# Patient Record
Sex: MALE | Race: WHITE | NOT HISPANIC OR LATINO | Employment: FULL TIME | URBAN - METROPOLITAN AREA
[De-identification: names, ages, dates, MRNs, and addresses within clinical notes are randomized per-mention and may not be internally consistent; named-entity substitution may affect disease eponyms.]

---

## 2017-10-23 ENCOUNTER — TRANSFERRED RECORDS (OUTPATIENT)
Dept: HEALTH INFORMATION MANAGEMENT | Facility: CLINIC | Age: 52
End: 2017-10-23

## 2017-10-31 ENCOUNTER — TRANSFERRED RECORDS (OUTPATIENT)
Dept: HEALTH INFORMATION MANAGEMENT | Facility: CLINIC | Age: 52
End: 2017-10-31

## 2017-12-01 DIAGNOSIS — N18.4 CKD (CHRONIC KIDNEY DISEASE) STAGE 4, GFR 15-29 ML/MIN (H): Primary | ICD-10-CM

## 2019-11-05 ENCOUNTER — APPOINTMENT (OUTPATIENT)
Age: 54
Setting detail: DERMATOLOGY
End: 2019-11-05

## 2019-11-05 DIAGNOSIS — L82.0 INFLAMED SEBORRHEIC KERATOSIS: ICD-10-CM

## 2019-11-05 DIAGNOSIS — L85.3 XEROSIS CUTIS: ICD-10-CM

## 2019-11-05 DIAGNOSIS — L82.1 OTHER SEBORRHEIC KERATOSIS: ICD-10-CM

## 2019-11-05 DIAGNOSIS — L29.8 OTHER PRURITUS: ICD-10-CM

## 2019-11-05 PROCEDURE — OTHER LIQUID NITROGEN: OTHER

## 2019-11-05 PROCEDURE — 17110 DESTRUCT B9 LESION 1-14: CPT

## 2019-11-05 PROCEDURE — 99213 OFFICE O/P EST LOW 20 MIN: CPT | Mod: 25

## 2019-11-05 PROCEDURE — OTHER COUNSELING: OTHER

## 2019-11-05 PROCEDURE — OTHER PRESCRIPTION: OTHER

## 2019-11-05 RX ORDER — FLUOCINONIDE 0.5 MG/G
CREAM TOPICAL
Qty: 1 | Refills: 0 | Status: ERX | COMMUNITY
Start: 2019-11-05

## 2019-11-05 ASSESSMENT — LOCATION SIMPLE DESCRIPTION DERM
LOCATION SIMPLE: RIGHT UPPER BACK
LOCATION SIMPLE: UPPER BACK
LOCATION SIMPLE: LEFT LOWER BACK

## 2019-11-05 ASSESSMENT — LOCATION DETAILED DESCRIPTION DERM
LOCATION DETAILED: LEFT SUPERIOR MEDIAL MIDBACK
LOCATION DETAILED: RIGHT SUPERIOR UPPER BACK
LOCATION DETAILED: INFERIOR THORACIC SPINE

## 2019-11-05 ASSESSMENT — LOCATION ZONE DERM: LOCATION ZONE: TRUNK

## 2019-11-05 NOTE — PROCEDURE: COUNSELING
Detail Level: Generalized
Detail Level: Zone
Detail Level: Detailed
Patient Specific Counseling (Will Not Stick From Patient To Patient): Samples of Eucerin and Cerave SA given to trial.

## 2019-11-05 NOTE — HPI: SKIN LESIONS
How Severe Is Your Skin Lesion?: moderate
Have Your Skin Lesions Been Treated?: not been treated
Is This A New Presentation, Or A Follow-Up?: Growths
Additional History: Itchy/ burning spot on back, no treatment. Few growths on legs, unsure what they are

## 2019-11-05 NOTE — PROCEDURE: LIQUID NITROGEN
Duration Of Freeze Thaw-Cycle (Seconds): 5-10
Render Note In Bullet Format When Appropriate: No
Post-Care Instructions: I reviewed with the patient in detail post-care instructions. Patient is to wear sunprotection, and avoid picking at any of the treated lesions. Pt may apply Vaseline to crusted or scabbing areas.
Medical Necessity Clause: This procedure was medically necessary because the lesions that were treated were:
Number Of Freeze-Thaw Cycles: 2 freeze-thaw cycles
Medical Necessity Information: It is in your best interest to select a reason for this procedure from the list below. All of these items fulfill various CMS LCD requirements except the new and changing color options.
Consent: The patient's consent was obtained including but not limited to risks of crusting, scabbing, blistering, scarring, darker or lighter pigmentary change, recurrence, incomplete removal and infection.
Detail Level: Simple

## 2020-10-21 ENCOUNTER — APPOINTMENT (OUTPATIENT)
Dept: URBAN - METROPOLITAN AREA CLINIC 257 | Age: 55
Setting detail: DERMATOLOGY
End: 2020-10-22

## 2020-10-21 DIAGNOSIS — D22 MELANOCYTIC NEVI: ICD-10-CM

## 2020-10-21 DIAGNOSIS — L57.8 OTHER SKIN CHANGES DUE TO CHRONIC EXPOSURE TO NONIONIZING RADIATION: ICD-10-CM

## 2020-10-21 DIAGNOSIS — L82.0 INFLAMED SEBORRHEIC KERATOSIS: ICD-10-CM

## 2020-10-21 DIAGNOSIS — B00.1 HERPESVIRAL VESICULAR DERMATITIS: ICD-10-CM

## 2020-10-21 PROBLEM — D22.5 MELANOCYTIC NEVI OF TRUNK: Status: ACTIVE | Noted: 2020-10-21

## 2020-10-21 PROBLEM — D48.5 NEOPLASM OF UNCERTAIN BEHAVIOR OF SKIN: Status: ACTIVE | Noted: 2020-10-21

## 2020-10-21 PROCEDURE — OTHER PRESCRIPTION: OTHER

## 2020-10-21 PROCEDURE — 11302 SHAVE SKIN LESION 1.1-2.0 CM: CPT

## 2020-10-21 PROCEDURE — OTHER COUNSELING: OTHER

## 2020-10-21 PROCEDURE — 99214 OFFICE O/P EST MOD 30 MIN: CPT | Mod: 25

## 2020-10-21 PROCEDURE — OTHER SHAVE REMOVAL: OTHER

## 2020-10-21 PROCEDURE — OTHER PATHOLOGY BILLING: OTHER

## 2020-10-21 PROCEDURE — 88305 TISSUE EXAM BY PATHOLOGIST: CPT

## 2020-10-21 RX ORDER — LIDOCAINE 4% 4 G/100G
GEL TOPICAL
Qty: 30 | Refills: 2 | Status: ERX | COMMUNITY
Start: 2020-10-21

## 2020-10-21 ASSESSMENT — LOCATION SIMPLE DESCRIPTION DERM
LOCATION SIMPLE: LEFT THIGH
LOCATION SIMPLE: RIGHT UPPER BACK
LOCATION SIMPLE: UPPER BACK

## 2020-10-21 ASSESSMENT — LOCATION DETAILED DESCRIPTION DERM
LOCATION DETAILED: LEFT ANTERIOR DISTAL THIGH
LOCATION DETAILED: RIGHT INFERIOR UPPER BACK
LOCATION DETAILED: INFERIOR THORACIC SPINE

## 2020-10-21 ASSESSMENT — LOCATION ZONE DERM
LOCATION ZONE: TRUNK
LOCATION ZONE: LEG

## 2020-10-21 NOTE — PROCEDURE: SHAVE REMOVAL
Anesthesia Volume In Cc: 0.6
Wound Care: Petrolatum
Bill 45461 For Specimen Handling/Conveyance To Laboratory?: no
Render Post-Care Instructions In Note?: yes
Biopsy Method: Dermablade
Notification Instructions: Patient will be notified of pathology results. However, patient instructed to call the office if not contacted within 2 weeks.
Detail Level: Detailed
Size Of Lesion In Cm (Required): 1.1
Consent was obtained from the patient. The risks and benefits to therapy were discussed in detail. Specifically, the risks of infection, scarring, bleeding, prolonged wound healing, incomplete removal, allergy to anesthesia, nerve injury and recurrence were addressed. Prior to the procedure, the treatment site was clearly identified and confirmed by the patient. All components of Universal Protocol/PAUSE Rule completed.
Anesthesia Type: 1% lidocaine with epinephrine
Post-Care Instructions: I reviewed with the patient in detail post-care instructions. Patient is to keep the biopsy site covered with Vaseline and bandage for 1 week
Hemostasis: Drysol
Path Notes (To The Dermatopathologist): Please check margins.
Billing Type: Third-Party Bill
X Size Of Lesion In Cm (Optional): 0
Medical Necessity Clause: This procedure was medically necessary because the lesion that was treated was:
Medical Necessity Information: It is in your best interest to select a reason for this procedure from the list below. All of these items fulfill various CMS LCD requirements except the new and changing color options.

## 2020-10-21 NOTE — PROCEDURE: PATHOLOGY BILLING
Immunohistochemistry ([id number] and [id number]) billing is not performed here. Please use the Immunohistochemistry Stain Billing plan to accomplish this. Immunohistochemistry ([id number] and [id number]) billing is not performed here. Please use the Immunohistochemistry Stain Billing plan to accomplish this.

## 2020-10-21 NOTE — HPI: FULL BODY SKIN EXAMINATION
How Severe Are Your Spot(S)?: moderate
What Type Of Note Output Would You Prefer (Optional)?: Bullet Format
What Is The Reason For Today's Visit?: Full Body Skin Examination with No Concerns
What Is The Reason For Today's Visit? (Being Monitored For X): the risk of recurrence of previously treated lesion(s)

## 2021-06-04 ENCOUNTER — APPOINTMENT (OUTPATIENT)
Dept: URBAN - METROPOLITAN AREA CLINIC 257 | Age: 56
Setting detail: DERMATOLOGY
End: 2021-06-04

## 2021-06-04 DIAGNOSIS — L57.8 OTHER SKIN CHANGES DUE TO CHRONIC EXPOSURE TO NONIONIZING RADIATION: ICD-10-CM

## 2021-06-04 DIAGNOSIS — L82.0 INFLAMED SEBORRHEIC KERATOSIS: ICD-10-CM

## 2021-06-04 DIAGNOSIS — L82.1 OTHER SEBORRHEIC KERATOSIS: ICD-10-CM

## 2021-06-04 PROBLEM — D48.5 NEOPLASM OF UNCERTAIN BEHAVIOR OF SKIN: Status: ACTIVE | Noted: 2021-06-04

## 2021-06-04 PROCEDURE — OTHER COUNSELING: OTHER

## 2021-06-04 PROCEDURE — 11306 SHAVE SKIN LESION 0.6-1.0 CM: CPT

## 2021-06-04 PROCEDURE — OTHER REASSURANCE: OTHER

## 2021-06-04 PROCEDURE — OTHER SHAVE REMOVAL: OTHER

## 2021-06-04 PROCEDURE — 99213 OFFICE O/P EST LOW 20 MIN: CPT | Mod: 25

## 2021-06-04 PROCEDURE — 17110 DESTRUCT B9 LESION 1-14: CPT | Mod: 59

## 2021-06-04 PROCEDURE — OTHER LIQUID NITROGEN: OTHER

## 2021-06-04 ASSESSMENT — LOCATION SIMPLE DESCRIPTION DERM
LOCATION SIMPLE: LEFT PRETIBIAL REGION
LOCATION SIMPLE: LEFT FOREARM
LOCATION SIMPLE: RIGHT THIGH
LOCATION SIMPLE: LEFT UPPER ARM
LOCATION SIMPLE: LEFT ANTERIOR NECK

## 2021-06-04 ASSESSMENT — LOCATION DETAILED DESCRIPTION DERM
LOCATION DETAILED: LEFT ANTERIOR DISTAL UPPER ARM
LOCATION DETAILED: LEFT PROXIMAL DORSAL FOREARM
LOCATION DETAILED: LEFT CLAVICULAR NECK
LOCATION DETAILED: RIGHT ANTERIOR DISTAL THIGH
LOCATION DETAILED: LEFT MEDIAL PROXIMAL PRETIBIAL REGION

## 2021-06-04 ASSESSMENT — LOCATION ZONE DERM
LOCATION ZONE: LEG
LOCATION ZONE: NECK
LOCATION ZONE: ARM

## 2021-06-04 NOTE — PROCEDURE: LIQUID NITROGEN
Medical Necessity Clause: This procedure was medically necessary because the lesions that were treated were:
Consent: The patient's consent was obtained including but not limited to risks of crusting, scabbing, blistering, scarring, darker or lighter pigmentary change, recurrence, incomplete removal and infection.
Post-Care Instructions: I reviewed with the patient in detail post-care instructions. Patient is to wear sunprotection, and avoid picking at any of the treated lesions. Pt may apply Vaseline to crusted or scabbing areas.
Detail Level: Generalized
Render Post-Care Instructions In Note?: no
Medical Necessity Information: It is in your best interest to select a reason for this procedure from the list below. All of these items fulfill various CMS LCD requirements except the new and changing color options.

## 2021-06-04 NOTE — PROCEDURE: SHAVE REMOVAL
Consent was obtained from the patient. The risks and benefits to therapy were discussed in detail. Specifically, the risks of infection, scarring, bleeding, prolonged wound healing, incomplete removal, allergy to anesthesia, nerve injury and recurrence were addressed. Prior to the procedure, the treatment site was clearly identified and confirmed by the patient. All components of Universal Protocol/PAUSE Rule completed.
Hemostasis: Drysol
Bill [id number] For Specimen Handling/Conveyance To Laboratory?: no
Biopsy Method: Dermablade
Path Notes (To The Dermatopathologist): Please check margins. atypical pigment network only on DermLite exam
Anesthesia Type: 1% lidocaine with epinephrine
Medical Necessity Clause: This procedure was medically necessary because the lesion that was treated was:
Medical Necessity Information: It is in your best interest to select a reason for this procedure from the list below. All of these items fulfill various CMS LCD requirements except the new and changing color options.
Notification Instructions: Patient will be notified of biopsy results. However, patient instructed to call the office if not contacted within 2 weeks.
X Size Of Lesion In Cm (Optional): 0
Wound Care: Petrolatum
Post-Care Instructions: I reviewed with the patient in detail post-care instructions. Patient is to keep the biopsy site dry overnight, and then apply bacitracin twice daily until healed. Patient may apply hydrogen peroxide soaks to remove any crusting.
Was A Bandage Applied: Yes
Detail Level: Detailed
Size Of Lesion In Cm (Required): 0.6
Billing Type: Third-Party Bill

## 2021-10-12 ENCOUNTER — LAB REQUISITION (OUTPATIENT)
Dept: LAB | Facility: CLINIC | Age: 56
End: 2021-10-12
Payer: COMMERCIAL

## 2021-10-12 PROCEDURE — 88312 SPECIAL STAINS GROUP 1: CPT | Mod: TC | Performed by: DENTIST

## 2022-06-24 ENCOUNTER — APPOINTMENT (OUTPATIENT)
Dept: MRI IMAGING | Facility: CLINIC | Age: 57
DRG: 459 | End: 2022-06-24
Attending: EMERGENCY MEDICINE
Payer: MEDICARE

## 2022-06-24 ENCOUNTER — HOSPITAL ENCOUNTER (INPATIENT)
Facility: CLINIC | Age: 57
LOS: 21 days | Discharge: NURSING, REHAB OR LONG-TERM CARE | DRG: 459 | End: 2022-07-15
Attending: EMERGENCY MEDICINE | Admitting: INTERNAL MEDICINE
Payer: MEDICARE

## 2022-06-24 ENCOUNTER — APPOINTMENT (OUTPATIENT)
Dept: MRI IMAGING | Facility: CLINIC | Age: 57
DRG: 459 | End: 2022-06-24
Attending: PHYSICIAN ASSISTANT
Payer: MEDICARE

## 2022-06-24 ENCOUNTER — APPOINTMENT (OUTPATIENT)
Dept: CT IMAGING | Facility: CLINIC | Age: 57
DRG: 459 | End: 2022-06-24
Attending: PHYSICIAN ASSISTANT
Payer: MEDICARE

## 2022-06-24 DIAGNOSIS — M46.40 DISCITIS, UNSPECIFIED SPINAL REGION: ICD-10-CM

## 2022-06-24 DIAGNOSIS — D63.1 ANEMIA OF CHRONIC RENAL FAILURE, UNSPECIFIED CKD STAGE: ICD-10-CM

## 2022-06-24 DIAGNOSIS — M47.14 DEGENERATIVE ARTHRITIS OF THORACIC SPINE WITH CORD COMPRESSION: ICD-10-CM

## 2022-06-24 DIAGNOSIS — N18.9 ANEMIA OF CHRONIC RENAL FAILURE, UNSPECIFIED CKD STAGE: ICD-10-CM

## 2022-06-24 LAB
ALBUMIN SERPL-MCNC: 3.3 G/DL (ref 3.4–5)
ALP SERPL-CCNC: 152 U/L (ref 40–150)
ALT SERPL W P-5'-P-CCNC: 55 U/L (ref 0–70)
ANION GAP SERPL CALCULATED.3IONS-SCNC: 14 MMOL/L (ref 3–14)
AST SERPL W P-5'-P-CCNC: 51 U/L (ref 0–45)
BASOPHILS # BLD AUTO: 0.1 10E3/UL (ref 0–0.2)
BASOPHILS NFR BLD AUTO: 1 %
BILIRUB SERPL-MCNC: 0.6 MG/DL (ref 0.2–1.3)
BUN SERPL-MCNC: 39 MG/DL (ref 7–30)
CALCIUM SERPL-MCNC: 9.9 MG/DL (ref 8.5–10.1)
CHLORIDE BLD-SCNC: 86 MMOL/L (ref 94–109)
CO2 SERPL-SCNC: 26 MMOL/L (ref 20–32)
CREAT SERPL-MCNC: 8.68 MG/DL (ref 0.66–1.25)
CRP SERPL-MCNC: 32.7 MG/L (ref 0–8)
EOSINOPHIL # BLD AUTO: 0.3 10E3/UL (ref 0–0.7)
EOSINOPHIL NFR BLD AUTO: 4 %
ERYTHROCYTE [DISTWIDTH] IN BLOOD BY AUTOMATED COUNT: 14.4 % (ref 10–15)
GFR SERPL CREATININE-BSD FRML MDRD: 7 ML/MIN/1.73M2
GLUCOSE BLD-MCNC: 144 MG/DL (ref 70–99)
HCT VFR BLD AUTO: 31.5 % (ref 40–53)
HGB BLD-MCNC: 10 G/DL (ref 13.3–17.7)
HOLD SPECIMEN: NORMAL
IMM GRANULOCYTES # BLD: 0 10E3/UL
IMM GRANULOCYTES NFR BLD: 1 %
LYMPHOCYTES # BLD AUTO: 1 10E3/UL (ref 0.8–5.3)
LYMPHOCYTES NFR BLD AUTO: 15 %
MCH RBC QN AUTO: 34.4 PG (ref 26.5–33)
MCHC RBC AUTO-ENTMCNC: 31.7 G/DL (ref 31.5–36.5)
MCV RBC AUTO: 108 FL (ref 78–100)
MONOCYTES # BLD AUTO: 0.5 10E3/UL (ref 0–1.3)
MONOCYTES NFR BLD AUTO: 7 %
NEUTROPHILS # BLD AUTO: 4.8 10E3/UL (ref 1.6–8.3)
NEUTROPHILS NFR BLD AUTO: 72 %
NRBC # BLD AUTO: 0 10E3/UL
NRBC BLD AUTO-RTO: 0 /100
PLATELET # BLD AUTO: 198 10E3/UL (ref 150–450)
POTASSIUM BLD-SCNC: 3.8 MMOL/L (ref 3.4–5.3)
PROT SERPL-MCNC: 7.3 G/DL (ref 6.8–8.8)
RBC # BLD AUTO: 2.91 10E6/UL (ref 4.4–5.9)
SARS-COV-2 RNA RESP QL NAA+PROBE: NEGATIVE
SODIUM SERPL-SCNC: 126 MMOL/L (ref 133–144)
WBC # BLD AUTO: 6.6 10E3/UL (ref 4–11)

## 2022-06-24 PROCEDURE — 255N000002 HC RX 255 OP 636: Performed by: EMERGENCY MEDICINE

## 2022-06-24 PROCEDURE — 96376 TX/PRO/DX INJ SAME DRUG ADON: CPT

## 2022-06-24 PROCEDURE — G1010 CDSM STANSON: HCPCS

## 2022-06-24 PROCEDURE — 250N000013 HC RX MED GY IP 250 OP 250 PS 637: Performed by: INTERNAL MEDICINE

## 2022-06-24 PROCEDURE — 250N000011 HC RX IP 250 OP 636: Performed by: EMERGENCY MEDICINE

## 2022-06-24 PROCEDURE — 36415 COLL VENOUS BLD VENIPUNCTURE: CPT | Performed by: EMERGENCY MEDICINE

## 2022-06-24 PROCEDURE — 99285 EMERGENCY DEPT VISIT HI MDM: CPT | Mod: 25

## 2022-06-24 PROCEDURE — 87040 BLOOD CULTURE FOR BACTERIA: CPT | Performed by: EMERGENCY MEDICINE

## 2022-06-24 PROCEDURE — 85025 COMPLETE CBC W/AUTO DIFF WBC: CPT | Performed by: EMERGENCY MEDICINE

## 2022-06-24 PROCEDURE — 99221 1ST HOSP IP/OBS SF/LOW 40: CPT | Performed by: PHYSICIAN ASSISTANT

## 2022-06-24 PROCEDURE — U0003 INFECTIOUS AGENT DETECTION BY NUCLEIC ACID (DNA OR RNA); SEVERE ACUTE RESPIRATORY SYNDROME CORONAVIRUS 2 (SARS-COV-2) (CORONAVIRUS DISEASE [COVID-19]), AMPLIFIED PROBE TECHNIQUE, MAKING USE OF HIGH THROUGHPUT TECHNOLOGIES AS DESCRIBED BY CMS-2020-01-R: HCPCS | Performed by: EMERGENCY MEDICINE

## 2022-06-24 PROCEDURE — A9585 GADOBUTROL INJECTION: HCPCS | Performed by: EMERGENCY MEDICINE

## 2022-06-24 PROCEDURE — 250N000011 HC RX IP 250 OP 636: Performed by: INTERNAL MEDICINE

## 2022-06-24 PROCEDURE — 72146 MRI CHEST SPINE W/O DYE: CPT | Mod: MG

## 2022-06-24 PROCEDURE — 86140 C-REACTIVE PROTEIN: CPT | Performed by: EMERGENCY MEDICINE

## 2022-06-24 PROCEDURE — 99223 1ST HOSP IP/OBS HIGH 75: CPT | Mod: AI | Performed by: INTERNAL MEDICINE

## 2022-06-24 PROCEDURE — 96375 TX/PRO/DX INJ NEW DRUG ADDON: CPT

## 2022-06-24 PROCEDURE — C9803 HOPD COVID-19 SPEC COLLECT: HCPCS

## 2022-06-24 PROCEDURE — 82040 ASSAY OF SERUM ALBUMIN: CPT | Performed by: EMERGENCY MEDICINE

## 2022-06-24 PROCEDURE — 96374 THER/PROPH/DIAG INJ IV PUSH: CPT | Mod: 59

## 2022-06-24 PROCEDURE — 80053 COMPREHEN METABOLIC PANEL: CPT | Performed by: EMERGENCY MEDICINE

## 2022-06-24 PROCEDURE — 120N000001 HC R&B MED SURG/OB

## 2022-06-24 RX ORDER — CYCLOBENZAPRINE HCL 10 MG
10 TABLET ORAL 3 TIMES DAILY PRN
Status: DISCONTINUED | OUTPATIENT
Start: 2022-06-24 | End: 2022-07-15 | Stop reason: HOSPADM

## 2022-06-24 RX ORDER — ALPRAZOLAM 0.25 MG
1 TABLET ORAL 2 TIMES DAILY PRN
Status: DISCONTINUED | OUTPATIENT
Start: 2022-06-24 | End: 2022-07-15 | Stop reason: HOSPADM

## 2022-06-24 RX ORDER — AMOXICILLIN 250 MG
2 CAPSULE ORAL 2 TIMES DAILY PRN
Status: DISCONTINUED | OUTPATIENT
Start: 2022-06-24 | End: 2022-07-01

## 2022-06-24 RX ORDER — AMMONIUM LACTATE 12 G/100G
CREAM TOPICAL 2 TIMES DAILY PRN
Status: ON HOLD | COMMUNITY
End: 2022-12-29

## 2022-06-24 RX ORDER — AMOXICILLIN 250 MG
1 CAPSULE ORAL 2 TIMES DAILY PRN
Status: DISCONTINUED | OUTPATIENT
Start: 2022-06-24 | End: 2022-07-01

## 2022-06-24 RX ORDER — NALOXONE HYDROCHLORIDE 0.4 MG/ML
0.2 INJECTION, SOLUTION INTRAMUSCULAR; INTRAVENOUS; SUBCUTANEOUS
Status: DISCONTINUED | OUTPATIENT
Start: 2022-06-24 | End: 2022-07-15 | Stop reason: HOSPADM

## 2022-06-24 RX ORDER — METHYLPREDNISOLONE SODIUM SUCCINATE 125 MG/2ML
125 INJECTION, POWDER, LYOPHILIZED, FOR SOLUTION INTRAMUSCULAR; INTRAVENOUS ONCE
Status: COMPLETED | OUTPATIENT
Start: 2022-06-24 | End: 2022-06-24

## 2022-06-24 RX ORDER — FINASTERIDE 5 MG/1
1.25 TABLET, FILM COATED ORAL DAILY
Status: DISCONTINUED | OUTPATIENT
Start: 2022-06-25 | End: 2022-07-15 | Stop reason: HOSPADM

## 2022-06-24 RX ORDER — MULTIVITAMIN WITH IRON
100 TABLET ORAL DAILY
Status: ON HOLD | COMMUNITY
End: 2022-12-29

## 2022-06-24 RX ORDER — LIDOCAINE 40 MG/G
CREAM TOPICAL
Status: DISCONTINUED | OUTPATIENT
Start: 2022-06-24 | End: 2022-07-07

## 2022-06-24 RX ORDER — OMEPRAZOLE 10 MG/1
20 CAPSULE, DELAYED RELEASE ORAL DAILY
COMMUNITY
End: 2022-06-24

## 2022-06-24 RX ORDER — GADOBUTROL 604.72 MG/ML
14 INJECTION INTRAVENOUS ONCE
Status: COMPLETED | OUTPATIENT
Start: 2022-06-24 | End: 2022-06-24

## 2022-06-24 RX ORDER — DIPHENHYDRAMINE HYDROCHLORIDE 50 MG/ML
50 INJECTION INTRAMUSCULAR; INTRAVENOUS
Status: DISCONTINUED | OUTPATIENT
Start: 2022-06-24 | End: 2022-06-24

## 2022-06-24 RX ORDER — POLYETHYLENE GLYCOL 3350 17 G/17G
17 POWDER, FOR SOLUTION ORAL DAILY PRN
Status: DISCONTINUED | OUTPATIENT
Start: 2022-06-24 | End: 2022-07-15 | Stop reason: HOSPADM

## 2022-06-24 RX ORDER — NALOXONE HYDROCHLORIDE 0.4 MG/ML
0.4 INJECTION, SOLUTION INTRAMUSCULAR; INTRAVENOUS; SUBCUTANEOUS
Status: DISCONTINUED | OUTPATIENT
Start: 2022-06-24 | End: 2022-07-15 | Stop reason: HOSPADM

## 2022-06-24 RX ORDER — PROCHLORPERAZINE 25 MG
25 SUPPOSITORY, RECTAL RECTAL EVERY 12 HOURS PRN
Status: DISCONTINUED | OUTPATIENT
Start: 2022-06-24 | End: 2022-07-15 | Stop reason: HOSPADM

## 2022-06-24 RX ORDER — FINASTERIDE 5 MG/1
1.25 TABLET, FILM COATED ORAL DAILY
COMMUNITY

## 2022-06-24 RX ORDER — DULOXETIN HYDROCHLORIDE 30 MG/1
30 CAPSULE, DELAYED RELEASE ORAL DAILY
COMMUNITY
End: 2022-08-04

## 2022-06-24 RX ORDER — VANCOMYCIN HYDROCHLORIDE 1 G/200ML
1000 INJECTION, SOLUTION INTRAVENOUS ONCE
Status: DISCONTINUED | OUTPATIENT
Start: 2022-06-24 | End: 2022-06-24

## 2022-06-24 RX ORDER — ATORVASTATIN CALCIUM 10 MG/1
10 TABLET, FILM COATED ORAL DAILY
COMMUNITY
End: 2022-06-24

## 2022-06-24 RX ORDER — UREA 10 %
500 LOTION (ML) TOPICAL DAILY
Status: ON HOLD | COMMUNITY
End: 2022-12-29

## 2022-06-24 RX ORDER — DULOXETIN HYDROCHLORIDE 30 MG/1
30 CAPSULE, DELAYED RELEASE ORAL DAILY
Status: DISCONTINUED | OUTPATIENT
Start: 2022-06-24 | End: 2022-07-15 | Stop reason: HOSPADM

## 2022-06-24 RX ORDER — MIDODRINE HYDROCHLORIDE 10 MG/1
10 TABLET ORAL DAILY PRN
Status: ON HOLD | COMMUNITY
End: 2022-07-15

## 2022-06-24 RX ORDER — HYDROMORPHONE HYDROCHLORIDE 1 MG/ML
0.5 INJECTION, SOLUTION INTRAMUSCULAR; INTRAVENOUS; SUBCUTANEOUS
Status: COMPLETED | OUTPATIENT
Start: 2022-06-24 | End: 2022-06-24

## 2022-06-24 RX ORDER — ATORVASTATIN CALCIUM 20 MG/1
20 TABLET, FILM COATED ORAL AT BEDTIME
Status: DISCONTINUED | OUTPATIENT
Start: 2022-06-24 | End: 2022-07-15 | Stop reason: HOSPADM

## 2022-06-24 RX ORDER — PIPERACILLIN SODIUM, TAZOBACTAM SODIUM 4; .5 G/20ML; G/20ML
4.5 INJECTION, POWDER, LYOPHILIZED, FOR SOLUTION INTRAVENOUS ONCE
Status: DISCONTINUED | OUTPATIENT
Start: 2022-06-24 | End: 2022-06-24

## 2022-06-24 RX ORDER — SERTRALINE HYDROCHLORIDE 100 MG/1
100 TABLET, FILM COATED ORAL DAILY
Status: ON HOLD | COMMUNITY
End: 2022-12-29

## 2022-06-24 RX ORDER — DULOXETIN HYDROCHLORIDE 20 MG/1
20 CAPSULE, DELAYED RELEASE ORAL 2 TIMES DAILY
COMMUNITY
End: 2022-06-24

## 2022-06-24 RX ORDER — METHYLPREDNISOLONE SODIUM SUCCINATE 125 MG/2ML
125 INJECTION, POWDER, LYOPHILIZED, FOR SOLUTION INTRAMUSCULAR; INTRAVENOUS
Status: DISCONTINUED | OUTPATIENT
Start: 2022-06-24 | End: 2022-06-24

## 2022-06-24 RX ORDER — SERTRALINE HYDROCHLORIDE 100 MG/1
100 TABLET, FILM COATED ORAL DAILY
Status: DISCONTINUED | OUTPATIENT
Start: 2022-06-25 | End: 2022-07-15 | Stop reason: HOSPADM

## 2022-06-24 RX ORDER — GABAPENTIN 300 MG/1
300 CAPSULE ORAL 3 TIMES DAILY
Status: DISCONTINUED | OUTPATIENT
Start: 2022-06-24 | End: 2022-07-05

## 2022-06-24 RX ORDER — BISACODYL 10 MG
10 SUPPOSITORY, RECTAL RECTAL DAILY PRN
Status: DISCONTINUED | OUTPATIENT
Start: 2022-06-24 | End: 2022-07-15 | Stop reason: HOSPADM

## 2022-06-24 RX ORDER — FLUTICASONE PROPIONATE 50 MCG
2 SPRAY, SUSPENSION (ML) NASAL DAILY PRN
Status: ON HOLD | COMMUNITY
End: 2022-12-29

## 2022-06-24 RX ORDER — PROCHLORPERAZINE MALEATE 10 MG
10 TABLET ORAL EVERY 6 HOURS PRN
Status: DISCONTINUED | OUTPATIENT
Start: 2022-06-24 | End: 2022-07-15 | Stop reason: HOSPADM

## 2022-06-24 RX ORDER — GABAPENTIN 300 MG/1
300 CAPSULE ORAL 3 TIMES DAILY
Status: ON HOLD | COMMUNITY
End: 2022-07-15

## 2022-06-24 RX ORDER — ALPRAZOLAM 1 MG
0.5 TABLET ORAL 2 TIMES DAILY PRN
Status: ON HOLD | COMMUNITY
End: 2022-12-29

## 2022-06-24 RX ORDER — HYDROMORPHONE HYDROCHLORIDE 1 MG/ML
0.5 INJECTION, SOLUTION INTRAMUSCULAR; INTRAVENOUS; SUBCUTANEOUS
Status: DISCONTINUED | OUTPATIENT
Start: 2022-06-24 | End: 2022-06-28

## 2022-06-24 RX ORDER — VIT B COMP NO.3/FOLIC/C/BIOTIN 1 MG-60 MG
1 TABLET ORAL DAILY
Status: ON HOLD | COMMUNITY
End: 2022-12-29

## 2022-06-24 RX ORDER — ATORVASTATIN CALCIUM 20 MG/1
20 TABLET, FILM COATED ORAL AT BEDTIME
Status: ON HOLD | COMMUNITY
End: 2022-12-29

## 2022-06-24 RX ORDER — CYCLOBENZAPRINE HCL 10 MG
10 TABLET ORAL 3 TIMES DAILY PRN
Status: ON HOLD | COMMUNITY
End: 2022-07-29

## 2022-06-24 RX ORDER — PIPERACILLIN SODIUM, TAZOBACTAM SODIUM 2; .25 G/10ML; G/10ML
2.25 INJECTION, POWDER, LYOPHILIZED, FOR SOLUTION INTRAVENOUS ONCE
Status: DISCONTINUED | OUTPATIENT
Start: 2022-06-24 | End: 2022-06-24

## 2022-06-24 RX ORDER — ONDANSETRON 4 MG/1
4 TABLET, ORALLY DISINTEGRATING ORAL EVERY 6 HOURS PRN
Status: DISCONTINUED | OUTPATIENT
Start: 2022-06-24 | End: 2022-07-15 | Stop reason: HOSPADM

## 2022-06-24 RX ORDER — ONDANSETRON 2 MG/ML
4 INJECTION INTRAMUSCULAR; INTRAVENOUS EVERY 6 HOURS PRN
Status: DISCONTINUED | OUTPATIENT
Start: 2022-06-24 | End: 2022-07-15 | Stop reason: HOSPADM

## 2022-06-24 RX ORDER — OXYCODONE HYDROCHLORIDE 5 MG/1
5 TABLET ORAL EVERY 4 HOURS PRN
Status: DISCONTINUED | OUTPATIENT
Start: 2022-06-24 | End: 2022-06-28

## 2022-06-24 RX ADMIN — HYDROMORPHONE HYDROCHLORIDE 0.5 MG: 1 INJECTION, SOLUTION INTRAMUSCULAR; INTRAVENOUS; SUBCUTANEOUS at 18:08

## 2022-06-24 RX ADMIN — HYDROMORPHONE HYDROCHLORIDE 0.5 MG: 1 INJECTION, SOLUTION INTRAMUSCULAR; INTRAVENOUS; SUBCUTANEOUS at 20:09

## 2022-06-24 RX ADMIN — HYDROMORPHONE HYDROCHLORIDE 0.5 MG: 1 INJECTION, SOLUTION INTRAMUSCULAR; INTRAVENOUS; SUBCUTANEOUS at 22:25

## 2022-06-24 RX ADMIN — HYDROMORPHONE HYDROCHLORIDE 0.5 MG: 1 INJECTION, SOLUTION INTRAMUSCULAR; INTRAVENOUS; SUBCUTANEOUS at 15:48

## 2022-06-24 RX ADMIN — DULOXETINE 30 MG: 30 CAPSULE, DELAYED RELEASE ORAL at 20:09

## 2022-06-24 RX ADMIN — HYDROMORPHONE HYDROCHLORIDE 0.5 MG: 1 INJECTION, SOLUTION INTRAMUSCULAR; INTRAVENOUS; SUBCUTANEOUS at 14:33

## 2022-06-24 RX ADMIN — HYDROMORPHONE HYDROCHLORIDE 0.5 MG: 1 INJECTION, SOLUTION INTRAMUSCULAR; INTRAVENOUS; SUBCUTANEOUS at 08:43

## 2022-06-24 RX ADMIN — ATORVASTATIN CALCIUM 20 MG: 20 TABLET, FILM COATED ORAL at 22:25

## 2022-06-24 RX ADMIN — CYCLOBENZAPRINE 10 MG: 10 TABLET, FILM COATED ORAL at 20:35

## 2022-06-24 RX ADMIN — GABAPENTIN 300 MG: 300 CAPSULE ORAL at 22:25

## 2022-06-24 RX ADMIN — METHYLPREDNISOLONE SODIUM SUCCINATE 125 MG: 125 INJECTION, POWDER, FOR SOLUTION INTRAMUSCULAR; INTRAVENOUS at 08:41

## 2022-06-24 RX ADMIN — GADOBUTROL 14 ML: 604.72 INJECTION INTRAVENOUS at 16:13

## 2022-06-24 ASSESSMENT — ACTIVITIES OF DAILY LIVING (ADL)
TOILETING: 1-->ASSISTANCE (EQUIPMENT/PERSON) NEEDED
DRESS: 1-->ASSISTANCE (EQUIPMENT/PERSON) NEEDED
TOILETING_ISSUES: YES
CONCENTRATING,_REMEMBERING_OR_MAKING_DECISIONS_DIFFICULTY: NO
ADLS_ACUITY_SCORE: 39
ADLS_ACUITY_SCORE: 44
DIFFICULTY_EATING/SWALLOWING: NO
TOILETING: 1-->ASSISTANCE (EQUIPMENT/PERSON) NEEDED (NOT DEVELOPMENTALLY APPROPRIATE)
ADLS_ACUITY_SCORE: 39
WALKING_OR_CLIMBING_STAIRS_DIFFICULTY: YES
WEAR_GLASSES_OR_BLIND: YES
VISION_MANAGEMENT: READING
FALL_HISTORY_WITHIN_LAST_SIX_MONTHS: YES
TRANSFERRING: 2-->COMPLETELY DEPENDENT
BATHING: 2-->COMPLETELY DEPENDENT (NOT DEVELOPMENTALLY APPROPRIATE)
TOILETING_ASSISTANCE: TOILETING DIFFICULTY, ASSISTANCE 1 PERSON
ADLS_ACUITY_SCORE: 44
ADLS_ACUITY_SCORE: 44
DOING_ERRANDS_INDEPENDENTLY_DIFFICULTY: YES
DRESS: 1-->ASSISTANCE (EQUIPMENT/PERSON) NEEDED (NOT DEVELOPMENTALLY APPROPRIATE)
NUMBER_OF_TIMES_PATIENT_HAS_FALLEN_WITHIN_LAST_SIX_MONTHS: 9
HEARING_DIFFICULTY_OR_DEAF: NO
EQUIPMENT_CURRENTLY_USED_AT_HOME: WALKER, ROLLING
DIFFICULTY_COMMUNICATING: NO
DRESSING/BATHING_DIFFICULTY: YES
DRESSING/BATHING: BATHING DIFFICULTY, ASSISTANCE 1 PERSON
WALKING_OR_CLIMBING_STAIRS: AMBULATION DIFFICULTY, REQUIRES EQUIPMENT
TRANSFERRING: 2-->COMPLETELY DEPENDENT (NOT DEVELOPMENTALLY APPROPRIATE)
CHANGE_IN_FUNCTIONAL_STATUS_SINCE_ONSET_OF_CURRENT_ILLNESS/INJURY: YES

## 2022-06-24 ASSESSMENT — ENCOUNTER SYMPTOMS
ABDOMINAL PAIN: 0
FEVER: 0
BACK PAIN: 1
CONSTIPATION: 1
NECK PAIN: 0
WEAKNESS: 1
NUMBNESS: 0
CHILLS: 0

## 2022-06-24 NOTE — ED NOTES
Bed: ED01  Expected date:   Expected time:   Means of arrival:   Comments:  723 57m back, leg pain ETA 0762

## 2022-06-24 NOTE — PROGRESS NOTES
Imaging reviewed myself as well as with ARIE and Dr. Boles   57 year old obesity and chronic renal failure with history of lower extremity weakness that has progressive x weeks. Has noted within last few days, cannot hold himself up 2/2 weakness and presented to ED.   Imaging as demonstrated below    MRI THORACIC SPINE WITHOUT CONTRAST  6/24/2022 12:44 PM      HISTORY: Severe upper back pain with leg weakness.     TECHNIQUE: Multiplanar multisequence MRI of the thoracic spine without  contrast.     COMPARISON: Thoracic spine MRI 6/6/2022, chest CT 2/18/2022     FINDINGS: Compression deformities involving T2 and T3 vertebral bodies  with severe disc height loss. Abnormal T1 hypointense T2 hyperintense  signal is present within the T2 and T3 vertebral bodies extending into  the bilateral pedicles and lamina/spinous processes. Abnormal T2  hyperintense signal within the T2-T3 disc space with erosions  involving the inferior endplate of T2 and superior endplate of T3.  Abnormal epidural fluid is present circumferentially encasing the  thoracic cord at T2-T3 extending inferiorly within the posterior  epidural space to approximately T4-T5, unchanged. Abnormal central  disc bulging and anterolisthesis of T2 on T3 contribute to severe  spinal canal stenosis with abnormal T2 hyperintense signal within the  thoracic cord at T2-T3, probably worsened compared to the prior exam.  Moderate to severe bilateral foraminal stenoses at T2-T3. Nonspecific  bilateral paraspinal, prevertebral, and posterior T2 hyperintense soft  tissue signal, similar compared to the prior exam.     Bone marrow demonstrates background of multiple presumed benign  intraosseous cavernous venous malformations. Scattered Schmorl's  nodes.      Moderate degenerative change is present with multiple small disc  protrusions contributing to mild spinal canal stenoses at multiple  levels. Multilevel facet arthropathy. Mild lower thoracic foraminal  stenoses.  Probable small bilateral pleural effusions.     Nonspecific degenerative changes and endplate irregularities are  present involving the partially visualized lower cervical spine with  multiple stenoses, incompletely characterized.                                                                      IMPRESSION:        1. Collapse of the T2 and T3 vertebral bodies with abnormal marrow  signal within the T2 and T3 vertebra and T2-T3 disc space with  epidural phlegmon/abscess and paraspinal inflammatory change,  concerning for discitis-osteomyelitis (consider contrast-enhanced  MRI). Superimposed acute on chronic fractures cannot be excluded  (consider CT correlation). Recommend spine surgery consultation.  2. Severe spinal canal stenosis at T2-T3 (probably slightly worsened)  with new abnormal cord signal suggestive of compressive myelopathy.  Infectious myelitis cannot be excluded.      Recommend:   -Flat bedrest until further imaging obtained  -Thoracic MRI w/wo contrast  -Thoracic CT w/o contrast   -Will formally consult  -Dr. Boles and ARIE in agreement     Gela NG [facility]  [address] [address]    Tel [phone]  Pager [phone]

## 2022-06-24 NOTE — PROGRESS NOTES
Patient A/Ox4, NPO, CMS : weakness in BLE, baseline Numbness/ tingling. Strict Bedrest.  Dialysis Fistula on LUE. Skin : BLE with rash/bumps, scabbed over both knees from the Falls, edema in both beet, Big callus on L Big toe, rash in the groin.

## 2022-06-24 NOTE — ED NOTES
"[facility]  ED Nurse Handoff Report    ED Chief complaint: Back Pain (Back pain \"for a long time\"  has back pain and unable to get out of chair this am and needs dialysis today.  Pt endstage renal faillure.  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 Has pain with movement in upper back. Pt has hx of renal failure on dialysis M, W, F has not had today  Focused Assessment:  ? Something on MRI in upper back area.  Pt  Hollers in pain with movement.  Given dilaudid with relief    Treatments and/or interventions provided: labs, MRI, dilaudid and solumendrol Blood cultures  Patient's response to treatments and/or interventions: pain lessened     To be done/followed up on inpatient unit:      Does this patient have any cognitive concerns?:     Activity level - Baseline/Home:  Stand with Assist  Activity Level - Current:   Total Care    Patient's Preferred language: English   Needed?: No    Isolation: None  Infection: Not Applicable  Patient tested for COVID 19 prior to admission: YES  Bariatric?: No    Vital Signs:   Vitals:    06/24/22 1000 06/24/22 1100 06/24/22 1120 06/24/22 1310   BP: 129/72 126/78 134/86 122/79   Pulse: 81 86 86 93   Resp: 16 18 18 16   Temp:       TempSrc:       SpO2: 97% 95% 93% 94%   Weight:       Height:           Cardiac Rhythm:     Was the PSS-3 completed:   Yes  What interventions are required if any?               Family Comments: none here  OBS brochure/video " discussed/provided to patient/family: N/A              Name of person given brochure if not patient:               Relationship to patient:     For the majority of the shift this patient's behavior was Green.   Behavioral interventions performed were .    ED NURSE PHONE NUMBER: [phone]

## 2022-06-24 NOTE — PROGRESS NOTES
RECEIVING UNIT ED HANDOFF REVIEW    ED Nurse Handoff Report was reviewed by: Queenie Urbano, RN on June 24, 2022 at 4:38 PM

## 2022-06-24 NOTE — H&P
Admitted: 06/24/2022    CHIEF COMPLAINT:  Upper back pain, generalized weakness and falls.    HISTORY OF PRESENT ILLNESS:  Shay Jimenez is a very pleasant 57-year-old male with a past medical history significant for hypertension, hyperlipidemia, end-stage renal disease, on hemodialysis, GERD, BPH, obesity, obstructive sleep apnea and depression, among other medical problems, who presents to the Emergency Room today for evaluation of back pain, generalized weakness and falls.  History is obtained per discussions with the patient, review of the medical record.    The patient states he first began to develop some upper back pain roughly 6 months ago, he noticed the pain seems to be exacerbated by activities such as coughing and sneezing.  He was hospitalized at [facility] in 2/2022 for shortness of breath secondary to volume overload.  A CTA of the chest was obtained during that stay and it was commented that there was abnormality in the thoracic spine suggestive of diskitis versus osteomyelitis.  The patient was recommended to follow up with the Ortho Spine team after discharge.  It sounds as though following that discharge, he may have been doing well for a period of time.  It is unclear exactly when he followed up with anyone.  Over the preceding past few weeks, he has noticed worsening of his upper back pain.  He has become increasingly weak.  He has had to use a cane to assist with ambulation.  He initially attributed this to his known chronic peripheral neuropathy that affects mostly his feet.  He had what sounds as though he ultimately saw an orthopedic provider who recommended some rest.  He then saw a different orthopedic provider who obtained an MRI and that showed findings concerning for fracture in the thoracic spine.  His symptoms have continued to worsen.  He describes severe weakness in his legs that is now precluded his ability to successfully ambulate.  He has fallen numerous times.  He says his legs  now feel like a rubber and he is no longer able to drive.  His wife is currently out of town and he has relied on his son to assist him.  He says at times his symptoms in his upper back pain is exacerbated by coughing.  He also reports occasional feelings of shakes and jolting in his legs.  He followed up with his orthopedic provider who recommended further evaluation with a CT and MRI, but given his inability to ambulate, he ultimately presented to the Emergency Room for further evaluation.  He has otherwise remained generally well with no other concurrent medical issues, he has been dialyzing per routine.  He was due to dialyze this morning and missed his session.  He has had no reports of fevers, chills, chest pain, shortness of breath or cough.  No changes in his bowel or bladder habits.  He has been taking all of his medications as prescribed.  He had just seen his neurologist last week for evaluation of his peripheral neuropathy, he has been using muscle relaxants as needed.    In the Emergency Room, he was seen and evaluated by Dr. De Oliveira.  He was afebrile and hemodynamically stable.  O2 sats were stable on room air.  He had significant weakness in his bilateral lower extremities.  Labs were notable for sodium of 126 and a creatinine of 8.6 in the context of his end-stage renal disease, on dialysis.  His white count was normal.  His CRP was elevated at 32.  His LFTs were generally stable.  His hemoglobin was low, but stable.  A blood culture was obtained.  Imaging of the spine was obtained including a thoracic and lumbar MRI spines without contrast, thoracic spine MRI without contrast showed findings of a collapse of the T2 and T3 vertebral bodies with abnormal marrow signal within the T2-T3 vertebra and T2-T3 disk space with epidural phlegmon abscess and paraspinal inflammatory changes concerning for diskitis and osteomyelitis.  There was also severe spinal canal stenosis at T2, T3, with new abnormal cord  signal suggestive of compressive myelopathy.  His lumbar imaging was generally unremarkable.  Neurosurgery was contacted and made aware of these findings.  They recommended further evaluation with a CT of the thoracic spine without contrast and MRI thoracic spine with and without contrast.  They are anticipating he may require some sort of surgical intervention this stay.  Antibiotics were deferred given potential plans for surgery and to obtain accurate intraoperative cultures.  He has been given some Dilaudid for pain.  When I saw him, he was otherwise comfortable appearing and had no other concerns at present.    PAST MEDICAL HISTORY:    1.  End-stage renal disease, on hemodialysis.  Dialyzes on a fistula in his left upper extremity.  Dialyzes Monday, Wednesday, .  2.  GERD.  3.  BPH.  4.  History of gout.  5.  History of hypertension.  He has been off any blood pressure medicines since early .  6.  Hyperlipidemia.  7.  Obesity.  BMI today is 43.  8.  Obstructive sleep apnea.  Does not utilize CPAP.  9.  Depression.  10.  History of chronic peripheral neuropathy.  Follows with [facility].     PAST SURGICAL HISTORY:  AV fistula formation, left upper extremity, ORIF of the left distal radius fracture in 2022, I and D of a strep infection after an ACL repair in the , bilateral knee replacements, hemorrhoidectomy, septoplasty, remote wisdom tooth extraction.    SOCIAL HISTORY:  The patient has a history of tobacco use. He drinks alcohol, reportedly 3-4 drinks per day.  He lives at home with his wife.  He typically does not require any assistive devices.     FAMILY HISTORY:  Mother reportedly has a history of fibromyalgia.  Father had a history of type 1 diabetes and  at the age of 35 from complications.  He has a brother with history of kidney disease.    HOME MEDICATIONS:   Prior to Admission medications    Medication Sig Last Dose Taking? Auth Provider Long Term End Date    ALPRAZolam (XANAX) 1 MG tablet Take 1 mg by mouth 2 times daily as needed for anxiety 6/23/2022 at Unknown time Yes Reported, Patient       ammonium lactate (AMLACTIN) 12 % external cream Apply topically daily Apply to bilateral lower legs 6/23/2022 at Unknown time Yes Unknown, Entered By History       atorvastatin (LIPITOR) 20 MG tablet Take 20 mg by mouth At Bedtime 6/23/2022 at Unknown time Yes Unknown, Entered By History No     cyanocobalamin (VITAMIN B-12) 500 MCG tablet Take 500 mcg by mouth daily Past Week at Unknown time Yes Unknown, Entered By History       cyclobenzaprine (FLEXERIL) 10 MG tablet Take 10 mg by mouth 3 times daily as needed for muscle spasms 6/24/2022 at Unknown time Yes Reported, Patient       DULoxetine (CYMBALTA) 30 MG capsule Take 30 mg by mouth daily 6/23/2022 at Unknown time Yes Unknown, Entered By History No     finasteride (PROSCAR) 5 MG tablet Take 1.25 mg by mouth daily Takes 1/4 of 5 mg daily 6/23/2022 at Unknown time Yes Unknown, Entered By History       fluticasone (FLONASE) 50 MCG/ACT nasal spray Spray 1 spray into both nostrils 2 times daily 6/24/2022 at am Yes Unknown, Entered By History       gabapentin (NEURONTIN) 300 MG capsule Take 300 mg by mouth 3 times daily 6/23/2022 at Unknown time Yes Reported, Patient Yes     midodrine (PROAMATINE) 10 MG tablet Take 10 mg by mouth daily as needed During dialysis if low bp Past Week at Unknown time Yes Unknown, Entered By History       multivitamin RENAL (RENAVITE RX/NEPHROVITE) 1 MG tablet Take 1 tablet by mouth daily 6/23/2022 at Unknown time Yes Unknown, Entered By History       omeprazole (PRILOSEC) 20 MG DR capsule Take 20 mg by mouth daily 6/24/2022 at Unknown time Yes Unknown, Entered By History       sertraline (ZOLOFT) 100 MG tablet Take 100 mg by mouth daily 6/24/2022 at Unknown time Yes Reported, Patient Yes     vitamin B6 (PYRIDOXINE) 100 MG tablet Take 100 mg by mouth daily 6/23/2022 at Unknown time Yes Unknown,  Entered By History             ALLERGIES:      1.  AMLODIPINE.  2.  LISINOPRIL.    REVIEW OF SYSTEMS:  Full 10-point review of systems was obtained and negative other stated per HPI.    PHYSICAL EXAMINATION:    VITAL SIGNS:  Temperature 97.1, pulse of 93, respirations 16, blood pressure 131/89, O2 sat 94% on room air.  GENERAL:  The patient is a well-nourished and well-developed male, appears stated age, alert, conversing appropriately, in no acute distress.  HEENT:  Pupils equal, round combinations were intact.  Mucous membranes are moist.  CARDIOVASCULAR:  Heart rhythm regular, no murmurs, gallops, or rubs.  Pulses are +2 and symmetric in bilateral lower extremities with significant +2 bilateral lower extremity edema.  He has an AV fistula in his left upper extremity with palpable thrill.  RESPIRATORY:  Lungs are clear to auscultation bilaterally.  No wheezes, rales or rhonchi.  No increased work of breathing assessed.  muscle use.  ABDOMEN:  Obese, soft, nontender, nondistended, positive bowel sounds throughout.  INTEGUMENTARY:   He has chronic venous stasis changes in his bilateral lower extremities otherwise warm.  No abnormal rashes, jaundice, or ecchymosis.  NEUROLOGIC:  Cranial nerves II-XII grossly he is alert and oriented x 3, conversing appropriately.  His cranial nerves II-XII grossly intact.  No focal motor or sensory deficits in bilateral upper extremities.  He has noted weakness in his bilateral lower extremities and is barely able to raise both legs off the bed.  He has decreased sensation in his feet bilaterally and findings are symmetric.  Gait was not assessed.    LABORATORY DATA AND IMAGING:  BMP shows sodium of 126, potassium of 3.8, creatinine of 8.68, a calcium of 9.9, glucose 144.  LFTs show AST of 51, ALT of 55, alkaline phosphatase 152, total bilirubin is 0.6.  CRP is 32.7.  CBC showed white count of 6.6, hemoglobin 10.0 and a platelet count of 198,000.  COVID-19 swab was negative.  Blood  cultures are pending.    MRI of the thoracic spine without contrast showed collapse of the T2 and T3 vertebral bodies with abnormal marrow signal within the T2 and T3 vertebrae in the T2-T3 disk space with epidural phlegmon abscess and paraspinal inflammatory changes concerning for diskitis and osteomyelitis as well as a severe spinal canal stenosis at T2, T3, with new abnormal cord signal suggestive of compressive myelopathy infectious myelopathy cannot be excluded.    MRI of the lumbar spine without contrast showed multilevel degenerative changes and no high-grade stenoses.    ASSESSMENT AND PLAN:  Shay Jimenez is a very pleasant 57-year-old male with past medical history significant for end-stage renal disease, on hemodialysis, chronic peripheral neuropathy affecting his feet, history of hypertension no longer on meds, hyperlipidemia, obesity, obstructive sleep apnea, GERD and depression, among other medical problems, who presents to Emergency Room today for evaluation of worsening upper back pain with associated generalized weakness and falls.  He was found to have abnormal findings on thoracic MRI concerning for diskitis with osteomyelitis with also some findings of a compressive myelopathy.  He is being admitted to the hospital today for ongoing evaluation and care.  1.  Upper back pain, generalized weakness and falls with abnormal findings of a T2 and T3 suggestive of diskitis, osteomyelitis and possible compressive myelopathy.  He initially began having upper back pain as long as 6 months ago.  On a routine CT of the chest obtained during a hospitalization at [facility] and 2/2022, he was noted to have abnormal findings on the upper thoracic spine concerning for diskitis or osteomyelitis.  In recent weeks, he developed worsening back pain and decreased mobility.  He had seen orthopedics and had an MRI that shows some concerning findings for a thoracic compression fracture.  His symptoms have continued to  progress.  He now reports his legs are weak.  He is falling frequently unable to ambulate.  He has otherwise been afebrile.  On imaging tonight, he has findings concerning for T2-T3 diskitis, osteomyelitis and compressive myelopathy.  Neurosurgery is following.  He will undergo further evaluation with a CT of the thoracic spine without contrast and MRI of the thoracic spine with and without contrast per neurosurgery recommendations.  Neurosurgery will follow up on these results to determine plan for possible surgical intervention.  He remains n.p.o. for now in the event that a more urgent surgical intervention is needed this evening.  Blood cultures were obtained.  Antibiotics were considered, but ultimately deferred per Neurosurgery recommendations so that if undergoes a surgical procedure, intraoperative cultures could be obtained.  Neurosurgery will continue to follow.  I have additionally placed a consult to Infectious Disease Service to help coordinate a plan moving forward.  He will eventually need PT and OT consults.  Tylenol, oxycodone and IV Dilaudid available as needed for pain.  2.  End-stage renal disease, on hemodialysis.  Typically dialyzes Monday, Wednesday, Friday.  Missed session this morning due to the need to present to the ED.  Nephrology consulted.  Continue management of dialysis.  3.  GERD.  Chronic and stable on PPI.  4.  History of hypertension, no longer needing blood pressure medicines.  Monitor blood pressures this stay.  5.  Hyperlipidemia.  Chronic and stable, on statin.  6.  Peripheral neuropathy of the feet.  Follows with Neurology.  Managed in part with duloxetine, which will be continued.  7.  Depression.  Chronic and stable on Zoloft.  8.  Obesity and obstructive sleep apnea.  BMI this stay is 40.  He does not use CPAP.  Encourage diet, lifestyle modifications once above issues addressed.  9.  History of BPH.    DVT prophylaxis: PCDs for now, would benefit from Lovenox but awaiting  plans per neurosurgery.    CODE STATUS:  Full code.  Discussed with him at bedside today.    Ellen Haq DO        D: 2022   T: 2022   MT: [id number]    Name:     SWAIT AGUIRRE MIKAELA  MRN:      [id number]        Account:     [id number]   :      1965           Admitted:    2022       Document: [id number]

## 2022-06-24 NOTE — PHARMACY-ADMISSION MEDICATION HISTORY
Pharmacy Medication History  Admission medication history interview status for the 6/24/2022  admission is complete. See EPIC admission navigator for prior to admission medications     Location of Interview: Patient room  Medication history sources: Patient and Surescripts    Significant changes made to the medication list:    In the past week, patient estimated taking medication this percent of the time: greater than 90%    Additional medication history information:     Medication reconciliation completed by provider prior to medication history? No    Time spent in this activity: 15 min    Prior to Admission medications    Medication Sig Last Dose Taking? Auth Provider Long Term End Date   ALPRAZolam (XANAX) 1 MG tablet Take 1 mg by mouth 2 times daily as needed for anxiety 6/23/2022 at Unknown time Yes Reported, Patient     ammonium lactate (AMLACTIN) 12 % external cream Apply topically daily Apply to bilateral lower legs 6/23/2022 at Unknown time Yes Unknown, Entered By History     atorvastatin (LIPITOR) 20 MG tablet Take 20 mg by mouth At Bedtime 6/23/2022 at Unknown time Yes Unknown, Entered By History No    cyanocobalamin (VITAMIN B-12) 500 MCG tablet Take 500 mcg by mouth daily Past Week at Unknown time Yes Unknown, Entered By History     cyclobenzaprine (FLEXERIL) 10 MG tablet Take 10 mg by mouth 3 times daily as needed for muscle spasms 6/24/2022 at Unknown time Yes Reported, Patient     DULoxetine (CYMBALTA) 30 MG capsule Take 30 mg by mouth daily 6/23/2022 at Unknown time Yes Unknown, Entered By History No    finasteride (PROSCAR) 5 MG tablet Take 1.25 mg by mouth daily Takes 1/4 of 5 mg daily 6/23/2022 at Unknown time Yes Unknown, Entered By History     fluticasone (FLONASE) 50 MCG/ACT nasal spray Spray 1 spray into both nostrils 2 times daily 6/24/2022 at am Yes Unknown, Entered By History     gabapentin (NEURONTIN) 300 MG capsule Take 300 mg by mouth 3 times daily 6/23/2022 at Unknown time Yes  Reported, Patient Yes    midodrine (PROAMATINE) 10 MG tablet Take 10 mg by mouth daily as needed During dialysis if low bp Past Week at Unknown time Yes Unknown, Entered By History     multivitamin RENAL (RENAVITE RX/NEPHROVITE) 1 MG tablet Take 1 tablet by mouth daily 6/23/2022 at Unknown time Yes Unknown, Entered By History     omeprazole (PRILOSEC) 20 MG DR capsule Take 20 mg by mouth daily 6/24/2022 at Unknown time Yes Unknown, Entered By History     sertraline (ZOLOFT) 100 MG tablet Take 100 mg by mouth daily 6/24/2022 at Unknown time Yes Reported, Patient Yes    vitamin B6 (PYRIDOXINE) 100 MG tablet Take 100 mg by mouth daily 6/23/2022 at Unknown time Yes Unknown, Entered By History         The information provided in this note is only as accurate as the sources available at the time of update(s)

## 2022-06-24 NOTE — H&P
[facility]    History and Physical - Hospitalist Service       Date of Admission:  6/24/2022  Dictation #: [id number]  Brief Summary (see dictation for more details):     56yo male presented to ED for upper back pain, generalized weakness, falls. Back pain started 6 mths ago. CTA obtained during hospitalization at [facility] in 2/2022 for volume overload showed findings of possible discitis. Was advised to follow up with ortho. Increased upper back pain, generalized bilateral LE weakness in recent weeks. Seen by ortho, had thoracic MRI that reported showed compression fracture of upper thoracic spine (don't have those reports in Epic). Bilateral LE weakness progressed, now with frequent falls and unable to ambulate.     Upper back pain, weakness, falls and abnl thoracic MRI with concern for possible discitis/osteomyelitis  * MRI thoracic spine w/o today showed  1. Collapse of the T2 and T3 vertebral bodies with abnormal marrow  signal within the T2 and T3 vertebra and T2-T3 disc space with  epidural phlegmon/abscess and paraspinal inflammatory change,  concerning for discitis-osteomyelitis   2. Severe spinal canal stenosis at T2-T3 (probably slightly worsened)   with new abnormal cord signal suggestive of compressive myelopathy.   Infectious myelitis cannot be excluded.     -- additional imaging studies ordered per neurosurgery including MRI thoracic spine w/wo contrast and CT thoracic spine w/o  -- neurosurgery consult -- will determine plan for surgical intervention, remains NPO for now  -- holding abx for now while awaiting plans for surgery/intraop culture, blood culture pending; ID consulted  -- prns for pain    ESRD on HD MWF -- nephrology consult  Peripheral neuropathy   Hx of hypertension, no longer on meds  HLP  GERD  Depression/Anxiety  GINI -- does not use CPAP  Obesity -- BMI 40 this stay    DVT ppx: PCDs  Code status: Full code    Clinically Significant Risk Factors Present on  "Admission         # Hyponatremia: Na = 126 mmol/L (Ref range: 133 - 144 mmol/L) on admission, will monitor as appropriate     # Hypoalbuminemia: Albumin = 3.3 g/dL (Ref range: 3.4 - 5.0 g/dL) on admission, will monitor as appropriate        # Severe Obesity: Estimated body mass index is 40.68 kg/m  as calculated from the following:    Height as of this encounter: 1.859 m (6' 1.2\").    Weight as of this encounter: 140.6 kg (310 lb).          Ellen Haq DO  Hospitalist Service  [facility]  Securely message with the Vocera Web Console (learn more here)  Text page via [employer] Paging/Directory       "

## 2022-06-24 NOTE — CONSULTS
[facility]    Neurosurgery Consultation     Date of Admission:  6/24/2022  Date of Consult (When I saw the patient): 06/24/22    Assessment & Plan   Shay Jimenez is a 57 year old male who was admitted on 6/24/2022. Shay Jimenez is a 57 year old male with history of chronic renal failure on dialysis, worsening upper thoracic pain since November/December, 2021 with new bilateral lower extremity weakness over the last 1 week with radiographic evidence as stated below. Patient provides history. Patient noted upper thoracic pain that started in November/December, 2021 that was aggravated with movement, coughing, sneezing and alleviated with resting. At that time, he denied associated radicular symptoms, paresthesias, new weakness. He was seen at [facility] 2-3 months ago and MRI was recommended however was denied without physical therapy. He participated in physical therapy and noted worsening of symptoms afterwards. He had MRI at [facility] 2-3 weeks ago that demonstrated fractures at T2-3. [facility] advised him to perform light duties and follow up in 2-3 months. He was recently seen at [facility] who recommended further imaging and CT scan. He was set to see them next Friday. He was able to walk with a cane or walker 1 week ago. He notes over the last 1 week he has noticed worsening lower extremity numbness and weakness. He notes he has numbness down entire distribution of bilateral lower extremities and weakness worse at bilateral hips and knees. He notes several falls over the last 1 week. He notes pain has worsened in his upper thoracic spine and is aggravated with movement, coughing or sneezing- admits to electric shocks down his back and lower extremities with coughing or sneezing. Pain is alleviated with medications, ice, biofreeze. Patient is overall frustrated with the long process it has taken him to get some answered. He is wanting surgical intervention as soon as possible for any relief  in his symptoms. He notes he takes warfarin on dialysis days Monday, Wednesday, fridays. Last dialysis and warfarin Wednesday 6/22.    Denies fever, chills, infectious s/s. Denies history of cancer.     MRI THORACIC SPINE WITHOUT CONTRAST  6/24/2022 12:44 PM   IMPRESSION:       1. Collapse of the T2 and T3 vertebral bodies with abnormal marrow  signal within the T2 and T3 vertebra and T2-T3 disc space with  epidural phlegmon/abscess and paraspinal inflammatory change,  concerning for discitis-osteomyelitis (consider contrast-enhanced  MRI). Superimposed acute on chronic fractures cannot be excluded  (consider CT correlation). Recommend spine surgery consultation.  2. Severe spinal canal stenosis at T2-T3 (probably slightly worsened)  with new abnormal cord signal suggestive of compressive myelopathy.  Infectious myelitis cannot be excluded.     Findings were discussed by phone between Dr. Dennis and Dr. De Oliveira  at 1:30 PM on 6/24/2022.    CT THORACIC SPINE WITHOUT CONTRAST   6/24/2022 3:02 PM                                        IMPRESSION:  1. Worsening collapse of the T2 and T3 vertebral bodies, worsening  facet degenerative/erosive changes, worsening endplate erosions,  worsening spondylolisthesis, and worsening stenoses compared to  2/18/2022 chest CT. Findings raise concern for discitis osteomyelitis  and septic facet arthritis.  2. No acute fracture margins are identified.     KATH DENNIS MD     Clinical history, imaging and plans reviewed myself as well as with Dr. Boles. Recommend thoracic MRI w/wo contrast, thoracic CT w/o contrast for further evaluation. Recommend AGAINST antibiotics at this time. Consult ID for further evaluation and recs. Final recommendations to come after final MRI and CT have been reviewed. In the meantime, patient should remain flat bedrest.     I have discussed the following assessment and plan with Dr. Boles who is in agreement with the initial plan and will follow up  with further consultation recommendations.    [facility]  [address]  [address]  [place] [address]    Tel [phone]  Pager [phone]    Code Status    No Order    Reason for Consult   Reason for consult: I was asked by Dr. Haq to evaluate this patient for imaging findings, BLE weakness.    Primary Care Physician   SAMANTHA GONZALEZ    Chief Complaint   Back pain, lower extremity weakness     History is obtained from the patient    History of Present Illness   Shay Jimenez is a 57 year old male with history of chronic renal failure on dialysis, worsening upper thoracic pain since November/December, 2021 with new bilateral lower extremity weakness over the last 1 week with radiographic evidence as stated below. Patient provides history. Patient noted upper thoracic pain that started in November/December, 2021 that was aggravated with movement, coughing, sneezing and alleviated with resting. At that time, he denied associated radicular symptoms, paresthesias, new weakness. He was seen at [facility] 2-3 months ago and MRI was recommended however was denied without physical therapy. He participated in physical therapy and noted worsening of symptoms afterwards. He had MRI at [facility] 2-3 weeks ago that demonstrated fractures at T2-3. [facility] advised him to perform light duties and follow up in 2-3 months. He was recently seen at [facility] who recommended further imaging and CT scan. He was set to see them next Friday. He was able to walk with a cane or walker 1 week ago. He notes over the last 1 week he has noticed worsening lower extremity numbness and weakness. He notes he has numbness down entire distribution of bilateral lower extremities and weakness worse at bilateral hips and knees. He notes several falls over the last 1 week. He notes pain has worsened in his upper thoracic spine and is aggravated with movement, coughing or sneezing-  admits to electric shocks down his back and lower extremities with coughing or sneezing. Pain is alleviated with medications, ice, biofreeze. Patient is overall frustrated with the long process it has taken him to get some answered. He is wanting surgical intervention as soon as possible for any relief in his symptoms. He notes he takes warfarin on dialysis days Monday, Wednesday, fridays. Last dialysis and warfarin Wednesday 6/22.    Denies fever, chills, infectious s/s. Denies history of cancer.     MRI THORACIC SPINE WITHOUT CONTRAST  6/24/2022 12:44 PM                                                             IMPRESSION:     1. Collapse of the T2 and T3 vertebral bodies with abnormal marrow  signal within the T2 and T3 vertebra and T2-T3 disc space with  epidural phlegmon/abscess and paraspinal inflammatory change,  concerning for discitis-osteomyelitis (consider contrast-enhanced  MRI). Superimposed acute on chronic fractures cannot be excluded  (consider CT correlation). Recommend spine surgery consultation.  2. Severe spinal canal stenosis at T2-T3 (probably slightly worsened)  with new abnormal cord signal suggestive of compressive myelopathy.  Infectious myelitis cannot be excluded.     Findings were discussed by phone between Dr. Dennis and Dr. De Oliveira  at 1:30 PM on 6/24/2022.    CT THORACIC SPINE WITHOUT CONTRAST   6/24/2022 3:02 PM                                                            IMPRESSION:  1. Worsening collapse of the T2 and T3 vertebral bodies, worsening  facet degenerative/erosive changes, worsening endplate erosions,  worsening spondylolisthesis, and worsening stenoses compared to  2/18/2022 chest CT. Findings raise concern for discitis osteomyelitis  and septic facet arthritis.  2. No acute fracture margins are identified.     KATH DENNIS MD     Past Medical History   I have reviewed this patient's medical history and updated it with pertinent information if needed.   Past  Medical History:   Diagnosis Date     Chronic kidney disease      Neuropathy        Past Surgical History   I have reviewed this patient's surgical history and updated it with pertinent information if needed.  No past surgical history on file.    Prior to Admission Medications   Prior to Admission Medications   Prescriptions Last Dose Informant Patient Reported? Taking?   ALPRAZolam (XANAX) 1 MG tablet 6/23/2022 at Unknown time  Yes Yes   Sig: Take 1 mg by mouth 2 times daily as needed for anxiety   DULoxetine (CYMBALTA) 30 MG capsule 6/23/2022 at Unknown time  Yes Yes   Sig: Take 30 mg by mouth daily   ammonium lactate (AMLACTIN) 12 % external cream 6/23/2022 at Unknown time  Yes Yes   Sig: Apply topically daily Apply to bilateral lower legs   atorvastatin (LIPITOR) 20 MG tablet 6/23/2022 at Unknown time  Yes Yes   Sig: Take 20 mg by mouth At Bedtime   cyanocobalamin (VITAMIN B-12) 500 MCG tablet Past Week at Unknown time  Yes Yes   Sig: Take 500 mcg by mouth daily   cyclobenzaprine (FLEXERIL) 10 MG tablet 6/24/2022 at Unknown time  Yes Yes   Sig: Take 10 mg by mouth 3 times daily as needed for muscle spasms   finasteride (PROSCAR) 5 MG tablet 6/23/2022 at Unknown time  Yes Yes   Sig: Take 1.25 mg by mouth daily Takes 1/4 of 5 mg daily   fluticasone (FLONASE) 50 MCG/ACT nasal spray 6/24/2022 at am  Yes Yes   Sig: Spray 1 spray into both nostrils 2 times daily   gabapentin (NEURONTIN) 300 MG capsule 6/23/2022 at Unknown time  Yes Yes   Sig: Take 300 mg by mouth 3 times daily   midodrine (PROAMATINE) 10 MG tablet Past Week at Unknown time  Yes Yes   Sig: Take 10 mg by mouth daily as needed During dialysis if low bp   multivitamin RENAL (RENAVITE RX/NEPHROVITE) 1 MG tablet 6/23/2022 at Unknown time  Yes Yes   Sig: Take 1 tablet by mouth daily   omeprazole (PRILOSEC) 20 MG DR capsule 6/24/2022 at Unknown time  Yes Yes   Sig: Take 20 mg by mouth daily   sertraline (ZOLOFT) 100 MG tablet 6/24/2022 at Unknown time  Yes  "Yes   Sig: Take 100 mg by mouth daily   vitamin B6 (PYRIDOXINE) 100 MG tablet 6/23/2022 at Unknown time  Yes Yes   Sig: Take 100 mg by mouth daily      Facility-Administered Medications: None     Allergies   Allergies   Allergen Reactions     Amlodipine      Lisinopril        Social History   I have reviewed this patient's social history and updated it with pertinent information if needed. Shay Jimenez      Family History   I have reviewed this patient's family history and updated it with pertinent information if needed.   No family history on file.    Review of Systems    ROS: 10 point ROS neg other than the symptoms noted above in the HPI.    Physical Exam   Temp: 97.1  F (36.2  C) Temp src: Temporal BP: 131/89 Pulse: 93   Resp: 16 SpO2: 94 % O2 Device: None (Room air)    Vital Signs with Ranges  Temp:  [97.1  F (36.2  C)] 97.1  F (36.2  C)  Pulse:  [81-93] 93  Resp:  [14-18] 16  BP: ()/(50-89) 131/89  SpO2:  [93 %-97 %] 94 %  310 lbs 0 oz     , Blood pressure 131/89, pulse 93, temperature 97.1  F (36.2  C), temperature source Temporal, resp. rate 16, height 6' 1.2\" (1.859 m), weight 310 lb (140.6 kg), SpO2 94 %.  310 lbs 0 oz    NEUROLOGICAL EXAMINATION:   Mental status:  Alert and Oriented x 3, speech is fluent.  Cranial nerves:  II-XII intact.   Motor:   Shoulder Abduction:  Right:  5/5   Left:  5/5  Biceps:                      Right:  5/5   Left:  5/5  Triceps:                     Right:  5/5   Left:  5/5  Wrist Extensors:       Right:  5/5   Left:  5/5  Wrist Flexors:           Right:  5/5   Left:  5/5  interosseus :            Right:  5/5   Left:  5/5    RLE: hip 3/5, knee flexion 3/5, knee extension 3/5, PF 5/5 DF 5/5   LLE: hip 2+/5, knee flexion 3/5, knee extension 3/5, PF 5/5 DF 5/5   Sensation:  Intact to light touch BUE. No sensation BL feet at baseline, decreased sensation from knee to ankle BLE to light touch.   Reflexes:     Negative Clonus.      Data   All new lab and imaging data was " personally reviewed by me.  MRI THORACIC SPINE WITHOUT CONTRAST  6/24/2022 12:44 PM      HISTORY: Severe upper back pain with leg weakness.     TECHNIQUE: Multiplanar multisequence MRI of the thoracic spine without  contrast.     COMPARISON: Thoracic spine MRI 6/6/2022, chest CT 2/18/2022     FINDINGS: Compression deformities involving T2 and T3 vertebral bodies  with severe disc height loss. Abnormal T1 hypointense T2 hyperintense  signal is present within the T2 and T3 vertebral bodies extending into  the bilateral pedicles and lamina/spinous processes. Abnormal T2  hyperintense signal within the T2-T3 disc space with erosions  involving the inferior endplate of T2 and superior endplate of T3.  Abnormal epidural fluid is present circumferentially encasing the  thoracic cord at T2-T3 extending inferiorly within the posterior  epidural space to approximately T4-T5, unchanged. Abnormal central  disc bulging and anterolisthesis of T2 on T3 contribute to severe  spinal canal stenosis with abnormal T2 hyperintense signal within the  thoracic cord at T2-T3, probably worsened compared to the prior exam.  Moderate to severe bilateral foraminal stenoses at T2-T3. Nonspecific  bilateral paraspinal, prevertebral, and posterior T2 hyperintense soft  tissue signal, similar compared to the prior exam.     Bone marrow demonstrates background of multiple presumed benign  intraosseous cavernous venous malformations. Scattered Schmorl's  nodes.      Moderate degenerative change is present with multiple small disc  protrusions contributing to mild spinal canal stenoses at multiple  levels. Multilevel facet arthropathy. Mild lower thoracic foraminal  stenoses. Probable small bilateral pleural effusions.     Nonspecific degenerative changes and endplate irregularities are  present involving the partially visualized lower cervical spine with  multiple stenoses, incompletely characterized.                                                                       IMPRESSION:       1. Collapse of the T2 and T3 vertebral bodies with abnormal marrow  signal within the T2 and T3 vertebra and T2-T3 disc space with  epidural phlegmon/abscess and paraspinal inflammatory change,  concerning for discitis-osteomyelitis (consider contrast-enhanced  MRI). Superimposed acute on chronic fractures cannot be excluded  (consider CT correlation). Recommend spine surgery consultation.  2. Severe spinal canal stenosis at T2-T3 (probably slightly worsened)  with new abnormal cord signal suggestive of compressive myelopathy.  Infectious myelitis cannot be excluded.     Findings were discussed by phone between Dr. Dennis and Dr. De Oliveira  at 1:30 PM on 6/24/2022.    CT THORACIC SPINE WITHOUT CONTRAST   6/24/2022 3:02 PM      HISTORY: Discitis verus acute on chronic fractures T2-T3.     TECHNIQUE: Axial images of the thoracic spine were obtained without  intravenous contrast. Multiplanar reformations were performed.   Radiation dose for this scan was reduced using automated exposure  control, adjustment of the mA and/or kV according to patient size, or  iterative reconstruction technique.     COMPARISON: Same day thoracic spine MRI, chest CT 2/18/2022.     FINDINGS: Worsening collapse of the T2 and T3 vertebral bodies with  endplate erosions adjacent to the irregular T2-T3 disc space, worsened  compared to the prior chest CT. There is increased grade 1 (borderline  grade 2) anterolisthesis of T2 on T3 with worsening severe bilateral  foraminal stenoses and worsening severe spinal canal stenosis. Area of  probable ankylosis across the T2-3 disc space Severe bilateral facet  arthropathy with facet joint erosions, worsened compared to the prior  exam. Known epidural process is poorly appreciated by CT.  Paraspinal/prevertebral inflammatory change/phlegmon noted.     No acute fracture margins are identified. Moderate degenerative change  is present. Multiple presumed benign  intraosseous cavernous venous  malformations. Moderate degenerative change.     Presumed scattered pulmonary atelectasis. Bilateral renal atrophy with  multiple nonspecific renal lesions.                                                                      IMPRESSION:  1. Worsening collapse of the T2 and T3 vertebral bodies, worsening  facet degenerative/erosive changes, worsening endplate erosions,  worsening spondylolisthesis, and worsening stenoses compared to  2/18/2022 chest CT. Findings raise concern for discitis osteomyelitis  and septic facet arthritis.  2. No acute fracture margins are identified.     KATH BETTENCOURT MD   CBC RESULTS:   Recent Labs   Lab Test 06/24/22 0818   WBC 6.6   RBC 2.91*   HGB 10.0*   HCT 31.5*   *   MCH 34.4*   MCHC 31.7   RDW 14.4        Basic Metabolic Panel:  Lab Results   Component Value Date     06/24/2022      Lab Results   Component Value Date    POTASSIUM 3.8 06/24/2022     Lab Results   Component Value Date    CHLORIDE 86 06/24/2022     Lab Results   Component Value Date    MAC 9.9 06/24/2022     Lab Results   Component Value Date    CO2 26 06/24/2022     Lab Results   Component Value Date    BUN 39 06/24/2022     Lab Results   Component Value Date    CR 8.68 06/24/2022     Lab Results   Component Value Date     06/24/2022     INR:  No results found for: INR

## 2022-06-24 NOTE — ED PROVIDER NOTES
"  History   Chief Complaint:  Back pain   The history is provided by the patient.      Shay Jimenez is a 57 year old male with history of CKD, reflux, hyperlipemia, and hypertension who presents with back pain. The patient reports that he has been experiencing upper back pain between his shoulder blades for a while. He notes he has T1-T2 compression vertebrae fractures and is supposed to see a provider next week. He reports coughs exacerbates the pain. He states he cannot sit up on his own due to the pain. He notes that over the past 2 days he has suffered 5 falls. He endorses leg weakness for the past week that caused him to fall yesterday. He reportedly suffered some scrapes on his knees due to the fall. His son had to help him up which he notes was difficult. The patient reports no fall today. He states he began using a walker a few weeks ago when he began having the gait issues.The patient notes that he had to use a cane when he went to his thoracic MRI appointment at [facility] in [place] two weeks ago. He reports they said he had degeneration throughout his spine. He reports the falling and leg weakness began after this MRI. He also endorses pain with movement that has worsened since the MRI. This past week he states he saw [facility] and was told by his provider that he should get a repeat MRI and CAT scan. He reports that he went to his PCP and they prescribed him Flexeril. He endorses constipation. He reports his \"blood pressure is low sometimes.\" He reports his leg swelling is normal for him.    The patient notes he was lifting weights a couple of weeks ago and felt fine. He denies use of pain medications. He endorses use of Lipitor, but no other prescribed medications. He states he is not a diabetic. He reports he was placed on dialysis due to calcium phosphate deposits. Patient reportedly gets his dialysis treatments on Monday, Wednesday, and Friday. He missed his dialysis appointment today " "that was scheduled for 0700. At this time, he reports no neck pain, perianal numbness, fever, chills, or abdominal pain.    Review of Systems   Constitutional: Negative for chills and fever.   Cardiovascular: Positive for leg swelling.   Gastrointestinal: Positive for constipation. Negative for abdominal pain.   Musculoskeletal: Positive for back pain (upper). Negative for neck pain.   Neurological: Positive for weakness (legs). Negative for numbness.   All other systems reviewed and are negative.    Allergies:  Amlodipine  Lisinopril    Medications:  Lipitor    Past Medical History:     CKD  Hypertension   Sleep apnea  Reflux  Hyperlipidemia    Past Surgical History:    Right ACL reconstruction  Colonoscopy x2    Family History:    Father: diabetes, kidney disease  Mother: type 1 diabetes     Social History:  The patient presents to the ED alone  PCP: Jonathan Travis MD    Arrives via EMS  The patient's wife lives with him, but she's leaving for [place]. He states his son is coming over to help him.     Physical Exam     Patient Vitals for the past 24 hrs:   BP Temp Temp src Pulse Resp SpO2 Height Weight   06/24/22 1430 131/89 -- -- -- 16 94 % -- --   06/24/22 1310 122/79 -- -- 93 16 94 % -- --   06/24/22 1120 134/86 -- -- 86 18 93 % -- --   06/24/22 1100 126/78 -- -- 86 18 95 % -- --   06/24/22 1000 129/72 -- -- 81 16 97 % -- --   06/24/22 0900 106/68 -- -- 83 14 95 % -- --   06/24/22 0800 (!) 82/50 -- -- 86 18 96 % -- --   06/24/22 0757 91/56 97.1  F (36.2  C) Temporal 82 16 95 % 1.859 m (6' 1.2\") 140.6 kg (310 lb)     Physical Exam  Constitutional: Heavy set, White male. Supine.    HENT: No signs of trauma.   Eyes: EOM are normal. Pupils are equal, round, and reactive to light.   Neck: Normal range of motion. No JVD present. No cervical adenopathy. No posterior midline tenderness  Cardiovascular: Regular rhythm.  Exam reveals no gallop and no friction rub.    No murmur heard.  Pulmonary/Chest: Bilateral breath " sounds normal. No wheezes, rhonchi or rales. No respiratory distress.   Abdominal: Soft. No tenderness. No rebound or guarding. 2+ femoral pulses.   Musculoskeletal: Trace edema, venous changes stasis. Tenderness on palpation of upper thoracic spine. No tenderness of lumbar spine. Negative straight leg raising. Fistula left arm with palpable pulse and thrill.   Lymphadenopathy: No lymphadenopathy.   Neurological: Alert and oriented to person, place, and time. Normal strength upper extremities. Weakness lower extremities, 3/5 strength. Sensation diminished to light touch. Trace knee jerk and ankle jerk bilaterally.   Skin: Skin is warm and dry. No rash noted. No erythema.     Emergency Department Course     Imaging:  MR Thoracic Spine w/o & w Contrast   Final Result   IMPRESSION: Postcontrast images demonstrate abnormal contrast   enhancement of the T2 and T3 vertebrae, thickened abnormal epidural   tissues from C7-T1 down to T4-T5 possibly simply representing inflamed   epidural tissues but epidural abscess cannot be excluded. Abnormal   contrast enhancement in the bilateral lateral and anterior paraspinous   soft tissues from T1 down to T4 consistent with a paraspinous   phlegmon/abscess again noted. Moderate compression of the thoracic   spinal cord at the upper T2 level again noted.       WILLIAM FREDERICK MD            SYSTEM ID:  [id number]      CT Thoracic Spine w/o Contrast   Final Result   IMPRESSION:   1. Worsening collapse of the T2 and T3 vertebral bodies, worsening   facet degenerative/erosive changes, worsening endplate erosions,   worsening spondylolisthesis, and worsening stenoses compared to   2/18/2022 chest CT. Findings raise concern for discitis osteomyelitis   and septic facet arthritis.   2. No acute fracture margins are identified.      KATH BETTENCOURT MD            SYSTEM ID:  [id number]      Lumbar spine MRI w/o contrast   Final Result   IMPRESSION:     1. Multilevel degenerative change as  detailed.   2. No high-grade stenoses.       KATH DENNIS MD            SYSTEM ID:  [id number]      Thoracic spine MRI w/o contrast   Final Result   IMPRESSION:        1. Collapse of the T2 and T3 vertebral bodies with abnormal marrow   signal within the T2 and T3 vertebra and T2-T3 disc space with   epidural phlegmon/abscess and paraspinal inflammatory change,   concerning for discitis-osteomyelitis (consider contrast-enhanced   MRI). Superimposed acute on chronic fractures cannot be excluded   (consider CT correlation). Recommend spine surgery consultation.   2. Severe spinal canal stenosis at T2-T3 (probably slightly worsened)   with new abnormal cord signal suggestive of compressive myelopathy.   Infectious myelitis cannot be excluded.      Findings were discussed by phone between Dr. Dennis and Dr. De Oliveira   at 1:30 PM on 6/24/2022.      KATH DENNIS MD            SYSTEM ID:  [id number]        Report per radiology    Laboratory:  Labs Ordered and Resulted from Time of ED Arrival to Time of ED Departure   COMPREHENSIVE METABOLIC PANEL - Abnormal       Result Value    Sodium 126 (*)     Potassium 3.8      Chloride 86 (*)     Carbon Dioxide (CO2) 26      Anion Gap 14      Urea Nitrogen 39 (*)     Creatinine 8.68 (*)     Calcium 9.9      Glucose 144 (*)     Alkaline Phosphatase 152 (*)     AST 51 (*)     ALT 55      Protein Total 7.3      Albumin 3.3 (*)     Bilirubin Total 0.6      GFR Estimate 7 (*)    CRP INFLAMMATION - Abnormal    CRP Inflammation 32.7 (*)    CBC WITH PLATELETS AND DIFFERENTIAL - Abnormal    WBC Count 6.6      RBC Count 2.91 (*)     Hemoglobin 10.0 (*)     Hematocrit 31.5 (*)      (*)     MCH 34.4 (*)     MCHC 31.7      RDW 14.4      Platelet Count 198      % Neutrophils 72      % Lymphocytes 15      % Monocytes 7      % Eosinophils 4      % Basophils 1      % Immature Granulocytes 1      NRBCs per 100 WBC 0      Absolute Neutrophils 4.8      Absolute Lymphocytes 1.0      Absolute  Monocytes 0.5      Absolute Eosinophils 0.3      Absolute Basophils 0.1      Absolute Immature Granulocytes 0.0      Absolute NRBCs 0.0     COVID-19 VIRUS (CORONAVIRUS) BY PCR - Normal    SARS CoV2 PCR Negative     BLOOD CULTURE   BLOOD CULTURE     Emergency Department Course:     Reviewed:  I reviewed nursing notes, vitals, past medical history and Care Everywhere    Assessments:  1089 I obtained history and examined the patient as noted above.   1321 I rechecked the patient and explained findings.     Consults:  8910 I spoke with Dr. Ellen Haq of the hospitalist service, who agreed to admit the patient.    Interventions:  Medications   HYDROmorphone (PF) (DILAUDID) injection 0.5 mg (0.5 mg Intravenous Given 6/24/22 1548)   methylPREDNISolone sodium succinate (solu-MEDROL) injection 125 mg (125 mg Intravenous Given 6/24/22 0841)   gadobutrol (GADAVIST) injection 14 mL (14 mLs Intravenous Given 6/24/22 1613)     Disposition:  The patient was admitted to the hospital under the care of Dr. Haq.     Impression & Plan     Medical Decision Making:  Shay Jimenez is a 57 year old male who presents to the ED with problems walking and upper spine pain. The patient has had this for several weeks. He is a dialysis patient. He denies fever or shaking chills. He denies any trauma to that area. He initially went to [facility] and saw a PA there who told him he needed to rest. He then went to [facility] where they ordered an MRI and this showed some upper thoracic compression type injuries. Patient had another follow up appointment but he has increasing weakness in his legs and difficulty walking to the point that he is falling all the time. He does not have any bowel or bladder incontinence fortunately and no perianal numbness. He was supposed to go back today to get these additional studies, but he could not make it. On exam, he has tenderness over his upper thoracic spine. He has weakness in his legs, he can lift them  off the bed but only a few inches. Reflexes are diminished and sensation is slightly decreased in feet. I did the MRI of thoracic spine and the lumbar spine. There are abnormalities in T2-3 with signs consistent for osteomyelitis, disc infection, and cord compression. Labs have been done which show an elevated CRP and also has been consistent with his chronic renal failure. I have spoken with the hospitalist as well as neurosurgery. We will obtain blood cultures. Neurosurgery requested additional CT of the thoracic spine as well as MR with contrast. Patient will be admitted for further evaluation and treatment. We have decided to hold off on antibiotics at this point so surgical specimens could be obtained.      Diagnosis:    ICD-10-CM    1. 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 Discitis, unspecified spinal region  M46.40    3. Anemia of chronic renal failure, unspecified CKD stage  N18.9     D63.1      Scribe Disclosure:  River OATES, am serving as a scribe at 7:59 AM on 6/24/2022 to document services personally performed by Robbi De Oliveira MD based on my observations and the provider's statements to me.            Robbi De Oliveira MD  06/25/22 0818

## 2022-06-25 LAB
ANION GAP SERPL CALCULATED.3IONS-SCNC: 11 MMOL/L (ref 3–14)
BUN SERPL-MCNC: 57 MG/DL (ref 7–30)
CALCIUM SERPL-MCNC: 9.3 MG/DL (ref 8.5–10.1)
CHLORIDE BLD-SCNC: 86 MMOL/L (ref 94–109)
CO2 SERPL-SCNC: 27 MMOL/L (ref 20–32)
CREAT SERPL-MCNC: 10.1 MG/DL (ref 0.66–1.25)
ERYTHROCYTE [DISTWIDTH] IN BLOOD BY AUTOMATED COUNT: 13.5 % (ref 10–15)
GFR SERPL CREATININE-BSD FRML MDRD: 5 ML/MIN/1.73M2
GLUCOSE BLD-MCNC: 153 MG/DL (ref 70–99)
HCT VFR BLD AUTO: 32.4 % (ref 40–53)
HGB BLD-MCNC: 10.8 G/DL (ref 13.3–17.7)
MCH RBC QN AUTO: 34.5 PG (ref 26.5–33)
MCHC RBC AUTO-ENTMCNC: 33.3 G/DL (ref 31.5–36.5)
MCV RBC AUTO: 104 FL (ref 78–100)
PLATELET # BLD AUTO: 214 10E3/UL (ref 150–450)
POTASSIUM BLD-SCNC: 5.1 MMOL/L (ref 3.4–5.3)
RBC # BLD AUTO: 3.13 10E6/UL (ref 4.4–5.9)
SODIUM SERPL-SCNC: 124 MMOL/L (ref 133–144)
WBC # BLD AUTO: 10.2 10E3/UL (ref 4–11)

## 2022-06-25 PROCEDURE — 5A1D70Z PERFORMANCE OF URINARY FILTRATION, INTERMITTENT, LESS THAN 6 HOURS PER DAY: ICD-10-PCS | Performed by: INTERNAL MEDICINE

## 2022-06-25 PROCEDURE — 99222 1ST HOSP IP/OBS MODERATE 55: CPT | Performed by: INTERNAL MEDICINE

## 2022-06-25 PROCEDURE — 258N000003 HC RX IP 258 OP 636: Performed by: INTERNAL MEDICINE

## 2022-06-25 PROCEDURE — 80048 BASIC METABOLIC PNL TOTAL CA: CPT | Performed by: INTERNAL MEDICINE

## 2022-06-25 PROCEDURE — 634N000001 HC RX 634: Performed by: INTERNAL MEDICINE

## 2022-06-25 PROCEDURE — 36415 COLL VENOUS BLD VENIPUNCTURE: CPT | Performed by: INTERNAL MEDICINE

## 2022-06-25 PROCEDURE — 120N000001 HC R&B MED SURG/OB

## 2022-06-25 PROCEDURE — 85027 COMPLETE CBC AUTOMATED: CPT | Performed by: INTERNAL MEDICINE

## 2022-06-25 PROCEDURE — 87340 HEPATITIS B SURFACE AG IA: CPT | Performed by: INTERNAL MEDICINE

## 2022-06-25 PROCEDURE — 250N000013 HC RX MED GY IP 250 OP 250 PS 637: Performed by: INTERNAL MEDICINE

## 2022-06-25 PROCEDURE — 250N000011 HC RX IP 250 OP 636: Performed by: INTERNAL MEDICINE

## 2022-06-25 PROCEDURE — 86704 HEP B CORE ANTIBODY TOTAL: CPT | Performed by: INTERNAL MEDICINE

## 2022-06-25 PROCEDURE — 90937 HEMODIALYSIS REPEATED EVAL: CPT

## 2022-06-25 PROCEDURE — 86706 HEP B SURFACE ANTIBODY: CPT | Performed by: INTERNAL MEDICINE

## 2022-06-25 PROCEDURE — 99233 SBSQ HOSP IP/OBS HIGH 50: CPT | Performed by: STUDENT IN AN ORGANIZED HEALTH CARE EDUCATION/TRAINING PROGRAM

## 2022-06-25 RX ORDER — MIDODRINE HYDROCHLORIDE 5 MG/1
10 TABLET ORAL ONCE
Status: COMPLETED | OUTPATIENT
Start: 2022-06-25 | End: 2022-06-25

## 2022-06-25 RX ORDER — MIDODRINE HYDROCHLORIDE 5 MG/1
10 TABLET ORAL
Status: DISCONTINUED | OUTPATIENT
Start: 2022-06-25 | End: 2022-06-26

## 2022-06-25 RX ADMIN — HYDROMORPHONE HYDROCHLORIDE 0.5 MG: 1 INJECTION, SOLUTION INTRAMUSCULAR; INTRAVENOUS; SUBCUTANEOUS at 23:34

## 2022-06-25 RX ADMIN — HYDROMORPHONE HYDROCHLORIDE 0.5 MG: 1 INJECTION, SOLUTION INTRAMUSCULAR; INTRAVENOUS; SUBCUTANEOUS at 16:30

## 2022-06-25 RX ADMIN — HYDROMORPHONE HYDROCHLORIDE 0.5 MG: 1 INJECTION, SOLUTION INTRAMUSCULAR; INTRAVENOUS; SUBCUTANEOUS at 14:32

## 2022-06-25 RX ADMIN — FINASTERIDE 1.25 MG: 5 TABLET, FILM COATED ORAL at 08:45

## 2022-06-25 RX ADMIN — GABAPENTIN 300 MG: 300 CAPSULE ORAL at 16:29

## 2022-06-25 RX ADMIN — SODIUM CHLORIDE 300 ML: 9 INJECTION, SOLUTION INTRAVENOUS at 12:31

## 2022-06-25 RX ADMIN — HYDROMORPHONE HYDROCHLORIDE 0.5 MG: 1 INJECTION, SOLUTION INTRAMUSCULAR; INTRAVENOUS; SUBCUTANEOUS at 10:49

## 2022-06-25 RX ADMIN — HYDROMORPHONE HYDROCHLORIDE 0.5 MG: 1 INJECTION, SOLUTION INTRAMUSCULAR; INTRAVENOUS; SUBCUTANEOUS at 20:43

## 2022-06-25 RX ADMIN — SERTRALINE HYDROCHLORIDE 100 MG: 100 TABLET ORAL at 08:46

## 2022-06-25 RX ADMIN — HYDROMORPHONE HYDROCHLORIDE 0.5 MG: 1 INJECTION, SOLUTION INTRAMUSCULAR; INTRAVENOUS; SUBCUTANEOUS at 08:56

## 2022-06-25 RX ADMIN — CYCLOBENZAPRINE 10 MG: 10 TABLET, FILM COATED ORAL at 22:35

## 2022-06-25 RX ADMIN — EPOETIN ALFA-EPBX 1000 UNITS: 2000 INJECTION, SOLUTION INTRAVENOUS; SUBCUTANEOUS at 12:30

## 2022-06-25 RX ADMIN — SODIUM CHLORIDE 250 ML: 9 INJECTION, SOLUTION INTRAVENOUS at 12:31

## 2022-06-25 RX ADMIN — ATORVASTATIN CALCIUM 20 MG: 20 TABLET, FILM COATED ORAL at 22:35

## 2022-06-25 RX ADMIN — MIDODRINE HYDROCHLORIDE 10 MG: 5 TABLET ORAL at 11:56

## 2022-06-25 RX ADMIN — GABAPENTIN 300 MG: 300 CAPSULE ORAL at 08:46

## 2022-06-25 RX ADMIN — DULOXETINE 30 MG: 30 CAPSULE, DELAYED RELEASE ORAL at 08:46

## 2022-06-25 RX ADMIN — HYDROMORPHONE HYDROCHLORIDE 0.5 MG: 1 INJECTION, SOLUTION INTRAMUSCULAR; INTRAVENOUS; SUBCUTANEOUS at 04:59

## 2022-06-25 RX ADMIN — HYDROMORPHONE HYDROCHLORIDE 0.5 MG: 1 INJECTION, SOLUTION INTRAMUSCULAR; INTRAVENOUS; SUBCUTANEOUS at 18:35

## 2022-06-25 RX ADMIN — GABAPENTIN 300 MG: 300 CAPSULE ORAL at 22:35

## 2022-06-25 RX ADMIN — OXYCODONE HYDROCHLORIDE 5 MG: 5 TABLET ORAL at 06:47

## 2022-06-25 RX ADMIN — OMEPRAZOLE 20 MG: 20 CAPSULE, DELAYED RELEASE ORAL at 08:46

## 2022-06-25 RX ADMIN — MIDODRINE HYDROCHLORIDE 10 MG: 5 TABLET ORAL at 13:22

## 2022-06-25 RX ADMIN — ALPRAZOLAM 1 MG: 0.25 TABLET ORAL at 08:56

## 2022-06-25 ASSESSMENT — ACTIVITIES OF DAILY LIVING (ADL)
ADLS_ACUITY_SCORE: 52
ADLS_ACUITY_SCORE: 44
ADLS_ACUITY_SCORE: 58
ADLS_ACUITY_SCORE: 44
ADLS_ACUITY_SCORE: 52
ADLS_ACUITY_SCORE: 58
ADLS_ACUITY_SCORE: 52

## 2022-06-25 NOTE — CONSULTS
Infectious Diseases Consultation  June 25, 2022  Shay Jimenez MRN# [id number]   Age: 57 year old YOB: 1965   Date of Admission: 6/24/2022     Reason for consult: I was asked to see this patient for evaluation of thoracic spine infection, back pain, vertebral collapse          Requesting physician Eun              Past Medical History:  Past Medical History:   Diagnosis Date     Chronic kidney disease      Neuropathy        Past Surgical History:  No past surgical history on file.    History of Present Illness:57-year-old male with a past medical history significant for hypertension, hyperlipidemia, end-stage renal disease, on hemodialysis, GERD, BPH, obesity, obstructive sleep apnea and depression, among other medical problems, who presents to the Emergency Room today for evaluation of back pain, generalized weakness and falls.  History is obtained per discussions with the patient, review of the medical record.     The patient states he first began to develop some upper back pain roughly 6 months ago, he noticed the pain seems to be exacerbated by activities such as coughing and sneezing.  He was hospitalized at [facility] in 2/2022 for shortness of breath secondary to volume overload.  A CTA of the chest was obtained during that stay and it was commented that there was abnormality in the thoracic spine suggestive of diskitis versus osteomyelitis.  The patient was recommended to follow up with the Ortho Spine team after discharge.  It sounds as though following that discharge, he may have been doing well for a period of time.  It is unclear exactly when he followed up with anyone.  Over the preceding past few weeks, he has noticed worsening of his upper back pain.  He has become increasingly weak.  He has had to use a cane to assist with ambulation.  He initially attributed this to his known chronic peripheral neuropathy that affects mostly his feet.  He had what sounds as though he ultimately  saw an orthopedic provider who recommended some rest.  He then saw a different orthopedic provider who obtained an MRI and that showed findings concerning for fracture in the thoracic spine.  His symptoms have continued to worsen.  He describes severe weakness in his legs that is now precluded his ability to successfully ambulate.  He has fallen numerous times.  He says his legs now feel like a rubber and he is no longer able to drive.  His wife is currently out of town and he has relied on his son to assist him.  He says at times his symptoms in his upper back pain is exacerbated by coughing.  He also reports occasional feelings of shakes and jolting in his legs.  He followed up with his orthopedic provider who recommended further evaluation with a CT and MRI, but given his inability to ambulate, he ultimately presented to the Emergency Room for further evaluation.  He has otherwise remained generally well with no other concurrent medical issues, he has been dialyzing per routine.  He was due to dialyze this morning and missed his session.  He has had no reports of fevers, chills, chest pain, shortness of breath or cough.  No changes in his bowel or bladder habits.  He has been taking all of his medications as prescribed.  He had just seen his neurologist last week for evaluation of his peripheral neuropathy, he has been using muscle relaxants as needed.     In the Emergency Room, he was seen and evaluated by Dr. De Oliveira.  He was afebrile and hemodynamically stable.  O2 sats were stable on room air.  He had significant weakness in his bilateral lower extremities.  Labs were notable for sodium of 126 and a creatinine of 8.6 in the context of his end-stage renal disease, on dialysis.  His white count was normal.  His CRP was elevated at 32.  His LFTs were generally stable.  His hemoglobin was low, but stable.  A blood culture was obtained.  Imaging of the spine was obtained including a thoracic and lumbar MRI spines  without contrast, thoracic spine MRI without contrast showed findings of a collapse of the T2 and T3 vertebral bodies with abnormal marrow signal within the T2-T3 vertebra and T2-T3 disk space with epidural phlegmon abscess and paraspinal inflammatory changes concerning for diskitis and osteomyelitis.  There was also severe spinal canal stenosis at T2, T3, with new abnormal cord signal suggestive of compressive myelopathy.  His lumbar imaging was generally unremarkable.  Neurosurgery was contacted and made aware of these findings.  They recommended further evaluation with a CT of the thoracic spine without contrast and MRI thoracic spine with and without contrast.  They are anticipating he may require some sort of surgical intervention this stay.  Antibiotics were deferred given potential plans for surgery and to obtain accurate intraoperative cultures.  He has been given some Dilaudid for pain.  When I saw him, he was otherwise comfortable appearing and had no other concerns at present.    No fever/chills  Mild intermittent pain since 11/21 in mid/upper T spine  Intermittent falls  Has had neg. PPDs in past, no TB contacts  Did travel to [facility] 20 yr ago  No other exotic travel  [place] in [place]     PAST MEDICAL HISTORY:    1.  End-stage renal disease, on hemodialysis.  Dialyzes on a fistula in his left upper extremity.  Dialyzes Monday, Wednesday, Fridays.  2.  GERD.  3.  BPH.  4.  History of gout.  5.  History of hypertension.  He has been off any blood pressure medicines since early 2022.  6.  Hyperlipidemia.  7.  Obesity.  BMI today is 43.  8.  Obstructive sleep apnea.  Does not utilize CPAP.  9.  Depression.  10.  History of chronic peripheral neuropathy.  Follows with [facility].      PAST SURGICAL HISTORY:  AV fistula formation, left upper extremity, ORIF of the left distal radius fracture in 01/2022, I and D of a strep infection after an ACL repair in the 90s, bilateral  "knee replacements, hemorrhoidectomy, septoplasty, remote wisdom tooth extraction.        FAMILY HISTORY:  Mother reportedly has a history of fibromyalgia.  Father had a history of type 1 diabetes and  at the age of 35 from complications.  He has a brother with history of kidney disease.       Antibiotics:  none  Review of Systems: as per HPI    Social History:The patient has a history of tobacco use. He drinks alcohol, reportedly 5-6 drinks per day.  He lives at home with his wife.  He typically does not require any assistive devices. Lives in [place], works as   Social History     Tobacco Use     Smoking status: Not on file     Smokeless tobacco: Not on file   Substance Use Topics     Alcohol use: Not on file        Allergies:  This patient is allergic to is allergic to amlodipine and lisinopril.     Physical Exam:  Vitals were reviewed  Blood pressure 112/71, pulse 94, temperature 97.5  F (36.4  C), temperature source Oral, resp. rate 16, height 1.859 m (6' 1.2\"), weight 149 kg (328 lb 7.8 oz), SpO2 94 %.  GEN: alert, O x 3, NAd, obese  HEENT: conj ok  No stigmata IE  LN: no neck LA  LUNG: CTA  COR: RRR  BACK mild punch/palpation  Tenderness Mid upper/mid T spine and R paraspinous area  ABDOMEN:  Soft, NT, ND, obese  MS: DF/PF 5/5, lifts leg off bed more than 30 degrees w/o pain  Neg. SLR  SKIN: no rash  L TKA incision, scab on knee from fall    MRI T spine-  IMPRESSION: Postcontrast images demonstrate abnormal contrast  enhancement of the T2 and T3 vertebrae, thickened abnormal epidural  tissues from C7-T1 down to T4-T5 possibly simply representing inflamed  epidural tissues but epidural abscess cannot be excluded. Abnormal  contrast enhancement in the bilateral lateral and anterior paraspinous  soft tissues from T1 down to T4 consistent with a paraspinous  phlegmon/abscess again noted. Moderate compression of the thoracic  spinal cord at the upper T2 level again noted.      CT T " spine-  IMPRESSION:  1. Worsening collapse of the T2 and T3 vertebral bodies, worsening  facet degenerative/erosive changes, worsening endplate erosions,  worsening spondylolisthesis, and worsening stenoses compared to  2/18/2022 chest CT. Findings raise concern for discitis osteomyelitis  and septic facet arthritis.  2. No acute fracture margins are identified.  Data:  CBC  Recent Labs   Lab 06/25/22  0728 06/24/22  0818   WBC 10.2 6.6   HGB 10.8* 10.0*    198   CRP 32    BMP  Recent Labs   Lab 06/25/22  0728 06/24/22  0818   * 126*   POTASSIUM 5.1 3.8   CHLORIDE 86* 86*   MAC 9.3 9.9   CO2 27 26   BUN 57* 39*   CR 10.10* 8.68*   * 144*   MRI L spine-  IMPRESSION:    1. Multilevel degenerative change as detailed.  2. No high-grade stenoses.     CULTURESNo results for input(s): CULT in the last 168 hours.   BC 6/24 x 2 NG  covid neg    Attestation:  I have reviewed today's vital signs, notes, medications, labs and imaging.    ASSESSMENT:  1. T2-T3 COLLAPSE, PHLEGMON, ENDPLATE EROSION, DEGENERATIVE CHANGES-possible discitis/vertebral osteomyelitis, changes could all be from DJD, CRP mildly elevated, no fever/chills, slow intermittent progression, I am not convinced this is infection but it still could be, he has had neg. PPD in past and no TB risk factors or contacts  2. DJD  3. ESRD  4. OBESITY  5. ETOH DEPENDENCE, ABUSE  6. NEUROPATHY  7. GINI  .    RECOMMENDATION  1. OR Plan on 6/27, send Cxs for routine cx/gs, fungus Cx and stain, AFB Cx and stain and pathology  2. Defer antibiotics until after surgery  3. Routine preop antibiotic prophylaxis  4. F/u pending BC    Thanks for asking me to see him!    MELECIO JAUREGUI M.D.  O:[phone]   B:[phone]

## 2022-06-25 NOTE — PROGRESS NOTES
Potassium   Date Value Ref Range Status   06/25/2022 5.1 3.4 - 5.3 mmol/L Final     Hemoglobin   Date Value Ref Range Status   06/25/2022 10.8 (L) 13.3 - 17.7 g/dL Final     Creatinine   Date Value Ref Range Status   06/25/2022 10.10 (H) 0.66 - 1.25 mg/dL Final     Urea Nitrogen   Date Value Ref Range Status   06/25/2022 57 (H) 7 - 30 mg/dL Final     Sodium   Date Value Ref Range Status   06/25/2022 124 (L) 133 - 144 mmol/L Final     No results found for: INR    DIALYSIS PROCEDURE NOTE  Hepatitis status of previous patient on machine log was checked and verified ok to use with this patients hepatitis status.  Patient dialyzed for 3 hrs. on a K2 bath with a net fluid removal of  4L.  A BFR of 400-450 ml/min was obtained via a LAVF using 15 gauge needles.      The treatment plan was discussed with Dr. Grier during the treatment.    Total heparin received during the treatment: 0 units.   Needle cannulation sites held x 10 min.       Meds  given: epogen   Complications: UF paused for 20 minutes during treatment. 250mL of NS given to treat systolic hypotension of 80s. Pt received midodrine x2 as per MD. UF resumed after effective midodrine intervention. No further complications.     Person educated: person. Knowledge base substantial. Barriers to learning: none. Educated on procedure via verbal mode. Patient verbalized understanding. Pt prefers oral education style.     ICEBOAT? Timeout performed pre-treatment  I: Patient was identified using 2 identifiers  C:  Consent Signed Yes  E: Equipment preventative maintenance is current and dialysis delivery system OK to use  B: Hepatitis B Surface Antigen: UNKNOWN; Draw Date: 6/25/2022      Hepatitis B Surface Antibody: UNKNOWN; Draw Date: 6/25/2022  O: Dialysis orders present and complete prior to treatment  A: Vascular access verified and assessed prior to treatment  T: Treatment was performed at a clinically appropriate time  ?: Patient was allowed to ask questions and  address concerns prior to treatment  See Adult Hemodialysis flowsheet in [facility] for further details and post assessment.  Machine water alarm in place and functioning. Transducer pods intact and checked every 15min.   Pt returned via bed.  Chlorine/Chloramine water system checked every 4 hours.  Outpatient Dialysis at [facility][1].       Post treatment report given to YUDI Urbano regarding 4L of fluid removed, last BP of 94/69, and patient pain rating of 7/10 (in which resource nurse administered pain meds in the last 10 minutes of treatment).     Please remove patient dressing on AVF and AVG needle sites 24 hours after dialysis. If leaking occurs please apply a Band-Aid.

## 2022-06-25 NOTE — PROGRESS NOTES
[facility]    Medicine Progress Note - Hospitalist Service    Date of Admission:  6/24/2022    Assessment & Plan          Shay Jimenez is a very pleasant 57-year-old male with past medical history significant for end-stage renal disease, on hemodialysis, chronic peripheral neuropathy affecting his feet, history of hypertension no longer on meds, hyperlipidemia, obesity, obstructive sleep apnea, GERD and depression, among other medical problems, who presented 6/24/22 for evaluation of worsening upper back pain with associated generalized weakness and falls.  He was found to have abnormal findings on thoracic MRI concerning for diskitis with osteomyelitis with also some findings of a compressive myelopathy.  He is being admitted to the hospital today for ongoing evaluation and care.    Upper back pain  Generalized weakness  Falls   Abnormal findings of a T2 and T3 suggestive of diskitis, osteomyelitis  Epidural phlegmon/abscess   Compressive myelopathy  *back pain for 6 months. CT chest 2/2022 showed concern for upper thoracic spine diskitis vs osteomyelitis. Unclear follow up from that time  *in weeks prior to admission, MRI showed possible thoracic compression fracture.   *developed progressively weak legs with frequent falls.  *on admission imaging with T2-T3 diskitis, osteomyelitis and compressive myelopathy.      - appreciate neurosurgery   - hold abx pending obtaining cultures unless develops e/o systemic infection or sepsis  - follow blood cultures  - consult to ID for further assistance  - Tylenol, oxycodone and IV Dilaudid available as needed for pain.  - will order PT/OT pending surgical plan.     Pre-op  RCRI 6.0% 30 day risk of cardiac event. He was able to ambulate at least 1 mile, exercising regularly prior to onset of weakness several weeks ago with anginal symptoms. Previously tolerated general anesthesia without issue for multiple orthopedic and AVF surgeries. Plan to  "complete pre-op dialysis 6/25  - medically optimized for surgery.     End-stage renal disease, on hemodialysis  *Typically dialyzes Monday, Wednesday, Friday. Missed 6/24 session due to being in ED    -Nephrology consult appreciated  -Dialysis per nephrology.    GERD  -Chronic and stable on PPI.    History of hypertension  *No longer needing blood pressure medicines.  Monitor blood pressures this stay.    Hyperlipidemia  -Chronic and stable, on statin.    Peripheral neuropathy of the feet  *Follows with Neurology.    -Managed in part with duloxetine, which will be continued.    Depression  -Chronic and stable on Zoloft.    Obesity, class III  Obstructive sleep apnea  *BMI this stay is 43.10.  He does not use CPAP.  Encourage diet, lifestyle modifications once above issues addressed.    History of BPH.       Diet: NPO per Anesthesia Guidelines for Procedure/Surgery Except for: Meds    DVT Prophylaxis: Pneumatic Compression Devices  Oliveira Catheter: Not present  Central Lines: None  Cardiac Monitoring: None  Code Status: Full Code      Disposition Plan      Expected Discharge Date: 06/28/2022                The patient's care was discussed with the Bedside Nurse and Patient.    Phoenix Hopkins MD  Hospitalist Service  [facility]  Securely message with the Vocera Web Console (learn more here)  Text page via [employer] Paging/Directory         Clinically Significant Risk Factors Present on Admission         # Hyponatremia: Na = 126 mmol/L (Ref range: 133 - 144 mmol/L) on admission, will monitor as appropriate     # Hypoalbuminemia: Albumin = 3.3 g/dL (Ref range: 3.4 - 5.0 g/dL) on admission, will monitor as appropriate        # Severe Obesity: Estimated body mass index is 43.1 kg/m  as calculated from the following:    Height as of this encounter: 1.859 m (6' 1.2\").    Weight as of this encounter: 149 kg (328 lb 7.8 oz).    "     ______________________________________________________________________    Interval History   Worsening weakness. Some bowel incontinence overnight. No f/c/r. No sob, chest pain. Ongoing back pain.     Data reviewed today: I reviewed all medications, new labs and imaging results over the last 24 hours. I personally reviewed no images or EKG's today.    Physical Exam   Vital Signs: Temp: 97.9  F (36.6  C) Temp src: Oral BP: (!) 136/91 Pulse: 93   Resp: 16 SpO2: 93 % O2 Device: Nasal cannula Oxygen Delivery: 3 LPM  Weight: 328 lbs 7.77 oz  Constitutional: Awake, alert, cooperative, no apparent distress  Respiratory: Clear to auscultation bilaterally, no crackles or wheezing  Cardiovascular: Regular rate and rhythm, normal S1 and S2, and no murmur noted  GI: Normal bowel sounds, soft, non-distended, non-tender  Skin/Integumen: No rashes, no cyanosis, no edema  Other: Antigravity with ble with sig effort.      Data   Recent Labs   Lab 06/25/22  0728 06/24/22  0818   WBC 10.2 6.6   HGB 10.8* 10.0*   * 108*    198   * 126*   POTASSIUM 5.1 3.8   CHLORIDE 86* 86*   CO2 27 26   BUN 57* 39*   CR 10.10* 8.68*   ANIONGAP 11 14   MAC 9.3 9.9   * 144*   ALBUMIN  --  3.3*   PROTTOTAL  --  7.3   BILITOTAL  --  0.6   ALKPHOS  --  152*   ALT  --  55   AST  --  51*     Recent Results (from the past 24 hour(s))   Thoracic spine MRI w/o contrast    Narrative    MRI THORACIC SPINE WITHOUT CONTRAST  6/24/2022 12:44 PM     HISTORY: Severe upper back pain with leg weakness.    TECHNIQUE: Multiplanar multisequence MRI of the thoracic spine without  contrast.    COMPARISON: Thoracic spine MRI 6/6/2022, chest CT 2/18/2022    FINDINGS: Compression deformities involving T2 and T3 vertebral bodies  with severe disc height loss. Abnormal T1 hypointense T2 hyperintense  signal is present within the T2 and T3 vertebral bodies extending into  the bilateral pedicles and lamina/spinous processes. Abnormal T2  hyperintense  signal within the T2-T3 disc space with erosions  involving the inferior endplate of T2 and superior endplate of T3.  Abnormal epidural fluid is present circumferentially encasing the  thoracic cord at T2-T3 extending inferiorly within the posterior  epidural space to approximately T4-T5, unchanged. Abnormal central  disc bulging and anterolisthesis of T2 on T3 contribute to severe  spinal canal stenosis with abnormal T2 hyperintense signal within the  thoracic cord at T2-T3, probably worsened compared to the prior exam.  Moderate to severe bilateral foraminal stenoses at T2-T3. Nonspecific  bilateral paraspinal, prevertebral, and posterior T2 hyperintense soft  tissue signal, similar compared to the prior exam.    Bone marrow demonstrates background of multiple presumed benign  intraosseous cavernous venous malformations. Scattered Schmorl's  nodes.     Moderate degenerative change is present with multiple small disc  protrusions contributing to mild spinal canal stenoses at multiple  levels. Multilevel facet arthropathy. Mild lower thoracic foraminal  stenoses. Probable small bilateral pleural effusions.    Nonspecific degenerative changes and endplate irregularities are  present involving the partially visualized lower cervical spine with  multiple stenoses, incompletely characterized.      Impression    IMPRESSION:      1. Collapse of the T2 and T3 vertebral bodies with abnormal marrow  signal within the T2 and T3 vertebra and T2-T3 disc space with  epidural phlegmon/abscess and paraspinal inflammatory change,  concerning for discitis-osteomyelitis (consider contrast-enhanced  MRI). Superimposed acute on chronic fractures cannot be excluded  (consider CT correlation). Recommend spine surgery consultation.  2. Severe spinal canal stenosis at T2-T3 (probably slightly worsened)  with new abnormal cord signal suggestive of compressive myelopathy.  Infectious myelitis cannot be excluded.    Findings were discussed by  phone between Dr. Dennis and Dr. De Oliveira  at 1:30 PM on 6/24/2022.    KATH DENNIS MD         SYSTEM ID:  [id number]   Lumbar spine MRI w/o contrast    Narrative    MRI LUMBAR SPINE WITHOUT CONTRAST   6/24/2022 12:46 PM     HISTORY: Severe upper back pain with leg weakness, low back pain.  Neurologic deficit, non-traumatic. Lower extremity weakness,  increasing/persistent or sudden onset.  No known/automatically  detected potential contraindications to imaging.    TECHNIQUE: Multiplanar multisequence MRI of the lumbar spine without  contrast.    COMPARISON: Lumbar spine MRI 6/6/2022.     FINDINGS: Bone marrow demonstrates scattered moderate marrow  heterogeneity with multiple presumed benign intraosseous cavernous  venous malformations. Background of mild nonspecific marrow  heterogeneity. Scattered Schmorl's nodes are present. Conus medullaris  is unremarkable terminating at the level of the mid L1 vertebral body.  Cauda equina is unremarkable. Bilateral sacroiliac joint degenerative  change. Nonspecific fluid signal within the bilateral sacroiliac  joints, left more than right. Paraspinal muscular atrophy. Bilateral  renal atrophy with multiple nonspecific renal lesions which are  incompletely characterized (renal ultrasound correlation would be  typically recommended). Dependent subcutaneous edema.    Segmental Analysis:     T12-L1:  Mild disc height loss. No herniation. Normal facet joints. No  foraminal or spinal canal stenosis.     L1-L2:  Disc height maintained. No herniation. Normal facet joints. No  foraminal or spinal canal stenosis.      L2-L3:  Disc height maintained. Subtle central annular fissure. Normal  facet joints. No foraminal or spinal canal stenosis.      L3-L4:  Disc height maintained. Subtle central annular fissure with  questionable tiny left central protrusion in close proximity to the  traversing left L4 nerve root within the lateral recess (series 5  image 34). Mild bilateral facet  arthropathy. No foraminal stenosis.  Mild spinal canal stenosis.      L4-L5:  Mild disc height loss. Disc bulge with broad central  protrusion with annular fissuring. Mild bilateral facet arthropathy.  No foraminal stenosis. Mild spinal canal stenosis with flattening of  the anterior thecal sac.      L5-S1:  Moderate to severe disc height loss. Disc bulge, asymmetric to  the left. Mild bilateral facet arthropathy. No right foraminal  stenosis. Mild left foraminal stenosis. No spinal canal stenosis.      Impression    IMPRESSION:    1. Multilevel degenerative change as detailed.  2. No high-grade stenoses.     KATH BETTENCOURT MD         SYSTEM ID:  [id number]   CT Thoracic Spine w/o Contrast    Narrative    CT THORACIC SPINE WITHOUT CONTRAST   6/24/2022 3:02 PM     HISTORY: Discitis verus acute on chronic fractures T2-T3.     TECHNIQUE: Axial images of the thoracic spine were obtained without  intravenous contrast. Multiplanar reformations were performed.   Radiation dose for this scan was reduced using automated exposure  control, adjustment of the mA and/or kV according to patient size, or  iterative reconstruction technique.    COMPARISON: Same day thoracic spine MRI, chest CT 2/18/2022.    FINDINGS: Worsening collapse of the T2 and T3 vertebral bodies with  endplate erosions adjacent to the irregular T2-T3 disc space, worsened  compared to the prior chest CT. There is increased grade 1 (borderline  grade 2) anterolisthesis of T2 on T3 with worsening severe bilateral  foraminal stenoses and worsening severe spinal canal stenosis. Area of  probable ankylosis across the T2-3 disc space Severe bilateral facet  arthropathy with facet joint erosions, worsened compared to the prior  exam. Known epidural process is poorly appreciated by CT.  Paraspinal/prevertebral inflammatory change/phlegmon noted.    No acute fracture margins are identified. Moderate degenerative change  is present. Multiple presumed benign intraosseous  cavernous venous  malformations. Moderate degenerative change.    Presumed scattered pulmonary atelectasis. Bilateral renal atrophy with  multiple nonspecific renal lesions.      Impression    IMPRESSION:  1. Worsening collapse of the T2 and T3 vertebral bodies, worsening  facet degenerative/erosive changes, worsening endplate erosions,  worsening spondylolisthesis, and worsening stenoses compared to  2/18/2022 chest CT. Findings raise concern for discitis osteomyelitis  and septic facet arthritis.  2. No acute fracture margins are identified.    KATH BETTENCOURT MD         SYSTEM ID:  [id number]   MR Thoracic Spine w/o & w Contrast    Narrative    MRI OF THE THORACIC SPINE WITHOUT AND WITH CONTRAST 6/24/2022 4:21 PM     HISTORY: Discitis verus acute on chronic fractures T2-3.    TECHNIQUE: Multiplanar, multisequence MRI images of the thoracic spine  were acquired without and with 14mL Gadavist gadolinium IV contrast.    COMPARISON: CT thoracic spine same day. Noncontrast MRI thoracic spine  same day.       Impression    IMPRESSION: Postcontrast images demonstrate abnormal contrast  enhancement of the T2 and T3 vertebrae, thickened abnormal epidural  tissues from C7-T1 down to T4-T5 possibly simply representing inflamed  epidural tissues but epidural abscess cannot be excluded. Abnormal  contrast enhancement in the bilateral lateral and anterior paraspinous  soft tissues from T1 down to T4 consistent with a paraspinous  phlegmon/abscess again noted. Moderate compression of the thoracic  spinal cord at the upper T2 level again noted.     WILLIAM FREDERICK MD         SYSTEM ID:  [id number]     Medications       - MEDICATION INSTRUCTIONS for Dialysis Patients -   Does not apply See Admin Instructions     atorvastatin  20 mg Oral At Bedtime     DULoxetine  30 mg Oral Daily     finasteride  1.25 mg Oral Daily     gabapentin  300 mg Oral TID     omeprazole  20 mg Oral Daily     sertraline  100 mg Oral Daily     sodium chloride  (PF)  3 mL Intracatheter Q8H

## 2022-06-25 NOTE — PLAN OF CARE
Goal Outcome Evaluation:    A&Ox4, forgetful at times. On 2 L O2 NC. VSS except BP. On strict bedrest. NPO for possible surgery today. IV SL. Incontinent of bowel x1. Weakness & baseline numbness/tingling to BLE. Edema present on BLE. Pain managed with PRN IV dilaudid, oxycodone, and gabapentin. Will continue to monitor.

## 2022-06-25 NOTE — PROGRESS NOTES
[facility]    Neurosurgery  Daily Note    Assessment & Plan   57M with ESRD, increased back pain and multiple recent falls. States his LE have not een supporting him for last 6-7 days. MRI T spine concerning for discitis with compression fracture at T2-3. MRI also showing possible compressive myelopathy. Currently seen while in dialysis.   Plan for OR Monday for decompression and fusion      Plan:  -Advance activity as tolerated  -Continue supportive and symptomatic treatment  -Start or continue physical therapy  -ok to increase HOB for meals    Manjit Garcia PA-C    Interval History   Stable.      Physical Exam   Temp: 97.9  F (36.6  C) Temp src: Oral BP: 96/61 Pulse: 92   Resp: 16 SpO2: 99 % O2 Device: Nasal cannula Oxygen Delivery: 3 LPM  Vitals:    06/24/22 0757 06/24/22 1716   Weight: 140.6 kg (310 lb) 149 kg (328 lb 7.8 oz)     Vital Signs with Ranges  Temp:  [97.9  F (36.6  C)-98.1  F (36.7  C)] 97.9  F (36.6  C)  Pulse:  [84-99] 92  Resp:  [16-18] 16  BP: ()/(53-91) 96/61  SpO2:  [91 %-99 %] 99 %  No intake/output data recorded.    Alert and oriented.  Weak in LE, sensation intact to light touch. Dorsi-plantar flexion 3/5. Hip flexors 4/5      Medications     - MEDICATION INSTRUCTIONS -          - MEDICATION INSTRUCTIONS for Dialysis Patients -   Does not apply See Admin Instructions     atorvastatin  20 mg Oral At Bedtime     DULoxetine  30 mg Oral Daily     finasteride  1.25 mg Oral Daily     gabapentin  300 mg Oral TID     midodrine  10 mg Oral Once per day on Tue Thu Sat     - MEDICATION INSTRUCTIONS -   Does not apply Once     omeprazole  20 mg Oral Daily     sertraline  100 mg Oral Daily     sodium chloride (PF)  3 mL Intracatheter Q8H           Manjit Garcia PA-C  [facility]  [address]  [address] [place] [address]    Tel [phone]  Pager [phone]

## 2022-06-25 NOTE — PROGRESS NOTES
A&Ox4, forgetful at times. VSS 2 L O2 NC PRN. On strict bedrest.  Regular diet. IV SL. Incontinent of bowel. Weakness/ baseline numbness/tingling to BLE. Edema present on BLE. Pain decreased with PRN IV dilaudid. Plan: surgery on Monday.

## 2022-06-26 ENCOUNTER — APPOINTMENT (OUTPATIENT)
Dept: CT IMAGING | Facility: CLINIC | Age: 57
DRG: 459 | End: 2022-06-26
Attending: STUDENT IN AN ORGANIZED HEALTH CARE EDUCATION/TRAINING PROGRAM
Payer: MEDICARE

## 2022-06-26 LAB
ANION GAP SERPL CALCULATED.3IONS-SCNC: 10 MMOL/L (ref 3–14)
BUN SERPL-MCNC: 57 MG/DL (ref 7–30)
CALCIUM SERPL-MCNC: 9.1 MG/DL (ref 8.5–10.1)
CHLORIDE BLD-SCNC: 93 MMOL/L (ref 94–109)
CO2 SERPL-SCNC: 30 MMOL/L (ref 20–32)
CREAT SERPL-MCNC: 8.46 MG/DL (ref 0.66–1.25)
ERYTHROCYTE [DISTWIDTH] IN BLOOD BY AUTOMATED COUNT: 14.2 % (ref 10–15)
GFR SERPL CREATININE-BSD FRML MDRD: 7 ML/MIN/1.73M2
GLUCOSE BLD-MCNC: 111 MG/DL (ref 70–99)
HCT VFR BLD AUTO: 33.6 % (ref 40–53)
HGB BLD-MCNC: 10.5 G/DL (ref 13.3–17.7)
MCH RBC QN AUTO: 34.5 PG (ref 26.5–33)
MCHC RBC AUTO-ENTMCNC: 31.3 G/DL (ref 31.5–36.5)
MCV RBC AUTO: 111 FL (ref 78–100)
PLATELET # BLD AUTO: 208 10E3/UL (ref 150–450)
POTASSIUM BLD-SCNC: 4.6 MMOL/L (ref 3.4–5.3)
RBC # BLD AUTO: 3.04 10E6/UL (ref 4.4–5.9)
SODIUM SERPL-SCNC: 133 MMOL/L (ref 133–144)
WBC # BLD AUTO: 7.8 10E3/UL (ref 4–11)

## 2022-06-26 PROCEDURE — 99232 SBSQ HOSP IP/OBS MODERATE 35: CPT | Performed by: STUDENT IN AN ORGANIZED HEALTH CARE EDUCATION/TRAINING PROGRAM

## 2022-06-26 PROCEDURE — 250N000011 HC RX IP 250 OP 636: Performed by: INTERNAL MEDICINE

## 2022-06-26 PROCEDURE — 250N000013 HC RX MED GY IP 250 OP 250 PS 637: Performed by: STUDENT IN AN ORGANIZED HEALTH CARE EDUCATION/TRAINING PROGRAM

## 2022-06-26 PROCEDURE — G1010 CDSM STANSON: HCPCS

## 2022-06-26 PROCEDURE — 82310 ASSAY OF CALCIUM: CPT | Performed by: STUDENT IN AN ORGANIZED HEALTH CARE EDUCATION/TRAINING PROGRAM

## 2022-06-26 PROCEDURE — 36415 COLL VENOUS BLD VENIPUNCTURE: CPT | Performed by: STUDENT IN AN ORGANIZED HEALTH CARE EDUCATION/TRAINING PROGRAM

## 2022-06-26 PROCEDURE — 87040 BLOOD CULTURE FOR BACTERIA: CPT | Performed by: STUDENT IN AN ORGANIZED HEALTH CARE EDUCATION/TRAINING PROGRAM

## 2022-06-26 PROCEDURE — 85027 COMPLETE CBC AUTOMATED: CPT | Performed by: STUDENT IN AN ORGANIZED HEALTH CARE EDUCATION/TRAINING PROGRAM

## 2022-06-26 PROCEDURE — 120N000001 HC R&B MED SURG/OB

## 2022-06-26 PROCEDURE — 250N000013 HC RX MED GY IP 250 OP 250 PS 637: Performed by: INTERNAL MEDICINE

## 2022-06-26 PROCEDURE — 99233 SBSQ HOSP IP/OBS HIGH 50: CPT | Performed by: INTERNAL MEDICINE

## 2022-06-26 RX ORDER — CETIRIZINE HYDROCHLORIDE 10 MG/1
10 TABLET ORAL DAILY
Status: DISCONTINUED | OUTPATIENT
Start: 2022-06-26 | End: 2022-07-15 | Stop reason: HOSPADM

## 2022-06-26 RX ORDER — MIDODRINE HYDROCHLORIDE 5 MG/1
10 TABLET ORAL
Status: DISCONTINUED | OUTPATIENT
Start: 2022-06-27 | End: 2022-06-28 | Stop reason: DRUGHIGH

## 2022-06-26 RX ORDER — SEVELAMER CARBONATE 800 MG/1
800 TABLET, FILM COATED ORAL
Status: DISCONTINUED | OUTPATIENT
Start: 2022-06-26 | End: 2022-07-11

## 2022-06-26 RX ADMIN — DULOXETINE 30 MG: 30 CAPSULE, DELAYED RELEASE ORAL at 08:10

## 2022-06-26 RX ADMIN — SERTRALINE HYDROCHLORIDE 100 MG: 100 TABLET ORAL at 08:10

## 2022-06-26 RX ADMIN — HYDROMORPHONE HYDROCHLORIDE 0.5 MG: 1 INJECTION, SOLUTION INTRAMUSCULAR; INTRAVENOUS; SUBCUTANEOUS at 21:05

## 2022-06-26 RX ADMIN — HYDROMORPHONE HYDROCHLORIDE 0.5 MG: 1 INJECTION, SOLUTION INTRAMUSCULAR; INTRAVENOUS; SUBCUTANEOUS at 08:10

## 2022-06-26 RX ADMIN — HYDROMORPHONE HYDROCHLORIDE 0.5 MG: 1 INJECTION, SOLUTION INTRAMUSCULAR; INTRAVENOUS; SUBCUTANEOUS at 10:01

## 2022-06-26 RX ADMIN — HYDROMORPHONE HYDROCHLORIDE 0.5 MG: 1 INJECTION, SOLUTION INTRAMUSCULAR; INTRAVENOUS; SUBCUTANEOUS at 12:35

## 2022-06-26 RX ADMIN — HYDROMORPHONE HYDROCHLORIDE 0.5 MG: 1 INJECTION, SOLUTION INTRAMUSCULAR; INTRAVENOUS; SUBCUTANEOUS at 23:12

## 2022-06-26 RX ADMIN — CETIRIZINE HYDROCHLORIDE 10 MG: 10 TABLET, FILM COATED ORAL at 10:18

## 2022-06-26 RX ADMIN — SEVELAMER CARBONATE 800 MG: 800 TABLET, FILM COATED ORAL at 18:12

## 2022-06-26 RX ADMIN — ATORVASTATIN CALCIUM 20 MG: 20 TABLET, FILM COATED ORAL at 21:05

## 2022-06-26 RX ADMIN — ALPRAZOLAM 1 MG: 0.25 TABLET ORAL at 23:11

## 2022-06-26 RX ADMIN — OMEPRAZOLE 20 MG: 20 CAPSULE, DELAYED RELEASE ORAL at 08:10

## 2022-06-26 RX ADMIN — ALPRAZOLAM 1 MG: 0.25 TABLET ORAL at 16:07

## 2022-06-26 RX ADMIN — GABAPENTIN 300 MG: 300 CAPSULE ORAL at 21:05

## 2022-06-26 RX ADMIN — HYDROMORPHONE HYDROCHLORIDE 0.5 MG: 1 INJECTION, SOLUTION INTRAMUSCULAR; INTRAVENOUS; SUBCUTANEOUS at 14:10

## 2022-06-26 RX ADMIN — GABAPENTIN 300 MG: 300 CAPSULE ORAL at 08:10

## 2022-06-26 RX ADMIN — FINASTERIDE 1.25 MG: 5 TABLET, FILM COATED ORAL at 08:10

## 2022-06-26 RX ADMIN — HYDROMORPHONE HYDROCHLORIDE 0.5 MG: 1 INJECTION, SOLUTION INTRAMUSCULAR; INTRAVENOUS; SUBCUTANEOUS at 16:09

## 2022-06-26 RX ADMIN — HYDROMORPHONE HYDROCHLORIDE 0.5 MG: 1 INJECTION, SOLUTION INTRAMUSCULAR; INTRAVENOUS; SUBCUTANEOUS at 05:54

## 2022-06-26 RX ADMIN — HYDROMORPHONE HYDROCHLORIDE 0.5 MG: 1 INJECTION, SOLUTION INTRAMUSCULAR; INTRAVENOUS; SUBCUTANEOUS at 18:12

## 2022-06-26 RX ADMIN — SEVELAMER CARBONATE 800 MG: 800 TABLET, FILM COATED ORAL at 13:15

## 2022-06-26 RX ADMIN — GABAPENTIN 300 MG: 300 CAPSULE ORAL at 16:05

## 2022-06-26 RX ADMIN — CYCLOBENZAPRINE 10 MG: 10 TABLET, FILM COATED ORAL at 23:11

## 2022-06-26 ASSESSMENT — ACTIVITIES OF DAILY LIVING (ADL)
ADLS_ACUITY_SCORE: 58
ADLS_ACUITY_SCORE: 54
ADLS_ACUITY_SCORE: 56
ADLS_ACUITY_SCORE: 58
ADLS_ACUITY_SCORE: 54
ADLS_ACUITY_SCORE: 58
ADLS_ACUITY_SCORE: 54
ADLS_ACUITY_SCORE: 56

## 2022-06-26 NOTE — PROGRESS NOTES
A&Ox4, forgetful at times. VSS,on  2 L O2 NC PRN. On strict bedrest.  Regular diet. IV SL. Incontinent of bowel. Weakness/ baseline numbness/tingling to BLE. Edema present on BLE. Pain decreased with PRN IV dilaudid Q2hrs. Plan:Dialisys and surgery on Monday. NPO at MN.

## 2022-06-26 NOTE — PROGRESS NOTES
[facility]    Nephrology Progress Note    Interval History     Shay is fairly comfortable and not too concerned about the surgery tomorrow. He was enjoying asian take out food when I went to see him. (Sodium intake has been a chronic issue)    Spoke with NSG. The plan is for them to operate in the afternoon. Will have to do dialysis in the morning.    /\/\/\/\/\/\/\/\/\/\/\/\/\/\    Assessment & Plan        <ESRD     ~Patient has excessive fluid gains chronically as well as significant IDH requiring midodrine. No evidence of volume overload today.      -Will plan on preop dialysis on Monday Morning for 4.25 hours per his usual with his midodrine prior and on dialysis.      <DISKITIS/EPIDURAL ABSCESS     ~Mgmt per neurosurgery and ID      Zaheer Grier MD  [facility], Nephrology  Cell:[phone]  Pager:[phone]    Securely message with the Voc[employer] Web Console (learn more here)  Text page via [employer] Paging/Directory     =========================    Temp: 97.6  F (36.4  C) Temp src: Oral BP: 103/73 Pulse: 73   Resp: 18 SpO2: 96 % O2 Device: Nasal cannula Oxygen Delivery: 2 LPM    Vitals:    06/24/22 0757 06/24/22 1716   Weight: 140.6 kg (310 lb) 149 kg (328 lb 7.8 oz)       [x] 10 point ROS performed and negative except as noted above.    Vital Signs with Ranges    Temp:  [97.5  F (36.4  C)-98  F (36.7  C)] 97.6  F (36.4  C)  Pulse:  [73-98] 73  Resp:  [16-18] 18  BP: ()/(51-76) 103/73  SpO2:  [94 %-96 %] 96 %    I/O last 3 completed shifts:  In: 480 [P.O.:480]  Out: 3000 [Other:3000]    Physical Exam    BP Readings from Last 10 Encounters:   06/26/22 103/73        Wt Readings from Last 20 Encounters:   06/24/22 149 kg (328 lb 7.8 oz)       GENERAL: Alert, in no apparent distress.   HEENT:  Normocephalic. No gross abnormalities.  The mouth moist.    Neck The jugular venous pressure is not visible.  CV: S1 S2 No murmur or rub detected.  RESP: Breathing  unlabored.  GI: Abdomen soft/nt/nd,   MUSCULOSKELETAL: extremities nl No change in brawny, woody venous stasis change.   SKIN: no new lesions or rashes, dry to touch.  NEURO:  No overt deficit.  PSYCH: mood good, affect appropriate    Medications       - MEDICATION INSTRUCTIONS -           - MEDICATION INSTRUCTIONS for Dialysis Patients -   Does not apply See Admin Instructions     atorvastatin  20 mg Oral At Bedtime     cetirizine  10 mg Oral Daily     DULoxetine  30 mg Oral Daily     finasteride  1.25 mg Oral Daily     gabapentin  300 mg Oral TID     [START ON 6/27/2022] midodrine  10 mg Oral Once per day on Mon Wed Fri     - MEDICATION INSTRUCTIONS -   Does not apply Once     omeprazole  20 mg Oral Daily     sertraline  100 mg Oral Daily     sevelamer carbonate  800 mg Oral TID w/meals     sodium chloride (PF)  3 mL Intracatheter Q8H       Data     UA RESULTS:  No results for input(s): COLOR, APPEARANCE, URINEGLC, URINEBILI, URINEKETONE, SG, UBLD, URINEPH, PROTEIN, UROBILINOGEN, NITRITE, LEUKEST, RBCU, WBCU in the last 02157 hours.   BMP  Recent Labs   Lab 06/26/22  0712 06/25/22  0728 06/24/22  0818    124* 126*   POTASSIUM 4.6 5.1 3.8   CHLORIDE 93* 86* 86*   MAC 9.1 9.3 9.9   CO2 30 27 26   BUN 57* 57* 39*   CR 8.46* 10.10* 8.68*   * 153* 144*     [email or web address](phos:4)  CBC)  Recent Labs   Lab 06/26/22  0712 06/25/22  0728 06/24/22  0818   WBC 7.8 10.2 6.6   HGB 10.5* 10.8* 10.0*   HCT 33.6* 32.4* 31.5*   * 104* 108*    214 198     Lab Results   Component Value Date    ALBUMIN 3.3 06/24/2022        Recent Labs   Lab 06/26/22  0712   HGB 10.5*   HCT 33.6*   *       Recent Labs   Lab 06/24/22  0818   AST 51*   ALT 55   ALKPHOS 152*   BILITOTAL 0.6     No lab results found in last 7 days.  No results found for: D2VIT, D3VIT, DTOT  No results for input(s): PTHI in the last 168 hours.    Attestation:   I have reviewed today's relevant vital signs, notes, medications, labs and  imaging.

## 2022-06-26 NOTE — PROGRESS NOTES
[facility]    Neurosurgery  Daily Note    Assessment & Plan     57M with ESRD, increased back pain and multiple recent falls. States his LE have not een supporting him for last 7-8 days. MRI T spine concerning for discitis with compression fracture at T2-3. MRI also showing possible compressive myelopathy. Currently seen while in dialysis.   Plan for OR Monday for decompression and fusion with Dr. Boles.   Discussed with Nephrology, who will plan to dialyze patient prior to surgery tomorrow.     Plan:  -continue bedrest.   -Continue supportive and symptomatic treatment  NPO after MN tonight.       Manjit Garcia PA-C    Interval History   Stable.      Physical Exam   Temp: 97.6  F (36.4  C) Temp src: Oral BP: 103/73 Pulse: 73   Resp: 18 SpO2: 96 % O2 Device: Nasal cannula Oxygen Delivery: 2 LPM  Vitals:    06/24/22 0757 06/24/22 1716   Weight: 140.6 kg (310 lb) 149 kg (328 lb 7.8 oz)     Vital Signs with Ranges  Temp:  [97.5  F (36.4  C)-98  F (36.7  C)] 97.6  F (36.4  C)  Pulse:  [73-98] 73  Resp:  [16-18] 18  BP: ()/(51-76) 103/73  SpO2:  [94 %-96 %] 96 %  I/O last 3 completed shifts:  In: 480 [P.O.:480]  Out: 3000 [Other:3000]    Alert and oriented.  Moves all extremities equally.      Medications     - MEDICATION INSTRUCTIONS -          - MEDICATION INSTRUCTIONS for Dialysis Patients -   Does not apply See Admin Instructions     atorvastatin  20 mg Oral At Bedtime     cetirizine  10 mg Oral Daily     DULoxetine  30 mg Oral Daily     finasteride  1.25 mg Oral Daily     gabapentin  300 mg Oral TID     [START ON 6/27/2022] midodrine  10 mg Oral Once per day on Mon Wed Fri     - MEDICATION INSTRUCTIONS -   Does not apply Once     omeprazole  20 mg Oral Daily     sertraline  100 mg Oral Daily     sevelamer carbonate  800 mg Oral TID w/meals     sodium chloride (PF)  3 mL Intracatheter Q8H           Manjit GARNETTC  [facility] Neurosurgery  [facility]  [address]  [address] [place] [id number]    Tel [phone]  Pager [phone]

## 2022-06-26 NOTE — PLAN OF CARE
Goal Outcome Evaluation:    A&Ox4. On 2 L O2 via NC  during NOC. VSS. On bedrest. Incontinent of bowel x1. Weakness & baseline numbness to BLE. Edema present on BLE. Pain managed with PRN IV dilaudid. IV SL. Slept comfortably during NOC. Will continue to monitor.

## 2022-06-26 NOTE — CONSULTS
[facility]    Nephrology Consultation     [facility]    Date of Admission:  6/24/2022    Assessment & Plan     <ESRD    ~Patient has excessive fluid gains chronically as well as significant IDH requiring midodrine.    -Will plan on preop dialysis on Monday for 4.25 hours per his usual with his midodrine prior and on dialysis.     <DISKITIS/EPIDURAL ABSCESS    ~Mgmt per neurosurgery and ID      Zaheer Grier MD  [facility], Nephrology  Cell:[phone]  Pager:[phone]    Securely message with the Vocera Web Console (learn more here)  Text page via [employer] Paging/Directory         /\/\/\/\/\/\/\/\/\/\/\/\/\/\/\/\/\/\/\/\/\/\/\/\/\/\    Reason for Consult     I was asked to see the patient for inpatient dialysis cares.    Primary Care Physician     SAMANTHA GONZALEZ    Chief Complaint     Back pain.    History of Present Illness     Shay Jimenez is a 57 year old male who presented with upper back pain, admitted 6/24.    Shay is a patient with CKD V on dialysis with progressive back pain and some weakness of the legs. On admission reimaging of the spine showed findings of discitis, possible abscess and compression of the spinal cord.    Surgery is planned for Monday.    Shay is typicall on dialysis for 4.25 hours and chronically has trouble with excessive fluid gains, often in excess of 4 kg. He also has significant intradialytic hypotension.     At the time I saw him he had some orthopnea but was overall comfortable.     History is obtained from the patient and chart review.      Past Medical History   I have reviewed this patient's medical history and updated it with pertinent information if needed.   Past Medical History:   Diagnosis Date     Chronic kidney disease      Neuropathy        Past Surgical History   I have reviewed this patient's surgical history and updated it with pertinent information if needed.  No past surgical history on file.    Prior to  Admission Medications   Prior to Admission Medications   Prescriptions Last Dose Informant Patient Reported? Taking?   ALPRAZolam (XANAX) 1 MG tablet 6/23/2022 at Unknown time  Yes Yes   Sig: Take 1 mg by mouth 2 times daily as needed for anxiety   DULoxetine (CYMBALTA) 30 MG capsule 6/23/2022 at Unknown time  Yes Yes   Sig: Take 30 mg by mouth daily   ammonium lactate (AMLACTIN) 12 % external cream 6/23/2022 at Unknown time  Yes Yes   Sig: Apply topically daily Apply to bilateral lower legs   atorvastatin (LIPITOR) 20 MG tablet 6/23/2022 at Unknown time  Yes Yes   Sig: Take 20 mg by mouth At Bedtime   cyanocobalamin (VITAMIN B-12) 500 MCG tablet Past Week at Unknown time  Yes Yes   Sig: Take 500 mcg by mouth daily   cyclobenzaprine (FLEXERIL) 10 MG tablet 6/24/2022 at Unknown time  Yes Yes   Sig: Take 10 mg by mouth 3 times daily as needed for muscle spasms   finasteride (PROSCAR) 5 MG tablet 6/23/2022 at Unknown time  Yes Yes   Sig: Take 1.25 mg by mouth daily Takes 1/4 of 5 mg daily   fluticasone (FLONASE) 50 MCG/ACT nasal spray 6/24/2022 at am  Yes Yes   Sig: Spray 1 spray into both nostrils 2 times daily   gabapentin (NEURONTIN) 300 MG capsule 6/23/2022 at Unknown time  Yes Yes   Sig: Take 300 mg by mouth 3 times daily   midodrine (PROAMATINE) 10 MG tablet Past Week at Unknown time  Yes Yes   Sig: Take 10 mg by mouth daily as needed During dialysis if low bp   multivitamin RENAL (RENAVITE RX/NEPHROVITE) 1 MG tablet 6/23/2022 at Unknown time  Yes Yes   Sig: Take 1 tablet by mouth daily   omeprazole (PRILOSEC) 20 MG DR capsule 6/24/2022 at Unknown time  Yes Yes   Sig: Take 20 mg by mouth daily   sertraline (ZOLOFT) 100 MG tablet 6/24/2022 at Unknown time  Yes Yes   Sig: Take 100 mg by mouth daily   vitamin B6 (PYRIDOXINE) 100 MG tablet 6/23/2022 at Unknown time  Yes Yes   Sig: Take 100 mg by mouth daily      Facility-Administered Medications: None     [unfilled]  Allergies   Allergies    Allergen Reactions     Amlodipine      Lisinopril        Social History   I have reviewed this patient's social history and updated it with pertinent information if needed. Shay Jimenez       Family History   I have reviewed this patient's family history and updated it with pertinent information if needed. No family history of kidney disease.   No family history on file.    Review of Systems   The 14 point Review of Systems is negative other than noted in the HPI.     Physical Exam   Temp: 97.5  F (36.4  C) Temp src: Oral BP: 112/71 Pulse: 94   Resp: 16 SpO2: 94 % O2 Device: Nasal cannula Oxygen Delivery: 3 LPM  Vital Signs with Ranges  Temp:  [97.5  F (36.4  C)-98.1  F (36.7  C)] 97.5  F (36.4  C)  Pulse:  [84-98] 94  Resp:  [16] 16  BP: ()/(53-91) 112/71  SpO2:  [91 %-99 %] 94 %  328 lbs 7.77 oz    GENERAL APPEARANCE:  Alert, in no distress, pleasant, cooperative, oriented x self, place and recent events.   EYES:  EOM, lids, pupils and irises normal, sclera clear and conjunctiva normal  ENT:  Mouth normal, moist mucous membranes, nose normal without drainage or crusting, external ears without lesions, hearing acuity grossly intact  NECK:  symmetrical, trachea midline, No JVD  RESP:  Respiratory effort normal,no respiratory distress, Lung sounds clear   CV:  Auscultation of heart done, rate and rhythm controlled and regular,NONA present consistent with AV fistula, no rub or gallop.   ABDOMEN:  Soft, nontender, no palpable masses or organomegaly.  EXT: Edema none bilateral lower extremities, but extensive and advanced venous stasis changes.  SKIN:  skin on extremities warm, dry and intact without rashes otherwise  NEURO: cranial nerves grossly intact, no facial asymmetry, no speech deficits and able to follow directions, moves all extremities symmetrically  PSYCH:  insight and judgement and memory appear intact, affect and mood normal    Data   BMP  Recent Labs   Lab 06/25/22  0728 06/24/22  0818   NA  124* 126*   POTASSIUM 5.1 3.8   CHLORIDE 86* 86*   MAC 9.3 9.9   CO2 27 26   BUN 57* 39*   CR 10.10* 8.68*   * 144*     [email or web address](phos:4)  CBC)  Recent Labs   Lab 06/25/22 0728 06/24/22 0818   WBC 10.2 6.6   HGB 10.8* 10.0*   HCT 32.4* 31.5*   * 108*    198     Recent Labs   Lab 06/24/22 0818   AST 51*   ALT 55   ALKPHOS 152*   BILITOTAL 0.6     No lab results found in last 7 days.  No results found for: D2VIT, D3VIT, DTOT  Recent Labs   Lab 06/25/22 0728   HGB 10.8*   HCT 32.4*   *     No results for input(s): PTHI in the last 168 hours.

## 2022-06-26 NOTE — PROGRESS NOTES
[facility]    Medicine Progress Note - Hospitalist Service    Date of Admission:  6/24/2022    Assessment & Plan          Shay Jimenez is a very pleasant 57-year-old male with past medical history significant for end-stage renal disease, on hemodialysis, chronic peripheral neuropathy affecting his feet, history of hypertension no longer on meds, hyperlipidemia, obesity, obstructive sleep apnea, GERD and depression, among other medical problems, who presented 6/24/22 for evaluation of worsening upper back pain with associated generalized weakness and falls.  He was found to have abnormal findings on thoracic MRI concerning for diskitis with osteomyelitis with also some findings of a compressive myelopathy.  He is being admitted to the hospital today for ongoing evaluation and care.    Upper back pain  Generalized weakness  Falls   Abnormal findings of a T2 and T3 suggestive of diskitis, osteomyelitis  Epidural phlegmon/abscess   Compressive myelopathy  *back pain for 6 months. CT chest 2/2022 showed concern for upper thoracic spine diskitis vs osteomyelitis. Unclear follow up from that time  *in weeks prior to admission, MRI showed possible thoracic compression fracture.   *developed progressively weak legs with frequent falls.  *on admission imaging with T2-T3 diskitis, osteomyelitis and compressive myelopathy.      - appreciate neurosurgery   - hold abx pending obtaining cultures unless develops e/o systemic infection or sepsis  - follow blood cultures  - consult to ID for further assistance  - Tylenol, oxycodone and IV Dilaudid available as needed for pain.  - will order PT/OT pending surgical plan.     Pre-op  RCRI 6.0% 30 day risk of cardiac event. He was able to ambulate at least 1 mile, exercising regularly prior to onset of weakness several weeks ago with anginal symptoms. Previously tolerated general anesthesia without issue for multiple orthopedic and AVF surgeries. Plan to  "complete pre-op dialysis 6/25  - medically optimized for surgery.   - recommend special attention to intra operative blood pressures. He is on midodrine for dialysis.     End-stage renal disease, on hemodialysis  *Typically dialyzes Monday, Wednesday, Friday. Missed 6/24 session due to being in ED    -Nephrology consult appreciated  -Dialysis per nephrology.  -PTA sevelamer     GERD  -Chronic and stable on PPI.    History of hypertension  *No longer needing blood pressure medicines.  Monitor blood pressures this stay.    Hyperlipidemia  -Chronic and stable, on statin.    Peripheral neuropathy of the feet  *Follows with Neurology.    -Managed in part with duloxetine, which will be continued.    Depression  -Chronic and stable on Zoloft.    Obesity, class III  Obstructive sleep apnea  *BMI this stay is 43.10.  He does not use CPAP.  Encourage diet, lifestyle modifications once above issues addressed.    History of BPH.       Diet: Regular Diet Adult    DVT Prophylaxis: Pneumatic Compression Devices  Oliveira Catheter: Not present  Central Lines: None  Cardiac Monitoring: None  Code Status: Full Code      Disposition Plan     Expected Discharge Date: 06/28/2022                The patient's care was discussed with the Bedside Nurse and Patient.    Phoenix Hopkins MD  Hospitalist Service  [facility]  Securely message with the [employer] Web Console (learn more here)  Text page via [employer] Paging/Directory         Clinically Significant Risk Factors Present on Admission                # Severe Obesity: Estimated body mass index is 43.1 kg/m  as calculated from the following:    Height as of this encounter: 1.859 m (6' 1.2\").    Weight as of this encounter: 149 kg (328 lb 7.8 oz).        ______________________________________________________________________    Interval History   Worsening weakness. No f/c/r. No sob, chest pain. Ongoing back pain.     Data reviewed today: I reviewed all medications, new " labs and imaging results over the last 24 hours. I personally reviewed no images or EKG's today.    Physical Exam   Vital Signs: Temp: 97.6  F (36.4  C) Temp src: Oral BP: 103/73 Pulse: 73   Resp: 18 SpO2: 96 % O2 Device: Nasal cannula Oxygen Delivery: 2 LPM  Weight: 328 lbs 7.77 oz  Constitutional: Awake, alert, cooperative, no apparent distress  Respiratory: Clear to auscultation bilaterally, no crackles or wheezing  Cardiovascular: Regular rate and rhythm, normal S1 and S2, and no murmur noted  GI: Normal bowel sounds, soft, non-distended, non-tender  Skin/Integumen: No rashes, no cyanosis, no edema  Other: Antigravity with ble with sig effort.      Data   Recent Labs   Lab 06/26/22  0712 06/25/22  0728 06/24/22  0818   WBC 7.8 10.2 6.6   HGB 10.5* 10.8* 10.0*   * 104* 108*    214 198    124* 126*   POTASSIUM 4.6 5.1 3.8   CHLORIDE 93* 86* 86*   CO2 30 27 26   BUN 57* 57* 39*   CR 8.46* 10.10* 8.68*   ANIONGAP 10 11 14   MAC 9.1 9.3 9.9   * 153* 144*   ALBUMIN  --   --  3.3*   PROTTOTAL  --   --  7.3   BILITOTAL  --   --  0.6   ALKPHOS  --   --  152*   ALT  --   --  55   AST  --   --  51*     No results found for this or any previous visit (from the past 24 hour(s)).  Medications     - MEDICATION INSTRUCTIONS -         - MEDICATION INSTRUCTIONS for Dialysis Patients -   Does not apply See Admin Instructions     atorvastatin  20 mg Oral At Bedtime     cetirizine  10 mg Oral Daily     DULoxetine  30 mg Oral Daily     finasteride  1.25 mg Oral Daily     gabapentin  300 mg Oral TID     midodrine  10 mg Oral Once per day on Tue Thu Sat     - MEDICATION INSTRUCTIONS -   Does not apply Once     omeprazole  20 mg Oral Daily     sertraline  100 mg Oral Daily     sevelamer carbonate  800 mg Oral TID w/meals     sodium chloride (PF)  3 mL Intracatheter Q8H

## 2022-06-26 NOTE — PROGRESS NOTES
I, Yelena Keller, staff  have reviewed and agree with the following  student's note on 6/29/2022 at 11:06 AM.       SPIRITUAL HEALTH SERVICES Progress Note  [facility]  Ortho Spine    Saw pt Shay Jimenez per Consultation.    Illness Narrative - Shay described his illness as some sort of back problem that also affects his legs as  sometimes [he] has trouble feeling them]. Further he talked about how he has been falling a lot this year and that he is concerned with how much he drinks.    Distress - Shay said that he is worried about a lot of things. His back and other health issues make it more difficult for him to work and do the things he enjoys. His alcohol consumption sometimes makes him worry that he puts undue burdens on others, and he is concerned of the negative health effects as well. His financial situation also adds stress where receiving proper care is in itself a burden. Overall, Shay said that he  has a lot on his plate and no healthy way of dealing with it .    Coping - Shay has stated that his primary coping method has been drinking but he would like that to change. Beyond this he relies heavily on his children for support, but he feels some guilt in this. Further Shay would like to find other ways of coping and we spoke of potentially getting involved in therapy and writing down things that bring him munir and further trying to  think of a happy place  which is something that he did in the past with some success. Shay also expressed that his brooke brings him some comfort, but he has been in between churches for some time and views this as a potential barrier for care he could receive within his brooke.    Meaning-Making - Shay is a Denominational and his wife is a Presbyterian. He shared that this difference proves somewhat challenging when it comes to finding a Congregational and practicing brooke, but Shay would like to start going to Congregational again. We then said a short prayer.    Plan -  Shay said he expected to stay at the hospital the next couple of days and would appreciate a follow up visit from .     will follow.    CELSO Faulkner  Intern    Phone: [phone]

## 2022-06-27 ENCOUNTER — ANESTHESIA EVENT (OUTPATIENT)
Dept: SURGERY | Facility: CLINIC | Age: 57
DRG: 459 | End: 2022-06-27
Payer: MEDICARE

## 2022-06-27 ENCOUNTER — APPOINTMENT (OUTPATIENT)
Dept: GENERAL RADIOLOGY | Facility: CLINIC | Age: 57
DRG: 459 | End: 2022-06-27
Attending: INTERNAL MEDICINE
Payer: MEDICARE

## 2022-06-27 ENCOUNTER — ANESTHESIA (OUTPATIENT)
Dept: SURGERY | Facility: CLINIC | Age: 57
DRG: 459 | End: 2022-06-27
Payer: MEDICARE

## 2022-06-27 LAB
ABO/RH(D): NORMAL
ANION GAP SERPL CALCULATED.3IONS-SCNC: 13 MMOL/L (ref 3–14)
ANTIBODY SCREEN: NEGATIVE
APTT PPP: 27 SECONDS (ref 22–38)
BLD PROD TYP BPU: NORMAL
BLD PROD TYP BPU: NORMAL
BLOOD COMPONENT TYPE: NORMAL
BLOOD COMPONENT TYPE: NORMAL
BUN SERPL-MCNC: 81 MG/DL (ref 7–30)
CALCIUM SERPL-MCNC: 9.4 MG/DL (ref 8.5–10.1)
CHLORIDE BLD-SCNC: 92 MMOL/L (ref 94–109)
CO2 SERPL-SCNC: 27 MMOL/L (ref 20–32)
CODING SYSTEM: NORMAL
CODING SYSTEM: NORMAL
COHGB MFR BLD: 99 % (ref 92–100)
COHGB MFR BLD: 99 % (ref 92–100)
CPB POCT: NO
CPB POCT: NO
CREAT SERPL-MCNC: 10.3 MG/DL (ref 0.66–1.25)
CROSSMATCH: NORMAL
CROSSMATCH: NORMAL
ERYTHROCYTE [DISTWIDTH] IN BLOOD BY AUTOMATED COUNT: 14.3 % (ref 10–15)
ERYTHROCYTE [DISTWIDTH] IN BLOOD BY AUTOMATED COUNT: 14.3 % (ref 10–15)
GFR SERPL CREATININE-BSD FRML MDRD: 5 ML/MIN/1.73M2
GLUCOSE BLD-MCNC: 131 MG/DL (ref 70–99)
HBV CORE AB SERPL QL IA: NEGATIVE
HBV SURFACE AB SERPL IA-ACNC: 1.29 M[IU]/ML
HBV SURFACE AB SERPLBLD QL IA.RAPID: NEGATIVE
HBV SURFACE AG SERPL QL IA: NONREACTIVE
HCO3 BLDA-SCNC: 27 MMOL/L (ref 21–28)
HCO3 BLDA-SCNC: 27 MMOL/L (ref 21–28)
HCT VFR BLD AUTO: 28.8 % (ref 40–53)
HCT VFR BLD AUTO: 33.5 % (ref 40–53)
HCT VFR BLD CALC: 31 % (ref 40–53)
HCT VFR BLD CALC: 33 % (ref 40–53)
HGB BLD-MCNC: 10.5 G/DL (ref 13.3–17.7)
HGB BLD-MCNC: 10.9 G/DL (ref 13.3–17.7)
HGB BLD-MCNC: 11.2 G/DL (ref 13.3–17.7)
HGB BLD-MCNC: 9.4 G/DL (ref 13.3–17.7)
INR PPP: 1.07 (ref 0.85–1.15)
ISSUE DATE AND TIME: NORMAL
ISSUE DATE AND TIME: NORMAL
MCH RBC QN AUTO: 34.8 PG (ref 26.5–33)
MCH RBC QN AUTO: 35.5 PG (ref 26.5–33)
MCHC RBC AUTO-ENTMCNC: 32.5 G/DL (ref 31.5–36.5)
MCHC RBC AUTO-ENTMCNC: 32.6 G/DL (ref 31.5–36.5)
MCV RBC AUTO: 107 FL (ref 78–100)
MCV RBC AUTO: 109 FL (ref 78–100)
PCO2 BLDA: 38 MM HG (ref 35–45)
PCO2 BLDA: 39 MM HG (ref 35–45)
PH BLDA: 7.45 [PH] (ref 7.35–7.45)
PH BLDA: 7.46 [PH] (ref 7.35–7.45)
PLATELET # BLD AUTO: 200 10E3/UL (ref 150–450)
PLATELET # BLD AUTO: 241 10E3/UL (ref 150–450)
PO2 BLDA: 111 MM HG (ref 80–105)
PO2 BLDA: 152 MM HG (ref 80–105)
POTASSIUM BLD-SCNC: 3.8 MMOL/L (ref 3.4–5.3)
POTASSIUM BLD-SCNC: 3.9 MMOL/L (ref 3.4–5.3)
POTASSIUM BLD-SCNC: 4.5 MMOL/L (ref 3.4–5.3)
RBC # BLD AUTO: 2.65 10E6/UL (ref 4.4–5.9)
RBC # BLD AUTO: 3.13 10E6/UL (ref 4.4–5.9)
SODIUM BLD-SCNC: 135 MMOL/L (ref 133–144)
SODIUM BLD-SCNC: 135 MMOL/L (ref 133–144)
SODIUM SERPL-SCNC: 132 MMOL/L (ref 133–144)
SPECIMEN EXPIRATION DATE: NORMAL
UNIT ABO/RH: NORMAL
UNIT ABO/RH: NORMAL
UNIT NUMBER: NORMAL
UNIT NUMBER: NORMAL
UNIT STATUS: NORMAL
UNIT STATUS: NORMAL
UNIT TYPE ISBT: 9500
UNIT TYPE ISBT: 9500
WBC # BLD AUTO: 10.9 10E3/UL (ref 4–11)
WBC # BLD AUTO: 7.1 10E3/UL (ref 4–11)

## 2022-06-27 PROCEDURE — 90937 HEMODIALYSIS REPEATED EVAL: CPT

## 2022-06-27 PROCEDURE — 85027 COMPLETE CBC AUTOMATED: CPT | Performed by: STUDENT IN AN ORGANIZED HEALTH CARE EDUCATION/TRAINING PROGRAM

## 2022-06-27 PROCEDURE — 258N000003 HC RX IP 258 OP 636: Performed by: INTERNAL MEDICINE

## 2022-06-27 PROCEDURE — 999N000141 HC STATISTIC PRE-PROCEDURE NURSING ASSESSMENT: Performed by: STUDENT IN AN ORGANIZED HEALTH CARE EDUCATION/TRAINING PROGRAM

## 2022-06-27 PROCEDURE — 87206 SMEAR FLUORESCENT/ACID STAI: CPT | Performed by: INTERNAL MEDICINE

## 2022-06-27 PROCEDURE — 0RG20K1 FUSION OF 2 OR MORE CERVICAL VERTEBRAL JOINTS WITH NONAUTOLOGOUS TISSUE SUBSTITUTE, POSTERIOR APPROACH, POSTERIOR COLUMN, OPEN APPROACH: ICD-10-PCS | Performed by: STUDENT IN AN ORGANIZED HEALTH CARE EDUCATION/TRAINING PROGRAM

## 2022-06-27 PROCEDURE — 250N000013 HC RX MED GY IP 250 OP 250 PS 637: Performed by: INTERNAL MEDICINE

## 2022-06-27 PROCEDURE — 63003 REMOVE SPINE LAMINA 1/2 THRC: CPT | Mod: 51 | Performed by: STUDENT IN AN ORGANIZED HEALTH CARE EDUCATION/TRAINING PROGRAM

## 2022-06-27 PROCEDURE — C1762 CONN TISS, HUMAN(INC FASCIA): HCPCS | Performed by: STUDENT IN AN ORGANIZED HEALTH CARE EDUCATION/TRAINING PROGRAM

## 2022-06-27 PROCEDURE — 86901 BLOOD TYPING SEROLOGIC RH(D): CPT | Performed by: INTERNAL MEDICINE

## 2022-06-27 PROCEDURE — 0PS40ZZ REPOSITION THORACIC VERTEBRA, OPEN APPROACH: ICD-10-PCS | Performed by: STUDENT IN AN ORGANIZED HEALTH CARE EDUCATION/TRAINING PROGRAM

## 2022-06-27 PROCEDURE — P9041 ALBUMIN (HUMAN),5%, 50ML: HCPCS | Performed by: ANESTHESIOLOGY

## 2022-06-27 PROCEDURE — 87075 CULTR BACTERIA EXCEPT BLOOD: CPT | Performed by: STUDENT IN AN ORGANIZED HEALTH CARE EDUCATION/TRAINING PROGRAM

## 2022-06-27 PROCEDURE — 250N000011 HC RX IP 250 OP 636: Performed by: NURSE ANESTHETIST, CERTIFIED REGISTERED

## 2022-06-27 PROCEDURE — 84295 ASSAY OF SERUM SODIUM: CPT

## 2022-06-27 PROCEDURE — 86923 COMPATIBILITY TEST ELECTRIC: CPT | Performed by: INTERNAL MEDICINE

## 2022-06-27 PROCEDURE — 370N000017 HC ANESTHESIA TECHNICAL FEE, PER MIN: Performed by: STUDENT IN AN ORGANIZED HEALTH CARE EDUCATION/TRAINING PROGRAM

## 2022-06-27 PROCEDURE — C1713 ANCHOR/SCREW BN/BN,TIS/BN: HCPCS | Performed by: STUDENT IN AN ORGANIZED HEALTH CARE EDUCATION/TRAINING PROGRAM

## 2022-06-27 PROCEDURE — 922N000001 HC NEURO MONITORING SERVICE, UP TO 7 HOURS (T1FEE): Performed by: STUDENT IN AN ORGANIZED HEALTH CARE EDUCATION/TRAINING PROGRAM

## 2022-06-27 PROCEDURE — 0RG70K1 FUSION OF 2 TO 7 THORACIC VERTEBRAL JOINTS WITH NONAUTOLOGOUS TISSUE SUBSTITUTE, POSTERIOR APPROACH, POSTERIOR COLUMN, OPEN APPROACH: ICD-10-PCS | Performed by: STUDENT IN AN ORGANIZED HEALTH CARE EDUCATION/TRAINING PROGRAM

## 2022-06-27 PROCEDURE — 250N000013 HC RX MED GY IP 250 OP 250 PS 637: Performed by: STUDENT IN AN ORGANIZED HEALTH CARE EDUCATION/TRAINING PROGRAM

## 2022-06-27 PROCEDURE — 22843 INSERT SPINE FIXATION DEVICE: CPT | Performed by: STUDENT IN AN ORGANIZED HEALTH CARE EDUCATION/TRAINING PROGRAM

## 2022-06-27 PROCEDURE — 634N000001 HC RX 634: Performed by: INTERNAL MEDICINE

## 2022-06-27 PROCEDURE — 250N000009 HC RX 250: Performed by: STUDENT IN AN ORGANIZED HEALTH CARE EDUCATION/TRAINING PROGRAM

## 2022-06-27 PROCEDURE — 22614 ARTHRD PST TQ 1NTRSPC EA ADD: CPT | Performed by: STUDENT IN AN ORGANIZED HEALTH CARE EDUCATION/TRAINING PROGRAM

## 2022-06-27 PROCEDURE — 258N000003 HC RX IP 258 OP 636: Performed by: ANESTHESIOLOGY

## 2022-06-27 PROCEDURE — 272N000001 HC OR GENERAL SUPPLY STERILE: Performed by: STUDENT IN AN ORGANIZED HEALTH CARE EDUCATION/TRAINING PROGRAM

## 2022-06-27 PROCEDURE — 8E0WXBF COMPUTER ASSISTED PROCEDURE OF TRUNK REGION, WITH FLUOROSCOPY: ICD-10-PCS | Performed by: STUDENT IN AN ORGANIZED HEALTH CARE EDUCATION/TRAINING PROGRAM

## 2022-06-27 PROCEDURE — 120N000001 HC R&B MED SURG/OB

## 2022-06-27 PROCEDURE — 999N000009 HC STATISTIC AIRWAY CARE

## 2022-06-27 PROCEDURE — 250N000011 HC RX IP 250 OP 636: Performed by: PHYSICIAN ASSISTANT

## 2022-06-27 PROCEDURE — 200N000001 HC R&B ICU

## 2022-06-27 PROCEDURE — 01N80ZZ RELEASE THORACIC NERVE, OPEN APPROACH: ICD-10-PCS | Performed by: STUDENT IN AN ORGANIZED HEALTH CARE EDUCATION/TRAINING PROGRAM

## 2022-06-27 PROCEDURE — 87102 FUNGUS ISOLATION CULTURE: CPT | Performed by: INTERNAL MEDICINE

## 2022-06-27 PROCEDURE — 99232 SBSQ HOSP IP/OBS MODERATE 35: CPT | Performed by: INTERNAL MEDICINE

## 2022-06-27 PROCEDURE — 999N000157 HC STATISTIC RCP TIME EA 10 MIN

## 2022-06-27 PROCEDURE — 86850 RBC ANTIBODY SCREEN: CPT | Performed by: INTERNAL MEDICINE

## 2022-06-27 PROCEDURE — 22600 ARTHRD PST TQ 1NTRSPC CRV: CPT | Mod: 51 | Performed by: STUDENT IN AN ORGANIZED HEALTH CARE EDUCATION/TRAINING PROGRAM

## 2022-06-27 PROCEDURE — 87040 BLOOD CULTURE FOR BACTERIA: CPT | Performed by: STUDENT IN AN ORGANIZED HEALTH CARE EDUCATION/TRAINING PROGRAM

## 2022-06-27 PROCEDURE — 87070 CULTURE OTHR SPECIMN AEROBIC: CPT | Performed by: STUDENT IN AN ORGANIZED HEALTH CARE EDUCATION/TRAINING PROGRAM

## 2022-06-27 PROCEDURE — 250N000011 HC RX IP 250 OP 636: Performed by: ANESTHESIOLOGY

## 2022-06-27 PROCEDURE — 258N000003 HC RX IP 258 OP 636: Performed by: NURSE ANESTHETIST, CERTIFIED REGISTERED

## 2022-06-27 PROCEDURE — 250N000011 HC RX IP 250 OP 636: Performed by: INTERNAL MEDICINE

## 2022-06-27 PROCEDURE — 360N000085 HC SURGERY LEVEL 5 W/ FLUORO, PER MIN: Performed by: STUDENT IN AN ORGANIZED HEALTH CARE EDUCATION/TRAINING PROGRAM

## 2022-06-27 PROCEDURE — 250N000009 HC RX 250: Performed by: ANESTHESIOLOGY

## 2022-06-27 PROCEDURE — 82803 BLOOD GASES ANY COMBINATION: CPT

## 2022-06-27 PROCEDURE — 87205 SMEAR GRAM STAIN: CPT | Performed by: STUDENT IN AN ORGANIZED HEALTH CARE EDUCATION/TRAINING PROGRAM

## 2022-06-27 PROCEDURE — 36415 COLL VENOUS BLD VENIPUNCTURE: CPT | Performed by: STUDENT IN AN ORGANIZED HEALTH CARE EDUCATION/TRAINING PROGRAM

## 2022-06-27 PROCEDURE — 85610 PROTHROMBIN TIME: CPT | Performed by: PHYSICIAN ASSISTANT

## 2022-06-27 PROCEDURE — 22327 TREAT THORAX SPINE FRACTURE: CPT | Performed by: STUDENT IN AN ORGANIZED HEALTH CARE EDUCATION/TRAINING PROGRAM

## 2022-06-27 PROCEDURE — 82310 ASSAY OF CALCIUM: CPT | Performed by: STUDENT IN AN ORGANIZED HEALTH CARE EDUCATION/TRAINING PROGRAM

## 2022-06-27 PROCEDURE — 94002 VENT MGMT INPAT INIT DAY: CPT

## 2022-06-27 PROCEDURE — 4A11X4G MONITORING OF PERIPHERAL NERVOUS ELECTRICAL ACTIVITY, INTRAOPERATIVE, EXTERNAL APPROACH: ICD-10-PCS | Performed by: STUDENT IN AN ORGANIZED HEALTH CARE EDUCATION/TRAINING PROGRAM

## 2022-06-27 PROCEDURE — 999N000180 XR SURGERY CARM FLUORO LESS THAN 5 MIN

## 2022-06-27 PROCEDURE — 61783 SCAN PROC SPINAL: CPT | Mod: 59 | Performed by: STUDENT IN AN ORGANIZED HEALTH CARE EDUCATION/TRAINING PROGRAM

## 2022-06-27 PROCEDURE — 90935 HEMODIALYSIS ONE EVALUATION: CPT | Performed by: INTERNAL MEDICINE

## 2022-06-27 PROCEDURE — P9016 RBC LEUKOCYTES REDUCED: HCPCS | Performed by: INTERNAL MEDICINE

## 2022-06-27 PROCEDURE — 85730 THROMBOPLASTIN TIME PARTIAL: CPT | Performed by: PHYSICIAN ASSISTANT

## 2022-06-27 PROCEDURE — 0RG40K1 FUSION OF CERVICOTHORACIC VERTEBRAL JOINT WITH NONAUTOLOGOUS TISSUE SUBSTITUTE, POSTERIOR APPROACH, POSTERIOR COLUMN, OPEN APPROACH: ICD-10-PCS | Performed by: STUDENT IN AN ORGANIZED HEALTH CARE EDUCATION/TRAINING PROGRAM

## 2022-06-27 PROCEDURE — 250N000009 HC RX 250: Performed by: NURSE ANESTHETIST, CERTIFIED REGISTERED

## 2022-06-27 PROCEDURE — 20930 SP BONE ALGRFT MORSEL ADD-ON: CPT | Performed by: STUDENT IN AN ORGANIZED HEALTH CARE EDUCATION/TRAINING PROGRAM

## 2022-06-27 PROCEDURE — 250N000005 HC OR RX SURGIFLO HEMOSTATIC MATRIX 10ML 199102S OPNP: Performed by: STUDENT IN AN ORGANIZED HEALTH CARE EDUCATION/TRAINING PROGRAM

## 2022-06-27 DEVICE — IMP SCR SET MEDT SOLERA BREAK OFF 5.5MM TI 5540030: Type: IMPLANTABLE DEVICE | Site: THORACIC | Status: FUNCTIONAL

## 2022-06-27 DEVICE — GRAFT BONE MAGNIFUSE 1.75CMX10CM 7509141: Type: IMPLANTABLE DEVICE | Site: THORACIC | Status: FUNCTIONAL

## 2022-06-27 DEVICE — IMP SCR MEDT 5.5/6.0MM SOLERA 4.5X35MM MA 55840004535: Type: IMPLANTABLE DEVICE | Site: THORACIC | Status: FUNCTIONAL

## 2022-06-27 DEVICE — ROD SPNL 420MM 3.5/5.5MM TPR COCRMO 3603807: Type: IMPLANTABLE DEVICE | Site: THORACIC | Status: FUNCTIONAL

## 2022-06-27 DEVICE — IMP SCR MEDT 5.5/6.0MM SOLERA 4.5X40MM MA 55840004540: Type: IMPLANTABLE DEVICE | Site: THORACIC | Status: FUNCTIONAL

## 2022-06-27 DEVICE — IMPLANTABLE DEVICE: Type: IMPLANTABLE DEVICE | Site: SPINE CERVICAL | Status: FUNCTIONAL

## 2022-06-27 RX ORDER — LIDOCAINE HYDROCHLORIDE 10 MG/ML
INJECTION, SOLUTION INFILTRATION; PERINEURAL
Status: COMPLETED | OUTPATIENT
Start: 2022-06-27 | End: 2022-06-27

## 2022-06-27 RX ORDER — CEFAZOLIN SODIUM/WATER 3 G/30 ML
3 SYRINGE (ML) INTRAVENOUS SEE ADMIN INSTRUCTIONS
Status: DISCONTINUED | OUTPATIENT
Start: 2022-06-27 | End: 2022-06-27 | Stop reason: HOSPADM

## 2022-06-27 RX ORDER — CEFAZOLIN SODIUM/WATER 3 G/30 ML
3 SYRINGE (ML) INTRAVENOUS
Status: DISCONTINUED | OUTPATIENT
Start: 2022-06-27 | End: 2022-06-27 | Stop reason: HOSPADM

## 2022-06-27 RX ORDER — HYDROMORPHONE HYDROCHLORIDE 1 MG/ML
0.5 INJECTION, SOLUTION INTRAMUSCULAR; INTRAVENOUS; SUBCUTANEOUS EVERY 5 MIN PRN
Status: DISCONTINUED | OUTPATIENT
Start: 2022-06-27 | End: 2022-06-27

## 2022-06-27 RX ORDER — ONDANSETRON 4 MG/1
4 TABLET, ORALLY DISINTEGRATING ORAL EVERY 30 MIN PRN
Status: DISCONTINUED | OUTPATIENT
Start: 2022-06-27 | End: 2022-06-27

## 2022-06-27 RX ORDER — SODIUM CHLORIDE 9 MG/ML
INJECTION, SOLUTION INTRAVENOUS CONTINUOUS PRN
Status: DISCONTINUED | OUTPATIENT
Start: 2022-06-27 | End: 2022-06-27

## 2022-06-27 RX ORDER — OXYCODONE HYDROCHLORIDE 5 MG/1
5 TABLET ORAL EVERY 4 HOURS PRN
Status: DISCONTINUED | OUTPATIENT
Start: 2022-06-27 | End: 2022-06-27

## 2022-06-27 RX ORDER — ONDANSETRON 2 MG/ML
4 INJECTION INTRAMUSCULAR; INTRAVENOUS EVERY 30 MIN PRN
Status: DISCONTINUED | OUTPATIENT
Start: 2022-06-27 | End: 2022-06-27

## 2022-06-27 RX ORDER — PROPOFOL 10 MG/ML
INJECTION, EMULSION INTRAVENOUS PRN
Status: DISCONTINUED | OUTPATIENT
Start: 2022-06-27 | End: 2022-06-27

## 2022-06-27 RX ORDER — ALBUMIN, HUMAN INJ 5% 5 %
SOLUTION INTRAVENOUS CONTINUOUS PRN
Status: DISCONTINUED | OUTPATIENT
Start: 2022-06-27 | End: 2022-06-27

## 2022-06-27 RX ORDER — MAGNESIUM HYDROXIDE 1200 MG/15ML
LIQUID ORAL PRN
Status: DISCONTINUED | OUTPATIENT
Start: 2022-06-27 | End: 2022-06-27 | Stop reason: HOSPADM

## 2022-06-27 RX ORDER — ONDANSETRON 2 MG/ML
INJECTION INTRAMUSCULAR; INTRAVENOUS PRN
Status: DISCONTINUED | OUTPATIENT
Start: 2022-06-27 | End: 2022-06-27

## 2022-06-27 RX ORDER — BUPIVACAINE HYDROCHLORIDE AND EPINEPHRINE 5; 5 MG/ML; UG/ML
INJECTION, SOLUTION EPIDURAL; INTRACAUDAL; PERINEURAL PRN
Status: DISCONTINUED | OUTPATIENT
Start: 2022-06-27 | End: 2022-06-27 | Stop reason: HOSPADM

## 2022-06-27 RX ORDER — GABAPENTIN 100 MG/1
100 CAPSULE ORAL
Status: DISCONTINUED | OUTPATIENT
Start: 2022-06-27 | End: 2022-06-27 | Stop reason: HOSPADM

## 2022-06-27 RX ORDER — FENTANYL CITRATE 50 UG/ML
25 INJECTION, SOLUTION INTRAMUSCULAR; INTRAVENOUS EVERY 5 MIN PRN
Status: DISCONTINUED | OUTPATIENT
Start: 2022-06-27 | End: 2022-06-27

## 2022-06-27 RX ORDER — FENTANYL CITRATE 50 UG/ML
INJECTION, SOLUTION INTRAMUSCULAR; INTRAVENOUS PRN
Status: DISCONTINUED | OUTPATIENT
Start: 2022-06-27 | End: 2022-06-27

## 2022-06-27 RX ORDER — HYDROMORPHONE HYDROCHLORIDE 1 MG/ML
0.5 INJECTION, SOLUTION INTRAMUSCULAR; INTRAVENOUS; SUBCUTANEOUS ONCE
Status: COMPLETED | OUTPATIENT
Start: 2022-06-27 | End: 2022-06-27

## 2022-06-27 RX ORDER — DEXMEDETOMIDINE HYDROCHLORIDE 4 UG/ML
INJECTION, SOLUTION INTRAVENOUS PRN
Status: DISCONTINUED | OUTPATIENT
Start: 2022-06-27 | End: 2022-06-27

## 2022-06-27 RX ORDER — SODIUM CHLORIDE, SODIUM LACTATE, POTASSIUM CHLORIDE, CALCIUM CHLORIDE 600; 310; 30; 20 MG/100ML; MG/100ML; MG/100ML; MG/100ML
INJECTION, SOLUTION INTRAVENOUS CONTINUOUS
Status: DISCONTINUED | OUTPATIENT
Start: 2022-06-28 | End: 2022-06-27

## 2022-06-27 RX ORDER — PROPOFOL 10 MG/ML
INJECTION, EMULSION INTRAVENOUS CONTINUOUS PRN
Status: DISCONTINUED | OUTPATIENT
Start: 2022-06-27 | End: 2022-06-27

## 2022-06-27 RX ADMIN — PHENYLEPHRINE HYDROCHLORIDE 100 MCG: 10 INJECTION INTRAVENOUS at 16:45

## 2022-06-27 RX ADMIN — PHENYLEPHRINE HYDROCHLORIDE 100 MCG: 10 INJECTION INTRAVENOUS at 16:53

## 2022-06-27 RX ADMIN — PHENYLEPHRINE HYDROCHLORIDE 100 MCG: 10 INJECTION INTRAVENOUS at 23:45

## 2022-06-27 RX ADMIN — FINASTERIDE 1.25 MG: 5 TABLET, FILM COATED ORAL at 07:56

## 2022-06-27 RX ADMIN — PROPOFOL 200 MG: 10 INJECTION, EMULSION INTRAVENOUS at 16:50

## 2022-06-27 RX ADMIN — Medication 2 G: at 21:01

## 2022-06-27 RX ADMIN — PHENYLEPHRINE HYDROCHLORIDE 100 MCG: 10 INJECTION INTRAVENOUS at 18:05

## 2022-06-27 RX ADMIN — PROPOFOL 50 MG: 10 INJECTION, EMULSION INTRAVENOUS at 18:13

## 2022-06-27 RX ADMIN — SODIUM CHLORIDE: 9 INJECTION, SOLUTION INTRAVENOUS at 19:24

## 2022-06-27 RX ADMIN — MIDODRINE HYDROCHLORIDE 10 MG: 5 TABLET ORAL at 10:57

## 2022-06-27 RX ADMIN — PHENYLEPHRINE HYDROCHLORIDE 100 MCG: 10 INJECTION INTRAVENOUS at 18:35

## 2022-06-27 RX ADMIN — HYDROMORPHONE HYDROCHLORIDE 0.5 MG: 1 INJECTION, SOLUTION INTRAMUSCULAR; INTRAVENOUS; SUBCUTANEOUS at 07:36

## 2022-06-27 RX ADMIN — Medication 3 G: at 16:59

## 2022-06-27 RX ADMIN — MIDAZOLAM 1 MG: 1 INJECTION INTRAMUSCULAR; INTRAVENOUS at 16:48

## 2022-06-27 RX ADMIN — PROPOFOL 50 MG: 10 INJECTION, EMULSION INTRAVENOUS at 23:32

## 2022-06-27 RX ADMIN — OMEPRAZOLE 20 MG: 20 CAPSULE, DELAYED RELEASE ORAL at 07:55

## 2022-06-27 RX ADMIN — GABAPENTIN 300 MG: 300 CAPSULE ORAL at 07:55

## 2022-06-27 RX ADMIN — PROPOFOL 120 MCG/KG/MIN: 10 INJECTION, EMULSION INTRAVENOUS at 16:50

## 2022-06-27 RX ADMIN — PHENYLEPHRINE HYDROCHLORIDE 100 MCG: 10 INJECTION INTRAVENOUS at 23:48

## 2022-06-27 RX ADMIN — PHENYLEPHRINE HYDROCHLORIDE 100 MCG: 10 INJECTION INTRAVENOUS at 23:38

## 2022-06-27 RX ADMIN — DEXMEDETOMIDINE HYDROCHLORIDE 20 MCG: 200 INJECTION INTRAVENOUS at 23:34

## 2022-06-27 RX ADMIN — SODIUM CHLORIDE 250 ML: 9 INJECTION, SOLUTION INTRAVENOUS at 10:02

## 2022-06-27 RX ADMIN — CYCLOBENZAPRINE 10 MG: 10 TABLET, FILM COATED ORAL at 07:56

## 2022-06-27 RX ADMIN — Medication: at 10:03

## 2022-06-27 RX ADMIN — CETIRIZINE HYDROCHLORIDE 10 MG: 10 TABLET, FILM COATED ORAL at 07:54

## 2022-06-27 RX ADMIN — HYDROMORPHONE HYDROCHLORIDE 0.5 MG: 1 INJECTION, SOLUTION INTRAMUSCULAR; INTRAVENOUS; SUBCUTANEOUS at 05:22

## 2022-06-27 RX ADMIN — HYDROMORPHONE HYDROCHLORIDE 0.5 MG: 1 INJECTION, SOLUTION INTRAMUSCULAR; INTRAVENOUS; SUBCUTANEOUS at 11:01

## 2022-06-27 RX ADMIN — ALBUMIN HUMAN: 0.05 INJECTION, SOLUTION INTRAVENOUS at 21:52

## 2022-06-27 RX ADMIN — REMIFENTANIL HYDROCHLORIDE 0.4 MCG/KG/MIN: 1 INJECTION, POWDER, LYOPHILIZED, FOR SOLUTION INTRAVENOUS at 20:49

## 2022-06-27 RX ADMIN — EPOETIN ALFA-EPBX 1000 UNITS: 2000 INJECTION, SOLUTION INTRAVENOUS; SUBCUTANEOUS at 12:03

## 2022-06-27 RX ADMIN — MIDAZOLAM 1 MG: 1 INJECTION INTRAMUSCULAR; INTRAVENOUS at 16:40

## 2022-06-27 RX ADMIN — FENTANYL CITRATE 100 MCG: 50 INJECTION, SOLUTION INTRAMUSCULAR; INTRAVENOUS at 17:05

## 2022-06-27 RX ADMIN — PHENYLEPHRINE HYDROCHLORIDE 100 MCG: 10 INJECTION INTRAVENOUS at 16:57

## 2022-06-27 RX ADMIN — HYDROMORPHONE HYDROCHLORIDE 0.5 MG: 1 INJECTION, SOLUTION INTRAMUSCULAR; INTRAVENOUS; SUBCUTANEOUS at 14:16

## 2022-06-27 RX ADMIN — SODIUM CHLORIDE: 9 INJECTION, SOLUTION INTRAVENOUS at 16:28

## 2022-06-27 RX ADMIN — SERTRALINE HYDROCHLORIDE 100 MG: 100 TABLET ORAL at 07:54

## 2022-06-27 RX ADMIN — SUCCINYLCHOLINE CHLORIDE 120 MG: 20 INJECTION, SOLUTION INTRAMUSCULAR; INTRAVENOUS; PARENTERAL at 16:50

## 2022-06-27 RX ADMIN — OXYCODONE HYDROCHLORIDE 5 MG: 5 TABLET ORAL at 07:56

## 2022-06-27 RX ADMIN — PHENYLEPHRINE HYDROCHLORIDE 100 MCG: 10 INJECTION INTRAVENOUS at 22:43

## 2022-06-27 RX ADMIN — PHENYLEPHRINE HYDROCHLORIDE 0.3 MCG/KG/MIN: 10 INJECTION INTRAVENOUS at 16:53

## 2022-06-27 RX ADMIN — Medication 1 G: at 21:04

## 2022-06-27 RX ADMIN — LIDOCAINE HYDROCHLORIDE 2 ML: 10 INJECTION, SOLUTION INFILTRATION; PERINEURAL at 16:25

## 2022-06-27 RX ADMIN — PROPOFOL 50 MG: 10 INJECTION, EMULSION INTRAVENOUS at 17:57

## 2022-06-27 RX ADMIN — SODIUM CHLORIDE 200 ML: 9 INJECTION, SOLUTION INTRAVENOUS at 10:02

## 2022-06-27 RX ADMIN — ONDANSETRON 4 MG: 2 INJECTION INTRAMUSCULAR; INTRAVENOUS at 23:38

## 2022-06-27 RX ADMIN — REMIFENTANIL HYDROCHLORIDE 0.2 MCG/KG/MIN: 1 INJECTION, POWDER, LYOPHILIZED, FOR SOLUTION INTRAVENOUS at 17:05

## 2022-06-27 RX ADMIN — DULOXETINE 30 MG: 30 CAPSULE, DELAYED RELEASE ORAL at 07:55

## 2022-06-27 RX ADMIN — ALBUMIN HUMAN: 0.05 INJECTION, SOLUTION INTRAVENOUS at 19:30

## 2022-06-27 ASSESSMENT — ACTIVITIES OF DAILY LIVING (ADL)
ADLS_ACUITY_SCORE: 56

## 2022-06-27 ASSESSMENT — COPD QUESTIONNAIRES: COPD: 0

## 2022-06-27 ASSESSMENT — LIFESTYLE VARIABLES: TOBACCO_USE: 0

## 2022-06-27 NOTE — ANESTHESIA PREPROCEDURE EVALUATION
Anesthesia Pre-Procedure Evaluation    Patient: Shay Jimenez   MRN: [id number] : 1965        Procedure : Procedure(s):  Cervical 6 to Thoracic 6 Fusion and Thoracic 2-3 Decompression, Possible additional levels.          Past Medical History:   Diagnosis Date     Chronic kidney disease      Neuropathy       History reviewed. No pertinent surgical history.   Allergies   Allergen Reactions     Amlodipine      Lisinopril       Social History     Tobacco Use     Smoking status: Not on file     Smokeless tobacco: Not on file   Substance Use Topics     Alcohol use: Not on file      Wt Readings from Last 1 Encounters:   22 149 kg (328 lb 7.8 oz)        Anesthesia Evaluation            ROS/MED HX  ENT/Pulmonary:     (+) sleep apnea, doesn't use CPAP,  (-) tobacco use, asthma and COPD   Neurologic: Comment: T2-3 epidural abcess. Cord compression.  LE weakness  Normal bowel/bladder fx    MRI:  IMPRESSION: Postcontrast images demonstrate abnormal contrast  enhancement of the T2 and T3 vertebrae, thickened abnormal epidural  tissues from C7-T1 down to T4-T5 possibly simply representing inflamed  epidural tissues but epidural abscess cannot be excluded. Abnormal  contrast enhancement in the bilateral lateral and anterior paraspinous  soft tissues from T1 down to T4 consistent with a paraspinous  phlegmon/abscess again noted. Moderate compression of the thoracic  spinal cord at the upper T2 level again noted.       Cardiovascular:     (+) Dyslipidemia hypertension-----Previous cardiac testing   Echo: Date:  Results:  CONCLUSIONS   1. Left ventricular chamber size is normal. Moderate concentric wall   thickening consistent with left ventricular hypertrophy is present.   Global and regional left ventricular function is normal. Left   ventricular ejection fraction is visually estimated at 60%. Left   ventricular Doppler filling pattern consistent with Grade II   (moderate) diastolic dysfunction.   2. Normal  right ventricle size and normal global function.   3. There is no significant valve pathology.   4. Mild dilation of the aorta is present involving the sinuses of   Valsalva (Maximal dimension 3.9 cm) and the aortic arch (Maximal   dimension 3.6 cm).   5. There is no pericardial effusion.   6. There is no prior study for direct comparison.     Stress Test: Date: Results:    ECG Reviewed: Date: Results:    Cath: Date: Results:   (-) CAD   METS/Exercise Tolerance:     Hematologic:       Musculoskeletal:       GI/Hepatic:     (+) GERD,     Renal/Genitourinary:     (+) renal disease, type: ESRD, Pt requires dialysis, type: Hemodialysis,     Endo:     (+) Obesity,  (-) Type II DM   Psychiatric/Substance Use:     (+) psychiatric history depression     Infectious Disease: Comment: Epidural Abcess      Malignancy:    (-) malignancy   Other:               OUTSIDE LABS:  CBC:   Lab Results   Component Value Date    WBC 7.1 06/27/2022    WBC 7.8 06/26/2022    HGB 10.9 (L) 06/27/2022    HGB 10.5 (L) 06/26/2022    HCT 33.5 (L) 06/27/2022    HCT 33.6 (L) 06/26/2022     06/27/2022     06/26/2022     BMP:   Lab Results   Component Value Date     (L) 06/27/2022     06/26/2022    POTASSIUM 4.5 06/27/2022    POTASSIUM 4.6 06/26/2022    CHLORIDE 92 (L) 06/27/2022    CHLORIDE 93 (L) 06/26/2022    CO2 27 06/27/2022    CO2 30 06/26/2022    BUN 81 (H) 06/27/2022    BUN 57 (H) 06/26/2022    CR 10.30 (H) 06/27/2022    CR 8.46 (H) 06/26/2022     (H) 06/27/2022     (H) 06/26/2022     COAGS: No results found for: PTT, INR, FIBR  POC: No results found for: BGM, HCG, HCGS  HEPATIC:   Lab Results   Component Value Date    ALBUMIN 3.3 (L) 06/24/2022    PROTTOTAL 7.3 06/24/2022    ALT 55 06/24/2022    AST 51 (H) 06/24/2022    ALKPHOS 152 (H) 06/24/2022    BILITOTAL 0.6 06/24/2022     OTHER:   Lab Results   Component Value Date    MAC 9.4 06/27/2022    CRP 32.7 (H) 06/24/2022       Anesthesia Plan    ASA  Status:  4   NPO Status:  NPO Appropriate    Anesthesia Type: General.     - Airway: ETT   Induction: Intravenous, Propofol.   Maintenance: Balanced.   Techniques and Equipment:     - Lines/Monitors: Arterial Line     Consents    Anesthesia Plan(s) and associated risks, benefits, and realistic alternatives discussed. Questions answered and patient/representative(s) expressed understanding.    - Discussed:     - Discussed with:  Patient      - Extended Intubation/Ventilatory Support Discussed: Yes.      - Patient is DNR/DNI Status: No    Use of blood products discussed: Yes.     - Discussed with: Patient.     - Consented: consented to blood products            Reason for refusal: other.     Postoperative Care    Pain management: Multi-modal analgesia.   PONV prophylaxis: Ondansetron (or other 5HT-3)     Comments:    Other Comments: Patient is counseled on the anesthesia plan and relevant anesthesia procedures including all risks and benefits. All patient questions were answered.   Possible post op vent but intitial plan is to extubate.             David Queen MD

## 2022-06-27 NOTE — PROGRESS NOTES
[facility]    Infectious Disease Progress Note    Date of Service (when I saw the patient): 06/27/2022     Assessment & Plan   Shay Jimenez is a 57 year old male who was admitted on 6/24/2022.     ASSESSMENT:  1. T2-T3 COLLAPSE, PHLEGMON, ENDPLATE EROSION, DEGENERATIVE CHANGES-possible discitis/vertebral osteomyelitis, changes could all be from DJD, CRP mildly elevated, no fever/chills, slow intermittent progression, I am not convinced this is infection but it still could be, he has had neg. PPD in past and no TB risk factors or contacts  2. DJD  3. ESRD  4. OBESITY  5. ETOH DEPENDENCE, ABUSE  6. NEUROPATHY  7. GINI  .     RECOMMENDATION  1. OR Plan on 6/27, send Cxs for routine cx/gs, fungus Cx and stain, AFB Cx and stain and pathology  2. Defer antibiotics until after surgery  3. Routine preop antibiotic prophylaxis  4. F/u pending BC       Family updated bedside     Ranjan Rain MD    Interval History   Afebrile   No new rashes or issues   Labs reviewed       Physical Exam   Temp: 97.4  F (36.3  C) Temp src: Oral BP: 91/58 Pulse: 90   Resp: 16 SpO2: 98 % O2 Device: Nasal cannula Oxygen Delivery: 4 LPM  Vitals:    06/24/22 0757 06/24/22 1716   Weight: 140.6 kg (310 lb) 149 kg (328 lb 7.8 oz)     Vital Signs with Ranges  Temp:  [97.4  F (36.3  C)-98.2  F (36.8  C)] 97.4  F (36.3  C)  Pulse:  [] 90  Resp:  [16-18] 16  BP: ()/(55-85) 91/58  SpO2:  [92 %-98 %] 98 %    Constitutional: Awake, alert, cooperative, no apparent distress  Lungs: Clear to auscultation bilaterally, no crackles or wheezing  Cardiovascular: Regular rate and rhythm, normal S1 and S2, and no murmur noted  Abdomen: Normal bowel sounds, soft, non-distended, non-tender  Skin: No rashes, no cyanosis, no edema  Other:    Medications     - MEDICATION INSTRUCTIONS -       - MEDICATION INSTRUCTIONS -         - MEDICATION INSTRUCTIONS for Dialysis Patients -   Does not apply See Admin Instructions     atorvastatin   20 mg Oral At Bedtime     cetirizine  10 mg Oral Daily     DULoxetine  30 mg Oral Daily     epoetin mar-epbx (RETACRIT) inj ESRD  1,000 Units Intravenous Once in dialysis/CRRT     finasteride  1.25 mg Oral Daily     gabapentin  300 mg Oral TID     HYDROmorphone  0.5 mg Intravenous Once     midodrine  10 mg Oral Once per day on Mon Wed Fri     - MEDICATION INSTRUCTIONS -   Does not apply Once     omeprazole  20 mg Oral Daily     sertraline  100 mg Oral Daily     sevelamer carbonate  800 mg Oral TID w/meals     sodium chloride (PF)  3 mL Intracatheter Q8H       Data   All microbiology laboratory data reviewed.  Recent Labs   Lab Test 06/27/22  0733 06/26/22 0712 06/25/22  0728   WBC 7.1 7.8 10.2   HGB 10.9* 10.5* 10.8*   HCT 33.5* 33.6* 32.4*   * 111* 104*    208 214     Recent Labs   Lab Test 06/27/22 0733 06/26/22 0712 06/25/22  0728   CR 10.30* 8.46* 10.10*     No lab results found.  No lab results found.    Invalid input(s): TRU

## 2022-06-27 NOTE — ANESTHESIA PROCEDURE NOTES
Arterial Line Procedure Note    Pre-Procedure   Staff -        Anesthesiologist:  David Queen MD       Performed By: Anesthesiologist       Location: pre-op       Pre-Anesthestic Checklist: patient identified, IV checked, risks and benefits discussed, informed consent, monitors and equipment checked and pre-op evaluation  Timeout:       Correct Patient: Yes        Correct Procedure: Yes        Correct Site: Yes        Correct Position: Yes   Line Placement:   This line was placed Pre Induction starting at 6/27/2022 4:25 PM and ending at 6/27/2022 4:30 PM  Procedure   Procedure: arterial line       Diagnosis: Hemodynamic instability       Laterality: right       Insertion Site: radial.  Sterile Prep        All elements of maximal sterile barrier technique followed       Patient Prep/Sterile Barriers: hand hygiene, sterile gloves, hat, mask, draped, sterile gown, draped       Skin prep: Chloraprep  Insertion/Injection        Technique: ultrasound guided        1. Ultrasound was used to evaluate the access site.       2. Artery evaluated via ultrasound for patency/adequacy.       3. Using real-time ultrasound the needle/catheter was observed entering the artery/vein.       4. Permanent image was captured and entered into the patient's record.       Catheter Type/Size: 20 gauge, 1.75 in/4.5 cm quick cath (integral wire)  Narrative         Secured by: other       Tegaderm dressing used.       Complications: None apparent,        Arterial waveform: Yes    Comments:  Ultrasound Interpretation arterial catheter    1. Ultrasound guidance was used to evaluate potential access sites.  2. Ultrasound was also used to verify the patency of the vessel specified above.   3. Ultrasound was used to visualize the needle entering the vessel.   4. The visualized structures were anatomically normal.  5. There were no apparent abnormal pathological findings.  6. A permanent ultrasound image was saved in the patient's  record.

## 2022-06-27 NOTE — PROGRESS NOTES
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 Multiple recent falls.   Precautions: fall risk  Cognitive Concerns/Orientation:  A&Ox4  Behavior and Aggression Color: green  ABNL VS/O2: VSS on 2L NC. Previously on RA but used 2L NC for sleeping and was on 4L in dialysis. Complaints of SOB bc taking deep breathes hurts. No cough. LS clear.   CMS: numbness in feet  Mobility: strict bedrest.   Pain Management: 10/10 pain managed with prn Dilaudid given x3 and prn oxy given x1.  Diet: NPO ex ice chips and water with meds.   Bowel/Bladder: incontinent of bowel. No BM this shift. On dialysis.   ABNL Labs/BG: Na: 132. Hgb: 10.9  Drain/Devices: PIV SL  Telemetry Rhythm: NA  Skin: scattered bruising. Scabs on both knees. Callous on right foot. Cracked/peeling on both feet/heels. Rash to perineum   Tests/Procedures: tentative plan for neurosurgery today around 4411-4526  Discharge Date: pending.   Other Info: ID following

## 2022-06-27 NOTE — PROGRESS NOTES
"[facility]    Medicine Progress Note - Hospitalist Service    Date of Admission:  6/24/2022    Assessment & Plan          Shay Jimenez is a very pleasant 57-year-old male with past medical history significant for end-stage renal disease, on hemodialysis, chronic peripheral neuropathy affecting his feet, history of hypertension no longer on meds, hyperlipidemia, obesity, obstructive sleep apnea, GERD and depression, among other medical problems, who presented 6/24/22 for evaluation of worsening upper back pain with associated generalized weakness and falls.  He was found to have abnormal findings on thoracic MRI concerning for diskitis with osteomyelitis with also some findings of a compressive myelopathy.  He is being admitted to the hospital today for ongoing evaluation and care.    Upper back pain  Generalized weakness  Falls   Abnormal findings of a T2 and T3 suggestive of diskitis, osteomyelitis  Epidural phlegmon/abscess   Compressive myelopathy  *back pain for 6 months. CT chest 2/2022 showed concern for upper thoracic spine diskitis vs osteomyelitis. Unclear follow up from that time  *in weeks prior to admission, MRI showed possible thoracic compression fracture.   *developed progressively weak legs with frequent falls.  *on admission imaging with T2-T3 diskitis, osteomyelitis and compressive myelopathy.        appreciate neurosurgery     hold abx pending obtaining cultures unless develops e/o systemic infection or sepsis    follow blood cultures    ID consult requested, I appreciate Dr. Yeager's evaluation recommendations    Per her, \"changes could all be from DJD, CRP mildly elevated, no fever/chills, slow intermittent progression, I am not convinced this is infection but it still could be, he has had neg. PPD in past and no TB risk factors or contacts.\"     Also per Dr. Rain, \"defer antibiotics until after surgery.\"  She also requests operative cultures to be sent for " routine gram stain culture, fungal stain and culture, AFB stain and culture and pathology    Tylenol, oxycodone and IV Dilaudid available as needed for pain.    Pre-op  RCRI 6.0% 30 day risk of cardiac event. He was able to ambulate at least 1 mile, exercising regularly prior to onset of weakness several weeks ago with anginal symptoms. Previously tolerated general anesthesia without issue for multiple orthopedic and AVF surgeries.    medically optimized for decompression/fusion    recommend special attention to intra operative blood pressures. He is on midodrine for dialysis.     End-stage renal disease, on hemodialysis  *Typically dialyzes Monday, Wednesday, Friday. Missed 6/24 session due to being in ED      Nephrology consult appreciated    Dialysis per nephrology.    PTA sevelamer     GERD    Chronic and stable on PPI.    History of hypertension  *No longer needing blood pressure medicines.  Monitor blood pressures this stay.    Hyperlipidemia    Chronic and stable, on statin.    Peripheral neuropathy of the feet  *Follows with Neurology.      Managed in part with duloxetine, which will be continued.    Depression    Chronic and stable on Zoloft.    Obesity, class III  Obstructive sleep apnea  *BMI this stay is 43.10.  He does not use CPAP.  Encourage diet, lifestyle modifications once above issues addressed.    History of BPH.       Diet: NPO per Anesthesia Guidelines for Procedure/Surgery Except for: Meds    DVT Prophylaxis: Pneumatic Compression Devices  Oliveira Catheter: Not present  Central Lines: None  Cardiac Monitoring: None  Code Status: Full Code      Disposition Plan     Expected Discharge Date: 06/28/2022                The patient's care was discussed with the Bedside Nurse and Patient.    Eunice Wilson MD  Hospitalist Service  [facility]  Securely message with the Vocera Web Console (learn more here)  Text page via [employer] Paging/Directory         Clinically Significant  "Risk Factors Present on Admission         # Acute Kidney Injury, unspecified: based on a >150% or 0.3 mg/dL increase in creatinine on admission compared to past 90 day average, will monitor renal function        # Severe Obesity: Estimated body mass index is 43.1 kg/m  as calculated from the following:    Height as of this encounter: 1.859 m (6' 1.2\").    Weight as of this encounter: 149 kg (328 lb 7.8 oz).        ______________________________________________________________________    Interval History   \"I was hurting.\"  Patient seen on hemodialysis run, he is drowsy.  Shay woke briefly but easily goes back to sleep.  Discussed with dialysis RN, patient complained of pain when he arrived for dialysis run, received Dilaudid, has been drowsy.  He had modest low blood pressure readings during dialysis run, was given midodrine.  There are no new GI issues noted.    Data reviewed today: I reviewed all medications, new labs and imaging results over the last 24 hours. I personally reviewed no images or EKG's today.    Physical Exam   Vital Signs: Temp: 97.4  F (36.3  C) Temp src: Oral BP: (!) 142/74 Pulse: 91   Resp: 16 SpO2: 98 % O2 Device: Nasal cannula Oxygen Delivery: 4 LPM  Weight: 328 lbs 7.77 oz  Constitutional: Drowsy, wakes to voice, but easily back to sleep again  Respiratory: Clear to auscultation bilaterally, no crackles or wheezing  Cardiovascular: Regular rate and rhythm, normal S1 and S2, and no murmur noted  GI: Obese, soft, bowel sounds are present  Skin/Integumen: No rash on exposed skin  Psych: Mood is pleasant      Data   Recent Labs   Lab 06/27/22  0733 06/26/22  0712 06/25/22  0728 06/24/22  0818   WBC 7.1 7.8 10.2 6.6   HGB 10.9* 10.5* 10.8* 10.0*   * 111* 104* 108*    208 214 198   * 133 124* 126*   POTASSIUM 4.5 4.6 5.1 3.8   CHLORIDE 92* 93* 86* 86*   CO2 27 30 27 26   BUN 81* 57* 57* 39*   CR 10.30* 8.46* 10.10* 8.68*   ANIONGAP 13 10 11 14   MAC 9.4 9.1 9.3 9.9   * " 111* 153* 144*   ALBUMIN  --   --   --  3.3*   PROTTOTAL  --   --   --  7.3   BILITOTAL  --   --   --  0.6   ALKPHOS  --   --   --  152*   ALT  --   --   --  55   AST  --   --   --  51*     Recent Results (from the past 24 hour(s))   CT Head w/o Contrast    Narrative    EXAM: CT HEAD W/O CONTRAST  LOCATION: [facility]  DATE/TIME: 6/26/2022 11:09 AM    INDICATION: Altered mental status. Spinal infection.  COMPARISON: None.  TECHNIQUE: Routine CT Head without IV contrast. Multiplanar reformats. Dose reduction techniques were used.    FINDINGS:  INTRACRANIAL CONTENTS: No intracranial hemorrhage, extraaxial collection, or mass effect.  No CT evidence of acute infarct. Normal parenchymal attenuation. Normal ventricles and sulci.     VISUALIZED ORBITS/SINUSES/MASTOIDS: No intraorbital abnormality. No paranasal sinus mucosal disease. No middle ear or mastoid effusion.    BONES/SOFT TISSUES: No acute abnormality.      Impression    IMPRESSION:  1.  Normal head CT.     Medications     - MEDICATION INSTRUCTIONS -       - MEDICATION INSTRUCTIONS -         - MEDICATION INSTRUCTIONS for Dialysis Patients -   Does not apply See Admin Instructions     sodium chloride 0.9%  250 mL Intravenous Once in dialysis/CRRT     sodium chloride 0.9%  300 mL Hemodialysis Machine Once     atorvastatin  20 mg Oral At Bedtime     cetirizine  10 mg Oral Daily     DULoxetine  30 mg Oral Daily     epoetin mar-epbx (RETACRIT) inj ESRD  1,000 Units Intravenous Once in dialysis/CRRT     finasteride  1.25 mg Oral Daily     gabapentin  300 mg Oral TID     midodrine  10 mg Oral Once per day on Mon Wed Fri     - MEDICATION INSTRUCTIONS -   Does not apply Once     - MEDICATION INSTRUCTIONS -   Does not apply Once     omeprazole  20 mg Oral Daily     sertraline  100 mg Oral Daily     sevelamer carbonate  800 mg Oral TID w/meals     sodium chloride (PF)  3 mL Intracatheter Q8H

## 2022-06-27 NOTE — PROVIDER NOTIFICATION
MD Notification    Notified Person: MD    Notified Person Name: Fatmata Perdomo    Notification Date/Time: 06/27/2022    Notification Interaction: web page    Purpose of Notification: Good Morning! Pt has been in constant pain and I'm wondering about increasing pain meds. Pt is in dialysis now and the plan is to get surgery later today- wondering if we have an ETA for that. Thanks!     Orders Received: page hospitalist    Comments:

## 2022-06-27 NOTE — ANESTHESIA PROCEDURE NOTES
Arterial Line Procedure Note    Pre-Procedure   Staff -        Anesthesiologist:  David Queen MD       Performed By: Anesthesiologist       Location: pre-op       Pre-Anesthestic Checklist: patient identified, IV checked, risks and benefits discussed, informed consent, monitors and equipment checked and pre-op evaluation  Timeout:       Correct Patient: Yes        Correct Procedure: Yes        Correct Site: Yes        Correct Position: Yes   Line Placement:   This line was placed Post Induction  Procedure   Procedure: arterial line       Diagnosis: Hemodynamic instability       Laterality: right       Insertion Site: brachial.  Sterile Prep        All elements of maximal sterile barrier technique followed       Patient Prep/Sterile Barriers: hand hygiene, sterile gloves, hat, mask, draped, sterile gown, draped       Skin prep: Chloraprep  Insertion/Injection        Technique: ultrasound guided        1. Ultrasound was used to evaluate the access site.       2. Artery evaluated via ultrasound for patency/adequacy.       3. Using real-time ultrasound the needle/catheter was observed entering the artery/vein.       4. Permanent image was captured and entered into the patient's record.       Catheter Type/Size: 20 G, 12 cm  Narrative         Secured by: suture and other       Tegaderm dressing used.       Complications: None apparent,        Arterial waveform: Yes    Comments:  Ultrasound Interpretation arterial catheter. Initial radial a-line got pulled out after flipping prone.     1. Ultrasound guidance was used to evaluate potential access sites.  2. Ultrasound was also used to verify the patency of the vessel specified above.   3. Ultrasound was used to visualize the needle entering the vessel.   4. The visualized structures were anatomically normal.  5. There were no apparent abnormal pathological findings.  6. A permanent ultrasound image was saved in the patient's record.

## 2022-06-27 NOTE — ANESTHESIA PROCEDURE NOTES
Airway       Patient location during procedure: OR       Procedure Start/Stop Times: 6/27/2022 4:52 PM  Staff -        CRNA: Bri Ramires APRN CRNA       Performed By: CRNA  Consent for Airway        Urgency: elective  Indications and Patient Condition       Indications for airway management: bibi-procedural       Induction type:intravenous       Mask difficulty assessment: 2 - vent by mask + OA or adjuvant +/- NMBA    Final Airway Details       Final airway type: endotracheal airway       Successful airway: ETT - single  Endotracheal Airway Details        ETT size (mm): 8.0       Successful intubation technique: video laryngoscopy       VL Blade Size: Glidescope 4       Grade View of Cords: 1       Adjucts: stylet       Position: Right       Measured from: gums/teeth       Secured at (cm): 24       Bite block used: Soft    Post intubation assessment        Placement verified by: capnometry, equal breath sounds and chest rise        Number of attempts at approach: 1       Number of other approaches attempted: 0       Secured with: plastic tape and silk tape       Ease of procedure: easy       Dentition: Unchanged    Medication(s) Administered   Medication Administration Time: 6/27/2022 4:52 PM

## 2022-06-27 NOTE — PLAN OF CARE
Goal Outcome Evaluation:    A&Ox4. On 2 L O2 via NC  during NOC. VSS. On bedrest. Incontinent of bowel. Baseline numbness & weakness to BLE. Edema present on BLE. Pain managed with PRN IV dilaudid. PIV SL. Slept comfortably during NOC. NPO for possible surgery today. Will continue to monitor.

## 2022-06-27 NOTE — PROGRESS NOTES
"[facility]     Renal Progress Note       SHORTHAND KEY FOR MY NOTES:  c = with, s = without, p = after, a = before, x = except, asx = asymptomatic, tx = transplant or treatment, sx = symptoms or symptomatic, cx = canceled or culture, rxn = reaction, yday = yesterday, nl = normal, abx = antibiotics, fxn = function, dx = diagnosis, dz = disease, m/h = melena/hematochezia, c/d/l/ha = cramping/dizziness/lightheadedness/headache, d/c = discharge or diarrhea/constipation, f/c/n/v = fevers/chills/nausea/vomiting, cp/sob = chest pain/shortness of breath, tbv = total body volume, rxn = reaction, tdc = tunneled dialysis catheter, pta = prior to admission, hd = hemodialysis, pd = peritoneal dialysis, hhd = home hemodialysis, edw = estimated dry wt         Assessment/Plan:     1.  ESKD.  Pt is on dialysis per a [facility] schedule.  No issues.  A.  Next planned HD on Weds.    2.  Discitis/epidural abscess.  Pt is going to the OR today.  A.  Per NSG.    3.  FEN.  Electrolytes are ok.  Na has improved.         Interval History:     Pt continues to have back pain and is due for surgery today.  Denies any f/c/n/v.  Tolerating HD s probs.  No c/d/l/ha.          Medications and Allergies:       - MEDICATION INSTRUCTIONS for Dialysis Patients -   Does not apply See Admin Instructions     atorvastatin  20 mg Oral At Bedtime     cetirizine  10 mg Oral Daily     DULoxetine  30 mg Oral Daily     finasteride  1.25 mg Oral Daily     gabapentin  300 mg Oral TID     midodrine  10 mg Oral Once per day on Mon Wed Fri     omeprazole  20 mg Oral Daily     sertraline  100 mg Oral Daily     sevelamer carbonate  800 mg Oral TID w/meals     sodium chloride (PF)  3 mL Intracatheter Q8H     Allergies   Allergen Reactions     Amlodipine      Lisinopril           Physical Exam:     Vitals were reviewed     , Blood pressure 120/83, pulse 78, temperature 98.7  F (37.1  C), temperature source Oral, resp. rate 18, height 1.859 m (6' 1.2\"), " weight 149 kg (328 lb 7.8 oz), SpO2 98 %.  Wt Readings from Last 3 Encounters:   06/24/22 149 kg (328 lb 7.8 oz)     Intake/Output Summary (Last 24 hours) at 6/27/2022 1444  Last data filed at 6/27/2022 1300  Gross per 24 hour   Intake 0 ml   Output 4000 ml   Net -4000 ml     GENERAL APPEARANCE: pleasant, NAD, alert  HEENT:  eyes/ears/nose/neck grossly nl  RESP: CTA B c good efforts  CV: RRR, nl S1/S2   ABDOMEN: o/s/nt/nd  EXTREMITIES/SKIN: + ble edema    Pt seen on HD.  Stable run expected.  No issues.  -4L UF.  Good BFR via LAF.         Data:     CBC RESULTS:     Recent Labs   Lab 06/27/22  0733 06/26/22  0712 06/25/22  0728 06/24/22  0818   WBC 7.1 7.8 10.2 6.6   RBC 3.13* 3.04* 3.13* 2.91*   HGB 10.9* 10.5* 10.8* 10.0*   HCT 33.5* 33.6* 32.4* 31.5*    208 214 198     Basic Metabolic Panel:  Recent Labs   Lab 06/27/22  0733 06/26/22  0712 06/25/22  0728 06/24/22  0818   * 133 124* 126*   POTASSIUM 4.5 4.6 5.1 3.8   CHLORIDE 92* 93* 86* 86*   CO2 27 30 27 26   BUN 81* 57* 57* 39*   CR 10.30* 8.46* 10.10* 8.68*   * 111* 153* 144*   MAC 9.4 9.1 9.3 9.9     INRNo lab results found in last 7 days.   Attestation:   I have reviewed today's relevant vital signs, notes, medications, labs and imaging.    Dejuan Carr MD  [facility] - Nephrology  [phone]

## 2022-06-27 NOTE — PROVIDER NOTIFICATION
MD Notification    Notified Person: MD    Notified Person Name: Eunice Wilson    Notification Date/Time: 06/27/2022 0995    Notification Interaction: vocera    Purpose of Notification: Good morning! Pt has been in constant pain and I'm wondering if we can increase the dose. Currently he is getting 0.5 dilaudid q2hrs and 5mg oxy q4hrs.    Orders Received:    Comments:

## 2022-06-27 NOTE — PROGRESS NOTES
Potassium   Date Value Ref Range Status   06/27/2022 4.5 3.4 - 5.3 mmol/L Final     Hemoglobin   Date Value Ref Range Status   06/27/2022 10.9 (L) 13.3 - 17.7 g/dL Final     Creatinine   Date Value Ref Range Status   06/27/2022 10.30 (H) 0.66 - 1.25 mg/dL Final     Urea Nitrogen   Date Value Ref Range Status   06/27/2022 81 (H) 7 - 30 mg/dL Final     Sodium   Date Value Ref Range Status   06/27/2022 132 (L) 133 - 144 mmol/L Final     No results found for: INR    DIALYSIS PROCEDURE NOTE  Hepatitis status of previous patient on machine log was checked and verified ok to use with this patients hepatitis status.  Patient dialyzed for 4.15 hrs. on a K2/3 bath with a net fluid removal of  4L.  A BFR of 400 ml/min was obtained via a LUAF using 15 gauge needles.      The treatment plan was discussed with Dr. Carr during the treatment.    Total heparin received during the treatment: 0 units.   Needle cannulation sites held x 15 min.   Meds  given: EPO 1000units IV   Complications: NONE      Person educated: pt. Knowledge base substantial. Barriers to learning: none. Educated on pain control via verbal mode. patient verbalized understanding. Pt prefers verbal education style.     ICEBOAT? Timeout performed pre-treatment  I: Patient was identified using 2 identifiers  C:  Consent Signed Yes  E: Equipment preventative maintenance is current and dialysis delivery system OK to use  B: Hepatitis B Surface Antigen: neg; Draw Date: 6/6/2022      Hepatitis B Surface Antibody: unknown; Draw Date: 6/25/2022  O: Dialysis orders present and complete prior to treatment  A: Vascular access verified and assessed prior to treatment  T: Treatment was performed at a clinically appropriate time  ?: Patient was allowed to ask questions and address concerns prior to treatment  See Adult Hemodialysis flowsheet in [employer] for further details and post assessment.  Machine water alarm in place and functioning. Transducer pods intact and checked every  15min.   Pt returned via bed.  Chlorine/Chloramine water system checked every 4 hours.  Outpatient Dialysis at [facility]      Post treatment report given to STEFANIE Avila RN regarding 4L of fluid removed, last BP of 120/83, and patient pain rating of 0/10.     **Please remove patient dressing on AVF and AVG needle sites 24 hours after dialysis. If leaking occurs please apply a Band-Aid.

## 2022-06-28 ENCOUNTER — APPOINTMENT (OUTPATIENT)
Dept: PHYSICAL THERAPY | Facility: CLINIC | Age: 57
DRG: 459 | End: 2022-06-28
Attending: PHYSICIAN ASSISTANT
Payer: MEDICARE

## 2022-06-28 LAB
ANION GAP SERPL CALCULATED.3IONS-SCNC: 11 MMOL/L (ref 3–14)
BUN SERPL-MCNC: 49 MG/DL (ref 7–30)
CALCIUM SERPL-MCNC: 8.7 MG/DL (ref 8.5–10.1)
CHLORIDE BLD-SCNC: 98 MMOL/L (ref 94–109)
CO2 SERPL-SCNC: 26 MMOL/L (ref 20–32)
COHGB MFR BLD: 100 % (ref 92–100)
CPB POCT: NO
CREAT SERPL-MCNC: 6.86 MG/DL (ref 0.66–1.25)
ERYTHROCYTE [DISTWIDTH] IN BLOOD BY AUTOMATED COUNT: 14 % (ref 10–15)
GFR SERPL CREATININE-BSD FRML MDRD: 9 ML/MIN/1.73M2
GLUCOSE BLD-MCNC: 153 MG/DL (ref 70–99)
GLUCOSE BLDC GLUCOMTR-MCNC: 139 MG/DL (ref 70–99)
GRAM STAIN RESULT: NORMAL
GRAM STAIN RESULT: NORMAL
HCO3 BLDA-SCNC: 27 MMOL/L (ref 21–28)
HCT VFR BLD AUTO: 27.6 % (ref 40–53)
HCT VFR BLD CALC: 29 % (ref 40–53)
HGB BLD-MCNC: 8.9 G/DL (ref 13.3–17.7)
HGB BLD-MCNC: 9.9 G/DL (ref 13.3–17.7)
MAGNESIUM SERPL-MCNC: 2.1 MG/DL (ref 1.6–2.3)
MCH RBC QN AUTO: 35 PG (ref 26.5–33)
MCHC RBC AUTO-ENTMCNC: 32.2 G/DL (ref 31.5–36.5)
MCV RBC AUTO: 109 FL (ref 78–100)
PCO2 BLDA: 41 MM HG (ref 35–45)
PH BLDA: 7.43 [PH] (ref 7.35–7.45)
PHOSPHATE SERPL-MCNC: 5.8 MG/DL (ref 2.5–4.5)
PLATELET # BLD AUTO: 169 10E3/UL (ref 150–450)
PO2 BLDA: 335 MM HG (ref 80–105)
POTASSIUM BLD-SCNC: 4 MMOL/L (ref 3.4–5.3)
POTASSIUM BLD-SCNC: 4.7 MMOL/L (ref 3.4–5.3)
RBC # BLD AUTO: 2.54 10E6/UL (ref 4.4–5.9)
SODIUM BLD-SCNC: 135 MMOL/L (ref 133–144)
SODIUM SERPL-SCNC: 135 MMOL/L (ref 133–144)
WBC # BLD AUTO: 7.9 10E3/UL (ref 4–11)

## 2022-06-28 PROCEDURE — 83735 ASSAY OF MAGNESIUM: CPT | Performed by: SURGERY

## 2022-06-28 PROCEDURE — 80048 BASIC METABOLIC PNL TOTAL CA: CPT | Performed by: SURGERY

## 2022-06-28 PROCEDURE — L0200 CERV COL SUPP ADJ BAR & THOR: HCPCS

## 2022-06-28 PROCEDURE — 999N000157 HC STATISTIC RCP TIME EA 10 MIN

## 2022-06-28 PROCEDURE — 250N000011 HC RX IP 250 OP 636: Performed by: PHYSICIAN ASSISTANT

## 2022-06-28 PROCEDURE — 250N000013 HC RX MED GY IP 250 OP 250 PS 637: Performed by: INTERNAL MEDICINE

## 2022-06-28 PROCEDURE — 85014 HEMATOCRIT: CPT | Performed by: PHYSICIAN ASSISTANT

## 2022-06-28 PROCEDURE — 84100 ASSAY OF PHOSPHORUS: CPT | Performed by: SURGERY

## 2022-06-28 PROCEDURE — 250N000011 HC RX IP 250 OP 636: Performed by: SURGERY

## 2022-06-28 PROCEDURE — 93005 ELECTROCARDIOGRAM TRACING: CPT

## 2022-06-28 PROCEDURE — 200N000001 HC R&B ICU

## 2022-06-28 PROCEDURE — 87040 BLOOD CULTURE FOR BACTERIA: CPT | Performed by: PHYSICIAN ASSISTANT

## 2022-06-28 PROCEDURE — 99232 SBSQ HOSP IP/OBS MODERATE 35: CPT | Performed by: INTERNAL MEDICINE

## 2022-06-28 PROCEDURE — 250N000013 HC RX MED GY IP 250 OP 250 PS 637: Performed by: STUDENT IN AN ORGANIZED HEALTH CARE EDUCATION/TRAINING PROGRAM

## 2022-06-28 PROCEDURE — 99233 SBSQ HOSP IP/OBS HIGH 50: CPT | Performed by: INTERNAL MEDICINE

## 2022-06-28 PROCEDURE — 93010 ELECTROCARDIOGRAM REPORT: CPT | Performed by: INTERNAL MEDICINE

## 2022-06-28 PROCEDURE — 258N000003 HC RX IP 258 OP 636: Performed by: PHYSICIAN ASSISTANT

## 2022-06-28 PROCEDURE — 250N000013 HC RX MED GY IP 250 OP 250 PS 637: Performed by: PHYSICIAN ASSISTANT

## 2022-06-28 PROCEDURE — 94003 VENT MGMT INPAT SUBQ DAY: CPT

## 2022-06-28 PROCEDURE — 97162 PT EVAL MOD COMPLEX 30 MIN: CPT | Mod: GP

## 2022-06-28 PROCEDURE — 36415 COLL VENOUS BLD VENIPUNCTURE: CPT | Performed by: PHYSICIAN ASSISTANT

## 2022-06-28 PROCEDURE — 97530 THERAPEUTIC ACTIVITIES: CPT | Mod: GP

## 2022-06-28 PROCEDURE — 250N000011 HC RX IP 250 OP 636: Performed by: INTERNAL MEDICINE

## 2022-06-28 RX ORDER — MIDODRINE HYDROCHLORIDE 5 MG/1
10 TABLET ORAL
Status: DISCONTINUED | OUTPATIENT
Start: 2022-06-28 | End: 2022-07-15 | Stop reason: HOSPADM

## 2022-06-28 RX ORDER — HYDROMORPHONE HYDROCHLORIDE 1 MG/ML
.3-.5 INJECTION, SOLUTION INTRAMUSCULAR; INTRAVENOUS; SUBCUTANEOUS
Status: DISCONTINUED | OUTPATIENT
Start: 2022-06-28 | End: 2022-07-15 | Stop reason: HOSPADM

## 2022-06-28 RX ORDER — CEFAZOLIN SODIUM 1 G/3ML
1 INJECTION, POWDER, FOR SOLUTION INTRAMUSCULAR; INTRAVENOUS EVERY 24 HOURS
Status: DISCONTINUED | OUTPATIENT
Start: 2022-06-28 | End: 2022-07-06

## 2022-06-28 RX ORDER — SODIUM CHLORIDE 9 MG/ML
INJECTION, SOLUTION INTRAVENOUS CONTINUOUS
Status: DISCONTINUED | OUTPATIENT
Start: 2022-06-28 | End: 2022-06-29

## 2022-06-28 RX ORDER — HYDRALAZINE HYDROCHLORIDE 20 MG/ML
10-20 INJECTION INTRAMUSCULAR; INTRAVENOUS EVERY 30 MIN PRN
Status: DISCONTINUED | OUTPATIENT
Start: 2022-06-28 | End: 2022-07-15 | Stop reason: HOSPADM

## 2022-06-28 RX ORDER — OXYCODONE HYDROCHLORIDE 5 MG/1
5-10 TABLET ORAL
Status: DISCONTINUED | OUTPATIENT
Start: 2022-06-28 | End: 2022-06-30

## 2022-06-28 RX ORDER — PHENYLEPHRINE HCL IN 0.9% NACL 50MG/250ML
.1-6 PLASTIC BAG, INJECTION (ML) INTRAVENOUS CONTINUOUS
Status: DISCONTINUED | OUTPATIENT
Start: 2022-06-28 | End: 2022-06-29

## 2022-06-28 RX ORDER — PANTOPRAZOLE SODIUM 40 MG/1
40 TABLET, DELAYED RELEASE ORAL
Status: DISCONTINUED | OUTPATIENT
Start: 2022-06-29 | End: 2022-07-15 | Stop reason: HOSPADM

## 2022-06-28 RX ORDER — LABETALOL HYDROCHLORIDE 5 MG/ML
10-40 INJECTION, SOLUTION INTRAVENOUS EVERY 10 MIN PRN
Status: DISCONTINUED | OUTPATIENT
Start: 2022-06-28 | End: 2022-07-15 | Stop reason: HOSPADM

## 2022-06-28 RX ORDER — PROPOFOL 10 MG/ML
5-75 INJECTION, EMULSION INTRAVENOUS CONTINUOUS
Status: DISCONTINUED | OUTPATIENT
Start: 2022-06-28 | End: 2022-06-29

## 2022-06-28 RX ORDER — LIDOCAINE 40 MG/G
CREAM TOPICAL
Status: DISCONTINUED | OUTPATIENT
Start: 2022-06-28 | End: 2022-07-15 | Stop reason: HOSPADM

## 2022-06-28 RX ORDER — HYDROMORPHONE HYDROCHLORIDE 1 MG/ML
0.5 INJECTION, SOLUTION INTRAMUSCULAR; INTRAVENOUS; SUBCUTANEOUS ONCE
Status: COMPLETED | OUTPATIENT
Start: 2022-06-28 | End: 2022-06-28

## 2022-06-28 RX ORDER — HYDROXYZINE HYDROCHLORIDE 25 MG/1
25 TABLET, FILM COATED ORAL 3 TIMES DAILY PRN
Status: DISCONTINUED | OUTPATIENT
Start: 2022-06-28 | End: 2022-07-15 | Stop reason: HOSPADM

## 2022-06-28 RX ADMIN — PROPOFOL 40 MCG/KG/MIN: 10 INJECTION, EMULSION INTRAVENOUS at 01:26

## 2022-06-28 RX ADMIN — HYDROMORPHONE HYDROCHLORIDE 0.5 MG: 1 INJECTION, SOLUTION INTRAMUSCULAR; INTRAVENOUS; SUBCUTANEOUS at 19:56

## 2022-06-28 RX ADMIN — FINASTERIDE 1.25 MG: 5 TABLET, FILM COATED ORAL at 08:27

## 2022-06-28 RX ADMIN — HYDROMORPHONE HYDROCHLORIDE 0.5 MG: 1 INJECTION, SOLUTION INTRAMUSCULAR; INTRAVENOUS; SUBCUTANEOUS at 10:21

## 2022-06-28 RX ADMIN — MIDODRINE HYDROCHLORIDE 10 MG: 5 TABLET ORAL at 11:58

## 2022-06-28 RX ADMIN — HYDROMORPHONE HYDROCHLORIDE 0.5 MG: 1 INJECTION, SOLUTION INTRAMUSCULAR; INTRAVENOUS; SUBCUTANEOUS at 08:55

## 2022-06-28 RX ADMIN — HYDROMORPHONE HYDROCHLORIDE 0.5 MG: 1 INJECTION, SOLUTION INTRAMUSCULAR; INTRAVENOUS; SUBCUTANEOUS at 14:00

## 2022-06-28 RX ADMIN — PROPOFOL 30 MCG/KG/MIN: 10 INJECTION, EMULSION INTRAVENOUS at 03:36

## 2022-06-28 RX ADMIN — GABAPENTIN 300 MG: 300 CAPSULE ORAL at 16:57

## 2022-06-28 RX ADMIN — SERTRALINE HYDROCHLORIDE 100 MG: 100 TABLET ORAL at 08:31

## 2022-06-28 RX ADMIN — GABAPENTIN 300 MG: 300 CAPSULE ORAL at 08:27

## 2022-06-28 RX ADMIN — ATORVASTATIN CALCIUM 20 MG: 20 TABLET, FILM COATED ORAL at 22:34

## 2022-06-28 RX ADMIN — HYDROXYZINE HYDROCHLORIDE 25 MG: 25 TABLET, FILM COATED ORAL at 10:41

## 2022-06-28 RX ADMIN — OXYCODONE HYDROCHLORIDE 10 MG: 5 TABLET ORAL at 18:20

## 2022-06-28 RX ADMIN — SODIUM CHLORIDE: 9 INJECTION, SOLUTION INTRAVENOUS at 00:22

## 2022-06-28 RX ADMIN — OMEPRAZOLE 20 MG: 20 CAPSULE, DELAYED RELEASE ORAL at 08:30

## 2022-06-28 RX ADMIN — AMIODARONE HYDROCHLORIDE 150 MG: 1.5 INJECTION, SOLUTION INTRAVENOUS at 04:45

## 2022-06-28 RX ADMIN — HYDROMORPHONE HYDROCHLORIDE 0.5 MG: 1 INJECTION, SOLUTION INTRAMUSCULAR; INTRAVENOUS; SUBCUTANEOUS at 16:57

## 2022-06-28 RX ADMIN — HYDROMORPHONE HYDROCHLORIDE 0.5 MG: 1 INJECTION, SOLUTION INTRAMUSCULAR; INTRAVENOUS; SUBCUTANEOUS at 04:44

## 2022-06-28 RX ADMIN — CEFAZOLIN 1 G: 1 INJECTION, POWDER, FOR SOLUTION INTRAMUSCULAR; INTRAVENOUS at 08:26

## 2022-06-28 RX ADMIN — HYDROMORPHONE HYDROCHLORIDE 0.5 MG: 1 INJECTION, SOLUTION INTRAMUSCULAR; INTRAVENOUS; SUBCUTANEOUS at 06:55

## 2022-06-28 RX ADMIN — HYDROMORPHONE HYDROCHLORIDE 0.5 MG: 1 INJECTION, SOLUTION INTRAMUSCULAR; INTRAVENOUS; SUBCUTANEOUS at 12:01

## 2022-06-28 RX ADMIN — CYCLOBENZAPRINE 10 MG: 10 TABLET, FILM COATED ORAL at 19:35

## 2022-06-28 RX ADMIN — GABAPENTIN 300 MG: 300 CAPSULE ORAL at 22:34

## 2022-06-28 RX ADMIN — OXYCODONE HYDROCHLORIDE 10 MG: 5 TABLET ORAL at 14:11

## 2022-06-28 RX ADMIN — HYDROMORPHONE HYDROCHLORIDE 0.5 MG: 1 INJECTION, SOLUTION INTRAMUSCULAR; INTRAVENOUS; SUBCUTANEOUS at 10:54

## 2022-06-28 RX ADMIN — OXYCODONE HYDROCHLORIDE 10 MG: 5 TABLET ORAL at 11:07

## 2022-06-28 RX ADMIN — OXYCODONE HYDROCHLORIDE 5 MG: 5 TABLET ORAL at 07:37

## 2022-06-28 RX ADMIN — OXYCODONE HYDROCHLORIDE 5 MG: 5 TABLET ORAL at 22:34

## 2022-06-28 RX ADMIN — HYDROXYZINE HYDROCHLORIDE 25 MG: 25 TABLET, FILM COATED ORAL at 17:22

## 2022-06-28 RX ADMIN — DULOXETINE 30 MG: 30 CAPSULE, DELAYED RELEASE ORAL at 08:30

## 2022-06-28 RX ADMIN — CYCLOBENZAPRINE 10 MG: 10 TABLET, FILM COATED ORAL at 08:27

## 2022-06-28 RX ADMIN — CETIRIZINE HYDROCHLORIDE 10 MG: 10 TABLET, FILM COATED ORAL at 08:30

## 2022-06-28 ASSESSMENT — ACTIVITIES OF DAILY LIVING (ADL)
ADLS_ACUITY_SCORE: 51
ADLS_ACUITY_SCORE: 51
ADLS_ACUITY_SCORE: 56
ADLS_ACUITY_SCORE: 51
ADLS_ACUITY_SCORE: 56
ADLS_ACUITY_SCORE: 56
ADLS_ACUITY_SCORE: 49
ADLS_ACUITY_SCORE: 56
ADLS_ACUITY_SCORE: 56
ADLS_ACUITY_SCORE: 51

## 2022-06-28 NOTE — PROGRESS NOTES
Pt went into sustained vtach, prior to extubation. We extubated the patient, gave a bolus of amio, then vitals stabilized. Since extubation, patient ahd been complaining of L arm pain/discomfort. Neurosurgery paged and notified.

## 2022-06-28 NOTE — PLAN OF CARE
Neuro: Pt A&Ox4. Pupils PERRLA. Speech is hoarse. Pt cooperative with cares.   Pulmonary: Lung sounds are diminished, pt has intermittent productive cough. On 5 L with oxyplus mask.   Cardiac: Tele- SR with PACs.  CMS: Able to move all extremities. Hand  is weaker in L hand. Numbness/tingling in L hand. Numbness in BLE (baseline). Pulses 2+ except for L radial, needed doppler.   : Oliveira removed at 0900. Pt has dialysis on MWF. Low urine output.   GI: Bowel sounds are hypoactive. Not passing gas yet. Tolerating water well.   Activity: Pt was able to sit at EOB with Ax3 and brace on (brace was placed while pt was supine). Tolerated fair, pt felt lightheaded and had increased pain.  Nursing staff unsure when brace is to be on, paged neurosurgery to have an order clarifying this with no response.   Pain: Pain was intolerable this morning.Pt frequently yelling out in pain, neurosurgery paged. Described it as sharp/stabbing L axillary pain, pt was often pointing to one place and would hold pressure on it himself to help with the pain. Ice, IV Dilaudid, Oxycodone, Gabapentin and Flexeril were given throughout the morning with little relief. Neurosurgery increased Oxycodone dose and frequency, Dilaudid frequency, and added hydroxyzine. Pt pain was more manageable by 1100 and pt was able to rest intermittently. Pt pain remained manageable throughout the afternoon. More lethargic in the afternoon, alert and not lethargic in the evening.       Skin: Mild generalized edema, legs appear heather. Scabs on knees, mepilex on R knee. Incision on back covered with dressing, mild amount of serosanguinous drainage on dressing near neck.  Drains: Hemovac had 170 mL of bloody drainge since 0600.  Lines: Art line dc'd at 1700, PIV x2. Fistula on L arm.   Plan: Pt will be needing upright xrays, currently pt is unable to tolerate upright xrays.

## 2022-06-28 NOTE — PROGRESS NOTES
[facility]    Infectious Disease Progress Note    Date of Service (when I saw the patient): 06/28/2022     Assessment & Plan   Shay Jimenez is a 57 year old male who was admitted on 6/24/2022.     ASSESSMENT:  1. T2-T3 COLLAPSE, PHLEGMON, ENDPLATE EROSION, DEGENERATIVE CHANGES-possible discitis/vertebral osteomyelitis, changes could all be from DJD, CRP mildly elevated, no fever/chills, slow intermittent progression, I am not convinced this is infection but it still could be, he has had neg. PPD in past and no TB risk factors or contacts  2. DJD  3. ESRD  4. OBESITY  5. ETOH DEPENDENCE, ABUSE  6. NEUROPATHY  7. GINI  .     RECOMMENDATION  1. S/p:  6/27,Cervical 6 to Thoracic 6 Fusion and Thoracic 2-3 Decompression, operative report noted for DJD changes,  Pending Cxs for routine cx/gs, fungus Cx and stain, AFB Cx and stain and pathology, discussed with lab   2. Routine preop antibiotic prophylaxis  3. F/u pending cultures.   4. Continue on Ancef for now as we wait on the cultures        Family updated bedside     Ranjan Rain MD    Interval History   Afebrile   No new rashes or issues   Labs reviewed   In ICU post surgery     Physical Exam   Temp: 98.5  F (36.9  C) Temp src: Oral BP: 112/65 Pulse: 94   Resp: 22 SpO2: 97 % O2 Device: Mechanical Ventilator Oxygen Delivery: 4 LPM  Vitals:    06/24/22 0757 06/24/22 1716 06/28/22 0400   Weight: 140.6 kg (310 lb) 149 kg (328 lb 7.8 oz) 146.6 kg (323 lb 3.1 oz)     Vital Signs with Ranges  Temp:  [98.5  F (36.9  C)-98.7  F (37.1  C)] 98.5  F (36.9  C)  Pulse:  [] 94  Resp:  [9-30] 22  BP: (110-146)/(45-83) 112/65  MAP:  [65 mmHg-97 mmHg] 83 mmHg  Arterial Line BP: ()/(49-70) 109/60  FiO2 (%):  [50 %] 50 %  SpO2:  [93 %-99 %] 97 %    Constitutional: Awake  Lungs: Clear to auscultation bilaterally, no crackles or wheezing  Cardiovascular: Regular rate and rhythm, normal S1 and S2, and no murmur noted  Abdomen: Normal bowel sounds,  soft, non-distended, non-tender  Skin: No rashes, no cyanosis, no edema  Other:    Medications    phenylephrine      propofol (DIPRIVAN) infusion Stopped (06/28/22 0405)    sodium chloride Stopped (06/28/22 1032)      - MEDICATION INSTRUCTIONS for Dialysis Patients -   Does not apply See Admin Instructions    atorvastatin  20 mg Oral At Bedtime    ceFAZolin  1 g Intravenous Q24H    cetirizine  10 mg Oral Daily    DULoxetine  30 mg Oral Daily    finasteride  1.25 mg Oral Daily    gabapentin  300 mg Oral TID    midodrine  10 mg Oral TID w/meals    [START ON 6/29/2022] pantoprazole  40 mg Oral QAM AC    sertraline  100 mg Oral Daily    sevelamer carbonate  800 mg Oral TID w/meals    sodium chloride (PF)  3 mL Intracatheter Q8H    sodium chloride (PF)  3 mL Intracatheter Q8H       Data   All microbiology laboratory data reviewed.  Recent Labs   Lab Test 06/28/22  0434 06/27/22  2229 06/27/22  2224 06/27/22  1859 06/27/22  0733   WBC 7.9 10.9  --   --  7.1   HGB 8.9* 9.4* 9.9*   < > 10.9*   HCT 27.6* 28.8* 29*   < > 33.5*   * 109*  --   --  107*    241  --   --  200    < > = values in this interval not displayed.     Recent Labs   Lab Test 06/28/22  0420 06/27/22  0733 06/26/22  0712   CR 6.86* 10.30* 8.46*     No lab results found.  No lab results found.    Invalid input(s): TRU

## 2022-06-28 NOTE — OP NOTE
Location: Main or  Attending Surgeon: Fritz Boles MD  Assistant Surgeon: CELSO Contreras  Preprocedure diagnosis: Thoracic myelopathy from T2-3 fractures due to osteomyelitis  Procedure diagnosis: Same  Procedure:  1.  Pedicle screw and lateral mass instrumentation at C5, C6, T1, T2, T4, T5, T6  2.  Posterior lateral fusion using cortical allograft from C5 through T6  3.  Open reduction of T2-3 fractures and correction of kyphosis  4.  T2 and T3 laminectomies for stenosis  5.  Use of intraoperative O-arm and C arm  6.  Use of Stealth navigation for placement of pedicle and lateral mass instrumentation  7.  Use of intraoperative spinal cord monitoring  Indications: The patient is a 57-year-old male with dialysis dependent kidney failure who is had progressive myelopathy over the last few months and over the last week has been unable to walk due to weakness and imbalance.  On imaging he has progressive kyphosis at T2-3 due to fractures likely due to osteomyelitis.  This is causing cord compression at T2-3 with cord signal change at the level.  Procedure details:  The patient was brought to the main operating, intubated and placed under general anesthesia.  Baseline SSEPs were obtained showing no significant stimulation to the lower extremities.  This time we considered adding motor evoked potentials but due to the patient's increased symptoms with Valsalva maneuvers we elected to not proceed with motor evoked potential monitoring.  He was placed in the operating table in the prone position with his head and a C-Flex and his arms tucked at his side.  He was cleaned and prepped in sterile fashion.  Local anesthetic was injected following a timeout.  Using a 10 blade incision was made to the skin.  We then dissected down in the midline with monopolar cautery to the spinous processes from C5 down through T6.  We then performed subperiosteal dissection bilaterally at these levels.  Using fluoroscopy we placed a clamp  in the mid thoracic spine for Stealth navigation purposes.  We then brought in the O-arm and performed an O-arm spin for navigation purposes.  We then identified our vertebral body levels and started placing screws in the cervical spine.  I attempted to place pedicle screws at C6 and C7 however due to the patient's body habitus I did not have an appropriate angle to be able to get the screws then.  We then moved to placing lateral mass screws at C5 and C6 using a high-speed drill to create a small cortical opening and then drilling down 12 mm in the lateral mass and then tapping the opening and placing 14 mm screws with Stealth navigation.  We then placed thoracic pedicle screws at T1 and T2 in the same fashion using a high-speed drill to create a cortical opening and then drilling down through the pedicle and then tapping and placing Infinity pedicle screws with Stealth navigation.  We then transitioned to Solera screws using the same workflow starting at T4 and working her way down through T6.  Prior to placing screws through each of the pedicle we verify there was no cortical violations using a ball-tipped probe.  We skipped T3 due to the significant erosion of the bone.  We then once again spine and O-arm to verify our screw location and no cortical violations were identified.  We then did a laminectomy at T2-3 using high-speed drill to remove the bone laterally after removing the spinous processes with a Leksell rongeur.  This was then widened using punch rongeurs superiorly and inferiorly until no further stenosis was identified.  We then brought in transition rods and cut them to length.  These were then bent in a slightly increased lordotic fashion to reduce the T2-3 kyphosis.  We started superiorly placed in the fortino in situ from C5-T2.  Setscrews were used to secure the fortino to each of the screws.  We then used reducing towers on the thoracic pedicle screws from T4-T6 and sequentially tightened these until  the fractures were reduced.  We then placed setscrews in this and final tightened all setscrews.  We were able to see fortino superiorly and inferiorly above all the screws.  We then irrigated the wound with normal saline including antibiotic.  Cortical cancellous allograft was placed from C5-T6 for posterior lateral fusion.  A Hemovac drain was placed in the wound and tunneled out inferiorly.  We then closed the muscle layers using 0 Vicryl suture followed by 0 Vicryl sutures for the fascial layer.  The subcutaneous layers were closed using 2-0 Vicryl sutures followed by 3-0 Vicryl sutures for the dermal layer and staples for the skin.  All counts were correct at the end the procedure.  The patient tolerated the procedure well.  He remained intubated and was transferred to the ICU in stable condition.  I was present for the entire procedure.    Fritz Boles MD

## 2022-06-28 NOTE — PROGRESS NOTES
[facility]    Neurosurgery  Daily Post-Op Note    Assessment & Plan   Procedure(s):  Cervical 6 to Thoracic 6 Fusion and Thoracic 2-3 Decompression   1 Day Post-Op  Pain difficult to control, pain in left a  Changes made to MAR    Plan:  -Advance activity as tolerated  -Continue supportive and symptomatic treatment  -Start or continue physical therapy  -Pain control measures    Manjit Garcia PA-C    Interval History   Stable.  Doing well.  Improving slowly.  Pain is reasonably controlled.  No fevers.     Physical Exam   Temp: 98.5  F (36.9  C) Temp src: Oral BP: 112/65 Pulse: 94   Resp: 22 SpO2: 97 % O2 Device: Mechanical Ventilator Oxygen Delivery: 4 LPM  Vitals:    06/24/22 0757 06/24/22 1716 06/28/22 0400   Weight: 140.6 kg (310 lb) 149 kg (328 lb 7.8 oz) 146.6 kg (323 lb 3.1 oz)     Vital Signs with Ranges  Temp:  [98.5  F (36.9  C)-98.7  F (37.1  C)] 98.5  F (36.9  C)  Pulse:  [] 94  Resp:  [9-30] 22  BP: (110-146)/(45-83) 112/65  MAP:  [65 mmHg-97 mmHg] 83 mmHg  Arterial Line BP: ()/(49-70) 109/60  FiO2 (%):  [50 %] 50 %  SpO2:  [93 %-99 %] 97 %  I/O last 3 completed shifts:  In: 2387.32 [I.V.:1887.32]  Out: 6735 [Urine:635; Drains:100; Other:4000; Blood:2000]    Alert and oriented.  Moves all extremities equally.  Reflexes symmetrical.     Incision: CDI      Medications     phenylephrine       propofol (DIPRIVAN) infusion Stopped (06/28/22 0405)     sodium chloride Stopped (06/28/22 1032)        - MEDICATION INSTRUCTIONS for Dialysis Patients -   Does not apply See Admin Instructions     atorvastatin  20 mg Oral At Bedtime     ceFAZolin  1 g Intravenous Q24H     cetirizine  10 mg Oral Daily     DULoxetine  30 mg Oral Daily     finasteride  1.25 mg Oral Daily     gabapentin  300 mg Oral TID     midodrine  10 mg Oral TID w/meals     [START ON 6/29/2022] pantoprazole  40 mg Oral QAM AC     sertraline  100 mg Oral Daily     sevelamer carbonate  800 mg Oral TID w/meals      sodium chloride (PF)  3 mL Intracatheter Q8H     sodium chloride (PF)  3 mL Intracatheter Q8H           Manjit Garcia PA-C  [facility] Neurosurgery  [address] [address]    Tel [phone]  Pager [phone]

## 2022-06-28 NOTE — BRIEF OP NOTE
[facility]    Brief Operative Note    Pre-operative diagnosis: Degenerative arthritis of thoracic spine with cord compression [M47.14]  Post-operative diagnosis Same as pre-operative diagnosis    Procedure: Procedure(s):  Cervical 6 to Thoracic 6 Fusion and Thoracic 2-3 Decompression  Surgeon: Surgeon(s) and Role:     * Fritz Boles MD - Primary  Anesthesia: General   Estimated Blood Loss: 2000cc    Drains: Hemovac  Specimens:   ID Type Source Tests Collected by Time Destination   A : Thoracic Epidural Tissue Tissue Spine, Thoracic ANAEROBIC BACTERIAL CULTURE ROUTINE, GRAM STAIN, AEROBIC BACTERIAL CULTURE ROUTINE Fritz Boles MD 6/27/2022  8:39 PM    B :  Blood Vein CBC WITH PLATELETS Fritz Boles MD 6/27/2022 10:29 PM      Findings:   Epidural tissue, sent for culture.  Complications: None.  Implants:   Implant Name Type Inv. Item Serial No.  Lot No. LRB No. Used Action   3.5 x 14 Infinity Screw    MEDTRONIC 42 10 [id number] N/A 4 Implanted   3.5 x 26 Infinity screw    [employer] 42 10 [id number] N/A 3 Implanted   3.5 x 28 Infinity screw    [employer] 42 10 [id number] N/A 1 Implanted   Set Screws    [employer] 42 10 [id number] N/A 8 Implanted   IMP SCR MEDT 5.5/6.0MM SOLERA 4.5X35MM MA [id number] - [id number] Metallic Hardware/[place] IMP SCR MEDT 5.5/6.0MM SOLERA 4.5X35MM MA [id number]  [employer] 42 10 [id number] N/A 2 Implanted   IMP SCR MEDT 5.5/6.0MM SOLERA 4.5X40MM MA [id number] - [id number] Metallic Hardware/[place] IMP SCR MEDT 5.5/6.0MM SOLERA 4.5X40MM MA [id number]  [employer] 42 10 [id number] N/A 4 Implanted   IMP SCR SET MEDT SOLERA BREAK OFF 5.5MM TI [id number] - [id number] Metallic Hardware/[place] IMP SCR SET MEDT SOLERA BREAK OFF 5.5MM TI [id number]  [employer] 42 10 [id number] N/A 6 Implanted   GWEN SPNL 420MM 3.5/5.5MM TPR COCO [id number] - [id number] Metallic Hardware/[place] GWEN SPNL 420[facility] 3.5/5.5MM [facility] [id number]  [employer] 42 10 [id number]  N/A 2 Implanted   GWEN SPNL 420MM 3.5/5.5MM TPR COCRMO [id number] - [id number] Metallic Hardware/[place] GWEN SPNL 420MM 3.5/5.5MM TPR COCRMO [id number]  [employer] 42 10 [id number] N/A 1 Wasted   Infinity set screw    [employer] 42 10 [id number] N/A 1 Wasted   GRAFT BONE MAGNIFUSE [id number] - [id number] Bone/Tissue/Biologic GRAFT BONE MAGNIFUSE [id number] [employer]  N/A 1 Implanted     Fritz Boles MD

## 2022-06-28 NOTE — PROGRESS NOTES
[facility]     Renal Progress Note       SHORTHAND KEY FOR MY NOTES:  c = with, s = without, p = after, a = before, x = except, asx = asymptomatic, tx = transplant or treatment, sx = symptoms or symptomatic, cx = canceled or culture, rxn = reaction, yday = yesterday, nl = normal, abx = antibiotics, fxn = function, dx = diagnosis, dz = disease, m/h = melena/hematochezia, c/d/l/ha = cramping/dizziness/lightheadedness/headache, d/c = discharge or diarrhea/constipation, f/c/n/v = fevers/chills/nausea/vomiting, cp/sob = chest pain/shortness of breath, tbv = total body volume, rxn = reaction, tdc = tunneled dialysis catheter, pta = prior to admission, hd = hemodialysis, pd = peritoneal dialysis, hhd = home hemodialysis, edw = estimated dry wt         Assessment/Plan:     1.  ESKD.  Pt is on dialysis per a [facility] schedule.  His access is in the L arm, which is the arm that hurts quite a bit.  This may impede the ability to run him safely if he keeps moving the arm since he will have needles in place.  A.  Next planned HD on Weds.  B.  We will have to see if he can keep his arm down long enough for dialysis.    2.  POD #1 s/p C6-T6 fusion / T2-3 decompression.  He is having a lot of pain in the L arm.  A.  Agree c Neuro consult for pain mgmt.  B.  Plans per NSG.    3.  Anemia.  Pt's hb is down to 8.9 and he rec'd multiple units of blood yday.  A.  Increase EPO to 10k qHD.  B.  Follow hb, clinically.  C.  Transfuse prn.    4.  NSVT.  Pt had an episode of VT earlier today that was r/w amio.  Mg is ok.  A.  Follow clinically.    5.  Hypotension.  Pt has low lower BPs at baseline and uses midodrine at dialysis.  He generally feels ok in the 70s he states.    A.  If BP remains low, then schedule midodrine 10 mg tid.    6.  FEN.  Electrolytes are ok.  He is on a clear liquid diet and is getting fluids at 50 ml/h.  K is trending higher today, but still in the nl range.  A.  Stop IVF.  B.  When eating, he  "should be on a dialysis diet.        Interval History:     Pt had surgery yday and had a lot of blood loss  (reported 2L).  Op note reviewed.    He is having a lot of pain in his L armpit that radiates to the shoulder and down the arm to the fingers.  Neuro has been consulted.  Pain meds haven't helped much so far.    No cp/sob/abd pain.  No f/c/n/v.          Medications and Allergies:       - MEDICATION INSTRUCTIONS for Dialysis Patients -   Does not apply See Admin Instructions     atorvastatin  20 mg Oral At Bedtime     ceFAZolin  1 g Intravenous Q24H     cetirizine  10 mg Oral Daily     DULoxetine  30 mg Oral Daily     finasteride  1.25 mg Oral Daily     gabapentin  300 mg Oral TID     midodrine  10 mg Oral Once per day on Mon Wed Fri     [START ON 6/29/2022] pantoprazole  40 mg Oral QAM AC     sertraline  100 mg Oral Daily     sevelamer carbonate  800 mg Oral TID w/meals     sodium chloride (PF)  3 mL Intracatheter Q8H     sodium chloride (PF)  3 mL Intracatheter Q8H     Allergies   Allergen Reactions     Amlodipine      Lisinopril           Physical Exam:     Vitals were reviewed     , Blood pressure (!) 146/68, pulse 105, temperature 98.5  F (36.9  C), temperature source Oral, resp. rate 20, height 1.859 m (6' 1.2\"), weight 146.6 kg (323 lb 3.1 oz), SpO2 94 %.  Wt Readings from Last 3 Encounters:   06/28/22 146.6 kg (323 lb 3.1 oz)     Intake/Output Summary (Last 24 hours) at 6/28/2022 1050  Last data filed at 6/28/2022 1000  Gross per 24 hour   Intake 3187.32 ml   Output 6745 ml   Net -3557.68 ml     GENERAL APPEARANCE: uncomfortable, lying in bed, alert  HEENT:  eyes/ears/nose/neck grossly nl  RESP: CTA B c good efforts  CV: reg, tachy, nl S1/S2   ABDOMEN: o/s/nt/nd  EXTREMITIES/SKIN: + ble edema; L axilla - some redness, tender to palpation, describes a shooting pain from armpit to hands/fingers  ACCESS:  LAF c good bruit         Data:     CBC RESULTS:     Recent Labs   Lab 06/28/22  0434 06/27/22  2229 " 06/27/22 2224 06/27/22 2006 06/27/22 1859 06/27/22 0733 06/26/22 0712 06/25/22 0728 06/24/22  0818   WBC 7.9 10.9  --   --   --  7.1 7.8 10.2 6.6   RBC 2.54* 2.65*  --   --   --  3.13* 3.04* 3.13* 2.91*   HGB 8.9* 9.4* 9.9* 10.5* 11.2* 10.9* 10.5* 10.8* 10.0*   HCT 27.6* 28.8* 29* 31* 33* 33.5* 33.6* 32.4* 31.5*    241  --   --   --  200 208 214 198     Basic Metabolic Panel:  Recent Labs   Lab 06/28/22  0809 06/28/22 0420 06/27/22 2224 06/27/22 2006 06/27/22 1859 06/27/22 0733 06/26/22 0712 06/25/22 0728 06/24/22  0818   NA  --  135 135 135 135 132* 133 124* 126*   POTASSIUM  --  4.7 4.0 3.8 3.9 4.5 4.6 5.1 3.8   CHLORIDE  --  98  --   --   --  92* 93* 86* 86*   CO2  --  26  --   --   --  27 30 27 26   BUN  --  49*  --   --   --  81* 57* 57* 39*   CR  --  6.86*  --   --   --  10.30* 8.46* 10.10* 8.68*   * 153*  --   --   --  131* 111* 153* 144*   MAC  --  8.7  --   --   --  9.4 9.1 9.3 9.9     INR  Recent Labs   Lab 06/27/22  1602   INR 1.07      Attestation:   I have reviewed today's relevant vital signs, notes, medications, labs and imaging.    Dejuan Carr MD  [facility] Nephrology  [phone]

## 2022-06-28 NOTE — PLAN OF CARE
Shift Summary:  Pain management. [id number], Oxy1x, Lulvcyht2t, Atarax.       Neuro: AO4. PERRLA. TANG. Follows command.   Cardiac: SR + hypotensive at times.   Pulmonary: LS dim. Productive cough. 4L Oxymask  GI: Clear liquid diet. Takes pills whole.   : No output overnight. Dialysis MWF.   Skin: Asif BLE. Scab bilateral knees. Rash in groin. Callus on lt big toe. Generalized edema trace. Cyst on left upper arm.   Lines: PIV 2x. Hemo drain to surgical site.   Activity: Bedrest  Restraints: NA

## 2022-06-28 NOTE — PROGRESS NOTES
[facility] ICU RESPIRATORY NOTE  Date of Admission: 6/27/22  Date of Intubation (most recent): 6/27/22  Reason for Mechanical Ventilation: Post op- Cervical 6 to Thoracic 6 Fusion and Thoracic 2-3 Decompression  Number of Days on Mechanical Ventilation: 2  Met Criteria for Pressure Support Trial:   Length of Pressure Support Trial:    Reason for Stopping Pressure Support Trial:  Reason for No Pressure Support Trial: Per MD     Significant Events Today: Pt arrived intubated from OR     ABG Results:   Recent Labs   Lab 06/27/22 2224 06/27/22 2006 06/27/22  1859   PH 7.43 7.46* 7.45   PCO2 41 38 39   PO2 335* 152* 111*   HCO3 27 27 27     Vent Mode: SIMV/PS  (Synchronized Intermittent Mandatory Ventilation with Pressure Support)  FiO2 (%): 50 %  Resp Rate (Set): 20 breaths/min  Tidal Volume (Set, mL): 500 mL  PEEP (cm H2O): 5 cmH2O  Pressure Support (cm H2O): 5 cmH2O  Resp: 30    LC Davis, RRT

## 2022-06-28 NOTE — PROGRESS NOTES
"[facility]    Medicine Progress Note - Hospitalist Service    Date of Admission:  6/24/2022    Assessment & Plan        Shay Jimenez is a very pleasant 57-year-old male with past medical history significant for end-stage renal disease, on hemodialysis, chronic peripheral neuropathy affecting his feet, history of hypertension no longer on meds, hyperlipidemia, obesity, obstructive sleep apnea, GERD and depression, among other medical problems, who presented 6/24/22 for evaluation of worsening upper back pain with associated generalized weakness and falls.  He was found to have abnormal findings on thoracic MRI concerning for diskitis with osteomyelitis with also some findings of a compressive myelopathy.  He is being admitted to the hospital today for ongoing evaluation and care.    Upper back pain  Generalized weakness  Falls   Abnormal findings of a T2 and T3 suggestive of diskitis, osteomyelitis  Epidural phlegmon/abscess   Compressive myelopathy  *back pain for 6 months. CT chest 2/2022 showed concern for upper thoracic spine diskitis vs osteomyelitis. Unclear follow up from that time  *in weeks prior to admission, MRI showed possible thoracic compression fracture.   *developed progressively weak legs with frequent falls.  *on admission imaging with T2-T3 diskitis, osteomyelitis and compressive myelopathy.        appreciate Neurosurgery care    follow blood cultures    ID consult requested, I appreciate Dr. Rain's evaluation recommendations    Per her, \"changes could all be from DJD, CRP mildly elevated, no fever/chills, slow intermittent progression, I am not convinced this is infection but it still could be, he has had neg. PPD in past and no TB risk factors or contacts.\"     Also per Dr. Rain, \"defer antibiotics until after surgery.\"  She also requests operative cultures to be sent for routine gram stain culture, fungal stain and culture, AFB stain and culture and " "pathology    Tylenol, oxycodone and IV Dilaudid available as needed for pain.      End-stage renal disease, on hemodialysis  *Typically dialyzes Monday, Wednesday, Friday. Missed 6/24 session due to being in ED      Nephrology consult appreciated    Dialysis per nephrology.    PTA sevelamer     GERD    Chronic and stable on PPI.    History of hypertension  *No longer needing blood pressure medicines.  Monitor blood pressures this stay.    Hyperlipidemia    Chronic and stable, on statin.    Peripheral neuropathy of the feet  *Follows with Neurology.      Managed in part with duloxetine, which will be continued.    Depression    Chronic and stable on Zoloft.    Obesity, class III  Obstructive sleep apnea  *BMI this stay is 43.10.  He does not use CPAP.  Encourage diet, lifestyle modifications once above issues addressed.    History of BPH.       Diet: Advance Diet as Tolerated: Clear Liquid Diet    DVT Prophylaxis: Pneumatic Compression Devices  Oliveira Catheter: Not present  Central Lines: None  Cardiac Monitoring: None  Code Status: Full Code      Disposition Plan      Expected Discharge Date: 07/01/2022                The patient's care was discussed with the Bedside Nurse and Patient.    Eunice Wilson MD  Hospitalist Service  [facility]  Securely message with the Vocera Web Console (learn more here)  Text page via [employer] Paging/Directory         Clinically Significant Risk Factors Present on Admission                # Severe Obesity: Estimated body mass index is 42.41 kg/m  as calculated from the following:    Height as of this encounter: 1.859 m (6' 1.2\").    Weight as of this encounter: 146.6 kg (323 lb 3.1 oz).        ______________________________________________________________________    Interval History   \"I have pain in my armpit.\"  Patient complains of pain under his left arm, says pain is intermittent, \"zaps me.\"  He has no new respiratory or GI complaints.    Data reviewed " today: I reviewed all medications, new labs and imaging results over the last 24 hours. I personally reviewed no images or EKG's today.    Physical Exam   Vital Signs: Temp: 98.5  F (36.9  C) Temp src: Oral BP: (!) 146/68 Pulse: 91   Resp: 19 SpO2: 95 % O2 Device: Mechanical Ventilator Oxygen Delivery: 5 LPM  Weight: 323 lbs 3.11 oz  Constitutional: Awake, alert.  Looks uncomfortable.  Respiratory: Clear to auscultation bilaterally, no crackles or wheezing  Chest: Has some pain to palpation left lateral chest wall.  There is no obvious ecchymosis, no deformity in this area  Cardiovascular: Regular rate and rhythm, normal S1 and S2, and no murmur noted  GI: Obese, soft, bowel sounds are present  Skin/Integumen: No rash on exposed skin  Psych: Mood is very agitated due to pain      Data   Recent Labs   Lab 06/28/22  0809 06/28/22  0434 06/28/22  0420 06/27/22  2229 06/27/22  2224 06/27/22 2006 06/27/22  1859 06/27/22  1602 06/27/22  0733 06/26/22  0712 06/25/22  0728 06/24/22  0818   WBC  --  7.9  --  10.9  --   --   --   --  7.1 7.8   < > 6.6   HGB  --  8.9*  --  9.4* 9.9* 10.5*   < >  --  10.9* 10.5*   < > 10.0*   MCV  --  109*  --  109*  --   --   --   --  107* 111*   < > 108*   PLT  --  169  --  241  --   --   --   --  200 208   < > 198   INR  --   --   --   --   --   --   --  1.07  --   --   --   --    NA  --   --  135  --  135 135   < >  --  132* 133   < > 126*   POTASSIUM  --   --  4.7  --  4.0 3.8   < >  --  4.5 4.6   < > 3.8   CHLORIDE  --   --  98  --   --   --   --   --  92* 93*   < > 86*   CO2  --   --  26  --   --   --   --   --  27 30   < > 26   BUN  --   --  49*  --   --   --   --   --  81* 57*   < > 39*   CR  --   --  6.86*  --   --   --   --   --  10.30* 8.46*   < > 8.68*   ANIONGAP  --   --  11  --   --   --   --   --  13 10   < > 14   MAC  --   --  8.7  --   --   --   --   --  9.4 9.1   < > 9.9   *  --  153*  --   --   --   --   --  131* 111*   < > 144*   ALBUMIN  --   --   --   --   --   --    --   --   --   --   --  3.3*   PROTTOTAL  --   --   --   --   --   --   --   --   --   --   --  7.3   BILITOTAL  --   --   --   --   --   --   --   --   --   --   --  0.6   ALKPHOS  --   --   --   --   --   --   --   --   --   --   --  152*   ALT  --   --   --   --   --   --   --   --   --   --   --  55   AST  --   --   --   --   --   --   --   --   --   --   --  51*    < > = values in this interval not displayed.     Recent Results (from the past 24 hour(s))   XR Surgery MOLLY L/T 5 Min Fluoro    Narrative    This exam was marked as non-reportable because it will not be read by a   radiologist or a [facility] non-radiologist provider.           Medications     phenylephrine       propofol (DIPRIVAN) infusion Stopped (06/28/22 0405)     sodium chloride 100 mL/hr at 06/28/22 0730       - MEDICATION INSTRUCTIONS for Dialysis Patients -   Does not apply See Admin Instructions     atorvastatin  20 mg Oral At Bedtime     ceFAZolin  1 g Intravenous Q24H     cetirizine  10 mg Oral Daily     DULoxetine  30 mg Oral Daily     finasteride  1.25 mg Oral Daily     gabapentin  300 mg Oral TID     midodrine  10 mg Oral Once per day on Mon Wed Fri     [START ON 6/29/2022] pantoprazole  40 mg Oral QAM AC     sertraline  100 mg Oral Daily     sevelamer carbonate  800 mg Oral TID w/meals     sodium chloride (PF)  3 mL Intracatheter Q8H     sodium chloride (PF)  3 mL Intracatheter Q8H

## 2022-06-28 NOTE — PROGRESS NOTES
06/28/22 1414   Quick Adds   Type of Visit Initial PT Evaluation   Living Environment   People in Home spouse   Current Living Arrangements apartment   Transportation Anticipated family or friend will provide   Living Environment Comments apartment, pt reports many stairs to apt, needs to navigate at least one flight of stairs at a time before rest break   Self-Care   Usual Activity Tolerance fair   Current Activity Tolerance poor   Activity/Exercise/Self-Care Comment Pt reports use of SEC recently, denies use of walker however per chart pt may have used walker previously. Will confirm with pt next session   General Information   Onset of Illness/Injury or Date of Surgery 06/27/22   Referring Physician Ivet Anne PA-C   Pertinent History of Current Problem (include personal factors and/or comorbidities that impact the POC) Cervical 6 to Thoracic 6 Fusion and Thoracic 2-3 Decompression   Existing Precautions/Restrictions fall;spinal   Cognition   Affect/Mental Status (Cognition) WNL   Pain Assessment   Patient Currently in Pain Yes, see Vital Sign flowsheet  (Pt having high pain level, L arm nerve pain)   Integumentary/Edema   Integumentary/Edema Comments scabs to BLE, frequent falls prior to admission   Posture    Posture Forward head position   Range of Motion (ROM)   ROM Comment BLE WFL   Strength (Manual Muscle Testing)   Strength Comments Pt demonstrates significant functional weakness, poor core strength sitting EOB, limited by pain this PM.   Bed Mobility   Comment, (Bed Mobility) Supine > sit max A x 3   Transfers   Comment, (Transfers) NA   Gait/Stairs (Locomotion)   Comment, (Gait/Stairs) NA   Balance   Balance Comments impaired static sitting balance EOB   Clinical Impression   Criteria for Skilled Therapeutic Intervention Yes, treatment indicated   PT Diagnosis (PT) impaired IND with mobility   Influenced by the following impairments functional weakness, impaired balance, pain   Functional  limitations due to impairments impaired IND with bed mobility, transfers, gait   Clinical Presentation (PT Evaluation Complexity) Evolving/Changing   Clinical Presentation Rationale clinical judgement, level of assist   Clinical Decision Making (Complexity) moderate complexity   Planned Therapy Interventions (PT) balance training;bed mobility training;gait training;patient/family education;strengthening;transfer training   Risk & Benefits of therapy have been explained evaluation/treatment results reviewed;care plan/treatment goals reviewed;risks/benefits reviewed;patient   PT Discharge Planning   PT Discharge Recommendation (DC Rec) [facility]ivated patient will benefit from intensive, interdisciplinary therapy.  Anticipate will be able to tolerate 3 hours of therapy per day   PT Rationale for DC Rec Pt previously mod I with use of SEC, currently pt requires A x 2-3 for mobility, most limited by pain. Recommend ARU at [facility] to improve IND with mobility as pt far below baseline at this time   Total Evaluation Time   Total Evaluation Time (Minutes) 15   Physical Therapy Goals   PT Frequency Daily   PT Predicted Duration/Target Date for Goal Attainment 07/05/22   PT Goals Bed Mobility;Transfers;Gait;Stairs   PT: Bed Mobility Supine to/from sit;Rolling;Within precautions;Minimal assist   PT: Transfers Sit to/from stand;Bed to/from chair;Assistive device;Within precautions;Minimal assist   PT: Gait Minimal assist;100 feet;Rolling walker;Within precautions   PT: Stairs Greater than 10 stairs;Minimal assist;Rail on left;Within precautions

## 2022-06-28 NOTE — PROGRESS NOTES
Pt extubated to 10 LPM oxymask at 0415 per MD. No stridor noted, SpO2 98%. Will continue to follow.     LC Davis, RRT

## 2022-06-28 NOTE — PROGRESS NOTES
S: Order received to see patient on I360-01 for fitting of a cervical thoracic orthosis () as ordered by Ivet Anne PA-C.  O/G: support, stabilize and immobilize spine  A: Patient seen for eval.  Patient was fit with an Walland Burke .  The posterior section was placed behind the patient.  Anterior section was then placed on patient and adjusted to achieve a customized/optimal fit.  The patient does not want to wear the brace right now so I removed it.  I left the instruction manual with the patient.      P: The patient will wear the brace following Physician guild lines.  The patient will contact us prn.  King DOBSON

## 2022-06-28 NOTE — ANESTHESIA POSTPROCEDURE EVALUATION
Patient: Shay Jimenez    Procedure: Procedure(s):  Cervical 6 to Thoracic 6 Fusion and Thoracic 2-3 Decompression       Anesthesia Type:  General    Note:  Disposition: Inpatient   Postop Pain Control: Uneventful            Sign Out: Well controlled pain   PONV: No   Neuro/Psych: Uneventful            Sign Out: Acceptable/Baseline neuro status   Airway/Respiratory: Uneventful            Sign Out: AIRWAY IN SITU/Resp. Support   CV/Hemodynamics: Uneventful            Sign Out: Acceptable CV status; No obvious hypovolemia; No obvious fluid overload   Other NRE: NONE   DID A NON-ROUTINE EVENT OCCUR?     Event details/Postop Comments:  To icu intubated per dr. Boles           Last vitals:  Vitals:    06/27/22 1230 06/27/22 1245 06/27/22 1300   BP: 111/45 117/74 120/83   Pulse: 74 74 78   Resp:   18   Temp:   37.1  C (98.7  F)   SpO2:   98%       Electronically Signed By: Veronique Calix  June 27, 2022  11:58 PM

## 2022-06-28 NOTE — PROGRESS NOTES
Intensivist:  From overnight signout, patient had a brief run of non-sustained v-tach during pressure support trial this morning.  Given amio bolus and then PS trial completed.  No need to continue amiodarone unless ventricular ectopy recurs.

## 2022-06-28 NOTE — ANESTHESIA CARE TRANSFER NOTE
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    Anesthesia Type:   General     Note:    Oropharynx: endotracheal tube in place  Level of Consciousness: iatrogenic sedation      Independent Airway: airway patency not satisfactory and stable  Dentition: dentition unchanged  Vital Signs Stable: post-procedure vital signs reviewed and stable  Report to RN Given: handoff report given  Patient transferred to: ICU    ICU Handoff: Call for PAUSE to initiate/utilize ICU HANDOFF, Identified Patient, Identified Responsible Provider, Reviewed the Pertinent Medical History, Discussed Surgical Course, Reviewed Intra-OP Anesthesia Management and Issues during Anesthesia, Set Expectations for Post Procedure Period and Allowed Opportunity for Questions and Acknowledgement of Understanding      Vitals:  Vitals Value Taken Time   BP     Temp     Pulse 92 06/27/22 2355   Resp 26 06/27/22 2355   SpO2 97 % 06/27/22 2355   Vitals shown include unvalidated device data.    Electronically Signed By: ENID Rene CRNA  June 27, 2022  11:56 PM

## 2022-06-29 ENCOUNTER — APPOINTMENT (OUTPATIENT)
Dept: GENERAL RADIOLOGY | Facility: CLINIC | Age: 57
DRG: 459 | End: 2022-06-29
Attending: NURSE PRACTITIONER
Payer: MEDICARE

## 2022-06-29 LAB
ALBUMIN SERPL-MCNC: 2.9 G/DL (ref 3.4–5)
ANION GAP SERPL CALCULATED.3IONS-SCNC: 11 MMOL/L (ref 3–14)
ATRIAL RATE - MUSE: 97 BPM
BACTERIA BLD CULT: NO GROWTH
BACTERIA BLD CULT: NO GROWTH
BUN SERPL-MCNC: 61 MG/DL (ref 7–30)
CALCIUM SERPL-MCNC: 8.3 MG/DL (ref 8.5–10.1)
CHLORIDE BLD-SCNC: 97 MMOL/L (ref 94–109)
CO2 SERPL-SCNC: 23 MMOL/L (ref 20–32)
CREAT SERPL-MCNC: 9.6 MG/DL (ref 0.66–1.25)
DIASTOLIC BLOOD PRESSURE - MUSE: NORMAL MMHG
ERYTHROCYTE [DISTWIDTH] IN BLOOD BY AUTOMATED COUNT: 13.9 % (ref 10–15)
GFR SERPL CREATININE-BSD FRML MDRD: 6 ML/MIN/1.73M2
GLUCOSE BLD-MCNC: 116 MG/DL (ref 70–99)
GLUCOSE BLDC GLUCOMTR-MCNC: 83 MG/DL (ref 70–99)
GLUCOSE BLDC GLUCOMTR-MCNC: 92 MG/DL (ref 70–99)
HCT VFR BLD AUTO: 25.8 % (ref 40–53)
HGB BLD-MCNC: 8 G/DL (ref 13.3–17.7)
INTERPRETATION ECG - MUSE: NORMAL
MCH RBC QN AUTO: 34.6 PG (ref 26.5–33)
MCHC RBC AUTO-ENTMCNC: 31 G/DL (ref 31.5–36.5)
MCV RBC AUTO: 112 FL (ref 78–100)
P AXIS - MUSE: 38 DEGREES
PHOSPHATE SERPL-MCNC: 8.6 MG/DL (ref 2.5–4.5)
PLATELET # BLD AUTO: 191 10E3/UL (ref 150–450)
POTASSIUM BLD-SCNC: 5.3 MMOL/L (ref 3.4–5.3)
PR INTERVAL - MUSE: 202 MS
QRS DURATION - MUSE: 76 MS
QT - MUSE: 372 MS
QTC - MUSE: 472 MS
R AXIS - MUSE: 18 DEGREES
RBC # BLD AUTO: 2.31 10E6/UL (ref 4.4–5.9)
SODIUM SERPL-SCNC: 131 MMOL/L (ref 133–144)
SYSTOLIC BLOOD PRESSURE - MUSE: NORMAL MMHG
T AXIS - MUSE: 32 DEGREES
VENTRICULAR RATE- MUSE: 97 BPM
WBC # BLD AUTO: 8.4 10E3/UL (ref 4–11)

## 2022-06-29 PROCEDURE — 250N000013 HC RX MED GY IP 250 OP 250 PS 637: Performed by: INTERNAL MEDICINE

## 2022-06-29 PROCEDURE — 250N000013 HC RX MED GY IP 250 OP 250 PS 637: Performed by: STUDENT IN AN ORGANIZED HEALTH CARE EDUCATION/TRAINING PROGRAM

## 2022-06-29 PROCEDURE — 258N000003 HC RX IP 258 OP 636: Performed by: INTERNAL MEDICINE

## 2022-06-29 PROCEDURE — 999N000065 XR THORACIC SPINE 2 VIEWS

## 2022-06-29 PROCEDURE — 200N000001 HC R&B ICU

## 2022-06-29 PROCEDURE — 250N000011 HC RX IP 250 OP 636: Performed by: PHYSICIAN ASSISTANT

## 2022-06-29 PROCEDURE — 87040 BLOOD CULTURE FOR BACTERIA: CPT | Performed by: PHYSICIAN ASSISTANT

## 2022-06-29 PROCEDURE — 36415 COLL VENOUS BLD VENIPUNCTURE: CPT | Performed by: PHYSICIAN ASSISTANT

## 2022-06-29 PROCEDURE — 72040 X-RAY EXAM NECK SPINE 2-3 VW: CPT

## 2022-06-29 PROCEDURE — 90937 HEMODIALYSIS REPEATED EVAL: CPT

## 2022-06-29 PROCEDURE — 250N000013 HC RX MED GY IP 250 OP 250 PS 637: Performed by: PHYSICIAN ASSISTANT

## 2022-06-29 PROCEDURE — 99232 SBSQ HOSP IP/OBS MODERATE 35: CPT | Performed by: INTERNAL MEDICINE

## 2022-06-29 PROCEDURE — 634N000001 HC RX 634: Performed by: INTERNAL MEDICINE

## 2022-06-29 PROCEDURE — 99233 SBSQ HOSP IP/OBS HIGH 50: CPT | Performed by: INTERNAL MEDICINE

## 2022-06-29 PROCEDURE — 250N000011 HC RX IP 250 OP 636: Performed by: INTERNAL MEDICINE

## 2022-06-29 PROCEDURE — 80069 RENAL FUNCTION PANEL: CPT | Performed by: INTERNAL MEDICINE

## 2022-06-29 PROCEDURE — 85027 COMPLETE CBC AUTOMATED: CPT | Performed by: INTERNAL MEDICINE

## 2022-06-29 RX ORDER — ALBUMIN, HUMAN INJ 5% 5 %
50-250 SOLUTION INTRAVENOUS
Status: DISCONTINUED | OUTPATIENT
Start: 2022-06-29 | End: 2022-06-29

## 2022-06-29 RX ORDER — ALBUMIN (HUMAN) 12.5 G/50ML
50 SOLUTION INTRAVENOUS
Status: DISCONTINUED | OUTPATIENT
Start: 2022-06-29 | End: 2022-06-29

## 2022-06-29 RX ORDER — MIDODRINE HYDROCHLORIDE 5 MG/1
10 TABLET ORAL
Status: COMPLETED | OUTPATIENT
Start: 2022-06-29 | End: 2022-06-29

## 2022-06-29 RX ADMIN — HYDROMORPHONE HYDROCHLORIDE 0.5 MG: 1 INJECTION, SOLUTION INTRAMUSCULAR; INTRAVENOUS; SUBCUTANEOUS at 04:50

## 2022-06-29 RX ADMIN — GABAPENTIN 300 MG: 300 CAPSULE ORAL at 13:34

## 2022-06-29 RX ADMIN — ATORVASTATIN CALCIUM 20 MG: 20 TABLET, FILM COATED ORAL at 20:55

## 2022-06-29 RX ADMIN — HYDROMORPHONE HYDROCHLORIDE 0.5 MG: 1 INJECTION, SOLUTION INTRAMUSCULAR; INTRAVENOUS; SUBCUTANEOUS at 21:56

## 2022-06-29 RX ADMIN — SEVELAMER CARBONATE 800 MG: 800 TABLET, FILM COATED ORAL at 09:09

## 2022-06-29 RX ADMIN — OXYCODONE HYDROCHLORIDE 10 MG: 5 TABLET ORAL at 18:55

## 2022-06-29 RX ADMIN — MIDODRINE HYDROCHLORIDE 10 MG: 5 TABLET ORAL at 11:45

## 2022-06-29 RX ADMIN — PANTOPRAZOLE SODIUM 40 MG: 40 TABLET, DELAYED RELEASE ORAL at 09:10

## 2022-06-29 RX ADMIN — OXYCODONE HYDROCHLORIDE 10 MG: 5 TABLET ORAL at 20:55

## 2022-06-29 RX ADMIN — SEVELAMER CARBONATE 800 MG: 800 TABLET, FILM COATED ORAL at 18:55

## 2022-06-29 RX ADMIN — OXYCODONE HYDROCHLORIDE 10 MG: 5 TABLET ORAL at 14:58

## 2022-06-29 RX ADMIN — EPOETIN ALFA-EPBX 2000 UNITS: 2000 INJECTION, SOLUTION INTRAVENOUS; SUBCUTANEOUS at 09:57

## 2022-06-29 RX ADMIN — OXYCODONE HYDROCHLORIDE 5 MG: 5 TABLET ORAL at 08:27

## 2022-06-29 RX ADMIN — CYCLOBENZAPRINE 10 MG: 10 TABLET, FILM COATED ORAL at 13:34

## 2022-06-29 RX ADMIN — SERTRALINE HYDROCHLORIDE 100 MG: 100 TABLET ORAL at 09:09

## 2022-06-29 RX ADMIN — HYDROMORPHONE HYDROCHLORIDE 0.5 MG: 1 INJECTION, SOLUTION INTRAMUSCULAR; INTRAVENOUS; SUBCUTANEOUS at 20:00

## 2022-06-29 RX ADMIN — FINASTERIDE 1.25 MG: 5 TABLET, FILM COATED ORAL at 09:09

## 2022-06-29 RX ADMIN — SODIUM CHLORIDE 250 ML: 9 INJECTION, SOLUTION INTRAVENOUS at 08:56

## 2022-06-29 RX ADMIN — HYDROXYZINE HYDROCHLORIDE 25 MG: 25 TABLET, FILM COATED ORAL at 17:03

## 2022-06-29 RX ADMIN — MIDODRINE HYDROCHLORIDE 10 MG: 5 TABLET ORAL at 18:54

## 2022-06-29 RX ADMIN — HYDROMORPHONE HYDROCHLORIDE 0.5 MG: 1 INJECTION, SOLUTION INTRAMUSCULAR; INTRAVENOUS; SUBCUTANEOUS at 17:04

## 2022-06-29 RX ADMIN — CETIRIZINE HYDROCHLORIDE 10 MG: 10 TABLET, FILM COATED ORAL at 09:09

## 2022-06-29 RX ADMIN — EPOETIN ALFA-EPBX 8000 UNITS: 4000 INJECTION, SOLUTION INTRAVENOUS; SUBCUTANEOUS at 09:56

## 2022-06-29 RX ADMIN — MIDODRINE HYDROCHLORIDE 10 MG: 5 TABLET ORAL at 10:50

## 2022-06-29 RX ADMIN — HYDROMORPHONE HYDROCHLORIDE 0.5 MG: 1 INJECTION, SOLUTION INTRAMUSCULAR; INTRAVENOUS; SUBCUTANEOUS at 15:45

## 2022-06-29 RX ADMIN — GABAPENTIN 300 MG: 300 CAPSULE ORAL at 20:55

## 2022-06-29 RX ADMIN — DULOXETINE 30 MG: 30 CAPSULE, DELAYED RELEASE ORAL at 09:09

## 2022-06-29 RX ADMIN — SODIUM CHLORIDE 300 ML: 9 INJECTION, SOLUTION INTRAVENOUS at 08:57

## 2022-06-29 RX ADMIN — HYDROXYZINE HYDROCHLORIDE 25 MG: 25 TABLET, FILM COATED ORAL at 21:56

## 2022-06-29 RX ADMIN — CEFAZOLIN 1 G: 1 INJECTION, POWDER, FOR SOLUTION INTRAMUSCULAR; INTRAVENOUS at 13:34

## 2022-06-29 RX ADMIN — MIDODRINE HYDROCHLORIDE 10 MG: 5 TABLET ORAL at 09:09

## 2022-06-29 RX ADMIN — HYDROXYZINE HYDROCHLORIDE 25 MG: 25 TABLET, FILM COATED ORAL at 11:45

## 2022-06-29 RX ADMIN — HYDROMORPHONE HYDROCHLORIDE 0.5 MG: 1 INJECTION, SOLUTION INTRAMUSCULAR; INTRAVENOUS; SUBCUTANEOUS at 09:25

## 2022-06-29 ASSESSMENT — ACTIVITIES OF DAILY LIVING (ADL)
ADLS_ACUITY_SCORE: 52
ADLS_ACUITY_SCORE: 54
ADLS_ACUITY_SCORE: 49
ADLS_ACUITY_SCORE: 52
ADLS_ACUITY_SCORE: 54
ADLS_ACUITY_SCORE: 52
ADLS_ACUITY_SCORE: 52
ADLS_ACUITY_SCORE: 49
ADLS_ACUITY_SCORE: 52
ADLS_ACUITY_SCORE: 52
ADLS_ACUITY_SCORE: 49
ADLS_ACUITY_SCORE: 52

## 2022-06-29 NOTE — PROGRESS NOTES
Potassium   Date Value Ref Range Status   06/29/2022 5.3 3.4 - 5.3 mmol/L Final     Hemoglobin   Date Value Ref Range Status   06/29/2022 8.0 (L) 13.3 - 17.7 g/dL Final     Creatinine   Date Value Ref Range Status   06/29/2022 9.60 (H) 0.66 - 1.25 mg/dL Final     Urea Nitrogen   Date Value Ref Range Status   06/29/2022 61 (H) 7 - 30 mg/dL Final     Sodium   Date Value Ref Range Status   06/29/2022 131 (L) 133 - 144 mmol/L Final     INR   Date Value Ref Range Status   06/27/2022 1.07 0.85 - 1.15 Final      Latest Reference Range & Units 06/25/22 11:53   Hepatitis B Surface Antibody Negative  Negative   Hep B Surface Agn Nonreactive  Nonreactive   Hepatitis B Core Mary[place] Negative  Negative   Hepatitis B Surface Antibody <8.00 m[IU]/mL 1.29       DIALYSIS PROCEDURE NOTE  Hepatitis status of previous patient on machine log was checked and verified ok to use with this patients hepatitis status.  Patient dialyzed for 4.25 hrs. on a K2 bath with a net fluid removal of  4L.  A BFR of 450 ml/min was obtained via a LAVF using 15 gauge needles.      The treatment plan was discussed with Dr. Carr during the treatment.    Total heparin received during the treatment: 0 units.   Needle cannulation sites held x 5 min.     Meds  given: Retacrit 10,000 units  Complications: Pt. Hypotensive, sys 70s. Pt. Asymptomatic, updated  saw pt. At bedside. Stated to continue to pull fluid with sys BP in 70s if pt. Asymptomatic. Pt. Received midodrine x 3 during run.     Person educated: Patient. Knowledge base Substantial;. Barriers to learning: None. Educated on Procedure via Verbal mode. Patient verbalized understanding. Pt prefers Oral education style.     ICEBOAT? Timeout performed pre-treatment  I: Patient was identified using 2 identifiers  C:  Consent Signed Yes  E: Equipment preventative maintenance is current and dialysis delivery system OK to use  B: Hepatitis B Status: See labs above.  O: Dialysis orders present and complete  prior to treatment  A: Vascular access verified and assessed prior to treatment  T: Treatment was performed at a clinically appropriate time  ?: Patient was allowed to ask questions and address concerns prior to treatment  See Adult Hemodialysis flowsheet in EPIC for further details and post assessment.  Machine water alarm in place and functioning. Transducer pods intact and checked every 15min.   Pt dialyzed at bedside.  Chlorine/Chloramine water system checked every 4 hours.  Outpatient Dialysis at [facility] on MWF      Post treatment report given to REGIS Bertrand RN regarding 4L of fluid removed, last BP of 89/53, and patient pain rating of 8/10.     Please remove patient dressing on AVF and AVG needle sites 24 hours after dialysis. If leaking occurs please apply a Band-Aid.

## 2022-06-29 NOTE — PROGRESS NOTES
"[facility]    Medicine Progress Note - Hospitalist Service    Date of Admission:  6/24/2022    Assessment & Plan        Shay Jimenez is a very pleasant 57-year-old male with past medical history significant for end-stage renal disease, on hemodialysis, chronic peripheral neuropathy affecting his feet, history of hypertension no longer on meds, hyperlipidemia, obesity, obstructive sleep apnea, GERD and depression, among other medical problems, who presented 6/24/22 for evaluation of worsening upper back pain with associated generalized weakness and falls.  He was found to have abnormal findings on thoracic MRI concerning for diskitis with osteomyelitis with also some findings of a compressive myelopathy.      Upper back pain  Generalized weakness  Falls   Epidural phlegmon/abscess   Compressive myelopathy  S/P C6-T6 fusion/T2-3 decompression  *back pain for 6 months. CT chest 2/2022 showed concern for upper thoracic spine diskitis vs osteomyelitis. Unclear follow up from that time  *in weeks prior to admission, MRI showed possible thoracic compression fracture.   *developed progressively weak legs with frequent falls.  *on admission imaging with T2-T3 diskitis, osteomyelitis and compressive myelopathy.        appreciate Neurosurgery care    follow blood cultures, they have no growth after 4 days    ID consult requested, I appreciate Dr. Rain's evaluation recommendations    Per her, \"changes could all be from DJD, CRP mildly elevated, no fever/chills, slow intermittent progression, I am not convinced this is infection but it still could be, he has had neg. PPD in past and no TB risk factors or contacts.\"     routine gram stain culture, fungal stain and culture, AFB stain and culture and pathology sent from OR    Also per Dr. Rain, \"continue on Ancef for now as we wait on the cultures.\"      Tylenol, oxycodone and IV Dilaudid available as needed for pain.    Okay to transfer out of ICU " "today per neurosurgery      End-stage renal disease, on hemodialysis  Hypotension  Hyponatremia  *Typically dialyzes Monday, Wednesday, Friday. Missed 6/24 session due to being in ED      Nephrology consult appreciated    Dialysis per nephrology.    Blood pressures readings are soft.  Per Dr. Carr, \"okay to run and pull fluid with BP in the 70s as long as he is asymptomatic.\"    Continue scheduled midodrine    PTA sevelamer     GERD    Chronic and stable on PPI.    Anemia of chronic kidney disease    Per Dr. Carr, \"increase erythropoietin to 10 K with dialysis\"    Follow hemoglobin    Consider transfusion for hemoglobin less than 7 g/dL and symptoms      Hyperlipidemia    Chronic and stable, on statin.    Peripheral neuropathy of the feet  *Follows with Neurology.      Managed in part with duloxetine, which will be continued.    Depression    Chronic and stable on Zoloft.    Obesity, class III  Obstructive sleep apnea  *BMI this stay is 43.10.  He does not use CPAP.  Encourage diet, lifestyle modifications once above issues addressed.    History of BPH.       Diet: Advance Diet as Tolerated: Clear Liquid Diet    DVT Prophylaxis: Pneumatic Compression Devices  Oliveira Catheter: Not present  Central Lines: None  Cardiac Monitoring: None  Code Status: Full Code      Disposition Plan     Expected Discharge Date: 07/01/2022                The patient's care was discussed with the Bedside Nurse and Patient.    Eunice Wilson MD  Hospitalist Service  [facility]  Securely message with the Vocera Web Console (learn more here)  Text page via ASSET4 Paging/Directory         Clinically Significant Risk Factors Present on Admission          # Acute Kidney Injury, unspecified: based on a >150% or 0.3 mg/dL increase in creatinine on admission compared to past 90 day average, will monitor renal function              ______________________________________________________________________    Interval History " "  \"I feel like I have to cough, but I cannot bring anything up.\"  Patient is using Coggon to suction his mouth, he has a strong, nonproductive cough.  He denies nausea, says he has had some water and is keeping it down.  He says his feet were somewhat numb prior to admission, sensation is unchanged.  He can lift each leg off the bed, strength is improved.    Data reviewed today: I reviewed all medications, new labs and imaging results over the last 24 hours. I personally reviewed no images or EKG's today.    Physical Exam   Vital Signs: Temp: 97.7  F (36.5  C) Temp src: Oral BP: (!) 73/44 Pulse: 91   Resp: 18 SpO2: 94 %   Oxygen Delivery: 4 LPM  Weight: 319 lbs 10.67 oz  Constitutional: Awake, alert.  Looks much more comfortable today  Respiratory: Clear to auscultation bilaterally, no crackles or wheezing  Cardiovascular: Regular rate and rhythm, normal S1 and S2, and no murmur noted  GI: Obese, soft, bowel sounds are present  Skin/Integumen: No rash on exposed skin  Neuro: He can feel me touching his feet.  He can lift each leg off the bed.  Psych: Mood is pleasant      Data   Recent Labs   Lab 06/29/22  0918 06/29/22  0637 06/29/22  0611 06/28/22  0809 06/28/22  0434 06/28/22  0420 06/27/22  2229 06/27/22  2224 06/27/22  1859 06/27/22  1602 06/27/22  0733 06/25/22  0728 06/24/22  0818   WBC  --   --  8.4  --  7.9  --  10.9  --   --   --  7.1   < > 6.6   HGB  --   --  8.0*  --  8.9*  --  9.4* 9.9*   < >  --  10.9*   < > 10.0*   MCV  --   --  112*  --  109*  --  109*  --   --   --  107*   < > 108*   PLT  --   --  191  --  169  --  241  --   --   --  200   < > 198   INR  --   --   --   --   --   --   --   --   --  1.07  --   --   --    NA  --   --  131*  --   --  135  --  135   < >  --  132*   < > 126*   POTASSIUM  --   --  5.3  --   --  4.7  --  4.0   < >  --  4.5   < > 3.8   CHLORIDE  --   --  97  --   --  98  --   --   --   --  92*   < > 86*   CO2  --   --  23  --   --  26  --   --   --   --  27   < > 26   BUN  " --   --  61*  --   --  49*  --   --   --   --  81*   < > 39*   CR  --   --  9.60*  --   --  6.86*  --   --   --   --  10.30*   < > 8.68*   ANIONGAP  --   --  11  --   --  11  --   --   --   --  13   < > 14   MAC  --   --  8.3*  --   --  8.7  --   --   --   --  9.4   < > 9.9   GLC 83 92 116*   < >  --  153*  --   --   --   --  131*   < > 144*   ALBUMIN  --   --  2.9*  --   --   --   --   --   --   --   --   --  3.3*   PROTTOTAL  --   --   --   --   --   --   --   --   --   --   --   --  7.3   BILITOTAL  --   --   --   --   --   --   --   --   --   --   --   --  0.6   ALKPHOS  --   --   --   --   --   --   --   --   --   --   --   --  152*   ALT  --   --   --   --   --   --   --   --   --   --   --   --  55   AST  --   --   --   --   --   --   --   --   --   --   --   --  51*    < > = values in this interval not displayed.     No results found for this or any previous visit (from the past 24 hour(s)).  Medications     phenylephrine       propofol (DIPRIVAN) infusion Stopped (06/28/22 3005)     sodium chloride Stopped (06/28/22 1032)     - MEDICATION INSTRUCTIONS -         - MEDICATION INSTRUCTIONS for Dialysis Patients -   Does not apply See Admin Instructions     atorvastatin  20 mg Oral At Bedtime     ceFAZolin  1 g Intravenous Q24H     cetirizine  10 mg Oral Daily     DULoxetine  30 mg Oral Daily     finasteride  1.25 mg Oral Daily     gabapentin  300 mg Oral TID     midodrine  10 mg Oral TID w/meals     - MEDICATION INSTRUCTIONS -   Does not apply Once     pantoprazole  40 mg Oral QAM AC     sertraline  100 mg Oral Daily     sevelamer carbonate  800 mg Oral TID w/meals     sodium chloride (PF)  3 mL Intracatheter Q8H     sodium chloride (PF)  3 mL Intracatheter Q8H

## 2022-06-29 NOTE — PROGRESS NOTES
[facility]     Renal Progress Note       SHORTHAND KEY FOR MY NOTES:  c = with, s = without, p = after, a = before, x = except, asx = asymptomatic, tx = transplant or treatment, sx = symptoms or symptomatic, cx = canceled or culture, rxn = reaction, yday = yesterday, nl = normal, abx = antibiotics, fxn = function, dx = diagnosis, dz = disease, m/h = melena/hematochezia, c/d/l/ha = cramping/dizziness/lightheadedness/headache, d/c = discharge or diarrhea/constipation, f/c/n/v = fevers/chills/nausea/vomiting, cp/sob = chest pain/shortness of breath, tbv = total body volume, rxn = reaction, tdc = tunneled dialysis catheter, pta = prior to admission, hd = hemodialysis, pd = peritoneal dialysis, hhd = home hemodialysis, edw = estimated dry wt         Assessment/Plan:     1.  ESKD.  Pt is running today per a MWF schedule.  Goal is to pull 4L.  He seems to tolerate this even c the low BPs, for which he takes midodrine.  As an outpt, he has run c low BPs s probs.  A.  Ok to run and pull fluid c BP in the 70s as long as he's asx.    B.  Next HD on Friday.    2.  POD #2 s/p C6-T6 fusion / T2-3 decompression.  His arm pain has resolved for the most part.  He is getting pain meds, which seems to be helping.  A.  Plans per NSG.    3.  Anemia.  Pt's hb continues to trend lower post-op.  Today, it is down to 8.  Part of it may be dilutional, so we will check hb in the AM.  A.  Increase EPO to 10k qHD.  B.  Follow hb, clinically.  C.  Transfuse prn.    4.  Hypotension.  Pt is now on scheduled midodrine 10 mg tid.  He is tolerating the BP s probs and is alert and interactive.  A.  Ok to give another 10 mg mid-run to facilitate fluid removal.  B.  Continue scheduled midodrine.    5.  FEN.  Electrolytes are ok.  He is on a clear liquid diet and will advance today.  A.  Ok for reg diet since that is what he eats at home.        Interval History:     Pt is having less pain in the L axilla/arm today.  No f/c/n/v.  " He wants to advance his diet.  At home, he doesn't follow a restricted renal diet \"bc my labs are always ok.\"  No c/d/l/ha right now, but he did feel a little off p receiving Dilaudid.          Medications and Allergies:       - MEDICATION INSTRUCTIONS for Dialysis Patients -   Does not apply See Admin Instructions     atorvastatin  20 mg Oral At Bedtime     ceFAZolin  1 g Intravenous Q24H     cetirizine  10 mg Oral Daily     DULoxetine  30 mg Oral Daily     finasteride  1.25 mg Oral Daily     gabapentin  300 mg Oral TID     midodrine  10 mg Oral TID w/meals     - MEDICATION INSTRUCTIONS -   Does not apply Once     pantoprazole  40 mg Oral QAM AC     sertraline  100 mg Oral Daily     sevelamer carbonate  800 mg Oral TID w/meals     sodium chloride (PF)  3 mL Intracatheter Q8H     sodium chloride (PF)  3 mL Intracatheter Q8H     Allergies   Allergen Reactions     Amlodipine      Lisinopril           Physical Exam:     Vitals were reviewed     , Blood pressure (!) 80/45, pulse 85, temperature 97.7  F (36.5  C), temperature source Oral, resp. rate 14, height 1.859 m (6' 1.2\"), weight 145 kg (319 lb 10.7 oz), SpO2 96 %.  Wt Readings from Last 3 Encounters:   06/29/22 145 kg (319 lb 10.7 oz)     Intake/Output Summary (Last 24 hours) at 6/29/2022 1012  Last data filed at 6/29/2022 0600  Gross per 24 hour   Intake 453 ml   Output 290 ml   Net 163 ml     GENERAL APPEARANCE:  lying in bed, alert, NAD  HEENT:  eyes/ears/nose/neck grossly nl  RESP: CTA B, but diminished; good efforts  CV: RRR, nl S1/S2   ABDOMEN: o/s/nt/nd  EXTREMITIES/SKIN: + ble edema - chronic-appearing skin changes  ACCESS:  LAF c good bruit    Pt seen on HD.  Goal -4L UF.  He has low BPs, but is asx         Data:     CBC RESULTS:     Recent Labs   Lab 06/29/22  0611 06/28/22  0434 06/27/22  2229 06/27/22  2224 06/27/22 2006 06/27/22  1859 06/27/22  0733 06/26/22  0712 06/25/22  0728   WBC 8.4 7.9 10.9  --   --   --  7.1 7.8 10.2   RBC 2.31* 2.54* 2.65*  " --   --   --  3.13* 3.04* 3.13*   HGB 8.0* 8.9* 9.4* 9.9* 10.5* 11.2* 10.9* 10.5* 10.8*   HCT 25.8* 27.6* 28.8* 29* 31* 33* 33.5* 33.6* 32.4*    169 241  --   --   --  200 208 214     Basic Metabolic Panel:  Recent Labs   Lab 06/29/22  0918 06/29/22  0637 06/29/22  0611 06/28/22  0809 06/28/22  0420 06/27/22  2224 06/27/22 2006 06/27/22  1859 06/27/22  0733 06/26/22  0712 06/25/22  0728 06/24/22  0818   NA  --   --  131*  --  135 135 135 135 132* 133 124* 126*   POTASSIUM  --   --  5.3  --  4.7 4.0 3.8 3.9 4.5 4.6 5.1 3.8   CHLORIDE  --   --  97  --  98  --   --   --  92* 93* 86* 86*   CO2  --   --  23  --  26  --   --   --  27 30 27 26   BUN  --   --  61*  --  49*  --   --   --  81* 57* 57* 39*   CR  --   --  9.60*  --  6.86*  --   --   --  10.30* 8.46* 10.10* 8.68*   GLC 83 92 116* 139* 153*  --   --   --  131* 111* 153* 144*   MAC  --   --  8.3*  --  8.7  --   --   --  9.4 9.1 9.3 9.9     INR  Recent Labs   Lab 06/27/22  1602   INR 1.07      Attestation:   I have reviewed today's relevant vital signs, notes, medications, labs and imaging.    Dejuan Carr MD  [facility] - Nephrology  [phone]

## 2022-06-29 NOTE — PROGRESS NOTES
Chart reviewed   No new micro   On ancef to continue   Follow up on the cultures.     Ranjan Rain MD  Infectious Disease

## 2022-06-29 NOTE — PROGRESS NOTES
[facility]  Neurosurgery Daily Progress Note    Assessment & Plan   Procedure(s):  Cervical 6 to Thoracic 6 Fusion and Thoracic 2-3 Decompression   -2 Days Post-Op  Patient seen in ICU receiving dialysis. He reports incisional back pain. Able to move extremities well in bed. He has been working with PT. Hemovac drain with 120ml output overnight.     Plan:  - Continue supportive and symptomatic treatment  - PT/OT  - Continue hemovac drain   - Routine wound care   - Brace on when out of bed   - Upright XR today (with brace on)   - Continue pain control measures as needed   - Appreciate assistance from specialties     Discussed with Dr. Elvi Yeboah, CNP  [facility] Neurosurgery  [facility]  [address]  [place] [address]  Tel [phone]  Pager [phone]    Interval History   Stable     Physical Exam   Temp: 97.7  F (36.5  C) Temp src: Oral BP: (!) 76/47 Pulse: 87   Resp: 8 SpO2: 93 %   Oxygen Delivery: 4 LPM  Vitals:    06/24/22 1716 06/28/22 0400 06/29/22 0638   Weight: 149 kg (328 lb 7.8 oz) 146.6 kg (323 lb 3.1 oz) 145 kg (319 lb 10.7 oz)     Vital Signs with Ranges  Temp:  [97.7  F (36.5  C)-98.9  F (37.2  C)] 97.7  F (36.5  C)  Pulse:  [] 87  Resp:  [8-24] 8  BP: ()/(30-96) 76/47  MAP:  [67 mmHg-91 mmHg] 87 mmHg  Arterial Line BP: ()/(51-77) 121/62  SpO2:  [90 %-100 %] 93 %  I/O last 3 completed shifts:  In: 1253 [P.O.:700; I.V.:553]  Out: 300 [Urine:10; Drains:290]    Mental status:  Alert and oriented x 3, speech is fluent.  Motor:    Moves all extremities   BUE 5/5  BLE hip flexion 4+/5, knee flex/ext 4/5, DF/PF 5/5   Sensation:  Intact to light touch     Medications     - MEDICATION INSTRUCTIONS -          - MEDICATION INSTRUCTIONS for Dialysis Patients -   Does not apply See Admin Instructions     atorvastatin  20 mg Oral At Bedtime     ceFAZolin  1 g Intravenous Q24H     cetirizine  10 mg Oral Daily      DULoxetine  30 mg Oral Daily     finasteride  1.25 mg Oral Daily     gabapentin  300 mg Oral TID     midodrine  10 mg Oral TID w/meals     - MEDICATION INSTRUCTIONS -   Does not apply Once     pantoprazole  40 mg Oral QAM AC     sertraline  100 mg Oral Daily     sevelamer carbonate  800 mg Oral TID w/meals     sodium chloride (PF)  3 mL Intracatheter Q8H     sodium chloride (PF)  3 mL Intracatheter Q8H       Venessa Yeboah [facility] Neurosurgery   [address] [address]  Tel [phone]  Pager [phone]

## 2022-06-30 ENCOUNTER — APPOINTMENT (OUTPATIENT)
Dept: PHYSICAL THERAPY | Facility: CLINIC | Age: 57
DRG: 459 | End: 2022-06-30
Payer: MEDICARE

## 2022-06-30 ENCOUNTER — APPOINTMENT (OUTPATIENT)
Dept: OCCUPATIONAL THERAPY | Facility: CLINIC | Age: 57
DRG: 459 | End: 2022-06-30
Attending: INTERNAL MEDICINE
Payer: MEDICARE

## 2022-06-30 LAB
ERYTHROCYTE [DISTWIDTH] IN BLOOD BY AUTOMATED COUNT: 14 % (ref 10–15)
HCT VFR BLD AUTO: 25.6 % (ref 40–53)
HGB BLD-MCNC: 7.9 G/DL (ref 13.3–17.7)
MCH RBC QN AUTO: 33.8 PG (ref 26.5–33)
MCHC RBC AUTO-ENTMCNC: 30.9 G/DL (ref 31.5–36.5)
MCV RBC AUTO: 109 FL (ref 78–100)
PLATELET # BLD AUTO: 229 10E3/UL (ref 150–450)
POTASSIUM BLD-SCNC: 4.1 MMOL/L (ref 3.4–5.3)
RBC # BLD AUTO: 2.34 10E6/UL (ref 4.4–5.9)
WBC # BLD AUTO: 8.3 10E3/UL (ref 4–11)

## 2022-06-30 PROCEDURE — 97110 THERAPEUTIC EXERCISES: CPT | Mod: GP | Performed by: PHYSICAL THERAPIST

## 2022-06-30 PROCEDURE — 250N000013 HC RX MED GY IP 250 OP 250 PS 637: Performed by: INTERNAL MEDICINE

## 2022-06-30 PROCEDURE — 87040 BLOOD CULTURE FOR BACTERIA: CPT | Performed by: INTERNAL MEDICINE

## 2022-06-30 PROCEDURE — 120N000001 HC R&B MED SURG/OB

## 2022-06-30 PROCEDURE — 97530 THERAPEUTIC ACTIVITIES: CPT | Mod: GO

## 2022-06-30 PROCEDURE — 99232 SBSQ HOSP IP/OBS MODERATE 35: CPT | Performed by: INTERNAL MEDICINE

## 2022-06-30 PROCEDURE — 97530 THERAPEUTIC ACTIVITIES: CPT | Mod: GP | Performed by: PHYSICAL THERAPIST

## 2022-06-30 PROCEDURE — 97165 OT EVAL LOW COMPLEX 30 MIN: CPT | Mod: GO

## 2022-06-30 PROCEDURE — 97535 SELF CARE MNGMENT TRAINING: CPT | Mod: GO

## 2022-06-30 PROCEDURE — 84132 ASSAY OF SERUM POTASSIUM: CPT | Performed by: INTERNAL MEDICINE

## 2022-06-30 PROCEDURE — 85027 COMPLETE CBC AUTOMATED: CPT | Performed by: INTERNAL MEDICINE

## 2022-06-30 PROCEDURE — 99233 SBSQ HOSP IP/OBS HIGH 50: CPT | Performed by: INTERNAL MEDICINE

## 2022-06-30 PROCEDURE — 250N000011 HC RX IP 250 OP 636: Performed by: INTERNAL MEDICINE

## 2022-06-30 PROCEDURE — 36415 COLL VENOUS BLD VENIPUNCTURE: CPT | Performed by: INTERNAL MEDICINE

## 2022-06-30 RX ORDER — HYDROMORPHONE HYDROCHLORIDE 2 MG/1
2-4 TABLET ORAL
Status: DISCONTINUED | OUTPATIENT
Start: 2022-06-30 | End: 2022-07-15 | Stop reason: HOSPADM

## 2022-06-30 RX ADMIN — POLYETHYLENE GLYCOL 3350 17 G: 17 POWDER, FOR SOLUTION ORAL at 08:07

## 2022-06-30 RX ADMIN — MIDODRINE HYDROCHLORIDE 10 MG: 5 TABLET ORAL at 11:45

## 2022-06-30 RX ADMIN — MIDODRINE HYDROCHLORIDE 10 MG: 5 TABLET ORAL at 08:07

## 2022-06-30 RX ADMIN — SERTRALINE HYDROCHLORIDE 100 MG: 100 TABLET ORAL at 08:07

## 2022-06-30 RX ADMIN — PROCHLORPERAZINE MALEATE 10 MG: 10 TABLET ORAL at 08:07

## 2022-06-30 RX ADMIN — SEVELAMER CARBONATE 800 MG: 800 TABLET, FILM COATED ORAL at 13:53

## 2022-06-30 RX ADMIN — ATORVASTATIN CALCIUM 20 MG: 20 TABLET, FILM COATED ORAL at 22:11

## 2022-06-30 RX ADMIN — HYDROXYZINE HYDROCHLORIDE 25 MG: 25 TABLET, FILM COATED ORAL at 17:11

## 2022-06-30 RX ADMIN — GABAPENTIN 300 MG: 300 CAPSULE ORAL at 05:58

## 2022-06-30 RX ADMIN — HYDROMORPHONE HYDROCHLORIDE 4 MG: 2 TABLET ORAL at 13:01

## 2022-06-30 RX ADMIN — GABAPENTIN 300 MG: 300 CAPSULE ORAL at 22:11

## 2022-06-30 RX ADMIN — PANTOPRAZOLE SODIUM 40 MG: 40 TABLET, DELAYED RELEASE ORAL at 08:07

## 2022-06-30 RX ADMIN — SENNOSIDES AND DOCUSATE SODIUM 2 TABLET: 50; 8.6 TABLET ORAL at 08:07

## 2022-06-30 RX ADMIN — GABAPENTIN 300 MG: 300 CAPSULE ORAL at 13:01

## 2022-06-30 RX ADMIN — HYDROMORPHONE HYDROCHLORIDE 0.5 MG: 1 INJECTION, SOLUTION INTRAMUSCULAR; INTRAVENOUS; SUBCUTANEOUS at 02:34

## 2022-06-30 RX ADMIN — FINASTERIDE 1.25 MG: 5 TABLET, FILM COATED ORAL at 08:07

## 2022-06-30 RX ADMIN — DULOXETINE 30 MG: 30 CAPSULE, DELAYED RELEASE ORAL at 08:07

## 2022-06-30 RX ADMIN — CYCLOBENZAPRINE 10 MG: 10 TABLET, FILM COATED ORAL at 05:58

## 2022-06-30 RX ADMIN — HYDROXYZINE HYDROCHLORIDE 25 MG: 25 TABLET, FILM COATED ORAL at 05:58

## 2022-06-30 RX ADMIN — SEVELAMER CARBONATE 800 MG: 800 TABLET, FILM COATED ORAL at 18:55

## 2022-06-30 RX ADMIN — CEFAZOLIN 1 G: 1 INJECTION, POWDER, FOR SOLUTION INTRAMUSCULAR; INTRAVENOUS at 09:58

## 2022-06-30 RX ADMIN — OXYCODONE HYDROCHLORIDE 10 MG: 5 TABLET ORAL at 10:15

## 2022-06-30 RX ADMIN — CETIRIZINE HYDROCHLORIDE 10 MG: 10 TABLET, FILM COATED ORAL at 08:07

## 2022-06-30 RX ADMIN — MIDODRINE HYDROCHLORIDE 10 MG: 5 TABLET ORAL at 18:56

## 2022-06-30 RX ADMIN — HYDROMORPHONE HYDROCHLORIDE 0.3 MG: 1 INJECTION, SOLUTION INTRAMUSCULAR; INTRAVENOUS; SUBCUTANEOUS at 13:53

## 2022-06-30 RX ADMIN — HYDROMORPHONE HYDROCHLORIDE 2 MG: 2 TABLET ORAL at 17:11

## 2022-06-30 RX ADMIN — SEVELAMER CARBONATE 800 MG: 800 TABLET, FILM COATED ORAL at 08:07

## 2022-06-30 RX ADMIN — HYDROMORPHONE HYDROCHLORIDE 4 MG: 2 TABLET ORAL at 22:11

## 2022-06-30 RX ADMIN — HYDROMORPHONE HYDROCHLORIDE 0.5 MG: 1 INJECTION, SOLUTION INTRAMUSCULAR; INTRAVENOUS; SUBCUTANEOUS at 05:37

## 2022-06-30 ASSESSMENT — ACTIVITIES OF DAILY LIVING (ADL)
ADLS_ACUITY_SCORE: 52
ADLS_ACUITY_SCORE: 52
ADLS_ACUITY_SCORE: 54
ADLS_ACUITY_SCORE: 52
ADLS_ACUITY_SCORE: 54
ADLS_ACUITY_SCORE: 52
ADLS_ACUITY_SCORE: 54
ADLS_ACUITY_SCORE: 52
ADLS_ACUITY_SCORE: 54
ADLS_ACUITY_SCORE: 52

## 2022-06-30 NOTE — PROGRESS NOTES
[facility]    Neurosurgery  Daily Note    Assessment & Plan   Procedure(s):  Cervical 6 to Thoracic 6 Fusion and Thoracic 2-3 Decompression   3 Days Post-Op  Pain well controlled. Numbness pre op has improved. Feels strength in BLE is improving as well. Drain with 50ml output overnight     EXAM: XR THORACIC SPINE 2 VIEWS, XR CERVICAL SPINE 2/3 VWS  LOCATION: [facility]  DATE/TIME: 6/29/2022 5:15 PM     INDICATION: post op fusion  COMPARISON: None.  TECHNIQUE: CR Cervical and Thoracic Spine.                                                                      IMPRESSION:   On the lateral cervical spine radiographs, the soft tissues. Evaluation of the vertebrae at C5-C7.     Postsurgical changes posterior fusion from C5-T6. No hardware fracture or hardware loosening. The vertebral bodies of the cervical spine otherwise have normal stature and alignment. Anterior marginal osteophytes C4-C7. Multilevel degenerative disc   disease of the cervical spinal with, most pronounced at C3/C4.     The vertebral bodies of the thoracic spine have normal stature and alignment without evidence of compression fracture. The partially imaged lungs are unremarkable. Soft tissues unremarkable.    Plan:  -OK to transfer to floor from NSGY perspective  -Advance activity as tolerated  -Brace on when out of bed  -Continue supportive and symptomatic treatment  -Start or continue physical therapy  -Pain control measures  -Advance diet as tolerated  -Routine wound care  -Monitor drain output   -Plans reviewed with patient and Dr. 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    Discitis, unspecified spinal region    Anemia of chronic renal failure,  unspecified CKD stage      Gela Garcia PA-C    Interval History   Stable     Physical Exam   Temp: 98.2  F (36.8  C) Temp src: Oral BP: 93/58 Pulse: 80   Resp: (!) 6 SpO2: 96 % O2 Device: Nasal cannula Oxygen Delivery: 6 LPM  Vitals:    06/24/22 1716 06/28/22 0400 06/29/22 0638   Weight: 328 lb 7.8 oz (149 kg) 323 lb 3.1 oz (146.6 kg) 319 lb 10.7 oz (145 kg)     Vital Signs with Ranges  Temp:  [98.2  F (36.8  C)-98.8  F (37.1  C)] 98.2  F (36.8  C)  Pulse:  [] 80  Resp:  [6-34] 6  BP: ()/(30-79) 93/58  SpO2:  [87 %-99 %] 96 %  I/O last 3 completed shifts:  In: 120 [P.O.:120]  Out: 4085 [Drains:85; Other:4000]    Awake, alert, intact  5/5 motor strength BUE   BL hip flexion imprved compared to pre op and now 4+/5  BL knee flexion and extension 4/5   PF and DF 5/5   sensation intact to light touch with exception of bilateral feet that are numb at baseline       Medications        - MEDICATION INSTRUCTIONS for Dialysis Patients -   Does not apply See Admin Instructions     atorvastatin  20 mg Oral At Bedtime     ceFAZolin  1 g Intravenous Q24H     cetirizine  10 mg Oral Daily     DULoxetine  30 mg Oral Daily     finasteride  1.25 mg Oral Daily     gabapentin  300 mg Oral TID     midodrine  10 mg Oral TID w/meals     pantoprazole  40 mg Oral QAM AC     sertraline  100 mg Oral Daily     sevelamer carbonate  800 mg Oral TID w/meals     sodium chloride (PF)  3 mL Intracatheter Q8H     sodium chloride (PF)  3 mL Intracatheter Q8H       Plans discussed with Dr. Boles who was in agreement with plans    Gela Garcia PA-C  [facility] Neurosurgery  [address]  [address] [place] [id number]    Tel [phone]  Pager [phone]

## 2022-06-30 NOTE — PLAN OF CARE
Neuros intact. Back brace utilized while out of bed. Equal symmetrical strength. Normothermic. SR. BP is ok per Dr. Carr noted. Pt on midodrine. Working with IS. Oral pain meds started today. Tolerating a good diet/appetite. Lift utilized with therapy and goal is to be weight bearing tomorrow. All care explained with pt. Just awaiting a ortho spine bed. Will continue to monitor and progress through plan of care.

## 2022-06-30 NOTE — PROGRESS NOTES
"[facility]    Medicine Progress Note - Hospitalist Service    Date of Admission:  6/24/2022    Assessment & Plan        Shay Jimenez is a very pleasant 57-year-old male with past medical history significant for end-stage renal disease, on hemodialysis, chronic peripheral neuropathy affecting his feet, history of hypertension no longer on meds, hyperlipidemia, obesity, obstructive sleep apnea, GERD and depression, among other medical problems, who presented 6/24/22 for evaluation of worsening upper back pain with associated generalized weakness and falls.  He was found to have abnormal findings on thoracic MRI concerning for diskitis with osteomyelitis with also some findings of a compressive myelopathy.      Upper back pain  Generalized weakness  Falls   Epidural phlegmon/abscess   Compressive myelopathy  S/P C6-T6 fusion/T2-3 decompression  *back pain for 6 months. CT chest 2/2022 showed concern for upper thoracic spine diskitis vs osteomyelitis. Unclear follow up from that time  *in weeks prior to admission, MRI showed possible thoracic compression fracture.   *developed progressively weak legs with frequent falls.  *on admission imaging with T2-T3 diskitis, osteomyelitis and compressive myelopathy.        appreciate Neurosurgery care    follow blood cultures, they have no growth after 5 days    ID consult requested, I appreciate Dr. Rain's evaluation recommendations    Per her, \"changes could all be from DJD, CRP mildly elevated, no fever/chills, slow intermittent progression, I am not convinced this is infection but it still could be, he has had neg. PPD in past and no TB risk factors or contacts.\"     routine gram stain culture, fungal stain and culture, AFB stain and culture and pathology sent from OR    Also per Dr. Rain, \"continue on Ancef for now as we wait on the cultures.\"      Tylenol, oxycodone and IV Dilaudid available as needed for pain.      End-stage renal disease, " "on hemodialysis  Hypotension  Hyponatremia  *Typically dialyzes Monday, Wednesday, Friday. Missed 6/24 session due to being in ED      Nephrology consult appreciated    Dialysis per nephrology.    Blood pressures readings are soft.  Per Dr. Carr, \"okay to run and pull fluid with BP in the 70s as long as he is asymptomatic.\"    Continue scheduled midodrine    PTA sevelamer     GERD    Chronic and stable on PPI.    Anemia of chronic kidney disease    Per Dr. Carr, \"increase erythropoietin to 10 K with dialysis\"    Follow hemoglobin    Consider transfusion for hemoglobin less than 7 g/dL and symptoms      Hyperlipidemia    Chronic and stable, on statin.    Peripheral neuropathy of the feet  *Follows with Neurology.      Managed in part with duloxetine, continue    Depression    Chronic and stable on Zoloft.    Obesity, class III  Obstructive sleep apnea  *BMI this stay is 43.10.  He does not use CPAP.  Encourage diet, lifestyle modifications once above issues addressed.    History of BPH.       Diet: Regular Diet Adult    DVT Prophylaxis: Pneumatic Compression Devices  [place] Catheter: Not present  Central Lines: None  Cardiac Monitoring: None  Code Status: Full Code      Disposition Plan     Expected Discharge Date: 07/01/2022      Destination: inpatient rehabilitation facility          The patient's care was discussed with the Bedside Nurse and Patient.    Eunice Wilson MD  Hospitalist Service  [facility]  Securely message with the Vocera Web Console (learn more here)  Text page via [email or web address] Paging/Directory         Clinically Significant Risk Factors Present on Admission          # Acute Kidney Injury, unspecified: based on a >150% or 0.3 mg/dL increase in creatinine on admission compared to past 90 day average, will monitor renal function              ______________________________________________________________________    Interval History   \"They just got me up.\"  Patient is sitting in " "the recliner, he says he was transferred using a lift.  When I ask about pain he says, \"it is there.\"  He has no new respiratory or GI complaints but cannot remember when he last had a BM, says it was prior to hospital admission.    Data reviewed today: I reviewed all medications, new labs and imaging results over the last 24 hours. I personally reviewed no images or EKG's today.    Physical Exam   Vital Signs: Temp: 98.2  F (36.8  C) Temp src: Oral BP: 93/58 Pulse: 80   Resp: (!) 6 SpO2: 96 % O2 Device: Nasal cannula Oxygen Delivery: 6 LPM  Weight: 319 lbs 10.67 oz  Constitutional: Awake, alert.  Has cervical collar/TLSO on  Respiratory: Clear to auscultation bilaterally, no crackles or wheezing  Cardiovascular: Regular rate and rhythm, normal S1 and S2, and no murmur noted  GI: Obese, soft, bowel sounds are present  Skin/Integumen: No rash on exposed skin  Psych: Mood is pleasant      Data   Recent Labs   Lab 06/30/22  0530 06/29/22  0918 06/29/22  0637 06/29/22  0611 06/28/22  0809 06/28/22  0434 06/28/22  0420 06/27/22  2229 06/27/22  2224 06/27/22  1859 06/27/22  1602 06/27/22  0733 06/25/22  0728 06/24/22  0818   WBC 8.3  --   --  8.4  --  7.9  --    < >  --   --   --  7.1   < > 6.6   HGB 7.9*  --   --  8.0*  --  8.9*  --    < > 9.9*   < >  --  10.9*   < > 10.0*   *  --   --  112*  --  109*  --    < >  --   --   --  107*   < > 108*     --   --  191  --  169  --    < >  --   --   --  200   < > 198   INR  --   --   --   --   --   --   --   --   --   --  1.07  --   --   --    NA  --   --   --  131*  --   --  135  --  135   < >  --  132*   < > 126*   POTASSIUM 4.1  --   --  5.3  --   --  4.7  --  4.0   < >  --  4.5   < > 3.8   CHLORIDE  --   --   --  97  --   --  98  --   --   --   --  92*   < > 86*   CO2  --   --   --  23  --   --  26  --   --   --   --  27   < > 26   BUN  --   --   --  61*  --   --  49*  --   --   --   --  81*   < > 39*   CR  --   --   --  9.60*  --   --  6.86*  --   --   --   --  " 10.30*   < > 8.68*   ANIONGAP  --   --   --  11  --   --  11  --   --   --   --  13   < > 14   MAC  --   --   --  8.3*  --   --  8.7  --   --   --   --  9.4   < > 9.9   GLC  --  83 92 116*   < >  --  153*  --   --   --   --  131*   < > 144*   ALBUMIN  --   --   --  2.9*  --   --   --   --   --   --   --   --   --  3.3*   PROTTOTAL  --   --   --   --   --   --   --   --   --   --   --   --   --  7.3   BILITOTAL  --   --   --   --   --   --   --   --   --   --   --   --   --  0.6   ALKPHOS  --   --   --   --   --   --   --   --   --   --   --   --   --  152*   ALT  --   --   --   --   --   --   --   --   --   --   --   --   --  55   AST  --   --   --   --   --   --   --   --   --   --   --   --   --  51*    < > = values in this interval not displayed.     Recent Results (from the past 24 hour(s))   XR Cervical Spine 2/3 Views    Narrative    EXAM: XR THORACIC SPINE 2 VIEWS, XR CERVICAL SPINE 2/3 VWS  LOCATION: [facility]  DATE/TIME: 6/29/2022 5:15 PM    INDICATION: post op fusion  COMPARISON: None.  TECHNIQUE: CR Cervical and Thoracic Spine.      Impression    IMPRESSION:   On the lateral cervical spine radiographs, the soft tissues. Evaluation of the vertebrae at C5-C7.    Postsurgical changes posterior fusion from C5-T6. No hardware fracture or hardware loosening. The vertebral bodies of the cervical spine otherwise have normal stature and alignment. Anterior marginal osteophytes C4-C7. Multilevel degenerative disc   disease of the cervical spinal with, most pronounced at C3/C4.    The vertebral bodies of the thoracic spine have normal stature and alignment without evidence of compression fracture. The partially imaged lungs are unremarkable. Soft tissues unremarkable.   XR Thoracic Spine 2 Views    Narrative    EXAM: XR THORACIC SPINE 2 VIEWS, XR CERVICAL SPINE 2/3 VWS  LOCATION: [facility]  DATE/TIME: 6/29/2022 5:15 PM    INDICATION: post op fusion  COMPARISON:  None.  TECHNIQUE: CR Cervical and Thoracic Spine.      Impression    IMPRESSION:   On the lateral cervical spine radiographs, the soft tissues. Evaluation of the vertebrae at C5-C7.    Postsurgical changes posterior fusion from C5-T6. No hardware fracture or hardware loosening. The vertebral bodies of the cervical spine otherwise have normal stature and alignment. Anterior marginal osteophytes C4-C7. Multilevel degenerative disc   disease of the cervical spinal with, most pronounced at C3/C4.    The vertebral bodies of the thoracic spine have normal stature and alignment without evidence of compression fracture. The partially imaged lungs are unremarkable. Soft tissues unremarkable.     Medications       - MEDICATION INSTRUCTIONS for Dialysis Patients -   Does not apply See Admin Instructions     atorvastatin  20 mg Oral At Bedtime     ceFAZolin  1 g Intravenous Q24H     cetirizine  10 mg Oral Daily     DULoxetine  30 mg Oral Daily     finasteride  1.25 mg Oral Daily     gabapentin  300 mg Oral TID     midodrine  10 mg Oral TID w/meals     pantoprazole  40 mg Oral QAM AC     sertraline  100 mg Oral Daily     sevelamer carbonate  800 mg Oral TID w/meals     sodium chloride (PF)  3 mL Intracatheter Q8H     sodium chloride (PF)  3 mL Intracatheter Q8H

## 2022-06-30 NOTE — PROGRESS NOTES
Patient alert and oriented, HD today, MD owen of with low Bps, see Patient care order. Went for x ray, unable to obtain standing x ray at this time. Brace to be worn when OOB. Pain managed with dilaudid, oxy, flexeril, and atarax, patient states that the oxy does not work well and that the dilaudid is better. Continuing to monitor.     Carmelita Bradford RN

## 2022-06-30 NOTE — PROGRESS NOTES
[facility]     Renal Progress Note       SHORTHAND KEY FOR MY NOTES:  c = with, s = without, p = after, a = before, x = except, asx = asymptomatic, tx = transplant or treatment, sx = symptoms or symptomatic, cx = canceled or culture, rxn = reaction, yday = yesterday, nl = normal, abx = antibiotics, fxn = function, dx = diagnosis, dz = disease, m/h = melena/hematochezia, c/d/l/ha = cramping/dizziness/lightheadedness/headache, d/c = discharge or diarrhea/constipation, f/c/n/v = fevers/chills/nausea/vomiting, cp/sob = chest pain/shortness of breath, tbv = total body volume, rxn = reaction, tdc = tunneled dialysis catheter, pta = prior to admission, hd = hemodialysis, pd = peritoneal dialysis, hhd = home hemodialysis, edw = estimated dry wt         Assessment/Plan:     1.  ESKD.  Pt is due to run tmrw per a [facility] schedule.  He has done well pulling fluid c the alb prime, so we will continue it.  He has low BPs and as long as he is asx, it's fine to run him and pull fluid.  A.  HD tmrw.  Orders placed for alb prime.  B.  Ok to run and pull fluid c BP in the 70s as long as he's asx.    C.  Continue midodrine c HD.    2.  POD #3 s/p C6-T6 fusion / T2-3 decompression.  He is doing ok, clinically.    A.  Plans reviewed in NSG note.    3.  Anemia.  Pt's hb is stable vs yday, but overall trend is lower.    A.  EPO 10k qHD.  B.  Follow hb, clinically.  C.  Transfuse prn.    4.  Hypotension.  Pt is asx c BPs as low as the high 60s.  He has been running in the 70s on dialysis and we are still able to pull 4L.  As long as he is asx, the low BP should not be treated c fluids. \  A.  Continue scheduled midodrine.  B.  Give an additional 10 mg midodrine mid-run.    5.  FEN.  Electrolytes are ok.    A.  Continue reg diet.        Interval History:     Pt is doing better today.  He is not having much pain in the arm today.  He denies any c/d/l/ha c the low BPs.  Tolerated 4L UF yday even c BP in the 70s.  No  "cp/sob.  He is eating.          Medications and Allergies:       - MEDICATION INSTRUCTIONS for Dialysis Patients -   Does not apply See Admin Instructions     atorvastatin  20 mg Oral At Bedtime     ceFAZolin  1 g Intravenous Q24H     cetirizine  10 mg Oral Daily     DULoxetine  30 mg Oral Daily     finasteride  1.25 mg Oral Daily     gabapentin  300 mg Oral TID     midodrine  10 mg Oral TID w/meals     pantoprazole  40 mg Oral QAM AC     sertraline  100 mg Oral Daily     sevelamer carbonate  800 mg Oral TID w/meals     sodium chloride (PF)  3 mL Intracatheter Q8H     sodium chloride (PF)  3 mL Intracatheter Q8H     Allergies   Allergen Reactions     Amlodipine      Lisinopril           Physical Exam:     Vitals were reviewed     , Blood pressure 93/58, pulse 80, temperature 98.2  F (36.8  C), temperature source Oral, resp. rate (!) 6, height 1.859 m (6' 1.2\"), weight 145 kg (319 lb 10.7 oz), SpO2 96 %.  Wt Readings from Last 3 Encounters:   06/29/22 145 kg (319 lb 10.7 oz)     Intake/Output Summary (Last 24 hours) at 6/30/2022 1114  Last data filed at 6/30/2022 0800  Gross per 24 hour   Intake 400 ml   Output 4085 ml   Net -3685 ml     GENERAL APPEARANCE:  lying in bed, alert, NAD  HEENT:  eyes/ears/nose/neck grossly nl  RESP: CTA B, but diminished; good efforts  CV: RRR, nl S1/S2   ABDOMEN: o/s/nt/nd  EXTREMITIES/SKIN: + ble edema - chronic-appearing skin changes  ACCESS:  LAF c good bruit         Data:     CBC RESULTS:     Recent Labs   Lab 06/30/22  0530 06/29/22  0611 06/28/22  0434 06/27/22  2229 06/27/22  2224 06/27/22 2006 06/27/22  1859 06/27/22  0733 06/26/22  0712   WBC 8.3 8.4 7.9 10.9  --   --   --  7.1 7.8   RBC 2.34* 2.31* 2.54* 2.65*  --   --   --  3.13* 3.04*   HGB 7.9* 8.0* 8.9* 9.4* 9.9* 10.5*   < > 10.9* 10.5*   HCT 25.6* 25.8* 27.6* 28.8* 29* 31*   < > 33.5* 33.6*    191 169 241  --   --   --  200 208    < > = values in this interval not displayed.     Basic Metabolic Panel:  Recent Labs "   Lab 06/30/22  0530 06/29/22  0918 06/29/22  0637 06/29/22  0611 06/28/22  0809 06/28/22  0420 06/27/22  2224 06/27/22 2006 06/27/22  1859 06/27/22  0733 06/26/22  0712 06/25/22  0728 06/24/22  0818   NA  --   --   --  131*  --  135 135 135 135 132* 133 124* 126*   POTASSIUM 4.1  --   --  5.3  --  4.7 4.0 3.8 3.9 4.5 4.6 5.1 3.8   CHLORIDE  --   --   --  97  --  98  --   --   --  92* 93* 86* 86*   CO2  --   --   --  23  --  26  --   --   --  27 30 27 26   BUN  --   --   --  61*  --  49*  --   --   --  81* 57* 57* 39*   CR  --   --   --  9.60*  --  6.86*  --   --   --  10.30* 8.46* 10.10* 8.68*   GLC  --  83 92 116* 139* 153*  --   --   --  131* 111* 153* 144*   MAC  --   --   --  8.3*  --  8.7  --   --   --  9.4 9.1 9.3 9.9     INR  Recent Labs   Lab 06/27/22  1602   INR 1.07      Attestation:   I have reviewed today's relevant vital signs, notes, medications, labs and imaging.    Dejuan Carr MD  [facility] - Nephrology  [phone]

## 2022-06-30 NOTE — PROGRESS NOTES
06/30/22 1102   Quick Adds   Type of Visit Initial Occupational Therapy Evaluation   Living Environment   People in Home spouse   Current Living Arrangements apartment   Home Accessibility stairs to enter home   Living Environment Comments Pt reports his spouse is currently  not working and can assist pt at discharge. They sold their home and are temporarily staying in apartment until home ready in September   Self-Care   Usual Activity Tolerance moderate   Current Activity Tolerance fair   Regular Exercise No   Fall history within last six months yes   Number of times patient has fallen within last six months 9   Activity/Exercise/Self-Care Comment At baseline pt is independent in self-cares without gait aid. Reports apartment has tall tub/shower and standard height toilet. Reports shortly before hospitalization he was having difficulty standing in shower   Instrumental Activities of Daily Living (IADL)   IADL Comments Reports spouse does cooking, cleaning, laundry. Works from home. Have dog and cat   General Information   Onset of Illness/Injury or Date of Surgery 06/27/22   Referring Physician Ivet Anne PA-C   Patient/Family Therapy Goal Statement (OT) Improve independence   Additional Occupational Profile Info/Pertinent History of Current Problem 57-year-old male with past medical history significant for end-stage renal disease, on hemodialysis, chronic peripheral neuropathy affecting his feet, history of hypertension no longer on meds, hyperlipidemia, obesity, obstructive sleep apnea, GERD and depression, among other medical problems, who presented 6/24/22 for evaluation of worsening upper back pain with associated generalized weakness and falls.  He was found to have abnormal findings on thoracic MRI concerning for diskitis with osteomyelitis with also some findings of a compressive myelopathy. S/P C6-T6 fusion/T2-3 decompression   Existing Precautions/Restrictions fall;brace worn when out of  bed;spinal;oxygen therapy device and L/min  (LUE fistula. )   Cognitive Status Examination   Orientation Status orientation to person, place and time   Follows Commands follows two-step commands   Visual Perception   Visual Impairment/Limitations corrective lenses for reading   Sensory   Sensory Comments Baseline bilateral neuropathy in feet. L hand tingling intermittently; reports due to wrist fx in January 2022   Pain Assessment   Patient Currently in Pain Yes, see Vital Sign flowsheet   Strength Comprehensive (MMT)   Comment, General Manual Muscle Testing (MMT) Assessment Reduced gross grasp bilaterally. 3/5 MMT in hands, wrist. 2+/5 MMT in BUE elbows due to pain. Did not assess shoulder ROM due to pain   Coordination   Upper Extremity Coordination Left UE impaired;Right UE impaired   Coordination Comments Difficult to assess due to painful IV on dorsum of R wrist.   Transfers   Transfers toilet transfer   Toilet Transfer   Type (Toilet Transfer) stand-sit;sit-stand   [place] Level (Toilet Transfer) dependent (less than 25% patient effort)   Activities of Daily Living   BADL Assessment/Intervention upper body dressing;lower body dressing;grooming;feeding;toileting   Upper Body Dressing Assessment/Training   [place] Level (Upper Body Dressing) dependent (less than 25% patient effort)   Lower Body Dressing Assessment/Training   [place] Level (Lower Body Dressing) dependent (less than 25% patient effort)   Grooming Assessment/Training   [place] Level (Grooming) moderate assist (50% patient effort)   Eating/Self Feeding   [place] Level (Feeding) supervision   Toileting   [place] Level (Toileting) dependent (less than 25% patient effort)   Clinical Impression   Criteria for Skilled Therapeutic Interventions Met (OT) Yes, treatment indicated   OT Diagnosis Decline function   OT Problem List-Impairments impacting ADL activity tolerance  impaired;balance;coordination;mobility;strength;pain;post-surgical precautions;postural control;sensory feedback   Assessment of Occupational Performance 5 or more Performance Deficits   Identified Performance Deficits grooming, toileting, bathing, functional mobility, dressing   Planned Therapy Interventions (OT) ADL retraining;IADL retraining;strengthening;transfer training;progressive activity/exercise;home program guidelines   Clinical Decision Making Complexity (OT) low complexity   Anticipated Equipment Needs Upon Discharge (OT)   (TBD)   Risk & Benefits of therapy have been explained care plan/treatment goals reviewed;evaluation/treatment results reviewed;risks/benefits reviewed;current/potential barriers reviewed;participants included;participants voiced agreement with care plan;patient   OT Discharge Planning   OT Discharge Recommendation (DC Rec) Acute [facility]hab Center-Motivated patient will benefit from intensive, interdisciplinary therapy.  Anticipate will be able to tolerate 3 hours of therapy per day   OT Rationale for DC Rec Pt functioning below baseline and will benefit from continued skilled OT to maximize safety and independence   Total Evaluation Time (Minutes)   Total Evaluation Time (Minutes) 9   OT Goals   Therapy Frequency (OT) Daily   OT Predicted Duration/Target Date for Goal Attainment 07/10/22   OT Goals Lower Body Dressing;Upper Body Dressing;Toilet Transfer/Toileting;OT Goal 1   OT: Upper Body Dressing Moderate assist;within precautions   OT: Lower Body Dressing Moderate assist;within precautions;using adaptive equipment   OT: Toilet Transfer/Toileting Moderate assist;toilet transfer;using adaptive equipment;within precautions   OT: Goal 1 Pt will be independent in at least 5 BUE exercises to enhance BUE endurance and coordination needed for self-care tasks

## 2022-06-30 NOTE — CONSULTS
Care Management Initial Consult    General Information  Assessment completed with: Patient,    Type of CM/SW Visit: Initial Assessment    Primary Care Provider verified and updated as needed:     Readmission within the last 30 days:        Reason for Consult: discharge planning  Advance Care Planning: Advance Care Planning Reviewed: no concerns identified          Communication Assessment  Patient's communication style: spoken language (English or Bilingual)    Hearing Difficulty or Deaf: no   Wear Glasses or Blind: yes    Cognitive  Cognitive/Neuro/Behavioral: WDL  Level of Consciousness: alert  Arousal Level: opens eyes spontaneously  Orientation: oriented x 4  Mood/Behavior: calm, cooperative  Best Language: 0 - No aphasia  Speech: clear, logical    Living Environment:   People in home: spouse     Current living Arrangements: house      Able to return to prior arrangements: other (see comments) (ARU v TCU)       Family/Social Support:  Care provided by: self  Provides care for: pet(s)  Marital Status:   Wife, Children  Tevin       Description of Support System: Supportive, Involved    Support Assessment: Adequate family and caregiver support, Adequate social supports    Current Resources:   Patient receiving home care services: No     Community Resources:    Equipment currently used at home: walker, rolling  Supplies currently used at home:      Employment/Financial:  Employment Status:          Financial Concerns: No concerns identified           Lifestyle & Psychosocial Needs:  Social Determinants of Health     Tobacco Use: Not on file   Alcohol Use: Not on file   Financial Resource Strain: Not on file   Food Insecurity: Not on file   Transportation Needs: Not on file   Physical Activity: Not on file   Stress: Not on file   Social Connections: Not on file   Intimate Partner Violence: Not on file   Depression: Not on file   Housing Stability: Not on file       Functional Status:  Prior to admission  patient needed assistance:              Mental Health Status:  Mental Health Status: No Current Concerns       Chemical Dependency Status:  Chemical Dependency Status: Other (see comment) (see spiritual health note)             Values/Beliefs:  Spiritual, Cultural Beliefs, Confucianism Practices, Values that affect care: no  Description of Beliefs that Will Affect Care:             Additional Information:  Shay is in the ICU for thoracic spine infection, back pain, vertebral collapse. Writer met with pt at bedside. He is A&O, though appears sleepy. Introduced self and role. Writer explained that per PT note from 6/28, ARU is being recommended at discharge. Shay expressed that he was interested in hearing more about this. Writer provided education about the differences b/w ARU and TCU. Shay indicates that his family would be supportive of either situation at discharge, and would not have any problem visiting him in the city. Gave writer permission to reach out to his wife, Tevin, to update her about possible discharge plans. Writer noted that he will have PT/OT today at 11:00 AM.   Shay lives in a house with his wife. They have a cat and a dog. He has two grown sons who have been supportive and visited him during his hospital stay. He reports he wants to rehab and get back to his baseline.   Writer spoke to [place] at [facility]. He will review chart and follow Shay.    Writer will reach out to Tevin today. She is visiting family in [place]. CC team will follow Shay for further therapy recommendations and assist with discharge PRN.    Jo-Ann Maher RN   Inpatient Care Coordinator

## 2022-06-30 NOTE — PROGRESS NOTES
[facility]    Infectious Disease Progress Note    Date of Service (when I saw the patient): 06/30/2022     Assessment & Plan   Shay Jimenez is a 57 year old male who was admitted on 6/24/2022.     ASSESSMENT:  1. T2-T3 COLLAPSE, PHLEGMON, ENDPLATE EROSION, DEGENERATIVE CHANGES-possible discitis/vertebral osteomyelitis, changes could all be from DJD, CRP mildly elevated, no fever/chills, slow intermittent progression, I am not convinced this is infection but it still could be, he has had neg. PPD in past and no TB risk factors or contacts  2. DJD  3. ESRD  4. OBESITY  5. ETOH DEPENDENCE, ABUSE  6. NEUROPATHY  7. GINI  .     RECOMMENDATION  1. S/p:  6/27,Cervical 6 to Thoracic 6 Fusion and Thoracic 2-3 Decompression, operative report noted for DJD changes,  Pending Cxs for routine cx/gs, fungus Cx and stain, AFB Cx and stain and pathology, discussed with lab   2 F/u pending cultures.   3. Continue on Ancef for now as we wait on the cultures though based on OR report low suspicion for infec         Ranjan Rain MD    Interval History   Afebrile   No new rashes or issues   Labs reviewed   In ICU post surgery     Physical Exam   Temp: 98.2  F (36.8  C) Temp src: Oral BP: 93/58 Pulse: 80   Resp: (!) 6 SpO2: 96 % O2 Device: Nasal cannula Oxygen Delivery: 6 LPM  Vitals:    06/24/22 1716 06/28/22 0400 06/29/22 0638   Weight: 149 kg (328 lb 7.8 oz) 146.6 kg (323 lb 3.1 oz) 145 kg (319 lb 10.7 oz)     Vital Signs with Ranges  Temp:  [98.2  F (36.8  C)-98.8  F (37.1  C)] 98.2  F (36.8  C)  Pulse:  [] 80  Resp:  [6-34] 6  BP: ()/(30-79) 93/58  SpO2:  [87 %-99 %] 96 %    Constitutional: Awake  Lungs: Clear to auscultation bilaterally, no crackles or wheezing  Cardiovascular: Regular rate and rhythm, normal S1 and S2, and no murmur noted  Abdomen: Normal bowel sounds, soft, non-distended, non-tender  Skin: No rashes, no cyanosis, no edema  Other:    Medications      - MEDICATION  INSTRUCTIONS for Dialysis Patients -   Does not apply See Admin Instructions    atorvastatin  20 mg Oral At Bedtime    ceFAZolin  1 g Intravenous Q24H    cetirizine  10 mg Oral Daily    DULoxetine  30 mg Oral Daily    finasteride  1.25 mg Oral Daily    gabapentin  300 mg Oral TID    midodrine  10 mg Oral TID w/meals    pantoprazole  40 mg Oral QAM AC    sertraline  100 mg Oral Daily    sevelamer carbonate  800 mg Oral TID w/meals    sodium chloride (PF)  3 mL Intracatheter Q8H    sodium chloride (PF)  3 mL Intracatheter Q8H       Data   All microbiology laboratory data reviewed.  Recent Labs   Lab Test 06/30/22  0530 06/29/22  0611 06/28/22  0434   WBC 8.3 8.4 7.9   HGB 7.9* 8.0* 8.9*   HCT 25.6* 25.8* 27.6*   * 112* 109*    191 169     Recent Labs   Lab Test 06/29/22  0611 06/28/22  0420 06/27/22  0733   CR 9.60* 6.86* 10.30*     No lab results found.  No lab results found.    Invalid input(s): TRU

## 2022-06-30 NOTE — PLAN OF CARE
ICU transfer, post-op day 1 of C6-T6 fusion and T2-T6 decompression. Alert and oriented, up with assist of 2, regular diet,Oral and IV Dlaudid for pain control, was 4L of oxygen wean down to 11/2 liters.

## 2022-07-01 ENCOUNTER — APPOINTMENT (OUTPATIENT)
Dept: PHYSICAL THERAPY | Facility: CLINIC | Age: 57
DRG: 459 | End: 2022-07-01
Payer: MEDICARE

## 2022-07-01 LAB
ANION GAP SERPL CALCULATED.3IONS-SCNC: 13 MMOL/L (ref 3–14)
BACTERIA BLD CULT: NO GROWTH
BUN SERPL-MCNC: 63 MG/DL (ref 7–30)
CALCIUM SERPL-MCNC: 9.1 MG/DL (ref 8.5–10.1)
CHLORIDE BLD-SCNC: 96 MMOL/L (ref 94–109)
CO2 SERPL-SCNC: 27 MMOL/L (ref 20–32)
CREAT SERPL-MCNC: 8.6 MG/DL (ref 0.66–1.25)
ERYTHROCYTE [DISTWIDTH] IN BLOOD BY AUTOMATED COUNT: 14.1 % (ref 10–15)
GFR SERPL CREATININE-BSD FRML MDRD: 7 ML/MIN/1.73M2
GLUCOSE BLD-MCNC: 102 MG/DL (ref 70–99)
HCT VFR BLD AUTO: 24.3 % (ref 40–53)
HGB BLD-MCNC: 7.5 G/DL (ref 13.3–17.7)
MCH RBC QN AUTO: 34.1 PG (ref 26.5–33)
MCHC RBC AUTO-ENTMCNC: 30.9 G/DL (ref 31.5–36.5)
MCV RBC AUTO: 111 FL (ref 78–100)
PLATELET # BLD AUTO: 260 10E3/UL (ref 150–450)
POTASSIUM BLD-SCNC: 4.2 MMOL/L (ref 3.4–5.3)
RBC # BLD AUTO: 2.2 10E6/UL (ref 4.4–5.9)
SODIUM SERPL-SCNC: 136 MMOL/L (ref 133–144)
WBC # BLD AUTO: 7.2 10E3/UL (ref 4–11)

## 2022-07-01 PROCEDURE — P9041 ALBUMIN (HUMAN),5%, 50ML: HCPCS | Performed by: INTERNAL MEDICINE

## 2022-07-01 PROCEDURE — 250N000013 HC RX MED GY IP 250 OP 250 PS 637: Performed by: INTERNAL MEDICINE

## 2022-07-01 PROCEDURE — 90937 HEMODIALYSIS REPEATED EVAL: CPT

## 2022-07-01 PROCEDURE — 634N000001 HC RX 634: Performed by: INTERNAL MEDICINE

## 2022-07-01 PROCEDURE — 97530 THERAPEUTIC ACTIVITIES: CPT | Mod: GP

## 2022-07-01 PROCEDURE — 258N000003 HC RX IP 258 OP 636: Performed by: INTERNAL MEDICINE

## 2022-07-01 PROCEDURE — 36415 COLL VENOUS BLD VENIPUNCTURE: CPT | Performed by: INTERNAL MEDICINE

## 2022-07-01 PROCEDURE — 250N000011 HC RX IP 250 OP 636: Performed by: INTERNAL MEDICINE

## 2022-07-01 PROCEDURE — 87040 BLOOD CULTURE FOR BACTERIA: CPT | Performed by: INTERNAL MEDICINE

## 2022-07-01 PROCEDURE — 85027 COMPLETE CBC AUTOMATED: CPT | Performed by: INTERNAL MEDICINE

## 2022-07-01 PROCEDURE — 80048 BASIC METABOLIC PNL TOTAL CA: CPT | Performed by: INTERNAL MEDICINE

## 2022-07-01 PROCEDURE — 120N000001 HC R&B MED SURG/OB

## 2022-07-01 PROCEDURE — 90935 HEMODIALYSIS ONE EVALUATION: CPT | Performed by: INTERNAL MEDICINE

## 2022-07-01 PROCEDURE — 99232 SBSQ HOSP IP/OBS MODERATE 35: CPT | Performed by: INTERNAL MEDICINE

## 2022-07-01 RX ORDER — AMOXICILLIN 250 MG
1 CAPSULE ORAL 2 TIMES DAILY
Status: DISCONTINUED | OUTPATIENT
Start: 2022-07-01 | End: 2022-07-15 | Stop reason: HOSPADM

## 2022-07-01 RX ORDER — ALBUMIN, HUMAN INJ 5% 5 %
250 SOLUTION INTRAVENOUS
Status: COMPLETED | OUTPATIENT
Start: 2022-07-01 | End: 2022-07-01

## 2022-07-01 RX ORDER — AMOXICILLIN 250 MG
2 CAPSULE ORAL 2 TIMES DAILY
Status: DISCONTINUED | OUTPATIENT
Start: 2022-07-01 | End: 2022-07-15 | Stop reason: HOSPADM

## 2022-07-01 RX ORDER — ALBUMIN, HUMAN INJ 5% 5 %
50-250 SOLUTION INTRAVENOUS
Status: DISCONTINUED | OUTPATIENT
Start: 2022-07-01 | End: 2022-07-01

## 2022-07-01 RX ORDER — ALBUMIN (HUMAN) 12.5 G/50ML
50 SOLUTION INTRAVENOUS
Status: DISCONTINUED | OUTPATIENT
Start: 2022-07-01 | End: 2022-07-01

## 2022-07-01 RX ORDER — POLYETHYLENE GLYCOL 3350 17 G/17G
17 POWDER, FOR SOLUTION ORAL DAILY
Status: DISCONTINUED | OUTPATIENT
Start: 2022-07-01 | End: 2022-07-15 | Stop reason: HOSPADM

## 2022-07-01 RX ADMIN — SODIUM CHLORIDE 300 ML: 9 INJECTION, SOLUTION INTRAVENOUS at 16:26

## 2022-07-01 RX ADMIN — ALBUMIN HUMAN 250 ML: 0.05 INJECTION, SOLUTION INTRAVENOUS at 16:26

## 2022-07-01 RX ADMIN — SEVELAMER CARBONATE 800 MG: 800 TABLET, FILM COATED ORAL at 13:31

## 2022-07-01 RX ADMIN — CETIRIZINE HYDROCHLORIDE 10 MG: 10 TABLET, FILM COATED ORAL at 09:18

## 2022-07-01 RX ADMIN — CYCLOBENZAPRINE 10 MG: 10 TABLET, FILM COATED ORAL at 00:43

## 2022-07-01 RX ADMIN — MIDODRINE HYDROCHLORIDE 10 MG: 5 TABLET ORAL at 16:44

## 2022-07-01 RX ADMIN — DULOXETINE 30 MG: 30 CAPSULE, DELAYED RELEASE ORAL at 09:18

## 2022-07-01 RX ADMIN — MIDODRINE HYDROCHLORIDE 10 MG: 5 TABLET ORAL at 09:18

## 2022-07-01 RX ADMIN — MIDODRINE HYDROCHLORIDE 10 MG: 5 TABLET ORAL at 22:20

## 2022-07-01 RX ADMIN — HYDROMORPHONE HYDROCHLORIDE 4 MG: 2 TABLET ORAL at 13:31

## 2022-07-01 RX ADMIN — CEFAZOLIN 1 G: 1 INJECTION, POWDER, FOR SOLUTION INTRAMUSCULAR; INTRAVENOUS at 18:52

## 2022-07-01 RX ADMIN — GABAPENTIN 300 MG: 300 CAPSULE ORAL at 21:35

## 2022-07-01 RX ADMIN — HYDROXYZINE HYDROCHLORIDE 25 MG: 25 TABLET, FILM COATED ORAL at 13:31

## 2022-07-01 RX ADMIN — HYDROMORPHONE HYDROCHLORIDE 4 MG: 2 TABLET ORAL at 16:45

## 2022-07-01 RX ADMIN — PANTOPRAZOLE SODIUM 40 MG: 40 TABLET, DELAYED RELEASE ORAL at 06:37

## 2022-07-01 RX ADMIN — HYDROXYZINE HYDROCHLORIDE 25 MG: 25 TABLET, FILM COATED ORAL at 00:43

## 2022-07-01 RX ADMIN — MIDODRINE HYDROCHLORIDE 10 MG: 5 TABLET ORAL at 13:31

## 2022-07-01 RX ADMIN — FINASTERIDE 1.25 MG: 5 TABLET, FILM COATED ORAL at 09:18

## 2022-07-01 RX ADMIN — EPOETIN ALFA-EPBX 10000 UNITS: 10000 INJECTION, SOLUTION INTRAVENOUS; SUBCUTANEOUS at 16:27

## 2022-07-01 RX ADMIN — SENNOSIDES AND DOCUSATE SODIUM 2 TABLET: 50; 8.6 TABLET ORAL at 21:34

## 2022-07-01 RX ADMIN — SERTRALINE HYDROCHLORIDE 100 MG: 100 TABLET ORAL at 09:18

## 2022-07-01 RX ADMIN — HYDROMORPHONE HYDROCHLORIDE 4 MG: 2 TABLET ORAL at 09:05

## 2022-07-01 RX ADMIN — Medication: at 16:26

## 2022-07-01 RX ADMIN — GABAPENTIN 300 MG: 300 CAPSULE ORAL at 06:37

## 2022-07-01 RX ADMIN — ATORVASTATIN CALCIUM 20 MG: 20 TABLET, FILM COATED ORAL at 21:34

## 2022-07-01 ASSESSMENT — ACTIVITIES OF DAILY LIVING (ADL)
ADLS_ACUITY_SCORE: 54

## 2022-07-01 NOTE — PROGRESS NOTES
[facility]    Infectious Disease Progress Note    Date of Service (when I saw the patient): 07/01/2022     Assessment & Plan   Shay Jimenez is a 57 year old male who was admitted on 6/24/2022.     ASSESSMENT:  1. T2-T3 COLLAPSE, PHLEGMON, ENDPLATE EROSION, DEGENERATIVE CHANGES-possible discitis/vertebral osteomyelitis, changes could all be from DJD, CRP mildly elevated, no fever/chills, slow intermittent progression, I am not convinced this is infection but it still could be, he has had neg. PPD in past and no TB risk factors or contacts  2. DJD  3. ESRD  4. OBESITY  5. ETOH DEPENDENCE, ABUSE  6. NEUROPATHY  7. GINI  .     RECOMMENDATION  1. S/p:  6/27,Cervical 6 to Thoracic 6 Fusion and Thoracic 2-3 Decompression, operative report noted for DJD changes,  Pending Cxs for routine cx/gs, fungus Cx and stain, AFB Cx and stain and pathology,    2 F/u pending cultures. So far No growth     If ready for discharge ok to discharge off antibiotics   I will follow up on the cultures for any late growth and start antimicrobials if and when needed.          Ranjan Rain MD    Interval History   Afebrile   No new rashes or issues   Labs reviewed   In ICU post surgery     Physical Exam   Temp: 97.6  F (36.4  C) Temp src: Oral BP: 91/62 Pulse: 86   Resp: 16 SpO2: 92 % O2 Device: None (Room air) Oxygen Delivery: 1.5 LPM  Vitals:    06/24/22 1716 06/28/22 0400 06/29/22 0638   Weight: 149 kg (328 lb 7.8 oz) 146.6 kg (323 lb 3.1 oz) 145 kg (319 lb 10.7 oz)     Vital Signs with Ranges  Temp:  [97.5  F (36.4  C)-98.6  F (37  C)] 97.6  F (36.4  C)  Pulse:  [84-90] 86  Resp:  [16-28] 16  BP: ()/(50-72) 91/62  SpO2:  [92 %-98 %] 92 %    Constitutional: Awake  Lungs: Clear to auscultation bilaterally, no crackles or wheezing  Cardiovascular: Regular rate and rhythm, normal S1 and S2, and no murmur noted  Abdomen: Normal bowel sounds, soft, non-distended, non-tender  Skin: No rashes, no cyanosis, no  edema  Other:    Medications       - MEDICATION INSTRUCTIONS for Dialysis Patients -   Does not apply See Admin Instructions     atorvastatin  20 mg Oral At Bedtime     ceFAZolin  1 g Intravenous Q24H     cetirizine  10 mg Oral Daily     DULoxetine  30 mg Oral Daily     finasteride  1.25 mg Oral Daily     gabapentin  300 mg Oral TID     midodrine  10 mg Oral TID w/meals     pantoprazole  40 mg Oral QAM AC     senna-docusate  1 tablet Oral BID    Or     senna-docusate  2 tablet Oral BID     sertraline  100 mg Oral Daily     sevelamer carbonate  800 mg Oral TID w/meals     sodium chloride (PF)  3 mL Intracatheter Q8H     sodium chloride (PF)  3 mL Intracatheter Q8H       Data   All microbiology laboratory data reviewed.  Recent Labs   Lab Test 07/01/22  0613 06/30/22  0530 06/29/22  0611   WBC 7.2 8.3 8.4   HGB 7.5* 7.9* 8.0*   HCT 24.3* 25.6* 25.8*   * 109* 112*    229 191     Recent Labs   Lab Test 07/01/22  0613 06/29/22  0611 06/28/22  0420   CR 8.60* 9.60* 6.86*     No lab results found.  No lab results found.    Invalid input(s): TRU

## 2022-07-01 NOTE — PROGRESS NOTES
Potassium   Date Value Ref Range Status   07/01/2022 4.2 3.4 - 5.3 mmol/L Final     Hemoglobin   Date Value Ref Range Status   07/01/2022 7.5 (L) 13.3 - 17.7 g/dL Final     Creatinine   Date Value Ref Range Status   07/01/2022 8.60 (H) 0.66 - 1.25 mg/dL Final     Urea Nitrogen   Date Value Ref Range Status   07/01/2022 63 (H) 7 - 30 mg/dL Final     Sodium   Date Value Ref Range Status   07/01/2022 136 133 - 144 mmol/L Final     INR   Date Value Ref Range Status   06/27/2022 1.07 0.85 - 1.15 Final      Latest Reference Range & Units 06/25/22 11:53   Hep B Surface Agn Nonreactive  Nonreactive   Hepatitis B Core Marysol Negative  Negative   Hepatitis B Surface Antibody <8.00 m[IU]/mL 1.29     DIALYSIS PROCEDURE NOTE  Hepatitis status of previous patient on machine log was checked and verified ok to use with this patients hepatitis status.  Patient dialyzed for 3 hrs 11 min. on a K3 bath with a net fluid removal of  3.6L.  A BFR of 400 ml/min was obtained via a left upper-arm AV fistula using 15 gauge needles.      The treatment plan was discussed with Dr. Carr during the treatment.    Total heparin received during the treatment: 0 units.   Needle cannulation sites held x 5 min.   Meds  given: epogen, 5% albumin prime, mid-run midodrine per resource RN     Complications: Pt stated he felt the urge to have a bowel movement ~2.5 hours into treatment- explained to patient that the only safe option to have a BM during dialysis is on a bedpan (he is A2 and has been using a lift for transfers according to notes) pt agreed to attempt using a bedpan. Pt did not have success with bedpan and requested to end treatment early d/t overall discomfort. MD notified.     Person educated: patient. Knowledge base substantial. Barriers to learning: none. Educated on procedure via verbal mode. patient verbalized understanding. Pt prefers verbal education style.     ICEBOAT? Timeout performed pre-treatment  I: Patient was identified using 2  identifiers  C:  Consent Signed Yes  E: Equipment preventative maintenance is current and dialysis delivery system OK to use  B: Hepatitis B Surface Antigen: see above  O: Dialysis orders present and complete prior to treatment  A: Vascular access verified and assessed prior to treatment  T: Treatment was performed at a clinically appropriate time  ?: Patient was allowed to ask questions and address concerns prior to treatment  See Adult Hemodialysis flowsheet in EPIC for further details and post assessment.  Machine water alarm in place and functioning. Transducer pods intact and checked every 15min.   Pt returned via bed.  Chlorine/Chloramine water system checked every 4 hours.  Outpatient Dialysis at [facility].      Post treatment report given to STEFANIE Manuel RN regarding 3.6L of fluid removed, last BP of 90/47, and patient pain rating of 4/10.     Please remove patient dressing on AVF and AVG needle sites 24 hours after dialysis. If leaking occurs please apply a Band-Aid.

## 2022-07-01 NOTE — PLAN OF CARE
Pt A&Ox3-4, forgetful at times. Hypotensive, asymptomatic; Midodrine per schedule. PO Dilaudid/ Atarax for incisional pain. N/T to BLE. Hemovac removed. Dressing replaced, surgical site wdl. Up to chair w/ lift this afternoon. Minimal appetite. Dialysis this afternoon, Rocephin/ gabapentin to be administered post dialysis. PT/OT following.

## 2022-07-01 NOTE — PROGRESS NOTES
[facility]     Neurosurgery  Daily Note        Assessment & Plan     Procedure(s):  Cervical 6 to Thoracic 6 Fusion and Thoracic 2-3 Decompression   4 Days Post-Op  Pain well controlled. Numbness pre op has improved. Feels strength in BLE is improving as well. Drain with 25ml output overnight      EXAM: XR THORACIC SPINE 2 VIEWS, XR CERVICAL SPINE 2/3 VWS  LOCATION: [facility]  DATE/TIME: 6/29/2022 5:15 PM     INDICATION: post op fusion  COMPARISON: None.  TECHNIQUE: CR Cervical and Thoracic Spine.                                                                      IMPRESSION:   On the lateral cervical spine radiographs, the soft tissues. Evaluation of the vertebrae at C5-C7.     Postsurgical changes posterior fusion from C5-T6. No hardware fracture or hardware loosening. The vertebral bodies of the cervical spine otherwise have normal stature and alignment. Anterior marginal osteophytes C4-C7. Multilevel degenerative disc   disease of the cervical spinal with, most pronounced at C3/C4.     The vertebral bodies of the thoracic spine have normal stature and alignment without evidence of compression fracture. The partially imaged lungs are unremarkable. Soft tissues unremarkable.     Plan:  -Okay to remove drain.  -Advance activity as tolerated  -Brace on when out of bed  -Continue supportive and symptomatic treatment  -Start or continue physical therapy  -Pain control measures  -Advance diet as tolerated  -Routine wound care  -Call with questions  -Plans reviewed with Dr. Boles.

## 2022-07-01 NOTE — PROGRESS NOTES
"[facility]    Medicine Progress Note - Hospitalist Service    Date of Admission:  6/24/2022    Assessment & Plan        Shay Jimenez is a very pleasant 57-year-old male with past medical history significant for end-stage renal disease, on hemodialysis, chronic peripheral neuropathy affecting his feet, history of hypertension no longer on meds, hyperlipidemia, obesity, obstructive sleep apnea, GERD and depression, among other medical problems, who presented 6/24/22 for evaluation of worsening upper back pain with associated generalized weakness and falls.  He was found to have abnormal findings on thoracic MRI concerning for diskitis with osteomyelitis with also some findings of a compressive myelopathy.      Upper back pain  Generalized weakness  Falls   Epidural phlegmon/abscess   Compressive myelopathy  S/P C6-T6 fusion/T2-3 decompression  *back pain for 6 months. CT chest 2/2022 showed concern for upper thoracic spine diskitis vs osteomyelitis. Unclear follow up from that time  *in weeks prior to admission, MRI showed possible thoracic compression fracture.   *developed progressively weak legs with frequent falls.  *on admission imaging with T2-T3 diskitis, osteomyelitis and compressive myelopathy.        appreciate Neurosurgery care    follow blood cultures, they have no growth after 5 days    ID consult requested, I appreciate Dr. Rain's evaluation recommendations    Per her, \"changes could all be from DJD, CRP mildly elevated, no fever/chills, slow intermittent progression, I am not convinced this is infection but it still could be, he has had neg. PPD in past and no TB risk factors or contacts.\"     routine gram stain culture, fungal stain and culture, AFB stain and culture and pathology sent from OR    Also per Dr. Rain, \"continue on Ancef for now as we wait on the cultures.\"      Oxycodone \"didn't work at all\" for patient, was discontinued. Tylenol and oral Dilaudid " "available as needed for pain.      End-stage renal disease, on hemodialysis  Hypotension  Hyponatremia  *Typically dialyzes Monday, Wednesday, Friday. Missed 6/24 session due to being in ED      Nephrology consult appreciated    Dialysis per nephrology.    Blood pressures readings are soft.  Per Dr. Carr, \"okay to run and pull fluid with BP in the 70s as long as he is asymptomatic.\"    Continue scheduled midodrine    PTA sevelamer     GERD    Chronic and stable on PPI.    Anemia of chronic kidney disease    Per Dr. Carr, \"increase erythropoietin to 10K with dialysis\"    Follow hemoglobin    Consider transfusion for hemoglobin less than 7 g/dL and symptoms      Hyperlipidemia    Chronic and stable, on statin.    Peripheral neuropathy of the feet  *Follows with Neurology.      Managed in part with duloxetine, continue    Depression    Chronic and stable on Zoloft.    Obesity, class III  Obstructive sleep apnea  *BMI this stay is 43.10.  He does not use CPAP.  Encourage diet, lifestyle modifications once above issues addressed.    History of BPH.       Diet: Regular Diet Adult    DVT Prophylaxis: Pneumatic Compression Devices  Oliveira Catheter: Not present  Central Lines: None  Cardiac Monitoring: None  Code Status: Full Code      Disposition Plan     Expected Discharge Date: 07/04/2022      Destination: inpatient rehabilitation facility          The patient's care was discussed with the Bedside Nurse and Patient.    Eunice Wilson MD  Hospitalist Service  [facility]  Securely message with the Vocera Web Console (learn more here)  Text page via [email or web address] Paging/Directory         Clinically Significant Risk Factors Present on Admission                     _______________________________________________________________    Interval History   \"I have kind of an ache.\"  Patient is sitting up in the chair, he says his whole back aches but pain is relatively well controlled.  He says he gets better pain " relief with Dilaudid and oxycodone.  He has not been able to stand or bear weight on his legs yet.  He has no new respiratory or GI complaints.    Data reviewed today: I reviewed all medications, new labs and imaging results over the last 24 hours. I personally reviewed no images or EKG's today.    Physical Exam   Vital Signs: Temp: 97.6  F (36.4  C) Temp src: Oral BP: 91/62 Pulse: 86   Resp: 16 SpO2: 92 % O2 Device: None (Room air) Oxygen Delivery: 1.5 LPM  Weight: 319 lbs 10.67 oz  Constitutional: Awake, alert.  Has cervical collar/TLSO on  Respiratory: Clear to auscultation bilaterally, no crackles or wheezing  Cardiovascular: Regular rate and rhythm, normal S1 and S2, and no murmur noted  GI: Obese, soft, bowel sounds are present  Skin/Integumen: No rash on exposed skin  Psych: Mood is pleasant      Data   Recent Labs   Lab 07/01/22  0613 06/30/22  0530 06/29/22  0918 06/29/22  0637 06/29/22  0611 06/28/22  0434 06/28/22  0420 06/27/22  1859 06/27/22  1602   WBC 7.2 8.3  --   --  8.4   < >  --    < >  --    HGB 7.5* 7.9*  --   --  8.0*   < >  --    < >  --    * 109*  --   --  112*   < >  --    < >  --     229  --   --  191   < >  --    < >  --    INR  --   --   --   --   --   --   --   --  1.07     --   --   --  131*  --  135   < >  --    POTASSIUM 4.2 4.1  --   --  5.3  --  4.7   < >  --    CHLORIDE 96  --   --   --  97  --  98  --   --    CO2 27  --   --   --  23  --  26  --   --    BUN 63*  --   --   --  61*  --  49*  --   --    CR 8.60*  --   --   --  9.60*  --  6.86*  --   --    ANIONGAP 13  --   --   --  11  --  11  --   --    MAC 9.1  --   --   --  8.3*  --  8.7  --   --    *  --  83 92 116*   < > 153*  --   --    ALBUMIN  --   --   --   --  2.9*  --   --   --   --     < > = values in this interval not displayed.     No results found for this or any previous visit (from the past 24 hour(s)).  Medications       - MEDICATION INSTRUCTIONS for Dialysis Patients -   Does not apply  See Admin Instructions     atorvastatin  20 mg Oral At Bedtime     ceFAZolin  1 g Intravenous Q24H     cetirizine  10 mg Oral Daily     DULoxetine  30 mg Oral Daily     finasteride  1.25 mg Oral Daily     gabapentin  300 mg Oral TID     midodrine  10 mg Oral TID w/meals     pantoprazole  40 mg Oral QAM AC     sertraline  100 mg Oral Daily     sevelamer carbonate  800 mg Oral TID w/meals     sodium chloride (PF)  3 mL Intracatheter Q8H     sodium chloride (PF)  3 mL Intracatheter Q8H

## 2022-07-01 NOTE — PROGRESS NOTES
[facility]     Renal Progress Note       SHORTHAND KEY FOR MY NOTES:  c = with, s = without, p = after, a = before, x = except, asx = asymptomatic, tx = transplant or treatment, sx = symptoms or symptomatic, cx = canceled or culture, rxn = reaction, yday = yesterday, nl = normal, abx = antibiotics, fxn = function, dx = diagnosis, dz = disease, m/h = melena/hematochezia, c/d/l/ha = cramping/dizziness/lightheadedness/headache, d/c = discharge or diarrhea/constipation, f/c/n/v = fevers/chills/nausea/vomiting, cp/sob = chest pain/shortness of breath, tbv = total body volume, rxn = reaction, tdc = tunneled dialysis catheter, pta = prior to admission, hd = hemodialysis, pd = peritoneal dialysis, hhd = home hemodialysis, edw = estimated dry wt         Assessment/Plan:     1.  ESKD.  Pt is running today and is not having any issues.  He is on a MWF schedule.  A.  Next planned run on Monday.  B.  Ok to run and pull fluid c BP in the 70s as long as he's asx.    C.  Continue midodrine c HD.    2.  POD #4 s/p C6-T6 fusion / T2-3 decompression.  He is doing ok, clinically.  Continues on abx while here, but per ID note won't need orals on discharge.  A.  Plans per NSG.    3.  Anemia.  Pt's hb continues to trend lower.  No obv bleeding, but he is post-op and lost a decent amt of blood during surgery.  A.  Continue EPO 10k qHD.  B.  Follow hb, clinically.  C.  Transfuse prn.    4.  Hypotension.  Pt is asx c BPs as low as the high 60s.  This is a chronic issue and he is doing fine c the midodrine; BP now as high as the 90s.  A.  Continue scheduled midodrine.  B.  Give an additional 10 mg midodrine mid-run on dialysis days.    5.  FEN.  Electrolytes are ok.    A.  Continue reg diet.        Interval History:     Pt is feeling ok and has no major complaints.  He is dialysing s probs.            Medications and Allergies:       - MEDICATION INSTRUCTIONS for Dialysis Patients -   Does not apply See Admin  "Instructions     sodium chloride 0.9%  300 mL Hemodialysis Machine Once     albumin human  250 mL Intravenous Once in dialysis/CRRT     atorvastatin  20 mg Oral At Bedtime     ceFAZolin  1 g Intravenous Q24H     cetirizine  10 mg Oral Daily     DULoxetine  30 mg Oral Daily     epoetin mar-epbx (RETACRIT) inj ESRD  10,000 Units Intravenous Once in dialysis/CRRT     finasteride  1.25 mg Oral Daily     gabapentin  300 mg Oral TID     midodrine  10 mg Oral TID w/meals     - MEDICATION INSTRUCTIONS -   Does not apply Once     pantoprazole  40 mg Oral QAM AC     senna-docusate  1 tablet Oral BID    Or     senna-docusate  2 tablet Oral BID     sertraline  100 mg Oral Daily     sevelamer carbonate  800 mg Oral TID w/meals     sodium chloride (PF)  3 mL Intracatheter Q8H     sodium chloride (PF)  3 mL Intracatheter Q8H     Allergies   Allergen Reactions     Amlodipine      Lisinopril           Physical Exam:     Vitals were reviewed     , Blood pressure (!) 89/59, pulse 85, temperature 98  F (36.7  C), temperature source Oral, resp. rate 16, height 1.859 m (6' 1.2\"), weight 145 kg (319 lb 10.7 oz), SpO2 90 %.  Wt Readings from Last 3 Encounters:   06/29/22 145 kg (319 lb 10.7 oz)     Intake/Output Summary (Last 24 hours) at 7/1/2022 1500  Last data filed at 7/1/2022 0649  Gross per 24 hour   Intake --   Output 100 ml   Net -100 ml     GENERAL APPEARANCE:  lying in bed, alert, NAD  HEENT:  eyes/ears/nose/neck grossly nl  RESP: CTA B c good efforts  CV: RRR, nl S1/S2   ABDOMEN: o/s/nt/nd  EXTREMITIES/SKIN: + ble edema - chronic-appearing skin changes  ACCESS:  LAF    Pt seen on HD.  Stable run expected.  Goal UF 4-5L c midodrine due to low BPs.  Good BFR via LAF.         Data:     CBC RESULTS:     Recent Labs   Lab 07/01/22  0613 06/30/22  0530 06/29/22  0611 06/28/22  0434 06/27/22  2229 06/27/22  2224 06/27/22  1859 06/27/22  0733   WBC 7.2 8.3 8.4 7.9 10.9  --   --  7.1   RBC 2.20* 2.34* 2.31* 2.54* 2.65*  --   --  3.13* "   HGB 7.5* 7.9* 8.0* 8.9* 9.4* 9.9*   < > 10.9*   HCT 24.3* 25.6* 25.8* 27.6* 28.8* 29*   < > 33.5*    229 191 169 241  --   --  200    < > = values in this interval not displayed.     Basic Metabolic Panel:  Recent Labs   Lab 07/01/22  0613 06/30/22  0530 06/29/22  0918 06/29/22  0637 06/29/22  0611 06/28/22  0809 06/28/22  0420 06/27/22  2224 06/27/22 2006 06/27/22  1859 06/27/22  1859 06/27/22  0733 06/26/22  0712 06/25/22  0728     --   --   --  131*  --  135 135 135  --  135 132* 133 124*   POTASSIUM 4.2 4.1  --   --  5.3  --  4.7 4.0 3.8   < > 3.9 4.5 4.6 5.1   CHLORIDE 96  --   --   --  97  --  98  --   --   --   --  92* 93* 86*   CO2 27  --   --   --  23  --  26  --   --   --   --  27 30 27   BUN 63*  --   --   --  61*  --  49*  --   --   --   --  81* 57* 57*   CR 8.60*  --   --   --  9.60*  --  6.86*  --   --   --   --  10.30* 8.46* 10.10*   *  --  83 92 116* 139* 153*  --   --   --   --  131* 111* 153*   MAC 9.1  --   --   --  8.3*  --  8.7  --   --   --   --  9.4 9.1 9.3    < > = values in this interval not displayed.     INR  Recent Labs   Lab 06/27/22  1602   INR 1.07      Attestation:   I have reviewed today's relevant vital signs, notes, medications, labs and imaging.    Dejuan Carr MD  [facility] - Nephrology  [phone]

## 2022-07-01 NOTE — PROGRESS NOTES
A&Ox4, not oob this shift normally Ax2, VSS, soft BP normal, reg diet, pain managed w/ oral dilaudid, VSS on 1.5L O2. Continue with care plans

## 2022-07-02 ENCOUNTER — APPOINTMENT (OUTPATIENT)
Dept: OCCUPATIONAL THERAPY | Facility: CLINIC | Age: 57
DRG: 459 | End: 2022-07-02
Payer: MEDICARE

## 2022-07-02 LAB
BACTERIA BLD CULT: NO GROWTH
ERYTHROCYTE [DISTWIDTH] IN BLOOD BY AUTOMATED COUNT: 14.1 % (ref 10–15)
HCT VFR BLD AUTO: 27.5 % (ref 40–53)
HGB BLD-MCNC: 8.4 G/DL (ref 13.3–17.7)
MCH RBC QN AUTO: 33.6 PG (ref 26.5–33)
MCHC RBC AUTO-ENTMCNC: 30.5 G/DL (ref 31.5–36.5)
MCV RBC AUTO: 110 FL (ref 78–100)
PLATELET # BLD AUTO: 311 10E3/UL (ref 150–450)
RBC # BLD AUTO: 2.5 10E6/UL (ref 4.4–5.9)
WBC # BLD AUTO: 8.4 10E3/UL (ref 4–11)

## 2022-07-02 PROCEDURE — 97535 SELF CARE MNGMENT TRAINING: CPT | Mod: GO

## 2022-07-02 PROCEDURE — 250N000013 HC RX MED GY IP 250 OP 250 PS 637: Performed by: INTERNAL MEDICINE

## 2022-07-02 PROCEDURE — 99232 SBSQ HOSP IP/OBS MODERATE 35: CPT | Performed by: INTERNAL MEDICINE

## 2022-07-02 PROCEDURE — 97530 THERAPEUTIC ACTIVITIES: CPT | Mod: GO

## 2022-07-02 PROCEDURE — 85027 COMPLETE CBC AUTOMATED: CPT | Performed by: INTERNAL MEDICINE

## 2022-07-02 PROCEDURE — 36415 COLL VENOUS BLD VENIPUNCTURE: CPT | Performed by: INTERNAL MEDICINE

## 2022-07-02 PROCEDURE — 87040 BLOOD CULTURE FOR BACTERIA: CPT | Performed by: INTERNAL MEDICINE

## 2022-07-02 PROCEDURE — 250N000011 HC RX IP 250 OP 636: Performed by: INTERNAL MEDICINE

## 2022-07-02 PROCEDURE — 120N000001 HC R&B MED SURG/OB

## 2022-07-02 RX ADMIN — MIDODRINE HYDROCHLORIDE 10 MG: 5 TABLET ORAL at 13:14

## 2022-07-02 RX ADMIN — HYDROMORPHONE HYDROCHLORIDE 4 MG: 2 TABLET ORAL at 04:14

## 2022-07-02 RX ADMIN — SERTRALINE HYDROCHLORIDE 100 MG: 100 TABLET ORAL at 09:59

## 2022-07-02 RX ADMIN — HYDROXYZINE HYDROCHLORIDE 25 MG: 25 TABLET, FILM COATED ORAL at 07:12

## 2022-07-02 RX ADMIN — CYCLOBENZAPRINE 10 MG: 10 TABLET, FILM COATED ORAL at 04:14

## 2022-07-02 RX ADMIN — SENNOSIDES AND DOCUSATE SODIUM 2 TABLET: 50; 8.6 TABLET ORAL at 09:58

## 2022-07-02 RX ADMIN — SENNOSIDES AND DOCUSATE SODIUM 2 TABLET: 50; 8.6 TABLET ORAL at 21:21

## 2022-07-02 RX ADMIN — CYCLOBENZAPRINE 10 MG: 10 TABLET, FILM COATED ORAL at 14:09

## 2022-07-02 RX ADMIN — CEFAZOLIN 1 G: 1 INJECTION, POWDER, FOR SOLUTION INTRAMUSCULAR; INTRAVENOUS at 09:58

## 2022-07-02 RX ADMIN — MIDODRINE HYDROCHLORIDE 10 MG: 5 TABLET ORAL at 17:35

## 2022-07-02 RX ADMIN — SEVELAMER CARBONATE 800 MG: 800 TABLET, FILM COATED ORAL at 13:14

## 2022-07-02 RX ADMIN — FINASTERIDE 1.25 MG: 5 TABLET, FILM COATED ORAL at 09:58

## 2022-07-02 RX ADMIN — GABAPENTIN 300 MG: 300 CAPSULE ORAL at 07:08

## 2022-07-02 RX ADMIN — HYDROXYZINE HYDROCHLORIDE 25 MG: 25 TABLET, FILM COATED ORAL at 01:10

## 2022-07-02 RX ADMIN — GABAPENTIN 300 MG: 300 CAPSULE ORAL at 13:14

## 2022-07-02 RX ADMIN — SEVELAMER CARBONATE 800 MG: 800 TABLET, FILM COATED ORAL at 09:59

## 2022-07-02 RX ADMIN — HYDROMORPHONE HYDROCHLORIDE 0.5 MG: 1 INJECTION, SOLUTION INTRAMUSCULAR; INTRAVENOUS; SUBCUTANEOUS at 01:10

## 2022-07-02 RX ADMIN — ATORVASTATIN CALCIUM 20 MG: 20 TABLET, FILM COATED ORAL at 21:21

## 2022-07-02 RX ADMIN — MIDODRINE HYDROCHLORIDE 10 MG: 5 TABLET ORAL at 09:59

## 2022-07-02 RX ADMIN — GABAPENTIN 300 MG: 300 CAPSULE ORAL at 21:21

## 2022-07-02 RX ADMIN — DULOXETINE 30 MG: 30 CAPSULE, DELAYED RELEASE ORAL at 09:58

## 2022-07-02 RX ADMIN — SEVELAMER CARBONATE 800 MG: 800 TABLET, FILM COATED ORAL at 17:36

## 2022-07-02 RX ADMIN — CETIRIZINE HYDROCHLORIDE 10 MG: 10 TABLET, FILM COATED ORAL at 09:58

## 2022-07-02 RX ADMIN — POLYETHYLENE GLYCOL 3350 17 G: 17 POWDER, FOR SOLUTION ORAL at 09:58

## 2022-07-02 RX ADMIN — HYDROMORPHONE HYDROCHLORIDE 4 MG: 2 TABLET ORAL at 14:10

## 2022-07-02 RX ADMIN — PANTOPRAZOLE SODIUM 40 MG: 40 TABLET, DELAYED RELEASE ORAL at 07:08

## 2022-07-02 RX ADMIN — HYDROMORPHONE HYDROCHLORIDE 2 MG: 2 TABLET ORAL at 07:08

## 2022-07-02 ASSESSMENT — ACTIVITIES OF DAILY LIVING (ADL)
ADLS_ACUITY_SCORE: 54
ADLS_ACUITY_SCORE: 50
ADLS_ACUITY_SCORE: 54
ADLS_ACUITY_SCORE: 54
ADLS_ACUITY_SCORE: 50
ADLS_ACUITY_SCORE: 54
ADLS_ACUITY_SCORE: 50

## 2022-07-02 NOTE — PLAN OF CARE
Goal Outcome Evaluation:    Plan of Care Reviewed With: patient        Patient vital signs are at baseline: Yes  Patient able to ambulate as they were prior to admission or with assist devices provided by therapies during their stay:  No, lift device in use for OOB activities   Patient MUST void prior to discharge:  No, on hemodialysis   Patient able to tolerate oral intake:  Yes  Pain has adequate pain control using Oral analgesics:  Yes  Does patient have an identified :  No,  Reason:  TBD  Has goal D/C date and time been discussed with patient:  No,  Reason:  , discharge pending    POD#5 c6-T6 fusion & decompression. Aox3-4. Forgetful abt place. Soft BPs. On 4 Lpm via NC while sleeping. Dilaudid and atarax for pain. Dressing CDI. Turns well in bed - not OOB this shift. Assist of 2 w/ lift for mobility. Reg diet, adequate I/Os. Continue w/ plan of care.

## 2022-07-02 NOTE — PROGRESS NOTES
[facility]     Neurosurgery  Daily Note        Assessment & Plan     Procedure(s):  Cervical 6 to Thoracic 6 Fusion and Thoracic 2-3 Decompression   5 Days Post-Op  Pain well controlled. Numbness pre op has improved. Feels strength in BLE is improving as well.      EXAM: XR THORACIC SPINE 2 VIEWS, XR CERVICAL SPINE 2/3 VWS  LOCATION: [facility]  DATE/TIME: 6/29/2022 5:15 PM     INDICATION: post op fusion  COMPARISON: None.  TECHNIQUE: CR Cervical and Thoracic Spine.                                                                      IMPRESSION:   On the lateral cervical spine radiographs, the soft tissues. Evaluation of the vertebrae at C5-C7.     Postsurgical changes posterior fusion from C5-T6. No hardware fracture or hardware loosening. The vertebral bodies of the cervical spine otherwise have normal stature and alignment. Anterior marginal osteophytes C4-C7. Multilevel degenerative disc   disease of the cervical spinal with, most pronounced at C3/C4.     The vertebral bodies of the thoracic spine have normal stature and alignment without evidence of compression fracture. The partially imaged lungs are unremarkable. Soft tissues unremarkable.     Plan:  -Brace on when out of bed  -Continue supportive and symptomatic treatment  -Start or continue physical therapy  -Pain control measures  -Advance diet as tolerated  -Routine wound care  -Call with questions    Ivet Anne MPAS  [facility] Neurosurgery  [address]  [address] [place] [id number]    Tel [phone]  Pager [phone]

## 2022-07-02 NOTE — PLAN OF CARE
Goal Outcome Evaluation:    Patient vital signs are at baseline: no hypotensive during the shift.  Patient able to ambulate as they were prior to admission or with assist devices provided by therapies during their stay:  no, refuse   Patient MUST void prior to discharge:  no he is on dialysis  Patient able to tolerate oral intake:  yes  Pain has adequate pain control using Oral analgesics:  yes  Does patient have an identified :    Has goal D/C date and time been discussed with patient:    Alert and oriented X3, hypotensive, come from dialysis at 7pm,  removed his IV and new one replace the same arm.

## 2022-07-02 NOTE — PROGRESS NOTES
Care Management Follow Up    Length of Stay (days): 8    Expected Discharge Date: 07/05/2022     Concerns to be Addressed: discharge planning     Patient plan of care discussed at interdisciplinary rounds: Yes    Anticipated Discharge Disposition: Acute Rehab, Transitional Care     Anticipated Discharge Services: Transportation Services  Anticipated Discharge DME:      Patient/family educated on Medicare website which has current facility and service quality ratings:    Education Provided on the Discharge Plan:    Patient/Family in Agreement with the Plan: yes    Referrals Placed by CM/SW: Post Acute Facilities  Private pay costs discussed: Not applicable    Additional Information:  Writer spoke to patient to confirm living situation. He reported that he is living in a quad home with his wife that is split level at entry. If going through garage he reports that there are 6 steps to enter and fewer if going through front door. September 1, 2022 he and his wife are moving into a rambler that is 2 levels but he does not need to access the 2nd floor. Patient is in favor of ARU vs TCU. He doesn't think he will need a wheelchair but also acknowledged that it it too soon to tell.     Writer called ARU to update.    SW will continue to follow.      JULISSA Tiwari

## 2022-07-02 NOTE — PROGRESS NOTES
"[facility]    Medicine Progress Note - Hospitalist Service    Date of Admission:  6/24/2022    Assessment & Plan        Shay Jimenez is a very pleasant 57-year-old male with past medical history significant for end-stage renal disease, on hemodialysis, chronic peripheral neuropathy affecting his feet, history of hypertension no longer on meds, hyperlipidemia, obesity, obstructive sleep apnea, GERD and depression, among other medical problems, who presented 6/24/22 for evaluation of worsening upper back pain with associated generalized weakness and falls.  He was found to have abnormal findings on thoracic MRI concerning for diskitis with osteomyelitis with also some findings of a compressive myelopathy.      Upper back pain  Generalized weakness  Falls   Epidural phlegmon/abscess   Compressive myelopathy  S/P C6-T6 fusion/T2-3 decompression  *back pain for 6 months. CT chest 2/2022 showed concern for upper thoracic spine diskitis vs osteomyelitis. Unclear follow up from that time  *in weeks prior to admission, MRI showed possible thoracic compression fracture.   *developed progressively weak legs with frequent falls.  *on admission imaging with T2-T3 diskitis, osteomyelitis and compressive myelopathy.        appreciate Neurosurgery care    follow blood cultures, they have no growth after 5 days    ID consult requested, I appreciate Dr. Rain's evaluation recommendations    Per her, \"changes could all be from DJD, CRP mildly elevated, no fever/chills, slow intermittent progression, I am not convinced this is infection but it still could be, he has had neg. PPD in past and no TB risk factors or contacts.\"     routine gram stain culture, fungal stain and culture, AFB stain and culture and pathology sent from OR    Also per Dr. Rain, \"continue on Ancef for now as we wait on the cultures.  If ready for discharge okay to discharge off antibiotics.\"  She plans to follow-up on the cultures " "for any late growth and start antimicrobials if/when needed.    Oxycodone \"didn't work at all\" for patient, was discontinued. Tylenol and oral Dilaudid available as needed for pain.      End-stage renal disease, on hemodialysis  Hypotension  Hyponatremia  *Typically dialyzes Monday, Wednesday, Friday. Missed 6/24 session due to being in ED      Nephrology consult appreciated    Dialysis per nephrology.    Blood pressures readings are soft.  Per Dr. Carr, \"okay to run and pull fluid with BP in the 70s as long as he is asymptomatic.\"    Continue scheduled midodrine    PTA sevelamer     GERD    Chronic and stable on PPI.    Anemia of chronic kidney disease    Per Dr. Carr, \"increase erythropoietin to 10K with dialysis\"    Follow hemoglobin    Consider transfusion for hemoglobin less than 7 g/dL and symptoms      Hyperlipidemia    Chronic and stable, on statin.    Peripheral neuropathy of the feet  *Follows with Neurology.      Managed in part with duloxetine, continue    Depression    Chronic and stable on Zoloft.    Obesity, class III  Obstructive sleep apnea  *BMI this stay is 43.10.  He does not use CPAP (patient says, \"I am one of those guys who does not tolerate CPAP.\") Encourage diet, lifestyle modifications once above issues addressed.    History of BPH.    Continue Proscar     Diet: Regular Diet Adult    DVT Prophylaxis: Pneumatic Compression Devices  Oliveira Catheter: Not present  Central Lines: None  Cardiac Monitoring: None  Code Status: Full Code      Disposition Plan      Expected Discharge Date: 07/05/2022      Destination: inpatient rehabilitation facility  Discharge Comments: Needs ARU        The patient's care was discussed with the Bedside Nurse and Patient.    Eunice Wilson MD  Hospitalist Service  [facility]  Securely message with the Vocera Web Console (learn more here)  Text page via [employer] Paging/Directory         Clinically Significant Risk Factors Present on Admission " "                    _______________________________________________________________    Interval History   \"I did not sleep very well.\"  Patient says he is tired.  He says he has not tried to bear weight on his legs yet.  RN was at the bedside, titrating supplemental oxygen.  Patient says he does not use supplemental oxygen at home, has had a couple of sleep studies showing he has obstructive sleep apnea but, \"I am one of those guys who does not tolerate CPAP.\"  He has no new respiratory or GI complaints.  He is passing gas.    Data reviewed today: I reviewed all medications, new labs and imaging results over the last 24 hours. I personally reviewed no images or EKG's today.    Physical Exam   Vital Signs: Temp: 98.1  F (36.7  C) Temp src: Oral BP: 112/74 Pulse: 95   Resp: 16 SpO2: 96 % O2 Device: Nasal cannula Oxygen Delivery: 4 LPM  Weight: 319 lbs 10.67 oz  Constitutional: Awake, looks tired  Respiratory: Clear to auscultation bilaterally, no crackles or wheezing  Cardiovascular: Regular rate and rhythm, normal S1 and S2, and no murmur noted  GI: Obese, soft, bowel sounds are present  Skin/Integumen: No rash on exposed skin  Psych: Mood seems a bit discouraged      Data   Recent Labs   Lab 07/02/22  0648 07/01/22  0613 06/30/22  0530 06/29/22  0918 06/29/22  0637 06/29/22  0611 06/28/22  0434 06/28/22  0420 06/27/22  1859 06/27/22  1602   WBC 8.4 7.2 8.3  --   --  8.4   < >  --    < >  --    HGB 8.4* 7.5* 7.9*  --   --  8.0*   < >  --    < >  --    * 111* 109*  --   --  112*   < >  --    < >  --     260 229  --   --  191   < >  --    < >  --    INR  --   --   --   --   --   --   --   --   --  1.07   NA  --  136  --   --   --  131*  --  135   < >  --    POTASSIUM  --  4.2 4.1  --   --  5.3  --  4.7   < >  --    CHLORIDE  --  96  --   --   --  97  --  98  --   --    CO2  --  27  --   --   --  23  --  26  --   --    BUN  --  63*  --   --   --  61*  --  49*  --   --    CR  --  8.60*  --   --   --  9.60*  " --  6.86*  --   --    ANIONGAP  --  13  --   --   --  11  --  11  --   --    MAC  --  9.1  --   --   --  8.3*  --  8.7  --   --    GLC  --  102*  --  83 92 116*   < > 153*  --   --    ALBUMIN  --   --   --   --   --  2.9*  --   --   --   --     < > = values in this interval not displayed.     No results found for this or any previous visit (from the past 24 hour(s)).  Medications       - MEDICATION INSTRUCTIONS for Dialysis Patients -   Does not apply See Admin Instructions     atorvastatin  20 mg Oral At Bedtime     ceFAZolin  1 g Intravenous Q24H     cetirizine  10 mg Oral Daily     DULoxetine  30 mg Oral Daily     finasteride  1.25 mg Oral Daily     gabapentin  300 mg Oral TID     midodrine  10 mg Oral TID w/meals     pantoprazole  40 mg Oral QAM AC     polyethylene glycol  17 g Oral Daily     senna-docusate  1 tablet Oral BID    Or     senna-docusate  2 tablet Oral BID     sertraline  100 mg Oral Daily     sevelamer carbonate  800 mg Oral TID w/meals     sodium chloride (PF)  3 mL Intracatheter Q8H     sodium chloride (PF)  3 mL Intracatheter Q8H

## 2022-07-02 NOTE — PROGRESS NOTES
Patient vital signs are at baseline: No,  Reason:  Chronically soft BPs  Patient able to ambulate as they were prior to admission or with assist devices provided by therapies during their stay:  No,  Reason:  Generalized weakness in BLE.   Patient MUST void prior to discharge:  Yes  Patient able to tolerate oral intake:  Yes  Pain has adequate pain control using Oral analgesics:  Yes  Does patient have an identified :  No,  Reason:  TBD.   Has goal D/C date and time been discussed with patient:  Yes,    Surgical dressing removed today.Staples intact. No drainage. Transfers with a lift due to weakness in BLE.Declined to get OOB during shift. Educated on the importance of increased activity versus being in bed. Brace on when OOB.

## 2022-07-03 ENCOUNTER — APPOINTMENT (OUTPATIENT)
Dept: PHYSICAL THERAPY | Facility: CLINIC | Age: 57
DRG: 459 | End: 2022-07-03
Payer: MEDICARE

## 2022-07-03 LAB
ANION GAP SERPL CALCULATED.3IONS-SCNC: 10 MMOL/L (ref 3–14)
BACTERIA BLD CULT: NO GROWTH
BUN SERPL-MCNC: 66 MG/DL (ref 7–30)
CALCIUM SERPL-MCNC: 9.8 MG/DL (ref 8.5–10.1)
CHLORIDE BLD-SCNC: 96 MMOL/L (ref 94–109)
CO2 SERPL-SCNC: 29 MMOL/L (ref 20–32)
CREAT SERPL-MCNC: 9.9 MG/DL (ref 0.66–1.25)
ERYTHROCYTE [DISTWIDTH] IN BLOOD BY AUTOMATED COUNT: 14.3 % (ref 10–15)
GFR SERPL CREATININE-BSD FRML MDRD: 6 ML/MIN/1.73M2
GLUCOSE BLD-MCNC: 123 MG/DL (ref 70–99)
HCT VFR BLD AUTO: 27.1 % (ref 40–53)
HGB BLD-MCNC: 8.3 G/DL (ref 13.3–17.7)
MCH RBC QN AUTO: 33.9 PG (ref 26.5–33)
MCHC RBC AUTO-ENTMCNC: 30.6 G/DL (ref 31.5–36.5)
MCV RBC AUTO: 111 FL (ref 78–100)
PLATELET # BLD AUTO: 282 10E3/UL (ref 150–450)
POTASSIUM BLD-SCNC: 4.8 MMOL/L (ref 3.4–5.3)
RBC # BLD AUTO: 2.45 10E6/UL (ref 4.4–5.9)
SODIUM SERPL-SCNC: 135 MMOL/L (ref 133–144)
WBC # BLD AUTO: 8 10E3/UL (ref 4–11)

## 2022-07-03 PROCEDURE — 36415 COLL VENOUS BLD VENIPUNCTURE: CPT | Performed by: INTERNAL MEDICINE

## 2022-07-03 PROCEDURE — 99233 SBSQ HOSP IP/OBS HIGH 50: CPT | Performed by: INTERNAL MEDICINE

## 2022-07-03 PROCEDURE — 85027 COMPLETE CBC AUTOMATED: CPT | Performed by: INTERNAL MEDICINE

## 2022-07-03 PROCEDURE — 250N000011 HC RX IP 250 OP 636: Performed by: INTERNAL MEDICINE

## 2022-07-03 PROCEDURE — 120N000001 HC R&B MED SURG/OB

## 2022-07-03 PROCEDURE — 250N000013 HC RX MED GY IP 250 OP 250 PS 637: Performed by: INTERNAL MEDICINE

## 2022-07-03 PROCEDURE — 97530 THERAPEUTIC ACTIVITIES: CPT | Mod: GP | Performed by: PHYSICAL THERAPIST

## 2022-07-03 PROCEDURE — 87040 BLOOD CULTURE FOR BACTERIA: CPT | Performed by: INTERNAL MEDICINE

## 2022-07-03 PROCEDURE — 80048 BASIC METABOLIC PNL TOTAL CA: CPT | Performed by: INTERNAL MEDICINE

## 2022-07-03 RX ORDER — MIDODRINE HYDROCHLORIDE 5 MG/1
10 TABLET ORAL
Status: DISCONTINUED | OUTPATIENT
Start: 2022-07-04 | End: 2022-07-03

## 2022-07-03 RX ORDER — MIDODRINE HYDROCHLORIDE 2.5 MG/1
10 TABLET ORAL
Status: COMPLETED | OUTPATIENT
Start: 2022-07-03 | End: 2022-07-04

## 2022-07-03 RX ADMIN — SEVELAMER CARBONATE 800 MG: 800 TABLET, FILM COATED ORAL at 12:02

## 2022-07-03 RX ADMIN — MIDODRINE HYDROCHLORIDE 10 MG: 5 TABLET ORAL at 12:02

## 2022-07-03 RX ADMIN — MIDODRINE HYDROCHLORIDE 10 MG: 5 TABLET ORAL at 08:26

## 2022-07-03 RX ADMIN — SEVELAMER CARBONATE 800 MG: 800 TABLET, FILM COATED ORAL at 08:27

## 2022-07-03 RX ADMIN — HYDROMORPHONE HYDROCHLORIDE 4 MG: 2 TABLET ORAL at 21:55

## 2022-07-03 RX ADMIN — DULOXETINE 30 MG: 30 CAPSULE, DELAYED RELEASE ORAL at 08:26

## 2022-07-03 RX ADMIN — CYCLOBENZAPRINE 10 MG: 10 TABLET, FILM COATED ORAL at 21:54

## 2022-07-03 RX ADMIN — GABAPENTIN 300 MG: 300 CAPSULE ORAL at 06:42

## 2022-07-03 RX ADMIN — ATORVASTATIN CALCIUM 20 MG: 20 TABLET, FILM COATED ORAL at 21:55

## 2022-07-03 RX ADMIN — CEFAZOLIN 1 G: 1 INJECTION, POWDER, FOR SOLUTION INTRAMUSCULAR; INTRAVENOUS at 08:31

## 2022-07-03 RX ADMIN — PANTOPRAZOLE SODIUM 40 MG: 40 TABLET, DELAYED RELEASE ORAL at 06:42

## 2022-07-03 RX ADMIN — SENNOSIDES AND DOCUSATE SODIUM 2 TABLET: 50; 8.6 TABLET ORAL at 20:20

## 2022-07-03 RX ADMIN — POLYETHYLENE GLYCOL 3350 17 G: 17 POWDER, FOR SOLUTION ORAL at 08:27

## 2022-07-03 RX ADMIN — SERTRALINE HYDROCHLORIDE 100 MG: 100 TABLET ORAL at 08:26

## 2022-07-03 RX ADMIN — FINASTERIDE 1.25 MG: 5 TABLET, FILM COATED ORAL at 08:27

## 2022-07-03 RX ADMIN — SENNOSIDES AND DOCUSATE SODIUM 2 TABLET: 50; 8.6 TABLET ORAL at 08:26

## 2022-07-03 RX ADMIN — CETIRIZINE HYDROCHLORIDE 10 MG: 10 TABLET, FILM COATED ORAL at 08:26

## 2022-07-03 ASSESSMENT — ACTIVITIES OF DAILY LIVING (ADL)
ADLS_ACUITY_SCORE: 50

## 2022-07-03 NOTE — PROGRESS NOTES
CROSS COVER:    Paged regarding BP and midodrine medication.    *Spoke with Malgorzata Manuel RN who indicated that the patient's blood pressure on his right arm was 94/61.  He checked the blood pressures of the lower extremities bilaterally (right: 137/69 and left: 141/69) and asked for direction if midodrine should be administered as parameters in place to hold for SBP > 110.    - Hold midodrine at this time.        Akira Nuno PA-C

## 2022-07-03 NOTE — PROVIDER NOTIFICATION
MD Notification    Notified Person: MD    Notified Person Name: Yaakov     Notification Date/Time:07/03/2022    Notification Interaction: amcomweb    Purpose of Notification: pt BP low on R arm and normal on both legs    Orders Received: hold midodrine for tonight.     Comments:

## 2022-07-03 NOTE — PROVIDER NOTIFICATION
MD Notification    Notified Person: MD    Notified Person Name: Yaakov  Notification Date/Time:07/02/2022 at 8:16    Notification Interaction: amcomweb    Purpose of Notification: pt low BP 81/48    Orders Received: continue to monitor and if he is symptomatic of low BP,   page again.    Comments:

## 2022-07-03 NOTE — PROGRESS NOTES
[facility]     Neurosurgery  Daily Note        Assessment & Plan     Procedure(s):  Cervical 6 to Thoracic 6 Fusion and Thoracic 2-3 Decompression   6 Days Post-Op,  Inta op cultures positive today for Gram + cocci.    PLAN:  Brace on when out of bed.  Follow up with NS for staple removal 7/13/22  Follow up in our office 8/3/22 and 9/21/22  Discharge recommendations entered.  NS will sign off.    Please feel free to contact with questions or concerns.     Ivet Anne MPAS  [facility] Neurosurgery  [address] [address]    Tel [phone]  Pager [phone]

## 2022-07-03 NOTE — PROGRESS NOTES
Patient had only 25% of breakfast this morning, became awake but didn't want to do anything. PT transferred to chair before noon, blood pressure dropped to 76/49. Dr. Carr and Dr. Wilson both came and saw patient, decided to hold gabapentin and Zoloft, other orders modified. Patient also reported occasional involuntary jerking, provider noted that problem might last until total dialysis is done tomorrow. BP is now up to 83/51, patient denies having any symptoms at this time.

## 2022-07-03 NOTE — PROGRESS NOTES
[facility]     Renal Progress Note       SHORTHAND KEY FOR MY NOTES:  c = with, s = without, p = after, a = before, x = except, asx = asymptomatic, tx = transplant or treatment, sx = symptoms or symptomatic, cx = canceled or culture, rxn = reaction, yday = yesterday, nl = normal, abx = antibiotics, fxn = function, dx = diagnosis, dz = disease, m/h = melena/hematochezia, c/d/l/ha = cramping/dizziness/lightheadedness/headache, d/c = discharge or diarrhea/constipation, f/c/n/v = fevers/chills/nausea/vomiting, cp/sob = chest pain/shortness of breath, tbv = total body volume, rxn = reaction, tdc = tunneled dialysis catheter, pta = prior to admission, hd = hemodialysis, pd = peritoneal dialysis, hhd = home hemodialysis, edw = estimated dry wt         Assessment/Plan:     1.  ESKD.  Pt is due for HD tmrw per a [facility] schedule.  A.  HD tmrw.  Orders placed.  B.  Midodrine mid-run ordered.    2.  POD #6 s/p C6-T6 fusion / T2-3 decompression.  He is doing ok, clinically, c the neck brace.  He is on cyclobenzaprine and last rec'd it yday afternoon.  Remains on cefazolin.  A.  Plans per [facility].    3.  Slight confusion / twitching.  Pt is on yossi 300 mg tid.  He's been on this dose for a while, incl pre-admission.  He didn't have a full run on Friday, so it's possible that he hasn't cleared all of the metabolites properly.    A.  Hold yossi for now.  B.  Minimize use of cyclobenzaprine +/- narcs.  C.  Follow clinically.    4.  Hypotension.  Pt remains asx c low BPs.  This is a chronic issue and he is doing fine c the midodrine.  A.  Continue scheduled midodrine.  B.  Give an additional 10 mg midodrine mid-run on dialysis days.  C.  Ok for low BP as long as he's not symptomatic.  The confusion and twitching today seem more related to yossi rather than low BP.    5.  FEN.  Electrolytes are ok.    A.  Continue reg diet.        Interval History:     Pt is a little confused at times, mentioning that he has to go  "fishing c his neighbors.  Almost immediately he recognizes that he's in the hospital.  He has been twitchy today, too.  No f/c/n/v.  No cp/sob/abd pain.  He has some neck/back discomfort.              Medications and Allergies:       - MEDICATION INSTRUCTIONS for Dialysis Patients -   Does not apply See Admin Instructions     atorvastatin  20 mg Oral At Bedtime     ceFAZolin  1 g Intravenous Q24H     cetirizine  10 mg Oral Daily     DULoxetine  30 mg Oral Daily     finasteride  1.25 mg Oral Daily     [Held by provider] gabapentin  300 mg Oral TID     midodrine  10 mg Oral TID w/meals     pantoprazole  40 mg Oral QAM AC     polyethylene glycol  17 g Oral Daily     senna-docusate  1 tablet Oral BID    Or     senna-docusate  2 tablet Oral BID     [Held by provider] sertraline  100 mg Oral Daily     sevelamer carbonate  800 mg Oral TID w/meals     sodium chloride (PF)  3 mL Intracatheter Q8H     sodium chloride (PF)  3 mL Intracatheter Q8H     Allergies   Allergen Reactions     Amlodipine      Lisinopril           Physical Exam:     Vitals were reviewed     , Blood pressure (!) 83/51, pulse 87, temperature 98  F (36.7  C), temperature source Oral, resp. rate 18, height 1.859 m (6' 1.2\"), weight 145 kg (319 lb 10.7 oz), SpO2 99 %.  Wt Readings from Last 3 Encounters:   06/29/22 145 kg (319 lb 10.7 oz)     No intake or output data in the 24 hours ending 07/03/22 1325    GENERAL APPEARANCE:  lying in bed, NAD, mostly alert but occ confusion  HEENT:  eyes/ears/nose/neck grossly nl x neck brace  RESP: CTA B c good efforts  CV: RRR c 2/6 m, nl S1/S2   ABDOMEN: o/s/nt/nd  EXTREMITIES/SKIN: + ble edema - chronic-appearing skin changes  NEURO: a little twitchy  ACCESS:  LAF c good bruit/thrill         Data:     CBC RESULTS:     Recent Labs   Lab 07/03/22  0658 07/02/22  0648 07/01/22  0613 06/30/22  0530 06/29/22  0611 06/28/22  0434   WBC 8.0 8.4 7.2 8.3 8.4 7.9   RBC 2.45* 2.50* 2.20* 2.34* 2.31* 2.54*   HGB 8.3* 8.4* 7.5* " 7.9* 8.0* 8.9*   HCT 27.1* 27.5* 24.3* 25.6* 25.8* 27.6*    311 260 229 191 169     Basic Metabolic Panel:  Recent Labs   Lab 07/03/22  0658 07/01/22  0613 06/30/22  0530 06/29/22  0918 06/29/22  0637 06/29/22  0611 06/28/22  0809 06/28/22  0420 06/27/22 2224 06/27/22 2006 06/27/22  1859 06/27/22  0733    136  --   --   --  131*  --  135 135 135   < > 132*   POTASSIUM 4.8 4.2 4.1  --   --  5.3  --  4.7 4.0 3.8   < > 4.5   CHLORIDE 96 96  --   --   --  97  --  98  --   --   --  92*   CO2 29 27  --   --   --  23  --  26  --   --   --  27   BUN 66* 63*  --   --   --  61*  --  49*  --   --   --  81*   CR 9.90* 8.60*  --   --   --  9.60*  --  6.86*  --   --   --  10.30*   * 102*  --  83 92 116* 139* 153*  --   --   --  131*   MAC 9.8 9.1  --   --   --  8.3*  --  8.7  --   --   --  9.4    < > = values in this interval not displayed.     INR  Recent Labs   Lab 06/27/22  1602   INR 1.07      Attestation:   I have reviewed today's relevant vital signs, notes, medications, labs and imaging.    Dejuan Carr MD  [facility] - Nephrology  [phone]

## 2022-07-03 NOTE — PROGRESS NOTES
Dr. Wilson called, said that blood culture from last week grew gram positive bacilli. Answering service contacted, call back expected from Dr. Shyanne Garcia.

## 2022-07-03 NOTE — PLAN OF CARE
Goal Outcome Evaluation:    Patient vital signs are at baseline: no , low BP  Patient able to ambulate as they were prior to admission or with assist devices provided by therapies during their stay:  no  Patient MUST void prior to discharge:  no, on dialysis  Patient able to tolerate oral intake:  yes  Pain has adequate pain control using Oral analgesics:  yes  Does patient have an identified :    Has goal D/C date and time been discussed with patient:    Alert and oriented X4, low BP 8:15 pm BP was 81/45 and provider notified.  Pt BP increased to 98/69 at 10pm.

## 2022-07-03 NOTE — PROGRESS NOTES
Infectious disease brief note:     Cultures noted:   06/27/2022 2039 07/03/2022 1048 Tissue Aerobic Bacterial Culture Routine [[id number]]   (Abnormal)   Tissue from Spine, Thoracic    Preliminary result Component Value   Culture Culture in progress P    Isolated in broth only Gram positive bacilli, resembling diphtheroids Abnormal  P    On day 5 of incubation          GPB resembling diphtheroid 5 days later growth in broth only is a contaminate, no changes to plan for discharge off antibiotics.     Ranjan Rain MD  Infectious Disease

## 2022-07-03 NOTE — PROGRESS NOTES
"[facility]    Medicine Progress Note - Hospitalist Service    Date of Admission:  6/24/2022    Assessment & Plan        Shay Jimenez is a very pleasant 57-year-old male with past medical history significant for end-stage renal disease, on hemodialysis, chronic peripheral neuropathy affecting his feet, history of hypertension no longer on meds, hyperlipidemia, obesity, obstructive sleep apnea, GERD and depression, among other medical problems, who presented 6/24/22 for evaluation of worsening upper back pain with associated generalized weakness and falls.  He was found to have abnormal findings on thoracic MRI concerning for diskitis with osteomyelitis with also some findings of a compressive myelopathy.      Upper back pain  Generalized weakness  Falls   Epidural phlegmon/abscess   Compressive myelopathy  S/P C6-T6 fusion/T2-3 decompression  *back pain for 6 months. CT chest 2/2022 showed concern for upper thoracic spine diskitis vs osteomyelitis. Unclear follow up from that time  *in weeks prior to admission, MRI showed possible thoracic compression fracture.   *developed progressively weak legs with frequent falls.  *on admission imaging with T2-T3 diskitis, osteomyelitis and compressive myelopathy.        appreciate Neurosurgery care    follow blood cultures, they have no growth after 5 days    ID consult requested, I appreciate Dr. Rain's evaluation recommendations    Per her, \"changes could all be from DJD, CRP mildly elevated, no fever/chills, slow intermittent progression, I am not convinced this is infection but it still could be, he has had neg. PPD in past and no TB risk factors or contacts.\"     routine gram stain culture, fungal stain and culture, AFB stain and culture and pathology sent from OR    Tissue culture has gram-positive bacilli isolated in broth only, resembling diphtheroids on day 5 of incubation.  Discussed with RN will notify ID of positive culture.    Also " "per Dr. Rain, \"continue on Ancef for now as we wait on the cultures.  If ready for discharge okay to discharge off antibiotics.\"  She plans to follow-up on the cultures for any late growth and start antimicrobials if/when needed.    Oxycodone \"didn't work at all\" for patient, was discontinued. Tylenol and oral Dilaudid available as needed for pain.      End-stage renal disease, on hemodialysis  Hypotension  Hyponatremia  *Typically dialyzes Monday, Wednesday, Friday. Missed 6/24 session due to being in ED      Nephrology consult appreciated    Dialysis per nephrology.    Blood pressures readings are soft.  Per Dr. Carr, \"okay to run and pull fluid with BP in the 70s as long as he is asymptomatic.\"    Continue scheduled midodrine    PTA sevelamer     Encephalopathy  Myclonus    Today, patient is drowsy and seems very mildly confused.  He had some myoclonic jerks of both hands.    Shay is on gabapentin 300 mg 3 times daily, this is a home medication and dose has been unchanged since he came to the hospital but is higher than the typical maximum dose recommended even for patients on CRRT    Presume encephalopathy is related to buildup of gabapentin metabolites from intradialytic..  Hold gabapentin and Zoloft    Monitor signs and symptoms    GERD    Chronic and stable on PPI.    Anemia of chronic kidney disease    Per Dr. Carr, \"increase erythropoietin to 10K with dialysis\"    Follow hemoglobin    Consider transfusion for hemoglobin less than 7 g/dL and symptoms      Hyperlipidemia    Chronic and stable, on statin.    Peripheral neuropathy of the feet  *Follows with Neurology.      Managed in part with duloxetine, continue    Depression    Chronic and stable on Zoloft.    Obesity, class III  Obstructive sleep apnea  *BMI this stay is 43.10.  He does not use CPAP (patient says, \"I am one of those guys who does not tolerate CPAP.\") Encourage diet, lifestyle modifications once above issues addressed.    History of " "BPH.    Continue Proscar     Diet: Regular Diet Adult    DVT Prophylaxis: Pneumatic Compression Devices  Oliveira Catheter: Not present  Central Lines: None  Cardiac Monitoring: None  Code Status: Full Code      Disposition Plan      Expected Discharge Date: 07/05/2022      Destination: inpatient rehabilitation facility  Discharge Comments: Needs ARU.        Total time spent:  > 35 minutes  Time spent counseling, coordination of care: 25 minutes including discussion with care team and Dr. Carr, regarding encephalopathy, hemodialysis, tissue culture results, blood pressure readings, therapy, also, personal review and interpretation of labs and studies as noted above     Eunice Wilson MD  Hospitalist Service  [facility]  Securely message with the Vocera Web Console (learn more here)  Text page via [employer] Paging/Directory         Clinically Significant Risk Factors Present on Admission          # Acute Kidney Injury, unspecified: based on a >150% or 0.3 mg/dL increase in creatinine on admission compared to past 90 day average, will monitor renal function            _______________________________________________________________    Interval History   \"I slept better last night.\"  Patient is sitting in the chair.  He offers no complaints.  He has occasional myoclonic jerks of both forearms and hands, he seems unaware of these.  He also seems mildly confused.  He has no new respiratory or GI complaints.    Data reviewed today: I reviewed all medications, new labs and imaging results over the last 24 hours. I personally reviewed no images or EKG's today.    Physical Exam   Vital Signs: Temp: 98  F (36.7  C) Temp src: Oral BP: 109/80 Pulse: 87   Resp: 18 SpO2: 99 % O2 Device: None (Room air) Oxygen Delivery: 5 LPM  Weight: 319 lbs 10.67 oz  Constitutional: Awake, looks tired, eyes are half mast  Respiratory: Clear to auscultation bilaterally, no crackles or wheezing  Cardiovascular: Regular rate and " rhythm  GI: Obese, soft, bowel sounds are present  Skin/Integumen: No rash on exposed skin  Psych: Mood is detached      Data   Recent Labs   Lab 07/03/22  0658 07/02/22  0648 07/01/22  0613 06/30/22  0530 06/29/22  0918 06/29/22  0637 06/29/22  0611 06/27/22  1859 06/27/22  1602   WBC 8.0 8.4 7.2 8.3  --   --  8.4   < >  --    HGB 8.3* 8.4* 7.5* 7.9*  --   --  8.0*   < >  --    * 110* 111* 109*  --   --  112*   < >  --     311 260 229  --   --  191   < >  --    INR  --   --   --   --   --   --   --   --  1.07     --  136  --   --   --  131*   < >  --    POTASSIUM 4.8  --  4.2 4.1  --   --  5.3   < >  --    CHLORIDE 96  --  96  --   --   --  97   < >  --    CO2 29  --  27  --   --   --  23   < >  --    BUN 66*  --  63*  --   --   --  61*   < >  --    CR 9.90*  --  8.60*  --   --   --  9.60*   < >  --    ANIONGAP 10  --  13  --   --   --  11   < >  --    MAC 9.8  --  9.1  --   --   --  8.3*   < >  --    *  --  102*  --  83   < > 116*   < >  --    ALBUMIN  --   --   --   --   --   --  2.9*  --   --     < > = values in this interval not displayed.     No results found for this or any previous visit (from the past 24 hour(s)).  Medications       - MEDICATION INSTRUCTIONS for Dialysis Patients -   Does not apply See Admin Instructions     atorvastatin  20 mg Oral At Bedtime     ceFAZolin  1 g Intravenous Q24H     cetirizine  10 mg Oral Daily     DULoxetine  30 mg Oral Daily     finasteride  1.25 mg Oral Daily     gabapentin  300 mg Oral TID     midodrine  10 mg Oral TID w/meals     pantoprazole  40 mg Oral QAM AC     polyethylene glycol  17 g Oral Daily     senna-docusate  1 tablet Oral BID    Or     senna-docusate  2 tablet Oral BID     sertraline  100 mg Oral Daily     sevelamer carbonate  800 mg Oral TID w/meals     sodium chloride (PF)  3 mL Intracatheter Q8H     sodium chloride (PF)  3 mL Intracatheter Q8H

## 2022-07-03 NOTE — PROGRESS NOTES
Patient sleeping all night, denies pain and discomfort. Upper back incision has staples on, no signs of infection noted. Oxygen saturation down to 86% during sleep and moderate use of accessory and abdominal muscle noted, breaths were deep and seemed abnormal. Writer asked patient if he was ever diagnosed of sleep apnea and patient responded yes, added that he has never used a CPAP when asked. He add that he was fine had no symptoms at all. Oxygen flow increased to 5L, saturation up to 97%. Will keep monitoring.

## 2022-07-03 NOTE — PROGRESS NOTES
CROSS COVER:    Paged regarding low BP 81/48.  Reviewed EMR and spoke with nursing staff.  Patient is asymptomatic and tends to run low.    - Continue to monitor and call on-call Hospitalist if patient becomes symptomatic.      Akira Nuno PA-C

## 2022-07-04 LAB
ANION GAP SERPL CALCULATED.3IONS-SCNC: 15 MMOL/L (ref 3–14)
BACTERIA BLD CULT: NO GROWTH
BUN SERPL-MCNC: 83 MG/DL (ref 7–30)
CALCIUM SERPL-MCNC: 9.5 MG/DL (ref 8.5–10.1)
CHLORIDE BLD-SCNC: 94 MMOL/L (ref 94–109)
CO2 SERPL-SCNC: 23 MMOL/L (ref 20–32)
CREAT SERPL-MCNC: 11.7 MG/DL (ref 0.66–1.25)
ERYTHROCYTE [DISTWIDTH] IN BLOOD BY AUTOMATED COUNT: 14.5 % (ref 10–15)
GFR SERPL CREATININE-BSD FRML MDRD: 5 ML/MIN/1.73M2
GLUCOSE BLD-MCNC: 91 MG/DL (ref 70–99)
HCT VFR BLD AUTO: 26.5 % (ref 40–53)
HGB BLD-MCNC: 8.1 G/DL (ref 13.3–17.7)
MCH RBC QN AUTO: 33.8 PG (ref 26.5–33)
MCHC RBC AUTO-ENTMCNC: 30.6 G/DL (ref 31.5–36.5)
MCV RBC AUTO: 110 FL (ref 78–100)
PLATELET # BLD AUTO: 316 10E3/UL (ref 150–450)
POTASSIUM BLD-SCNC: 4.4 MMOL/L (ref 3.4–5.3)
RBC # BLD AUTO: 2.4 10E6/UL (ref 4.4–5.9)
SODIUM SERPL-SCNC: 132 MMOL/L (ref 133–144)
WBC # BLD AUTO: 8.4 10E3/UL (ref 4–11)

## 2022-07-04 PROCEDURE — 258N000003 HC RX IP 258 OP 636: Performed by: INTERNAL MEDICINE

## 2022-07-04 PROCEDURE — 250N000013 HC RX MED GY IP 250 OP 250 PS 637: Performed by: INTERNAL MEDICINE

## 2022-07-04 PROCEDURE — 87040 BLOOD CULTURE FOR BACTERIA: CPT | Performed by: INTERNAL MEDICINE

## 2022-07-04 PROCEDURE — 250N000011 HC RX IP 250 OP 636: Performed by: INTERNAL MEDICINE

## 2022-07-04 PROCEDURE — 90935 HEMODIALYSIS ONE EVALUATION: CPT | Performed by: INTERNAL MEDICINE

## 2022-07-04 PROCEDURE — 80048 BASIC METABOLIC PNL TOTAL CA: CPT | Performed by: INTERNAL MEDICINE

## 2022-07-04 PROCEDURE — 90937 HEMODIALYSIS REPEATED EVAL: CPT

## 2022-07-04 PROCEDURE — 120N000001 HC R&B MED SURG/OB

## 2022-07-04 PROCEDURE — 99232 SBSQ HOSP IP/OBS MODERATE 35: CPT | Performed by: INTERNAL MEDICINE

## 2022-07-04 PROCEDURE — 85027 COMPLETE CBC AUTOMATED: CPT | Performed by: INTERNAL MEDICINE

## 2022-07-04 PROCEDURE — 634N000001 HC RX 634: Performed by: INTERNAL MEDICINE

## 2022-07-04 PROCEDURE — 36415 COLL VENOUS BLD VENIPUNCTURE: CPT | Performed by: INTERNAL MEDICINE

## 2022-07-04 PROCEDURE — 999N000128 HC STATISTIC PERIPHERAL IV START W/O US GUIDANCE

## 2022-07-04 RX ORDER — ALBUMIN (HUMAN) 12.5 G/50ML
50 SOLUTION INTRAVENOUS
Status: DISCONTINUED | OUTPATIENT
Start: 2022-07-04 | End: 2022-07-04

## 2022-07-04 RX ORDER — ALBUMIN, HUMAN INJ 5% 5 %
50-250 SOLUTION INTRAVENOUS
Status: DISCONTINUED | OUTPATIENT
Start: 2022-07-04 | End: 2022-07-04

## 2022-07-04 RX ADMIN — PANTOPRAZOLE SODIUM 40 MG: 40 TABLET, DELAYED RELEASE ORAL at 06:35

## 2022-07-04 RX ADMIN — HYDROMORPHONE HYDROCHLORIDE 4 MG: 2 TABLET ORAL at 22:20

## 2022-07-04 RX ADMIN — CEFAZOLIN 1 G: 1 INJECTION, POWDER, FOR SOLUTION INTRAMUSCULAR; INTRAVENOUS at 14:26

## 2022-07-04 RX ADMIN — SEVELAMER CARBONATE 800 MG: 800 TABLET, FILM COATED ORAL at 17:49

## 2022-07-04 RX ADMIN — MIDODRINE HYDROCHLORIDE 10 MG: 5 TABLET ORAL at 14:19

## 2022-07-04 RX ADMIN — SODIUM CHLORIDE 250 ML: 9 INJECTION, SOLUTION INTRAVENOUS at 09:55

## 2022-07-04 RX ADMIN — FINASTERIDE 1.25 MG: 5 TABLET, FILM COATED ORAL at 14:20

## 2022-07-04 RX ADMIN — DULOXETINE 30 MG: 30 CAPSULE, DELAYED RELEASE ORAL at 14:20

## 2022-07-04 RX ADMIN — MIDODRINE HYDROCHLORIDE 10 MG: 5 TABLET ORAL at 17:39

## 2022-07-04 RX ADMIN — CARBIDOPA AND LEVODOPA 10 MG: 50; 200 TABLET, EXTENDED RELEASE ORAL at 10:27

## 2022-07-04 RX ADMIN — HYDROXYZINE HYDROCHLORIDE 25 MG: 25 TABLET, FILM COATED ORAL at 06:35

## 2022-07-04 RX ADMIN — MIDODRINE HYDROCHLORIDE 10 MG: 5 TABLET ORAL at 08:00

## 2022-07-04 RX ADMIN — CYCLOBENZAPRINE 10 MG: 10 TABLET, FILM COATED ORAL at 17:39

## 2022-07-04 RX ADMIN — HYDROXYZINE HYDROCHLORIDE 25 MG: 25 TABLET, FILM COATED ORAL at 22:20

## 2022-07-04 RX ADMIN — ATORVASTATIN CALCIUM 20 MG: 20 TABLET, FILM COATED ORAL at 22:20

## 2022-07-04 RX ADMIN — POLYETHYLENE GLYCOL 3350 17 G: 17 POWDER, FOR SOLUTION ORAL at 14:17

## 2022-07-04 RX ADMIN — HYDROMORPHONE HYDROCHLORIDE 4 MG: 2 TABLET ORAL at 06:35

## 2022-07-04 RX ADMIN — HYDROMORPHONE HYDROCHLORIDE 4 MG: 2 TABLET ORAL at 17:39

## 2022-07-04 RX ADMIN — SODIUM CHLORIDE 300 ML: 9 INJECTION, SOLUTION INTRAVENOUS at 09:52

## 2022-07-04 RX ADMIN — SENNOSIDES AND DOCUSATE SODIUM 2 TABLET: 50; 8.6 TABLET ORAL at 22:20

## 2022-07-04 RX ADMIN — EPOETIN ALFA-EPBX 10000 UNITS: 10000 INJECTION, SOLUTION INTRAVENOUS; SUBCUTANEOUS at 09:55

## 2022-07-04 RX ADMIN — SEVELAMER CARBONATE 800 MG: 800 TABLET, FILM COATED ORAL at 14:20

## 2022-07-04 RX ADMIN — CETIRIZINE HYDROCHLORIDE 10 MG: 10 TABLET, FILM COATED ORAL at 14:19

## 2022-07-04 ASSESSMENT — ACTIVITIES OF DAILY LIVING (ADL)
ADLS_ACUITY_SCORE: 50

## 2022-07-04 NOTE — PROGRESS NOTES
"[facility]    Medicine Progress Note - Hospitalist Service    Date of Admission:  6/24/2022    Assessment & Plan        Shay Jimenez is a very pleasant 57-year-old male with past medical history significant for end-stage renal disease, on hemodialysis, chronic peripheral neuropathy affecting his feet, history of hypertension no longer on meds, hyperlipidemia, obesity, obstructive sleep apnea, GERD and depression, among other medical problems, who presented 6/24/22 for evaluation of worsening upper back pain with associated generalized weakness and falls.  He was found to have abnormal findings on thoracic MRI concerning for diskitis with osteomyelitis with also some findings of a compressive myelopathy.      Upper back pain  Generalized weakness  Falls   Epidural phlegmon/abscess   Compressive myelopathy  S/P C6-T6 fusion/T2-3 decompression  *back pain for 6 months. CT chest 2/2022 showed concern for upper thoracic spine diskitis vs osteomyelitis. Unclear follow up from that time  *in weeks prior to admission, MRI showed possible thoracic compression fracture.   *developed progressively weak legs with frequent falls.  *on admission imaging with T2-T3 diskitis, osteomyelitis and compressive myelopathy.        appreciate Neurosurgery care    follow blood cultures, they have no growth after 5 days    ID consult requested, I appreciate Dr. Rain's evaluation recommendations    Per her, \"changes could all be from DJD, CRP mildly elevated, no fever/chills, slow intermittent progression, I am not convinced this is infection but it still could be, he has had neg. PPD in past and no TB risk factors or contacts.\"     routine gram stain culture, fungal stain and culture, AFB stain and culture and pathology sent from OR    Also per Dr. Rain, \"continue on Ancef for now as we wait on the cultures.  If ready for discharge okay to discharge off antibiotics.\"  She plans to follow-up on the cultures " "for any late growth and start antimicrobials if/when needed.    Tissue culture has gram-positive bacilli isolated in broth only, resembling diphtheroids on day 5 of incubation; ID notified of this result.  Per Dr. Rain, \"GPB resembling diphtheroid 5 days later growth in broth only is a contaminate, no changes to plan for discharge off antibiotics.\"    Oxycodone \"didn't work at all\" for patient, was discontinued. Tylenol and oral Dilaudid available as needed for pain.      End-stage renal disease, on hemodialysis  Hypotension  Hyponatremia  *Typically dialyzes Monday, Wednesday, Friday. Missed 6/24 session due to being in ED      Nephrology consult appreciated    Dialysis per nephrology.    Blood pressures readings are soft.  Per Dr. Carr, \"okay to run and pull fluid with BP in the 70s as long as he is asymptomatic.\"    Continue scheduled midodrine    PTA sevelamer     Encephalopathy  Myclonus    Today, patient is drowsy and seems very mildly confused.  He had some myoclonic jerks of both hands.    Shay is on gabapentin 300 mg 3 times daily, this is a home medication and dose has been unchanged since he came to the hospital but is higher than the typical maximum dose recommended even for patients on CRRT    Presume encephalopathy is related to buildup of gabapentin metabolites from intradialytic..  Hold gabapentin and Zoloft    When encephalopathy improves, would resume gabapentin at decreased dose, 100 mg at at bedtime    Monitor signs and symptoms    GERD    Chronic and stable on PPI.    Anemia of chronic kidney disease    Per Dr. Carr, \"increase erythropoietin to 10K with dialysis\"    Follow hemoglobin    Consider transfusion for hemoglobin less than 7 g/dL and symptoms      Hyperlipidemia    Chronic and stable, on statin.    Peripheral neuropathy of the feet  *Follows with Neurology.      Managed in part with duloxetine, continue    Depression    Chronic and stable on Zoloft.    Obesity, class III  Obstructive " "sleep apnea  *BMI this stay is 43.10.  He does not use CPAP (patient says, \"I am one of those guys who does not tolerate CPAP.\") Encourage diet, lifestyle modifications once above issues addressed.    History of BPH.    Continue Proscar     Diet: Regular Diet Adult  Diet    DVT Prophylaxis: Pneumatic Compression Devices  Oliveira Catheter: Not present  Central Lines: None  Cardiac Monitoring: None  Code Status: Full Code      Disposition Plan      Expected Discharge Date: 07/07/2022    Discharge Delays: Placement - TCU  Destination: inpatient rehabilitation facility  Discharge Comments: Needs ARU.          Eunice Wilson MD  Hospitalist Service  [facility]  Securely message with the Vocera Web Console (learn more here)  Text page via [employer] Paging/Directory     Clinically Significant Risk Factors Present on Admission         # Acute Kidney Injury, unspecified: based on a >150% or 0.3 mg/dL increase in creatinine on admission compared to past 90 day average, will monitor renal function            _______________________________________________________________    Interval History   \"I feel like Frankenstein.\" Patient seen during dialysis run, he is somewhat drowsy and confused.  He says he \"feels like Frankenstein\" because he is \"poked and prodded\" all the time. He says his pain is under good control.  He has no new respiratory or GI complaints.    Data reviewed today: I reviewed all medications, new labs and imaging results over the last 24 hours. I personally reviewed no images or EKG's today.    Physical Exam   Vital Signs: Temp: 98.2  F (36.8  C) Temp src: Oral BP: 94/59 Pulse: 85   Resp: 16 SpO2: (!) 89 % O2 Device: Nasal cannula Oxygen Delivery: 3 LPM  Weight: 319 lbs 10.67 oz  Constitutional: Awake, alert, seems mildly confused  Respiratory: Clear to auscultation bilaterally, no crackles or wheezing  Cardiovascular: Regular rate and rhythm  GI: Obese, soft, bowel sounds are " present  Skin/Integumen: No rash on exposed skin  Psych: Mood is perplexed      Data   Recent Labs   Lab 07/04/22  0715 07/03/22  0658 07/02/22  0648 07/01/22  0613 06/29/22  0637 06/29/22  0611 06/27/22  1859 06/27/22  1602   WBC 8.4 8.0 8.4 7.2   < > 8.4   < >  --    HGB 8.1* 8.3* 8.4* 7.5*   < > 8.0*   < >  --    * 111* 110* 111*   < > 112*   < >  --     282 311 260   < > 191   < >  --    INR  --   --   --   --   --   --   --  1.07   * 135  --  136  --  131*   < >  --    POTASSIUM 4.4 4.8  --  4.2   < > 5.3   < >  --    CHLORIDE 94 96  --  96  --  97   < >  --    CO2 23 29  --  27  --  23   < >  --    BUN 83* 66*  --  63*  --  61*   < >  --    CR 11.70* 9.90*  --  8.60*  --  9.60*   < >  --    ANIONGAP 15* 10  --  13  --  11   < >  --    MAC 9.5 9.8  --  9.1  --  8.3*   < >  --    GLC 91 123*  --  102*   < > 116*   < >  --    ALBUMIN  --   --   --   --   --  2.9*  --   --     < > = values in this interval not displayed.     No results found for this or any previous visit (from the past 24 hour(s)).  Medications       - MEDICATION INSTRUCTIONS for Dialysis Patients -   Does not apply See Admin Instructions     atorvastatin  20 mg Oral At Bedtime     ceFAZolin  1 g Intravenous Q24H     cetirizine  10 mg Oral Daily     DULoxetine  30 mg Oral Daily     finasteride  1.25 mg Oral Daily     [Held by provider] gabapentin  300 mg Oral TID     midodrine  10 mg Oral TID w/meals     pantoprazole  40 mg Oral QAM AC     polyethylene glycol  17 g Oral Daily     senna-docusate  1 tablet Oral BID    Or     senna-docusate  2 tablet Oral BID     [Held by provider] sertraline  100 mg Oral Daily     sevelamer carbonate  800 mg Oral TID w/meals     sodium chloride (PF)  3 mL Intracatheter Q8H     sodium chloride (PF)  3 mL Intracatheter Q8H

## 2022-07-04 NOTE — PROGRESS NOTES
[facility]     Renal Progress Note       SHORTHAND KEY FOR MY NOTES:  c = with, s = without, p = after, a = before, x = except, asx = asymptomatic, tx = transplant or treatment, sx = symptoms or symptomatic, cx = canceled or culture, rxn = reaction, yday = yesterday, nl = normal, abx = antibiotics, fxn = function, dx = diagnosis, dz = disease, m/h = melena/hematochezia, c/d/l/ha = cramping/dizziness/lightheadedness/headache, d/c = discharge or diarrhea/constipation, f/c/n/v = fevers/chills/nausea/vomiting, cp/sob = chest pain/shortness of breath, tbv = total body volume, rxn = reaction, tdc = tunneled dialysis catheter, pta = prior to admission, hd = hemodialysis, pd = peritoneal dialysis, hhd = home hemodialysis, edw = estimated dry wt         Assessment/Plan:     1.  ESKD.  Pt is running today per a [facility] schedule.  A.  Continue midodrine pre and mid-run.  B.  Next planned run on Weds.    2.  POD #7 s/p C6-T6 fusion / T2-3 decompression.  He is doing ok, clinically.  He is tolerating the neck brace and is on pain meds prn.  He is still on abx and wonders when they will be stopped.  A.  Plans per NSG.  B.  Abx course?  Will defer to ID.    3.  Slight confusion / twitching.  This has improved  p stopping yossi.  A.  Resume yossi 300 mg at bedtime.  B.  Follow sx.    4.  Hypotension.  This is chronic and he is on midodrine.  He is generally asx c the low BPs, but does get sx if the BP drops too low.  A.  Continue scheduled midodrine.  B.  Give an additional 10 mg midodrine mid-run on dialysis days.  C.  Ok for low BP as long as he's not symptomatic.      5.  FEN.  Electrolytes are ok.    A.  Continue reg diet.        Interval History:     Pt is feeling fine today.  No f/c/n/v/itching/twitching/confusion.              Medications and Allergies:       - MEDICATION INSTRUCTIONS for Dialysis Patients -   Does not apply See Admin Instructions     atorvastatin  20 mg Oral At Bedtime     ceFAZolin  1 g  "Intravenous Q24H     cetirizine  10 mg Oral Daily     DULoxetine  30 mg Oral Daily     finasteride  1.25 mg Oral Daily     [Held by provider] gabapentin  300 mg Oral TID     midodrine  10 mg Oral TID w/meals     - MEDICATION INSTRUCTIONS -   Does not apply Once     pantoprazole  40 mg Oral QAM AC     polyethylene glycol  17 g Oral Daily     senna-docusate  1 tablet Oral BID    Or     senna-docusate  2 tablet Oral BID     [Held by provider] sertraline  100 mg Oral Daily     sevelamer carbonate  800 mg Oral TID w/meals     sodium chloride (PF)  3 mL Intracatheter Q8H     sodium chloride (PF)  3 mL Intracatheter Q8H     Allergies   Allergen Reactions     Amlodipine      Lisinopril           Physical Exam:     Vitals were reviewed     , Blood pressure (!) 72/40, pulse 99, temperature 98.4  F (36.9  C), temperature source Axillary, resp. rate 16, height 1.859 m (6' 1.2\"), weight 145 kg (319 lb 10.7 oz), SpO2 97 %.  Wt Readings from Last 3 Encounters:   06/29/22 145 kg (319 lb 10.7 oz)     No intake or output data in the 24 hours ending 07/04/22 1140    GENERAL APPEARANCE:  lying in bed, NAD, alert, not confused right now  HEENT:  eyes/ears/nose/neck grossly nl x neck brace  RESP: CTA B c good efforts  CV: RRR c 2/6 m, nl S1/S2   ABDOMEN: o/s/nt/nd  EXTREMITIES/SKIN: + tr ble edema - chronic-appearing skin changes  NEURO: no twitching  ACCESS:  LAF c good bruit/thrill    Pt seen on HD.  Stable run.  -4L UF goal.  Good BFR via LAF.         Data:     CBC RESULTS:     Recent Labs   Lab 07/04/22  0715 07/03/22  0658 07/02/22  0648 07/01/22  0613 06/30/22  0530 06/29/22  0611   WBC 8.4 8.0 8.4 7.2 8.3 8.4   RBC 2.40* 2.45* 2.50* 2.20* 2.34* 2.31*   HGB 8.1* 8.3* 8.4* 7.5* 7.9* 8.0*   HCT 26.5* 27.1* 27.5* 24.3* 25.6* 25.8*    282 311 260 229 191     Basic Metabolic Panel:  Recent Labs   Lab 07/04/22  0715 07/03/22  0658 07/01/22  0613 06/30/22  0530 06/29/22  0918 06/29/22  0637 06/29/22  0611 06/28/22  0809 " 06/28/22  0420 06/27/22  2224   0000   * 135 136  --   --   --  131*  --  135 135  --    POTASSIUM 4.4 4.8 4.2 4.1  --   --  5.3  --  4.7 4.0  --    CHLORIDE 94 96 96  --   --   --  97  --  98  --   --    CO2 23 29 27  --   --   --  23  --  26  --   --    BUN 83* 66* 63*  --   --   --  61*  --  49*  --   --    CR 11.70* 9.90* 8.60*  --   --   --  9.60*  --  6.86*  --   --    GLC 91 123* 102*  --  83 92 116*   < > 153*  --    < >   MAC 9.5 9.8 9.1  --   --   --  8.3*  --  8.7  --   --     < > = values in this interval not displayed.     INR  Recent Labs   Lab 06/27/22  1602   INR 1.07      Attestation:   I have reviewed today's relevant vital signs, notes, medications, labs and imaging.    Dejuan Carr MD  [facility] - Nephrology  [phone]

## 2022-07-04 NOTE — PROGRESS NOTES
Potassium   Date Value Ref Range Status   07/04/2022 4.4 3.4 - 5.3 mmol/L Final     Hemoglobin   Date Value Ref Range Status   07/04/2022 8.1 (L) 13.3 - 17.7 g/dL Final     Creatinine   Date Value Ref Range Status   07/04/2022 11.70 (H) 0.66 - 1.25 mg/dL Final     Urea Nitrogen   Date Value Ref Range Status   07/04/2022 83 (H) 7 - 30 mg/dL Final     Sodium   Date Value Ref Range Status   07/04/2022 132 (L) 133 - 144 mmol/L Final     INR   Date Value Ref Range Status   06/27/2022 1.07 0.85 - 1.15 Final      Latest Reference Range & Units 06/25/22 11:53   Hepatitis B Surface Antibody Negative  Negative   Hep B Surface Agn Nonreactive  Nonreactive   Hepatitis B Core Mary[place] Negative  Negative   Hepatitis B Surface Antibody <8.00 m[IU]/mL 1.29       DIALYSIS PROCEDURE NOTE  Hepatitis status of previous patient on machine log was checked and verified ok to use with this patients hepatitis status.  Patient dialyzed for 4.25 hrs. on a K3 bath with a net fluid removal of  3.2L.  A BFR of 400 ml/min was obtained via a LUE AVF using 15 gauge needles.      The treatment plan was discussed with Dr. Carr during the treatment.    Total heparin received during the treatment: 0 units.   Needle cannulation sites held x 5 min.     Meds  given: Epogen 10,000units and midodrine 10mg, see MAR   Complications: system began to clot near end of treatment. Able to finish and rinse blood back. Patient had intermittent confusion throughout treatment that was worse when BP was low. UF turned off. Dr. Carr notified.       Person educated: patient. Knowledge base substantial. Barriers to learning: none. Educated on provedure via verbal mode. Patient verbalized understanding. Pt prefers verbal education style.     ICEBOAT? Timeout performed pre-treatment  I: Patient was identified using 2 identifiers  C:  Consent Signed Yes  E: Equipment preventative maintenance is current and dialysis delivery system OK to use  B: Hepatitis B Surface Antigen:  NEGATIVE; Draw Date: 6/25/2022      Hepatitis B Surface Antibody: SUSCEPTIBLE; Draw Date: 6/25/2022  O: Dialysis orders present and complete prior to treatment  A: Vascular access verified and assessed prior to treatment  T: Treatment was performed at a clinically appropriate time  ?: Patient was allowed to ask questions and address concerns prior to treatment  See Adult Hemodialysis flowsheet in [facility] for further details and post assessment.  Machine water alarm in place and functioning. Transducer pods intact and checked every 15min.   Pt returned via bed.  Chlorine/Chloramine water system checked every 4 hours.  Outpatient Dialysis at [facility] MWF      Post treatment report given to PENELOPE Kline RN regarding 3.2L of fluid removed, last BP of 79/50, and patient pain rating of 5/10 in mid and lower back.     Please remove patient dressing on AVF and AVG needle sites 24 hours after dialysis. If leaking occurs please apply a Band-Aid.

## 2022-07-04 NOTE — PLAN OF CARE
Goal Outcome Evaluation:  Patient vital signs are at baseline: yes  Patient able to ambulate as they were prior to admission or with assist devices provided by therapies during their stay:  no  Patient MUST void prior to discharge:  no  Patient able to tolerate oral intake:  yes  Pain has adequate pain control using Oral analgesics:  yes  Does patient have an identified :  yes  Has goal D/C date and time been discussed with patient:  yes  Alert and oriented X3, regular diet, assist of two with lift.  Pt BP is low on arm and normal on both legs.  Pain managed with prn dilaudid and flexeril.

## 2022-07-04 NOTE — PROGRESS NOTES
Care Management Follow Up    Length of Stay (days): 10    Expected Discharge Date: 07/05/2022     Concerns to be Addressed: discharge planning     Patient plan of care discussed at interdisciplinary rounds: Yes    Anticipated Discharge Disposition: Acute Rehab, Transitional Care     Anticipated Discharge Services: Transportation Services  Anticipated Discharge DME:      Patient/family educated on Medicare website which has current facility and service quality ratings:    Education Provided on the Discharge Plan:    Patient/Family in Agreement with the Plan: yes    Referrals Placed by CM/SW: Post Acute Facilities  Private pay costs discussed: Not applicable    Additional Information:  SHAKIRA called [facility] and left a message asking for a callback to follow up on referral. SHAKIRA will continue to follow.      JULISSA Treviño    [facility]

## 2022-07-04 NOTE — PLAN OF CARE
Goal Outcome Evaluation:    Plan of Care Reviewed With: patient        Plan of Care Reviewed With: patient      Patient vital signs are at baseline: Yes  Patient able to ambulate as they were prior to admission or with assist devices provided by therapies during their stay:  No, NOT oob - per PM nurse pt transfers include lift use  Patient MUST void prior to discharge:  No, on hemodialysis  Patient able to tolerate oral intake:  Yes  Pain has adequate pain control using Oral analgesics:  Yes  Does patient have an identified :  No,  Reason:  TBD  Has goal D/C date and time been discussed with patient:  No,  Reason:  , discharge pending     Post op C6-T6 fusion & decompression. Aox2-4. Forgetful abt situation and place (refers to CABG surgery and talks about being outside hospital). Soft BPs. On 4 Lpm via NC while sleeping. Dilaudid and atarax for pain. Surgical incision open to air, ice applied. Turns well in bed - not OOB this shift. Assist of 2 w/ lift for mobility. Continue w/ plan of care.

## 2022-07-04 NOTE — PLAN OF CARE
Reason for Disposition   No answer.  First attempt to contact caller.  Follow-up call scheduled within 15 minutes.    Additional Information   Negative: Caller is angry or rude (e.g., hangs up, verbally abusive, yelling)   Negative: Caller hangs up   Negative: Caller has already spoken with the PCP and has no further questions.   Negative: Caller has already spoken with another triager and has no further questions.   Negative: Caller has already spoken with another triager or PCP AND has further questions AND triager able to answer questions.   Negative: Busy signal.  First attempt to contact caller.  Follow-up call scheduled within 15 minutes.    Protocols used: NO CONTACT OR DUPLICATE CONTACT CALL-A-     Patient dialysis today, was intermittently confused,  Pain medication was given one time, on 2 liters of oxygen, dressing open to air, was up on chair today.

## 2022-07-05 ENCOUNTER — APPOINTMENT (OUTPATIENT)
Dept: OCCUPATIONAL THERAPY | Facility: CLINIC | Age: 57
DRG: 459 | End: 2022-07-05
Payer: MEDICARE

## 2022-07-05 ENCOUNTER — APPOINTMENT (OUTPATIENT)
Dept: PHYSICAL THERAPY | Facility: CLINIC | Age: 57
DRG: 459 | End: 2022-07-05
Payer: MEDICARE

## 2022-07-05 LAB
BACTERIA BLD CULT: NO GROWTH
BACTERIA TISS BX CULT: ABNORMAL
ERYTHROCYTE [DISTWIDTH] IN BLOOD BY AUTOMATED COUNT: 14.6 % (ref 10–15)
HCT VFR BLD AUTO: 27 % (ref 40–53)
HGB BLD-MCNC: 8.4 G/DL (ref 13.3–17.7)
MCH RBC QN AUTO: 33.9 PG (ref 26.5–33)
MCHC RBC AUTO-ENTMCNC: 31.1 G/DL (ref 31.5–36.5)
MCV RBC AUTO: 109 FL (ref 78–100)
PLATELET # BLD AUTO: 296 10E3/UL (ref 150–450)
RBC # BLD AUTO: 2.48 10E6/UL (ref 4.4–5.9)
WBC # BLD AUTO: 10 10E3/UL (ref 4–11)

## 2022-07-05 PROCEDURE — 97530 THERAPEUTIC ACTIVITIES: CPT | Mod: GP

## 2022-07-05 PROCEDURE — 250N000011 HC RX IP 250 OP 636: Performed by: INTERNAL MEDICINE

## 2022-07-05 PROCEDURE — 97530 THERAPEUTIC ACTIVITIES: CPT | Mod: GO

## 2022-07-05 PROCEDURE — 250N000013 HC RX MED GY IP 250 OP 250 PS 637: Performed by: INTERNAL MEDICINE

## 2022-07-05 PROCEDURE — 85027 COMPLETE CBC AUTOMATED: CPT | Performed by: INTERNAL MEDICINE

## 2022-07-05 PROCEDURE — 87077 CULTURE AEROBIC IDENTIFY: CPT | Performed by: INTERNAL MEDICINE

## 2022-07-05 PROCEDURE — 99233 SBSQ HOSP IP/OBS HIGH 50: CPT | Performed by: INTERNAL MEDICINE

## 2022-07-05 PROCEDURE — 87149 DNA/RNA DIRECT PROBE: CPT | Performed by: INTERNAL MEDICINE

## 2022-07-05 PROCEDURE — 36415 COLL VENOUS BLD VENIPUNCTURE: CPT | Performed by: INTERNAL MEDICINE

## 2022-07-05 PROCEDURE — 120N000001 HC R&B MED SURG/OB

## 2022-07-05 RX ORDER — MIDODRINE HYDROCHLORIDE 5 MG/1
10 TABLET ORAL
Status: COMPLETED | OUTPATIENT
Start: 2022-07-06 | End: 2022-07-06

## 2022-07-05 RX ORDER — GABAPENTIN 100 MG/1
100 CAPSULE ORAL 3 TIMES DAILY
Status: DISCONTINUED | OUTPATIENT
Start: 2022-07-05 | End: 2022-07-15 | Stop reason: HOSPADM

## 2022-07-05 RX ADMIN — SEVELAMER CARBONATE 800 MG: 800 TABLET, FILM COATED ORAL at 18:39

## 2022-07-05 RX ADMIN — MIDODRINE HYDROCHLORIDE 10 MG: 5 TABLET ORAL at 12:55

## 2022-07-05 RX ADMIN — DULOXETINE 30 MG: 30 CAPSULE, DELAYED RELEASE ORAL at 10:01

## 2022-07-05 RX ADMIN — SEVELAMER CARBONATE 800 MG: 800 TABLET, FILM COATED ORAL at 12:55

## 2022-07-05 RX ADMIN — CYCLOBENZAPRINE 10 MG: 10 TABLET, FILM COATED ORAL at 18:43

## 2022-07-05 RX ADMIN — HYDROMORPHONE HYDROCHLORIDE 4 MG: 2 TABLET ORAL at 22:14

## 2022-07-05 RX ADMIN — PANTOPRAZOLE SODIUM 40 MG: 40 TABLET, DELAYED RELEASE ORAL at 07:29

## 2022-07-05 RX ADMIN — SEVELAMER CARBONATE 800 MG: 800 TABLET, FILM COATED ORAL at 10:00

## 2022-07-05 RX ADMIN — MIDODRINE HYDROCHLORIDE 10 MG: 5 TABLET ORAL at 18:39

## 2022-07-05 RX ADMIN — GABAPENTIN 100 MG: 100 CAPSULE ORAL at 16:14

## 2022-07-05 RX ADMIN — CETIRIZINE HYDROCHLORIDE 10 MG: 10 TABLET, FILM COATED ORAL at 10:00

## 2022-07-05 RX ADMIN — HYDROXYZINE HYDROCHLORIDE 25 MG: 25 TABLET, FILM COATED ORAL at 13:29

## 2022-07-05 RX ADMIN — CEFAZOLIN 1 G: 1 INJECTION, POWDER, FOR SOLUTION INTRAMUSCULAR; INTRAVENOUS at 10:00

## 2022-07-05 RX ADMIN — HYDROMORPHONE HYDROCHLORIDE 4 MG: 2 TABLET ORAL at 10:27

## 2022-07-05 RX ADMIN — HYDROMORPHONE HYDROCHLORIDE 4 MG: 2 TABLET ORAL at 03:13

## 2022-07-05 RX ADMIN — FINASTERIDE 1.25 MG: 5 TABLET, FILM COATED ORAL at 10:00

## 2022-07-05 RX ADMIN — SENNOSIDES AND DOCUSATE SODIUM 2 TABLET: 50; 8.6 TABLET ORAL at 20:40

## 2022-07-05 RX ADMIN — SENNOSIDES AND DOCUSATE SODIUM 2 TABLET: 50; 8.6 TABLET ORAL at 10:01

## 2022-07-05 RX ADMIN — GABAPENTIN 100 MG: 100 CAPSULE ORAL at 22:08

## 2022-07-05 RX ADMIN — ATORVASTATIN CALCIUM 20 MG: 20 TABLET, FILM COATED ORAL at 22:08

## 2022-07-05 RX ADMIN — ALPRAZOLAM 1 MG: 0.25 TABLET ORAL at 22:15

## 2022-07-05 RX ADMIN — CYCLOBENZAPRINE 10 MG: 10 TABLET, FILM COATED ORAL at 10:27

## 2022-07-05 RX ADMIN — POLYETHYLENE GLYCOL 3350 17 G: 17 POWDER, FOR SOLUTION ORAL at 10:19

## 2022-07-05 RX ADMIN — HYDROMORPHONE HYDROCHLORIDE 4 MG: 2 TABLET ORAL at 18:43

## 2022-07-05 RX ADMIN — MIDODRINE HYDROCHLORIDE 10 MG: 5 TABLET ORAL at 10:00

## 2022-07-05 ASSESSMENT — ACTIVITIES OF DAILY LIVING (ADL)
ADLS_ACUITY_SCORE: 50
ADLS_ACUITY_SCORE: 50
ADLS_ACUITY_SCORE: 49
ADLS_ACUITY_SCORE: 49
ADLS_ACUITY_SCORE: 50
ADLS_ACUITY_SCORE: 49
ADLS_ACUITY_SCORE: 46
ADLS_ACUITY_SCORE: 49
ADLS_ACUITY_SCORE: 46
ADLS_ACUITY_SCORE: 49

## 2022-07-05 NOTE — PROVIDER NOTIFICATION
MD Notification    Notified Person: MD    Notified Person Name: Dustin Mclean     Notification Date/Time:  7/4/22 @ 2254    Notification Interaction: page     Purpose of Notification: low BP see MAR     Orders Received:    Comments:

## 2022-07-05 NOTE — PROGRESS NOTES
"    Medicine Progress Note - Hospitalist Service    Date of Admission:  6/24/2022    Assessment & Plan        Shay Jimenez is a very pleasant 57-year-old male with past medical history significant for end-stage renal disease, on hemodialysis, chronic peripheral neuropathy affecting his feet, history of hypertension no longer on meds, hyperlipidemia, obesity, obstructive sleep apnea, GERD and depression, among other medical problems, who presented 6/24/22 for evaluation of worsening upper back pain with associated generalized weakness and falls.  He was found to have abnormal findings on thoracic MRI concerning for diskitis with osteomyelitis with also some findings of a compressive myelopathy.      Upper back pain  Generalized weakness  Falls   Epidural phlegmon/abscess   Compressive myelopathy  S/P C6-T6 fusion/T2-3 decompression  *back pain for 6 months. CT chest 2/2022 showed concern for upper thoracic spine diskitis vs osteomyelitis. Unclear follow up from that time  *in weeks prior to admission, MRI showed possible thoracic compression fracture.   *developed progressively weak legs with frequent falls.  *on admission imaging with T2-T3 diskitis, osteomyelitis and compressive myelopathy.        appreciate Neurosurgery care    follow blood cultures, they have no growth after 5 days    ID consult requested, I appreciate Dr. Rain's evaluation recommendations    Per her, \"changes could all be from DJD, CRP mildly elevated, no fever/chills, slow intermittent progression, I am not convinced this is infection but it still could be, he has had neg. PPD in past and no TB risk factors or contacts.\"     routine gram stain culture, fungal stain and culture, AFB stain and culture and pathology sent from OR    Also per Dr. Rain, \"continue on Ancef for now as we wait on the cultures.  If ready for discharge okay to discharge off antibiotics.\"  She plans to follow-up on the cultures " "for any late growth and start antimicrobials if/when needed.    Tissue culture has gram-positive bacilli isolated in broth only, resembling diphtheroids on day 5 of incubation; ID notified of this result.  Per Dr. Rain, \"GPB resembling diphtheroid 5 days later growth in broth only is a contaminate, no changes to plan for discharge off antibiotics.\"    Oxycodone \"didn't work at all\" for patient, was discontinued. Tylenol and oral Dilaudid available as needed for pain.     pain is better controlled, he is working with PT, he could  stand up today and is looking forward to go to ARU to get back his strength      End-stage renal disease, on hemodialysis  Hypotension  Hyponatremia  *Typically dialyzes Monday, Wednesday, Friday. Missed 6/24 session due to being in ED      Nephrology consult appreciated    Dialysis per nephrology.    Blood pressures readings are soft.  Per Dr. Carr, \"okay to run and pull fluid with BP in the 70s as long as he is asymptomatic.\"    Continue scheduled midodrine    PTA sevelamer     Encephalopathy  Myclonus    Today, patient is drowsy and seems very mildly confused.  He had some myoclonic jerks of both hands.    Shay is on gabapentin 300 mg 3 times daily, this is a home medication and dose has been unchanged since he came to the hospital but is higher than the typical maximum dose recommended even for patients on CRRT    Presume encephalopathy is related to buildup of gabapentin metabolites from intradialytic..  Gabapentin and Zoloft was on hold, will start with gabapentin  low-dose of 100 mg at bedtime since encephalopathy is improved.  Restarted 7/5.    Monitor signs and symptoms    GERD    Chronic and stable on PPI.    Anemia of chronic kidney disease    Per Dr. Carr, \"increase erythropoietin to 10K with dialysis\"    Follow hemoglobin    Consider transfusion for hemoglobin less than 7 g/dL and symptoms      Hyperlipidemia    Chronic and stable, on statin.    Peripheral neuropathy of the " "feet  *Follows with Neurology.      Managed in part with duloxetine, continue    Depression    Chronic and stable on Zoloft.    Obesity, class III  Obstructive sleep apnea  *BMI this stay is 43.10.  He does not use CPAP (patient says, \"I am one of those guys who does not tolerate CPAP.\") Encourage diet, lifestyle modifications once above issues addressed.    History of BPH.    Continue Proscar     Diet: Regular Diet Adult  Diet    DVT Prophylaxis: Pneumatic Compression Devices  Oliveira Catheter: Not present  Central Lines: None  Cardiac Monitoring: None  Code Status: Full Code      Disposition Plan      Expected Discharge Date: 07/07/2022    Discharge Delays: Placement - TCU  Destination: inpatient rehabilitation facility  Discharge Comments: ARU following          Oksana Spicer MD  Hospitalist Service  [facility]  Securely message with the Vocera Web Console (learn more here)  Text page via [employer] Paging/Directory     Clinically Significant Risk Factors Present on Admission         # Acute Kidney Injury, unspecified: based on a >150% or 0.3 mg/dL increase in creatinine on admission compared to past 90 day average, will monitor renal function            _______________________________________________________________    Interval History   Patient is awake and participating in communication, he worked with PT, was able to get out of bed and stand with the help of a walker, he is looking forward to being released from the hospital and working with PT.    Data reviewed today: I reviewed all medications, new labs and imaging results over the last 24 hours. I personally reviewed no images or EKG's today.    Physical Exam   Vital Signs: Temp: 98  F (36.7  C) Temp src: Oral BP: (!) 88/59 Pulse: 91   Resp: 16 SpO2: 99 % O2 Device: Nasal cannula Oxygen Delivery: 2 LPM  Weight: 319 lbs 10.67 oz  Constitutional: Awake, alert, oriented x3  Respiratory: Clear to auscultation bilaterally, no crackles or " wheezing  Cardiovascular: Regular rate and rhythm  GI: Obese, soft, bowel sounds are present  Skin/Integumen: No rash on exposed skin  Psych: Mood is appropriate      Data   Recent Labs   Lab 07/05/22  0743 07/04/22  0715 07/03/22  0658 07/02/22  0648 07/01/22  0613 06/29/22  0637 06/29/22  0611   WBC 10.0 8.4 8.0   < > 7.2   < > 8.4   HGB 8.4* 8.1* 8.3*   < > 7.5*   < > 8.0*   * 110* 111*   < > 111*   < > 112*    316 282   < > 260   < > 191   NA  --  132* 135  --  136  --  131*   POTASSIUM  --  4.4 4.8  --  4.2   < > 5.3   CHLORIDE  --  94 96  --  96  --  97   CO2  --  23 29  --  27  --  23   BUN  --  83* 66*  --  63*  --  61*   CR  --  11.70* 9.90*  --  8.60*  --  9.60*   ANIONGAP  --  15* 10  --  13  --  11   MAC  --  9.5 9.8  --  9.1  --  8.3*   GLC  --  91 123*  --  102*   < > 116*   ALBUMIN  --   --   --   --   --   --  2.9*    < > = values in this interval not displayed.     No results found for this or any previous visit (from the past 24 hour(s)).  Medications       - MEDICATION INSTRUCTIONS for Dialysis Patients -   Does not apply See Admin Instructions     atorvastatin  20 mg Oral At Bedtime     ceFAZolin  1 g Intravenous Q24H     cetirizine  10 mg Oral Daily     DULoxetine  30 mg Oral Daily     finasteride  1.25 mg Oral Daily     [Held by provider] gabapentin  300 mg Oral TID     midodrine  10 mg Oral TID w/meals     pantoprazole  40 mg Oral QAM AC     polyethylene glycol  17 g Oral Daily     senna-docusate  1 tablet Oral BID    Or     senna-docusate  2 tablet Oral BID     [Held by provider] sertraline  100 mg Oral Daily     sevelamer carbonate  800 mg Oral TID w/meals     sodium chloride (PF)  3 mL Intracatheter Q8H     sodium chloride (PF)  3 mL Intracatheter Q8H

## 2022-07-05 NOTE — INTERIM SUMMARY
[facility] Pre-Admission Screen    Referral Source:  [facility] 2405-01  Admit date to referring facility: 6/24/2022    Physical Medicine and Rehab Consult Completed: No    Rehab Diagnosis:    Spinal Cord Dysfunction Spinal Non-Traumatic 04.111 Paraplegia, Incomplete    Justification for Acute Inpatient Rehabilitation  Shay Jimenez is a 57 year old male with past medical history including ESRD on dialysis, worsening upper thoracic pain since November/December, 2021 with new bilateral lower extremity weakness over the week prior to admit who admitted on 6/24/2022. Patient noted upper thoracic pain that started in November/December, 2021 that was aggravated with movement, coughing, sneezing and alleviated with resting. He had MRI at [facility] 2-3 weeks prior to admit that demonstrated fractures at T2-3. [facility] advised him to perform light duties and follow up in 2-3 months. He was recently seen at [facility] who recommended further imaging and CT scan. He was able to walk with a cane or walker 1 week prior to admit, however he had noticed worsening lower extremity numbness and weakness. He notes he has numbness down entire distribution of bilateral lower extremities and weakness worse at bilateral hips and knees. He notes several falls over the week prior to admit. He notes he takes warfarin on dialysis days M/W/F. Upon admit he was noted to have epidural phlegmon/abscess with compressive myelopathy. He ultimately underwent a C6-T6 fusion with Thoracic 2-3 decompression on 6/27/2022. Post op course was complicated by worsened weakness, B LEs, encephalopathy, and orthostatic hypotension.    Patient requires an intensive inpatient rehab program to address the following acute impairments:impaired strength, impaired activity tolerance, impaired tone, impaired balance, impaired coordination, impaired judgement and safety awareness, impulsiveness and impaired  weight shifting    Current Active Medical Management Needs/Risks for Clinical Complications  The patient requires the high level of rehabilitation physician supervision that accompanies the provision of intensive rehabilitation therapy.  The patient needs the services of the rehabilitation physician to assess the patient medically and functionally and to modify the course of treatment as needed to maximize the patient's capacity to benefit from the rehabilitation process. The patient requires physician involvement at least 3 days per week to manage:   SCI: due to epidural phlegmon/abscess with compressive myelopathy s/p C6-T6 fusion/T2-3 decompression, assess and manage incision to promote healing and prevent infection, continue to promote spinal precautions for optimal healing and safety  Infectious Disease: in the setting of epidural abscess, at risk for continued infection, completed course of Ancef-- currently on amoxicillin 500mg daily x 4 weeks  ESRD on HD: assess and manage, will require nephrology consult upon arrival at [facility]; the patient will need to switch to T/Th/Sa runs while on [facility] due to dialysis unit capacity-- currently on Renvela  Cardiac: in the setting of hypotension, HLD; needs close monitoring and management of hypotension for optimal participation in therapies--currently on midodrine, Lipitor, fluid boluses, compression stockings  Depression/Anxiety: assess and manage in the setting of new functional loss, may benefit from health psych while on [facility]-- currently on Cymbalta, Zoloft, Xanax  Pain: acute (post surgical) on chronic back pain, peripheral neuropathy, assess and manage for optimal participation in all therapies-- currently on Neurontin, Flexeril, Dilaudid, Atarax  Incontinence of Bowel: in the setting of new incomplete SCI, with inconsistent bowel control, assess for need for bowel program.   GINI: history of GINI but does not utilize CPAP, currently requiring 2-3 L/min nocturnally for  sleep, assess and manage    The patient is at risk for falls in the setting of incomplete paraplegia; skin breakdown/pressure sores in the setting of incontinence of bowel and decreased sensation s/p incomplete SCI; medical complications due to Morbid Obesity with BMI of 41.94; depression/anxiety exacerbation in the setting of new functional loss; hypotension in patient with low BP at baseline and HD.     Past Medical/Surgical History   Surgery in the past 100 days: Yes   Additional relevant past medical history: chronic renal failure on dialysis, GERD, gout, HTN (but off BP meds since early 2022), HLD, Obesity, GINI, depression, chronic peripheral neuropathy    Level of Functioning Prior to Admission:    LIVING ENVIRONMENT   People in Home: spouse   Current Living Arrangements: house     Transportation Anticipated: agency   Living Environment Comments: Pt reports his spouse is currently not working and can assist pt at discharge. They sold their home and are temporarily staying in apartment until home ready in September    SELF-CARE   Usual Activity Tolerance: moderate   Regular Exercise: No     Equipment Currently Used at Home: walker, rolling   Activity/Exercise/Self-Care Comment: At baseline pt is independent in self-cares without gait aid. Reports apartment has tall tub/shower and standard height toilet. Reports shortly before hospitalization he was having difficulty standing in shower    Additional Comments: Pt lives with spouse who is able to assist upon return home. New home is a rambler but not available until 9/1/2022.     Level of Function: GG Scale (Section GG Functional Ability and Goals; CMS's STILL Version 3.0 Manual effective 10.1.2019):  PT Current Function Goals for Rehab   Bed Rolling 3 Partial/moderate assistance 6 Independent   Supine to Sit 3 Partial/moderate assistance 6 Independent   Sit to Stand 3 Partial/moderate assistance 6 Independent   Transfer 3 Partial/moderate assistance 6 Independent    Ambulation 1 Dependent 6 Independent   Stairs Not completed 3 Partial/moderate assistance     OT Current Function Goals for Rehab   Feeding 5 Setup or clean-up assistance 6 Independent   Grooming 3 Partial/moderate assistance 6 Independent   Bathing 3 Partial/moderate assistance 6 Independent   Upper Body Dressing 3 Partial/moderate assistance 6 Independent   Lower Body Dressing 1 Dependent 6 Independent   Toileting 1 Dependent 6 Independent   Toilet Transfer 1 Dependent 6 Independent   Tub/Shower Transfer Not completed 3 Partial/moderate assistance   Cognition Not Assessed Independent     SLP Current Function Goals for Rehab   Swallow Not Impaired Not applicable   Communication Not Impaired Not applicable       Current Diet:  Regular diet    Summary Statement:  In PT the patient performs side steps to L w/ min assist of 1 (second assist on SBA), x 5 steps w/ cuing throughout given neuropathy and unable to see feet position. Side steps to R x 8 steps with CGA to position in front of chair for chair follow. Forward ambulation x 4ft, CGA-min assist, seated rest break, then x 12ft w/ chair follow throughout. Pt demonstrates decreased L clearance compared to R, otherwise able to clear w/ CGA most of the time. For longer distances will require WC follow.   In OT patient was educated on donning  at EOB. able to demo with mod A, cue for hand placemnt and within precautions. in standing able to complete weight shifting with min Ax2, unilateral support of FWW to manage brief and bibi cares with mod A.    Expected Therapies and Services Required During Inpatient Rehab Admission  Intensity of Therapy: Patient requires intensive therapies not available in a lesser level of care. Patient is motivated, making gains, and can tolerate 3 hours of therapy a day.  Physical Therapy: 90 minutes per day, 7 days a week for 14 days  Occupational Therapy: 90 minutes per day, 7 days a week for 14 days  Speech and Language Therapy: No  SLP needs noted at this time  Rehabilitation Nursing Needs: Patient requires 24 hour Rehab Nursing to manage wound care, bowel program, vitals, medication education, tone management, positioning, carryover of new rehab techniques, care coordination, assess neurologic status, pain management and provide safe environment for patient at falls risk.    Precautions/restrictions/special needs:   Precautions: fall precautions, spinal precautions and    Restrictions: No lifting greater than 5-10 lbs, no excessive bending, lifting or twisting.  to be worn when out of bed.   Special Needs: lift and oxygen    Expected Level of Improvement: Anticipate with intensive therapies, close medical management, and rehabilitative nursing care the patient will improve strength, balance, tolerance to activity, safety to ensure Mod I with basic mobility on level and ADLs, A x 1 for stairs and tub/shower transfer to allow return home with family A and ongoing OP therapies.   Expected Length of time to achieve: 14 days    Anticipated Discharge Needs:  Anticipated Discharge Destination: Home  Anticipated Discharge Support: Family member  24/7 support available : Unknown  Identified caregiver(s):  spouse  Anticipated Discharge Needs: Home with outpatient therapy    Identified challenges/barriers:  MARCEL home    Rehab Admissions Liaison Signature/Date/Time:    Physician statement of review and agreement:  I have reviewed and am in agreement of the need for IRF stay to address above functional and medical needs. In addition to above statements address, Patient requires intensive active and ongoing therapeutic intervention and multiple therapies; Patient requires medical supervision; Expected to actively participate in the intensive rehab program; Sufficiently stable to actively participate; Expectation for measurable improvement in functional capacity or adaption to impairments.      Physician Signature/Date/Time:

## 2022-07-05 NOTE — PLAN OF CARE
Goal Outcome Evaluation:    Plan of Care Reviewed With: patient           Patient vital signs are at baseline: Yes  Patient able to ambulate as they were prior to admission or with assist devices provided by therapies during their stay:  No,  Reason:  not OOB, assist of 2 w/ bed mobility, uses lift for transfers   Patient MUST void prior to discharge:  No,  Reason:  hemodialysis   Patient able to tolerate oral intake:  Yes  Pain has adequate pain control using Oral analgesics:  Yes  Does patient have an identified :  No,  Reason:  TBD   Has goal D/C date and time been discussed with patient:  No,  Reason:  discharge pending.       Post op C6-T6 fusion & decompression. Aox3-4 (disoriented to situation on & off), forgetful. Soft BPs. On 4 Lpm via NC while sleeping. CMS intact. Dilaudid and cold therapy for upper back pain w/some relief. . Incision to back open to air . Turns well in bed - not OOB this shift. T&R. Assist of 2 w/ lift for OOB activity. Reg diet, adequate I/Os. Continue w/ plan of care

## 2022-07-05 NOTE — PROGRESS NOTES
Care Management Follow Up    Length of Stay (days): 11    Expected Discharge Date: 07/07/2022     Concerns to be Addressed: discharge planning     Patient plan of care discussed at interdisciplinary rounds: Yes    Anticipated Discharge Disposition: Acute Rehab, Transitional Care     Anticipated Discharge Services: Transportation Services  Anticipated Discharge DME:      Patient/family educated on Medicare website which has current facility and service quality ratings:    Education Provided on the Discharge Plan:    Patient/Family in Agreement with the Plan: yes    Referrals Placed by CM/SW: Post Acute Facilities  Private pay costs discussed: Not applicable    Additional Information:  SW received return call from  ARU. Patient has not been clinically accepted as of yet.  ARU is following patient today to see how well patient can tolerate therapies.     Talisha Lucas, MSW, LGSW

## 2022-07-06 LAB
ANION GAP SERPL CALCULATED.3IONS-SCNC: 8 MMOL/L (ref 3–14)
BACTERIA BLD CULT: NO GROWTH
BACTERIA TISS BX CULT: ABNORMAL
BUN SERPL-MCNC: 37 MG/DL (ref 7–30)
CALCIUM SERPL-MCNC: 9.6 MG/DL (ref 8.5–10.1)
CHLORIDE BLD-SCNC: 96 MMOL/L (ref 94–109)
CO2 SERPL-SCNC: 27 MMOL/L (ref 20–32)
CREAT SERPL-MCNC: 5.37 MG/DL (ref 0.66–1.25)
ENTEROCOCCUS FAECALIS: NOT DETECTED
ENTEROCOCCUS FAECIUM: NOT DETECTED
ERYTHROCYTE [DISTWIDTH] IN BLOOD BY AUTOMATED COUNT: 14.3 % (ref 10–15)
GFR SERPL CREATININE-BSD FRML MDRD: 12 ML/MIN/1.73M2
GLUCOSE BLD-MCNC: 119 MG/DL (ref 70–99)
HCT VFR BLD AUTO: 29.4 % (ref 40–53)
HGB BLD-MCNC: 9.3 G/DL (ref 13.3–17.7)
LISTERIA SPECIES (DETECTED/NOT DETECTED): NOT DETECTED
MCH RBC QN AUTO: 33.5 PG (ref 26.5–33)
MCHC RBC AUTO-ENTMCNC: 31.6 G/DL (ref 31.5–36.5)
MCV RBC AUTO: 106 FL (ref 78–100)
PLATELET # BLD AUTO: 225 10E3/UL (ref 150–450)
POTASSIUM BLD-SCNC: 4.5 MMOL/L (ref 3.4–5.3)
RBC # BLD AUTO: 2.78 10E6/UL (ref 4.4–5.9)
SODIUM SERPL-SCNC: 131 MMOL/L (ref 133–144)
STAPHYLOCOCCUS AUREUS: NOT DETECTED
STAPHYLOCOCCUS EPIDERMIDIS: DETECTED
STAPHYLOCOCCUS LUGDUNENSIS: NOT DETECTED
STREPTOCOCCUS AGALACTIAE: NOT DETECTED
STREPTOCOCCUS ANGINOSUS GROUP: NOT DETECTED
STREPTOCOCCUS PNEUMONIAE: NOT DETECTED
STREPTOCOCCUS PYOGENES: NOT DETECTED
STREPTOCOCCUS SPECIES: NOT DETECTED
WBC # BLD AUTO: 10.9 10E3/UL (ref 4–11)

## 2022-07-06 PROCEDURE — P9041 ALBUMIN (HUMAN),5%, 50ML: HCPCS | Performed by: INTERNAL MEDICINE

## 2022-07-06 PROCEDURE — 36415 COLL VENOUS BLD VENIPUNCTURE: CPT | Performed by: INTERNAL MEDICINE

## 2022-07-06 PROCEDURE — 250N000011 HC RX IP 250 OP 636: Performed by: INTERNAL MEDICINE

## 2022-07-06 PROCEDURE — 250N000013 HC RX MED GY IP 250 OP 250 PS 637: Performed by: INTERNAL MEDICINE

## 2022-07-06 PROCEDURE — 99233 SBSQ HOSP IP/OBS HIGH 50: CPT | Performed by: INTERNAL MEDICINE

## 2022-07-06 PROCEDURE — 99232 SBSQ HOSP IP/OBS MODERATE 35: CPT | Performed by: INTERNAL MEDICINE

## 2022-07-06 PROCEDURE — 85014 HEMATOCRIT: CPT | Performed by: INTERNAL MEDICINE

## 2022-07-06 PROCEDURE — 634N000001 HC RX 634: Performed by: INTERNAL MEDICINE

## 2022-07-06 PROCEDURE — 80048 BASIC METABOLIC PNL TOTAL CA: CPT | Performed by: INTERNAL MEDICINE

## 2022-07-06 PROCEDURE — 120N000001 HC R&B MED SURG/OB

## 2022-07-06 PROCEDURE — 87040 BLOOD CULTURE FOR BACTERIA: CPT | Performed by: INTERNAL MEDICINE

## 2022-07-06 PROCEDURE — 258N000003 HC RX IP 258 OP 636: Performed by: INTERNAL MEDICINE

## 2022-07-06 RX ORDER — ALBUMIN, HUMAN INJ 5% 5 %
250 SOLUTION INTRAVENOUS
Status: COMPLETED | OUTPATIENT
Start: 2022-07-06 | End: 2022-07-06

## 2022-07-06 RX ORDER — ALBUMIN, HUMAN INJ 5% 5 %
SOLUTION INTRAVENOUS
Status: DISPENSED
Start: 2022-07-06 | End: 2022-07-06

## 2022-07-06 RX ORDER — CEFAZOLIN SODIUM 1 G/3ML
1 INJECTION, POWDER, FOR SOLUTION INTRAMUSCULAR; INTRAVENOUS EVERY 24 HOURS
Status: DISCONTINUED | OUTPATIENT
Start: 2022-07-07 | End: 2022-07-07

## 2022-07-06 RX ADMIN — SERTRALINE HYDROCHLORIDE 100 MG: 100 TABLET ORAL at 13:52

## 2022-07-06 RX ADMIN — CYCLOBENZAPRINE 10 MG: 10 TABLET, FILM COATED ORAL at 09:23

## 2022-07-06 RX ADMIN — SEVELAMER CARBONATE 800 MG: 800 TABLET, FILM COATED ORAL at 13:52

## 2022-07-06 RX ADMIN — ATORVASTATIN CALCIUM 20 MG: 20 TABLET, FILM COATED ORAL at 21:58

## 2022-07-06 RX ADMIN — MIDODRINE HYDROCHLORIDE 10 MG: 5 TABLET ORAL at 07:43

## 2022-07-06 RX ADMIN — MIDODRINE HYDROCHLORIDE 10 MG: 5 TABLET ORAL at 17:52

## 2022-07-06 RX ADMIN — PANTOPRAZOLE SODIUM 40 MG: 40 TABLET, DELAYED RELEASE ORAL at 06:54

## 2022-07-06 RX ADMIN — ALBUMIN HUMAN 250 ML: 0.05 INJECTION, SOLUTION INTRAVENOUS at 10:02

## 2022-07-06 RX ADMIN — HYDROMORPHONE HYDROCHLORIDE 4 MG: 2 TABLET ORAL at 09:23

## 2022-07-06 RX ADMIN — HYDROXYZINE HYDROCHLORIDE 25 MG: 25 TABLET, FILM COATED ORAL at 13:51

## 2022-07-06 RX ADMIN — HYDROMORPHONE HYDROCHLORIDE 4 MG: 2 TABLET ORAL at 13:51

## 2022-07-06 RX ADMIN — CEFAZOLIN 1 G: 1 INJECTION, POWDER, FOR SOLUTION INTRAMUSCULAR; INTRAVENOUS at 13:52

## 2022-07-06 RX ADMIN — CETIRIZINE HYDROCHLORIDE 10 MG: 10 TABLET, FILM COATED ORAL at 13:52

## 2022-07-06 RX ADMIN — GABAPENTIN 100 MG: 100 CAPSULE ORAL at 17:52

## 2022-07-06 RX ADMIN — POLYETHYLENE GLYCOL 3350 17 G: 17 POWDER, FOR SOLUTION ORAL at 13:52

## 2022-07-06 RX ADMIN — SODIUM CHLORIDE 300 ML: 9 INJECTION, SOLUTION INTRAVENOUS at 10:01

## 2022-07-06 RX ADMIN — SENNOSIDES AND DOCUSATE SODIUM 2 TABLET: 50; 8.6 TABLET ORAL at 20:13

## 2022-07-06 RX ADMIN — HYDROXYZINE HYDROCHLORIDE 25 MG: 25 TABLET, FILM COATED ORAL at 20:12

## 2022-07-06 RX ADMIN — EPOETIN ALFA-EPBX 10000 UNITS: 10000 INJECTION, SOLUTION INTRAVENOUS; SUBCUTANEOUS at 10:02

## 2022-07-06 RX ADMIN — DULOXETINE 30 MG: 30 CAPSULE, DELAYED RELEASE ORAL at 13:52

## 2022-07-06 RX ADMIN — HYDROMORPHONE HYDROCHLORIDE 2 MG: 2 TABLET ORAL at 20:12

## 2022-07-06 RX ADMIN — MIDODRINE HYDROCHLORIDE 10 MG: 5 TABLET ORAL at 10:57

## 2022-07-06 RX ADMIN — FINASTERIDE 1.25 MG: 5 TABLET, FILM COATED ORAL at 13:52

## 2022-07-06 RX ADMIN — Medication: at 10:02

## 2022-07-06 RX ADMIN — SENNOSIDES AND DOCUSATE SODIUM 2 TABLET: 50; 8.6 TABLET ORAL at 14:31

## 2022-07-06 RX ADMIN — GABAPENTIN 100 MG: 100 CAPSULE ORAL at 21:58

## 2022-07-06 RX ADMIN — MIDODRINE HYDROCHLORIDE 10 MG: 5 TABLET ORAL at 13:52

## 2022-07-06 ASSESSMENT — ACTIVITIES OF DAILY LIVING (ADL)
ADLS_ACUITY_SCORE: 49

## 2022-07-06 NOTE — PROGRESS NOTES
"[facility]    Medicine Progress Note - Hospitalist Service    Date of Admission:  6/24/2022    Assessment & Plan        Shay Jimenez is a very pleasant 57-year-old male with past medical history significant for end-stage renal disease, on hemodialysis, chronic peripheral neuropathy affecting his feet, history of hypertension no longer on meds, hyperlipidemia, obesity, obstructive sleep apnea, GERD and depression, among other medical problems, who presented 6/24/22 for evaluation of worsening upper back pain with associated generalized weakness and falls.  He was found to have abnormal findings on thoracic MRI concerning for diskitis with osteomyelitis with also some findings of a compressive myelopathy.      Upper back pain  Generalized weakness  Falls   Epidural phlegmon/abscess   Compressive myelopathy  S/P C6-T6 fusion/T2-3 decompression  *back pain for 6 months. CT chest 2/2022 showed concern for upper thoracic spine diskitis vs osteomyelitis. Unclear follow up from that time  *in weeks prior to admission, MRI showed possible thoracic compression fracture.   *developed progressively weak legs with frequent falls.  *on admission imaging with T2-T3 diskitis, osteomyelitis and compressive myelopathy.        appreciate Neurosurgery care    follow blood cultures, they have no growth after 5 days    ID consult requested, I appreciate Dr. Rain's evaluation recommendations    Per her, \"changes could all be from DJD, CRP mildly elevated, no fever/chills, slow intermittent progression, I am not convinced this is infection but it still could be, he has had neg. PPD in past and no TB risk factors or contacts.\"     routine gram stain culture, fungal stain and culture, AFB stain and culture and pathology sent from OR    Also per Dr. Rain, \"continue on Ancef for now as we wait on the cultures.  If ready for discharge okay to discharge off antibiotics.\"  She plans to follow-up on the cultures " "for any late growth and start antimicrobials if/when needed.    Tissue culture has gram-positive bacilli isolated in broth only, resembling diphtheroids on day 5 of incubation; ID notified of this result.  Per Dr. Rain, \"GPB resembling diphtheroid 5 days later growth in broth only is a contaminate, no changes to plan for discharge off antibiotics.\"    Oxycodone \"didn't work at all\" for patient, was discontinued. Tylenol and oral Dilaudid available as needed for pain.     pain is better controlled, he is working with PT, he  is looking forward to go to ARU to get back his strength.    Addendum: 1 blood culture came back positive for gram-positive cocci on 7/6, he has been having regular blood cultures since 7/2, possibly this is a contaminant, 2 set of blood cultures ordered for 7/6.      End-stage renal disease, on hemodialysis  Hypotension  Hyponatremia  *Typically dialyzes Monday, Wednesday, Friday. Missed 6/24 session due to being in ED      Nephrology consult appreciated    Dialysis per nephrology.    Blood pressures readings are soft.  Per Dr. Carr, \"okay to run and pull fluid with BP in the 70s as long as he is asymptomatic.\"    Continue scheduled midodrine    PTA sevelamer     Encephalopathy  Myclonus    Today, patient is drowsy and seems very mildly confused.  He had some myoclonic jerks of both hands.    Shay is on gabapentin 300 mg 3 times daily, this is a home medication and dose has been unchanged since he came to the hospital but is higher than the typical maximum dose recommended even for patients on CRRT    Presume encephalopathy is related to buildup of gabapentin metabolites from intradialytic..  Gabapentin and Zoloft was on hold, will start with gabapentin  low-dose of 100 mg at bedtime since encephalopathy is improved.  Restarted 7/5.    Monitor signs and symptoms    GERD    Chronic and stable on PPI.    Anemia of chronic kidney disease    Per Dr. Carr, \"increase erythropoietin to 10K with " "dialysis\"    Follow hemoglobin    Consider transfusion for hemoglobin less than 7 g/dL and symptoms      Hyperlipidemia    Chronic and stable, on statin.    Peripheral neuropathy of the feet  *Follows with Neurology.      Managed in part with duloxetine, continue    Depression    Chronic and stable on Zoloft.    Obesity, class III  Obstructive sleep apnea  *BMI this stay is 43.10.  He does not use CPAP (patient says, \"I am one of those guys who does not tolerate CPAP.\") Encourage diet, lifestyle modifications once above issues addressed.    History of BPH.    Continue Proscar     Diet: Regular Diet Adult  Diet    DVT Prophylaxis: Pneumatic Compression Devices  Oliveiar Catheter: Not present  Central Lines: None  Cardiac Monitoring: None  Code Status: Full Code      Disposition Plan     Expected Discharge Date: 07/07/2022    Discharge Delays: Placement - TCU  Destination: inpatient rehabilitation facility  Discharge Comments: ARU following          Oksana Spicer MD  Hospitalist Service  [facility]  Securely message with the Vocera Web Console (learn more here)  Text page via [employer] Paging/Directory     Clinically Significant Risk Factors Present on Admission                    _______________________________________________________________    Interval History   Visited With patient in dialysis,he  is resting comfortably, no new issues noted, pending placement to ARU.    Data reviewed today: I reviewed all medications, new labs and imaging results over the last 24 hours. I personally reviewed no images or EKG's today.    Physical Exam   Vital Signs: Temp: 97.7  F (36.5  C) Temp src: Oral BP: 97/79 Pulse: 93   Resp: 16 SpO2: 94 % O2 Device: Nasal cannula Oxygen Delivery: 2 LPM  Weight: 319 lbs 10.67 oz  Constitutional: Awake, alert, oriented x3  Respiratory: Clear to auscultation bilaterally, no crackles or wheezing  Cardiovascular: Regular rate and rhythm  GI: Obese, soft, bowel sounds are " present  Skin/Integumen: No rash on exposed skin  Psych: Mood is appropriate      Data   Recent Labs   Lab 07/05/22  0743 07/04/22  0715 07/03/22  0658 07/02/22  0648 07/01/22  0613   WBC 10.0 8.4 8.0   < > 7.2   HGB 8.4* 8.1* 8.3*   < > 7.5*   * 110* 111*   < > 111*    316 282   < > 260   NA  --  132* 135  --  136   POTASSIUM  --  4.4 4.8  --  4.2   CHLORIDE  --  94 96  --  96   CO2  --  23 29  --  27   BUN  --  83* 66*  --  63*   CR  --  11.70* 9.90*  --  8.60*   ANIONGAP  --  15* 10  --  13   MAC  --  9.5 9.8  --  9.1   GLC  --  91 123*  --  102*    < > = values in this interval not displayed.     No results found for this or any previous visit (from the past 24 hour(s)).  Medications       - MEDICATION INSTRUCTIONS for Dialysis Patients -   Does not apply See Admin Instructions     albumin human         atorvastatin  20 mg Oral At Bedtime     ceFAZolin  1 g Intravenous Q24H     cetirizine  10 mg Oral Daily     DULoxetine  30 mg Oral Daily     finasteride  1.25 mg Oral Daily     gabapentin  100 mg Oral TID     midodrine  10 mg Oral TID w/meals     pantoprazole  40 mg Oral QAM AC     polyethylene glycol  17 g Oral Daily     senna-docusate  1 tablet Oral BID    Or     senna-docusate  2 tablet Oral BID     sertraline  100 mg Oral Daily     sevelamer carbonate  800 mg Oral TID w/meals     sodium chloride (PF)  3 mL Intracatheter Q8H     sodium chloride (PF)  3 mL Intracatheter Q8H

## 2022-07-06 NOTE — PROGRESS NOTES
Care Management Follow Up    Length of Stay (days): 12    Expected Discharge Date: 07/07/2022     Concerns to be Addressed: discharge planning     Patient plan of care discussed at interdisciplinary rounds: Yes    Anticipated Discharge Disposition: Acute Rehab, Transitional Care     Anticipated Discharge Services: Transportation Services  Anticipated Discharge DME:      Patient/family educated on Medicare website which has current facility and service quality ratings:    Education Provided on the Discharge Plan:    Patient/Family in Agreement with the Plan: yes    Referrals Placed by CM/SW: Post Acute Facilities  Private pay costs discussed: Not applicable    Additional Information:  SHAKIRA spoke with [facility] who indicated they can possibly accept today pending their hospitalitis review and bed availability. SHAKIRA informed dialysis dates would need to switch to Tue, Thur, Sat and requested to determine how nephrology would want to manage this switch. SHAKIRA informed by nephrology if patient accepted today they would complete a short two hour dialysis run tomorrow 07/08 and begin full length Saturday. SW informed [facility]. SHAKIRA will continue to follow.    Addendum: SHAKIRA spoke with [facility] who idicated they can accept patient tomorrow after short 2 hour dialysis and would like transport scheduled for 1400. SW will continue to follow    Addendum: SHAKIRA scheduled -[facility] wheelchair for tomorrow at 1400. SHAKIRA spoke with patient and informed of tentative transport time. SW will continue to follow.      JULISSA Treviño    [facility]

## 2022-07-06 NOTE — PROGRESS NOTES
Potassium   Date Value Ref Range Status   07/04/2022 4.4 3.4 - 5.3 mmol/L Final     Hemoglobin   Date Value Ref Range Status   07/05/2022 8.4 (L) 13.3 - 17.7 g/dL Final     Creatinine   Date Value Ref Range Status   07/04/2022 11.70 (H) 0.66 - 1.25 mg/dL Final     Urea Nitrogen   Date Value Ref Range Status   07/04/2022 83 (H) 7 - 30 mg/dL Final     Sodium   Date Value Ref Range Status   07/04/2022 132 (L) 133 - 144 mmol/L Final     INR   Date Value Ref Range Status   06/27/2022 1.07 0.85 - 1.15 Final      Latest Reference Range & Units 06/25/22 11:53   Hepatitis B Surface Antibody Negative  Negative   Hep B Surface Agn Nonreactive  Nonreactive   Hepatitis B Core Marysol Negative  Negative   Hepatitis B Surface Antibody <8.00 m[IU]/mL 1.29     DIALYSIS PROCEDURE NOTE  Hepatitis status of previous patient on machine log was checked and verified ok to use with this patients hepatitis status.  Patient dialyzed for 3 hrs + 33min. on a K3 bath with a net fluid removal of  4L.    A BFR of 350-450 ml/min was obtained via a left upper-arm AV fistula using 15 gauge needles.      The treatment plan was discussed with Dr. Carr during the treatment.    Total heparin received during the treatment: 0 units.   Needle cannulation sites held x 5 min.   Meds  given: epogen, albumin prime     Complications: MD verbal orders to increase UF goal to net 5L, this writer resumed care of patient in the last 1.5 hours of treatment- venous pressures started climbing at that time and NS flushes administered in attempt to maintain patency- venous pressures continued to increase (MD notified and verbal to continue to give flushes). In the last 30 minutes pt reported feeling lightheaded- checked BP and SBP was in the 60's- HOB flat and UF turned off. Blood rinsed back to patient and treatment discontinued, MD notified.     Person educated: patient. Knowledge base substantial. Barriers to learning: none. Educated on procedure via verbal mode. patient  verbalized understanding. Pt prefers verbal education style.     ICEBOAT? Timeout performed pre-treatment  I: Patient was identified using 2 identifiers  C:  Consent Signed Yes  E: Equipment preventative maintenance is current and dialysis delivery system OK to use  B: Hepatitis B Surface Antigen: see above  O: Dialysis orders present and complete prior to treatment  A: Vascular access verified and assessed prior to treatment  T: Treatment was performed at a clinically appropriate time  ?: Patient was allowed to ask questions and address concerns prior to treatment  See Adult Hemodialysis flowsheet in [facility] for further details and post assessment.  Machine water alarm in place and functioning. Transducer pods intact and checked every 15min.   Pt returned via bed.  Chlorine/Chloramine water system checked every 4 hours.  Outpatient Dialysis at [facility].       Post treatment report given to ADRIANNA Quiros RN regarding 4L of fluid removed, last BP of 97/79, and patient pain rating of 3/10.     Please remove patient dressing on AVF and AVG needle sites 24 hours after dialysis. If leaking occurs please apply a Band-Aid.

## 2022-07-06 NOTE — PROGRESS NOTES
Patient vital signs are at baseline: Yes HX of hypotension  Patient able to ambulate as they were prior to admission or with assist devices provided by therapies during their stay:  No,  Reason, Pt has bilateral lower leg weakness. Needs sera steady for transfers.  Patient MUST void prior to discharge:  No,  Reason:  Pt has End Stage Renal Disease. Pt receives dialysis 3 times a week  Patient able to tolerate oral intake:  Yes  Pain has adequate pain control using Oral analgesics:  Yes  Does patient have an identified :  No,  Reason:  Pending discharge to TCU  Has goal D/C date and time been discussed with patient:  Yes    Pt states he has not had a BM since after his surgery. Abdomen is soft with active bowel sounds. Pt received scheduled Senna. Writer offered pt Suppository. Pt refused  the suppository this evening. Pt stated he will take a suppository after dialysis 7/6/22. Oxygen on at 2 liters per nasal cannula. L upper arm dialysis shunt has an audible bruit and thrill palpable.

## 2022-07-06 NOTE — PROGRESS NOTES
ID Lab notified writer of a positive blood culture for Gram positive cooci in clusters for blood sample obtained on 7/5/2022. Provider notified. Patient is stable.

## 2022-07-06 NOTE — PROGRESS NOTES
Patient vital signs are at baseline: Yes History of Hypotension  Patient able to ambulate as they were prior to admission or with assist devices provided by therapies during their stay:  No,  Reason:  Pt needs sera steady for transfers.  Patient MUST void prior to discharge:  No,  Reason: Pt has End Stage Renal Disease, receives dialysis 3 times a week.  Patient able to tolerate oral intake:  Yes  Pain has adequate pain control using Oral analgesics:  Yes  Does patient have an identified :  No,  Reason: Pending discharge to TCU  Has goal D/C date and time been discussed with patient:  Yes     Oxygen on at 2 liters per nasal cannula. O2 sats 95%. L upper arm dialysis shunt patent. Pt to have dialysis 7/6/22. Pt states he is slightly constipated. Stated he will take a suppository after dialysis. Abdomen is soft with active bowel sounds. Pt states he has no had a BM since surgery. Upper back incision is CDI with staples. Incision open to air

## 2022-07-06 NOTE — PROGRESS NOTES
Care Transitions Note:    Writer paged Dr. BARKLEY, Nephrologist -- Q: Patient is likely to go to ARU upons discharge and they would like to  Dialyze T, Th, Sat when he is with them due to the current unit capacity. Is it okay for him to dialyze T, Th, Sat?    Received a call from Dr. Angulo; if patient is accepted at ARU Nephrologist would have him do a short run on Thursday 7/7.      YUDI Watson -[phone]

## 2022-07-06 NOTE — PROGRESS NOTES
[facility]    Infectious Disease Progress Note    Date of Service (when I saw the patient): 07/06/2022     Assessment & Plan   Shay Jimenez is a 57 year old male who was admitted on 6/24/2022.     ASSESSMENT:  1. T2-T3 COLLAPSE, PHLEGMON, ENDPLATE EROSION, DEGENERATIVE CHANGES-possible discitis/vertebral osteomyelitis, changes could all be from DJD, CRP mildly elevated, no fever/chills, slow intermittent progression, I am not convinced this is infection but it still could be, he has had neg. PPD in past and no TB risk factors or contacts  2. DJD  3. ESRD  4. OBESITY  5. ETOH DEPENDENCE, ABUSE  6. NEUROPATHY  7. GINI  .     RECOMMENDATION  1. S/p:  6/27,Cervical 6 to Thoracic 6 Fusion and Thoracic 2-3 Decompression, operative report noted for DJD changes,  Pending Cxs for routine cx/gs, fungus Cx and stain, AFB Cx and stain and pathology,    2. Has been on Ancef since surgery, day 10, Late growth of P acne in broth only a probable contaminate given no previous hardware, the growth is in now 2 cultures day 5/ after day7  late and in broth only. But since new hardware in place now will recommend starting prophylactic amoxi 500 mg daily once off ancef.   3. Now on 1/2 positive blood cultures from 7/ 5 verigene pending has been on ancef already received today`s dose as being dosed once a day.   4. Follow up on the verigene a probable contaminant but will follow. Only 1 cultures was done. Not clear why this was done, patient was not febrile no new infection symptoms. Get a set of repeat cultures.            Ranjan Rain MD    Interval History   Afebrile   No new rashes or issues   Labs reviewed   Dicussed with Nephrology     Physical Exam   Temp: 97.7  F (36.5  C) Temp src: Oral BP: 97/79 Pulse: 93   Resp: 16 SpO2: 94 % O2 Device: Nasal cannula Oxygen Delivery: 2 LPM  Vitals:    06/24/22 1716 06/28/22 0400 06/29/22 0638   Weight: 149 kg (328 lb 7.8 oz) 146.6 kg (323 lb 3.1 oz) 145 kg (319 lb  10.7 oz)     Vital Signs with Ranges  Temp:  [97.6  F (36.4  C)-98.1  F (36.7  C)] 97.7  F (36.5  C)  Pulse:  [62-99] 93  Resp:  [16-20] 16  BP: ()/(41-80) 97/79  SpO2:  [94 %-99 %] 94 %    Constitutional: Awake  Lungs: Clear to auscultation bilaterally, no crackles or wheezing  Cardiovascular: Regular rate and rhythm, normal S1 and S2, and no murmur noted  Abdomen: Normal bowel sounds, soft, non-distended, non-tender  Skin: No rashes, no cyanosis, no edema  Other:    Medications       - MEDICATION INSTRUCTIONS for Dialysis Patients -   Does not apply See Admin Instructions     albumin human         atorvastatin  20 mg Oral At Bedtime     cetirizine  10 mg Oral Daily     DULoxetine  30 mg Oral Daily     finasteride  1.25 mg Oral Daily     gabapentin  100 mg Oral TID     midodrine  10 mg Oral TID w/meals     pantoprazole  40 mg Oral QAM AC     polyethylene glycol  17 g Oral Daily     senna-docusate  1 tablet Oral BID    Or     senna-docusate  2 tablet Oral BID     sertraline  100 mg Oral Daily     sevelamer carbonate  800 mg Oral TID w/meals     sodium chloride (PF)  3 mL Intracatheter Q8H     sodium chloride (PF)  3 mL Intracatheter Q8H       Data   All microbiology laboratory data reviewed.  Recent Labs   Lab Test 07/05/22  0743 07/04/22  0715 07/03/22  0658   WBC 10.0 8.4 8.0   HGB 8.4* 8.1* 8.3*   HCT 27.0* 26.5* 27.1*   * 110* 111*    316 282     Recent Labs   Lab Test 07/04/22  0715 07/03/22  0658 07/01/22  0613   CR 11.70* 9.90* 8.60*     No lab results found.  No lab results found.    Invalid input(s): TRU

## 2022-07-06 NOTE — PROGRESS NOTES
[facility]     Renal Progress Note       SHORTHAND KEY FOR MY NOTES:  c = with, s = without, p = after, a = before, x = except, asx = asymptomatic, tx = transplant or treatment, sx = symptoms or symptomatic, cx = canceled or culture, rxn = reaction, yday = yesterday, nl = normal, abx = antibiotics, fxn = function, dx = diagnosis, dz = disease, m/h = melena/hematochezia, c/d/l/ha = cramping/dizziness/lightheadedness/headache, d/c = discharge or diarrhea/constipation, f/c/n/v = fevers/chills/nausea/vomiting, cp/sob = chest pain/shortness of breath, tbv = total body volume, rxn = reaction, tdc = tunneled dialysis catheter, pta = prior to admission, hd = hemodialysis, pd = peritoneal dialysis, hhd = home hemodialysis, edw = estimated dry wt         Assessment/Plan:     1.  ESKD.  Pt is running on a MWF schedule and has no issues c dialysis.  Per the notes, he may need to change to TRS to go to acute rehab.  A.  Continue midodrine pre and mid-run.  B.  Next planned run on Fri, but if he needs to change we can do a short run tmrw.    2.  POD #9 s/p C6-T6 fusion / T2-3 decompression.  Pt is feeling ok and has no major complaints of pain, but he is getting meds prn.  Currently, he is not wearing the neck brace.  Still on abx.  A.  Plans per NSG.  B.  Length of abx course?  Will defer to ID.    3.  Chronic hypotension.  Pt's BP is usually between 70-90s and he is asx most of the time.  He tends to get symptomatic in the 60s.  Pt takes midodrine 10 mg tid and an additional dose mid-run on HD days.  Even c the low BPs, he is able to pull fluid on dialysis.  A.  Ok to pull 4-5L each run even c BP in the 70s as long as he is not symptomatic.  B.  Continue scheduled midodrine 10 mg tid and mid-run 10 mg.  C.  If he's confused, first make sure it's not pain med related (yossi, cyclobenzaprine, narcs).  If it's not then, consider that it might be related to low BP and give a little albumin.    4.  FEN.   "Electrolytes are ok x Na is a little lower today.  This should improve p fluid removal c HD.  He is on a reg diet since that is what he eats at home.  A.  Continue reg diet.  B.  Monitor electrolytes.    5.  Dispo.  Pt is due to go to acute rehab soon.        Interval History:     Pt is doing well and has no issues c HD.  He denies any c/d/l/ha even c the low BPs.  Taking midodrine s probs.  No f/c/n/v/itching.  No cp/sob/abd pain.          Medications and Allergies:       - MEDICATION INSTRUCTIONS for Dialysis Patients -   Does not apply See Admin Instructions     albumin human         atorvastatin  20 mg Oral At Bedtime     ceFAZolin  1 g Intravenous Q24H     cetirizine  10 mg Oral Daily     DULoxetine  30 mg Oral Daily     finasteride  1.25 mg Oral Daily     gabapentin  100 mg Oral TID     midodrine  10 mg Oral TID w/meals     pantoprazole  40 mg Oral QAM AC     polyethylene glycol  17 g Oral Daily     senna-docusate  1 tablet Oral BID    Or     senna-docusate  2 tablet Oral BID     sertraline  100 mg Oral Daily     sevelamer carbonate  800 mg Oral TID w/meals     sodium chloride (PF)  3 mL Intracatheter Q8H     sodium chloride (PF)  3 mL Intracatheter Q8H     Allergies   Allergen Reactions     Amlodipine      Lisinopril           Physical Exam:     Vitals were reviewed     , Blood pressure (!) 88/53, pulse 88, temperature 97.7  F (36.5  C), temperature source Oral, resp. rate 16, height 1.859 m (6' 1.2\"), weight 145 kg (319 lb 10.7 oz), SpO2 99 %.  Wt Readings from Last 3 Encounters:   06/29/22 145 kg (319 lb 10.7 oz)     No intake or output data in the 24 hours ending 07/06/22 1138    GENERAL APPEARANCE:  lying in bed, NAD, alert  HEENT:  eyes/ears/nose/neck grossly nl  RESP: CTA B c good efforts  CV: RRR c 2/6 m, nl S1/S2   ABDOMEN: o/s/nt/nd  EXTREMITIES/SKIN: + tr ble edema - chronic-appearing skin changes  NEURO: no twitching  ACCESS:  LAF    Pt seen on HD.  Stable run.  -4-5L UF goal.  Good BFR via " LAF.         Data:     CBC RESULTS:     Recent Labs   Lab 07/05/22  0743 07/04/22  0715 07/03/22  0658 07/02/22  0648 07/01/22  0613 06/30/22  0530   WBC 10.0 8.4 8.0 8.4 7.2 8.3   RBC 2.48* 2.40* 2.45* 2.50* 2.20* 2.34*   HGB 8.4* 8.1* 8.3* 8.4* 7.5* 7.9*   HCT 27.0* 26.5* 27.1* 27.5* 24.3* 25.6*    316 282 311 260 229     Basic Metabolic Panel:  Recent Labs   Lab 07/04/22  0715 07/03/22  0658 07/01/22  0613 06/30/22  0530   * 135 136  --    POTASSIUM 4.4 4.8 4.2 4.1   CHLORIDE 94 96 96  --    CO2 23 29 27  --    BUN 83* 66* 63*  --    CR 11.70* 9.90* 8.60*  --    GLC 91 123* 102*  --    MAC 9.5 9.8 9.1  --      INR  No lab results found in last 7 days.   Attestation:   I have reviewed today's relevant vital signs, notes, medications, labs and imaging.    Dejuan Carr MD  [facility] - Nephrology  [phone]

## 2022-07-06 NOTE — PROGRESS NOTES
Patient vital signs are at baseline: Yes  (history of hypotension)  Patient able to ambulate as they were prior to admission or with assist devices provided by therapies during their stay:  No,  Reason:  weaknessin BLE. Transferred with suzi lafleur today from bed to recliner.   Patient MUST void prior to discharge:  No,  Reason:  End stage renal disease. TID weekly dialysis.   Patient able to tolerate oral intake:  Yes  Pain has adequate pain control using Oral analgesics:  Yes  Does patient have an identified :  No,  Reason:  Discharge to TCU for rehab.     Has goal D/C date and time been discussed with patient:  Yes

## 2022-07-07 ENCOUNTER — APPOINTMENT (OUTPATIENT)
Dept: PHYSICAL THERAPY | Facility: CLINIC | Age: 57
DRG: 459 | End: 2022-07-07
Payer: MEDICARE

## 2022-07-07 ENCOUNTER — APPOINTMENT (OUTPATIENT)
Dept: OCCUPATIONAL THERAPY | Facility: CLINIC | Age: 57
DRG: 459 | End: 2022-07-07
Payer: MEDICARE

## 2022-07-07 LAB
BACTERIA BLD CULT: NO GROWTH
ERYTHROCYTE [DISTWIDTH] IN BLOOD BY AUTOMATED COUNT: 14.6 % (ref 10–15)
HCT VFR BLD AUTO: 28.3 % (ref 40–53)
HGB BLD-MCNC: 8.7 G/DL (ref 13.3–17.7)
MCH RBC QN AUTO: 33 PG (ref 26.5–33)
MCHC RBC AUTO-ENTMCNC: 30.7 G/DL (ref 31.5–36.5)
MCV RBC AUTO: 107 FL (ref 78–100)
PLATELET # BLD AUTO: 256 10E3/UL (ref 150–450)
POTASSIUM BLD-SCNC: 5.5 MMOL/L (ref 3.4–5.3)
RBC # BLD AUTO: 2.64 10E6/UL (ref 4.4–5.9)
WBC # BLD AUTO: 10.3 10E3/UL (ref 4–11)

## 2022-07-07 PROCEDURE — 85014 HEMATOCRIT: CPT | Performed by: INTERNAL MEDICINE

## 2022-07-07 PROCEDURE — 97530 THERAPEUTIC ACTIVITIES: CPT | Mod: GO

## 2022-07-07 PROCEDURE — 250N000013 HC RX MED GY IP 250 OP 250 PS 637: Performed by: INTERNAL MEDICINE

## 2022-07-07 PROCEDURE — 250N000011 HC RX IP 250 OP 636: Performed by: INTERNAL MEDICINE

## 2022-07-07 PROCEDURE — 99232 SBSQ HOSP IP/OBS MODERATE 35: CPT | Performed by: INTERNAL MEDICINE

## 2022-07-07 PROCEDURE — 120N000001 HC R&B MED SURG/OB

## 2022-07-07 PROCEDURE — 99231 SBSQ HOSP IP/OBS SF/LOW 25: CPT | Performed by: INTERNAL MEDICINE

## 2022-07-07 PROCEDURE — 97535 SELF CARE MNGMENT TRAINING: CPT | Mod: GO

## 2022-07-07 PROCEDURE — 84132 ASSAY OF SERUM POTASSIUM: CPT | Performed by: INTERNAL MEDICINE

## 2022-07-07 PROCEDURE — 36415 COLL VENOUS BLD VENIPUNCTURE: CPT | Performed by: INTERNAL MEDICINE

## 2022-07-07 PROCEDURE — 97530 THERAPEUTIC ACTIVITIES: CPT | Mod: GP

## 2022-07-07 RX ORDER — CEFAZOLIN SODIUM 2 G/100ML
2 INJECTION, SOLUTION INTRAVENOUS EVERY 24 HOURS
Status: DISCONTINUED | OUTPATIENT
Start: 2022-07-07 | End: 2022-07-12

## 2022-07-07 RX ADMIN — HYDROXYZINE HYDROCHLORIDE 25 MG: 25 TABLET, FILM COATED ORAL at 12:02

## 2022-07-07 RX ADMIN — SERTRALINE HYDROCHLORIDE 100 MG: 100 TABLET ORAL at 08:36

## 2022-07-07 RX ADMIN — ATORVASTATIN CALCIUM 20 MG: 20 TABLET, FILM COATED ORAL at 21:38

## 2022-07-07 RX ADMIN — POLYETHYLENE GLYCOL 3350 17 G: 17 POWDER, FOR SOLUTION ORAL at 21:39

## 2022-07-07 RX ADMIN — SEVELAMER CARBONATE 800 MG: 800 TABLET, FILM COATED ORAL at 17:20

## 2022-07-07 RX ADMIN — CETIRIZINE HYDROCHLORIDE 10 MG: 10 TABLET, FILM COATED ORAL at 08:36

## 2022-07-07 RX ADMIN — MIDODRINE HYDROCHLORIDE 10 MG: 5 TABLET ORAL at 12:02

## 2022-07-07 RX ADMIN — HYDROMORPHONE HYDROCHLORIDE 0.5 MG: 1 INJECTION, SOLUTION INTRAMUSCULAR; INTRAVENOUS; SUBCUTANEOUS at 09:25

## 2022-07-07 RX ADMIN — ALPRAZOLAM 1 MG: 0.25 TABLET ORAL at 22:29

## 2022-07-07 RX ADMIN — DULOXETINE 30 MG: 30 CAPSULE, DELAYED RELEASE ORAL at 08:36

## 2022-07-07 RX ADMIN — SEVELAMER CARBONATE 800 MG: 800 TABLET, FILM COATED ORAL at 08:36

## 2022-07-07 RX ADMIN — MIDODRINE HYDROCHLORIDE 10 MG: 5 TABLET ORAL at 08:36

## 2022-07-07 RX ADMIN — GABAPENTIN 100 MG: 100 CAPSULE ORAL at 08:36

## 2022-07-07 RX ADMIN — HYDROMORPHONE HYDROCHLORIDE 4 MG: 2 TABLET ORAL at 17:20

## 2022-07-07 RX ADMIN — HYDROMORPHONE HYDROCHLORIDE 0.5 MG: 1 INJECTION, SOLUTION INTRAMUSCULAR; INTRAVENOUS; SUBCUTANEOUS at 14:31

## 2022-07-07 RX ADMIN — BISACODYL 10 MG: 10 SUPPOSITORY RECTAL at 06:20

## 2022-07-07 RX ADMIN — SENNOSIDES AND DOCUSATE SODIUM 1 TABLET: 50; 8.6 TABLET ORAL at 21:38

## 2022-07-07 RX ADMIN — GABAPENTIN 100 MG: 100 CAPSULE ORAL at 21:38

## 2022-07-07 RX ADMIN — HYDROMORPHONE HYDROCHLORIDE 4 MG: 2 TABLET ORAL at 06:18

## 2022-07-07 RX ADMIN — HYDROMORPHONE HYDROCHLORIDE 4 MG: 2 TABLET ORAL at 12:02

## 2022-07-07 RX ADMIN — PANTOPRAZOLE SODIUM 40 MG: 40 TABLET, DELAYED RELEASE ORAL at 06:31

## 2022-07-07 RX ADMIN — FINASTERIDE 1.25 MG: 5 TABLET, FILM COATED ORAL at 08:36

## 2022-07-07 RX ADMIN — SENNOSIDES AND DOCUSATE SODIUM 2 TABLET: 50; 8.6 TABLET ORAL at 08:36

## 2022-07-07 RX ADMIN — POLYETHYLENE GLYCOL 3350 17 G: 17 POWDER, FOR SOLUTION ORAL at 08:35

## 2022-07-07 RX ADMIN — HYDROXYZINE HYDROCHLORIDE 25 MG: 25 TABLET, FILM COATED ORAL at 23:01

## 2022-07-07 RX ADMIN — GABAPENTIN 100 MG: 100 CAPSULE ORAL at 17:19

## 2022-07-07 RX ADMIN — CEFAZOLIN 2 G: 10 INJECTION, POWDER, FOR SOLUTION INTRAVENOUS at 14:32

## 2022-07-07 RX ADMIN — MIDODRINE HYDROCHLORIDE 10 MG: 5 TABLET ORAL at 17:19

## 2022-07-07 ASSESSMENT — ACTIVITIES OF DAILY LIVING (ADL)
ADLS_ACUITY_SCORE: 54
ADLS_ACUITY_SCORE: 49
ADLS_ACUITY_SCORE: 54
ADLS_ACUITY_SCORE: 49
ADLS_ACUITY_SCORE: 54
ADLS_ACUITY_SCORE: 54
ADLS_ACUITY_SCORE: 49
ADLS_ACUITY_SCORE: 54
ADLS_ACUITY_SCORE: 49

## 2022-07-07 NOTE — PROGRESS NOTES
Care Transitions Note:  Writer received a phone call from Bolivar at [facility].  Discussed the note Dr. Rain just entered re a blood cx.  He will check with his admitting doctors about the patient coming to them with the cx pending, although we do not expect them to be +.  Bolivar will call back 7/7 re this.

## 2022-07-07 NOTE — PROGRESS NOTES
Pt is A&O x4, VSS, pt was anxious regarding Blood culture results. Writer listened to pt concerns and answered questions. Medicated with Hydroxyzine for itching and pain. Medicated with Dilaudid for back pain. Back incision is CDI and open to air. Pt states he is slightly constipated but refused suppository. States he will take suppository 7/7/22 in AM. Abdomen soft with BS audible. Saline Lock patent. Fistula patent.

## 2022-07-07 NOTE — PROGRESS NOTES
Patient vital signs are at baseline: Yes  Patient able to ambulate as they were prior to admission or with assist devices provided by therapies during their stay:  No  Patient MUST void prior to discharge:  Yes  Patient able to tolerate oral intake:  Yes  Pain has adequate pain control using Oral analgesics:  Yes  Does patient have an identified :  Yes  Has goal D/C date and time been discussed with patient:  Yes    Pt A&O x4, Up x2 with Irina steady. Soft BP, on O2 2L. Pain managed with Dilaudid, Atarax. Senna and Miralax given for constipation, had BM x2. Pt got agitated easily. Continues on contact isolation.

## 2022-07-07 NOTE — PROGRESS NOTES
Patient vital signs are at baseline: Yes Pt has history of Hypotension  Patient able to ambulate as they were prior to admission or with assist devices provided by therapies during their stay:  No,  Reason:  Pt needs sera steady for transfers.   Patient MUST void prior to discharge:  No,  Reason:  Pt has End Stage Renal Disease and receives Dialysis 3 times a week.  Patient able to tolerate oral intake:  Yes  Pain has adequate pain control using Oral analgesics:  Yes  Does patient have an identified :  No,  Reason:  Pending discharge to TCU  Has goal D/C date and time been discussed with patient:  Yes     Pt has oxygen on at 2 liters per nasal cannula. Oxygen sat 95%. Back incision is CDI and open to air. Pain managed with Dilaudid. Pt states he has not had a BM post operatively. No impaction noted. Soft yellow stool when checked. Suppository given 06:20. No results at this time. L upper arm Fistula is patent. Remains on contact isolation.

## 2022-07-07 NOTE — PROGRESS NOTES
[facility]    Infectious Disease Progress Note    Date of Service (when I saw the patient): 07/07/2022     Assessment & Plan   Shay Jimenez is a 57 year old male who was admitted on 6/24/2022.     ASSESSMENT:  1. T2-T3 COLLAPSE, PHLEGMON, ENDPLATE EROSION, DEGENERATIVE CHANGES-possible discitis/vertebral osteomyelitis, changes could all be from DJD, CRP mildly elevated, no fever/chills, slow intermittent progression, I am not convinced this is infection but it still could be, he has had neg. PPD in past and no TB risk factors or contacts  2. DJD  3. ESRD  4. OBESITY  5. ETOH DEPENDENCE, ABUSE  6. NEUROPATHY  7. GINI  .     RECOMMENDATION  1. S/p:  6/27,Cervical 6 to Thoracic 6 Fusion and Thoracic 2-3 Decompression, operative report noted for DJD changes,  Pending Cxs for routine cx/gs, fungus Cx and stain, AFB Cx and stain and pathology,    2. Has been on Ancef since surgery, day 10, Late growth of P acne in broth only a probable contaminate given no previous hardware, the growth is in now 2 cultures day 5/ after day7  late and in broth only. But since new hardware in place now will recommend starting prophylactic amoxi 500 mg daily once off ancef for a total of 4 weeks.   3. Now on 1/2 positive blood cultures from 7/ 5 verigene pending has been on ancef already received today`s dose as being dosed once a day.   4. Blood cultures further identified as staph epi,  probable contaminant but will follow. Only 1 cultures was done. Not clear why this was done, patient was not febrile no new infection symptoms. Repeat pending.            Ranjan Rain MD    Interval History   Afebrile   No new rashes or issues   Labs reviewed   Dicussed with IM     Physical Exam   Temp: 97.7  F (36.5  C) Temp src: Oral BP: (!) 79/50 Pulse: 85   Resp: 16 SpO2: 94 % O2 Device: Nasal cannula Oxygen Delivery: 2 LPM  Vitals:    06/24/22 1716 06/28/22 0400 06/29/22 0638   Weight: 149 kg (328 lb 7.8 oz) 146.6 kg (323  lb 3.1 oz) 145 kg (319 lb 10.7 oz)     Vital Signs with Ranges  Temp:  [97.6  F (36.4  C)-98.1  F (36.7  C)] 97.7  F (36.5  C)  Pulse:  [62-99] 85  Resp:  [16-18] 16  BP: ()/(39-80) 79/50  SpO2:  [93 %-95 %] 94 %    Constitutional: Awake  Lungs: Clear to auscultation bilaterally, no crackles or wheezing  Cardiovascular: Regular rate and rhythm, normal S1 and S2, and no murmur noted  Abdomen: Normal bowel sounds, soft, non-distended, non-tender  Skin: No rashes, no cyanosis, no edema  Other:    Medications       - MEDICATION INSTRUCTIONS for Dialysis Patients -   Does not apply See Admin Instructions     atorvastatin  20 mg Oral At Bedtime     ceFAZolin  1 g Intravenous Q24H     cetirizine  10 mg Oral Daily     DULoxetine  30 mg Oral Daily     finasteride  1.25 mg Oral Daily     gabapentin  100 mg Oral TID     midodrine  10 mg Oral TID w/meals     pantoprazole  40 mg Oral QAM AC     polyethylene glycol  17 g Oral Daily     senna-docusate  1 tablet Oral BID    Or     senna-docusate  2 tablet Oral BID     sertraline  100 mg Oral Daily     sevelamer carbonate  800 mg Oral TID w/meals     sodium chloride (PF)  3 mL Intracatheter Q8H     sodium chloride (PF)  3 mL Intracatheter Q8H       Data   All microbiology laboratory data reviewed.  Recent Labs   Lab Test 07/07/22  0552 07/06/22  1618 07/05/22  0743   WBC 10.3 10.9 10.0   HGB 8.7* 9.3* 8.4*   HCT 28.3* 29.4* 27.0*   * 106* 109*    225 296     Recent Labs   Lab Test 07/06/22  1618 07/04/22  0715 07/03/22  0658   CR 5.37* 11.70* 9.90*     No lab results found.  No lab results found.    Invalid input(s): TRU

## 2022-07-07 NOTE — PROGRESS NOTES
"[facility]    Medicine Progress Note - Hospitalist Service    Date of Admission:  6/24/2022    Assessment & Plan        Shay Jimenez is a very pleasant 57-year-old male with past medical history significant for end-stage renal disease, on hemodialysis, chronic peripheral neuropathy affecting his feet, history of hypertension no longer on meds, hyperlipidemia, obesity, obstructive sleep apnea, GERD and depression, among other medical problems, who presented 6/24/22 for evaluation of worsening upper back pain with associated generalized weakness and falls.  He was found to have abnormal findings on thoracic MRI concerning for diskitis with osteomyelitis with also some findings of a compressive myelopathy.      Upper back pain  Generalized weakness  Falls   Epidural phlegmon/abscess   Compressive myelopathy  S/P C6-T6 fusion/T2-3 decompression  *back pain for 6 months. CT chest 2/2022 showed concern for upper thoracic spine diskitis vs osteomyelitis. Unclear follow up from that time  *in weeks prior to admission, MRI showed possible thoracic compression fracture.   *developed progressively weak legs with frequent falls.  *on admission imaging with T2-T3 diskitis, osteomyelitis and compressive myelopathy.        appreciate Neurosurgery care    follow blood cultures, they have no growth after 5 days    ID consult requested, I appreciate Dr. Rain's evaluation recommendations    Per her, \"changes could all be from DJD, CRP mildly elevated, no fever/chills, slow intermittent progression, I am not convinced this is infection but it still could be, he has had neg. PPD in past and no TB risk factors or contacts.\"     routine gram stain culture, fungal stain and culture, AFB stain and culture and pathology sent from OR    Also per Dr. Rain, \"continue on Ancef for now as we wait on the cultures.  If ready for discharge okay to discharge off antibiotics.\"  She plans to follow-up on the cultures " "for any late growth and start antimicrobials if/when needed.    Tissue culture has gram-positive bacilli isolated in broth only, resembling diphtheroids on day 5 of incubation; ID notified of this result.  Per Dr. Rain, \"GPB resembling diphtheroid 5 days later growth in broth only is a contaminate, no changes to plan for discharge off antibiotics.\"    Oxycodone \"didn't work at all\" for patient, was discontinued. Tylenol and oral Dilaudid available as needed for pain.     pain is better controlled, he is working with PT, he  is looking forward to go to ARU to get back his strength.    Addendum: 1 blood culture came back positive for gram-positive cocci on 7/6, he has been having regular blood cultures since 7/2, possibly this is a contaminant, 2 set of blood cultures ordered for 7/6.  7/5,  1 set positive for staph epidermidis, possibly contaminant, repeat blood cultures from 7/6 is still pending.  Current plan is to discharge patient to ARU when bed available on amoxicillin 500 mg daily for 4 weeks.      End-stage renal disease, on hemodialysis  Hypotension  Hyponatremia  *Typically dialyzes Monday, Wednesday, Friday. Missed 6/24 session due to being in ED      Nephrology consult appreciated    Dialysis per nephrology.    Blood pressures readings are soft.  Per Dr. Carr, \"okay to run and pull fluid with BP in the 70s as long as he is asymptomatic.\"    Continue scheduled midodrine    PTA sevelamer     Encephalopathy  Gianfranco Day is on gabapentin 300 mg 3 times daily, this is a home medication and dose has been unchanged since he came to the hospital but is higher than the typical maximum dose recommended even for patients on CRRT    Presume encephalopathy is related to buildup of gabapentin metabolites from intradialytic..  Gabapentin and Zoloft was on hold, will start with gabapentin  low-dose of 100 mg at bedtime since encephalopathy is improved.  Restarted 7/5.    Monitor signs and " "symptoms    GERD    Chronic and stable on PPI.    Anemia of chronic kidney disease    Per Dr. Carr, \"increase erythropoietin to 10K with dialysis\"    Follow hemoglobin    Consider transfusion for hemoglobin less than 7 g/dL and symptoms      Hyperlipidemia    Chronic and stable, on statin.    Peripheral neuropathy of the feet  *Follows with Neurology.      Managed in part with duloxetine, continue    Depression    Chronic and stable on Zoloft.    Obesity, class III  Obstructive sleep apnea  *BMI this stay is 43.10.  He does not use CPAP (patient says, \"I am one of those guys who does not tolerate CPAP.\") Encourage diet, lifestyle modifications once above issues addressed.    History of BPH.    Continue Proscar     Diet: Regular Diet Adult  Diet    DVT Prophylaxis: Pneumatic Compression Devices  Oliveira Catheter: Not present  Central Lines: None  Cardiac Monitoring: None  Code Status: Full Code      Disposition Plan      Expected Discharge Date: 07/08/2022,  3:00 PM  Discharge Delays: Placement - TCU  Destination: inpatient rehabilitation facility  Discharge Comments: ARU following          Oksana Spicer MD  Hospitalist Service  [facility]  Securely message with the Vocera Web Console (learn more here)  Text page via studdex Paging/Directory     Clinically Significant Risk Factors Present on Admission                    _______________________________________________________________    Interval History   Patient is resting comfortably, his systolic blood pressure is 82 mmHg, he denies any symptoms, he is eager to participate in PT and to go to rehab, he also mentioned ongoing constipation, discussed about enema for that.    Data reviewed today: I reviewed all medications, new labs and imaging results over the last 24 hours. I personally reviewed no images or EKG's today.    Physical Exam   Vital Signs: Temp: 97.7  F (36.5  C) Temp src: Oral BP: (!) 82/56 Pulse: 96   Resp: 16 SpO2: 96 % O2 " Device: Nasal cannula Oxygen Delivery: 2 LPM  Weight: 319 lbs 10.67 oz  Constitutional: Awake, alert, oriented x3  Respiratory: Clear to auscultation bilaterally, no crackles or wheezing  Cardiovascular: Regular rate and rhythm  GI: Obese, soft, bowel sounds are present  Skin/Integumen: No rash on exposed skin, patient does have a brace  Psych: Mood is appropriate      Data   Recent Labs   Lab 07/07/22  0552 07/06/22  1618 07/05/22  0743 07/04/22  0715 07/03/22  0658   WBC 10.3 10.9 10.0 8.4 8.0   HGB 8.7* 9.3* 8.4* 8.1* 8.3*   * 106* 109* 110* 111*    225 296 316 282   NA  --  131*  --  132* 135   POTASSIUM 5.5* 4.5  --  4.4 4.8   CHLORIDE  --  96  --  94 96   CO2  --  27  --  23 29   BUN  --  37*  --  83* 66*   CR  --  5.37*  --  11.70* 9.90*   ANIONGAP  --  8  --  15* 10   MAC  --  9.6  --  9.5 9.8   GLC  --  119*  --  91 123*     No results found for this or any previous visit (from the past 24 hour(s)).  Medications       - MEDICATION INSTRUCTIONS for Dialysis Patients -   Does not apply See Admin Instructions     atorvastatin  20 mg Oral At Bedtime     ceFAZolin  2 g Intravenous Q24H     cetirizine  10 mg Oral Daily     DULoxetine  30 mg Oral Daily     finasteride  1.25 mg Oral Daily     gabapentin  100 mg Oral TID     midodrine  10 mg Oral TID w/meals     pantoprazole  40 mg Oral QAM AC     polyethylene glycol  17 g Oral Daily     senna-docusate  1 tablet Oral BID    Or     senna-docusate  2 tablet Oral BID     sertraline  100 mg Oral Daily     sevelamer carbonate  800 mg Oral TID w/meals     sodium chloride (PF)  3 mL Intracatheter Q8H

## 2022-07-07 NOTE — PROGRESS NOTES
Patient vital signs are at baseline: No,  Reason:  chronoic hypotension.   Patient able to ambulate as they were prior to admission or with assist devices provided by therapies during their stay:  No,  Reason:  Chronic weakness in BLE.   Patient MUST void prior to discharge:  No,  Reason:  Esrd. HEMODIALYSIS.   Patient able to tolerate oral intake:  Yes  Pain has adequate pain control using Oral analgesics:  Yes  Does patient have an identified :  No,  Reason:  Tcu Placement.   Has goal D/C date and time been discussed with patient:  Yes. AC Rehab placement.        Blood culture positive today. See lab results.

## 2022-07-07 NOTE — PROGRESS NOTES
"Care Management Follow Up    Length of Stay (days): 13    Expected Discharge Date: 07/07/2022     Concerns to be Addressed: discharge planning     Patient plan of care discussed at interdisciplinary rounds: Yes    Anticipated Discharge Disposition: Acute Rehab, Transitional Care     Anticipated Discharge Services: Transportation Services  Anticipated Discharge DME:      Patient/family educated on Medicare website which has current facility and service quality ratings:    Education Provided on the Discharge Plan:    Patient/Family in Agreement with the Plan: yes    Referrals Placed by CM/SW: Post Acute Facilities  Private pay costs discussed: Not applicable    Additional Information:  Voicemail received from Bolivar at [facility] stating that they have some concerns with patient's activity tolerance and blood pressure with therapy. Bolivar stated he is going to page therapy services to see if they can see patient this morning and as long as patient tolerates therapy and blood pressure is stable, they could accept patient today. Writer waiting for updated therapy note and to hear from Bolivar on final determination.     Addendum 1104: Updated therapy note has been entered. Writer paged Bolivar at [facility] to determine if they are able to accept patient today- ride set at 1400.    Addendum 1120: VM received from Bolivar stating that he has a call out to his MD to determine if they can accept patient due to his blood pressure when he worked with OT. Writer waiting to hear.     Writer also sent message to MD to determine if patient is medically stable for discharge if ARU will accept. Md confirmed patient is medically ready.     Spoke to Bolivar. Bolivar's concerns \"our concerns are with the hypotension with mobility and the fact he will not tolerate 3 hours of therapy per day. We are asking for you guys to place an abdominal binder and compression stockings on this darcy to try to boost fluid return to the system which may keep his BP up a " "little. That is what we usually do in rehab. Then we will want to see therapies tomorrow to make sure that he is tolerating without dizziness.\" Writer updated MD asking for these items to be ordered. Writer called [facility] transport, spoke to max and cancelled ride.     Writer updated bedside nurse that patient is not discharging to ARU due to concerns of BP, writer asked that bedside nurse update patient that he is not discharging.     Lorna Larios, TABITHA, SW   Social Work   [facility]      "

## 2022-07-08 LAB
BACTERIA BLD CULT: ABNORMAL
BACTERIA BLD CULT: ABNORMAL
BACTERIA BLD CULT: NO GROWTH
ERYTHROCYTE [DISTWIDTH] IN BLOOD BY AUTOMATED COUNT: 14.6 % (ref 10–15)
HCT VFR BLD AUTO: 24.8 % (ref 40–53)
HGB BLD-MCNC: 7.7 G/DL (ref 13.3–17.7)
MCH RBC QN AUTO: 32.6 PG (ref 26.5–33)
MCHC RBC AUTO-ENTMCNC: 31 G/DL (ref 31.5–36.5)
MCV RBC AUTO: 105 FL (ref 78–100)
PLATELET # BLD AUTO: 250 10E3/UL (ref 150–450)
RBC # BLD AUTO: 2.36 10E6/UL (ref 4.4–5.9)
WBC # BLD AUTO: 10.1 10E3/UL (ref 4–11)

## 2022-07-08 PROCEDURE — 90935 HEMODIALYSIS ONE EVALUATION: CPT | Performed by: INTERNAL MEDICINE

## 2022-07-08 PROCEDURE — 250N000013 HC RX MED GY IP 250 OP 250 PS 637: Performed by: INTERNAL MEDICINE

## 2022-07-08 PROCEDURE — 85027 COMPLETE CBC AUTOMATED: CPT | Performed by: INTERNAL MEDICINE

## 2022-07-08 PROCEDURE — 120N000001 HC R&B MED SURG/OB

## 2022-07-08 PROCEDURE — 99233 SBSQ HOSP IP/OBS HIGH 50: CPT | Performed by: INTERNAL MEDICINE

## 2022-07-08 PROCEDURE — 250N000011 HC RX IP 250 OP 636: Performed by: INTERNAL MEDICINE

## 2022-07-08 PROCEDURE — 90937 HEMODIALYSIS REPEATED EVAL: CPT

## 2022-07-08 PROCEDURE — 36415 COLL VENOUS BLD VENIPUNCTURE: CPT | Performed by: INTERNAL MEDICINE

## 2022-07-08 RX ADMIN — SEVELAMER CARBONATE 800 MG: 800 TABLET, FILM COATED ORAL at 14:15

## 2022-07-08 RX ADMIN — DULOXETINE 30 MG: 30 CAPSULE, DELAYED RELEASE ORAL at 14:14

## 2022-07-08 RX ADMIN — ATORVASTATIN CALCIUM 20 MG: 20 TABLET, FILM COATED ORAL at 22:48

## 2022-07-08 RX ADMIN — HYDROMORPHONE HYDROCHLORIDE 4 MG: 2 TABLET ORAL at 16:31

## 2022-07-08 RX ADMIN — MIDODRINE HYDROCHLORIDE 10 MG: 5 TABLET ORAL at 14:27

## 2022-07-08 RX ADMIN — ALPRAZOLAM 1 MG: 0.25 TABLET ORAL at 22:48

## 2022-07-08 RX ADMIN — MIDODRINE HYDROCHLORIDE 10 MG: 5 TABLET ORAL at 09:50

## 2022-07-08 RX ADMIN — PANTOPRAZOLE SODIUM 40 MG: 40 TABLET, DELAYED RELEASE ORAL at 08:39

## 2022-07-08 RX ADMIN — POLYETHYLENE GLYCOL 3350 17 G: 17 POWDER, FOR SOLUTION ORAL at 14:15

## 2022-07-08 RX ADMIN — GABAPENTIN 100 MG: 100 CAPSULE ORAL at 16:31

## 2022-07-08 RX ADMIN — MIDODRINE HYDROCHLORIDE 10 MG: 5 TABLET ORAL at 17:41

## 2022-07-08 RX ADMIN — MIDODRINE HYDROCHLORIDE 10 MG: 5 TABLET ORAL at 11:40

## 2022-07-08 RX ADMIN — SERTRALINE HYDROCHLORIDE 100 MG: 100 TABLET ORAL at 14:15

## 2022-07-08 RX ADMIN — HYDROMORPHONE HYDROCHLORIDE 4 MG: 2 TABLET ORAL at 08:00

## 2022-07-08 RX ADMIN — HYDROXYZINE HYDROCHLORIDE 25 MG: 25 TABLET, FILM COATED ORAL at 16:31

## 2022-07-08 RX ADMIN — SENNOSIDES AND DOCUSATE SODIUM 1 TABLET: 50; 8.6 TABLET ORAL at 22:48

## 2022-07-08 RX ADMIN — CETIRIZINE HYDROCHLORIDE 10 MG: 10 TABLET, FILM COATED ORAL at 09:47

## 2022-07-08 RX ADMIN — CYCLOBENZAPRINE 10 MG: 10 TABLET, FILM COATED ORAL at 08:00

## 2022-07-08 RX ADMIN — FINASTERIDE 1.25 MG: 5 TABLET, FILM COATED ORAL at 14:14

## 2022-07-08 RX ADMIN — SENNOSIDES AND DOCUSATE SODIUM 2 TABLET: 50; 8.6 TABLET ORAL at 14:33

## 2022-07-08 RX ADMIN — GABAPENTIN 100 MG: 100 CAPSULE ORAL at 22:48

## 2022-07-08 RX ADMIN — CEFAZOLIN 2 G: 10 INJECTION, POWDER, FOR SOLUTION INTRAVENOUS at 14:15

## 2022-07-08 RX ADMIN — Medication: at 14:27

## 2022-07-08 RX ADMIN — HYDROXYZINE HYDROCHLORIDE 25 MG: 25 TABLET, FILM COATED ORAL at 09:47

## 2022-07-08 ASSESSMENT — ACTIVITIES OF DAILY LIVING (ADL)
ADLS_ACUITY_SCORE: 54

## 2022-07-08 NOTE — PROGRESS NOTES
[facility]     Renal Progress Note       SHORTHAND KEY FOR MY NOTES:  c = with, s = without, p = after, a = before, x = except, asx = asymptomatic, tx = transplant or treatment, sx = symptoms or symptomatic, cx = canceled or culture, rxn = reaction, yday = yesterday, nl = normal, abx = antibiotics, fxn = function, dx = diagnosis, dz = disease, m/h = melena/hematochezia, c/d/l/ha = cramping/dizziness/lightheadedness/headache, d/c = discharge or diarrhea/constipation, f/c/n/v = fevers/chills/nausea/vomiting, cp/sob = chest pain/shortness of breath, tbv = total body volume, rxn = reaction, tdc = tunneled dialysis catheter, pta = prior to admission, hd = hemodialysis, pd = peritoneal dialysis, hhd = home hemodialysis, edw = estimated dry wt         Assessment/Plan:     1.  ESKD.  Pt continues to run on a MWF schedule.  He is not having any issues c volume removal of 4-5L each run.  A.  Continue midodrine pre and mid-run.  B.  Next planned run on Mon.      2.  POD #11 s/p C6-T6 fusion / T2-3 decompression.  Pt is doing ok and is awaiting rehab placement.  He is able to do rehab c the low BPs and isn't having any major complaints.  His pain is controlled c prn meds.  He remains on abx.  A.  Abx per ID at proper renal dose.  B.  Ready to go to rehab.    3.  Chronic hypotension.  Pt's BP is usually between 70-90s and he is asx most of the time.  He tends to get symptomatic in the 60s.  Pt takes midodrine 10 mg tid and an additional dose mid-run on HD days.  Even c the low BPs, he is able to pull fluid on dialysis.  This should not be a reason for him not to be accepted at rehab if he's doing it here s probs.    A.  Ok to pull 4-5L each run even c BP in the 70s as long as he is not symptomatic.  B.  Continue scheduled midodrine 10 mg tid and mid-run 10 mg.  C.  If he's confused, first make sure it's not pain med related (yossi, cyclobenzaprine, narcs).  If it's not that, then consider that it might  "be related to low BP and give a little albumin.    4.  FEN.  No labs done today.  Yday's K of 5.5 was hemolysed.  A.  Continue reg diet.  B.  Monitor electrolytes.    5.  Dispo.  Pt is due to go to acute rehab soon.  The chronic hypotension shouldn't be a disqualifying issue unless he becomes symptomatic c therapies.        Interval History:     Pt is feeling fine and has no complaints.  No cp/sob.  Denies any significant c/d/l/ha.  When getting up from lying to sitting or standing he is lightheaded for 1-2 sec, but then is fine.  Rehab is helping.            Medications and Allergies:       - MEDICATION INSTRUCTIONS for Dialysis Patients -   Does not apply See Admin Instructions     sodium chloride 0.9%  250 mL Intravenous Once in dialysis/CRRT     sodium chloride 0.9%  300 mL Hemodialysis Machine Once     atorvastatin  20 mg Oral At Bedtime     ceFAZolin  2 g Intravenous Q24H     cetirizine  10 mg Oral Daily     DULoxetine  30 mg Oral Daily     epoetin mar-epbx (RETACRIT) inj ESRD  10,000 Units Intravenous Once in dialysis/CRRT     finasteride  1.25 mg Oral Daily     gabapentin  100 mg Oral TID     midodrine  10 mg Oral TID w/meals     - MEDICATION INSTRUCTIONS -   Does not apply Once     pantoprazole  40 mg Oral QAM AC     polyethylene glycol  17 g Oral Daily     senna-docusate  1 tablet Oral BID    Or     senna-docusate  2 tablet Oral BID     sertraline  100 mg Oral Daily     sevelamer carbonate  800 mg Oral TID w/meals     sodium chloride (PF)  3 mL Intracatheter Q8H     Allergies   Allergen Reactions     Amlodipine      Lisinopril           Physical Exam:     Vitals were reviewed     , Blood pressure 100/53, pulse 83, temperature 97.2  F (36.2  C), temperature source Oral, resp. rate 18, height 1.859 m (6' 1.2\"), weight 145 kg (319 lb 10.7 oz), SpO2 98 %.  Wt Readings from Last 3 Encounters:   06/29/22 145 kg (319 lb 10.7 oz)     Intake/Output Summary (Last 24 hours) at 7/8/2022 1011  Last data filed at " 7/7/2022 1900  Gross per 24 hour   Intake 480 ml   Output --   Net 480 ml     GENERAL APPEARANCE:  lying in bed, NAD, alert  HEENT:  eyes/ears/nose/neck grossly nl  RESP: CTA B c good efforts  CV: RRR c 2/6 m, nl S1/S2   ABDOMEN: o/s/nt/nd  EXTREMITIES/SKIN: + tr ble edema - chronic-appearing skin changes  ACCESS:  LAF    Pt seen on HD.  Stable run.  -4-5L UF goal.  Good BFR via LAF.         Data:     CBC RESULTS:     Recent Labs   Lab 07/08/22  0800 07/07/22  0552 07/06/22  1618 07/05/22  0743 07/04/22  0715 07/03/22  0658   WBC 10.1 10.3 10.9 10.0 8.4 8.0   RBC 2.36* 2.64* 2.78* 2.48* 2.40* 2.45*   HGB 7.7* 8.7* 9.3* 8.4* 8.1* 8.3*   HCT 24.8* 28.3* 29.4* 27.0* 26.5* 27.1*    256 225 296 316 282     Basic Metabolic Panel:  Recent Labs   Lab 07/07/22  0552 07/06/22  1618 07/04/22  0715 07/03/22  0658   NA  --  131* 132* 135   POTASSIUM 5.5* 4.5 4.4 4.8   CHLORIDE  --  96 94 96   CO2  --  27 23 29   BUN  --  37* 83* 66*   CR  --  5.37* 11.70* 9.90*   GLC  --  119* 91 123*   MAC  --  9.6 9.5 9.8     INR  No lab results found in last 7 days.   Attestation:   I have reviewed today's relevant vital signs, notes, medications, labs and imaging.    Dejuan Carr MD  [facility] - Nephrology  [phone]

## 2022-07-08 NOTE — PROGRESS NOTES
"[facility]    Medicine Progress Note - Hospitalist Service    Date of Admission:  6/24/2022    Assessment & Plan        Shay Jimenez is a very pleasant 57-year-old male with past medical history significant for end-stage renal disease, on hemodialysis, chronic peripheral neuropathy affecting his feet, history of hypertension no longer on meds, hyperlipidemia, obesity, obstructive sleep apnea, GERD and depression, among other medical problems, who presented 6/24/22 for evaluation of worsening upper back pain with associated generalized weakness and falls.  He was found to have abnormal findings on thoracic MRI concerning for diskitis with osteomyelitis with also some findings of a compressive myelopathy.      Upper back pain  Generalized weakness  Falls   Epidural phlegmon/abscess   Compressive myelopathy  S/P C6-T6 fusion/T2-3 decompression  *back pain for 6 months. CT chest 2/2022 showed concern for upper thoracic spine diskitis vs osteomyelitis. Unclear follow up from that time  *in weeks prior to admission, MRI showed possible thoracic compression fracture.   *developed progressively weak legs with frequent falls.  *on admission imaging with T2-T3 diskitis, osteomyelitis and compressive myelopathy.        appreciate Neurosurgery care    follow blood cultures, they have no growth after 5 days    ID consult requested, I appreciate Dr. Rain's evaluation recommendations    Per her, \"changes could all be from DJD, CRP mildly elevated, no fever/chills, slow intermittent progression, I am not convinced this is infection but it still could be, he has had neg. PPD in past and no TB risk factors or contacts.\"     routine gram stain culture, fungal stain and culture, AFB stain and culture and pathology sent from OR    Also per Dr. Rain, \"continue on Ancef for now as we wait on the cultures.  If ready for discharge okay to discharge off antibiotics.\"  She plans to follow-up on the cultures " "for any late growth and start antimicrobials if/when needed.    Tissue culture has gram-positive bacilli isolated in broth only, resembling diphtheroids on day 5 of incubation; ID notified of this result.  Per Dr. Rain, \"GPB resembling diphtheroid 5 days later growth in broth only is a contaminate, no changes to plan for discharge off antibiotics.\"    Oxycodone \"didn't work at all\" for patient, was discontinued. Tylenol and oral Dilaudid available as needed for pain.     pain is better controlled, he is working with PT, he  is looking forward to go to ARU to get back his strength.    1 blood culture came back positive for gram-positive cocci on 7/6, he has been having regular blood cultures since 7/2, possibly this is a contaminant, 2 set of blood cultures ordered for 7/6.  7/5,  1 set positive for staph epidermidis, possibly contaminant, repeat blood cultures from 7/6 is still pending.  Current plan is to discharge patient to ARU when bed available on amoxicillin 500 mg daily for 4 weeks once IV antibiotics are discontinued.      End-stage renal disease, on hemodialysis  Hypotension  Hyponatremia  *Typically dialyzes Monday, Wednesday, Friday. Missed 6/24 session due to being in ED      Nephrology consult appreciated    Dialysis per nephrology.    Blood pressures readings are soft.  Per Dr. Carr, \"okay to run and pull fluid with BP in the 70s as long as he is asymptomatic.\"    Continue scheduled midodrine    PTA sevelamer    Patient is stable on low blood pressures, he is already on 10 mg midodrine 3 times a day, he does have mild dizziness with positional changes but he is tolerating PT here well.  ARU is concerned about his blood pressures but he is functioning well and this time for blood pressure and he is already on high dose of midodrine, patient is  able to tolerate PT at this blood pressures.    Encephalopathy  Gianfranco Day is on gabapentin 300 mg 3 times daily, this is a home medication and dose has " "been unchanged since he came to the hospital but is higher than the typical maximum dose recommended even for patients on CRRT    Presume encephalopathy is related to buildup of gabapentin metabolites from intradialytic..  Gabapentin and Zoloft was on hold, will start with gabapentin  low-dose of 100 mg at bedtime since encephalopathy is improved.  Restarted 7/5.    Monitor signs and symptoms    GERD    Chronic and stable on PPI.    Anemia of chronic kidney disease    Per Dr. Carr, \"increase erythropoietin to 10K with dialysis\"    Follow hemoglobin    Consider transfusion for hemoglobin less than 7 g/dL and symptoms      Hyperlipidemia    Chronic and stable, on statin.    Peripheral neuropathy of the feet  *Follows with Neurology.      Managed in part with duloxetine, continue    Depression    Chronic and stable on Zoloft.    Obesity, class III  Obstructive sleep apnea  *BMI this stay is 43.10.  He does not use CPAP (patient says, \"I am one of those guys who does not tolerate CPAP.\") Encourage diet, lifestyle modifications once above issues addressed.    History of BPH.    Continue Proscar     Diet: Regular Diet Adult  Diet    DVT Prophylaxis: Pneumatic Compression Devices  Oliveira Catheter: Not present  Central Lines: None  Cardiac Monitoring: None  Code Status: Full Code      Disposition Plan      Expected Discharge Date: 07/09/2022    Discharge Delays: Placement - TCU  Destination: inpatient rehabilitation facility  Discharge Comments: ARU following          Oksana Spicer MD  Hospitalist Service  [facility]  Securely message with the Vocera Web Console (learn more here)  Text page via [employer] Paging/Directory     Clinically Significant Risk Factors Present on Admission                    _______________________________________________________________    Interval History   Visited with patient in dialysis, denies any new concerns, we briefly discussed about his dizziness, he denies any " today.  Data reviewed today: I reviewed all medications, new labs and imaging results over the last 24 hours. I personally reviewed no images or EKG's today.    Physical Exam   Vital Signs: Temp: 97.2  F (36.2  C) Temp src: Oral BP: (!) 81/41 Pulse: 90   Resp: 18 SpO2: 98 % O2 Device: Nasal cannula Oxygen Delivery: 2 LPM  Weight: 319 lbs 10.67 oz  Constitutional: Awake, alert, oriented x3  Respiratory: Clear to auscultation bilaterally, no crackles or wheezing  Cardiovascular: Regular rate and rhythm  GI: Obese, soft, bowel sounds are present  Skin/Integumen: No rash on exposed skin, patient does have a brace  Psych: Mood is appropriate      Data   Recent Labs   Lab 07/08/22  0800 07/07/22  0552 07/06/22  1618 07/05/22  0743 07/04/22  0715 07/03/22  0658   WBC 10.1 10.3 10.9   < > 8.4 8.0   HGB 7.7* 8.7* 9.3*   < > 8.1* 8.3*   * 107* 106*   < > 110* 111*    256 225   < > 316 282   NA  --   --  131*  --  132* 135   POTASSIUM  --  5.5* 4.5  --  4.4 4.8   CHLORIDE  --   --  96  --  94 96   CO2  --   --  27  --  23 29   BUN  --   --  37*  --  83* 66*   CR  --   --  5.37*  --  11.70* 9.90*   ANIONGAP  --   --  8  --  15* 10   MAC  --   --  9.6  --  9.5 9.8   GLC  --   --  119*  --  91 123*    < > = values in this interval not displayed.     No results found for this or any previous visit (from the past 24 hour(s)).  Medications       - MEDICATION INSTRUCTIONS for Dialysis Patients -   Does not apply See Admin Instructions     sodium chloride 0.9%  250 mL Intravenous Once in dialysis/CRRT     sodium chloride 0.9%  300 mL Hemodialysis Machine Once     atorvastatin  20 mg Oral At Bedtime     ceFAZolin  2 g Intravenous Q24H     cetirizine  10 mg Oral Daily     DULoxetine  30 mg Oral Daily     epoetin mar-epbx (RETACRIT) inj ESRD  10,000 Units Intravenous Once in dialysis/CRRT     finasteride  1.25 mg Oral Daily     gabapentin  100 mg Oral TID     midodrine  10 mg Oral TID w/meals     - MEDICATION INSTRUCTIONS  -   Does not apply Once     pantoprazole  40 mg Oral QAM AC     polyethylene glycol  17 g Oral Daily     senna-docusate  1 tablet Oral BID    Or     senna-docusate  2 tablet Oral BID     sertraline  100 mg Oral Daily     sevelamer carbonate  800 mg Oral TID w/meals     sodium chloride (PF)  3 mL Intracatheter Q8H

## 2022-07-08 NOTE — PROGRESS NOTES
Chronic hypotension. No symptoms of dizziness upon return from Dialysis this afternoon. Back pain managed with oral Dilaudid. Transfer by [facility] study Ax2. Awaiting discharge to acute rehab. IV saline locked.

## 2022-07-08 NOTE — PROGRESS NOTES
Potassium   Date Value Ref Range Status   07/07/2022 5.5 (H) 3.4 - 5.3 mmol/L Final     Comment:     Specimen slightly hemolyzed, potassium may be falsely elevated.     Hemoglobin   Date Value Ref Range Status   07/08/2022 7.7 (L) 13.3 - 17.7 g/dL Final     Creatinine   Date Value Ref Range Status   07/06/2022 5.37 (H) 0.66 - 1.25 mg/dL Final     Urea Nitrogen   Date Value Ref Range Status   07/06/2022 37 (H) 7 - 30 mg/dL Final     Sodium   Date Value Ref Range Status   07/06/2022 131 (L) 133 - 144 mmol/L Final     INR   Date Value Ref Range Status   06/27/2022 1.07 0.85 - 1.15 Final      Latest Reference Range & Units 06/25/22 11:53   Hep B Surface Agn Nonreactive  Nonreactive   Hepatitis B Surface Antibody <8.00 m[IU]/mL 1.29     DIALYSIS PROCEDURE NOTE  Hepatitis status of previous patient on machine log was checked and verified ok to use with this patients hepatitis status.  Patient dialyzed for 3.5 hrs. on a K2 bath with a net fluid removal of 5L.  A BFR of 400 ml/min was obtained via a LAVF using 15 gauge needles. The treatment plan was discussed with Dr. Carr during the treatment.    Total heparin received during the treatment: 0 units.   Needle cannulation sites held x 5 min.   Meds  given: epogen   Complications: HD rinseback 30 min prior to end of treatment with 59.0 L processed and 3.8L removed due to increased venous pressures increasing with signs of clotting in chamber. Pt rinsed back with no complications. MD notified.     Person educated: person. Knowledge base substantial. Barriers to learning: none. Educated on procedure via verbal mode. Patient verbalized understanding. Pt prefers oral education style.     ICEBOAT? Timeout performed pre-treatment  I: Patient was identified using 2 identifiers  C:  Consent Signed Yes  E: Equipment preventative maintenance is current and dialysis delivery system OK to use  B: Hepatitis B Surface Antigen: NEGATIVE; Draw Date: 6/25/22      Hepatitis B Surface  Antibody: SUSCEPTIBLE; Draw Date: 6/25/2022  O: Dialysis orders present and complete prior to treatment  A: Vascular access verified and assessed prior to treatment  T: Treatment was performed at a clinically appropriate time  ?: Patient was allowed to ask questions and address concerns prior to treatment  See Adult Hemodialysis flowsheet in [facility] for further details and post assessment.  Machine water alarm in place and functioning. Transducer pods intact and checked every 15min.   Pt returned via bed.  Chlorine/Chloramine water system checked every 4 hours.  Outpatient Dialysis at [facility].       Post treatment report given to SEJAL Quiros RN regarding 3.8L of fluid removed, last BP of 91/49.    Please remove patient dressing on AVF and AVG needle sites 24 hours after dialysis. If leaking occurs please apply a Band-Aid.

## 2022-07-08 NOTE — PROGRESS NOTES
"Pt seen. Comfortable. Working with therapy  Continues to have low BP ( Chronic)   Chart reviewed. Labs and vitals reviewed.   Plan for HD tomorrow. Possible discharge to TCU afterwards    BP (!) 83/45 (BP Location: Right arm, Patient Position: Semi-Doherty's, Cuff Size: Adult Regular)   Pulse 87   Temp 98.5  F (36.9  C) (Oral)   Resp 18   Ht 1.859 m (6' 1.2\")   Wt 145 kg (319 lb 10.7 oz)   SpO2 93%   BMI 41.94 kg/m        Recent Labs   Lab Test 07/07/22  0552 07/06/22  1618 07/04/22  0715   NA  --  131* 132*   POTASSIUM 5.5* 4.5 4.4   CHLORIDE  --  96 94   CO2  --  27 23   ANIONGAP  --  8 15*   GLC  --  119* 91   BUN  --  37* 83*   CR  --  5.37* 11.70*   MAC  --  9.6 9.5       Lilly Kumari MD  [facility] - Nephrology   [phone]    "

## 2022-07-08 NOTE — PROGRESS NOTES
[facility]    Infectious Disease Progress Note    Date of Service (when I saw the patient): 07/08/2022     Assessment & Plan   Shay Jimenez is a 57 year old male who was admitted on 6/24/2022.     ASSESSMENT:  1. T2-T3 COLLAPSE, PHLEGMON, ENDPLATE EROSION, DEGENERATIVE CHANGES-possible discitis/vertebral osteomyelitis, changes could all be from DJD, CRP mildly elevated, no fever/chills, slow intermittent progression, I am not convinced this is infection but it still could be, he has had neg. PPD in past and no TB risk factors or contacts  2. DJD  3. ESRD  4. OBESITY  5. ETOH DEPENDENCE, ABUSE  6. NEUROPATHY  7. GINI  .     RECOMMENDATION  1. S/p:  6/27,Cervical 6 to Thoracic 6 Fusion and Thoracic 2-3 Decompression, operative report noted for DJD changes,   routine cx with P acne but only in broth and 5/ 7 days later .which probably means environmental contamination  fungus Cx and stain, AFB Cx and stain and pathology,    2. Has been on Ancef since surgery, day 12, Late growth of P acne in broth only a probable contaminate given no previous hardware, the growth is in now 2 cultures day 5/ after day7  late and in broth only. But since new hardware in place now will recommend starting prophylactic amoxi 500 mg daily once off ancef for a total of 4 weeks.   3. Now on 1/2 positive blood cultures from 7/ 5 verigene staph epi, has been on ancef already received today`s dose as being dosed once a day.   4. Blood cultures further identified as staph epi,  probable contaminant. Only 1 cultures was done. Not clear why this was done, patient was not febrile no new infection symptoms. Repeat so far no growth            Ranjan Rain MD    Interval History   Afebrile   No new rashes or issues   Labs reviewed   Dicussed with IM     Physical Exam   Temp: 97.2  F (36.2  C) Temp src: Oral BP: (!) 74/46 Pulse: 85   Resp: 18 SpO2: 98 % O2 Device: Nasal cannula Oxygen Delivery: 2 LPM  Vitals:    06/24/22  1716 06/28/22 0400 06/29/22 0638   Weight: 149 kg (328 lb 7.8 oz) 146.6 kg (323 lb 3.1 oz) 145 kg (319 lb 10.7 oz)     Vital Signs with Ranges  Temp:  [97.1  F (36.2  C)-98.5  F (36.9  C)] 97.2  F (36.2  C)  Pulse:  [77-96] 85  Resp:  [16-18] 18  BP: ()/(42-57) 74/46  SpO2:  [93 %-99 %] 98 %    Constitutional: Awake  Lungs: Clear to auscultation bilaterally, no crackles or wheezing  Cardiovascular: Regular rate and rhythm, normal S1 and S2, and no murmur noted  Abdomen: Normal bowel sounds, soft, non-distended, non-tender  Skin: No rashes, no cyanosis, no edema  Other:    Medications       - MEDICATION INSTRUCTIONS for Dialysis Patients -   Does not apply See Admin Instructions     sodium chloride 0.9%  250 mL Intravenous Once in dialysis/CRRT     sodium chloride 0.9%  300 mL Hemodialysis Machine Once     atorvastatin  20 mg Oral At Bedtime     ceFAZolin  2 g Intravenous Q24H     cetirizine  10 mg Oral Daily     DULoxetine  30 mg Oral Daily     epoetin mar-epbx (RETACRIT) inj ESRD  10,000 Units Intravenous Once in dialysis/CRRT     finasteride  1.25 mg Oral Daily     gabapentin  100 mg Oral TID     midodrine  10 mg Oral TID w/meals     - MEDICATION INSTRUCTIONS -   Does not apply Once     pantoprazole  40 mg Oral QAM AC     polyethylene glycol  17 g Oral Daily     senna-docusate  1 tablet Oral BID    Or     senna-docusate  2 tablet Oral BID     sertraline  100 mg Oral Daily     sevelamer carbonate  800 mg Oral TID w/meals     sodium chloride (PF)  3 mL Intracatheter Q8H       Data   All microbiology laboratory data reviewed.  Recent Labs   Lab Test 07/08/22  0800 07/07/22  0552 07/06/22  1618   WBC 10.1 10.3 10.9   HGB 7.7* 8.7* 9.3*   HCT 24.8* 28.3* 29.4*   * 107* 106*    256 225     Recent Labs   Lab Test 07/06/22  1618 07/04/22  0715 07/03/22  0658   CR 5.37* 11.70* 9.90*     No lab results found.  No lab results found.    Invalid input(s): TRU

## 2022-07-08 NOTE — PROGRESS NOTES
MD Notification    Notified Person: MD    Notified Person Name:Jennifer Aviles    Notification Date/Time: 7/7/22 @ 2139    Notification Interaction:Low BP    Purpose of Notification: Pt Is running low BP, the last couple SBP are on the 80s, do you want to order a bolus?    Orders Received: This is a chronic problem, continue to monitor    Comments:    Pt. AOx4. On dialysis MWF. Was running low BP this shift, provider notified. On 2L O2 at 94%.  not OOB this shift. Denies pain. itchiness managed by atarax. Anxiety managed by xanax. CMS intact except baseline BLE numbness. Awaiting TCU placement.

## 2022-07-08 NOTE — PLAN OF CARE
[id number]:    CMS intact ex basline n/t to BLE. Pain controlled w/ PRN Dilaudid x1, Atarax PRN for itching. Surgical incision open to air, staples intact. Not oob this shift. Discharge pending ARU acceptance.

## 2022-07-08 NOTE — PROGRESS NOTES
"I, Yelena Keller, staff  have reviewed and agree with the following  student's note on 7/8/2022 at 4:15 PM.       SPIRITUAL HEALTH SERVICES Progress Note  [facility]  Ortho Spine    Saw pt Shay Jimenez per Follow up.    Illness Narrative - Shay is frustrated with his varying physical ability and general body weakness and pain. However, Shay does believe he is getting better and further that he wants to get to work on getting better through physical therapy.    Distress - Shay is upset that his body isn't functioning at baseline and in general wishes he could get out of the hospital. Shay said that he is willing to do the work to get better, but his body does not always allow him to, which is disheartening.    Coping - Shay is grateful to have the support of his two sons that he sees almost daily. He is also confident the staff are providing him good care. Further, Shay has his brooke which is helpful to him.    Meaning-Making - While Shay is frustrated with his continued stay, he says he believes it is in \"someone's plan\" and that he prays the plan includes him getting better soon. Shay also said that he prays often.    Plan - Shay said that he might be leaving soon but if he doesn t, he would like another visit.    I offered reflective conversation, reassurance of emotions related to his situation, spoke about his support systems and his stay at the hospital. We closed in prayer.    SH remains available.    CELSO Faulkner  Intern    Phone: [phone]   "

## 2022-07-08 NOTE — PROGRESS NOTES
Care Management Follow Up    Length of Stay (days): 14    Expected Discharge Date: 07/09/2022     Concerns to be Addressed: discharge planning     Patient plan of care discussed at interdisciplinary rounds: Yes    Anticipated Discharge Disposition: Acute Rehab, Transitional Care     Anticipated Discharge Services: Transportation Services  Anticipated Discharge DME:      Patient/family educated on Medicare website which has current facility and service quality ratings:    Education Provided on the Discharge Plan:    Patient/Family in Agreement with the Plan: yes    Referrals Placed by CM/SW: Post Acute Facilities  Private pay costs discussed: Not applicable    Additional Information:  SW received a voicemail from [place] at [facility] stating they will not accept the patient at this time due to the continued low blood pressure and the lack of participation in therapy. [facility] will observe the patient over the weekend and reassess on Monday.     SW will continue to follow    Jose Luis Buenrostro, JULISSA

## 2022-07-08 NOTE — PROGRESS NOTES
Cross Cover Note    Called re low BP. Reviewed nephrology note that this is chronic.  Will hold on bolus at this time.    Jennifer Baca PA-C

## 2022-07-09 ENCOUNTER — APPOINTMENT (OUTPATIENT)
Dept: OCCUPATIONAL THERAPY | Facility: CLINIC | Age: 57
DRG: 459 | End: 2022-07-09
Payer: MEDICARE

## 2022-07-09 LAB — BACTERIA BLD CULT: NO GROWTH

## 2022-07-09 PROCEDURE — 97530 THERAPEUTIC ACTIVITIES: CPT | Mod: GO

## 2022-07-09 PROCEDURE — 250N000013 HC RX MED GY IP 250 OP 250 PS 637: Performed by: INTERNAL MEDICINE

## 2022-07-09 PROCEDURE — 250N000011 HC RX IP 250 OP 636: Performed by: INTERNAL MEDICINE

## 2022-07-09 PROCEDURE — 99232 SBSQ HOSP IP/OBS MODERATE 35: CPT | Performed by: INTERNAL MEDICINE

## 2022-07-09 PROCEDURE — 120N000001 HC R&B MED SURG/OB

## 2022-07-09 PROCEDURE — 258N000003 HC RX IP 258 OP 636: Performed by: INTERNAL MEDICINE

## 2022-07-09 RX ORDER — ACETAMINOPHEN 325 MG/1
325-650 TABLET ORAL EVERY 4 HOURS PRN
Status: DISCONTINUED | OUTPATIENT
Start: 2022-07-09 | End: 2022-07-15 | Stop reason: HOSPADM

## 2022-07-09 RX ADMIN — MIDODRINE HYDROCHLORIDE 10 MG: 5 TABLET ORAL at 12:51

## 2022-07-09 RX ADMIN — SERTRALINE HYDROCHLORIDE 100 MG: 100 TABLET ORAL at 08:08

## 2022-07-09 RX ADMIN — MIDODRINE HYDROCHLORIDE 10 MG: 5 TABLET ORAL at 17:31

## 2022-07-09 RX ADMIN — CETIRIZINE HYDROCHLORIDE 10 MG: 10 TABLET, FILM COATED ORAL at 08:08

## 2022-07-09 RX ADMIN — FINASTERIDE 1.25 MG: 5 TABLET, FILM COATED ORAL at 08:09

## 2022-07-09 RX ADMIN — CEFAZOLIN 2 G: 10 INJECTION, POWDER, FOR SOLUTION INTRAVENOUS at 15:29

## 2022-07-09 RX ADMIN — HYDROMORPHONE HYDROCHLORIDE 4 MG: 2 TABLET ORAL at 09:26

## 2022-07-09 RX ADMIN — SEVELAMER CARBONATE 800 MG: 800 TABLET, FILM COATED ORAL at 12:51

## 2022-07-09 RX ADMIN — ALPRAZOLAM 1 MG: 0.25 TABLET ORAL at 17:31

## 2022-07-09 RX ADMIN — HYDROMORPHONE HYDROCHLORIDE 4 MG: 2 TABLET ORAL at 15:29

## 2022-07-09 RX ADMIN — MIDODRINE HYDROCHLORIDE 10 MG: 5 TABLET ORAL at 08:11

## 2022-07-09 RX ADMIN — SENNOSIDES AND DOCUSATE SODIUM 2 TABLET: 50; 8.6 TABLET ORAL at 08:09

## 2022-07-09 RX ADMIN — SENNOSIDES AND DOCUSATE SODIUM 1 TABLET: 50; 8.6 TABLET ORAL at 21:06

## 2022-07-09 RX ADMIN — HYDROXYZINE HYDROCHLORIDE 25 MG: 25 TABLET, FILM COATED ORAL at 09:26

## 2022-07-09 RX ADMIN — PANTOPRAZOLE SODIUM 40 MG: 40 TABLET, DELAYED RELEASE ORAL at 08:09

## 2022-07-09 RX ADMIN — POLYETHYLENE GLYCOL 3350 17 G: 17 POWDER, FOR SOLUTION ORAL at 08:09

## 2022-07-09 RX ADMIN — HYDROMORPHONE HYDROCHLORIDE 4 MG: 2 TABLET ORAL at 21:05

## 2022-07-09 RX ADMIN — GABAPENTIN 100 MG: 100 CAPSULE ORAL at 21:05

## 2022-07-09 RX ADMIN — DULOXETINE 30 MG: 30 CAPSULE, DELAYED RELEASE ORAL at 08:09

## 2022-07-09 RX ADMIN — ATORVASTATIN CALCIUM 20 MG: 20 TABLET, FILM COATED ORAL at 21:06

## 2022-07-09 RX ADMIN — SEVELAMER CARBONATE 800 MG: 800 TABLET, FILM COATED ORAL at 08:09

## 2022-07-09 RX ADMIN — GABAPENTIN 100 MG: 100 CAPSULE ORAL at 08:09

## 2022-07-09 RX ADMIN — GABAPENTIN 100 MG: 100 CAPSULE ORAL at 15:23

## 2022-07-09 RX ADMIN — HYDROXYZINE HYDROCHLORIDE 25 MG: 25 TABLET, FILM COATED ORAL at 17:31

## 2022-07-09 ASSESSMENT — ACTIVITIES OF DAILY LIVING (ADL)
ADLS_ACUITY_SCORE: 50
ADLS_ACUITY_SCORE: 54
ADLS_ACUITY_SCORE: 50
ADLS_ACUITY_SCORE: 54
ADLS_ACUITY_SCORE: 54
ADLS_ACUITY_SCORE: 50
ADLS_ACUITY_SCORE: 50
ADLS_ACUITY_SCORE: 54
ADLS_ACUITY_SCORE: 50
ADLS_ACUITY_SCORE: 54

## 2022-07-09 NOTE — PROGRESS NOTES
Pt. A&Ox4. VSS on RA. Not oob this shift Denies pain. CMS intact except BLE numbness. Back incision site stabled, open to air, intact.

## 2022-07-09 NOTE — PROGRESS NOTES
Provider paged MARILU-   Pt had a near syncopal episode while on the rafa steady with OT. He did not lose consciousness but reported feeling very dizzy and weak. After sitting back down on the bed patient recovered. BP was low, but still near baseline. Pt stable at this time.

## 2022-07-09 NOTE — PLAN OF CARE
Pt A&Ox4. CMS/ neuro intact. Remains hypotensive. Had near syncopal event this afternoon during OT while on rafa steady, recovered once sitting back in bed. PRN Dilaudid for pain, effective. Tolerating diet. Discharge pending ARU acceptance.

## 2022-07-09 NOTE — PROGRESS NOTES
"[facility]    Medicine Progress Note - Hospitalist Service    Date of Admission:  6/24/2022    Assessment & Plan        Shay Jimenez is a very pleasant 57-year-old male with past medical history significant for end-stage renal disease, on hemodialysis, chronic peripheral neuropathy affecting his feet, history of hypertension no longer on meds, hyperlipidemia, obesity, obstructive sleep apnea, GERD and depression, among other medical problems, who presented 6/24/22 for evaluation of worsening upper back pain with associated generalized weakness and falls.  He was found to have abnormal findings on thoracic MRI concerning for diskitis with osteomyelitis with also some findings of a compressive myelopathy.      Upper back pain  Generalized weakness  Falls   Epidural phlegmon/abscess   Compressive myelopathy  S/P C6-T6 fusion/T2-3 decompression  *back pain for 6 months. CT chest 2/2022 showed concern for upper thoracic spine diskitis vs osteomyelitis. Unclear follow up from that time  *in weeks prior to admission, MRI showed possible thoracic compression fracture.   *developed progressively weak legs with frequent falls.  *on admission imaging with T2-T3 diskitis, osteomyelitis and compressive myelopathy.        appreciate Neurosurgery care    follow blood cultures, they have no growth after 5 days    ID consult requested, I appreciate Dr. Rain's evaluation recommendations    Per her, \"changes could all be from DJD, CRP mildly elevated, no fever/chills, slow intermittent progression, I am not convinced this is infection but it still could be, he has had neg. PPD in past and no TB risk factors or contacts.\"     routine gram stain culture, fungal stain and culture, AFB stain and culture and pathology sent from OR    Also per Dr. Rain, \"continue on Ancef for now as we wait on the cultures.  If ready for discharge okay to discharge off antibiotics.\"  She plans to follow-up on the cultures " "for any late growth and start antimicrobials if/when needed.    Tissue culture has gram-positive bacilli isolated in broth only, resembling diphtheroids on day 5 of incubation; ID notified of this result.  Per Dr. Rain, \"GPB resembling diphtheroid 5 days later growth in broth only is a contaminate, no changes to plan for discharge off antibiotics.\"    Oxycodone \"didn't work at all\" for patient, was discontinued. Tylenol and oral Dilaudid available as needed for pain.     pain is better controlled, he is working with PT, he  is looking forward to go to ARU to get back his strength.    1 blood culture came back positive for gram-positive cocci on 7/6, he has been having regular blood cultures since 7/2, possibly this is a contaminant, 2 set of blood cultures ordered for 7/6.  7/5,  1 set positive for staph epidermidis, possibly contaminant, repeat blood cultures from 7/6 is still pending.  Current plan is to discharge patient to ARU when bed available (they plan to reassess 7/11) on amoxicillin 500 mg daily for 4 weeks once IV antibiotics are discontinued.    End-stage renal disease, on hemodialysis  Hypotension  Hyponatremia  *Typically dialyzes Monday, Wednesday, Friday. Missed 6/24 session due to being in ED      Nephrology consult appreciated    Dialysis per nephrology.    Blood pressures readings are soft.  Per Dr. Carr, \"okay to run and pull fluid with BP in the 70s as long as he is asymptomatic.\"    Continue scheduled midodrine    PTA sevelamer    Patient is stable on low blood pressures, he is already on 10 mg midodrine 3 times a day, he does have mild dizziness with positional changes but he is tolerating PT here well.  ARU is concerned about his blood pressures but he is functioning well at this time for blood pressure and he is already on high dose of midodrine, patient is able to tolerate PT at this blood pressures.    Encephalopathy  Gianfranco Day is on gabapentin 300 mg 3 times daily, this is a " "home medication and dose has been unchanged since he came to the hospital but is higher than the typical maximum dose recommended even for patients on CRRT    Presume encephalopathy is related to buildup of gabapentin metabolites from intradialytic. Gabapentin and Zoloft was on hold, will start with gabapentin  low-dose of 100 mg at bedtime since encephalopathy is improved.  Restarted 7/5.    Monitor signs and symptoms    GERD    Chronic and stable on PPI.    Anemia of chronic kidney disease    Per Dr. Carr, \"increase erythropoietin to 10K with dialysis\"    Follow hemoglobin    Consider transfusion for hemoglobin less than 7 g/dL and symptoms      Hyperlipidemia    Chronic and stable, on statin.    Peripheral neuropathy of the feet  *Follows with Neurology.      Managed in part with duloxetine, continue    Depression    Chronic and stable on Zoloft.    Obesity, class III  Obstructive sleep apnea  *BMI this stay is 43.10.  He does not use CPAP (patient says, \"I am one of those guys who does not tolerate CPAP.\") Encourage diet, lifestyle modifications once above issues addressed.    History of BPH.    Continue Proscar     Diet: Regular Diet Adult  Diet    DVT Prophylaxis: Pneumatic Compression Devices  Oliveira Catheter: Not present  Central Lines: None  Cardiac Monitoring: None  Code Status: Full Code      Disposition Plan      Expected Discharge Date: 07/11/2022    Discharge Delays: Placement - TCU  Destination: inpatient rehabilitation facility  Discharge Comments: ARU following          Dioni Wellington MD  Hospitalist Service  [facility]  Securely message with the Vocera Web Console (learn more here)    Clinically Significant Risk Factors Present on Admission                    _______________________________________________________________    Interval History   - I assumed care this AM  - ARU still concerned about hypotension, plan to reassess on Monday  - Patient working with therapies. Some " dizziness yesterday but manageable per patient.    Data reviewed today: I reviewed all medications, new labs and imaging results over the last 24 hours. I personally reviewed no images or EKG's today.    Physical Exam   Vital Signs: Temp: 97.3  F (36.3  C) Temp src: Oral BP: 137/57 Pulse: 54   Resp: 16 SpO2: 99 % O2 Device: Nasal cannula Oxygen Delivery: 2.5 LPM  Weight: 319 lbs 10.67 oz     Constitutional: Awake, alert, oriented x3  Respiratory: Clear to auscultation bilaterally, no crackles or wheezing  Cardiovascular: Regular rate and rhythm  GI: Obese, soft, bowel sounds are present  Skin/Integumen: No rash on exposed skin, patient does have a brace  Psych: Mood is appropriate    Data   Recent Labs   Lab 07/08/22  0800 07/07/22  0552 07/06/22  1618 07/05/22  0743 07/04/22  0715 07/03/22  0658   WBC 10.1 10.3 10.9   < > 8.4 8.0   HGB 7.7* 8.7* 9.3*   < > 8.1* 8.3*   * 107* 106*   < > 110* 111*    256 225   < > 316 282   NA  --   --  131*  --  132* 135   POTASSIUM  --  5.5* 4.5  --  4.4 4.8   CHLORIDE  --   --  96  --  94 96   CO2  --   --  27  --  23 29   BUN  --   --  37*  --  83* 66*   CR  --   --  5.37*  --  11.70* 9.90*   ANIONGAP  --   --  8  --  15* 10   MAC  --   --  9.6  --  9.5 9.8   GLC  --   --  119*  --  91 123*    < > = values in this interval not displayed.     No results found for this or any previous visit (from the past 24 hour(s)).  Medications       - MEDICATION INSTRUCTIONS for Dialysis Patients -   Does not apply See Admin Instructions     atorvastatin  20 mg Oral At Bedtime     ceFAZolin  2 g Intravenous Q24H     cetirizine  10 mg Oral Daily     DULoxetine  30 mg Oral Daily     finasteride  1.25 mg Oral Daily     gabapentin  100 mg Oral TID     midodrine  10 mg Oral TID w/meals     pantoprazole  40 mg Oral QAM AC     polyethylene glycol  17 g Oral Daily     senna-docusate  1 tablet Oral BID    Or     senna-docusate  2 tablet Oral BID     sertraline  100 mg Oral Daily      sevelamer carbonate  800 mg Oral TID w/meals     sodium chloride (PF)  3 mL Intracatheter Q8H

## 2022-07-10 ENCOUNTER — APPOINTMENT (OUTPATIENT)
Dept: PHYSICAL THERAPY | Facility: CLINIC | Age: 57
DRG: 459 | End: 2022-07-10
Payer: MEDICARE

## 2022-07-10 PROCEDURE — 250N000011 HC RX IP 250 OP 636: Performed by: INTERNAL MEDICINE

## 2022-07-10 PROCEDURE — 258N000003 HC RX IP 258 OP 636: Performed by: INTERNAL MEDICINE

## 2022-07-10 PROCEDURE — 97530 THERAPEUTIC ACTIVITIES: CPT | Mod: GP

## 2022-07-10 PROCEDURE — 250N000013 HC RX MED GY IP 250 OP 250 PS 637: Performed by: INTERNAL MEDICINE

## 2022-07-10 PROCEDURE — 99233 SBSQ HOSP IP/OBS HIGH 50: CPT | Performed by: INTERNAL MEDICINE

## 2022-07-10 PROCEDURE — 120N000001 HC R&B MED SURG/OB

## 2022-07-10 RX ORDER — MIDODRINE HYDROCHLORIDE 5 MG/1
10 TABLET ORAL
Status: COMPLETED | OUTPATIENT
Start: 2022-07-11 | End: 2022-07-11

## 2022-07-10 RX ADMIN — PANTOPRAZOLE SODIUM 40 MG: 40 TABLET, DELAYED RELEASE ORAL at 06:40

## 2022-07-10 RX ADMIN — GABAPENTIN 100 MG: 100 CAPSULE ORAL at 09:30

## 2022-07-10 RX ADMIN — DULOXETINE 30 MG: 30 CAPSULE, DELAYED RELEASE ORAL at 09:30

## 2022-07-10 RX ADMIN — SEVELAMER CARBONATE 800 MG: 800 TABLET, FILM COATED ORAL at 13:29

## 2022-07-10 RX ADMIN — SERTRALINE HYDROCHLORIDE 100 MG: 100 TABLET ORAL at 09:30

## 2022-07-10 RX ADMIN — GABAPENTIN 100 MG: 100 CAPSULE ORAL at 17:44

## 2022-07-10 RX ADMIN — POLYETHYLENE GLYCOL 3350 17 G: 17 POWDER, FOR SOLUTION ORAL at 09:29

## 2022-07-10 RX ADMIN — HYDROXYZINE HYDROCHLORIDE 25 MG: 25 TABLET, FILM COATED ORAL at 22:17

## 2022-07-10 RX ADMIN — SENNOSIDES AND DOCUSATE SODIUM 2 TABLET: 50; 8.6 TABLET ORAL at 22:16

## 2022-07-10 RX ADMIN — FINASTERIDE 1.25 MG: 5 TABLET, FILM COATED ORAL at 09:30

## 2022-07-10 RX ADMIN — CETIRIZINE HYDROCHLORIDE 10 MG: 10 TABLET, FILM COATED ORAL at 09:30

## 2022-07-10 RX ADMIN — HYDROXYZINE HYDROCHLORIDE 25 MG: 25 TABLET, FILM COATED ORAL at 06:42

## 2022-07-10 RX ADMIN — MIDODRINE HYDROCHLORIDE 10 MG: 5 TABLET ORAL at 09:29

## 2022-07-10 RX ADMIN — ATORVASTATIN CALCIUM 20 MG: 20 TABLET, FILM COATED ORAL at 22:17

## 2022-07-10 RX ADMIN — SENNOSIDES AND DOCUSATE SODIUM 2 TABLET: 50; 8.6 TABLET ORAL at 09:30

## 2022-07-10 RX ADMIN — MIDODRINE HYDROCHLORIDE 10 MG: 5 TABLET ORAL at 12:18

## 2022-07-10 RX ADMIN — HYDROMORPHONE HYDROCHLORIDE 4 MG: 2 TABLET ORAL at 13:27

## 2022-07-10 RX ADMIN — GABAPENTIN 100 MG: 100 CAPSULE ORAL at 22:17

## 2022-07-10 RX ADMIN — HYDROMORPHONE HYDROCHLORIDE 4 MG: 2 TABLET ORAL at 06:42

## 2022-07-10 RX ADMIN — CEFAZOLIN 2 G: 10 INJECTION, POWDER, FOR SOLUTION INTRAVENOUS at 13:28

## 2022-07-10 RX ADMIN — MIDODRINE HYDROCHLORIDE 10 MG: 5 TABLET ORAL at 17:44

## 2022-07-10 RX ADMIN — POLYETHYLENE GLYCOL 3350 17 G: 17 POWDER, FOR SOLUTION ORAL at 18:13

## 2022-07-10 ASSESSMENT — ACTIVITIES OF DAILY LIVING (ADL)
ADLS_ACUITY_SCORE: 50

## 2022-07-10 NOTE — PLAN OF CARE
Pt A&Ox4. CMS/ neuro intact. Still hypotensive. PT/ OT this afternoon, tolerated up to chair. Therapy recommending nursing get patient up or at least sitting at edge of bed more frequently to build activity tolerance. PRN Dilaudid/ Atarax for pain, effective. Tolerating diet. Still no BM, sched bowel regimen given + PRN Miralax. Offered suppository, pt declined, stated he may accept later this evening, BS present. Discharge pending.

## 2022-07-10 NOTE — PROGRESS NOTES
Chart reviewed. Labs and vitals reviewed.   Plan for HD tomorrow.   3-4 l UF, extra midodrine mid run, EPO     Recent Labs   Lab Test 07/07/22  0552 07/06/22  1618 07/04/22  0715   NA  --  131* 132*   POTASSIUM 5.5* 4.5 4.4   CHLORIDE  --  96 94   CO2  --  27 23   ANIONGAP  --  8 15*   GLC  --  119* 91   BUN  --  37* 83*   CR  --  5.37* 11.70*   MAC  --  9.6 9.5       Lilly Kumari MD  [facility] - Nephrology   [phone]

## 2022-07-10 NOTE — PLAN OF CARE
Goal Outcome Evaluation:      A&Ox4. Pt continues to have soft Bps. Remains asymptomatic. A2 GB Irina steady. Passing flatus. CMS intact. Incision to back SOSA. PRN Dilaudid for pain.

## 2022-07-10 NOTE — PROGRESS NOTES
"[facility]    Medicine Progress Note - Hospitalist Service    Date of Admission:  6/24/2022    Assessment & Plan        hSay Jimenez is a very pleasant 57-year-old male with past medical history significant for end-stage renal disease, on hemodialysis, chronic peripheral neuropathy affecting his feet, history of hypertension no longer on meds, hyperlipidemia, obesity, obstructive sleep apnea, GERD and depression, among other medical problems, who presented 6/24/22 for evaluation of worsening upper back pain with associated generalized weakness and falls.  He was found to have abnormal findings on thoracic MRI concerning for diskitis with osteomyelitis with also some findings of a compressive myelopathy.      Upper back pain  Generalized weakness  Falls   Epidural phlegmon/abscess   Compressive myelopathy  S/P C6-T6 fusion/T2-3 decompression  *back pain for 6 months. CT chest 2/2022 showed concern for upper thoracic spine diskitis vs osteomyelitis. Unclear follow up from that time  *in weeks prior to admission, MRI showed possible thoracic compression fracture.   *developed progressively weak legs with frequent falls.  *on admission imaging with T2-T3 diskitis, osteomyelitis and compressive myelopathy.        appreciate Neurosurgery care    follow blood cultures, they have no growth after 5 days    ID consult requested, I appreciate Dr. Rain's evaluation recommendations    Per her, \"changes could all be from DJD, CRP mildly elevated, no fever/chills, slow intermittent progression, I am not convinced this is infection but it still could be, he has had neg. PPD in past and no TB risk factors or contacts.\"     routine gram stain culture, fungal stain and culture, AFB stain and culture and pathology sent from OR    Also per Dr. Rain, \"continue on Ancef for now as we wait on the cultures.  If ready for discharge okay to discharge off antibiotics.\"  She plans to follow-up on the cultures " "for any late growth and start antimicrobials if/when needed.    Tissue culture has gram-positive bacilli isolated in broth only, resembling diphtheroids on day 5 of incubation; ID notified of this result.  Per Dr. Rain, \"GPB resembling diphtheroid 5 days later growth in broth only is a contaminate, no changes to plan for discharge off antibiotics.\"    Oxycodone \"didn't work at all\" for patient, was discontinued. Tylenol and oral Dilaudid available as needed for pain.     pain is better controlled, he is working with PT, he  is looking forward to go to ARU to get back his strength.    1 blood culture came back positive for gram-positive cocci on 7/6, he has been having regular blood cultures since 7/2, possibly this is a contaminant, 2 set of blood cultures ordered for 7/6.  7/5,  1 set positive for staph epidermidis, possibly contaminant, repeat blood cultures from 7/6 is still pending.  Current plan is to discharge patient to ARU when bed available (they plan to reassess 7/11) on amoxicillin 500 mg daily for 4 weeks once IV antibiotics are discontinued.    End-stage renal disease, on hemodialysis  Hypotension  Hyponatremia  *Typically dialyzes Monday, Wednesday, Friday. Missed 6/24 session due to being in ED      Nephrology consult appreciated    Dialysis per nephrology.    Blood pressures readings are soft.  Per Dr. Carr, \"okay to run and pull fluid with BP in the 70s as long as he is asymptomatic.\"    Continue scheduled midodrine    PTA sevelamer    Patient is stable on low blood pressures, he is already on 10 mg midodrine 3 times a day, he does have mild dizziness with positional changes but he is tolerating PT here well.  ARU is concerned about his blood pressures but he is functioning well at this time for blood pressure and he is already on high dose of midodrine, patient is able to tolerate PT at this blood pressures.    Will order bilateral lower extremity Jobst stockings, nephrology did recommend that we " "could increase midodrine to 20 mg 3 times daily, considering that this not the usual dosage I would recommend nephrology to proceed with that if needed.    Encephalopathy  Gianfranco Day is on gabapentin 300 mg 3 times daily, this is a home medication and dose has been unchanged since he came to the hospital but is higher than the typical maximum dose recommended even for patients on CRRT    Presume encephalopathy is related to buildup of gabapentin metabolites from intradialytic. Gabapentin and Zoloft was on hold, will start with gabapentin  low-dose of 100 mg at bedtime since encephalopathy is improved.  Restarted 7/5.    Monitor signs and symptoms    GERD    Chronic and stable on PPI.    Anemia of chronic kidney disease    Per Dr. Carr, \"increase erythropoietin to 10K with dialysis\"    Follow hemoglobin    Consider transfusion for hemoglobin less than 7 g/dL and symptoms      Hyperlipidemia    Chronic and stable, on statin.    Peripheral neuropathy of the feet  *Follows with Neurology.      Managed in part with duloxetine, continue    Depression    Chronic and stable on Zoloft.    Obesity, class III  Obstructive sleep apnea  *BMI this stay is 43.10.  He does not use CPAP (patient says, \"I am one of those guys who does not tolerate CPAP.\") Encourage diet, lifestyle modifications once above issues addressed.    History of BPH.    Continue Proscar     Diet: Regular Diet Adult  Diet    DVT Prophylaxis: Pneumatic Compression Devices  Oliveira Catheter: Not present  Central Lines: None  Cardiac Monitoring: None  Code Status: Full Code      Disposition Plan   { TIP  Click here to update expected discharge date and refresh note (tipsheet)     :08702}  Expected Discharge Date: 07/11/2022    Discharge Delays: Placement - TCU  Destination: inpatient rehabilitation facility  Discharge Comments: ARU following          Oksana Spicer MD  Hospitalist Service  [facility]  Securely message with the " Vocera Web Console (learn more here)    Clinically Significant Risk Factors Present on Admission                    _______________________________________________________________    Interval History   Has low blood pressures during activity but he is participating well with therapies.  We discussed about will start adding in jobst to stockings for lower extremities    Data reviewed today: I reviewed all medications, new labs and imaging results over the last 24 hours. I personally reviewed no images or EKG's today.    Physical Exam   Vital Signs: Temp: 97.4  F (36.3  C) Temp src: Oral BP: (!) 86/46 Pulse: 81   Resp: 16 SpO2: 93 % O2 Device: None (Room air)    Weight: 319 lbs 10.67 oz     Constitutional: Awake, alert, oriented x3  Respiratory: Clear to auscultation bilaterally, no crackles or wheezing  Cardiovascular: Regular rate and rhythm  GI: Obese, soft, bowel sounds are present  Skin/Integumen: No rash on exposed skin, patient does have a brace  Psych: Mood is appropriate    Data   Recent Labs   Lab 07/08/22  0800 07/07/22  0552 07/06/22  1618 07/05/22  0743 07/04/22  0715   WBC 10.1 10.3 10.9   < > 8.4   HGB 7.7* 8.7* 9.3*   < > 8.1*   * 107* 106*   < > 110*    256 225   < > 316   NA  --   --  131*  --  132*   POTASSIUM  --  5.5* 4.5  --  4.4   CHLORIDE  --   --  96  --  94   CO2  --   --  27  --  23   BUN  --   --  37*  --  83*   CR  --   --  5.37*  --  11.70*   ANIONGAP  --   --  8  --  15*   MAC  --   --  9.6  --  9.5   GLC  --   --  119*  --  91    < > = values in this interval not displayed.     No results found for this or any previous visit (from the past 24 hour(s)).  Medications       - MEDICATION INSTRUCTIONS for Dialysis Patients -   Does not apply See Admin Instructions     atorvastatin  20 mg Oral At Bedtime     ceFAZolin  2 g Intravenous Q24H     cetirizine  10 mg Oral Daily     DULoxetine  30 mg Oral Daily     finasteride  1.25 mg Oral Daily     gabapentin  100 mg Oral TID      midodrine  10 mg Oral TID w/meals     pantoprazole  40 mg Oral QAM AC     polyethylene glycol  17 g Oral Daily     senna-docusate  1 tablet Oral BID    Or     senna-docusate  2 tablet Oral BID     sertraline  100 mg Oral Daily     sevelamer carbonate  800 mg Oral TID w/meals     sodium chloride (PF)  3 mL Intracatheter Q8H

## 2022-07-11 ENCOUNTER — APPOINTMENT (OUTPATIENT)
Dept: OCCUPATIONAL THERAPY | Facility: CLINIC | Age: 57
DRG: 459 | End: 2022-07-11
Payer: MEDICARE

## 2022-07-11 LAB
ALBUMIN SERPL-MCNC: 3.2 G/DL (ref 3.4–5)
ANION GAP SERPL CALCULATED.3IONS-SCNC: 18 MMOL/L (ref 3–14)
BACTERIA BLD CULT: NO GROWTH
BACTERIA BLD CULT: NO GROWTH
BUN SERPL-MCNC: 79 MG/DL (ref 7–30)
CALCIUM SERPL-MCNC: 9.4 MG/DL (ref 8.5–10.1)
CHLORIDE BLD-SCNC: 83 MMOL/L (ref 94–109)
CO2 SERPL-SCNC: 22 MMOL/L (ref 20–32)
CREAT SERPL-MCNC: 10.4 MG/DL (ref 0.66–1.25)
ERYTHROCYTE [DISTWIDTH] IN BLOOD BY AUTOMATED COUNT: 14.6 % (ref 10–15)
GFR SERPL CREATININE-BSD FRML MDRD: 5 ML/MIN/1.73M2
GLUCOSE BLD-MCNC: 89 MG/DL (ref 70–99)
HCT VFR BLD AUTO: 25.9 % (ref 40–53)
HGB BLD-MCNC: 8.2 G/DL (ref 13.3–17.7)
MCH RBC QN AUTO: 32.2 PG (ref 26.5–33)
MCHC RBC AUTO-ENTMCNC: 31.7 G/DL (ref 31.5–36.5)
MCV RBC AUTO: 102 FL (ref 78–100)
PHOSPHATE SERPL-MCNC: 8.9 MG/DL (ref 2.5–4.5)
PLATELET # BLD AUTO: 255 10E3/UL (ref 150–450)
POTASSIUM BLD-SCNC: 4.4 MMOL/L (ref 3.4–5.3)
RBC # BLD AUTO: 2.55 10E6/UL (ref 4.4–5.9)
SODIUM SERPL-SCNC: 123 MMOL/L (ref 133–144)
WBC # BLD AUTO: 9.8 10E3/UL (ref 4–11)

## 2022-07-11 PROCEDURE — 90937 HEMODIALYSIS REPEATED EVAL: CPT

## 2022-07-11 PROCEDURE — 250N000013 HC RX MED GY IP 250 OP 250 PS 637: Performed by: INTERNAL MEDICINE

## 2022-07-11 PROCEDURE — 250N000011 HC RX IP 250 OP 636: Performed by: INTERNAL MEDICINE

## 2022-07-11 PROCEDURE — 85027 COMPLETE CBC AUTOMATED: CPT | Performed by: INTERNAL MEDICINE

## 2022-07-11 PROCEDURE — 99233 SBSQ HOSP IP/OBS HIGH 50: CPT | Performed by: INTERNAL MEDICINE

## 2022-07-11 PROCEDURE — P9041 ALBUMIN (HUMAN),5%, 50ML: HCPCS | Performed by: INTERNAL MEDICINE

## 2022-07-11 PROCEDURE — 250N000013 HC RX MED GY IP 250 OP 250 PS 637: Performed by: HOSPITALIST

## 2022-07-11 PROCEDURE — 258N000003 HC RX IP 258 OP 636: Performed by: INTERNAL MEDICINE

## 2022-07-11 PROCEDURE — 97110 THERAPEUTIC EXERCISES: CPT | Mod: GO

## 2022-07-11 PROCEDURE — 120N000001 HC R&B MED SURG/OB

## 2022-07-11 PROCEDURE — 36415 COLL VENOUS BLD VENIPUNCTURE: CPT | Performed by: INTERNAL MEDICINE

## 2022-07-11 PROCEDURE — 634N000001 HC RX 634: Performed by: INTERNAL MEDICINE

## 2022-07-11 PROCEDURE — 99232 SBSQ HOSP IP/OBS MODERATE 35: CPT | Performed by: INTERNAL MEDICINE

## 2022-07-11 PROCEDURE — 99232 SBSQ HOSP IP/OBS MODERATE 35: CPT | Performed by: SPECIALIST

## 2022-07-11 PROCEDURE — 82040 ASSAY OF SERUM ALBUMIN: CPT | Performed by: INTERNAL MEDICINE

## 2022-07-11 PROCEDURE — 97530 THERAPEUTIC ACTIVITIES: CPT | Mod: GO

## 2022-07-11 RX ORDER — CALCIUM CARBONATE 500 MG/1
500 TABLET, CHEWABLE ORAL 3 TIMES DAILY PRN
Status: DISCONTINUED | OUTPATIENT
Start: 2022-07-11 | End: 2022-07-15 | Stop reason: HOSPADM

## 2022-07-11 RX ORDER — SEVELAMER CARBONATE 800 MG/1
1600 TABLET, FILM COATED ORAL
Status: DISCONTINUED | OUTPATIENT
Start: 2022-07-11 | End: 2022-07-15 | Stop reason: HOSPADM

## 2022-07-11 RX ORDER — ALBUMIN, HUMAN INJ 5% 5 %
250 SOLUTION INTRAVENOUS
Status: COMPLETED | OUTPATIENT
Start: 2022-07-11 | End: 2022-07-11

## 2022-07-11 RX ADMIN — EPOETIN ALFA-EPBX 10000 UNITS: 10000 INJECTION, SOLUTION INTRAVENOUS; SUBCUTANEOUS at 14:28

## 2022-07-11 RX ADMIN — CALCIUM CARBONATE (ANTACID) CHEW TAB 500 MG 500 MG: 500 CHEW TAB at 11:34

## 2022-07-11 RX ADMIN — ACETAMINOPHEN 650 MG: 325 TABLET ORAL at 08:15

## 2022-07-11 RX ADMIN — FINASTERIDE 1.25 MG: 5 TABLET, FILM COATED ORAL at 08:10

## 2022-07-11 RX ADMIN — HYDROXYZINE HYDROCHLORIDE 25 MG: 25 TABLET, FILM COATED ORAL at 12:01

## 2022-07-11 RX ADMIN — MIDODRINE HYDROCHLORIDE 10 MG: 5 TABLET ORAL at 13:50

## 2022-07-11 RX ADMIN — ALPRAZOLAM 1 MG: 0.25 TABLET ORAL at 22:27

## 2022-07-11 RX ADMIN — MIDODRINE HYDROCHLORIDE 10 MG: 5 TABLET ORAL at 19:17

## 2022-07-11 RX ADMIN — SODIUM CHLORIDE 300 ML: 9 INJECTION, SOLUTION INTRAVENOUS at 14:28

## 2022-07-11 RX ADMIN — CYCLOBENZAPRINE 10 MG: 10 TABLET, FILM COATED ORAL at 08:15

## 2022-07-11 RX ADMIN — ATORVASTATIN CALCIUM 20 MG: 20 TABLET, FILM COATED ORAL at 22:28

## 2022-07-11 RX ADMIN — HYDROMORPHONE HYDROCHLORIDE 4 MG: 2 TABLET ORAL at 17:02

## 2022-07-11 RX ADMIN — GABAPENTIN 100 MG: 100 CAPSULE ORAL at 08:10

## 2022-07-11 RX ADMIN — POLYETHYLENE GLYCOL 3350 17 G: 17 POWDER, FOR SOLUTION ORAL at 08:10

## 2022-07-11 RX ADMIN — SENNOSIDES AND DOCUSATE SODIUM 2 TABLET: 50; 8.6 TABLET ORAL at 20:38

## 2022-07-11 RX ADMIN — GABAPENTIN 100 MG: 100 CAPSULE ORAL at 22:28

## 2022-07-11 RX ADMIN — MIDODRINE HYDROCHLORIDE 10 MG: 5 TABLET ORAL at 08:09

## 2022-07-11 RX ADMIN — PANTOPRAZOLE SODIUM 40 MG: 40 TABLET, DELAYED RELEASE ORAL at 06:42

## 2022-07-11 RX ADMIN — SENNOSIDES AND DOCUSATE SODIUM 2 TABLET: 50; 8.6 TABLET ORAL at 08:10

## 2022-07-11 RX ADMIN — CALCIUM CARBONATE (ANTACID) CHEW TAB 500 MG 500 MG: 500 CHEW TAB at 01:51

## 2022-07-11 RX ADMIN — HYDROXYZINE HYDROCHLORIDE 25 MG: 25 TABLET, FILM COATED ORAL at 20:39

## 2022-07-11 RX ADMIN — CETIRIZINE HYDROCHLORIDE 10 MG: 10 TABLET, FILM COATED ORAL at 08:10

## 2022-07-11 RX ADMIN — ALBUMIN HUMAN 250 ML: 0.05 INJECTION, SOLUTION INTRAVENOUS at 12:28

## 2022-07-11 RX ADMIN — HYDROMORPHONE HYDROCHLORIDE 4 MG: 2 TABLET ORAL at 20:39

## 2022-07-11 RX ADMIN — SEVELAMER CARBONATE 1600 MG: 800 TABLET, FILM COATED ORAL at 19:17

## 2022-07-11 RX ADMIN — CEFAZOLIN 2 G: 10 INJECTION, POWDER, FOR SOLUTION INTRAVENOUS at 16:59

## 2022-07-11 RX ADMIN — MIDODRINE HYDROCHLORIDE 10 MG: 5 TABLET ORAL at 11:34

## 2022-07-11 RX ADMIN — ACETAMINOPHEN 650 MG: 325 TABLET ORAL at 16:58

## 2022-07-11 RX ADMIN — SODIUM CHLORIDE 250 ML: 9 INJECTION, SOLUTION INTRAVENOUS at 14:28

## 2022-07-11 RX ADMIN — DULOXETINE 30 MG: 30 CAPSULE, DELAYED RELEASE ORAL at 08:10

## 2022-07-11 RX ADMIN — GABAPENTIN 100 MG: 100 CAPSULE ORAL at 16:58

## 2022-07-11 RX ADMIN — SERTRALINE HYDROCHLORIDE 100 MG: 100 TABLET ORAL at 08:10

## 2022-07-11 ASSESSMENT — ACTIVITIES OF DAILY LIVING (ADL)
ADLS_ACUITY_SCORE: 49
ADLS_ACUITY_SCORE: 50
ADLS_ACUITY_SCORE: 49
ADLS_ACUITY_SCORE: 50

## 2022-07-11 NOTE — PROGRESS NOTES
"CLINICAL NUTRITION SERVICES  -  ASSESSMENT NOTE      Recommendations Ordered by Registered Dietitian (RD):   - ordered nepro varied flavors BID   Future/Additional Recommendations:   - PO intake and supplement tolerance  - obtain nutrition hx and NFPA, as able   Malnutrition:   % Weight Loss:  None noted  % Intake: Unable to assess- pt not available for nutrition hx, though only 3 meals received in past 7 days  Subcutaneous Fat Loss:  Unable to assess- pt not available   Muscle Loss:  Unable to assess- pt not available   Fluid Retention:  Generalized trace edema    Malnutrition Diagnosis: Unable to determine due to lack of nutrition hx, NPFA       REASON FOR ASSESSMENT  Shay Jimenez is a 57 year old male seen by Registered Dietitian for LOS      NUTRITION HISTORY  - Information obtained from chart review  - PMH of CKD, reflux, hyperlipemia, and hypertension   - per ED notes, He reports he was placed on dialysis due to calcium phosphate deposits. Patient reportedly gets his dialysis treatments on Monday, Wednesday, and Friday. He endorses constipation.   - unable to visit with pt today, he was gone from room for HD (runs [facility] afternoons)    CURRENT NUTRITION ORDERS  Diet Order:     - Dialysis diet with 1500 ml FR    - 6/28-7/11: regular  - 6/28: CLD --> ADAT  - 6/27: NPO  - 6/25-6/27: regular  - 6/24-6/25: NPO    Current Intake/Tolerance:  - unable to visit with pt today, he was gone from room for HD (runs [facility] afternoons)  - per nursing flow sheets, % intake with most recently 100%  - per health touch, pt has received 3 meals since 7/4      NUTRITION FOCUSED PHYSICAL ASSESSMENT FOR DIAGNOSING MALNUTRITION)  No:  Patient not available           ANTHROPOMETRICS  Height: 6' 1.2\"  Weight: 319 lbs 10.67 oz  Body mass index is 41.94 kg/m .  Weight Status:  Obesity Grade III BMI >40  IBW: 83.6 kg  % IBW: 168%  Weight History: weight appears to be trending down slightly since 11/21- though difficult to assess with " fluid-status, receives HD  06/29/22 : 145 kg (319 lb 10.7 oz)  06/29/22 : 145 kg (319 lb 10.7 oz)  06/28/22 : 146.6 kg (323 lb 3.1 oz)  06/24/22 : 149 kg (328 lb 7.8 oz)  06/24/22 : 140.6 kg (310 lb)  11/02/21 : 150.6 kg (332 lb 1.6 oz) - per care everywhere    LABS  Labs reviewed  - Na 123 (L), BUN 79 (H), creatinine 10.40 (H), phos 8.9 (H --> plans to increase renvela dose), BGM     MEDICATIONS  Medications reviewed  - protonix, miralax, senna-docusate, renvela      ASSESSED NUTRITION NEEDS PER APPROVED PRACTICE GUIDELINES:  Dosing Weight: 99 kg (adjusted, based on lowest wt this admit)  Estimated Energy Needs: 6652-2659 kcals (25-30 Kcal/Kg)  Justification: related to BMI and HD  Estimated Protein Needs: 119-149 grams protein (1.2-1.5 g pro/Kg)  Justification: dialysis, preservation of LBM  Estimated Fluid Needs: per provider    MALNUTRITION:  % Weight Loss:  None noted  % Intake: Unable to assess- pt not available for nutrition hx, though only 3 meals received in past 7 days  Subcutaneous Fat Loss:  Unable to assess- pt not available   Muscle Loss:  Unable to assess- pt not available   Fluid Retention:  Generalized trace edema    Malnutrition Diagnosis: Unable to determine due to lack of nutrition hx, NPFA    NUTRITION DIAGNOSIS:  Inadequate oral intake related to increased needs secondary to HD, suspected low intake as evidenced by only 3 meals ordered in past week      NUTRITION INTERVENTIONS  Recommendations / Nutrition Prescription  - ordered nepro varied flavors BID      Implementation  - Nutrition education: Not appropriate at this time due to patient condition- pt in HD this PM  - Medical Food Supplement - see above      Nutrition Goals  Patient to consume % of nutritionally adequate meal trays TID, or the equivalent with supplements/snacks.    MONITORING AND EVALUATION:  Progress towards goals will be monitored and evaluated per protocol and Practice Guidelines    Paige Mueller RDN

## 2022-07-11 NOTE — PROGRESS NOTES
"[facility]    Medicine Progress Note - Hospitalist Service    Date of Admission:  6/24/2022    Assessment & Plan        Shay Jimenez is a very pleasant 57-year-old male with past medical history significant for end-stage renal disease, on hemodialysis, chronic peripheral neuropathy affecting his feet, history of hypertension no longer on meds, hyperlipidemia, obesity, obstructive sleep apnea, GERD and depression, among other medical problems, who presented 6/24/22 for evaluation of worsening upper back pain with associated generalized weakness and falls.  He was found to have abnormal findings on thoracic MRI concerning for diskitis with osteomyelitis with also some findings of a compressive myelopathy.      Upper back pain  Generalized weakness  Falls   Epidural phlegmon/abscess   Compressive myelopathy  S/P C6-T6 fusion/T2-3 decompression  *back pain for 6 months. CT chest 2/2022 showed concern for upper thoracic spine diskitis vs osteomyelitis. Unclear follow up from that time  *in weeks prior to admission, MRI showed possible thoracic compression fracture.   *developed progressively weak legs with frequent falls.  *on admission imaging with T2-T3 diskitis, osteomyelitis and compressive myelopathy.        appreciate Neurosurgery care    follow blood cultures, they have no growth after 5 days    ID consult requested, I appreciate Dr. Rain's evaluation recommendations    Per her, \"changes could all be from DJD, CRP mildly elevated, no fever/chills, slow intermittent progression, I am not convinced this is infection but it still could be, he has had neg. PPD in past and no TB risk factors or contacts.\"     routine gram stain culture, fungal stain and culture, AFB stain and culture and pathology sent from OR    Also per Dr. Rain, \"continue on Ancef for now as we wait on the cultures.  If ready for discharge okay to discharge off antibiotics.\"  She plans to follow-up on the cultures " "for any late growth and start antimicrobials if/when needed.    Tissue culture has gram-positive bacilli isolated in broth only, resembling diphtheroids on day 5 of incubation; ID notified of this result.  Per Dr. Rain, \"GPB resembling diphtheroid 5 days later growth in broth only is a contaminate, no changes to plan for discharge off antibiotics.\"    Oxycodone \"didn't work at all\" for patient, was discontinued. Tylenol and oral Dilaudid available as needed for pain.     pain is better controlled, he is working with PT, he  is looking forward to go to ARU to get back his strength.    1 blood culture came back positive for gram-positive cocci on 7/6, he has been having regular blood cultures since 7/2, possibly this is a contaminant, 2 set of blood cultures ordered for 7/6.  7/5,  1 set positive for staph epidermidis, possibly contaminant, repeat blood cultures from 7/6 is still pending.  Current plan is to discharge patient to ARU when bed available (they plan to reassess 7/11) on amoxicillin 500 mg daily for 4 weeks once IV antibiotics are discontinued.    Currently pending placement to ARU .    End-stage renal disease, on hemodialysis  Hypotension  Hyponatremia  *Typically dialyzes Monday, Wednesday, Friday. Missed 6/24 session due to being in ED      Nephrology consult appreciated    Dialysis per nephrology.    Blood pressures readings are soft.  Per Dr. Carr, \"okay to run and pull fluid with BP in the 70s as long as he is asymptomatic.\"    Continue scheduled midodrine    PTA sevelamer    Patient is stable on low blood pressures, he is already on 10 mg midodrine 3 times a day, he does have mild dizziness with positional changes but he is tolerating PT here well.  ARU is concerned about his blood pressures but he is functioning well at this time for blood pressure and he is already on high dose of midodrine, patient is able to tolerate PT at this blood pressures.    Will order bilateral lower extremity Jobst " "stockings, nephrology did recommend that we could increase midodrine to 20 mg 3 times daily, considering that this not the usual dosage I would recommend nephrology to proceed with that if needed.    Encephalopathy  Gianfranco Day is on gabapentin 300 mg 3 times daily, this is a home medication and dose has been unchanged since he came to the hospital but is higher than the typical maximum dose recommended even for patients on CRRT    Presume encephalopathy is related to buildup of gabapentin metabolites from intradialytic. Gabapentin and Zoloft was on hold, will start with gabapentin  low-dose of 100 mg at bedtime since encephalopathy is improved.  Restarted 7/5.    Monitor signs and symptoms    GERD    Chronic and stable on PPI.    Anemia of chronic kidney disease    Per Dr. Carr, \"increase erythropoietin to 10K with dialysis\"    Follow hemoglobin    Consider transfusion for hemoglobin less than 7 g/dL and symptoms      Hyperlipidemia    Chronic and stable, on statin.    Peripheral neuropathy of the feet  *Follows with Neurology.      Managed in part with duloxetine, continue    Depression    Chronic and stable on Zoloft.    Obesity, class III  Obstructive sleep apnea  *BMI this stay is 43.10.  He does not use CPAP (patient says, \"I am one of those guys who does not tolerate CPAP.\") Encourage diet, lifestyle modifications once above issues addressed.    History of BPH.    Continue Proscar     Diet: Diet  Combination Diet Renal Diet (dialysis)  Fluid restriction 1500 ML FLUID    DVT Prophylaxis: Pneumatic Compression Devices  Oliveira Catheter: Not present  Central Lines: None  Cardiac Monitoring: None  Code Status: Full Code      Disposition Plan     Expected Discharge Date: 07/11/2022    Discharge Delays: Placement - TCU  Destination: inpatient rehabilitation facility  Discharge Comments: ARU following, symptomatic B/P's will need TCU most likely          Oksana Spicer MD  Hospitalist Service  [facility] " [facility]  Securely message with the Vocera Web Console (learn more here)    Clinically Significant Risk Factors Present on Admission         # Acute Kidney Injury, unspecified: based on a >150% or 0.3 mg/dL increase in creatinine on admission compared to past 90 day average, will monitor renal function            _______________________________________________________________    Interval History   Blood pressures are reasonably good today, he is able to do minimal activity, due for dialysis today, he mentions heartburn.    Data reviewed today: I reviewed all medications, new labs and imaging results over the last 24 hours. I personally reviewed no images or EKG's today.    Physical Exam   Vital Signs: Temp: 97.6  F (36.4  C) Temp src: Oral BP: 92/56 Pulse: 81   Resp: 18 SpO2: 96 % O2 Device: Nasal cannula Oxygen Delivery: 2 LPM  Weight: 319 lbs 10.67 oz     Constitutional: Awake, alert, oriented x3  Respiratory: Clear to auscultation bilaterally, no crackles or wheezing  Cardiovascular: Regular rate and rhythm  GI: Obese, soft, bowel sounds are present  Skin/Integumen: No rash on exposed skin, patient does have a brace  Psych: Mood is appropriate    Data   Recent Labs   Lab 07/11/22  0722 07/08/22  0800 07/07/22  0552 07/06/22  1618   WBC 9.8 10.1 10.3 10.9   HGB 8.2* 7.7* 8.7* 9.3*   * 105* 107* 106*    250 256 225   *  --   --  131*   POTASSIUM 4.4  --  5.5* 4.5   CHLORIDE 83*  --   --  96   CO2 22  --   --  27   BUN 79*  --   --  37*   CR 10.40*  --   --  5.37*   ANIONGAP 18*  --   --  8   MAC 9.4  --   --  9.6   GLC 89  --   --  119*   ALBUMIN 3.2*  --   --   --      No results found for this or any previous visit (from the past 24 hour(s)).  Medications       - MEDICATION INSTRUCTIONS for Dialysis Patients -   Does not apply See Admin Instructions     atorvastatin  20 mg Oral At Bedtime     ceFAZolin  2 g Intravenous Q24H     cetirizine  10 mg Oral Daily     DULoxetine  30 mg Oral  Daily     finasteride  1.25 mg Oral Daily     gabapentin  100 mg Oral TID     midodrine  10 mg Oral TID w/meals     pantoprazole  40 mg Oral QAM AC     polyethylene glycol  17 g Oral Daily     senna-docusate  1 tablet Oral BID    Or     senna-docusate  2 tablet Oral BID     sertraline  100 mg Oral Daily     sevelamer carbonate  1,600 mg Oral TID w/meals     sodium chloride (PF)  3 mL Intracatheter Q8H

## 2022-07-11 NOTE — PLAN OF CARE
Goal Outcome Evaluation:    Plan of Care Reviewed With: other (see comments) (pt not available today d/t HD run this PM)     Overall Patient Progress: declining    Outcome Evaluation: per health touch, only 3 meals have been ordered in past week and unable to speak with pt as he is in HD run this PM    Paige Mueller RDN

## 2022-07-11 NOTE — PROGRESS NOTES
Potassium   Date Value Ref Range Status   07/11/2022 4.4 3.4 - 5.3 mmol/L Final     Hemoglobin   Date Value Ref Range Status   07/11/2022 8.2 (L) 13.3 - 17.7 g/dL Final     Creatinine   Date Value Ref Range Status   07/11/2022 10.40 (H) 0.66 - 1.25 mg/dL Final     Urea Nitrogen   Date Value Ref Range Status   07/11/2022 79 (H) 7 - 30 mg/dL Final     Sodium   Date Value Ref Range Status   07/11/2022 123 (L) 133 - 144 mmol/L Final     INR   Date Value Ref Range Status   06/27/2022 1.07 0.85 - 1.15 Final      Latest Reference Range & Units 06/25/22 11:53   Hepatitis B Surface Antibody Negative  Negative   Hep B Surface Agn Nonreactive  Nonreactive   Hepatitis B Core Marysol Negative  Negative   Hepatitis B Surface Antibody <8.00 m[IU]/mL 1.29       DIALYSIS PROCEDURE NOTE  Hepatitis status of previous patient on machine log was checked and verified ok to use with this patients hepatitis status.  Patient dialyzed for 3.5 hrs. on a K3 bath with a net fluid removal of  3L.  A BFR of 400-450 ml/min was obtained via a left upper-arm AV fistula using 15 gauge needles.      The treatment plan was discussed with Dr. French during the treatment.    Total heparin received during the treatment: 1000 units- MD verbal order to add heparin mid-treatment d/t recent history of clotting on the run.   Needle cannulation sites held x 5 min.   Meds  given: 5% albumin prime, epogen     Complications: none      Person educated: patient. Knowledge base substantial. Barriers to learning: none. Educated on procedure via verbal mode. patient/family verbalized understanding. Pt prefers verbal education style.     ICEBOAT? Timeout performed pre-treatment  I: Patient was identified using 2 identifiers  C:  Consent Signed Yes  E: Equipment preventative maintenance is current and dialysis delivery system OK to use  B: Hepatitis B Surface Antigen: see above  O: Dialysis orders present and complete prior to treatment  A: Vascular access verified and  assessed prior to treatment  T: Treatment was performed at a clinically appropriate time  ?: Patient was allowed to ask questions and address concerns prior to treatment  See Adult Hemodialysis flowsheet in EPIC for further details and post assessment.  Machine water alarm in place and functioning. Transducer pods intact and checked every 15min.   Pt returned via bed.  Chlorine/Chloramine water system checked every 4 hours.  Outpatient Dialysis at [facility].      Post treatment report given to MARY BETH Menendez RN regarding 3L of fluid removed, last BP of 89/55, and patient pain rating of 5/10.     Please remove patient dressing on AVF and AVG needle sites 24 hours after dialysis. If leaking occurs please apply a Band-Aid.

## 2022-07-11 NOTE — PLAN OF CARE
Goal Outcome Evaluation:        A&Ox4. Pt continues to have soft Bps. Remains asymptomatic. A2 GB Irina steady. Passing flatus,continues to have constipation, refused suppository this evening. CMS intact. Incision to back SOSA. PRN dilaudid and Atarax available for pain.      Pt requests not to be bothered with vitals overnight while writer was taking his vitals this shift, will pass on to next nurse.

## 2022-07-11 NOTE — PROGRESS NOTES
[facility]    Infectious Disease Progress Note    Date of Service : 07/11/2022     Assessment:  1. Concern for T2-T3  discitis/vertebral osteomyelitis,  S/p: 6/27,Cervical 6 to Thoracic 6 Fusion and Thoracic 2-3 Decompression, operative report noted for DJD changes,   routine cx with P acne only in broth and 5/ 7 days later .which probably signifies contamination   2. DJD  3. ESRD on HD  4. Obesity  5. ETOH use disorder  6. Neuropathy  7. GINI    Recommendations:  1.  On Cefazolin  2.  Late growth of P acne in 2 cxs now. Likely contaminant given no previous hardware, but since new hardware is in place now will recommend starting prophylactic amoxi 500 mg daily once off ancef for a total of 4 weeks.   3. 1/2 positive blood cultures from 7/ 5 for staph epi signifies contamination    Shyanne Garcia MD    Interval History   Resting, was dialyzed today, tolerating antibiotics without side effects, pain stable    Physical Exam   Temp: 97.6  F (36.4  C) Temp src: Oral BP: (!) 81/47 Pulse: 90   Resp: 18 SpO2: 96 % O2 Device: Nasal cannula Oxygen Delivery: 2 LPM  Vitals:    06/24/22 1716 06/28/22 0400 06/29/22 0638   Weight: 149 kg (328 lb 7.8 oz) 146.6 kg (323 lb 3.1 oz) 145 kg (319 lb 10.7 oz)     Vital Signs with Ranges  Temp:  [97.2  F (36.2  C)-98.2  F (36.8  C)] 97.6  F (36.4  C)  Pulse:  [76-90] 90  Resp:  [16-18] 18  BP: ()/(44-65) 81/47  SpO2:  [91 %-98 %] 96 %    Constitutional: Awake, alert, cooperative, no apparent distress  Lungs: Non labored breathing  Cardiovascular: S1S2  Abdomen:soft  Skin: No rash    Other:    Medications       - MEDICATION INSTRUCTIONS for Dialysis Patients -   Does not apply See Admin Instructions     sodium chloride 0.9%  250 mL Intravenous Once in dialysis/CRRT     sodium chloride 0.9%  300 mL Hemodialysis Machine Once     atorvastatin  20 mg Oral At Bedtime     ceFAZolin  2 g Intravenous Q24H     cetirizine  10 mg Oral Daily     DULoxetine  30 mg Oral Daily      epoetin mar-epbx (RETACRIT) inj ESRD  10,000 Units Intravenous Once in dialysis/CRRT     finasteride  1.25 mg Oral Daily     gabapentin  100 mg Oral TID     midodrine  10 mg Oral Once in dialysis/CRRT     midodrine  10 mg Oral TID w/meals     - MEDICATION INSTRUCTIONS -   Does not apply Once     pantoprazole  40 mg Oral QAM AC     polyethylene glycol  17 g Oral Daily     senna-docusate  1 tablet Oral BID    Or     senna-docusate  2 tablet Oral BID     sertraline  100 mg Oral Daily     sevelamer carbonate  800 mg Oral TID w/meals     sodium chloride (PF)  3 mL Intracatheter Q8H       Data   All microbiology laboratory data reviewed.  Recent Labs   Lab Test 07/11/22  0722 07/08/22  0800 07/07/22  0552   WBC 9.8 10.1 10.3   HGB 8.2* 7.7* 8.7*   HCT 25.9* 24.8* 28.3*   * 105* 107*    250 256     Recent Labs   Lab Test 07/11/22  0722 07/06/22  1618 07/04/22  0715   CR 10.40* 5.37* 11.70*

## 2022-07-11 NOTE — PLAN OF CARE
Goal Outcome Evaluation:    A&Ox4. On 2 L O2 via NC. VSS ex soft BP. Back incision SOSA & intact. Up with Ax2 w/ rafa steady. PRN Tums given once for acide reflux. Denied pain. Refused PRN suppository for constipation. PIV SL. Fistula on arm intact. Slept comfortably at night. Pending discharge to TCU.

## 2022-07-11 NOTE — PROGRESS NOTES
Assessment and Plan:   ESRD: on dialysis MWF. Orders today: 3-4 L UF, AVF L , 400 BFR, no heparin, 3.5 h. K protocol, 35 HCO3 and 140 Na. Alb prime. EPO 10,000 U. Midodrine 10 mg tid.     Increase renvela dose. Phos 8.9.   Dialysis diet with 1500 ml Fluid restriction.                 Interval History:   Hypotension: getting midodrine on the run.      GINI:   Adm with back pain, weakness and falls. Abnl thoracic MRI with ? Discitis and osteo. On ancef. S/P spine surgery.              Review of Systems:   Some back pain. Problems with low BP on the run.           Medications:       - MEDICATION INSTRUCTIONS for Dialysis Patients -   Does not apply See Admin Instructions     sodium chloride 0.9%  250 mL Intravenous Once in dialysis/CRRT     sodium chloride 0.9%  300 mL Hemodialysis Machine Once     atorvastatin  20 mg Oral At Bedtime     ceFAZolin  2 g Intravenous Q24H     cetirizine  10 mg Oral Daily     DULoxetine  30 mg Oral Daily     epoetin mar-epbx (RETACRIT) inj ESRD  10,000 Units Intravenous Once in dialysis/CRRT     finasteride  1.25 mg Oral Daily     gabapentin  100 mg Oral TID     midodrine  10 mg Oral TID w/meals     - MEDICATION INSTRUCTIONS -   Does not apply Once     pantoprazole  40 mg Oral QAM AC     polyethylene glycol  17 g Oral Daily     senna-docusate  1 tablet Oral BID    Or     senna-docusate  2 tablet Oral BID     sertraline  100 mg Oral Daily     sevelamer carbonate  800 mg Oral TID w/meals     sodium chloride (PF)  3 mL Intracatheter Q8H         Current active medications and PTA medications reviewed, see medication list for details.            Physical Exam:   Vitals were reviewed  Patient Vitals for the past 24 hrs:   BP Temp Temp src Pulse Resp SpO2   07/11/22 1345 (!) 78/48 -- -- 82 -- --   07/11/22 1330 (!) 78/48 -- -- 86 -- --   07/11/22 1315 (!) 81/53 -- -- 85 -- --   07/11/22 1300 (!) 81/47 -- -- 90 -- --   07/11/22 1245 (!) 86/50 -- -- 86 -- --   07/11/22 1230 98/57 -- -- 86  -- --   22 1226 99/59 -- -- 84 -- --   22 1215 98/53 97.6  F (36.4  C) Oral 87 18 96 %   22 1131 93/59 97.7  F (36.5  C) Oral 90 18 98 %   22 1109 (!) 88/65 -- -- -- -- --   22 1100 135/51 -- -- -- -- --   22 0737 103/59 98.2  F (36.8  C) Oral 80 18 91 %   22 0645 101/60 -- -- 76 -- 94 %   22 0453 94/55 97.5  F (36.4  C) Oral 76 16 95 %   07/10/22 2223 103/60 97.3  F (36.3  C) Oral 77 16 96 %   07/10/22 1959 (!) 84/44 97.2  F (36.2  C) Oral 77 16 91 %   07/10/22 1700 94/44 97.3  F (36.3  C) Oral 80 16 91 %       Temp:  [97.2  F (36.2  C)-98.2  F (36.8  C)] 97.6  F (36.4  C)  Pulse:  [76-90] 82  Resp:  [16-18] 18  BP: ()/(44-65) 78/48  SpO2:  [91 %-98 %] 96 %    Temperatures:  Current - Temp: 97.6  F (36.4  C); Max - Temp  Av.5  F (36.4  C)  Min: 97.2  F (36.2  C)  Max: 98.2  F (36.8  C)  Respiration range: Resp  Av.9  Min: 16  Max: 18  Pulse range: Pulse  Av.8  Min: 76  Max: 90  Blood pressure range: Systolic (24hrs), Av , Min:78 , Max:135   ; Diastolic (24hrs), Av, Min:44, Max:65    Pulse oximetry range: SpO2  Av %  Min: 91 %  Max: 98 %    No intake/output data recorded.      Intake/Output Summary (Last 24 hours) at 2022 1355  Last data filed at 2022 0800  Gross per 24 hour   Intake 240 ml   Output --   Net 240 ml       Alert and responsive  LAF with good bruit, needles in place  Lungs with clear ant BS  Cor RRR nl S1 S2 no M  LE no edema       Wt Readings from Last 4 Encounters:   22 145 kg (319 lb 10.7 oz)          Data:          Lab Results   Component Value Date     2022     2022     2022      Lab Results   Component Value Date    CHLORIDE 83 2022    CHLORIDE 96 2022    CHLORIDE 94 2022    Lab Results   Component Value Date    BUN 79 2022    BUN 37 2022    BUN 83 2022      Lab Results   Component Value Date    POTASSIUM 4.4 2022     POTASSIUM 5.5 07/07/2022    POTASSIUM 4.5 07/06/2022    Lab Results   Component Value Date    CO2 22 07/11/2022    CO2 27 07/06/2022    CO2 23 07/04/2022    Lab Results   Component Value Date    CR 10.40 07/11/2022    CR 5.37 07/06/2022    CR 11.70 07/04/2022        Recent Labs   Lab Test 07/11/22  0722 07/08/22  0800 07/07/22  0552   WBC 9.8 10.1 10.3   HGB 8.2* 7.7* 8.7*   HCT 25.9* 24.8* 28.3*   * 105* 107*    250 256     Recent Labs   Lab Test 06/24/22  0818   AST 51*   ALT 55   ALKPHOS 152*   BILITOTAL 0.6       Recent Labs   Lab Test 06/28/22  0420   MAG 2.1     Recent Labs   Lab Test 07/11/22  0722 06/29/22  0611 06/28/22  0420   PHOS 8.9* 8.6* 5.8*     Recent Labs   Lab Test 07/11/22  0722 07/06/22  1618 07/04/22  0715   MAC 9.4 9.6 9.5     Lab Results   Component Value Date    MAC 9.4 07/11/2022     Lab Results   Component Value Date    WBC 9.8 07/11/2022    HGB 8.2 (L) 07/11/2022    HCT 25.9 (L) 07/11/2022     (H) 07/11/2022     07/11/2022     Lab Results   Component Value Date     (L) 07/11/2022    POTASSIUM 4.4 07/11/2022    CHLORIDE 83 (L) 07/11/2022    CO2 22 07/11/2022    GLC 89 07/11/2022     Lab Results   Component Value Date    BUN 79 (H) 07/11/2022    CR 10.40 (H) 07/11/2022     Lab Results   Component Value Date    MAG 2.1 06/28/2022     Lab Results   Component Value Date    PHOS 8.9 (H) 07/11/2022       Creatinine   Date Value Ref Range Status   07/11/2022 10.40 (H) 0.66 - 1.25 mg/dL Final   07/06/2022 5.37 (H) 0.66 - 1.25 mg/dL Final   07/04/2022 11.70 (H) 0.66 - 1.25 mg/dL Final   07/03/2022 9.90 (H) 0.66 - 1.25 mg/dL Final   07/01/2022 8.60 (H) 0.66 - 1.25 mg/dL Final   06/29/2022 9.60 (H) 0.66 - 1.25 mg/dL Final       Attestation:  I have reviewed today's vital signs, notes, medications, labs and imaging.  Seen on dialysis.      Aydin French MD

## 2022-07-12 ENCOUNTER — APPOINTMENT (OUTPATIENT)
Dept: PHYSICAL THERAPY | Facility: CLINIC | Age: 57
DRG: 459 | End: 2022-07-12
Payer: MEDICARE

## 2022-07-12 ENCOUNTER — APPOINTMENT (OUTPATIENT)
Dept: OCCUPATIONAL THERAPY | Facility: CLINIC | Age: 57
DRG: 459 | End: 2022-07-12
Payer: MEDICARE

## 2022-07-12 LAB
ERYTHROCYTE [DISTWIDTH] IN BLOOD BY AUTOMATED COUNT: 14.6 % (ref 10–15)
HCT VFR BLD AUTO: 24 % (ref 40–53)
HGB BLD-MCNC: 7.7 G/DL (ref 13.3–17.7)
MCH RBC QN AUTO: 32.4 PG (ref 26.5–33)
MCHC RBC AUTO-ENTMCNC: 32.1 G/DL (ref 31.5–36.5)
MCV RBC AUTO: 101 FL (ref 78–100)
PLATELET # BLD AUTO: 227 10E3/UL (ref 150–450)
RBC # BLD AUTO: 2.38 10E6/UL (ref 4.4–5.9)
WBC # BLD AUTO: 9.2 10E3/UL (ref 4–11)

## 2022-07-12 PROCEDURE — 250N000013 HC RX MED GY IP 250 OP 250 PS 637: Performed by: INTERNAL MEDICINE

## 2022-07-12 PROCEDURE — 36415 COLL VENOUS BLD VENIPUNCTURE: CPT | Performed by: INTERNAL MEDICINE

## 2022-07-12 PROCEDURE — 99232 SBSQ HOSP IP/OBS MODERATE 35: CPT | Performed by: INTERNAL MEDICINE

## 2022-07-12 PROCEDURE — 97530 THERAPEUTIC ACTIVITIES: CPT | Mod: GO

## 2022-07-12 PROCEDURE — 97530 THERAPEUTIC ACTIVITIES: CPT | Mod: GP

## 2022-07-12 PROCEDURE — 97110 THERAPEUTIC EXERCISES: CPT | Mod: GP

## 2022-07-12 PROCEDURE — 250N000013 HC RX MED GY IP 250 OP 250 PS 637: Performed by: SPECIALIST

## 2022-07-12 PROCEDURE — 85027 COMPLETE CBC AUTOMATED: CPT | Performed by: INTERNAL MEDICINE

## 2022-07-12 PROCEDURE — 120N000001 HC R&B MED SURG/OB

## 2022-07-12 PROCEDURE — 97535 SELF CARE MNGMENT TRAINING: CPT | Mod: GO

## 2022-07-12 PROCEDURE — 99232 SBSQ HOSP IP/OBS MODERATE 35: CPT | Performed by: SPECIALIST

## 2022-07-12 RX ORDER — MIDODRINE HYDROCHLORIDE 5 MG/1
10 TABLET ORAL
Status: COMPLETED | OUTPATIENT
Start: 2022-07-13 | End: 2022-07-13

## 2022-07-12 RX ORDER — AMOXICILLIN 500 MG/1
500 CAPSULE ORAL
Status: DISCONTINUED | OUTPATIENT
Start: 2022-07-12 | End: 2022-07-15 | Stop reason: HOSPADM

## 2022-07-12 RX ADMIN — ALPRAZOLAM 1 MG: 0.25 TABLET ORAL at 21:05

## 2022-07-12 RX ADMIN — GABAPENTIN 100 MG: 100 CAPSULE ORAL at 21:05

## 2022-07-12 RX ADMIN — MIDODRINE HYDROCHLORIDE 10 MG: 5 TABLET ORAL at 12:48

## 2022-07-12 RX ADMIN — AMOXICILLIN 500 MG: 500 CAPSULE ORAL at 17:44

## 2022-07-12 RX ADMIN — SENNOSIDES AND DOCUSATE SODIUM 2 TABLET: 50; 8.6 TABLET ORAL at 20:04

## 2022-07-12 RX ADMIN — SEVELAMER CARBONATE 1600 MG: 800 TABLET, FILM COATED ORAL at 09:56

## 2022-07-12 RX ADMIN — DULOXETINE 30 MG: 30 CAPSULE, DELAYED RELEASE ORAL at 09:57

## 2022-07-12 RX ADMIN — MIDODRINE HYDROCHLORIDE 10 MG: 5 TABLET ORAL at 09:56

## 2022-07-12 RX ADMIN — CETIRIZINE HYDROCHLORIDE 10 MG: 10 TABLET, FILM COATED ORAL at 09:56

## 2022-07-12 RX ADMIN — SEVELAMER CARBONATE 1600 MG: 800 TABLET, FILM COATED ORAL at 17:44

## 2022-07-12 RX ADMIN — HYDROMORPHONE HYDROCHLORIDE 4 MG: 2 TABLET ORAL at 10:13

## 2022-07-12 RX ADMIN — FINASTERIDE 1.25 MG: 5 TABLET, FILM COATED ORAL at 09:56

## 2022-07-12 RX ADMIN — MIDODRINE HYDROCHLORIDE 10 MG: 5 TABLET ORAL at 17:44

## 2022-07-12 RX ADMIN — GABAPENTIN 100 MG: 100 CAPSULE ORAL at 09:56

## 2022-07-12 RX ADMIN — GABAPENTIN 100 MG: 100 CAPSULE ORAL at 16:03

## 2022-07-12 RX ADMIN — PANTOPRAZOLE SODIUM 40 MG: 40 TABLET, DELAYED RELEASE ORAL at 09:56

## 2022-07-12 RX ADMIN — HYDROMORPHONE HYDROCHLORIDE 4 MG: 2 TABLET ORAL at 20:04

## 2022-07-12 RX ADMIN — CYCLOBENZAPRINE 10 MG: 10 TABLET, FILM COATED ORAL at 21:05

## 2022-07-12 RX ADMIN — ATORVASTATIN CALCIUM 20 MG: 20 TABLET, FILM COATED ORAL at 21:05

## 2022-07-12 RX ADMIN — SERTRALINE HYDROCHLORIDE 100 MG: 100 TABLET ORAL at 09:56

## 2022-07-12 ASSESSMENT — ACTIVITIES OF DAILY LIVING (ADL)
ADLS_ACUITY_SCORE: 51

## 2022-07-12 NOTE — PROGRESS NOTES
Pt AO x 4. Hypotensive baseline on room air, Tylenol and Dilaudid x 1 for upper back pain, Up with assist of 2, rafa ramos,1 BM this shift, renal diet. Dialysis session tomorrow.

## 2022-07-12 NOTE — PROGRESS NOTES
"[facility]    Medicine Progress Note - Hospitalist Service    Date of Admission:  6/24/2022    Assessment & Plan        Shay Jimenez is a very pleasant 57-year-old male with past medical history significant for end-stage renal disease, on hemodialysis, chronic peripheral neuropathy affecting his feet, history of hypertension no longer on meds, hyperlipidemia, obesity, obstructive sleep apnea, GERD and depression, among other medical problems, who presented 6/24/22 for evaluation of worsening upper back pain with associated generalized weakness and falls.  He was found to have abnormal findings on thoracic MRI concerning for diskitis with osteomyelitis with also some findings of a compressive myelopathy.      Upper back pain  Generalized weakness  Falls   Epidural phlegmon/abscess   Compressive myelopathy  S/P C6-T6 fusion/T2-3 decompression  *back pain for 6 months. CT chest 2/2022 showed concern for upper thoracic spine diskitis vs osteomyelitis. Unclear follow up from that time  *in weeks prior to admission, MRI showed possible thoracic compression fracture.   *developed progressively weak legs with frequent falls.  *on admission imaging with T2-T3 diskitis, osteomyelitis and compressive myelopathy.        appreciate Neurosurgery care    follow blood cultures, they have no growth after 5 days    ID consult requested, I appreciate Dr. Rain's evaluation recommendations    Per her, \"changes could all be from DJD, CRP mildly elevated, no fever/chills, slow intermittent progression, I am not convinced this is infection but it still could be, he has had neg. PPD in past and no TB risk factors or contacts.\"     routine gram stain culture, fungal stain and culture, AFB stain and culture and pathology sent from OR    Also per Dr. Rain, \"continue on Ancef for now as we wait on the cultures.  If ready for discharge okay to discharge off antibiotics.\"  She plans to follow-up on the cultures " "for any late growth and start antimicrobials if/when needed.    Tissue culture has gram-positive bacilli isolated in broth only, resembling diphtheroids on day 5 of incubation; ID notified of this result.  Per Dr. Rain, \"GPB resembling diphtheroid 5 days later growth in broth only is a contaminate, no changes to plan for discharge off antibiotics.\"    Oxycodone \"didn't work at all\" for patient, was discontinued. Tylenol and oral Dilaudid available as needed for pain.     pain is better controlled, he is working with PT, he  is looking forward to go to ARU to get back his strength.    1 blood culture came back positive for gram-positive cocci on 7/6, he has been having regular blood cultures since 7/2, possibly this is a contaminant, 2 set of blood cultures ordered for 7/6.  7/5,  1 set positive for staph epidermidis, possibly contaminant, repeat blood cultures from 7/6 is negative.   on amoxicillin 500 mg daily for 4 weeks once IV antibiotics are discontinued.  Cefazolin discontinued 7/12, complete 4 weeks of amoxicillin.    Currently pending placement to ARU .  I discussed with charge nurse, patient's blood pressure would not improve then there is, he is able to function at this blood pressure, if ARU was not able to accept the patient at this blood pressure we should look into TCU options.  Patient has been waiting for ARU placement for last 5 days.    End-stage renal disease, on hemodialysis  Hypotension  Hyponatremia  *Typically dialyzes Monday, Wednesday, Friday. Missed 6/24 session due to being in ED      Nephrology consult appreciated    Dialysis per nephrology.    Blood pressures readings are soft.  Per Dr. Carr, \"okay to run and pull fluid with BP in the 70s as long as he is asymptomatic.\"    Continue scheduled midodrine    PTA sevelamer    Patient is stable on low blood pressures, he is already on 10 mg midodrine 3 times a day, he does have mild dizziness with positional changes but he is tolerating PT " "here well.  ARU is concerned about his blood pressures but he is functioning well at this time for blood pressure and he is already on high dose of midodrine, patient is able to tolerate PT at this blood pressures.    Will order bilateral lower extremity Jobst stockings, nephrology did recommend that we could increase midodrine to 20 mg 3 times daily, considering that this not the usual dosage I would recommend nephrology to proceed with that if needed.    Encephalopathy  Gianfranco Day is on gabapentin 300 mg 3 times daily, this is a home medication and dose has been unchanged since he came to the hospital but is higher than the typical maximum dose recommended even for patients on CRRT    Presume encephalopathy is related to buildup of gabapentin metabolites from intradialytic. Gabapentin and Zoloft was on hold, will start with gabapentin  low-dose of 100 mg at bedtime since encephalopathy is improved.  Restarted 7/5.    Monitor signs and symptoms    GERD    Chronic and stable on PPI.    Anemia of chronic kidney disease    Per Dr. Carr, \"increase erythropoietin to 10K with dialysis\"    Follow hemoglobin    Consider transfusion for hemoglobin less than 7 g/dL and symptoms      Hyperlipidemia    Chronic and stable, on statin.    Peripheral neuropathy of the feet  *Follows with Neurology.      Managed in part with duloxetine, continue    Depression    Chronic and stable on Zoloft.    Obesity, class III  Obstructive sleep apnea  *BMI this stay is 43.10.  He does not use CPAP (patient says, \"I am one of those guys who does not tolerate CPAP.\") Encourage diet, lifestyle modifications once above issues addressed.    History of BPH.    Continue Proscar     Diet: Diet  Combination Diet Renal Diet (dialysis)  Fluid restriction 1500 ML FLUID  Snacks/Supplements Adult: Nepro Oral Supplement; Between Meals    DVT Prophylaxis: Pneumatic Compression Devices  Oliveira Catheter: Not present  Central Lines: None  Cardiac " Monitoring: None  Code Status: Full Code      Disposition Plan      Expected Discharge Date: 07/13/2022    Discharge Delays: Placement - TCU  Destination: inpatient rehabilitation facility  Discharge Comments: ARU following, symptomatic B/P's will need TCU most likely          Oksana Spicer MD  Hospitalist Service  [facility]  Securely message with the Vocera Web Console (learn more here)    Clinically Significant Risk Factors Present on Admission         # Acute Kidney Injury, unspecified: based on a >150% or 0.3 mg/dL increase in creatinine on admission compared to past 90 day average, will monitor renal function            _______________________________________________________________    Interval History   Patient is resting comfortably, blood pressure is 80 over 50s, he denies any dizziness, he is concerned about his discharge planning, worried while no decision is made yet regarding his disposition.  Did not have any new concerns we will    Data reviewed today: I reviewed all medications, new labs and imaging results over the last 24 hours. I personally reviewed no images or EKG's today.    Physical Exam   Vital Signs: Temp: 98.2  F (36.8  C) Temp src: Oral BP: (!) 84/55 (BP was checked 2x, notified RN) Pulse: 65   Resp: 18 SpO2: 94 % O2 Device: None (Room air)    Weight: 319 lbs 10.67 oz     Constitutional: Awake, alert, oriented x3  Respiratory: Clear to auscultation bilaterally, no crackles or wheezing  Cardiovascular: Regular rate and rhythm  GI: Obese, soft, bowel sounds are present  Skin/Integumen: No rash on exposed skin, patient does have a brace  Psych: Mood is appropriate    Data   Recent Labs   Lab 07/12/22  0800 07/11/22  0722 07/08/22  0800 07/07/22  0552 07/06/22  1618   WBC 9.2 9.8 10.1 10.3 10.9   HGB 7.7* 8.2* 7.7* 8.7* 9.3*   * 102* 105* 107* 106*    255 250 256 225   NA  --  123*  --   --  131*   POTASSIUM  --  4.4  --  5.5* 4.5   CHLORIDE  --  83*   --   --  96   CO2  --  22  --   --  27   BUN  --  79*  --   --  37*   CR  --  10.40*  --   --  5.37*   ANIONGAP  --  18*  --   --  8   MAC  --  9.4  --   --  9.6   GLC  --  89  --   --  119*   ALBUMIN  --  3.2*  --   --   --      No results found for this or any previous visit (from the past 24 hour(s)).  Medications       - MEDICATION INSTRUCTIONS for Dialysis Patients -   Does not apply See Admin Instructions     amoxicillin  500 mg Oral Daily with supper     atorvastatin  20 mg Oral At Bedtime     cetirizine  10 mg Oral Daily     DULoxetine  30 mg Oral Daily     finasteride  1.25 mg Oral Daily     gabapentin  100 mg Oral TID     midodrine  10 mg Oral TID w/meals     pantoprazole  40 mg Oral QAM AC     polyethylene glycol  17 g Oral Daily     senna-docusate  1 tablet Oral BID    Or     senna-docusate  2 tablet Oral BID     sertraline  100 mg Oral Daily     sevelamer carbonate  1,600 mg Oral TID w/meals     sodium chloride (PF)  3 mL Intracatheter Q8H

## 2022-07-12 NOTE — PROGRESS NOTES
Pt. A&OX4. VSS on RA. Not OOB this shift. Denies pain. Not OOb this shift. Denies CPAP for apnea, On 2L O2 NC. On dialysis, did not void this shift. No BM shift.

## 2022-07-12 NOTE — PROGRESS NOTES
Care Management Follow Up    Length of Stay (days): 18    Expected Discharge Date: 07/13/2022     Concerns to be Addressed: discharge planning     Patient plan of care discussed at interdisciplinary rounds: Yes    Anticipated Discharge Disposition: Acute Rehab, Transitional Care     Anticipated Discharge Services: Transportation Services  Anticipated Discharge DME:      Patient/family educated on Medicare website which has current facility and service quality ratings:    Education Provided on the Discharge Plan:    Patient/Family in Agreement with the Plan: yes    Referrals Placed by CM/SW: Post Acute Facilities  Private pay costs discussed: Not applicable    Additional Information:  SW paged [facility] lia[facility] to see if they would be able to take pt tomorrow. SW followed up with [facility] lia[facility], he reports he needs to see at least 2 days of continuous participation with therapy to monitor tolerance for  various sessions throughout the day. Provider requested SW look into TCUs as a back up in case ARU could not admit. Pt reports he would like to pursue ARU but if it does not work out he would like to be close to [place].       JULISSA Villaseñor

## 2022-07-12 NOTE — PROGRESS NOTES
Assessment and Plan:   ESRD on HD: running MWF. Orders written for tomorrow. UF limited by low BP.     We will give midodrine pre run and during run.  Pt wants to be on a regular diet > orders changed.               Interval History:   Concern for T2-T3  discitis/vertebral osteomyelitis,  S/p: 6/27,Cervical 6 to Thoracic 6 Fusion and Thoracic 2-3 Decompression  Alcoholism:   GINI             Review of Systems:   Feels well. No problems on dialysis.           Medications:       - MEDICATION INSTRUCTIONS for Dialysis Patients -   Does not apply See Admin Instructions     amoxicillin  500 mg Oral Daily with supper     atorvastatin  20 mg Oral At Bedtime     cetirizine  10 mg Oral Daily     DULoxetine  30 mg Oral Daily     finasteride  1.25 mg Oral Daily     gabapentin  100 mg Oral TID     midodrine  10 mg Oral TID w/meals     pantoprazole  40 mg Oral QAM AC     polyethylene glycol  17 g Oral Daily     senna-docusate  1 tablet Oral BID    Or     senna-docusate  2 tablet Oral BID     sertraline  100 mg Oral Daily     sevelamer carbonate  1,600 mg Oral TID w/meals     sodium chloride (PF)  3 mL Intracatheter Q8H         Current active medications and PTA medications reviewed, see medication list for details.            Physical Exam:   Vitals were reviewed  Patient Vitals for the past 24 hrs:   BP Temp Temp src Pulse Resp SpO2   07/12/22 0846 (!) 84/55 98.2  F (36.8  C) Oral 65 18 94 %   07/11/22 2300 125/44 97.4  F (36.3  C) Oral 82 18 91 %   07/11/22 1636 (!) 83/52 98.1  F (36.7  C) Oral 80 -- 92 %   07/11/22 1600 (!) 89/55 98.2  F (36.8  C) Oral 85 18 96 %   07/11/22 1557 92/57 -- -- 87 -- --   07/11/22 1545 108/57 -- -- 87 -- --   07/11/22 1530 90/50 -- -- 89 -- --   07/11/22 1515 90/53 -- -- 86 -- --   07/11/22 1500 (!) 89/62 -- -- 87 -- --   07/11/22 1445 92/56 -- -- 81 -- --   07/11/22 1430 92/56 -- -- 86 -- --   07/11/22 1415 95/55 -- -- 87 -- --   07/11/22 1400 95/81 -- -- 89 -- --   07/11/22 1345 (!)  7848 -- -- 82 -- --   22 1330 (!) 7848 -- -- 86 -- --   22 1315 (!) 81/53 -- -- 85 -- --       Temp:  [97.4  F (36.3  C)-98.2  F (36.8  C)] 98.2  F (36.8  C)  Pulse:  [65-89] 65  Resp:  [18] 18  BP: ()/(44-81) 84/55  SpO2:  [91 %-96 %] 94 %    Temperatures:  Current - Temp: 98.2  F (36.8  C); Max - Temp  Av  F (36.7  C)  Min: 97.4  F (36.3  C)  Max: 98.2  F (36.8  C)  Respiration range: Resp  Av  Min: 18  Max: 18  Pulse range: Pulse  Av  Min: 65  Max: 89  Blood pressure range: Systolic (24hrs), Av , Min:78 , Max:125   ; Diastolic (24hrs), Av, Min:44, Max:81    Pulse oximetry range: SpO2  Av.3 %  Min: 91 %  Max: 96 %    I/O last 3 completed shifts:  In: 240 [P.O.:240]  Out: 3000 [Other:3000]      Intake/Output Summary (Last 24 hours) at 2022 1311  Last data filed at 2022 1600  Gross per 24 hour   Intake 0 ml   Output 3000 ml   Net -3000 ml       Alert and responsive  Resting in bed  Lungs with clear ant BS  Cor RRR nl S1 S2 no M  LE stasis changes, no edema  LUAF with good thrill and bruit       Wt Readings from Last 4 Encounters:   22 145 kg (319 lb 10.7 oz)          Data:          Lab Results   Component Value Date     2022     2022     2022        Lab Results   Component Value Date    CHLORIDE 83 2022    CHLORIDE 96 2022    CHLORIDE 94 2022      Lab Results   Component Value Date    BUN 79 2022    BUN 37 2022    BUN 83 2022      Lab Results   Component Value Date    POTASSIUM 4.4 2022    POTASSIUM 5.5 2022    POTASSIUM 4.5 2022    Lab Results   Component Value Date    CO2 22 2022    CO2 27 2022    CO2 23 2022    Lab Results   Component Value Date    CR 10.40 2022    CR 5.37 2022    CR 11.70 2022        Recent Labs   Lab Test 22  0800 22  0722 22  0800   WBC 9.2 9.8 10.1   HGB 7.7* 8.2* 7.7*   HCT 24.0* 25.9*  24.8*   * 102* 105*    255 250     Recent Labs   Lab Test 06/24/22  0818   AST 51*   ALT 55   ALKPHOS 152*   BILITOTAL 0.6       Recent Labs   Lab Test 06/28/22  0420   MAG 2.1     Recent Labs   Lab Test 07/11/22  0722 06/29/22  0611 06/28/22  0420   PHOS 8.9* 8.6* 5.8*     Recent Labs   Lab Test 07/11/22  0722 07/06/22  1618 07/04/22  0715   MAC 9.4 9.6 9.5       Lab Results   Component Value Date    MAC 9.4 07/11/2022     Lab Results   Component Value Date    WBC 9.2 07/12/2022    HGB 7.7 (L) 07/12/2022    HCT 24.0 (L) 07/12/2022     (H) 07/12/2022     07/12/2022     Lab Results   Component Value Date     (L) 07/11/2022    POTASSIUM 4.4 07/11/2022    CHLORIDE 83 (L) 07/11/2022    CO2 22 07/11/2022    GLC 89 07/11/2022     Lab Results   Component Value Date    BUN 79 (H) 07/11/2022    CR 10.40 (H) 07/11/2022     Lab Results   Component Value Date    MAG 2.1 06/28/2022     Lab Results   Component Value Date    PHOS 8.9 (H) 07/11/2022       Creatinine   Date Value Ref Range Status   07/11/2022 10.40 (H) 0.66 - 1.25 mg/dL Final   07/06/2022 5.37 (H) 0.66 - 1.25 mg/dL Final   07/04/2022 11.70 (H) 0.66 - 1.25 mg/dL Final   07/03/2022 9.90 (H) 0.66 - 1.25 mg/dL Final   07/01/2022 8.60 (H) 0.66 - 1.25 mg/dL Final   06/29/2022 9.60 (H) 0.66 - 1.25 mg/dL Final       Attestation:  I have reviewed today's vital signs, notes, medications, labs and imaging.     Aydin French MD

## 2022-07-12 NOTE — PLAN OF CARE
Goal Outcome Evaluation:  3p to 7p : Patient is Alert and oriented x 4. Hypotensive baseline on room air, Tylenol and Dilaudid x 1 for upper back pain, Up with assist of 2, rafa ramos,1 BM this shift, renal diet. Dialysis session today,3 Ltrs removed, continuet o monitor.

## 2022-07-12 NOTE — PROGRESS NOTES
[facility]    Infectious Disease Progress Note    Date of Service : 07/12/2022        Assessment:  1. Concern for T2-T3  discitis/vertebral osteomyelitis,  S/p: 6/27,Cervical 6 to Thoracic 6 Fusion and Thoracic 2-3 Decompression, operative report noted for DJD changes,   routine cx with P acne only in broth and 5/ 7 days later which probably signifies contamination   2. DJD  3. ESRD on HD  4. Obesity  5. ETOH use disorder  6. Neuropathy  7. GINI     Recommendations:  1.  Discontinue Cefazolin  2.  Late growth of P acne in 2 cxs now. Likely contaminant given no previous hardware, but since new hardware is in place will recommend starting prophylactic amoxi 500 mg daily for a total of 4 weeks.   3. 1/2 positive blood cultures from 7/ 5 for staph epi signifies contamination and does not require treatment    Can discharge from the ID stand point  ID will sign off  Shyanne Garcia MD    Interval History   Resting, no new complaints, ongoing back pain and discomfort. tolerating antibiotics without side effects    Physical Exam   Temp: 98.2  F (36.8  C) Temp src: Oral BP: (!) 84/55 (BP was checked 2x, notified RN) Pulse: 65   Resp: 18 SpO2: 94 % O2 Device: None (Room air)    Vitals:    06/24/22 1716 06/28/22 0400 06/29/22 0638   Weight: 149 kg (328 lb 7.8 oz) 146.6 kg (323 lb 3.1 oz) 145 kg (319 lb 10.7 oz)     Vital Signs with Ranges  Temp:  [97.4  F (36.3  C)-98.2  F (36.8  C)] 98.2  F (36.8  C)  Pulse:  [65-90] 65  Resp:  [18] 18  BP: ()/(44-81) 84/55  SpO2:  [91 %-96 %] 94 %    Constitutional: Awake, alert, cooperative, no apparent distress  Lungs: Clear to auscultation bilaterally, no crackles or wheezing  Cardiovascular: Regular rate and rhythm, normal S1 and S2, and no murmur noted  Abdomen: Normal bowel sounds, soft, non-distended, non-tender  Skin: No rashes, no cyanosis, no edema  Other:    Medications       - MEDICATION INSTRUCTIONS for Dialysis Patients -   Does not apply See Admin  Instructions     atorvastatin  20 mg Oral At Bedtime     ceFAZolin  2 g Intravenous Q24H     cetirizine  10 mg Oral Daily     DULoxetine  30 mg Oral Daily     finasteride  1.25 mg Oral Daily     gabapentin  100 mg Oral TID     midodrine  10 mg Oral TID w/meals     pantoprazole  40 mg Oral QAM AC     polyethylene glycol  17 g Oral Daily     senna-docusate  1 tablet Oral BID    Or     senna-docusate  2 tablet Oral BID     sertraline  100 mg Oral Daily     sevelamer carbonate  1,600 mg Oral TID w/meals     sodium chloride (PF)  3 mL Intracatheter Q8H       Data   All microbiology laboratory data reviewed.  Recent Labs   Lab Test 07/12/22  0800 07/11/22  0722 07/08/22  0800   WBC 9.2 9.8 10.1   HGB 7.7* 8.2* 7.7*   HCT 24.0* 25.9* 24.8*   * 102* 105*    255 250     Recent Labs   Lab Test 07/11/22  0722 07/06/22  1618 07/04/22  0715   CR 10.40* 5.37* 11.70*

## 2022-07-13 ENCOUNTER — APPOINTMENT (OUTPATIENT)
Dept: PHYSICAL THERAPY | Facility: CLINIC | Age: 57
DRG: 459 | End: 2022-07-13
Payer: MEDICARE

## 2022-07-13 ENCOUNTER — TELEPHONE (OUTPATIENT)
Dept: NEUROSURGERY | Facility: CLINIC | Age: 57
End: 2022-07-13

## 2022-07-13 LAB
ERYTHROCYTE [DISTWIDTH] IN BLOOD BY AUTOMATED COUNT: 14.5 % (ref 10–15)
HCT VFR BLD AUTO: 23.6 % (ref 40–53)
HGB BLD-MCNC: 7.6 G/DL (ref 13.3–17.7)
MCH RBC QN AUTO: 31.8 PG (ref 26.5–33)
MCHC RBC AUTO-ENTMCNC: 32.2 G/DL (ref 31.5–36.5)
MCV RBC AUTO: 99 FL (ref 78–100)
PLATELET # BLD AUTO: 264 10E3/UL (ref 150–450)
RBC # BLD AUTO: 2.39 10E6/UL (ref 4.4–5.9)
WBC # BLD AUTO: 8.9 10E3/UL (ref 4–11)

## 2022-07-13 PROCEDURE — 250N000013 HC RX MED GY IP 250 OP 250 PS 637: Performed by: INTERNAL MEDICINE

## 2022-07-13 PROCEDURE — 99232 SBSQ HOSP IP/OBS MODERATE 35: CPT | Performed by: INTERNAL MEDICINE

## 2022-07-13 PROCEDURE — 120N000001 HC R&B MED SURG/OB

## 2022-07-13 PROCEDURE — 634N000001 HC RX 634: Performed by: INTERNAL MEDICINE

## 2022-07-13 PROCEDURE — 97110 THERAPEUTIC EXERCISES: CPT | Mod: GP

## 2022-07-13 PROCEDURE — 90935 HEMODIALYSIS ONE EVALUATION: CPT | Performed by: INTERNAL MEDICINE

## 2022-07-13 PROCEDURE — 250N000013 HC RX MED GY IP 250 OP 250 PS 637: Performed by: HOSPITALIST

## 2022-07-13 PROCEDURE — 250N000013 HC RX MED GY IP 250 OP 250 PS 637: Performed by: SPECIALIST

## 2022-07-13 PROCEDURE — 258N000003 HC RX IP 258 OP 636: Performed by: INTERNAL MEDICINE

## 2022-07-13 PROCEDURE — 90937 HEMODIALYSIS REPEATED EVAL: CPT

## 2022-07-13 PROCEDURE — 85027 COMPLETE CBC AUTOMATED: CPT | Performed by: INTERNAL MEDICINE

## 2022-07-13 PROCEDURE — 36415 COLL VENOUS BLD VENIPUNCTURE: CPT | Performed by: INTERNAL MEDICINE

## 2022-07-13 PROCEDURE — 97530 THERAPEUTIC ACTIVITIES: CPT | Mod: GP

## 2022-07-13 PROCEDURE — 250N000011 HC RX IP 250 OP 636: Performed by: INTERNAL MEDICINE

## 2022-07-13 RX ORDER — DOXERCALCIFEROL 4 UG/2ML
4 INJECTION INTRAVENOUS
Status: COMPLETED | OUTPATIENT
Start: 2022-07-13 | End: 2022-07-13

## 2022-07-13 RX ORDER — HEPARIN SODIUM 1000 [USP'U]/ML
500 INJECTION, SOLUTION INTRAVENOUS; SUBCUTANEOUS CONTINUOUS
Status: DISCONTINUED | OUTPATIENT
Start: 2022-07-13 | End: 2022-07-13

## 2022-07-13 RX ADMIN — GABAPENTIN 100 MG: 100 CAPSULE ORAL at 16:33

## 2022-07-13 RX ADMIN — MIDODRINE HYDROCHLORIDE 10 MG: 5 TABLET ORAL at 08:36

## 2022-07-13 RX ADMIN — ATORVASTATIN CALCIUM 20 MG: 20 TABLET, FILM COATED ORAL at 21:16

## 2022-07-13 RX ADMIN — ALPRAZOLAM 1 MG: 0.25 TABLET ORAL at 21:16

## 2022-07-13 RX ADMIN — ALPRAZOLAM 1 MG: 0.25 TABLET ORAL at 12:14

## 2022-07-13 RX ADMIN — PANTOPRAZOLE SODIUM 40 MG: 40 TABLET, DELAYED RELEASE ORAL at 06:57

## 2022-07-13 RX ADMIN — HYDROXYZINE HYDROCHLORIDE 25 MG: 25 TABLET, FILM COATED ORAL at 09:12

## 2022-07-13 RX ADMIN — HYDROMORPHONE HYDROCHLORIDE 4 MG: 2 TABLET ORAL at 09:19

## 2022-07-13 RX ADMIN — HEPARIN SODIUM 500 UNITS/HR: 1000 INJECTION INTRAVENOUS; SUBCUTANEOUS at 12:45

## 2022-07-13 RX ADMIN — DULOXETINE 30 MG: 30 CAPSULE, DELAYED RELEASE ORAL at 08:36

## 2022-07-13 RX ADMIN — SERTRALINE HYDROCHLORIDE 100 MG: 100 TABLET ORAL at 08:36

## 2022-07-13 RX ADMIN — EPOETIN ALFA-EPBX 4000 UNITS: 4000 INJECTION, SOLUTION INTRAVENOUS; SUBCUTANEOUS at 13:21

## 2022-07-13 RX ADMIN — HEPARIN SODIUM 500 UNITS: 1000 INJECTION INTRAVENOUS; SUBCUTANEOUS at 12:40

## 2022-07-13 RX ADMIN — MIDODRINE HYDROCHLORIDE 10 MG: 5 TABLET ORAL at 08:45

## 2022-07-13 RX ADMIN — GABAPENTIN 100 MG: 100 CAPSULE ORAL at 21:16

## 2022-07-13 RX ADMIN — DOXERCALCIFEROL 4 MCG: 4 INJECTION, SOLUTION INTRAVENOUS at 13:22

## 2022-07-13 RX ADMIN — SEVELAMER CARBONATE 1600 MG: 800 TABLET, FILM COATED ORAL at 18:35

## 2022-07-13 RX ADMIN — HYDROMORPHONE HYDROCHLORIDE 4 MG: 2 TABLET ORAL at 21:16

## 2022-07-13 RX ADMIN — CALCIUM CARBONATE (ANTACID) CHEW TAB 500 MG 500 MG: 500 CHEW TAB at 09:03

## 2022-07-13 RX ADMIN — HYDROMORPHONE HYDROCHLORIDE 4 MG: 2 TABLET ORAL at 16:35

## 2022-07-13 RX ADMIN — MIDODRINE HYDROCHLORIDE 10 MG: 5 TABLET ORAL at 16:33

## 2022-07-13 RX ADMIN — AMOXICILLIN 500 MG: 500 CAPSULE ORAL at 16:33

## 2022-07-13 RX ADMIN — GABAPENTIN 100 MG: 100 CAPSULE ORAL at 08:36

## 2022-07-13 RX ADMIN — FINASTERIDE 1.25 MG: 5 TABLET, FILM COATED ORAL at 08:36

## 2022-07-13 RX ADMIN — SODIUM CHLORIDE 300 ML: 9 INJECTION, SOLUTION INTRAVENOUS at 11:45

## 2022-07-13 RX ADMIN — SODIUM CHLORIDE 250 ML: 9 INJECTION, SOLUTION INTRAVENOUS at 12:45

## 2022-07-13 RX ADMIN — CETIRIZINE HYDROCHLORIDE 10 MG: 10 TABLET, FILM COATED ORAL at 08:36

## 2022-07-13 RX ADMIN — HYDROXYZINE HYDROCHLORIDE 25 MG: 25 TABLET, FILM COATED ORAL at 21:16

## 2022-07-13 RX ADMIN — MIDODRINE HYDROCHLORIDE 10 MG: 5 TABLET ORAL at 11:58

## 2022-07-13 ASSESSMENT — ACTIVITIES OF DAILY LIVING (ADL)
ADLS_ACUITY_SCORE: 49
ADLS_ACUITY_SCORE: 49
ADLS_ACUITY_SCORE: 51
ADLS_ACUITY_SCORE: 49
ADLS_ACUITY_SCORE: 51
ADLS_ACUITY_SCORE: 49
ADLS_ACUITY_SCORE: 51
ADLS_ACUITY_SCORE: 49
ADLS_ACUITY_SCORE: 51

## 2022-07-13 NOTE — PROGRESS NOTES
Pt AO x 4. Hypotensive baseline on room air, Tylenol and Dilaudid x 1 for upper back pain, Up with assist of 2, rafa ramos, Dialysis today.

## 2022-07-13 NOTE — PLAN OF CARE
Goal Outcome Evaluation:      Patient is alert and oriented X4. VSS except BP soft BP.  Assist of 2 with gait belt and rafa steady. On renal diet with fluid restriction. Patient reports pain, prn oral dilaudid and  flexeril was given. Patient request for xanax and was given. PIV saline locked. Up in chair today . Participating in PT. Dressing dry and intact, open to air. Dialysis session tomorrow. Awaiting TCU placement.

## 2022-07-13 NOTE — PROGRESS NOTES
Surgical staples removed per verbal order. Incision CDI. Scant drainage. Covered temporarily with primapore dressing.

## 2022-07-13 NOTE — PROGRESS NOTES
Potassium   Date Value Ref Range Status   07/11/2022 4.4 3.4 - 5.3 mmol/L Final     Hemoglobin   Date Value Ref Range Status   07/13/2022 7.6 (L) 13.3 - 17.7 g/dL Final     Creatinine   Date Value Ref Range Status   07/11/2022 10.40 (H) 0.66 - 1.25 mg/dL Final     Urea Nitrogen   Date Value Ref Range Status   07/11/2022 79 (H) 7 - 30 mg/dL Final     Sodium   Date Value Ref Range Status   07/11/2022 123 (L) 133 - 144 mmol/L Final     INR   Date Value Ref Range Status   06/27/2022 1.07 0.85 - 1.15 Final      Latest Reference Range & Units 06/25/22 11:53   Hepatitis B Surface Antibody Negative  Negative   Hep B Surface Agn Nonreactive  Nonreactive   Hepatitis B Core Mary[place] Negative  Negative   Hepatitis B Surface Antibody <8.00 m[IU]/mL 1.29     DIALYSIS PROCEDURE NOTE  Hepatitis status of previous patient on machine log was checked and verified ok to use with this patients hepatitis status.  Patient dialyzed for 3 hrs. on a K3 bath with a net fluid removal of  3L.  A BFR of 400 ml/min was obtained via a LAVF using 15 gauge needles.      The treatment plan was discussed with Dr. French during the treatment.    Total heparin received during the treatment: 1500 units.   Needle cannulation sites held x  min.     Meds  given: None  Complications: Pt. Hypotensive, asymptomatic denied dizziness. UF paused, saline bolus administered, UF goal decreased. Pt. Rinsed back approximately 5min. Early. Pt. States he typically has Bps in 70s-80s in dialysis. MD updated.     Person educated: Patient. Knowledge base Substantial. Barriers to learning: None. Educated on Procedure via Verbal mode. Patient verbalized understanding. Pt prefers Oral education style.     ICEBOAT? Timeout performed pre-treatment  I: Patient was identified using 2 identifiers  C:  Consent Signed Yes  E: Equipment preventative maintenance is current and dialysis delivery system OK to use  B: Hepatitis B Status: See labs above  O: Dialysis orders present and complete  prior to treatment  A: Vascular access verified and assessed prior to treatment  T: Treatment was performed at a clinically appropriate time  ?: Patient was allowed to ask questions and address concerns prior to treatment  See Adult Hemodialysis flowsheet in EPIC for further details and post assessment.  Machine water alarm in place and functioning. Transducer pods intact and checked every 15min.   Pt returned via Bed.  Chlorine/Chloramine water system checked every 4 hours.  Outpatient Dialysis at [facility]      Post treatment report given to REGIS Alfaro RN regarding 3L of fluid removed, last BP of 81/50 pt. Asymptomatic, and patient pain rating of 0/10.     Please remove patient dressing on AVF and AVG needle sites 24 hours after dialysis. If leaking occurs please apply a Band-Aid.

## 2022-07-13 NOTE — PROGRESS NOTES
Spoke with hospitalist regarding consult. Was told that it is no longer needed because staples have already been removed. Attempted to call Roxane (neuro Surg) back but was unable to get through or to leave a message.

## 2022-07-13 NOTE — PROGRESS NOTES
Assessment and Plan:   ESRD: on dialysis 3h LAF with 400 BFR. 3-4 L UF. 3K 35 HCO3 138 Na. IV hectorol and EPO on the run. Low dose heparin. On midodrine before dialysis.   Renvela 2 tid.             Interval History:   Concern for T2-T3  discitis/vertebral osteomyelitis,  S/p: 6/27,Cervical 6 to Thoracic 6 Fusion and Thoracic 2-3 Decompression  Alcoholism:   IGNI                 Review of Systems:   No problems on dialysis.           Medications:       - MEDICATION INSTRUCTIONS for Dialysis Patients -   Does not apply See Admin Instructions     amoxicillin  500 mg Oral Daily with supper     atorvastatin  20 mg Oral At Bedtime     cetirizine  10 mg Oral Daily     DULoxetine  30 mg Oral Daily     finasteride  1.25 mg Oral Daily     gabapentin  100 mg Oral TID     heparin  500 Units Hemodialysis Machine OR IV Push Once in dialysis/CRRT     midodrine  10 mg Oral TID w/meals     pantoprazole  40 mg Oral QAM AC     polyethylene glycol  17 g Oral Daily     senna-docusate  1 tablet Oral BID    Or     senna-docusate  2 tablet Oral BID     sertraline  100 mg Oral Daily     sevelamer carbonate  1,600 mg Oral TID w/meals     sodium chloride (PF)  3 mL Intracatheter Q8H       heparin (porcine)       - MEDICATION INSTRUCTIONS -       Current active medications and PTA medications reviewed, see medication list for details.            Physical Exam:   Vitals were reviewed  Patient Vitals for the past 24 hrs:   BP Temp Temp src Pulse Resp SpO2   07/13/22 1300 118/61 -- -- 77 -- --   07/13/22 1245 94/59 -- -- 77 -- --   07/13/22 1240 116/54 98  F (36.7  C) Oral 77 16 95 %   07/13/22 1135 99/54 -- -- -- -- --   07/13/22 0830 -- (P) 97.5  F (36.4  C) (P) Oral -- (P) 16 (P) 92 %   07/13/22 0100 100/65 97.8  F (36.6  C) Oral 79 14 94 %   07/12/22 2040 -- -- -- -- 17 --   07/12/22 2004 -- -- -- -- 17 --   07/12/22 1937 92/55 97.5  F (36.4  C) Oral 71 18 95 %   07/12/22 1430 (!) 89/48 -- -- 79 -- --   07/12/22 1415 (!) 89/55 -- --  76 -- --   22 1400 (!) 86/56 -- -- -- -- --       Temp:  [97.5  F (36.4  C)-98  F (36.7  C)] 98  F (36.7  C)  Pulse:  [71-79] 77  Resp:  [14-18] 16  BP: ()/(48-65) 118/61  SpO2:  [92 %-95 %] 95 %    Temperatures:  Current - Temp: 98  F (36.7  C); Max - Temp  Av.7  F (36.5  C)  Min: 97.5  F (36.4  C)  Max: 98  F (36.7  C)  Respiration range: Resp  Av.3  Min: 14  Max: 18  Pulse range: Pulse  Av.6  Min: 71  Max: 79  Blood pressure range: Systolic (24hrs), Av , Min:86 , Max:118   ; Diastolic (24hrs), Av, Min:48, Max:65    Pulse oximetry range: SpO2  Av %  Min: 92 %  Max: 95 %    I/O last 3 completed shifts:  In: 120 [P.O.:120]  Out: -       Intake/Output Summary (Last 24 hours) at 2022 1322  Last data filed at 2022 1900  Gross per 24 hour   Intake 120 ml   Output --   Net 120 ml       Resting in bed         Wt Readings from Last 4 Encounters:   22 145 kg (319 lb 10.7 oz)          Data:          Lab Results   Component Value Date     2022     2022     2022    Lab Results   Component Value Date    CHLORIDE 83 2022    CHLORIDE 96 2022    CHLORIDE 94 2022      Lab Results   Component Value Date    BUN 79 2022    BUN 37 2022    BUN 83 2022      Lab Results   Component Value Date    POTASSIUM 4.4 2022    POTASSIUM 5.5 2022    POTASSIUM 4.5 2022    Lab Results   Component Value Date    CO2 22 2022    CO2 27 2022    CO2 23 2022    Lab Results   Component Value Date    CR 10.40 2022    CR 5.37 2022    CR 11.70 2022        Recent Labs   Lab Test 22  0730 22  0800 22  0722   WBC 8.9 9.2 9.8   HGB 7.6* 7.7* 8.2*   HCT 23.6* 24.0* 25.9*   MCV 99 101* 102*    227 255     Recent Labs   Lab Test 22  0818   AST 51*   ALT 55   ALKPHOS 152*   BILITOTAL 0.6       Recent Labs   Lab Test 22  0420   MAG 2.1     Recent Labs    Lab Test 07/11/22  0722 06/29/22  0611 06/28/22  0420   PHOS 8.9* 8.6* 5.8*     Recent Labs   Lab Test 07/11/22  0722 07/06/22  1618 07/04/22  0715   MAC 9.4 9.6 9.5     Lab Results   Component Value Date    MAC 9.4 07/11/2022     Lab Results   Component Value Date    WBC 8.9 07/13/2022    HGB 7.6 (L) 07/13/2022    HCT 23.6 (L) 07/13/2022    MCV 99 07/13/2022     07/13/2022     Lab Results   Component Value Date     (L) 07/11/2022    POTASSIUM 4.4 07/11/2022    CHLORIDE 83 (L) 07/11/2022    CO2 22 07/11/2022    GLC 89 07/11/2022     Lab Results   Component Value Date    BUN 79 (H) 07/11/2022    CR 10.40 (H) 07/11/2022     Lab Results   Component Value Date    MAG 2.1 06/28/2022     Lab Results   Component Value Date    PHOS 8.9 (H) 07/11/2022       Creatinine   Date Value Ref Range Status   07/11/2022 10.40 (H) 0.66 - 1.25 mg/dL Final   07/06/2022 5.37 (H) 0.66 - 1.25 mg/dL Final   07/04/2022 11.70 (H) 0.66 - 1.25 mg/dL Final   07/03/2022 9.90 (H) 0.66 - 1.25 mg/dL Final   07/01/2022 8.60 (H) 0.66 - 1.25 mg/dL Final   06/29/2022 9.60 (H) 0.66 - 1.25 mg/dL Final       Attestation:  I have reviewed today's vital signs, notes, medications, labs and imaging.  Seen on dialysis.      Aydin French MD

## 2022-07-13 NOTE — PROGRESS NOTES
Pt is A&O x4, VSS, /65. Pt denies pain, refused pain medications.  Ice pack in place to the back incision.  Back incision is CDI with staples noted. Pt needs assistance with 2 with use of gait belt and rafa steady. L upper dialysis shunt / good bruit audible and thrill palpable. Dialysis today. TCU placement is pending. Pt did not void this shift. O2 sat 94% on room air.

## 2022-07-13 NOTE — TELEPHONE ENCOUNTER
Patient scheduled for a 2 week RN visit at  today but is currently inpatient.Called [facility] and spoke to patients current RN. Gave verbal order from Dr. Boles to remove monica.

## 2022-07-13 NOTE — PROGRESS NOTES
"[facility]    Medicine Progress Note - Hospitalist Service    Date of Admission:  6/24/2022    Assessment & Plan        Shay Jimenez is a very pleasant 57-year-old male with past medical history significant for end-stage renal disease, on hemodialysis, chronic peripheral neuropathy affecting his feet, history of hypertension no longer on meds, hyperlipidemia, obesity, obstructive sleep apnea, GERD and depression, among other medical problems, who presented 6/24/22 for evaluation of worsening upper back pain with associated generalized weakness and falls.  He was found to have abnormal findings on thoracic MRI concerning for diskitis with osteomyelitis with also some findings of a compressive myelopathy.      Upper back pain  Generalized weakness  Falls   Epidural phlegmon/abscess   Compressive myelopathy  S/P C6-T6 fusion/T2-3 decompression  *back pain for 6 months. CT chest 2/2022 showed concern for upper thoracic spine diskitis vs osteomyelitis. Unclear follow up from that time  *in weeks prior to admission, MRI showed possible thoracic compression fracture.   *developed progressively weak legs with frequent falls.  *on admission imaging with T2-T3 diskitis, osteomyelitis and compressive myelopathy.        appreciate Neurosurgery care    follow blood cultures, they have no growth after 5 days    ID consult requested, I appreciate Dr. Rain's evaluation recommendations    Per her, \"changes could all be from DJD, CRP mildly elevated, no fever/chills, slow intermittent progression, I am not convinced this is infection but it still could be, he has had neg. PPD in past and no TB risk factors or contacts.\"     routine gram stain culture, fungal stain and culture, AFB stain and culture and pathology sent from OR    Also per Dr. Rain, \"continue on Ancef for now as we wait on the cultures.  If ready for discharge okay to discharge off antibiotics.\"  She plans to follow-up on the cultures " "for any late growth and start antimicrobials if/when needed.    Tissue culture has gram-positive bacilli isolated in broth only, resembling diphtheroids on day 5 of incubation; ID notified of this result.  Per Dr. Rain, \"GPB resembling diphtheroid 5 days later growth in broth only is a contaminate, no changes to plan for discharge off antibiotics.\"    Oxycodone \"didn't work at all\" for patient, was discontinued. Tylenol and oral Dilaudid available as needed for pain.     pain is better controlled, he is working with PT, he  is looking forward to go to ARU to get back his strength.    1 blood culture came back positive for gram-positive cocci on 7/6, he has been having regular blood cultures since 7/2, possibly this is a contaminant, 2 set of blood cultures ordered for 7/6.  7/5,  1 set positive for staph epidermidis, possibly contaminant, repeat blood cultures from 7/6 is negative.   on amoxicillin 500 mg daily for 4 weeks once IV antibiotics are discontinued.  Cefazolin discontinued 7/12, complete 4 weeks of amoxicillin.    Currently pending placement to ARU .  I discussed with charge nurse, patient's blood pressure would not improve then there is, he is able to function at this blood pressure, if ARU was not able to accept the patient at this blood pressure we should look into TCU options.  Patient has been waiting for ARU placement for last 6 days.    End-stage renal disease, on hemodialysis  Hypotension  Hyponatremia  *Typically dialyzes Monday, Wednesday, Friday. Missed 6/24 session due to being in ED      Nephrology consult appreciated    Dialysis per nephrology.    Blood pressures readings are soft.  Per Dr. Carr, \"okay to run and pull fluid with BP in the 70s as long as he is asymptomatic.\"    Continue scheduled midodrine    PTA sevelamer    Patient is stable on low blood pressures, he is already on 10 mg midodrine 3 times a day, he does have mild dizziness with positional changes but he is tolerating PT " "here well.  ARU is concerned about his blood pressures but he is functioning well at this time for blood pressure and he is already on high dose of midodrine, patient is able to tolerate PT at this blood pressures.    Will order bilateral lower extremity Jobst stockings, nephrology did recommend that we could increase midodrine to 20 mg 3 times daily, considering that this not the usual dosage I would recommend nephrology to proceed with that if needed.    Encephalopathy  Gianfranco Day is on gabapentin 300 mg 3 times daily, this is a home medication and dose has been unchanged since he came to the hospital but is higher than the typical maximum dose recommended even for patients on CRRT    Presume encephalopathy is related to buildup of gabapentin metabolites from intradialytic. Gabapentin and Zoloft was on hold, will start with gabapentin  low-dose of 100 mg at bedtime since encephalopathy is improved.  Restarted 7/5.    Monitor signs and symptoms    GERD    Chronic and stable on PPI.    Anemia of chronic kidney disease    Per Dr. Carr, \"increase erythropoietin to 10K with dialysis\"    Follow hemoglobin    Consider transfusion for hemoglobin less than 7 g/dL and symptoms      Hyperlipidemia    Chronic and stable, on statin.    Peripheral neuropathy of the feet  *Follows with Neurology.      Managed in part with duloxetine, continue    Depression    Chronic and stable on Zoloft.    Obesity, class III  Obstructive sleep apnea  *BMI this stay is 43.10.  He does not use CPAP (patient says, \"I am one of those guys who does not tolerate CPAP.\") Encourage diet, lifestyle modifications once above issues addressed.    History of BPH.    Continue Proscar     Diet: Diet  Fluid restriction 1500 ML FLUID  Snacks/Supplements Adult: Nepro Oral Supplement; Between Meals  Combination Diet Regular Diet    DVT Prophylaxis: Pneumatic Compression Devices  Oliveira Catheter: Not present  Central Lines: None  Cardiac Monitoring: " None  Code Status: Full Code      Disposition Plan      Expected Discharge Date: 07/15/2022    Discharge Delays: Placement - TCU  Destination: inpatient rehabilitation facility  Discharge Comments: ARU following, symptomatic B/P's will need TCU most likely          Oksana Spicer MD  Hospitalist Service  [facility]  Securely message with the Vocera Web Console (learn more here)    Clinically Significant Risk Factors Present on Admission                    _______________________________________________________________    Interval History   Patient is resting comfortably, blood pressure is 80 over 50s, he denies any dizziness, he is concerned about his discharge planning, worried while no decision is made yet regarding his disposition.  Did not have any new concerns .    Data reviewed today: I reviewed all medications, new labs and imaging results over the last 24 hours. I personally reviewed no images or EKG's today.    Physical Exam   Vital Signs: Temp: 98  F (36.7  C) Temp src: Oral BP: 118/61 Pulse: 77   Resp: 16 SpO2: 95 % O2 Device: (P) Nasal cannula Oxygen Delivery: (P) 2 LPM  Weight: 319 lbs 10.67 oz     Constitutional: Awake, alert, oriented x3  Respiratory: Clear to auscultation bilaterally, no crackles or wheezing  Cardiovascular: Regular rate and rhythm  GI: Obese, soft, bowel sounds are present  Skin/Integumen: No rash on exposed skin, patient does have a brace  Psych: Mood is appropriate    Data   Recent Labs   Lab 07/13/22  0730 07/12/22  0800 07/11/22  0722 07/08/22  0800 07/07/22  0552 07/06/22  1618   WBC 8.9 9.2 9.8   < > 10.3 10.9   HGB 7.6* 7.7* 8.2*   < > 8.7* 9.3*   MCV 99 101* 102*   < > 107* 106*    227 255   < > 256 225   NA  --   --  123*  --   --  131*   POTASSIUM  --   --  4.4  --  5.5* 4.5   CHLORIDE  --   --  83*  --   --  96   CO2  --   --  22  --   --  27   BUN  --   --  79*  --   --  37*   CR  --   --  10.40*  --   --  5.37*   ANIONGAP  --   --  18*   --   --  8   MAC  --   --  9.4  --   --  9.6   GLC  --   --  89  --   --  119*   ALBUMIN  --   --  3.2*  --   --   --     < > = values in this interval not displayed.     No results found for this or any previous visit (from the past 24 hour(s)).  Medications     heparin (porcine)       - MEDICATION INSTRUCTIONS -         - MEDICATION INSTRUCTIONS for Dialysis Patients -   Does not apply See Admin Instructions     sodium chloride 0.9%  250 mL Intravenous Once in dialysis/CRRT     sodium chloride 0.9%  300 mL Hemodialysis Machine Once     amoxicillin  500 mg Oral Daily with supper     atorvastatin  20 mg Oral At Bedtime     cetirizine  10 mg Oral Daily     doxercalciferol  4 mcg Intravenous Once in dialysis/CRRT     DULoxetine  30 mg Oral Daily     epoetin mar-epbx (RETACRIT) inj ESRD  4,000 Units Intravenous Once in dialysis/CRRT     finasteride  1.25 mg Oral Daily     gabapentin  100 mg Oral TID     heparin  500 Units Hemodialysis Machine OR IV Push Once in dialysis/CRRT     midodrine  10 mg Oral TID w/meals     pantoprazole  40 mg Oral QAM AC     polyethylene glycol  17 g Oral Daily     senna-docusate  1 tablet Oral BID    Or     senna-docusate  2 tablet Oral BID     sertraline  100 mg Oral Daily     sevelamer carbonate  1,600 mg Oral TID w/meals     sodium chloride (PF)  3 mL Intracatheter Q8H

## 2022-07-14 ENCOUNTER — APPOINTMENT (OUTPATIENT)
Dept: PHYSICAL THERAPY | Facility: CLINIC | Age: 57
DRG: 459 | End: 2022-07-14
Payer: MEDICARE

## 2022-07-14 LAB
ERYTHROCYTE [DISTWIDTH] IN BLOOD BY AUTOMATED COUNT: 14.6 % (ref 10–15)
HCT VFR BLD AUTO: 24.1 % (ref 40–53)
HGB BLD-MCNC: 7.6 G/DL (ref 13.3–17.7)
MCH RBC QN AUTO: 31.5 PG (ref 26.5–33)
MCHC RBC AUTO-ENTMCNC: 31.5 G/DL (ref 31.5–36.5)
MCV RBC AUTO: 100 FL (ref 78–100)
PLATELET # BLD AUTO: 269 10E3/UL (ref 150–450)
RBC # BLD AUTO: 2.41 10E6/UL (ref 4.4–5.9)
WBC # BLD AUTO: 7.4 10E3/UL (ref 4–11)

## 2022-07-14 PROCEDURE — 250N000013 HC RX MED GY IP 250 OP 250 PS 637: Performed by: INTERNAL MEDICINE

## 2022-07-14 PROCEDURE — 99233 SBSQ HOSP IP/OBS HIGH 50: CPT | Performed by: INTERNAL MEDICINE

## 2022-07-14 PROCEDURE — 99232 SBSQ HOSP IP/OBS MODERATE 35: CPT | Performed by: INTERNAL MEDICINE

## 2022-07-14 PROCEDURE — 250N000013 HC RX MED GY IP 250 OP 250 PS 637: Performed by: SPECIALIST

## 2022-07-14 PROCEDURE — 85027 COMPLETE CBC AUTOMATED: CPT | Performed by: INTERNAL MEDICINE

## 2022-07-14 PROCEDURE — 97530 THERAPEUTIC ACTIVITIES: CPT | Mod: GP

## 2022-07-14 PROCEDURE — 97116 GAIT TRAINING THERAPY: CPT | Mod: GP

## 2022-07-14 PROCEDURE — 120N000001 HC R&B MED SURG/OB

## 2022-07-14 PROCEDURE — 36415 COLL VENOUS BLD VENIPUNCTURE: CPT | Performed by: INTERNAL MEDICINE

## 2022-07-14 RX ADMIN — ALPRAZOLAM 1 MG: 0.25 TABLET ORAL at 20:31

## 2022-07-14 RX ADMIN — SEVELAMER CARBONATE 1600 MG: 800 TABLET, FILM COATED ORAL at 17:03

## 2022-07-14 RX ADMIN — HYDROMORPHONE HYDROCHLORIDE 4 MG: 2 TABLET ORAL at 04:32

## 2022-07-14 RX ADMIN — CETIRIZINE HYDROCHLORIDE 10 MG: 10 TABLET, FILM COATED ORAL at 10:23

## 2022-07-14 RX ADMIN — DULOXETINE 30 MG: 30 CAPSULE, DELAYED RELEASE ORAL at 10:23

## 2022-07-14 RX ADMIN — SENNOSIDES AND DOCUSATE SODIUM 1 TABLET: 50; 8.6 TABLET ORAL at 10:23

## 2022-07-14 RX ADMIN — ATORVASTATIN CALCIUM 20 MG: 20 TABLET, FILM COATED ORAL at 21:44

## 2022-07-14 RX ADMIN — SERTRALINE HYDROCHLORIDE 100 MG: 100 TABLET ORAL at 10:23

## 2022-07-14 RX ADMIN — AMOXICILLIN 500 MG: 500 CAPSULE ORAL at 17:03

## 2022-07-14 RX ADMIN — FINASTERIDE 1.25 MG: 5 TABLET, FILM COATED ORAL at 10:23

## 2022-07-14 RX ADMIN — MIDODRINE HYDROCHLORIDE 10 MG: 5 TABLET ORAL at 17:03

## 2022-07-14 RX ADMIN — MIDODRINE HYDROCHLORIDE 10 MG: 5 TABLET ORAL at 12:05

## 2022-07-14 RX ADMIN — GABAPENTIN 100 MG: 100 CAPSULE ORAL at 09:32

## 2022-07-14 RX ADMIN — SEVELAMER CARBONATE 1600 MG: 800 TABLET, FILM COATED ORAL at 10:23

## 2022-07-14 RX ADMIN — HYDROMORPHONE HYDROCHLORIDE 4 MG: 2 TABLET ORAL at 20:31

## 2022-07-14 RX ADMIN — SENNOSIDES AND DOCUSATE SODIUM 1 TABLET: 50; 8.6 TABLET ORAL at 21:44

## 2022-07-14 RX ADMIN — MIDODRINE HYDROCHLORIDE 10 MG: 5 TABLET ORAL at 09:31

## 2022-07-14 RX ADMIN — GABAPENTIN 100 MG: 100 CAPSULE ORAL at 21:43

## 2022-07-14 RX ADMIN — GABAPENTIN 100 MG: 100 CAPSULE ORAL at 17:03

## 2022-07-14 RX ADMIN — PANTOPRAZOLE SODIUM 40 MG: 40 TABLET, DELAYED RELEASE ORAL at 06:50

## 2022-07-14 ASSESSMENT — ACTIVITIES OF DAILY LIVING (ADL)
ADLS_ACUITY_SCORE: 49

## 2022-07-14 NOTE — PROGRESS NOTES
Arrived on the floor from Dialysis @ 1600. A/O x4. BP low, schedule Midodrine given earlier. VS WNL. On RA. Urinal@ bedside. BM x1. Refused senna. Dialysis Shunt on RA. PIV SL. Staples pulled out by day shift before dialysis. Got a report from outgoing nurse to apply a sterile strips on the incision. Sterile strips applied. Dressing CDI. Pain managed with prn dilaudid, xanax and robaxin.

## 2022-07-14 NOTE — PLAN OF CARE
Goal Outcome Evaluation:    Plan of Care Reviewed With: patient          Outcome Evaluation: Pt states appetite is better. Has had his son bring in some food at times. OK wtih Balbirro, will just send 1/day instead of 2.

## 2022-07-14 NOTE — PROGRESS NOTES
Care Management Follow Up    Length of Stay (days): 20    Expected Discharge Date: 07/15/2022     Concerns to be Addressed: discharge planning     Patient plan of care discussed at interdisciplinary rounds: Yes    Anticipated Discharge Disposition: Acute Rehab, Transitional Care     Anticipated Discharge Services: Transportation Services  Anticipated Discharge DME:      Patient/family educated on Medicare website which has current facility and service quality ratings:    Education Provided on the Discharge Plan:    Patient/Family in Agreement with the Plan: yes    Referrals Placed by CM/SW: Post Acute Facilities  Private pay costs discussed: Not applicable    Additional Information:  SHAKIRA Lutz, [facility] provider regarding pt's possible placement at ARU. SHAKIRA consulted with RNCC [facility] who reports she did speak with [facility] regarding this pt. It was reported that a doctor-to-doctor consult will take place.       JULISSA Villaseñor

## 2022-07-14 NOTE — PLAN OF CARE
A&Ox4. CMS/ neuro intact. Still hypotensive, denies sx. Walked w/ PT today, tolerated well. Pain controlled; minimal.  Incision SOSA w/ steri strips. Tolerating diet. Plan for discharge to ARU tomorrow following hemodialysis tx.

## 2022-07-14 NOTE — PROGRESS NOTES
CLINICAL NUTRITION SERVICES - REASSESSMENT NOTE    Recommendations Ordered by Registered Dietitian (RD):   Send Nepro just 1x daily     MALNUTRITION  % Weight Loss:  None noted  % Intake:  <75% for > 7 days (moderate malnutrition)  Subcutaneous Fat Loss:  None observed  Muscle Loss:  None observed  Fluid Retention:  Dialysis     Malnutrition Diagnosis: Patient does not meet two of the above criteria necessary for diagnosing malnutrition     EVALUATION OF PROGRESS TOWARD GOALS   Diet: Regular diet + 1500 mL FR, Nepro BID  Intake/Tolerance:   - Pt seen in room. He states his appetite is getting better. He actually has had his son bring food in a few times, which is why he does not always order meals through room service.   - He tried the Nepro today, and actually thinks its pretty good, but does not need it 2x daily.   - tolerating 100% of the trays recorded.     - Labs reviewed.   - HD: MWF  - Stooling: BM x1 on 7/11  - Weight is 145 kg - fluid fluctuations r/t dialysis   - Meds: miralax, senokot, renvela     ASSESSED NUTRITION NEEDS:  Dosing Weight: 99 kg (adjusted, based on lowest wt this admit)  Estimated Energy Needs: 3547-2227 kcals (25-30 Kcal/Kg)  Justification: related to BMI and HD  Estimated Protein Needs: 119-149 grams protein (1.2-1.5 g pro/Kg)  Justification: dialysis, preservation of LBM  Estimated Fluid Needs: per provider    Previous Goals:   Patient to consume % of nutritionally adequate meal trays TID, or the equivalent with supplements/snacks  Evaluation: Met - though intakes are not consistently recorded     Previous Nutrition Diagnosis:   Inadequate oral intake related to increased needs secondary to HD, suspected low intake as evidenced by only 3 meals ordered in past week  Evaluation: Completed    MALNUTRITION  % Weight Loss:  None noted  % Intake:  <75% for > 7 days (moderate malnutrition)  Subcutaneous Fat Loss:  None observed  Muscle Loss:  None observed  Fluid Retention:  Dialysis      Malnutrition Diagnosis: Patient does not meet two of the above criteria necessary for diagnosing malnutrition    CURRENT NUTRITION DIAGNOSIS  No nutrition diagnosis identified at this time     INTERVENTIONS  Recommendations / Nutrition Prescription  Continue regular diet w/ FR  Send Nepro just 1x daily    Implementation  Medical food supplement - updated schedule     Goals  Intake of at least 75% for 2 meals/day + 1 supplement.     MONITORING AND EVALUATION:  Progress towards goals will be monitored and evaluated per protocol and Practice Guidelines    Inocencia Mcginnis RD, LD  [facility], [facility], [facility]  Pager: [phone]  Weekend Pager: [phone]

## 2022-07-14 NOTE — PLAN OF CARE
Goal Outcome Evaluation:      [id number]        A&Ox4. Pt continues to have soft Bps. Remains asymptomatic. A2  Irina steady. CMS intact. Incision to back has adhesive strips. PRN dilaudid and Atarax for pain.

## 2022-07-14 NOTE — PROGRESS NOTES
"[facility]    Medicine Progress Note - Hospitalist Service    Date of Admission:  6/24/2022    Assessment & Plan        Shay Jimenez is a very pleasant 57-year-old male with past medical history significant for end-stage renal disease, on hemodialysis, chronic peripheral neuropathy affecting his feet, history of hypertension no longer on meds, hyperlipidemia, obesity, obstructive sleep apnea, GERD and depression, among other medical problems, who presented 6/24/22 for evaluation of worsening upper back pain with associated generalized weakness and falls.  He was found to have abnormal findings on thoracic MRI concerning for diskitis with osteomyelitis with also some findings of a compressive myelopathy.      Upper back pain  Generalized weakness  Falls   Epidural phlegmon/abscess   Compressive myelopathy  S/P C6-T6 fusion/T2-3 decompression  *back pain for 6 months. CT chest 2/2022 showed concern for upper thoracic spine diskitis vs osteomyelitis. Unclear follow up from that time  *in weeks prior to admission, MRI showed possible thoracic compression fracture.   *developed progressively weak legs with frequent falls.  *on admission imaging with T2-T3 diskitis, osteomyelitis and compressive myelopathy.        appreciate Neurosurgery care    follow blood cultures, they have no growth after 5 days    ID consult requested, I appreciate Dr. Rain's evaluation recommendations    Per her, \"changes could all be from DJD, CRP mildly elevated, no fever/chills, slow intermittent progression, I am not convinced this is infection but it still could be, he has had neg. PPD in past and no TB risk factors or contacts.\"     routine gram stain culture, fungal stain and culture, AFB stain and culture and pathology sent from OR    Also per Dr. Rain, \"continue on Ancef for now as we wait on the cultures.  If ready for discharge okay to discharge off antibiotics.\"  She plans to follow-up on the cultures " "for any late growth and start antimicrobials if/when needed.    Tissue culture has gram-positive bacilli isolated in broth only, resembling diphtheroids on day 5 of incubation; ID notified of this result.  Per Dr. Rain, \"GPB resembling diphtheroid 5 days later growth in broth only is a contaminate, no changes to plan for discharge off antibiotics.\"    Oxycodone \"didn't work at all\" for patient, was discontinued. Tylenol and oral Dilaudid available as needed for pain.     pain is better controlled, he is working with PT, he  is looking forward to go to ARU to get back his strength.    1 blood culture came back positive for gram-positive cocci on 7/6, he has been having regular blood cultures since 7/2, possibly this is a contaminant, 2 set of blood cultures ordered for 7/6.  7/5,  1 set positive for staph epidermidis, possibly contaminant, repeat blood cultures from 7/6 is negative.   on amoxicillin 500 mg daily for 4 weeks once IV antibiotics are discontinued.  Cefazolin discontinued 7/12, complete 4 weeks of amoxicillin.    Currently pending placement to ARU .  I discussed with charge nurse, patient's blood pressure would not improve then there is, he is able to function at this blood pressure, if ARU was not able to accept the patient at this blood pressure we should look into TCU options.  Patient has been waiting for ARU placement for last 7 days.    Discussed with ARU liaison, will try to communicate with ARU physician today so that we can have a final determination whether patient could go there versus try TCU.  Addendum: Had face-to-face with the ARU physician, he is concerned about the blood pressures, I tried to convey to him that patient already was on midodrine and he had low blood pressures prior to all this, at this point patient is functioning well with this blood pressure, I did request them to give the final opinion whether they would take the patient at ARU at this blood pressure or not, this " "particular patient has been waiting for 7 days for disposition without any changes in medications to go to ARU.  If ARU is unable to or is uncomfortable taking this patient then we should look into transitional care options, patient remaining in the hospital without any active medication changes for rehab is getting deconditioned every day.  End-stage renal disease, on hemodialysis  Hypotension  Hyponatremia  *Typically dialyzes Monday, Wednesday, Friday. Missed 6/24 session due to being in ED      Nephrology consult appreciated    Dialysis per nephrology.    Blood pressures readings are soft.  Per Dr. Carr, \"okay to run and pull fluid with BP in the 70s as long as he is asymptomatic.\"    Continue scheduled midodrine    PTA sevelamer    Patient is stable on low blood pressures, he is already on 10 mg midodrine 3 times a day, he does have mild dizziness with positional changes but he is tolerating PT here well.  ARU is concerned about his blood pressures but he is functioning well at this time for blood pressure and he is already on high dose of midodrine, patient is able to tolerate PT at this blood pressures.    Added bilateral lower extremity Jobst stockings, nephrology did recommend that we could increase midodrine to 20 mg 3 times daily, considering that this not the usual dosage I would recommend nephrology to proceed with that if needed.    Encephalopathy  Gianfranco Day is on gabapentin 300 mg 3 times daily, this is a home medication and dose has been unchanged since he came to the hospital but is higher than the typical maximum dose recommended even for patients on CRRT    Presume encephalopathy is related to buildup of gabapentin metabolites from intradialytic. Gabapentin and Zoloft was on hold, started with gabapentin  low-dose of 100 mg at bedtime since encephalopathy is improved.  Restarted 7/5.    Monitor signs and symptoms    GERD    Chronic and stable on PPI.    Anemia of chronic kidney " "disease    Per Dr. Carr, \"increase erythropoietin to 10K with dialysis\"    Follow hemoglobin    Consider transfusion for hemoglobin less than 7 g/dL and symptoms      Hyperlipidemia    Chronic and stable, on statin.    Peripheral neuropathy of the feet  *Follows with Neurology.      Managed in part with duloxetine, continue    Depression    Chronic and stable on Zoloft.    Obesity, class III  Obstructive sleep apnea  *BMI this stay is 43.10.  He does not use CPAP (patient says, \"I am one of those guys who does not tolerate CPAP.\") Encourage diet, lifestyle modifications once above issues addressed.    History of BPH.    Continue Proscar     Diet: Diet  Fluid restriction 1500 ML FLUID  Snacks/Supplements Adult: Nepro Oral Supplement; Between Meals  Combination Diet Regular Diet    DVT Prophylaxis: Pneumatic Compression Devices  Oliveira Catheter: Not present  Central Lines: None  Cardiac Monitoring: None  Code Status: Full Code      Disposition Plan      Expected Discharge Date: 07/15/2022    Discharge Delays: Placement - TCU  Destination: inpatient rehabilitation facility  Discharge Comments: ARU following, symptomatic B/P's will need TCU most likely          Oksana Spicer MD  Hospitalist Service  [facility]  Securely message with the Vocera Web Console (learn more here)    Clinically Significant Risk Factors Present on Admission                    _______________________________________________________________    Interval History   Staples were removed yesterday, patient is resting comfortably, he is anxious regarding a decision to go to ARU versus TCU.  Denies any pain his blood pressures are 84/48 but he is denying any dizziness or other symptoms.    Data reviewed today: I reviewed all medications, new labs and imaging results over the last 24 hours. I personally reviewed no images or EKG's today.    Physical Exam   Vital Signs: Temp: 97.1  F (36.2  C) Temp src: Axillary BP: (!) 84/48 " Pulse: 81   Resp: 16 SpO2: 96 % O2 Device: None (Room air) Oxygen Delivery: 2 LPM  Weight: 319 lbs 10.67 oz     Constitutional: Awake, alert, oriented x3  Respiratory: Clear to auscultation bilaterally, no crackles or wheezing  Cardiovascular: Regular rate and rhythm  GI: Obese, soft, bowel sounds are present  Skin/Integumen: No rash on exposed skin, patient does have a brace  Psych: Mood is appropriate    Data   Recent Labs   Lab 07/14/22  0638 07/13/22  0730 07/12/22  0800 07/11/22  0722   WBC 7.4 8.9 9.2 9.8   HGB 7.6* 7.6* 7.7* 8.2*    99 101* 102*    264 227 255   NA  --   --   --  123*   POTASSIUM  --   --   --  4.4   CHLORIDE  --   --   --  83*   CO2  --   --   --  22   BUN  --   --   --  79*   CR  --   --   --  10.40*   ANIONGAP  --   --   --  18*   MAC  --   --   --  9.4   GLC  --   --   --  89   ALBUMIN  --   --   --  3.2*     No results found for this or any previous visit (from the past 24 hour(s)).  Medications       - MEDICATION INSTRUCTIONS for Dialysis Patients -   Does not apply See Admin Instructions     amoxicillin  500 mg Oral Daily with supper     atorvastatin  20 mg Oral At Bedtime     cetirizine  10 mg Oral Daily     DULoxetine  30 mg Oral Daily     finasteride  1.25 mg Oral Daily     gabapentin  100 mg Oral TID     midodrine  10 mg Oral TID w/meals     pantoprazole  40 mg Oral QAM AC     polyethylene glycol  17 g Oral Daily     senna-docusate  1 tablet Oral BID    Or     senna-docusate  2 tablet Oral BID     sertraline  100 mg Oral Daily     sevelamer carbonate  1,600 mg Oral TID w/meals     sodium chloride (PF)  3 mL Intracatheter Q8H

## 2022-07-14 NOTE — PROGRESS NOTES
Assessment and Plan:   ESRD: HD MWF. Orders reviewed and written for tomorrow.             Interval History:   Concern for T2-T3  discitis/vertebral osteomyelitis,  S/p: 6/27,Cervical 6 to Thoracic 6 Fusion and Thoracic 2-3 Decompression  Alcoholism:   GINI                  Review of Systems:   Ambulating with walker. Using neck and chest brace. No problems on dialysis.           Medications:       - MEDICATION INSTRUCTIONS for Dialysis Patients -   Does not apply See Admin Instructions     amoxicillin  500 mg Oral Daily with supper     atorvastatin  20 mg Oral At Bedtime     cetirizine  10 mg Oral Daily     DULoxetine  30 mg Oral Daily     finasteride  1.25 mg Oral Daily     gabapentin  100 mg Oral TID     midodrine  10 mg Oral TID w/meals     pantoprazole  40 mg Oral QAM AC     polyethylene glycol  17 g Oral Daily     senna-docusate  1 tablet Oral BID    Or     senna-docusate  2 tablet Oral BID     sertraline  100 mg Oral Daily     sevelamer carbonate  1,600 mg Oral TID w/meals     sodium chloride (PF)  3 mL Intracatheter Q8H         Current active medications and PTA medications reviewed, see medication list for details.            Physical Exam:   Vitals were reviewed  Patient Vitals for the past 24 hrs:   BP Temp Temp src Pulse Resp SpO2   07/14/22 0928 (!) 84/48 -- -- -- -- --   07/14/22 0859 (!) 77/48 97.1  F (36.2  C) Axillary 81 16 96 %   07/14/22 0423 92/59 97.5  F (36.4  C) Oral 79 16 96 %   07/14/22 0035 (!) 86/58 97.7  F (36.5  C) Oral 77 16 95 %   07/13/22 1718 94/64 -- -- 77 -- --   07/13/22 1700 (!) 85/58 -- -- 78 -- --   07/13/22 1604 (!) 74/46 97.8  F (36.6  C) Oral 96 -- 94 %   07/13/22 1540 (!) 81/50 (!) 37.4  F (3  C) Oral 93 16 96 %   07/13/22 1535 (!) 79/41 -- -- 90 -- --   07/13/22 1530 (!) 63/52 -- -- 87 -- --   07/13/22 1500 93/57 -- -- 86 -- --   07/13/22 1445 (!) 74/40 -- -- 90 -- --   07/13/22 1430 91/68 -- -- 88 -- --   07/13/22 1415 115/76 -- -- 89 -- --   07/13/22 1400 101/71  -- -- 86 -- --   22 1345 98/70 -- -- 88 -- --   22 1330 98/88 -- -- 89 -- --   22 1315 110/71 -- -- 82 -- --   22 1300 118/61 -- -- 77 -- --   22 1245 94/59 -- -- 77 -- --   22 1240 116/54 98  F (36.7  C) Oral 77 16 95 %   22 1135 99/54 -- -- -- -- --       Temp:  [37.4  F (3  C)-98  F (36.7  C)] 97.1  F (36.2  C)  Pulse:  [77-96] 81  Resp:  [16] 16  BP: ()/(40-88) 84/48  SpO2:  [94 %-96 %] 96 %    Temperatures:  Current - Temp: 97.1  F (36.2  C); Max - Temp  Av.6  F (30.9  C)  Min: 37.4  F (3  C)  Max: 98  F (36.7  C)  Respiration range: Resp  Av  Min: 16  Max: 16  Pulse range: Pulse  Av.4  Min: 77  Max: 96  Blood pressure range: Systolic (24hrs), Av , Min:63 , Max:118   ; Diastolic (24hrs), Av, Min:40, Max:88    Pulse oximetry range: SpO2  Av.3 %  Min: 94 %  Max: 96 %    I/O last 3 completed shifts:  In: 0   Out: 3000 [Other:3000]      Intake/Output Summary (Last 24 hours) at 2022 1058  Last data filed at 2022 1540  Gross per 24 hour   Intake 0 ml   Output 3000 ml   Net -3000 ml       Alert, sitting up in chair  Neck and chest brace in place  LAF with good thrill       Wt Readings from Last 4 Encounters:   22 145 kg (319 lb 10.7 oz)          Data:          Lab Results   Component Value Date     2022     2022     2022    Lab Results   Component Value Date    CHLORIDE 83 2022    CHLORIDE 96 2022    CHLORIDE 94 2022      Lab Results   Component Value Date    BUN 79 2022    BUN 37 2022    BUN 83 2022      Lab Results   Component Value Date    POTASSIUM 4.4 2022    POTASSIUM 5.5 2022    POTASSIUM 4.5 2022    Lab Results   Component Value Date    CO2 22 2022    CO2 27 2022    CO2 23 2022    Lab Results   Component Value Date    CR 10.40 2022    CR 5.37 2022    CR 11.70 2022        Recent Labs   Lab Test  07/14/22  0638 07/13/22  0730 07/12/22  0800   WBC 7.4 8.9 9.2   HGB 7.6* 7.6* 7.7*   HCT 24.1* 23.6* 24.0*    99 101*    264 227     Recent Labs   Lab Test 06/24/22  0818   AST 51*   ALT 55   ALKPHOS 152*   BILITOTAL 0.6       Recent Labs   Lab Test 06/28/22  0420   MAG 2.1     Recent Labs   Lab Test 07/11/22  0722 06/29/22  0611 06/28/22  0420   PHOS 8.9* 8.6* 5.8*     Recent Labs   Lab Test 07/11/22  0722 07/06/22  1618 07/04/22  0715   MAC 9.4 9.6 9.5       Lab Results   Component Value Date    MAC 9.4 07/11/2022     Lab Results   Component Value Date    WBC 7.4 07/14/2022    HGB 7.6 (L) 07/14/2022    HCT 24.1 (L) 07/14/2022     07/14/2022     07/14/2022     Lab Results   Component Value Date     (L) 07/11/2022    POTASSIUM 4.4 07/11/2022    CHLORIDE 83 (L) 07/11/2022    CO2 22 07/11/2022    GLC 89 07/11/2022     Lab Results   Component Value Date    BUN 79 (H) 07/11/2022    CR 10.40 (H) 07/11/2022     Lab Results   Component Value Date    MAG 2.1 06/28/2022     Lab Results   Component Value Date    PHOS 8.9 (H) 07/11/2022       Creatinine   Date Value Ref Range Status   07/11/2022 10.40 (H) 0.66 - 1.25 mg/dL Final   07/06/2022 5.37 (H) 0.66 - 1.25 mg/dL Final   07/04/2022 11.70 (H) 0.66 - 1.25 mg/dL Final   07/03/2022 9.90 (H) 0.66 - 1.25 mg/dL Final   07/01/2022 8.60 (H) 0.66 - 1.25 mg/dL Final   06/29/2022 9.60 (H) 0.66 - 1.25 mg/dL Final       Attestation:  I have reviewed today's vital signs, notes, medications, labs and imaging.     Aydin French MD

## 2022-07-14 NOTE — PROGRESS NOTES
Care Management Follow Up    Length of Stay (days): 20    Expected Discharge Date: 07/15/2022     Concerns to be Addressed: discharge planning     Patient plan of care discussed at interdisciplinary rounds: Yes    Anticipated Discharge Disposition: Acute Rehab, Transitional Care     Anticipated Discharge Services: Transportation Services  Anticipated Discharge DME:      Patient/family educated on Medicare website which has current facility and service quality ratings:    Education Provided on the Discharge Plan:    Patient/Family in Agreement with the Plan: yes    Referrals Placed by CM/SW: Post Acute Facilities  Private pay costs discussed: Not applicable    Additional Information:  Writer spoke with Sherron Patel at [facility] today regarding acceptance to ARU.  Sherron will arrange a doctor to doctor phone call in regards to the low BPs.  Will await result of this conversation before making any further plans.  Sherron agrees the patient is in need of ARU and will benefit from ARU vs TCU.  Per discussion with the ARU Dr. They are concerned patient will not tolerate 3 hours of therapy per day with the low BP's although the patient is currently able to tolerate his PT.    1541: ARU able to accept patient but will need to rearrange the HD schedule.  Patient is currently on a M, W, F schedule and will need to change to T, TH, SA.  Writer paged Nephrologist to plan the HD for discharge to [facility]..      Received call from Dr. French, nephrologist.  He wants Shay to have a full run at [facility] on Friday 7/15 and a short run at [facility] [place], then resume full runs on Tuesday, continueing on a TU, TH, Sat schedule.   Writer spoke with sherron by phone call and let him know of the nephrologists plan for HD.  Writer also let charge YUDI Childs know and she called [facility] HD center and [facility] regards to having earliest HD time tomorrow so patient can discharge by 2 PM.  Writer arranged transport via W/C for 2 PM 7/15.          Shirley Napoles RN

## 2022-07-15 ENCOUNTER — HOSPITAL ENCOUNTER (INPATIENT)
Facility: CLINIC | Age: 57
LOS: 15 days | Discharge: HOME HEALTH | DRG: 052 | End: 2022-07-30
Attending: PHYSICAL MEDICINE & REHABILITATION | Admitting: PHYSICAL MEDICINE & REHABILITATION
Payer: MEDICARE

## 2022-07-15 VITALS
RESPIRATION RATE: 20 BRPM | DIASTOLIC BLOOD PRESSURE: 47 MMHG | OXYGEN SATURATION: 96 % | TEMPERATURE: 98.3 F | SYSTOLIC BLOOD PRESSURE: 85 MMHG | WEIGHT: 315 LBS | BODY MASS INDEX: 41.75 KG/M2 | HEART RATE: 83 BPM | HEIGHT: 73 IN

## 2022-07-15 DIAGNOSIS — M46.40 DISCITIS, UNSPECIFIED SPINAL REGION: ICD-10-CM

## 2022-07-15 DIAGNOSIS — G47.33 OSA (OBSTRUCTIVE SLEEP APNEA): ICD-10-CM

## 2022-07-15 DIAGNOSIS — G62.9 PERIPHERAL POLYNEUROPATHY: Primary | ICD-10-CM

## 2022-07-15 DIAGNOSIS — K59.2 NEUROGENIC BOWEL: ICD-10-CM

## 2022-07-15 DIAGNOSIS — G06.1 ABSCESS IN EPIDURAL SPACE OF SPINE: ICD-10-CM

## 2022-07-15 DIAGNOSIS — M47.14 DEGENERATIVE ARTHRITIS OF THORACIC SPINE WITH CORD COMPRESSION: ICD-10-CM

## 2022-07-15 LAB
ANION GAP SERPL CALCULATED.3IONS-SCNC: 8 MMOL/L (ref 3–14)
BUN SERPL-MCNC: 27 MG/DL (ref 7–30)
CALCIUM SERPL-MCNC: 8.7 MG/DL (ref 8.5–10.1)
CHLORIDE BLD-SCNC: 93 MMOL/L (ref 94–109)
CO2 SERPL-SCNC: 28 MMOL/L (ref 20–32)
CREAT SERPL-MCNC: 4.85 MG/DL (ref 0.66–1.25)
ERYTHROCYTE [DISTWIDTH] IN BLOOD BY AUTOMATED COUNT: 14.2 % (ref 10–15)
GFR SERPL CREATININE-BSD FRML MDRD: 13 ML/MIN/1.73M2
GLUCOSE BLD-MCNC: 112 MG/DL (ref 70–99)
HCT VFR BLD AUTO: 24.8 % (ref 40–53)
HGB BLD-MCNC: 7.9 G/DL (ref 13.3–17.7)
MCH RBC QN AUTO: 32.1 PG (ref 26.5–33)
MCHC RBC AUTO-ENTMCNC: 31.9 G/DL (ref 31.5–36.5)
MCV RBC AUTO: 101 FL (ref 78–100)
PLATELET # BLD AUTO: 282 10E3/UL (ref 150–450)
POTASSIUM BLD-SCNC: 3.5 MMOL/L (ref 3.4–5.3)
RBC # BLD AUTO: 2.46 10E6/UL (ref 4.4–5.9)
SODIUM SERPL-SCNC: 129 MMOL/L (ref 133–144)
WBC # BLD AUTO: 7.1 10E3/UL (ref 4–11)

## 2022-07-15 PROCEDURE — 80048 BASIC METABOLIC PNL TOTAL CA: CPT

## 2022-07-15 PROCEDURE — 250N000013 HC RX MED GY IP 250 OP 250 PS 637: Performed by: INTERNAL MEDICINE

## 2022-07-15 PROCEDURE — 250N000011 HC RX IP 250 OP 636: Performed by: INTERNAL MEDICINE

## 2022-07-15 PROCEDURE — 36415 COLL VENOUS BLD VENIPUNCTURE: CPT

## 2022-07-15 PROCEDURE — 258N000003 HC RX IP 258 OP 636: Performed by: INTERNAL MEDICINE

## 2022-07-15 PROCEDURE — 99223 1ST HOSP IP/OBS HIGH 75: CPT | Mod: 24 | Performed by: PHYSICAL MEDICINE & REHABILITATION

## 2022-07-15 PROCEDURE — 36415 COLL VENOUS BLD VENIPUNCTURE: CPT | Performed by: INTERNAL MEDICINE

## 2022-07-15 PROCEDURE — 128N000003 HC R&B REHAB

## 2022-07-15 PROCEDURE — 90935 HEMODIALYSIS ONE EVALUATION: CPT | Performed by: INTERNAL MEDICINE

## 2022-07-15 PROCEDURE — 99239 HOSP IP/OBS DSCHRG MGMT >30: CPT | Performed by: INTERNAL MEDICINE

## 2022-07-15 PROCEDURE — 250N000013 HC RX MED GY IP 250 OP 250 PS 637: Performed by: PHYSICIAN ASSISTANT

## 2022-07-15 PROCEDURE — 85027 COMPLETE CBC AUTOMATED: CPT | Performed by: INTERNAL MEDICINE

## 2022-07-15 PROCEDURE — 90937 HEMODIALYSIS REPEATED EVAL: CPT

## 2022-07-15 PROCEDURE — 634N000001 HC RX 634: Performed by: INTERNAL MEDICINE

## 2022-07-15 RX ORDER — CYCLOBENZAPRINE HCL 5 MG
10 TABLET ORAL 3 TIMES DAILY PRN
Status: DISCONTINUED | OUTPATIENT
Start: 2022-07-15 | End: 2022-07-30 | Stop reason: HOSPADM

## 2022-07-15 RX ORDER — HYDROXYZINE HYDROCHLORIDE 25 MG/1
25 TABLET, FILM COATED ORAL 3 TIMES DAILY PRN
Status: DISCONTINUED | OUTPATIENT
Start: 2022-07-15 | End: 2022-07-30 | Stop reason: HOSPADM

## 2022-07-15 RX ORDER — AMOXICILLIN 500 MG/1
500 CAPSULE ORAL
Status: ON HOLD | DISCHARGE
Start: 2022-07-15 | End: 2022-07-29

## 2022-07-15 RX ORDER — AMOXICILLIN 500 MG/1
500 CAPSULE ORAL
Status: DISCONTINUED | OUTPATIENT
Start: 2022-07-15 | End: 2022-07-30 | Stop reason: HOSPADM

## 2022-07-15 RX ORDER — HYDROMORPHONE HYDROCHLORIDE 2 MG/1
2-4 TABLET ORAL
Refills: 0 | Status: ON HOLD | DISCHARGE
Start: 2022-07-15 | End: 2022-07-29

## 2022-07-15 RX ORDER — HEPARIN SODIUM 1000 [USP'U]/ML
500 INJECTION, SOLUTION INTRAVENOUS; SUBCUTANEOUS CONTINUOUS
Status: DISCONTINUED | OUTPATIENT
Start: 2022-07-15 | End: 2022-07-15 | Stop reason: HOSPADM

## 2022-07-15 RX ORDER — CETIRIZINE HYDROCHLORIDE 10 MG/1
10 TABLET ORAL DAILY
Status: ON HOLD | DISCHARGE
Start: 2022-07-15 | End: 2022-12-29

## 2022-07-15 RX ORDER — CALCIUM CARBONATE 500 MG/1
2 TABLET, CHEWABLE ORAL 3 TIMES DAILY PRN
Status: ON HOLD | DISCHARGE
Start: 2022-07-15 | End: 2022-12-29

## 2022-07-15 RX ORDER — NALOXONE HYDROCHLORIDE 0.4 MG/ML
0.4 INJECTION, SOLUTION INTRAMUSCULAR; INTRAVENOUS; SUBCUTANEOUS
Status: DISCONTINUED | OUTPATIENT
Start: 2022-07-15 | End: 2022-07-30 | Stop reason: HOSPADM

## 2022-07-15 RX ORDER — SERTRALINE HYDROCHLORIDE 100 MG/1
100 TABLET, FILM COATED ORAL DAILY
Status: DISCONTINUED | OUTPATIENT
Start: 2022-07-16 | End: 2022-07-30 | Stop reason: HOSPADM

## 2022-07-15 RX ORDER — PANTOPRAZOLE SODIUM 40 MG/1
40 TABLET, DELAYED RELEASE ORAL
Status: DISCONTINUED | OUTPATIENT
Start: 2022-07-16 | End: 2022-07-25

## 2022-07-15 RX ORDER — FINASTERIDE 5 MG/1
1.25 TABLET, FILM COATED ORAL DAILY
Status: DISCONTINUED | OUTPATIENT
Start: 2022-07-16 | End: 2022-07-30 | Stop reason: HOSPADM

## 2022-07-15 RX ORDER — ALPRAZOLAM 1 MG
1 TABLET ORAL 2 TIMES DAILY PRN
Status: DISCONTINUED | OUTPATIENT
Start: 2022-07-15 | End: 2022-07-30 | Stop reason: HOSPADM

## 2022-07-15 RX ORDER — NALOXONE HYDROCHLORIDE 0.4 MG/ML
0.2 INJECTION, SOLUTION INTRAMUSCULAR; INTRAVENOUS; SUBCUTANEOUS
Status: DISCONTINUED | OUTPATIENT
Start: 2022-07-15 | End: 2022-07-30 | Stop reason: HOSPADM

## 2022-07-15 RX ORDER — UREA 10 %
500 LOTION (ML) TOPICAL DAILY
Status: DISCONTINUED | OUTPATIENT
Start: 2022-07-16 | End: 2022-07-30 | Stop reason: HOSPADM

## 2022-07-15 RX ORDER — CETIRIZINE HYDROCHLORIDE 5 MG/1
10 TABLET ORAL DAILY
Status: DISCONTINUED | OUTPATIENT
Start: 2022-07-16 | End: 2022-07-30 | Stop reason: HOSPADM

## 2022-07-15 RX ORDER — SEVELAMER CARBONATE 800 MG/1
1600 TABLET, FILM COATED ORAL
Status: ON HOLD | DISCHARGE
Start: 2022-07-15 | End: 2022-12-29

## 2022-07-15 RX ORDER — CALCIUM CARBONATE 500 MG/1
1000 TABLET, CHEWABLE ORAL 3 TIMES DAILY PRN
Status: DISCONTINUED | OUTPATIENT
Start: 2022-07-15 | End: 2022-07-30 | Stop reason: HOSPADM

## 2022-07-15 RX ORDER — MIDODRINE HYDROCHLORIDE 10 MG/1
10 TABLET ORAL
Status: ON HOLD | DISCHARGE
Start: 2022-07-15 | End: 2022-12-29

## 2022-07-15 RX ORDER — MIDODRINE HYDROCHLORIDE 5 MG/1
10 TABLET ORAL
Status: DISCONTINUED | OUTPATIENT
Start: 2022-07-15 | End: 2022-07-30 | Stop reason: HOSPADM

## 2022-07-15 RX ORDER — ALBUMIN (HUMAN) 12.5 G/50ML
50 SOLUTION INTRAVENOUS
Status: DISCONTINUED | OUTPATIENT
Start: 2022-07-15 | End: 2022-07-15 | Stop reason: HOSPADM

## 2022-07-15 RX ORDER — POLYETHYLENE GLYCOL 3350 17 G/17G
17 POWDER, FOR SOLUTION ORAL DAILY
Qty: 510 G | Status: ON HOLD | DISCHARGE
Start: 2022-07-15 | End: 2022-07-29

## 2022-07-15 RX ORDER — ACETAMINOPHEN 325 MG/1
325-650 TABLET ORAL EVERY 4 HOURS PRN
Status: ON HOLD | DISCHARGE
Start: 2022-07-15 | End: 2022-12-29

## 2022-07-15 RX ORDER — GABAPENTIN 100 MG/1
100 CAPSULE ORAL 3 TIMES DAILY
Status: DISCONTINUED | OUTPATIENT
Start: 2022-07-15 | End: 2022-07-30 | Stop reason: HOSPADM

## 2022-07-15 RX ORDER — HYDROMORPHONE HYDROCHLORIDE 2 MG/1
2-4 TABLET ORAL EVERY 6 HOURS PRN
Status: DISCONTINUED | OUTPATIENT
Start: 2022-07-15 | End: 2022-07-25

## 2022-07-15 RX ORDER — HEPARIN SODIUM 1000 [USP'U]/ML
500 INJECTION, SOLUTION INTRAVENOUS; SUBCUTANEOUS CONTINUOUS
Status: CANCELLED | OUTPATIENT
Start: 2022-07-15

## 2022-07-15 RX ORDER — BISACODYL 10 MG
10 SUPPOSITORY, RECTAL RECTAL DAILY PRN
Status: DISCONTINUED | OUTPATIENT
Start: 2022-07-15 | End: 2022-07-30 | Stop reason: HOSPADM

## 2022-07-15 RX ORDER — GABAPENTIN 100 MG/1
100 CAPSULE ORAL 3 TIMES DAILY
Status: ON HOLD | DISCHARGE
Start: 2022-07-15 | End: 2022-07-29

## 2022-07-15 RX ORDER — DOXERCALCIFEROL 4 UG/2ML
4 INJECTION INTRAVENOUS
Status: COMPLETED | OUTPATIENT
Start: 2022-07-15 | End: 2022-07-15

## 2022-07-15 RX ORDER — ACETAMINOPHEN 325 MG/1
325-650 TABLET ORAL EVERY 4 HOURS PRN
Status: DISCONTINUED | OUTPATIENT
Start: 2022-07-15 | End: 2022-07-30 | Stop reason: HOSPADM

## 2022-07-15 RX ORDER — POLYETHYLENE GLYCOL 3350 17 G/17G
17 POWDER, FOR SOLUTION ORAL DAILY PRN
Status: DISCONTINUED | OUTPATIENT
Start: 2022-07-15 | End: 2022-07-26

## 2022-07-15 RX ORDER — PYRIDOXINE HCL (VITAMIN B6) 25 MG
100 TABLET ORAL DAILY
Status: DISCONTINUED | OUTPATIENT
Start: 2022-07-16 | End: 2022-07-30 | Stop reason: HOSPADM

## 2022-07-15 RX ORDER — ATORVASTATIN CALCIUM 20 MG/1
20 TABLET, FILM COATED ORAL AT BEDTIME
Status: DISCONTINUED | OUTPATIENT
Start: 2022-07-15 | End: 2022-07-30 | Stop reason: HOSPADM

## 2022-07-15 RX ORDER — SEVELAMER CARBONATE 800 MG/1
1600 TABLET, FILM COATED ORAL
Status: DISCONTINUED | OUTPATIENT
Start: 2022-07-15 | End: 2022-07-30 | Stop reason: HOSPADM

## 2022-07-15 RX ORDER — DULOXETIN HYDROCHLORIDE 30 MG/1
30 CAPSULE, DELAYED RELEASE ORAL DAILY
Status: DISCONTINUED | OUTPATIENT
Start: 2022-07-16 | End: 2022-07-30 | Stop reason: HOSPADM

## 2022-07-15 RX ORDER — FLUTICASONE PROPIONATE 50 MCG
1 SPRAY, SUSPENSION (ML) NASAL DAILY
Status: DISCONTINUED | OUTPATIENT
Start: 2022-07-16 | End: 2022-07-30 | Stop reason: HOSPADM

## 2022-07-15 RX ORDER — HYDROXYZINE HYDROCHLORIDE 25 MG/1
25 TABLET, FILM COATED ORAL 3 TIMES DAILY PRN
Status: ON HOLD | DISCHARGE
Start: 2022-07-15 | End: 2022-07-29

## 2022-07-15 RX ORDER — VIT B COMP NO.3/FOLIC/C/BIOTIN 1 MG-60 MG
1 TABLET ORAL DAILY
Status: DISCONTINUED | OUTPATIENT
Start: 2022-07-16 | End: 2022-07-30 | Stop reason: HOSPADM

## 2022-07-15 RX ADMIN — HYDROMORPHONE HYDROCHLORIDE 4 MG: 2 TABLET ORAL at 21:41

## 2022-07-15 RX ADMIN — ALPRAZOLAM 1 MG: 0.25 TABLET ORAL at 12:32

## 2022-07-15 RX ADMIN — DOXERCALCIFEROL 4 MCG: 4 INJECTION, SOLUTION INTRAVENOUS at 09:06

## 2022-07-15 RX ADMIN — DULOXETINE 30 MG: 30 CAPSULE, DELAYED RELEASE ORAL at 12:31

## 2022-07-15 RX ADMIN — HYDROXYZINE HYDROCHLORIDE 25 MG: 25 TABLET, FILM COATED ORAL at 12:32

## 2022-07-15 RX ADMIN — GABAPENTIN 100 MG: 100 CAPSULE ORAL at 12:31

## 2022-07-15 RX ADMIN — SEVELAMER CARBONATE 1600 MG: 800 TABLET, FILM COATED ORAL at 18:41

## 2022-07-15 RX ADMIN — GABAPENTIN 100 MG: 100 CAPSULE ORAL at 16:54

## 2022-07-15 RX ADMIN — ATORVASTATIN CALCIUM 20 MG: 20 TABLET, FILM COATED ORAL at 21:41

## 2022-07-15 RX ADMIN — EPOETIN ALFA-EPBX 4000 UNITS: 4000 INJECTION, SOLUTION INTRAVENOUS; SUBCUTANEOUS at 09:05

## 2022-07-15 RX ADMIN — SODIUM CHLORIDE 250 ML: 9 INJECTION, SOLUTION INTRAVENOUS at 09:07

## 2022-07-15 RX ADMIN — MIDODRINE HYDROCHLORIDE 10 MG: 5 TABLET ORAL at 07:34

## 2022-07-15 RX ADMIN — GABAPENTIN 100 MG: 100 CAPSULE ORAL at 21:41

## 2022-07-15 RX ADMIN — MIDODRINE HYDROCHLORIDE 10 MG: 5 TABLET ORAL at 08:58

## 2022-07-15 RX ADMIN — MIDODRINE HYDROCHLORIDE 10 MG: 5 TABLET ORAL at 12:47

## 2022-07-15 RX ADMIN — ALPRAZOLAM 1 MG: 1 TABLET ORAL at 21:41

## 2022-07-15 RX ADMIN — MIDODRINE HYDROCHLORIDE 10 MG: 5 TABLET ORAL at 18:40

## 2022-07-15 RX ADMIN — HEPARIN SODIUM 500 UNITS: 1000 INJECTION INTRAVENOUS; SUBCUTANEOUS at 09:06

## 2022-07-15 RX ADMIN — PANTOPRAZOLE SODIUM 40 MG: 40 TABLET, DELAYED RELEASE ORAL at 06:37

## 2022-07-15 RX ADMIN — HEPARIN SODIUM 500 UNITS/HR: 1000 INJECTION INTRAVENOUS; SUBCUTANEOUS at 09:06

## 2022-07-15 RX ADMIN — SERTRALINE HYDROCHLORIDE 100 MG: 100 TABLET ORAL at 12:32

## 2022-07-15 RX ADMIN — CETIRIZINE HYDROCHLORIDE 10 MG: 10 TABLET, FILM COATED ORAL at 12:33

## 2022-07-15 RX ADMIN — HYDROMORPHONE HYDROCHLORIDE 4 MG: 2 TABLET ORAL at 13:38

## 2022-07-15 RX ADMIN — FINASTERIDE 1.25 MG: 5 TABLET, FILM COATED ORAL at 12:32

## 2022-07-15 RX ADMIN — SODIUM CHLORIDE 300 ML: 9 INJECTION, SOLUTION INTRAVENOUS at 09:05

## 2022-07-15 RX ADMIN — AMOXICILLIN 500 MG: 500 CAPSULE ORAL at 16:54

## 2022-07-15 ASSESSMENT — ACTIVITIES OF DAILY LIVING (ADL)
DIFFICULTY_EATING/SWALLOWING: NO
DRESSING/BATHING: BATHING DIFFICULTY, REQUIRES EQUIPMENT
ADLS_ACUITY_SCORE: 49
ADLS_ACUITY_SCORE: 35
ADLS_ACUITY_SCORE: 38
ADLS_ACUITY_SCORE: 49
WALKING_OR_CLIMBING_STAIRS_DIFFICULTY: YES
DRESSING/BATHING_DIFFICULTY: YES
ADLS_ACUITY_SCORE: 32
CHANGE_IN_FUNCTIONAL_STATUS_SINCE_ONSET_OF_CURRENT_ILLNESS/INJURY: YES
ADLS_ACUITY_SCORE: 32
WEAR_GLASSES_OR_BLIND: YES
EQUIPMENT_CURRENTLY_USED_AT_HOME: WALKER, ROLLING
CONCENTRATING,_REMEMBERING_OR_MAKING_DECISIONS_DIFFICULTY: NO
NUMBER_OF_TIMES_PATIENT_HAS_FALLEN_WITHIN_LAST_SIX_MONTHS: 9
ADLS_ACUITY_SCORE: 49
TOILETING_ISSUES: YES
ADLS_ACUITY_SCORE: 49
WALKING_OR_CLIMBING_STAIRS: AMBULATION DIFFICULTY, REQUIRES EQUIPMENT
TOILETING_ASSISTANCE: TOILETING DIFFICULTY, ASSISTANCE 1 PERSON
ADLS_ACUITY_SCORE: 49
FALL_HISTORY_WITHIN_LAST_SIX_MONTHS: YES
ADLS_ACUITY_SCORE: 49
ADLS_ACUITY_SCORE: 49
DOING_ERRANDS_INDEPENDENTLY_DIFFICULTY: YES

## 2022-07-15 NOTE — PLAN OF CARE
Physical Therapy Discharge Summary    Reason for therapy discharge:    Discharged to acute rehabilitation facility.    Progress towards therapy goal(s). See goals on Care Plan in [facility] electronic health record for goal details.  Goals partially met.  Barriers to achieving goals:   discharge from facility.    Therapy recommendation(s):    Continued therapy is recommended.  Rationale/Recommendations:  intensive rehab recommended in order to continue to address safety and independence with functional mobility. Pt now working on transfers and ambulation w/ FWW and is making good progress to continue this at [facility] setting.       Suzi Haq, PT

## 2022-07-15 NOTE — DISCHARGE SUMMARY
[facility]    Discharge Summary  Hospitalist    Date of Admission:  6/24/2022  Date of Discharge:  7/15/2022  Discharging Provider: Oksana Spicer MD    Discharge Diagnoses   Generalized weakness  Epidural abscess  S/p C6-T6 fusion and T2-3 decompression    History of Present Illness   Please review admission history and physical.    Hospital Course   Shay Jimenez was admitted on 6/24/2022.  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  He was found to have abnormal findings on thoracic MRI concerning for diskitis with osteomyelitis with also some findings of a compressive myelopathy.       Upper back pain  Generalized weakness  Falls   Epidural phlegmon/abscess   Compressive myelopathy  S/P C6-T6 fusion/T2-3 decompression  *back pain for 6 months. CT chest 2/2022 showed concern for upper thoracic spine diskitis vs osteomyelitis. Unclear follow up from that time  *in weeks prior to admission, MRI showed possible thoracic compression fracture.   *developed progressively weak legs with frequent falls.  *on admission imaging with T2-T3 diskitis, osteomyelitis and compressive myelopathy.       ? Patient was seen by neurosurgery here, underwent procedure, blood cultures did not show any growth after 5 days.  Seen by infectious disease here  ? ID  "consult requested, I appreciate Dr. Rain's evaluation recommendations  ? Per her, \"changes could all be from DJD, CRP mildly elevated, no fever/chills, slow intermittent progression, I am not convinced this is infection but it still could be, he has had neg. PPD in past and no TB risk factors or contacts.\" routine gram stain culture, fungal stain and culture, AFB stain and culture and pathology sent from OR remains negative.  ? Tissue culture has gram-positive bacilli isolated in broth only, resembling diphtheroids on day 5 of incubation; ID notified of this result.  Per Dr. Rain, \"GPB resembling diphtheroid 5 days later growth in broth only is a contaminate, no changes to plan for discharge off antibiotics.\"pain is better controlled, he is working with PT, he  is looking forward to go to ARU to get back his strength.  ? 1 blood culture came back positive for gram-positive cocci on 7/6, he has been having regular blood cultures since 7/2, possibly this is a contaminant, 2 set of blood cultures ordered for 7/6.  7/5,  1 set positive for staph epidermidis, possibly contaminant, repeat blood cultures from 7/6 is negative.   on amoxicillin 500 mg daily for 4 weeks once IV antibiotics are discontinued.  Cefazolin discontinued 7/12, complete 4 weeks of amoxicillin.  ? Current plan is to discharge patient to ARU.    End-stage renal disease, on hemodialysis  Hypotension  Hyponatremia  *Typically dialyzes Monday, Wednesday, Friday. Missed 6/24 session due to being in ED, patient was managed by nephrology during his stay here, nephrology recommended to oral antral fluid with BP in the 70s as long as he is asymptomatic.  He is placed on scheduled midodrine here, continue PTA sevelamer.  ? Patient is stable on low blood pressures, he is already on 10 mg midodrine 3 times a day,   ? Added bilateral lower extremity Jobst stockings, nephrology did recommend that we could increase midodrine to 20 mg 3 times daily, considering " "that this not the usual dosage I would recommend nephrology to proceed with that if needed.     Encephalopathy  Myclonus  ? Shay is on gabapentin 300 mg 3 times daily, this is a home medication and dose has been unchanged since he came to the hospital but is higher than the typical maximum dose recommended even for patients on CRRT  ? Presume encephalopathy is related to buildup of gabapentin metabolites from intradialytic. Gabapentin and Zoloft was on hold, started with gabapentin  low-dose of 100 mg at bedtime since encephalopathy is improved.  Restarted 7/5.  ? Monitor signs and symptoms     GERD  ? Chronic and stable on PPI.     Anemia of chronic kidney disease  ? Per Dr. Carr, \"increase erythropoietin to 10K with dialysis\"  ? Consider transfusion for hemoglobin less than 7 g/dL and symptoms   Hyperlipidemia  Chronic and stable, on statin.     Peripheral neuropathy of the feet  *Follows with Neurology.  Managed in part with duloxetine.     Depression  ? Chronic and stable on Zoloft.     Obesity, class III  Obstructive sleep apnea  *BMI this stay is 43.10.  He does not use CPAP (patient says, \"I am one of those guys who does not tolerate CPAP.\") Encourage diet, lifestyle modifications once above issues addressed.     History of BPH.  ? Continue Proscar        Oksana Spicer MD    Significant Results and Procedures       Pending Results     Unresulted Labs Ordered in the Past 30 Days of this Admission     Date and Time Order Name Status Description    6/28/2022 11:52 AM Acid-Fast Bacilli Culture and Stain In process     6/28/2022 11:52 AM Fungal or Yeast Culture Routine Preliminary           Code Status   Full Code       Primary Care Physician   SAMANTHA GONZALEZ    Physical Exam   Temp: 97.7  F (36.5  C) Temp src: Oral BP: (!) 75/40 Pulse: 88   Resp: 18 SpO2: 96 % O2 Device: Nasal cannula Oxygen Delivery: 2 LPM  Vitals:    06/24/22 1716 06/28/22 0400 06/29/22 0638   Weight: 149 kg (328 lb 7.8 oz) 146.6 kg (323 lb " 3.1 oz) 145 kg (319 lb 10.7 oz)     Vital Signs with Ranges  Temp:  [97.4  F (36.3  C)-98.4  F (36.9  C)] 97.7  F (36.5  C)  Pulse:  [70-88] 88  Resp:  [14-18] 18  BP: ()/(40-70) 75/40  SpO2:  [94 %-98 %] 96 %  I/O last 3 completed shifts:  In: 100 [P.O.:100]  Out: -     The patient was examined on the day of discharge.    Discharge Disposition   Discharged to ARU  Condition at discharge: Stable    Consultations This Hospital Stay   NEUROSURGERY IP CONSULT  INFECTIOUS DISEASES IP CONSULT  NEPHROLOGY IP CONSULT  SPIRITUAL HEALTH SERVICES IP CONSULT  INTENSIVIST IP CONSULT  PHYSICAL THERAPY ADULT IP CONSULT  OCCUPATIONAL THERAPY ADULT IP CONSULT  ORTHOSIS BRACE IP CONSULT  CARE MANAGEMENT / SOCIAL WORK IP CONSULT  VASCULAR ACCESS ADULT IP CONSULT  NEUROSURGERY IP CONSULT  PHYSICAL THERAPY ADULT IP CONSULT  OCCUPATIONAL THERAPY ADULT IP CONSULT    Time Spent on this Encounter   I, Oksana Spicer MD, personally saw the patient today and spent greater than 30 minutes discharging this patient.    Discharge Orders      Anti-Embolism Stockings    Bilateral below knee length.On in the morning, off at night     Follow-up and recommended labs and tests     Follow up in our NS clinic   7/13/22 staple removal  8/13 and 9/21/22     Activity    No lifting greater than 5-10 lbs, no excessive bending, lifting or twisting.    Brace to be worn when out of bed.     Reason for your hospital stay    Cervical-thoracic fusion     Wound care and dressings    Okay to check incision wet, no soaking in the tub.     General info for SNF    Length of Stay Estimate: Short Term Care: Estimated # of Days <30  Condition at Discharge: Improving  Level of care:skilled   Rehabilitation Potential: Excellent  Admission H&P remains valid and up-to-date: Yes  Recent Chemotherapy: N/A  Use Nursing Home Standing Orders: Yes     Mantoux instructions    Give two-step Mantoux (PPD) Per Facility Policy Yes     Follow Up and recommended labs and tests     Follow up with Nursing home physician.  The following labs/tests are recommended: cbc in 1 week.follow up with neurosurgery as recommended..     Reason for your hospital stay    diskitis     Intake and output    Every shift     Daily weights    Call Provider for weight gain of more than 2 pounds per day or 5 pounds per week.     Activity - Up with nursing assistance     Full Code     Physical Therapy Adult Consult    Evaluate and treat as clinically indicated.    Reason:  weakness, deconditioning     Occupational Therapy Adult Consult    Evaluate and treat as clinically indicated.    Reason:  deconditioning     Fall precautions     Diet    Resume your usual diet.     Diet    Follow this diet upon discharge: Orders Placed This Encounter      Fluid restriction 1500 ML FLUID      Snacks/Supplements Adult: Nepro Oral Supplement; Between Meals      Diet      Combination Diet Regular Diet     Discharge Medications   Current Discharge Medication List      START taking these medications    Details   acetaminophen (TYLENOL) 325 MG tablet Take 1-2 tablets (325-650 mg) by mouth every 4 hours as needed for mild pain or fever    Associated Diagnoses: Discitis, unspecified spinal region      amoxicillin (AMOXIL) 500 MG capsule Take 1 capsule (500 mg) by mouth daily (with dinner) for 25 days    Associated Diagnoses: Discitis, unspecified spinal region      calcium carbonate (TUMS) 500 MG chewable tablet Take 2 tablets (1,000 mg) by mouth 3 times daily as needed for heartburn    Associated Diagnoses: Discitis, unspecified spinal region      cetirizine (ZYRTEC) 10 MG tablet Take 1 tablet (10 mg) by mouth daily    Associated Diagnoses: Discitis, unspecified spinal region      hydrOXYzine (ATARAX) 25 MG tablet Take 1 tablet (25 mg) by mouth 3 times daily as needed for other (pain)    Associated Diagnoses: Discitis, unspecified spinal region      polyethylene glycol (MIRALAX) 17 GM/Dose powder Take 17 g by mouth daily  Qty: 510 g     Associated Diagnoses: Discitis, unspecified spinal region      sevelamer carbonate (RENVELA) 800 MG tablet Take 2 tablets (1,600 mg) by mouth 3 times daily (with meals)    Associated Diagnoses: Discitis, unspecified spinal region         CONTINUE these medications which have CHANGED    Details   gabapentin (NEURONTIN) 100 MG capsule Take 1 capsule (100 mg) by mouth 3 times daily    Associated Diagnoses: Discitis, unspecified spinal region      midodrine (PROAMATINE) 10 MG tablet Take 1 tablet (10 mg) by mouth 3 times daily (with meals)    Associated Diagnoses: Discitis, unspecified spinal region         CONTINUE these medications which have NOT CHANGED    Details   ALPRAZolam (XANAX) 1 MG tablet Take 1 mg by mouth 2 times daily as needed for anxiety      ammonium lactate (AMLACTIN) 12 % external cream Apply topically daily Apply to bilateral lower legs      atorvastatin (LIPITOR) 20 MG tablet Take 20 mg by mouth At Bedtime      cyanocobalamin (VITAMIN B-12) 500 MCG tablet Take 500 mcg by mouth daily      cyclobenzaprine (FLEXERIL) 10 MG tablet Take 10 mg by mouth 3 times daily as needed for muscle spasms      DULoxetine (CYMBALTA) 30 MG capsule Take 30 mg by mouth daily      finasteride (PROSCAR) 5 MG tablet Take 1.25 mg by mouth daily Takes 1/4 of 5 mg daily      fluticasone (FLONASE) 50 MCG/ACT nasal spray Spray 1 spray into both nostrils 2 times daily      multivitamin RENAL (RENAVITE RX/NEPHROVITE) 1 MG tablet Take 1 tablet by mouth daily      omeprazole (PRILOSEC) 20 MG DR capsule Take 20 mg by mouth daily      sertraline (ZOLOFT) 100 MG tablet Take 100 mg by mouth daily      vitamin B6 (PYRIDOXINE) 100 MG tablet Take 100 mg by mouth daily           Allergies   Allergies   Allergen Reactions     Amlodipine      Lisinopril      Data   Most Recent 3 CBC's:Recent Labs   Lab Test 07/15/22  0625 07/14/22  0638 07/13/22  0730   WBC 7.1 7.4 8.9   HGB 7.9* 7.6* 7.6*   * 100 99    269 264      Most Recent  3 BMP's:  Recent Labs   Lab Test 07/15/22  0945 07/11/22  0722 07/07/22  0552 07/06/22  1618   * 123*  --  131*   POTASSIUM 3.5 4.4 5.5* 4.5   CHLORIDE 93* 83*  --  96   CO2 28 22  --  27   BUN 27 79*  --  37*   CR 4.85* 10.40*  --  5.37*   ANIONGAP 8 18*  --  8   MAC 8.7 9.4  --  9.6   * 89  --  119*     Most Recent 2 LFT's:  Recent Labs   Lab Test 06/24/22  0818   AST 51*   ALT 55   ALKPHOS 152*   BILITOTAL 0.6     Most Recent INR's and Anticoagulation Dosing History:  Anticoagulation Dose History     Recent Dosing and Labs Latest Ref Rng & Units 6/27/2022    INR 0.85 - 1.15 1.07        Most Recent 3 Troponin's:No lab results found.  Most Recent Cholesterol Panel:No lab results found.  Most Recent 6 Bacteria Isolates From Any Culture (See EPIC Reports for Culture Details):No lab results found.  Most Recent TSH, T4 and A1c Labs:No lab results found.  Results for orders placed or performed during the hospital encounter of 06/24/22   Thoracic spine MRI w/o contrast    Narrative    MRI THORACIC SPINE WITHOUT CONTRAST  6/24/2022 12:44 PM     HISTORY: Severe upper back pain with leg weakness.    TECHNIQUE: Multiplanar multisequence MRI of the thoracic spine without  contrast.    COMPARISON: Thoracic spine MRI 6/6/2022, chest CT 2/18/2022    FINDINGS: Compression deformities involving T2 and T3 vertebral bodies  with severe disc height loss. Abnormal T1 hypointense T2 hyperintense  signal is present within the T2 and T3 vertebral bodies extending into  the bilateral pedicles and lamina/spinous processes. Abnormal T2  hyperintense signal within the T2-T3 disc space with erosions  involving the inferior endplate of T2 and superior endplate of T3.  Abnormal epidural fluid is present circumferentially encasing the  thoracic cord at T2-T3 extending inferiorly within the posterior  epidural space to approximately T4-T5, unchanged. Abnormal central  disc bulging and anterolisthesis of T2 on T3 contribute to  severe  spinal canal stenosis with abnormal T2 hyperintense signal within the  thoracic cord at T2-T3, probably worsened compared to the prior exam.  Moderate to severe bilateral foraminal stenoses at T2-T3. Nonspecific  bilateral paraspinal, prevertebral, and posterior T2 hyperintense soft  tissue signal, similar compared to the prior exam.    Bone marrow demonstrates background of multiple presumed benign  intraosseous cavernous venous malformations. Scattered Schmorl's  nodes.     Moderate degenerative change is present with multiple small disc  protrusions contributing to mild spinal canal stenoses at multiple  levels. Multilevel facet arthropathy. Mild lower thoracic foraminal  stenoses. Probable small bilateral pleural effusions.    Nonspecific degenerative changes and endplate irregularities are  present involving the partially visualized lower cervical spine with  multiple stenoses, incompletely characterized.      Impression    IMPRESSION:      1. Collapse of the T2 and T3 vertebral bodies with abnormal marrow  signal within the T2 and T3 vertebra and T2-T3 disc space with  epidural phlegmon/abscess and paraspinal inflammatory change,  concerning for discitis-osteomyelitis (consider contrast-enhanced  MRI). Superimposed acute on chronic fractures cannot be excluded  (consider CT correlation). Recommend spine surgery consultation.  2. Severe spinal canal stenosis at T2-T3 (probably slightly worsened)  with new abnormal cord signal suggestive of compressive myelopathy.  Infectious myelitis cannot be excluded.    Findings were discussed by phone between Dr. Dennis and Dr. De Oliveira  at 1:30 PM on 6/24/2022.    KATH DENNIS MD         SYSTEM ID:  [id number]   Lumbar spine MRI w/o contrast    Narrative    MRI LUMBAR SPINE WITHOUT CONTRAST   6/24/2022 12:46 PM     HISTORY: Severe upper back pain with leg weakness, low back pain.  Neurologic deficit, non-traumatic. Lower extremity weakness,  increasing/persistent  or sudden onset.  No known/automatically  detected potential contraindications to imaging.    TECHNIQUE: Multiplanar multisequence MRI of the lumbar spine without  contrast.    COMPARISON: Lumbar spine MRI 6/6/2022.     FINDINGS: Bone marrow demonstrates scattered moderate marrow  heterogeneity with multiple presumed benign intraosseous cavernous  venous malformations. Background of mild nonspecific marrow  heterogeneity. Scattered Schmorl's nodes are present. Conus medullaris  is unremarkable terminating at the level of the mid L1 vertebral body.  Cauda equina is unremarkable. Bilateral sacroiliac joint degenerative  change. Nonspecific fluid signal within the bilateral sacroiliac  joints, left more than right. Paraspinal muscular atrophy. Bilateral  renal atrophy with multiple nonspecific renal lesions which are  incompletely characterized (renal ultrasound correlation would be  typically recommended). Dependent subcutaneous edema.    Segmental Analysis:     T12-L1:  Mild disc height loss. No herniation. Normal facet joints. No  foraminal or spinal canal stenosis.     L1-L2:  Disc height maintained. No herniation. Normal facet joints. No  foraminal or spinal canal stenosis.      L2-L3:  Disc height maintained. Subtle central annular fissure. Normal  facet joints. No foraminal or spinal canal stenosis.      L3-L4:  Disc height maintained. Subtle central annular fissure with  questionable tiny left central protrusion in close proximity to the  traversing left L4 nerve root within the lateral recess (series 5  image 34). Mild bilateral facet arthropathy. No foraminal stenosis.  Mild spinal canal stenosis.      L4-L5:  Mild disc height loss. Disc bulge with broad central  protrusion with annular fissuring. Mild bilateral facet arthropathy.  No foraminal stenosis. Mild spinal canal stenosis with flattening of  the anterior thecal sac.      L5-S1:  Moderate to severe disc height loss. Disc bulge, asymmetric to  the  left. Mild bilateral facet arthropathy. No right foraminal  stenosis. Mild left foraminal stenosis. No spinal canal stenosis.      Impression    IMPRESSION:    1. Multilevel degenerative change as detailed.  2. No high-grade stenoses.     KATH BETTENCOURT MD         SYSTEM ID:  [id number]   MR Thoracic Spine w/o & w Contrast    Narrative    MRI OF THE THORACIC SPINE WITHOUT AND WITH CONTRAST 6/24/2022 4:21 PM     HISTORY: Discitis verus acute on chronic fractures T2-3.    TECHNIQUE: Multiplanar, multisequence MRI images of the thoracic spine  were acquired without and with 14mL Gadavist gadolinium IV contrast.    COMPARISON: CT thoracic spine same day. Noncontrast MRI thoracic spine  same day.       Impression    IMPRESSION: Postcontrast images demonstrate abnormal contrast  enhancement of the T2 and T3 vertebrae, thickened abnormal epidural  tissues from C7-T1 down to T4-T5 possibly simply representing inflamed  epidural tissues but epidural abscess cannot be excluded. Abnormal  contrast enhancement in the bilateral lateral and anterior paraspinous  soft tissues from T1 down to T4 consistent with a paraspinous  phlegmon/abscess again noted. Moderate compression of the thoracic  spinal cord at the upper T2 level again noted.     WILLIAM FREDERICK MD         SYSTEM ID:  [id number]   CT Thoracic Spine w/o Contrast    Narrative    CT THORACIC SPINE WITHOUT CONTRAST   6/24/2022 3:02 PM     HISTORY: Discitis verus acute on chronic fractures T2-T3.     TECHNIQUE: Axial images of the thoracic spine were obtained without  intravenous contrast. Multiplanar reformations were performed.   Radiation dose for this scan was reduced using automated exposure  control, adjustment of the mA and/or kV according to patient size, or  iterative reconstruction technique.    COMPARISON: Same day thoracic spine MRI, chest CT 2/18/2022.    FINDINGS: Worsening collapse of the T2 and T3 vertebral bodies with  endplate erosions adjacent to the  irregular T2-T3 disc space, worsened  compared to the prior chest CT. There is increased grade 1 (borderline  grade 2) anterolisthesis of T2 on T3 with worsening severe bilateral  foraminal stenoses and worsening severe spinal canal stenosis. Area of  probable ankylosis across the T2-3 disc space Severe bilateral facet  arthropathy with facet joint erosions, worsened compared to the prior  exam. Known epidural process is poorly appreciated by CT.  Paraspinal/prevertebral inflammatory change/phlegmon noted.    No acute fracture margins are identified. Moderate degenerative change  is present. Multiple presumed benign intraosseous cavernous venous  malformations. Moderate degenerative change.    Presumed scattered pulmonary atelectasis. Bilateral renal atrophy with  multiple nonspecific renal lesions.      Impression    IMPRESSION:  1. Worsening collapse of the T2 and T3 vertebral bodies, worsening  facet degenerative/erosive changes, worsening endplate erosions,  worsening spondylolisthesis, and worsening stenoses compared to  2/18/2022 chest CT. Findings raise concern for discitis osteomyelitis  and septic facet arthritis.  2. No acute fracture margins are identified.    KATH BETTENCOURT MD         SYSTEM ID:  [id number]   CT Head w/o Contrast    Narrative    EXAM: CT HEAD W/O CONTRAST  LOCATION: [facility]  DATE/TIME: 6/26/2022 11:09 AM    INDICATION: Altered mental status. Spinal infection.  COMPARISON: None.  TECHNIQUE: Routine CT Head without IV contrast. Multiplanar reformats. Dose reduction techniques were used.    FINDINGS:  INTRACRANIAL CONTENTS: No intracranial hemorrhage, extraaxial collection, or mass effect.  No CT evidence of acute infarct. Normal parenchymal attenuation. Normal ventricles and sulci.     VISUALIZED ORBITS/SINUSES/MASTOIDS: No intraorbital abnormality. No paranasal sinus mucosal disease. No middle ear or mastoid effusion.    BONES/SOFT TISSUES: No acute  abnormality.      Impression    IMPRESSION:  1.  Normal head CT.   XR Surgery MOLLY L/T 5 Min Fluoro    Narrative    This exam was marked as non-reportable because it will not be read by a   radiologist or a [facility] non-radiologist provider.         XR Cervical Spine 2/3 Views    Narrative    EXAM: XR THORACIC SPINE 2 VIEWS, XR CERVICAL SPINE 2/3 VWS  LOCATION: [facility]  DATE/TIME: 6/29/2022 5:15 PM    INDICATION: post op fusion  COMPARISON: None.  TECHNIQUE: CR Cervical and Thoracic Spine.      Impression    IMPRESSION:   On the lateral cervical spine radiographs, the soft tissues. Evaluation of the vertebrae at C5-C7.    Postsurgical changes posterior fusion from C5-T6. No hardware fracture or hardware loosening. The vertebral bodies of the cervical spine otherwise have normal stature and alignment. Anterior marginal osteophytes C4-C7. Multilevel degenerative disc   disease of the cervical spinal with, most pronounced at C3/C4.    The vertebral bodies of the thoracic spine have normal stature and alignment without evidence of compression fracture. The partially imaged lungs are unremarkable. Soft tissues unremarkable.   XR Thoracic Spine 2 Views    Narrative    EXAM: XR THORACIC SPINE 2 VIEWS, XR CERVICAL SPINE 2/3 VWS  LOCATION: [facility]  DATE/TIME: 6/29/2022 5:15 PM    INDICATION: post op fusion  COMPARISON: None.  TECHNIQUE: CR Cervical and Thoracic Spine.      Impression    IMPRESSION:   On the lateral cervical spine radiographs, the soft tissues. Evaluation of the vertebrae at C5-C7.    Postsurgical changes posterior fusion from C5-T6. No hardware fracture or hardware loosening. The vertebral bodies of the cervical spine otherwise have normal stature and alignment. Anterior marginal osteophytes C4-C7. Multilevel degenerative disc   disease of the cervical spinal with, most pronounced at C3/C4.    The vertebral bodies of the thoracic spine have normal  stature and alignment without evidence of compression fracture. The partially imaged lungs are unremarkable. Soft tissues unremarkable.
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
## 2022-07-15 NOTE — PROGRESS NOTES
Assessment and Plan:   ESRD: HD today for UF and clearance. Run complicated by hypotension.   Pt treated with midodrine before and during the run. Have cut down on UF goal to 2L.   Plan 3h, LAF with 400 BFR, 2L  UF as tolerated, 3K 35 HCO3 140 Na. IV hectorol and EPO on the run. Low dose heparin.             Interval History:   S/P spine surgery  Anemia: on EPO  GINI : not on CPAP                   Review of Systems:   No complaints during the run.          Medications:       - MEDICATION INSTRUCTIONS for Dialysis Patients -   Does not apply See Admin Instructions     amoxicillin  500 mg Oral Daily with supper     atorvastatin  20 mg Oral At Bedtime     cetirizine  10 mg Oral Daily     DULoxetine  30 mg Oral Daily     finasteride  1.25 mg Oral Daily     gabapentin  100 mg Oral TID     midodrine  10 mg Oral TID w/meals     pantoprazole  40 mg Oral QAM AC     polyethylene glycol  17 g Oral Daily     senna-docusate  1 tablet Oral BID    Or     senna-docusate  2 tablet Oral BID     sertraline  100 mg Oral Daily     sevelamer carbonate  1,600 mg Oral TID w/meals     sodium chloride (PF)  3 mL Intracatheter Q8H       heparin (porcine) 500 Units/hr (07/15/22 0906)     - MEDICATION INSTRUCTIONS -       Current active medications and PTA medications reviewed, see medication list for details.            Physical Exam:   Vitals were reviewed  Patient Vitals for the past 24 hrs:   BP Temp Temp src Pulse Resp SpO2   07/15/22 0930 91/58 -- -- 80 -- --   07/15/22 0915 (!) 83/69 -- -- 82 -- --   07/15/22 0900 90/44 -- -- 85 -- --   07/15/22 0845 (!) 73/49 -- -- 87 -- --   07/15/22 0830 (!) 82/55 -- -- 83 -- --   07/15/22 0815 (!) 86/50 -- -- 85 -- --   07/15/22 0800 98/50 -- -- 84 -- --   07/15/22 0750 100/69 -- -- 80 -- --   07/15/22 0748 102/70 97.7  F (36.5  C) Oral 81 18 96 %   07/15/22 0315 -- -- -- -- 14 --   07/14/22 2315 96/51 97.9  F (36.6  C) Oral 73 14 98 %   07/14/22 1947 91/54 98.4  F (36.9  C) Oral 72 18 94 %    22 1529 97/60 97.4  F (36.3  C) Axillary 70 16 97 %       Temp:  [97.4  F (36.3  C)-98.4  F (36.9  C)] 97.7  F (36.5  C)  Pulse:  [70-87] 80  Resp:  [14-18] 18  BP: ()/(44-70) 91/58  SpO2:  [94 %-98 %] 96 %    Temperatures:  Current - Temp: 97.7  F (36.5  C); Max - Temp  Av.9  F (36.6  C)  Min: 97.4  F (36.3  C)  Max: 98.4  F (36.9  C)  Respiration range: Resp  Av  Min: 14  Max: 18  Pulse range: Pulse  Av.2  Min: 70  Max: 87  Blood pressure range: Systolic (24hrs), Av , Min:73 , Max:102   ; Diastolic (24hrs), Av, Min:44, Max:70    Pulse oximetry range: SpO2  Av.3 %  Min: 94 %  Max: 98 %    I/O last 3 completed shifts:  In: 100 [P.O.:100]  Out: -       Intake/Output Summary (Last 24 hours) at 7/15/2022 0933  Last data filed at 2022 2100  Gross per 24 hour   Intake 100 ml   Output --   Net 100 ml       Sleeping during the run  On O2 per NC  LUAF with needles in place       Wt Readings from Last 4 Encounters:   22 145 kg (319 lb 10.7 oz)          Data:          Lab Results   Component Value Date     2022     2022     2022    Lab Results   Component Value Date    CHLORIDE 83 2022    CHLORIDE 96 2022    CHLORIDE 94 2022    Lab Results   Component Value Date    BUN 79 2022    BUN 37 2022    BUN 83 2022      Lab Results   Component Value Date    POTASSIUM 4.4 2022    POTASSIUM 5.5 2022    POTASSIUM 4.5 2022    Lab Results   Component Value Date    CO2 22 2022    CO2 27 2022    CO2 23 2022    Lab Results   Component Value Date    CR 10.40 2022    CR 5.37 2022    CR 11.70 2022        Recent Labs   Lab Test 07/15/22  0625 22  0638 22  0730   WBC 7.1 7.4 8.9   HGB 7.9* 7.6* 7.6*   HCT 24.8* 24.1* 23.6*   * 100 99    269 264     Recent Labs   Lab Test 22  0818   AST 51*   ALT 55   ALKPHOS 152*   BILITOTAL 0.6        Recent Labs   Lab Test 06/28/22  0420   MAG 2.1     Recent Labs   Lab Test 07/11/22  0722 06/29/22  0611 06/28/22  0420   PHOS 8.9* 8.6* 5.8*     Recent Labs   Lab Test 07/11/22  0722 07/06/22  1618 07/04/22  0715   MAC 9.4 9.6 9.5       Lab Results   Component Value Date    MAC 9.4 07/11/2022     Lab Results   Component Value Date    WBC 7.1 07/15/2022    HGB 7.9 (L) 07/15/2022    HCT 24.8 (L) 07/15/2022     (H) 07/15/2022     07/15/2022     Lab Results   Component Value Date     (L) 07/11/2022    POTASSIUM 4.4 07/11/2022    CHLORIDE 83 (L) 07/11/2022    CO2 22 07/11/2022    GLC 89 07/11/2022     Lab Results   Component Value Date    BUN 79 (H) 07/11/2022    CR 10.40 (H) 07/11/2022     Lab Results   Component Value Date    MAG 2.1 06/28/2022     Lab Results   Component Value Date    PHOS 8.9 (H) 07/11/2022       Creatinine   Date Value Ref Range Status   07/11/2022 10.40 (H) 0.66 - 1.25 mg/dL Final   07/06/2022 5.37 (H) 0.66 - 1.25 mg/dL Final   07/04/2022 11.70 (H) 0.66 - 1.25 mg/dL Final   07/03/2022 9.90 (H) 0.66 - 1.25 mg/dL Final   07/01/2022 8.60 (H) 0.66 - 1.25 mg/dL Final   06/29/2022 9.60 (H) 0.66 - 1.25 mg/dL Final       Attestation:  I have reviewed today's vital signs, notes, medications, labs and imaging.  Seen on pharesis.      Aydin French MD

## 2022-07-15 NOTE — PROGRESS NOTES
Potassium   Date Value Ref Range Status   07/11/2022 4.4 3.4 - 5.3 mmol/L Final     Hemoglobin   Date Value Ref Range Status   07/15/2022 7.9 (L) 13.3 - 17.7 g/dL Final     Creatinine   Date Value Ref Range Status   07/11/2022 10.40 (H) 0.66 - 1.25 mg/dL Final     Urea Nitrogen   Date Value Ref Range Status   07/11/2022 79 (H) 7 - 30 mg/dL Final     Sodium   Date Value Ref Range Status   07/11/2022 123 (L) 133 - 144 mmol/L Final     INR   Date Value Ref Range Status   06/27/2022 1.07 0.85 - 1.15 Final      Latest Reference Range & Units 06/25/22 11:53   Hep B Surface Agn Nonreactive  Nonreactive   Hepatitis B Surface Antibody <8.00 m[IU]/mL 1.29     DIALYSIS PROCEDURE NOTE  Hepatitis status of previous patient on machine log was checked and verified ok to use with this patients hepatitis status.  Patient dialyzed for 3 hrs. on a K3 bath with a net fluid removal of  3L.  A BFR of 400 ml/min was obtained via a LAVF using 15 gauge needles.      The treatment plan was discussed with Dr. Santana during the treatment.    Total heparin received during the treatment: 1500 units.   Needle cannulation sites held x 10 min.     Meds  given: epogen, hectarol,    Complications: UF lowered to 3L as per ordered parameters after asymptomatic hypotension. Midodrine given mid run as per verbal order by nephrologist. Primary care nurse notified about post dialysis blood pressure with the recommendations of blood pressure check before gabapentin.  No further complications.     Person educated: person. Knowledge base substantial. Barriers to learning: none. Educated on procedure via verbal mode. Patient/ verbalized understanding. Pt prefers oral education style.     ICEBOAT? Timeout performed pre-treatment  I: Patient was identified using 2 identifiers  C:  Consent Signed Yes  E: Equipment preventative maintenance is current and dialysis delivery system OK to use  B: Hepatitis B Surface Antigen: NEGATIVE; Draw Date: 6/25/2022       Hepatitis B Surface Antibody: SUSCEPTIBLE; Draw Date: 6/25/2022  O: Dialysis orders present and complete prior to treatment  A: Vascular access verified and assessed prior to treatment  T: Treatment was performed at a clinically appropriate time  ?: Patient was allowed to ask questions and address concerns prior to treatment  See Adult Hemodialysis flowsheet in [facility] for further details and post assessment.  Machine water alarm in place and functioning. Transducer pods intact and checked every 15min.   Pt returned via bed.  Chlorine/Chloramine water system checked every 4 hours.  Outpatient Dialysis at [place] qMWF      Post treatment report given to FILIPPO Conde, RN regarding 3L of fluid removed, last BP of 80/46.    Please remove patient dressing on AVF and AVG needle sites 24 hours after dialysis. If leaking occurs please apply a Band-Aid.

## 2022-07-16 ENCOUNTER — APPOINTMENT (OUTPATIENT)
Dept: PHYSICAL THERAPY | Facility: CLINIC | Age: 57
DRG: 052 | End: 2022-07-16
Attending: PHYSICAL MEDICINE & REHABILITATION
Payer: MEDICARE

## 2022-07-16 ENCOUNTER — APPOINTMENT (OUTPATIENT)
Dept: OCCUPATIONAL THERAPY | Facility: CLINIC | Age: 57
DRG: 052 | End: 2022-07-16
Attending: PHYSICAL MEDICINE & REHABILITATION
Payer: MEDICARE

## 2022-07-16 LAB
ALBUMIN SERPL-MCNC: 2.8 G/DL (ref 3.4–5)
ALP SERPL-CCNC: 134 U/L (ref 40–150)
ALT SERPL W P-5'-P-CCNC: <6 U/L (ref 0–70)
ANION GAP SERPL CALCULATED.3IONS-SCNC: 10 MMOL/L (ref 3–14)
AST SERPL W P-5'-P-CCNC: 13 U/L (ref 0–45)
BILIRUB SERPL-MCNC: 0.5 MG/DL (ref 0.2–1.3)
BUN SERPL-MCNC: 35 MG/DL (ref 7–30)
CALCIUM SERPL-MCNC: 9.4 MG/DL (ref 8.5–10.1)
CHLORIDE BLD-SCNC: 93 MMOL/L (ref 94–109)
CO2 SERPL-SCNC: 26 MMOL/L (ref 20–32)
CREAT SERPL-MCNC: 6.52 MG/DL (ref 0.66–1.25)
ERYTHROCYTE [DISTWIDTH] IN BLOOD BY AUTOMATED COUNT: 14.6 % (ref 10–15)
GFR SERPL CREATININE-BSD FRML MDRD: 9 ML/MIN/1.73M2
GLUCOSE BLD-MCNC: 94 MG/DL (ref 70–99)
HCT VFR BLD AUTO: 25.1 % (ref 40–53)
HGB BLD-MCNC: 7.7 G/DL (ref 13.3–17.7)
MCH RBC QN AUTO: 31.2 PG (ref 26.5–33)
MCHC RBC AUTO-ENTMCNC: 30.7 G/DL (ref 31.5–36.5)
MCV RBC AUTO: 102 FL (ref 78–100)
PHOSPHATE SERPL-MCNC: 5.6 MG/DL (ref 2.5–4.5)
PLATELET # BLD AUTO: 282 10E3/UL (ref 150–450)
POTASSIUM BLD-SCNC: 4.5 MMOL/L (ref 3.4–5.3)
PROT SERPL-MCNC: 6.5 G/DL (ref 6.8–8.8)
RBC # BLD AUTO: 2.47 10E6/UL (ref 4.4–5.9)
SODIUM SERPL-SCNC: 129 MMOL/L (ref 133–144)
WBC # BLD AUTO: 7.7 10E3/UL (ref 4–11)

## 2022-07-16 PROCEDURE — 97162 PT EVAL MOD COMPLEX 30 MIN: CPT | Mod: GP | Performed by: PHYSICAL THERAPIST

## 2022-07-16 PROCEDURE — 250N000013 HC RX MED GY IP 250 OP 250 PS 637: Performed by: PHYSICIAN ASSISTANT

## 2022-07-16 PROCEDURE — 97530 THERAPEUTIC ACTIVITIES: CPT | Mod: GP | Performed by: PHYSICAL THERAPIST

## 2022-07-16 PROCEDURE — 97167 OT EVAL HIGH COMPLEX 60 MIN: CPT | Mod: GO | Performed by: OCCUPATIONAL THERAPIST

## 2022-07-16 PROCEDURE — 97535 SELF CARE MNGMENT TRAINING: CPT | Mod: GO | Performed by: OCCUPATIONAL THERAPIST

## 2022-07-16 PROCEDURE — 84100 ASSAY OF PHOSPHORUS: CPT | Performed by: PHYSICAL MEDICINE & REHABILITATION

## 2022-07-16 PROCEDURE — 85027 COMPLETE CBC AUTOMATED: CPT | Performed by: PHYSICIAN ASSISTANT

## 2022-07-16 PROCEDURE — 128N000003 HC R&B REHAB

## 2022-07-16 PROCEDURE — 97110 THERAPEUTIC EXERCISES: CPT | Mod: GP | Performed by: PHYSICAL THERAPIST

## 2022-07-16 PROCEDURE — 36415 COLL VENOUS BLD VENIPUNCTURE: CPT | Performed by: PHYSICIAN ASSISTANT

## 2022-07-16 PROCEDURE — 99233 SBSQ HOSP IP/OBS HIGH 50: CPT | Mod: 24 | Performed by: PHYSICAL MEDICINE & REHABILITATION

## 2022-07-16 PROCEDURE — 97116 GAIT TRAINING THERAPY: CPT | Mod: GP | Performed by: PHYSICAL THERAPIST

## 2022-07-16 PROCEDURE — 80053 COMPREHEN METABOLIC PANEL: CPT | Performed by: PHYSICIAN ASSISTANT

## 2022-07-16 RX ADMIN — SEVELAMER CARBONATE 1600 MG: 800 TABLET, FILM COATED ORAL at 13:01

## 2022-07-16 RX ADMIN — FLUTICASONE PROPIONATE 1 SPRAY: 50 SPRAY, METERED NASAL at 09:31

## 2022-07-16 RX ADMIN — AMOXICILLIN 500 MG: 500 CAPSULE ORAL at 17:29

## 2022-07-16 RX ADMIN — MIDODRINE HYDROCHLORIDE 10 MG: 5 TABLET ORAL at 17:29

## 2022-07-16 RX ADMIN — GABAPENTIN 100 MG: 100 CAPSULE ORAL at 13:01

## 2022-07-16 RX ADMIN — GABAPENTIN 100 MG: 100 CAPSULE ORAL at 09:30

## 2022-07-16 RX ADMIN — CYANOCOBALAMIN TAB 500 MCG 500 MCG: 500 TAB at 09:30

## 2022-07-16 RX ADMIN — Medication 100 MG: at 09:30

## 2022-07-16 RX ADMIN — DULOXETINE HYDROCHLORIDE 30 MG: 30 CAPSULE, DELAYED RELEASE ORAL at 09:30

## 2022-07-16 RX ADMIN — CETIRIZINE HYDROCHLORIDE 10 MG: 5 TABLET ORAL at 09:30

## 2022-07-16 RX ADMIN — SEVELAMER CARBONATE 1600 MG: 800 TABLET, FILM COATED ORAL at 09:30

## 2022-07-16 RX ADMIN — ALPRAZOLAM 1 MG: 1 TABLET ORAL at 19:15

## 2022-07-16 RX ADMIN — Medication 1 TABLET: at 09:30

## 2022-07-16 RX ADMIN — MIDODRINE HYDROCHLORIDE 10 MG: 5 TABLET ORAL at 13:01

## 2022-07-16 RX ADMIN — MIDODRINE HYDROCHLORIDE 10 MG: 5 TABLET ORAL at 09:30

## 2022-07-16 RX ADMIN — ATORVASTATIN CALCIUM 20 MG: 20 TABLET, FILM COATED ORAL at 21:15

## 2022-07-16 RX ADMIN — SERTRALINE HYDROCHLORIDE 100 MG: 100 TABLET ORAL at 09:30

## 2022-07-16 RX ADMIN — GABAPENTIN 100 MG: 100 CAPSULE ORAL at 19:15

## 2022-07-16 RX ADMIN — Medication 1.25 MG: at 09:31

## 2022-07-16 RX ADMIN — PANTOPRAZOLE SODIUM 40 MG: 40 TABLET, DELAYED RELEASE ORAL at 06:10

## 2022-07-16 ASSESSMENT — ACTIVITIES OF DAILY LIVING (ADL)
ADLS_ACUITY_SCORE: 38
BADLS,_PREVIOUS_FUNCTIONAL_LEVEL: INDEPENDENT
ADLS_ACUITY_SCORE: 38
PREVIOUS_RESPONSIBILITIES: MEAL PREP;HOUSEKEEPING;LAUNDRY;SHOPPING;DRIVING
ADLS_ACUITY_SCORE: 38
ADLS_ACUITY_SCORE: 38
IADLS,_PREVIOUS_FUNCTIONAL_LEVEL: INDEPENDENT
ADLS_ACUITY_SCORE: 38
ADLS_ACUITY_SCORE: 42
ADLS_ACUITY_SCORE: 38

## 2022-07-16 NOTE — PROGRESS NOTES
"CLINICAL NUTRITION SERVICES - ASSESSMENT NOTE     Nutrition Prescription    Malnutrition Status:    Patient does not meet two of the established criteria necessary for diagnosing malnutrition.    Recommendations already ordered by Registered Dietitian (RD):  None at this time.    Future/Additional Recommendations:  Monitor PO intake, wt trends, labs.     REASON FOR ASSESSMENT  Shay Jimenez is a/an 57 year old male assessed by the dietitian for Provider Order - assess/optimize nutrition    [facility]  Shay Jimenez is a 57-year-old male with a history of ESRD on HD who presented on 6/24/2022 with a 6-month history of progressive upper back pain with worsening lower extremity weakness and gait disturbances leading to falls.  He had been seen by orthopedics 2 to 3 months prior to evaluate his back pain and he was found to have T2-T3 compression fractures.     NUTRITION HISTORY  Shay reports eating well at this time. His appetite varies, but is having at least 2 meals/day. He is getting menu fatigue due to coming from [facility], but his sons are bringing him in at least 1 meal/day, which is helping his intake. No other nutrition concerns at this time.     CURRENT NUTRITION ORDERS  Diet/Nutrition Received: regular  Diet/Feeding Assistance: none  Diet/Feeding Tolerance: good  Intake (%): 75%-100% per flow sheets  Nutrition Interventions: food preferences provided     LABS  Labs reviewed 7/16  Phos 5.6H  Glucose 94WNL-112H (7/15-7/16)    MEDICATIONS  Medications reviewed 7/16  Vitamin B12 500mcg  Renavite  Protonix  Renvela     ANTHROPOMETRICS  Height: 185.4 cm (6' 1\")  Most Recent Weight: 139.8 kg (308 lb 3.3 oz)    IBW: 83.6 kg  %IBW: 167%  BMI (kg/m2): 40.96kg/m2  Weight History: Weight loss of 5.2kg (3.6%) x~2 weeks -- some likely 2/2 fluid fluctuations.   Wt Readings from Last 20 Encounters:   07/16/22 139.8 kg (308 lb 3.3 oz)   06/29/22 145 kg (319 lb 10.7 oz)     Dosing Weight:  97.7 Kg (adjusted BW)    ASSESSED " NUTRITION NEEDS  Estimated Energy Needs: 3256-9324 kcals/day (25 - 30 kcals/kg)  Justification: Obese  Estimated Protein Needs: 117-147 grams protein/day (1.2 - 1.5 grams of pro/kg)  Justification: dialysis, preservation of LBM  Estimated Fluid Needs: per provider    PHYSICAL FINDINGS  See malnutrition section below.    MALNUTRITION  % Intake: < 75% for > 7 days (moderate)  % Weight Loss: Weight loss does not meet criteria  Subcutaneous Fat Loss: None observed, visually  Muscle Loss: None observed, visually  Fluid Accumulation/Edema: None noted, visually  Malnutrition Diagnosis: Patient does not meet two of the established criteria necessary for diagnosing malnutrition.     NUTRITION DIAGNOSIS  Predicted inadequate nutrient intake (kcal, protein) related to medical course as evidenced by potential to meet <100% of estimated nutrition needs.    INTERVENTIONS  Implementation  Provided education on RDN role in care, nutrient needs.  Nutrition Interventions: food preferences provided    Goals  Patient to consume % of nutritionally adequate meal trays TID, or the equivalent with supplements/snacks.     Monitoring/Evaluation  Progress toward goals will be monitored and evaluated per protocol.  Ansley Richey, MPH, RDN, LD  On Call Pager (weekends only): [phone]

## 2022-07-16 NOTE — PROGRESS NOTES
"SPIRITUAL HEALTH SERVICES Progress Note  [facility] ([facility]) ACUTE REHAB    Met with pt per consult order while on-call.     Introduced Spiritual Health Services; Assessed spiritual needs; no immediate needs at this time, but pt remains open to spiritual health for duration of stay.     Pt reported that he would like a  visit but that today was \"not a good time\" because his son was about to visit, and that Monday would be \"better\".     PLAN: Pt will be referred to unit  for follow up visit.     Haleigh Burdick   Intern  Pager [phone]      * [facility] remains available 24/7 for emergent requests/referrals, either by having the switchboard page the on-call  or by entering an ASAP/STAT consult in Epic (this will also page the on-call ). Routine Epic consults receive an initial response within 24 hours.*    "

## 2022-07-16 NOTE — PHARMACY-ADMISSION MEDICATION HISTORY
Please see Admission Medication History completed on 6/24/22 by Akil Aliheyder, RPh under previous encounter at [facility] for information regarding prior to admission medications.

## 2022-07-16 NOTE — PHARMACY-MEDICATION REGIMEN REVIEW
Pharmacy Medication Regimen Review  Shay Jimenez is a 57 year old male who is currently in the Acute Rehab Unit.    Assessment: All medications have an appropriate indications, durations and no unnecessary use was found    Medication history completed on 6/24/22. PTA meds addressed. PTA gabapentin dose decreased as it was felt to be accumulating and contributing to encephalopathy.     Plan:   - continue current mediation regimen     Attending provider will be sent this note for review.  If there are any emergent issues noted above, pharmacist will contact provider directly by phone.      Pharmacy will periodically review the resident's medication regimen for any PRN medications not administered in > 72 hours and discontinue them. The pharmacist will discuss gradual dose reductions of psychopharmacologic medications with interdisciplinary team on a regular basis.    Please contact pharmacy if the above does not answer specific medication questions/concerns.    Background:  A pharmacist has reviewed all medications and pertinent medical history today.  Medications were reviewed for appropriate use and any irregularities found are listed with recommendations.      Current Facility-Administered Medications:      acetaminophen (TYLENOL) tablet 325-650 mg, 325-650 mg, Oral, Q4H PRN, Dona Cabezas PA-C     ALPRAZolam (XANAX) tablet 1 mg, 1 mg, Oral, BID PRN, Dona Cabezas PA-C, 1 mg at 07/15/22 2141     amoxicillin (AMOXIL) capsule 500 mg, 500 mg, Oral, Daily with supper, Dona Cabezas PA-C, 500 mg at 07/15/22 1654     atorvastatin (LIPITOR) tablet 20 mg, 20 mg, Oral, At Bedtime, Dona Cabezas PA-C, 20 mg at 07/15/22 2141     bisacodyl (DULCOLAX) suppository 10 mg, 10 mg, Rectal, Daily PRN, Dona Cabezas PA-C     calcium carbonate (TUMS) chewable tablet 1,000 mg, 1,000 mg, Oral, TID PRN, Dona Cabezas PA-C     cetirizine (zyrTEC) tablet 10 mg, 10 mg, Oral, Daily, Raulito  Dona NG PA-C, 10 mg at 07/16/22 0930     cyanocobalamin (VITAMIN B-12) tablet 500 mcg, 500 mcg, Oral, Daily, Dona Cabezas PA-C, 500 mcg at 07/16/22 0930     cyclobenzaprine (FLEXERIL) tablet 10 mg, 10 mg, Oral, TID PRN, Dona Cabezas PA-C     DULoxetine (CYMBALTA) DR capsule 30 mg, 30 mg, Oral, Daily, Dona Cabezas PA-C, 30 mg at 07/16/22 0930     finasteride (PROSCAR) quarter-tab 1.25 mg, 1.25 mg, Oral, Daily, Dona Cabezas PA-C, 1.25 mg at 07/16/22 0931     fluticasone (FLONASE) 50 MCG/ACT spray 1 spray, 1 spray, Both Nostrils, Daily, Dona Cabezas PA-C, 1 spray at 07/16/22 0931     gabapentin (NEURONTIN) capsule 100 mg, 100 mg, Oral, TID, Dona Cabezas PA-C, 100 mg at 07/16/22 0930     HYDROmorphone (DILAUDID) tablet 2-4 mg, 2-4 mg, Oral, Q6H PRN, Dona Cabezas PA-C, 4 mg at 07/15/22 2141     hydrOXYzine (ATARAX) tablet 25 mg, 25 mg, Oral, TID PRN, Dona Cabezas PA-C     melatonin tablet 1 mg, 1 mg, Oral, At Bedtime PRN, Dona Cabezas PA-C     midodrine (PROAMATINE) tablet 10 mg, 10 mg, Oral, TID w/meals, Dona Cabezas PA-C, 10 mg at 07/16/22 0930     multivitamin RENAL (RENAVITE RX/NEPHROVITE) tablet 1 tablet, 1 tablet, Oral, Daily, Dona Cabezas PA-C, 1 tablet at 07/16/22 0930     naloxone (NARCAN) injection 0.2 mg, 0.2 mg, Intravenous, Q2 Min PRN **OR** naloxone (NARCAN) injection 0.4 mg, 0.4 mg, Intravenous, Q2 Min PRN **OR** naloxone (NARCAN) injection 0.2 mg, 0.2 mg, Intramuscular, Q2 Min PRN **OR** naloxone (NARCAN) injection 0.4 mg, 0.4 mg, Intramuscular, Q2 Min PRN, Antonio Loo DO     pantoprazole (PROTONIX) EC tablet 40 mg, 40 mg, Oral, [facility] AC, Dona Cabezas PA-C, 40 mg at 07/16/22 0610     polyethylene glycol (MIRALAX) powder 17 g, 17 g, Oral, Daily PRN, Dona Cabezas PA-C     pyridOXINE (VITAMIN B6) tablet 100 mg, 100 mg, Oral, Daily, Dona Cabezas PA-C, 100 mg at 07/16/22 0930      sertraline (ZOLOFT) tablet 100 mg, 100 mg, Oral, Daily, Dona Cabezas PA-C, 100 mg at 07/16/22 0930     sevelamer carbonate (RENVELA) tablet 1,600 mg, 1,600 mg, Oral, TID w/meals, Dona Cabezas PA-C, 1,600 mg at 07/16/22 0930  No current outpatient prescriptions on file.   PMH: ESRD on HD, GERD, hyperlipidemia, peripheral neuropathy, depression/anxiety, GINI, BPH, obesity

## 2022-07-16 NOTE — PROGRESS NOTES
07/16/22 1000   Quick Adds   Type of Visit Initial Occupational Therapy Evaluation   Living Environment   People in Home alone   Current Living Arrangements apartment   Home Accessibility stairs to enter home   Number of Stairs, Main Entrance 6   Stair Railings, Main Entrance railing on right side (ascending)   Transportation Anticipated family or friend will provide   Living Environment Comments OT: Pt lives in split level home. Entry from garage, go up 6 stairs up, rail on one side on R side.  Pt's sons are 22 and 24, live nearby. Bathroom set up: high ledge tubshower and standard toilet. Bedroom set up: usually sleeps in a recliner. Plans to sleep on standard bed. Son will provide support upon discharge, works from home and will be available to assist.   Self-Care   Usual Activity Tolerance moderate   Current Activity Tolerance fair   Regular Exercise Yes   Activity/Exercise Type strength training   Exercise Amount/Frequency 3-5 times/wk   Equipment Currently Used at Home walker, rolling;cane, quad   Fall history within last six months yes   Number of times patient has fallen within last six months 9   Activity/Exercise/Self-Care Comment OT:  Pt was I , then using quad cane , then fww PTA but having more and more difficulty with balance, mobility due to weakness, sensory changes.  PT was working out at the health club. liked weights, leg press, walkng, bikes prior to having leg problems. . Had swollen feet from dialysis.  Pt likes hunting, fishing, and was working from home as an .  Has 2 level apartment.  office was downstairs and where he typically slept, but then moved recliner up to upper level, and bathroom/bed on upper level.  Plans to move 9/1/22 to a rambler nearby.   Instrumental Activities of Daily Living (IADL)   Previous Responsibilities meal prep;housekeeping;laundry;shopping;driving   Post-Acute Assessment Only   Post-Acute Functional Assessment See IP Rehab Daily Documentation  Flowsheet for Functional Mobility/ADL Assessment   Previous Level of Function/Home Environm   Bathing/Grooming, Premorbid Functional Level independent   Dressing, Premorbid Functional Level independent   Eating/Feeding, Premorbid Functional Level independent   Toileting, Premorbid Functional Level independent   BADLs, Premorbid Functional Level independent   IADLs, Premorbid Functional Level independent   Bed Mobility, Premorbid Functional Level independent   Transfers, Premorbid Functional Level independent   Household Ambulation, Premorbid Functional Level independent   Stairs, Premorbid Functional Level independent   Community Ambulation, Premorbid Functional Level uses device or equipment   Functional Cognition, Premorbid Functional Level No deficits   General Information   Onset of Illness/Injury or Date of Surgery 06/24/22   Referring Physician Antonio Loo, DO   Patient/Family Therapy Goal Statement (OT) Pt goals to return home and be more IND.   Additional Occupational Profile Info/Pertinent History of Current Problem Shay Jimenez is a 57 year old right hand dominant male with a past medical history of ESRD on HD, GERD, hyperlipidemia, peripheral neuropathy, depression/anxiety, GINI, BPH, obesity who was admitted on 6/24/22 with thoracic compressive myelopathy and concern for discitis/vertebral osteomyelitis now s/p C6-T6 fusion and T2-T3 decompression on 6/27/22 with post-op course complicated by hypotension, pain, anemia, encephalopathy, and constipation.  He is now admitted to ARU on 7/15/22 for multidisciplinary rehabilitation and ongoing medical management.   Performance Patterns (Routines, Roles, Habits) Pt working full time, IND ADL/IADL, driving. Reports recent decline in function just prior to acute hospital admission.   Existing Precautions/Restrictions fall;brace worn when out of bed;spinal;oxygen therapy device and L/min   Limitations/Impairments insensate body part;sensory   Left  "Upper Extremity (Weight-bearing Status) other (see comments)  (spinal)   Right Upper Extremity (Weight-bearing Status) other (see comments)  (spinal)   Left Lower Extremity (Weight-bearing Status) full weight-bearing (FWB)   Right Lower Extremity (Weight-bearing Status) full weight-bearing (FWB)   Cognitive Status Examination   Orientation Status orientation to person, place and time   Cognitive Status Comments Reports mild concerns from surgical anesthesia \"feeling foggy\".   Visual Perception   Visual Impairment/Limitations corrective lenses for reading   Visual Acuity Reports declining acuity with far space.   Sensory   Sensory Quick Adds Sharp dull discrimination;Light touch   Sensory Comments PT sensory testing indicates insenate BLE. Reports mild numbness in L 5th digit/forearm from previous arterial removal for LUE fistula.   Pain Assessment   Patient Currently in Pain Yes, see Vital Sign flowsheet   Integumentary/Edema   Integumentary/Edema Comments Cervical/thoracic incision, closed, steristrips.   Posture   Posture forward head position;protracted shoulders   Range of Motion Comprehensive   Comment, General Range of Motion BUE AROM WFL, limited some overhead reaching from cervical/thoracic spinal precautions. Limited L wrist AROM due to recent fx Jan 2021 after a fall.   Strength Comprehensive (MMT)   Comment, General Manual Muscle Testing (MMT) Assessment BUE strength 4/5 grossly, limited MMT due to spinal precautions.   Muscle Tone Assessment   Muscle Tone Quick Adds No deficits were identified   Coordination   Functional Limitations Fine motor ADL performance impaired;Decreased speed   Coordination Comments Noting slower coordination with ADL tasks (clipping nails, shaving, opening toothpaste).   Bed Mobility   Comment (Bed Mobility) Max A supine <> EOB   Transfers   Transfer Comments Min-Mod A SPT FWW, unsteady BLE.   Balance   Balance Assessment sitting balance: static;sitting balance: dynamic;standing " balance: static;standing balance: dynamic;system impairment contributing to balance disturbance   Balance Comments Able to sit unsupported with supervision   Clinical Impression   Criteria for Skilled Therapeutic Interventions Met (OT) Yes, treatment indicated   OT Diagnosis Decreased IND ADLs/IADLs   Influenced by the following impairments Impaired BLE sensation/coordination, impaired balance, post surgical precautions, pain, fatigue, weakness.   OT Problem List-Impairments impacting ADL activity tolerance impaired;balance;coordination;mobility;strength;pain;post-surgical precautions;postural control;sensory feedback   ADL comments/analysis Performance with ADLs/IADLs is below baseline   Assessment of Occupational Performance 5 or more Performance Deficits   Identified Performance Deficits Performance deficits include mobility, transfers, grooming, bathing, dressing, toileting, meal prep, household management, community transportation, work.   Planned Therapy Interventions (OT) ADL retraining;IADL retraining;strengthening;transfer training;progressive activity/exercise;home program guidelines   Clinical Decision Making Complexity (OT) high complexity   Anticipated Equipment Needs Upon Discharge (OT) dressing equipment;commode chair;bathing equipment;reacher;tub bench;walker, rolling;other (see comments)  (transport w/c)   Risk & Benefits of therapy have been explained risks/benefits reviewed;current/potential barriers reviewed;participants voiced agreement with care plan;participants included;patient   Clinical Impression Comments The pt is a 57 yr old male admitted to IP ARU following hospital stay for spinal fusion due to arthritis of thoracic spine with cord compression. Pt performance with ADLs/IADLs is below baseline impacted by performance deficits listed above. Goals to advance to Mod IND simple IADLs, anticipate pt will require increased assistance IADLs. The pt will benefit from skilled OT intervention to  address goals in POC and maximize functional IND and safety in discharge environment. ELOS 2-3 weeks with goals to return home with HC OT.   Total Evaluation Time (Minutes)   Total Evaluation Time (Minutes) 30   OT Goals   Therapy Frequency (OT) Daily   OT Predicted Duration/Target Date for Goal Attainment 08/02/22   OT Goals Hygiene/Grooming;Upper Body Dressing;Lower Body Dressing;Upper Body Bathing;Lower Body Bathing;Bed Mobility;Transfers;Toilet Transfer/Toileting;Meal Preparation;Home Management;Cognition;Aerobic Activity;OT Goal 1   OT: Hygiene/Grooming independent   OT: Upper Body Dressing Modified independent   OT: Lower Body Dressing Modified independent   OT: Upper Body Bathing Supervision/stand-by assist   OT: Lower Body Bathing Supervision/stand-by assist   OT: Bed Mobility Modified independent   OT: Transfer Modified independent   OT: Toilet Transfer/Toileting Modified independent   OT: Meal Preparation Supervision/stand-by assist;ambulatory level;with simple meal preparation   OT: Home Management Supervision/stand-by assist;ambulatory level;with light demand household tasks   OT: Cognitive Patient/caregiver will verbalize understanding of cognitive assessment results/recommendations as needed for safe discharge planning   OT: Goal 1 The pt will demo SBA shower transfer using DME/AE prn

## 2022-07-16 NOTE — PLAN OF CARE
Goal Outcome Evaluation:    Plan of Care Reviewed With: patient     Please see full RDN progress note for interventions and goals.    Ansley Richey, MPH, RDN, LD  On Call Pager (weekends only): [phone]
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
## 2022-07-16 NOTE — PLAN OF CARE
Discharge Planner Post-Acute Rehab PT:     Discharge Plan: Apartment, stairs to upper level or lower level, 6, rail on one side. Home care PT,  2 sons to assist.     Precautions: falls, spinal, brace when out of bed (CTLO) , poor sensation B feet.     Current Status:  Bed Mobility: max A of 1 , logroll  Transfer: stand pivot with fww A of 1, modA .   Gait: Amb with fww 15 ft, Pinky and another helper for wc follow.   Stairs: NT   Balance: sitting EOB sba, standing with fww and cga to Pinky.     Assessment:  PT: Pt with complex medical history with h/o HD, neuropathy, and with 6 month recent h/o cervical myelopathy, s/p C6-T6 fusion, T2-T3 decompression, with LE weakness, significant impaiment in proprioception and sensation in B feet, decreased activity tolerance with low BPs, pain, and decreased activity tolerance.  Pt states his 2  sons can help him at home.  Pt has stairs into home, and his wife is not going to be living with him or taking care of him at discharge per chart.  Pt will likely need home care PT at discharge.  Pt may need a transport w/c for longer distance mobility at discharge.    Other Barriers to Discharge (DME, Family Training, etc):   - stairs  - history of recurrent falls  - may need sons for family training.

## 2022-07-16 NOTE — PROGRESS NOTES
07/16/22 0807   Quick Adds   Type of Visit Initial PT Evaluation   Living Environment   People in Home alone   Current Living Arrangements apartment   Home Accessibility stairs to enter home   Number of Stairs, Main Entrance 6   Stair Railings, Main Entrance railing on right side (ascending)   Transportation Anticipated agency   Living Environment Comments PT: from garage, go up 6 stairs up, rail on one side on R side.  Pt's sons are 22 and 24, live nearby.   Self-Care   Usual Activity Tolerance moderate   Current Activity Tolerance fair   Regular Exercise Yes   Activity/Exercise Type strength training   Exercise Amount/Frequency 3-5 times/wk   Equipment Currently Used at Home walker, rolling;cane, quad   Fall history within last six months yes   Number of times patient has fallen within last six months 9   Activity/Exercise/Self-Care Comment PT; Pt was I , then using quad cane , then fww PTA but having more and more difficulty with balance, mobility due to weakness, sensory changes.  PT was working out at the health club. liked weights, leg press, walkng, bikes prior to having leg problems. . Had swollen feet from dialysis.  Pt likes hunting, fishing, and was working from home as an .  Has 2 level apartment.  office was downstairs and where he typically slept, but then moved recliner up to upper level, and bathroom/bed on upper level.  Plans to move 9/1/22 to a rambler nearby.   Post-Acute Assessment Only   Post-Acute Functional Assessment See IP Rehab Daily Documentation Flowsheet for Functional Mobility/ADL Assessment   General Information   Onset of Illness/Injury or Date of Surgery 06/27/22   Referring Physician CELSO Cabezas and Cinda DO   Patient/Family Therapy Goals Statement (PT) PT: Pt states he wants to get to bathroom, sit on toilet on his own.Pt wants to walk eventually without a walker.   Pertinent History of Current Problem (include personal factors and/or comorbidities that  impact the POC) Shay Jimenez is a 57-year-old male with a history of ESRD on HD who presented on 6/24/2022 with a 6-month history of progressive upper back pain with worsening lower extremity weakness and gait disturbances leading to falls.  He had been seen by orthopedics 2 to 3 months prior to evaluate his back pain and he was found to have T2-T3 compression fractures.  On admission to the hospital, a CT of the thoracic spine revealed a worsening collapse of the T2 and T3 vertebral bodies, facet degeneration, endplate erosion, spondylolisthesis and stenosis as compared to prior imaging with concern for discitis/osteomyelitis and possible epidural abscess.  He was also found to have abnormal cord signal suggestive of compressive myelopathy.  The patient ultimately underwent C6-T6 fusion and T2-T3 decompression, and he was started on perioperative Ancef, with cultures consistent with P acne which was thought to possibly be a contaminant.   Existing Precautions/Restrictions fall;oxygen therapy device and L/min   Cognition   Affect/Mental Status (Cognition) WNL   Cognitive Status Comments alert, O x 4   Pain Assessment   Patient Currently in Pain Yes, see Vital Sign flowsheet  (intermittent neck pain)   Integumentary/Edema   Integumentary/Edema Comments has fistula for HD L UE , B knee scabs  , R metatarsal callous R foot.  discoloration/flaky skin B LEs.   Range of Motion (ROM)   ROM Comment B LEs WFL   Left Hip (Manual Muscle Testing)   Hip Flexion - Left Side (2/5) poor, left   Hip ABduction - Left Side (2/5) poor, left   Hip ADduction - Left Side (2/5) poor, left   Right Hip (Manual Muscle Testing)   Hip Flexion - Right Side (2/5) poor, right   Hip ABduction - Right Side (2/5) poor, right   Hip ADduction - Right Side (2/5) poor, right   Left Knee (Manual Muscle Testing)   Knee Flexion - Left Side (4/5) good, left   Knee Extension - Left Side (3+/5) fair plus, left   Right Knee (Manual Muscle Testing)   Knee  Flexion - Right Side (4/5) good, right   Knee Extension - Right Side (4/5) good, right   Left Ankle/Foot (Manual Muscle Testing)   Ankle Dorsiflexion - Left Side (3+/5) fair plus, left   Ankle Plantarflexion - Left Side (2+/5) poor plus, left   Right Ankle/Foot (Manual Muscle Testing)   Ankle Dorsiflexion - Right Side (3+/5) fair plus, right   Ankle Plantarflexion - Right Side (2+/5) poor plus, right   Balance   Balance other (describe)   Sit-to-Stand Balance fair balance   Standing Balance: Static poor balance   Standing Balance: Dynamic poor balance   Systems Impairment Contributing to Balance Disturbance somatosensory;neuromuscular   Identified Impairments Contributing to Balance Disturbance decreased strength;impaired sensory feedback;decreased sensation   Balance Comments PT sitting EOB sba, no lob, with some UE movements to help don brace.   Standing impaired, needs B UEs support on fww, Pinky at times iwth static standing, slight lean forward at times. Amb with Pinky, antoher helper for wc follow   Sensory Examination   Sensory Perception other (describe)   Sensory Perception Comments PT: pt with severely impaired light touch, lacks protective sensation B feet.  Pt can feel some pressure B feet, but not light touch. Pt with sever proprioceptive loss B great toes.   Coordination   Coordination other (see comments)   Muscle Tone   Muscle Tone other (see comments)   Muscle Tone Comments pt reports occasional spasms at the hospital.   Clinical Impression   Criteria for Skilled Therapeutic Intervention Yes, treatment indicated   PT Diagnosis (PT) PT: Pt with impaired mobility, ADLs due to debility, incomplete SCI   Influenced by the following impairments PT; Pt with longstanding impaired sensation B LEs, lacking protective sensation in B feet, decreased strength B LEs especially proximally, decreased standing balance and decreased activity tolerance.  Pt also with pain in spine.   Functional limitations due to  impairments PT: pt with difficulty with bed mobility, transfers, and gait.  Stairs NT  today , and pt unable to participate in work duties or community mobility at this time.   Clinical Presentation (PT Evaluation Complexity) Evolving/Changing   Clinical Presentation Rationale Pt with multiple medical complexities, stairs. history of falls.   Clinical Decision Making (Complexity) moderate complexity   Planned Therapy Interventions (PT) balance training;bed mobility training;gait training;home exercise program;lumbar stabilization;neuromuscular re-education;patient/family education;postural re-education;orthotic fitting/training;stair training;strengthening;stretching;transfer training;progressive activity/exercise;risk factor education;home program guidelines   Anticipated Equipment Needs at Discharge (PT) wheelchair   Risk & Benefits of therapy have been explained evaluation/treatment results reviewed;care plan/treatment goals reviewed;risks/benefits reviewed;patient   Clinical Impression Comments PT: Pt with complex medical history with h/o HD, neuropathy, and with 6 month recent h/o cervical myelopathy, s/p C6-T6 fusion, T2-T3 decompression, with LE weakness, significant impaiment in proprioception and sensation in B feet, decreased activity tolerance with low BPs, pain, and decreased activity tolerance.  Pt states his son can help him at home.  Pt has stairs into home, and his wife is not going to be living with him or taking care of him at discharge per chart.  Pt will likely need home care PT at discharge.  Pt may need a transport w/c for longer distance mobility at discharge.   Plan of Care Review   Plan of Care Reviewed With patient   Total Evaluation Time   Total Evaluation Time (Minutes) 25   Physical Therapy Goals   PT Frequency Daily   PT Predicted Duration/Target Date for Goal Attainment 08/02/22   PT: Bed Mobility Modified independent;Supine to/from sit;Within precautions;Rolling;Bridging   PT:  Transfers Modified independent;Sit to/from stand;Bed to/from chair;Assistive device;Within precautions  (fww)   PT: Gait Modified independent;Rolling walker;100 feet   PT: Stairs 8 stairs;Supervision/stand-by assist;Rail on right;Assistive device  (quad cane)   PT: Perform aerobic activity with stable cardiovascular response intermittent activity;5 minutes;ambulation   PT: Goal 1 Pt able to perform a car transfer with fww and sba   PT: Goal 2 Pt able to perform a HEP Natalia for LE strengthening for improved function at home.   PT: Goal 3 Pt able to state 3 fall prevention strategies after participating in fall prevention training to reduce fall risk at home.   Psychosocial Support   Family/Support System Care involvement promoted

## 2022-07-16 NOTE — PLAN OF CARE
FOCUS/GOAL  Bowel management, Bladder management, Skin integrity and Cognition/Memory/Judgment/Problem solving    ASSESSMENT, INTERVENTIONS AND CONTINUING PLAN FOR GOAL:  Pt is alert and oriented, denied pain, sob, fever, chills, n/v, or new numbness and tingling. Fluids promoted. BP is low but baseline since admission for pt. No urine output overnight due to HD, fistula intact with thrill and bruit. Pt was observed having apneic breathing SPO2 was monitored during sleeping and was found to need O2 at 2 LPM to maintain O2 sat above 90%. Numbness present in BLE. Spinal incision is SOSA, no further care concerns at this time.        Goal Outcome Evaluation: Ongoing
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
## 2022-07-16 NOTE — PLAN OF CARE
Goal Outcome Evaluation:    Plan of Care Reviewed With: patient     Overall Patient Progress: no change    Outcome Evaluation: Pt admitted this afternoon, nursing goals are just generated.    Oriented to unit and routines. VSS.

## 2022-07-16 NOTE — PLAN OF CARE
Goal Outcome Evaluation:        Patient alert and oriented x4. Regular diet, thins, pills whole. A1 pivot. LUE fistula, bruit and thrill present. Continent of bowel, LBM: 7/13. Pt on HD on MWF, not producing urine. Pt up in chair most of day, brace on at this time. UTV surgical incisional sites. Pt BP stable throughout day, given scheduled midodrine. Pt denies pain during shift. Will continue with POC.

## 2022-07-16 NOTE — PLAN OF CARE
Goal Outcome Evaluation:    Plan of Care Reviewed With: patient     Pt at dialysis most of day shift. Upon return, pt A&Ox4; continues to be hypotensive but remains asymptomatic. Able to finish dialysis run. Received midodrine as ordered. CMS intact; denies any numbness or tingling this shift. Incision on back CDI/steri-strips intact/ SOSA. Pain managed with PO analgesics. Tolerating PO; up with assist of 2/Irina-Steady with brace on. Discharge orders received/ pt updated. IV removed; all belongings returned to pt; pt discharged from floor via w/c accompanied by [facility] transpot; Destination [facility].

## 2022-07-16 NOTE — PROGRESS NOTES
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      Interim history   last hemodialysis done yesterday complicated by hypotension and he was treated with midodrine before during the run.  Currently he is not on fluid restriction.  At this point he denies any lightheadedness while sitting, asked the patient to closely monitor his symptoms when working with therapies.  Recommend therapies check his blood pressures.    From the functional perspective, he is participating in therapies, he appears to be motivated.  Full formal evaluations by PT and OT today.    Medically,   T2-T3 compression fractures, facet degeneration, endplate erosion, spondylolisthesis and stenosis status post T2-T3 decompression and C6 to T6 fusion.    Continue to 4-week prophylactic dose of amoxicillin.    Pain: on gabapentin, Cymbalta, hydroxyzine, Dilaudid, for pain control.  Patient reports that he does not have any pain at the time of the encounter was in a seated position.    Monitor closely for the orthostatics, hemodialysis 3 times a week.  Sodium levels remain low at 129.  We will continue to monitor    Orders Placed This Encounter      Combination Diet Regular Diet; Thin Liquids (level 0)        Review Of Systems  Total of ten systems reviewed, pertinent positives and negatives as follows  Denies chest pain fever chills rigors  Denies any shortness of breath   Denies any nausea or vomiting   Appetite good  Denies any lightheadedness or dizziness   Orders Placed This Encounter      Combination Diet Regular Diet; Thin Liquids (level  "0)  Denies any pain    Denies any sob or cough   Denies any new rashes   Mood euphoric   Slept well last night   Remainder of the review of the systems was negative        /50 (BP Location: Right arm)   Pulse 74   Temp (!) 96.4  F (35.8  C) (Oral)   Resp 20   Ht 1.854 m (6' 1\")   Wt 139.8 kg (308 lb 3.3 oz)   SpO2 96%   BMI 40.66 kg/m    Physical exam    Patient is sitting in chair comfortable in no acute distress   HEENT NC AT PRTL EOM good   Neck supple  Heart S1S2  Lungs CTA  Abdomen  benign BS positive NT NR   LE mild edema         Current Facility-Administered Medications   Medication     acetaminophen (TYLENOL) tablet 325-650 mg     ALPRAZolam (XANAX) tablet 1 mg     amoxicillin (AMOXIL) capsule 500 mg     atorvastatin (LIPITOR) tablet 20 mg     bisacodyl (DULCOLAX) suppository 10 mg     calcium carbonate (TUMS) chewable tablet 1,000 mg     cetirizine (zyrTEC) tablet 10 mg     cyanocobalamin (VITAMIN B-12) tablet 500 mcg     cyclobenzaprine (FLEXERIL) tablet 10 mg     DULoxetine (CYMBALTA) DR capsule 30 mg     finasteride (PROSCAR) quarter-tab 1.25 mg     fluticasone (FLONASE) 50 MCG/ACT spray 1 spray     gabapentin (NEURONTIN) capsule 100 mg     HYDROmorphone (DILAUDID) tablet 2-4 mg     hydrOXYzine (ATARAX) tablet 25 mg     melatonin tablet 1 mg     midodrine (PROAMATINE) tablet 10 mg     multivitamin RENAL (RENAVITE RX/NEPHROVITE) tablet 1 tablet     naloxone (NARCAN) injection 0.2 mg    Or     naloxone (NARCAN) injection 0.4 mg    Or     naloxone (NARCAN) injection 0.2 mg    Or     naloxone (NARCAN) injection 0.4 mg     pantoprazole (PROTONIX) EC tablet 40 mg     polyethylene glycol (MIRALAX) powder 17 g     pyridOXINE (VITAMIN B6) tablet 100 mg     sertraline (ZOLOFT) tablet 100 mg     sevelamer carbonate (RENVELA) tablet 1,600 mg        Labs:  Lab Results   Component Value Date    WBC 7.7 07/16/2022    HGB 7.7 (L) 07/16/2022    HCT 25.1 (L) 07/16/2022     07/16/2022     (L) " 07/16/2022    POTASSIUM 4.5 07/16/2022    CHLORIDE 93 (L) 07/16/2022    CO2 26 07/16/2022    BUN 35 (H) 07/16/2022    CR 6.52 (H) 07/16/2022    GLC 94 07/16/2022    AST 13 07/16/2022    ALT <6 07/16/2022    ALKPHOS 134 07/16/2022    BILITOTAL 0.5 07/16/2022    INR 1.07 06/27/2022       Attestation:  This patient has been seen and evaluated by me, Shannon Cabrera MD.    Total time: 35 minutes more than 50% in Care coordination on the floor/ counseling the patient face to face   Shannon Cabrera MD, [facility]   Department of Rehabilitation

## 2022-07-17 ENCOUNTER — APPOINTMENT (OUTPATIENT)
Dept: OCCUPATIONAL THERAPY | Facility: CLINIC | Age: 57
DRG: 052 | End: 2022-07-17
Attending: PHYSICAL MEDICINE & REHABILITATION
Payer: MEDICARE

## 2022-07-17 ENCOUNTER — APPOINTMENT (OUTPATIENT)
Dept: PHYSICAL THERAPY | Facility: CLINIC | Age: 57
DRG: 052 | End: 2022-07-17
Attending: PHYSICAL MEDICINE & REHABILITATION
Payer: MEDICARE

## 2022-07-17 PROCEDURE — 97110 THERAPEUTIC EXERCISES: CPT | Mod: GP | Performed by: PHYSICAL THERAPIST

## 2022-07-17 PROCEDURE — 97110 THERAPEUTIC EXERCISES: CPT | Mod: GO

## 2022-07-17 PROCEDURE — 250N000013 HC RX MED GY IP 250 OP 250 PS 637: Performed by: PHYSICIAN ASSISTANT

## 2022-07-17 PROCEDURE — 97530 THERAPEUTIC ACTIVITIES: CPT | Mod: GP | Performed by: PHYSICAL THERAPIST

## 2022-07-17 PROCEDURE — 128N000003 HC R&B REHAB

## 2022-07-17 PROCEDURE — 97116 GAIT TRAINING THERAPY: CPT | Mod: GP | Performed by: PHYSICAL THERAPIST

## 2022-07-17 PROCEDURE — 97535 SELF CARE MNGMENT TRAINING: CPT | Mod: GO | Performed by: OCCUPATIONAL THERAPIST

## 2022-07-17 RX ADMIN — PANTOPRAZOLE SODIUM 40 MG: 40 TABLET, DELAYED RELEASE ORAL at 06:24

## 2022-07-17 RX ADMIN — GABAPENTIN 100 MG: 100 CAPSULE ORAL at 21:52

## 2022-07-17 RX ADMIN — MIDODRINE HYDROCHLORIDE 10 MG: 5 TABLET ORAL at 18:44

## 2022-07-17 RX ADMIN — CETIRIZINE HYDROCHLORIDE 10 MG: 5 TABLET ORAL at 07:48

## 2022-07-17 RX ADMIN — DULOXETINE HYDROCHLORIDE 30 MG: 30 CAPSULE, DELAYED RELEASE ORAL at 07:48

## 2022-07-17 RX ADMIN — GABAPENTIN 100 MG: 100 CAPSULE ORAL at 13:03

## 2022-07-17 RX ADMIN — ATORVASTATIN CALCIUM 20 MG: 20 TABLET, FILM COATED ORAL at 21:52

## 2022-07-17 RX ADMIN — Medication 1 TABLET: at 07:48

## 2022-07-17 RX ADMIN — MIDODRINE HYDROCHLORIDE 10 MG: 5 TABLET ORAL at 13:03

## 2022-07-17 RX ADMIN — HYDROMORPHONE HYDROCHLORIDE 4 MG: 2 TABLET ORAL at 21:51

## 2022-07-17 RX ADMIN — SEVELAMER CARBONATE 1600 MG: 800 TABLET, FILM COATED ORAL at 13:03

## 2022-07-17 RX ADMIN — Medication 1.25 MG: at 07:49

## 2022-07-17 RX ADMIN — AMOXICILLIN 500 MG: 500 CAPSULE ORAL at 18:44

## 2022-07-17 RX ADMIN — ACETAMINOPHEN 650 MG: 325 TABLET, FILM COATED ORAL at 13:03

## 2022-07-17 RX ADMIN — SERTRALINE HYDROCHLORIDE 100 MG: 100 TABLET ORAL at 07:48

## 2022-07-17 RX ADMIN — Medication 100 MG: at 07:48

## 2022-07-17 RX ADMIN — FLUTICASONE PROPIONATE 1 SPRAY: 50 SPRAY, METERED NASAL at 07:49

## 2022-07-17 RX ADMIN — SEVELAMER CARBONATE 1600 MG: 800 TABLET, FILM COATED ORAL at 07:48

## 2022-07-17 RX ADMIN — CYANOCOBALAMIN TAB 500 MCG 500 MCG: 500 TAB at 07:48

## 2022-07-17 RX ADMIN — GABAPENTIN 100 MG: 100 CAPSULE ORAL at 07:49

## 2022-07-17 ASSESSMENT — ACTIVITIES OF DAILY LIVING (ADL)
ADLS_ACUITY_SCORE: 42

## 2022-07-17 NOTE — PLAN OF CARE
Goal Outcome Evaluation:        Patient alert and oriented x4. Regular diet, thins, pills whole. A1 pivot. LUE fistula, bruit and thrill present. Continent of bowel, LBM: 7/16. Pt on HD on MWF, not producing urine. Pt up in chair most of day, brace on at this time. Surgical incisional sites approximated with steri strips.. Pt BP stable throughout day, held morning midodrine but given in the afternoon as scheduled. Pt had shower with OT in the morning. Pt c/o pain in head this afternoon, given prn tylenol. Will continue with POC.

## 2022-07-17 NOTE — PLAN OF CARE
Discharge Planner Post-Acute Rehab PT:     Discharge Plan: Apartment, stairs to upper level or lower level, 6, rail on one side. Home care PT,  2 sons to assist (wife will not be there or be available to help per chart). .     Precautions: falls, spinal, brace when out of bed (CTLO) , poor sensation B feet.     Current Status:  Bed Mobility: max A of 1 , logroll  Transfer: stand pivot with fww A of 1, modA .   Gait: Amb with fww  15 to 40  ft, Pinky and another helper for wc follow.   Stairs: NT   Balance: sitting EOB sba, standing with fww and cga to Pinky.     Assessment: Pt is making progress with increased gait distance today and increased tolerance for sitting in w/c , and OOB.  Pt needing 2 L O 2 in am, but tolerated PT session with transfers on RA. PT will continue to assess pt's O2 needs with activity.  Cont toward goals.               Other Barriers to Discharge (DME, Family Training, etc):   - stairs  - history of recurrent falls  - may need sons for family training.

## 2022-07-17 NOTE — PLAN OF CARE
FOCUS/GOAL  Bowel management, Bladder management, Pain management and Cognition/Memory/Judgment/Problem solving    ASSESSMENT, INTERVENTIONS AND CONTINUING PLAN FOR GOAL:  Pt is alert and oriented, denied pain, sob, fever, chills, n/v, or new numbness and tingling. Sleeping throughout the night, frequently removing O2 during sleep. Fistula with thrill and bruit. Self weight shifting in bed. No further care concerns at this time continue with POC.       Goal Outcome Evaluation: No change
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
## 2022-07-17 NOTE — PROGRESS NOTES
ORIENTATION: A/O x4  TRANSFERS/AMBULATION: A1-2 SPT walker, GB  DIET: reg/thin/whole. Nausea in evening. Declined intervention and dinner; renvela held. Declined snack with abx. Nausea relieved. Pt states that he wants to lose weight at ARU. Education provided.  BOWEL/BLADDER: cont B/B; 7/16. Pt does not produce urine, hemodialysis  PAIN: SOB upon exertion. Pain to neck and upper back. Prn dilaudid admin x1; effective  LDA: none  SKIN: scabs, incision, bilat knee scab. Fistula to LUE. Declined micatin powder to groin.  Other: patient requested 02 at [facility]. Does not have at home. sats at HS 88% RA. Up to 92% 2L via NC. Sticky left to request overnight oximeter prior to discharge

## 2022-07-17 NOTE — PLAN OF CARE
Goal Outcome Evaluation:    Plan of Care Reviewed With: patient     Overall Patient Progress: improving    Outcome Evaluation: Pt denies pain this shift. He uses the call light appropriately for assistance and continues to reposition himself independently, no new skin concerns noted.

## 2022-07-18 ENCOUNTER — APPOINTMENT (OUTPATIENT)
Dept: OCCUPATIONAL THERAPY | Facility: CLINIC | Age: 57
DRG: 052 | End: 2022-07-18
Attending: PHYSICAL MEDICINE & REHABILITATION
Payer: MEDICARE

## 2022-07-18 ENCOUNTER — APPOINTMENT (OUTPATIENT)
Dept: PHYSICAL THERAPY | Facility: CLINIC | Age: 57
DRG: 052 | End: 2022-07-18
Attending: PHYSICAL MEDICINE & REHABILITATION
Payer: MEDICARE

## 2022-07-18 LAB
ANION GAP SERPL CALCULATED.3IONS-SCNC: 13 MMOL/L (ref 3–14)
BUN SERPL-MCNC: 53 MG/DL (ref 7–30)
CALCIUM SERPL-MCNC: 9.3 MG/DL (ref 8.5–10.1)
CHLORIDE BLD-SCNC: 88 MMOL/L (ref 94–109)
CO2 SERPL-SCNC: 21 MMOL/L (ref 20–32)
CREAT SERPL-MCNC: 9.55 MG/DL (ref 0.66–1.25)
ERYTHROCYTE [DISTWIDTH] IN BLOOD BY AUTOMATED COUNT: 14.6 % (ref 10–15)
GFR SERPL CREATININE-BSD FRML MDRD: 6 ML/MIN/1.73M2
GLUCOSE BLD-MCNC: 80 MG/DL (ref 70–99)
HCT VFR BLD AUTO: 24.2 % (ref 40–53)
HGB BLD-MCNC: 7.4 G/DL (ref 13.3–17.7)
MCH RBC QN AUTO: 30.6 PG (ref 26.5–33)
MCHC RBC AUTO-ENTMCNC: 30.6 G/DL (ref 31.5–36.5)
MCV RBC AUTO: 100 FL (ref 78–100)
PHOSPHATE SERPL-MCNC: 6.9 MG/DL (ref 2.5–4.5)
PLATELET # BLD AUTO: 266 10E3/UL (ref 150–450)
POTASSIUM BLD-SCNC: 5.2 MMOL/L (ref 3.4–5.3)
RBC # BLD AUTO: 2.42 10E6/UL (ref 4.4–5.9)
SODIUM SERPL-SCNC: 122 MMOL/L (ref 133–144)
WBC # BLD AUTO: 7.8 10E3/UL (ref 4–11)

## 2022-07-18 PROCEDURE — 82310 ASSAY OF CALCIUM: CPT | Performed by: PHYSICIAN ASSISTANT

## 2022-07-18 PROCEDURE — 97116 GAIT TRAINING THERAPY: CPT | Mod: GP

## 2022-07-18 PROCEDURE — 36415 COLL VENOUS BLD VENIPUNCTURE: CPT | Performed by: PHYSICIAN ASSISTANT

## 2022-07-18 PROCEDURE — 258N000003 HC RX IP 258 OP 636

## 2022-07-18 PROCEDURE — 83970 ASSAY OF PARATHORMONE: CPT | Performed by: PHYSICIAN ASSISTANT

## 2022-07-18 PROCEDURE — 250N000013 HC RX MED GY IP 250 OP 250 PS 637: Performed by: PHYSICIAN ASSISTANT

## 2022-07-18 PROCEDURE — 97110 THERAPEUTIC EXERCISES: CPT | Mod: GP

## 2022-07-18 PROCEDURE — 250N000011 HC RX IP 250 OP 636

## 2022-07-18 PROCEDURE — 90937 HEMODIALYSIS REPEATED EVAL: CPT

## 2022-07-18 PROCEDURE — 85027 COMPLETE CBC AUTOMATED: CPT | Performed by: PHYSICIAN ASSISTANT

## 2022-07-18 PROCEDURE — 5A1D70Z PERFORMANCE OF URINARY FILTRATION, INTERMITTENT, LESS THAN 6 HOURS PER DAY: ICD-10-PCS | Performed by: PHYSICAL MEDICINE & REHABILITATION

## 2022-07-18 PROCEDURE — 97535 SELF CARE MNGMENT TRAINING: CPT | Mod: GO

## 2022-07-18 PROCEDURE — 84100 ASSAY OF PHOSPHORUS: CPT | Performed by: PHYSICIAN ASSISTANT

## 2022-07-18 PROCEDURE — 99232 SBSQ HOSP IP/OBS MODERATE 35: CPT | Mod: 24 | Performed by: PHYSICIAN ASSISTANT

## 2022-07-18 PROCEDURE — 128N000003 HC R&B REHAB

## 2022-07-18 PROCEDURE — 97530 THERAPEUTIC ACTIVITIES: CPT | Mod: GP

## 2022-07-18 RX ORDER — HEPARIN SODIUM 1000 [USP'U]/ML
500 INJECTION, SOLUTION INTRAVENOUS; SUBCUTANEOUS CONTINUOUS
Status: DISCONTINUED | OUTPATIENT
Start: 2022-07-18 | End: 2022-07-18

## 2022-07-18 RX ADMIN — ACETAMINOPHEN 650 MG: 325 TABLET, FILM COATED ORAL at 09:41

## 2022-07-18 RX ADMIN — HYDROXYZINE HYDROCHLORIDE 25 MG: 25 TABLET ORAL at 08:07

## 2022-07-18 RX ADMIN — Medication 1 TABLET: at 08:03

## 2022-07-18 RX ADMIN — HYDROMORPHONE HYDROCHLORIDE 4 MG: 2 TABLET ORAL at 18:28

## 2022-07-18 RX ADMIN — ATORVASTATIN CALCIUM 20 MG: 20 TABLET, FILM COATED ORAL at 21:10

## 2022-07-18 RX ADMIN — CETIRIZINE HYDROCHLORIDE 10 MG: 5 TABLET ORAL at 08:02

## 2022-07-18 RX ADMIN — SODIUM CHLORIDE 300 ML: 9 INJECTION, SOLUTION INTRAVENOUS at 12:58

## 2022-07-18 RX ADMIN — GABAPENTIN 100 MG: 100 CAPSULE ORAL at 08:02

## 2022-07-18 RX ADMIN — ALPRAZOLAM 1 MG: 1 TABLET ORAL at 21:10

## 2022-07-18 RX ADMIN — Medication 1.25 MG: at 08:10

## 2022-07-18 RX ADMIN — MIDODRINE HYDROCHLORIDE 10 MG: 5 TABLET ORAL at 08:02

## 2022-07-18 RX ADMIN — PANTOPRAZOLE SODIUM 40 MG: 40 TABLET, DELAYED RELEASE ORAL at 06:04

## 2022-07-18 RX ADMIN — MIDODRINE HYDROCHLORIDE 10 MG: 5 TABLET ORAL at 11:41

## 2022-07-18 RX ADMIN — GABAPENTIN 100 MG: 100 CAPSULE ORAL at 21:10

## 2022-07-18 RX ADMIN — HEPARIN SODIUM 500 UNITS: 1000 INJECTION INTRAVENOUS; SUBCUTANEOUS at 13:00

## 2022-07-18 RX ADMIN — CYANOCOBALAMIN TAB 500 MCG 500 MCG: 500 TAB at 08:02

## 2022-07-18 RX ADMIN — AMOXICILLIN 500 MG: 500 CAPSULE ORAL at 18:27

## 2022-07-18 RX ADMIN — HYDROXYZINE HYDROCHLORIDE 25 MG: 25 TABLET ORAL at 18:28

## 2022-07-18 RX ADMIN — MIDODRINE HYDROCHLORIDE 10 MG: 5 TABLET ORAL at 18:28

## 2022-07-18 RX ADMIN — HEPARIN SODIUM 500 UNITS/HR: 1000 INJECTION INTRAVENOUS; SUBCUTANEOUS at 13:00

## 2022-07-18 RX ADMIN — SEVELAMER CARBONATE 1600 MG: 800 TABLET, FILM COATED ORAL at 18:27

## 2022-07-18 RX ADMIN — SERTRALINE HYDROCHLORIDE 100 MG: 100 TABLET ORAL at 08:02

## 2022-07-18 RX ADMIN — CYCLOBENZAPRINE HYDROCHLORIDE 10 MG: 5 TABLET, FILM COATED ORAL at 11:41

## 2022-07-18 RX ADMIN — SODIUM CHLORIDE 250 ML: 9 INJECTION, SOLUTION INTRAVENOUS at 13:00

## 2022-07-18 RX ADMIN — Medication 100 MG: at 08:02

## 2022-07-18 RX ADMIN — FLUTICASONE PROPIONATE 1 SPRAY: 50 SPRAY, METERED NASAL at 08:11

## 2022-07-18 RX ADMIN — DULOXETINE HYDROCHLORIDE 30 MG: 30 CAPSULE, DELAYED RELEASE ORAL at 08:02

## 2022-07-18 RX ADMIN — HYDROMORPHONE HYDROCHLORIDE 4 MG: 2 TABLET ORAL at 06:04

## 2022-07-18 ASSESSMENT — ACTIVITIES OF DAILY LIVING (ADL)
ADLS_ACUITY_SCORE: 44
ADLS_ACUITY_SCORE: 44
ADLS_ACUITY_SCORE: 42
ADLS_ACUITY_SCORE: 44
ADLS_ACUITY_SCORE: 44
ADLS_ACUITY_SCORE: 42
ADLS_ACUITY_SCORE: 44
ADLS_ACUITY_SCORE: 42
ADLS_ACUITY_SCORE: 44

## 2022-07-18 NOTE — PLAN OF CARE
Discharge Planner Post-Acute Rehab PT:      Discharge Plan: Apartment, stairs to upper level or lower level, 6, rail on one side. Home care PT,  2 sons to assist (wife will not be there or be available to help per chart). .      Precautions: falls, spinal, brace when out of bed (CTLO) , poor sensation B feet.      Current Status:  Bed Mobility: max A of 1 , logroll  Transfer: stand pivot with fww A of 1, modA .   Gait: Amb with fww  15 to 40  ft, Pinky and another helper for wc follow.   Stairs: NT   Balance: sitting EOB sba, standing with fww and cga to Pinky.      Assessment: Pt responding well w/ STS cueing for efficiency, progress from consistent modA to Pinky w/ practice. 40' gait bout today w/ good tolerance.
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
## 2022-07-18 NOTE — PROGRESS NOTES
HEMODIALYSIS TREATMENT NOTE    Date: 7/18/2022  Time: 6:07 PM    Data:  Pre Wt:  141.3   Desired Wt:136.7   kg   Post Wt:  136.7  Weight change:4.5   kg  Ultrafiltration - Post Run Net Total Removed (mL): 4500 mL  Vascular Access Status: patent  Dialyzer Rinse: Clear  Total Blood Volume Processed: 0 L Liters  Total Dialysis (Treatment) Time: 3.5 Hours    Lab:   No    Interventions:  Heparin bolus and infusion admin as Rx. Total Uf removal: 4.5L. pt tolerated 3.5Hr of HD w/o complications     Assessment:  A&Ox4. HR-RRR. 20 rpm. Lung sounds diminished ant & post BUL/BLL. Edema present in ble. Pt denies complaints since last tx. B/t+. hemostasis obtained within 10 minutes post tx.      Plan:    Follow-up w renal team and PCN

## 2022-07-18 NOTE — PLAN OF CARE
FOCUS/GOAL  Medical management    ASSESSMENT, INTERVENTIONS AND CONTINUING PLAN FOR GOAL:    Patient slept good this shift. Had a large loose incontinent of bm this morning. Denied abdominal pain or nausea. Mod A2 in bed mobility. Complained of neck and back pain, requested for prn dilaudid which was given with relief. Vitals stable this morning. For dialysis today p/u at 12 noon.     Goal Outcome Evaluation: In progress

## 2022-07-18 NOTE — PROGRESS NOTES
Nephrology Progress Note  07/18/2022         Assessment & Recommendations:   Shay Jimenez is a 57 year old right hand dominant male with a past medical history of ESRD on HD, GERD, hyperlipidemia, peripheral neuropathy, depression/anxiety, GINI, BPH, obesity who was admitted on 6/24/22 with thoracic compressive myelopathy and concern for discitis/vertebral osteomyelitis now s/p C6-T6 fusion and T2-T3 decompression on 6/27/22 with post-op course complicated by hypotension, pain, anemia, encephalopathy, and constipation.  He is now admitted to ARU on 7/15/22 for multidisciplinary rehabilitation and ongoing medical management.     #ESRD  - dialyzes MWF at [facility], primary nephrologist Dr. Cline  - access: left AVF, run time 4 hours and 15 minutes  - .6 kg    #BP/Volume  - BP historically low, getting midodrine 10 mg TID, hold with systolic pressure > 110 except just before dialysis  - spoke with his unit, has history of large 4-6 kg interdialytic wt gains.    #Electrolytes:  - K 5.2, sodium 122  - hyponatremia  - encourage fluid restriction of 1.2 liters, correct with HD     #Anemia  - Hgb 7.4  - getting epo 4000 units with HD while inpatient      #Bone/Mineral  - Hectorol 4 mcg with HD  - sevelamer 1600 mg TID with meals, recently increased from 800 mg  - phos 6.9, check PTH      Recommendations were communicated to primary team via this note    JUNAID ELLIOTT, PA-C     Interval History :   Reviewed provider and nursing notes for past 24 hours. Pt seen on HD, stable run. Denies fevers/chills, chest pain, dyspnea. He does have some LE edema. Admits to drinking a large amount of soda yesterday after PT.       Review of Systems:   A 4 point review of systems was negative except as noted above.      Physical Exam:   Vitals: /49  Pulse 77  Resp 16  SpO2 97%  Wt 141.3 kg  GENERAL APPEARANCE: alert and no distress  EYES:  No scleral icterus, pupils equal  HENT: mouth without ulcers or  lesions  PULM: lungs clear to auscultation, equal air movement, no cyanosis or clubbing  CV: regular rhythm, normal rate, no rub     -edema 2+ LE   GI: soft, nontender  MS: no evidence of inflammation in joints, no muscle tenderness  NEURO: mentation intact and speech normal, no asterixis   Access LUE AVF     Labs:   All labs reviewed by me  Electrolytes/Renal - Recent Labs   Lab Test 07/18/22  0700 07/16/22  0557 07/15/22  0945 07/11/22  0722 06/28/22  0809 06/28/22  0420   * 129* 129* 123*   < > 135   POTASSIUM 5.2 4.5 3.5 4.4   < > 4.7   CHLORIDE 88* 93* 93* 83*   < > 98   CO2 21 26 28 22   < > 26   BUN 53* 35* 27 79*   < > 49*   CR 9.55* 6.52* 4.85* 10.40*   < > 6.86*   GLC 80 94 112* 89   < > 153*   MAC 9.3 9.4 8.7 9.4   < > 8.7   MAG  --   --   --   --   --  2.1   PHOS 6.9* 5.6*  --  8.9*   < > 5.8*    < > = values in this interval not displayed.       CBC -   Recent Labs   Lab Test 07/18/22  0700 07/16/22  0557 07/15/22  0625   WBC 7.8 7.7 7.1   HGB 7.4* 7.7* 7.9*    282 282       LFTs -   Recent Labs   Lab Test 07/16/22  0557 07/11/22  0722 06/29/22  0611 06/24/22  0818   ALKPHOS 134  --   --  152*   BILITOTAL 0.5  --   --  0.6   ALT <6  --   --  55   AST 13  --   --  51*   PROTTOTAL 6.5*  --   --  7.3   ALBUMIN 2.8* 3.2* 2.9* 3.3*       Iron Panel - No lab results found.      Imaging: Reviewed     Current Medications:    sodium chloride 0.9%  250 mL Intravenous Once in dialysis/CRRT     sodium chloride 0.9%  300 mL Hemodialysis Machine Once     amoxicillin  500 mg Oral Daily with supper     atorvastatin  20 mg Oral At Bedtime     cetirizine  10 mg Oral Daily     cyanocobalamin  500 mcg Oral Daily     DULoxetine  30 mg Oral Daily     finasteride  1.25 mg Oral Daily     fluticasone  1 spray Both Nostrils Daily     gabapentin  100 mg Oral TID     heparin  500 Units Hemodialysis Machine OR IV Push Once in dialysis/CRRT     midodrine  10 mg Oral TID w/meals     multivitamin RENAL  1 tablet Oral  Daily     pantoprazole  40 mg Oral QAM AC     vitamin B6  100 mg Oral Daily     sertraline  100 mg Oral Daily     sevelamer carbonate  1,600 mg Oral TID w/meals       heparin (porcine)       - MEDICATION INSTRUCTIONS -       JUNAID ELLIOTT PA-C

## 2022-07-18 NOTE — PLAN OF CARE
Goal Outcome Evaluation:    Plan of Care Reviewed With: patient      Pt complained of headache that was alleviated with tylenol. Up with assist of 1, stand pivot. Appetite fair, 2 episodes of continent diarrhea this shift. Pt went to dialysis @ 12 noon, midodrine given prior to . Continue with POC.

## 2022-07-18 NOTE — PROGRESS NOTES
[facility]   Acute Rehabilitation Unit  Daily progress note    INTERVAL HISTORY  Weekend therapy and progress notes reviewed, no acute events reported.    Shay Jimenez was seen and examined at bedside this morning working with therapist.  He reports onset of stomach upset and diarrhea this morning, which did result in an incontinent bowel episode.  Patient denies any nausea or vomiting.  States that he has not had any abdominal pain or diarrhea prior to today.  Feels he is eating and drinking well.  He also notes low energy, fatigue, headache, which he states are typical for him when he is due for dialysis.    Functionally, EOB LB dressing tasks and short ambulation to and from bathroom w/ CGA-min AFWW A. Pt had incontinent episode of loose bowels contained in brief and then in toilet requiring additional time for total dep A for pericares/doffing LB dressing.  Yesterday was able to progress with increased gait distance up to 40' with min A, FWW, and wheelchair follow.    MEDICATIONS    sodium chloride 0.9%  250 mL Intravenous Once in dialysis/CRRT     sodium chloride 0.9%  300 mL Hemodialysis Machine Once     amoxicillin  500 mg Oral Daily with supper     atorvastatin  20 mg Oral At Bedtime     cetirizine  10 mg Oral Daily     cyanocobalamin  500 mcg Oral Daily     DULoxetine  30 mg Oral Daily     finasteride  1.25 mg Oral Daily     fluticasone  1 spray Both Nostrils Daily     gabapentin  100 mg Oral TID     heparin  500 Units Hemodialysis Machine OR IV Push Once in dialysis/CRRT     midodrine  10 mg Oral TID w/meals     multivitamin RENAL  1 tablet Oral Daily     pantoprazole  40 mg Oral QAM AC     vitamin B6  100 mg Oral Daily     sertraline  100 mg Oral Daily     sevelamer carbonate  1,600 mg Oral TID w/meals        sodium chloride 0.9%, acetaminophen, ALPRAZolam, bisacodyl, calcium carbonate, cyclobenzaprine, HYDROmorphone, hydrOXYzine, lidocaine (buffered or not  "buffered), lidocaine (buffered or not buffered), melatonin, miconazole, naloxone **OR** naloxone **OR** naloxone **OR** naloxone, polyethylene glycol, - MEDICATION INSTRUCTIONS -     PHYSICAL EXAM  /49 (BP Location: Right arm)   Pulse 77   Temp (!) 96.1  F (35.6  C) (Oral)   Resp 16   Ht 1.854 m (6' 1\")   Wt 141.3 kg (311 lb 8.2 oz)   SpO2 97%   BMI 41.10 kg/m    Gen: NAD, sitting upright in chair  HEENT: NC/AT,   Cardio: RRR, no murmurs  Pulm: non-labored on room air, lungs CTA bilaterally  Abd: soft, non-tender, protuberant, hyperactive bowel sounds  Ext: chronic venous stasis changes, bilateral pedal edema  Neuro/MSK: awake, alert, follows commands, decreased sensation to light touch in bilateral lower extremities to ~knee in stocking distribution.  Generalized weakness, worst in bilateral HF, bilateral DF/PF, L hand.    LABS  CBC RESULTS: Recent Labs   Lab Test 07/18/22  0700 07/16/22  0557 07/15/22  0625   WBC 7.8 7.7 7.1   RBC 2.42* 2.47* 2.46*   HGB 7.4* 7.7* 7.9*   HCT 24.2* 25.1* 24.8*    102* 101*   MCH 30.6 31.2 32.1   MCHC 30.6* 30.7* 31.9   RDW 14.6 14.6 14.2    282 282     Last Basic Metabolic Panel:  Recent Labs   Lab Test 07/18/22  0700 07/16/22  0557 07/15/22  0945   * 129* 129*   POTASSIUM 5.2 4.5 3.5   CHLORIDE 88* 93* 93*   CO2 21 26 28   ANIONGAP 13 10 8   GLC 80 94 112*   BUN 53* 35* 27   CR 9.55* 6.52* 4.85*   GFRESTIMATED 6* 9* 13*   MAC 9.3 9.4 8.7     Liver Function Studies - Recent Labs   Lab Test 07/16/22  0557   PROTTOTAL 6.5*   ALBUMIN 2.8*   BILITOTAL 0.5   ALKPHOS 134   AST 13   ALT <6       Rehabilitation - continue comprehensive acute inpatient rehabilitation program with multidisciplinary approach including therapies, rehab nursing, and physiatry following. See interval history for updates.      ASSESSMENT AND PLAN  Shay EARL Jimenez is a 57 year old right hand dominant male with a past medical history of ESRD on HD, GERD, hyperlipidemia, " peripheral neuropathy, depression/anxiety, GINI, BPH, obesity who was admitted on 6/24/22 with thoracic compressive myelopathy and concern for discitis/vertebral osteomyelitis now s/p C6-T6 fusion and T2-T3 decompression on 6/27/22 with post-op course complicated by hypotension, pain, anemia, encephalopathy, and constipation.  He is now admitted to ARU on 7/15/22 for multidisciplinary rehabilitation and ongoing medical management.       Admission to acute inpatient rehab 07/15/22.    Impairment group code: Spinal Cord Dysfunction Spinal Non-Traumatic 04.111 Paraplegia, Incomplete        1. PT and OT 90 minutes of each on a daily basis, in addition to rehab nursing and close management of physiatrist.       2. Impairment of ADL's: Noted to have impaired strength, impaired activity tolerance, impaired tone, impaired balance, impaired coordination, impaired judgement and safety awareness, impulsiveness and impaired weight shifting, all affecting his ability to safely and independently perform basic ADLs.  Goal for mod I with basic ADLs with assist of 1 for tub/shower transfers.     3. Impairment of mobility:  Noted to have impaired strength, impaired activity tolerance, impaired tone, impaired balance, impaired coordination, impaired judgement and safety awareness, impulsiveness and impaired weight shifting, all affecting his ability to safely and independently perform basic mobility.  Goal for mod I with basic mobility with assist of 1 for stairs.     4. Medical Conditions    Thoracic compressive myelopathy  Concern for T2-T3 discitis/vertebral osteomyelitis  S/p C6-T6 fusion/T2-3 decompression on 6/27/22 with Dr. Elvi Dela Cruz on chronic upper back pain  Lower extremity weakness  Recurrent falls  Hx back pain for 6 months. CT chest 2/2022 showed concern for upper thoracic spine diskitis vs osteomyelitis. Unclear follow up from that time.  In weeks prior to admission, MRI showed possible thoracic compression fracture.  "Developed progressively weak legs with frequent falls.  On admission imaging with T2-T3 diskitis, osteomyelitis and compressive myelopathy.  Neurosurgery and ID consulted while inpatient.  Late growth of P acne in 2 intra-op cultures.  Per ID, \"likely contaminant given no previous hardware, but since new hardware is in place will recommend starting prophylactic amoxicillin.\"  Started on 7/12.  1/2 positive blood cultures from 7/ 5 for staph epi, per ID \"signifies contamination and does not require treatment\".  Repeat cultures negative.  - Continue amoxicillin 500 mg daily for total of 4 weeks  - Pain management: Tylenol PRN, flexeril PRN, Dilaudid 2-4 mg q6h PRN.  Wean opioids as able.  - Wound care: keep clean and dry, ok to shower no soaking  - Activity: no lifting greater than 5-10 pounds, no excessive bending, lifting, or twisting  - Brace when out of bed  - Continue PT/OT  - Follow up with neurosurgery, currently scheduled on 8/3 and 9/21     End-stage renal disease, on hemodialysis  Hypotension  Hyponatremia  Hyperphosphatemia  PTA HD M/W/F.  LUE fistula.  Nephrology following this admission for ongoing dialysis.  Reportedly with chronic hypotension, typically SBP in 70-90s and asymptomatic most of the time.  While IP, mild dizziness with positional changes but tolerating PT.   Patient reports PTA using midodrine 1 hour prior to HD and repeated 1 hour into HD run.  Started on TID dosing this admission.  - Nephrology consulted, appreciate ongoing assistance.  - Continue HD per nephrology, M/W/F schedule while at ARU  - Monitor BP.  Continue midodrine 10 mg TID.  Per [facility] team, nephrology recommended to consider 20 mg TID if symptomatic hypotension limiting therapies.  This was deferred by inpatient team.  Could discuss with [facility] nephrology to consider at ARU if indicated.  - Continue bilateral lower extremity Jobst stockings  - Continue PTA sevelamer  - Trend labs.  Cr/BUN up as expected, Na 122, phos 6.9. "  Dialysis today, management per nephrology.     Acute blood loss on anemia of chronic kidney disease  Pre-op Hgb stable in 10s.  Hgb currently stable in 7s-8s.  - Epo with HD per nephrology  - Trend CBC.  Hgb stable at 7.4 today (7/18)  - Consider transfusion for hemoglobin less than 7 g/dL and symptoms     Encephalopathy  Felt to be due to buildup of gabapentin metabolites due to PTA dose (300 mg TID) higher than typical maximum dose recommended even for patients on CRRT.  Gabapentin and Zoloft held.  With improved mental status, resumed, gabapentin at lower dose.  - Monitor mental status, consider further adjustments in medications if recurrent.     GERD  - Continue PPI (autosub protonix for omeprazole per formulary while at ARU)  - Patient reports that protonix has not been as helpful for symptoms as PTA prilosec.  He notes increased reflux symptoms this admission.  Will try to obtain home supply for use at ARU.  - Tums PRN     Hyperlipidemia  - Chronic and stable, on statin.     Peripheral neuropathy  Follows with [facility].    - Continue PT duloxetine 30 mg daily  - Continue gabapentin 100 mg TID, reduced from PTA dose as above due to concern for contributing to confusion in setting of renal impairment     Depression/anxiety  At ARU admission, reporting increased stressors at home, marital issues, financial.  - Continue PTA Zoloft 100 mg daily, Xanax 1 mg BID PRN  - Continue atarax 25 mg PRN for anxiety and pain  - Monitor mood, consult health psychology if indicated     Obesity, class III  Obstructive sleep apnea  BMI this stay is ~41.  No PTA CPAP use, reportedly does not tolerate.  - Needs oxygen by NC with sleep due to ongoing desats at night.  Overnight oximetry closer to discharge.  - Recommend follow-up with sleep medicine after discharge  - Encourage diet, lifestyle modifications once above issues addressed     History of BPH  - Continue PTA finasteride.  Given anuric and hypotension,  "could consider discontinuing?     Alcohol use disorder  Reports drinking \"too much\" lately, 6-8 cocktails per day PTA.  - Encourage cessation, offer CD resources if interested     Allergic rhinitis  - Continue PTA Flonase and Zyrtec    Loose stool, abdominal pain, bowel incontinence   Onset this morning per patient report.  Patient reports that he is having regular BM prior to this, but chart with last BM on 7/15 noted to be loose as well.  On sertraline, recently resumed after brief hold of PTA med.  No senna for ~4 days.  LFTs on 7/17 WNL.  Afebrile, WBC WNL.  Abdomen soft, non-tender.  Could be component of neurogenic bowel, possible bypass given unclear whether having regular bowel movements recently.    - Encourage fluid  - Monitor.  If ongoing, will pursue additional work-up, could trial loperamide if non-infectious.      1. Adjustment to disability:  Clinical psychology to eval and treat if indicated  2. FEN: regular diet, thin liquids.  During inpatient stay, was on 1.5L fluid restriction, though not documented in nephrology or IM note.  Will defer to [place] nephrology if feel he should be on ongoing restriction.  3. Bowel: continent, monitor for neurogenic bowel, on scheduled senokot, PRN Miralax, suppository  4. Bladder: anuric  5. DVT Prophylaxis: mechanical  6. GI Prophylaxis: PPI  7. Code: full  8. Disposition: goal for home  9. ELOS:  14 days  10. Follow up Appointments on Discharge: PCP, neurosurgery follow-up 8/3 and 9/21, nephrology for ongoing dialysis        Patient discussed with Dr. Antonio Loo, PM&R Staff Physician    Dona Cabezas PA-C  Physical Medicine & Rehabilitation     "

## 2022-07-18 NOTE — PROGRESS NOTES
SPIRITUAL HEALTH SERVICES Progress Note  [facility] ([facility]) ARU    Met w/ pt Shay following on-call visit. Pt reported they were getting ready for dialysis and requested writer come back tomorrow.    Manjit Tong   Intern  Pager [phone]      * [facility] remains available 24/7 for emergent requests/referrals, either by having the switchboard page the on-call  or by entering an ASAP/STAT consult in Epic (this will also page the on-call ). Routine Epic consults receive an initial response within 24 hours.*

## 2022-07-19 ENCOUNTER — APPOINTMENT (OUTPATIENT)
Dept: OCCUPATIONAL THERAPY | Facility: CLINIC | Age: 57
DRG: 052 | End: 2022-07-19
Attending: PHYSICAL MEDICINE & REHABILITATION
Payer: MEDICARE

## 2022-07-19 ENCOUNTER — APPOINTMENT (OUTPATIENT)
Dept: PHYSICAL THERAPY | Facility: CLINIC | Age: 57
DRG: 052 | End: 2022-07-19
Attending: PHYSICAL MEDICINE & REHABILITATION
Payer: MEDICARE

## 2022-07-19 ENCOUNTER — TELEPHONE (OUTPATIENT)
Dept: NEUROSURGERY | Facility: CLINIC | Age: 57
End: 2022-07-19

## 2022-07-19 DIAGNOSIS — Z98.1 S/P SPINAL FUSION: Primary | ICD-10-CM

## 2022-07-19 LAB — PTH-INTACT SERPL-MCNC: 75 PG/ML (ref 15–65)

## 2022-07-19 PROCEDURE — 97110 THERAPEUTIC EXERCISES: CPT | Mod: GO

## 2022-07-19 PROCEDURE — 97535 SELF CARE MNGMENT TRAINING: CPT | Mod: GO

## 2022-07-19 PROCEDURE — 99232 SBSQ HOSP IP/OBS MODERATE 35: CPT | Mod: 24 | Performed by: PHYSICAL MEDICINE & REHABILITATION

## 2022-07-19 PROCEDURE — 250N000013 HC RX MED GY IP 250 OP 250 PS 637: Performed by: PHYSICIAN ASSISTANT

## 2022-07-19 PROCEDURE — 97530 THERAPEUTIC ACTIVITIES: CPT | Mod: GP

## 2022-07-19 PROCEDURE — 97110 THERAPEUTIC EXERCISES: CPT | Mod: GP

## 2022-07-19 PROCEDURE — 128N000003 HC R&B REHAB

## 2022-07-19 RX ADMIN — HYDROXYZINE HYDROCHLORIDE 25 MG: 25 TABLET ORAL at 12:34

## 2022-07-19 RX ADMIN — PANTOPRAZOLE SODIUM 40 MG: 40 TABLET, DELAYED RELEASE ORAL at 05:37

## 2022-07-19 RX ADMIN — AMOXICILLIN 500 MG: 500 CAPSULE ORAL at 17:53

## 2022-07-19 RX ADMIN — SERTRALINE HYDROCHLORIDE 100 MG: 100 TABLET ORAL at 08:15

## 2022-07-19 RX ADMIN — HYDROMORPHONE HYDROCHLORIDE 4 MG: 2 TABLET ORAL at 20:37

## 2022-07-19 RX ADMIN — MIDODRINE HYDROCHLORIDE 10 MG: 5 TABLET ORAL at 08:15

## 2022-07-19 RX ADMIN — CETIRIZINE HYDROCHLORIDE 10 MG: 5 TABLET ORAL at 08:15

## 2022-07-19 RX ADMIN — Medication 100 MG: at 08:15

## 2022-07-19 RX ADMIN — MIDODRINE HYDROCHLORIDE 10 MG: 5 TABLET ORAL at 17:54

## 2022-07-19 RX ADMIN — DULOXETINE HYDROCHLORIDE 30 MG: 30 CAPSULE, DELAYED RELEASE ORAL at 08:15

## 2022-07-19 RX ADMIN — Medication 1.25 MG: at 08:17

## 2022-07-19 RX ADMIN — GABAPENTIN 100 MG: 100 CAPSULE ORAL at 20:38

## 2022-07-19 RX ADMIN — CYANOCOBALAMIN TAB 500 MCG 500 MCG: 500 TAB at 08:15

## 2022-07-19 RX ADMIN — ATORVASTATIN CALCIUM 20 MG: 20 TABLET, FILM COATED ORAL at 20:38

## 2022-07-19 RX ADMIN — Medication 1 TABLET: at 08:15

## 2022-07-19 RX ADMIN — SEVELAMER CARBONATE 1600 MG: 800 TABLET, FILM COATED ORAL at 17:54

## 2022-07-19 RX ADMIN — HYDROMORPHONE HYDROCHLORIDE 4 MG: 2 TABLET ORAL at 12:39

## 2022-07-19 RX ADMIN — ALPRAZOLAM 1 MG: 1 TABLET ORAL at 20:37

## 2022-07-19 RX ADMIN — GABAPENTIN 100 MG: 100 CAPSULE ORAL at 14:22

## 2022-07-19 RX ADMIN — MIDODRINE HYDROCHLORIDE 10 MG: 5 TABLET ORAL at 12:34

## 2022-07-19 RX ADMIN — GABAPENTIN 100 MG: 100 CAPSULE ORAL at 08:15

## 2022-07-19 RX ADMIN — FLUTICASONE PROPIONATE 1 SPRAY: 50 SPRAY, METERED NASAL at 08:17

## 2022-07-19 ASSESSMENT — ACTIVITIES OF DAILY LIVING (ADL)
ADLS_ACUITY_SCORE: 44

## 2022-07-19 NOTE — PROGRESS NOTES
"SPIRITUAL HEALTH SERVICES Progress Note  [facility] ([facility]) ARU    Saw pt Shay Jimenez per follow-up from on-call.    Illness Narrative - Shay openly shared about his health problems leading up to his stay in the ARU. He desires to start prioritizing his physical health, and is especially interested in regaining mobility and better leg function.    Distress - In addition to his physical health reasons for being in the ARU, Shay has financial, relational, and social stressors that he shared about.   His wife recently  him, which he did not blame her for expressing that she was doing what's best for her. But he did blame himself by saying he \"was drinking too much and getting angry too easily.\"   There also are financial stressors as Shay mentioned bills piling up, short term disability running out Friday, and a desire to do some work while he's staying in the hospital.  Lastly Shay mentioned a sense of having too much time in the day, feeling socially alone a lot of the time, which leads to unhelpful dwelling on his other problems.    Coping - Shay's two sons provide most of his support currently. His eldest is able to visit more frequently, but his youngest can be here occasionally. Shay said that he \"would not be able to make it\" without them and their support. Shay also asked for prayer, he communicated that he does not have a strong tie to a community of [place] but considers himself [employer]ism. Shay plans to start doing some financial advising work on his laptop next week once disability benefits run out, both to try and relieve some financial stress and help keep himself busy and not dwelling on his other problems too much.    Meaning-Making - Shay and I talked about the multitude of things he has to deal with at the moment and agreed that his physical health is up there, if not on the top of the list. He currently desires to stay in the hospital until he gets more mobility.    Plan - Per pt " request will follow-up next week.    Manjit Gill Intern  Pager [phone]      * [facility] remains available 24/7 for emergent requests/referrals, either by having the switchboard page the on-call  or by entering an ASAP/STAT consult in Epic (this will also page the on-call ). Routine Epic consults receive an initial response within 24 hours.*

## 2022-07-19 NOTE — PLAN OF CARE
"/57 (BP Location: Right arm, Patient Position: Semi-Doherty's, Cuff Size: Adult Regular)   Pulse 81   Temp 96.8  F (36  C) (Oral)   Resp 16   Ht 1.854 m (6' 1\")   Wt 141.3 kg (311 lb 8.2 oz)   SpO2 97%   BMI 41.10 kg/m    Pt arrived back to the unit around 1745 from dialysis. Alert and oriented x4. Vitals stable. Denies SOB, chest pain, dizziness or headache. C/o upper back and neck pain, pain managed effectively with tylenol and dilaudid PRN. Pt also requested for atarax and xanax PRN at bedtime. Tolerating regular diet well, thin liquids. Takes pills whole. Incontinent of small loose stool x1. Denies abdominal discomfort, nausea/vomiting. Transfers with assist of 1, stand pivot. Continue to monitor per POC.  "

## 2022-07-19 NOTE — PROGRESS NOTES
[facility]   Acute Rehabilitation Unit  Daily progress note    INTERVAL HISTORY  Shay Jimenez was seen and examined at bedside this afternoon.  No acute events reported overnight.  Today, patient reports that he is feeling ok.  He feels progress/recovery is going slowing, wishing his strength and mobility would improve more quickly.  States that pain has overall been well-controlled, though increased with therapies, so using PRN pain meds after sessions for relief.  He does note some new right leg muscle spasms starting today, he suspects related to increased activity.  Does have PRN flexeril available, has not yet tried.  He notes intermittent ongoing loose stools, though improved from yesterday.  Denies any current abdominal pain or nausea.  Feels appetite has been poor throughout this admission.    Functionally, he is currently needing assist of 1 for pivot transfers, CGA to min A for short distance ambulation with FWW and skilled therapy, remains wheelchair based with nursing.  He needs set-up assist for seated grooming, mod A for upper body dressing, min A for loer body, mod A for sponge bathing.    MEDICATIONS    amoxicillin  500 mg Oral Daily with supper     atorvastatin  20 mg Oral At Bedtime     cetirizine  10 mg Oral Daily     cyanocobalamin  500 mcg Oral Daily     DULoxetine  30 mg Oral Daily     finasteride  1.25 mg Oral Daily     fluticasone  1 spray Both Nostrils Daily     gabapentin  100 mg Oral TID     midodrine  10 mg Oral TID w/meals     multivitamin RENAL  1 tablet Oral Daily     pantoprazole  40 mg Oral QAM AC     vitamin B6  100 mg Oral Daily     sertraline  100 mg Oral Daily     sevelamer carbonate  1,600 mg Oral TID w/meals        acetaminophen, ALPRAZolam, bisacodyl, calcium carbonate, cyclobenzaprine, HYDROmorphone, hydrOXYzine, melatonin, miconazole, naloxone **OR** naloxone **OR** naloxone **OR** naloxone, polyethylene glycol     PHYSICAL  "EXAM  /57 (BP Location: Right arm, Patient Position: Semi-Doherty's, Cuff Size: Adult Regular)   Pulse 81   Temp 96.8  F (36  C) (Oral)   Resp 16   Ht 1.854 m (6' 1\")   Wt 142.7 kg (314 lb 9.5 oz)   SpO2 97%   BMI 41.51 kg/m     Gen: NAD, sitting upright in chair  HEENT: NC/AT,   Cardio: RRR, no murmurs  Pulm: non-labored on room air, lungs CTA bilaterally  Abd: soft, non-tender, protuberant, bowel sounds present  Ext: chronic venous stasis changes, bilateral pedal edema  Neuro/MSK: awake, alert, follows commands, decreased sensation to light touch in bilateral lower extremities to ~knee in stocking distribution.  Generalized weakness, worst in bilateral HF, bilateral DF/PF, L hand.    LABS  CBC RESULTS:   Recent Labs   Lab Test 07/18/22  0700 07/16/22  0557 07/15/22  0625   WBC 7.8 7.7 7.1   RBC 2.42* 2.47* 2.46*   HGB 7.4* 7.7* 7.9*   HCT 24.2* 25.1* 24.8*    102* 101*   MCH 30.6 31.2 32.1   MCHC 30.6* 30.7* 31.9   RDW 14.6 14.6 14.2    282 282     Last Basic Metabolic Panel:  Recent Labs   Lab Test 07/18/22  0700 07/16/22  0557 07/15/22  0945   * 129* 129*   POTASSIUM 5.2 4.5 3.5   CHLORIDE 88* 93* 93*   CO2 21 26 28   ANIONGAP 13 10 8   GLC 80 94 112*   BUN 53* 35* 27   CR 9.55* 6.52* 4.85*   GFRESTIMATED 6* 9* 13*   MAC 9.3 9.4 8.7     Liver Function Studies - Recent Labs   Lab Test 07/16/22  0557   PROTTOTAL 6.5*   ALBUMIN 2.8*   BILITOTAL 0.5   ALKPHOS 134   AST 13   ALT <6       Rehabilitation - continue comprehensive acute inpatient rehabilitation program with multidisciplinary approach including therapies, rehab nursing, and physiatry following. See interval history for updates.      ASSESSMENT AND PLAN  Shay Jimenez is a 57 year old right hand dominant male with a past medical history of ESRD on HD, GERD, hyperlipidemia, peripheral neuropathy, depression/anxiety, GINI, BPH, obesity who was admitted on 6/24/22 with thoracic compressive myelopathy and concern for " discitis/vertebral osteomyelitis now s/p C6-T6 fusion and T2-T3 decompression on 6/27/22 with post-op course complicated by hypotension, pain, anemia, encephalopathy, and constipation.  He is now admitted to ARU on 7/15/22 for multidisciplinary rehabilitation and ongoing medical management.       Admission to acute inpatient rehab 07/15/22.    Impairment group code: Spinal Cord Dysfunction Spinal Non-Traumatic 04.111 Paraplegia, Incomplete        1. PT and OT 90 minutes of each on a daily basis, in addition to rehab nursing and close management of physiatrist.       2. Impairment of ADL's: Noted to have impaired strength, impaired activity tolerance, impaired tone, impaired balance, impaired coordination, impaired judgement and safety awareness, impulsiveness and impaired weight shifting, all affecting his ability to safely and independently perform basic ADLs.  Goal for mod I with basic ADLs with assist of 1 for tub/shower transfers.     3. Impairment of mobility:  Noted to have impaired strength, impaired activity tolerance, impaired tone, impaired balance, impaired coordination, impaired judgement and safety awareness, impulsiveness and impaired weight shifting, all affecting his ability to safely and independently perform basic mobility.  Goal for mod I with basic mobility with assist of 1 for stairs.     4. Medical Conditions    Thoracic compressive myelopathy  Concern for T2-T3 discitis/vertebral osteomyelitis  S/p C6-T6 fusion/T2-3 decompression on 6/27/22 with Dr. Elvi Dela Cruz on chronic upper back pain  Lower extremity weakness  Recurrent falls  Hx back pain for 6 months. CT chest 2/2022 showed concern for upper thoracic spine diskitis vs osteomyelitis. Unclear follow up from that time.  In weeks prior to admission, MRI showed possible thoracic compression fracture. Developed progressively weak legs with frequent falls.  On admission imaging with T2-T3 diskitis, osteomyelitis and compressive myelopathy.  " Neurosurgery and ID consulted while inpatient.  Late growth of P acne in 2 intra-op cultures.  Per ID, \"likely contaminant given no previous hardware, but since new hardware is in place will recommend starting prophylactic amoxicillin.\"  Started on 7/12.  1/2 positive blood cultures from 7/5 for staph epi, per ID \"signifies contamination and does not require treatment\".  Repeat cultures negative.  - Continue amoxicillin 500 mg daily for total of 4 weeks  - Pain management: Tylenol PRN, flexeril PRN, Dilaudid 2-4 mg q6h PRN.  Wean opioids as able.  - Wound care: keep clean and dry, ok to shower no soaking  - Activity: no lifting greater than 5-10 pounds, no excessive bending, lifting, or twisting  - Brace when out of bed  - Continue PT/OT  - Follow up with neurosurgery, currently scheduled on 8/3 and 9/21     End-stage renal disease, on hemodialysis  Hypotension  Hyponatremia  Hyperphosphatemia  Secondary hyperparathyroidism  PTA HD M/W/F.  LUE fistula.  Nephrology following this admission for ongoing dialysis.  Reportedly with chronic hypotension, typically SBP in 70-90s and asymptomatic most of the time.  While IP, mild dizziness with positional changes but tolerating PT.   Patient reports PTA using midodrine 1 hour prior to HD and repeated 1 hour into HD run.  Started on TID dosing this admission.  - Nephrology consulted, appreciate ongoing assistance.  - Continue HD per nephrology, M/W/F schedule while at ARU  - Monitor BP.  Continue midodrine 10 mg TID.  Per [place] team, nephrology recommended to consider 20 mg TID if symptomatic hypotension limiting therapies. This was deferred by inpatient team.  Could discuss with [facility] nephrology to consider at ARU if indicated.  - Continue bilateral lower extremity Jobst stockings  - Continue PTA sevelamer (recently increased 800 mg to 1600 mg).  Per nephrology, check PTH, which is elevated.  Defer any changes in management to nephrology.  - Resumed fluid restriction, " 1.2 L daily, on 7/19 per nephrology recs given ongoing hyponatremia.  - Trend labs.       Acute blood loss on anemia of chronic kidney disease  Pre-op Hgb stable in 10s.  Hgb currently stable in 7s-8s.  - Epo with HD per nephrology  - Trend CBC.  Hgb stable at 7.4 on 7/18  - Consider transfusion for hemoglobin less than 7 g/dL and symptoms     Encephalopathy  Felt to be due to buildup of gabapentin metabolites due to PTA dose (300 mg TID) higher than typical maximum dose recommended even for patients on CRRT.  Gabapentin and Zoloft held.  With improved mental status, resumed, gabapentin at lower dose.  - Monitor mental status, consider further adjustments in medications if recurrent.     GERD  - Continue PPI (autosub protonix for omeprazole per formulary while at ARU)  - Patient reports that protonix has not been as helpful for symptoms as PTA prilosec.  He notes increased reflux symptoms this admission.  Will try to obtain home supply for use at ARU.  - Tums PRN     Hyperlipidemia  - Chronic and stable, on statin.     Peripheral neuropathy  Follows with [facility].    - Continue PT duloxetine 30 mg daily  - Continue gabapentin 100 mg TID, reduced from PTA dose as above due to concern for contributing to confusion in setting of renal impairment     Depression/anxiety  At ARU admission, reporting increased stressors at home, marital issues, financial.  - Continue PTA Zoloft 100 mg daily, Xanax 1 mg BID PRN  - Continue atarax 25 mg PRN for anxiety and pain  - Monitor mood, consult health psychology if indicated     Obesity, class III  Obstructive sleep apnea  BMI this stay is ~41.  No PTA CPAP use, reportedly does not tolerate.  - Needs oxygen by NC with sleep due to ongoing desats at night.  Overnight oximetry closer to discharge.  - Recommend follow-up with sleep medicine after discharge  - Encourage diet, lifestyle modifications once above issues addressed     History of BPH  - Continue PTA  "finasteride.  Given anuric and hypotension, could consider discontinuing?     Alcohol use disorder  Reports drinking \"too much\" lately, 6-8 cocktails per day PTA.  - Most recent LFTs on 7/16 WNL  - Encourage cessation, offer CD resources if interested     Allergic rhinitis  - Continue PTA Flonase and Zyrtec    Loose stool, bowel incontinence   Onset 7/18 morning per patient report.  Patient reports that he is having regular BM prior to this, but chart with last BM on 7/15 noted to be loose as well.  On sertraline, recently resumed after brief hold of PTA med.  No senna for ~4 days.  LFTs on 7/16 WNL.  Afebrile, WBC WNL.  Abdomen soft, non-tender.  Could be component of neurogenic bowel, possible bypass given unclear whether having regular bowel movements recently.    - Ongoing but improving without intervention per patient report  - Encourage fluid  - Monitor.  If ongoing, will pursue additional work-up, could trial loperamide if non-infectious.      1. Adjustment to disability:  Clinical psychology to eval and treat if indicated  2. FEN: regular diet, thin liquids, 1.2L fluid restriction per nephrology  3. Bowel: continent, monitor for neurogenic bowel, on scheduled senokot, PRN Miralax, suppository  4. Bladder: anuric  5. DVT Prophylaxis: mechanical  6. GI Prophylaxis: PPI  7. Code: full  8. Disposition: goal for home  9. ELOS:  14 days  10. Follow up Appointments on Discharge: PCP, neurosurgery follow-up 8/3 and 9/21, nephrology for ongoing dialysis        Patient discussed with Dr. Antonio Loo, PM&R Staff Physician    Dona Cabezas PA-C  Physical Medicine & Rehabilitation     "

## 2022-07-19 NOTE — PLAN OF CARE
Discharge Planner Post-Acute Rehab PT:      Discharge Plan: Apartment, stairs to upper level or lower level, 6, rail on one side. Home care PT,  2 sons to assist (wife will not be there or be available to help per chart).     Precautions: falls, spinal, brace when out of bed (CTLO), poor sensation B feet.   *Turn power chair off before transferring to avoid chair moving*     Current Status:  Bed Mobility: max A of 1 , logroll  Transfer: stand pivot with fww A of 1, modA .   Gait: 30 ft with WW, CGA  Stairs: NT   Balance: sitting EOB sba, standing with fww and cga to Pinky.      Assessment: Pt is OK to amb with nursing staff in room with WW. Progressing well with STS and standing tolerance.     Issued power chair for improved independence. Pt needs reminders to turn chair power off before transferring.     Other Barriers to Discharge (DME, Family Training, etc):   - stairs  - history of recurrent falls  - may need sons for family training

## 2022-07-19 NOTE — TELEPHONE ENCOUNTER
[facility]- Dr Boles has requested images prior to the appointments on August 3rd, and Sept 21st. Patient was asked to call scheduling to confirm the imaging times- due to not having My chart access established.

## 2022-07-19 NOTE — PLAN OF CARE
"  VS: BP 96/57 (BP Location: Right arm)   Pulse 85   Temp (!) 95.8  F (35.4  C) (Oral)   Resp 16   Ht 1.854 m (6' 1\")   Wt 142.7 kg (314 lb 9.5 oz)   SpO2 91%   BMI 41.51 kg/m       O2: 91% on RA, uses 2L O2 overnight    Output: Incontinent   Last BM: Incontinent 7/19/2022, loose.    Activity: Assist of 1 with walker to pivot.    Skin: Scab on L knee. Surgical incision on spine, covered with steri-strips    Pain: Pain rated 7/10 in upper back and neck. PRN Dilaudid 4 mg given.    Neuro: A&Ox4, denies NV, numbness present in BUE     Dressing: None    Diet: 1200 mL fluid restriction. Regular diet, whole pills, thin liquids    LDA: None    Equipment: Walker and gait belt at bedside    Plan: Continue plan of care    Additional Info:       Talisha Urbano RN   "
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
## 2022-07-19 NOTE — PLAN OF CARE
FOCUS/GOAL  Bladder management, Pain management, Cognition/Memory/Judgment/Problem solving, and Safety management    ASSESSMENT, INTERVENTIONS AND CONTINUING PLAN FOR GOAL:  Pt is alert and oriented. No void this shift, pt on hemodialysis. No report of pain or discomfort this shift. Appeared to be sleeping well during rounds. Pt using O2 at 2 liters overnight. Uses call light appropriately, able to make needs known. Bed alarm on for safety.

## 2022-07-19 NOTE — PROGRESS NOTES
Individualized Overall Plan Of Care (IOPOC)      Rehab diagnosis/Impairment Group Code: Spinal cord dysfunction spinal non-traumatic 04.111 paraplegia, incomplete  Abscess in epidural space of spine       Expected functional outcome: reach a level of mod I     Clinical Impression Comments: Spinal Cord Dysfunction Spinal Non-Traumatic Paraplegia, Incomplete    Mobility: PT: Pt with complex medical history with h/o HD, neuropathy, and with 6 month recent h/o cervical myelopathy, s/p C6-T6 fusion, T2-T3 decompression, with LE weakness, significant impaiment in proprioception and sensation in B feet, decreased activity tolerance with low BPs, pain, and decreased activity tolerance.  Pt states his son can help him at home.  Pt has stairs into home, and his wife is not going to be living with him or taking care of him at discharge per chart.  Pt will likely need home care PT at discharge.  Pt may need a transport w/c for longer distance mobility at discharge.    ADL: The pt is a 57 yr old male admitted to IP ARU following hospital stay for spinal fusion due to arthritis of thoracic spine with cord compression. Pt performance with ADLs/IADLs is below baseline impacted by performance deficits listed above. Goals to advance to Mod IND simple IADLs, anticipate pt will require increased assistance IADLs. The pt will benefit from skilled OT intervention to address goals in POC and maximize functional IND and safety in discharge environment. ELOS 2-3 weeks with goals to return home with HC OT.    Communication/Cognition/Swallow:   Intact     Intensity of therapy:   PT 90 minutes, Daily, for 16 days   OT 90 minutes, Daily, for 16 days         Education orthostatic BP monitoring   Neuropsychology Testing: No        Medical Prognosis: good      Physician summary statement: Spinal Cord Dysfunction Spinal Non-Traumatic Paraplegia, Incomplete goal is to reach a level of mod I       Discharge destination: prior home  Discharge  rehabilitation needs: home care, RN, PT and OT      Estimated length of stay: 16 days       Rehabilitation Physician Antonio Loo DO

## 2022-07-20 ENCOUNTER — APPOINTMENT (OUTPATIENT)
Dept: PHYSICAL THERAPY | Facility: CLINIC | Age: 57
DRG: 052 | End: 2022-07-20
Attending: PHYSICAL MEDICINE & REHABILITATION
Payer: MEDICARE

## 2022-07-20 ENCOUNTER — APPOINTMENT (OUTPATIENT)
Dept: OCCUPATIONAL THERAPY | Facility: CLINIC | Age: 57
DRG: 052 | End: 2022-07-20
Attending: PHYSICAL MEDICINE & REHABILITATION
Payer: MEDICARE

## 2022-07-20 PROCEDURE — 97110 THERAPEUTIC EXERCISES: CPT | Mod: GO

## 2022-07-20 PROCEDURE — 250N000013 HC RX MED GY IP 250 OP 250 PS 637: Performed by: PHYSICIAN ASSISTANT

## 2022-07-20 PROCEDURE — 128N000003 HC R&B REHAB

## 2022-07-20 PROCEDURE — 258N000003 HC RX IP 258 OP 636: Performed by: PHYSICIAN ASSISTANT

## 2022-07-20 PROCEDURE — 634N000001 HC RX 634: Performed by: PHYSICIAN ASSISTANT

## 2022-07-20 PROCEDURE — 97535 SELF CARE MNGMENT TRAINING: CPT | Mod: GO

## 2022-07-20 PROCEDURE — 99232 SBSQ HOSP IP/OBS MODERATE 35: CPT | Mod: 24 | Performed by: PHYSICAL MEDICINE & REHABILITATION

## 2022-07-20 PROCEDURE — 90937 HEMODIALYSIS REPEATED EVAL: CPT

## 2022-07-20 PROCEDURE — 97110 THERAPEUTIC EXERCISES: CPT | Mod: GP

## 2022-07-20 PROCEDURE — 99232 SBSQ HOSP IP/OBS MODERATE 35: CPT | Mod: 24 | Performed by: PHYSICIAN ASSISTANT

## 2022-07-20 RX ADMIN — HYDROMORPHONE HYDROCHLORIDE 4 MG: 2 TABLET ORAL at 21:41

## 2022-07-20 RX ADMIN — AMOXICILLIN 500 MG: 500 CAPSULE ORAL at 17:49

## 2022-07-20 RX ADMIN — DULOXETINE HYDROCHLORIDE 30 MG: 30 CAPSULE, DELAYED RELEASE ORAL at 08:24

## 2022-07-20 RX ADMIN — EPOETIN ALFA-EPBX 4000 UNITS: 10000 INJECTION, SOLUTION INTRAVENOUS; SUBCUTANEOUS at 13:43

## 2022-07-20 RX ADMIN — GABAPENTIN 100 MG: 100 CAPSULE ORAL at 20:04

## 2022-07-20 RX ADMIN — CYCLOBENZAPRINE HYDROCHLORIDE 10 MG: 5 TABLET, FILM COATED ORAL at 21:41

## 2022-07-20 RX ADMIN — Medication 1 TABLET: at 08:24

## 2022-07-20 RX ADMIN — HYDROMORPHONE HYDROCHLORIDE 4 MG: 2 TABLET ORAL at 11:31

## 2022-07-20 RX ADMIN — MIDODRINE HYDROCHLORIDE 10 MG: 5 TABLET ORAL at 08:24

## 2022-07-20 RX ADMIN — HYDROXYZINE HYDROCHLORIDE 25 MG: 25 TABLET ORAL at 20:06

## 2022-07-20 RX ADMIN — PANTOPRAZOLE SODIUM 40 MG: 40 TABLET, DELAYED RELEASE ORAL at 06:35

## 2022-07-20 RX ADMIN — ATORVASTATIN CALCIUM 20 MG: 20 TABLET, FILM COATED ORAL at 20:04

## 2022-07-20 RX ADMIN — Medication 100 MG: at 08:24

## 2022-07-20 RX ADMIN — SODIUM CHLORIDE 250 ML: 9 INJECTION, SOLUTION INTRAVENOUS at 13:44

## 2022-07-20 RX ADMIN — SERTRALINE HYDROCHLORIDE 100 MG: 100 TABLET ORAL at 08:24

## 2022-07-20 RX ADMIN — CYANOCOBALAMIN TAB 500 MCG 500 MCG: 500 TAB at 08:25

## 2022-07-20 RX ADMIN — SEVELAMER CARBONATE 1600 MG: 800 TABLET, FILM COATED ORAL at 17:49

## 2022-07-20 RX ADMIN — ACETAMINOPHEN 650 MG: 325 TABLET, FILM COATED ORAL at 06:35

## 2022-07-20 RX ADMIN — ALPRAZOLAM 1 MG: 1 TABLET ORAL at 17:51

## 2022-07-20 RX ADMIN — GABAPENTIN 100 MG: 100 CAPSULE ORAL at 08:25

## 2022-07-20 RX ADMIN — SODIUM CHLORIDE 300 ML: 9 INJECTION, SOLUTION INTRAVENOUS at 13:44

## 2022-07-20 RX ADMIN — MIDODRINE HYDROCHLORIDE 10 MG: 5 TABLET ORAL at 11:33

## 2022-07-20 RX ADMIN — MIDODRINE HYDROCHLORIDE 10 MG: 5 TABLET ORAL at 17:49

## 2022-07-20 RX ADMIN — CETIRIZINE HYDROCHLORIDE 10 MG: 5 TABLET ORAL at 08:24

## 2022-07-20 RX ADMIN — HYDROXYZINE HYDROCHLORIDE 25 MG: 25 TABLET ORAL at 08:35

## 2022-07-20 RX ADMIN — FLUTICASONE PROPIONATE 1 SPRAY: 50 SPRAY, METERED NASAL at 08:36

## 2022-07-20 RX ADMIN — Medication 1.25 MG: at 08:35

## 2022-07-20 RX ADMIN — Medication: at 13:44

## 2022-07-20 RX ADMIN — SEVELAMER CARBONATE 1600 MG: 800 TABLET, FILM COATED ORAL at 08:25

## 2022-07-20 ASSESSMENT — ACTIVITIES OF DAILY LIVING (ADL)
ADLS_ACUITY_SCORE: 44
ADLS_ACUITY_SCORE: 44
ADLS_ACUITY_SCORE: 42
ADLS_ACUITY_SCORE: 44
ADLS_ACUITY_SCORE: 44
ADLS_ACUITY_SCORE: 42
ADLS_ACUITY_SCORE: 44
ADLS_ACUITY_SCORE: 44
ADLS_ACUITY_SCORE: 42
ADLS_ACUITY_SCORE: 44

## 2022-07-20 NOTE — PROGRESS NOTES
HEMODIALYSIS TREATMENT NOTE    Date: 7/20/2022  Time: 4:17 PM    Data:  Pre Wt:     Desired Wt:   kg   Post Wt:    Weight change:   kg  Ultrafiltration - Post Run Net Total Removed (mL): 4500 mL  Vascular Access Status: patent  Dialyzer Rinse: Light, Streaked  Total Blood Volume Processed: 81.71 L Liters  Total Dialysis (Treatment) Time: 3.5 Hours    Lab:        Interventions:  3.5 hours of HD with 4500 mls pulled with no complications.    Assessment:  ESRD patient from [facility] in for his regular HD run VSS A+O     Plan:    Per renal

## 2022-07-20 NOTE — PLAN OF CARE
"Goal Outcome Evaluation:  FOCUS/GOAL  Bowel management, Nutrition/Feeding/Swallowing precautions, Pain management, and Medical management    ASSESSMENT, INTERVENTIONS AND CONTINUING PLAN FOR GOAL:  Patient is alert/oriented x4, able to call appropriately for care needs.  Patient denied any pain this am, had taken PRN tylenol for HA prior to this shift and was effective.  Patient's BP was 108/64 this am and Midodrine given as scheduled.  Patient did not want to eat breakfast, stated \"I'll eat after my 9 O'clock therapy,\" but patient was offered some corn flakes and was willing to eat that, but then did not order any more food to eat prior to dialysis and is on a fluid restriction.  Patient had a bowel movement x1 today, was very loose per report but continent.  Patient was assisted to bed after- this was around 11:20am and requested for PRN Dilaudid and Xanax, went to give and checked BP prior as patient also had Midodrine due. BP was 94/44 at this time so Midodrine was given and the Xanax held to prevent further drop of BP.  [facility] ride here to take patient at 12:10pm and BP rechecked prior and was only 88/43, patient denied symptoms and still sent to dialysis, report on this left for dialysis nurse, continue to monitor closely.    ADDENDUM: page sent to PA just to MARILU regarding patient's BP prior to dialysis.                      "
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
## 2022-07-20 NOTE — PLAN OF CARE
Goal Outcome Evaluation:        Patient alert and oriented x4. A1 pivot transfer. Mixed continence, LBM: 7/19. Pt c/o having multiple loose stools during shift. Regular diet, thins, pills whole. Fistula in LUE for HD, scheduled on MWF. Steri strips on posterior back incision, approximated. Scabs on both knees, SOSA. Edema in BLE. Prn dilaudid and xanax given at bedtime after c/o of back pain. Will continue with POC.

## 2022-07-20 NOTE — PROGRESS NOTES
Nephrology Progress Note  07/20/2022         Assessment & Recommendations:   Shay Jimenez is a 57 year old right hand dominant male with a past medical history of ESRD on HD, GERD, hyperlipidemia, peripheral neuropathy, depression/anxiety, GINI, BPH, obesity who was admitted on 6/24/22 with thoracic compressive myelopathy and concern for discitis/vertebral osteomyelitis now s/p C6-T6 fusion and T2-T3 decompression on 6/27/22 with post-op course complicated by hypotension, pain, anemia, encephalopathy, and constipation.  He is now admitted to ARU on 7/15/22 for multidisciplinary rehabilitation and ongoing medical management.     #ESRD  - dialyzes MWF at [facility], primary nephrologist Dr. Cline  - access: left AVF, run time 4 hours and 15 minutes  - .6 kg    #BP/Volume  - BP historically low, getting midodrine 10 mg TID, hold with systolic pressure > 110 except just before dialysis  - spoke with his unit, has history of large 4-6 kg interdialytic wt gains.  - fluid restriction should help with this as well  - will try for UF 4.5 kg today    #Electrolytes:  - labs pending, should be obtained morning of dialysis days to assess potassium level  - history of hyperkalemia and hyponatremia  - encourage fluid restriction of 1.2 liters    #Anemia  - recent Hgb 7.4  - getting epo 4000 units with HD while inpatient      #Bone/Mineral  - Hectorol 4 mcg with HD  - sevelamer 1600 mg TID with meals, recently increased from 800 mg  - phos 6.9, PTH 75, can reduce hectorol to 2 mcg with HD  - recheck phos on Monday      Recommendations were communicated to primary team via this note    JUNAID ELLIOTT, PAJaeC     Interval History :   Reviewed provider and nursing notes for past 24 hours. Pt seen on HD, stable run. Denies fevers/chills, chest pain, dyspnea. He does have LE edema.     Review of Systems:   A 4 point review of systems was negative except as noted above.      Physical Exam:   Vitals: /64  Pulse 72   Resp 18  SpO2 95%  Wt 142.2 kg  GENERAL APPEARANCE: Resting in no distress  HENT: mouth without ulcers or lesions  PULM: lungs clear to auscultation, equal air movement, no cyanosis or clubbing  CV: regular rhythm, normal rate, no rub     -edema 2+ LE   GI: soft, nontender  NEURO: mentation intact and speech normal, no asterixis   Access LUE AVF     Labs:   All labs reviewed by me  Electrolytes/Renal -   Recent Labs   Lab Test 07/18/22  0700 07/16/22  0557 07/15/22  0945 07/11/22  0722 06/28/22  0809 06/28/22  0420   * 129* 129* 123*   < > 135   POTASSIUM 5.2 4.5 3.5 4.4   < > 4.7   CHLORIDE 88* 93* 93* 83*   < > 98   CO2 21 26 28 22   < > 26   BUN 53* 35* 27 79*   < > 49*   CR 9.55* 6.52* 4.85* 10.40*   < > 6.86*   GLC 80 94 112* 89   < > 153*   MAC 9.3 9.4 8.7 9.4   < > 8.7   MAG  --   --   --   --   --  2.1   PHOS 6.9* 5.6*  --  8.9*   < > 5.8*    < > = values in this interval not displayed.       CBC -   Recent Labs   Lab Test 07/18/22  0700 07/16/22  0557 07/15/22  0625   WBC 7.8 7.7 7.1   HGB 7.4* 7.7* 7.9*    282 282       LFTs -   Recent Labs   Lab Test 07/16/22  0557 07/11/22  0722 06/29/22  0611 06/24/22  0818   ALKPHOS 134  --   --  152*   BILITOTAL 0.5  --   --  0.6   ALT <6  --   --  55   AST 13  --   --  51*   PROTTOTAL 6.5*  --   --  7.3   ALBUMIN 2.8* 3.2* 2.9* 3.3*       Iron Panel - No lab results found.      Imaging: Reviewed     Current Medications:    sodium chloride 0.9%  250 mL Intravenous Once in dialysis/CRRT     sodium chloride 0.9%  300 mL Hemodialysis Machine Once     amoxicillin  500 mg Oral Daily with supper     atorvastatin  20 mg Oral At Bedtime     cetirizine  10 mg Oral Daily     cyanocobalamin  500 mcg Oral Daily     DULoxetine  30 mg Oral Daily     epoetin mar-epbx (RETACRIT) inj ESRD  4,000 Units Intravenous Once in dialysis/CRRT     finasteride  1.25 mg Oral Daily     fluticasone  1 spray Both Nostrils Daily     gabapentin  100 mg Oral TID     midodrine  10 mg Oral  TID w/meals     multivitamin RENAL  1 tablet Oral Daily     - MEDICATION INSTRUCTIONS -   Does not apply Once     pantoprazole  40 mg Oral QAM AC     vitamin B6  100 mg Oral Daily     sertraline  100 mg Oral Daily     sevelamer carbonate  1,600 mg Oral TID w/meals       - MEDICATION INSTRUCTIONS -       JUNAID ELLIOTT, PA-C

## 2022-07-20 NOTE — PLAN OF CARE
FOCUS/GOAL  Bladder management, Pain management, Cognition/Memory/Judgment/Problem solving, and Safety management    ASSESSMENT, INTERVENTIONS AND CONTINUING PLAN FOR GOAL:  Pt is alert and oriented. No void this shift, pt on hemodialysis. Incontinent of large amount of loose/watery stool x 1 this shift. Pt denied pain or discomfort overnight. Appeared to be sleeping well during rounds. Pt using O2 at 2 liters at night. Uses call light appropriately, able to make needs known. Bed alarm on for safety.

## 2022-07-20 NOTE — PROGRESS NOTES
[facility]   Acute Rehabilitation Unit  Daily progress note    INTERVAL HISTORY  Shay Jimenez was seen and examined at bedside this morning.  No acute events reported overnight.  Today, patient reports that he is feeling fatigued with intensity of therapies at ARU, though overall ok.  He has some ongoing neck/surgical site pain, which he rates on average ~5-6/10.  Has not had a bowel movement yet today, did have recurrent loose incontinent stool last evening.  Denies any abdominal pain, nausea or vomiting today.  He also denies chest pain, palpitations, shortness of breath, dizziness or lightheadedness.    Functionally, he is currently needing max A for bed mobility, mod A for stand pivot transfers with FWW, CGA for 30' ambulation with walker.  Progressing well with sit to stand and standing tolerance.  Issued power chair for improved independence.    MEDICATIONS    sodium chloride 0.9%  250 mL Intravenous Once in dialysis/CRRT     sodium chloride 0.9%  300 mL Hemodialysis Machine Once     amoxicillin  500 mg Oral Daily with supper     atorvastatin  20 mg Oral At Bedtime     cetirizine  10 mg Oral Daily     cyanocobalamin  500 mcg Oral Daily     DULoxetine  30 mg Oral Daily     epoetin mar-epbx (RETACRIT) inj ESRD  4,000 Units Intravenous Once in dialysis/CRRT     finasteride  1.25 mg Oral Daily     fluticasone  1 spray Both Nostrils Daily     gabapentin  100 mg Oral TID     midodrine  10 mg Oral TID w/meals     multivitamin RENAL  1 tablet Oral Daily     - MEDICATION INSTRUCTIONS -   Does not apply Once     pantoprazole  40 mg Oral QAM AC     vitamin B6  100 mg Oral Daily     sertraline  100 mg Oral Daily     sevelamer carbonate  1,600 mg Oral TID w/meals        sodium chloride 0.9%, acetaminophen, ALPRAZolam, bisacodyl, calcium carbonate, cyclobenzaprine, HYDROmorphone, hydrOXYzine, lidocaine (buffered or not buffered), lidocaine (buffered or not buffered), melatonin,  "miconazole, naloxone **OR** naloxone **OR** naloxone **OR** naloxone, polyethylene glycol, - MEDICATION INSTRUCTIONS -     PHYSICAL EXAM  /64 (BP Location: Right arm, Patient Position: Semi-Doherty's)   Pulse 72   Temp (!) 96.7  F (35.9  C) (Oral)   Resp 18   Ht 1.854 m (6' 1\")   Wt 142.2 kg (313 lb 7.9 oz)   SpO2 95%   BMI 41.36 kg/m     Gen: NAD, sitting upright in chair  HEENT: NC/AT,   Cardio: RRR, no murmurs  Pulm: non-labored on room air, lungs CTA bilaterally  Abd: soft, non-tender, protuberant, bowel sounds present  Ext: chronic venous stasis changes, bilateral pedal edema  Neuro/MSK: awake, alert, follows commands, decreased sensation to light touch in bilateral lower extremities to ~knee in stocking distribution.  Generalized weakness, worst in bilateral HF, bilateral DF/PF, L hand.    LABS  CBC RESULTS:   Recent Labs   Lab Test 07/18/22  0700 07/16/22  0557 07/15/22  0625   WBC 7.8 7.7 7.1   RBC 2.42* 2.47* 2.46*   HGB 7.4* 7.7* 7.9*   HCT 24.2* 25.1* 24.8*    102* 101*   MCH 30.6 31.2 32.1   MCHC 30.6* 30.7* 31.9   RDW 14.6 14.6 14.2    282 282     Last Basic Metabolic Panel:  Recent Labs   Lab Test 07/18/22  0700 07/16/22  0557 07/15/22  0945   * 129* 129*   POTASSIUM 5.2 4.5 3.5   CHLORIDE 88* 93* 93*   CO2 21 26 28   ANIONGAP 13 10 8   GLC 80 94 112*   BUN 53* 35* 27   CR 9.55* 6.52* 4.85*   GFRESTIMATED 6* 9* 13*   MAC 9.3 9.4 8.7     Liver Function Studies - Recent Labs   Lab Test 07/16/22  0557   PROTTOTAL 6.5*   ALBUMIN 2.8*   BILITOTAL 0.5   ALKPHOS 134   AST 13   ALT <6       Rehabilitation - continue comprehensive acute inpatient rehabilitation program with multidisciplinary approach including therapies, rehab nursing, and physiatry following. See interval history for updates.      ASSESSMENT AND PLAN  Shay EARL Jimenez is a 57 year old right hand dominant male with a past medical history of ESRD on HD, GERD, hyperlipidemia, peripheral neuropathy, " depression/anxiety, GINI, BPH, obesity who was admitted on 6/24/22 with thoracic compressive myelopathy and concern for discitis/vertebral osteomyelitis now s/p C6-T6 fusion and T2-T3 decompression on 6/27/22 with post-op course complicated by hypotension, pain, anemia, encephalopathy, and constipation.  He is now admitted to ARU on 7/15/22 for multidisciplinary rehabilitation and ongoing medical management.       Admission to acute inpatient rehab 07/15/22.    Impairment group code: Spinal Cord Dysfunction Spinal Non-Traumatic 04.111 Paraplegia, Incomplete        1. PT and OT 90 minutes of each on a daily basis, in addition to rehab nursing and close management of physiatrist.       2. Impairment of ADL's: Noted to have impaired strength, impaired activity tolerance, impaired tone, impaired balance, impaired coordination, impaired judgement and safety awareness, impulsiveness and impaired weight shifting, all affecting his ability to safely and independently perform basic ADLs.  Goal for mod I with basic ADLs with assist of 1 for tub/shower transfers.     3. Impairment of mobility:  Noted to have impaired strength, impaired activity tolerance, impaired tone, impaired balance, impaired coordination, impaired judgement and safety awareness, impulsiveness and impaired weight shifting, all affecting his ability to safely and independently perform basic mobility.  Goal for mod I with basic mobility with assist of 1 for stairs.     4. Medical Conditions    Thoracic compressive myelopathy  Concern for T2-T3 discitis/vertebral osteomyelitis  S/p C6-T6 fusion/T2-3 decompression on 6/27/22 with Dr. Elvi Dela Cruz on chronic upper back pain  Lower extremity weakness  Recurrent falls  Hx back pain for 6 months. CT chest 2/2022 showed concern for upper thoracic spine diskitis vs osteomyelitis. Unclear follow up from that time.  In weeks prior to admission, MRI showed possible thoracic compression fracture. Developed  "progressively weak legs with frequent falls.  On admission imaging with T2-T3 diskitis, osteomyelitis and compressive myelopathy.  Neurosurgery and ID consulted while inpatient.  Late growth of P acne in 2 intra-op cultures.  Per ID, \"likely contaminant given no previous hardware, but since new hardware is in place will recommend starting prophylactic amoxicillin.\"  Started on 7/12.  1/2 positive blood cultures from 7/5 for staph epi, per ID \"signifies contamination and does not require treatment\".  Repeat cultures negative.  - Continue amoxicillin 500 mg daily for total of 4 weeks  - Pain management: Tylenol PRN, flexeril PRN, Dilaudid 2-4 mg q6h PRN.  Wean opioids as able.  - Wound care: keep clean and dry, ok to shower no soaking  - Activity: no lifting greater than 5-10 pounds, no excessive bending, lifting, or twisting  - Brace when out of bed  - Continue PT/OT  - Follow up with neurosurgery, currently scheduled on 8/3 and 9/21     End-stage renal disease, on hemodialysis  Hypotension  Hyponatremia  Hyperphosphatemia  Secondary hyperparathyroidism  PTA HD M/W/F.  LUE fistula.  Nephrology following this admission for ongoing dialysis.  Reportedly with chronic hypotension, typically SBP in 70-90s and asymptomatic most of the time.  While IP, mild dizziness with positional changes but tolerating PT.   Patient reports PTA using midodrine 1 hour prior to HD and repeated 1 hour into HD run.  Started on TID dosing this admission.  - Nephrology consulted, appreciate ongoing assistance.  - Continue HD per nephrology, M/W/F schedule while at ARU  - Monitor BP.  Continue midodrine 10 mg TID.  Per [facility] team, nephrology recommended to consider 20 mg TID if symptomatic hypotension limiting therapies. This was deferred by inpatient team.  Could discuss with [facility] nephrology to consider at ARU if indicated.  - Continue bilateral lower extremity Jobst stockings  - Continue PTA sevelamer (recently increased 800 mg to 1600 " mg).  Per nephrology, checked PTH, which is elevated.  Defer any changes in management to nephrology.  - Resumed fluid restriction, 1.2 L daily, on 7/19 per nephrology recs given ongoing hyponatremia.  - Trend labs MWF, pending this morning     Acute blood loss on anemia of chronic kidney disease  Pre-op Hgb stable in 10s.  Hgb currently stable in 7s-8s.  - Epo with HD per nephrology  - Trend CBC.  Hgb stable at 7.4 on 7/18  - Consider transfusion for hemoglobin less than 7 g/dL and symptoms     Encephalopathy  Felt to be due to buildup of gabapentin metabolites due to PTA dose (300 mg TID) higher than typical maximum dose recommended even for patients on CRRT.  Gabapentin and Zoloft held.  With improved mental status, resumed, gabapentin at lower dose.  - Monitor mental status, consider further adjustments in medications if recurrent.     GERD  - Continue PPI (autosub protonix for omeprazole per formulary while at ARU)  - Patient reports that protonix has not been as helpful for symptoms as PTA prilosec.  He notes increased reflux symptoms this admission.  Will try to obtain home supply for use at ARU.  - Tums PRN     Hyperlipidemia  - Chronic and stable, on statin.     Peripheral neuropathy  Follows with [facility].    - Continue PT duloxetine 30 mg daily  - Continue gabapentin 100 mg TID, reduced from PTA dose as above due to concern for contributing to confusion in setting of renal impairment     Depression/anxiety  At ARU admission, reporting increased stressors at home, marital issues, financial.  - Continue PTA Zoloft 100 mg daily, Xanax 1 mg BID PRN  - Continue atarax 25 mg PRN for anxiety and pain  - Monitor mood, consult health psychology if indicated     Obesity, class III  Obstructive sleep apnea  BMI this stay is ~41.  No PTA CPAP use, reportedly does not tolerate.  - Needs oxygen by NC with sleep due to ongoing desats at night.  Overnight oximetry closer to discharge.  - Recommend  "follow-up with sleep medicine after discharge  - Encourage diet, lifestyle modifications once above issues addressed     History of BPH  - Continue PTA finasteride.  Given anuric and hypotension, could consider discontinuing?     Alcohol use disorder  Reports drinking \"too much\" lately, 6-8 cocktails per day PTA.  - Most recent LFTs on 7/16 WNL  - Encourage cessation, offer CD resources if interested     Allergic rhinitis  - Continue PTA Flonase and Zyrtec    Loose stool, bowel incontinence   Onset 7/18 morning per patient report.  Patient reports that he is having regular BM prior to this, but chart with last BM on 7/15 noted to be loose as well.  On sertraline, recently resumed after brief hold of PTA med.  No senna for ~4 days.  LFTs on 7/16 WNL.  Afebrile, WBC WNL.  Abdomen soft, non-tender.  Could be component of neurogenic bowel, possible bypass given unclear whether having regular bowel movements recently.    - Improving without intervention per patient report  - Encourage fluid  - Monitor.  If ongoing, will pursue additional work-up, could trial loperamide if non-infectious.      1. Adjustment to disability:  Clinical psychology to eval and treat if indicated  2. FEN: regular diet, thin liquids, 1.2L fluid restriction per nephrology  3. Bowel: continent, monitor for neurogenic bowel, on scheduled senokot, PRN Miralax, suppository  4. Bladder: anuric  5. DVT Prophylaxis: mechanical  6. GI Prophylaxis: PPI  7. Code: full  8. Disposition: goal for home  9. ELOS:  14 days  10. Follow up Appointments on Discharge: PCP, neurosurgery follow-up 8/3 and 9/21, nephrology for ongoing dialysis        Patient discussed with Dr. Antonio Loo, PM&R Staff Physician    Dona Cabezas PA-C  Physical Medicine & Rehabilitation     "

## 2022-07-21 ENCOUNTER — APPOINTMENT (OUTPATIENT)
Dept: PHYSICAL THERAPY | Facility: CLINIC | Age: 57
DRG: 052 | End: 2022-07-21
Attending: PHYSICAL MEDICINE & REHABILITATION
Payer: MEDICARE

## 2022-07-21 ENCOUNTER — APPOINTMENT (OUTPATIENT)
Dept: OCCUPATIONAL THERAPY | Facility: CLINIC | Age: 57
DRG: 052 | End: 2022-07-21
Attending: PHYSICAL MEDICINE & REHABILITATION
Payer: MEDICARE

## 2022-07-21 LAB
ALBUMIN SERPL-MCNC: 3 G/DL (ref 3.4–5)
ALP SERPL-CCNC: 131 U/L (ref 40–150)
ALT SERPL W P-5'-P-CCNC: 9 U/L (ref 0–70)
ANION GAP SERPL CALCULATED.3IONS-SCNC: 10 MMOL/L (ref 3–14)
AST SERPL W P-5'-P-CCNC: 17 U/L (ref 0–45)
BILIRUB SERPL-MCNC: 0.6 MG/DL (ref 0.2–1.3)
BUN SERPL-MCNC: 25 MG/DL (ref 7–30)
CALCIUM SERPL-MCNC: 9.4 MG/DL (ref 8.5–10.1)
CHLORIDE BLD-SCNC: 98 MMOL/L (ref 94–109)
CO2 SERPL-SCNC: 25 MMOL/L (ref 20–32)
CREAT SERPL-MCNC: 6.75 MG/DL (ref 0.66–1.25)
ERYTHROCYTE [DISTWIDTH] IN BLOOD BY AUTOMATED COUNT: 14.7 % (ref 10–15)
GFR SERPL CREATININE-BSD FRML MDRD: 9 ML/MIN/1.73M2
GLUCOSE BLD-MCNC: 101 MG/DL (ref 70–99)
HCT VFR BLD AUTO: 25.1 % (ref 40–53)
HGB BLD-MCNC: 8 G/DL (ref 13.3–17.7)
MCH RBC QN AUTO: 31.1 PG (ref 26.5–33)
MCHC RBC AUTO-ENTMCNC: 31.9 G/DL (ref 31.5–36.5)
MCV RBC AUTO: 98 FL (ref 78–100)
PLATELET # BLD AUTO: 279 10E3/UL (ref 150–450)
POTASSIUM BLD-SCNC: 3.7 MMOL/L (ref 3.4–5.3)
PROT SERPL-MCNC: 6.7 G/DL (ref 6.8–8.8)
RBC # BLD AUTO: 2.57 10E6/UL (ref 4.4–5.9)
SODIUM SERPL-SCNC: 133 MMOL/L (ref 133–144)
WBC # BLD AUTO: 8.9 10E3/UL (ref 4–11)

## 2022-07-21 PROCEDURE — 97112 NEUROMUSCULAR REEDUCATION: CPT | Mod: GP | Performed by: PHYSICAL THERAPIST

## 2022-07-21 PROCEDURE — 97110 THERAPEUTIC EXERCISES: CPT | Mod: GP | Performed by: PHYSICAL THERAPIST

## 2022-07-21 PROCEDURE — 250N000013 HC RX MED GY IP 250 OP 250 PS 637: Performed by: PHYSICIAN ASSISTANT

## 2022-07-21 PROCEDURE — 80053 COMPREHEN METABOLIC PANEL: CPT | Performed by: PHYSICIAN ASSISTANT

## 2022-07-21 PROCEDURE — 99233 SBSQ HOSP IP/OBS HIGH 50: CPT | Mod: 24 | Performed by: PHYSICAL MEDICINE & REHABILITATION

## 2022-07-21 PROCEDURE — 128N000003 HC R&B REHAB

## 2022-07-21 PROCEDURE — 97116 GAIT TRAINING THERAPY: CPT | Mod: GP | Performed by: PHYSICAL THERAPIST

## 2022-07-21 PROCEDURE — 999N000150 HC STATISTIC PT MED CONFERENCE < 30 MIN

## 2022-07-21 PROCEDURE — 999N000125 HC STATISTIC PATIENT MED CONFERENCE < 30 MIN

## 2022-07-21 PROCEDURE — 97760 ORTHOTIC MGMT&TRAING 1ST ENC: CPT | Mod: GO

## 2022-07-21 PROCEDURE — 97530 THERAPEUTIC ACTIVITIES: CPT | Mod: GP | Performed by: PHYSICAL THERAPIST

## 2022-07-21 PROCEDURE — 97535 SELF CARE MNGMENT TRAINING: CPT | Mod: GO

## 2022-07-21 PROCEDURE — 36415 COLL VENOUS BLD VENIPUNCTURE: CPT | Performed by: PHYSICIAN ASSISTANT

## 2022-07-21 PROCEDURE — 85014 HEMATOCRIT: CPT | Performed by: PHYSICIAN ASSISTANT

## 2022-07-21 RX ORDER — FAMOTIDINE 10 MG
10 TABLET ORAL DAILY
Status: DISCONTINUED | OUTPATIENT
Start: 2022-07-21 | End: 2022-07-30 | Stop reason: HOSPADM

## 2022-07-21 RX ORDER — DOXERCALCIFEROL 4 UG/2ML
4 INJECTION INTRAVENOUS
Status: DISCONTINUED | OUTPATIENT
Start: 2022-07-22 | End: 2022-07-27

## 2022-07-21 RX ORDER — LACTOBACILLUS RHAMNOSUS GG 10B CELL
1 CAPSULE ORAL 2 TIMES DAILY
Status: DISCONTINUED | OUTPATIENT
Start: 2022-07-21 | End: 2022-07-30 | Stop reason: HOSPADM

## 2022-07-21 RX ADMIN — CALCIUM CARBONATE (ANTACID) CHEW TAB 500 MG 1000 MG: 500 CHEW TAB at 09:53

## 2022-07-21 RX ADMIN — AMOXICILLIN 500 MG: 500 CAPSULE ORAL at 17:09

## 2022-07-21 RX ADMIN — CETIRIZINE HYDROCHLORIDE 10 MG: 5 TABLET ORAL at 08:27

## 2022-07-21 RX ADMIN — Medication 1 CAPSULE: at 21:07

## 2022-07-21 RX ADMIN — Medication 1.25 MG: at 08:33

## 2022-07-21 RX ADMIN — SEVELAMER CARBONATE 1600 MG: 800 TABLET, FILM COATED ORAL at 08:27

## 2022-07-21 RX ADMIN — MIDODRINE HYDROCHLORIDE 10 MG: 5 TABLET ORAL at 08:27

## 2022-07-21 RX ADMIN — ATORVASTATIN CALCIUM 20 MG: 20 TABLET, FILM COATED ORAL at 21:07

## 2022-07-21 RX ADMIN — SERTRALINE HYDROCHLORIDE 100 MG: 100 TABLET ORAL at 08:27

## 2022-07-21 RX ADMIN — Medication 1 CAPSULE: at 11:33

## 2022-07-21 RX ADMIN — CYANOCOBALAMIN TAB 500 MCG 500 MCG: 500 TAB at 08:27

## 2022-07-21 RX ADMIN — ALPRAZOLAM 1 MG: 1 TABLET ORAL at 21:07

## 2022-07-21 RX ADMIN — GABAPENTIN 100 MG: 100 CAPSULE ORAL at 08:27

## 2022-07-21 RX ADMIN — FLUTICASONE PROPIONATE 1 SPRAY: 50 SPRAY, METERED NASAL at 08:33

## 2022-07-21 RX ADMIN — SEVELAMER CARBONATE 1600 MG: 800 TABLET, FILM COATED ORAL at 17:09

## 2022-07-21 RX ADMIN — ALPRAZOLAM 1 MG: 1 TABLET ORAL at 11:59

## 2022-07-21 RX ADMIN — FAMOTIDINE 10 MG: 10 TABLET ORAL at 11:57

## 2022-07-21 RX ADMIN — HYDROXYZINE HYDROCHLORIDE 25 MG: 25 TABLET ORAL at 09:50

## 2022-07-21 RX ADMIN — HYDROMORPHONE HYDROCHLORIDE 4 MG: 2 TABLET ORAL at 17:09

## 2022-07-21 RX ADMIN — Medication 100 MG: at 08:27

## 2022-07-21 RX ADMIN — GABAPENTIN 100 MG: 100 CAPSULE ORAL at 21:06

## 2022-07-21 RX ADMIN — HYDROXYZINE HYDROCHLORIDE 25 MG: 25 TABLET ORAL at 21:06

## 2022-07-21 RX ADMIN — DULOXETINE HYDROCHLORIDE 30 MG: 30 CAPSULE, DELAYED RELEASE ORAL at 08:28

## 2022-07-21 RX ADMIN — GABAPENTIN 100 MG: 100 CAPSULE ORAL at 14:42

## 2022-07-21 RX ADMIN — PANTOPRAZOLE SODIUM 40 MG: 40 TABLET, DELAYED RELEASE ORAL at 06:20

## 2022-07-21 RX ADMIN — Medication 1 TABLET: at 08:28

## 2022-07-21 RX ADMIN — MIDODRINE HYDROCHLORIDE 10 MG: 5 TABLET ORAL at 17:09

## 2022-07-21 RX ADMIN — MIDODRINE HYDROCHLORIDE 10 MG: 5 TABLET ORAL at 11:33

## 2022-07-21 ASSESSMENT — ACTIVITIES OF DAILY LIVING (ADL)
ADLS_ACUITY_SCORE: 44
ADLS_ACUITY_SCORE: 42
ADLS_ACUITY_SCORE: 42
ADLS_ACUITY_SCORE: 44
ADLS_ACUITY_SCORE: 42
ADLS_ACUITY_SCORE: 44
ADLS_ACUITY_SCORE: 44

## 2022-07-21 NOTE — PLAN OF CARE
"Goal Outcome Evaluation:  FOCUS/GOAL  Bowel management, Medication management, Medical management, Cognition/Memory/Judgment/Problem solving, Psychosocial needs, and Prevention of secondary complications    ASSESSMENT, INTERVENTIONS AND CONTINUING PLAN FOR GOAL:  Patient is alert/oriented x4, is able to use call light and direct cares appropriately on this shift.  Patient continues to have soft BP, gave Midodrine as scheduled this am prior to therapies and patient is asymptomatic.  With OT this am patient c/o itchiness with the brace and requested Atarax PRN x1 with relief.  Patient also was upset because had another loose stool that cut into the therapy time, afterwards was c/o indegestion and heartburn and was given Tums PRN x1 but patient did not feel relief from this- team rounds was happening at this time (11am) so team was discussing adding some probiotic as well as some fiber while patient is continuing on Amoxicillin.  Patient was also requesting to get Pepcid as Protonix does not seem to be helping for his symptoms.  These meds were given as soon as available, patient still having anxiety though, BP was low again at 11:30am and midodrine given, patient still wanted the xanax though stating \"my anxiety just gets really bad.\" MD aware of recent BP, will continue to monitor and offer support.  Team did also offer psychology support to assist in management of rehab needs along with interpersonal, patient is taking into consideration.  No additional care concerns at this time, continue with POC.                        "

## 2022-07-21 NOTE — PROGRESS NOTES
Team rounds this morning. Discussed discharge and ongoing therapy. Asked pt to confirm discharge location, house set-up, and support at discharge early next week. Will talk again in round next week. Therapy suggesting pt doesn't drive at discharge. Marci offered a metro mobility application. Pt declined at this time. Encouraged to consider if pt thinks he will need assistance with transport, especially to HD, at discharge. Health Psychology offered. Pt stated that he spoke with the [facility] yesterday and found it helpful. Will consider Health Psychology support as well. Need to f/u and place consult if agreeable. SW will follow, address other concerns, and offer additional resources throughout ARU stay.     MOUNA Rosario   [facility]   Direct Phone: [phone]  I   Pager: [phone]  I  Fax: [phone]

## 2022-07-21 NOTE — PROGRESS NOTES
"  [facility]   Acute Rehabilitation Unit  Daily progress note    INTERVAL HISTORY  Shay Jimenez was seen and examined at bedside this morning during team rounds.  No acute events reported overnight.  Today, patient reports that he is feeling \"frustrated\".  He notes ongoing reflux which has been increased in setting of being off his home omeprazole on pantoprazole as substitute.  He also notes ongoing loose stools, with some incontinence, which has been disruptive for therapy.  Denies abdominal pain or nausea.  Also with some ongoing social stressors - marital, financial.  He will \"think about\" health psychology supports, did find  visit yesterday to be helpful.    Functionally, he has significant sensory impairments in bilateral feet, unclear if this is different than PTA baseline, unsteady, did have episode of R knee buckling with OT this morning.  He is making slow but steady progress.  Anticipates discharge to [facility] apartment with help from sons with heavier IADLs, stairs, donning/doffing brace, transportation.  For full functional updates, see team rounds note from today.    MEDICATIONS    amoxicillin  500 mg Oral Daily with supper     atorvastatin  20 mg Oral At Bedtime     cetirizine  10 mg Oral Daily     cyanocobalamin  500 mcg Oral Daily     DULoxetine  30 mg Oral Daily     finasteride  1.25 mg Oral Daily     fluticasone  1 spray Both Nostrils Daily     gabapentin  100 mg Oral TID     midodrine  10 mg Oral TID w/meals     multivitamin RENAL  1 tablet Oral Daily     pantoprazole  40 mg Oral QAM AC     vitamin B6  100 mg Oral Daily     sertraline  100 mg Oral Daily     sevelamer carbonate  1,600 mg Oral TID w/meals        acetaminophen, ALPRAZolam, bisacodyl, calcium carbonate, cyclobenzaprine, HYDROmorphone, hydrOXYzine, melatonin, miconazole, naloxone **OR** naloxone **OR** naloxone **OR** naloxone, polyethylene glycol     PHYSICAL EXAM  BP 90/80 (BP Location: " "Right arm)   Pulse 77   Temp 97  F (36.1  C) (Oral)   Resp 18   Ht 1.854 m (6' 1\")   Wt 135.3 kg (298 lb 3.2 oz)   SpO2 92%   BMI 39.34 kg/m     Gen: NAD, sitting upright in chair  HEENT: NC/AT,   Cardio: appears well-perfused  Pulm: non-labored on room air  Abd: soft, non-tender, protuberant, bowel sounds present  Ext: chronic venous stasis changes, bilateral pedal edema  Neuro/MSK: awake, alert, follows commands, decreased sensation to light touch in bilateral lower extremities to ~knee in stocking distribution.  Lower extremity weakness.    LABS  CBC RESULTS:   Recent Labs   Lab Test 07/18/22  0700 07/16/22  0557 07/15/22  0625   WBC 7.8 7.7 7.1   RBC 2.42* 2.47* 2.46*   HGB 7.4* 7.7* 7.9*   HCT 24.2* 25.1* 24.8*    102* 101*   MCH 30.6 31.2 32.1   MCHC 30.6* 30.7* 31.9   RDW 14.6 14.6 14.2    282 282     Last Basic Metabolic Panel:  Recent Labs   Lab Test 07/18/22  0700 07/16/22  0557 07/15/22  0945   * 129* 129*   POTASSIUM 5.2 4.5 3.5   CHLORIDE 88* 93* 93*   CO2 21 26 28   ANIONGAP 13 10 8   GLC 80 94 112*   BUN 53* 35* 27   CR 9.55* 6.52* 4.85*   GFRESTIMATED 6* 9* 13*   MAC 9.3 9.4 8.7     Liver Function Studies - Recent Labs   Lab Test 07/16/22  0557   PROTTOTAL 6.5*   ALBUMIN 2.8*   BILITOTAL 0.5   ALKPHOS 134   AST 13   ALT <6       Rehabilitation - continue comprehensive acute inpatient rehabilitation program with multidisciplinary approach including therapies, rehab nursing, and physiatry following. See interval history for updates.      ASSESSMENT AND PLAN  Shay Jimenez is a 57 year old right hand dominant male with a past medical history of ESRD on HD, GERD, hyperlipidemia, peripheral neuropathy, depression/anxiety, GINI, BPH, obesity who was admitted on 6/24/22 with thoracic compressive myelopathy and concern for discitis/vertebral osteomyelitis now s/p C6-T6 fusion and T2-T3 decompression on 6/27/22 with post-op course complicated by hypotension, pain, anemia, " "encephalopathy, and constipation.  He is now admitted to ARU on 7/15/22 for multidisciplinary rehabilitation and ongoing medical management.       Admission to acute inpatient rehab 07/15/22.    Impairment group code: Spinal Cord Dysfunction Spinal Non-Traumatic 04.111 Paraplegia, Incomplete        1. PT and OT 90 minutes of each on a daily basis, in addition to rehab nursing and close management of physiatrist.       2. Impairment of ADL's: Noted to have impaired strength, impaired activity tolerance, impaired tone, impaired balance, impaired coordination, impaired judgement and safety awareness, impulsiveness and impaired weight shifting, all affecting his ability to safely and independently perform basic ADLs.  Goal for mod I with basic ADLs with assist of 1 for tub/shower transfers.     3. Impairment of mobility:  Noted to have impaired strength, impaired activity tolerance, impaired tone, impaired balance, impaired coordination, impaired judgement and safety awareness, impulsiveness and impaired weight shifting, all affecting his ability to safely and independently perform basic mobility.  Goal for mod I with basic mobility with assist of 1 for stairs.     4. Medical Conditions    Thoracic compressive myelopathy  Concern for T2-T3 discitis/vertebral osteomyelitis  S/p C6-T6 fusion/T2-3 decompression on 6/27/22 with Dr. Elvi Dela Cruz on chronic upper back pain  Lower extremity weakness  Recurrent falls  Hx back pain for 6 months. CT chest 2/2022 showed concern for upper thoracic spine diskitis vs osteomyelitis. Unclear follow up from that time.  In weeks prior to admission, MRI showed possible thoracic compression fracture. Developed progressively weak legs with frequent falls.  On admission imaging with T2-T3 diskitis, osteomyelitis and compressive myelopathy.  Neurosurgery and ID consulted while inpatient.  Late growth of P acne in 2 intra-op cultures.  Per ID, \"likely contaminant given no previous hardware, " "but since new hardware is in place will recommend starting prophylactic amoxicillin.\"  Started on 7/12.  1/2 positive blood cultures from 7/5 for staph epi, per ID \"signifies contamination and does not require treatment\".  Repeat cultures negative.  - Continue amoxicillin 500 mg daily for total of 4 weeks  - Pain management: Tylenol PRN, flexeril PRN, Dilaudid 2-4 mg q6h PRN.  Wean opioids as able.  - Wound care: keep clean and dry, ok to shower no soaking  - Activity: no lifting greater than 5-10 pounds, no excessive bending, lifting, or twisting  - Brace when out of bed  - Continue PT/OT  - Follow up with neurosurgery, currently scheduled on 8/3 and 9/21     End-stage renal disease, on hemodialysis  Hypotension  Hyponatremia  Hyperphosphatemia  Secondary hyperparathyroidism  PTA HD M/W/F.  LUE fistula.  Nephrology following this admission for ongoing dialysis.  Reportedly with chronic hypotension, typically SBP in 70-90s and asymptomatic most of the time.  While IP, mild dizziness with positional changes but tolerating PT.   Patient reports PTA using midodrine 1 hour prior to HD and repeated 1 hour into HD run.  Started on TID dosing this admission.  - Nephrology consulted, appreciate ongoing assistance.  - Continue HD per nephrology, M/W/F schedule while at ARU  - Monitor BP.  Continue midodrine 10 mg TID.  Per Pinky team, nephrology recommended to consider 20 mg TID if symptomatic hypotension limiting therapies. This was deferred by inpatient team.  Could discuss with [facility] nephrology to consider at ARU if indicated.  - Continue bilateral lower extremity Jobst stockings  - Continue PTA sevelamer (recently increased 800 mg to 1600 mg).  Per nephrology, checked PTH 7/19, which is elevated 75.  Management per nephrology.  - Resumed fluid restriction, 1.2 L daily, on 7/19 per nephrology recs given ongoing hyponatremia.  - Trend labs MWF     Acute blood loss on anemia of chronic kidney disease  Pre-op Hgb stable " "in 10s.  Hgb currently stable in 7s-8s.  - Epo with HD per nephrology  - Trend CBC.  Hgb stable at 7.4 on 7/18  - Consider transfusion for hemoglobin less than 7 g/dL and symptoms     Encephalopathy  Felt to be due to buildup of gabapentin metabolites due to PTA dose (300 mg TID) higher than typical maximum dose recommended even for patients on CRRT.  Gabapentin and Zoloft held.  With improved mental status, resumed, gabapentin at lower dose.  - Monitor mental status, consider further adjustments in medications if recurrent.     GERD  - Continue PPI (autosub protonix for omeprazole per formulary while at ARU)  - Patient reports that protonix has not been as helpful for symptoms as PTA prilosec.  He notes increased reflux symptoms this admission.  Will try to obtain home supply for use at ARU.  - Has not yet been able to get home omeprazole.  Will add Pepcid due to ongoing reflux symptoms.  - Tums PRN     Hyperlipidemia  - Chronic and stable, on statin.     Peripheral neuropathy  Follows with [facility].    - Continue PT duloxetine 30 mg daily  - Continue gabapentin 100 mg TID, reduced from PTA dose as above due to concern for contributing to confusion in setting of renal impairment     Depression/anxiety  At ARU admission, reporting increased stressors at home, marital issues, financial.  - Continue PTA Zoloft 100 mg daily, Xanax 1 mg BID PRN  - Continue atarax 25 mg PRN for anxiety and pain  - Offered health psychology, patient wants to \"think about it\".  Will consult if open.  - Monitor mood     Obesity, class III  Obstructive sleep apnea  BMI this stay is ~41.  No PTA CPAP use, reportedly does not tolerate.  - Needs oxygen by NC with sleep due to ongoing desats at night.  Overnight oximetry closer to discharge.  - Recommend follow-up with sleep medicine after discharge  - Encourage diet, lifestyle modifications once above issues addressed     History of BPH  - Continue PTA finasteride.  Given anuric " "and hypotension, could consider discontinuing?     Alcohol use disorder  Reports drinking \"too much\" lately, 6-8 cocktails per day PTA.  - Most recent LFTs on 7/16 WNL  - Encourage cessation, offer CD resources if interested     Allergic rhinitis  - Continue PTA Flonase and Zyrtec    Loose stool, bowel incontinence   Onset 7/18 morning per patient report.  On sertraline, recently resumed after brief hold of PTA med.  No recent stool softener or laxative use. senna for ~4 days.  LFTs on 7/16 WNL.  Afebrile, WBC WNL.  Abdomen soft, non-tender.    - Reportedly improving without intervention on 7/20, but 7/21 complains of ongoing loose, incontinent stools.  No abdominal pain or nausea, though is noting reflux as above.  Add metamucil, probiotic.  - Obtain repeat BMP, CBC  - Encourage fluid  - Monitor.  If ongoing, will pursue additional work-up, could trial loperamide if non-infectious.      1. Adjustment to disability:  Clinical psychology to eval and treat if indicated  2. FEN: regular diet, thin liquids, 1.2L fluid restriction per nephrology  3. Bowel: continent/incontinent, frequent loose stools recently as above   4. Bladder: anuric  5. DVT Prophylaxis: mechanical  6. GI Prophylaxis: PPI  7. Code: full  8. Disposition: goal for home  9. ELOS:  14 days  10. Follow up Appointments on Discharge: PCP, neurosurgery follow-up 8/3 and 9/21, nephrology for ongoing dialysis        Patient seen and discussed with Dr. Antonio Loo, PM&R Staff Physician    Dona Cabezas PA-C  Physical Medicine & Rehabilitation     "

## 2022-07-21 NOTE — PLAN OF CARE
Acute Rehab Care Conference/Team Rounds      Type: Team Rounds    Present: Dr Antonio Loo, Dona HOUSTON, Elvia Sky OT, Claudia Covarrubias PT, Tomasa KENNYSW, Suyapa Hawkins RD, Jo-Ann Man RN, and Patient Shay Jimenez.     Discharge Barriers/Treatment/Education    Rehab Diagnosis: paraparesis 2/2 thoracic myelopathy s/p spinal surgery     Active Medical Co-morbidities/Prognosis:     Patient Active Problem List   Diagnosis Code     Degenerative arthritis of thoracic spine with cord compression M47.14     Discitis, unspecified spinal region M46.40     Anemia of chronic renal failure, unspecified CKD stage N18.9, D63.1     Abscess in epidural space of spine G06.1   gait instability     Safety: Pt is alert and oriented. Use call light appropriately. A1-2 SPT transfers. On hemodialysis. 1200 Fluid restriction. Use oxygen at 2L/nc prn while sleeping.    Pain: No c/o pain.    Medications, Skin, Tubes/Lines: Can take pills whole. Post s/p C6/T6 fusion and T2-T3 decompression site is w/ steri strips,SOSA.  Fistula to left forearm for hemodialysis on MWF.    Swallowing/Nutrition: reg with tihns    Bowel/Bladder: Anuric. Can be cont/incont of bowel. LBM 7/20/22.    Psychosocial: Live with wife and teenage sons. Indep PTA but decline recently PTA. Depression noted. ETOH use reported, pt denied concerns to SWer. Questionable support from pt wife. No financial concerns. HD PTA.     ADLs/IADLs:    Mobility: A x 1 SPT FWW, CGA-Min A short distance amb FWW(LE sensory deficits/R knee instability), w/c based nursing.              Needs supervision w/ power w/c mobility for safety.    Grooming: Set up seated.    Dressing: SBA don shirt upright, dep A to don brace    . LB sba w/ AE for threading over feet. Mod a standing to adjust over hips unilateral support of walker for modified tech.     Bathing: SBA UB wash sponge bath, LB wash mod A and max A for posterior cares.     Toileting: A x 1 SPT toilet w/o commode overlay but  pt would benefit from commode overlay, needs to reassess. Mod-max A clothing management, variable CGA seated/max A w/ toilet hygiene standing w/ FWW.  IADLs: Previously IND ADL/IADL, working, and driving. Sons can assist at discharge.  Vision/Cognition: ACE score 91/100 w/ below 82 indicating impairment. WFL cognition.     Pt is making good progress with ADLs, tsfers and room mobility to bathroom but variable performance w/ pt occasionally demonstrating R knee buckling w/ short ambulation, activity tolerance and transfers. Pt can continue to progress functional standing tolerance to progress LB ADLs/dressing w/ AE/cares standing, UE strengthening with precautions and transfers. Continue to assess safety and performance w/ ADLs/tsfers and mobility w/ consistency. Recommending Home OT.     Mobility: Pt is making progress with his mobility.  Pt with B LE, core, UE weakness, and decreased balance.  Pt needing modA to stand from w/c height (about 20 inches) , amb with Pinky, 30-40 ft, fww,  davis by TONEY, LE weakness. Pt is using a power wheelchair on the unit for more independence, but needs cues to turn the wc off before transferring on/off it.   Stairs NT yet due to LE weakness. Pt has stairs to get into his apartment. Home PT vs OP PT depending on progress.     Cognition/Language: intact     Community Re-Entry: Pt will need to continue to work on this goal as an outpatient.     Transportation: A car transfer will likely not be a barrier to discharge.     Decision maker: self    Plan of Care and goals reviewed and updated.    Discharge Plan/Recommendations    Fall Precautions: continue    Patient/Family input to goals: yes     Estimated length of stay: 20 days     Overall plan for the patient: reach a level of mod I       Utilization Review and Continued Stay Justification    Medical Necessity Criteria:    For any criteria that is not met, please document reason and plan for discharge, transfer, or modification of plan of  care to address.    Requires intensive rehabilitation program to treat functional deficits?: Yes    Requires 3x per week or greater involvement of rehabilitation physician to oversee rehabilitation program?: Yes    Requires rehabilitation nursing interventions?: Yes    Patient is making functional progress?: Yes    There is a potential for additional functional progress? Yes    Patient is participating in therapy 3 hours per day a minimum of 5 days per week or 15 hours per week in 7 day period?:Yes    Has discharge needs that require coordinated discharge planning approach?:Yes      Barriers/Concerns related to meeting medical necessity criteria:  None     Team Plan to Address Concern:  As needed       Final Physician Sign off    Statement of Approval:  Antonio Loo, DO      Patient Goals  Social Work Goals: Confirm discharge recommendations with therapy, coordinate safe discharge plan and remain available to support and assist as needed.    OT Predicted Duration/Target Date for Goal Attainment: 08/02/22  Therapy Frequency (OT): Daily  OT: Hygiene/Grooming: independent  OT: Upper Body Dressing: Modified independent  OT: Lower Body Dressing: Modified independent  OT: Upper Body Bathing: Supervision/stand-by assist  OT: Lower Body Bathing: Supervision/stand-by assist  OT: Bed Mobility: Modified independent  OT: Transfer: Modified independent  OT: Toilet Transfer/Toileting: Modified independent  OT: Meal Preparation: Supervision/stand-by assist, ambulatory level, with simple meal preparation  OT: Home Management: Supervision/stand-by assist, ambulatory level, with light demand household tasks  OT: Cognitive: Patient/caregiver will verbalize understanding of cognitive assessment results/recommendations as needed for safe discharge planning     OT: Goal 1: The pt will demo SBA shower transfer using DME/AE prn       PT Predicted Duration/Target Date for Goal Attainment: 08/02/22  PT Frequency: Daily  PT: Bed  Mobility: Modified independent, Supine to/from sit, Within precautions, Rolling, Bridging  PT: Transfers: Modified independent, Sit to/from stand, Bed to/from chair, Assistive device, Within precautions (fww)  PT: Gait: Modified independent, Rolling walker, 100 feet  PT: Stairs: 8 stairs, Supervision/stand-by assist, Rail on right, Assistive device (quad cane)     PT: Perform aerobic activity with stable cardiovascular response: intermittent activity, 5 minutes, ambulation  PT: Goal 1: Pt able to perform a car transfer with fww and sba  PT: Goal 2: Pt able to perform a HEP Natalia for LE strengthening for improved function at home.  PT: Goal 3: Pt able to state 3 fall prevention strategies after participating in fall prevention training to reduce fall risk at home.           Nursing Goals:    Patient Goal:  Pain Management: Pt will verbalize adequate pain control by participating in daily therapies prior to discharge.     Goal: Skin Integrity: Pt will be free from skin breakdown/pressure injury by independently shifting positions or asking staff to assist with repositioning prior to discharge.       Goal: Safety Management: Pt will demonstarte safety by using call light appropriately and waiting for staff assistance to mobilize prior to discharge.

## 2022-07-21 NOTE — PLAN OF CARE
Goal Outcome Evaluation:        Patient alert and oriented x4. A1-2 pivot transfer. Mixed continence, LBM: 7/20. Regular diet, thins, pills whole. 1200 fluid restriction. Fistula in [facility] for HD, came back at 1800 today. Steri strips on posterior back incision, approximated. Scabs on both knees, tegaderm on. Edema in BLE. Prn xanax given when returning from dialysis and prn dilaudid and flexeril given at bedtime after c/o of back pain. Will continue with POC.
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
## 2022-07-21 NOTE — PLAN OF CARE
Discharge Planner Post-Acute Rehab PT:      Discharge Plan: Apartment, stairs to upper level or lower level, 6, rail on one side. Home care PT,  2 sons to assist (wife will not be there or be available to help per chart).     Precautions: falls, spinal, brace when out of bed (CTLO), poor sensation B feet.     Current Status:  Bed Mobility: sit EOB cga, rail. Needs A for BLes into bed.   Transfer: stand pivot with fww A of 1, modA .   Gait: 30 ft with WW, CGA  Stairs: NT - 4 inch step up in bars.   Balance: sitting EOB sba, standing with fww and cga to Pinky.      Assessment: Pt is back to using the manual wc as the tyree rest on power chair was seemed hazardous due to the position of it and pt's decreased sensation in B LES. Pt frustrated about loose stools in morning, needing A, cues to slow and for safety when ambulating to BR when frustrated.  Pt participating well in PM for strengthening , gait drills, needs cues at times for foot positioning due to poor sensation in B LEs.        Other Barriers to Discharge (DME, Family Training, etc):   - stairs  - history of recurrent falls  - may need sons for family training

## 2022-07-22 ENCOUNTER — APPOINTMENT (OUTPATIENT)
Dept: PHYSICAL THERAPY | Facility: CLINIC | Age: 57
DRG: 052 | End: 2022-07-22
Attending: PHYSICAL MEDICINE & REHABILITATION
Payer: MEDICARE

## 2022-07-22 ENCOUNTER — APPOINTMENT (OUTPATIENT)
Dept: OCCUPATIONAL THERAPY | Facility: CLINIC | Age: 57
DRG: 052 | End: 2022-07-22
Attending: PHYSICAL MEDICINE & REHABILITATION
Payer: MEDICARE

## 2022-07-22 LAB
ANION GAP SERPL CALCULATED.3IONS-SCNC: 12 MMOL/L (ref 3–14)
BUN SERPL-MCNC: 33 MG/DL (ref 7–30)
CALCIUM SERPL-MCNC: 9.5 MG/DL (ref 8.5–10.1)
CHLORIDE BLD-SCNC: 97 MMOL/L (ref 94–109)
CO2 SERPL-SCNC: 24 MMOL/L (ref 20–32)
CREAT SERPL-MCNC: 7.86 MG/DL (ref 0.66–1.25)
ERYTHROCYTE [DISTWIDTH] IN BLOOD BY AUTOMATED COUNT: 14.6 % (ref 10–15)
GFR SERPL CREATININE-BSD FRML MDRD: 7 ML/MIN/1.73M2
GLUCOSE BLD-MCNC: 92 MG/DL (ref 70–99)
HCT VFR BLD AUTO: 25.8 % (ref 40–53)
HGB BLD-MCNC: 7.8 G/DL (ref 13.3–17.7)
MCH RBC QN AUTO: 30.7 PG (ref 26.5–33)
MCHC RBC AUTO-ENTMCNC: 30.2 G/DL (ref 31.5–36.5)
MCV RBC AUTO: 102 FL (ref 78–100)
PHOSPHATE SERPL-MCNC: 5.8 MG/DL (ref 2.5–4.5)
PLATELET # BLD AUTO: 256 10E3/UL (ref 150–450)
POTASSIUM BLD-SCNC: 3.6 MMOL/L (ref 3.4–5.3)
RBC # BLD AUTO: 2.54 10E6/UL (ref 4.4–5.9)
SODIUM SERPL-SCNC: 133 MMOL/L (ref 133–144)
WBC # BLD AUTO: 6.4 10E3/UL (ref 4–11)

## 2022-07-22 PROCEDURE — 634N000001 HC RX 634: Performed by: PHYSICIAN ASSISTANT

## 2022-07-22 PROCEDURE — 99232 SBSQ HOSP IP/OBS MODERATE 35: CPT | Performed by: PHYSICIAN ASSISTANT

## 2022-07-22 PROCEDURE — 258N000003 HC RX IP 258 OP 636: Performed by: PHYSICIAN ASSISTANT

## 2022-07-22 PROCEDURE — 82310 ASSAY OF CALCIUM: CPT | Performed by: PHYSICIAN ASSISTANT

## 2022-07-22 PROCEDURE — 128N000003 HC R&B REHAB

## 2022-07-22 PROCEDURE — 97110 THERAPEUTIC EXERCISES: CPT | Mod: GP

## 2022-07-22 PROCEDURE — 250N000013 HC RX MED GY IP 250 OP 250 PS 637: Performed by: PHYSICIAN ASSISTANT

## 2022-07-22 PROCEDURE — 90937 HEMODIALYSIS REPEATED EVAL: CPT

## 2022-07-22 PROCEDURE — 85027 COMPLETE CBC AUTOMATED: CPT | Performed by: PHYSICIAN ASSISTANT

## 2022-07-22 PROCEDURE — 97530 THERAPEUTIC ACTIVITIES: CPT | Mod: GO

## 2022-07-22 PROCEDURE — 99232 SBSQ HOSP IP/OBS MODERATE 35: CPT | Mod: 24 | Performed by: PHYSICAL MEDICINE & REHABILITATION

## 2022-07-22 PROCEDURE — 36415 COLL VENOUS BLD VENIPUNCTURE: CPT | Performed by: PHYSICIAN ASSISTANT

## 2022-07-22 PROCEDURE — 250N000013 HC RX MED GY IP 250 OP 250 PS 637: Performed by: PHYSICAL MEDICINE & REHABILITATION

## 2022-07-22 PROCEDURE — 84100 ASSAY OF PHOSPHORUS: CPT | Performed by: PHYSICIAN ASSISTANT

## 2022-07-22 PROCEDURE — 97535 SELF CARE MNGMENT TRAINING: CPT | Mod: GO

## 2022-07-22 RX ORDER — HEPARIN SODIUM 1000 [USP'U]/ML
500 INJECTION, SOLUTION INTRAVENOUS; SUBCUTANEOUS CONTINUOUS
Status: DISCONTINUED | OUTPATIENT
Start: 2022-07-22 | End: 2022-07-22

## 2022-07-22 RX ADMIN — HYDROXYZINE HYDROCHLORIDE 25 MG: 25 TABLET ORAL at 08:29

## 2022-07-22 RX ADMIN — MIDODRINE HYDROCHLORIDE 10 MG: 5 TABLET ORAL at 08:18

## 2022-07-22 RX ADMIN — AMOXICILLIN 500 MG: 500 CAPSULE ORAL at 19:03

## 2022-07-22 RX ADMIN — EPOETIN ALFA-EPBX 4000 UNITS: 10000 INJECTION, SOLUTION INTRAVENOUS; SUBCUTANEOUS at 13:43

## 2022-07-22 RX ADMIN — Medication 1.25 MG: at 08:29

## 2022-07-22 RX ADMIN — ALPRAZOLAM 1 MG: 1 TABLET ORAL at 20:58

## 2022-07-22 RX ADMIN — Medication 1 CAPSULE: at 08:17

## 2022-07-22 RX ADMIN — SEVELAMER CARBONATE 1600 MG: 800 TABLET, FILM COATED ORAL at 08:19

## 2022-07-22 RX ADMIN — GABAPENTIN 100 MG: 100 CAPSULE ORAL at 08:19

## 2022-07-22 RX ADMIN — SODIUM CHLORIDE 250 ML: 9 INJECTION, SOLUTION INTRAVENOUS at 13:42

## 2022-07-22 RX ADMIN — SODIUM CHLORIDE 300 ML: 9 INJECTION, SOLUTION INTRAVENOUS at 13:42

## 2022-07-22 RX ADMIN — Medication 1 CAPSULE: at 08:20

## 2022-07-22 RX ADMIN — HYDROMORPHONE HYDROCHLORIDE 4 MG: 2 TABLET ORAL at 19:12

## 2022-07-22 RX ADMIN — FLUTICASONE PROPIONATE 1 SPRAY: 50 SPRAY, METERED NASAL at 08:17

## 2022-07-22 RX ADMIN — MIDODRINE HYDROCHLORIDE 10 MG: 5 TABLET ORAL at 11:48

## 2022-07-22 RX ADMIN — SEVELAMER CARBONATE 1600 MG: 800 TABLET, FILM COATED ORAL at 19:03

## 2022-07-22 RX ADMIN — ACETAMINOPHEN 650 MG: 325 TABLET, FILM COATED ORAL at 08:17

## 2022-07-22 RX ADMIN — Medication 1 TABLET: at 08:18

## 2022-07-22 RX ADMIN — GABAPENTIN 100 MG: 100 CAPSULE ORAL at 20:49

## 2022-07-22 RX ADMIN — CYANOCOBALAMIN TAB 500 MCG 500 MCG: 500 TAB at 08:19

## 2022-07-22 RX ADMIN — SERTRALINE HYDROCHLORIDE 100 MG: 100 TABLET ORAL at 08:19

## 2022-07-22 RX ADMIN — Medication 100 MG: at 08:17

## 2022-07-22 RX ADMIN — MIDODRINE HYDROCHLORIDE 10 MG: 5 TABLET ORAL at 19:12

## 2022-07-22 RX ADMIN — CETIRIZINE HYDROCHLORIDE 10 MG: 5 TABLET ORAL at 08:29

## 2022-07-22 RX ADMIN — GABAPENTIN 100 MG: 100 CAPSULE ORAL at 13:42

## 2022-07-22 RX ADMIN — DULOXETINE HYDROCHLORIDE 30 MG: 30 CAPSULE, DELAYED RELEASE ORAL at 08:17

## 2022-07-22 RX ADMIN — CYCLOBENZAPRINE HYDROCHLORIDE 10 MG: 5 TABLET, FILM COATED ORAL at 11:48

## 2022-07-22 RX ADMIN — Medication 1 CAPSULE: at 20:49

## 2022-07-22 RX ADMIN — PANTOPRAZOLE SODIUM 40 MG: 40 TABLET, DELAYED RELEASE ORAL at 06:28

## 2022-07-22 RX ADMIN — SEVELAMER CARBONATE 1600 MG: 800 TABLET, FILM COATED ORAL at 11:48

## 2022-07-22 RX ADMIN — HYDROXYZINE HYDROCHLORIDE 25 MG: 25 TABLET ORAL at 19:19

## 2022-07-22 RX ADMIN — ATORVASTATIN CALCIUM 20 MG: 20 TABLET, FILM COATED ORAL at 21:00

## 2022-07-22 ASSESSMENT — ACTIVITIES OF DAILY LIVING (ADL)
ADLS_ACUITY_SCORE: 46
ADLS_ACUITY_SCORE: 44
ADLS_ACUITY_SCORE: 46

## 2022-07-22 NOTE — PLAN OF CARE
Goal Outcome Evaluation:        Patient alert and oriented x4. A1 pivot transfer. Continent of bowel and bladder, LBM: 7/21. Pt still having loose stools. Regular diet, thins, pills whole. 1200 fluid restriction. Fistula in LUE for HD on MWF. Steri strips on posterior back incision, approximated. Scabs on both knees, tegaderm on. Edema in BLE. Prn dilaudid given at 1700, xanax and atarax given at bedtime after c/o of back pain. Pt denied shower this evening with NA. Will continue with POC.

## 2022-07-22 NOTE — PLAN OF CARE
Goal Outcome Evaluation:    Plan of Care Reviewed With: patient     Overall Patient Progress: no change    Outcome Evaluation: no change in pt progress on shift, participating in therapies.    Pt A&Ox4. A1 stand pivot. Incont of bowel on shift, cares done. Pt c/o back spasms, PRN flexeril given with relief. C/o mild headache, PRN tylenol given with relief. Pt denies SOB, nausea, or new numbness/tingling. Back incision, SOSA. L fistula. Tegaderm on bilateral knees for scabbing. Brace on OOB. 1200 FR in place. Call light in reach, alarms on. Picked up for dialysis at 12:30.

## 2022-07-22 NOTE — PROGRESS NOTES
HEMODIALYSIS TREATMENT NOTE    Date: 7/22/2022  Time: 5:33 PM    Data:  Pre Wt:     Desired Wt:   kg   Post Wt:    Weight change:   kg  Ultrafiltration - Post Run Net Total Removed (mL): 3500 mL  Vascular Access Status: patent  Dialyzer Rinse: Streaked, Moderate  Total Blood Volume Processed: 91.24 L Liters  Total Dialysis (Treatment) Time: 3.5 Hours    Lab:        Interventions:  3.5 hours of a 4 hour run. Cut short 2/2 large BM and low SBPS. 3500 mls pulled. 450 BFR     Assessment:  ESRD patient fro ARU in for his regular HD run VSS A+O     Plan:    Per renal team

## 2022-07-22 NOTE — PLAN OF CARE
"Discharge Planner Post-Acute Rehab PT:      Discharge Plan: Apartment, stairs to upper level or lower level, 6, rail on one side. Home care PT,  2 sons to assist.     Precautions: falls, spinal, brace when out of bed (CTLO), poor sensation B feet.     Current Status:  Bed Mobility: sit EOB cga, rail. Needs A for BLes into bed.   Transfer: stand pivot with WW, CGA   Gait: 75 ft with WW, CGA  Stairs: MIN A on 6\" stairs  Balance: Poor standing balance d/t absent sensation in B feet.      Assessment: Able to progress to stair climbing MIN A today. Need to work on endurance and strengthening for LEs in order to be durable for home. Sons will assist with stair climbing safety at home     Other Barriers to Discharge (DME, Family Training, etc):   - stairs  - history of recurrent falls  - may need sons for family training: for stairs, car transfer, and brace use        "

## 2022-07-22 NOTE — PLAN OF CARE
Goal Outcome Evaluation:    Overall Patient Progress: no change    Outcome Evaluation: No change in patient progress this shift.    Pt is alert and oriented. Can take pills whole. Continent of B&B. [facility] 7/21. Ax1 SPT. Denied pain. Call light within reach and bed alarms on. Will continue with POC.

## 2022-07-22 NOTE — PROGRESS NOTES
Nephrology Progress Note  07/22/2022         Assessment & Recommendations:   Shay Jimenez is a 57 year old right hand dominant male with a past medical history of ESRD on HD, GERD, hyperlipidemia, peripheral neuropathy, depression/anxiety, GINI, BPH, obesity who was admitted on 6/24/22 with thoracic compressive myelopathy and concern for discitis/vertebral osteomyelitis now s/p C6-T6 fusion and T2-T3 decompression on 6/27/22 with post-op course complicated by hypotension, pain, anemia, encephalopathy, and constipation.  He is now admitted to ARU on 7/15/22 for multidisciplinary rehabilitation and ongoing medical management.     #ESRD  - dialyzes MWF at [facility], primary nephrologist Dr. Cline  - access: left AVF, run time 4 hours and 15 minutes  - .6 kg  - verbal consent obtained to continue HD while in ARU.     #BP/Volume  - BP historically low, getting midodrine 10 mg TID, hold with systolic pressure > 110 except just before dialysis  - spoke with his unit, has history of large 4-6 kg interdialytic wt gains.  - fluid restriction should help with this as well  - wt today 137 kg  - Planned for UF 3 kg today, pt wants to challenge to pull 5 kg, will UF as able depending on blood pressure.    #Electrolytes:  - sodium 133, K 3.6, bicarb 24  - history of hyperkalemia and hyponatremia  - encourage fluid restriction of 1.2 liters    #Anemia  - recent Hgb 7.8  - getting epo 4000 units with HD while inpatient      #Bone/Mineral  - Hectorol 4 mcg with HD  - sevelamer 1600 mg TID with meals, recently increased from 800 mg  - phos 6.9, PTH 75, can reduce hectorol to 2 mcg with HD  - recheck phos on Monday      Recommendations were communicated to primary team via this note    JUNAID ELLIOTT, PA-C     Interval History :   Reviewed provider and nursing notes for past 24 hours. Pt seen on HD, stable run. Denies fevers/chills, chest pain, dyspnea. He does have LE edema. He is hoping to UF ~ 5 kg  today.    Review of Systems:   A 4 point review of systems was negative except as noted above.      Physical Exam:   Vitals: /64  Pulse 72  Resp 18  SpO2 95%  Wt 142.2 kg  GENERAL APPEARANCE: Resting in no distress  HENT: mouth without ulcers or lesions  PULM: lungs clear to auscultation, equal air movement, no cyanosis or clubbing  CV: regular rhythm, normal rate, no rub     -edema 2+ LE   GI: soft, nontender  NEURO: mentation intact and speech normal, no asterixis   Access LUE AVF     Labs:   All labs reviewed by me  Electrolytes/Renal -   Recent Labs   Lab Test 07/22/22  0517 07/21/22  1252 07/18/22  0700 07/16/22  0557 06/28/22  0809 06/28/22  0420    133 122* 129*   < > 135   POTASSIUM 3.6 3.7 5.2 4.5   < > 4.7   CHLORIDE 97 98 88* 93*   < > 98   CO2 24 25 21 26   < > 26   BUN 33* 25 53* 35*   < > 49*   CR 7.86* 6.75* 9.55* 6.52*   < > 6.86*   GLC 92 101* 80 94   < > 153*   MAC 9.5 9.4 9.3 9.4   < > 8.7   MAG  --   --   --   --   --  2.1   PHOS 5.8*  --  6.9* 5.6*   < > 5.8*    < > = values in this interval not displayed.       CBC -   Recent Labs   Lab Test 07/22/22  0517 07/21/22  1252 07/18/22  0700   WBC 6.4 8.9 7.8   HGB 7.8* 8.0* 7.4*    279 266       LFTs -   Recent Labs   Lab Test 07/21/22  1252 07/16/22  0557 07/11/22  0722 06/29/22  0611 06/24/22  0818   ALKPHOS 131 134  --   --  152*   BILITOTAL 0.6 0.5  --   --  0.6   ALT 9 <6  --   --  55   AST 17 13  --   --  51*   PROTTOTAL 6.7* 6.5*  --   --  7.3   ALBUMIN 3.0* 2.8* 3.2*   < > 3.3*    < > = values in this interval not displayed.       Iron Panel - No lab results found.      Imaging: Reviewed     Current Medications:    sodium chloride 0.9%  250 mL Intravenous Once in dialysis/CRRT     sodium chloride 0.9%  300 mL Hemodialysis Machine Once     amoxicillin  500 mg Oral Daily with supper     atorvastatin  20 mg Oral At Bedtime     cetirizine  10 mg Oral Daily     cyanocobalamin  500 mcg Oral Daily     doxercalciferol  4 mcg  Intravenous Once in dialysis/CRRT     DULoxetine  30 mg Oral Daily     epoetin mar-epbx (RETACRIT) inj ESRD  4,000 Units Intravenous Once in dialysis/CRRT     famotidine  10 mg Oral Daily     finasteride  1.25 mg Oral Daily     fluticasone  1 spray Both Nostrils Daily     gabapentin  100 mg Oral TID     heparin  500 Units Hemodialysis Machine OR IV Push Once in dialysis/CRRT     lactobacillus rhamnosus (GG)  1 capsule Oral BID     midodrine  10 mg Oral TID w/meals     multivitamin RENAL  1 tablet Oral Daily     pantoprazole  40 mg Oral QAM AC     psyllium  1 packet Oral BID     vitamin B6  100 mg Oral Daily     sertraline  100 mg Oral Daily     sevelamer carbonate  1,600 mg Oral TID w/meals       heparin (porcine)       JUNAID ELLIOTT, PA-C

## 2022-07-22 NOTE — PROGRESS NOTES
"  [facility]   Acute Rehabilitation Unit  Daily progress note    INTERVAL HISTORY  Shay Jimenez was seen and examined at bedside this morning.  No acute events overnight.  Denies chest pain, shortness of breath, no fever or chills.  States stomach feels much better.  Hopes to be able to discharge next week.  Going for HD today.      Functional  PT:  Bed Mobility: sit EOB cga, rail. Needs A for BLes into bed.   Transfer: stand pivot with WW, CGA   Gait: 75 ft with WW, CGA  Stairs: MIN A on 6\" stairs  Balance: Poor standing balance d/t absent sensation in B feet.        ROS: 10 point ROS neg other than the symptoms noted above in the HPI.    MEDICATIONS    amoxicillin  500 mg Oral Daily with supper     atorvastatin  20 mg Oral At Bedtime     cetirizine  10 mg Oral Daily     cyanocobalamin  500 mcg Oral Daily     doxercalciferol  4 mcg Intravenous Once in dialysis/CRRT     DULoxetine  30 mg Oral Daily     famotidine  10 mg Oral Daily     finasteride  1.25 mg Oral Daily     fluticasone  1 spray Both Nostrils Daily     gabapentin  100 mg Oral TID     lactobacillus rhamnosus (GG)  1 capsule Oral BID     midodrine  10 mg Oral TID w/meals     multivitamin RENAL  1 tablet Oral Daily     pantoprazole  40 mg Oral QAM AC     psyllium  1 packet Oral BID     vitamin B6  100 mg Oral Daily     sertraline  100 mg Oral Daily     sevelamer carbonate  1,600 mg Oral TID w/meals        acetaminophen, ALPRAZolam, bisacodyl, calcium carbonate, cyclobenzaprine, HYDROmorphone, hydrOXYzine, melatonin, miconazole, naloxone **OR** naloxone **OR** naloxone **OR** naloxone, polyethylene glycol     PHYSICAL EXAM  BP 95/57   Pulse 82   Temp 97.5  F (36.4  C) (Oral)   Resp 9   Ht 1.854 m (6' 1\")   Wt 137 kg (302 lb)   SpO2 99%   BMI 39.84 kg/m     Gen: NAD, sitting upright in chair  HEENT: NC/AT,   Cardio: appears well-perfused  Pulm: non-labored on room air  Abd: soft, non-tender, protuberant, " bowel sounds present  Ext: chronic venous stasis changes, bilateral pedal edema  Neuro/MSK: awake, alert, follows commands, decreased sensation to light touch in bilateral lower extremities to ~knee in stocking distribution.  Lower extremity weakness.    LABS  CBC RESULTS:   Recent Labs   Lab Test 07/22/22  0517 07/21/22  1252 07/18/22  0700   WBC 6.4 8.9 7.8   RBC 2.54* 2.57* 2.42*   HGB 7.8* 8.0* 7.4*   HCT 25.8* 25.1* 24.2*   * 98 100   MCH 30.7 31.1 30.6   MCHC 30.2* 31.9 30.6*   RDW 14.6 14.7 14.6    279 266     Last Basic Metabolic Panel:  Recent Labs   Lab Test 07/22/22  0517 07/21/22  1252 07/18/22  0700    133 122*   POTASSIUM 3.6 3.7 5.2   CHLORIDE 97 98 88*   CO2 24 25 21   ANIONGAP 12 10 13   GLC 92 101* 80   BUN 33* 25 53*   CR 7.86* 6.75* 9.55*   GFRESTIMATED 7* 9* 6*   MAC 9.5 9.4 9.3     Liver Function Studies - Recent Labs   Lab Test 07/16/22  0557   PROTTOTAL 6.5*   ALBUMIN 2.8*   BILITOTAL 0.5   ALKPHOS 134   AST 13   ALT <6       Rehabilitation - continue comprehensive acute inpatient rehabilitation program with multidisciplinary approach including therapies, rehab nursing, and physiatry following. See interval history for updates.      ASSESSMENT AND PLAN  Shay Jimenez is a 57 year old right hand dominant male with a past medical history of ESRD on HD, GERD, hyperlipidemia, peripheral neuropathy, depression/anxiety, GINI, BPH, obesity who was admitted on 6/24/22 with thoracic compressive myelopathy and concern for discitis/vertebral osteomyelitis now s/p C6-T6 fusion and T2-T3 decompression on 6/27/22 with post-op course complicated by hypotension, pain, anemia, encephalopathy, and constipation.  He is now admitted to ARU on 7/15/22 for multidisciplinary rehabilitation and ongoing medical management.       Admission to acute inpatient rehab 07/15/22.    Impairment group code: Spinal Cord Dysfunction Spinal Non-Traumatic 04.111 Paraplegia, Incomplete        1. PT and OT 90  "minutes of each on a daily basis, in addition to rehab nursing and close management of physiatrist.       2. Impairment of ADL's: Noted to have impaired strength, impaired activity tolerance, impaired tone, impaired balance, impaired coordination, impaired judgement and safety awareness, impulsiveness and impaired weight shifting, all affecting his ability to safely and independently perform basic ADLs.  Goal for mod I with basic ADLs with assist of 1 for tub/shower transfers.     3. Impairment of mobility:  Noted to have impaired strength, impaired activity tolerance, impaired tone, impaired balance, impaired coordination, impaired judgement and safety awareness, impulsiveness and impaired weight shifting, all affecting his ability to safely and independently perform basic mobility.  Goal for mod I with basic mobility with assist of 1 for stairs.     4. Medical Conditions    Thoracic compressive myelopathy  Concern for T2-T3 discitis/vertebral osteomyelitis  S/p C6-T6 fusion/T2-3 decompression on 6/27/22 with Dr. Boles  Acute on chronic upper back pain  Lower extremity weakness  Recurrent falls  Hx back pain for 6 months. CT chest 2/2022 showed concern for upper thoracic spine diskitis vs osteomyelitis. Unclear follow up from that time.  In weeks prior to admission, MRI showed possible thoracic compression fracture. Developed progressively weak legs with frequent falls.  On admission imaging with T2-T3 diskitis, osteomyelitis and compressive myelopathy.  Neurosurgery and ID consulted while inpatient.  Late growth of P acne in 2 intra-op cultures.  Per ID, \"likely contaminant given no previous hardware, but since new hardware is in place will recommend starting prophylactic amoxicillin.\"  Started on 7/12.  1/2 positive blood cultures from 7/5 for staph epi, per ID \"signifies contamination and does not require treatment\".  Repeat cultures negative.  - Continue amoxicillin 500 mg daily for total of 4 weeks  - Pain " management: Tylenol PRN, flexeril PRN, Dilaudid 2-4 mg q6h PRN.  Wean opioids as able.  - Wound care: keep clean and dry, ok to shower no soaking  - Activity: no lifting greater than 5-10 pounds, no excessive bending, lifting, or twisting  - Brace when out of bed  - Continue PT/OT  - Follow up with neurosurgery, currently scheduled on 8/3 and 9/21     End-stage renal disease, on hemodialysis  Hypotension  Hyponatremia  Hyperphosphatemia  Secondary hyperparathyroidism  PTA HD M/W/F.  LUE fistula.  Nephrology following this admission for ongoing dialysis.  Reportedly with chronic hypotension, typically SBP in 70-90s and asymptomatic most of the time.  While IP, mild dizziness with positional changes but tolerating PT.   Patient reports PTA using midodrine 1 hour prior to HD and repeated 1 hour into HD run.  Started on TID dosing this admission.  - Nephrology consulted, appreciate ongoing assistance.  - Continue HD per nephrology, M/W/F schedule while at ARU  - Monitor BP.  Continue midodrine 10 mg TID.  Per [facility] team, nephrology recommended to consider 20 mg TID if symptomatic hypotension limiting therapies. This was deferred by inpatient team.  Could discuss with [facility] nephrology to consider at ARU if indicated.  - Continue bilateral lower extremity Jobst stockings  - Continue PTA sevelamer (recently increased 800 mg to 1600 mg).  Per nephrology, checked PTH 7/19, which is elevated 75.  Management per nephrology.  - Resumed fluid restriction, 1.2 L daily, on 7/19 per nephrology recs given ongoing hyponatremia.  - Trend labs MWF     Acute blood loss on anemia of chronic kidney disease  Pre-op Hgb stable in 10s.  Hgb currently stable in 7s-8s.  - Epo with HD per nephrology  - Trend CBC.  Hgb stable at 7.4 on 7/18  - Consider transfusion for hemoglobin less than 7 g/dL and symptoms     Encephalopathy  Felt to be due to buildup of gabapentin metabolites due to PTA dose (300 mg TID) higher than typical maximum dose  "recommended even for patients on CRRT.  Gabapentin and Zoloft held.  With improved mental status, resumed, gabapentin at lower dose.  - Monitor mental status, consider further adjustments in medications if recurrent.     GERD  - Continue PPI (autosub protonix for omeprazole per formulary while at ARU)  - Patient reports that protonix has not been as helpful for symptoms as PTA prilosec.  He notes increased reflux symptoms this admission.  Will try to obtain home supply for use at ARU.  - Has not yet been able to get home omeprazole.  Will add Pepcid due to ongoing reflux symptoms.  - Tums PRN     Hyperlipidemia  - Chronic and stable, on statin.     Peripheral neuropathy  Follows with \[facility].    - Continue PT duloxetine 30 mg daily  - Continue gabapentin 100 mg TID, reduced from PTA dose as above due to concern for contributing to confusion in setting of renal impairment     Depression/anxiety  At ARU admission, reporting increased stressors at home, marital issues, financial.  - Continue PTA Zoloft 100 mg daily, Xanax 1 mg BID PRN  - Continue atarax 25 mg PRN for anxiety and pain  - Offered health psychology, patient wants to \"think about it\".  Will consult if open.  - Monitor mood     Obesity, class III  Obstructive sleep apnea  BMI this stay is ~41.  No PTA CPAP use, reportedly does not tolerate.  - Needs oxygen by NC with sleep due to ongoing desats at night.  Overnight oximetry closer to discharge.  - Recommend follow-up with sleep medicine after discharge  - Encourage diet, lifestyle modifications once above issues addressed     History of BPH  - Continue PTA finasteride.  Given anuric and hypotension, could consider discontinuing?     Alcohol use disorder  Reports drinking \"too much\" lately, 6-8 cocktails per day PTA.  - Most recent LFTs on 7/16 WNL  - Encourage cessation, offer CD resources if interested     Allergic rhinitis  - Continue PTA Flonase and Zyrtec    Loose stool, bowel " incontinence - imporved  Onset 7/18 morning per patient report.  On sertraline, recently resumed after brief hold of PTA med.  No recent stool softener or laxative use. senna for ~4 days.  LFTs on 7/16 WNL.  Afebrile, WBC WNL.  Abdomen soft, non-tender.    - Reportedly improving without intervention on 7/20, but 7/21 complains of ongoing loose, incontinent stools.  No abdominal pain or nausea, though is noting reflux as above.  Added metamucil, probiotic.  - Obtained repeat BMP, CBC - stable  - Encourage fluid  - Monitor.  If ongoing, will pursue additional work-up, could trial loperamide if non-infectious.      1. Adjustment to disability:  Clinical psychology to eval and treat if indicated  2. FEN: regular diet, thin liquids, 1.2L fluid restriction per nephrology  3. Bowel: continent/incontinent, frequent loose stools recently as above   4. Bladder: anuric  5. DVT Prophylaxis: mechanical  6. GI Prophylaxis: PPI  7. Code: full  8. Disposition: goal for home  9. ELOS:  14 days  10. Follow up Appointments on Discharge: PCP, neurosurgery follow-up 8/3 and 9/21, nephrology for ongoing dialysis        Antonio Loo, DO  Physical Medicine & Rehabilitation       I spent a total of 25 minutes face to face and coordinating care of Shay Jimenez. Over 50% of my time on the unit was spent counseling the patient and /or coordinating care regarding paraparesis post thoracic myelopathy.
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
## 2022-07-23 ENCOUNTER — APPOINTMENT (OUTPATIENT)
Dept: PHYSICAL THERAPY | Facility: CLINIC | Age: 57
DRG: 052 | End: 2022-07-23
Attending: PHYSICAL MEDICINE & REHABILITATION
Payer: MEDICARE

## 2022-07-23 ENCOUNTER — APPOINTMENT (OUTPATIENT)
Dept: OCCUPATIONAL THERAPY | Facility: CLINIC | Age: 57
DRG: 052 | End: 2022-07-23
Attending: PHYSICAL MEDICINE & REHABILITATION
Payer: MEDICARE

## 2022-07-23 ENCOUNTER — APPOINTMENT (OUTPATIENT)
Dept: GENERAL RADIOLOGY | Facility: CLINIC | Age: 57
DRG: 052 | End: 2022-07-23
Attending: PHYSICAL MEDICINE & REHABILITATION
Payer: MEDICARE

## 2022-07-23 PROCEDURE — 99233 SBSQ HOSP IP/OBS HIGH 50: CPT | Mod: 24 | Performed by: PHYSICAL MEDICINE & REHABILITATION

## 2022-07-23 PROCEDURE — 74018 RADEX ABDOMEN 1 VIEW: CPT

## 2022-07-23 PROCEDURE — 74018 RADEX ABDOMEN 1 VIEW: CPT | Mod: 26 | Performed by: STUDENT IN AN ORGANIZED HEALTH CARE EDUCATION/TRAINING PROGRAM

## 2022-07-23 PROCEDURE — 250N000013 HC RX MED GY IP 250 OP 250 PS 637: Performed by: PHYSICAL MEDICINE & REHABILITATION

## 2022-07-23 PROCEDURE — 128N000003 HC R&B REHAB

## 2022-07-23 PROCEDURE — 250N000013 HC RX MED GY IP 250 OP 250 PS 637: Performed by: PHYSICIAN ASSISTANT

## 2022-07-23 PROCEDURE — 97535 SELF CARE MNGMENT TRAINING: CPT | Mod: GO

## 2022-07-23 PROCEDURE — 97110 THERAPEUTIC EXERCISES: CPT | Mod: GP | Performed by: PHYSICAL THERAPIST

## 2022-07-23 RX ORDER — BISACODYL 10 MG
10 SUPPOSITORY, RECTAL RECTAL EVERY EVENING
Status: DISCONTINUED | OUTPATIENT
Start: 2022-07-23 | End: 2022-07-27

## 2022-07-23 RX ADMIN — MIDODRINE HYDROCHLORIDE 10 MG: 5 TABLET ORAL at 08:34

## 2022-07-23 RX ADMIN — Medication 1 CAPSULE: at 20:18

## 2022-07-23 RX ADMIN — DULOXETINE HYDROCHLORIDE 30 MG: 30 CAPSULE, DELAYED RELEASE ORAL at 08:34

## 2022-07-23 RX ADMIN — HYDROMORPHONE HYDROCHLORIDE 4 MG: 2 TABLET ORAL at 20:18

## 2022-07-23 RX ADMIN — SERTRALINE HYDROCHLORIDE 100 MG: 100 TABLET ORAL at 08:35

## 2022-07-23 RX ADMIN — Medication 1.25 MG: at 08:42

## 2022-07-23 RX ADMIN — Medication 100 MG: at 08:35

## 2022-07-23 RX ADMIN — GABAPENTIN 100 MG: 100 CAPSULE ORAL at 14:53

## 2022-07-23 RX ADMIN — HYDROXYZINE HYDROCHLORIDE 25 MG: 25 TABLET ORAL at 08:42

## 2022-07-23 RX ADMIN — Medication 1 CAPSULE: at 08:34

## 2022-07-23 RX ADMIN — AMOXICILLIN 500 MG: 500 CAPSULE ORAL at 18:04

## 2022-07-23 RX ADMIN — HYDROXYZINE HYDROCHLORIDE 25 MG: 25 TABLET ORAL at 16:03

## 2022-07-23 RX ADMIN — ALPRAZOLAM 1 MG: 1 TABLET ORAL at 20:56

## 2022-07-23 RX ADMIN — FAMOTIDINE 10 MG: 10 TABLET ORAL at 08:35

## 2022-07-23 RX ADMIN — MIDODRINE HYDROCHLORIDE 10 MG: 5 TABLET ORAL at 18:04

## 2022-07-23 RX ADMIN — SEVELAMER CARBONATE 1600 MG: 800 TABLET, FILM COATED ORAL at 08:35

## 2022-07-23 RX ADMIN — SEVELAMER CARBONATE 1600 MG: 800 TABLET, FILM COATED ORAL at 18:05

## 2022-07-23 RX ADMIN — CETIRIZINE HYDROCHLORIDE 10 MG: 5 TABLET ORAL at 08:34

## 2022-07-23 RX ADMIN — GABAPENTIN 100 MG: 100 CAPSULE ORAL at 20:18

## 2022-07-23 RX ADMIN — ATORVASTATIN CALCIUM 20 MG: 20 TABLET, FILM COATED ORAL at 20:55

## 2022-07-23 RX ADMIN — GABAPENTIN 100 MG: 100 CAPSULE ORAL at 08:35

## 2022-07-23 RX ADMIN — Medication 1 TABLET: at 08:36

## 2022-07-23 RX ADMIN — FLUTICASONE PROPIONATE 1 SPRAY: 50 SPRAY, METERED NASAL at 08:41

## 2022-07-23 RX ADMIN — MIDODRINE HYDROCHLORIDE 10 MG: 5 TABLET ORAL at 14:52

## 2022-07-23 RX ADMIN — PSYLLIUM HUSK 1 PACKET: 3.4 POWDER ORAL at 08:34

## 2022-07-23 RX ADMIN — CYANOCOBALAMIN TAB 500 MCG 500 MCG: 500 TAB at 08:35

## 2022-07-23 RX ADMIN — HYDROMORPHONE HYDROCHLORIDE 4 MG: 2 TABLET ORAL at 14:53

## 2022-07-23 ASSESSMENT — ACTIVITIES OF DAILY LIVING (ADL)
ADLS_ACUITY_SCORE: 46

## 2022-07-23 NOTE — PLAN OF CARE
Goal Outcome Evaluation:      FOCUS/GOAL  Pain management, Medical management, Mobility, and Skin integrity    ASSESSMENT, INTERVENTIONS AND CONTINUING PLAN FOR GOAL:         Aox4. Pleasant and cooperative during shift. Provider called this evening regarding X-ray reports and ordered suppository. Pt declined suppository as it was late and stated he would try to get it in the morning after his first therapy session.   Denies concerns for nurse at this time. Needs in reach and safety measures in place. Cont POC.

## 2022-07-23 NOTE — PLAN OF CARE
Goal Outcome Evaluation:    Plan of Care Reviewed With: patient     Overall Patient Progress: improving    Outcome Evaluation: Pt participating in therapies. Pt c/o pain, PRN atarax and Dilaudid given and provided relief. Cont of B&B on shift.    Pt A&Ox4. A1 with walker. Cont of B&B, continues to have loose stools, orders for abdominal xray, pt brought down at change of shift. Pt c/o back pain, PRN oxy and atarax given and provided relief. Incision SOSA. Collar on OOB. Call light in reach, alarms on, continue POC.

## 2022-07-23 NOTE — PROGRESS NOTES
"  [facility]   Acute Rehabilitation Unit  Daily progress note    INTERVAL HISTORY  No acute events overnight, patient reports sleeping well.  Therapies are going well, no issues.  Patient was reporting ongoing loose stools in the context of his antibiotic regimen.  Reporting frequent watery stools 4-5 times a day.  Additionally has not had significant bowel movements for almost a week discussed obtaining an abdominal XR the patient was agreeable       ROS: 10 point ROS neg other than the symptoms noted above in the HPI.    MEDICATIONS    amoxicillin  500 mg Oral Daily with supper     atorvastatin  20 mg Oral At Bedtime     cetirizine  10 mg Oral Daily     cyanocobalamin  500 mcg Oral Daily     doxercalciferol  4 mcg Intravenous Once in dialysis/CRRT     DULoxetine  30 mg Oral Daily     famotidine  10 mg Oral Daily     finasteride  1.25 mg Oral Daily     fluticasone  1 spray Both Nostrils Daily     gabapentin  100 mg Oral TID     lactobacillus rhamnosus (GG)  1 capsule Oral BID     midodrine  10 mg Oral TID w/meals     multivitamin RENAL  1 tablet Oral Daily     pantoprazole  40 mg Oral QAM AC     psyllium  1 packet Oral BID     vitamin B6  100 mg Oral Daily     sertraline  100 mg Oral Daily     sevelamer carbonate  1,600 mg Oral TID w/meals        acetaminophen, ALPRAZolam, bisacodyl, calcium carbonate, cyclobenzaprine, HYDROmorphone, hydrOXYzine, melatonin, miconazole, naloxone **OR** naloxone **OR** naloxone **OR** naloxone, polyethylene glycol     PHYSICAL EXAM  /60 (BP Location: Right arm)   Pulse 83   Temp 98.6  F (37  C) (Oral)   Resp 20   Ht 1.854 m (6' 1\")   Wt 133.1 kg (293 lb 8 oz)   SpO2 94%   BMI 38.72 kg/m     Gen: Sitting up in chair, no acute distress  HEENT: NC/AT,   Cardio: Regular rate and rhythm  Pulm: Nonlabored, lungs clear to auscultation bilateral  Abd: Soft, moderately distended, normal bowel sounds, nontender  Ext: chronic venous stasis " Per Dr. Obregon:  Use ice, not heat on the hip as needed for pain.    Use pain medications as needed for pain and swelling.    Continue toe touch weightbearing.    We will see her back in 2 weeks with more Xrays.    Please make sure patient arrives early for appointment.    changes, bilateral pedal edema, no tenderness  Neuro/MSK: awake, alert, follows commands, decreased sensation to light touch in bilateral lower extremities to ~knee in stocking distribution.  Lower extremity weakness.  No changes in deficits    LABS  CBC RESULTS:   Recent Labs   Lab Test 07/22/22  0517 07/21/22  1252 07/18/22  0700   WBC 6.4 8.9 7.8   RBC 2.54* 2.57* 2.42*   HGB 7.8* 8.0* 7.4*   HCT 25.8* 25.1* 24.2*   * 98 100   MCH 30.7 31.1 30.6   MCHC 30.2* 31.9 30.6*   RDW 14.6 14.7 14.6    279 266     Last Basic Metabolic Panel:  Recent Labs   Lab Test 07/22/22  0517 07/21/22  1252 07/18/22  0700    133 122*   POTASSIUM 3.6 3.7 5.2   CHLORIDE 97 98 88*   CO2 24 25 21   ANIONGAP 12 10 13   GLC 92 101* 80   BUN 33* 25 53*   CR 7.86* 6.75* 9.55*   GFRESTIMATED 7* 9* 6*   MAC 9.5 9.4 9.3     Liver Function Studies - Recent Labs   Lab Test 07/16/22  0557   PROTTOTAL 6.5*   ALBUMIN 2.8*   BILITOTAL 0.5   ALKPHOS 134   AST 13   ALT <6       Rehabilitation - continue comprehensive acute inpatient rehabilitation program with multidisciplinary approach including therapies, rehab nursing, and physiatry following. See interval history for updates.      ASSESSMENT AND PLAN  Shay Jimenez is a 57 year old right hand dominant male with a past medical history of ESRD on HD, GERD, hyperlipidemia, peripheral neuropathy, depression/anxiety, GINI, BPH, obesity who was admitted on 6/24/22 with thoracic compressive myelopathy and concern for discitis/vertebral osteomyelitis now s/p C6-T6 fusion and T2-T3 decompression on 6/27/22 with post-op course complicated by hypotension, pain, anemia, encephalopathy, and constipation.  He is now admitted to ARU on 7/15/22 for multidisciplinary rehabilitation and ongoing medical management.     Weekend updates/changes:  -07/23: We will obtain abdominal XR and assess current stool burden    Admission to acute inpatient rehab 07/15/22.    Impairment group code: Spinal Cord  "Dysfunction Spinal Non-Traumatic 04.111 Paraplegia, Incomplete        1. PT and OT 90 minutes of each on a daily basis, in addition to rehab nursing and close management of physiatrist.       2. Impairment of ADL's: Noted to have impaired strength, impaired activity tolerance, impaired tone, impaired balance, impaired coordination, impaired judgement and safety awareness, impulsiveness and impaired weight shifting, all affecting his ability to safely and independently perform basic ADLs.  Goal for mod I with basic ADLs with assist of 1 for tub/shower transfers.     3. Impairment of mobility:  Noted to have impaired strength, impaired activity tolerance, impaired tone, impaired balance, impaired coordination, impaired judgement and safety awareness, impulsiveness and impaired weight shifting, all affecting his ability to safely and independently perform basic mobility.  Goal for mod I with basic mobility with assist of 1 for stairs.     4. Medical Conditions    Thoracic compressive myelopathy  Concern for T2-T3 discitis/vertebral osteomyelitis  S/p C6-T6 fusion/T2-3 decompression on 6/27/22 with Dr. Boles  Acute on chronic upper back pain  Lower extremity weakness  Recurrent falls  Hx back pain for 6 months. CT chest 2/2022 showed concern for upper thoracic spine diskitis vs osteomyelitis. Unclear follow up from that time.  In weeks prior to admission, MRI showed possible thoracic compression fracture. Developed progressively weak legs with frequent falls.  On admission imaging with T2-T3 diskitis, osteomyelitis and compressive myelopathy.  Neurosurgery and ID consulted while inpatient.  Late growth of P acne in 2 intra-op cultures.  Per ID, \"likely contaminant given no previous hardware, but since new hardware is in place will recommend starting prophylactic amoxicillin.\"  Started on 7/12.  1/2 positive blood cultures from 7/5 for staph epi, per ID \"signifies contamination and does not require treatment\".  Repeat " cultures negative.  - Continue amoxicillin 500 mg daily for total of 4 weeks  - Pain management: Tylenol PRN, flexeril PRN, Dilaudid 2-4 mg q6h PRN.  Wean opioids as able.  - Wound care: keep clean and dry, ok to shower no soaking  - Activity: no lifting greater than 5-10 pounds, no excessive bending, lifting, or twisting  - Brace when out of bed  - Continue PT/OT  - Follow up with neurosurgery, currently scheduled on 8/3 and 9/21     End-stage renal disease, on hemodialysis  Hypotension  Hyponatremia  Hyperphosphatemia  Secondary hyperparathyroidism  PTA HD M/W/F.  LUE fistula.  Nephrology following this admission for ongoing dialysis.  Reportedly with chronic hypotension, typically SBP in 70-90s and asymptomatic most of the time.  While IP, mild dizziness with positional changes but tolerating PT.   Patient reports PTA using midodrine 1 hour prior to HD and repeated 1 hour into HD run.  Started on TID dosing this admission.  - Nephrology consulted, appreciate ongoing assistance.  - Continue HD per nephrology, M/W/F schedule while at ARU  - Monitor BP.  Continue midodrine 10 mg TID.  Per [facility] team, nephrology recommended to consider 20 mg TID if symptomatic hypotension limiting therapies. This was deferred by inpatient team.  Could discuss with [facility] nephrology to consider at ARU if indicated.  - Continue bilateral lower extremity Jobst stockings  - Continue PTA sevelamer (recently increased 800 mg to 1600 mg).  Per nephrology, checked PTH 7/19, which is elevated 75.  Management per nephrology.  - Resumed fluid restriction, 1.2 L daily, on 7/19 per nephrology recs given ongoing hyponatremia.  - Trend labs MWF     Acute blood loss on anemia of chronic kidney disease  Pre-op Hgb stable in 10s.  Hgb currently stable in 7s-8s.  - Epo with HD per nephrology  - Trend CBC.  Hgb stable at 7.4 on 7/18  - Consider transfusion for hemoglobin less than 7 g/dL and symptoms     Encephalopathy  Felt to be due to buildup of  "gabapentin metabolites due to PTA dose (300 mg TID) higher than typical maximum dose recommended even for patients on CRRT.  Gabapentin and Zoloft held.  With improved mental status, resumed, gabapentin at lower dose.  - Monitor mental status, consider further adjustments in medications if recurrent.     GERD  - Continue PPI (autosub protonix for omeprazole per formulary while at ARU)  - Patient reports that protonix has not been as helpful for symptoms as PTA prilosec.  He notes increased reflux symptoms this admission.  Will try to obtain home supply for use at ARU.  - Has not yet been able to get home omeprazole.  Will add Pepcid due to ongoing reflux symptoms.  - Tums PRN     Hyperlipidemia  - Chronic and stable, on statin.     Peripheral neuropathy  Follows with [facility].    - Continue PT duloxetine 30 mg daily  - Continue gabapentin 100 mg TID, reduced from PTA dose as above due to concern for contributing to confusion in setting of renal impairment     Depression/anxiety  At ARU admission, reporting increased stressors at home, marital issues, financial.  - Continue PTA Zoloft 100 mg daily, Xanax 1 mg BID PRN  - Continue atarax 25 mg PRN for anxiety and pain  - Offered health psychology, patient wants to \"think about it\".  Will consult if open.  - Monitor mood     Obesity, class III  Obstructive sleep apnea  BMI this stay is ~41.  No PTA CPAP use, reportedly does not tolerate.  - Needs oxygen by NC with sleep due to ongoing desats at night.  Overnight oximetry closer to discharge.  - Recommend follow-up with sleep medicine after discharge  - Encourage diet, lifestyle modifications once above issues addressed     History of BPH  - Continue PTA finasteride.  Given anuric and hypotension, could consider discontinuing?     Alcohol use disorder  Reports drinking \"too much\" lately, 6-8 cocktails per day PTA.  - Most recent LFTs on 7/16 WNL  - Encourage cessation, offer CD resources if " interested     Allergic rhinitis  - Continue PTA Flonase and Zyrtec    Loose stool, bowel incontinence - imporved  Onset 7/18 morning per patient report.  On sertraline, recently resumed after brief hold of PTA med.  No recent stool softener or laxative use. senna for ~4 days.  LFTs on 7/16 WNL.  Afebrile, WBC WNL.  Abdomen soft, non-tender.    - Reportedly improving without intervention on 7/20, but 7/21 complains of ongoing loose, incontinent stools.  No abdominal pain or nausea, though is noting reflux as above.  Added metamucil, probiotic.  - Obtained repeat BMP, CBC - stable  - Encourage fluid  - Monitor.  If ongoing, will pursue additional work-up, could trial loperamide if non-infectious.    1. Adjustment to disability:  Clinical psychology to eval and treat if indicated  2. FEN: regular diet, thin liquids, 1.2L fluid restriction per nephrology  3. Bowel: continent/incontinent, frequent loose stools recently as above   4. Bladder: anuric  5. DVT Prophylaxis: mechanical  6. GI Prophylaxis: PPI  7. Code: full  8. Disposition: goal for home  9. ELOS:  14 days  10. Follow up Appointments on Discharge: PCP, neurosurgery follow-up 8/3 and 9/21, nephrology for ongoing dialysis        Antonio Loo, DO  Physical Medicine & Rehabilitation       I spent a total of 25 minutes face to face and coordinating care of Shay Jimenez. Over 50% of my time on the unit was spent counseling the patient and /or coordinating care regarding paraparesis post thoracic myelopathy.

## 2022-07-23 NOTE — PLAN OF CARE
FOCUS/GOAL  Pain management, Medical management, Mobility, and Skin integrity    ASSESSMENT, INTERVENTIONS AND CONTINUING PLAN FOR GOAL:    Goal Outcome Evaluation:      Pt Aox4. Pt had dialysis today.  Requested PRN Xanax, Dilaudid and Atarax during shift. Pt denies concerns for nurse at this time. Calls appropriately.  Pt reported he had a bout of incont diarrhea while at dialysis. Needs in reach and safety measures in place. Cont POC.

## 2022-07-23 NOTE — PLAN OF CARE
"Discharge Planner Post-Acute Rehab PT:      Discharge Plan: Apartment, stairs to upper level or lower level, 6, rail on one side. Home care PT,  2 sons to assist.     Precautions: falls, spinal, brace when out of bed (CTLO), poor sensation B feet.     Current Status:  Bed Mobility: sit EOB cga, rail. Needs A for BLes into bed.   Transfer: stand pivot with WW, SBA/CGA   Gait: 95 ft with FWW, SBA/CGA  Stairs: MIN A on 6\" stairs  Balance: Poor standing balance d/t absent sensation in B feet.      Assessment: Able to progress to stair climbing daily; using  core-engaged LE strengthening with green band resistance for bridging or slow marching within spinal precautions.     Other Barriers to Discharge (DME, Family Training, etc):   - stairs  - history of recurrent falls  - may need sons for family training: for stairs, car transfer, and brace use        "

## 2022-07-23 NOTE — PLAN OF CARE
Goal Outcome Evaluation:    Overall Patient Progress: no change    Outcome Evaluation: No change in patient progress this shift.    Pt is alert and oriented. Can take pills whole. Mixed continence of B&B. LB 7/23. Denied pain, SOB, CP, and n/t. Call light within reach and bed alarms on. Will continue with POC.
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
## 2022-07-24 ENCOUNTER — APPOINTMENT (OUTPATIENT)
Dept: PHYSICAL THERAPY | Facility: CLINIC | Age: 57
DRG: 052 | End: 2022-07-24
Attending: PHYSICAL MEDICINE & REHABILITATION
Payer: MEDICARE

## 2022-07-24 ENCOUNTER — APPOINTMENT (OUTPATIENT)
Dept: OCCUPATIONAL THERAPY | Facility: CLINIC | Age: 57
DRG: 052 | End: 2022-07-24
Attending: PHYSICAL MEDICINE & REHABILITATION
Payer: MEDICARE

## 2022-07-24 PROCEDURE — 250N000013 HC RX MED GY IP 250 OP 250 PS 637: Performed by: PHYSICAL MEDICINE & REHABILITATION

## 2022-07-24 PROCEDURE — 128N000003 HC R&B REHAB

## 2022-07-24 PROCEDURE — 250N000013 HC RX MED GY IP 250 OP 250 PS 637: Performed by: PHYSICIAN ASSISTANT

## 2022-07-24 PROCEDURE — 97535 SELF CARE MNGMENT TRAINING: CPT | Mod: GO

## 2022-07-24 PROCEDURE — 99233 SBSQ HOSP IP/OBS HIGH 50: CPT | Mod: 24 | Performed by: PHYSICAL MEDICINE & REHABILITATION

## 2022-07-24 PROCEDURE — 97110 THERAPEUTIC EXERCISES: CPT | Mod: GO

## 2022-07-24 PROCEDURE — 97110 THERAPEUTIC EXERCISES: CPT | Mod: GP | Performed by: PHYSICAL THERAPIST

## 2022-07-24 RX ADMIN — GABAPENTIN 100 MG: 100 CAPSULE ORAL at 21:22

## 2022-07-24 RX ADMIN — CETIRIZINE HYDROCHLORIDE 10 MG: 5 TABLET ORAL at 08:24

## 2022-07-24 RX ADMIN — FAMOTIDINE 10 MG: 10 TABLET ORAL at 08:24

## 2022-07-24 RX ADMIN — GABAPENTIN 100 MG: 100 CAPSULE ORAL at 08:24

## 2022-07-24 RX ADMIN — SEVELAMER CARBONATE 1600 MG: 800 TABLET, FILM COATED ORAL at 18:47

## 2022-07-24 RX ADMIN — MIDODRINE HYDROCHLORIDE 10 MG: 5 TABLET ORAL at 12:54

## 2022-07-24 RX ADMIN — SEVELAMER CARBONATE 1600 MG: 800 TABLET, FILM COATED ORAL at 08:24

## 2022-07-24 RX ADMIN — PSYLLIUM HUSK 1 PACKET: 3.4 POWDER ORAL at 08:24

## 2022-07-24 RX ADMIN — FLUTICASONE PROPIONATE 1 SPRAY: 50 SPRAY, METERED NASAL at 08:29

## 2022-07-24 RX ADMIN — DULOXETINE HYDROCHLORIDE 30 MG: 30 CAPSULE, DELAYED RELEASE ORAL at 08:24

## 2022-07-24 RX ADMIN — MIDODRINE HYDROCHLORIDE 10 MG: 5 TABLET ORAL at 08:25

## 2022-07-24 RX ADMIN — ALPRAZOLAM 1 MG: 1 TABLET ORAL at 16:29

## 2022-07-24 RX ADMIN — Medication 1 CAPSULE: at 08:25

## 2022-07-24 RX ADMIN — GABAPENTIN 100 MG: 100 CAPSULE ORAL at 12:54

## 2022-07-24 RX ADMIN — HYDROXYZINE HYDROCHLORIDE 25 MG: 25 TABLET ORAL at 08:25

## 2022-07-24 RX ADMIN — MIDODRINE HYDROCHLORIDE 10 MG: 5 TABLET ORAL at 17:53

## 2022-07-24 RX ADMIN — AMOXICILLIN 500 MG: 500 CAPSULE ORAL at 16:19

## 2022-07-24 RX ADMIN — Medication 1 CAPSULE: at 21:22

## 2022-07-24 RX ADMIN — Medication 1 TABLET: at 08:25

## 2022-07-24 RX ADMIN — SERTRALINE HYDROCHLORIDE 100 MG: 100 TABLET ORAL at 08:24

## 2022-07-24 RX ADMIN — Medication 1.25 MG: at 08:24

## 2022-07-24 RX ADMIN — MICONAZOLE NITRATE: 2 POWDER TOPICAL at 17:48

## 2022-07-24 RX ADMIN — Medication 100 MG: at 08:25

## 2022-07-24 RX ADMIN — BISACODYL 10 MG: 10 SUPPOSITORY RECTAL at 16:19

## 2022-07-24 RX ADMIN — CYANOCOBALAMIN TAB 500 MCG 500 MCG: 500 TAB at 08:24

## 2022-07-24 RX ADMIN — ATORVASTATIN CALCIUM 20 MG: 20 TABLET, FILM COATED ORAL at 21:22

## 2022-07-24 ASSESSMENT — ACTIVITIES OF DAILY LIVING (ADL)
ADLS_ACUITY_SCORE: 46
ADLS_ACUITY_SCORE: 44
ADLS_ACUITY_SCORE: 46
ADLS_ACUITY_SCORE: 44
ADLS_ACUITY_SCORE: 46

## 2022-07-24 NOTE — PLAN OF CARE
"Discharge Planner Post-Acute Rehab PT:      Discharge Plan: Apartment, stairs to upper level or lower level, 6, rail on one side. Home care PT,  2 sons to assist.     Precautions: falls, spinal, brace when out of bed (CTLO), poor sensation B feet.     Current Status:  Bed Mobility: sit EOB cga, rail. Needs A for BLes into bed.   Transfer: stand pivot with WW, SBA/CGA   Gait: 95 ft with FWW, SBA/CGA  Stairs: MIN A on 6\" stairs  Balance: Poor standing balance d/t absent sensation in B feet.      Assessment: Able to progress to stair climbing and gait with reciprocal pattern in parallel bars; using core-engaged LE strengthening with green band resistance for bridging or slow marching within spinal precautions to increase posterior muscle chain strength for upright posture..     Other Barriers to Discharge (DME, Family Training, etc):   - stairs  - history of recurrent falls  - may need sons for family training: for stairs, car transfer, and brace use        "

## 2022-07-24 NOTE — PROGRESS NOTES
Final results of abdominal XR reviewed. Results competed ~9PM showing moderate stool burden. Concern multiple liquid stools reported by patient is leakage around impacted stool. Will schedule suppository and recommend taking tonight if patient wishes.

## 2022-07-24 NOTE — PLAN OF CARE
Goal Outcome Evaluation:    Overall Patient Progress: no change    Outcome Evaluation: No change in patient progress this shift.    Pt is alert and oriented. Can take pills whole. Mixed continence of B&B. LBM 7/23. Ax1-2 SPT. Denied pain. Call light within reach and bed alarms on. Pt slept throughout the shift. Will continue with POC.

## 2022-07-24 NOTE — PLAN OF CARE
"FOCUS/GOAL  Medication management, Pain management, Medical management, Mobility, and Skin integrity    ASSESSMENT, INTERVENTIONS AND CONTINUING PLAN FOR GOAL:    Goal Outcome Evaluation:           AOX4. Pleasant and cooperative during shift. Pt calls appropriately.   Cleansed bilateral knee abrasions and replaced Tegaderm with mepilex dressings.   Pt received suppository at approx 1620 and sat on commode with minimal results; loose stool. Provider notified and, held 2100 scheduled suppository.  Groin/inguinal folds red and peeling, cleansed area and applied PRN micatin powder and educ pt on keeping area clean and dry.   Pt denies pain and no concerns for nurse at this time.  Pt fatigued today and sleeping off and on, states, \"they really worked me hard in therapy.\"   Needs in reach and safety measures in place. Cont POC.                "
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
## 2022-07-24 NOTE — PLAN OF CARE
Goal Outcome Evaluation:    Plan of Care Reviewed With: patient     Overall Patient Progress: improving    Outcome Evaluation: Pt participating in therapies. uses call light appropriately. pt denies pain.    Pt A&Ox4. A1 with walker. Cont of B&B on shift. No new skin concerns. Brace on OOB. PRN atarax given for itchiness, provided relief. Pt denies pain, SOB, headache, nausea, or new numbness/tingling. Call light in reach, alarms on, continue POC.

## 2022-07-24 NOTE — PROGRESS NOTES
"  [facility]   Acute Rehabilitation Unit  Daily progress note    INTERVAL HISTORY  No acute events overnight, patient reports sleeping well.  We discussed his abdominal x-ray results today, patient did not wish to try suppository yesterday.  We discussed trying suppository today after therapies to get ahead of bowel movements.  However he does report improvement of his BM frequency.  Therapies are otherwise going well, no issues.     ROS: 10 point ROS neg other than the symptoms noted above in the HPI.    MEDICATIONS    amoxicillin  500 mg Oral Daily with supper     atorvastatin  20 mg Oral At Bedtime     bisacodyl  10 mg Rectal QPM     cetirizine  10 mg Oral Daily     cyanocobalamin  500 mcg Oral Daily     doxercalciferol  4 mcg Intravenous Once in dialysis/CRRT     DULoxetine  30 mg Oral Daily     famotidine  10 mg Oral Daily     finasteride  1.25 mg Oral Daily     fluticasone  1 spray Both Nostrils Daily     gabapentin  100 mg Oral TID     lactobacillus rhamnosus (GG)  1 capsule Oral BID     midodrine  10 mg Oral TID w/meals     multivitamin RENAL  1 tablet Oral Daily     pantoprazole  40 mg Oral QAM AC     psyllium  1 packet Oral BID     vitamin B6  100 mg Oral Daily     sertraline  100 mg Oral Daily     sevelamer carbonate  1,600 mg Oral TID w/meals        acetaminophen, ALPRAZolam, bisacodyl, calcium carbonate, cyclobenzaprine, HYDROmorphone, hydrOXYzine, melatonin, miconazole, naloxone **OR** naloxone **OR** naloxone **OR** naloxone, polyethylene glycol     PHYSICAL EXAM  /54 (BP Location: Right arm, Patient Position: Sitting, Cuff Size: Adult Large)   Pulse 79   Temp 96.9  F (36.1  C) (Oral)   Resp 16   Ht 1.854 m (6' 1\")   Wt 134.5 kg (296 lb 9.6 oz)   SpO2 99%   BMI 39.13 kg/m     Gen: Sitting up in chair, pleasant and cooperative  HEENT: NC/AT,   Cardio: Regular rate and rhythm  Pulm: Nonlabored, lungs clear to auscultation bilateral  Abd: Soft, " moderately distended, normal active bowel sounds, nontender  Ext: chronic venous stasis changes, bilateral pedal edema with no tenderness-no change  Neuro/MSK: awake, alert, follows commands, decreased sensation to light touch in bilateral lower extremities to ~knee in stocking distribution.  Lower extremity weakness.  No changes in deficits    LABS  CBC RESULTS:   Recent Labs   Lab Test 07/22/22  0517 07/21/22  1252 07/18/22  0700   WBC 6.4 8.9 7.8   RBC 2.54* 2.57* 2.42*   HGB 7.8* 8.0* 7.4*   HCT 25.8* 25.1* 24.2*   * 98 100   MCH 30.7 31.1 30.6   MCHC 30.2* 31.9 30.6*   RDW 14.6 14.7 14.6    279 266     Last Basic Metabolic Panel:  Recent Labs   Lab Test 07/22/22  0517 07/21/22  1252 07/18/22  0700    133 122*   POTASSIUM 3.6 3.7 5.2   CHLORIDE 97 98 88*   CO2 24 25 21   ANIONGAP 12 10 13   GLC 92 101* 80   BUN 33* 25 53*   CR 7.86* 6.75* 9.55*   GFRESTIMATED 7* 9* 6*   MAC 9.5 9.4 9.3     Liver Function Studies - Recent Labs   Lab Test 07/16/22  0557   PROTTOTAL 6.5*   ALBUMIN 2.8*   BILITOTAL 0.5   ALKPHOS 134   AST 13   ALT <6       Rehabilitation - continue comprehensive acute inpatient rehabilitation program with multidisciplinary approach including therapies, rehab nursing, and physiatry following. See interval history for updates.      ASSESSMENT AND PLAN  Shay Jimenez is a 57 year old right hand dominant male with a past medical history of ESRD on HD, GERD, hyperlipidemia, peripheral neuropathy, depression/anxiety, GINI, BPH, obesity who was admitted on 6/24/22 with thoracic compressive myelopathy and concern for discitis/vertebral osteomyelitis now s/p C6-T6 fusion and T2-T3 decompression on 6/27/22 with post-op course complicated by hypotension, pain, anemia, encephalopathy, and constipation.  He is now admitted to ARU on 7/15/22 for multidisciplinary rehabilitation and ongoing medical management.     Weekend updates/changes:  -07/23: Obtain abdominal XR, scheduled  "suppository  -07/24: Encourage patient to take suppository.  Patient refusing Protonix, consider discontinuing if indicated    Admission to acute inpatient rehab 07/15/22.    Impairment group code: Spinal Cord Dysfunction Spinal Non-Traumatic 04.111 Paraplegia, Incomplete        1. PT and OT 90 minutes of each on a daily basis, in addition to rehab nursing and close management of physiatrist.       2. Impairment of ADL's: Noted to have impaired strength, impaired activity tolerance, impaired tone, impaired balance, impaired coordination, impaired judgement and safety awareness, impulsiveness and impaired weight shifting, all affecting his ability to safely and independently perform basic ADLs.  Goal for mod I with basic ADLs with assist of 1 for tub/shower transfers.     3. Impairment of mobility:  Noted to have impaired strength, impaired activity tolerance, impaired tone, impaired balance, impaired coordination, impaired judgement and safety awareness, impulsiveness and impaired weight shifting, all affecting his ability to safely and independently perform basic mobility.  Goal for mod I with basic mobility with assist of 1 for stairs.     4. Medical Conditions    Thoracic compressive myelopathy  Concern for T2-T3 discitis/vertebral osteomyelitis  S/p C6-T6 fusion/T2-3 decompression on 6/27/22 with Dr. Elvi Dela Cruz on chronic upper back pain  Lower extremity weakness  Recurrent falls  Hx back pain for 6 months. CT chest 2/2022 showed concern for upper thoracic spine diskitis vs osteomyelitis. Unclear follow up from that time.  In weeks prior to admission, MRI showed possible thoracic compression fracture. Developed progressively weak legs with frequent falls.  On admission imaging with T2-T3 diskitis, osteomyelitis and compressive myelopathy.  Neurosurgery and ID consulted while inpatient.  Late growth of P acne in 2 intra-op cultures.  Per ID, \"likely contaminant given no previous hardware, but since new " "hardware is in place will recommend starting prophylactic amoxicillin.\"  Started on 7/12.  1/2 positive blood cultures from 7/5 for staph epi, per ID \"signifies contamination and does not require treatment\".  Repeat cultures negative.  - Continue amoxicillin 500 mg daily for total of 4 weeks  - Pain management: Tylenol PRN, flexeril PRN, Dilaudid 2-4 mg q6h PRN.  Wean opioids as able.  - Wound care: keep clean and dry, ok to shower no soaking  - Activity: no lifting greater than 5-10 pounds, no excessive bending, lifting, or twisting  - Brace when out of bed  - Continue PT/OT  - Follow up with neurosurgery, currently scheduled on 8/3 and 9/21     End-stage renal disease, on hemodialysis  Hypotension  Hyponatremia  Hyperphosphatemia  Secondary hyperparathyroidism  PTA HD M/W/F.  LUE fistula.  Nephrology following this admission for ongoing dialysis.  Reportedly with chronic hypotension, typically SBP in 70-90s and asymptomatic most of the time.  While IP, mild dizziness with positional changes but tolerating PT.   Patient reports PTA using midodrine 1 hour prior to HD and repeated 1 hour into HD run.  Started on TID dosing this admission.  - Nephrology consulted, appreciate ongoing assistance.  - Continue HD per nephrology, M/W/F schedule while at ARU  - Monitor BP.  Continue midodrine 10 mg TID.  Per [facility] team, nephrology recommended to consider 20 mg TID if symptomatic hypotension limiting therapies. This was deferred by inpatient team.  Could discuss with [facility] nephrology to consider at ARU if indicated.  - Continue bilateral lower extremity Jobst stockings  - Continue PTA sevelamer (recently increased 800 mg to 1600 mg).  Per nephrology, checked PTH 7/19, which is elevated 75.  Management per nephrology.  - Resumed fluid restriction, 1.2 L daily, on 7/19 per nephrology recs given ongoing hyponatremia.  - Trend labs MWF     Acute blood loss on anemia of chronic kidney disease  Pre-op Hgb stable in 10s.  Hgb " "currently stable in 7s-8s.  - Epo with HD per nephrology  - Trend CBC.  Hgb stable at 7.4 on 7/18  - Consider transfusion for hemoglobin less than 7 g/dL and symptoms     Encephalopathy  Felt to be due to buildup of gabapentin metabolites due to PTA dose (300 mg TID) higher than typical maximum dose recommended even for patients on CRRT.  Gabapentin and Zoloft held.  With improved mental status, resumed, gabapentin at lower dose.  - Monitor mental status, consider further adjustments in medications if recurrent.  -No issues over the weekend 07/24     GERD  - Continue PPI (autosub protonix for omeprazole per formulary while at ARU)  - Patient reports that protonix has not been as helpful for symptoms as PTA prilosec.  He notes increased reflux symptoms this admission.  Will try to obtain home supply for use at ARU.  - Has not yet been able to get home omeprazole.  Will add Pepcid due to ongoing reflux symptoms.  - Tums PRN     Hyperlipidemia  - Chronic and stable, on statin.     Peripheral neuropathy  Follows with [facility].    - Continue PT duloxetine 30 mg daily  - Continue gabapentin 100 mg TID, reduced from PTA dose as above due to concern for contributing to confusion in setting of renal impairment     Depression/anxiety  At ARU admission, reporting increased stressors at home, marital issues, financial.  - Continue PTA Zoloft 100 mg daily, Xanax 1 mg BID PRN  - Continue atarax 25 mg PRN for anxiety and pain  - Offered health psychology, patient wants to \"think about it\".  Will consult if open.  - Monitor mood     Obesity, class III  Obstructive sleep apnea  BMI this stay is ~41.  No PTA CPAP use, reportedly does not tolerate.  - Needs oxygen by NC with sleep due to ongoing desats at night.  Overnight oximetry closer to discharge.  - Recommend follow-up with sleep medicine after discharge  - Encourage diet, lifestyle modifications once above issues addressed     History of BPH  - Continue PTA " "finasteride.  Given anuric and hypotension, could consider discontinuing?     Alcohol use disorder  Reports drinking \"too much\" lately, 6-8 cocktails per day PTA.  - Most recent LFTs on 7/16 WNL  - Encourage cessation, offer CD resources if interested     Allergic rhinitis  - Continue PTA Flonase and Zyrtec    Loose stool, bowel incontinence - imporved  Onset 7/18 morning per patient report.  On sertraline, recently resumed after brief hold of PTA med.  No recent stool softener or laxative use. senna for ~4 days.  LFTs on 7/16 WNL.  Afebrile, WBC WNL.  Abdomen soft, non-tender.    - Reportedly improving without intervention on 7/20, but 7/21 complains of ongoing loose, incontinent stools.  No abdominal pain or nausea, though is noting reflux as above.  Added metamucil, probiotic.  - Obtained repeat BMP, CBC - stable  - Encourage fluid  - Monitor.  If ongoing, will pursue additional work-up, could trial loperamide if non-infectious.    1. Adjustment to disability:  Clinical psychology to eval and treat if indicated  2. FEN: regular diet, thin liquids, 1.2L fluid restriction per nephrology  3. Bowel: continent/incontinent, frequent loose stools recently as above   4. Bladder: anuric  5. DVT Prophylaxis: mechanical  6. GI Prophylaxis: PPI  7. Code: full  8. Disposition: goal for home  9. ELOS:  14 days  10. Follow up Appointments on Discharge: PCP, neurosurgery follow-up 8/3 and 9/21, nephrology for ongoing dialysis    Patient was seen and discussed with the attending Dr. He who agrees with the above assessment and plan    Delio Man M.D.  Physical Medicine & Rehabilitation PGY-4  Pager: [phone]      "

## 2022-07-25 ENCOUNTER — APPOINTMENT (OUTPATIENT)
Dept: OCCUPATIONAL THERAPY | Facility: CLINIC | Age: 57
DRG: 052 | End: 2022-07-25
Attending: PHYSICAL MEDICINE & REHABILITATION
Payer: MEDICARE

## 2022-07-25 ENCOUNTER — APPOINTMENT (OUTPATIENT)
Dept: PHYSICAL THERAPY | Facility: CLINIC | Age: 57
DRG: 052 | End: 2022-07-25
Attending: PHYSICAL MEDICINE & REHABILITATION
Payer: MEDICARE

## 2022-07-25 LAB
ANION GAP SERPL CALCULATED.3IONS-SCNC: 13 MMOL/L (ref 3–14)
BUN SERPL-MCNC: 39 MG/DL (ref 7–30)
CALCIUM SERPL-MCNC: 9.2 MG/DL (ref 8.5–10.1)
CHLORIDE BLD-SCNC: 94 MMOL/L (ref 94–109)
CO2 SERPL-SCNC: 24 MMOL/L (ref 20–32)
CREAT SERPL-MCNC: 9.64 MG/DL (ref 0.66–1.25)
ERYTHROCYTE [DISTWIDTH] IN BLOOD BY AUTOMATED COUNT: 15.2 % (ref 10–15)
GFR SERPL CREATININE-BSD FRML MDRD: 6 ML/MIN/1.73M2
GLUCOSE BLD-MCNC: 95 MG/DL (ref 70–99)
HBV SURFACE AB SERPL IA-ACNC: 0 M[IU]/ML
HBV SURFACE AG SERPL QL IA: NONREACTIVE
HCT VFR BLD AUTO: 25.9 % (ref 40–53)
HGB BLD-MCNC: 8.2 G/DL (ref 13.3–17.7)
MCH RBC QN AUTO: 30.7 PG (ref 26.5–33)
MCHC RBC AUTO-ENTMCNC: 31.7 G/DL (ref 31.5–36.5)
MCV RBC AUTO: 97 FL (ref 78–100)
PHOSPHATE SERPL-MCNC: 5.5 MG/DL (ref 2.5–4.5)
PLATELET # BLD AUTO: 226 10E3/UL (ref 150–450)
POTASSIUM BLD-SCNC: 3.5 MMOL/L (ref 3.4–5.3)
RBC # BLD AUTO: 2.67 10E6/UL (ref 4.4–5.9)
SODIUM SERPL-SCNC: 131 MMOL/L (ref 133–144)
WBC # BLD AUTO: 7.3 10E3/UL (ref 4–11)

## 2022-07-25 PROCEDURE — 85027 COMPLETE CBC AUTOMATED: CPT | Performed by: PHYSICIAN ASSISTANT

## 2022-07-25 PROCEDURE — 97535 SELF CARE MNGMENT TRAINING: CPT | Mod: GO

## 2022-07-25 PROCEDURE — 634N000001 HC RX 634: Performed by: PHYSICIAN ASSISTANT

## 2022-07-25 PROCEDURE — 250N000013 HC RX MED GY IP 250 OP 250 PS 637: Performed by: PHYSICIAN ASSISTANT

## 2022-07-25 PROCEDURE — 97110 THERAPEUTIC EXERCISES: CPT | Mod: GP | Performed by: PHYSICAL THERAPIST

## 2022-07-25 PROCEDURE — 250N000011 HC RX IP 250 OP 636: Performed by: PHYSICAL MEDICINE & REHABILITATION

## 2022-07-25 PROCEDURE — 99232 SBSQ HOSP IP/OBS MODERATE 35: CPT | Mod: 24 | Performed by: PHYSICAL MEDICINE & REHABILITATION

## 2022-07-25 PROCEDURE — 84100 ASSAY OF PHOSPHORUS: CPT | Performed by: PHYSICIAN ASSISTANT

## 2022-07-25 PROCEDURE — 36415 COLL VENOUS BLD VENIPUNCTURE: CPT | Performed by: PHYSICIAN ASSISTANT

## 2022-07-25 PROCEDURE — 86706 HEP B SURFACE ANTIBODY: CPT | Performed by: INTERNAL MEDICINE

## 2022-07-25 PROCEDURE — 97530 THERAPEUTIC ACTIVITIES: CPT | Mod: GP | Performed by: PHYSICAL THERAPIST

## 2022-07-25 PROCEDURE — 87340 HEPATITIS B SURFACE AG IA: CPT | Performed by: INTERNAL MEDICINE

## 2022-07-25 PROCEDURE — 250N000011 HC RX IP 250 OP 636: Performed by: PHYSICIAN ASSISTANT

## 2022-07-25 PROCEDURE — 80048 BASIC METABOLIC PNL TOTAL CA: CPT | Performed by: PHYSICIAN ASSISTANT

## 2022-07-25 PROCEDURE — 90937 HEMODIALYSIS REPEATED EVAL: CPT

## 2022-07-25 PROCEDURE — 258N000003 HC RX IP 258 OP 636: Performed by: PHYSICIAN ASSISTANT

## 2022-07-25 PROCEDURE — 99233 SBSQ HOSP IP/OBS HIGH 50: CPT | Mod: 24 | Performed by: PHYSICIAN ASSISTANT

## 2022-07-25 PROCEDURE — 250N000013 HC RX MED GY IP 250 OP 250 PS 637: Performed by: PHYSICAL MEDICINE & REHABILITATION

## 2022-07-25 PROCEDURE — 36415 COLL VENOUS BLD VENIPUNCTURE: CPT | Performed by: INTERNAL MEDICINE

## 2022-07-25 PROCEDURE — 128N000003 HC R&B REHAB

## 2022-07-25 RX ORDER — MIDODRINE HYDROCHLORIDE 5 MG/1
10 TABLET ORAL DAILY PRN
Status: DISCONTINUED | OUTPATIENT
Start: 2022-07-25 | End: 2022-07-30 | Stop reason: HOSPADM

## 2022-07-25 RX ORDER — HYDROMORPHONE HYDROCHLORIDE 2 MG/1
2 TABLET ORAL EVERY 6 HOURS PRN
Status: DISCONTINUED | OUTPATIENT
Start: 2022-07-25 | End: 2022-07-27

## 2022-07-25 RX ORDER — DOXERCALCIFEROL 4 UG/2ML
2 INJECTION INTRAVENOUS
Status: COMPLETED | OUTPATIENT
Start: 2022-07-25 | End: 2022-07-25

## 2022-07-25 RX ORDER — HEPARIN SODIUM 1000 [USP'U]/ML
500 INJECTION, SOLUTION INTRAVENOUS; SUBCUTANEOUS CONTINUOUS
Status: DISCONTINUED | OUTPATIENT
Start: 2022-07-25 | End: 2022-07-30 | Stop reason: HOSPADM

## 2022-07-25 RX ADMIN — HEPARIN SODIUM 500 UNITS/HR: 1000 INJECTION INTRAVENOUS; SUBCUTANEOUS at 13:43

## 2022-07-25 RX ADMIN — Medication 1 TABLET: at 08:02

## 2022-07-25 RX ADMIN — MIDODRINE HYDROCHLORIDE 10 MG: 5 TABLET ORAL at 08:02

## 2022-07-25 RX ADMIN — ACETAMINOPHEN 650 MG: 325 TABLET, FILM COATED ORAL at 03:58

## 2022-07-25 RX ADMIN — MIDODRINE HYDROCHLORIDE 10 MG: 5 TABLET ORAL at 14:21

## 2022-07-25 RX ADMIN — Medication 100 MG: at 08:02

## 2022-07-25 RX ADMIN — HYDROXYZINE HYDROCHLORIDE 25 MG: 25 TABLET ORAL at 18:36

## 2022-07-25 RX ADMIN — AMOXICILLIN 500 MG: 500 CAPSULE ORAL at 18:36

## 2022-07-25 RX ADMIN — DULOXETINE HYDROCHLORIDE 30 MG: 30 CAPSULE, DELAYED RELEASE ORAL at 08:03

## 2022-07-25 RX ADMIN — ALPRAZOLAM 1 MG: 1 TABLET ORAL at 23:57

## 2022-07-25 RX ADMIN — CETIRIZINE HYDROCHLORIDE 10 MG: 5 TABLET ORAL at 08:02

## 2022-07-25 RX ADMIN — GABAPENTIN 100 MG: 100 CAPSULE ORAL at 17:28

## 2022-07-25 RX ADMIN — HYDROXYZINE HYDROCHLORIDE 25 MG: 25 TABLET ORAL at 08:02

## 2022-07-25 RX ADMIN — Medication 1.25 MG: at 08:07

## 2022-07-25 RX ADMIN — MIDODRINE HYDROCHLORIDE 10 MG: 5 TABLET ORAL at 11:23

## 2022-07-25 RX ADMIN — DOXERCALCIFEROL 2 MCG: 2 INJECTION, SOLUTION INTRAVENOUS at 13:55

## 2022-07-25 RX ADMIN — ATORVASTATIN CALCIUM 20 MG: 20 TABLET, FILM COATED ORAL at 20:23

## 2022-07-25 RX ADMIN — EPOETIN ALFA-EPBX 4000 UNITS: 10000 INJECTION, SOLUTION INTRAVENOUS; SUBCUTANEOUS at 13:55

## 2022-07-25 RX ADMIN — ALPRAZOLAM 1 MG: 1 TABLET ORAL at 11:27

## 2022-07-25 RX ADMIN — PSYLLIUM HUSK 1 PACKET: 3.4 POWDER ORAL at 08:08

## 2022-07-25 RX ADMIN — ACETAMINOPHEN 650 MG: 325 TABLET, FILM COATED ORAL at 18:36

## 2022-07-25 RX ADMIN — FLUTICASONE PROPIONATE 1 SPRAY: 50 SPRAY, METERED NASAL at 08:03

## 2022-07-25 RX ADMIN — GABAPENTIN 100 MG: 100 CAPSULE ORAL at 20:23

## 2022-07-25 RX ADMIN — Medication 1 CAPSULE: at 08:02

## 2022-07-25 RX ADMIN — BISACODYL 10 MG: 10 SUPPOSITORY RECTAL at 10:03

## 2022-07-25 RX ADMIN — SERTRALINE HYDROCHLORIDE 100 MG: 100 TABLET ORAL at 08:02

## 2022-07-25 RX ADMIN — Medication 1 CAPSULE: at 20:23

## 2022-07-25 RX ADMIN — CYCLOBENZAPRINE HYDROCHLORIDE 10 MG: 5 TABLET, FILM COATED ORAL at 20:23

## 2022-07-25 RX ADMIN — SODIUM CHLORIDE 250 ML: 9 INJECTION, SOLUTION INTRAVENOUS at 13:27

## 2022-07-25 RX ADMIN — CYANOCOBALAMIN TAB 500 MCG 500 MCG: 500 TAB at 08:02

## 2022-07-25 RX ADMIN — GABAPENTIN 100 MG: 100 CAPSULE ORAL at 08:02

## 2022-07-25 RX ADMIN — SEVELAMER CARBONATE 1600 MG: 800 TABLET, FILM COATED ORAL at 08:03

## 2022-07-25 RX ADMIN — FAMOTIDINE 10 MG: 10 TABLET ORAL at 08:02

## 2022-07-25 RX ADMIN — SODIUM CHLORIDE 300 ML: 9 INJECTION, SOLUTION INTRAVENOUS at 13:27

## 2022-07-25 ASSESSMENT — ACTIVITIES OF DAILY LIVING (ADL)
ADLS_ACUITY_SCORE: 44
ADLS_ACUITY_SCORE: 46
ADLS_ACUITY_SCORE: 44
ADLS_ACUITY_SCORE: 44

## 2022-07-25 NOTE — PROGRESS NOTES
Nephrology Progress Note  07/25/2022         Assessment & Recommendations:   Shay Jimenez is a 57 year old right hand dominant male with a past medical history of ESRD on HD, GERD, hyperlipidemia, peripheral neuropathy, depression/anxiety, GINI, BPH, obesity who was admitted on 6/24/22 with thoracic compressive myelopathy and concern for discitis/vertebral osteomyelitis now s/p C6-T6 fusion and T2-T3 decompression on 6/27/22 with post-op course complicated by hypotension, pain, anemia, encephalopathy, and constipation.  He is now admitted to ARU on 7/15/22 for multidisciplinary rehabilitation and ongoing medical management.     #ESRD  - dialyzes MWF at [facility], primary nephrologist Dr. Cline  - access: left AVF, run time 4 hours and 15 minutes  - .6 kg  - verbal consent obtained to continue HD while in ARU.     #BP/Volume hypervolemia is much improved, though still significant LE edema  - BP historically low, getting midodrine 10 mg TID, ordered another dose 1 hr into HD per pt's usual routine  - per OP HD unit, has history of large 4-6 kg interdialytic wt gains.  - fluid restriction should help with this as well (1200 ml per day, started 7/19)  - UF 3-4 kg generally, will UF as able depending on blood pressure.  - Will need to lower EDW       #Anemia hgb 8's  - getting epo 4000 units with HD while inpatient  - Avoid transfusions if possible given pt is listed for transplant (at [place]).  - If transfused, would start low dose immunosuppression (MMF generally)      #Bone/[place] Ca 9's, phos 5.5, PTH 75  - Hectorol 2 mcg with HD (reduced dose)  - sevelamer 1600 mg TID with meals       Recommendations were communicated to primary team via this note    Jayde Pete PA-C  [phone]     Interval History :   Reviewed provider and nursing notes for past 24 hours. Seen on dialysis, stable run so far for 4 kg. Gets midodrine 1 hr into the run. LE edema is much improved though still significant. Denies  n/v, CP, SOB, chills    Review of Systems:   A 4 point review of systems was negative except as noted above.      Physical Exam:   Vitals: /64  Pulse 72  Resp 18  SpO2 95%  Wt 142.2 kg  GENERAL APPEARANCE: Resting in no distress, neck brace on  HENT: mouth without ulcers or lesions  PULM: lungs clear to auscultation, equal air movement, no cyanosis or clubbing  CV: regular rhythm, normal rate, no rub     -edema 2+ LE   GI: soft, nontender  NEURO: mentation intact and speech normal, no asterixis   Access LUE AVF     Labs:   All labs reviewed by me  Electrolytes/Renal -   Recent Labs   Lab Test 07/25/22  0732 07/22/22  0517 07/21/22  1252 07/18/22  0700 06/28/22  0809 06/28/22  0420   * 133 133 122*   < > 135   POTASSIUM 3.5 3.6 3.7 5.2   < > 4.7   CHLORIDE 94 97 98 88*   < > 98   CO2 24 24 25 21   < > 26   BUN 39* 33* 25 53*   < > 49*   CR 9.64* 7.86* 6.75* 9.55*   < > 6.86*   GLC 95 92 101* 80   < > 153*   MAC 9.2 9.5 9.4 9.3   < > 8.7   MAG  --   --   --   --   --  2.1   PHOS 5.5* 5.8*  --  6.9*   < > 5.8*    < > = values in this interval not displayed.       CBC -   Recent Labs   Lab Test 07/25/22  0732 07/22/22  0517 07/21/22  1252   WBC 7.3 6.4 8.9   HGB 8.2* 7.8* 8.0*    256 279       LFTs -   Recent Labs   Lab Test 07/21/22  1252 07/16/22  0557 07/11/22  0722 06/29/22  0611 06/24/22  0818   ALKPHOS 131 134  --   --  152*   BILITOTAL 0.6 0.5  --   --  0.6   ALT 9 <6  --   --  55   AST 17 13  --   --  51*   PROTTOTAL 6.7* 6.5*  --   --  7.3   ALBUMIN 3.0* 2.8* 3.2*   < > 3.3*    < > = values in this interval not displayed.       Iron Panel - No lab results found.      Imaging: Reviewed     Current Medications:    amoxicillin  500 mg Oral Daily with supper     atorvastatin  20 mg Oral At Bedtime     bisacodyl  10 mg Rectal QPM     cetirizine  10 mg Oral Daily     cyanocobalamin  500 mcg Oral Daily     doxercalciferol  2 mcg Intravenous Once in dialysis/CRRT     doxercalciferol  4 mcg  Intravenous Once in dialysis/CRRT     DULoxetine  30 mg Oral Daily     epoetin mar-epbx (RETACRIT) inj ESRD  4,000 Units Intravenous Once in dialysis/CRRT     famotidine  10 mg Oral Daily     finasteride  1.25 mg Oral Daily     fluticasone  1 spray Both Nostrils Daily     gabapentin  100 mg Oral TID     lactobacillus rhamnosus (GG)  1 capsule Oral BID     midodrine  10 mg Oral TID w/meals     multivitamin RENAL  1 tablet Oral Daily     - MEDICATION INSTRUCTIONS -   Does not apply Once     psyllium  1 packet Oral BID     vitamin B6  100 mg Oral Daily     sertraline  100 mg Oral Daily     sevelamer carbonate  1,600 mg Oral TID w/meals       heparin (porcine)       CELSO Haynes
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
## 2022-07-25 NOTE — PLAN OF CARE
"Discharge Planner Post-Acute Rehab PT:      Discharge Plan: Apartment, stairs to upper level or lower level, 6, rail on one side. Home care PT,  2 sons to assist.     Precautions: falls, spinal, brace when out of bed (CTLO), poor sensation B feet.     Current Status:  Bed Mobility: sit EOB cga, rail. Needs A for BLes into bed.   Transfer: stand pivot with WW, SBA/CGA   Gait: 95 ft with FWW, SBA/CGA  Stairs: MIN A on 6\" stairs  Balance: Poor standing balance d/t absent sensation in B feet.      Assessment:  Fatigued from previous day, but able to participate in therapy.  Continues to progress with LE and core strength for improve stability during gait and functional endurance.     Other Barriers to Discharge (DME, Family Training, etc):   - stairs  - history of recurrent falls  - may need sons for family training: for stairs, car transfer, and brace use        "

## 2022-07-25 NOTE — PROGRESS NOTES
"  [facility]   Acute Rehabilitation Unit  Daily progress note    INTERVAL HISTORY  Weekend therapy and progress notes reviewed.  No acute events reported.  However noted to have ongoing loose stools; AXR revealed moderate colonic stool burden, started on nightly suppository, took for first time yesterday with minimal results.    Patient was seen and examined at bedside this morning.  Today, he reports that he is somewhat frustrated about ongoing loose stools.  He tried suppository yesterday with just minimal results, just had administered a few minutes ago and awaiting results.  He denies any abdominal pain or nausea.  He reports that he is eating well.  He denies any ongoing reflux symptoms, feels this has been improved since adding Pepcid and he would like to make Protonix PRN.  He was discouraged by several incontinent bowel episodes over the past week or so.  He would like to discuss with ID to consider alternative antibiotic, though he is willing to try bowel clean-out first to see if resolves.  He denies any significant back pain.  Feels he is drinking up to his fluid limit, denies any dizziness or lightheadedness.  Outside of bowel concerns, he feels he is progressing well with therapy and close to ready for discharge.  Progressing with ambulating and stairs.  Reports his son will be staying with him at discharge and is able to work from home, so he will be available to assist as needed.    Functionally, he is currently needing CGA to sit edge of bed, needs assist to get lower extremities into bed.  He needs SBA/CGA for stand pivot transfers, ambulating 95' with FWW and SBA/CGA.  He needs min A for 6\" stairs.    MEDICATIONS    sodium chloride 0.9%  250 mL Intravenous Once in dialysis/CRRT     sodium chloride 0.9%  300 mL Hemodialysis Machine Once     amoxicillin  500 mg Oral Daily with supper     atorvastatin  20 mg Oral At Bedtime     bisacodyl  10 mg Rectal QPM     " "cetirizine  10 mg Oral Daily     cyanocobalamin  500 mcg Oral Daily     doxercalciferol  2 mcg Intravenous Once in dialysis/CRRT     doxercalciferol  4 mcg Intravenous Once in dialysis/CRRT     DULoxetine  30 mg Oral Daily     epoetin mar-epbx (RETACRIT) inj ESRD  4,000 Units Intravenous Once in dialysis/CRRT     famotidine  10 mg Oral Daily     finasteride  1.25 mg Oral Daily     fluticasone  1 spray Both Nostrils Daily     gabapentin  100 mg Oral TID     lactobacillus rhamnosus (GG)  1 capsule Oral BID     midodrine  10 mg Oral TID w/meals     multivitamin RENAL  1 tablet Oral Daily     - MEDICATION INSTRUCTIONS -   Does not apply Once     pantoprazole  40 mg Oral QAM AC     psyllium  1 packet Oral BID     vitamin B6  100 mg Oral Daily     sertraline  100 mg Oral Daily     sevelamer carbonate  1,600 mg Oral TID w/meals        sodium chloride 0.9%, acetaminophen, ALPRAZolam, bisacodyl, calcium carbonate, cyclobenzaprine, HYDROmorphone, hydrOXYzine, melatonin, miconazole, naloxone **OR** naloxone **OR** naloxone **OR** naloxone, polyethylene glycol     PHYSICAL EXAM  BP (!) 89/54 (BP Location: Right arm, Patient Position: Semi-Doherty's, Cuff Size: Adult Large)   Pulse 88   Temp 97.6  F (36.4  C) (Oral)   Resp 16   Ht 1.854 m (6' 1\")   Wt 134.5 kg (296 lb 9.6 oz)   SpO2 95%   BMI 39.13 kg/m     Gen: NAD, lying in bed  HEENT: NC/AT,   Cardio: RRR  Pulm: non-labored on room air, lungs CTA bilaterally  Abd: soft, non-tender, mildly distended, bowel sounds present  Ext: chronic venous stasis changes, bilateral pedal edema  Neuro/MSK: awake, alert, follows commands, decreased sensation to light touch in bilateral lower extremities to ~knee in stocking distribution.  Lower extremity weakness.    LABS  CBC RESULTS:   Recent Labs   Lab Test 07/25/22  0732 07/22/22  0517 07/21/22  1252   WBC 7.3 6.4 8.9   RBC 2.67* 2.54* 2.57*   HGB 8.2* 7.8* 8.0*   HCT 25.9* 25.8* 25.1*   MCV 97 102* 98   MCH 30.7 30.7 31.1   MCHC " 31.7 30.2* 31.9   RDW 15.2* 14.6 14.7    256 279     Last Basic Metabolic Panel:  Recent Labs   Lab Test 07/25/22  0732 07/22/22  0517 07/21/22  1252   * 133 133   POTASSIUM 3.5 3.6 3.7   CHLORIDE 94 97 98   CO2 24 24 25   ANIONGAP 13 12 10   GLC 95 92 101*   BUN 39* 33* 25   CR 9.64* 7.86* 6.75*   GFRESTIMATED 6* 7* 9*   MAC 9.2 9.5 9.4     Lab Results   Component Value Date    AST 17 07/21/2022     Lab Results   Component Value Date    ALT 9 07/21/2022     No results found for: BILICONJ   Lab Results   Component Value Date    BILITOTAL 0.6 07/21/2022     Lab Results   Component Value Date    ALBUMIN 3.0 07/21/2022     Lab Results   Component Value Date    PROTTOTAL 6.7 07/21/2022      Lab Results   Component Value Date    ALKPHOS 131 07/21/2022         Rehabilitation - continue comprehensive acute inpatient rehabilitation program with multidisciplinary approach including therapies, rehab nursing, and physiatry following. See interval history for updates.      ASSESSMENT AND PLAN  Shay Jimenez is a 57 year old right hand dominant male with a past medical history of ESRD on HD, GERD, hyperlipidemia, peripheral neuropathy, depression/anxiety, GINI, BPH, obesity who was admitted on 6/24/22 with thoracic compressive myelopathy and concern for discitis/vertebral osteomyelitis now s/p C6-T6 fusion and T2-T3 decompression on 6/27/22 with post-op course complicated by hypotension, pain, anemia, encephalopathy, and constipation.  He is now admitted to ARU on 7/15/22 for multidisciplinary rehabilitation and ongoing medical management.     Admission to acute inpatient rehab 07/15/22.    Impairment group code: Spinal Cord Dysfunction Spinal Non-Traumatic 04.111 Paraplegia, Incomplete        1. PT and OT 90 minutes of each on a daily basis, in addition to rehab nursing and close management of physiatrist.       2. Impairment of ADL's: Noted to have impaired strength, impaired activity tolerance, impaired tone,  "impaired balance, impaired coordination, impaired judgement and safety awareness, impulsiveness and impaired weight shifting, all affecting his ability to safely and independently perform basic ADLs.  Goal for mod I with basic ADLs with assist of 1 for tub/shower transfers.     3. Impairment of mobility:  Noted to have impaired strength, impaired activity tolerance, impaired tone, impaired balance, impaired coordination, impaired judgement and safety awareness, impulsiveness and impaired weight shifting, all affecting his ability to safely and independently perform basic mobility.  Goal for mod I with basic mobility with assist of 1 for stairs.     4. Medical Conditions    Thoracic compressive myelopathy  Concern for T2-T3 discitis/vertebral osteomyelitis  S/p C6-T6 fusion/T2-3 decompression on 6/27/22 with Dr. Elvi Dela Cruz on chronic upper back pain  Lower extremity weakness  Recurrent falls  Hx back pain for 6 months. CT chest 2/2022 showed concern for upper thoracic spine diskitis vs osteomyelitis. Unclear follow up from that time.  In weeks prior to admission, MRI showed possible thoracic compression fracture. Developed progressively weak legs with frequent falls.  On admission imaging with T2-T3 diskitis, osteomyelitis and compressive myelopathy.  Neurosurgery and ID consulted while inpatient.  Late growth of P acne in 2 intra-op cultures.  Per ID, \"likely contaminant given no previous hardware, but since new hardware is in place will recommend starting prophylactic amoxicillin.\"  Started on 7/12.  1/2 positive blood cultures from 7/5 for staph epi, per ID \"signifies contamination and does not require treatment\".  Repeat cultures negative.  - Continue amoxicillin 500 mg daily for total of 4 weeks  - Pain management: Tylenol PRN, flexeril PRN, Decrease Dilaudid to 2 mg q6h PRN.  Wean opioids as able.  - Wound care: keep clean and dry, ok to shower no soaking  - Activity: no lifting greater than 5-10 pounds, no " excessive bending, lifting, or twisting  - Brace when out of bed  - Continue PT/OT  - Follow up with neurosurgery, currently scheduled on 8/3 and 9/21     End-stage renal disease, on hemodialysis  Hypotension  Hyponatremia  Hyperphosphatemia  Secondary hyperparathyroidism  PTA HD M/W/F.  LUE fistula.  Nephrology following this admission for ongoing dialysis.  Reportedly with chronic hypotension, typically SBP in 70-90s and asymptomatic most of the time.  While IP, mild dizziness with positional changes but tolerating PT.   Patient reports PTA using midodrine 1 hour prior to HD and repeated 1 hour into HD run.  Started on TID dosing this admission.  - Nephrology consulted, appreciate ongoing assistance.  - Continue HD per nephrology, M/W/F schedule while at ARU  - Monitor BP.  Continue midodrine 10 mg TID.  Per [facility] team, nephrology recommended to consider 20 mg TID if symptomatic hypotension limiting therapies. This was deferred by inpatient team.  Could discuss with [facility] nephrology to consider at ARU if indicated.  - Continue bilateral lower extremity Jobst stockings  - Continue PTA sevelamer (recently increased 800 mg to 1600 mg).  Per nephrology, checked PTH 7/19, which is elevated 75.  Management per nephrology.  - Resumed fluid restriction, 1.2 L daily, on 7/19 per nephrology recs given ongoing hyponatremia.  - Trend labs MWF.  7/25: Cr/BUN consistent with CKD.  Na mildly low at 131.  K WNL.  Phos elevated 5.5 but improved.     Acute blood loss on anemia of chronic kidney disease  Pre-op Hgb stable in 10s.  Hgb currently stable in 7s-8s.  - Epo with HD per nephrology  - Trend CBC.  Hgb stable at 8.2 on 7/25  - Consider transfusion for hemoglobin less than 7 g/dL and symptoms     Encephalopathy  Felt to be due to buildup of gabapentin metabolites due to PTA dose (300 mg TID) higher than typical maximum dose recommended even for patients on CRRT. Gabapentin and Zoloft held.  With improved mental status,  "resumed, gabapentin at lower dose.  No ongoing concerns at ARU.     GERD  PTA on omeprazole.  On protonix as formulary autosub while inpatient.  Patient reports this has not been as helpful, unable to obtain home supply for use here.  On 7/21, complaining of increased reflux symptoms, added Pepcid.  - Continue Pepcid BID  - Continue Tums PRN  - Resume PTA omeprazole at discharge to home     Hyperlipidemia  - Chronic and stable, on statin.     Peripheral neuropathy  Follows with [facility].    - Continue PT duloxetine 30 mg daily  - Continue gabapentin 100 mg TID, reduced from PTA dose as above due to concern for contributing to confusion in setting of renal impairment     Depression/anxiety  At ARU admission, reporting increased stressors at home, marital issues, financial.  - Continue PTA Zoloft 100 mg daily, Xanax 1 mg BID PRN  - Continue atarax 25 mg PRN for anxiety and pain  - Offered health psychology, patient wants to \"think about it\".  Will consult if open.   - Monitor mood     Obesity, class III  Obstructive sleep apnea  BMI this stay is ~41.  No PTA CPAP use, reportedly does not tolerate.  - Needs oxygen by NC with sleep due to ongoing desats at night.  Overnight oximetry closer to discharge.  - Recommend follow-up with sleep medicine after discharge  - Encourage diet, lifestyle modifications once above issues addressed     History of BPH  - Continue PTA finasteride.  Given anuric and hypotension, could consider discontinuing?     Alcohol use disorder  Reports drinking \"too much\" lately, 6-8 cocktails per day PTA.  - Most recent LFTs on 7/16 WNL  - Encourage cessation, offer CD resources if interested     Allergic rhinitis  - Continue PTA Flonase and Zyrtec    Loose stool, bowel incontinence  Onset 7/18 morning per patient report.  On sertraline, recently resumed after brief hold of PTA med.  No recent stool softener or laxative use.  LFTs on 7/16 WNL.  Afebrile, WBC WNL.  Abdomen soft, " non-tender.  7/21 added metamucil and probiotic.  AXR on 7/23 with moderate colonic stool burden.  Started on daily suppository, given 7/24 with minimal results.  - Continue probiotics, metamucil  - Patient complaining of ongoing loose stools, x2 yesterday.  Intermittent incontinence.  No abdominal pain, nausea, abdominal exam benign.  Remains afebrile, WBC WNL. Repeat suppository this morning, if unsuccessful, administer enema.  - Encourage fluid up to limit  - If ongoing, trial loperamide.  Consider reaching out to ID for alternative to amoxicillin due to patient request if other measures ineffective.    1. Adjustment to disability:  Clinical psychology to eval and treat if indicated  2. FEN: regular diet, thin liquids, 1.2L fluid restriction per nephrology  3. Bowel: continent/incontinent, frequent loose stools recently as above   4. Bladder: anuric  5. DVT Prophylaxis: mechanical  6. GI Prophylaxis: PPI  7. Code: full  8. Disposition: goal for home  9. ELOS:  14 days  10. Follow up Appointments on Discharge: PCP, neurosurgery follow-up 8/3 and 9/21, nephrology for ongoing dialysis        Dona Cabezas PA-C  Physical Medicine & Rehabilitation
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
## 2022-07-25 NOTE — PROGRESS NOTES
HEMODIALYSIS TREATMENT NOTE    Date: 7/25/2022  Time: 4:58 PM    Data:  Pre Wt:134.5    Desired Wt: 130.5  kg   Post Wt: Unable to obtain post weight   Weight change:   4kg  Ultrafiltration - Post Run Net Total Removed (mL): 3500 mL  Vascular Access Status: patent  Dialyzer Rinse: Streaked, Moderate  Total Blood Volume Processed: 92 L  Total Dialysis (Treatment) Time: 4 Hours  Dialysate:  3K,Ca2.25  Heparin: 1300 Units    Lab:   Yes    Interventions:  Hecterol  Midodrine  Heparin  Epogen  Gabapentin  BFR: 400  DFR: 600  UF:4kg      Assessment:  Patient slept through the run, alert and oriented x 4. Completed 4 hours of HD via left upper arm AVF. 10 mg Midodrine given to optimize BP, pulled 4 kg without issues, 100 mg gabapentin  Given post run. Hemostasis achieved in 10 minutes . Post report given to ARU charge RN.     Plan:    Per Renal team

## 2022-07-25 NOTE — PLAN OF CARE
Goal Outcome Evaluation:    Plan of Care Reviewed With: patient     Overall Patient Progress: improving    Outcome Evaluation: Pt participating in therapies. PRN xanax given with  relief. Cont of B&B on shift using BSC.    Pt A&Ox4. A1 with walker. Pt c/o loose stools, PRN suppository given with no results. PRN enema given per orders, pt had medium formed BM. Pt denies pain, SOB, headache, nausea, or new numbness/tingling. Pt c/o anxiousness, PRN xanax given with relief. No new skin issues. Back incision with steri strips WDL, bilateral knee abrasions with mepilex present. Pt picked up for dialysis around 12:30pm. Call light in reach, alarms on, continue POC.

## 2022-07-25 NOTE — PLAN OF CARE
Goal Outcome Evaluation:    Overall Patient Progress: no change    Outcome Evaluation: No change in patient progress this shift.    Pt is alert and oriented. Mixed continence of B&B. LBM 7/24. Ax1-2 SPT with walker. C/o pain; given PRN pain medication with some relief. Denied SOB, CP, and n/t. Call light within reach and bed alarms on. Will continue with POC.

## 2022-07-26 ENCOUNTER — APPOINTMENT (OUTPATIENT)
Dept: PHYSICAL THERAPY | Facility: CLINIC | Age: 57
DRG: 052 | End: 2022-07-26
Attending: PHYSICAL MEDICINE & REHABILITATION
Payer: MEDICARE

## 2022-07-26 ENCOUNTER — APPOINTMENT (OUTPATIENT)
Dept: OCCUPATIONAL THERAPY | Facility: CLINIC | Age: 57
DRG: 052 | End: 2022-07-26
Attending: PHYSICAL MEDICINE & REHABILITATION
Payer: MEDICARE

## 2022-07-26 LAB — BACTERIA TISS BX CULT: NO GROWTH

## 2022-07-26 PROCEDURE — 128N000003 HC R&B REHAB

## 2022-07-26 PROCEDURE — 99232 SBSQ HOSP IP/OBS MODERATE 35: CPT | Mod: 24 | Performed by: PHYSICAL MEDICINE & REHABILITATION

## 2022-07-26 PROCEDURE — 97110 THERAPEUTIC EXERCISES: CPT | Mod: GP | Performed by: REHABILITATION PRACTITIONER

## 2022-07-26 PROCEDURE — 97535 SELF CARE MNGMENT TRAINING: CPT | Mod: GO

## 2022-07-26 PROCEDURE — 97530 THERAPEUTIC ACTIVITIES: CPT | Mod: GP | Performed by: REHABILITATION PRACTITIONER

## 2022-07-26 PROCEDURE — 250N000013 HC RX MED GY IP 250 OP 250 PS 637: Performed by: PHYSICIAN ASSISTANT

## 2022-07-26 PROCEDURE — 250N000013 HC RX MED GY IP 250 OP 250 PS 637: Performed by: PHYSICAL MEDICINE & REHABILITATION

## 2022-07-26 RX ORDER — POLYETHYLENE GLYCOL 3350 17 G/17G
17 POWDER, FOR SOLUTION ORAL 2 TIMES DAILY
Status: DISCONTINUED | OUTPATIENT
Start: 2022-07-26 | End: 2022-07-30 | Stop reason: HOSPADM

## 2022-07-26 RX ADMIN — Medication 100 MG: at 09:34

## 2022-07-26 RX ADMIN — HYDROMORPHONE HYDROCHLORIDE 2 MG: 2 TABLET ORAL at 00:32

## 2022-07-26 RX ADMIN — FAMOTIDINE 10 MG: 10 TABLET ORAL at 09:34

## 2022-07-26 RX ADMIN — DOCUSATE SODIUM 1 ENEMA: 283 LIQUID RECTAL at 12:10

## 2022-07-26 RX ADMIN — GABAPENTIN 100 MG: 100 CAPSULE ORAL at 20:24

## 2022-07-26 RX ADMIN — HYDROXYZINE HYDROCHLORIDE 25 MG: 25 TABLET ORAL at 20:24

## 2022-07-26 RX ADMIN — ACETAMINOPHEN 650 MG: 325 TABLET, FILM COATED ORAL at 00:32

## 2022-07-26 RX ADMIN — Medication 1 TABLET: at 09:35

## 2022-07-26 RX ADMIN — Medication 1 CAPSULE: at 20:24

## 2022-07-26 RX ADMIN — CETIRIZINE HYDROCHLORIDE 10 MG: 5 TABLET ORAL at 09:34

## 2022-07-26 RX ADMIN — ALPRAZOLAM 1 MG: 1 TABLET ORAL at 20:24

## 2022-07-26 RX ADMIN — GABAPENTIN 100 MG: 100 CAPSULE ORAL at 09:34

## 2022-07-26 RX ADMIN — AMOXICILLIN 500 MG: 500 CAPSULE ORAL at 17:03

## 2022-07-26 RX ADMIN — SEVELAMER CARBONATE 1600 MG: 800 TABLET, FILM COATED ORAL at 17:03

## 2022-07-26 RX ADMIN — Medication 1.25 MG: at 09:33

## 2022-07-26 RX ADMIN — CYANOCOBALAMIN TAB 500 MCG 500 MCG: 500 TAB at 09:34

## 2022-07-26 RX ADMIN — SERTRALINE HYDROCHLORIDE 100 MG: 100 TABLET ORAL at 09:35

## 2022-07-26 RX ADMIN — MIDODRINE HYDROCHLORIDE 10 MG: 5 TABLET ORAL at 14:22

## 2022-07-26 RX ADMIN — FLUTICASONE PROPIONATE 1 SPRAY: 50 SPRAY, METERED NASAL at 09:36

## 2022-07-26 RX ADMIN — Medication 1 CAPSULE: at 09:34

## 2022-07-26 RX ADMIN — DULOXETINE HYDROCHLORIDE 30 MG: 30 CAPSULE, DELAYED RELEASE ORAL at 09:34

## 2022-07-26 RX ADMIN — MIDODRINE HYDROCHLORIDE 10 MG: 5 TABLET ORAL at 17:02

## 2022-07-26 RX ADMIN — MIDODRINE HYDROCHLORIDE 10 MG: 5 TABLET ORAL at 09:34

## 2022-07-26 RX ADMIN — ACETAMINOPHEN 650 MG: 325 TABLET, FILM COATED ORAL at 17:03

## 2022-07-26 RX ADMIN — HYDROMORPHONE HYDROCHLORIDE 2 MG: 2 TABLET ORAL at 20:23

## 2022-07-26 RX ADMIN — GABAPENTIN 100 MG: 100 CAPSULE ORAL at 14:22

## 2022-07-26 RX ADMIN — ATORVASTATIN CALCIUM 20 MG: 20 TABLET, FILM COATED ORAL at 20:23

## 2022-07-26 RX ADMIN — POLYETHYLENE GLYCOL 3350 17 G: 17 POWDER, FOR SOLUTION ORAL at 20:23

## 2022-07-26 RX ADMIN — ACETAMINOPHEN 650 MG: 325 TABLET, FILM COATED ORAL at 22:35

## 2022-07-26 ASSESSMENT — ACTIVITIES OF DAILY LIVING (ADL)
ADLS_ACUITY_SCORE: 46

## 2022-07-26 NOTE — PLAN OF CARE
Post Rounds Family Phone Call  Staff involved: Elvia Sky OT  Length of call: 15 mins  Family involved: Cory MANTILLA  Projected discharge date: 7/31/22  Projected discharge location: Pt's home w/ son.   Equipment needs: See OT POC/PT POC  Other questions and misc: Pt gave permission to reach out to son Cory. Discussed progress thus far for pt and pt desire to leave earlier than recommended date. Pt having increased anxiety about return to home and needing to arrange for moving in August. Cory had no questions and agreeable to assist pt with ADLs/mobility. Will send out equipment list to son as son reports patient has not followed up on sending him any OT/ADL equipment that was recommended for home.    Family training scheduled for Saturday 7/31/22 for 8:15 a.m.-10:00 p.m. w/ OT/PT with sons Cory (who will stay with pt upon discharge) and son Coleman. Cory expressed no concerns or questions. Family training time to be provided to pt.

## 2022-07-26 NOTE — PROGRESS NOTES
"  [facility]   Acute Rehabilitation Unit  Daily progress note    INTERVAL HISTORY  Patient was seen and examined at bedside this morning.  No acute events reported overnight.  Today, he reports that overall he is feeling better.  He would like to try to facilitate sooner discharge than originally anticipated and aim for Sunday.  He anticipates his son will be able to provide needed level of assist and would plan for family training with therapies on Saturday prior.  He reports a good formed BM after enema yesterday, which has provided some relief of loose/incontinent stools, though he did have looser BM this morning and would like to repeat enema.  He continues to deny any abdominal pain or nausea.  He reports back pain continues to decrease and he is agreeable to ongoing wean of opioids, expects he will be able to manage on Tylenol alone soon, certainly by discharge to home.  Reports that he has intermittent been using oxygen with sleep at ARU, though he often \"forgets\".  Has a history of GINI, though did not tolerate CPAP.  He typically slept in recliner due to back pain, which he states helped with his apnea as well.  He would like to see if he would qualify for oxygen for home with sleep, will need overnight oximetry, and then would plan to follow-up with sleep medicine as an outpatient.    Functionally, he is currently needing SBA/CGA with stand pivot transfers, ambulating up to 95' with SBA/CGA with walker.  He needs min A on 6\" stairs.  He needs set-up for seated grooming, SBA for upper body dressing but dependent to don brace.  He needs CGA to mod A for lower body dressing standing.    MEDICATIONS    amoxicillin  500 mg Oral Daily with supper     atorvastatin  20 mg Oral At Bedtime     bisacodyl  10 mg Rectal QPM     cetirizine  10 mg Oral Daily     cyanocobalamin  500 mcg Oral Daily     doxercalciferol  4 mcg Intravenous Once in dialysis/CRRT     DULoxetine  30 mg Oral " "Daily     famotidine  10 mg Oral Daily     finasteride  1.25 mg Oral Daily     fluticasone  1 spray Both Nostrils Daily     gabapentin  100 mg Oral TID     lactobacillus rhamnosus (GG)  1 capsule Oral BID     midodrine  10 mg Oral TID w/meals     multivitamin RENAL  1 tablet Oral Daily     psyllium  1 packet Oral BID     vitamin B6  100 mg Oral Daily     sertraline  100 mg Oral Daily     sevelamer carbonate  1,600 mg Oral TID w/meals        acetaminophen, ALPRAZolam, bisacodyl, calcium carbonate, cyclobenzaprine, HYDROmorphone, hydrOXYzine, melatonin, miconazole, midodrine, naloxone **OR** naloxone **OR** naloxone **OR** naloxone, polyethylene glycol     PHYSICAL EXAM  BP 98/60 (BP Location: Right arm)   Pulse 77   Temp 96.8  F (36  C) (Oral)   Resp 16   Ht 1.854 m (6' 1\")   Wt 134.5 kg (296 lb 9.6 oz)   SpO2 91%   BMI 39.13 kg/m     Gen: NAD, sitting up in chair  HEENT: NC/AT,   Cardio: RRR, no murmurs  Pulm: non-labored on room air, lungs CTA bilaterally  Abd: soft, non-tender, mildly distended, bowel sounds present  Ext: chronic venous stasis changes, stable bilateral pedal edema  Neuro/MSK: awake, alert, follows commands, decreased sensation to light touch in bilateral lower extremities to ~knee in stocking distribution.  Lower extremity weakness.    LABS  CBC RESULTS:   Recent Labs   Lab Test 07/25/22  0732 07/22/22  0517 07/21/22  1252   WBC 7.3 6.4 8.9   RBC 2.67* 2.54* 2.57*   HGB 8.2* 7.8* 8.0*   HCT 25.9* 25.8* 25.1*   MCV 97 102* 98   MCH 30.7 30.7 31.1   MCHC 31.7 30.2* 31.9   RDW 15.2* 14.6 14.7    256 279     Last Basic Metabolic Panel:  Recent Labs   Lab Test 07/25/22  0732 07/22/22  0517 07/21/22  1252   * 133 133   POTASSIUM 3.5 3.6 3.7   CHLORIDE 94 97 98   CO2 24 24 25   ANIONGAP 13 12 10   GLC 95 92 101*   BUN 39* 33* 25   CR 9.64* 7.86* 6.75*   GFRESTIMATED 6* 7* 9*   MAC 9.2 9.5 9.4     Lab Results   Component Value Date    AST 17 07/21/2022     Lab Results   Component " Value Date    ALT 9 07/21/2022     No results found for: BILICONJ   Lab Results   Component Value Date    BILITOTAL 0.6 07/21/2022     Lab Results   Component Value Date    ALBUMIN 3.0 07/21/2022     Lab Results   Component Value Date    PROTTOTAL 6.7 07/21/2022      Lab Results   Component Value Date    ALKPHOS 131 07/21/2022         Rehabilitation - continue comprehensive acute inpatient rehabilitation program with multidisciplinary approach including therapies, rehab nursing, and physiatry following. See interval history for updates.      ASSESSMENT AND PLAN  Shay Jimenez is a 57 year old right hand dominant male with a past medical history of ESRD on HD, GERD, hyperlipidemia, peripheral neuropathy, depression/anxiety, GINI, BPH, obesity who was admitted on 6/24/22 with thoracic compressive myelopathy and concern for discitis/vertebral osteomyelitis now s/p C6-T6 fusion and T2-T3 decompression on 6/27/22 with post-op course complicated by hypotension, pain, anemia, encephalopathy, and constipation.  He is now admitted to ARU on 7/15/22 for multidisciplinary rehabilitation and ongoing medical management.     Admission to acute inpatient rehab 07/15/22.    Impairment group code: Spinal Cord Dysfunction Spinal Non-Traumatic 04.111 Paraplegia, Incomplete        1. PT and OT 90 minutes of each on a daily basis, in addition to rehab nursing and close management of physiatrist.       2. Impairment of ADL's: Noted to have impaired strength, impaired activity tolerance, impaired tone, impaired balance, impaired coordination, impaired judgement and safety awareness, impulsiveness and impaired weight shifting, all affecting his ability to safely and independently perform basic ADLs.  Goal for mod I with basic ADLs with assist of 1 for tub/shower transfers.     3. Impairment of mobility:  Noted to have impaired strength, impaired activity tolerance, impaired tone, impaired balance, impaired coordination, impaired  "judgement and safety awareness, impulsiveness and impaired weight shifting, all affecting his ability to safely and independently perform basic mobility.  Goal for mod I with basic mobility with assist of 1 for stairs.     4. Medical Conditions    Thoracic compressive myelopathy  Concern for T2-T3 discitis/vertebral osteomyelitis  S/p C6-T6 fusion/T2-3 decompression on 6/27/22 with Dr. Boles  Acute on chronic upper back pain  Lower extremity weakness  Recurrent falls  Hx back pain for 6 months. CT chest 2/2022 showed concern for upper thoracic spine diskitis vs osteomyelitis. Unclear follow up from that time.  In weeks prior to admission, MRI showed possible thoracic compression fracture. Developed progressively weak legs with frequent falls.  On admission imaging with T2-T3 diskitis, osteomyelitis and compressive myelopathy.  Neurosurgery and ID consulted while inpatient.  Late growth of P acne in 2 intra-op cultures.  Per ID, \"likely contaminant given no previous hardware, but since new hardware is in place will recommend starting prophylactic amoxicillin.\"  Started on 7/12.  1/2 positive blood cultures from 7/5 for staph epi, per ID \"signifies contamination and does not require treatment\".  Repeat cultures negative.  - Continue amoxicillin 500 mg daily for total of 4 weeks  - Pain management: Tylenol PRN, flexeril PRN, Dilaudid 2 mg q6h PRN.  Continue to wean opioids as able, with plans to discontinue prior to discharge.  - Wound care: keep clean and dry, ok to shower no soaking  - Activity: no lifting greater than 5-10 pounds, no excessive bending, lifting, or twisting  - Brace when out of bed  - Continue PT/OT  - Follow up with neurosurgery, currently scheduled on 8/3 and 9/21     End-stage renal disease, on hemodialysis  Hypotension  Hyponatremia  Hyperphosphatemia  Secondary hyperparathyroidism  PTA HD M/W/F.  LUE fistula.  Nephrology following this admission for ongoing dialysis.  Reportedly with chronic " hypotension, typically SBP in 70-90s and asymptomatic most of the time.  While IP, mild dizziness with positional changes but tolerating PT.   Patient reports PTA using midodrine 1 hour prior to HD and repeated 1 hour into HD run.  Started on TID dosing this admission.  - Nephrology consulted, appreciate ongoing assistance.  - Continue HD per nephrology, M/W/F schedule while at ARU  - Monitor BP.  Continue midodrine 10 mg TID.  Per [facility] team, nephrology recommended to consider 20 mg TID if symptomatic hypotension limiting therapies. This was deferred by inpatient team.  Could discuss with [facility] nephrology to consider at ARU if indicated.  - Continue bilateral lower extremity Jobst stockings  - Continue PTA sevelamer (recently increased 800 mg to 1600 mg).  Per nephrology, checked PTH 7/19, which is elevated 75.  Management per nephrology - hectorol with HD, dose reduced.  - Resumed fluid restriction, 1.2 L daily, on 7/19 per nephrology recs given ongoing hyponatremia.  - Trend labs MWF.  7/25: Cr/BUN consistent with CKD.  Na mildly low at 131.  K WNL.  Phos elevated 5.5 but improved.     Acute blood loss on anemia of chronic kidney disease  Pre-op Hgb stable in 10s.  Hgb currently stable in 7s-8s.  - Epo with HD per nephrology  - Trend CBC.  Hgb stable at 8.2 on 7/25  - Avoid transfusion if possible given listed for transplant ([place]).  Per nephrology, if transfused, would start low dose immunosuppression (MMF generally).     Encephalopathy  Felt to be due to buildup of gabapentin metabolites due to PTA dose (300 mg TID) higher than typical maximum dose recommended even for patients on CRRT. Gabapentin and Zoloft held.  With improved mental status, resumed, gabapentin at lower dose.  No ongoing concerns at ARU.     GERD  PTA on omeprazole.  On protonix as formulary autosub while inpatient.  Patient reports this has not been as helpful, unable to obtain home supply for use here.  On 7/21, complaining of increased  "reflux symptoms, added Pepcid.  - Continue Pepcid BID  - Continue Tums PRN  - Resume PTA omeprazole at discharge to home     Hyperlipidemia  - Chronic and stable, on statin.     Peripheral neuropathy  Follows with [facility].    - Continue PT duloxetine 30 mg daily  - Continue gabapentin 100 mg TID, reduced from PTA dose as above due to concern for contributing to confusion in setting of renal impairment     Depression/anxiety  At ARU admission, reporting increased stressors at home, marital issues, financial.  - Continue PTA Zoloft 100 mg daily, Xanax 1 mg BID PRN  - Continue atarax 25 mg PRN for anxiety and pain  - [facility] psychology consulted, appreciate assistance  - Monitor mood     Obesity, class III  Obstructive sleep apnea  BMI this stay is ~41.  No PTA CPAP use, reportedly does not tolerate.  - Intermittently using oxygen by NC with sleep.  Overnight oximetry closer to discharge to qualify for home oxygen.  - Recommend follow-up with sleep medicine after discharge  - Encourage diet, lifestyle modifications once above issues addressed     History of BPH  - Continue PTA finasteride.  Given anuric and hypotension, could consider discontinuing?     Alcohol use disorder  Reports drinking \"too much\" lately, 6-8 cocktails per day PTA.  - Most recent LFTs on 7/16 WNL  - Encourage cessation, offer CD resources if interested     Allergic rhinitis  - Continue PTA Flonase and Zyrtec    Loose stool, bowel incontinence  Onset 7/18 morning per patient report.  On sertraline, recently resumed after brief hold of PTA med.  No recent stool softener or laxative use.  LFTs on 7/16 WNL.  Afebrile, WBC WNL.  Abdomen soft, non-tender.  7/21 added metamucil and probiotic.  AXR on 7/23 with moderate colonic stool burden.  Started on daily suppository, given 7/24 with minimal results.    - Continue probiotics, metamucil  - Formed BM on 7/25 following enema.  Did have 1 recurrent loose stool this morning, though patient " states improving.  Repeat enema today.  Continue to monitor.  - Encourage fluid up to limit  - If ongoing, trial loperamide    1. Adjustment to disability:  Clinical psychology to eval and treat if indicated  2. FEN: regular diet, thin liquids, 1.2L fluid restriction per nephrology  3. Bowel: continent/incontinent, frequent loose stools recently as above   4. Bladder: anuric  5. DVT Prophylaxis: mechanical  6. GI Prophylaxis: PPI  7. Code: full  8. Disposition: goal for home  9. ELOS:  14 days  10. Follow up Appointments on Discharge: PCP, neurosurgery follow-up 8/3 and 9/21, nephrology for ongoing dialysis      Patient discussed with Dr. Antonio Loo, PM&R staff physician     CELSO Gibbs-C  Physical Medicine & Rehabilitation

## 2022-07-26 NOTE — PLAN OF CARE
FOCUS/GOAL  Pain management, Medical management, Mobility, and Skin integrity    ASSESSMENT, INTERVENTIONS AND CONTINUING PLAN FOR GOAL:    Goal Outcome Evaluation:       Pt Aox4 this shift. Denies pain. Pleasant and cooperative. No concerns for nurse at this time. Calls appropriately. Needs in reach and safety measures in place. Cont POC  Mobility: Ax1 with FWW  Bowel/Bladder: Received enema, with minimal results  Skin: Asif, flakey  O2: RA  Diet: R/T/W  Psychosocial: appropriate to situation  Misc:       B/P: 98/60, T: 96.8, P: 77, R: 16

## 2022-07-26 NOTE — PLAN OF CARE
Goal Outcome Evaluation:        Patient a/o x4. Returned from HD at 1800, 4L taken off. VSS. Held scheduled midodrine, see flowsheets for BP. 1200 fluid restriction, had 400mL during shift. Fistula in LUE. Surgical incision approximated. Mepilex on bilat knee for opened scabs. Prn tylenol and atarax given at 1845 for headache, reported relief. Prn flexeril given at 2030 for spasms. Continent of bowel and bladder, LBM: 7/25. Pt refused bowel meds this evening. Will continue with POC.

## 2022-07-26 NOTE — PROGRESS NOTES
SPIRITUAL HEALTH SERVICES Progress Note  [facility] MAISHA Day is pleased with his therapy progress, and is looking forward to discharge on Sunday, he's hopeful he will meet that date. Discussed his divorce and the grief dynamics that play into it. Also discussed his move at the end of August and what he's looking forward to/fearing with moving in with his two sons. Shay is thinking positively about moving in with his boys, while acknowledging there may be some tension - he believes they will be kind and adequate caretakers. Provided prayer for pt.    Manjit Tong   Intern  Pager [phone]      * [facility] remains available 24/7 for emergent requests/referrals, either by having the switchboard page the on-call  or by entering an ASAP/STAT consult in Epic (this will also page the on-call ). Routine Epic consults receive an initial response within 24 hours.*

## 2022-07-26 NOTE — PLAN OF CARE
FOCUS/GOAL  Bowel management, Bladder management and Pain management    ASSESSMENT, INTERVENTIONS AND CONTINUING PLAN FOR GOAL:  Pt is alert and oriented, reported anxiety and back pain at start of shift and received, xanax, dilaudid and tylenol. Pain was relieved and pt slept throughout the remainder of the night without voiding due to HD. Fistula in LUE with thrill and bruit. Mepilex on B/L knees.   No further care concerns at this time continue with POC. Continue with 1200 ml FR, 200 ml given overnight.      Goal Outcome Evaluation: NO change.
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
## 2022-07-26 NOTE — PROGRESS NOTES
CLINICAL NUTRITION SERVICES - REASSESSMENT NOTE     Nutrition Prescription    RECOMMENDATIONS FOR MDs/PROVIDERS TO ORDER:  None today     Malnutrition Status:    Patient does not meet two of the established criteria necessary for diagnosing malnutrition    Recommendations already ordered by Registered Dietitian (RD):  RD will place patient care order for nursing reminder to give phos binder with meals     Future/Additional Recommendations:  Continue to monitor po meal intakes, weight and lab trends      EVALUATION OF THE PROGRESS TOWARD GOALS   Diet: Regular, 1200 ml FR  Intake: %, pt consuming outside foods as well as  meals        NEW FINDINGS   MAR reviewed. Labs reviewed, phos and albumin trends noted. RD visited pt at bedside, noted fluid status improving, reviewed dialysis labs and noted phos trends, pt reports doesn't usually run high, reminded pt of importance of taking phos binder right with meals and will put in nursing FYI as additional reminder. Reviewed importance of protein intakes, re-offered supplements, pt politely declined at this time and will attempt to increase protein intakes at meals.    07/24/22 0631 134.5 kg (296 lb 9.6 oz) Standing scale   07/17/22 0623 139.7 kg (307 lb 15.7 oz) Standing scale   07/16/22 0700 139.8 kg (308 lb 3.3 oz) Bed scale   07/15/22 1518 140.8 kg (310 lb 6.5 oz) --     06/29/22 145 kg (319 lb 10.7 oz)     142.9 kg (315 lb) 01/04/2022 - per CE     Weight assessment: 3.5% weight loss in 1 week, 7.2% weight loss in 1 month, 6% weight loss in 6 months.     MALNUTRITION  % Intake: Decreased intake does not meet criteria  % Weight Loss: Difficult to assess due to fluid status   Subcutaneous Fat Loss: None observed  Muscle Loss: None observed  Fluid Accumulation/Edema: Moderate to trace  Malnutrition Diagnosis: Patient does not meet two of the established criteria necessary for diagnosing malnutrition    Previous Goals   Patient to consume % of  nutritionally adequate meal trays TID, or the equivalent with supplements/snacks  Evaluation: Likely met     Previous Nutrition Diagnosis  Predicted inadequate nutrient intake (kcal, protein) related to medical course as evidenced by potential to meet <100% of estimated nutrition needs  Evaluation: Improving    CURRENT NUTRITION DIAGNOSIS  Predicted inadequate nutrient intake vs predicted increased nutrient needs related to appetite, dialysis as evidenced by therapeutic recommendations       INTERVENTIONS  Implementation  Collaboration with other staff - ordered as above     Goals  Patient to consume % of nutritionally adequate meal trays TID, or the equivalent with supplements/snacks.    Monitoring/Evaluation  Progress toward goals will be monitored and evaluated per protocol.    Suyapa Hawkins RD, CNSC, LD  [facility] RD pager: [phone]

## 2022-07-26 NOTE — DISCHARGE INSTRUCTIONS
PCP  You are scheduled to see Dr. Smith on August 3 2022 at 10:20 am.    Address  [address]  [address]  [place] [address]  Phone   [phone]        Neurosurgery  You are scheduled to see Dr. Boles on August 4 2022 and September 21 2022 at 11:20 am.    Address  [address]  [address] [facility] [address]  Phone   [phone]          Podiatry  You are scheduled to see Dr. Romano on August 5 2022 at 11:00 am.    Address  [address]  [address]   [facility] [place] [address]  Phone   [phone]        Sleep Medicine        Call the number below to schedule an apppointment.   You are scheduled to see *** on *** at ***.    Address  ***  Phone   [phone]      Home Health Care:   [facility] PH: [phone]   Nurse, physical therapy, occupational therapy, and home health aide   -You will get a call after you have discharged from [facility] to schedule the first home care visit.     Overnight Oxygen:   Call today to set up home oxygen delivery.  [facility] (D[facility])  PH: [phone] x [phone] x [phone]  Fax: [phone]    Outpatient Dialysis:   [facility]   [address]  [place], [place] [address]  PH: [phone]  F: [phone]  [facility] chair     [employer]:   Call [phone] to check on the status of your application   Mailed 07/28/2022

## 2022-07-26 NOTE — PROGRESS NOTES
Targeting discharge home Sun 07/31. Therapy coordinating family training. HC recommended. Referral for RN, PT, and OT sent to [facility] and pending. Pt on OP HD MWF at [facility]. SWer called [facility] and provided update via VM and provided SWer direct number to f/u if additional needs arise. Will f/u and confirm update was received.     Overnight O2 test needed Fri PM vs Sat PM. Will send communication to weekend Charge RN RE: setting up overnight O2 if pt qualifies. Discussed this with PA and Charge RN this afternoon after huddle.     Will continue to follow.     Outpatient Dialysis:   [facility]   [address]  [place], [place] [address]  PH: [phone]  F: [phone]  [facility] chair     ---------------------------------------------------------------------------------------    ADDENDUM: [facility] accepted. Info added to pt AVS. Will update/confirm all discharge plans with pt prior to discharge home and continue to follow if needs arise.     Home Health Care:   [facility] PH: [phone]  RN, PT, OT     MOUNA Rosario   [facility]   Direct Phone: [phone]  I   Pager: [phone]  I  Fax: [phone]

## 2022-07-26 NOTE — PLAN OF CARE
"Discharge Planner Post-Acute Rehab PT:      Discharge Plan: Apartment, stairs to upper level or lower level, 6, rail on one side. Home care PT,  2 sons to assist.     Precautions: falls, spinal, brace when out of bed (CTLO), poor sensation B feet.     Current Status:  Bed Mobility: sit EOB cga, rail. Needs A for BLes into bed.   Transfer: stand pivot with WW, SBA/CGA   Gait: 95 ft with FWW, SBA/CGA  Stairs: MIN A on 6\" stairs  Balance: Poor standing balance d/t absent sensation in B feet.      Assessment:  Fatigued from previous day, but able to participate in therapy.  Continues to progress with LE and core strength for improve stability during gait and functional endurance.  7/26 pt cont to work hard with pt but limited by weakness, pain and fatigue. Due to absent of Chava foot sensation pt foot placement is inconsistent causing instability at times. Pt also demo Chava knee buckling at time needing up to min A for stability.    Other Barriers to Discharge (DME, Family Training, etc):   - stairs  - history of recurrent falls  - may need sons for family training: for stairs, car transfer, and brace use - son will be coming Saturday for family training.         "

## 2022-07-27 ENCOUNTER — APPOINTMENT (OUTPATIENT)
Dept: OCCUPATIONAL THERAPY | Facility: CLINIC | Age: 57
DRG: 052 | End: 2022-07-27
Attending: PHYSICAL MEDICINE & REHABILITATION
Payer: MEDICARE

## 2022-07-27 ENCOUNTER — APPOINTMENT (OUTPATIENT)
Dept: PHYSICAL THERAPY | Facility: CLINIC | Age: 57
DRG: 052 | End: 2022-07-27
Attending: PHYSICAL MEDICINE & REHABILITATION
Payer: MEDICARE

## 2022-07-27 LAB
ANION GAP SERPL CALCULATED.3IONS-SCNC: 10 MMOL/L (ref 3–14)
BUN SERPL-MCNC: 29 MG/DL (ref 7–30)
CALCIUM SERPL-MCNC: 9.4 MG/DL (ref 8.5–10.1)
CHLORIDE BLD-SCNC: 95 MMOL/L (ref 94–109)
CO2 SERPL-SCNC: 26 MMOL/L (ref 20–32)
CREAT SERPL-MCNC: 8.18 MG/DL (ref 0.66–1.25)
ERYTHROCYTE [DISTWIDTH] IN BLOOD BY AUTOMATED COUNT: 15.1 % (ref 10–15)
GFR SERPL CREATININE-BSD FRML MDRD: 7 ML/MIN/1.73M2
GLUCOSE BLD-MCNC: 107 MG/DL (ref 70–99)
HCT VFR BLD AUTO: 26.9 % (ref 40–53)
HGB BLD-MCNC: 8.5 G/DL (ref 13.3–17.7)
MCH RBC QN AUTO: 30.5 PG (ref 26.5–33)
MCHC RBC AUTO-ENTMCNC: 31.6 G/DL (ref 31.5–36.5)
MCV RBC AUTO: 96 FL (ref 78–100)
PHOSPHATE SERPL-MCNC: 4 MG/DL (ref 2.5–4.5)
PLATELET # BLD AUTO: 287 10E3/UL (ref 150–450)
POTASSIUM BLD-SCNC: 3.4 MMOL/L (ref 3.4–5.3)
RBC # BLD AUTO: 2.79 10E6/UL (ref 4.4–5.9)
SODIUM SERPL-SCNC: 131 MMOL/L (ref 133–144)
WBC # BLD AUTO: 9 10E3/UL (ref 4–11)

## 2022-07-27 PROCEDURE — 90937 HEMODIALYSIS REPEATED EVAL: CPT

## 2022-07-27 PROCEDURE — 250N000013 HC RX MED GY IP 250 OP 250 PS 637: Performed by: PHYSICAL MEDICINE & REHABILITATION

## 2022-07-27 PROCEDURE — 85027 COMPLETE CBC AUTOMATED: CPT | Performed by: PHYSICIAN ASSISTANT

## 2022-07-27 PROCEDURE — 634N000001 HC RX 634: Performed by: PHYSICAL MEDICINE & REHABILITATION

## 2022-07-27 PROCEDURE — 97535 SELF CARE MNGMENT TRAINING: CPT | Mod: GO

## 2022-07-27 PROCEDURE — 80048 BASIC METABOLIC PNL TOTAL CA: CPT | Performed by: PHYSICIAN ASSISTANT

## 2022-07-27 PROCEDURE — 128N000003 HC R&B REHAB

## 2022-07-27 PROCEDURE — 99232 SBSQ HOSP IP/OBS MODERATE 35: CPT | Mod: 24 | Performed by: PHYSICIAN ASSISTANT

## 2022-07-27 PROCEDURE — 250N000013 HC RX MED GY IP 250 OP 250 PS 637: Performed by: PHYSICIAN ASSISTANT

## 2022-07-27 PROCEDURE — 99233 SBSQ HOSP IP/OBS HIGH 50: CPT | Mod: 24 | Performed by: PHYSICIAN ASSISTANT

## 2022-07-27 PROCEDURE — 36415 COLL VENOUS BLD VENIPUNCTURE: CPT | Performed by: PHYSICIAN ASSISTANT

## 2022-07-27 PROCEDURE — 84100 ASSAY OF PHOSPHORUS: CPT | Performed by: PHYSICIAN ASSISTANT

## 2022-07-27 PROCEDURE — 258N000003 HC RX IP 258 OP 636: Performed by: PHYSICAL MEDICINE & REHABILITATION

## 2022-07-27 PROCEDURE — 97530 THERAPEUTIC ACTIVITIES: CPT | Mod: GP

## 2022-07-27 PROCEDURE — 250N000011 HC RX IP 250 OP 636: Performed by: PHYSICAL MEDICINE & REHABILITATION

## 2022-07-27 RX ORDER — HYDROMORPHONE HYDROCHLORIDE 2 MG/1
2 TABLET ORAL 2 TIMES DAILY PRN
Status: DISCONTINUED | OUTPATIENT
Start: 2022-07-27 | End: 2022-07-30 | Stop reason: HOSPADM

## 2022-07-27 RX ORDER — DOXERCALCIFEROL 4 UG/2ML
2 INJECTION INTRAVENOUS
Status: COMPLETED | OUTPATIENT
Start: 2022-07-27 | End: 2022-07-27

## 2022-07-27 RX ORDER — ALBUMIN (HUMAN) 12.5 G/50ML
50 SOLUTION INTRAVENOUS
Status: DISCONTINUED | OUTPATIENT
Start: 2022-07-27 | End: 2022-07-27

## 2022-07-27 RX ADMIN — GABAPENTIN 100 MG: 100 CAPSULE ORAL at 20:59

## 2022-07-27 RX ADMIN — Medication 1.25 MG: at 08:28

## 2022-07-27 RX ADMIN — POLYETHYLENE GLYCOL 3350 17 G: 17 POWDER, FOR SOLUTION ORAL at 08:28

## 2022-07-27 RX ADMIN — FAMOTIDINE 10 MG: 10 TABLET ORAL at 08:28

## 2022-07-27 RX ADMIN — HYDROMORPHONE HYDROCHLORIDE 2 MG: 2 TABLET ORAL at 18:55

## 2022-07-27 RX ADMIN — ALPRAZOLAM 1 MG: 1 TABLET ORAL at 12:00

## 2022-07-27 RX ADMIN — HYDROMORPHONE HYDROCHLORIDE 2 MG: 2 TABLET ORAL at 12:08

## 2022-07-27 RX ADMIN — MIDODRINE HYDROCHLORIDE 10 MG: 5 TABLET ORAL at 08:25

## 2022-07-27 RX ADMIN — MIDODRINE HYDROCHLORIDE 10 MG: 5 TABLET ORAL at 12:00

## 2022-07-27 RX ADMIN — SODIUM CHLORIDE 300 ML: 9 INJECTION, SOLUTION INTRAVENOUS at 13:07

## 2022-07-27 RX ADMIN — DOXERCALCIFEROL 2 MCG: 2 INJECTION, SOLUTION INTRAVENOUS at 14:57

## 2022-07-27 RX ADMIN — Medication 1 CAPSULE: at 21:04

## 2022-07-27 RX ADMIN — CYCLOBENZAPRINE HYDROCHLORIDE 10 MG: 5 TABLET, FILM COATED ORAL at 22:24

## 2022-07-27 RX ADMIN — Medication 1 MG: at 22:24

## 2022-07-27 RX ADMIN — CETIRIZINE HYDROCHLORIDE 10 MG: 5 TABLET ORAL at 08:24

## 2022-07-27 RX ADMIN — ACETAMINOPHEN 650 MG: 325 TABLET, FILM COATED ORAL at 10:16

## 2022-07-27 RX ADMIN — SEVELAMER CARBONATE 1600 MG: 800 TABLET, FILM COATED ORAL at 12:01

## 2022-07-27 RX ADMIN — ATORVASTATIN CALCIUM 20 MG: 20 TABLET, FILM COATED ORAL at 21:04

## 2022-07-27 RX ADMIN — EPOETIN ALFA-EPBX 4000 UNITS: 10000 INJECTION, SOLUTION INTRAVENOUS; SUBCUTANEOUS at 13:07

## 2022-07-27 RX ADMIN — HYDROXYZINE HYDROCHLORIDE 25 MG: 25 TABLET ORAL at 08:27

## 2022-07-27 RX ADMIN — Medication 1 CAPSULE: at 08:25

## 2022-07-27 RX ADMIN — Medication 1 TABLET: at 10:16

## 2022-07-27 RX ADMIN — Medication: at 13:08

## 2022-07-27 RX ADMIN — HYDROXYZINE HYDROCHLORIDE 25 MG: 25 TABLET ORAL at 18:55

## 2022-07-27 RX ADMIN — POLYETHYLENE GLYCOL 3350 17 G: 17 POWDER, FOR SOLUTION ORAL at 21:03

## 2022-07-27 RX ADMIN — GABAPENTIN 100 MG: 100 CAPSULE ORAL at 08:25

## 2022-07-27 RX ADMIN — SEVELAMER CARBONATE 1600 MG: 800 TABLET, FILM COATED ORAL at 08:26

## 2022-07-27 RX ADMIN — FLUTICASONE PROPIONATE 1 SPRAY: 50 SPRAY, METERED NASAL at 08:28

## 2022-07-27 RX ADMIN — SODIUM CHLORIDE 250 ML: 9 INJECTION, SOLUTION INTRAVENOUS at 13:07

## 2022-07-27 RX ADMIN — SERTRALINE HYDROCHLORIDE 100 MG: 100 TABLET ORAL at 08:25

## 2022-07-27 RX ADMIN — CYANOCOBALAMIN TAB 500 MCG 500 MCG: 500 TAB at 08:26

## 2022-07-27 RX ADMIN — ALPRAZOLAM 1 MG: 1 TABLET ORAL at 21:03

## 2022-07-27 RX ADMIN — MIDODRINE HYDROCHLORIDE 10 MG: 5 TABLET ORAL at 18:48

## 2022-07-27 RX ADMIN — ACETAMINOPHEN 650 MG: 325 TABLET, FILM COATED ORAL at 21:00

## 2022-07-27 RX ADMIN — SEVELAMER CARBONATE 1600 MG: 800 TABLET, FILM COATED ORAL at 18:48

## 2022-07-27 RX ADMIN — Medication 100 MG: at 08:26

## 2022-07-27 RX ADMIN — DULOXETINE HYDROCHLORIDE 30 MG: 30 CAPSULE, DELAYED RELEASE ORAL at 08:26

## 2022-07-27 RX ADMIN — AMOXICILLIN 500 MG: 500 CAPSULE ORAL at 18:51

## 2022-07-27 ASSESSMENT — ACTIVITIES OF DAILY LIVING (ADL)
ADLS_ACUITY_SCORE: 46

## 2022-07-27 NOTE — PROGRESS NOTES
Called pt HD center and notified them of pt return. Nothing needed from this writer. HD center given this writer direct number if needs arise. Met with pt and provided update. Also notified pt of [facility] acceptance and provided pt with [facility] flyer. Information also in AVS. Pt expressed appreciation and denied additional needs.     Will need IMM prior to discharge. Will ask covering SWer to provide before discharge home. Health Psych apt scheduled for tomorrow at 3pm, therapy schedulers notified and iPad username and pin set up by this writer.     Outpatient Dialysis:   [facility]   [address]  [place], [place] [address]  PH: [phone]  F: [phone]  MWF chair     Home Health Care:   [facility] PH: [phone]  RN, PT, OT     MOUNA Rosario   [facility]   Direct Phone: [phone]  I   Pager: [phone]  I  Fax: [phone]

## 2022-07-27 NOTE — PROGRESS NOTES
"  [facility]   Acute Rehabilitation Unit  Daily progress note    INTERVAL HISTORY  Patient was seen and examined at bedside this morning.  No acute events reported overnight.  Today, he reports that he is feeling well overall.  States back pain is \"medium\", typically alleviated with Tylenol.  He reports diarrhea is improved.  Had partially loose stool this morning; still have 2-3 BMs per day.  Continues to deny abdominal pain or nausea.  He would like to continue with daily Miralax and PRN suppository/enema while at ARU.  He feels he will be ready for discharge this weekend with his son's assistance.  Family is working on obtaining needed equipment, will have caregiver training prior to discharge.    Functionally, patient is demonstrating improvement with ADLs, more agreeable to use of adaptive equipment.  He needs set-up for seated grooming, SBA for standing grooming, set-up for upper body dressing, CGA to mod A for standing dressing.    MEDICATIONS    sodium chloride 0.9%  250 mL Intravenous Once in dialysis/CRRT     sodium chloride 0.9%  300 mL Hemodialysis Machine Once     amoxicillin  500 mg Oral Daily with supper     atorvastatin  20 mg Oral At Bedtime     bisacodyl  10 mg Rectal QPM     cetirizine  10 mg Oral Daily     cyanocobalamin  500 mcg Oral Daily     doxercalciferol  2 mcg Intravenous Once in dialysis/CRRT     doxercalciferol  4 mcg Intravenous Once in dialysis/CRRT     DULoxetine  30 mg Oral Daily     epoetin mar-epbx (RETACRIT) inj ESRD  4,000 Units Intravenous Once in dialysis/CRRT     famotidine  10 mg Oral Daily     finasteride  1.25 mg Oral Daily     fluticasone  1 spray Both Nostrils Daily     gabapentin  100 mg Oral TID     lactobacillus rhamnosus (GG)  1 capsule Oral BID     midodrine  10 mg Oral TID w/meals     multivitamin RENAL  1 tablet Oral Daily     - MEDICATION INSTRUCTIONS -   Does not apply Once     polyethylene glycol  17 g Oral BID     vitamin " "B6  100 mg Oral Daily     sertraline  100 mg Oral Daily     sevelamer carbonate  1,600 mg Oral TID w/meals        sodium chloride 0.9%, acetaminophen, albumin human, ALPRAZolam, bisacodyl, calcium carbonate, cyclobenzaprine, HYDROmorphone, hydrOXYzine, lidocaine (buffered or not buffered), lidocaine (buffered or not buffered), melatonin, miconazole, midodrine, naloxone **OR** naloxone **OR** naloxone **OR** naloxone, - MEDICATION INSTRUCTIONS -     PHYSICAL EXAM  /68 (BP Location: Right arm, Cuff Size: Adult Regular)   Pulse 77   Temp (!) 96.3  F (35.7  C)   Resp 18   Ht 1.854 m (6' 1\")   Wt 134.2 kg (295 lb 14.4 oz)   SpO2 99%   BMI 39.04 kg/m     Gen: NAD, sitting up in chair  HEENT: NC/AT,   Cardio: appears well-perfused  Pulm: non-labored on room air  Abd: soft, non-tender, mildly distended, bowel sounds present  Ext: chronic venous stasis changes, stable bilateral pedal edema  Neuro/MSK: awake, alert, follows commands, decreased sensation to light touch in bilateral lower extremities to ~knee in stocking distribution.  Lower extremity weakness.    LABS  CBC RESULTS:   Recent Labs   Lab Test 07/25/22  0732 07/22/22  0517 07/21/22  1252   WBC 7.3 6.4 8.9   RBC 2.67* 2.54* 2.57*   HGB 8.2* 7.8* 8.0*   HCT 25.9* 25.8* 25.1*   MCV 97 102* 98   MCH 30.7 30.7 31.1   MCHC 31.7 30.2* 31.9   RDW 15.2* 14.6 14.7    256 279     Last Basic Metabolic Panel:  Recent Labs   Lab Test 07/25/22  0732 07/22/22  0517 07/21/22  1252   * 133 133   POTASSIUM 3.5 3.6 3.7   CHLORIDE 94 97 98   CO2 24 24 25   ANIONGAP 13 12 10   GLC 95 92 101*   BUN 39* 33* 25   CR 9.64* 7.86* 6.75*   GFRESTIMATED 6* 7* 9*   MAC 9.2 9.5 9.4     Lab Results   Component Value Date    AST 17 07/21/2022     Lab Results   Component Value Date    ALT 9 07/21/2022     No results found for: BILICONJ   Lab Results   Component Value Date    BILITOTAL 0.6 07/21/2022     Lab Results   Component Value Date    ALBUMIN 3.0 07/21/2022 "     Lab Results   Component Value Date    PROTTOTAL 6.7 07/21/2022      Lab Results   Component Value Date    ALKPHOS 131 07/21/2022         Rehabilitation - continue comprehensive acute inpatient rehabilitation program with multidisciplinary approach including therapies, rehab nursing, and physiatry following. See interval history for updates.      ASSESSMENT AND PLAN  Shay Jimenez is a 57 year old right hand dominant male with a past medical history of ESRD on HD, GERD, hyperlipidemia, peripheral neuropathy, depression/anxiety, GINI, BPH, obesity who was admitted on 6/24/22 with thoracic compressive myelopathy and concern for discitis/vertebral osteomyelitis now s/p C6-T6 fusion and T2-T3 decompression on 6/27/22 with post-op course complicated by hypotension, pain, anemia, encephalopathy, and constipation.  He is now admitted to ARU on 7/15/22 for multidisciplinary rehabilitation and ongoing medical management.     Admission to acute inpatient rehab 07/15/22.    Impairment group code: Spinal Cord Dysfunction Spinal Non-Traumatic 04.111 Paraplegia, Incomplete        1. PT and OT 90 minutes of each on a daily basis, in addition to rehab nursing and close management of physiatrist.       2. Impairment of ADL's: Noted to have impaired strength, impaired activity tolerance, impaired tone, impaired balance, impaired coordination, impaired judgement and safety awareness, impulsiveness and impaired weight shifting, all affecting his ability to safely and independently perform basic ADLs.  Goal for mod I with basic ADLs with assist of 1 for tub/shower transfers.     3. Impairment of mobility:  Noted to have impaired strength, impaired activity tolerance, impaired tone, impaired balance, impaired coordination, impaired judgement and safety awareness, impulsiveness and impaired weight shifting, all affecting his ability to safely and independently perform basic mobility.  Goal for mod I with basic mobility with assist  "of 1 for stairs.     4. Medical Conditions    Thoracic compressive myelopathy  Concern for T2-T3 discitis/vertebral osteomyelitis  S/p C6-T6 fusion/T2-3 decompression on 6/27/22 with Dr. Boles  Acute on chronic upper back pain  Lower extremity weakness  Recurrent falls  Hx back pain for 6 months. CT chest 2/2022 showed concern for upper thoracic spine diskitis vs osteomyelitis. Unclear follow up from that time.  In weeks prior to admission, MRI showed possible thoracic compression fracture. Developed progressively weak legs with frequent falls.  On admission imaging with T2-T3 diskitis, osteomyelitis and compressive myelopathy.  Neurosurgery and ID consulted while inpatient.  Late growth of P acne in 2 intra-op cultures.  Per ID, \"likely contaminant given no previous hardware, but since new hardware is in place will recommend starting prophylactic amoxicillin.\"  Started on 7/12.  1/2 positive blood cultures from 7/5 for staph epi, per ID \"signifies contamination and does not require treatment\".  Repeat cultures negative.  - Continue amoxicillin 500 mg daily for total of 4 weeks  - Pain management: Tylenol PRN, flexeril PRN, Dilaudid 2 mg q6h PRN.  Continue to wean opioids as able, with plans to discontinue prior to discharge.  - Wound care: keep clean and dry, ok to shower no soaking  - Activity: no lifting greater than 5-10 pounds, no excessive bending, lifting, or twisting  - Brace when out of bed  - Continue PT/OT  - Follow up with neurosurgery, currently scheduled on 8/3 and 9/21     End-stage renal disease, on hemodialysis  Hypotension  Hyponatremia  Hyperphosphatemia  Secondary hyperparathyroidism  PTA HD M/W/F.  LUE fistula.  Nephrology following this admission for ongoing dialysis.  Reportedly with chronic hypotension, typically SBP in 70-90s and asymptomatic most of the time.  While IP, mild dizziness with positional changes but tolerating PT.   Patient reports PTA using midodrine 1 hour prior to HD and " repeated 1 hour into HD run.  Started on TID dosing this admission.  - Nephrology consulted, appreciate ongoing assistance.  - Continue HD per nephrology, M/W/F schedule while at ARU  - Monitor BP.  Continue midodrine 10 mg TID.  Per [facility] team, nephrology recommended to consider 20 mg TID if symptomatic hypotension limiting therapies. This was deferred by inpatient team.  Could discuss with [facility] nephrology to consider at ARU if indicated.  - Continue bilateral lower extremity Jobst stockings  - Continue PTA sevelamer (recently increased 800 mg to 1600 mg).  Per nephrology, checked PTH 7/19, which is elevated 75.  Management per nephrology - hectorol with HD, dose reduced.  - Resumed fluid restriction, 1.2 L daily, on 7/19 per nephrology recs given ongoing hyponatremia.  - Trend labs MWF.  7/27: Cr/BUN consistent with CKD.  Na mildly low at 131.  K WNL.  Phos WNL 4.0.     Acute blood loss on anemia of chronic kidney disease  Pre-op Hgb stable in 10s.  Hgb currently stable in 7s-8s.  - Epo with HD per nephrology  - Trend CBC.  Hgb stable at 8.5 today (7/27)  - Avoid transfusion if possible given listed for transplant ([place]).  Per nephrology, if transfused, would start low dose immunosuppression (MMF generally).     Encephalopathy  Felt to be due to buildup of gabapentin metabolites due to PTA dose (300 mg TID) higher than typical maximum dose recommended even for patients on CRRT. Gabapentin and Zoloft held.  With improved mental status, resumed, gabapentin at lower dose.  No ongoing concerns at ARU.     GERD  PTA on omeprazole.  On protonix as formulary autosub while inpatient.  Patient reports this has not been as helpful, unable to obtain home supply for use here.  On 7/21, complaining of increased reflux symptoms, added Pepcid.  - Continue Pepcid BID  - Continue Tums PRN  - Resume PTA omeprazole at discharge to home     Hyperlipidemia  - Chronic and stable, on statin.     Peripheral neuropathy  Follows with  [facility].    - Continue PT duloxetine 30 mg daily  - Continue gabapentin 100 mg TID, reduced from PTA dose as above due to concern for contributing to confusion in setting of renal impairment     Depression/anxiety  At ARU admission, reporting increased stressors at home, marital issues, financial.  - Continue PTA Zoloft 100 mg daily, Xanax 1 mg BID PRN  - Continue atarax 25 mg PRN for anxiety and pain  - [facility] psychology consulted, appreciate assistance  - Monitor mood     Obesity, class III  Obstructive sleep apnea  BMI this stay is ~41.  No PTA CPAP use, reportedly does not tolerate.  - Intermittently using oxygen by NC with sleep.  Plan for overnight oximetry Friday (needs to be within 48 hours discharge to qualify for home oxygen).  - Recommend follow-up with sleep medicine after discharge  - Encourage diet, lifestyle modifications once above issues addressed     History of BPH  - Continue PTA finasteride.  Given anuric and hypotension, could consider discontinuing?     Alcohol use disorder  Reports drinking \"too much\" lately, 6-8 cocktails per day PTA.  - Most recent LFTs on 7/16 WNL  - Encourage cessation, offer CD resources if interested     Allergic rhinitis  - Continue PTA Flonase and Zyrtec    Loose stool, bowel incontinence - improving  Onset 7/18 morning per patient report.  On sertraline, recently resumed after brief hold of PTA med.  No recent stool softener or laxative use.  LFTs on 7/16 WNL.  Afebrile, WBC WNL.  Abdomen soft, non-tender.  7/21 added metamucil and probiotic.  AXR on 7/23 with moderate colonic stool burden.  Started on daily suppository, given 7/24 with minimal results.  Did have good results with enema on 7/25.  Repeated enema 7/26 per patient request with small results.  - Continue probiotics, metamucil  - Improving per patient report. Still having 2-3 BM per day, more formed.  Patient would like to continue Miralax daily, suppository/enema PRN.  Continue to " monitor.  - Encourage fluid up to limit    1. Adjustment to disability:  Clinical psychology to eval and treat if indicated  2. FEN: regular diet, thin liquids, 1.2L fluid restriction per nephrology  3. Bowel: continent/incontinent, frequent loose stools recently as above   4. Bladder: anuric  5. DVT Prophylaxis: mechanical  6. GI Prophylaxis: PPI  7. Code: full  8. Disposition: goal for home  9. ELOS:  14 days  10. Follow up Appointments on Discharge: PCP, neurosurgery follow-up 8/3 and 9/21, nephrology for ongoing dialysis      Patient discussed with Dr. Antonio Loo, PM&R staff physician     CELSO Gibbs-C  Physical Medicine & Rehabilitation

## 2022-07-27 NOTE — PLAN OF CARE
Goal Outcome Evaluation:        Patient a/o x4. VSS. 1200 fluid restriction, had 480mL during shift. Fistula in LUE. HD on MWF. Surgical incision approximated. Mepilex on bilat knee for opened scabs. Prn dilaudid, atarax, and xanax given at bedtime for back pain, reported relief. Continent of bowel and bladder, LBM: 7/26. Pt refused suppository this evening but took the scheduled miralax. Prn tylenol given at bedtime for headache. Will continue with POC.

## 2022-07-27 NOTE — PROGRESS NOTES
HEMODIALYSIS TREATMENT NOTE    Date: 7/27/2022  Time: 3:42 PM    Data:  Pre Wt: 134.2kg    Desired Wt130.2kg:   kg   Post Wt: 130.2kg   Weight change:  4 kg  Ultrafiltration - Post Run Net Total Removed (mL): 4000 mL  Vascular Access Status: patent  Dialyzer Rinse: Clear  Total Blood Volume Processed: 89.97 L Liters  Total Dialysis (Treatment) Time: 4 Hours    Lab:   no    Interventions:Assessments  Pt dialyzed today for 4hrs on a K3 Ca2.25 bath via LAVF. 450Qb throughout. 4kgs of fluid removed per pt request. VSS throughout. See MAR for meds given on run. Hemostasis achieved in 5 minutes. Report to ARU RN post run.      Plan:    Per renal

## 2022-07-27 NOTE — PLAN OF CARE
FOCUS/GOAL  Bowel management, Bladder management, Pain management and Cognition/Memory/Judgment/Problem solving    ASSESSMENT, INTERVENTIONS AND CONTINUING PLAN FOR GOAL:  Pt is alert and oriented x4, denies fever, chills, CP, SOB, N/V, abdominal pain, or new weakness/numbness/tingling, not voiding due to HD, fistula in LUE, Mepilex on B/L knees. Transferring with assist of 1 and ww, sleeping throughout the night, on 2L O2 which is frequently being removed during sleep and replaced. Apneic breathing is persistent.vs stable, no further care concerns at this time continue with POC.         Goal Outcome Evaluation: No change

## 2022-07-27 NOTE — PLAN OF CARE
FOCUS/GOAL  Bowel management, Bladder management, Medication management, Pain management, Medical management, Mobility, Skin integrity, and Safety management    ASSESSMENT, INTERVENTIONS AND CONTINUING PLAN FOR GOAL:    Pt is alert and oriented x 4. Vital signs stable. Generalized pain managed well with prn pain medications. Denied nausea and vomiting, numbness or tingling, shortness of breath. Alarms on, side rails up x 3 for safety. Call light within reach, able to make needs known. Pt is continent of bowel, last bm today, on hemodialysis. Spinal incision site WDL. Mepilex on bilateral knee. Brace on when out of bed,  Reg diet, thin liquids. Pt left for dialysis this afternoon.
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
## 2022-07-27 NOTE — PLAN OF CARE
"Discharge Planner Post-Acute Rehab PT:      Discharge Plan: Apartment, stairs to upper level or lower level, 6, rail on one side. Home care PT,  2 sons to assist. New discharge of 7/31/22.     Precautions: falls, spinal, brace when out of bed (CTLO), poor sensation B feet.     Current Status:  Bed Mobility: sit EOB cga, rail. Needs A for BLes into bed.   Transfer: stand pivot with WW, SBA/CGA   Gait: 186ft with FWW, SBA/CGA with w/c follow d/t LE buckling w/fatigue  Stairs: 4 - 6\" stairs, B HR, CGA  Balance: Poor standing balance d/t absent sensation in B feet.      Assessment: focused on progression of gait distance, functional transfers and stairs. Pt displayed good progress with gait distance, stairs today with no knee buckling. Some BLE unsteadiness w/fatigue. Continue to progress BLE strengthening within precautions for increased ease with functional activities.      Other Barriers to Discharge (DME, Family Training, etc):   - stairs  - history of recurrent falls  - may need sons for family training: for stairs, car transfer, and brace use - son will be coming Saturday, 7/30/33 for family training with discharge on Sunday, 7/31/22.        "

## 2022-07-27 NOTE — PROGRESS NOTES
Nephrology Progress Note  07/27/2022         Assessment & Recommendations:   Shay Jimenez is a 57 year old right hand dominant male with a past medical history of ESRD on HD, GERD, hyperlipidemia, peripheral neuropathy, depression/anxiety, GINI, BPH, obesity who was admitted on 6/24/22 with thoracic compressive myelopathy and concern for discitis/vertebral osteomyelitis now s/p C6-T6 fusion and T2-T3 decompression on 6/27/22 with post-op course complicated by hypotension, pain, anemia, encephalopathy, and constipation.  He is now admitted to ARU on 7/15/22 for multidisciplinary rehabilitation and ongoing medical management.     #ESRD  - dialyzes MWF at [facility], primary nephrologist Dr. Cline  - access: left AVF, run time 4 hours and 15 minutes  - .6 kg  - verbal consent obtained to continue HD while in ARU.     #BP/Volume hypervolemia is much improved, though still significant LE edema  - BP historically low, getting midodrine 10 mg TID, ordered another dose 1 hr into HD per pt's usual routine  - per OP HD unit, has history of large 4-6 kg interdialytic wt gains   - fluid restriction 1200 ml per day started 7/19  - Will need to lower EDW       #Anemia hgb 8's  - getting epo 4000 units with HD while inpatient  - Avoid transfusions if possible given pt is listed for transplant (at [facility]).  - If transfused, would start low dose immunosuppression (MMF generally)      #Bone/[place] Ca 9.4, alb 3.0, phos 4.0, PTH 75  - stop hectorol for now  - sevelamer 1600 mg TID with meals       Recommendations were communicated to primary team via this note    Jayde Pete PA-C  P [phone]     Interval History :   Seen on dialysis, stable run so far with 3-4 kg UF goal. Gets midodrine 1 hr into the run. LE edema is much improved though still significant. Denies n/v, CP, SOB, chills    Review of Systems:   A 4 point review of systems was negative except as noted above.      Physical Exam:   Vitals: /64   Pulse 72  Resp 18  SpO2 95%  Wt 142.2 kg  GENERAL APPEARANCE: NAD  HENT: mouth without ulcers or lesions  PULM: lungs clear to auscultation, equal air movement   CV: regular rhythm, normal rate      -edema 2+ LE   GI: soft, nontender  NEURO: mentation intact and speech normal, no asterixis   Access LUE AVF     Labs:   All labs reviewed by me  Electrolytes/Renal -   Recent Labs   Lab Test 07/27/22  1057 07/25/22  0732 07/22/22  0517 06/28/22  0809 06/28/22  0420   * 131* 133   < > 135   POTASSIUM 3.4 3.5 3.6   < > 4.7   CHLORIDE 95 94 97   < > 98   CO2 26 24 24   < > 26   BUN 29 39* 33*   < > 49*   CR 8.18* 9.64* 7.86*   < > 6.86*   * 95 92   < > 153*   MAC 9.4 9.2 9.5   < > 8.7   MAG  --   --   --   --  2.1   PHOS 4.0 5.5* 5.8*   < > 5.8*    < > = values in this interval not displayed.       CBC -   Recent Labs   Lab Test 07/27/22  1057 07/25/22  0732 07/22/22  0517   WBC 9.0 7.3 6.4   HGB 8.5* 8.2* 7.8*    226 256       LFTs -   Recent Labs   Lab Test 07/21/22  1252 07/16/22  0557 07/11/22  0722 06/29/22  0611 06/24/22  0818   ALKPHOS 131 134  --   --  152*   BILITOTAL 0.6 0.5  --   --  0.6   ALT 9 <6  --   --  55   AST 17 13  --   --  51*   PROTTOTAL 6.7* 6.5*  --   --  7.3   ALBUMIN 3.0* 2.8* 3.2*   < > 3.3*    < > = values in this interval not displayed.       Iron Panel - No lab results found.      Imaging: Reviewed     Current Medications:    sodium chloride 0.9%  250 mL Intravenous Once in dialysis/CRRT     sodium chloride 0.9%  300 mL Hemodialysis Machine Once     amoxicillin  500 mg Oral Daily with supper     atorvastatin  20 mg Oral At Bedtime     cetirizine  10 mg Oral Daily     cyanocobalamin  500 mcg Oral Daily     doxercalciferol  2 mcg Intravenous Once in dialysis/CRRT     DULoxetine  30 mg Oral Daily     epoetin mar-epbx (RETACRIT) inj ESRD  4,000 Units Intravenous Once in dialysis/CRRT     famotidine  10 mg Oral Daily     finasteride  1.25 mg Oral Daily     fluticasone  1 spray  Both Nostrils Daily     gabapentin  100 mg Oral TID     lactobacillus rhamnosus (GG)  1 capsule Oral BID     midodrine  10 mg Oral TID w/meals     multivitamin RENAL  1 tablet Oral Daily     - MEDICATION INSTRUCTIONS -   Does not apply Once     polyethylene glycol  17 g Oral BID     vitamin B6  100 mg Oral Daily     sertraline  100 mg Oral Daily     sevelamer carbonate  1,600 mg Oral TID w/meals       heparin (porcine) 500 Units/hr (07/25/22 1343)     - MEDICATION INSTRUCTIONS -       Jayde Pete PA

## 2022-07-28 ENCOUNTER — APPOINTMENT (OUTPATIENT)
Dept: PHYSICAL THERAPY | Facility: CLINIC | Age: 57
DRG: 052 | End: 2022-07-28
Attending: PHYSICAL MEDICINE & REHABILITATION
Payer: MEDICARE

## 2022-07-28 ENCOUNTER — APPOINTMENT (OUTPATIENT)
Dept: OCCUPATIONAL THERAPY | Facility: CLINIC | Age: 57
DRG: 052 | End: 2022-07-28
Attending: PHYSICAL MEDICINE & REHABILITATION
Payer: MEDICARE

## 2022-07-28 PROCEDURE — 99233 SBSQ HOSP IP/OBS HIGH 50: CPT | Mod: 24 | Performed by: PHYSICAL MEDICINE & REHABILITATION

## 2022-07-28 PROCEDURE — 97110 THERAPEUTIC EXERCISES: CPT | Mod: GP

## 2022-07-28 PROCEDURE — 97530 THERAPEUTIC ACTIVITIES: CPT | Mod: GP

## 2022-07-28 PROCEDURE — 250N000013 HC RX MED GY IP 250 OP 250 PS 637: Performed by: PHYSICIAN ASSISTANT

## 2022-07-28 PROCEDURE — 97530 THERAPEUTIC ACTIVITIES: CPT | Mod: GO

## 2022-07-28 PROCEDURE — 999N000125 HC STATISTIC PATIENT MED CONFERENCE < 30 MIN

## 2022-07-28 PROCEDURE — 999N000150 HC STATISTIC PT MED CONFERENCE < 30 MIN: Performed by: PHYSICAL THERAPIST

## 2022-07-28 PROCEDURE — 97116 GAIT TRAINING THERAPY: CPT | Mod: GP

## 2022-07-28 PROCEDURE — 128N000003 HC R&B REHAB

## 2022-07-28 PROCEDURE — 97535 SELF CARE MNGMENT TRAINING: CPT | Mod: GO

## 2022-07-28 RX ADMIN — MIDODRINE HYDROCHLORIDE 10 MG: 5 TABLET ORAL at 09:03

## 2022-07-28 RX ADMIN — ALPRAZOLAM 1 MG: 1 TABLET ORAL at 16:47

## 2022-07-28 RX ADMIN — SEVELAMER CARBONATE 1600 MG: 800 TABLET, FILM COATED ORAL at 12:04

## 2022-07-28 RX ADMIN — SEVELAMER CARBONATE 1600 MG: 800 TABLET, FILM COATED ORAL at 09:03

## 2022-07-28 RX ADMIN — Medication 1 TABLET: at 09:04

## 2022-07-28 RX ADMIN — GABAPENTIN 100 MG: 100 CAPSULE ORAL at 09:04

## 2022-07-28 RX ADMIN — AMOXICILLIN 500 MG: 500 CAPSULE ORAL at 18:40

## 2022-07-28 RX ADMIN — ATORVASTATIN CALCIUM 20 MG: 20 TABLET, FILM COATED ORAL at 21:50

## 2022-07-28 RX ADMIN — CYCLOBENZAPRINE HYDROCHLORIDE 10 MG: 5 TABLET, FILM COATED ORAL at 20:10

## 2022-07-28 RX ADMIN — DULOXETINE HYDROCHLORIDE 30 MG: 30 CAPSULE, DELAYED RELEASE ORAL at 09:04

## 2022-07-28 RX ADMIN — MICONAZOLE NITRATE: 2 POWDER TOPICAL at 21:50

## 2022-07-28 RX ADMIN — FLUTICASONE PROPIONATE 1 SPRAY: 50 SPRAY, METERED NASAL at 09:15

## 2022-07-28 RX ADMIN — HYDROXYZINE HYDROCHLORIDE 25 MG: 25 TABLET ORAL at 20:11

## 2022-07-28 RX ADMIN — HYDROMORPHONE HYDROCHLORIDE 2 MG: 2 TABLET ORAL at 22:47

## 2022-07-28 RX ADMIN — Medication 1.25 MG: at 09:16

## 2022-07-28 RX ADMIN — FAMOTIDINE 10 MG: 10 TABLET ORAL at 09:03

## 2022-07-28 RX ADMIN — GABAPENTIN 100 MG: 100 CAPSULE ORAL at 13:53

## 2022-07-28 RX ADMIN — ACETAMINOPHEN 650 MG: 325 TABLET, FILM COATED ORAL at 09:02

## 2022-07-28 RX ADMIN — Medication 1 CAPSULE: at 09:03

## 2022-07-28 RX ADMIN — ALPRAZOLAM 1 MG: 1 TABLET ORAL at 22:47

## 2022-07-28 RX ADMIN — MIDODRINE HYDROCHLORIDE 10 MG: 5 TABLET ORAL at 12:04

## 2022-07-28 RX ADMIN — CYANOCOBALAMIN TAB 500 MCG 500 MCG: 500 TAB at 09:04

## 2022-07-28 RX ADMIN — HYDROXYZINE HYDROCHLORIDE 25 MG: 25 TABLET ORAL at 09:04

## 2022-07-28 RX ADMIN — SERTRALINE HYDROCHLORIDE 100 MG: 100 TABLET ORAL at 09:04

## 2022-07-28 RX ADMIN — Medication 100 MG: at 09:03

## 2022-07-28 RX ADMIN — Medication 1 CAPSULE: at 21:50

## 2022-07-28 RX ADMIN — CETIRIZINE HYDROCHLORIDE 10 MG: 5 TABLET ORAL at 09:03

## 2022-07-28 RX ADMIN — GABAPENTIN 100 MG: 100 CAPSULE ORAL at 21:49

## 2022-07-28 ASSESSMENT — ACTIVITIES OF DAILY LIVING (ADL)
ADLS_ACUITY_SCORE: 46

## 2022-07-28 NOTE — PLAN OF CARE
Post Rounds Family Phone Call  Staff involved: Elvia Her   Length of call: 5 mins  Family involved: Cory, son  Projected discharge date: 7/31/22  Projected discharge location: Home w/ home OT/PT and possibly home health aide/bath aide  Equipment needs: FWW to be issued by PT, gait belt, extended tub bench, commode, total hip kit  Other questions and misc:   Confirmation from son that he received equipment list and reports that equipment has been ordered. Pt declined total hip kit for LB ADLs and son reports he can assist w/ ADLs prn. Training will be provided by Saida BRUSH/Sue for Saturday. Discharge on track for leave on Sunday.

## 2022-07-28 NOTE — PROGRESS NOTES
"  [facility]   Acute Rehabilitation Unit  Daily progress note    INTERVAL HISTORY  Patient was seen and examined at bedside this morning during team rounds.  No acute events reported overnight.  Today, he reports ***    Functionally, ***.  For full functional updates, see team rounds note from today.    MEDICATIONS    amoxicillin  500 mg Oral Daily with supper     atorvastatin  20 mg Oral At Bedtime     cetirizine  10 mg Oral Daily     cyanocobalamin  500 mcg Oral Daily     DULoxetine  30 mg Oral Daily     famotidine  10 mg Oral Daily     finasteride  1.25 mg Oral Daily     fluticasone  1 spray Both Nostrils Daily     gabapentin  100 mg Oral TID     lactobacillus rhamnosus (GG)  1 capsule Oral BID     midodrine  10 mg Oral TID w/meals     multivitamin RENAL  1 tablet Oral Daily     polyethylene glycol  17 g Oral BID     vitamin B6  100 mg Oral Daily     sertraline  100 mg Oral Daily     sevelamer carbonate  1,600 mg Oral TID w/meals        acetaminophen, ALPRAZolam, bisacodyl, calcium carbonate, cyclobenzaprine, docusate sodium, HYDROmorphone, hydrOXYzine, melatonin, miconazole, midodrine, naloxone **OR** naloxone **OR** naloxone **OR** naloxone     PHYSICAL EXAM  /77   Pulse 76   Temp 98  F (36.7  C) (Axillary)   Resp 12   Ht 1.854 m (6' 1\")   Wt 130.5 kg (287 lb 9.6 oz)   SpO2 99%   BMI 37.94 kg/m     ***  Gen: NAD, sitting up in chair  HEENT: NC/AT,   Cardio: appears well-perfused  Pulm: non-labored on room air  Abd: soft, non-tender, mildly distended, bowel sounds present  Ext: chronic venous stasis changes, stable bilateral pedal edema  Neuro/MSK: awake, alert, follows commands, decreased sensation to light touch in bilateral lower extremities to ~knee in stocking distribution.  Lower extremity weakness.    LABS  CBC RESULTS:   Recent Labs   Lab Test 07/27/22  1057 07/25/22  0732 07/22/22  0517   WBC 9.0 7.3 6.4   RBC 2.79* 2.67* 2.54*   HGB 8.5* 8.2* 7.8* "   HCT 26.9* 25.9* 25.8*   MCV 96 97 102*   MCH 30.5 30.7 30.7   MCHC 31.6 31.7 30.2*   RDW 15.1* 15.2* 14.6    226 256     Last Basic Metabolic Panel:  Recent Labs   Lab Test 07/27/22  1057 07/25/22  0732 07/22/22  0517   * 131* 133   POTASSIUM 3.4 3.5 3.6   CHLORIDE 95 94 97   CO2 26 24 24   ANIONGAP 10 13 12   * 95 92   BUN 29 39* 33*   CR 8.18* 9.64* 7.86*   GFRESTIMATED 7* 6* 7*   MAC 9.4 9.2 9.5     Lab Results   Component Value Date    AST 17 07/21/2022     Lab Results   Component Value Date    ALT 9 07/21/2022     No results found for: BILICONJ   Lab Results   Component Value Date    BILITOTAL 0.6 07/21/2022     Lab Results   Component Value Date    ALBUMIN 3.0 07/21/2022     Lab Results   Component Value Date    PROTTOTAL 6.7 07/21/2022      Lab Results   Component Value Date    ALKPHOS 131 07/21/2022         Rehabilitation - continue comprehensive acute inpatient rehabilitation program with multidisciplinary approach including therapies, rehab nursing, and physiatry following. See interval history for updates.      ASSESSMENT AND PLAN  Shay Jimenez is a 57 year old right hand dominant male with a past medical history of ESRD on HD, GERD, hyperlipidemia, peripheral neuropathy, depression/anxiety, GINI, BPH, obesity who was admitted on 6/24/22 with thoracic compressive myelopathy and concern for discitis/vertebral osteomyelitis now s/p C6-T6 fusion and T2-T3 decompression on 6/27/22 with post-op course complicated by hypotension, pain, anemia, encephalopathy, and constipation.  He is now admitted to ARU on 7/15/22 for multidisciplinary rehabilitation and ongoing medical management.     Admission to acute inpatient rehab 07/15/22.    Impairment group code: Spinal Cord Dysfunction Spinal Non-Traumatic 04.111 Paraplegia, Incomplete        1. PT and OT 90 minutes of each on a daily basis, in addition to rehab nursing and close management of physiatrist.       2. Impairment of ADL's: Noted  "to have impaired strength, impaired activity tolerance, impaired tone, impaired balance, impaired coordination, impaired judgement and safety awareness, impulsiveness and impaired weight shifting, all affecting his ability to safely and independently perform basic ADLs.  Goal for mod I with basic ADLs with assist of 1 for tub/shower transfers.     3. Impairment of mobility:  Noted to have impaired strength, impaired activity tolerance, impaired tone, impaired balance, impaired coordination, impaired judgement and safety awareness, impulsiveness and impaired weight shifting, all affecting his ability to safely and independently perform basic mobility.  Goal for mod I with basic mobility with assist of 1 for stairs.     4. Medical Conditions    Thoracic compressive myelopathy  Concern for T2-T3 discitis/vertebral osteomyelitis  S/p C6-T6 fusion/T2-3 decompression on 6/27/22 with Dr. Elvi Dela Cruz on chronic upper back pain  Lower extremity weakness  Recurrent falls  Hx back pain for 6 months. CT chest 2/2022 showed concern for upper thoracic spine diskitis vs osteomyelitis. Unclear follow up from that time.  In weeks prior to admission, MRI showed possible thoracic compression fracture. Developed progressively weak legs with frequent falls.  On admission imaging with T2-T3 diskitis, osteomyelitis and compressive myelopathy.  Neurosurgery and ID consulted while inpatient.  Late growth of P acne in 2 intra-op cultures.  Per ID, \"likely contaminant given no previous hardware, but since new hardware is in place will recommend starting prophylactic amoxicillin.\"  Started on 7/12.  1/2 positive blood cultures from 7/5 for staph epi, per ID \"signifies contamination and does not require treatment\".  Repeat cultures negative.  - Continue amoxicillin 500 mg daily for total of 4 weeks  - Pain management: Tylenol PRN, flexeril PRN, Dilaudid decreased to 2 mg BID PRN on 7/27.  Continue to wean opioids as able, with plans to " discontinue prior to discharge.  - Wound care: keep clean and dry, ok to shower no soaking  - Activity: no lifting greater than 5-10 pounds, no excessive bending, lifting, or twisting  - Brace when out of bed  - Continue PT/OT  - Follow up with neurosurgery, currently scheduled on 8/3 and 9/21     End-stage renal disease, on hemodialysis  Hypotension  Hyponatremia  Hyperphosphatemia  Secondary hyperparathyroidism  PTA HD M/W/F.  LUE fistula.  Nephrology following this admission for ongoing dialysis.  Reportedly with chronic hypotension, typically SBP in 70-90s and asymptomatic most of the time.  While IP, mild dizziness with positional changes but tolerating PT.   Patient reports PTA using midodrine 1 hour prior to HD and repeated 1 hour into HD run.  Started on TID dosing this admission.  - Nephrology consulted, appreciate ongoing assistance.  - Continue HD per nephrology, M/W/F schedule while at ARU  - BP labile but orthostasis has generally been asymptomatic and not limiting therapies.  Continue midodrine 10 mg TID.   - Continue bilateral lower extremity Jobst stockings  - Continue PTA sevelamer (recently increased 800 mg to 1600 mg).  Per nephrology, checked PTH 7/19, which is elevated 75.  Phos 4.0 on 7/27.  Management per nephrology - hectorol stopped 7/27.  - Resumed fluid restriction, 1.2 L daily, on 7/19 per nephrology recs given ongoing hyponatremia.  - Trend labs MWF.  7/27: Cr/BUN consistent with CKD.  Na mildly low at 131.  K WNL.  Phos WNL 4.0.     Acute blood loss on anemia of chronic kidney disease  Pre-op Hgb stable in 10s.  Hgb currently stable in 7s-8s.  - Epo with HD per nephrology  - Trend CBC.  Hgb stable at 8.5 on 7/27  - Avoid transfusion if possible given listed for transplant ([place]).  Per nephrology, if transfused, would start low dose immunosuppression (MMF generally).     Encephalopathy  Felt to be due to buildup of gabapentin metabolites due to PTA dose (300 mg TID) higher than typical  "maximum dose recommended even for patients on CRRT. Gabapentin and Zoloft held.  With improved mental status, resumed, gabapentin at lower dose.  No ongoing concerns at ARU.     GERD  PTA on omeprazole.  On protonix as formulary autosub while inpatient.  Patient reports this has not been as helpful, unable to obtain home supply for use here.  On 7/21, complaining of increased reflux symptoms, added Pepcid.  - Continue Pepcid BID  - Continue Tums PRN  - Resume PTA omeprazole at discharge to home     Hyperlipidemia  - Chronic and stable, on statin.     Peripheral neuropathy  Follows with [facility].    - Continue PT duloxetine 30 mg daily  - Continue gabapentin 100 mg TID, reduced from PTA dose as above due to concern for contributing to confusion in setting of renal impairment     Depression/anxiety  At ARU admission, reporting increased stressors at home, marital issues, financial.  - Continue PTA Zoloft 100 mg daily, Xanax 1 mg BID PRN  - Continue atarax 25 mg PRN for anxiety and pain  - [facility] appreciate assistance  - Monitor mood     Obesity, class III  Obstructive sleep apnea  BMI this stay is ~41.  No PTA CPAP use, reportedly does not tolerate.  - Intermittently using oxygen by NC with sleep.  Plan for overnight oximetry Friday (needs to be within 48 hours discharge to qualify for home oxygen).  - Recommend follow-up with sleep medicine after discharge  - Encourage diet, lifestyle modifications once above issues addressed     History of BPH  - Continue PTA finasteride.  Given anuric and hypotension, could consider discontinuing?     Alcohol use disorder  Reports drinking \"too much\" lately, 6-8 cocktails per day PTA.  - Most recent LFTs on 7/16 WNL  - Encourage cessation, offer CD resources if interested     Allergic rhinitis  - Continue PTA Flonase and Zyrtec    Loose stool, bowel incontinence - improving  Onset 7/18 morning per patient report.  On sertraline, recently resumed " after brief hold of PTA med.  No recent stool softener or laxative use.  LFTs on 7/16 WNL.  Afebrile, WBC WNL.  Abdomen soft, non-tender.  7/21 added metamucil and probiotic.  AXR on 7/23 with moderate colonic stool burden.  Started on daily suppository, given 7/24 with minimal results.  Did have good results with enema on 7/25.  Repeated enema 7/26 per patient request with small results.  - Continue probiotics, metamucil  - ***Improving per patient report. Still having 2-3 BM per day, more formed.  Patient would like to continue Miralax daily, suppository/enema PRN.  Continue to monitor.  - Encourage fluid up to limit    1. Adjustment to disability:  Clinical psychology to eval and treat if indicated  2. FEN: regular diet, thin liquids, 1.2L fluid restriction per nephrology  3. Bowel: continent/incontinent, frequent loose stools recently as above   4. Bladder: anuric  5. DVT Prophylaxis: mechanical  6. GI Prophylaxis: PPI  7. Code: full  8. Disposition: goal for home  9. ELOS:  14 days  10. Follow up Appointments on Discharge: PCP, neurosurgery follow-up 8/3 and 9/21, nephrology for ongoing dialysis      Patient seen and discussed with Dr. Antonio Loo, PM&R staff physician     Dona Cabezas PA-C  Physical Medicine & Rehabilitation

## 2022-07-28 NOTE — PLAN OF CARE
"Goal Outcome Evaluation:    Plan of Care Reviewed With: patient     Overall Patient Progress: improving     Pt A/O X 4. Afebrile. IS encouraged. Denies nausea, shortness of breath, or chest pain.  Vital signs:Temp: 97  F (36.1  C) Temp src: Oral BP: 107/45 Pulse: 87   Resp: 20 SpO2: 94 % , None (Room air) .  Pt refused the writer to assess his skin and said \" I am fine, they check my skin every day\"Pt is able to make needs known and the call light is within the pt's reach. Continue to monitor.     "

## 2022-07-28 NOTE — CARE CONFERENCE
Acute Rehab Care Conference/Team Rounds      Type: Team Rounds    Present: Dr. Antonio Loo, Dona Cabezas PA, Flori Covarrubias PT, Elvia Sky OT, Tomasa KENNYSW, Suyapa Hawkins RD, Samy Moore RN, and patient Shay Jimenez.     Discharge Barriers/Treatment/Education    Rehab Diagnosis: gait instability 2/2 thoracic myelopathy 2/2 spinal surgery     Active Medical Co-morbidities/Prognosis:   Patient Active Problem List   Diagnosis Code     Degenerative arthritis of thoracic spine with cord compression M47.14     Discitis, unspecified spinal region M46.40     Anemia of chronic renal failure, unspecified CKD stage N18.9, D63.1     Abscess in epidural space of spine G06.1   ESRD  Peripheral neuropathy      Safety: Pt is alert and oriented, calling for needs, assist of 1 ww to transfer. Bed alarms on for safety.     Pain: No reports of pain overnight.    Medications, Skin, Tubes/Lines: Pt is taking medications whole with water. Pt would benefit from modified MAP prior to discharge so will start it today. Mepilex on b/l knees, spinal incision SOSA with steri strips in place. Fistula in LUE with thrill in bruit.     Swallowing/Nutrition: reg with thins     Bowel/Bladder: Pt is not producing urine due to HD, LBM 7/27.     Psychosocial: Live with wife and teenage sons (although wife left for [place] and ). Indep PTA but decline recently PTA. Depression noted. ETOH use reported, pt denied concerns to SWer. Support from sons. No financial concerns. HD MWF PTA.     ADLs:    Mobility: (Pt benefit from LE AROM prior to morning mobility for increasing L knee stability). FWW SBA room mobility w/ FWW(LE sensory deficits/ knee instability). Ambulates CGAx1 w/ nsg.    Grooming: Set up seated, SBA w/c behind standing.    Dressing: Set up UBD, A to don brace    . LB sba w/ AE for threading over feet. Variable w/ assistance CGA-mod A standing to adjust over hips unilateral support of walker for modified tech.     Bathing: set  up UB wash sponge bath, LB wash SBA-supervisoin standing at sink for UE support     Toileting: SBA on handicapped toilet/ SBA for clothing management/sitting for cares.Commode overlay but would benefit from higher toilet for home w/ toilet frame for home    IADLs: Previously IND ADL/IADL, working, and driving. Sons can assist at discharge w/ transportation and IADLs.  Vision/Cognition: ACE score 91/100 w/ below 82 indicating impairment. WFL cognition. Pt needs assist for complex problem solving and minimizes impairments.    Pt making progress for home w/ son assist and home OT/bath aide recommended. Spoke with son who has confirmed order of equipment needs and will be in for training on Saturday. Pt can continue to benefit from Home OT to progress endurance, safety w/ tsfers/ADLS and functional mobility.    Mobility: Pt is making progress with his mobility. Pt with poor sensation in B lower legs, weakness and decreased activity tolerance and balance.  Pt needs cga to stand from wc level height, amb with cga for balance, unsteady at times , fww 186 ft, and cga for balance for stairs x 4 (6 inch ) with 2 HR.   Likely home PT initially. Pt will need a wide walker at discharge.  Pt will have his son at home with him at discharge, family training on Saturday 7/30.     Cognition/Language: intact     Community Re-Entry: Pt willl need to continue to work on this goal with therapy.     Transportation: A car transfer will not be a barrier to discharge.     Decision maker: self    Plan of Care and goals reviewed and updated.    Discharge Plan/Recommendations    Fall Precautions: continue    Patient/Family input to goals: yes     Estimated length of stay: 14 days     Overall plan for the patient: reach a level of mod I       Utilization Review and Continued Stay Justification    Medical Necessity Criteria:    For any criteria that is not met, please document reason and plan for discharge, transfer, or modification of plan of  care to address.    Requires intensive rehabilitation program to treat functional deficits?: Yes    Requires 3x per week or greater involvement of rehabilitation physician to oversee rehabilitation program?: Yes    Requires rehabilitation nursing interventions?: Yes    Patient is making functional progress?: Yes    There is a potential for additional functional progress? Yes    Patient is participating in therapy 3 hours per day a minimum of 5 days per week or 15 hours per week in 7 day period?:Yes    Has discharge needs that require coordinated discharge planning approach?:Yes      Barriers/Concerns related to meeting medical necessity criteria:  none    Team Plan to Address Concern:  As needed       Final Physician Sign off    Statement of Approval:  Antonio Loo, DO      Patient Goals  Social Work Goals: Good family support. Home Sun 07/31/2022 with [facility] and resumed HD. Overnight O2 test pending.     OT Predicted Duration/Target Date for Goal Attainment: 08/02/22  Therapy Frequency (OT): Daily  OT: Hygiene/Grooming: independent  OT: Upper Body Dressing: Modified independent  OT: Lower Body Dressing: Modified independent  OT: Upper Body Bathing: Supervision/stand-by assist  OT: Lower Body Bathing: Supervision/stand-by assist  OT: Bed Mobility: Modified independent  OT: Transfer: Modified independent  OT: Toilet Transfer/Toileting: Modified independent  OT: Meal Preparation: Supervision/stand-by assist, ambulatory level, with simple meal preparation  OT: Home Management: Supervision/stand-by assist, ambulatory level, with light demand household tasks  OT: Cognitive: Patient/caregiver will verbalize understanding of cognitive assessment results/recommendations as needed for safe discharge planning   OT: Goal 1: The pt will demo SBA shower transfer using DME/AE prn      PT Predicted Duration/Target Date for Goal Attainment: 08/02/22  PT Frequency: Daily  PT: Bed Mobility: Modified independent, Supine  to/from sit, Within precautions, Rolling, Bridging  PT: Transfers: Modified independent, Sit to/from stand, Bed to/from chair, Assistive device, Within precautions (fww)  PT: Gait: Modified independent, Rolling walker, 100 feet  PT: Stairs: 8 stairs, Supervision/stand-by assist, Rail on right, Assistive device (quad cane)     PT: Perform aerobic activity with stable cardiovascular response: intermittent activity, 5 minutes, ambulation  PT: Goal 1: Pt able to perform a car transfer with fww and sba  PT: Goal 2: Pt able to perform a HEP Natalia for LE strengthening for improved function at home.  PT: Goal 3: Pt able to state 3 fall prevention strategies after participating in fall prevention training to reduce fall risk at home.    Nursing goals:     Patient Goal:  Pain Management: Pt will verbalize adequate pain control by participating in daily therapies prior to discharge.  Goal: Skin Integrity: Pt will be free from skin breakdown/pressure injury by independently shifting positions or asking staff to assist with repositioning prior to discharge.  Goal: Safety Management: Pt will demonstarte safety by using call light appropriately and waiting for staff assistance to mobilize prior to discharge.
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
## 2022-07-28 NOTE — PROGRESS NOTES
OT discussed [employer] mobility again with pt. Pt agreeable. SWer and pt completed application and application placed in outgoing mailbox. Information for [employer] mobility and for pt to f/u added to his AVS and pt aware. See earlier note for more information.    MOUNA Rosario   [facility]   Direct Phone: [phone]  I   Pager: [phone]  I  Fax: [phone]

## 2022-07-28 NOTE — PROGRESS NOTES
[facility]   Acute Rehabilitation Unit  Daily progress note    INTERVAL HISTORY  Patient was seen and examined at bedside this morning during team rounds.  No acute events reported overnight.  Today, he reports that he is looking forward to discharge home this weekend, happy with the progress he has made but recognizes he is still recovering.  Bowels have improved, loose/incontinent stools slowed and no longer interfering with therapy.  Has not had any recent symptomatic orthostatic hypotension.  Pain continues to improve and feels he will be able to manage with Tylenol alone at home so encouraged to continue wean opioids at ARU.    Functionally, he is on track for discharge home on Sunday.  Will plan on family training with his sons on Saturday.  Will work on obtaining appropriate equipment for discharge; will need a walker as the one he has at home does not fit him properly.  Discussed no driving at discharge.  Remains fall risk, so provided fall reduction strategies, and recommend assist with transfers, stairs.  Will continue with home therapies.  For full functional updates, see team rounds note from today.    MEDICATIONS    amoxicillin  500 mg Oral Daily with supper     atorvastatin  20 mg Oral At Bedtime     cetirizine  10 mg Oral Daily     cyanocobalamin  500 mcg Oral Daily     DULoxetine  30 mg Oral Daily     famotidine  10 mg Oral Daily     finasteride  1.25 mg Oral Daily     fluticasone  1 spray Both Nostrils Daily     gabapentin  100 mg Oral TID     lactobacillus rhamnosus (GG)  1 capsule Oral BID     midodrine  10 mg Oral TID w/meals     multivitamin RENAL  1 tablet Oral Daily     polyethylene glycol  17 g Oral BID     vitamin B6  100 mg Oral Daily     sertraline  100 mg Oral Daily     sevelamer carbonate  1,600 mg Oral TID w/meals        acetaminophen, ALPRAZolam, bisacodyl, calcium carbonate, cyclobenzaprine, docusate sodium, HYDROmorphone, hydrOXYzine, melatonin,  "miconazole, midodrine, naloxone **OR** naloxone **OR** naloxone **OR** naloxone     PHYSICAL EXAM  /57 (BP Location: Right arm, Patient Position: Supine)   Pulse 80   Temp 97  F (36.1  C) (Oral)   Resp 16   Ht 1.854 m (6' 1\")   Wt 130.5 kg (287 lb 9.6 oz)   SpO2 99%   BMI 37.94 kg/m     Gen: NAD, lying in bed  HEENT: NC/AT  Cardio: appears well-perfused  Pulm: non-labored on room air  Abd: soft, non-distended  Ext: chronic venous stasis changes, stable bilateral pedal edema  Neuro/MSK: awake, alert, follows commands, decreased sensation to light touch in bilateral lower extremities to ~knee in stocking distribution.  Lower extremity weakness.    LABS  CBC RESULTS:   Recent Labs   Lab Test 07/27/22  1057 07/25/22  0732 07/22/22  0517   WBC 9.0 7.3 6.4   RBC 2.79* 2.67* 2.54*   HGB 8.5* 8.2* 7.8*   HCT 26.9* 25.9* 25.8*   MCV 96 97 102*   MCH 30.5 30.7 30.7   MCHC 31.6 31.7 30.2*   RDW 15.1* 15.2* 14.6    226 256     Last Basic Metabolic Panel:  Recent Labs   Lab Test 07/27/22  1057 07/25/22  0732 07/22/22  0517   * 131* 133   POTASSIUM 3.4 3.5 3.6   CHLORIDE 95 94 97   CO2 26 24 24   ANIONGAP 10 13 12   * 95 92   BUN 29 39* 33*   CR 8.18* 9.64* 7.86*   GFRESTIMATED 7* 6* 7*   MAC 9.4 9.2 9.5     Lab Results   Component Value Date    AST 17 07/21/2022     Lab Results   Component Value Date    ALT 9 07/21/2022     No results found for: BILICONJ   Lab Results   Component Value Date    BILITOTAL 0.6 07/21/2022     Lab Results   Component Value Date    ALBUMIN 3.0 07/21/2022     Lab Results   Component Value Date    PROTTOTAL 6.7 07/21/2022      Lab Results   Component Value Date    ALKPHOS 131 07/21/2022         Rehabilitation - continue comprehensive acute inpatient rehabilitation program with multidisciplinary approach including therapies, rehab nursing, and physiatry following. See interval history for updates.      ASSESSMENT AND PLAN  Shay Jimenez is a 57 year old right hand " dominant male with a past medical history of ESRD on HD, GERD, hyperlipidemia, peripheral neuropathy, depression/anxiety, GINI, BPH, obesity who was admitted on 6/24/22 with thoracic compressive myelopathy and concern for discitis/vertebral osteomyelitis now s/p C6-T6 fusion and T2-T3 decompression on 6/27/22 with post-op course complicated by hypotension, pain, anemia, encephalopathy, and constipation.  He is now admitted to ARU on 7/15/22 for multidisciplinary rehabilitation and ongoing medical management.     Admission to acute inpatient rehab 07/15/22.    Impairment group code: Spinal Cord Dysfunction Spinal Non-Traumatic 04.111 Paraplegia, Incomplete        1. PT and OT 90 minutes of each on a daily basis, in addition to rehab nursing and close management of physiatrist.       2. Impairment of ADL's: Noted to have impaired strength, impaired activity tolerance, impaired tone, impaired balance, impaired coordination, impaired judgement and safety awareness, impulsiveness and impaired weight shifting, all affecting his ability to safely and independently perform basic ADLs.  Goal for mod I with basic ADLs with assist of 1 for tub/shower transfers.     3. Impairment of mobility:  Noted to have impaired strength, impaired activity tolerance, impaired tone, impaired balance, impaired coordination, impaired judgement and safety awareness, impulsiveness and impaired weight shifting, all affecting his ability to safely and independently perform basic mobility.  Goal for mod I with basic mobility with assist of 1 for stairs.     4. Medical Conditions    Thoracic compressive myelopathy  Concern for T2-T3 discitis/vertebral osteomyelitis  S/p C6-T6 fusion/T2-3 decompression on 6/27/22 with Dr. Boles  Acute on chronic upper back pain  Lower extremity weakness  Recurrent falls  Hx back pain for 6 months. CT chest 2/2022 showed concern for upper thoracic spine diskitis vs osteomyelitis. Unclear follow up from that time.  In  "weeks prior to admission, MRI showed possible thoracic compression fracture. Developed progressively weak legs with frequent falls.  On admission imaging with T2-T3 diskitis, osteomyelitis and compressive myelopathy.  Neurosurgery and ID consulted while inpatient.  Late growth of P acne in 2 intra-op cultures.  Per ID, \"likely contaminant given no previous hardware, but since new hardware is in place will recommend starting prophylactic amoxicillin.\"  Started on 7/12.  1/2 positive blood cultures from 7/5 for staph epi, per ID \"signifies contamination and does not require treatment\".  Repeat cultures negative.  - Continue amoxicillin 500 mg daily for total of 4 weeks  - Pain management: Tylenol PRN, flexeril PRN, Dilaudid 2 mg q6h PRN.  Continue to wean opioids as able, with plans to discontinue prior to discharge.  - Wound care: keep clean and dry, ok to shower no soaking  - Activity: no lifting greater than 5-10 pounds, no excessive bending, lifting, or twisting  - Brace when out of bed  - Continue PT/OT  - Follow up with neurosurgery, currently scheduled on 8/3 and 9/21     End-stage renal disease, on hemodialysis  Hypotension  Hyponatremia  Hyperphosphatemia  Secondary hyperparathyroidism  PTA HD M/W/F.  LUE fistula.  Nephrology following this admission for ongoing dialysis.  Reportedly with chronic hypotension, typically SBP in 70-90s and asymptomatic most of the time.  While IP, mild dizziness with positional changes but tolerating PT.   Patient reports PTA using midodrine 1 hour prior to HD and repeated 1 hour into HD run.  Started on TID dosing this admission.  - Nephrology consulted, appreciate ongoing assistance.  - Continue HD per nephrology, M/W/F schedule while at ARU  - Monitor BP.  Continue midodrine 10 mg TID.  Per [facility] team, nephrology recommended to consider 20 mg TID if symptomatic hypotension limiting therapies. This was deferred by inpatient team.  Could discuss with [facility] nephrology to " consider at ARU if indicated.  - Continue bilateral lower extremity Jobst stockings  - Continue PTA sevelamer (recently increased 800 mg to 1600 mg).  Per nephrology, checked PTH 7/19, which is elevated 75.  Management per nephrology - hectorol with HD, dose reduced.  - Resumed fluid restriction, 1.2 L daily, on 7/19 per nephrology recs given ongoing hyponatremia.  Will discuss with nephrology to determine whether need to continue 1.2L restriction at home or ok for 1.5L?  - Trend labs MWF.  7/27: Cr/BUN consistent with CKD.  Na mildly low at 131.  K WNL.  Phos WNL 4.0.     Acute blood loss on anemia of chronic kidney disease  Pre-op Hgb stable in 10s.  Hgb currently stable in 7s-8s.  - Epo with HD per nephrology  - Trend CBC.  Hgb stable at 8.5 on 7/27  - Avoid transfusion if possible given listed for transplant ([place]).  Per nephrology, if transfused, would start low dose immunosuppression ([facility]F generally).     Encephalopathy  Felt to be due to buildup of gabapentin metabolites due to PTA dose (300 mg TID) higher than typical maximum dose recommended even for patients on CRRT. Gabapentin and Zoloft held.  With improved mental status, resumed, gabapentin at lower dose.  No ongoing concerns at ARU.     GERD  PTA on omeprazole.  On protonix as formulary autosub while inpatient.  Patient reports this has not been as helpful, unable to obtain home supply for use here.  On 7/21, complaining of increased reflux symptoms, added Pepcid.  - Continue Pepcid BID  - Continue Tums PRN  - Resume PTA omeprazole at discharge to home     Hyperlipidemia  - Chronic and stable, on statin.     Peripheral neuropathy  Follows with [facility].    - Continue PT duloxetine 30 mg daily  - Continue gabapentin 100 mg TID, reduced from PTA dose as above due to concern for contributing to confusion in setting of renal impairment     Depression/anxiety  At ARU admission, reporting increased stressors at home, marital issues,  "financial.  - Continue PTA Zoloft 100 mg daily, Xanax 1 mg BID PRN  - Continue atarax 25 mg PRN for anxiety and pain  - [facility] psychology consulted, appreciate assistance  - Monitor mood     Obesity, class III  Obstructive sleep apnea  BMI this stay is ~41.  No PTA CPAP use, reportedly does not tolerate.  - Intermittently using oxygen by NC with sleep.  Plan for overnight oximetry Friday (needs to be within 48 hours discharge to qualify for home oxygen).  - Recommend follow-up with sleep medicine after discharge  - Encourage diet, lifestyle modifications once above issues addressed     History of BPH  - Continue PTA finasteride.  Given anuric and hypotension, could consider discontinuing?     Alcohol use disorder  Reports drinking \"too much\" lately, 6-8 cocktails per day PTA.  - Most recent LFTs on 7/16 WNL  - Encourage cessation, offer CD resources if interested     Allergic rhinitis  - Continue PTA Flonase and Zyrtec    Loose stool, bowel incontinence - improving  Onset 7/18 morning per patient report.  On sertraline, recently resumed after brief hold of PTA med.  No recent stool softener or laxative use.  LFTs on 7/16 WNL.  Afebrile, WBC WNL.  Abdomen soft, non-tender.  7/21 added metamucil and probiotic.  AXR on 7/23 with moderate colonic stool burden.  Started on daily suppository, given 7/24 with minimal results.  Did have good results with enema on 7/25.  Repeated enema 7/26 per patient request with small results.  - Continue probiotics, metamucil  - Improving per patient report. Continue Miralax daily, suppository/enema PRN.  Continue to monitor.  - Encourage fluid up to limit    1. Adjustment to disability:  Clinical psychology to eval and treat if indicated  2. FEN: regular diet, thin liquids, 1.2L fluid restriction per nephrology  3. Bowel: continent/incontinent, frequent loose stools recently as above   4. Bladder: anuric  5. DVT Prophylaxis: mechanical  6. GI Prophylaxis: PPI  7. Code: " full  8. Disposition: goal for home  9. ELOS:  14 days  10. Follow up Appointments on Discharge: PCP, neurosurgery follow-up 8/3 and 9/21, nephrology for ongoing dialysis      Patient seen and discussed with Dr. Antonio Loo, PM&R staff physician     Dona Cabezas PA-C  Physical Medicine & Rehabilitation

## 2022-07-28 NOTE — PROGRESS NOTES
Team rounds this morning. On track to discharge home Sunday 07/31/2022. [facility] HC set up for RN, PT, and OT. ARU OT asking for HA to be added as well. Email sent with request and [facility] HC able to accommodate. Pt's sons are coming in Sat 07/30/2022 for caregiver training. HD resumed and pt aware. Providers discussing recommendation for no driving. [employer] nivia recommended/offered last week but pt politely declined.     Overnight O2 test needed prior to discharge. Order placed by the ARU provider. Communication sent to Charge RN over the weekend to follow-up and set up. Once overnight O2 test completed, MD order and F2F needed. Charge RN to call [facility] and initiate order/request. Due to only needing overnight O2, getting O2 delivered won't be a barrier to discharge as [facility] (if accepting) would connect directly with the pt and deliver to the pt home.     Home Health Care:   [facility] PH: [phone]   Nurse, physical therapy, occupational therapy, and home health aide      Overnight Oxygen:   [facility] ([facility]  PH: [phone]  Fax: [phone]     Outpatient Dialysis:   [facility]   [address]  [place], [place] [address]  PH: [phone]  F: [phone]  MWF chair     MOUNA Rosario   [facility]   Direct Phone: [phone]  I   Pager: [phone]  I  Fax: [phone]

## 2022-07-28 NOTE — PLAN OF CARE
"Discharge Planner Post-Acute Rehab PT:      Discharge Plan: Apartment, stairs to upper level or lower level, 6, rail on one side. Home care PT,  2 sons to assist. New discharge of 7/31/22.     Precautions: falls, spinal, brace when out of bed (CTLO), poor sensation B feet.      Current Status:  Bed Mobility: sit EOB cga, rail. Needs A for BLes into bed.   Transfer: stand pivot with WW, SBA/CGA   Gait: 186ft with FWW, SBA/CGA with w/c follow d/t LE buckling w/fatigue  Stairs: 4 - 6\" stairs, B HR, CGA  Balance: Poor standing balance d/t absent sensation in B feet.      Assessment: Pt reporting more fatigue today.  Focused on IND with transfers/standing, stairs and short bouts of walking.  Use of nustep for strength.       Other Barriers to Discharge (DME, Family Training, etc):   - stairs  - history of recurrent falls  - may need sons for family training: for stairs, car transfer, and brace use - son will be coming Saturday, 7/30/33 for family training with discharge on Sunday, 7/31/22.       "
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
## 2022-07-28 NOTE — PLAN OF CARE
Goal Outcome Evaluation:    Plan of Care Reviewed With: patient     Overall Patient Progress: improving    Outcome Evaluation: Pt given tylenol and dilaudid x1 for pain with relief. Up in chair, with assist of one. Using call light to make needs known.

## 2022-07-29 ENCOUNTER — APPOINTMENT (OUTPATIENT)
Dept: OCCUPATIONAL THERAPY | Facility: CLINIC | Age: 57
DRG: 052 | End: 2022-07-29
Attending: PHYSICAL MEDICINE & REHABILITATION
Payer: MEDICARE

## 2022-07-29 ENCOUNTER — APPOINTMENT (OUTPATIENT)
Dept: PHYSICAL THERAPY | Facility: CLINIC | Age: 57
DRG: 052 | End: 2022-07-29
Attending: PHYSICAL MEDICINE & REHABILITATION
Payer: MEDICARE

## 2022-07-29 LAB
ANION GAP SERPL CALCULATED.3IONS-SCNC: 10 MMOL/L (ref 3–14)
BUN SERPL-MCNC: 28 MG/DL (ref 7–30)
CALCIUM SERPL-MCNC: 9.2 MG/DL (ref 8.5–10.1)
CHLORIDE BLD-SCNC: 93 MMOL/L (ref 94–109)
CO2 SERPL-SCNC: 26 MMOL/L (ref 20–32)
CREAT SERPL-MCNC: 7.54 MG/DL (ref 0.66–1.25)
ERYTHROCYTE [DISTWIDTH] IN BLOOD BY AUTOMATED COUNT: 15.3 % (ref 10–15)
FERRITIN SERPL-MCNC: 626 NG/ML (ref 26–388)
GFR SERPL CREATININE-BSD FRML MDRD: 8 ML/MIN/1.73M2
GLUCOSE BLD-MCNC: 89 MG/DL (ref 70–99)
HCT VFR BLD AUTO: 27 % (ref 40–53)
HGB BLD-MCNC: 8.4 G/DL (ref 13.3–17.7)
IRON SATN MFR SERPL: 20 % (ref 15–46)
IRON SERPL-MCNC: 44 UG/DL (ref 35–180)
MCH RBC QN AUTO: 30.2 PG (ref 26.5–33)
MCHC RBC AUTO-ENTMCNC: 31.1 G/DL (ref 31.5–36.5)
MCV RBC AUTO: 97 FL (ref 78–100)
PHOSPHATE SERPL-MCNC: 4.4 MG/DL (ref 2.5–4.5)
PLATELET # BLD AUTO: 244 10E3/UL (ref 150–450)
POTASSIUM BLD-SCNC: 3.6 MMOL/L (ref 3.4–5.3)
RBC # BLD AUTO: 2.78 10E6/UL (ref 4.4–5.9)
SODIUM SERPL-SCNC: 129 MMOL/L (ref 133–144)
TIBC SERPL-MCNC: 217 UG/DL (ref 240–430)
WBC # BLD AUTO: 6.2 10E3/UL (ref 4–11)

## 2022-07-29 PROCEDURE — 250N000013 HC RX MED GY IP 250 OP 250 PS 637: Performed by: PHYSICIAN ASSISTANT

## 2022-07-29 PROCEDURE — 85027 COMPLETE CBC AUTOMATED: CPT | Performed by: PHYSICIAN ASSISTANT

## 2022-07-29 PROCEDURE — G0463 HOSPITAL OUTPT CLINIC VISIT: HCPCS

## 2022-07-29 PROCEDURE — 99233 SBSQ HOSP IP/OBS HIGH 50: CPT | Mod: 24 | Performed by: PHYSICIAN ASSISTANT

## 2022-07-29 PROCEDURE — 99232 SBSQ HOSP IP/OBS MODERATE 35: CPT | Mod: 24 | Performed by: PHYSICAL MEDICINE & REHABILITATION

## 2022-07-29 PROCEDURE — 97116 GAIT TRAINING THERAPY: CPT | Mod: GP | Performed by: PHYSICAL THERAPIST

## 2022-07-29 PROCEDURE — 258N000003 HC RX IP 258 OP 636: Performed by: PHYSICAL MEDICINE & REHABILITATION

## 2022-07-29 PROCEDURE — 97535 SELF CARE MNGMENT TRAINING: CPT | Mod: GO

## 2022-07-29 PROCEDURE — 82728 ASSAY OF FERRITIN: CPT | Performed by: PHYSICAL MEDICINE & REHABILITATION

## 2022-07-29 PROCEDURE — 128N000003 HC R&B REHAB

## 2022-07-29 PROCEDURE — 97530 THERAPEUTIC ACTIVITIES: CPT | Mod: GP | Performed by: PHYSICAL THERAPIST

## 2022-07-29 PROCEDURE — 90937 HEMODIALYSIS REPEATED EVAL: CPT

## 2022-07-29 PROCEDURE — 80048 BASIC METABOLIC PNL TOTAL CA: CPT | Performed by: PHYSICIAN ASSISTANT

## 2022-07-29 PROCEDURE — 36415 COLL VENOUS BLD VENIPUNCTURE: CPT | Performed by: PHYSICIAN ASSISTANT

## 2022-07-29 PROCEDURE — 84100 ASSAY OF PHOSPHORUS: CPT | Performed by: PHYSICIAN ASSISTANT

## 2022-07-29 PROCEDURE — 83550 IRON BINDING TEST: CPT | Performed by: PHYSICAL MEDICINE & REHABILITATION

## 2022-07-29 PROCEDURE — 250N000011 HC RX IP 250 OP 636: Performed by: PHYSICAL MEDICINE & REHABILITATION

## 2022-07-29 PROCEDURE — 634N000001 HC RX 634: Performed by: PHYSICAL MEDICINE & REHABILITATION

## 2022-07-29 RX ORDER — GABAPENTIN 100 MG/1
100 CAPSULE ORAL 3 TIMES DAILY
Qty: 90 CAPSULE | Refills: 0 | Status: ON HOLD | OUTPATIENT
Start: 2022-07-29 | End: 2022-12-12

## 2022-07-29 RX ORDER — AMOXICILLIN 500 MG/1
500 CAPSULE ORAL
Qty: 10 CAPSULE | Refills: 0 | Status: SHIPPED | OUTPATIENT
Start: 2022-07-29 | End: 2022-08-08

## 2022-07-29 RX ORDER — HEPARIN SODIUM 1000 [USP'U]/ML
500 INJECTION, SOLUTION INTRAVENOUS; SUBCUTANEOUS CONTINUOUS
Status: DISCONTINUED | OUTPATIENT
Start: 2022-07-29 | End: 2022-07-29

## 2022-07-29 RX ORDER — LACTOBACILLUS RHAMNOSUS GG 10B CELL
1 CAPSULE ORAL 2 TIMES DAILY
Qty: 20 CAPSULE | Refills: 0 | COMMUNITY
Start: 2022-07-29 | End: 2022-08-08

## 2022-07-29 RX ORDER — POLYETHYLENE GLYCOL 3350 17 G/17G
17 POWDER, FOR SOLUTION ORAL DAILY
Qty: 510 G | Refills: 0 | Status: ON HOLD | OUTPATIENT
Start: 2022-07-29 | End: 2022-12-29

## 2022-07-29 RX ADMIN — GABAPENTIN 100 MG: 100 CAPSULE ORAL at 08:49

## 2022-07-29 RX ADMIN — HYDROXYZINE HYDROCHLORIDE 25 MG: 25 TABLET ORAL at 21:27

## 2022-07-29 RX ADMIN — GABAPENTIN 100 MG: 100 CAPSULE ORAL at 19:34

## 2022-07-29 RX ADMIN — SERTRALINE HYDROCHLORIDE 100 MG: 100 TABLET ORAL at 08:48

## 2022-07-29 RX ADMIN — ACETAMINOPHEN 650 MG: 325 TABLET, FILM COATED ORAL at 06:47

## 2022-07-29 RX ADMIN — HYDROXYZINE HYDROCHLORIDE 25 MG: 25 TABLET ORAL at 19:34

## 2022-07-29 RX ADMIN — Medication 1 TABLET: at 08:48

## 2022-07-29 RX ADMIN — SEVELAMER CARBONATE 1600 MG: 800 TABLET, FILM COATED ORAL at 19:35

## 2022-07-29 RX ADMIN — ACETAMINOPHEN 650 MG: 325 TABLET, FILM COATED ORAL at 16:39

## 2022-07-29 RX ADMIN — FLUTICASONE PROPIONATE 1 SPRAY: 50 SPRAY, METERED NASAL at 08:49

## 2022-07-29 RX ADMIN — ATORVASTATIN CALCIUM 20 MG: 20 TABLET, FILM COATED ORAL at 21:24

## 2022-07-29 RX ADMIN — Medication 1 CAPSULE: at 08:43

## 2022-07-29 RX ADMIN — ALPRAZOLAM 1 MG: 1 TABLET ORAL at 21:29

## 2022-07-29 RX ADMIN — HEPARIN SODIUM 500 UNITS/HR: 1000 INJECTION INTRAVENOUS; SUBCUTANEOUS at 12:58

## 2022-07-29 RX ADMIN — HYDROMORPHONE HYDROCHLORIDE 2 MG: 2 TABLET ORAL at 22:56

## 2022-07-29 RX ADMIN — EPOETIN ALFA-EPBX 4000 UNITS: 10000 INJECTION, SOLUTION INTRAVENOUS; SUBCUTANEOUS at 17:08

## 2022-07-29 RX ADMIN — CYANOCOBALAMIN TAB 500 MCG 500 MCG: 500 TAB at 08:49

## 2022-07-29 RX ADMIN — AMOXICILLIN 500 MG: 500 CAPSULE ORAL at 19:35

## 2022-07-29 RX ADMIN — CYCLOBENZAPRINE HYDROCHLORIDE 10 MG: 5 TABLET, FILM COATED ORAL at 21:29

## 2022-07-29 RX ADMIN — SODIUM CHLORIDE 300 ML: 9 INJECTION, SOLUTION INTRAVENOUS at 12:58

## 2022-07-29 RX ADMIN — HYDROXYZINE HYDROCHLORIDE 25 MG: 25 TABLET ORAL at 08:47

## 2022-07-29 RX ADMIN — Medication 1.25 MG: at 08:47

## 2022-07-29 RX ADMIN — SEVELAMER CARBONATE 1600 MG: 800 TABLET, FILM COATED ORAL at 08:48

## 2022-07-29 RX ADMIN — Medication 100 MG: at 08:48

## 2022-07-29 RX ADMIN — FAMOTIDINE 10 MG: 10 TABLET ORAL at 08:46

## 2022-07-29 RX ADMIN — ALPRAZOLAM 1 MG: 1 TABLET ORAL at 12:01

## 2022-07-29 RX ADMIN — Medication 1 CAPSULE: at 21:25

## 2022-07-29 RX ADMIN — MIDODRINE HYDROCHLORIDE 10 MG: 5 TABLET ORAL at 19:34

## 2022-07-29 RX ADMIN — MIDODRINE HYDROCHLORIDE 10 MG: 5 TABLET ORAL at 12:18

## 2022-07-29 RX ADMIN — Medication: at 12:59

## 2022-07-29 RX ADMIN — SODIUM CHLORIDE 250 ML: 9 INJECTION, SOLUTION INTRAVENOUS at 12:58

## 2022-07-29 RX ADMIN — DULOXETINE HYDROCHLORIDE 30 MG: 30 CAPSULE, DELAYED RELEASE ORAL at 08:49

## 2022-07-29 RX ADMIN — CETIRIZINE HYDROCHLORIDE 10 MG: 5 TABLET ORAL at 08:49

## 2022-07-29 RX ADMIN — HEPARIN SODIUM 500 UNITS: 1000 INJECTION INTRAVENOUS; SUBCUTANEOUS at 12:57

## 2022-07-29 ASSESSMENT — ACTIVITIES OF DAILY LIVING (ADL)
ADLS_ACUITY_SCORE: 46
ADLS_ACUITY_SCORE: 42
ADLS_ACUITY_SCORE: 46
ADLS_ACUITY_SCORE: 40
ADLS_ACUITY_SCORE: 46
ADLS_ACUITY_SCORE: 40
ADLS_ACUITY_SCORE: 40
ADLS_ACUITY_SCORE: 42
ADLS_ACUITY_SCORE: 40
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
## 2022-07-29 NOTE — PLAN OF CARE
Goal Outcome Evaluation:    Plan of Care Reviewed With: patient     Overall Patient Progress: no change    Outcome Evaluation: No change in patient progress this shift. Pt is able to make needs known.    Pt is alert and oriented. On 2L of oxygen at HOS. Regular textured diet and thin liquids; can take pills whole. Continent of B&B. LBM 7/27. C/o pain; given PRN pain medication with some relief. Denied SOB, CP, and n/t. Given PRN Xanax x2, Atarax and Flexeril per Pt request with some relief. PRN Micatin powder applied to affected areas. Pt refused shower but wanted a bed bath instead - later refused a bed bath stating that he could wash himself in the morning. Call light within reach. Pt did not call for scheduled medications. Pt is able to make needs known. Will continue with POC.

## 2022-07-29 NOTE — PROGRESS NOTES
HEMODIALYSIS TREATMENT NOTE    Date: 7/29/2022  Time: 6:03 PM    Data:  Pre Wt: 132.5    Desired Wt:  127.5kg   Post Wt:  127.5  Weight change: 5.0  kg  Ultrafiltration - Post Run Net Total Removed (mL): 5000 mL  Vascular Access Status: patent  Dialyzer Rinse: Clear  Total Blood Volume Processed: 100.2 L Liters  Total Dialysis (Treatment) Time: 4.0 Hours    Lab:   No    Interventions:  Epo, heparin, and Acetaminophen admin as Rx- see MAR. Pt tolerated tx of 4.0 hr and UF removal of 5.0L. b/t+. Hemostasis obtained within 10 minutes post tx     Assessment:  A&Ox4. HR-RRR. 20rpm. Lung sounds diminished ant & post BUL/BLL trace edema present In ble. Pt denies complaints since last tx r/t HD, pt c.o pain- see MAR per intervention. B/t+     Plan:    Follow- up w Renal Team and Charge RN at ARU

## 2022-07-29 NOTE — PLAN OF CARE
Goal Outcome Evaluation:    Plan of Care Reviewed With: patient          Outcome Evaluation: Pt is alert and oriented x 4 but can be forgetful. On Modified MAP. He didn't call for meds this morning. Reviewed his meds  using medlist at bedside and pt knows his meds including the dosages and indications. Vitals stable. BP low at noon but he is on scheduled midodrine for that. Pt able to use this call light and direct his cares. TLSO on before getting out of bed. Assist of 2 walking to the bathroom using a walker. Pt almost lost balance on the way back to room. He thinks that it was because of his  socks and he does better with his shoes on so assisted to put on his shoes and no loss of balance noted later when he walked to bathroom with staff assist and walker to use the toilet again. Also notified PT and she said to make sure pt wears his shoes as he has been able to walk to/from the gym with walker. Noted thick callous and dark area of ulceration on plantar area of foot under right great toe. Notified PM&R CELSO Cabezas and she put in a WOCN consult. Seen by WOCN and foot cares done per WOCN recommendations. Pt declined sevelamer at lunch as he will not be eating lunch. prn xanax given per request. Picked up by HE transport for dialysis at around 1215.

## 2022-07-29 NOTE — PROGRESS NOTES
SW met with Pt.  Pt was on the phone and didn't want to get off phone.  Pt signed IMM. SW returned with signed copy.       JULISSA Forrest   [facility], Transitional Care Unit   Social Work   [address] [place] [address], [address] [place] [address]  (PH) [phone]

## 2022-07-29 NOTE — PLAN OF CARE
"Discharge Planner Post-Acute Rehab PT:     Discharge Plan: Apartment with assist from sons; 6 steps with single rail to upper level or lower level. Home care PT. Anticipate discharge to home 7/30/22, following completion of family training.     Precautions: falls, spinal, brace when out of bed (CTLO), poor sensation B feet.     Current Status:  Bed Mobility: SBA for supine to sidelying to sitting with HOB elevated & use of rails; needs assist for sitting to sidelying to supine to bring L/Es into bed  Transfer: SBA for sit to stand from W/C, & toilet with use of rails, & WW  Gait: SBA/CGA to amb with WW on level 150 ft with W/C follow as L/Es may buckle with fatigue  Stairs: CGA to ascend/descend 4 - 6\" steps with bilateral rails  Balance: standing - fair/poor due to absent sensation bilateral feet & need for U/E support    Assessment: highly motivated to progress gait stability & L/E strength - working in // bars with SBA; absent sensation bilateral feet complicates standing balance without U/E support; continue to work on progressing to modified household mobility with use of WW    Other Barriers to Discharge (DME, Family Training, etc):   Stairs within home; history of recurrent falls;   son will be coming Saturday, 7/30/33 for family training with plan for discharge following therapy sessions                   "

## 2022-07-29 NOTE — PROGRESS NOTES
"  [facility]   Acute Rehabilitation Unit  Daily progress note    INTERVAL HISTORY  Patient was seen and examined at bedside this morning.  No acute events reported overnight.  Today, he reports that he is feeling well and would like to move discharge up to tomorrow after therapies rather than Sunday morning.  He continues to feel pain well-controlled and will not prescribe opioids at discharge.  Nursing noted callus with concern for wound on plantar surface of right foot, base of great toe.  Evaluated by WOC (see their note) and recommended referral to podiatry as outpatient.  Patient reports that he has been seen by podiatry for callus in this area in past, but no history of wound.  Reviewed importance of good foot care and follow-up given significant sensory deficits.  States bowels have improved.    Functionally, he is needing SBA for sit to stand from wheelchair and toilet with walker, SBA to CGA to ambulate with walker on level 150' with wheelchair follow, CGA to ascend/descend 4-6\" steps.  Plan for family training tomorrow and discharge following full day of therapy.    MEDICATIONS    sodium chloride 0.9%  250 mL Intravenous Once in dialysis/CRRT     sodium chloride 0.9%  300 mL Hemodialysis Machine Once     amoxicillin  500 mg Oral Daily with supper     atorvastatin  20 mg Oral At Bedtime     cetirizine  10 mg Oral Daily     cyanocobalamin  500 mcg Oral Daily     DULoxetine  30 mg Oral Daily     epoetin mar-epbx (RETACRIT) inj ESRD  4,000 Units Intravenous Once in dialysis/CRRT     famotidine  10 mg Oral Daily     finasteride  1.25 mg Oral Daily     fluticasone  1 spray Both Nostrils Daily     gabapentin  100 mg Oral TID     heparin  500 Units Hemodialysis Machine OR IV Push Once in dialysis/CRRT     lactobacillus rhamnosus (GG)  1 capsule Oral BID     midodrine  10 mg Oral TID w/meals     multivitamin RENAL  1 tablet Oral Daily     polyethylene glycol  17 g Oral BID     " "vitamin B6  100 mg Oral Daily     sertraline  100 mg Oral Daily     sevelamer carbonate  1,600 mg Oral TID w/meals        sodium chloride 0.9%, acetaminophen, ALPRAZolam, bisacodyl, calcium carbonate, cyclobenzaprine, docusate sodium, HYDROmorphone, hydrOXYzine, lidocaine (buffered or not buffered), lidocaine (buffered or not buffered), melatonin, miconazole, midodrine, naloxone **OR** naloxone **OR** naloxone **OR** naloxone, - MEDICATION INSTRUCTIONS -     PHYSICAL EXAM  /66 (BP Location: Right arm)   Pulse 83   Temp (!) 96.2  F (35.7  C) (Oral)   Resp 20   Ht 1.854 m (6' 1\")   Wt 130.5 kg (287 lb 9.6 oz)   SpO2 92%   BMI 37.94 kg/m     Gen: NAD, lying in bed  HEENT: NC/AT  Cardio: appears well-perfused  Pulm: non-labored on room air  Abd: soft, non-distended  Ext: chronic venous stasis changes, stable bilateral pedal edema.  Callus/suspected wound on plantar surface of R foot at base of great toe not visualized by this writer but reviewed WOC photos from today.  Neuro/MSK: awake, alert, follows commands, decreased sensation to light touch in bilateral lower extremities to ~knee in stocking distribution.  Lower extremity weakness.    LABS  CBC RESULTS:   Recent Labs   Lab Test 07/29/22  0600 07/27/22  1057 07/25/22  0732   WBC 6.2 9.0 7.3   RBC 2.78* 2.79* 2.67*   HGB 8.4* 8.5* 8.2*   HCT 27.0* 26.9* 25.9*   MCV 97 96 97   MCH 30.2 30.5 30.7   MCHC 31.1* 31.6 31.7   RDW 15.3* 15.1* 15.2*    287 226     Last Basic Metabolic Panel:  Recent Labs   Lab Test 07/29/22  0600 07/27/22  1057 07/25/22  0732   * 131* 131*   POTASSIUM 3.6 3.4 3.5   CHLORIDE 93* 95 94   CO2 26 26 24   ANIONGAP 10 10 13   GLC 89 107* 95   BUN 28 29 39*   CR 7.54* 8.18* 9.64*   GFRESTIMATED 8* 7* 6*   MAC 9.2 9.4 9.2     Lab Results   Component Value Date    AST 17 07/21/2022     Lab Results   Component Value Date    ALT 9 07/21/2022     No results found for: BILICONJ   Lab Results   Component Value Date    BILITOTAL " 0.6 07/21/2022     Lab Results   Component Value Date    ALBUMIN 3.0 07/21/2022     Lab Results   Component Value Date    PROTTOTAL 6.7 07/21/2022      Lab Results   Component Value Date    ALKPHOS 131 07/21/2022         Rehabilitation - continue comprehensive acute inpatient rehabilitation program with multidisciplinary approach including therapies, rehab nursing, and physiatry following. See interval history for updates.      ASSESSMENT AND PLAN  Shay Jimenez is a 57 year old right hand dominant male with a past medical history of ESRD on HD, GERD, hyperlipidemia, peripheral neuropathy, depression/anxiety, GINI, BPH, obesity who was admitted on 6/24/22 with thoracic compressive myelopathy and concern for discitis/vertebral osteomyelitis now s/p C6-T6 fusion and T2-T3 decompression on 6/27/22 with post-op course complicated by hypotension, pain, anemia, encephalopathy, and constipation.  He is now admitted to ARU on 7/15/22 for multidisciplinary rehabilitation and ongoing medical management.     Admission to acute inpatient rehab 07/15/22.    Impairment group code: Spinal Cord Dysfunction Spinal Non-Traumatic 04.111 Paraplegia, Incomplete        1. PT and OT 90 minutes of each on a daily basis, in addition to rehab nursing and close management of physiatrist.       2. Impairment of ADL's: Noted to have impaired strength, impaired activity tolerance, impaired tone, impaired balance, impaired coordination, impaired judgement and safety awareness, impulsiveness and impaired weight shifting, all affecting his ability to safely and independently perform basic ADLs.  Goal for mod I with basic ADLs with assist of 1 for tub/shower transfers.     3. Impairment of mobility:  Noted to have impaired strength, impaired activity tolerance, impaired tone, impaired balance, impaired coordination, impaired judgement and safety awareness, impulsiveness and impaired weight shifting, all affecting his ability to safely and  "independently perform basic mobility.  Goal for mod I with basic mobility with assist of 1 for stairs.     4. Medical Conditions    Thoracic compressive myelopathy  Concern for T2-T3 discitis/vertebral osteomyelitis  S/p C6-T6 fusion/T2-3 decompression on 6/27/22 with Dr. Boles  Acute on chronic upper back pain  Lower extremity weakness  Recurrent falls  Hx back pain for 6 months. CT chest 2/2022 showed concern for upper thoracic spine diskitis vs osteomyelitis. Unclear follow up from that time.  In weeks prior to admission, MRI showed possible thoracic compression fracture. Developed progressively weak legs with frequent falls.  On admission imaging with T2-T3 diskitis, osteomyelitis and compressive myelopathy.  Neurosurgery and ID consulted while inpatient.  Late growth of P acne in 2 intra-op cultures.  Per ID, \"likely contaminant given no previous hardware, but since new hardware is in place will recommend starting prophylactic amoxicillin.\"  Started on 7/12.  1/2 positive blood cultures from 7/5 for staph epi, per ID \"signifies contamination and does not require treatment\".  Repeat cultures negative.  - Continue amoxicillin 500 mg daily for total of 4 weeks  - Pain management: Tylenol PRN, flexeril PRN, Dilaudid 2 mg BID PRN.  Continue to wean opioids as able, with plans to discontinue prior to discharge.  - Wound care: keep clean and dry, ok to shower no soaking  - Activity: no lifting greater than 5-10 pounds, no excessive bending, lifting, or twisting  - Brace when out of bed  - Continue PT/OT  - Follow up with neurosurgery, currently scheduled on 8/3 and 9/21     End-stage renal disease, on hemodialysis  Hypotension  Hyponatremia  Hyperphosphatemia  Secondary hyperparathyroidism  PTA HD M/W/F.  LUE fistula.  Nephrology following this admission for ongoing dialysis.  Reportedly with chronic hypotension, typically SBP in 70-90s and asymptomatic most of the time.  While IP, mild dizziness with positional " changes but tolerating PT.   Patient reports PTA using midodrine 1 hour prior to HD and repeated 1 hour into HD run.  Started on TID dosing this admission.  - Nephrology consulted, appreciate ongoing assistance.  - Continue HD per nephrology, M/W/F schedule while at ARU  - BP labile but orthostasis has generally been asymptomatic and not limiting therapies.  Continue midodrine 10 mg TID.   - Continue bilateral lower extremity Jobst stockings  - Continue PTA sevelamer (recently increased 800 mg to 1600 mg).  Per nephrology, checked PTH 7/19, which is elevated 75.  Management per nephrology - hectorol with HD, dose reduced.  - Resumed fluid restriction, 1.2 L daily, on 7/19 per nephrology recs given ongoing hyponatremia.    - Trend labs MW.  7/29: Cr/BUN consistent with CKD.  Na mildly low at 129.  K WNL.  Phos WNL 4.4.     Acute blood loss on anemia of chronic kidney disease  Pre-op Hgb stable in 10s.  Hgb currently stable in 7s-8s.  - Epo with HD per nephrology  - Trend CBC.  Hgb stable at 8.4 on 7/29.  Iron labs per nephrology with iron WNL, iron binding cap low 217, ferritin elevated 626.  - Avoid transfusion if possible given listed for transplant ([place]).  Per nephrology, if transfused, would start low dose immunosuppression (MMF generally).     Encephalopathy  Felt to be due to buildup of gabapentin metabolites due to PTA dose (300 mg TID) higher than typical maximum dose recommended even for patients on CRRT. Gabapentin and Zoloft held.  With improved mental status, resumed, gabapentin at lower dose.  No ongoing concerns at ARU.     GERD  PTA on omeprazole.  On protonix as formulary autosub while inpatient.  Patient reports this has not been as helpful, unable to obtain home supply for use here.  On 7/21, complaining of increased reflux symptoms, added Pepcid.  - Continue Pepcid BID  - Continue Tums PRN  - Resume PTA omeprazole at discharge to home     Hyperlipidemia  - Chronic and stable, on  "statin.     Peripheral neuropathy  Follows with [facility].    - Continue PT duloxetine 30 mg daily  - Continue gabapentin 100 mg TID, reduced from PTA dose as above due to concern for contributing to confusion in setting of renal impairment     Depression/anxiety  At ARU admission, reporting increased stressors at home, marital issues, financial.  - Continue PTA Zoloft 100 mg daily, Xanax 1 mg BID PRN  - Continue atarax 25 mg PRN for anxiety and pain  - [facility] psychology consulted, appreciate assistance  - Monitor mood     Obesity, class III  Obstructive sleep apnea  BMI this stay is ~41.  No PTA CPAP use, reportedly does not tolerate.  - Intermittently using oxygen by NC with sleep.  Plan for overnight oximetry tonight to determine whether qualifies for home oxygen with sleep.  - Recommend follow-up with sleep medicine after discharge  - Encourage diet, lifestyle modifications once above issues addressed     History of BPH  - Continue PTA finasteride.  Given anuric and hypotension, could consider discontinuing?     Alcohol use disorder  Reports drinking \"too much\" lately, 6-8 cocktails per day PTA.  - Most recent LFTs on 7/16 WNL  - Encourage cessation, offer CD resources if interested     Allergic rhinitis  - Continue PTA Flonase and Zyrtec    Loose stool, bowel incontinence - improving  Onset 7/18 morning per patient report.  On sertraline, recently resumed after brief hold of PTA med.  No recent stool softener or laxative use.  LFTs on 7/16 WNL.  Afebrile, WBC WNL.  Abdomen soft, non-tender.  7/21 added metamucil and probiotic.  AXR on 7/23 with moderate colonic stool burden.  Started on daily suppository, given 7/24 with minimal results.  Did have good results with enema on 7/25.  Repeated enema 7/26 per patient request with small results.  - Continue probiotics, metamucil  - Improving per patient report. Continue Miralax daily, suppository/enema PRN.  Continue to monitor.  - Encourage fluid up to " limit    R foot callus, suspected diabetic ulcer at base of great toe  - Evaluated by WOC on 7/29, see their note for additional details  - Diabetic foot care  - Follow up with outpatient podiatry, referral placed    1. Adjustment to disability:  Clinical psychology to eval and treat if indicated  2. FEN: regular diet, thin liquids, 1.2L fluid restriction per nephrology  3. Bowel: continent/incontinent, frequent loose stools recently as above   4. Bladder: anuric  5. DVT Prophylaxis: mechanical  6. GI Prophylaxis: PPI  7. Code: full  8. Disposition: goal for home  9. ELOS:  14 days  10. Follow up Appointments on Discharge: PCP, neurosurgery follow-up 8/3 and 9/21, nephrology for ongoing dialysis, sleep medicine, podiatry      Patient discussed with Dr. Antonio Loo, PM&R staff physician     CELSO Gibbs-C  Physical Medicine & Rehabilitation

## 2022-07-29 NOTE — CONSULTS
[facility] Nurse Inpatient Assessment     Consulted for: Right foot wound    Patient History (according to provider note(s):      Per Dona Cabezas PA-C on 7/28/2022: Shay Jimenez is a 57 year old right hand dominant male with a past medical history of ESRD on HD, GERD, hyperlipidemia, peripheral neuropathy, depression/anxiety, GINI, BPH, obesity who was admitted on 6/24/22 with thoracic compressive myelopathy and concern for discitis/vertebral osteomyelitis now s/p C6-T6 fusion and T2-T3 decompression on 6/27/22 with post-op course complicated by hypotension, pain, anemia, encephalopathy, and constipation.  He is now admitted to ARU on 7/15/22 for multidisciplinary rehabilitation and ongoing medical management.     Areas Assessed:      Areas visualized during today's visit: Right foot    Wound location: Right 1st Met Head    7/29    Last photo: 7/292/2022  Wound due to: Diabetic Ulcer  Wound history/plan of care: Patient with thick layer of hyperkeratotic tissue over the right 1st met head. Dark area in center, right over the joint, most likely indicates a wound under the callous. Patient needs to be seen by Podiatry for removal of callous to investigate possible wound.   Wound base: 100 % thick hyperkeratotic tissue     Palpation of the wound bed: firm      Drainage: none     Description of drainage: none     Measurements (length x width x depth, in cm): see photo   Periwound skin: Intact      Color: heather      Temperature: normal   Odor: none  Pain: insensate  Pain interventions prior to dressing change: N/A  Treatment goal: Infection control/prevention  STATUS: initial assessment  Supplies ordered: supplies stored on unit       Treatment Plan:     Bilateral foot diabetic foot care: Daily :   1. Wash feet daily with warm water and soap. Dry thoroughly, taking care to dry well between the toes, with a clean towel.   2. Moisturize feet with lubricating oil  or lotion (Sween 24)  3. Soften calluses with Vaseline or Aquaphor.   4. Wear socks and shoes at all times when out of bed.     Orders: Written    RECOMMEND PRIMARY TEAM ORDER: Podiatry consult  Education provided: plan of care and wound progress  Discussed plan of care with: Patient and Physicians Assistant  WO nurse follow-up plan: signing off  Notify WOC if wound(s) deteriorate.  Nursing to notify the Provider(s) and re-consult the WOC Nurse if new skin concern.    DATA:     Current support surface: Standard  Atmos Air mattress  Containment of urine/stool: Continent of bladder and Continent of bowel  BMI: Body mass index is 38.52 kg/m .   Active diet order: Orders Placed This Encounter      Combination Diet Regular Diet; Thin Liquids (level 0)     Output: I/O last 3 completed shifts:  In: 1048 [P.O.:1048]  Out: -      Labs: Recent Labs   Lab 07/29/22  0600   HGB 8.4*   WBC 6.2     Pressure injury risk assessment:   Sensory Perception: 3-->slightly limited  Moisture: 3-->occasionally moist  Activity: 2-->chairfast  Mobility: 3-->slightly limited  Nutrition: 3-->adequate  Friction and Shear: 2-->potential problem  Konstantin Score: 16    Kanwal Farrell RN   Dept. Pager: [phone]  Dept. Office Number: [phone]

## 2022-07-29 NOTE — PROGRESS NOTES
Nephrology Progress Note  07/29/2022         Assessment & Recommendations:   Shay Jimenez is a 57 year old right hand dominant male with a past medical history of ESRD on HD, GERD, hyperlipidemia, peripheral neuropathy, depression/anxiety, GINI, BPH, obesity who was admitted on 6/24/22 with thoracic compressive myelopathy and concern for discitis/vertebral osteomyelitis now s/p C6-T6 fusion and T2-T3 decompression on 6/27/22 with post-op course complicated by hypotension, pain, anemia, encephalopathy, and constipation.  He is now admitted to ARU on 7/15/22 for multidisciplinary rehabilitation and ongoing medical management.     #ESRD  - dialyzes MWF at [facility], primary nephrologist Dr. Cline  - access: left AVF, run time 4 hours and 15 minutes  - .6 kg (will need to be lowered)  - verbal consent obtained to continue HD while in ARU.     #BP/Volume hypervolemia is much improved, though still significant LE edema  - BP historically low, getting midodrine 10 mg TID, ordered another dose 1 hr into HD per pt's usual routine  - per OP HD unit, has history of large 4-6 kg interdialytic wt gains   - fluid restriction 1200 ml per day started 7/19  - Will need to lower EDW       #Anemia hgb 8's  - getting epo 4000 units with HD while inpatient  - ordered iron labs 7/29  - Avoid transfusions if possible given pt is listed for transplant (at [place]).  - If transfused, would start MMF      #Bone/Mineral Ca 9.2, alb 3.0, phos 4.4, PTH 75  - stop hectorol for now  - sevelamer 1600 mg TID with meals       Recommendations were communicated to primary team via this note    Jayde Pete PA-C  [phone]     Interval History :   Seen on dialysis, stable run so far with 3-4 kg UF goal. LE edema continues to improve. Denies n/v, CP, SOB, chills    Review of Systems:   A 4 point review of systems was negative except as noted above.      Physical Exam:   Vitals: /64  Pulse 72  Resp 18  SpO2 95%  Wt 142.2  kg  GENERAL APPEARANCE: NAD  HENT: mouth without ulcers or lesions  PULM: lungs clear to auscultation, equal air movement   CV: regular rhythm, normal rate      -edema 1-2+ LE   GI: soft, nontender  NEURO: mentation intact and speech normal, no asterixis   Access LUE AVF     Labs:   All labs reviewed by me  Electrolytes/Renal -   Recent Labs   Lab Test 07/29/22  0600 07/27/22  1057 07/25/22  0732 06/28/22  0809 06/28/22  0420   * 131* 131*   < > 135   POTASSIUM 3.6 3.4 3.5   < > 4.7   CHLORIDE 93* 95 94   < > 98   CO2 26 26 24   < > 26   BUN 28 29 39*   < > 49*   CR 7.54* 8.18* 9.64*   < > 6.86*   GLC 89 107* 95   < > 153*   MAC 9.2 9.4 9.2   < > 8.7   MAG  --   --   --   --  2.1   PHOS 4.4 4.0 5.5*   < > 5.8*    < > = values in this interval not displayed.       CBC -   Recent Labs   Lab Test 07/29/22  0600 07/27/22  1057 07/25/22  0732   WBC 6.2 9.0 7.3   HGB 8.4* 8.5* 8.2*    287 226       LFTs -   Recent Labs   Lab Test 07/21/22  1252 07/16/22  0557 07/11/22  0722 06/29/22  0611 06/24/22  0818   ALKPHOS 131 134  --   --  152*   BILITOTAL 0.6 0.5  --   --  0.6   ALT 9 <6  --   --  55   AST 17 13  --   --  51*   PROTTOTAL 6.7* 6.5*  --   --  7.3   ALBUMIN 3.0* 2.8* 3.2*   < > 3.3*    < > = values in this interval not displayed.       Iron Panel - No lab results found.      Imaging: Reviewed     Current Medications:    amoxicillin  500 mg Oral Daily with supper     atorvastatin  20 mg Oral At Bedtime     cetirizine  10 mg Oral Daily     cyanocobalamin  500 mcg Oral Daily     DULoxetine  30 mg Oral Daily     epoetin mar-epbx (RETACRIT) inj ESRD  4,000 Units Intravenous Once in dialysis/CRRT     famotidine  10 mg Oral Daily     finasteride  1.25 mg Oral Daily     fluticasone  1 spray Both Nostrils Daily     gabapentin  100 mg Oral TID     lactobacillus rhamnosus (GG)  1 capsule Oral BID     midodrine  10 mg Oral TID w/meals     multivitamin RENAL  1 tablet Oral Daily     polyethylene glycol  17 g Oral  BID     vitamin B6  100 mg Oral Daily     sertraline  100 mg Oral Daily     sevelamer carbonate  1,600 mg Oral TID w/meals       heparin (porcine) 500 Units/hr (07/29/22 1258)     heparin (porcine) 500 Units/hr (07/25/22 1343)     - MEDICATION INSTRUCTIONS -       CELSO Haynes

## 2022-07-29 NOTE — PLAN OF CARE
FOCUS/GOAL  Medical management    ASSESSMENT, INTERVENTIONS AND CONTINUING PLAN FOR GOAL:  Had a good night of sleep. ON 2l o2 during  The night with good Spo2. Was asleep at all writer's rounds. No concerns  Or complaints to report. 50 ml  fluid intake this shift. 200 ml water in his room at this moment.No spontaneous voiding. Tylenol given at the end of shift for headache. Will continue with POC.  Goal Outcome Evaluation:    Plan of Care Reviewed With: other (see comments)          Outcome Evaluation: NO change. Noc SHIFT.

## 2022-07-30 ENCOUNTER — APPOINTMENT (OUTPATIENT)
Dept: OCCUPATIONAL THERAPY | Facility: CLINIC | Age: 57
DRG: 052 | End: 2022-07-30
Attending: PHYSICAL MEDICINE & REHABILITATION
Payer: MEDICARE

## 2022-07-30 ENCOUNTER — APPOINTMENT (OUTPATIENT)
Dept: PHYSICAL THERAPY | Facility: CLINIC | Age: 57
DRG: 052 | End: 2022-07-30
Attending: PHYSICAL MEDICINE & REHABILITATION
Payer: MEDICARE

## 2022-07-30 VITALS
SYSTOLIC BLOOD PRESSURE: 83 MMHG | RESPIRATION RATE: 20 BRPM | WEIGHT: 292 LBS | HEIGHT: 73 IN | OXYGEN SATURATION: 98 % | DIASTOLIC BLOOD PRESSURE: 51 MMHG | BODY MASS INDEX: 38.7 KG/M2 | HEART RATE: 91 BPM | TEMPERATURE: 96.3 F

## 2022-07-30 LAB — LACTATE SERPL-SCNC: 1.6 MMOL/L (ref 0.7–2)

## 2022-07-30 PROCEDURE — 97530 THERAPEUTIC ACTIVITIES: CPT | Mod: GP

## 2022-07-30 PROCEDURE — 83605 ASSAY OF LACTIC ACID: CPT | Performed by: PHYSICAL MEDICINE & REHABILITATION

## 2022-07-30 PROCEDURE — 250N000013 HC RX MED GY IP 250 OP 250 PS 637: Performed by: PHYSICIAN ASSISTANT

## 2022-07-30 PROCEDURE — 99239 HOSP IP/OBS DSCHRG MGMT >30: CPT | Mod: 24 | Performed by: PHYSICAL MEDICINE & REHABILITATION

## 2022-07-30 PROCEDURE — 97535 SELF CARE MNGMENT TRAINING: CPT | Mod: GO

## 2022-07-30 PROCEDURE — 36415 COLL VENOUS BLD VENIPUNCTURE: CPT | Performed by: PHYSICAL MEDICINE & REHABILITATION

## 2022-07-30 PROCEDURE — 94762 N-INVAS EAR/PLS OXIMTRY CONT: CPT

## 2022-07-30 PROCEDURE — 999N000157 HC STATISTIC RCP TIME EA 10 MIN

## 2022-07-30 RX ORDER — HYDROXYZINE HYDROCHLORIDE 25 MG/1
25 TABLET, FILM COATED ORAL 2 TIMES DAILY PRN
Qty: 60 TABLET | Refills: 0 | Status: ON HOLD | OUTPATIENT
Start: 2022-07-30 | End: 2022-12-29

## 2022-07-30 RX ORDER — HYDROXYZINE HYDROCHLORIDE 25 MG/1
25 TABLET, FILM COATED ORAL 2 TIMES DAILY PRN
Qty: 60 TABLET | Refills: 0 | Status: SHIPPED | OUTPATIENT
Start: 2022-07-30 | End: 2022-07-30

## 2022-07-30 RX ADMIN — Medication 1.25 MG: at 08:56

## 2022-07-30 RX ADMIN — Medication 100 MG: at 08:55

## 2022-07-30 RX ADMIN — MIDODRINE HYDROCHLORIDE 10 MG: 5 TABLET ORAL at 11:41

## 2022-07-30 RX ADMIN — CYANOCOBALAMIN TAB 500 MCG 500 MCG: 500 TAB at 08:56

## 2022-07-30 RX ADMIN — Medication 1 CAPSULE: at 08:53

## 2022-07-30 RX ADMIN — SEVELAMER CARBONATE 1600 MG: 800 TABLET, FILM COATED ORAL at 08:54

## 2022-07-30 RX ADMIN — GABAPENTIN 100 MG: 100 CAPSULE ORAL at 08:56

## 2022-07-30 RX ADMIN — CETIRIZINE HYDROCHLORIDE 10 MG: 5 TABLET ORAL at 08:54

## 2022-07-30 RX ADMIN — HYDROXYZINE HYDROCHLORIDE 25 MG: 25 TABLET ORAL at 09:08

## 2022-07-30 RX ADMIN — MIDODRINE HYDROCHLORIDE 10 MG: 5 TABLET ORAL at 08:53

## 2022-07-30 RX ADMIN — FLUTICASONE PROPIONATE 1 SPRAY: 50 SPRAY, METERED NASAL at 08:56

## 2022-07-30 RX ADMIN — Medication 1 TABLET: at 08:53

## 2022-07-30 RX ADMIN — DULOXETINE HYDROCHLORIDE 30 MG: 30 CAPSULE, DELAYED RELEASE ORAL at 08:53

## 2022-07-30 RX ADMIN — FAMOTIDINE 10 MG: 10 TABLET ORAL at 08:53

## 2022-07-30 RX ADMIN — ACETAMINOPHEN 650 MG: 325 TABLET, FILM COATED ORAL at 09:08

## 2022-07-30 RX ADMIN — SERTRALINE HYDROCHLORIDE 100 MG: 100 TABLET ORAL at 08:53

## 2022-07-30 ASSESSMENT — ACTIVITIES OF DAILY LIVING (ADL)
ADLS_ACUITY_SCORE: 42
ADLS_ACUITY_SCORE: 41
ADLS_ACUITY_SCORE: 42
ADLS_ACUITY_SCORE: 42

## 2022-07-30 NOTE — PLAN OF CARE
Occupational Therapy Discharge Summary    Reason for therapy discharge:    Discharged to home with home therapy.    Progress towards therapy goal(s). See goals on Care Plan in [facility] electronic health record for goal details.  Goals partially met.  Barriers to achieving goals:   discharge from facility.    Therapy recommendation(s):    Continued therapy is recommended.  Rationale/Recommendations:  24 hr sba from [facility]  pt will have home ot and HHA for showers, continue ot to increase I with adls.

## 2022-07-30 NOTE — PROGRESS NOTES
Physical Therapy Discharge Summary    Reason for therapy discharge:    Discharged to home with home therapy.    Progress towards therapy goal(s). See goals on Care Plan in [facility] electronic health record for goal details.  Goals met    Therapy recommendation(s):    Continued therapy is recommended.  Rationale/Recommendations:  Pt is now safe to d/c to home with assist from sons. He currently lives in split level home, and requires min A on stairs with single rail. Sons present for training today and able to assist prn. Reviewed spinal precautions, lifting restrictions, driving restrictions, and safety strategies for fall prevention. Family was advised to seek medical help immediately if pt falls at home d/t recent spine surgery. .     Recommending ongoing HCPT to address safety at home and progress strength and IND.

## 2022-07-30 NOTE — PLAN OF CARE
"Goal Outcome Evaluation:    Plan of Care Reviewed With: patient     Overall Patient Progress: improving    Outcome Evaluation: Pt discharged this shift after family training. BP continuing to be low, around baseline, midodrine given. Pt and family presnt for discussion of medications and AVS. Discharged home with family transport and support.    BP (!) 83/51 (BP Location: Right arm)   Pulse 91   Temp (!) 96.3  F (35.7  C) (Oral)   Resp 20   Ht 1.854 m (6' 1\")   Wt 132.5 kg (292 lb)   SpO2 98%   BMI 38.52 kg/m      Last BP 75/50. MD aware and okay with discharge. Midodrine given. Pt educated on low BP and told to recheck BP when home.  "

## 2022-07-30 NOTE — PLAN OF CARE
FOCUS/GOAL  Medical management    ASSESSMENT, INTERVENTIONS AND CONTINUING PLAN FOR GOAL:  On oxymeter study, patient's failed. His Spo2 was fluactuacting between 94 and 71 at shift change. He was put back on  2L O2 via NC. On call and RT department updated. Patient removed nasal cannula stating that he was not supposed to have it. Reeducated. But was still experiencing low Spo2 and snoring.  Denied pain. Voided. No BM to report this shift.   Goal Outcome Evaluation:    Plan of Care Reviewed With: other (see comments)     Overall Patient Progress: no change    Outcome Evaluation: No change. need o2 overnight.

## 2022-08-01 ENCOUNTER — TELEPHONE (OUTPATIENT)
Dept: PODIATRY | Facility: CLINIC | Age: 57
End: 2022-08-01

## 2022-08-01 ENCOUNTER — PATIENT OUTREACH (OUTPATIENT)
Dept: CARE COORDINATION | Facility: CLINIC | Age: 57
End: 2022-08-01

## 2022-08-01 DIAGNOSIS — Z71.89 OTHER SPECIFIED COUNSELING: ICD-10-CM

## 2022-08-01 NOTE — PROGRESS NOTES
Clinic Care Coordination Contact  [facility]: Post-Discharge Note  SITUATION                                                      Admission:    Admission Date: 07/15/22   Reason for Admission: Thoracic compressive myelopathy s/p C6-T6 fusion, T2-3 decompression on 6/27/22  Discharge:   Discharge Date: 07/30/22  Discharge Diagnosis: Thoracic compressive myelopathy s/p C6-T6 fusion, T2-3 decompression on 6/27/22    BACKGROUND                                                      Per hospital discharge summary and inpatient provider notes:    Shay Jimenez is a 57 year old right hand dominant male with a past medical history of ESRD on HD, GERD, hyperlipidemia, peripheral neuropathy, depression/anxiety, GINI, BPH, obesity who was admitted on 6/24/22 with thoracic compressive myelopathy and concern for discitis/vertebral osteomyelitis now s/p C6-T6 fusion and T2-T3 decompression on 6/27/22 with post-op course complicated by hypotension, pain, anemia, encephalopathy, and constipation.  He is now admitted to ARU on 7/15/22 for multidisciplinary rehabilitation and ongoing medical management.        ASSESSMENT      Enrollment  Primary Care Care Coordination Status: Not a Candidate    Discharge Assessment  How are you doing now that you are home?: Patient reports he is doing well, no questions  How are your symptoms? (Red Flag symptoms escalate to triage hotline per guidelines): Improved  Do you feel your condition is stable enough to be safe at home until your provider visit?: Yes  Does the patient have their discharge instructions? : Yes  Does the patient have questions regarding their discharge instructions? : No  Were you started on any new medications or were there changes to any of your previous medications? : Yes  Does the patient have all of their medications?: Yes  Do you have questions regarding any of your medications? : No  Discharge follow-up appointment scheduled within 14 calendar days? :  Yes  Discharge Follow Up Appointment Date: 08/04/22  Discharge Follow Up Appointment Scheduled with?: Specialty Care Provider                  PLAN                                                      Outpatient Plan:     Follow up with primary care provider, SAMANTHA GONZALEZ, within 7 days for hospital follow- up.  The following labs/tests are recommended: CBC, BMP.     Follow-up with neurosurgery as scheduled on 8/3 and 9/21.     Follow up with nephrology for ongoing dialysis.     Follow up with sleep medicine.     Follow up with podiatry for R foot callus/wound.       Future Appointments   Date Time Provider Department Center   8/4/2022 10:10 AM MGXR2 MGXRAY [place]   8/4/2022 10:30 AM MGXR2 MGXRAY [place]   8/4/2022 11:20 AM Fritz Boles MD MGNRSG [facility]   8/5/2022 11:15 AM Krystle Romano DPM [facility]   9/21/2022 10:50 AM BUFSOCXR2 [facility]FSXR [facility]   9/21/2022 11:20 AM [facility]         For any urgent concerns, please contact our 24 hour nurse triage line: [phone] ([phone])         Shanna Perdomo  [facility] Care [facility]

## 2022-08-01 NOTE — TELEPHONE ENCOUNTER
RIGHT foot, great toe, wound, see referral (detailed notes) / Dona Cabezas PA-C / [facility] Call Center    Phone Message:  Pt has some questions in regards to his appt, prior to attending his appt.  Please contact pt to discuss.  Thank you.    May a detailed message be left on voicemail: Yes     Reason for Call: Other: Pre Appointment Questions     Action Taken: Message routed to:  Other: [facility]    Travel Screening: Not Applicable

## 2022-08-02 NOTE — TELEPHONE ENCOUNTER
There is a referral order dated 7/15/22 from Dona Cabezas PA-C with the following:    callus with apparent underlying wound at base of R great toe in patient with hx ESRD on HD, peripheral neuropathy

## 2022-08-02 NOTE — TELEPHONE ENCOUNTER
Phone call to patient. He forgot he had called with all of the doctors appointments he has had recently.   He asked if we take his insurance. Recommended he contact our billing office to confirm, and their number was provided.     He also states he wants to make sure the doctor is able to manage his callus and can trim his nails. Discussed that our podiatrists do handle calluses but do not do routine nail trimming. We can provide him with information of other providers that do this when he comes for the appointment. He verbalized understanding.     LIDIA Roblero RN

## 2022-08-04 ENCOUNTER — ANCILLARY PROCEDURE (OUTPATIENT)
Dept: GENERAL RADIOLOGY | Facility: CLINIC | Age: 57
End: 2022-08-04
Attending: STUDENT IN AN ORGANIZED HEALTH CARE EDUCATION/TRAINING PROGRAM
Payer: MEDICARE

## 2022-08-04 ENCOUNTER — OFFICE VISIT (OUTPATIENT)
Dept: NEUROSURGERY | Facility: CLINIC | Age: 57
End: 2022-08-04
Payer: MEDICARE

## 2022-08-04 VITALS
BODY MASS INDEX: 38.7 KG/M2 | HEART RATE: 86 BPM | HEIGHT: 73 IN | WEIGHT: 292 LBS | SYSTOLIC BLOOD PRESSURE: 97 MMHG | DIASTOLIC BLOOD PRESSURE: 63 MMHG

## 2022-08-04 DIAGNOSIS — Z98.1 S/P SPINAL FUSION: Primary | ICD-10-CM

## 2022-08-04 DIAGNOSIS — Z98.1 S/P SPINAL FUSION: ICD-10-CM

## 2022-08-04 PROCEDURE — 72040 X-RAY EXAM NECK SPINE 2-3 VW: CPT | Mod: GC | Performed by: RADIOLOGY

## 2022-08-04 PROCEDURE — 72070 X-RAY EXAM THORAC SPINE 2VWS: CPT | Performed by: STUDENT IN AN ORGANIZED HEALTH CARE EDUCATION/TRAINING PROGRAM

## 2022-08-04 PROCEDURE — 99024 POSTOP FOLLOW-UP VISIT: CPT | Performed by: STUDENT IN AN ORGANIZED HEALTH CARE EDUCATION/TRAINING PROGRAM

## 2022-08-04 RX ORDER — TRAMADOL HYDROCHLORIDE 50 MG/1
50 TABLET ORAL EVERY 6 HOURS PRN
Qty: 10 TABLET | Refills: 0 | Status: SHIPPED | OUTPATIENT
Start: 2022-08-04 | End: 2022-08-07

## 2022-08-04 RX ORDER — BACLOFEN 10 MG/1
10 TABLET ORAL 2 TIMES DAILY
Qty: 60 TABLET | Refills: 1 | Status: ON HOLD | OUTPATIENT
Start: 2022-08-04 | End: 2022-12-29

## 2022-08-04 NOTE — PROGRESS NOTES
"HPI:  57 year old male s/p C5-T6 Fusion and decompression for T2-3 osteomyelitis.  Now able to walk with a walker.  Living at home with assistance.  No significant pain and feeling better.  Sensation still not quite normal.  No bladder control issues.    Current Outpatient Medications   Medication     acetaminophen (TYLENOL) 325 MG tablet     ALPRAZolam (XANAX) 1 MG tablet     ammonium lactate (AMLACTIN) 12 % external cream     amoxicillin (AMOXIL) 500 MG capsule     atorvastatin (LIPITOR) 20 MG tablet     calcium carbonate (TUMS) 500 MG chewable tablet     cetirizine (ZYRTEC) 10 MG tablet     cyanocobalamin (VITAMIN B-12) 500 MCG tablet     DULoxetine (CYMBALTA) 30 MG capsule     finasteride (PROSCAR) 5 MG tablet     fluticasone (FLONASE) 50 MCG/ACT nasal spray     gabapentin (NEURONTIN) 100 MG capsule     hydrOXYzine (ATARAX) 25 MG tablet     lactobacillus rhamnosus, GG, (CULTURELL) capsule     midodrine (PROAMATINE) 10 MG tablet     multivitamin RENAL (RENAVITE RX/NEPHROVITE) 1 MG tablet     omeprazole (PRILOSEC) 20 MG DR capsule     polyethylene glycol (MIRALAX) 17 GM/Dose powder     sertraline (ZOLOFT) 100 MG tablet     sevelamer carbonate (RENVELA) 800 MG tablet     vitamin B6 (PYRIDOXINE) 100 MG tablet     No current facility-administered medications for this visit.      Physical Exam:  Vital signs:      BP: 97/63 Pulse: 86           Height: 185.4 cm (6' 1\") Weight: 132.5 kg (292 lb)  Estimated body mass index is 38.52 kg/m  as calculated from the following:    Height as of this encounter: 1.854 m (6' 1\").    Weight as of this encounter: 132.5 kg (292 lb).   Full strength in bilateral lower extremities.  Sensation diminished to normal in BLE.  Incision well healed.    Results Reviewed:  I personally reviewed x rays today showing stable hardware without fracture and no increased kyphosis.  Assessment:  57 year old male s/p C5-T6 Fusion and decompression T2-3 for spinal cord comrepession from " osteomyelitis.  Plan:  Ok to lift up to 25 pounds.  Continue with therapy for myelopathy.  Follow up 6 more weeks with me.      Fritz Boles MD

## 2022-08-16 ENCOUNTER — HOME INFUSION (PRE-WILLOW HOME INFUSION) (OUTPATIENT)
Dept: PHARMACY | Facility: CLINIC | Age: 57
End: 2022-08-16

## 2022-08-16 ENCOUNTER — TELEPHONE (OUTPATIENT)
Dept: NEUROSURGERY | Facility: CLINIC | Age: 57
End: 2022-08-16

## 2022-08-16 NOTE — TELEPHONE ENCOUNTER
Pt reports that he was seen at [facility] lat week for dx of sepsis and  osteomyelitis in his spine. Placed on abx for 6 weeks. During his hospital admission, thoracic CT and MRI was completed which showed 2 loose screws. Pt scheduled appt with Dr. Boles to further discuss. Report in care everywhere but will retrieve images and have this sent to Dr. Boles prior to appt on 8/18.         Celina Mireles, RNCC  Neurology/Neurosurgery/PM&R           Friend

## 2022-08-18 ENCOUNTER — LAB REQUISITION (OUTPATIENT)
Dept: LAB | Facility: CLINIC | Age: 57
End: 2022-08-18
Payer: MEDICARE

## 2022-08-18 ENCOUNTER — OFFICE VISIT (OUTPATIENT)
Dept: NEUROSURGERY | Facility: CLINIC | Age: 57
End: 2022-08-18
Payer: MEDICARE

## 2022-08-18 VITALS
WEIGHT: 292 LBS | BODY MASS INDEX: 38.7 KG/M2 | HEART RATE: 91 BPM | DIASTOLIC BLOOD PRESSURE: 70 MMHG | SYSTOLIC BLOOD PRESSURE: 112 MMHG | HEIGHT: 73 IN

## 2022-08-18 DIAGNOSIS — R65.21 SEVERE SEPSIS WITH SEPTIC SHOCK (CODE) (H): ICD-10-CM

## 2022-08-18 DIAGNOSIS — Z98.1 S/P SPINAL FUSION: Primary | ICD-10-CM

## 2022-08-18 LAB
ANION GAP SERPL CALCULATED.3IONS-SCNC: 10 MMOL/L (ref 3–14)
AST SERPL W P-5'-P-CCNC: 17 U/L (ref 0–45)
BASOPHILS # BLD AUTO: 0.1 10E3/UL (ref 0–0.2)
BASOPHILS NFR BLD AUTO: 1 %
BUN SERPL-MCNC: 20 MG/DL (ref 7–30)
CALCIUM SERPL-MCNC: 8.7 MG/DL (ref 8.5–10.1)
CHLORIDE BLD-SCNC: 94 MMOL/L (ref 94–109)
CO2 SERPL-SCNC: 28 MMOL/L (ref 20–32)
CREAT SERPL-MCNC: 5.53 MG/DL (ref 0.66–1.25)
CRP SERPL-MCNC: 14.6 MG/L (ref 0–8)
EOSINOPHIL # BLD AUTO: 0.1 10E3/UL (ref 0–0.7)
EOSINOPHIL NFR BLD AUTO: 2 %
ERYTHROCYTE [DISTWIDTH] IN BLOOD BY AUTOMATED COUNT: 15.4 % (ref 10–15)
ERYTHROCYTE [SEDIMENTATION RATE] IN BLOOD BY WESTERGREN METHOD: 41 MM/HR (ref 0–20)
GFR SERPL CREATININE-BSD FRML MDRD: 11 ML/MIN/1.73M2
GLUCOSE BLD-MCNC: 85 MG/DL (ref 70–99)
HCT VFR BLD AUTO: 28.6 % (ref 40–53)
HGB BLD-MCNC: 9 G/DL (ref 13.3–17.7)
IMM GRANULOCYTES # BLD: 0 10E3/UL
IMM GRANULOCYTES NFR BLD: 0 %
LYMPHOCYTES # BLD AUTO: 1.3 10E3/UL (ref 0.8–5.3)
LYMPHOCYTES NFR BLD AUTO: 16 %
MCH RBC QN AUTO: 29.8 PG (ref 26.5–33)
MCHC RBC AUTO-ENTMCNC: 31.5 G/DL (ref 31.5–36.5)
MCV RBC AUTO: 95 FL (ref 78–100)
MONOCYTES # BLD AUTO: 0.5 10E3/UL (ref 0–1.3)
MONOCYTES NFR BLD AUTO: 7 %
NEUTROPHILS # BLD AUTO: 6.1 10E3/UL (ref 1.6–8.3)
NEUTROPHILS NFR BLD AUTO: 74 %
NRBC # BLD AUTO: 0 10E3/UL
NRBC BLD AUTO-RTO: 0 /100
PLATELET # BLD AUTO: 259 10E3/UL (ref 150–450)
POTASSIUM BLD-SCNC: 3 MMOL/L (ref 3.4–5.3)
RBC # BLD AUTO: 3.02 10E6/UL (ref 4.4–5.9)
SODIUM SERPL-SCNC: 132 MMOL/L (ref 133–144)
WBC # BLD AUTO: 8.1 10E3/UL (ref 4–11)

## 2022-08-18 PROCEDURE — 85025 COMPLETE CBC W/AUTO DIFF WBC: CPT | Performed by: INTERNAL MEDICINE

## 2022-08-18 PROCEDURE — 80048 BASIC METABOLIC PNL TOTAL CA: CPT | Performed by: INTERNAL MEDICINE

## 2022-08-18 PROCEDURE — 99024 POSTOP FOLLOW-UP VISIT: CPT | Performed by: STUDENT IN AN ORGANIZED HEALTH CARE EDUCATION/TRAINING PROGRAM

## 2022-08-18 PROCEDURE — 85652 RBC SED RATE AUTOMATED: CPT | Performed by: INTERNAL MEDICINE

## 2022-08-18 PROCEDURE — 86140 C-REACTIVE PROTEIN: CPT | Performed by: INTERNAL MEDICINE

## 2022-08-18 PROCEDURE — 84450 TRANSFERASE (AST) (SGOT): CPT | Performed by: INTERNAL MEDICINE

## 2022-08-18 RX ORDER — CEFUROXIME AXETIL 250 MG/1
500 TABLET ORAL DAILY
Status: ON HOLD | COMMUNITY
Start: 2021-11-03 | End: 2022-12-29

## 2022-08-18 ASSESSMENT — PAIN SCALES - GENERAL: PAINLEVEL: MODERATE PAIN (5)

## 2022-08-18 NOTE — LETTER
"    8/18/2022         RE: Shay Jimenez  [address] [address]        Dear Colleague,    Thank you for referring your patient, Shay Jimenez, to the [facility] [place]. Please see a copy of my visit note below.    HPI:  57-year-old male status post C5-T6 posterior fusion with T2-3 decompression for thoracic myelopathy presents after a recent admission to a local hospital for bacteremia and possible sepsis.  He is now on antibiotics.  He denies any changes in his neurologic status since we last saw him.  A CT scan was done showing possible displacement of the upper lateral mass screws in the cervical spine.  He denies any significant pain and no wound healing issues.  He is continuing to improve with his thoracic myelopathy symptoms as he was last clinic visit.  Current Outpatient Medications   Medication     acetaminophen (TYLENOL) 325 MG tablet     ALPRAZolam (XANAX) 1 MG tablet     ammonium lactate (AMLACTIN) 12 % external cream     atorvastatin (LIPITOR) 20 MG tablet     baclofen (LIORESAL) 10 MG tablet     calcium carbonate (TUMS) 500 MG chewable tablet     cefuroxime (CEFTIN) 250 MG tablet     cetirizine (ZYRTEC) 10 MG tablet     cyanocobalamin (VITAMIN B-12) 500 MCG tablet     finasteride (PROSCAR) 5 MG tablet     fluticasone (FLONASE) 50 MCG/ACT nasal spray     gabapentin (NEURONTIN) 100 MG capsule     hydrOXYzine (ATARAX) 25 MG tablet     midodrine (PROAMATINE) 10 MG tablet     multivitamin RENAL (RENAVITE RX/NEPHROVITE) 1 MG tablet     naloxone (NARCAN) 4 MG/0.1ML nasal spray     omeprazole (PRILOSEC) 20 MG DR capsule     polyethylene glycol (MIRALAX) 17 GM/Dose powder     sertraline (ZOLOFT) 100 MG tablet     sevelamer carbonate (RENVELA) 800 MG tablet     vitamin B6 (PYRIDOXINE) 100 MG tablet     No current facility-administered medications for this visit.      Physical Exam:  Vital signs:                    Height: 185.4 cm (6' 1\") Weight: 132.5 kg " "(292 lb)  Estimated body mass index is 38.52 kg/m  as calculated from the following:    Height as of this encounter: 1.854 m (6' 1\").    Weight as of this encounter: 132.5 kg (292 lb).   Is full-strength in his bilateral lower extremities.  Sensation is intact to light touch.  His incision is well-healed without any erythema swelling or drainage.  Results Reviewed:  I personally viewed the images of the CT scan of the cervical and thoracic spine showing lateral mass screws at C5 and C6 which have been displaced posteriorly and no longer significantly in the bone.  The thoracic screws at T1 and T2 are firmly in place and there is significant improvement in the kyphosis as compared to preop.  I also reviewed an MRI of the thoracic spine which shows significant decompression of the thoracic spinal cord without any further compression.  No evidence of any abscess or fluid collection.  Assessment:  57-year-old male with displaced lateral mass screws at C5 and 6.  No change in symptoms.  Plan:  We discussed possible revision in the future.  Given he has an infection being treated right now I would recommend continuing to treat that as his T1 and T2 screws are in a good spot and he is continuing to improve with his symptoms.  We will discuss further revision once he has completed treatment for his infection.  I will see him back as previously scheduled in about 4 weeks.  We discussed warning symptoms including increased pain, numbness and weakness.    Fritz Boles MD      Again, thank you for allowing me to participate in the care of your patient.        Sincerely,        Fritz Boles MD    "

## 2022-08-19 ENCOUNTER — HOME INFUSION (PRE-WILLOW HOME INFUSION) (OUTPATIENT)
Dept: PHARMACY | Facility: CLINIC | Age: 57
End: 2022-08-19

## 2022-08-22 NOTE — PROGRESS NOTES
This is a recent snapshot of the patient's [facility] medical record.  For current drug dose and complete information and questions, call [phone] or In Basket pool, fv home infusion ([id number])  CSN Number:  [id number]

## 2022-08-23 LAB
ACID FAST STAIN (ARUP): NORMAL

## 2022-08-25 ENCOUNTER — LAB REQUISITION (OUTPATIENT)
Dept: LAB | Facility: CLINIC | Age: 57
End: 2022-08-25
Payer: MEDICARE

## 2022-08-25 ENCOUNTER — HOME INFUSION (PRE-WILLOW HOME INFUSION) (OUTPATIENT)
Dept: PHARMACY | Facility: CLINIC | Age: 57
End: 2022-08-25

## 2022-08-25 DIAGNOSIS — M46.24 OSTEOMYELITIS OF VERTEBRA, THORACIC REGION (H): ICD-10-CM

## 2022-08-25 DIAGNOSIS — R65.21 SEVERE SEPSIS WITH SEPTIC SHOCK (CODE) (H): ICD-10-CM

## 2022-08-25 LAB
AST SERPL W P-5'-P-CCNC: 22 U/L (ref 0–45)
BASOPHILS # BLD AUTO: 0.1 10E3/UL (ref 0–0.2)
BASOPHILS NFR BLD AUTO: 1 %
BUN SERPL-MCNC: 11 MG/DL (ref 7–30)
CREAT SERPL-MCNC: 5.22 MG/DL (ref 0.66–1.25)
CRP SERPL-MCNC: 39.2 MG/L (ref 0–8)
EOSINOPHIL # BLD AUTO: 0.2 10E3/UL (ref 0–0.7)
EOSINOPHIL NFR BLD AUTO: 3 %
ERYTHROCYTE [DISTWIDTH] IN BLOOD BY AUTOMATED COUNT: 15.9 % (ref 10–15)
ERYTHROCYTE [SEDIMENTATION RATE] IN BLOOD BY WESTERGREN METHOD: 34 MM/HR (ref 0–20)
GFR SERPL CREATININE-BSD FRML MDRD: 12 ML/MIN/1.73M2
HCT VFR BLD AUTO: 27.8 % (ref 40–53)
HGB BLD-MCNC: 8.8 G/DL (ref 13.3–17.7)
IMM GRANULOCYTES # BLD: 0 10E3/UL
IMM GRANULOCYTES NFR BLD: 0 %
LYMPHOCYTES # BLD AUTO: 1.2 10E3/UL (ref 0.8–5.3)
LYMPHOCYTES NFR BLD AUTO: 19 %
MCH RBC QN AUTO: 29.6 PG (ref 26.5–33)
MCHC RBC AUTO-ENTMCNC: 31.7 G/DL (ref 31.5–36.5)
MCV RBC AUTO: 94 FL (ref 78–100)
MONOCYTES # BLD AUTO: 0.4 10E3/UL (ref 0–1.3)
MONOCYTES NFR BLD AUTO: 6 %
NEUTROPHILS # BLD AUTO: 4.5 10E3/UL (ref 1.6–8.3)
NEUTROPHILS NFR BLD AUTO: 71 %
NRBC # BLD AUTO: 0 10E3/UL
NRBC BLD AUTO-RTO: 0 /100
PLATELET # BLD AUTO: 231 10E3/UL (ref 150–450)
RBC # BLD AUTO: 2.97 10E6/UL (ref 4.4–5.9)
WBC # BLD AUTO: 6.3 10E3/UL (ref 4–11)

## 2022-08-25 PROCEDURE — 84450 TRANSFERASE (AST) (SGOT): CPT | Performed by: INTERNAL MEDICINE

## 2022-08-25 PROCEDURE — 84520 ASSAY OF UREA NITROGEN: CPT | Performed by: INTERNAL MEDICINE

## 2022-08-25 PROCEDURE — 85025 COMPLETE CBC W/AUTO DIFF WBC: CPT | Performed by: INTERNAL MEDICINE

## 2022-08-25 PROCEDURE — 82565 ASSAY OF CREATININE: CPT | Performed by: INTERNAL MEDICINE

## 2022-08-25 PROCEDURE — 86140 C-REACTIVE PROTEIN: CPT | Performed by: INTERNAL MEDICINE

## 2022-08-25 PROCEDURE — 85652 RBC SED RATE AUTOMATED: CPT | Performed by: INTERNAL MEDICINE

## 2022-08-29 ENCOUNTER — HOME INFUSION (PRE-WILLOW HOME INFUSION) (OUTPATIENT)
Dept: PHARMACY | Facility: CLINIC | Age: 57
End: 2022-08-29

## 2022-08-29 ENCOUNTER — LAB REQUISITION (OUTPATIENT)
Dept: LAB | Facility: CLINIC | Age: 57
End: 2022-08-29
Payer: MEDICARE

## 2022-08-29 DIAGNOSIS — M46.24 OSTEOMYELITIS OF VERTEBRA, THORACIC REGION (H): ICD-10-CM

## 2022-08-29 DIAGNOSIS — R65.21 SEVERE SEPSIS WITH SEPTIC SHOCK (CODE) (H): ICD-10-CM

## 2022-08-29 LAB
BASOPHILS # BLD AUTO: 0.1 10E3/UL (ref 0–0.2)
BASOPHILS NFR BLD AUTO: 2 %
EOSINOPHIL # BLD AUTO: 0.2 10E3/UL (ref 0–0.7)
EOSINOPHIL NFR BLD AUTO: 4 %
ERYTHROCYTE [DISTWIDTH] IN BLOOD BY AUTOMATED COUNT: 15.9 % (ref 10–15)
HCT VFR BLD AUTO: 30.3 % (ref 40–53)
HGB BLD-MCNC: 9.6 G/DL (ref 13.3–17.7)
IMM GRANULOCYTES # BLD: 0 10E3/UL
IMM GRANULOCYTES NFR BLD: 0 %
LYMPHOCYTES # BLD AUTO: 0.9 10E3/UL (ref 0.8–5.3)
LYMPHOCYTES NFR BLD AUTO: 18 %
MCH RBC QN AUTO: 29.4 PG (ref 26.5–33)
MCHC RBC AUTO-ENTMCNC: 31.7 G/DL (ref 31.5–36.5)
MCV RBC AUTO: 93 FL (ref 78–100)
MONOCYTES # BLD AUTO: 0.4 10E3/UL (ref 0–1.3)
MONOCYTES NFR BLD AUTO: 9 %
NEUTROPHILS # BLD AUTO: 3.5 10E3/UL (ref 1.6–8.3)
NEUTROPHILS NFR BLD AUTO: 67 %
NRBC # BLD AUTO: 0 10E3/UL
NRBC BLD AUTO-RTO: 0 /100
PLATELET # BLD AUTO: 261 10E3/UL (ref 150–450)
RBC # BLD AUTO: 3.27 10E6/UL (ref 4.4–5.9)
WBC # BLD AUTO: 5.2 10E3/UL (ref 4–11)

## 2022-08-29 PROCEDURE — 85025 COMPLETE CBC W/AUTO DIFF WBC: CPT | Performed by: INTERNAL MEDICINE

## 2022-08-29 NOTE — PROGRESS NOTES
This is a recent snapshot of the patient's [facility] medical record.  For current drug dose and complete information and questions, call [phone] or In Basket pool, fv home infusion ([id number])  CSN Number:  [id number]

## 2022-08-30 ENCOUNTER — HOME INFUSION (PRE-WILLOW HOME INFUSION) (OUTPATIENT)
Dept: PHARMACY | Facility: CLINIC | Age: 57
End: 2022-08-30

## 2022-08-31 NOTE — PROGRESS NOTES
This is a recent snapshot of the patient's [facility] medical record.  For current drug dose and complete information and questions, call [phone] or In Basket pool, fv home infusion ([id number])  CSN Number:  [id number]

## 2022-09-01 ENCOUNTER — LAB REQUISITION (OUTPATIENT)
Dept: LAB | Facility: CLINIC | Age: 57
End: 2022-09-01
Payer: MEDICARE

## 2022-09-01 DIAGNOSIS — R65.21 SEVERE SEPSIS WITH SEPTIC SHOCK (CODE) (H): ICD-10-CM

## 2022-09-01 DIAGNOSIS — M46.24 OSTEOMYELITIS OF VERTEBRA, THORACIC REGION (H): ICD-10-CM

## 2022-09-01 LAB
AST SERPL W P-5'-P-CCNC: 22 U/L (ref 0–45)
BASOPHILS # BLD AUTO: 0.1 10E3/UL (ref 0–0.2)
BASOPHILS NFR BLD AUTO: 1 %
CRP SERPL-MCNC: 26.8 MG/L (ref 0–8)
EOSINOPHIL # BLD AUTO: 0.2 10E3/UL (ref 0–0.7)
EOSINOPHIL NFR BLD AUTO: 4 %
ERYTHROCYTE [DISTWIDTH] IN BLOOD BY AUTOMATED COUNT: 17 % (ref 10–15)
HCT VFR BLD AUTO: 28.4 % (ref 40–53)
HGB BLD-MCNC: 8.9 G/DL (ref 13.3–17.7)
IMM GRANULOCYTES # BLD: 0 10E3/UL
IMM GRANULOCYTES NFR BLD: 1 %
LYMPHOCYTES # BLD AUTO: 1.1 10E3/UL (ref 0.8–5.3)
LYMPHOCYTES NFR BLD AUTO: 23 %
MCH RBC QN AUTO: 30 PG (ref 26.5–33)
MCHC RBC AUTO-ENTMCNC: 31.3 G/DL (ref 31.5–36.5)
MCV RBC AUTO: 96 FL (ref 78–100)
MONOCYTES # BLD AUTO: 0.7 10E3/UL (ref 0–1.3)
MONOCYTES NFR BLD AUTO: 15 %
NEUTROPHILS # BLD AUTO: 2.6 10E3/UL (ref 1.6–8.3)
NEUTROPHILS NFR BLD AUTO: 56 %
NRBC # BLD AUTO: 0 10E3/UL
NRBC BLD AUTO-RTO: 0 /100
PLATELET # BLD AUTO: 164 10E3/UL (ref 150–450)
RBC # BLD AUTO: 2.97 10E6/UL (ref 4.4–5.9)
WBC # BLD AUTO: 4.6 10E3/UL (ref 4–11)

## 2022-09-01 PROCEDURE — 84450 TRANSFERASE (AST) (SGOT): CPT | Performed by: INTERNAL MEDICINE

## 2022-09-01 PROCEDURE — 86140 C-REACTIVE PROTEIN: CPT | Performed by: INTERNAL MEDICINE

## 2022-09-01 PROCEDURE — 85025 COMPLETE CBC W/AUTO DIFF WBC: CPT | Performed by: INTERNAL MEDICINE

## 2022-09-02 ENCOUNTER — HOME INFUSION (PRE-WILLOW HOME INFUSION) (OUTPATIENT)
Dept: PHARMACY | Facility: CLINIC | Age: 57
End: 2022-09-02

## 2022-09-06 NOTE — PROGRESS NOTES
This is a recent snapshot of the patient's [facility] medical record.  For current drug dose and complete information and questions, call [phone] or In Basket pool, fv home infusion ([id number])  CSN Number:  [id number]

## 2022-09-07 ENCOUNTER — DOCUMENTATION ONLY (OUTPATIENT)
Dept: NEUROSURGERY | Facility: CLINIC | Age: 57
End: 2022-09-07

## 2022-09-08 ENCOUNTER — LAB REQUISITION (OUTPATIENT)
Dept: LAB | Facility: CLINIC | Age: 57
End: 2022-09-08
Payer: MEDICARE

## 2022-09-08 DIAGNOSIS — R65.21 SEVERE SEPSIS WITH SEPTIC SHOCK (CODE) (H): ICD-10-CM

## 2022-09-08 DIAGNOSIS — M46.24 OSTEOMYELITIS OF VERTEBRA, THORACIC REGION (H): ICD-10-CM

## 2022-09-08 LAB
AST SERPL W P-5'-P-CCNC: 33 U/L (ref 0–45)
BASOPHILS # BLD AUTO: 0.1 10E3/UL (ref 0–0.2)
BASOPHILS NFR BLD AUTO: 1 %
BUN SERPL-MCNC: 8 MG/DL (ref 7–30)
CREAT SERPL-MCNC: 5.07 MG/DL (ref 0.66–1.25)
CRP SERPL-MCNC: 30.3 MG/L (ref 0–8)
EOSINOPHIL # BLD AUTO: 0.3 10E3/UL (ref 0–0.7)
EOSINOPHIL NFR BLD AUTO: 5 %
ERYTHROCYTE [DISTWIDTH] IN BLOOD BY AUTOMATED COUNT: 19.1 % (ref 10–15)
ERYTHROCYTE [SEDIMENTATION RATE] IN BLOOD BY WESTERGREN METHOD: 27 MM/HR (ref 0–20)
GFR SERPL CREATININE-BSD FRML MDRD: 12 ML/MIN/1.73M2
HCT VFR BLD AUTO: 26.1 % (ref 40–53)
HGB BLD-MCNC: 8.2 G/DL (ref 13.3–17.7)
IMM GRANULOCYTES # BLD: 0 10E3/UL
IMM GRANULOCYTES NFR BLD: 0 %
LYMPHOCYTES # BLD AUTO: 1.3 10E3/UL (ref 0.8–5.3)
LYMPHOCYTES NFR BLD AUTO: 25 %
MCH RBC QN AUTO: 30 PG (ref 26.5–33)
MCHC RBC AUTO-ENTMCNC: 31.4 G/DL (ref 31.5–36.5)
MCV RBC AUTO: 96 FL (ref 78–100)
MONOCYTES # BLD AUTO: 0.6 10E3/UL (ref 0–1.3)
MONOCYTES NFR BLD AUTO: 11 %
NEUTROPHILS # BLD AUTO: 3.1 10E3/UL (ref 1.6–8.3)
NEUTROPHILS NFR BLD AUTO: 58 %
NRBC # BLD AUTO: 0 10E3/UL
NRBC BLD AUTO-RTO: 0 /100
PLATELET # BLD AUTO: 128 10E3/UL (ref 150–450)
RBC # BLD AUTO: 2.73 10E6/UL (ref 4.4–5.9)
WBC # BLD AUTO: 5.4 10E3/UL (ref 4–11)

## 2022-09-08 PROCEDURE — 85652 RBC SED RATE AUTOMATED: CPT | Performed by: INTERNAL MEDICINE

## 2022-09-08 PROCEDURE — 84520 ASSAY OF UREA NITROGEN: CPT | Performed by: INTERNAL MEDICINE

## 2022-09-08 PROCEDURE — 86140 C-REACTIVE PROTEIN: CPT | Performed by: INTERNAL MEDICINE

## 2022-09-08 PROCEDURE — 84450 TRANSFERASE (AST) (SGOT): CPT | Performed by: INTERNAL MEDICINE

## 2022-09-08 PROCEDURE — 82565 ASSAY OF CREATININE: CPT | Performed by: INTERNAL MEDICINE

## 2022-09-08 PROCEDURE — 85025 COMPLETE CBC W/AUTO DIFF WBC: CPT | Mod: AY | Performed by: INTERNAL MEDICINE

## 2022-09-09 ENCOUNTER — HOME INFUSION (PRE-WILLOW HOME INFUSION) (OUTPATIENT)
Dept: PHARMACY | Facility: CLINIC | Age: 57
End: 2022-09-09

## 2022-09-12 ENCOUNTER — TELEPHONE (OUTPATIENT)
Dept: NEUROSURGERY | Facility: CLINIC | Age: 57
End: 2022-09-12

## 2022-09-12 ENCOUNTER — LAB REQUISITION (OUTPATIENT)
Dept: LAB | Facility: CLINIC | Age: 57
End: 2022-09-12
Payer: MEDICARE

## 2022-09-12 DIAGNOSIS — R65.21 SEVERE SEPSIS WITH SEPTIC SHOCK (CODE) (H): ICD-10-CM

## 2022-09-12 LAB
BASOPHILS # BLD AUTO: 0.1 10E3/UL (ref 0–0.2)
BASOPHILS NFR BLD AUTO: 1 %
CRP SERPL-MCNC: 9.4 MG/L (ref 0–8)
EOSINOPHIL # BLD AUTO: 0.2 10E3/UL (ref 0–0.7)
EOSINOPHIL NFR BLD AUTO: 5 %
ERYTHROCYTE [DISTWIDTH] IN BLOOD BY AUTOMATED COUNT: 19.3 % (ref 10–15)
HCT VFR BLD AUTO: 27 % (ref 40–53)
HGB BLD-MCNC: 8.6 G/DL (ref 13.3–17.7)
IMM GRANULOCYTES # BLD: 0 10E3/UL
IMM GRANULOCYTES NFR BLD: 1 %
LYMPHOCYTES # BLD AUTO: 1.2 10E3/UL (ref 0.8–5.3)
LYMPHOCYTES NFR BLD AUTO: 28 %
MCH RBC QN AUTO: 31.3 PG (ref 26.5–33)
MCHC RBC AUTO-ENTMCNC: 31.9 G/DL (ref 31.5–36.5)
MCV RBC AUTO: 98 FL (ref 78–100)
MONOCYTES # BLD AUTO: 0.3 10E3/UL (ref 0–1.3)
MONOCYTES NFR BLD AUTO: 6 %
NEUTROPHILS # BLD AUTO: 2.6 10E3/UL (ref 1.6–8.3)
NEUTROPHILS NFR BLD AUTO: 59 %
NRBC # BLD AUTO: 0 10E3/UL
NRBC BLD AUTO-RTO: 0 /100
PLATELET # BLD AUTO: 137 10E3/UL (ref 150–450)
RBC # BLD AUTO: 2.75 10E6/UL (ref 4.4–5.9)
WBC # BLD AUTO: 4.3 10E3/UL (ref 4–11)

## 2022-09-12 PROCEDURE — 85025 COMPLETE CBC W/AUTO DIFF WBC: CPT | Mod: AY | Performed by: INTERNAL MEDICINE

## 2022-09-12 PROCEDURE — 86140 C-REACTIVE PROTEIN: CPT | Performed by: INTERNAL MEDICINE

## 2022-09-12 NOTE — TELEPHONE ENCOUNTER
"See TE note below which was attached to pt's son's chart rather than the patient himself. Pt's son, Cory, called to have [employer] form completed so he can continue to remain home and help care for pt who is recovering from spinal surgery. Form were received at [facility] but unclear if these have been signed and sent back to the [employer]. Will send to [facility] neurosurgery staff to inquire if these have been completed. Pt states they will need to be sent back today.       \"Pt called to follow up on a form (Certification for  healthcare and family member with serious health condition . ) [employer] insurance company faxed in last Tuesday for the clinic to review and fill out. Pt is requesting the clinic fill out the form and fax back to the insurance company.\"     Pt is also requesting a call [phone].     [phone]- [employer] Fax     Action Taken: Message routed to:  Adult Clinics: Surgery, General p [id number]     Travel Screening: Not Applicable                                                                                                Celina Mireles RNCC  Neurology/Neurosurgery/PM&R              "

## 2022-09-13 ENCOUNTER — TELEPHONE (OUTPATIENT)
Dept: NEUROSURGERY | Facility: CLINIC | Age: 57
End: 2022-09-13

## 2022-09-13 ENCOUNTER — HOME INFUSION (PRE-WILLOW HOME INFUSION) (OUTPATIENT)
Dept: PHARMACY | Facility: CLINIC | Age: 57
End: 2022-09-13

## 2022-09-13 DIAGNOSIS — Z98.1 S/P SPINAL FUSION: Primary | ICD-10-CM

## 2022-09-13 NOTE — TELEPHONE ENCOUNTER
Cory called in regards to his paperwork for FMLA for his dad who is a pt of Dr Boles. Cory is going to take care of his dad (Shay Baum : 1965) while his dad is recovering. Cory's work is asking for the completed paperwork to be sent ASAP. Paperwork was faxed over on Tuesday last week 9/6 & the deadline is 2022. Please call Cory when it is completed.  Thank you.

## 2022-09-13 NOTE — PROGRESS NOTES
This is a recent snapshot of the patient's [facility] medical record.  For current drug dose and complete information and questions, call [phone] or In Basket pool, fv home infusion ([id number])  CSN Number:  [id number]

## 2022-09-14 ENCOUNTER — TELEPHONE (OUTPATIENT)
Dept: NEUROSURGERY | Facility: CLINIC | Age: 57
End: 2022-09-14

## 2022-09-14 NOTE — PROGRESS NOTES
Faxed signed forms September 14, 2022 to fax number [phone]    Right Fax confirmed at 11:10 AM    Will bring to  location to be scanned into chart.

## 2022-09-14 NOTE — TELEPHONE ENCOUNTER
Patient's son is requesting a return call, he is wanting a status on the patient's [place] paperwork, he can be reached back at [phone].

## 2022-09-14 NOTE — PROGRESS NOTES
This is a recent snapshot of the patient's [facility] medical record.  For current drug dose and complete information and questions, call [phone] or In Basket pool, fv home infusion ([id number])  CSN Number:  [id number]

## 2022-09-15 ENCOUNTER — HOME INFUSION (PRE-WILLOW HOME INFUSION) (OUTPATIENT)
Dept: PHARMACY | Facility: CLINIC | Age: 57
End: 2022-09-15

## 2022-09-15 ENCOUNTER — LAB REQUISITION (OUTPATIENT)
Dept: LAB | Facility: CLINIC | Age: 57
End: 2022-09-15
Payer: MEDICARE

## 2022-09-15 DIAGNOSIS — R65.21 SEVERE SEPSIS WITH SEPTIC SHOCK (CODE) (H): ICD-10-CM

## 2022-09-15 DIAGNOSIS — M46.24 OSTEOMYELITIS OF VERTEBRA, THORACIC REGION (H): ICD-10-CM

## 2022-09-15 LAB
AST SERPL W P-5'-P-CCNC: 38 U/L (ref 0–45)
BASOPHILS # BLD AUTO: 0 10E3/UL (ref 0–0.2)
BASOPHILS NFR BLD AUTO: 1 %
BUN SERPL-MCNC: 18 MG/DL (ref 7–30)
CREAT SERPL-MCNC: 6.45 MG/DL (ref 0.66–1.25)
CRP SERPL-MCNC: 29.7 MG/L (ref 0–8)
EOSINOPHIL # BLD AUTO: 0.1 10E3/UL (ref 0–0.7)
EOSINOPHIL NFR BLD AUTO: 3 %
ERYTHROCYTE [DISTWIDTH] IN BLOOD BY AUTOMATED COUNT: 19.6 % (ref 10–15)
ERYTHROCYTE [SEDIMENTATION RATE] IN BLOOD BY WESTERGREN METHOD: 25 MM/HR (ref 0–20)
GFR SERPL CREATININE-BSD FRML MDRD: 9 ML/MIN/1.73M2
HCT VFR BLD AUTO: 27.5 % (ref 40–53)
HGB BLD-MCNC: 8.8 G/DL (ref 13.3–17.7)
IMM GRANULOCYTES # BLD: 0 10E3/UL
IMM GRANULOCYTES NFR BLD: 0 %
LYMPHOCYTES # BLD AUTO: 0.7 10E3/UL (ref 0.8–5.3)
LYMPHOCYTES NFR BLD AUTO: 22 %
MCH RBC QN AUTO: 30.8 PG (ref 26.5–33)
MCHC RBC AUTO-ENTMCNC: 32 G/DL (ref 31.5–36.5)
MCV RBC AUTO: 96 FL (ref 78–100)
MONOCYTES # BLD AUTO: 0.3 10E3/UL (ref 0–1.3)
MONOCYTES NFR BLD AUTO: 10 %
NEUTROPHILS # BLD AUTO: 2.1 10E3/UL (ref 1.6–8.3)
NEUTROPHILS NFR BLD AUTO: 64 %
NRBC # BLD AUTO: 0 10E3/UL
NRBC BLD AUTO-RTO: 0 /100
PLATELET # BLD AUTO: 121 10E3/UL (ref 150–450)
RBC # BLD AUTO: 2.86 10E6/UL (ref 4.4–5.9)
WBC # BLD AUTO: 3.3 10E3/UL (ref 4–11)

## 2022-09-15 PROCEDURE — 84520 ASSAY OF UREA NITROGEN: CPT | Performed by: INTERNAL MEDICINE

## 2022-09-15 PROCEDURE — 85025 COMPLETE CBC W/AUTO DIFF WBC: CPT | Mod: AY | Performed by: INTERNAL MEDICINE

## 2022-09-15 PROCEDURE — 84450 TRANSFERASE (AST) (SGOT): CPT | Performed by: INTERNAL MEDICINE

## 2022-09-15 PROCEDURE — 86140 C-REACTIVE PROTEIN: CPT | Performed by: INTERNAL MEDICINE

## 2022-09-15 PROCEDURE — 85652 RBC SED RATE AUTOMATED: CPT | Performed by: INTERNAL MEDICINE

## 2022-09-15 PROCEDURE — 82565 ASSAY OF CREATININE: CPT | Mod: AY | Performed by: INTERNAL MEDICINE

## 2022-09-16 ENCOUNTER — HOME INFUSION (PRE-WILLOW HOME INFUSION) (OUTPATIENT)
Dept: PHARMACY | Facility: CLINIC | Age: 57
End: 2022-09-16

## 2022-09-16 NOTE — PROGRESS NOTES
This is a recent snapshot of the patient's [facility] medical record.  For current drug dose and complete information and questions, call [phone] or In Basket pool, fv home infusion ([id number])  CSN Number:  [id number]

## 2022-09-20 ENCOUNTER — HOME INFUSION (PRE-WILLOW HOME INFUSION) (OUTPATIENT)
Dept: PHARMACY | Facility: CLINIC | Age: 57
End: 2022-09-20

## 2022-09-21 ENCOUNTER — TELEPHONE (OUTPATIENT)
Dept: NEUROSURGERY | Facility: CLINIC | Age: 57
End: 2022-09-21

## 2022-09-21 NOTE — TELEPHONE ENCOUNTER
I called pt and let him know that we are keeping his appt for tomorrow at 340pm with xrays before. Pt was very appreciative    Cyndy Garcia LPN

## 2022-09-21 NOTE — TELEPHONE ENCOUNTER
"I called patient to explain that we needed to reschedule his appt that was just scheduled with Dr. Boles for tomorrow. Unfortunately he was being scheduled at the same time as another patient. Dr. Boles has agreed to add this patient on next week 11/29/2022 at 11:20. He will need xrays before appt.   When I explained the above information to patient patient stated he wanted to know why his original appt  For tomorrow at 9:20 was cancelled, I explained that it stated in notes it was cancelled because he had appt at [facility].  Pt stated he doesn't know why they keep trying to get him to go to [facility] and he is not going to wait, He wants to speak to Dr. Boles now.  I let him know that I will let Dr. Boles know and someone will call him back.  Pt Stated \" I do not want a nurse to call me I want Dr. Boles to call me back.\"    Cyndy Garcia LPN  "

## 2022-09-22 ENCOUNTER — LAB REQUISITION (OUTPATIENT)
Dept: LAB | Facility: CLINIC | Age: 57
End: 2022-09-22
Payer: MEDICARE

## 2022-09-22 ENCOUNTER — OFFICE VISIT (OUTPATIENT)
Dept: NEUROSURGERY | Facility: CLINIC | Age: 57
End: 2022-09-22
Payer: MEDICARE

## 2022-09-22 ENCOUNTER — HOME INFUSION (PRE-WILLOW HOME INFUSION) (OUTPATIENT)
Dept: PHARMACY | Facility: CLINIC | Age: 57
End: 2022-09-22

## 2022-09-22 VITALS
SYSTOLIC BLOOD PRESSURE: 87 MMHG | HEIGHT: 73 IN | DIASTOLIC BLOOD PRESSURE: 54 MMHG | WEIGHT: 292 LBS | BODY MASS INDEX: 38.7 KG/M2 | HEART RATE: 97 BPM

## 2022-09-22 DIAGNOSIS — M46.24 OSTEOMYELITIS OF VERTEBRA, THORACIC REGION (H): ICD-10-CM

## 2022-09-22 DIAGNOSIS — Z98.1 S/P SPINAL FUSION: Primary | ICD-10-CM

## 2022-09-22 LAB
AST SERPL W P-5'-P-CCNC: 41 U/L (ref 10–50)
BASOPHILS # BLD AUTO: 0 10E3/UL (ref 0–0.2)
BASOPHILS NFR BLD AUTO: 1 %
BUN SERPL-MCNC: 11.2 MG/DL (ref 6–20)
CREAT SERPL-MCNC: 5.03 MG/DL (ref 0.67–1.17)
CRP SERPL-MCNC: 32.9 MG/L
EOSINOPHIL # BLD AUTO: 0.1 10E3/UL (ref 0–0.7)
EOSINOPHIL NFR BLD AUTO: 3 %
ERYTHROCYTE [DISTWIDTH] IN BLOOD BY AUTOMATED COUNT: 20.9 % (ref 10–15)
GFR SERPL CREATININE-BSD FRML MDRD: 13 ML/MIN/1.73M2
HCT VFR BLD AUTO: 28.2 % (ref 40–53)
HGB BLD-MCNC: 8.9 G/DL (ref 13.3–17.7)
HOLD SPECIMEN: NORMAL
HOLD SPECIMEN: NORMAL
IMM GRANULOCYTES # BLD: 0 10E3/UL
IMM GRANULOCYTES NFR BLD: 0 %
LYMPHOCYTES # BLD AUTO: 0.9 10E3/UL (ref 0.8–5.3)
LYMPHOCYTES NFR BLD AUTO: 19 %
MCH RBC QN AUTO: 30.9 PG (ref 26.5–33)
MCHC RBC AUTO-ENTMCNC: 31.6 G/DL (ref 31.5–36.5)
MCV RBC AUTO: 98 FL (ref 78–100)
MONOCYTES # BLD AUTO: 0.6 10E3/UL (ref 0–1.3)
MONOCYTES NFR BLD AUTO: 12 %
NEUTROPHILS # BLD AUTO: 2.9 10E3/UL (ref 1.6–8.3)
NEUTROPHILS NFR BLD AUTO: 65 %
NRBC # BLD AUTO: 0 10E3/UL
NRBC BLD AUTO-RTO: 0 /100
PLATELET # BLD AUTO: 150 10E3/UL (ref 150–450)
RBC # BLD AUTO: 2.88 10E6/UL (ref 4.4–5.9)
WBC # BLD AUTO: 4.5 10E3/UL (ref 4–11)

## 2022-09-22 PROCEDURE — 86140 C-REACTIVE PROTEIN: CPT | Performed by: INTERNAL MEDICINE

## 2022-09-22 PROCEDURE — 84450 TRANSFERASE (AST) (SGOT): CPT | Performed by: INTERNAL MEDICINE

## 2022-09-22 PROCEDURE — 82565 ASSAY OF CREATININE: CPT | Performed by: INTERNAL MEDICINE

## 2022-09-22 PROCEDURE — 85025 COMPLETE CBC W/AUTO DIFF WBC: CPT | Performed by: INTERNAL MEDICINE

## 2022-09-22 PROCEDURE — 84520 ASSAY OF UREA NITROGEN: CPT | Mod: AY | Performed by: INTERNAL MEDICINE

## 2022-09-22 PROCEDURE — 99024 POSTOP FOLLOW-UP VISIT: CPT | Performed by: STUDENT IN AN ORGANIZED HEALTH CARE EDUCATION/TRAINING PROGRAM

## 2022-09-22 RX ORDER — APIXABAN 2.5 MG/1
TABLET, FILM COATED ORAL
Status: ON HOLD | COMMUNITY
Start: 2022-08-15 | End: 2022-12-29

## 2022-09-22 ASSESSMENT — PAIN SCALES - GENERAL: PAINLEVEL: MODERATE PAIN (4)

## 2022-09-22 NOTE — PROGRESS NOTES
"Shay Jimenez's goals for this visit include:   Chief Complaint   Patient presents with     RECHECK     Upper back ( Throasic ) neck        He requests these members of his care team be copied on today's visit information: yes    PCP: Jonathan Travis    Referring Provider:  Referred Self, MD  No address on file    BP (!) 87/54 (BP Location: Right arm, Patient Position: Sitting, Cuff Size: Adult Small)   Pulse 97   Ht 1.854 m (6' 1\")   Wt 132.5 kg (292 lb)   BMI 38.52 kg/m      Do you need any medication refills at today's visit? Yes  WILIAM Butcher., LESLI ([facility])      "

## 2022-09-22 NOTE — PROGRESS NOTES
This is a recent snapshot of the patient's [facility] medical record.  For current drug dose and complete information and questions, call [phone] or In Basket pool, fv home infusion ([id number])  CSN Number:  [id number]

## 2022-09-22 NOTE — PROGRESS NOTES
This is a recent snapshot of the patient's [facility] medical record.  For current drug dose and complete information and questions, call [phone] or In Basket pool, fv home infusion ([id number])  CSN Number:  [id number]

## 2022-09-22 NOTE — LETTER
"    9/22/2022         RE: Shay Jimenez  [address] [address]        Dear Colleague,    Thank you for referring your patient, Shay Jimenez, to the [facility] [place]. Please see a copy of my visit note below.    Shay Jimenez's goals for this visit include:   Chief Complaint   Patient presents with     RECHECK     Upper back ( Throasic ) neck        He requests these members of his care team be copied on today's visit information: yes    PCP: Jonathan Travis    Referring Provider:  Referred Self, MD  No address on file    BP (!) 87/54 (BP Location: Right arm, Patient Position: Sitting, Cuff Size: Adult Small)   Pulse 97   Ht 1.854 m (6' 1\")   Wt 132.5 kg (292 lb)   BMI 38.52 kg/m      Do you need any medication refills at today's visit? Yes  MIESHA Butcher, LESLI ([facility])        HPI:  57 year old male s/p C5 to T6 fusuion and decompression at T2-3 for chronic osteomyelitis with cord compression.  States no changes since last visit.  Is walking at home gradually better than before.  Continuing to work with PT.  Strength improving.  Last visit he was noted to have losening of the lateral mass screws at C5 and C6 but intact T1-2 screws with no new kyphosis or stenosis.  No new symptoms.  Continued ABX for infection.  Current Outpatient Medications   Medication     acetaminophen (TYLENOL) 325 MG tablet     ALPRAZolam (XANAX) 1 MG tablet     ammonium lactate (AMLACTIN) 12 % external cream     atorvastatin (LIPITOR) 20 MG tablet     baclofen (LIORESAL) 10 MG tablet     calcium carbonate (TUMS) 500 MG chewable tablet     cefuroxime (CEFTIN) 250 MG tablet     cetirizine (ZYRTEC) 10 MG tablet     cyanocobalamin (VITAMIN B-12) 500 MCG tablet     ELIQUIS ANTICOAGULANT 2.5 MG tablet     finasteride (PROSCAR) 5 MG tablet     fluticasone (FLONASE) 50 MCG/ACT nasal spray     gabapentin (NEURONTIN) 100 MG capsule     hydrOXYzine (ATARAX) 25 MG tablet     midodrine " "(PROAMATINE) 10 MG tablet     multivitamin RENAL (RENAVITE RX/NEPHROVITE) 1 MG tablet     naloxone (NARCAN) 4 MG/0.1ML nasal spray     omeprazole (PRILOSEC) 20 MG DR capsule     polyethylene glycol (MIRALAX) 17 GM/Dose powder     sertraline (ZOLOFT) 100 MG tablet     sevelamer carbonate (RENVELA) 800 MG tablet     vitamin B6 (PYRIDOXINE) 100 MG tablet     No current facility-administered medications for this visit.      Physical Exam:  Vital signs:                   Height: 185.4 cm (6' 1\") Weight: 132.5 kg (292 lb)  Estimated body mass index is 38.52 kg/m  as calculated from the following:    Height as of this encounter: 1.854 m (6' 1\").    Weight as of this encounter: 132.5 kg (292 lb).   He has full strength in his bilateral upper and lower extremities.  Normal sensation in upper and lower extremities.  Incision healing well.  No palpable hardware.  Results Reviewed:  I have personally reviewed x rays today showing no increased kyphosis or hardware complications.  Based on his prior MRI and CT, I suspect the C5 screws and unilateral c6 screw still displaced.    Assessment:  57 year old male s/p c5-T6 fusion and T2-3 decompression, screw back out at c5-6  Plan:  Discussed revision surgery.  This will likely be necessary unless there are clear signs of posterior fusion.  Given he is doing well, we will keep in a collar at all times and get a Ct scan in the next couple of weeks.  Should this not show any signs of fusion, I will recommend a revision surgery.  Discussed red flag symptoms to call with.  I would like to wait to complete the antibiotics before revision surgery if possible as well.  I instructed to call should there be any changes or concerns as well.    Fritz Boles MD        Again, thank you for allowing me to participate in the care of your patient.        Sincerely,        Fritz Boles MD    "

## 2022-09-26 ENCOUNTER — TELEPHONE (OUTPATIENT)
Dept: NEUROSURGERY | Facility: CLINIC | Age: 57
End: 2022-09-26

## 2022-09-26 NOTE — TELEPHONE ENCOUNTER
Cory called in regards to the paperwork for hisself.  Please call him ASA. [phone]  Thank you

## 2022-09-26 NOTE — PROGRESS NOTES
This is a recent snapshot of the patient's [facility] medical record.  For current drug dose and complete information and questions, call [phone] or In Basket pool, fv home infusion ([id number])  CSN Number:  [id number]

## 2022-09-26 NOTE — TELEPHONE ENCOUNTER
Called son to discuss. He has asked for some additional time off from work to include the dates on 8/26-9/11.  Cory reports the [employer] faxed some more paperwork on 9/22.  We have not received any more paperwork  Dr Boles will need to review when he is in office on 8/28.   Per cory it is due on 9/29

## 2022-09-27 ENCOUNTER — MYC MEDICAL ADVICE (OUTPATIENT)
Dept: NEUROSURGERY | Facility: CLINIC | Age: 57
End: 2022-09-27

## 2022-09-27 NOTE — TELEPHONE ENCOUNTER
"Fill out a \"yes\" to question requiring clarification that Dr Boles approves to extend the leave for patients son.   Dr Boles to sign  Please fax back to [place] by 9/28  "

## 2022-09-27 NOTE — TELEPHONE ENCOUNTER
Called son Cory  We do not have any new paperwork     Per patient, verbally gave the ok for son to help set up mychart. He will send additional paperwork through [employer]

## 2022-09-28 NOTE — TELEPHONE ENCOUNTER
"Dr. Boles authorized. Completed form by replying \"yes\" and signed by Dr Boles. Form sent to be scanned into chart. Updated patient via [employer].     Faxed completed and signed form to [employer]  September 28, 2022 to fax number [phone].     Right Fax confirmed at 1500 PM          "

## 2022-09-29 ENCOUNTER — LAB REQUISITION (OUTPATIENT)
Dept: LAB | Facility: CLINIC | Age: 57
End: 2022-09-29
Payer: MEDICARE

## 2022-09-29 DIAGNOSIS — M46.24 OSTEOMYELITIS OF VERTEBRA, THORACIC REGION (H): ICD-10-CM

## 2022-09-29 DIAGNOSIS — R65.21 SEVERE SEPSIS WITH SEPTIC SHOCK (CODE) (H): ICD-10-CM

## 2022-09-29 LAB
AST SERPL W P-5'-P-CCNC: 35 U/L (ref 0–45)
BASOPHILS # BLD AUTO: 0 10E3/UL (ref 0–0.2)
BASOPHILS NFR BLD AUTO: 1 %
BUN SERPL-MCNC: 15 MG/DL (ref 7–30)
CREAT SERPL-MCNC: 5.99 MG/DL (ref 0.66–1.25)
CRP SERPL-MCNC: 53.5 MG/L (ref 0–8)
EOSINOPHIL # BLD AUTO: 0.1 10E3/UL (ref 0–0.7)
EOSINOPHIL NFR BLD AUTO: 2 %
ERYTHROCYTE [DISTWIDTH] IN BLOOD BY AUTOMATED COUNT: 20.3 % (ref 10–15)
ERYTHROCYTE [SEDIMENTATION RATE] IN BLOOD BY WESTERGREN METHOD: 31 MM/HR (ref 0–20)
GFR SERPL CREATININE-BSD FRML MDRD: 10 ML/MIN/1.73M2
HCT VFR BLD AUTO: 25 % (ref 40–53)
HGB BLD-MCNC: 8.3 G/DL (ref 13.3–17.7)
IMM GRANULOCYTES # BLD: 0 10E3/UL
IMM GRANULOCYTES NFR BLD: 1 %
LYMPHOCYTES # BLD AUTO: 0.7 10E3/UL (ref 0.8–5.3)
LYMPHOCYTES NFR BLD AUTO: 17 %
MCH RBC QN AUTO: 31.4 PG (ref 26.5–33)
MCHC RBC AUTO-ENTMCNC: 33.2 G/DL (ref 31.5–36.5)
MCV RBC AUTO: 95 FL (ref 78–100)
MONOCYTES # BLD AUTO: 0.4 10E3/UL (ref 0–1.3)
MONOCYTES NFR BLD AUTO: 10 %
NEUTROPHILS # BLD AUTO: 2.9 10E3/UL (ref 1.6–8.3)
NEUTROPHILS NFR BLD AUTO: 69 %
NRBC # BLD AUTO: 0 10E3/UL
NRBC BLD AUTO-RTO: 0 /100
PLATELET # BLD AUTO: 144 10E3/UL (ref 150–450)
RBC # BLD AUTO: 2.64 10E6/UL (ref 4.4–5.9)
WBC # BLD AUTO: 4.1 10E3/UL (ref 4–11)

## 2022-09-29 PROCEDURE — 85025 COMPLETE CBC W/AUTO DIFF WBC: CPT | Performed by: INTERNAL MEDICINE

## 2022-09-29 PROCEDURE — 86140 C-REACTIVE PROTEIN: CPT | Performed by: INTERNAL MEDICINE

## 2022-09-29 PROCEDURE — 84520 ASSAY OF UREA NITROGEN: CPT | Performed by: INTERNAL MEDICINE

## 2022-09-29 PROCEDURE — 85652 RBC SED RATE AUTOMATED: CPT | Performed by: INTERNAL MEDICINE

## 2022-09-29 PROCEDURE — 84450 TRANSFERASE (AST) (SGOT): CPT | Performed by: INTERNAL MEDICINE

## 2022-09-29 PROCEDURE — 82565 ASSAY OF CREATININE: CPT | Performed by: INTERNAL MEDICINE

## 2022-09-30 ENCOUNTER — HOME INFUSION (PRE-WILLOW HOME INFUSION) (OUTPATIENT)
Dept: PHARMACY | Facility: CLINIC | Age: 57
End: 2022-09-30

## 2022-09-30 ENCOUNTER — TELEPHONE (OUTPATIENT)
Dept: NEUROSURGERY | Facility: CLINIC | Age: 57
End: 2022-09-30

## 2022-09-30 NOTE — TELEPHONE ENCOUNTER
Last Visit: 9/22/22 with Dr Ceja    Next Visit: 10/20/22 followup with Dr ceja with CT Cervical and Thoracic prior    Name of Provider:  Dr Ceja    Assessment: Patient s/p Cervical 6 to Thoracic 6 Fusion and Thoracic 2-3 Decompression on 6/27/22. As per patient, when last seen in clinic with Dr Ceja, there was discussion of possibly needing to do another surgery.  Calls clinic reporting that he feels his  hardware is broken/loose. Pain ok. Some mild intermittent n/t in fingers. Patient had a difficult time describing symptoms exactly but just feels something is wrong and wants to be seen by Provider earlier.   Reviewed Red Flags and denies at this time.    Recommendation given: CTM symptoms. Reviewed Red Flags and to proceed to ED if any occur.     Action needed from provider: Routed to Provider for rec's

## 2022-10-03 ENCOUNTER — LAB REQUISITION (OUTPATIENT)
Dept: LAB | Facility: CLINIC | Age: 57
End: 2022-10-03
Payer: MEDICARE

## 2022-10-03 DIAGNOSIS — M46.24 OSTEOMYELITIS OF VERTEBRA, THORACIC REGION (H): ICD-10-CM

## 2022-10-03 DIAGNOSIS — R65.21 SEVERE SEPSIS WITH SEPTIC SHOCK (CODE) (H): ICD-10-CM

## 2022-10-03 LAB
BASOPHILS # BLD AUTO: 0 10E3/UL (ref 0–0.2)
BASOPHILS NFR BLD AUTO: 1 %
CRP SERPL-MCNC: 42.6 MG/L (ref 0–8)
EOSINOPHIL # BLD AUTO: 0.1 10E3/UL (ref 0–0.7)
EOSINOPHIL NFR BLD AUTO: 2 %
ERYTHROCYTE [DISTWIDTH] IN BLOOD BY AUTOMATED COUNT: 19.8 % (ref 10–15)
HCT VFR BLD AUTO: 27.5 % (ref 40–53)
HGB BLD-MCNC: 9.2 G/DL (ref 13.3–17.7)
IMM GRANULOCYTES # BLD: 0 10E3/UL
IMM GRANULOCYTES NFR BLD: 0 %
LYMPHOCYTES # BLD AUTO: 0.5 10E3/UL (ref 0.8–5.3)
LYMPHOCYTES NFR BLD AUTO: 9 %
MCH RBC QN AUTO: 32.4 PG (ref 26.5–33)
MCHC RBC AUTO-ENTMCNC: 33.5 G/DL (ref 31.5–36.5)
MCV RBC AUTO: 97 FL (ref 78–100)
MONOCYTES # BLD AUTO: 0.5 10E3/UL (ref 0–1.3)
MONOCYTES NFR BLD AUTO: 9 %
NEUTROPHILS # BLD AUTO: 4.8 10E3/UL (ref 1.6–8.3)
NEUTROPHILS NFR BLD AUTO: 79 %
NRBC # BLD AUTO: 0 10E3/UL
NRBC BLD AUTO-RTO: 0 /100
PLATELET # BLD AUTO: 149 10E3/UL (ref 150–450)
RBC # BLD AUTO: 2.84 10E6/UL (ref 4.4–5.9)
WBC # BLD AUTO: 6 10E3/UL (ref 4–11)

## 2022-10-03 PROCEDURE — 86140 C-REACTIVE PROTEIN: CPT | Performed by: INTERNAL MEDICINE

## 2022-10-03 PROCEDURE — 85025 COMPLETE CBC W/AUTO DIFF WBC: CPT | Performed by: INTERNAL MEDICINE

## 2022-10-03 NOTE — TELEPHONE ENCOUNTER
As per Provider, ok to move up CTs and schedule with any of Dr Boles's APPs.     Called patient to update him and he states that he is ok, that nothing really has changed since surgery. He prefers to keep the imaging and appts as scheduled. He will call clinic if any changes/concerns.

## 2022-10-03 NOTE — PROGRESS NOTES
This is a recent snapshot of the patient's [facility] medical record.  For current drug dose and complete information and questions, call [phone] or In Basket pool, [facility] home infusion ([id number])  CSN Number:  [id number]

## 2022-10-05 ENCOUNTER — HOME INFUSION (PRE-WILLOW HOME INFUSION) (OUTPATIENT)
Dept: PHARMACY | Facility: CLINIC | Age: 57
End: 2022-10-05

## 2022-10-06 ENCOUNTER — LAB REQUISITION (OUTPATIENT)
Dept: LAB | Facility: CLINIC | Age: 57
End: 2022-10-06
Payer: MEDICARE

## 2022-10-06 LAB
AST SERPL W P-5'-P-CCNC: 31 U/L (ref 10–50)
BASOPHILS # BLD AUTO: 0.1 10E3/UL (ref 0–0.2)
BASOPHILS NFR BLD AUTO: 1 %
BUN SERPL-MCNC: 11 MG/DL (ref 6–20)
CREAT SERPL-MCNC: 4.94 MG/DL (ref 0.67–1.17)
CRP SERPL-MCNC: 43.7 MG/L
EOSINOPHIL # BLD AUTO: 0.1 10E3/UL (ref 0–0.7)
EOSINOPHIL NFR BLD AUTO: 2 %
ERYTHROCYTE [DISTWIDTH] IN BLOOD BY AUTOMATED COUNT: 20.2 % (ref 10–15)
ERYTHROCYTE [SEDIMENTATION RATE] IN BLOOD BY WESTERGREN METHOD: 31 MM/HR (ref 0–20)
GFR SERPL CREATININE-BSD FRML MDRD: 13 ML/MIN/1.73M2
HCT VFR BLD AUTO: 25.9 % (ref 40–53)
HGB BLD-MCNC: 8.4 G/DL (ref 13.3–17.7)
HOLD SPECIMEN: NORMAL
IMM GRANULOCYTES # BLD: 0 10E3/UL
IMM GRANULOCYTES NFR BLD: 0 %
LYMPHOCYTES # BLD AUTO: 0.7 10E3/UL (ref 0.8–5.3)
LYMPHOCYTES NFR BLD AUTO: 16 %
MCH RBC QN AUTO: 32.1 PG (ref 26.5–33)
MCHC RBC AUTO-ENTMCNC: 32.4 G/DL (ref 31.5–36.5)
MCV RBC AUTO: 99 FL (ref 78–100)
MONOCYTES # BLD AUTO: 0.7 10E3/UL (ref 0–1.3)
MONOCYTES NFR BLD AUTO: 17 %
NEUTROPHILS # BLD AUTO: 2.8 10E3/UL (ref 1.6–8.3)
NEUTROPHILS NFR BLD AUTO: 64 %
NRBC # BLD AUTO: 0 10E3/UL
NRBC BLD AUTO-RTO: 0 /100
PLATELET # BLD AUTO: 167 10E3/UL (ref 150–450)
RBC # BLD AUTO: 2.62 10E6/UL (ref 4.4–5.9)
WBC # BLD AUTO: 4.4 10E3/UL (ref 4–11)

## 2022-10-06 PROCEDURE — 85025 COMPLETE CBC W/AUTO DIFF WBC: CPT | Mod: ORL | Performed by: INTERNAL MEDICINE

## 2022-10-06 PROCEDURE — 84450 TRANSFERASE (AST) (SGOT): CPT | Mod: ORL

## 2022-10-06 PROCEDURE — 82565 ASSAY OF CREATININE: CPT | Mod: ORL

## 2022-10-06 PROCEDURE — 84520 ASSAY OF UREA NITROGEN: CPT | Mod: ORL | Performed by: INTERNAL MEDICINE

## 2022-10-06 PROCEDURE — 85652 RBC SED RATE AUTOMATED: CPT | Mod: ORL

## 2022-10-06 PROCEDURE — 85652 RBC SED RATE AUTOMATED: CPT | Mod: ORL | Performed by: INTERNAL MEDICINE

## 2022-10-06 PROCEDURE — 86140 C-REACTIVE PROTEIN: CPT | Mod: ORL | Performed by: INTERNAL MEDICINE

## 2022-10-06 PROCEDURE — 85025 COMPLETE CBC W/AUTO DIFF WBC: CPT | Mod: ORL

## 2022-10-06 PROCEDURE — 84520 ASSAY OF UREA NITROGEN: CPT | Mod: ORL

## 2022-10-06 PROCEDURE — 84450 TRANSFERASE (AST) (SGOT): CPT | Mod: ORL | Performed by: INTERNAL MEDICINE

## 2022-10-06 PROCEDURE — 86140 C-REACTIVE PROTEIN: CPT | Mod: ORL

## 2022-10-06 PROCEDURE — 82565 ASSAY OF CREATININE: CPT | Mod: ORL | Performed by: INTERNAL MEDICINE

## 2022-10-07 ENCOUNTER — HOME INFUSION (PRE-WILLOW HOME INFUSION) (OUTPATIENT)
Dept: PHARMACY | Facility: CLINIC | Age: 57
End: 2022-10-07

## 2022-10-10 ENCOUNTER — APPOINTMENT (OUTPATIENT)
Dept: CT IMAGING | Facility: CLINIC | Age: 57
End: 2022-10-10
Attending: EMERGENCY MEDICINE
Payer: MEDICARE

## 2022-10-10 ENCOUNTER — HOSPITAL ENCOUNTER (EMERGENCY)
Facility: CLINIC | Age: 57
Discharge: HOME | End: 2022-10-11
Attending: EMERGENCY MEDICINE | Admitting: EMERGENCY MEDICINE
Payer: MEDICARE

## 2022-10-10 ENCOUNTER — APPOINTMENT (OUTPATIENT)
Dept: MRI IMAGING | Facility: CLINIC | Age: 57
End: 2022-10-10
Attending: EMERGENCY MEDICINE
Payer: MEDICARE

## 2022-10-10 DIAGNOSIS — Z99.2 END-STAGE RENAL DISEASE NEEDING DIALYSIS (H): ICD-10-CM

## 2022-10-10 DIAGNOSIS — E16.2 HYPOGLYCEMIA: ICD-10-CM

## 2022-10-10 DIAGNOSIS — R20.2 BILATERAL LEG PARESTHESIA: ICD-10-CM

## 2022-10-10 DIAGNOSIS — N18.6 END-STAGE RENAL DISEASE NEEDING DIALYSIS (H): ICD-10-CM

## 2022-10-10 DIAGNOSIS — M54.16 LUMBAR RADICULOPATHY: ICD-10-CM

## 2022-10-10 LAB
ANION GAP SERPL CALCULATED.3IONS-SCNC: 24 MMOL/L (ref 7–15)
BASOPHILS # BLD AUTO: 0 10E3/UL (ref 0–0.2)
BASOPHILS NFR BLD AUTO: 1 %
BUN SERPL-MCNC: 19.3 MG/DL (ref 6–20)
CALCIUM SERPL-MCNC: 8.4 MG/DL (ref 8.6–10)
CHLORIDE SERPL-SCNC: 79 MMOL/L (ref 98–107)
CREAT SERPL-MCNC: 7.66 MG/DL (ref 0.67–1.17)
DEPRECATED HCO3 PLAS-SCNC: 18 MMOL/L (ref 22–29)
EOSINOPHIL # BLD AUTO: 0.1 10E3/UL (ref 0–0.7)
EOSINOPHIL NFR BLD AUTO: 1 %
ERYTHROCYTE [DISTWIDTH] IN BLOOD BY AUTOMATED COUNT: 18.7 % (ref 10–15)
GFR SERPL CREATININE-BSD FRML MDRD: 8 ML/MIN/1.73M2
GLUCOSE SERPL-MCNC: 87 MG/DL (ref 70–99)
HCT VFR BLD AUTO: 24.6 % (ref 40–53)
HGB BLD-MCNC: 8.2 G/DL (ref 13.3–17.7)
IMM GRANULOCYTES # BLD: 0 10E3/UL
IMM GRANULOCYTES NFR BLD: 0 %
LYMPHOCYTES # BLD AUTO: 0.4 10E3/UL (ref 0.8–5.3)
LYMPHOCYTES NFR BLD AUTO: 8 %
MCH RBC QN AUTO: 31.7 PG (ref 26.5–33)
MCHC RBC AUTO-ENTMCNC: 33.3 G/DL (ref 31.5–36.5)
MCV RBC AUTO: 95 FL (ref 78–100)
MONOCYTES # BLD AUTO: 0.5 10E3/UL (ref 0–1.3)
MONOCYTES NFR BLD AUTO: 8 %
NEUTROPHILS # BLD AUTO: 4.5 10E3/UL (ref 1.6–8.3)
NEUTROPHILS NFR BLD AUTO: 82 %
NRBC # BLD AUTO: 0 10E3/UL
NRBC BLD AUTO-RTO: 0 /100
PLATELET # BLD AUTO: 137 10E3/UL (ref 150–450)
POTASSIUM SERPL-SCNC: 3.3 MMOL/L (ref 3.4–5.3)
RBC # BLD AUTO: 2.59 10E6/UL (ref 4.4–5.9)
SODIUM SERPL-SCNC: 121 MMOL/L (ref 136–145)
WBC # BLD AUTO: 5.5 10E3/UL (ref 4–11)

## 2022-10-10 PROCEDURE — 250N000013 HC RX MED GY IP 250 OP 250 PS 637: Performed by: EMERGENCY MEDICINE

## 2022-10-10 PROCEDURE — 36415 COLL VENOUS BLD VENIPUNCTURE: CPT | Performed by: EMERGENCY MEDICINE

## 2022-10-10 PROCEDURE — G1010 CDSM STANSON: HCPCS | Mod: GC | Performed by: STUDENT IN AN ORGANIZED HEALTH CARE EDUCATION/TRAINING PROGRAM

## 2022-10-10 PROCEDURE — 85025 COMPLETE CBC W/AUTO DIFF WBC: CPT | Performed by: EMERGENCY MEDICINE

## 2022-10-10 PROCEDURE — G1010 CDSM STANSON: HCPCS

## 2022-10-10 PROCEDURE — 96376 TX/PRO/DX INJ SAME DRUG ADON: CPT | Performed by: EMERGENCY MEDICINE

## 2022-10-10 PROCEDURE — 90937 HEMODIALYSIS REPEATED EVAL: CPT

## 2022-10-10 PROCEDURE — 72148 MRI LUMBAR SPINE W/O DYE: CPT | Mod: 26 | Performed by: RADIOLOGY

## 2022-10-10 PROCEDURE — 72125 CT NECK SPINE W/O DYE: CPT | Mod: 26 | Performed by: STUDENT IN AN ORGANIZED HEALTH CARE EDUCATION/TRAINING PROGRAM

## 2022-10-10 PROCEDURE — 96374 THER/PROPH/DIAG INJ IV PUSH: CPT | Performed by: EMERGENCY MEDICINE

## 2022-10-10 PROCEDURE — 258N000003 HC RX IP 258 OP 636: Performed by: EMERGENCY MEDICINE

## 2022-10-10 PROCEDURE — G0257 UNSCHED DIALYSIS ESRD PT HOS: HCPCS

## 2022-10-10 PROCEDURE — 250N000011 HC RX IP 250 OP 636: Performed by: STUDENT IN AN ORGANIZED HEALTH CARE EDUCATION/TRAINING PROGRAM

## 2022-10-10 PROCEDURE — 250N000011 HC RX IP 250 OP 636: Performed by: EMERGENCY MEDICINE

## 2022-10-10 PROCEDURE — 96361 HYDRATE IV INFUSION ADD-ON: CPT | Performed by: EMERGENCY MEDICINE

## 2022-10-10 PROCEDURE — 99285 EMERGENCY DEPT VISIT HI MDM: CPT | Mod: 25 | Performed by: EMERGENCY MEDICINE

## 2022-10-10 PROCEDURE — 99285 EMERGENCY DEPT VISIT HI MDM: CPT | Performed by: EMERGENCY MEDICINE

## 2022-10-10 PROCEDURE — 80048 BASIC METABOLIC PNL TOTAL CA: CPT | Performed by: EMERGENCY MEDICINE

## 2022-10-10 PROCEDURE — 99214 OFFICE O/P EST MOD 30 MIN: CPT | Performed by: PHYSICIAN ASSISTANT

## 2022-10-10 PROCEDURE — G1010 CDSM STANSON: HCPCS | Performed by: RADIOLOGY

## 2022-10-10 PROCEDURE — 72128 CT CHEST SPINE W/O DYE: CPT | Mod: 26 | Performed by: STUDENT IN AN ORGANIZED HEALTH CARE EDUCATION/TRAINING PROGRAM

## 2022-10-10 PROCEDURE — 96375 TX/PRO/DX INJ NEW DRUG ADDON: CPT | Performed by: EMERGENCY MEDICINE

## 2022-10-10 PROCEDURE — 72148 MRI LUMBAR SPINE W/O DYE: CPT | Mod: MF

## 2022-10-10 RX ORDER — HYDROMORPHONE HYDROCHLORIDE 1 MG/ML
0.5 INJECTION, SOLUTION INTRAMUSCULAR; INTRAVENOUS; SUBCUTANEOUS
Status: COMPLETED | OUTPATIENT
Start: 2022-10-10 | End: 2022-10-10

## 2022-10-10 RX ORDER — HEPARIN SODIUM 1000 [USP'U]/ML
1000 INJECTION, SOLUTION INTRAVENOUS; SUBCUTANEOUS CONTINUOUS
Status: DISCONTINUED | OUTPATIENT
Start: 2022-10-10 | End: 2022-10-10

## 2022-10-10 RX ORDER — FLUTICASONE PROPIONATE 50 MCG
2 SPRAY, SUSPENSION (ML) NASAL DAILY
Status: DISCONTINUED | OUTPATIENT
Start: 2022-10-10 | End: 2022-10-11 | Stop reason: HOSPADM

## 2022-10-10 RX ORDER — HYDROXYZINE HYDROCHLORIDE 25 MG/1
25 TABLET, FILM COATED ORAL ONCE
Status: COMPLETED | OUTPATIENT
Start: 2022-10-10 | End: 2022-10-10

## 2022-10-10 RX ORDER — ONDANSETRON 2 MG/ML
4 INJECTION INTRAMUSCULAR; INTRAVENOUS EVERY 6 HOURS PRN
Status: DISCONTINUED | OUTPATIENT
Start: 2022-10-10 | End: 2022-10-11 | Stop reason: HOSPADM

## 2022-10-10 RX ORDER — SODIUM CHLORIDE, SODIUM LACTATE, POTASSIUM CHLORIDE, CALCIUM CHLORIDE 600; 310; 30; 20 MG/100ML; MG/100ML; MG/100ML; MG/100ML
INJECTION, SOLUTION INTRAVENOUS CONTINUOUS
Status: DISCONTINUED | OUTPATIENT
Start: 2022-10-10 | End: 2022-10-10

## 2022-10-10 RX ORDER — GABAPENTIN 100 MG/1
100 CAPSULE ORAL 3 TIMES DAILY
Status: COMPLETED | OUTPATIENT
Start: 2022-10-10 | End: 2022-10-10

## 2022-10-10 RX ORDER — MIDODRINE HYDROCHLORIDE 5 MG/1
10 TABLET ORAL DAILY PRN
Status: DISCONTINUED | OUTPATIENT
Start: 2022-10-10 | End: 2022-10-11 | Stop reason: HOSPADM

## 2022-10-10 RX ORDER — LORAZEPAM 2 MG/ML
1 INJECTION INTRAMUSCULAR ONCE
Status: COMPLETED | OUTPATIENT
Start: 2022-10-10 | End: 2022-10-10

## 2022-10-10 RX ADMIN — LORAZEPAM 1 MG: 2 INJECTION INTRAMUSCULAR; INTRAVENOUS at 21:25

## 2022-10-10 RX ADMIN — SODIUM CHLORIDE 250 ML: 9 INJECTION, SOLUTION INTRAVENOUS at 15:53

## 2022-10-10 RX ADMIN — MIDODRINE HYDROCHLORIDE 10 MG: 5 TABLET ORAL at 15:33

## 2022-10-10 RX ADMIN — HEPARIN SODIUM 1000 UNITS/HR: 1000 INJECTION INTRAVENOUS; SUBCUTANEOUS at 19:15

## 2022-10-10 RX ADMIN — HEPARIN SODIUM 1000 UNITS: 1000 INJECTION INTRAVENOUS; SUBCUTANEOUS at 17:13

## 2022-10-10 RX ADMIN — Medication: at 17:25

## 2022-10-10 RX ADMIN — HYDROXYZINE HYDROCHLORIDE 25 MG: 25 TABLET, FILM COATED ORAL at 21:26

## 2022-10-10 RX ADMIN — SODIUM CHLORIDE 300 ML: 9 INJECTION, SOLUTION INTRAVENOUS at 15:53

## 2022-10-10 RX ADMIN — HYDROMORPHONE HYDROCHLORIDE 0.5 MG: 1 INJECTION, SOLUTION INTRAMUSCULAR; INTRAVENOUS; SUBCUTANEOUS at 21:26

## 2022-10-10 RX ADMIN — ONDANSETRON 4 MG: 2 INJECTION INTRAMUSCULAR; INTRAVENOUS at 21:26

## 2022-10-10 RX ADMIN — HEPARIN SODIUM 1000 UNITS/HR: 1000 INJECTION INTRAVENOUS; SUBCUTANEOUS at 17:13

## 2022-10-10 RX ADMIN — SODIUM CHLORIDE, POTASSIUM CHLORIDE, SODIUM LACTATE AND CALCIUM CHLORIDE: 600; 310; 30; 20 INJECTION, SOLUTION INTRAVENOUS at 10:55

## 2022-10-10 RX ADMIN — ONDANSETRON 4 MG: 2 INJECTION INTRAMUSCULAR; INTRAVENOUS at 17:23

## 2022-10-10 RX ADMIN — HYDROMORPHONE HYDROCHLORIDE 0.5 MG: 1 INJECTION, SOLUTION INTRAMUSCULAR; INTRAVENOUS; SUBCUTANEOUS at 13:18

## 2022-10-10 RX ADMIN — SODIUM CHLORIDE, POTASSIUM CHLORIDE, SODIUM LACTATE AND CALCIUM CHLORIDE: 600; 310; 30; 20 INJECTION, SOLUTION INTRAVENOUS at 14:00

## 2022-10-10 RX ADMIN — GABAPENTIN 100 MG: 100 CAPSULE ORAL at 21:30

## 2022-10-10 ASSESSMENT — ACTIVITIES OF DAILY LIVING (ADL)
ADLS_ACUITY_SCORE: 39
ADLS_ACUITY_SCORE: 37
ADLS_ACUITY_SCORE: 39

## 2022-10-10 ASSESSMENT — ENCOUNTER SYMPTOMS: NUMBNESS: 1

## 2022-10-10 NOTE — CONSULTS
Nephrology Initial Consult  October 10, 2022      Shay Jimenez MRN:[id number] YOB: 1965  Date of Admission:10/10/2022  Primary care provider: Jonathan Travis  Requesting physician: Bartolo Lomeli*    ASSESSMENT AND RECOMMENDATIONS:   Shay Jimenez is a 57 year old male with PMH of ESRD on HD, GERD, hyperlipidemia, peripheral neuropathy, depression/anxiety, GINI, BPH, obesity, hx of thoracic compressive myelopathy and concern for discitis/vertebral osteomyelitis s/p C6-T6 fusion and T2-T3 decompression on 6/27/22, in ED now with c/f lower extremity weakness.    ESKD: Dialyzes MWF at [facility] with Dr. Cline. Run time 4 hrs 15 min. Access: LUE AVF. .6 kg. Uses heparin  - Dialysis today per usual schedule, possibly a run tomorrow as well for volume and electrolyte management  - often has shortened runs as has difficulty coming on time in the morning    Hypervolemic hyponatremia: Na runs in mid 120's at baseline, 121 today  Hypervolemia/BP: PTA 10 mg midodrine pre HD, ok to run SBP 70's if asymptomatic  - per OP HD unit, usual UF ~ 6.5 kg per run, often pt is 10 kg up over the weekend  - pt has difficulty with limiting fluids, HD units has had multiple conversations  - 1800 ml fluid restriction recommended  - Will dialyze on low sodium bath to avoid rapid correction  - ordered pre HD midodrine  - usual outpatient BP parameters > 70 if asymptomatic    ETOH overuse disorder: positive for asterixis  - pt reports drinking at least 5 cocktails per day  - He has not reported this to anyone before and he would like help with this  - recommend withdrawal precautions and checking thiamine, etc  - he reports minimal appetite and is concerned about his liver  - hx of anxiety/depression and suicide in his family, would benefit from psych consult as well    Hypokalemia:  - Will dialyze on K4  - poor PO intake, per above    BMD: recent , PTA PO calcitriol 0.25 mcg MWF, renvela 1  tab tid WM      Anemia: hgb 8-9's. recent ferritin 870, iron 184, hgb 9's, on mircera protocol (30 mcg s9kmbnt)      Recommendations were communicated to primary team via this note and in person      CELSO Haynes   Division of Renal Disease and Hypertension  [phone]      REASON FOR CONSULT: ESKD/dialysis    HISTORY OF PRESENT ILLNESS:  Shay Jimenez is a 57 year old male with PMH of ESRD on HD, GERD, hyperlipidemia, peripheral neuropathy, depression/anxiety, GINI, BPH, obesity, hx of thoracic compressive myelopathy and concern for discitis/vertebral osteomyelitis s/p C6-T6 fusion and T2-T3 decompression on 6/27/22, in ED now with c/f lower extremity weakness. CT scans unremarkable, pt going for MRI shortly. Obtained dialysis records and talked with unit. Pt usually pulls 6.5 kg per run and has great difficulty with fluid restriction. He has difficulty coming on time as well so runs are often shortened. His Na runs mid 120's at baseline, today 121 so will dialyze on low sodium bath to avoid rapid correction. Pt was seen in ED, feeling quite down and worried as he reports drinking at least 5 cocktails per day and reports family hx of ETOH abuse and mental health issues including several suicides. He says he has not told anyone this before. He would like help with his drinking and does not feel suicidal currently. Discussed with ED physician as he'll need withdrawal protocols and hopefully a mental health consult. He also reports a very poor appetite. His K is 3.3 so will dialyze on high K bath. He denies current n/v, CP, chills. He has somewhat increased WOB, will pull 4-5 kg today and likely run again tomorrow.     PAST MEDICAL HISTORY:  Reviewed with patient on 10/10/2022     Past Medical History:   Diagnosis Date     Chronic kidney disease      Neuropathy        Past Surgical History:   Procedure Laterality Date     OPTICAL TRACKING SYSTEM FUSION POSTERIOR SPINE THORACIC THREE+ LEVELS N/A 6/27/2022     Procedure: Cervical 6 to Thoracic 6 Fusion and Thoracic 2-3 Decompression;  Surgeon: Fritz Boles MD;  Location:  OR        MEDICATIONS:  PTA Meds  Prior to Admission medications    Medication Sig Last Dose Taking? Auth Provider Long Term End Date   acetaminophen (TYLENOL) 325 MG tablet Take 1-2 tablets (325-650 mg) by mouth every 4 hours as needed for mild pain or fever   Oksana Spicer MD     ALPRAZolam (XANAX) 1 MG tablet Take 1 mg by mouth 2 times daily as needed for anxiety   Reported, Patient     ammonium lactate (AMLACTIN) 12 % external cream Apply topically daily Apply to bilateral lower legs   Unknown, Entered By History     atorvastatin (LIPITOR) 20 MG tablet Take 20 mg by mouth At Bedtime   Unknown, Entered By History No    baclofen (LIORESAL) 10 MG tablet Take 1 tablet (10 mg) by mouth 2 times daily   Fritz Boles MD     calcium carbonate (TUMS) 500 MG chewable tablet Take 2 tablets (1,000 mg) by mouth 3 times daily as needed for heartburn   Oksana Spicer MD     cefuroxime (CEFTIN) 250 MG tablet 500 mg daily   Reported, Patient     cetirizine (ZYRTEC) 10 MG tablet Take 1 tablet (10 mg) by mouth daily   Oksana Spicer MD     cyanocobalamin (VITAMIN B-12) 500 MCG tablet Take 500 mcg by mouth daily   Unknown, Entered By History     ELIQUIS ANTICOAGULANT 2.5 MG tablet TAKE 1 TABLET (2.5 MG) BY MOUTH TWO TIMES A DAY. INDICATIONS: PREVENT STROKE IN AFIB   Reported, Patient     finasteride (PROSCAR) 5 MG tablet Take 1.25 mg by mouth daily Takes 1/4 of 5 mg daily   Unknown, Entered By History     fluticasone (FLONASE) 50 MCG/ACT nasal spray Spray 1 spray into both nostrils 2 times daily   Unknown, Entered By History     gabapentin (NEURONTIN) 100 MG capsule Take 1 capsule (100 mg) by mouth 3 times daily   Dona Cabezas PA-C Yes    hydrOXYzine (ATARAX) 25 MG tablet Take 1 tablet (25 mg) by mouth 2 times daily as needed for other or itching (pain as adjuant therapy)   Talia Johnson MD      midodrine (PROAMATINE) 10 MG tablet Take 1 tablet (10 mg) by mouth 3 times daily (with meals)   Oksana Spicer MD     multivitamin RENAL (RENAVITE RX/NEPHROVITE) 1 MG tablet Take 1 tablet by mouth daily   Unknown, Entered By History     naloxone (NARCAN) 4 MG/0.1ML nasal spray Spray 1 spray (4 mg) into one nostril alternating nostrils once as needed for opioid reversal every 2-3 minutes until assistance arrives   Fritz Boles MD Yes    omeprazole (PRILOSEC) 20 MG DR capsule Take 20 mg by mouth daily   Unknown, Entered By History     polyethylene glycol (MIRALAX) 17 GM/Dose powder Take 17 g by mouth daily   Dona Cabezas PA-C     sertraline (ZOLOFT) 100 MG tablet Take 100 mg by mouth daily   Reported, Patient Yes    sevelamer carbonate (RENVELA) 800 MG tablet Take 2 tablets (1,600 mg) by mouth 3 times daily (with meals)   Oksana Spicer MD     vitamin B6 (PYRIDOXINE) 100 MG tablet Take 100 mg by mouth daily   Unknown, Entered By History        Current Meds    Infusion Meds      ALLERGIES:    Allergies   Allergen Reactions     Amlodipine      Lisinopril        REVIEW OF SYSTEMS:  A comprehensive of systems was negative except as noted above.    SOCIAL HISTORY:   Social History     Socioeconomic History     Marital status: Single     Spouse name: Not on file     Number of children: Not on file     Years of education: Not on file     Highest education level: Not on file   Occupational History     Not on file   Tobacco Use     Smoking status: Never     Smokeless tobacco: Never   Substance and Sexual Activity     Alcohol use: Not Currently     Drug use: Never     Sexual activity: Not on file   Other Topics Concern     Not on file   Social History Narrative     Not on file     Social Determinants of Health     Financial Resource Strain: Not on file   Food Insecurity: Not on file   Transportation Needs: Not on file   Physical Activity: Not on file   Stress: Not on file   Social Connections: Not on file  "  Intimate Partner Violence: Not on file   Housing Stability: Not on file     Reviewed with patient     FAMILY MEDICAL HISTORY:   History reviewed. No pertinent family history.  No known family history of kidney disease  Reviewed with patient    PHYSICAL EXAM:   Temp  Av.6  F (36.4  C)  Min: 97.6  F (36.4  C)  Max: 97.6  F (36.4  C)      Pulse  Av  Min: 97  Max: 103 Resp  Av  Min: 18  Max: 20  SpO2  Av.8 %  Min: 92 %  Max: 98 %       BP (!) 151/68   Pulse 102   Temp 97.6  F (36.4  C) (Oral)   Resp 20   Ht 1.854 m (6' 1\")   Wt 132.5 kg (292 lb)   SpO2 98%   BMI 38.52 kg/m        Admit Weight: 132.5 kg (292 lb)     GENERAL APPEARANCE: NAD  EYES: no scleral icterus, pupils equal  Pulmonary: diminished  CV: RRR   - Edema 2+  GI: soft   MS: no evidence of inflammation in joints, no muscle tenderness  : no de los santos  SKIN: no rash, warm, dry, no cyanosis  NEURO: face symmetric, + asterixis     LABS:   I have reviewed the following labs:  CMP  Recent Labs   Lab 10/10/22  1053 10/06/22  1215   *  --    POTASSIUM 3.3*  --    CHLORIDE 79*  --    CO2 18*  --    ANIONGAP 24*  --    GLC 87  --    BUN 19.3 11.0   CR 7.66* 4.94*   GFRESTIMATED 8* 13*   MAC 8.4*  --    AST  --  31     CBC  Recent Labs   Lab 10/10/22  1053 10/06/22  1215 10/03/22  1500   HGB 8.2* 8.4* 9.2*   WBC 5.5 4.4 6.0   RBC 2.59* 2.62* 2.84*   HCT 24.6* 25.9* 27.5*   MCV 95 99 97   MCH 31.7 32.1 32.4   MCHC 33.3 32.4 33.5   RDW 18.7* 20.2* 19.8*   * 167 149*     INRNo lab results found in last 7 days.  ABGNo lab results found in last 7 days.   URINE STUDIES  No lab results found.  No lab results found.  PTH  Recent Labs   Lab Test 22  0700   PTHI 75*     IRON STUDIES  Recent Labs   Lab Test 22  0600   IRON 44   *   IRONSAT 20   JACKELINE 626*       IMAGING:  Reviewed    CELSO Haynes    "

## 2022-10-10 NOTE — ED PROVIDER NOTES
[employer] EMERGENCY DEPARTMENT ([facility])  10/10/22    History     Chief Complaint   Patient presents with     Generalized Weakness     The history is provided by the patient and medical records.     Shay Jimenez is a 57 year old male with history of chronic osteomyelitis with cord compression s/p C5-T6 fusion and T2-T3 decompression (6/27/2022) who presents to the Emergency Department with lower extremity numbness. Patient reports he had spinal cord surgery done by Dr. Boles at the end of June, has been told he has 2 loose screws in his neck seen on previous CT. Patient reports over the past couple of days he developed bilateral lower leg numbness. He states prior to the surgery he had neuropathy in his feet and this does not feel similar. He states yesterday and today he has been hardly able to lift his legs. Patient states prior to the past couple of days he had been able to exercise.    XR SPINE COMPLETE SCOLIOSIS 2 VIEWS 9/22/2022  IMPRESSION:  Similar appearance of posterior cervicothoracic fusion hardware without hardware fracture or loosening. Alignment is unchanged with mild S-shaped curvature of the thoracolumbar spine and kyphosis in the proximal thoracic spine. Vertebral   body heights maintained. No spondylolisthesis. Mild spondylitic changes elsewhere, not significantly changed. Similar appearance of coarse calcifications overlying the right lower abdomen since 07/23/2022.    Past Medical History  Past Medical History:   Diagnosis Date     Chronic kidney disease      Neuropathy      Past Surgical History:   Procedure Laterality Date     OPTICAL TRACKING SYSTEM FUSION POSTERIOR SPINE THORACIC THREE+ LEVELS N/A 6/27/2022    Procedure: Cervical 6 to Thoracic 6 Fusion and Thoracic 2-3 Decompression;  Surgeon: Fritz Boles MD;  Location:  OR     acetaminophen (TYLENOL) 325 MG tablet  ALPRAZolam (XANAX) 1 MG tablet  ammonium lactate (AMLACTIN) 12 % external cream  atorvastatin  "(LIPITOR) 20 MG tablet  baclofen (LIORESAL) 10 MG tablet  calcium carbonate (TUMS) 500 MG chewable tablet  cefuroxime (CEFTIN) 250 MG tablet  cetirizine (ZYRTEC) 10 MG tablet  cyanocobalamin (VITAMIN B-12) 500 MCG tablet  ELIQUIS ANTICOAGULANT 2.5 MG tablet  finasteride (PROSCAR) 5 MG tablet  fluticasone (FLONASE) 50 MCG/ACT nasal spray  gabapentin (NEURONTIN) 100 MG capsule  hydrOXYzine (ATARAX) 25 MG tablet  midodrine (PROAMATINE) 10 MG tablet  multivitamin RENAL (RENAVITE RX/NEPHROVITE) 1 MG tablet  naloxone (NARCAN) 4 MG/0.1ML nasal spray  omeprazole (PRILOSEC) 20 MG DR capsule  polyethylene glycol (MIRALAX) 17 GM/Dose powder  sertraline (ZOLOFT) 100 MG tablet  sevelamer carbonate (RENVELA) 800 MG tablet  vitamin B6 (PYRIDOXINE) 100 MG tablet      Allergies   Allergen Reactions     Amlodipine      Lisinopril      Family History  History reviewed. No pertinent family history.  Social History   Social History     Tobacco Use     Smoking status: Never     Smokeless tobacco: Never   Substance Use Topics     Alcohol use: Not Currently     Drug use: Never      Past medical history, past surgical history, medications, allergies, family history, and social history were reviewed with the patient. No additional pertinent items.       Review of Systems   Constitutional: Negative.  Negative for fever.   HENT: Negative.    Respiratory: Negative.    Cardiovascular: Negative.    Gastrointestinal: Negative.    Genitourinary: Negative.    Musculoskeletal: Negative for back pain.   Neurological: Positive for numbness (BLE). Negative for weakness.   All other systems reviewed and are negative.    A complete review of systems was performed with pertinent positives and negatives noted in the HPI, and all other systems negative.    Physical Exam   BP: 103/70  Pulse: 97  Temp: 97.6  F (36.4  C)  Resp: 18  Height: 185.4 cm (6' 1\")  Weight: 132.5 kg (292 lb)  SpO2: 97 %  Physical Exam  Vitals and nursing note reviewed.   Constitutional: "       General: He is not in acute distress.     Appearance: He is well-developed.   HENT:      Head: Normocephalic and atraumatic.      Mouth/Throat:      Mouth: Mucous membranes are moist.   Eyes:      General: No scleral icterus.     Conjunctiva/sclera: Conjunctivae normal.      Pupils: Pupils are equal, round, and reactive to light.   Cardiovascular:      Rate and Rhythm: Normal rate and regular rhythm.      Heart sounds: Normal heart sounds.   Pulmonary:      Effort: Pulmonary effort is normal. No respiratory distress.      Breath sounds: Normal breath sounds. No wheezing.   Abdominal:      General: Abdomen is flat.      Palpations: Abdomen is soft.   Musculoskeletal:      Cervical back: Neck supple.   Skin:     General: Skin is warm and dry.   Neurological:      General: No focal deficit present.      Mental Status: He is alert and oriented to person, place, and time.      Cranial Nerves: No cranial nerve deficit.   Psychiatric:         Mood and Affect: Mood normal.         Behavior: Behavior normal.         ED Course   9:00 AM  The patient was seen and examined by Bartolo Lomeli MD in [facility].      Procedures              Results for orders placed or performed during the hospital encounter of 10/10/22   CT Cervical Spine w/o Contrast     Status: None    Narrative    CT CERVICAL SPINE W/O CONTRAST, CT THORACIC SPINE W/O CONTRAST  10/10/2022 9:47 AM    Provided History: Recent cervical fusion now with lower extremity  numbness    Comparison: Outside CT 8/11/2022    Technique: Using multidetector thin collimation helical acquisition  technique, axial, coronal and sagittal CT images through the cervical  spine and thoracic were obtained without intravenous contrast.     Findings:  Surgical changes of C5-T6 posterior instrumented fusion with bilateral  transpedicular screws at C5 and C6, T1-T2, and T4-T6. Hardware is  intact. Unchanged loosening and retraction of bilateral C5 and C6  screws. No acute fracture or  subluxation.     No acute fracture or subluxation. No prevertebral edema. The findings  on a level by level basis are as follows:    C2-3:  No spinal canal or neural foraminal stenosis.    C3-4:  Disc osteophyte complex. Bilateral uncinate spurring. Severe  left neuroforaminal stenosis. Moderate right neuroforaminal stenosis.  No spinal canal stenosis.    C4-5:  Bilateral uncinate spurring and facet arthropathy. Moderate  right and mild left neuroforaminal stenosis. No spinal canal stenosis.    C5-6:  Bilateral uncinate spurring and facet arthropathy. Mild  bilateral neural foraminal stenosis. No spinal canal stenosis.    C6-7:  Bilateral uncinate spurring. Mild bilateral neural foraminal  stenosis, left greater than right. No canal stenosis.    C7-T1:  No spinal canal or neural foraminal stenosis.     Thoracic spine:  Unchanged height loss and sclerosis of T2 and T3 vertebral bodies with  anterior wedging and erosive changes centered in the joint space.  Unchanged multilevel degenerative opposing endplate changes. Unchanged  hemangioma in posterior T11 vertebral body. No significant neural  foraminal or spinal canal stenosis at any level.    Atelectasis in the posterior lungs bilaterally. Partially visualized  central venous catheter with tip in the superior vena cava. Atrophic  kidneys.      Impression    Impression:   1. No acute finding or significant change since 8/11/2022.  2. Unchanged loosening and retropulsion of the transpedicle screws  bilaterally at C5 and C6.   3. Unchanged T2 and T3 vertebral body sclerosis and height loss.  4. Stable degenerative changes of the cervical spine, most  significantly resulting in severe neural foraminal stenosis on the  left C3-4 and moderate neural foraminal stenosis on the right at C3-4  and C4-5.    I have personally reviewed the examination and initial interpretation  and I agree with the findings.    MIGDALIA DEE MD         SYSTEM ID:  [id number]   CT Thoracic Spine  w/o Contrast     Status: None    Narrative    CT CERVICAL SPINE W/O CONTRAST, CT THORACIC SPINE W/O CONTRAST  10/10/2022 9:47 AM    Provided History: Recent cervical fusion now with lower extremity  numbness    Comparison: Outside CT 8/11/2022    Technique: Using multidetector thin collimation helical acquisition  technique, axial, coronal and sagittal CT images through the cervical  spine and thoracic were obtained without intravenous contrast.     Findings:  Surgical changes of C5-T6 posterior instrumented fusion with bilateral  transpedicular screws at C5 and C6, T1-T2, and T4-T6. Hardware is  intact. Unchanged loosening and retraction of bilateral C5 and C6  screws. No acute fracture or subluxation.     No acute fracture or subluxation. No prevertebral edema. The findings  on a level by level basis are as follows:    C2-3:  No spinal canal or neural foraminal stenosis.    C3-4:  Disc osteophyte complex. Bilateral uncinate spurring. Severe  left neuroforaminal stenosis. Moderate right neuroforaminal stenosis.  No spinal canal stenosis.    C4-5:  Bilateral uncinate spurring and facet arthropathy. Moderate  right and mild left neuroforaminal stenosis. No spinal canal stenosis.    C5-6:  Bilateral uncinate spurring and facet arthropathy. Mild  bilateral neural foraminal stenosis. No spinal canal stenosis.    C6-7:  Bilateral uncinate spurring. Mild bilateral neural foraminal  stenosis, left greater than right. No canal stenosis.    C7-T1:  No spinal canal or neural foraminal stenosis.     Thoracic spine:  Unchanged height loss and sclerosis of T2 and T3 vertebral bodies with  anterior wedging and erosive changes centered in the joint space.  Unchanged multilevel degenerative opposing endplate changes. Unchanged  hemangioma in posterior T11 vertebral body. No significant neural  foraminal or spinal canal stenosis at any level.    Atelectasis in the posterior lungs bilaterally. Partially visualized  central venous  catheter with tip in the superior vena cava. Atrophic  kidneys.      Impression    Impression:   1. No acute finding or significant change since 8/11/2022.  2. Unchanged loosening and retropulsion of the transpedicle screws  bilaterally at C5 and C6.   3. Unchanged T2 and T3 vertebral body sclerosis and height loss.  4. Stable degenerative changes of the cervical spine, most  significantly resulting in severe neural foraminal stenosis on the  left C3-4 and moderate neural foraminal stenosis on the right at C3-4  and C4-5.    I have personally reviewed the examination and initial interpretation  and I agree with the findings.    MIGDALIA DEE MD         SYSTEM ID:  [id number]   Basic metabolic panel     Status: Abnormal   Result Value Ref Range    Sodium 121 (L) 136 - 145 mmol/L    Potassium 3.3 (L) 3.4 - 5.3 mmol/L    Chloride 79 (L) 98 - 107 mmol/L    Carbon Dioxide (CO2) 18 (L) 22 - 29 mmol/L    Anion Gap 24 (H) 7 - 15 mmol/L    Urea Nitrogen 19.3 6.0 - 20.0 mg/dL    Creatinine 7.66 (H) 0.67 - 1.17 mg/dL    Calcium 8.4 (L) 8.6 - 10.0 mg/dL    Glucose 87 70 - 99 mg/dL    GFR Estimate 8 (L) >60 mL/min/1.73m2   CBC with platelets and differential     Status: Abnormal   Result Value Ref Range    WBC Count 5.5 4.0 - 11.0 10e3/uL    RBC Count 2.59 (L) 4.40 - 5.90 10e6/uL    Hemoglobin 8.2 (L) 13.3 - 17.7 g/dL    Hematocrit 24.6 (L) 40.0 - 53.0 %    MCV 95 78 - 100 fL    MCH 31.7 26.5 - 33.0 pg    MCHC 33.3 31.5 - 36.5 g/dL    RDW 18.7 (H) 10.0 - 15.0 %    Platelet Count 137 (L) 150 - 450 10e3/uL    % Neutrophils 82 %    % Lymphocytes 8 %    % Monocytes 8 %    % Eosinophils 1 %    % Basophils 1 %    % Immature Granulocytes 0 %    NRBCs per 100 WBC 0 <1 /100    Absolute Neutrophils 4.5 1.6 - 8.3 10e3/uL    Absolute Lymphocytes 0.4 (L) 0.8 - 5.3 10e3/uL    Absolute Monocytes 0.5 0.0 - 1.3 10e3/uL    Absolute Eosinophils 0.1 0.0 - 0.7 10e3/uL    Absolute Basophils 0.0 0.0 - 0.2 10e3/uL    Absolute Immature Granulocytes 0.0  <=0.4 10e3/uL    Absolute NRBCs 0.0 10e3/uL   CBC with platelets differential     Status: Abnormal    Narrative    The following orders were created for panel order CBC with platelets differential.  Procedure                               Abnormality         Status                     ---------                               -----------         ------                     CBC with platelets and d...[[id number]]  Abnormal            Final result                 Please view results for these tests on the individual orders.     Medications   0.9% sodium chloride BOLUS (has no administration in time range)   heparin 10,000 units/10 mL infusion (DIALYSIS USE) (1,000 Units/hr Hemodialysis Machine New Bag 10/10/22 1713)   lidocaine 1 % 0.5 mL (has no administration in time range)   lidocaine 1 % 0.5 mL (has no administration in time range)   Stop Heparin 60 minutes before end of treatment ( Does not apply Given 10/10/22 1725)   midodrine (PROAMATINE) tablet 10 mg (10 mg Oral Given 10/10/22 1533)   ondansetron (ZOFRAN) injection 4 mg (4 mg Intravenous Given 10/10/22 1723)   gabapentin (NEURONTIN) capsule 100 mg (has no administration in time range)   hydrOXYzine (ATARAX) tablet 25 mg (has no administration in time range)   fluticasone (FLONASE) 50 MCG/ACT spray 2 spray (has no administration in time range)   HYDROmorphone (PF) (DILAUDID) injection 0.5 mg (0.5 mg Intravenous Given 10/10/22 1318)   0.9% sodium chloride BOLUS (250 mLs Intravenous New Bag 10/10/22 1553)   0.9% sodium chloride BOLUS (300 mLs Hemodialysis Machine New Bag 10/10/22 1553)   heparin (porcine) injection (1,000 Units Hemodialysis Machine OR IV Push Given 10/10/22 1713)        Assessments & Plan (with Medical Decision Making)   Patient presented complaining of numbness in both legs of 2 days duration.  He has an extensive history of cervical and upper thoracic abnormalities including effusion and a decompression and osteomyelitis.  For this reason I  started with a CT of the cervical and thoracic spine there is no compromised canal.  I then and moving towards MRI of the lumbar spine.  He missed his regular dialysis so they contacted me and are willing to run him now.  His MRI has not been done but he will get it after dialysis patient will be signed out to my partner pending the results of the MRI.  His symptoms may be simply his chronic neuropathy but we must rule out lumbar pathology.    I have reviewed the nursing notes. I have reviewed the findings, diagnosis, plan and need for follow up with the patient.    New Prescriptions    No medications on file       Final diagnoses:   Bilateral leg paresthesia   End-stage renal disease needing dialysis (H)     I, Jo-Ann Hamlin, am serving as a trained medical scribe to document services personally performed by Bartolo Lomeli MD, based on the provider's statements to me.      I, Bartolo Lomeli MD, was physically present and have reviewed and verified the accuracy of this note documented by Jo-Ann Hamlin.     --  Bartolo Lomeli MD  [facility] EMERGENCY DEPARTMENT  10/10/2022     Bartolo Lomeli MD  10/23/22 [id number]

## 2022-10-10 NOTE — PROGRESS NOTES
"HPI:  57 year old male s/p C5 to T6 fusuion and decompression at T2-3 for chronic osteomyelitis with cord compression.  States no changes since last visit.  Is walking at home gradually better than before.  Continuing to work with PT.  Strength improving.  Last visit he was noted to have losening of the lateral mass screws at C5 and C6 but intact T1-2 screws with no new kyphosis or stenosis.  No new symptoms.  Continued ABX for infection.  Current Outpatient Medications   Medication     acetaminophen (TYLENOL) 325 MG tablet     ALPRAZolam (XANAX) 1 MG tablet     ammonium lactate (AMLACTIN) 12 % external cream     atorvastatin (LIPITOR) 20 MG tablet     baclofen (LIORESAL) 10 MG tablet     calcium carbonate (TUMS) 500 MG chewable tablet     cefuroxime (CEFTIN) 250 MG tablet     cetirizine (ZYRTEC) 10 MG tablet     cyanocobalamin (VITAMIN B-12) 500 MCG tablet     ELIQUIS ANTICOAGULANT 2.5 MG tablet     finasteride (PROSCAR) 5 MG tablet     fluticasone (FLONASE) 50 MCG/ACT nasal spray     gabapentin (NEURONTIN) 100 MG capsule     hydrOXYzine (ATARAX) 25 MG tablet     midodrine (PROAMATINE) 10 MG tablet     multivitamin RENAL (RENAVITE RX/NEPHROVITE) 1 MG tablet     naloxone (NARCAN) 4 MG/0.1ML nasal spray     omeprazole (PRILOSEC) 20 MG DR capsule     polyethylene glycol (MIRALAX) 17 GM/Dose powder     sertraline (ZOLOFT) 100 MG tablet     sevelamer carbonate (RENVELA) 800 MG tablet     vitamin B6 (PYRIDOXINE) 100 MG tablet     No current facility-administered medications for this visit.      Physical Exam:  Vital signs:                   Height: 185.4 cm (6' 1\") Weight: 132.5 kg (292 lb)  Estimated body mass index is 38.52 kg/m  as calculated from the following:    Height as of this encounter: 1.854 m (6' 1\").    Weight as of this encounter: 132.5 kg (292 lb).   He has full strength in his bilateral upper and lower extremities.  Normal sensation in upper and lower extremities.  Incision healing well.  No palpable " hardware.  Results Reviewed:  I have personally reviewed x rays today showing no increased kyphosis or hardware complications.  Based on his prior MRI and CT, I suspect the C5 screws and unilateral c6 screw still displaced.    Assessment:  57 year old male s/p c5-T6 fusion and T2-3 decompression, screw back out at c5-6  Plan:  Discussed revision surgery.  This will likely be necessary unless there are clear signs of posterior fusion.  Given he is doing well, we will keep in a collar at all times and get a Ct scan in the next couple of weeks.  Should this not show any signs of fusion, I will recommend a revision surgery.  Discussed red flag symptoms to call with.  I would like to wait to complete the antibiotics before revision surgery if possible as well.  I instructed to call should there be any changes or concerns as well.    Fritz Boles MD

## 2022-10-10 NOTE — ED NOTES
Bed: [facility]  Expected date: 10/10/22  Expected time: 8:25 AM  Means of arrival: Ambulance  Comments:  [place] [id number]    56 y/o Male    Weak    Triaged:  YELLOW

## 2022-10-10 NOTE — ED TRIAGE NOTES
"Shay was BIBA from home for evaluation of not being able to feel his lower legs.  Has neuropathy.  Legs are \"giving out on him\".      "

## 2022-10-11 VITALS
TEMPERATURE: 98.2 F | OXYGEN SATURATION: 94 % | BODY MASS INDEX: 38.42 KG/M2 | RESPIRATION RATE: 16 BRPM | DIASTOLIC BLOOD PRESSURE: 67 MMHG | WEIGHT: 289.9 LBS | SYSTOLIC BLOOD PRESSURE: 100 MMHG | HEART RATE: 101 BPM | HEIGHT: 73 IN

## 2022-10-11 LAB
GLUCOSE BLDC GLUCOMTR-MCNC: 148 MG/DL (ref 70–99)
GLUCOSE BLDC GLUCOMTR-MCNC: 33 MG/DL (ref 70–99)

## 2022-10-11 PROCEDURE — 258N000003 HC RX IP 258 OP 636: Performed by: EMERGENCY MEDICINE

## 2022-10-11 PROCEDURE — 258N000001 HC RX 258

## 2022-10-11 RX ORDER — DEXTROSE MONOHYDRATE 25 G/50ML
INJECTION, SOLUTION INTRAVENOUS
Status: COMPLETED
Start: 2022-10-11 | End: 2022-10-11

## 2022-10-11 RX ADMIN — DEXTROSE MONOHYDRATE 50 ML: 500 INJECTION PARENTERAL at 00:39

## 2022-10-11 RX ADMIN — SODIUM CHLORIDE 1000 ML: 9 INJECTION, SOLUTION INTRAVENOUS at 00:39

## 2022-10-11 ASSESSMENT — ACTIVITIES OF DAILY LIVING (ADL)
ADLS_ACUITY_SCORE: 39
ADLS_ACUITY_SCORE: 39

## 2022-10-11 NOTE — DISCHARGE INSTRUCTIONS
TODAY'S VISIT:  You were seen today for leg numbness  -   - If you had any labs or imaging/radiology tests performed today, you should also discuss these tests with your usual provider.     FOLLOW-UP:  Please make an appointment to follow up with:  - Your Primary Care Provider. If you do not have a PCP, please call the Primary Care Center (phone: [phone] for an appointment    - Have your provider review the results from today's visit with you again to make sure no further follow-up or additional testing is needed based on those results.     RETURN TO THE EMERGENCY DEPARTMENT  Return to the Emergency Department at any time for any new or worsening symptoms or any concerns.

## 2022-10-11 NOTE — DISCHARGE SUMMARY
Dialysis Discharge Summary Brief    [facility]  Division of Nephrology  Nephrology Discharge Dialysis Orders  Ph: [phone]  Fax: [phone]    Shay Jimenez  MRN: [id number]  YOB: 1965    [facility]  Primary Nephrologist: Dr. Cline    Date of Admission: 10/10/2022  Date of Discharge: 10/11/2022    Please page me at  with any questions. Thanks much. Jayde Pete PA-C    Shay Jimenez is a 57 year old male with PMH of ESRD on HD, GERD, hyperlipidemia, peripheral neuropathy, depression/anxiety, GINI, BPH, obesity, hx of thoracic compressive myelopathy and concern for discitis/vertebral osteomyelitis s/p C6-T6 fusion and T2-T3 decompression on 6/27/22, in ED now with c/f lower extremity weakness; CT and MRI without acute changes. New dx of alcohol abuse disorder.     ESKD: Dialyzes MWF at [facility] with Dr. Cline. Run time 4 hrs 15 min. Access: LUE AVF. .6 kg. Uses heparin  - Dialyzed 10/10     ETOH overuse disorder: positive for asterixis  - pt reports drinking at least 5 cocktails per day  - He has not reported this to anyone before and he would like help with this  - he reports minimal appetite and is concerned about his liver  - hx of anxiety/depression and suicide in his family, would benefit from psych consult as well  - I spoke with primary care physician office today (Dr. Jonathan Travis) and they will reach out to the patient and hopefully get him connected to addiction medicine.             Name of physician completing this form: CELSO Haynes

## 2022-10-11 NOTE — ED NOTES
Emergency Department Patient Sign-out       Brief HPI:  This is a 57 year old male signed out to me by Dr. Lomeli .  See initial ED Provider note for details of the presentation.            Significant Events prior to my assuming care: The patient is a 57-year-old male who presented to the emergency department with numbness in bilateral legs.  CTs of the thoracic and lumbar spines showed no acute process.  MRI of the lumbar spine is pending at this time.  Patient also missed his regular dialysis run, so he is to be dialyzed today.  If MRI is negative, he will likely be discharged home.      Exam:   Patient Vitals for the past 24 hrs:   BP Temp Temp src Pulse Resp SpO2 Height Weight   10/10/22 1905 -- -- -- -- -- -- -- 131.5 kg (289 lb 14.5 oz)   10/10/22 1853 130/84 98.3  F (36.8  C) Oral 100 18 95 % -- --   10/10/22 1851 116/83 -- -- 99 -- 99 % -- --   10/10/22 1845 125/82 -- -- 101 -- 98 % -- --   10/10/22 1830 117/83 -- -- 100 -- 98 % -- --   10/10/22 1815 118/74 -- -- 96 -- 100 % -- --   10/10/22 1800 111/75 -- -- 97 -- 98 % -- --   10/10/22 1745 98/64 -- -- 91 -- 98 % -- --   10/10/22 1730 (!) 118/104 -- -- 89 -- 95 % -- --   10/10/22 1715 91/78 -- -- 102 -- 96 % -- --   10/10/22 1700 95/65 -- -- 104 -- 97 % -- --   10/10/22 1645 (!) 89/60 -- -- 103 -- 95 % -- --   10/10/22 1630 (!) 80/54 -- -- 102 -- 98 % -- --   10/10/22 1615 (!) 89/59 -- -- 102 -- 96 % -- --   10/10/22 1600 (!) 85/52 -- -- 100 -- 98 % -- --   10/10/22 1551 90/57 -- -- 98 18 96 % -- --   10/10/22 1547 109/65 98.7  F (37.1  C) Oral 99 16 97 % -- --   10/10/22 1500 136/67 -- -- 99 -- 98 % -- --   10/10/22 1330 -- -- -- -- -- 98 % -- --   10/10/22 1315 (!) 151/68 -- -- 102 -- -- -- --   10/10/22 1300 (!) 143/70 -- -- 103 -- -- -- --   10/10/22 1245 (!) 147/71 -- -- 101 -- -- -- --   10/10/22 1230 (!) 143/59 -- -- 99 -- -- -- --   10/10/22 1200 (!) 145/63 -- -- 99 -- 92 % -- --   10/10/22 1145 (!) 143/63 -- -- 100 -- 95 % -- --  "  10/10/22 1130 (!) 145/59 -- -- 100 -- 98 % -- --   10/10/22 1102 100/63 -- -- 99 20 95 % -- --   10/10/22 0858 103/70 97.6  F (36.4  C) Oral 97 18 97 % 1.854 m (6' 1\") 132.5 kg (292 lb)           ED RESULTS:   Results for orders placed or performed during the hospital encounter of 10/10/22 (from the past 24 hour(s))   CT Cervical Spine w/o Contrast     Status: None    Collection Time: 10/10/22  9:47 AM    Narrative    CT CERVICAL SPINE W/O CONTRAST, CT THORACIC SPINE W/O CONTRAST  10/10/2022 9:47 AM    Provided History: Recent cervical fusion now with lower extremity  numbness    Comparison: Outside CT 8/11/2022    Technique: Using multidetector thin collimation helical acquisition  technique, axial, coronal and sagittal CT images through the cervical  spine and thoracic were obtained without intravenous contrast.     Findings:  Surgical changes of C5-T6 posterior instrumented fusion with bilateral  transpedicular screws at C5 and C6, T1-T2, and T4-T6. Hardware is  intact. Unchanged loosening and retraction of bilateral C5 and C6  screws. No acute fracture or subluxation.     No acute fracture or subluxation. No prevertebral edema. The findings  on a level by level basis are as follows:    C2-3:  No spinal canal or neural foraminal stenosis.    C3-4:  Disc osteophyte complex. Bilateral uncinate spurring. Severe  left neuroforaminal stenosis. Moderate right neuroforaminal stenosis.  No spinal canal stenosis.    C4-5:  Bilateral uncinate spurring and facet arthropathy. Moderate  right and mild left neuroforaminal stenosis. No spinal canal stenosis.    C5-6:  Bilateral uncinate spurring and facet arthropathy. Mild  bilateral neural foraminal stenosis. No spinal canal stenosis.    C6-7:  Bilateral uncinate spurring. Mild bilateral neural foraminal  stenosis, left greater than right. No canal stenosis.    C7-T1:  No spinal canal or neural foraminal stenosis.     Thoracic spine:  Unchanged height loss and sclerosis of " T2 and T3 vertebral bodies with  anterior wedging and erosive changes centered in the joint space.  Unchanged multilevel degenerative opposing endplate changes. Unchanged  hemangioma in posterior T11 vertebral body. No significant neural  foraminal or spinal canal stenosis at any level.    Atelectasis in the posterior lungs bilaterally. Partially visualized  central venous catheter with tip in the superior vena cava. Atrophic  kidneys.      Impression    Impression:   1. No acute finding or significant change since 8/11/2022.  2. Unchanged loosening and retropulsion of the transpedicle screws  bilaterally at C5 and C6.   3. Unchanged T2 and T3 vertebral body sclerosis and height loss.  4. Stable degenerative changes of the cervical spine, most  significantly resulting in severe neural foraminal stenosis on the  left C3-4 and moderate neural foraminal stenosis on the right at C3-4  and C4-5.    I have personally reviewed the examination and initial interpretation  and I agree with the findings.    MIGDALIA DEE MD         SYSTEM ID:  [id number]   CT Thoracic Spine w/o Contrast     Status: None    Collection Time: 10/10/22  9:47 AM    Narrative    CT CERVICAL SPINE W/O CONTRAST, CT THORACIC SPINE W/O CONTRAST  10/10/2022 9:47 AM    Provided History: Recent cervical fusion now with lower extremity  numbness    Comparison: Outside CT 8/11/2022    Technique: Using multidetector thin collimation helical acquisition  technique, axial, coronal and sagittal CT images through the cervical  spine and thoracic were obtained without intravenous contrast.     Findings:  Surgical changes of C5-T6 posterior instrumented fusion with bilateral  transpedicular screws at C5 and C6, T1-T2, and T4-T6. Hardware is  intact. Unchanged loosening and retraction of bilateral C5 and C6  screws. No acute fracture or subluxation.     No acute fracture or subluxation. No prevertebral edema. The findings  on a level by level basis are as  follows:    C2-3:  No spinal canal or neural foraminal stenosis.    C3-4:  Disc osteophyte complex. Bilateral uncinate spurring. Severe  left neuroforaminal stenosis. Moderate right neuroforaminal stenosis.  No spinal canal stenosis.    C4-5:  Bilateral uncinate spurring and facet arthropathy. Moderate  right and mild left neuroforaminal stenosis. No spinal canal stenosis.    C5-6:  Bilateral uncinate spurring and facet arthropathy. Mild  bilateral neural foraminal stenosis. No spinal canal stenosis.    C6-7:  Bilateral uncinate spurring. Mild bilateral neural foraminal  stenosis, left greater than right. No canal stenosis.    C7-T1:  No spinal canal or neural foraminal stenosis.     Thoracic spine:  Unchanged height loss and sclerosis of T2 and T3 vertebral bodies with  anterior wedging and erosive changes centered in the joint space.  Unchanged multilevel degenerative opposing endplate changes. Unchanged  hemangioma in posterior T11 vertebral body. No significant neural  foraminal or spinal canal stenosis at any level.    Atelectasis in the posterior lungs bilaterally. Partially visualized  central venous catheter with tip in the superior vena cava. Atrophic  kidneys.      Impression    Impression:   1. No acute finding or significant change since 8/11/2022.  2. Unchanged loosening and retropulsion of the transpedicle screws  bilaterally at C5 and C6.   3. Unchanged T2 and T3 vertebral body sclerosis and height loss.  4. Stable degenerative changes of the cervical spine, most  significantly resulting in severe neural foraminal stenosis on the  left C3-4 and moderate neural foraminal stenosis on the right at C3-4  and C4-5.    I have personally reviewed the examination and initial interpretation  and I agree with the findings.    MIGDALIA DEE MD         SYSTEM ID:  [id number]   CBC with platelets differential     Status: Abnormal    Collection Time: 10/10/22 10:53 AM    Narrative    The following orders were created  for panel order CBC with platelets differential.  Procedure                               Abnormality         Status                     ---------                               -----------         ------                     CBC with platelets and d...[[id number]]  Abnormal            Final result                 Please view results for these tests on the individual orders.   Basic metabolic panel     Status: Abnormal    Collection Time: 10/10/22 10:53 AM   Result Value Ref Range    Sodium 121 (L) 136 - 145 mmol/L    Potassium 3.3 (L) 3.4 - 5.3 mmol/L    Chloride 79 (L) 98 - 107 mmol/L    Carbon Dioxide (CO2) 18 (L) 22 - 29 mmol/L    Anion Gap 24 (H) 7 - 15 mmol/L    Urea Nitrogen 19.3 6.0 - 20.0 mg/dL    Creatinine 7.66 (H) 0.67 - 1.17 mg/dL    Calcium 8.4 (L) 8.6 - 10.0 mg/dL    Glucose 87 70 - 99 mg/dL    GFR Estimate 8 (L) >60 mL/min/1.73m2   CBC with platelets and differential     Status: Abnormal    Collection Time: 10/10/22 10:53 AM   Result Value Ref Range    WBC Count 5.5 4.0 - 11.0 10e3/uL    RBC Count 2.59 (L) 4.40 - 5.90 10e6/uL    Hemoglobin 8.2 (L) 13.3 - 17.7 g/dL    Hematocrit 24.6 (L) 40.0 - 53.0 %    MCV 95 78 - 100 fL    MCH 31.7 26.5 - 33.0 pg    MCHC 33.3 31.5 - 36.5 g/dL    RDW 18.7 (H) 10.0 - 15.0 %    Platelet Count 137 (L) 150 - 450 10e3/uL    % Neutrophils 82 %    % Lymphocytes 8 %    % Monocytes 8 %    % Eosinophils 1 %    % Basophils 1 %    % Immature Granulocytes 0 %    NRBCs per 100 WBC 0 <1 /100    Absolute Neutrophils 4.5 1.6 - 8.3 10e3/uL    Absolute Lymphocytes 0.4 (L) 0.8 - 5.3 10e3/uL    Absolute Monocytes 0.5 0.0 - 1.3 10e3/uL    Absolute Eosinophils 0.1 0.0 - 0.7 10e3/uL    Absolute Basophils 0.0 0.0 - 0.2 10e3/uL    Absolute Immature Granulocytes 0.0 <=0.4 10e3/uL    Absolute NRBCs 0.0 10e3/uL       ED MEDICATIONS:   Medications   midodrine (PROAMATINE) tablet 10 mg (10 mg Oral Given 10/10/22 1533)   ondansetron (ZOFRAN) injection 4 mg (4 mg Intravenous Given 10/10/22 1723)    gabapentin (NEURONTIN) capsule 100 mg (has no administration in time range)   hydrOXYzine (ATARAX) tablet 25 mg (has no administration in time range)   fluticasone (FLONASE) 50 MCG/ACT spray 2 spray (has no administration in time range)   LORazepam (ATIVAN) injection 1 mg (has no administration in time range)   HYDROmorphone (PF) (DILAUDID) injection 0.5 mg (has no administration in time range)   HYDROmorphone (PF) (DILAUDID) injection 0.5 mg (0.5 mg Intravenous Given 10/10/22 1318)   0.9% sodium chloride BOLUS (250 mLs Intravenous New Bag 10/10/22 1553)   0.9% sodium chloride BOLUS (300 mLs Hemodialysis Machine New Bag 10/10/22 1553)   heparin (porcine) injection (1,000 Units Hemodialysis Machine OR IV Push Given 10/10/22 1713)         Impression:    ICD-10-CM    1. Bilateral leg paresthesia  R20.2       2. End-stage renal disease needing dialysis (H)  N18.6     Z99.2           Plan:    Pending studies include MRI of the lumbar spine.    Patient has returned from hemodialysis.  MRI was delayed due to hemodialysis.  We will check with MRI staff to see if this can be performed this evening, otherwise, we will plan to admit to ED observation unit.    MRI shows stable degenerative changes, no high-grade canal stenosis.  There is bilateral neural foraminal narrowing greatest on the left at L5-S1    Patient to be discharged home. Advised to follow up with PCP within 1 week. To return to ER immediately with any new/worsening symptoms.     Of note, after transport arrived to pick patient up in the emergency department, he was noted to be somewhat drowsy.  He had attributed this to having received Ativan and Dilaudid for anxiety and pain prior to his MRI.  Blood pressures noted to be somewhat low.  About 500 cc of IV fluids were given.  Blood glucose 35.  Patient notes he has had problems with hypoglycemia in the past.  Patient was given d50 and food/juice.  Blood sugar 145 on recheck.  Blood pressures improved to 100s  systolic.  Patient states his normal blood pressures range between 80 and 120 systolic.  He reports they pulled off about 1 L at dialysis today.    MD Luis Antonio Lou Michelle C, MD  10/10/22 9005       Yasmin Salmon MD  10/11/22 [id number]

## 2022-10-11 NOTE — PROGRESS NOTES
HEMODIALYSIS TREATMENT NOTE    Date: 10/10/2022  Time: 7:06 PM    Data:  Pre Wt:   132.5kg  Desired Wt:   kg    Post Wt:  131.5kg  Weight change:  -1 kg  Ultrafiltration - Post Run Net Total Removed (mL): 1000 mL  Vascular Access Status: patent  Dialyzer Rinse: Clear  Total Blood Volume Processed: 70.56 L Liters  Total Dialysis (Treatment) Time: 3.5 Hours      Interventions:  Heparin bolus and maintenance infusion   Ondansetron for nausea       Assessment:  Pt completed 3.5hrs of tx removing 1L of fluid. SBPs  varied from low 80s to last reading of 130/84. Pt had complaints of nausea and back pain. Zofran given at 1723 by previous dialysis RN. Report given to PCN. Sites held for 10 minutes without issue.    Plan:    Per renal.

## 2022-10-11 NOTE — PROGRESS NOTES
This is a recent snapshot of the patient's [facility] medical record.  For current drug dose and complete information and questions, call [phone] or In Basket pool, fv home infusion ([id number])  CSN Number:  [id number]

## 2022-10-12 ENCOUNTER — TELEPHONE (OUTPATIENT)
Dept: NEUROSURGERY | Facility: CLINIC | Age: 57
End: 2022-10-12

## 2022-10-12 NOTE — TELEPHONE ENCOUNTER
As per Dr Boles:  Can we have this patient actually moved up for CT and visit with CHITO this week.     Patient in ED 10/10 for increased numbness/tingling. Imaging done and negative. Patient DC'ed.     Attempted to reach out to patient to followup/assess, no answer. Left voice message for patient to call clinic back to further discuss.

## 2022-10-17 ENCOUNTER — TELEPHONE (OUTPATIENT)
Dept: NEUROSURGERY | Facility: CLINIC | Age: 57
End: 2022-10-17

## 2022-10-19 NOTE — PROGRESS NOTES
This is a recent snapshot of the patient's [facility] medical record.  For current drug dose and complete information and questions, call [phone] or In Basket pool, fv home infusion ([id number])  CSN Number:  [id number]

## 2022-10-22 ENCOUNTER — HEALTH MAINTENANCE LETTER (OUTPATIENT)
Age: 57
End: 2022-10-22

## 2022-10-23 ASSESSMENT — ENCOUNTER SYMPTOMS
WEAKNESS: 0
GASTROINTESTINAL NEGATIVE: 1
BACK PAIN: 0
FEVER: 0
RESPIRATORY NEGATIVE: 1
CARDIOVASCULAR NEGATIVE: 1
CONSTITUTIONAL NEGATIVE: 1

## 2022-10-24 NOTE — PROGRESS NOTES
This is a recent snapshot of the patient's [facility] medical record.  For current drug dose and complete information and questions, call [phone] or In Basket pool, fv home infusion ([id number])  CSN Number:  [id number]

## 2022-10-28 ENCOUNTER — TELEPHONE (OUTPATIENT)
Dept: NEUROSURGERY | Facility: CLINIC | Age: 57
End: 2022-10-28

## 2022-10-28 ENCOUNTER — DOCUMENTATION ONLY (OUTPATIENT)
Dept: NEUROSURGERY | Facility: CLINIC | Age: 57
End: 2022-10-28

## 2022-10-28 NOTE — PROGRESS NOTES
This is a recent snapshot of the patient's [facility] medical record.  For current drug dose and complete information and questions, call [phone] or In Basket pool, fv home infusion ([id number])  CSN Number:  [id number]

## 2022-10-28 NOTE — TELEPHONE ENCOUNTER
Received Jeffrey STD form from RN at [facility]. Scanned blank form into media.     Will pass form back to RN

## 2022-10-31 NOTE — TELEPHONE ENCOUNTER
Lilly Melchor, Celina Denny RN; Casandra Lyon RN; [facility]  Replies will be sent to  [facility]  Valdo Carroll,     Patient is scheduled to see Dr. Boles 11/3 in  with some imaging prior. We received paperwork from [facility] to complete for this patient and its due by 11/3 (blank form scanned into media). Difficult to complete this paperwork right now since he hasn't had his follow-up with imaging yet or POC determined.     Paperwork will need to be completed and signed by Dr Boles after visit on 11/3. Would you be able to fill out PW and have Dr Boles sign it Thursday?     It would need to be faxed back to vita once done and scanned into patient's chart.     Sorry to put this on you ladies. LMK if any questions. Thank you so much!     YUDI Carr

## 2022-10-31 NOTE — TELEPHONE ENCOUNTER
Forms placed in Dr Boles's folder to complete on Thursday when he is in clinic.    Casandra Lyon, RN Care Coordinator   Neurology/Neurosurgery/PM&R/ Pain Management

## 2022-11-03 ENCOUNTER — OFFICE VISIT (OUTPATIENT)
Dept: NEUROSURGERY | Facility: CLINIC | Age: 57
End: 2022-11-03
Payer: COMMERCIAL

## 2022-11-03 ENCOUNTER — ANCILLARY PROCEDURE (OUTPATIENT)
Dept: CT IMAGING | Facility: CLINIC | Age: 57
End: 2022-11-03
Attending: STUDENT IN AN ORGANIZED HEALTH CARE EDUCATION/TRAINING PROGRAM
Payer: COMMERCIAL

## 2022-11-03 VITALS
HEIGHT: 73 IN | SYSTOLIC BLOOD PRESSURE: 86 MMHG | WEIGHT: 289 LBS | HEART RATE: 82 BPM | BODY MASS INDEX: 38.3 KG/M2 | OXYGEN SATURATION: 99 % | DIASTOLIC BLOOD PRESSURE: 61 MMHG

## 2022-11-03 DIAGNOSIS — Z98.1 S/P SPINAL FUSION: ICD-10-CM

## 2022-11-03 DIAGNOSIS — N18.5 CKD (CHRONIC KIDNEY DISEASE) STAGE 5, GFR LESS THAN 15 ML/MIN (H): ICD-10-CM

## 2022-11-03 DIAGNOSIS — Z01.818 PRE-OP TESTING: Primary | ICD-10-CM

## 2022-11-03 PROCEDURE — G1010 CDSM STANSON: HCPCS | Mod: GC | Performed by: STUDENT IN AN ORGANIZED HEALTH CARE EDUCATION/TRAINING PROGRAM

## 2022-11-03 PROCEDURE — 99214 OFFICE O/P EST MOD 30 MIN: CPT | Performed by: STUDENT IN AN ORGANIZED HEALTH CARE EDUCATION/TRAINING PROGRAM

## 2022-11-03 PROCEDURE — 72128 CT CHEST SPINE W/O DYE: CPT | Mod: MG | Performed by: STUDENT IN AN ORGANIZED HEALTH CARE EDUCATION/TRAINING PROGRAM

## 2022-11-03 PROCEDURE — 72125 CT NECK SPINE W/O DYE: CPT | Mod: MF | Performed by: STUDENT IN AN ORGANIZED HEALTH CARE EDUCATION/TRAINING PROGRAM

## 2022-11-03 ASSESSMENT — PAIN SCALES - GENERAL: PAINLEVEL: MODERATE PAIN (4)

## 2022-11-03 NOTE — NURSING NOTE
"Shay Jimenez's goals for this visit include:   Chief Complaint   Patient presents with     RECHECK     return:4 wk follow up:  MRI done 10/10  CT done 10/10  No xrays needed md       He requests these members of his care team be copied on today's visit information: YES    PCP: Jonathan Travis    Referring Provider:  No referring provider defined for this encounter.    BP (!) 86/61 (BP Location: Right arm, Patient Position: Sitting, Cuff Size: Adult Regular)   Pulse 82   Ht 1.854 m (6' 1\")   Wt 131.1 kg (289 lb)   SpO2 99%   BMI 38.13 kg/m      Do you need any medication refills at today's visit? NO  WILIAM Butcher., LESLI ([facility])      "

## 2022-11-03 NOTE — TELEPHONE ENCOUNTER
Forms completed and signed. RN tried to reach the pt to discuss some further questions but there was no answer. Was unable to leave a message so we will try again in a little bit.    Casandra Lyon RN Care Coordinator   Neurology/Neurosurgery/PM&R/ Pain Management

## 2022-11-03 NOTE — LETTER
11/3/2022         RE: Shay Jmienez  [address] [address]        Dear Colleague,    Thank you for referring your patient, Shay Jimenez, to the [facility] [place]. Please see a copy of my visit note below.    HPI:  57-year-old male status post C5-T6 posterior lateral fusion and decompression for cervical myelopathy from chronic osteomyelitis.  Postoperatively he is improved significantly with his strength and myelopathy symptoms however recently he was admitted for hyponatremia and global weakness.  He does feel he is still slightly weaker after his hospital stay however this is starting to improve once again.  He is still able to ambulate at home with a walker.  Denies any new myelopathy.  He states he can feel the hardware in the back of his neck sometimes.  Current Outpatient Medications   Medication     acetaminophen (TYLENOL) 325 MG tablet     ALPRAZolam (XANAX) 1 MG tablet     ammonium lactate (AMLACTIN) 12 % external cream     atorvastatin (LIPITOR) 20 MG tablet     baclofen (LIORESAL) 10 MG tablet     calcium carbonate (TUMS) 500 MG chewable tablet     cefuroxime (CEFTIN) 250 MG tablet     cetirizine (ZYRTEC) 10 MG tablet     cyanocobalamin (VITAMIN B-12) 500 MCG tablet     ELIQUIS ANTICOAGULANT 2.5 MG tablet     finasteride (PROSCAR) 5 MG tablet     fluticasone (FLONASE) 50 MCG/ACT nasal spray     gabapentin (NEURONTIN) 100 MG capsule     hydrOXYzine (ATARAX) 25 MG tablet     midodrine (PROAMATINE) 10 MG tablet     multivitamin RENAL (RENAVITE RX/NEPHROVITE) 1 MG tablet     naloxone (NARCAN) 4 MG/0.1ML nasal spray     omeprazole (PRILOSEC) 20 MG DR capsule     polyethylene glycol (MIRALAX) 17 GM/Dose powder     sertraline (ZOLOFT) 100 MG tablet     sevelamer carbonate (RENVELA) 800 MG tablet     vitamin B6 (PYRIDOXINE) 100 MG tablet     No current facility-administered medications for this visit.      Physical Exam:  Vital signs:                   " Height: 185.4 cm (6' 1\") Weight: 131.1 kg (289 lb)  Estimated body mass index is 38.13 kg/m  as calculated from the following:    Height as of this encounter: 1.854 m (6' 1\").    Weight as of this encounter: 131.1 kg (289 lb).   He has full strength in his bilateral upper and lower extremities.  Sensation is intact to light touch throughout.  He is in his wheelchair today due to the long walk from the parking lot to the clinic.  No Daroí sign noted.  No clonus noted.  Incision is well-healed.  Results Reviewed:  I personally viewed the images of the CT scan of the cervical and thoracic spine.  There does appear to be continued displacement of the C5 and C6 lateral mass screws bilaterally.  Fortunately the T1-T2 and T4-6 screws that appear to be stable without any evidence of haloing or displacement.  There is no significant increase in cervical-thoracic kyphosis.  There is no significant posterior lateral fusion above or below the area of the T2 and T3 degeneration however the lateral masses bilaterally do appear to be fusing at these levels.  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  Now with hardware displacement and not fusion.  Plan:  We discussed continuing with current treatment plan versus revision surgery.  Given the screws at C5 and C6 of backed out and there is no obvious fusion above or below the areas of the fracture I believe he is at risk for fortino fracture and restenosis of the thoracic spinal cord.  Therefore I recommend revision surgery specifically in the cervical region to place more robust hardware and add rods for further stability.  Would also recommend redoing posterior lateral fusion as he has not had any significant fusion on the imaging.  We discussed the risks and the benefits of this " procedure overall.  Would like to do this in the near future and would recommend keeping him in a cervical collar at all times until the procedure is done.    Fritz Boles MD      Again, thank you for allowing me to participate in the care of your patient.        Sincerely,        Fritz Boles MD

## 2022-11-03 NOTE — PATIENT INSTRUCTIONS
Patient Instructions    Surgery scheduled at [facility] cervical and thoracic revision of previous fusion at [facility] with Dr. Boles    Pre-Operative  Surgical risks: blood clots in the leg or lung, problems urinating, nerve damage, drainage from the incision, infection,stiffness  Pre-operative physical with primary care physician within 30 days of surgical date.   2-4 night hospitalization stay   Shower procedure  Please shower with antimicrobial soap the night before surgery and morning of surgery. Please refer to showering instruction sheet in folder.  Eating/Drinking  Stop solid foods 8 hours prior to arrival time  May have clear liquids up to 1 hour prior to arrival time   Clear liquids include water, clear juice, black coffee, or clear tea without milk, Gatorade, clear soda.   Medications  Hold Aspirin, NSAIDs (Advil/Ibuprofen, Indocin, Naproxen,Nuprin,Relafen/Nabumetone, Diclofenac,Meloxicam, Aleve, Celebrex) x 7 days prior to surgical date  Hold methotrexate, Xeljanz for 1-2 weeks prior to surgery and continue to hold 2 weeks post surgery.   You can take Tylenol (Acetaminophen) for pain, 1000 mg  Do not exceed 3,000 mg per day   Any other medications prescribed, please discuss with your primary care provider at your pre-operative physical   As part of preparation for your upcoming procedure you are required to have a test for the novel Coronavirus, COVID-19.  Please call the drive-up testing number to schedule your appointment. The test needs to be completed within 4 days (96) hours of surgery.   Scheduling Number: [phone]  You may NOT receive the COVID-19 vaccine 72 hours before or after surgery.    Pain Management  Dealing with pain  As your body heals, you might feel a stabbing, burning, or aching pain. You may also have some numbness.  Everyone feels pain differently, we may ask you to rate your pain using a pain scale. This will let us know how much pain you feel.   Keep in mind  that medicine won't take away all of your pain. It helps to try other ways to relax and ease pain.   Things to help with pain  After surgery, we will give you medicine for your pain. These medications work well, but they can make you drowsy, itchy, or sick to your stomach. If we give you narcotics for pain, try to take the pills with food.   For mild to moderate pain, you can take medication such as Tylenol. These can be used with narcotics, but make sure that your narcotic does not contain Tylenol.   Do NOT drive while taking narcotic pain medication  Do NOT drink alcohol while using any pain medication  You can utilize ice as needed (no longer than 20 minutes at one time)  You may resume taking NSAIDs (ex. Ibuprofen, aleve, naproxen) 2 weeks after surgery.    Incision Care  No submerging incision in water such as pools, hot tubs, baths for at least 8 weeks or until incision is healed  You may get your incision wet in the shower. Allow water to run over incision, and gently pat dry.   Remove dressing as instructed upon discharge  Watch for signs of infection  Redness, swelling, warmth, drainage, and fever of 101 degrees or higher  Notify clinic [phone].    Activity Restrictions  For the first 6-8 weeks, no lifting > 10 pounds, no bending, twisting, or overhead reaching.  Take stairs in moderation   Ok to walk as tolerated, take short frequent walks. You may gradually increase the distance as tolerated.   Avoid bed rest and prolonged sitting for longer than 30 minutes (change positions frequently while awake)  No contact sports until after follow up visit  No high impact activities such as; running/jogging, snowmobile or 4 apiz riding or any other recreational vehicles  Please call the clinic if you develop any of the following symptoms:  Swelling and/or warmth in one or both legs  Pain or tenderness in your leg, ankle, foot, or arm   Red or discolored skin     Post-Op Follow Up Appointments  2 week  staple/suture removal with CHITO/RN  6 week post op with xray prior with Dr. Boles  3 months post op with xray prior with Dr. Boles  6 months with xray prior with CHITO  1 year post op with xray prior with CHITO  2 year post op with xray prior with CHITO  Please call to schedule follow up and xray appointments at [phone]    Resources  If you are currently employed, you will need to be off work for 4-6 weeks for post op recovery and healing.  Please fax any FMLA/short term disability paperwork to [phone]  You may call our clinic when you'd like to return to work and we can provide a work letter  To allow staff adequate time to complete paperwork, please send as soon as possible     [facility]  Phone: [phone]  Fax: [phone]

## 2022-11-03 NOTE — TELEPHONE ENCOUNTER
Form completed and signed while pt was in-clinic on 11/3/22. Faxed to Jeffrey Bennett at [phone]. Confirmed fax was sent successfully.      Celina Mireles, RNCC  Neurology/Neurosurgery/PM&R

## 2022-11-03 NOTE — TELEPHONE ENCOUNTER
Tried that pt again with no success. [facility] message sent to the pt.     Casandra Lyon, RN Care Coordinator   Neurology/Neurosurgery/PM&R/ Pain Management

## 2022-11-03 NOTE — NURSING NOTE
Reviewed pre- and post-operative instructions as outlined in the After Visit Summary/Patient Instructions with patient.   Surgery folder was given to patient    Patient Education Topic: pre-op education     Learner(s): Patient and Family     Knowledge Level: Advanced     Readiness to Learn: Ready    Method:  Verbal explanation and Written material     Outcome: Able to verbalize instructions    Barriers to Learning: No barrier    Covid Testing: patient educated they will need to have a test for the novel Coronavirus, COVID-19.The PCR test needs to be completed within 4 days (96) hours of surgery. . Order Placed.     Scheduling Number: [phone]    NDI/ERNAE: Confirmation of completion within last 6 months    Patient had the opportunity for questions to be answered. Advised Patient and Family  to call clinic with any questions/concerns. Gave patient antibacterial soap for pre-surgery skin preparation.       Celina Mireles, RNCC  Neurology/Neurosurgery/PM&R

## 2022-11-06 PROBLEM — N18.5 CKD (CHRONIC KIDNEY DISEASE) STAGE 5, GFR LESS THAN 15 ML/MIN (H): Status: ACTIVE | Noted: 2022-11-06

## 2022-11-09 ENCOUNTER — TELEPHONE (OUTPATIENT)
Dept: NEUROSURGERY | Facility: CLINIC | Age: 57
End: 2022-11-09

## 2022-11-09 NOTE — TELEPHONE ENCOUNTER
Spoke to patient and offered January surgery date. Patient would like to have surgery in December. Writer sent staff message to .

## 2022-11-10 ENCOUNTER — MEDICAL CORRESPONDENCE (OUTPATIENT)
Dept: HEALTH INFORMATION MANAGEMENT | Facility: CLINIC | Age: 57
End: 2022-11-10

## 2022-11-10 NOTE — PROGRESS NOTES
"HPI:  57-year-old male status post C5-T6 posterior lateral fusion and decompression for cervical myelopathy from chronic osteomyelitis.  Postoperatively he is improved significantly with his strength and myelopathy symptoms however recently he was admitted for hyponatremia and global weakness.  He does feel he is still slightly weaker after his hospital stay however this is starting to improve once again.  He is still able to ambulate at home with a walker.  Denies any new myelopathy.  He states he can feel the hardware in the back of his neck sometimes.  Current Outpatient Medications   Medication     acetaminophen (TYLENOL) 325 MG tablet     ALPRAZolam (XANAX) 1 MG tablet     ammonium lactate (AMLACTIN) 12 % external cream     atorvastatin (LIPITOR) 20 MG tablet     baclofen (LIORESAL) 10 MG tablet     calcium carbonate (TUMS) 500 MG chewable tablet     cefuroxime (CEFTIN) 250 MG tablet     cetirizine (ZYRTEC) 10 MG tablet     cyanocobalamin (VITAMIN B-12) 500 MCG tablet     ELIQUIS ANTICOAGULANT 2.5 MG tablet     finasteride (PROSCAR) 5 MG tablet     fluticasone (FLONASE) 50 MCG/ACT nasal spray     gabapentin (NEURONTIN) 100 MG capsule     hydrOXYzine (ATARAX) 25 MG tablet     midodrine (PROAMATINE) 10 MG tablet     multivitamin RENAL (RENAVITE RX/NEPHROVITE) 1 MG tablet     naloxone (NARCAN) 4 MG/0.1ML nasal spray     omeprazole (PRILOSEC) 20 MG DR capsule     polyethylene glycol (MIRALAX) 17 GM/Dose powder     sertraline (ZOLOFT) 100 MG tablet     sevelamer carbonate (RENVELA) 800 MG tablet     vitamin B6 (PYRIDOXINE) 100 MG tablet     No current facility-administered medications for this visit.      Physical Exam:  Vital signs:                   Height: 185.4 cm (6' 1\") Weight: 131.1 kg (289 lb)  Estimated body mass index is 38.13 kg/m  as calculated from the following:    Height as of this encounter: 1.854 m (6' 1\").    Weight as of this encounter: 131.1 kg (289 lb).   He has full strength in his bilateral " upper and lower extremities.  Sensation is intact to light touch throughout.  He is in his wheelchair today due to the long walk from the parking lot to the clinic.  No Darío sign noted.  No clonus noted.  Incision is well-healed.  Results Reviewed:  I personally viewed the images of the CT scan of the cervical and thoracic spine.  There does appear to be continued displacement of the C5 and C6 lateral mass screws bilaterally.  Fortunately the T1-T2 and T4-6 screws that appear to be stable without any evidence of haloing or displacement.  There is no significant increase in cervical-thoracic kyphosis.  There is no significant posterior lateral fusion above or below the area of the T2 and T3 degeneration however the lateral masses bilaterally do appear to be fusing at these levels.  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  Now with hardware displacement and not fusion.  Plan:  We discussed continuing with current treatment plan versus revision surgery.  Given the screws at C5 and C6 of backed out and there is no obvious fusion above or below the areas of the fracture I believe he is at risk for fortino fracture and restenosis of the thoracic spinal cord.  Therefore I recommend revision surgery specifically in the cervical region to place more robust hardware and add rods for further stability.  Would also recommend redoing posterior lateral fusion as he has not had any significant fusion on the imaging.  We discussed the risks and the benefits of this procedure overall.  Would like to do this in the near future and would recommend keeping him in a cervical collar at all times until the procedure is done.    Fritz Boles MD

## 2022-11-14 ENCOUNTER — TELEPHONE (OUTPATIENT)
Dept: NEUROSURGERY | Facility: CLINIC | Age: 57
End: 2022-11-14

## 2022-12-01 ENCOUNTER — TELEPHONE (OUTPATIENT)
Dept: NEUROSURGERY | Facility: CLINIC | Age: 57
End: 2022-12-01

## 2022-12-01 ENCOUNTER — TRANSFERRED RECORDS (OUTPATIENT)
Dept: MULTI SPECIALTY CLINIC | Facility: CLINIC | Age: 57
End: 2022-12-01

## 2022-12-01 LAB
CREATININE (EXTERNAL): 4.8 MG/DL (ref 0.5–1.2)
GFR ESTIMATED (EXTERNAL): 12 ML/MIN/1.73M2
GFR ESTIMATED (IF AFRICAN AMERICAN) (EXTERNAL): 14 ML/MIN/1.73M2
GLUCOSE (EXTERNAL): 77 MG/DL (ref 65–99)
POTASSIUM (EXTERNAL): 4.2 MMOL/L (ref 3.5–5)

## 2022-12-01 NOTE — TELEPHONE ENCOUNTER
PAN office notified clinic that pt still needs H&P completed prior to surgery on 12/6. Pt was contacted and informed writer that he is scheudled for his H&P at 2:20pm today with his PCP. He is also getting his COVID booster today which will be outside the 72hr window. He will pan to have his COVID test done tomorrow. Pt given both fax numbers to [facility] and  locations to fax his H&P notes and COVID test results. He will make sure that PCP office has this faxed accordingly. PAN office updated with this information.         Celina Mireles, RNCC  Neurology/Neurosurgery/PM&R

## 2022-12-05 ENCOUNTER — ANESTHESIA EVENT (OUTPATIENT)
Dept: SURGERY | Facility: CLINIC | Age: 57
DRG: 459 | End: 2022-12-05
Payer: MEDICARE

## 2022-12-05 ASSESSMENT — ENCOUNTER SYMPTOMS: DYSRHYTHMIAS: 1

## 2022-12-06 ENCOUNTER — ANESTHESIA (OUTPATIENT)
Dept: SURGERY | Facility: CLINIC | Age: 57
DRG: 459 | End: 2022-12-06
Payer: MEDICARE

## 2022-12-06 ENCOUNTER — HOSPITAL ENCOUNTER (INPATIENT)
Facility: CLINIC | Age: 57
LOS: 32 days | Discharge: NURSING, REHAB OR LONG-TERM CARE | DRG: 459 | End: 2023-01-07
Attending: STUDENT IN AN ORGANIZED HEALTH CARE EDUCATION/TRAINING PROGRAM | Admitting: STUDENT IN AN ORGANIZED HEALTH CARE EDUCATION/TRAINING PROGRAM
Payer: MEDICARE

## 2022-12-06 ENCOUNTER — APPOINTMENT (OUTPATIENT)
Dept: GENERAL RADIOLOGY | Facility: CLINIC | Age: 57
DRG: 459 | End: 2022-12-06
Attending: STUDENT IN AN ORGANIZED HEALTH CARE EDUCATION/TRAINING PROGRAM
Payer: MEDICARE

## 2022-12-06 ENCOUNTER — APPOINTMENT (OUTPATIENT)
Dept: INTERVENTIONAL RADIOLOGY/VASCULAR | Facility: CLINIC | Age: 57
DRG: 459 | End: 2022-12-06
Attending: STUDENT IN AN ORGANIZED HEALTH CARE EDUCATION/TRAINING PROGRAM
Payer: MEDICARE

## 2022-12-06 DIAGNOSIS — M47.14 DEGENERATIVE ARTHRITIS OF THORACIC SPINE WITH CORD COMPRESSION: ICD-10-CM

## 2022-12-06 DIAGNOSIS — G62.9 PERIPHERAL POLYNEUROPATHY: ICD-10-CM

## 2022-12-06 DIAGNOSIS — Z98.1 S/P SPINAL FUSION: ICD-10-CM

## 2022-12-06 DIAGNOSIS — M46.40 DISCITIS, UNSPECIFIED SPINAL REGION: ICD-10-CM

## 2022-12-06 DIAGNOSIS — K59.2 NEUROGENIC BOWEL: ICD-10-CM

## 2022-12-06 DIAGNOSIS — G06.1 ABSCESS IN EPIDURAL SPACE OF SPINE: ICD-10-CM

## 2022-12-06 DIAGNOSIS — Z98.1 S/P CERVICAL SPINAL FUSION: Primary | ICD-10-CM

## 2022-12-06 LAB
ABO/RH(D): NORMAL
ANION GAP SERPL CALCULATED.3IONS-SCNC: 20 MMOL/L (ref 7–15)
ANTIBODY SCREEN: NEGATIVE
APTT PPP: 26 SECONDS (ref 22–38)
BASE EXCESS BLDA CALC-SCNC: 2 MMOL/L (ref -9.6–2)
BASE EXCESS BLDA CALC-SCNC: 6.6 MMOL/L (ref -9.6–2)
BASE EXCESS BLDA CALC-SCNC: 6.8 MMOL/L (ref -9.6–2)
BASOPHILS # BLD AUTO: 0.1 10E3/UL (ref 0–0.2)
BASOPHILS NFR BLD AUTO: 1 %
BLD PROD TYP BPU: NORMAL
BLOOD COMPONENT TYPE: NORMAL
BUN SERPL-MCNC: 17.4 MG/DL (ref 6–20)
CA-I BLD-MCNC: 4 MG/DL (ref 4.4–5.2)
CA-I BLD-MCNC: 4.1 MG/DL (ref 4.4–5.2)
CA-I BLD-MCNC: 4.2 MG/DL (ref 4.4–5.2)
CALCIUM SERPL-MCNC: 9.7 MG/DL (ref 8.6–10)
CHLORIDE SERPL-SCNC: 87 MMOL/L (ref 98–107)
CODING SYSTEM: NORMAL
CREAT SERPL-MCNC: 4.66 MG/DL (ref 0.67–1.17)
CROSSMATCH: NORMAL
DEPRECATED HCO3 PLAS-SCNC: 25 MMOL/L (ref 22–29)
EOSINOPHIL # BLD AUTO: 0.2 10E3/UL (ref 0–0.7)
EOSINOPHIL NFR BLD AUTO: 5 %
ERYTHROCYTE [DISTWIDTH] IN BLOOD BY AUTOMATED COUNT: 14.2 % (ref 10–15)
GFR SERPL CREATININE-BSD FRML MDRD: 14 ML/MIN/1.73M2
GLUCOSE BLD-MCNC: 129 MG/DL (ref 70–99)
GLUCOSE BLD-MCNC: 137 MG/DL (ref 70–99)
GLUCOSE BLD-MCNC: 161 MG/DL (ref 70–99)
GLUCOSE BLDC GLUCOMTR-MCNC: 127 MG/DL (ref 70–99)
GLUCOSE BLDC GLUCOMTR-MCNC: 92 MG/DL (ref 70–99)
GLUCOSE SERPL-MCNC: 87 MG/DL (ref 70–99)
HCO3 BLDA-SCNC: 27 MMOL/L (ref 21–28)
HCO3 BLDA-SCNC: 31 MMOL/L (ref 21–28)
HCO3 BLDA-SCNC: 31 MMOL/L (ref 21–28)
HCT VFR BLD AUTO: 30.3 % (ref 40–53)
HGB BLD-MCNC: 10.2 G/DL (ref 13.3–17.7)
HGB BLD-MCNC: 8.5 G/DL (ref 13.3–17.7)
HGB BLD-MCNC: 8.6 G/DL (ref 13.3–17.7)
HGB BLD-MCNC: 9.1 G/DL (ref 13.3–17.7)
IMM GRANULOCYTES # BLD: 0 10E3/UL
IMM GRANULOCYTES NFR BLD: 0 %
INR PPP: 0.92 (ref 0.85–1.15)
ISSUE DATE AND TIME: NORMAL
LACTATE BLD-SCNC: 1.9 MMOL/L
LACTATE BLD-SCNC: 2 MMOL/L
LACTATE BLD-SCNC: 3.4 MMOL/L
LYMPHOCYTES # BLD AUTO: 1.1 10E3/UL (ref 0.8–5.3)
LYMPHOCYTES NFR BLD AUTO: 26 %
MCH RBC QN AUTO: 33 PG (ref 26.5–33)
MCHC RBC AUTO-ENTMCNC: 33.7 G/DL (ref 31.5–36.5)
MCV RBC AUTO: 98 FL (ref 78–100)
MONOCYTES # BLD AUTO: 0.5 10E3/UL (ref 0–1.3)
MONOCYTES NFR BLD AUTO: 12 %
NEUTROPHILS # BLD AUTO: 2.3 10E3/UL (ref 1.6–8.3)
NEUTROPHILS NFR BLD AUTO: 56 %
NRBC # BLD AUTO: 0 10E3/UL
NRBC BLD AUTO-RTO: 0 /100
O2/TOTAL GAS SETTING VFR VENT: 49 %
O2/TOTAL GAS SETTING VFR VENT: 90 %
O2/TOTAL GAS SETTING VFR VENT: 93 %
PCO2 BLDA: 40 MM HG (ref 35–45)
PCO2 BLDA: 43 MM HG (ref 35–45)
PCO2 BLDA: 44 MM HG (ref 35–45)
PH BLDA: 7.43 [PH] (ref 7.35–7.45)
PH BLDA: 7.46 [PH] (ref 7.35–7.45)
PH BLDA: 7.46 [PH] (ref 7.35–7.45)
PLATELET # BLD AUTO: 204 10E3/UL (ref 150–450)
PO2 BLDA: 166 MM HG (ref 80–105)
PO2 BLDA: 296 MM HG (ref 80–105)
PO2 BLDA: 403 MM HG (ref 80–105)
POTASSIUM BLD-SCNC: 4.7 MMOL/L (ref 3.5–5)
POTASSIUM BLD-SCNC: 4.9 MMOL/L (ref 3.5–5)
POTASSIUM BLD-SCNC: 5.9 MMOL/L (ref 3.5–5)
POTASSIUM SERPL-SCNC: 4 MMOL/L (ref 3.4–5.3)
RBC # BLD AUTO: 3.09 10E6/UL (ref 4.4–5.9)
SODIUM BLD-SCNC: 129 MMOL/L (ref 133–144)
SODIUM BLD-SCNC: 130 MMOL/L (ref 133–144)
SODIUM BLD-SCNC: 130 MMOL/L (ref 133–144)
SODIUM SERPL-SCNC: 132 MMOL/L (ref 136–145)
SPECIMEN EXPIRATION DATE: NORMAL
UNIT ABO/RH: NORMAL
UNIT NUMBER: NORMAL
UNIT STATUS: NORMAL
UNIT TYPE ISBT: 9500
WBC # BLD AUTO: 4.1 10E3/UL (ref 4–11)

## 2022-12-06 PROCEDURE — C1887 CATHETER, GUIDING: HCPCS

## 2022-12-06 PROCEDURE — 250N000011 HC RX IP 250 OP 636

## 2022-12-06 PROCEDURE — 999N000179 XR SURGERY CARM FLUORO LESS THAN 5 MIN W STILLS: Mod: TC

## 2022-12-06 PROCEDURE — 85730 THROMBOPLASTIN TIME PARTIAL: CPT | Performed by: PHYSICIAN ASSISTANT

## 2022-12-06 PROCEDURE — 36224 PLACE CATH CAROTD ART: CPT

## 2022-12-06 PROCEDURE — 250N000011 HC RX IP 250 OP 636: Performed by: NURSE PRACTITIONER

## 2022-12-06 PROCEDURE — 250N000009 HC RX 250

## 2022-12-06 PROCEDURE — 20930 SP BONE ALGRFT MORSEL ADD-ON: CPT | Performed by: STUDENT IN AN ORGANIZED HEALTH CARE EDUCATION/TRAINING PROGRAM

## 2022-12-06 PROCEDURE — 86923 COMPATIBILITY TEST ELECTRIC: CPT | Performed by: STUDENT IN AN ORGANIZED HEALTH CARE EDUCATION/TRAINING PROGRAM

## 2022-12-06 PROCEDURE — 84100 ASSAY OF PHOSPHORUS: CPT | Performed by: STUDENT IN AN ORGANIZED HEALTH CARE EDUCATION/TRAINING PROGRAM

## 2022-12-06 PROCEDURE — 999N000141 HC STATISTIC PRE-PROCEDURE NURSING ASSESSMENT: Performed by: STUDENT IN AN ORGANIZED HEALTH CARE EDUCATION/TRAINING PROGRAM

## 2022-12-06 PROCEDURE — 22614 ARTHRD PST TQ 1NTRSPC EA ADD: CPT | Mod: 22 | Performed by: STUDENT IN AN ORGANIZED HEALTH CARE EDUCATION/TRAINING PROGRAM

## 2022-12-06 PROCEDURE — 258N000003 HC RX IP 258 OP 636: Performed by: ANESTHESIOLOGY

## 2022-12-06 PROCEDURE — 36226 PLACE CATH VERTEBRAL ART: CPT | Mod: 50

## 2022-12-06 PROCEDURE — 86901 BLOOD TYPING SEROLOGIC RH(D): CPT | Performed by: PHYSICIAN ASSISTANT

## 2022-12-06 PROCEDURE — 22849 REINSERT SPINAL FIXATION: CPT | Mod: 51 | Performed by: STUDENT IN AN ORGANIZED HEALTH CARE EDUCATION/TRAINING PROGRAM

## 2022-12-06 PROCEDURE — 86923 COMPATIBILITY TEST ELECTRIC: CPT | Performed by: NURSE PRACTITIONER

## 2022-12-06 PROCEDURE — 82803 BLOOD GASES ANY COMBINATION: CPT

## 2022-12-06 PROCEDURE — 370N000017 HC ANESTHESIA TECHNICAL FEE, PER MIN: Performed by: STUDENT IN AN ORGANIZED HEALTH CARE EDUCATION/TRAINING PROGRAM

## 2022-12-06 PROCEDURE — 250N000011 HC RX IP 250 OP 636: Performed by: PHYSICIAN ASSISTANT

## 2022-12-06 PROCEDURE — 36226 PLACE CATH VERTEBRAL ART: CPT | Mod: 50 | Performed by: NEUROLOGICAL SURGERY

## 2022-12-06 PROCEDURE — 86140 C-REACTIVE PROTEIN: CPT | Performed by: STUDENT IN AN ORGANIZED HEALTH CARE EDUCATION/TRAINING PROGRAM

## 2022-12-06 PROCEDURE — 36224 PLACE CATH CAROTD ART: CPT | Mod: RT | Performed by: NEUROLOGICAL SURGERY

## 2022-12-06 PROCEDURE — 61783 SCAN PROC SPINAL: CPT | Performed by: STUDENT IN AN ORGANIZED HEALTH CARE EDUCATION/TRAINING PROGRAM

## 2022-12-06 PROCEDURE — 710N000011 HC RECOVERY PHASE 1, LEVEL 3, PER MIN: Performed by: STUDENT IN AN ORGANIZED HEALTH CARE EDUCATION/TRAINING PROGRAM

## 2022-12-06 PROCEDURE — 85610 PROTHROMBIN TIME: CPT | Performed by: PHYSICIAN ASSISTANT

## 2022-12-06 PROCEDURE — 250N000011 HC RX IP 250 OP 636: Performed by: NURSE ANESTHETIST, CERTIFIED REGISTERED

## 2022-12-06 PROCEDURE — P9016 RBC LEUKOCYTES REDUCED: HCPCS

## 2022-12-06 PROCEDURE — 999N000285 HC STATISTIC VASC ACCESS LAB DRAW WITH PIV START

## 2022-12-06 PROCEDURE — 36415 COLL VENOUS BLD VENIPUNCTURE: CPT

## 2022-12-06 PROCEDURE — 85014 HEMATOCRIT: CPT | Performed by: PHYSICIAN ASSISTANT

## 2022-12-06 PROCEDURE — 250N000009 HC RX 250: Performed by: STUDENT IN AN ORGANIZED HEALTH CARE EDUCATION/TRAINING PROGRAM

## 2022-12-06 PROCEDURE — 250N000009 HC RX 250: Performed by: NURSE ANESTHETIST, CERTIFIED REGISTERED

## 2022-12-06 PROCEDURE — 22600 ARTHRD PST TQ 1NTRSPC CRV: CPT | Mod: 22 | Performed by: STUDENT IN AN ORGANIZED HEALTH CARE EDUCATION/TRAINING PROGRAM

## 2022-12-06 PROCEDURE — 86923 COMPATIBILITY TEST ELECTRIC: CPT

## 2022-12-06 PROCEDURE — 255N000002 HC RX 255 OP 636: Performed by: NEUROLOGICAL SURGERY

## 2022-12-06 PROCEDURE — 85027 COMPLETE CBC AUTOMATED: CPT | Performed by: STUDENT IN AN ORGANIZED HEALTH CARE EDUCATION/TRAINING PROGRAM

## 2022-12-06 PROCEDURE — 20936 SP BONE AGRFT LOCAL ADD-ON: CPT | Performed by: STUDENT IN AN ORGANIZED HEALTH CARE EDUCATION/TRAINING PROGRAM

## 2022-12-06 PROCEDURE — 84132 ASSAY OF SERUM POTASSIUM: CPT | Performed by: STUDENT IN AN ORGANIZED HEALTH CARE EDUCATION/TRAINING PROGRAM

## 2022-12-06 PROCEDURE — 86850 RBC ANTIBODY SCREEN: CPT | Performed by: PHYSICIAN ASSISTANT

## 2022-12-06 PROCEDURE — 22327 TREAT THORAX SPINE FRACTURE: CPT | Mod: GC | Performed by: STUDENT IN AN ORGANIZED HEALTH CARE EDUCATION/TRAINING PROGRAM

## 2022-12-06 PROCEDURE — 258N000003 HC RX IP 258 OP 636

## 2022-12-06 PROCEDURE — 272N000566 HC SHEATH CR3

## 2022-12-06 PROCEDURE — C1769 GUIDE WIRE: HCPCS

## 2022-12-06 PROCEDURE — 82330 ASSAY OF CALCIUM: CPT

## 2022-12-06 PROCEDURE — 82310 ASSAY OF CALCIUM: CPT

## 2022-12-06 PROCEDURE — P9047 ALBUMIN (HUMAN), 25%, 50ML: HCPCS

## 2022-12-06 PROCEDURE — 272N000001 HC OR GENERAL SUPPLY STERILE: Performed by: STUDENT IN AN ORGANIZED HEALTH CARE EDUCATION/TRAINING PROGRAM

## 2022-12-06 PROCEDURE — L8699 PROSTHETIC IMPLANT NOS: HCPCS | Performed by: STUDENT IN AN ORGANIZED HEALTH CARE EDUCATION/TRAINING PROGRAM

## 2022-12-06 PROCEDURE — C1762 CONN TISS, HUMAN(INC FASCIA): HCPCS | Performed by: STUDENT IN AN ORGANIZED HEALTH CARE EDUCATION/TRAINING PROGRAM

## 2022-12-06 PROCEDURE — 87070 CULTURE OTHR SPECIMN AEROBIC: CPT | Performed by: STUDENT IN AN ORGANIZED HEALTH CARE EDUCATION/TRAINING PROGRAM

## 2022-12-06 PROCEDURE — 87075 CULTR BACTERIA EXCEPT BLOOD: CPT | Performed by: STUDENT IN AN ORGANIZED HEALTH CARE EDUCATION/TRAINING PROGRAM

## 2022-12-06 PROCEDURE — 200N000002 HC R&B ICU UMMC

## 2022-12-06 PROCEDURE — 999N000248 HC STATISTIC IV INSERT WITH US BY RN

## 2022-12-06 PROCEDURE — B31Q1ZZ FLUOROSCOPY OF CERVICO-CEREBRAL ARCH USING LOW OSMOLAR CONTRAST: ICD-10-PCS | Performed by: NEUROLOGICAL SURGERY

## 2022-12-06 PROCEDURE — 258N000003 HC RX IP 258 OP 636: Performed by: NURSE PRACTITIONER

## 2022-12-06 PROCEDURE — 271N000001 HC OR GENERAL SUPPLY NON-STERILE: Performed by: STUDENT IN AN ORGANIZED HEALTH CARE EDUCATION/TRAINING PROGRAM

## 2022-12-06 PROCEDURE — 83735 ASSAY OF MAGNESIUM: CPT | Performed by: STUDENT IN AN ORGANIZED HEALTH CARE EDUCATION/TRAINING PROGRAM

## 2022-12-06 PROCEDURE — 360N000085 HC SURGERY LEVEL 5 W/ FLUORO, PER MIN: Performed by: STUDENT IN AN ORGANIZED HEALTH CARE EDUCATION/TRAINING PROGRAM

## 2022-12-06 PROCEDURE — C1713 ANCHOR/SCREW BN/BN,TIS/BN: HCPCS | Performed by: STUDENT IN AN ORGANIZED HEALTH CARE EDUCATION/TRAINING PROGRAM

## 2022-12-06 PROCEDURE — 250N000025 HC SEVOFLURANE, PER MIN: Performed by: STUDENT IN AN ORGANIZED HEALTH CARE EDUCATION/TRAINING PROGRAM

## 2022-12-06 PROCEDURE — C1760 CLOSURE DEV, VASC: HCPCS

## 2022-12-06 DEVICE — GRAFT BONE MAGNIFUSE 1CMX10CM 7509211: Type: IMPLANTABLE DEVICE | Site: BACK | Status: FUNCTIONAL

## 2022-12-06 DEVICE — IMP SCREW INFINITY SPINE MULTIAXIAL 18MMX3.5MM 3603518: Type: IMPLANTABLE DEVICE | Site: BACK | Status: FUNCTIONAL

## 2022-12-06 DEVICE — GRAFT BONE INFUSE BMP SM 7510200: Type: IMPLANTABLE DEVICE | Site: SPINE CERVICAL | Status: FUNCTIONAL

## 2022-12-06 DEVICE — IMP SCREW INFINITY SPINE MULTIAXIAL14MMX3.5MM 3603514: Type: IMPLANTABLE DEVICE | Site: BACK | Status: FUNCTIONAL

## 2022-12-06 DEVICE — GRAFT BONE MAGNIFUSE 1CMX5CM 7509115: Type: IMPLANTABLE DEVICE | Site: BACK | Status: FUNCTIONAL

## 2022-12-06 DEVICE — ROD SPNL 420MM 3.5/5.5MM TPR COCRMO 3603807: Type: IMPLANTABLE DEVICE | Site: BACK | Status: FUNCTIONAL

## 2022-12-06 DEVICE — IMP SCR SET MEDT SOLERA BREAK OFF 5.5MM TI 5540030: Type: IMPLANTABLE DEVICE | Site: BACK | Status: FUNCTIONAL

## 2022-12-06 DEVICE — IMP SCREW INFINITY SPINE MULTIAXIAL 16MMX3.5MM 3603516: Type: IMPLANTABLE DEVICE | Site: BACK | Status: FUNCTIONAL

## 2022-12-06 DEVICE — IMP SET SCREW MEDTRONIC INFINITY 3600215: Type: IMPLANTABLE DEVICE | Site: BACK | Status: FUNCTIONAL

## 2022-12-06 RX ORDER — HYDROMORPHONE HCL IN WATER/PF 6 MG/30 ML
0.2 PATIENT CONTROLLED ANALGESIA SYRINGE INTRAVENOUS
Status: DISCONTINUED | OUTPATIENT
Start: 2022-12-06 | End: 2022-12-10

## 2022-12-06 RX ORDER — SODIUM CHLORIDE 9 MG/ML
INJECTION, SOLUTION INTRAVENOUS CONTINUOUS
Status: DISCONTINUED | OUTPATIENT
Start: 2022-12-06 | End: 2022-12-06

## 2022-12-06 RX ORDER — OXYCODONE HYDROCHLORIDE 5 MG/1
5 TABLET ORAL
Status: DISCONTINUED | OUTPATIENT
Start: 2022-12-06 | End: 2022-12-10

## 2022-12-06 RX ORDER — IODIXANOL 320 MG/ML
150 INJECTION, SOLUTION INTRAVASCULAR ONCE
Status: COMPLETED | OUTPATIENT
Start: 2022-12-06 | End: 2022-12-06

## 2022-12-06 RX ORDER — FAMOTIDINE 20 MG/1
20 TABLET, FILM COATED ORAL 2 TIMES DAILY
Status: DISCONTINUED | OUTPATIENT
Start: 2022-12-06 | End: 2022-12-06 | Stop reason: CLARIF

## 2022-12-06 RX ORDER — PROCHLORPERAZINE MALEATE 5 MG
10 TABLET ORAL EVERY 6 HOURS PRN
Status: DISCONTINUED | OUTPATIENT
Start: 2022-12-06 | End: 2023-01-07 | Stop reason: HOSPADM

## 2022-12-06 RX ORDER — HYDROMORPHONE HCL IN WATER/PF 6 MG/30 ML
0.4 PATIENT CONTROLLED ANALGESIA SYRINGE INTRAVENOUS EVERY 5 MIN PRN
Status: DISCONTINUED | OUTPATIENT
Start: 2022-12-06 | End: 2022-12-06 | Stop reason: HOSPADM

## 2022-12-06 RX ORDER — ONDANSETRON 4 MG/1
4 TABLET, ORALLY DISINTEGRATING ORAL EVERY 30 MIN PRN
Status: DISCONTINUED | OUTPATIENT
Start: 2022-12-06 | End: 2022-12-06 | Stop reason: HOSPADM

## 2022-12-06 RX ORDER — PROPOFOL 10 MG/ML
INJECTION, EMULSION INTRAVENOUS PRN
Status: DISCONTINUED | OUTPATIENT
Start: 2022-12-06 | End: 2022-12-06

## 2022-12-06 RX ORDER — POTASSIUM CHLORIDE 1500 MG/1
20 TABLET, EXTENDED RELEASE ORAL DAILY
Status: ON HOLD | COMMUNITY
Start: 2022-10-27 | End: 2022-12-29

## 2022-12-06 RX ORDER — LIDOCAINE 40 MG/G
CREAM TOPICAL
Status: CANCELLED | OUTPATIENT
Start: 2022-12-06

## 2022-12-06 RX ORDER — SODIUM CHLORIDE 9 MG/ML
INJECTION, SOLUTION INTRAVENOUS CONTINUOUS
Status: DISCONTINUED | OUTPATIENT
Start: 2022-12-06 | End: 2022-12-07

## 2022-12-06 RX ORDER — SODIUM CHLORIDE 9 MG/ML
INJECTION, SOLUTION INTRAVENOUS CONTINUOUS
Status: CANCELLED | OUTPATIENT
Start: 2022-12-06

## 2022-12-06 RX ORDER — UREA 10 %
500 LOTION (ML) TOPICAL DAILY
Status: DISCONTINUED | OUTPATIENT
Start: 2022-12-07 | End: 2023-01-07 | Stop reason: HOSPADM

## 2022-12-06 RX ORDER — CEFAZOLIN SODIUM 2 G/100ML
2 INJECTION, SOLUTION INTRAVENOUS EVERY 12 HOURS
Status: COMPLETED | OUTPATIENT
Start: 2022-12-07 | End: 2022-12-07

## 2022-12-06 RX ORDER — NALOXONE HYDROCHLORIDE 0.4 MG/ML
0.2 INJECTION, SOLUTION INTRAMUSCULAR; INTRAVENOUS; SUBCUTANEOUS
Status: DISCONTINUED | OUTPATIENT
Start: 2022-12-06 | End: 2023-01-07 | Stop reason: HOSPADM

## 2022-12-06 RX ORDER — ACETAMINOPHEN 325 MG/1
975 TABLET ORAL EVERY 8 HOURS SCHEDULED
Status: COMPLETED | OUTPATIENT
Start: 2022-12-07 | End: 2022-12-09

## 2022-12-06 RX ORDER — PROCHLORPERAZINE MALEATE 10 MG
10 TABLET ORAL EVERY 6 HOURS PRN
Status: DISCONTINUED | OUTPATIENT
Start: 2022-12-06 | End: 2022-12-06

## 2022-12-06 RX ORDER — CEFAZOLIN SODIUM/WATER 3 G/30 ML
3 SYRINGE (ML) INTRAVENOUS
Status: COMPLETED | OUTPATIENT
Start: 2022-12-06 | End: 2022-12-06

## 2022-12-06 RX ORDER — POLYETHYLENE GLYCOL 3350 17 G/17G
17 POWDER, FOR SOLUTION ORAL DAILY
Status: DISCONTINUED | OUTPATIENT
Start: 2022-12-07 | End: 2022-12-13

## 2022-12-06 RX ORDER — CEFAZOLIN SODIUM/WATER 3 G/30 ML
3 SYRINGE (ML) INTRAVENOUS SEE ADMIN INSTRUCTIONS
Status: DISCONTINUED | OUTPATIENT
Start: 2022-12-06 | End: 2022-12-06 | Stop reason: HOSPADM

## 2022-12-06 RX ORDER — PROPOFOL 10 MG/ML
INJECTION, EMULSION INTRAVENOUS CONTINUOUS PRN
Status: DISCONTINUED | OUTPATIENT
Start: 2022-12-06 | End: 2022-12-06

## 2022-12-06 RX ORDER — PHENYLEPHRINE HCL IN 0.9% NACL 50MG/250ML
.5-1.25 PLASTIC BAG, INJECTION (ML) INTRAVENOUS CONTINUOUS
Status: DISCONTINUED | OUTPATIENT
Start: 2022-12-06 | End: 2022-12-07

## 2022-12-06 RX ORDER — GABAPENTIN 300 MG/1
300 CAPSULE ORAL 3 TIMES DAILY
Status: DISCONTINUED | OUTPATIENT
Start: 2022-12-07 | End: 2022-12-11

## 2022-12-06 RX ORDER — HYDROMORPHONE HCL IN WATER/PF 6 MG/30 ML
0.2 PATIENT CONTROLLED ANALGESIA SYRINGE INTRAVENOUS EVERY 5 MIN PRN
Status: DISCONTINUED | OUTPATIENT
Start: 2022-12-06 | End: 2022-12-06 | Stop reason: HOSPADM

## 2022-12-06 RX ORDER — FLUTICASONE PROPIONATE 50 MCG
1 SPRAY, SUSPENSION (ML) NASAL 2 TIMES DAILY
Status: DISCONTINUED | OUTPATIENT
Start: 2022-12-06 | End: 2023-01-07 | Stop reason: HOSPADM

## 2022-12-06 RX ORDER — FINASTERIDE 5 MG/1
1.25 TABLET, FILM COATED ORAL DAILY
Status: DISCONTINUED | OUTPATIENT
Start: 2022-12-07 | End: 2022-12-07

## 2022-12-06 RX ORDER — ONDANSETRON 2 MG/ML
4 INJECTION INTRAMUSCULAR; INTRAVENOUS EVERY 6 HOURS PRN
Status: DISCONTINUED | OUTPATIENT
Start: 2022-12-06 | End: 2023-01-07 | Stop reason: HOSPADM

## 2022-12-06 RX ORDER — HYDROMORPHONE HCL IN WATER/PF 6 MG/30 ML
0.4 PATIENT CONTROLLED ANALGESIA SYRINGE INTRAVENOUS
Status: DISCONTINUED | OUTPATIENT
Start: 2022-12-06 | End: 2022-12-10

## 2022-12-06 RX ORDER — BISACODYL 10 MG
10 SUPPOSITORY, RECTAL RECTAL DAILY PRN
Status: DISCONTINUED | OUTPATIENT
Start: 2022-12-06 | End: 2023-01-07 | Stop reason: HOSPADM

## 2022-12-06 RX ORDER — ONDANSETRON 4 MG/1
4 TABLET, ORALLY DISINTEGRATING ORAL EVERY 6 HOURS PRN
Status: DISCONTINUED | OUTPATIENT
Start: 2022-12-06 | End: 2022-12-06

## 2022-12-06 RX ORDER — SEVELAMER CARBONATE 800 MG/1
1600 TABLET, FILM COATED ORAL
Status: DISCONTINUED | OUTPATIENT
Start: 2022-12-07 | End: 2023-01-07 | Stop reason: HOSPADM

## 2022-12-06 RX ORDER — OXYCODONE HYDROCHLORIDE 10 MG/1
10 TABLET ORAL
Status: DISCONTINUED | OUTPATIENT
Start: 2022-12-06 | End: 2022-12-10

## 2022-12-06 RX ORDER — BACLOFEN 10 MG/1
10 TABLET ORAL 2 TIMES DAILY
Status: DISCONTINUED | OUTPATIENT
Start: 2022-12-06 | End: 2022-12-11

## 2022-12-06 RX ORDER — GABAPENTIN 300 MG/1
300 CAPSULE ORAL 3 TIMES DAILY
Status: ON HOLD | COMMUNITY
Start: 2022-12-02 | End: 2022-12-29

## 2022-12-06 RX ORDER — ACETAMINOPHEN 325 MG/1
650 TABLET ORAL EVERY 4 HOURS PRN
Status: DISCONTINUED | OUTPATIENT
Start: 2022-12-09 | End: 2022-12-17

## 2022-12-06 RX ORDER — AMOXICILLIN 250 MG
1 CAPSULE ORAL 2 TIMES DAILY
Status: DISCONTINUED | OUTPATIENT
Start: 2022-12-06 | End: 2023-01-07 | Stop reason: HOSPADM

## 2022-12-06 RX ORDER — SODIUM CHLORIDE, SODIUM GLUCONATE, SODIUM ACETATE, POTASSIUM CHLORIDE AND MAGNESIUM CHLORIDE 526; 502; 368; 37; 30 MG/100ML; MG/100ML; MG/100ML; MG/100ML; MG/100ML
INJECTION, SOLUTION INTRAVENOUS CONTINUOUS PRN
Status: DISCONTINUED | OUTPATIENT
Start: 2022-12-06 | End: 2022-12-06

## 2022-12-06 RX ORDER — LIDOCAINE 40 MG/G
CREAM TOPICAL
Status: DISCONTINUED | OUTPATIENT
Start: 2022-12-06 | End: 2022-12-10

## 2022-12-06 RX ORDER — ONDANSETRON 2 MG/ML
4 INJECTION INTRAMUSCULAR; INTRAVENOUS EVERY 6 HOURS PRN
Status: DISCONTINUED | OUTPATIENT
Start: 2022-12-06 | End: 2022-12-06

## 2022-12-06 RX ORDER — HEPARIN SODIUM 200 [USP'U]/100ML
1 INJECTION, SOLUTION INTRAVENOUS CONTINUOUS PRN
Status: DISCONTINUED | OUTPATIENT
Start: 2022-12-06 | End: 2022-12-06 | Stop reason: HOSPADM

## 2022-12-06 RX ORDER — NALOXONE HYDROCHLORIDE 0.4 MG/ML
0.4 INJECTION, SOLUTION INTRAMUSCULAR; INTRAVENOUS; SUBCUTANEOUS
Status: DISCONTINUED | OUTPATIENT
Start: 2022-12-06 | End: 2023-01-07 | Stop reason: HOSPADM

## 2022-12-06 RX ORDER — LIDOCAINE HYDROCHLORIDE 20 MG/ML
INJECTION, SOLUTION INFILTRATION; PERINEURAL PRN
Status: DISCONTINUED | OUTPATIENT
Start: 2022-12-06 | End: 2022-12-06

## 2022-12-06 RX ORDER — CETIRIZINE HYDROCHLORIDE 10 MG/1
10 TABLET ORAL DAILY
Status: DISCONTINUED | OUTPATIENT
Start: 2022-12-07 | End: 2022-12-11

## 2022-12-06 RX ORDER — POTASSIUM CHLORIDE 750 MG/1
20 TABLET, EXTENDED RELEASE ORAL DAILY
Status: DISCONTINUED | OUTPATIENT
Start: 2022-12-07 | End: 2022-12-06

## 2022-12-06 RX ORDER — ALBUMIN (HUMAN) 12.5 G/50ML
SOLUTION INTRAVENOUS CONTINUOUS PRN
Status: DISCONTINUED | OUTPATIENT
Start: 2022-12-06 | End: 2022-12-06

## 2022-12-06 RX ORDER — FENTANYL CITRATE 50 UG/ML
INJECTION, SOLUTION INTRAMUSCULAR; INTRAVENOUS PRN
Status: DISCONTINUED | OUTPATIENT
Start: 2022-12-06 | End: 2022-12-06

## 2022-12-06 RX ORDER — ONDANSETRON 2 MG/ML
INJECTION INTRAMUSCULAR; INTRAVENOUS PRN
Status: DISCONTINUED | OUTPATIENT
Start: 2022-12-06 | End: 2022-12-06

## 2022-12-06 RX ORDER — FENTANYL CITRATE 50 UG/ML
25 INJECTION, SOLUTION INTRAMUSCULAR; INTRAVENOUS EVERY 5 MIN PRN
Status: DISCONTINUED | OUTPATIENT
Start: 2022-12-06 | End: 2022-12-06 | Stop reason: HOSPADM

## 2022-12-06 RX ORDER — DIAZEPAM 5 MG
5 TABLET ORAL EVERY 6 HOURS PRN
Status: DISCONTINUED | OUTPATIENT
Start: 2022-12-06 | End: 2022-12-12

## 2022-12-06 RX ORDER — VIT B COMP NO.3/FOLIC/C/BIOTIN 1 MG-60 MG
1 TABLET ORAL DAILY
Status: DISCONTINUED | OUTPATIENT
Start: 2022-12-07 | End: 2023-01-07 | Stop reason: HOSPADM

## 2022-12-06 RX ORDER — ONDANSETRON 4 MG/1
4 TABLET, ORALLY DISINTEGRATING ORAL EVERY 6 HOURS PRN
Status: DISCONTINUED | OUTPATIENT
Start: 2022-12-06 | End: 2023-01-07 | Stop reason: HOSPADM

## 2022-12-06 RX ORDER — CALCIUM CARBONATE 500 MG/1
1000 TABLET, CHEWABLE ORAL 3 TIMES DAILY PRN
Status: DISCONTINUED | OUTPATIENT
Start: 2022-12-06 | End: 2023-01-07 | Stop reason: HOSPADM

## 2022-12-06 RX ORDER — FENTANYL CITRATE 50 UG/ML
50 INJECTION, SOLUTION INTRAMUSCULAR; INTRAVENOUS EVERY 5 MIN PRN
Status: DISCONTINUED | OUTPATIENT
Start: 2022-12-06 | End: 2022-12-06 | Stop reason: HOSPADM

## 2022-12-06 RX ORDER — CALCIUM CHLORIDE 100 MG/ML
INJECTION INTRAVENOUS; INTRAVENTRICULAR PRN
Status: DISCONTINUED | OUTPATIENT
Start: 2022-12-06 | End: 2022-12-06

## 2022-12-06 RX ORDER — ATORVASTATIN CALCIUM 20 MG/1
20 TABLET, FILM COATED ORAL AT BEDTIME
Status: DISCONTINUED | OUTPATIENT
Start: 2022-12-06 | End: 2023-01-07 | Stop reason: HOSPADM

## 2022-12-06 RX ORDER — MIDODRINE HYDROCHLORIDE 5 MG/1
10 TABLET ORAL EVERY 8 HOURS SCHEDULED
Status: DISCONTINUED | OUTPATIENT
Start: 2022-12-07 | End: 2022-12-14

## 2022-12-06 RX ORDER — LIDOCAINE 40 MG/G
CREAM TOPICAL
Status: DISCONTINUED | OUTPATIENT
Start: 2022-12-06 | End: 2022-12-06 | Stop reason: HOSPADM

## 2022-12-06 RX ORDER — SODIUM CHLORIDE, SODIUM LACTATE, POTASSIUM CHLORIDE, CALCIUM CHLORIDE 600; 310; 30; 20 MG/100ML; MG/100ML; MG/100ML; MG/100ML
INJECTION, SOLUTION INTRAVENOUS CONTINUOUS
Status: DISCONTINUED | OUTPATIENT
Start: 2022-12-06 | End: 2022-12-06 | Stop reason: HOSPADM

## 2022-12-06 RX ORDER — ONDANSETRON 2 MG/ML
4 INJECTION INTRAMUSCULAR; INTRAVENOUS EVERY 30 MIN PRN
Status: DISCONTINUED | OUTPATIENT
Start: 2022-12-06 | End: 2022-12-06 | Stop reason: HOSPADM

## 2022-12-06 RX ORDER — CYCLOBENZAPRINE HCL 10 MG
10 TABLET ORAL
Status: ON HOLD | COMMUNITY
Start: 2022-12-04 | End: 2022-12-29

## 2022-12-06 RX ORDER — PANTOPRAZOLE SODIUM 40 MG/1
40 TABLET, DELAYED RELEASE ORAL DAILY
Status: DISCONTINUED | OUTPATIENT
Start: 2022-12-07 | End: 2022-12-11

## 2022-12-06 RX ORDER — ACETAMINOPHEN 325 MG/1
325-650 TABLET ORAL EVERY 4 HOURS PRN
Status: DISCONTINUED | OUTPATIENT
Start: 2022-12-06 | End: 2022-12-06

## 2022-12-06 RX ORDER — LIDOCAINE HYDROCHLORIDE AND EPINEPHRINE 10; 10 MG/ML; UG/ML
INJECTION, SOLUTION INFILTRATION; PERINEURAL PRN
Status: DISCONTINUED | OUTPATIENT
Start: 2022-12-06 | End: 2022-12-06 | Stop reason: HOSPADM

## 2022-12-06 RX ORDER — PROCHLORPERAZINE 25 MG
25 SUPPOSITORY, RECTAL RECTAL EVERY 12 HOURS PRN
Status: DISCONTINUED | OUTPATIENT
Start: 2022-12-06 | End: 2022-12-06

## 2022-12-06 RX ADMIN — NOREPINEPHRINE BITARTRATE 6.4 MCG: 1 INJECTION, SOLUTION, CONCENTRATE INTRAVENOUS at 12:28

## 2022-12-06 RX ADMIN — Medication 20 MG: at 15:19

## 2022-12-06 RX ADMIN — PROPOFOL 85 MCG/KG/MIN: 10 INJECTION, EMULSION INTRAVENOUS at 09:45

## 2022-12-06 RX ADMIN — NOREPINEPHRINE BITARTRATE 6.4 MCG: 1 INJECTION, SOLUTION, CONCENTRATE INTRAVENOUS at 12:39

## 2022-12-06 RX ADMIN — Medication 10 MG: at 18:06

## 2022-12-06 RX ADMIN — Medication 50 MG: at 09:18

## 2022-12-06 RX ADMIN — NOREPINEPHRINE BITARTRATE 6.4 MCG: 1 INJECTION, SOLUTION, CONCENTRATE INTRAVENOUS at 12:25

## 2022-12-06 RX ADMIN — Medication 10 MG: at 10:42

## 2022-12-06 RX ADMIN — SUGAMMADEX 200 MG: 100 INJECTION, SOLUTION INTRAVENOUS at 19:24

## 2022-12-06 RX ADMIN — PHENYLEPHRINE HYDROCHLORIDE 100 MCG: 10 INJECTION INTRAVENOUS at 09:21

## 2022-12-06 RX ADMIN — MIDAZOLAM 1 MG: 1 INJECTION INTRAMUSCULAR; INTRAVENOUS at 09:03

## 2022-12-06 RX ADMIN — PHENYLEPHRINE HYDROCHLORIDE 100 MCG: 10 INJECTION INTRAVENOUS at 09:59

## 2022-12-06 RX ADMIN — SODIUM CHLORIDE, SODIUM GLUCONATE, SODIUM ACETATE, POTASSIUM CHLORIDE AND MAGNESIUM CHLORIDE: 526; 502; 368; 37; 30 INJECTION, SOLUTION INTRAVENOUS at 18:34

## 2022-12-06 RX ADMIN — CALCIUM CHLORIDE 1 G: 100 INJECTION INTRAVENOUS; INTRAVENTRICULAR at 18:30

## 2022-12-06 RX ADMIN — HYDROMORPHONE HYDROCHLORIDE 0.5 MG: 1 INJECTION, SOLUTION INTRAMUSCULAR; INTRAVENOUS; SUBCUTANEOUS at 17:29

## 2022-12-06 RX ADMIN — HYDROMORPHONE HYDROCHLORIDE 0.5 MG: 1 INJECTION, SOLUTION INTRAMUSCULAR; INTRAVENOUS; SUBCUTANEOUS at 11:50

## 2022-12-06 RX ADMIN — Medication 20 MG: at 12:23

## 2022-12-06 RX ADMIN — FENTANYL CITRATE 50 MCG: 50 INJECTION, SOLUTION INTRAMUSCULAR; INTRAVENOUS at 14:42

## 2022-12-06 RX ADMIN — Medication 20 MG: at 11:32

## 2022-12-06 RX ADMIN — PHENYLEPHRINE HYDROCHLORIDE 100 MCG: 10 INJECTION INTRAVENOUS at 09:25

## 2022-12-06 RX ADMIN — Medication 3 G: at 10:05

## 2022-12-06 RX ADMIN — ALBUMIN HUMAN: 0.25 SOLUTION INTRAVENOUS at 12:44

## 2022-12-06 RX ADMIN — IODIXANOL 100 ML: 320 INJECTION, SOLUTION INTRAVASCULAR at 14:16

## 2022-12-06 RX ADMIN — PHENYLEPHRINE HYDROCHLORIDE 100 MCG: 10 INJECTION INTRAVENOUS at 10:22

## 2022-12-06 RX ADMIN — Medication 50 MG: at 09:08

## 2022-12-06 RX ADMIN — PHENYLEPHRINE HYDROCHLORIDE 100 MCG: 10 INJECTION INTRAVENOUS at 09:42

## 2022-12-06 RX ADMIN — FENTANYL CITRATE 50 MCG: 50 INJECTION, SOLUTION INTRAMUSCULAR; INTRAVENOUS at 09:35

## 2022-12-06 RX ADMIN — PHENYLEPHRINE HYDROCHLORIDE 100 MCG: 10 INJECTION INTRAVENOUS at 09:23

## 2022-12-06 RX ADMIN — NOREPINEPHRINE BITARTRATE 6.4 MCG: 1 INJECTION, SOLUTION, CONCENTRATE INTRAVENOUS at 12:32

## 2022-12-06 RX ADMIN — PHENYLEPHRINE HYDROCHLORIDE 0.3 MCG/KG/MIN: 10 INJECTION INTRAVENOUS at 09:45

## 2022-12-06 RX ADMIN — Medication 20 MG: at 16:56

## 2022-12-06 RX ADMIN — PHENYLEPHRINE HYDROCHLORIDE 100 MCG: 10 INJECTION INTRAVENOUS at 16:02

## 2022-12-06 RX ADMIN — FENTANYL CITRATE 25 MCG: 50 INJECTION, SOLUTION INTRAMUSCULAR; INTRAVENOUS at 19:12

## 2022-12-06 RX ADMIN — PROPOFOL 50 MG: 10 INJECTION, EMULSION INTRAVENOUS at 09:36

## 2022-12-06 RX ADMIN — ONDANSETRON 4 MG: 2 INJECTION INTRAMUSCULAR; INTRAVENOUS at 19:05

## 2022-12-06 RX ADMIN — PHENYLEPHRINE HYDROCHLORIDE 100 MCG: 10 INJECTION INTRAVENOUS at 09:41

## 2022-12-06 RX ADMIN — SUGAMMADEX 200 MG: 100 INJECTION, SOLUTION INTRAVENOUS at 19:45

## 2022-12-06 RX ADMIN — HYDROMORPHONE HYDROCHLORIDE 0.4 MG: 0.2 INJECTION, SOLUTION INTRAMUSCULAR; INTRAVENOUS; SUBCUTANEOUS at 22:10

## 2022-12-06 RX ADMIN — REMIFENTANIL HYDROCHLORIDE 0.05 MCG/KG/MIN: 1 INJECTION, POWDER, LYOPHILIZED, FOR SOLUTION INTRAVENOUS at 10:24

## 2022-12-06 RX ADMIN — SODIUM CHLORIDE: 9 INJECTION, SOLUTION INTRAVENOUS at 21:30

## 2022-12-06 RX ADMIN — SODIUM CHLORIDE, POTASSIUM CHLORIDE, SODIUM LACTATE AND CALCIUM CHLORIDE: 600; 310; 30; 20 INJECTION, SOLUTION INTRAVENOUS at 09:45

## 2022-12-06 RX ADMIN — HYDROMORPHONE HYDROCHLORIDE 0.5 MG: 1 INJECTION, SOLUTION INTRAMUSCULAR; INTRAVENOUS; SUBCUTANEOUS at 17:01

## 2022-12-06 RX ADMIN — LIDOCAINE HYDROCHLORIDE 100 MG: 20 INJECTION, SOLUTION INFILTRATION; PERINEURAL at 09:06

## 2022-12-06 RX ADMIN — Medication 3 G: at 18:05

## 2022-12-06 RX ADMIN — Medication 50 MG: at 12:57

## 2022-12-06 RX ADMIN — HYDROMORPHONE HYDROCHLORIDE 0.5 MG: 1 INJECTION, SOLUTION INTRAMUSCULAR; INTRAVENOUS; SUBCUTANEOUS at 10:50

## 2022-12-06 RX ADMIN — FENTANYL CITRATE 50 MCG: 50 INJECTION, SOLUTION INTRAMUSCULAR; INTRAVENOUS at 19:02

## 2022-12-06 RX ADMIN — MIDAZOLAM 1 MG: 1 INJECTION INTRAMUSCULAR; INTRAVENOUS at 08:56

## 2022-12-06 RX ADMIN — Medication 3 G: at 14:05

## 2022-12-06 RX ADMIN — PROPOFOL 200 MG: 10 INJECTION, EMULSION INTRAVENOUS at 09:06

## 2022-12-06 RX ADMIN — SODIUM CHLORIDE, SODIUM GLUCONATE, SODIUM ACETATE, POTASSIUM CHLORIDE AND MAGNESIUM CHLORIDE: 526; 502; 368; 37; 30 INJECTION, SOLUTION INTRAVENOUS at 09:01

## 2022-12-06 ASSESSMENT — VISUAL ACUITY
OU: BASELINE
OU: NORMAL ACUITY

## 2022-12-06 ASSESSMENT — ACTIVITIES OF DAILY LIVING (ADL)
ADLS_ACUITY_SCORE: 40
ADLS_ACUITY_SCORE: 42
ADLS_ACUITY_SCORE: 37

## 2022-12-06 NOTE — LETTER
[facility] [address] [address]  [phone]    FACSIMILE TRANSMITTAL SHEET    TO: Richelle  COMPANY: [employer]  FAX NUMBER: [phone]  PHONE NUMBER:     FROM: Suraj  PHONE: [phone]  DATE: 12/26/22  NUMBER OF PAGES:     _____URGENT _____REVIEW ONLY _____PLEASE COMMENT____PLEASE REPLY    NOTES/COMMENTS: Please assess for TCU stay. Thank you!                                      IF YOU DID NOT RECEIVE THE CORRECT NUMBER OF PAGES OR THE FAX DID NOT COME THROUGH CLEARLY, PLEASE CALL THE SENDER     CONFIDENTIALITY STATEMENT: Confidential information that may accompany this transmission contains protected health information under state and federal law and is legally privileged. This information is intended only for the use of the individual or entity named above and may be used only for carrying out treatment, payment or other healthcare operations. The recipient or person responsible for delivering this information is prohibited by law from disclosing this information without proper authorization to any other party, unless required to do so by law or regulation. If you are not the intended recipient, you are hereby notified that any review, dissemination, distribution, or copying of this message is strictly prohibited. If you have received this communication in error, please destroy the materials and contact us immediately by calling the number listed above. No response indicates that the information was received by the appropriate authorized party

## 2022-12-06 NOTE — PROGRESS NOTES
Patient Name: Shay ELLIOTT [facility]  Medical Record Number: [id number]  Today's Date: 12/6/2022    Procedure: Diagnostic cerebral angiogram, possible treatment.  Proceduralist: MD Davion., MD Esequiel  Pathology present: n/a    Procedure Start: 1333  Procedure end: 1400  Sedation medications administered: Per anesthesia.     : n/a    Other Notes: Pt arrived to IR room 3 from OR. Consent reviewed. Pt denies any questions or concerns regarding procedure. Pt positioned supine and monitored per protocol. Angioseal closure device deployed @ 1400. 3 hour flat bedrest.  Pt tolerated procedure without any noted complications. Pt transferred back to OR.    Roxane Arthur RN

## 2022-12-06 NOTE — ANESTHESIA PREPROCEDURE EVALUATION
Anesthesia Pre-Procedure Evaluation    Patient: Shay Jimenez   MRN: [id number] : 1965        Procedure : Procedure(s):  O-Arm/Stealth Assisted Revision of Cervical 5 to Thoracic 6 Fusion, Possible Additional Levels          Past Medical History:   Diagnosis Date     Chronic kidney disease      Neuropathy       Past Surgical History:   Procedure Laterality Date     OPTICAL TRACKING SYSTEM FUSION POSTERIOR SPINE THORACIC THREE+ LEVELS N/A 2022    Procedure: Cervical 6 to Thoracic 6 Fusion and Thoracic 2-3 Decompression;  Surgeon: Fritz Boles MD;  Location:  OR      Allergies   Allergen Reactions     Amlodipine      Lisinopril       Social History     Tobacco Use     Smoking status: Never     Smokeless tobacco: Never   Substance Use Topics     Alcohol use: Not Currently      Wt Readings from Last 1 Encounters:   22 131.1 kg (289 lb)        Anesthesia Evaluation   Pt has had prior anesthetic. Type: General.        ROS/MED HX  ENT/Pulmonary:     (+) sleep apnea, doesn't use CPAP,     Neurologic: Comment: Hx C5-T6 fusion  Hx cervical myelopathy    (+) peripheral neuropathy,     Cardiovascular:     (+) Dyslipidemia hypertension-----dysrhythmias, a-fib, pulmonary hypertension, Previous cardiac testing   Echo: Date: 8/15/22 Results:  CONCLUSIONS   Left ventricular ejection fraction is visually estimated at 60%.   Mild right ventricular dilation is present.   Global right ventricular systolic function is normal.   Mild-moderate mitral regurgitation.   Mild pulmonary hypertension   Estimated right atrial pressure is > 15 mmHg .   Compared with the previous study dated 22, mild right ventricular   dilation is better demonstrated (prior imaging was off-axis). Right atrial pressure is elevated.     Stress Test: Date: Results:    ECG Reviewed: Date: Results:    Cath: Date: Results:   (-) taking anticoagulants/antiplatelets   METS/Exercise Tolerance:     Hematologic:     (+) anemia,      Musculoskeletal:   (+) cervical spine instability.     GI/Hepatic:     (+) GERD, Asymptomatic on medication, liver disease,     Renal/Genitourinary:     (+) renal disease, type: ESRD, Pt requires dialysis, type: Hemodialysis,     Endo:     (+) Obesity,     Psychiatric/Substance Use:     (+) psychiatric history anxiety and depression alcohol abuse  (-) chronic opioid use history   Infectious Disease:       Malignancy:  - neg malignancy ROS     Other:      (+) , H/O Chronic Pain,           OUTSIDE LABS:  CBC:   Lab Results   Component Value Date    WBC 5.5 10/10/2022    WBC 4.4 10/06/2022    HGB 8.2 (L) 10/10/2022    HGB 8.4 (L) 10/06/2022    HCT 24.6 (L) 10/10/2022    HCT 25.9 (L) 10/06/2022     (L) 10/10/2022     10/06/2022     BMP:   Lab Results   Component Value Date     (L) 10/10/2022     (L) 08/18/2022    POTASSIUM 3.3 (L) 10/10/2022    POTASSIUM 3.0 (L) 08/18/2022    CHLORIDE 79 (L) 10/10/2022    CHLORIDE 94 08/18/2022    CO2 18 (L) 10/10/2022    CO2 28 08/18/2022    BUN 19.3 10/10/2022    BUN 11.0 10/06/2022    CR 7.66 (H) 10/10/2022    CR 4.94 (H) 10/06/2022     (H) 10/11/2022    GLC 33 (LL) 10/11/2022     COAGS:   Lab Results   Component Value Date    PTT 27 06/27/2022    INR 1.07 06/27/2022     POC: No results found for: BGM, HCG, HCGS  HEPATIC:   Lab Results   Component Value Date    ALBUMIN 3.0 (L) 07/21/2022    PROTTOTAL 6.7 (L) 07/21/2022    ALT 9 07/21/2022    AST 31 10/06/2022    ALKPHOS 131 07/21/2022    BILITOTAL 0.6 07/21/2022     OTHER:   Lab Results   Component Value Date    PH 7.43 06/27/2022    LACT 1.6 07/30/2022    MAC 8.4 (L) 10/10/2022    PHOS 4.4 07/29/2022    MAG 2.1 06/28/2022    CRP 43.70 (H) 10/06/2022    SED 31 (H) 10/06/2022       Anesthesia Plan    ASA Status:  4   NPO Status:  NPO Appropriate    Anesthesia Type: General.     - Airway: ETT   Induction: Intravenous, Propofol.   Maintenance: Balanced.   Techniques and Equipment:     - AVOID: No BP/IV  in LUE     - Airway: Video-Laryngoscope     - Lines/Monitors: 2nd IV, Arterial Line, BIS     - Blood: T&S     - Drips/Meds: Remifentanil, Phenylephrine     Consents            Postoperative Care    Pain management: IV analgesics, Oral pain medications, Multi-modal analgesia.   PONV prophylaxis: Ondansetron (or other 5HT-3), Dexamethasone or Solumedrol, Background Propofol Infusion     Comments:                Bolivar Vences MD

## 2022-12-06 NOTE — LETTER
[facility] [address] [address]  [phone]    FACSIMILE TRANSMITTAL SHEET    TO: Zahida  COMPANY: [employer]  FAX NUMBER: [phone]  PHONE NUMBER:     FROM: Suraj  PHONE: [phone]  DATE: 12/26/22  NUMBER OF PAGES:     _____URGENT _____REVIEW ONLY _____PLEASE COMMENT____PLEASE REPLY    NOTES/COMMENTS: Please assess for TCU. He is medically stable for discharge. Thank you!                                    IF YOU DID NOT RECEIVE THE CORRECT NUMBER OF PAGES OR THE FAX DID NOT COME THROUGH CLEARLY, PLEASE CALL THE SENDER     CONFIDENTIALITY STATEMENT: Confidential information that may accompany this transmission contains protected health information under state and federal law and is legally privileged. This information is intended only for the use of the individual or entity named above and may be used only for carrying out treatment, payment or other healthcare operations. The recipient or person responsible for delivering this information is prohibited by law from disclosing this information without proper authorization to any other party, unless required to do so by law or regulation. If you are not the intended recipient, you are hereby notified that any review, dissemination, distribution, or copying of this message is strictly prohibited. If you have received this communication in error, please destroy the materials and contact us immediately by calling the number listed above. No response indicates that the information was received by the appropriate authorized party

## 2022-12-06 NOTE — ANESTHESIA PROCEDURE NOTES
Arterial Line Procedure Note    Pre-Procedure   Staff -        Anesthesiologist:  Cory Jeter MD       Resident/Fellow: Bolivar Vences MD       Performed By: resident       Location: OR       Pre-Anesthestic Checklist: patient identified, IV checked, risks and benefits discussed, informed consent, monitors and equipment checked, pre-op evaluation and at physician/surgeon's request  Timeout:       Correct Patient: Yes        Correct Procedure: Yes        Correct Site: Yes        Correct Position: Yes   Line Placement:   This line was placed Post Induction starting at 12/6/2022 9:00 AM and ending at 12/6/2022 9:04 AM  Procedure   Procedure: arterial line       Laterality: right       Insertion Site: radial.  Sterile Prep        Standard elements of sterile barrier followed       Skin prep: Chloraprep  Insertion/Injection        Technique: ultrasound guided and Seldinger Technique        1. Ultrasound was used to evaluate the access site.       2. Artery evaluated via ultrasound for patency/adequacy.       3. Using real-time ultrasound the needle/catheter was observed entering the artery/vein.       Catheter Type/Size: 20 G, 12 cm  Narrative         Secured by: suture       Tegaderm dressing used.       Complications: None apparent,        Arterial waveform: Yes        IBP within 10% of NIBP: Yes

## 2022-12-06 NOTE — ANESTHESIA PROCEDURE NOTES
Airway       Patient location during procedure: OR       Procedure Start/Stop Times: 12/6/2022 10:02 AM  Staff -        Anesthesiologist:  Cory Jeter MD       Resident/Fellow: Bolivar Vences MD       Performed By: resident  Consent for Airway        Urgency: elective  Indications and Patient Condition       Indications for airway management: bibi-procedural       Induction type:intravenous       Mask difficulty assessment: 1 - vent by mask    Final Airway Details       Final airway type: endotracheal airway       Successful airway: ETT - single  Endotracheal Airway Details        ETT size (mm): 7.5       Cuffed: yes       Successful intubation technique: flexible bronchoscopy       Grade View of Cords: 1       Position: Right       Measured from: gums/teeth       Secured at (cm): 22       Bite block used: Soft    Post intubation assessment        Placement verified by: capnometry, equal breath sounds and chest rise        Number of attempts at approach: 1       Number of other approaches attempted: 0       Secured with: silk tape       Ease of procedure: easy       Dentition: Intact and Unchanged    Medication(s) Administered   Medication Administration Time: 12/6/2022 10:02 AM    Additional Comments       Fiberoptic used given c-spine pathology and concern for potential cervical instability.

## 2022-12-06 NOTE — PROCEDURES
[facility]     Endovascular Surgical Neuroradiology Post-Procedure Note    Pre-Procedure Diagnosis:  Suspected right vertebral artery injury   Post-Procedure Diagnosis:  No evidence of right v2,3 or v4 segment vertebral artery injury with normal opacification of vertebrobasilar circulation     Procedure(s):   Diagnostic cervicocerebral angiography    Findings:    [] as above     Plan:    [] Keep right leg flat x 2 hours     Primary Surgeon:  Dr. Piter Ricardo  Secondary Surgeon:  Dr. Piter Ricardo  Secondary Surgeon Review:  None  Fellow:  Gm Iraheta   Additional Assistants:  As above     Prior to the start of the procedure and with procedural staff participation, I verbally confirmed: the patient s identity using two indicators, relevant allergies, that the procedure was appropriate and matched the consent or emergent situation, and that the correct equipment/implants were available. Immediately prior to starting the procedure I conducted the Time Out with the procedural staff and re-confirmed the patient s name, procedure, and site/side. (The Joint Commission universal protocol was followed.)  Yes    PRU value: Not applicable    Anesthesia:  Performed by Anesthesia  Medications:  per anesthesia   Puncture site:  Right Femoral Artery    Fluoroscopy time (minutes):    Radiation dose (mGy):   Contrast amount (mL): 20    Estimated blood loss (mL):  05    Closure:  Device    Disposition:  Will be followed in hospital by the Neurosurgery team.        Sedation Post-Procedure Summary    Sedatives: per anesthesiology     Vital signs and pulse oximetry were monitored and remained stable throughout the procedure, and sedation was maintained until the procedure was complete.  The patient was monitored by staff until sedation discharge criteria were met.    Patient tolerance:  Patient tolerated the procedure well with no immediate complications.    Time of sedation in minutes:  per anesthesiology     UDAY BUCIO MD  Pager:  [phone]

## 2022-12-07 ENCOUNTER — APPOINTMENT (OUTPATIENT)
Dept: GENERAL RADIOLOGY | Facility: CLINIC | Age: 57
DRG: 459 | End: 2022-12-07
Attending: STUDENT IN AN ORGANIZED HEALTH CARE EDUCATION/TRAINING PROGRAM
Payer: MEDICARE

## 2022-12-07 ENCOUNTER — APPOINTMENT (OUTPATIENT)
Dept: OCCUPATIONAL THERAPY | Facility: CLINIC | Age: 57
DRG: 459 | End: 2022-12-07
Attending: NURSE PRACTITIONER
Payer: MEDICARE

## 2022-12-07 LAB
ANION GAP SERPL CALCULATED.3IONS-SCNC: 16 MMOL/L (ref 7–15)
ANION GAP SERPL CALCULATED.3IONS-SCNC: 17 MMOL/L (ref 7–15)
BLD PROD TYP BPU: NORMAL
BLOOD COMPONENT TYPE: NORMAL
BUN SERPL-MCNC: 22 MG/DL (ref 6–20)
BUN SERPL-MCNC: 26.4 MG/DL (ref 6–20)
CALCIUM SERPL-MCNC: 7.3 MG/DL (ref 8.6–10)
CALCIUM SERPL-MCNC: 7.9 MG/DL (ref 8.6–10)
CHLORIDE SERPL-SCNC: 86 MMOL/L (ref 98–107)
CHLORIDE SERPL-SCNC: 93 MMOL/L (ref 98–107)
CODING SYSTEM: NORMAL
CREAT SERPL-MCNC: 4.7 MG/DL (ref 0.67–1.17)
CREAT SERPL-MCNC: 5.54 MG/DL (ref 0.67–1.17)
CROSSMATCH: NORMAL
CRP SERPL-MCNC: 37.6 MG/L
DEPRECATED HCO3 PLAS-SCNC: 22 MMOL/L (ref 22–29)
DEPRECATED HCO3 PLAS-SCNC: 25 MMOL/L (ref 22–29)
ERYTHROCYTE [DISTWIDTH] IN BLOOD BY AUTOMATED COUNT: 15 % (ref 10–15)
ERYTHROCYTE [DISTWIDTH] IN BLOOD BY AUTOMATED COUNT: 16.8 % (ref 10–15)
ERYTHROCYTE [DISTWIDTH] IN BLOOD BY AUTOMATED COUNT: 17.2 % (ref 10–15)
ERYTHROCYTE [DISTWIDTH] IN BLOOD BY AUTOMATED COUNT: 17.7 % (ref 10–15)
GFR SERPL CREATININE-BSD FRML MDRD: 11 ML/MIN/1.73M2
GFR SERPL CREATININE-BSD FRML MDRD: 14 ML/MIN/1.73M2
GLUCOSE SERPL-MCNC: 103 MG/DL (ref 70–99)
GLUCOSE SERPL-MCNC: 91 MG/DL (ref 70–99)
HBA1C MFR BLD: 5.6 %
HBV SURFACE AB SERPL IA-ACNC: 4.67 M[IU]/ML
HBV SURFACE AB SERPL IA-ACNC: NONREACTIVE M[IU]/ML
HBV SURFACE AG SERPL QL IA: NONREACTIVE
HCT VFR BLD AUTO: 20.8 % (ref 40–53)
HCT VFR BLD AUTO: 23.4 % (ref 40–53)
HCT VFR BLD AUTO: 23.5 % (ref 40–53)
HCT VFR BLD AUTO: 23.6 % (ref 40–53)
HGB BLD-MCNC: 6.6 G/DL (ref 13.3–17.7)
HGB BLD-MCNC: 7.5 G/DL (ref 13.3–17.7)
HGB BLD-MCNC: 7.6 G/DL (ref 13.3–17.7)
HGB BLD-MCNC: 7.7 G/DL (ref 13.3–17.7)
ISSUE DATE AND TIME: NORMAL
MAGNESIUM SERPL-MCNC: 1.8 MG/DL (ref 1.7–2.3)
MCH RBC QN AUTO: 31.9 PG (ref 26.5–33)
MCH RBC QN AUTO: 32.1 PG (ref 26.5–33)
MCH RBC QN AUTO: 32.2 PG (ref 26.5–33)
MCH RBC QN AUTO: 32.4 PG (ref 26.5–33)
MCHC RBC AUTO-ENTMCNC: 31.7 G/DL (ref 31.5–36.5)
MCHC RBC AUTO-ENTMCNC: 32.1 G/DL (ref 31.5–36.5)
MCHC RBC AUTO-ENTMCNC: 32.2 G/DL (ref 31.5–36.5)
MCHC RBC AUTO-ENTMCNC: 32.8 G/DL (ref 31.5–36.5)
MCV RBC AUTO: 100 FL (ref 78–100)
MCV RBC AUTO: 100 FL (ref 78–100)
MCV RBC AUTO: 102 FL (ref 78–100)
MCV RBC AUTO: 98 FL (ref 78–100)
PHOSPHATE SERPL-MCNC: 4.3 MG/DL (ref 2.5–4.5)
PLATELET # BLD AUTO: 109 10E3/UL (ref 150–450)
PLATELET # BLD AUTO: 114 10E3/UL (ref 150–450)
PLATELET # BLD AUTO: 130 10E3/UL (ref 150–450)
PLATELET # BLD AUTO: 175 10E3/UL (ref 150–450)
POTASSIUM SERPL-SCNC: 5 MMOL/L (ref 3.4–5.3)
POTASSIUM SERPL-SCNC: 5.2 MMOL/L (ref 3.4–5.3)
RBC # BLD AUTO: 2.04 10E6/UL (ref 4.4–5.9)
RBC # BLD AUTO: 2.35 10E6/UL (ref 4.4–5.9)
RBC # BLD AUTO: 2.36 10E6/UL (ref 4.4–5.9)
RBC # BLD AUTO: 2.4 10E6/UL (ref 4.4–5.9)
SODIUM SERPL-SCNC: 128 MMOL/L (ref 136–145)
SODIUM SERPL-SCNC: 131 MMOL/L (ref 136–145)
UNIT ABO/RH: NORMAL
UNIT NUMBER: NORMAL
UNIT STATUS: NORMAL
UNIT TYPE ISBT: 5100
UNIT TYPE ISBT: 9500
WBC # BLD AUTO: 5 10E3/UL (ref 4–11)
WBC # BLD AUTO: 5.3 10E3/UL (ref 4–11)
WBC # BLD AUTO: 6.6 10E3/UL (ref 4–11)
WBC # BLD AUTO: 8 10E3/UL (ref 4–11)

## 2022-12-07 PROCEDURE — 250N000011 HC RX IP 250 OP 636: Performed by: NURSE PRACTITIONER

## 2022-12-07 PROCEDURE — 3E0U0GB INTRODUCTION OF RECOMBINANT BONE MORPHOGENETIC PROTEIN INTO JOINTS, OPEN APPROACH: ICD-10-PCS | Performed by: STUDENT IN AN ORGANIZED HEALTH CARE EDUCATION/TRAINING PROGRAM

## 2022-12-07 PROCEDURE — 8E09XBZ COMPUTER ASSISTED PROCEDURE OF HEAD AND NECK REGION: ICD-10-PCS | Performed by: STUDENT IN AN ORGANIZED HEALTH CARE EDUCATION/TRAINING PROGRAM

## 2022-12-07 PROCEDURE — 83036 HEMOGLOBIN GLYCOSYLATED A1C: CPT | Performed by: NURSE PRACTITIONER

## 2022-12-07 PROCEDURE — L0190 CERV COLLAR SUPP ADJ CERV BA: HCPCS

## 2022-12-07 PROCEDURE — 86706 HEP B SURFACE ANTIBODY: CPT | Performed by: CLINICAL NURSE SPECIALIST

## 2022-12-07 PROCEDURE — 80048 BASIC METABOLIC PNL TOTAL CA: CPT | Performed by: NURSE PRACTITIONER

## 2022-12-07 PROCEDURE — 999N000065 XR ABDOMEN PORT 1 VIEW

## 2022-12-07 PROCEDURE — 97535 SELF CARE MNGMENT TRAINING: CPT | Mod: GO

## 2022-12-07 PROCEDURE — 99222 1ST HOSP IP/OBS MODERATE 55: CPT | Performed by: CLINICAL NURSE SPECIALIST

## 2022-12-07 PROCEDURE — 97166 OT EVAL MOD COMPLEX 45 MIN: CPT | Mod: GO

## 2022-12-07 PROCEDURE — 0RG20K1 FUSION OF 2 OR MORE CERVICAL VERTEBRAL JOINTS WITH NONAUTOLOGOUS TISSUE SUBSTITUTE, POSTERIOR APPROACH, POSTERIOR COLUMN, OPEN APPROACH: ICD-10-PCS | Performed by: STUDENT IN AN ORGANIZED HEALTH CARE EDUCATION/TRAINING PROGRAM

## 2022-12-07 PROCEDURE — 200N000002 HC R&B ICU UMMC

## 2022-12-07 PROCEDURE — 36415 COLL VENOUS BLD VENIPUNCTURE: CPT | Performed by: NURSE PRACTITIONER

## 2022-12-07 PROCEDURE — 5A1D70Z PERFORMANCE OF URINARY FILTRATION, INTERMITTENT, LESS THAN 6 HOURS PER DAY: ICD-10-PCS | Performed by: CLINICAL NURSE SPECIALIST

## 2022-12-07 PROCEDURE — 99292 CRITICAL CARE ADDL 30 MIN: CPT | Mod: FS | Performed by: PSYCHIATRY & NEUROLOGY

## 2022-12-07 PROCEDURE — 85027 COMPLETE CBC AUTOMATED: CPT | Performed by: NURSE PRACTITIONER

## 2022-12-07 PROCEDURE — 0RG70K1 FUSION OF 2 TO 7 THORACIC VERTEBRAL JOINTS WITH NONAUTOLOGOUS TISSUE SUBSTITUTE, POSTERIOR APPROACH, POSTERIOR COLUMN, OPEN APPROACH: ICD-10-PCS | Performed by: STUDENT IN AN ORGANIZED HEALTH CARE EDUCATION/TRAINING PROGRAM

## 2022-12-07 PROCEDURE — P9016 RBC LEUKOCYTES REDUCED: HCPCS | Performed by: STUDENT IN AN ORGANIZED HEALTH CARE EDUCATION/TRAINING PROGRAM

## 2022-12-07 PROCEDURE — 250N000013 HC RX MED GY IP 250 OP 250 PS 637: Performed by: NURSE PRACTITIONER

## 2022-12-07 PROCEDURE — 90937 HEMODIALYSIS REPEATED EVAL: CPT

## 2022-12-07 PROCEDURE — 99222 1ST HOSP IP/OBS MODERATE 55: CPT | Mod: FS | Performed by: STUDENT IN AN ORGANIZED HEALTH CARE EDUCATION/TRAINING PROGRAM

## 2022-12-07 PROCEDURE — 87340 HEPATITIS B SURFACE AG IA: CPT | Performed by: CLINICAL NURSE SPECIALIST

## 2022-12-07 PROCEDURE — 0PS404Z REPOSITION THORACIC VERTEBRA WITH INTERNAL FIXATION DEVICE, OPEN APPROACH: ICD-10-PCS | Performed by: STUDENT IN AN ORGANIZED HEALTH CARE EDUCATION/TRAINING PROGRAM

## 2022-12-07 PROCEDURE — 0RP104Z REMOVAL OF INTERNAL FIXATION DEVICE FROM CERVICAL VERTEBRAL JOINT, OPEN APPROACH: ICD-10-PCS | Performed by: STUDENT IN AN ORGANIZED HEALTH CARE EDUCATION/TRAINING PROGRAM

## 2022-12-07 PROCEDURE — P9059 PLASMA, FRZ BETWEEN 8-24HOUR: HCPCS | Performed by: NURSE PRACTITIONER

## 2022-12-07 PROCEDURE — 250N000013 HC RX MED GY IP 250 OP 250 PS 637: Performed by: STUDENT IN AN ORGANIZED HEALTH CARE EDUCATION/TRAINING PROGRAM

## 2022-12-07 PROCEDURE — 74018 RADEX ABDOMEN 1 VIEW: CPT | Mod: 26 | Performed by: STUDENT IN AN ORGANIZED HEALTH CARE EDUCATION/TRAINING PROGRAM

## 2022-12-07 PROCEDURE — P9016 RBC LEUKOCYTES REDUCED: HCPCS | Performed by: NURSE PRACTITIONER

## 2022-12-07 PROCEDURE — 272N000010 HC KIT CATH ARTERIAL EXT SUPPLY

## 2022-12-07 PROCEDURE — 0RG40K1 FUSION OF CERVICOTHORACIC VERTEBRAL JOINT WITH NONAUTOLOGOUS TISSUE SUBSTITUTE, POSTERIOR APPROACH, POSTERIOR COLUMN, OPEN APPROACH: ICD-10-PCS | Performed by: STUDENT IN AN ORGANIZED HEALTH CARE EDUCATION/TRAINING PROGRAM

## 2022-12-07 PROCEDURE — 250N000013 HC RX MED GY IP 250 OP 250 PS 637: Performed by: CLINICAL NURSE SPECIALIST

## 2022-12-07 PROCEDURE — 258N000003 HC RX IP 258 OP 636: Performed by: CLINICAL NURSE SPECIALIST

## 2022-12-07 PROCEDURE — 99291 CRITICAL CARE FIRST HOUR: CPT | Mod: FS | Performed by: PSYCHIATRY & NEUROLOGY

## 2022-12-07 PROCEDURE — 250N000009 HC RX 250: Performed by: STUDENT IN AN ORGANIZED HEALTH CARE EDUCATION/TRAINING PROGRAM

## 2022-12-07 PROCEDURE — 74018 RADEX ABDOMEN 1 VIEW: CPT

## 2022-12-07 PROCEDURE — 999N000015 HC STATISTIC ARTERIAL MONITORING DAILY

## 2022-12-07 PROCEDURE — 634N000001 HC RX 634: Performed by: CLINICAL NURSE SPECIALIST

## 2022-12-07 PROCEDURE — 4A11X4G MONITORING OF PERIPHERAL NERVOUS ELECTRICAL ACTIVITY, INTRAOPERATIVE, EXTERNAL APPROACH: ICD-10-PCS | Performed by: STUDENT IN AN ORGANIZED HEALTH CARE EDUCATION/TRAINING PROGRAM

## 2022-12-07 RX ORDER — CALCIUM GLUCONATE 20 MG/ML
2 INJECTION, SOLUTION INTRAVENOUS EVERY 4 HOURS
Status: COMPLETED | OUTPATIENT
Start: 2022-12-07 | End: 2022-12-07

## 2022-12-07 RX ORDER — FINASTERIDE 5 MG/1
1.25 TABLET, FILM COATED ORAL DAILY
Status: DISCONTINUED | OUTPATIENT
Start: 2022-12-07 | End: 2022-12-12

## 2022-12-07 RX ORDER — METHOCARBAMOL 500 MG/1
500 TABLET, FILM COATED ORAL 4 TIMES DAILY
Status: DISCONTINUED | OUTPATIENT
Start: 2022-12-07 | End: 2023-01-07 | Stop reason: HOSPADM

## 2022-12-07 RX ORDER — HYDROXYZINE HYDROCHLORIDE 25 MG/1
25 TABLET, FILM COATED ORAL 2 TIMES DAILY PRN
Status: DISCONTINUED | OUTPATIENT
Start: 2022-12-07 | End: 2022-12-07

## 2022-12-07 RX ORDER — HYDROXYZINE HYDROCHLORIDE 25 MG/1
25 TABLET, FILM COATED ORAL EVERY 6 HOURS PRN
Status: DISCONTINUED | OUTPATIENT
Start: 2022-12-07 | End: 2022-12-09

## 2022-12-07 RX ORDER — MIDODRINE HYDROCHLORIDE 5 MG/1
10 TABLET ORAL ONCE
Status: COMPLETED | OUTPATIENT
Start: 2022-12-07 | End: 2022-12-07

## 2022-12-07 RX ADMIN — FLUTICASONE PROPIONATE 1 SPRAY: 50 SPRAY, METERED NASAL at 20:26

## 2022-12-07 RX ADMIN — HYDROXYZINE HYDROCHLORIDE 25 MG: 25 TABLET, FILM COATED ORAL at 17:34

## 2022-12-07 RX ADMIN — Medication 133 MG: at 08:05

## 2022-12-07 RX ADMIN — CEFAZOLIN SODIUM 2 G: 2 INJECTION, SOLUTION INTRAVENOUS at 18:04

## 2022-12-07 RX ADMIN — MIDODRINE HYDROCHLORIDE 10 MG: 5 TABLET ORAL at 05:46

## 2022-12-07 RX ADMIN — B-COMPLEX W/ C & FOLIC ACID TAB 1 MG 1 TABLET: 1 TAB at 07:59

## 2022-12-07 RX ADMIN — SERTRALINE HYDROCHLORIDE 100 MG: 50 TABLET ORAL at 07:59

## 2022-12-07 RX ADMIN — OXYCODONE HYDROCHLORIDE 10 MG: 10 TABLET ORAL at 18:55

## 2022-12-07 RX ADMIN — OXYCODONE HYDROCHLORIDE 10 MG: 10 TABLET ORAL at 08:01

## 2022-12-07 RX ADMIN — CALCIUM GLUCONATE 2 G: 20 INJECTION, SOLUTION INTRAVENOUS at 10:50

## 2022-12-07 RX ADMIN — SEVELAMER CARBONATE 1600 MG: 800 TABLET, FILM COATED ORAL at 18:32

## 2022-12-07 RX ADMIN — OXYCODONE HYDROCHLORIDE 10 MG: 10 TABLET ORAL at 11:25

## 2022-12-07 RX ADMIN — ACETAMINOPHEN 975 MG: 325 TABLET, FILM COATED ORAL at 05:46

## 2022-12-07 RX ADMIN — HYDROMORPHONE HYDROCHLORIDE 0.4 MG: 0.2 INJECTION, SOLUTION INTRAMUSCULAR; INTRAVENOUS; SUBCUTANEOUS at 18:37

## 2022-12-07 RX ADMIN — ACETAMINOPHEN 975 MG: 325 TABLET, FILM COATED ORAL at 13:53

## 2022-12-07 RX ADMIN — DIAZEPAM 5 MG: 5 TABLET ORAL at 20:25

## 2022-12-07 RX ADMIN — SEVELAMER CARBONATE 1600 MG: 800 TABLET, FILM COATED ORAL at 07:59

## 2022-12-07 RX ADMIN — HYDROMORPHONE HYDROCHLORIDE 0.4 MG: 0.2 INJECTION, SOLUTION INTRAMUSCULAR; INTRAVENOUS; SUBCUTANEOUS at 11:25

## 2022-12-07 RX ADMIN — CETIRIZINE HYDROCHLORIDE 10 MG: 10 TABLET, FILM COATED ORAL at 08:00

## 2022-12-07 RX ADMIN — METHOCARBAMOL 500 MG: 500 TABLET ORAL at 15:13

## 2022-12-07 RX ADMIN — ATORVASTATIN CALCIUM 20 MG: 20 TABLET, FILM COATED ORAL at 00:58

## 2022-12-07 RX ADMIN — METHOCARBAMOL 500 MG: 500 TABLET ORAL at 09:23

## 2022-12-07 RX ADMIN — FINASTERIDE 1.3 MG: 5 TABLET, FILM COATED ORAL at 12:23

## 2022-12-07 RX ADMIN — Medication 0.5 MCG/KG/MIN: at 00:43

## 2022-12-07 RX ADMIN — BACLOFEN 10 MG: 10 TABLET ORAL at 08:00

## 2022-12-07 RX ADMIN — OXYCODONE HYDROCHLORIDE 10 MG: 10 TABLET ORAL at 15:13

## 2022-12-07 RX ADMIN — GABAPENTIN 300 MG: 300 CAPSULE ORAL at 07:59

## 2022-12-07 RX ADMIN — METHOCARBAMOL 500 MG: 500 TABLET ORAL at 12:23

## 2022-12-07 RX ADMIN — BACLOFEN 10 MG: 10 TABLET ORAL at 00:58

## 2022-12-07 RX ADMIN — CYANOCOBALAMIN TAB 500 MCG 500 MCG: 500 TAB at 08:05

## 2022-12-07 RX ADMIN — BACLOFEN 10 MG: 10 TABLET ORAL at 20:25

## 2022-12-07 RX ADMIN — ACETAMINOPHEN 975 MG: 325 TABLET, FILM COATED ORAL at 00:57

## 2022-12-07 RX ADMIN — METHOCARBAMOL 500 MG: 500 TABLET ORAL at 20:26

## 2022-12-07 RX ADMIN — HYDROMORPHONE HYDROCHLORIDE 0.4 MG: 0.2 INJECTION, SOLUTION INTRAMUSCULAR; INTRAVENOUS; SUBCUTANEOUS at 03:43

## 2022-12-07 RX ADMIN — GABAPENTIN 300 MG: 300 CAPSULE ORAL at 14:40

## 2022-12-07 RX ADMIN — MIDODRINE HYDROCHLORIDE 10 MG: 5 TABLET ORAL at 10:59

## 2022-12-07 RX ADMIN — Medication 100 MG: at 10:50

## 2022-12-07 RX ADMIN — MIDODRINE HYDROCHLORIDE 10 MG: 5 TABLET ORAL at 14:40

## 2022-12-07 RX ADMIN — GABAPENTIN 300 MG: 300 CAPSULE ORAL at 20:25

## 2022-12-07 RX ADMIN — CALCIUM GLUCONATE 2 G: 20 INJECTION, SOLUTION INTRAVENOUS at 13:55

## 2022-12-07 RX ADMIN — HYDROMORPHONE HYDROCHLORIDE 0.4 MG: 0.2 INJECTION, SOLUTION INTRAMUSCULAR; INTRAVENOUS; SUBCUTANEOUS at 05:54

## 2022-12-07 RX ADMIN — HYDROXYZINE HYDROCHLORIDE 25 MG: 25 TABLET, FILM COATED ORAL at 10:50

## 2022-12-07 RX ADMIN — ATORVASTATIN CALCIUM 20 MG: 20 TABLET, FILM COATED ORAL at 22:11

## 2022-12-07 RX ADMIN — EPOETIN ALFA-EPBX 8000 UNITS: 10000 INJECTION, SOLUTION INTRAVENOUS; SUBCUTANEOUS at 11:39

## 2022-12-07 RX ADMIN — FLUTICASONE PROPIONATE 1 SPRAY: 50 SPRAY, METERED NASAL at 08:18

## 2022-12-07 RX ADMIN — SODIUM CHLORIDE 300 ML: 9 INJECTION, SOLUTION INTRAVENOUS at 11:00

## 2022-12-07 RX ADMIN — MIDODRINE HYDROCHLORIDE 10 MG: 5 TABLET ORAL at 22:11

## 2022-12-07 RX ADMIN — ACETAMINOPHEN 975 MG: 325 TABLET, FILM COATED ORAL at 22:11

## 2022-12-07 RX ADMIN — MIDODRINE HYDROCHLORIDE 10 MG: 5 TABLET ORAL at 00:58

## 2022-12-07 RX ADMIN — HYDROMORPHONE HYDROCHLORIDE 0.4 MG: 0.2 INJECTION, SOLUTION INTRAMUSCULAR; INTRAVENOUS; SUBCUTANEOUS at 13:53

## 2022-12-07 RX ADMIN — SODIUM CHLORIDE 250 ML: 9 INJECTION, SOLUTION INTRAVENOUS at 11:00

## 2022-12-07 RX ADMIN — CEFAZOLIN SODIUM 2 G: 2 INJECTION, SOLUTION INTRAVENOUS at 05:47

## 2022-12-07 RX ADMIN — HYDROMORPHONE HYDROCHLORIDE 0.2 MG: 0.2 INJECTION, SOLUTION INTRAMUSCULAR; INTRAVENOUS; SUBCUTANEOUS at 01:09

## 2022-12-07 ASSESSMENT — ACTIVITIES OF DAILY LIVING (ADL)
ADLS_ACUITY_SCORE: 48
ADLS_ACUITY_SCORE: 46
ADLS_ACUITY_SCORE: 42
ADLS_ACUITY_SCORE: 46
ADLS_ACUITY_SCORE: 48
ADLS_ACUITY_SCORE: 46
IADL_COMMENTS: SON ASSISTS

## 2022-12-07 ASSESSMENT — VISUAL ACUITY
OU: NORMAL ACUITY

## 2022-12-07 NOTE — PROGRESS NOTES
SPIRITUAL HEALTH SERVICES Progress Note  [facility] ([place]) 4A    I met briefly with Shay as he was being cared for by medical team per admission request to introduce SHS. Shay expressed desire for a follow up visit tomorrow which I have notified the on-call  of. If Shay is still on 4A Friday, I will attempt to follow up. Shay and I prayed together per his request.    Landon Esquivel  Chaplain Resident  Pager [phone]    * [facility] remains available 24/7 for emergent requests/referrals, either by having the switchboard page the on-call  or by entering an ASAP/STAT consult in Epic (this will also page the on-call ). Routine Epic consults receive an initial response within 24 hours.*

## 2022-12-07 NOTE — PROGRESS NOTES
[facility], [place]   Neurosurgery Progress Note:    Assessment: Shay Jimenez is a 57 year old male with with PMH ESRD on dialysis and osteomyelitis s/p C5-T6 fusion who is now POD#1 s/p redo C5-T6 fusion with concern for hemorrhage; no vertebral artery dissection or extravasation was noted on intraoperative angiogram.  He was admitted to the Neuro ICU postoperatively for MAP goals and frequent neuro checks, remaining stable on pressors.  Neuro exam is unchanged from patient's baseline.    Plan:  - Serial neuro exams  - Pain control  - Monitor HV output  - Goal normotension  - Appreciate Nephrology recommendations re: dialysis (baseline M/W/F)  - Cervical collar at all times  -   - Advance diet as tolerated  - Bowel regimen  - PRN antiemetics  - IVF until taking adequate PO  - PT/OT  - SCDs for DVT proph    Greatly appreciate the help from PT/OT in caring for Shay Jimenez    -----------------------------------  Lior Cornejo MD  Neurosurgery PGY-4    Interval History/Subjective: Maintained MAP >65 on maintenance fluids and pressors overnight.  Transfused 2u pRBCs for postoperative anemia.  Pending dialysis today.    Objective:   Temp:  [97.5  F (36.4  C)-99.8  F (37.7  C)] 98.2  F (36.8  C)  Pulse:  [] 84  Resp:  [0-52] 12  BP: ()/(44-93) 126/57  MAP:  [0 mmHg-246 mmHg] 57 mmHg  Arterial Line BP: (0-259)/(0-244) 68/47  SpO2:  [83 %-100 %] 83 %  I/O last 3 completed shifts:  In: 4890.42 [I.V.:4090.42]  Out: 3561 [Urine:918; Drains:143; Blood:2500]    Gen: Appears comfortable, NAD  Wound: Incision, clean, dry, intact without strikethrough  Neurologic:  - Alert & Oriented to person, place, time, and situation  - Follows commands briskly  - Speech fluent, spontaneous. No aphasia or dysarthria.  - No gaze preference. No apparent hemineglect.  - PERRL, EOMI  - Strong brow raise, eye closure, and smile  - Face symmetric with sensation intact to light touch  - Trapezii and  sternocleidomastoid muscles 5/5 bilaterally  - No pronator drift     Del Tr Bi WE WF Gr   R 4* 5 5 5 5 5   L 4* 5 5 5 5 5    HF KE KF DF PF EHL   R 4+ 5 5 5 5 5   L 4+ 5 5 5 5 5   *pain-limited    Norman's and clonus negative.    Sensation intact and symmetric to light touch throughout    LABS  Recent Labs   Lab Test 12/07/22 0538 12/06/22  2335 12/06/22 1817 12/06/22  1305 12/06/22  0756   WBC 8.0 6.6  --   --  4.1   HGB 7.6* 6.6* 9.1*   < > 10.2*    102*  --   --  98    130*  --   --  204    < > = values in this interval not displayed.       Recent Labs   Lab Test 12/07/22 0538 12/06/22 2335 12/06/22 2246 12/06/22 1817 12/06/22  1305 12/06/22  0756   * 131*  --  129*   < > 132*   POTASSIUM 5.2 5.0  --  5.9*   < > 4.0   CHLORIDE 86* 93*  --   --   --  87*   CO2 25 22  --   --   --  25   BUN 26.4* 22.0*  --   --   --  17.4   CR 5.54* 4.70*  --   --   --  4.66*   ANIONGAP 17* 16*  --   --   --  20*   MAC 7.9* 7.3*  --   --   --  9.7   GLC 91 103* 127* 161*   < > 87    < > = values in this interval not displayed.       IMAGING  Recent Results (from the past 24 hour(s))   XR Surgery MOLLY Fluoro Less Than 5 Min w Stills    Narrative    This exam was marked as non-reportable because it will not be read by a   radiologist or a [place] non-radiologist provider.

## 2022-12-07 NOTE — CONSULTS
[facility]OUS [facility]EAS[facility] CONSULT NOTE     Patient:  Shay Jimenez   Date of birth 1965, Medical record number [id number]  Date of Visit:  12/07/2022  Date of Admission: 12/6/2022  Consult Requester:Fritz Boles MD          Assessment and Recommendations:   ID Problem List  1. S/p redo C5-T6 fusion 12/6/22  2. History of possible T2-T3 discitis/vertebral osteomyelitis vs DJD s/p cervical to thoracic fusion and T2/3 decompression 6/27/22, intraoperative culture x2 with Cutibacterium acnes in broth only, thought likely contamination but completed 2 weeks Ancef and 4 weeks amoxicillin  3. Recent Morganella morganii bacteremia of unclear etiology s/p 8.5 weeks cefepime (8/14/22 - 10/13/22)    RECOMMENDATION:  1. Follow up intraoperative cultures  2. No role for empiric antibiotics at this time  3. Requested to add CRP to patient's admission labs prior to OR    ASSESSMENT:  Shay Jimenez is a 57 year old male with past medical history significant for ESRD on HD, alcohol use disorder, previous c/f thoracic spine osteomyelitis vs DJD s/p cervical spinal fusion in June 2022, recent Morganella bacteremia (08/2022, s/p 8.5 wks cefepime) who is admitted since 12/6/22 for redo cervical spinal fusion which was completed 12/6 with cefazolin perioperatively. ID consulted regarding history of osteomyelitis. He has recent admission in June 2022 for upper back pain, weakness, falls, inability to ambulate with radiographic findings concerning for DJD vs inflammatory changes possibly related to discitis/osteomyelitis at T2-T3. He had cervical spine fusion on 6/27/22, and since clinically stable was done off antibiotics and intraoperative cultures collected. These were positive on day 5 and day 7 in 2 sets for Cutibacterium acnes that was felt likely contaminant due by infectious disease at the time. However, due to hardware placement did receive 2 weeks IV Ancef followed by 4 weeks amoxicillin. One blood  culture during that time with Staph epi attributed to contamination. He was also admitted in August 2022 septic shock requiring pressor support and found to have Morganella morganii bacteremia (blood cultures +2 days) of unclear etiology, no evidence clinically or radiographically for other source of infection, was thought to be possibly related to osteomyelitis and he completed 8.5 weeks of IV cefepime in mid-October. Given report of 4 months of diarrhea 2/2 constipation (likely overflow) per patient, consider GI etiology for previous Morganella bacteremia though CT at that time unremarkable. He continued to have neck pain and imaging findings with no acute change from August 2022, though loosened screws at C5/C6 for which he was admitted for re-do fusion. He is afebrile, no leukocytosis, no report of intraoperative concern for osteomyelitis, and stable findings on CT spinal imaging. No evidence of active infection and would not treat with antibiotics at this time. Will follow up pending cultures.     Thank you for this consult. ID will continue to follow.     Patient was discussed with Dr. Pandya. Recommendations discussed with primary team.    Phylicia Roper PA-C  Infectious Diseases  Pager # [phone]    I spent a total of 60 minutes face-to-face and/or coordinating care of Shay Jimenez. Over 50% of my time on the unit was spent counseling the patient and/or coordinating care.         History of Present Illness:     Shay Jimenez is a 57 year old male with past medical history significant for HTN, HLD, ESRD on HD, GERD, GINI, obesity, alcohol use disorder, previous c/f thoracic spine osteomyelitis s/p spinal fusion in June 2022, recent Morganella bacteremia (08/2022) who is admitted since 12/6/22 for redo cervical spinal fusion.     He had CTA chest in 02/2022 for dyspnea due to volume overload that noted discitis vs osteomyelitis of thoracic spine. Follow up after this is reported by ID note 6/25/22 -  ultimately pt had done ok with rest, later had follow up with orthopedic provider and had MRI notable for fracture of thoracic spine. He was admitted in June 2022 for 6 months of upper back pain, with weakness, falls, and inability to ambulate. He denied fever, chills. CT and thoracic MRI 6/24/22 notable for collapse of T2-T3 vertebral bodies with abnormal enhancement, and thickened abnormal epidural tissues c/f inflammed epidural tissues and paraspinal inflammatory changes c/f DJD but unable to rule out epidural phlegmon abscess c/f discitis and osteomyelitis, and with severe spinal canal stenosis at T2,T3 c/f compressive myelopathy. Antibiotics were deferred since clinically stable and planned for OR with cultures to be collected. On 6/27/22 he had cervical 6 to thoracic 6 fusion and decompression of thoracic 2-3, operative note with degenerative joint disease changes and intraop cultures sent. He was treated with Ancef postoperatively for 14 days. Spine tissue culture was positive in two cultures on day 5 and day 7 in broth only for C. acnes that was thought likely to be contaminant. However, he had new hardware placed during surgery and was found in two cultures, he was started on amoxicillin 500 mg daily (dose adjusted for HD) for 4 weeks after being on Ancef for 2 weeks. Blood culture on 7/5 (collected when afebrile, on Ancef) positive in 1/2 bottle from only 1 set for Staph epi was considered due to contamination.    He was previously admitted 8/10/22 for septic shock requiring pressors, found to have Morganella morganii of unclear etiology , thought related to progressive T2-3 discitis/osteomyelitis. No intraabdominal source on imaging, TTE negative for endocarditis, no other reported clinical or radiographic signs of infection. He had positive blood culture on 8/10 x2 (reports rigors then, no fever) and again in 1/2 sets on 8/11, negative since 8/13. Resistant to ampicillin, cefazolin, intermediate  ceftriaxone, cefepime/Zosyn/carbapenem/FQ sensitive. He was treated with IV cefepime from 8/14/22 - 10/13/22 for almost 9 weeks of IV antibiotics.    CTA chest angio 8/10/22 with progressive bony destruction and c/f discitis/osteomyelitis at T2/3. CT Cervical spine 10/10/22 with no acute finding or change since 8/11/22 - with stable degenerative changes, loosening and retropulsion of screws at C5/C6. MRI lumbar spine same day with stable degenerative changes, no report of discitis/osteomyelitis on imaging at that time. Repeat imaging on 11/3 with stable screw loosening, stable erosive changes at T2-T3. He was found to have hardware failure with screw pullout at C5/C6 and pseudoarthrosis, admitted 12/6/22 for re-do cervical spinal fusion C5-T6 with fortino placement. Had hemorrhage intraoperatively, s/p 2 units RBC, cerebral angiogram negative for vertebral artery injury. Intraoperative cultures were taken and remain pending at this time. Per neurosurgery, no evidence of osteomyelitis reported intraoperatively. Does have significant amount of original hardware retained. He received cefazolin for perioperative antibiotic. He remains in ICU on pressor x1. He remains afebrile, without leukocytosis.     He denies IV drug use or injection medications. He has no open sores, cuts. He reports continued neck pain prior to admission. Denies fever, chills, rigors, unintentional weight loss, nausea, vomiting, abdominal pain, diarrhea, rash, or urinary symptoms. He denies any previous episodes of bacteremia prior to this year. He has LUE fistula and dialysis in place.          Review of Systems:   CONSTITUTIONAL:  No fevers, chills or night sweats.   EYES: negative for icterus, redness, or purulent drainage.   ENT:  negative for nasal congestion, rhinorrhea, or sore throat.  RESPIRATORY:  negative for cough with sputum and dyspnea.  CARDIOVASCULAR:  negative for chest pain or palpitations.  GASTROINTESTINAL:  negative for nausea,  vomiting, diarrhea and constipation.  GENITOURINARY:  negative for dysuria, frequency, or urgency.   HEME:  No easy bruising or bleeding.  INTEGUMENT:  negative for rash and pruritus.  NEURO:  Negative for headache, vision changes, +numbness/tingling in extremities.         Past Medical History:     Past Medical History:   Diagnosis Date     Chronic kidney disease      Neuropathy             Past Surgical History:     Past Surgical History:   Procedure Laterality Date     OPTICAL TRACKING SYSTEM FUSION POSTERIOR SPINE THORACIC THREE+ LEVELS N/A 6/27/2022    Procedure: Cervical 6 to Thoracic 6 Fusion and Thoracic 2-3 Decompression;  Surgeon: Fritz Boles MD;  Location:  OR            Family History:     History reviewed. No pertinent family history.         Social History:     Social History     Tobacco Use     Smoking status: Never     Smokeless tobacco: Never   Substance Use Topics     Alcohol use: Not Currently     History   Sexual Activity     Sexual activity: Not on file            Current Medications:       sodium chloride 0.9%  250 mL Intravenous Once in dialysis/CRRT     sodium chloride 0.9%  300 mL Hemodialysis Machine Once     acetaminophen  975 mg Oral Q8H CUONG     atorvastatin  20 mg Oral At Bedtime     baclofen  10 mg Oral BID     ceFAZolin  2 g Intravenous Q12H     cetirizine  10 mg Oral Daily     cyanocobalamin  500 mcg Oral Daily     epoetin mar-epbx  8,000 Units Intravenous Once in dialysis/CRRT     finasteride  1.3 mg Oral Daily     fluticasone  1 spray Both Nostrils BID     gabapentin  300 mg Oral TID     magnesium plus protein  133 mg Oral Daily     methocarbamol  500 mg Oral 4x Daily     midodrine  10 mg Oral Q8H [facility]     multivitamin RENAL  1 tablet Oral Daily     - MEDICATION INSTRUCTIONS -   Does not apply Once     pantoprazole  40 mg Oral Daily     polyethylene glycol  17 g Oral Daily     vitamin B6  100 mg Oral Daily     senna-docusate  1 tablet Oral BID     sertraline  100 mg Oral  Daily     sevelamer carbonate  1,600 mg Oral TID w/meals     sodium chloride (PF)  3 mL Intracatheter Q8H            Allergies:     Allergies   Allergen Reactions     Amlodipine      Lisinopril             Physical Exam:   Vitals were reviewed  Patient Vitals for the past 24 hrs:   BP Temp Temp src Pulse Resp SpO2 Weight   12/07/22 0900 (!) 85/38 97.6  F (36.4  C) Oral 92 15 97 % --   12/07/22 0845 (!) 88/41 -- -- 93 21 91 % --   12/07/22 0844 92/46 97.7  F (36.5  C) Oral 91 17 94 % --   12/07/22 0800 104/49 98.3  F (36.8  C) Oral 86 10 94 % --   12/07/22 0700 126/57 -- -- 84 -- (!) 83 % --   12/07/22 0645 127/60 -- -- 84 -- 95 % --   12/07/22 0630 131/57 -- -- 86 -- 99 % --   12/07/22 0615 123/54 -- -- 85 -- 100 % --   12/07/22 0600 119/64 -- -- 86 -- 91 % 136.1 kg (300 lb 0.7 oz)   12/07/22 0400 115/44 98.2  F (36.8  C) Axillary 88 12 (!) 88 % --   12/07/22 0345 115/48 -- -- 85 (!) 8 97 % --   12/07/22 0330 123/56 -- -- 85 13 93 % --   12/07/22 0315 124/56 -- -- 87 (!) 7 (!) 87 % --   12/07/22 0300 119/48 -- -- 87 10 97 % --   12/07/22 0245 (!) 85/54 -- -- 89 (!) 6 99 % --   12/07/22 0230 -- 97.9  F (36.6  C) Axillary 94 10 96 % --   12/07/22 0215 -- 97.8  F (36.6  C) Axillary 90 (!) 7 90 % --   12/07/22 0200 -- -- -- 85 12 (!) 83 % --   12/07/22 0145 -- -- -- 84 (!) 52 98 % --   12/07/22 0130 -- -- -- 85 (!) 0 98 % --   12/07/22 0115 -- -- -- 86 10 100 % --   12/07/22 0100 -- -- -- 86 10 96 % --   12/07/22 0000 (!) 82/50 97.7  F (36.5  C) Oral 90 12 100 % --   12/06/22 2300 (!) 83/49 99.8  F (37.7  C) Oral 93 (!) 5 100 % --   12/06/22 2231 -- -- -- -- -- 99 % --   12/06/22 2200 (!) 125/93 -- -- 92 18 98 % --   12/06/22 2130 112/68 97.5  F (36.4  C) Oral 93 11 98 % --   12/06/22 2115 135/69 -- -- 98 10 97 % --   12/06/22 2100 91/60 -- -- 96 16 100 % --   12/06/22 2045 92/66 -- -- 94 14 100 % --   12/06/22 2030 99/62 -- -- 97 13 100 % --   12/06/22 2015 (!) 81/62 -- -- 94 14 100 % --   12/06/22 2000 100/64 -- -- 96  10 98 % --   12/06/22 1945 90/60 -- -- 92 13 98 % --   12/06/22 1941 91/63 98.5  F (36.9  C) Axillary 103 24 93 % --       Physical Examination:  Constitutional: Pleasant adult male seen sitting up in bed, in NAD. Alert and interactive. Spine C-collar in place.  HEENT: NCAT, anicteric sclerae, conjunctiva clear. Moist mucous membranes without lesions or thrush.   Respiratory: Non-labored breathing, good air exchange. Lungs are clear to auscultation bilaterally, without wheezing, crackles or rhonchi. No cough noted.   Cardiovascular: Regular rate and rhythm with no murmur, rub or gallop.  GI: Normoactive BS. Abdomen is soft, obese, and non-tender to palpation. No rigidity or guarding.  Skin: Warm and dry. No lesions on exposed surfaces.+peripheral vascular changes  Musculoskeletal: 2+ edema present.   Neurologic: A &O x3, speech normal, answering questions appropriately. Moves all extremities spontaneously. Grossly non-focal.  Neuropsychiatric: Mentation and affect normal/appropriate.  VAD: Lines c/d/i with no erythema, drainage, or tenderness.LUE fistula.         Laboratory Data:     Inflammatory Markers    Recent Labs   Lab Test 10/06/22  1215 10/03/22  1500 09/29/22  1345 09/22/22  1205 09/15/22  1425 09/12/22  1320 09/08/22  1330 09/01/22  1300 08/25/22  1300 08/18/22  1435   SED 31*  --  31*  --  25*  --  27*  --  34* 41*   CRP 43.70* 42.6* 53.5* 32.90* 29.7* 9.4* 30.3* 26.8* 39.2* 14.6*       Hematology Studies    Recent Labs   Lab Test 12/07/22  0538 12/06/22  2335 12/06/22  1817 12/06/22  1409 12/06/22  1305 12/06/22  0756 10/10/22  1053 10/06/22  1215 10/03/22  1500   WBC 8.0 6.6  --   --   --  4.1 5.5 4.4 6.0   HGB 7.6* 6.6* 9.1* 8.6* 8.5* 10.2* 8.2* 8.4* 9.2*    102*  --   --   --  98 95 99 97    130*  --   --   --  204 137* 167 149*       Metabolic Studies     Recent Labs   Lab Test 12/07/22  0538 12/06/22  2335 12/06/22  1817 12/06/22  1409 12/06/22  1305 12/06/22  0756 10/10/22  1053  10/06/22  1215 08/25/22  1300 08/18/22  1435 08/18/22  1435   * 131* 129* 130* 130* 132* 121*  --   --   --  132*   POTASSIUM 5.2 5.0 5.9* 4.9 4.7 4.0 3.3*  --   --    < > 3.0*   CHLORIDE 86* 93*  --   --   --  87* 79*  --   --   --  94   CO2 25 22  --   --   --  25 18*  --   --   --  28   BUN 26.4* 22.0*  --   --   --  17.4 19.3 11.0   < >  --  20   CR 5.54* 4.70*  --   --   --  4.66* 7.66* 4.94*   < >  --  5.53*   GFRESTIMATED 11* 14*  --   --   --  14* 8* 13*   < >  --  11*    < > = values in this interval not displayed.       Hepatic Studies    Recent Labs   Lab Test 10/06/22  1215 09/29/22  1345 09/22/22  1205 09/15/22  1425 09/08/22  1330 09/01/22  1300 08/18/22  1435 07/21/22  1252 07/16/22  0557 07/11/22  0722 06/29/22  0611 06/24/22  0818   BILITOTAL  --   --   --   --   --   --   --  0.6 0.5  --   --  0.6   ALKPHOS  --   --   --   --   --   --   --  131 134  --   --  152*   ALBUMIN  --   --   --   --   --   --   --  3.0* 2.8* 3.2* 2.9* 3.3*   AST 31 35 41 38 33 22   < > 17 13  --   --  51*   ALT  --   --   --   --   --   --   --  9 <6  --   --  55    < > = values in this interval not displayed.       Microbiology:  Culture   Date Value Ref Range Status   07/06/2022 No Growth  Final   07/06/2022 No Growth  Final   07/05/2022 Positive on the 1st day of incubation (A)  Final   07/05/2022 Staphylococcus epidermidis (AA)  Final     Comment:     1 of 1 bottles   07/04/2022 No Growth  Final   07/03/2022 No Growth  Final   07/02/2022 No Growth  Final   07/01/2022 No Growth  Final   06/30/2022 No Growth  Final   06/29/2022 No Growth  Final   06/28/2022 No Growth  Final   06/27/2022 (A)  Final    Isolated in broth only Cutibacterium (Propionibacterium) acnes     Comment:     On day 7 of incubation    Susceptibility testing of Cutibacterium (Propionibacterium) species is not done from this source. This organism is susceptible to penicillin and cefotaxime and most are susceptible to clindamycin.   06/27/2022 (A)   Final    Isolated in broth only Cutibacterium (Propionibacterium) acnes     Comment:     On day 5 of incubation  Susceptibility testing of Cutibacterium (Propionibacterium) species is not done from this source. This organism is susceptible to penicillin and cefotaxime and most are susceptible to clindamycin.   06/27/2022 No Growth  Final   06/27/2022 No Growth  Final   06/26/2022 No Growth  Final   06/24/2022 No Growth  Final   06/24/2022 No Growth  Final       IMAGING  CT Thoracic and Cervical Spine 11/3/22  Impression:      1. Postsurgical changes of C5-T6 fortino/screw fixation. When compared to  the 10/10/2012 exams, there is stable transpedicular screw loosening  and retraction at C5 and C6.   2. Stable erosive changes in the T2, T3 sequela of patient's known  osteomyelitis. Unchanged T11 hemangioma.    MRI Lumbar Spine 10/10/22  IMPRESSION:  1.  Stable degenerative changes of the lumbar spine without a significant interval change.  2.  No high-grade canal stenosis.  3.  Bilateral neural foraminal narrowing is greatest on the left at L5-S1, where there is moderate stenosis.    CT Thoracic and Cervical Spine 10/10/22                                                                 Impression:   1. No acute finding or significant change since 8/11/2022.  2. Unchanged loosening and retropulsion of the transpedicle screws  bilaterally at C5 and C6.   3. Unchanged T2 and T3 vertebral body sclerosis and height loss.  4. Stable degenerative changes of the cervical spine, most  significantly resulting in severe neural foraminal stenosis on the  left C3-4 and moderate neural foraminal stenosis on the right at C3-4  and C4-5.    MRI Thoracic Spine 6/24/22  IMPRESSION: Postcontrast images demonstrate abnormal contrast  enhancement of the T2 and T3 vertebrae, thickened abnormal epidural  tissues from C7-T1 down to T4-T5 possibly simply representing inflamed  epidural tissues but epidural abscess cannot be excluded.  Abnormal  contrast enhancement in the bilateral lateral and anterior paraspinous  soft tissues from T1 down to T4 consistent with a paraspinous  phlegmon/abscess again noted. Moderate compression of the thoracic  spinal cord at the upper T2 level again noted.

## 2022-12-07 NOTE — OP NOTE
PATIENT NAME: Shay Jimenez  PATIENT MRN: [id number]  PATIENT ACCOUNT: [id number]  PATIENT YOB: 1965    NEUROSURGERY OPERATIVE REPORT     DATE OF SURGERY: 12/07/22     PREOPERATIVE DIAGNOSIS:  1. 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 Hardware failure of C5-T6 posterior fusion (June 27, 2022)  3. Thoracic myelopathy  4. History of T2-3 fractures  5. Osteomyelitis    POSTOPERATIVE DIAGNOSIS:  1. 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 Hardware failure of C5-T6 posterior fusion (June 27, 2022)  3. Thoracic myelopathy  4. History of T2-3 fractures  5. Osteomyelitis     PROCEDURES PERFORMED:  1. Exploration of prior spinal fusion from Cervical 5 to Thoracic 6(CPT 80376)  2. Removal of spine instrumentation at right cervical 6  3. Re-insertion of instrumentation at same cervical levels at cervical 5 and 6 (CPT 70173)  4. Insertion of new hardware at cervical 7   5. Revision arthrodesis from cervical 5-thoracic 6 (modifier 22 for increased complexity due to patient's body habitus)  6. Open reduction and internal fixation of thoracic 2-3 fracture with correction of kyphotic deformity  7. Posterior lateral fusion using bone morphogenic protein  8. Posterior lateral fusion using allograft   9. Use of intra-operative O-arm and C-arm  10. Temporary midline fascia and skin closure   11. Cerebral angiography   12. Use of Stealth navigation  13. Use of neuromonitoring (SSEPs)    STAFF SURGEON: Fritz Boles MD     RESIDENT SURGEONS: Romeo Chang MD     ANESTHESIA: General endotracheal anesthesia     ESTIMATED BLOOD LOSS: 2.5 liters     IMPLANTS:   Medtronic Vertex screws:  C5: 3.5 x 16 mm screw (L) and 3.5 x 14 mm screw (R)  C6: 3.5 x 18 mm screw (L) and no screw placed on right   C7: 3.5 x 18 mm screw (L) and 3.5 x 18 mm screw (R)    New set screws placed throughout construct     Rods:   3x custom cut and custom bent cobalt chrome Medtronic spinal rods     Graft:    Medtronic Magnifuse     Biologics:   Medtronic infuse bone morphogenic protein    Adjuncts:   2x Medtronic variable angle dominos     EXPLANTS:   Removed 4 previously placed lateral mass screws   Removed 2 previously placed Medtronic rods   Removed old set screws      DRAINS:   Bilateral medium hemovac drains in the subfascial space     FINDINGS:  Prior C5-T6 fusion was explored and pseudoarthrosis noted with screw pull-out at C5 and C6. At time of soft tissue dissection, brisk bleeding was identified and tamponade was achieved with ray-janusz sponge that was later removed after his diagnostic angiogram. This prompted a formal diagnostic cerebral angiogram to evaluate the patient for vertebral artery injury. No vertebral artery injury was identified on the right side. Patient returned to operating room for remainder of operation with pedicle screws placed from C5-C7. No mal-placed screw identified on O-arm check-spin. Revision arthrodesis performed from C5-T6 without complication thereafter.     COMPLICATIONS: none immediate     INDICATIONS:  Shay Jimenez is a 58 yo male who underwent a prior cervical 5 - thoracic 6 fusion for the indication of thoracic pathologic fracture secondary to osteomyelitis. Unfortunately, in follow-up, the patient was found to have hardware failure with screw pullout at C5 and C6 as well as pseudoarthrosis. For this reason, the patient underwent informed consent and provided written and verbal consent with the above stated operation after a discussion of risks, alternatives, and benefits.      DESCRIPTION OF PROCEDURE:  Shay was brought back to the operating room.  General endotracheal anesthesia was obtained.  The patient underwent an arterial line and Oliveira placement.  All intravenous lines were obtained on the contralateral side of his fistula.  Patient underwent neuro monitoring with somatosensory evoked potentials.  We proceeded to clamp his skull using a head clamp.  Baseline  signals were obtained prior to turning the patient prone.  He was then positioned in the prone position on a Amos table with the appropriate attachments with his arms at his side and his head secured using a Mizuho Levo.  His skin was cleaned, prepared, and draped in sterile fashion.  We performed a timeout, and proceeded to open his prior incision using a #10 blade after injecting the skin with local anesthetic.     Thereafter, we performed a dissection down to the patient's spinous processes.  We encountered the patient's hardware and worked towards the thoracic 2 and 3 levels and left the scar tissue overlying the thecal sac intact.  Next, we dissected laterally towards the previously placed screws.  Pseudoarthrosis was noted with no bone fusion mass identified at the cervical levels.  We proceeded to remove the set screws and fortino on the patient's left side which allowed us to remove the C5 and C6 lateral mass screw on the left side.  These screws had pulled out and were not within bone.  Monopolar cautery was used to dissect the scar tissue off the bone at C5, C6, and C7.  During this process, we also decorticated visible bone with the Leksell rongeur.  To facilitate this dissection, his incision was extended in a cephalad fashion and additional soft tissue dissection was required.  It was difficult to mobilize the patient's soft tissues laterally.  Due to the patient's body habitus and degree of decreased venous return, this added greater than 200% extra time.  We replaced the old fortino on the left side which had been cut so that it only articulated with the T1-T6 screws.      Next, we proceeded to remove the set screws on the right side from C5-T6 and removed the fortino.  We also decorticated visible bone with the Leksell rongeur during this process.  We thereafter began dissection of the bone at C5, C6, and C7 using the monopolar cautery.  Brisk bleeding began to occur at the junction of C5 and C6.  We sized  our suction tips up and applied manual pressure.  Bleeding continued and appeared extensive.  Thus, we proceeded to insert gel foam to achieve hemostasis.  Further tamponade was achieved using a Ray-janusz sponge.      We obtained an intraoperative consultation from Dr. Ricardo from the neuro-interventional team.  Given the difficulty in achieving hemostasis and difficulty in visualizing the source of the bleeding, we could not rule out vertebral artery injury on the right side.  Thus, the maneuvers of Surgi-Kristofer injection or bipolar cauterization were not deemed to be safe.  We discussed with Dr. Ricardo that performing a diagnostic cerebral angiogram would be the best to evaluate the current situation and to assess for either vertebral artery puncture and/or dissection of the vasculature.  To achieve some degree of spinal stability, we replaced his rods at T1-T6 and placed his old set screws in.  They were hand-tightened.  Thereater, the patient's surgical site was closed in temporary fashion with an 0-0 vicryl runner and skin was closed with 2-0 prolene.  Throughout this time, the neuro-monitoring team informed us of no changes from the patient's pre-flip baseline.  We dressed the incision in sterile fashion.  We assisted anesthesia with turning the patient over into the supine position and removed his skull clamp.  A cervical orthosis was applied.  We escorted the patient down to the interventional radiology suite.  A diagnostic angiography did not reveal any injury of the right vertebral artery.  Thus, he was brought back to the operating room where all the instrumentation was kept sterile or was replaced as deemed appropriate by the circulating nurse and scrub technician.      He was again positioned in the same manner after ensuring adequate intravenous access, initiation of neuro-monitoring, and application of a skull clamp.  Neuro-monitoring was performed prior and after positioning the patient in the prone  position, and again no significant changes were identified.  We cleaned, prepared, and draped his skin in sterile fashion again.  Due to the emergency nature of the previous closure, needle counts were identified to be incorrect.  Thus, using the X-ray imaging from the patient's angiogram, we had independent verification from the radiologist that there were no retained needles.  Prior to our second time out, a new needle count was initiated. The prolene and vicryl sutures were cut and removed after performing a timeout.  We removed the temporary Ray-Jr sponge.  Upon inspection of the soft tissues adjacent to the right C5 and C6 lateral mass, we identified all of our old gel foam, which was left in place.  No further maneuvers were required to achieve hemostasis and there was no active bleeding at this site visualized.     At this time, we proceeded to remove all of the set screws on the patient's right side.  A screw based Medtronic stealth frame was placed and secured with temporary screws.  An O-arm image was obtained in sterile fashion.  We then used Stealth neuronavigation to place screws in  the following fashion.  A 1.1 mm drill bit was used to create a starter hole which was identified using KS12.  Next, we used a navigated drill guide to drill a  hole to an appropriate depth.  We then tapped the trajectory of the drill guide to expand the hole and depth to 3.0 mm diameter.  Next, the screws were placed in the same trajectory as the tap.  Due to pedicle size on the right C6 vertebral body, we deemed risk outweighed benefit for placement of a screw at this level.   Another O-arm check spin was obtained that verified appropriate placement of the 5 new screws.  No screw revisions were performed thereafter.      Next, we utilized the Levo head damon to increase the patient's lordosis at the cervico-thoracic junction and to reduce the patient's fracture at T2-3 as able.  We then turned our  "attention to contouring of the Cobalt Chrome rods which were custom sized and custom contoured to achieve correction of the patient's kyphosis.  We secured the rods using set screws.  On the left side, we placed a medial and a lateral fortino.  The medial fortino articulated from T1-T6.  The medial fortino was secured to the lateral fortino using two dominos.  The lateral fortino articulated with the pedicle screw tulips at C5, C6, and C7 on the left side.  This was deemed necessary to increase the strength of the overall construct.      At the completion of these maneuvers, an X-ray was obtained which verified appropriate placement of the hardware and overall alignment.  We then final tightened all the set screws and dominos per the Medtronic representative's recommendations.  Next, the surgical site was extensively irrigated using a Pulsavac. We then placed the autograft and bone morphogenic protein laterally from the screws which was deemed necessary due to the patient's pseudoarthrosis.      Two medium hemovac drains were placed in the sub-fascial and sub-muscular layer.  We then closed the skin in layers using Vicryl for the deep and layers and a combination of staples and nylon for the skin.  The drains were secured using 3-0 nylon.  He underwent dressing in sterile fashion.  We then assisted anesthesia with turning the patient into the supine position onto his gurney, where he underwent uncomplicated extubation in the OR.  He returned to the post-operative anesthesia care unit in stable condition.      At the end of the procedure, all the counts were correct x 2.  Dr. Boles was present and scrubbed for the entirety of the operation.  No changes were noted with somatosensory evoked potentials throughout the entirety of the case.      Operative note prepared by:   Romeo \"Sha\" MD Charlene, Neurosurgery, PGY-6  "

## 2022-12-07 NOTE — PLAN OF CARE
Major Shift Events: pt admitted from PACU post op spinal fusion. 9/10 back/neck pain, PRN dilaudid x3. Baseline BLE neuropathy, all other neuros intact. Soft blood pressures upon transfer, phenyl started for MAP >65. HgB 6.6, 1 unit RBCs overnight. SR in 80s. 2L NC, lung sounds clear/diminished. 2 loose BMs, de los santos in place. Oliguric d/t dialysis.     Plan: dialysis today, monitor BP and Hgb, and manage pain

## 2022-12-07 NOTE — PROGRESS NOTES
Neurocritical Care Progress Note    Reason for critical care admission: Post op-C5-T6 fusion  Admitting Team: GABINO  Date of Service:  12/07/2022  Date of Admission:  12/6/2022  Hospital Day: 2    Assessment/Plan  Shay Jimenez is a 57 year old male with a past medical history of ESRD on dialysis and osteomyelitis s/p C5-T6 fusion admitted on 12/6/2022 for redo C5-T6 fusion    24 hour events: POD1 doing well. Low BP's giving two units PRBC's. Plan to wean pressors.     Neuro  #Redo C5-T6 Fusion POD1  #Concern for Hemorrhage  -No extravasation on angiogram  -Neurochecks every 2 hrs  -MAP goal >65 mmHg  -HOB > 30   -PT/OT/SLP    #Analgesics & sedation  RASS goal:0  -dilaudid 0.2-0.4mg q2  -oxy 5-10mg PRN q3    CV  #Hypotension  -Cardiac monitoring  -MAP goal >65 mmHg  -Phenylephrine gtt-weaning  -Midodrine 10mg q8 for Dialysis    Resp  #POLO  Oxygen/vent:[place] air  -Continuous pulse ox  -Maintain O2 saturations greater than 92%    GI  #POLO  Diet:Regular Diet  Last BM: 12/7  GI prophylaxis:  -Bowel regimen: scheduled senna-docusate and Miralax  -ABD xray-PT is concerned he has bowel obstruction and large stool burden seen on previous imaging.    Renal/  #ESRD  #Chronic Hyponatremia  #Hypochloremia  -Nephrology consulted  -Scheduled dialysis today  -Daily BMP  -IV fluids: none  -Electrolyte replacement protocol  -discontinue de los santos    Endo  #POLO  -Hgb A1c pending  -Monitor glucose levels    Heme  #Acute blood loss anemia  -EBL in OR 2.5 liters  -2units PRBC's today  -No dissection or extravasation on angiogram.   -Daily CBC  -Repeat CBC @1400 post transfusions  -Hgb goal >7, plt goal >50k  -Transfuse to meet Hgb and plt goals    ID  #Osteomylytis Hx  -ID consulted per NSGY  -Daily CBC  -Follow temperature curve  -Afebrile  -No leukocytosis  -No ABX at this time.     ICU Checklist  Lines/tubes/drains: Fistula, PIV, discontinue de los santos  FEN: none, prn replacement, regular diet  PPX: DVT - SCDs; GI -  protonix.  Code:Full  Dispo: ICU - [facility]      TIME SPENT ON THIS ENCOUNTER   I spent 40 minutes of critical care time on the unit/floor managing the care of Shay Jimenez. Upon evaluation, this patient had a high probability of imminent or life-threatening deterioration due to acute blood loss anemia requiring close hemodynamic monitoring, which required my direct attention, intervention, and personal management. Greater than 50% of my time was spent at the bedside counseling the patient and/or coordinating care regarding services listed in this note.    The patient was seen and discussed with the [facility] attending, Dr. Arrieta.    Phill Downs, CNP  NCC *[id number]    24 Hour Vital Signs Summary:  Temp: 97.6  F (36.4  C) Temp  Min: 97.5  F (36.4  C)  Max: 99.8  F (37.7  C)  Resp: (!) 9 Resp  Min: 0  Max: 52  SpO2: 97 % SpO2  Min: 83 %  Max: 100 %  Pulse: 91 Pulse  Min: 84  Max: 103    No data recorded  BP: (!) 148/64 Systolic (24hrs), Av , Min:81 , Max:148   Diastolic (24hrs), Av, Min:38, Max:93        Respiratory monitoring:   Resp: (!) 9      I/O last 3 completed shifts:  In: 4890.42 [I.V.:4090.42]  Out: 3561 [Urine:918; Drains:143; Blood:2500]    Current Medications:    sodium chloride 0.9%  250 mL Intravenous Once in dialysis/CRRT     sodium chloride 0.9%  300 mL Hemodialysis Machine Once     acetaminophen  975 mg Oral Q8H CUONG     atorvastatin  20 mg Oral At Bedtime     baclofen  10 mg Oral BID     ceFAZolin  2 g Intravenous Q12H     cetirizine  10 mg Oral Daily     cyanocobalamin  500 mcg Oral Daily     epoetin mar-epbx  8,000 Units Intravenous Once in dialysis/CRRT     finasteride  1.3 mg Oral Daily     fluticasone  1 spray Both Nostrils BID     gabapentin  300 mg Oral TID     magnesium plus protein  133 mg Oral Daily     methocarbamol  500 mg Oral 4x Daily     midodrine  10 mg Oral Q8H CUONG     multivitamin RENAL  1 tablet Oral Daily     - MEDICATION INSTRUCTIONS -   Does not apply Once     pantoprazole  40  "mg Oral Daily     polyethylene glycol  17 g Oral Daily     vitamin B6  100 mg Oral Daily     senna-docusate  1 tablet Oral BID     sertraline  100 mg Oral Daily     sevelamer carbonate  1,600 mg Oral TID w/meals     sodium chloride (PF)  3 mL Intracatheter Q8H       PRN Medications:  sodium chloride 0.9%, [START ON 12/9/2022] acetaminophen, sore throat, bisacodyl, calcium carbonate, diazepam, HYDROmorphone **OR** HYDROmorphone, hydrOXYzine, lidocaine 4%, lidocaine (buffered or not buffered), lidocaine (buffered or not buffered), lidocaine (buffered or not buffered), magnesium hydroxide, naloxone **OR** naloxone **OR** naloxone **OR** naloxone, ondansetron **OR** ondansetron, oxyCODONE **OR** oxyCODONE, prochlorperazine **OR** prochlorperazine, sodium chloride (PF), - MEDICATION INSTRUCTIONS -    Infusions:    phenylephrine 0.5 mcg/kg/min (12/07/22 0900)     sodium chloride 75 mL/hr at 12/07/22 1000     - MEDICATION INSTRUCTIONS -         Allergies   Allergen Reactions     Amlodipine      Lisinopril        Physical Examination:  Vitals: BP (!) 148/64   Pulse 91   Temp 97.6  F (36.4  C) (Oral)   Resp (!) 9   Ht 1.854 m (6' 1\")   Wt 136.1 kg (300 lb 0.7 oz)   SpO2 97%   BMI 39.59 kg/m    General: Adult male patient, lying in bed, critically-ill  HEENT: Normocephalic, c-collar, no icterus, oral cavity/oropharynx pink and moist  Cardiac: RRR, s1/s2 auscultated without murmur  Pulm: CTAB, unlabored, expansion symmetric, no retractions or use of accessory muscles  Abdomen: Soft, non-distended, bowel sounds present  Extremities: Warm, generalized edema, distal pulses +2, well perfused  Skin: No rash or lesion  Psych: Calm and cooperative  Neuro:  Mental status: Awake, alert, attentive, oriented to self, time, place, and circumstance. Language is fluent and coherent with intact comprehension of complex commands, naming and repetition.  Cranial nerves: VFF, PERRL, conjugate gaze, EOMI, facial sensation intact, face " symmetric, shoulder shrug strong, tongue midline, no dysarthria.   Motor: Normal bulk and tone. No abnormal movements. 5/5 strength in 4/4 extremities.   Sensory: Intact to light touch x 4 extremities  Coordination: FNF and HS without ataxia or dysmetria. Rapid alternating movements intact.   Gait: MARIA ESTHER, deferred.      Labs:  Recent Labs   Lab 12/07/22  0538 12/06/22  2335 12/06/22  1817 12/06/22  1409 12/06/22  1305 12/06/22  0756   * 131* 129* 130*   < > 132*   POTASSIUM 5.2 5.0 5.9* 4.9   < > 4.0   CHLORIDE 86* 93*  --   --   --  87*   CO2 25 22  --   --   --  25   BUN 26.4* 22.0*  --   --   --  17.4   CR 5.54* 4.70*  --   --   --  4.66*   MAC 7.9* 7.3*  --   --   --  9.7    < > = values in this interval not displayed.       Recent Labs   Lab 12/07/22  0538 12/06/22  2335 12/06/22 1817 12/06/22  1409 12/06/22  1305 12/06/22  0756   WBC 8.0 6.6  --   --   --  4.1   HGB 7.6* 6.6* 9.1* 8.6*   < > 10.2*    130*  --   --   --  204    < > = values in this interval not displayed.       Recent Labs   Lab 12/06/22 1817 12/06/22  1409 12/06/22  1305   PH 7.43 7.46* 7.46*   PCO2 40 44 43   PO2 166* 296* 403*   HCO3 27 31* 31*       All cultures:  No results for input(s): CULTURE in the last 168 hours.    Imaging:    Results for orders placed or performed during the hospital encounter of 12/06/22 (from the past 24 hour(s))   XR Surgery MOLLY Fluoro Less Than 5 Min w Stills    Narrative    This exam was marked as non-reportable because it will not be read by a   radiologist or a [place] non-radiologist provider.         Tissue Aerobic Bacterial Culture Routine    Specimen: Spine; Tissue   Result Value Ref Range    Culture No growth, less than 1 day     Narrative    This specimen was received on a swab. Results may not be optimal. For maximum sensitivity of detection submit tissue, fluid or fine needle aspirate.         Arterial Panel POCT   Result Value Ref Range    pH Arterial POCT 7.43 7.35 - 7.45    pCO2  Arterial POCT 40 35 - 45 mm Hg    pO2 Arterial POCT 166 (H) 80 - 105 mm Hg    Bicarbonate Arterial POCT 27 21 - 28 mmol/L    Sodium POCT 129 (L) 133 - 144 mmol/L    Potassium POCT 5.9 (H) 3.5 - 5.0 mmol/L    Hemoglobin POCT 9.1 (L) 13.3 - 17.7 g/dL    Glucose Whole Blood POCT 161 (H) 70 - 99 mg/dL    Calcium, Ionized Whole Blood POCT 4.0 (L) 4.4 - 5.2 mg/dL    Base Excess/Deficit (+/-) POCT 2.0 -9.6 - 2.0 mmol/L    FIO2 POCT 49.0 %    Lactic Acid POCT 3.4 (H) <=2.0 mmol/L   Glucose by meter   Result Value Ref Range    GLUCOSE BY METER POCT 127 (H) 70 - 99 mg/dL   CBC with platelets   Result Value Ref Range    WBC Count 6.6 4.0 - 11.0 10e3/uL    RBC Count 2.04 (L) 4.40 - 5.90 10e6/uL    Hemoglobin 6.6 (LL) 13.3 - 17.7 g/dL    Hematocrit 20.8 (L) 40.0 - 53.0 %     (H) 78 - 100 fL    MCH 32.4 26.5 - 33.0 pg    MCHC 31.7 31.5 - 36.5 g/dL    RDW 15.0 10.0 - 15.0 %    Platelet Count 130 (L) 150 - 450 10e3/uL   Basic metabolic panel   Result Value Ref Range    Sodium 131 (L) 136 - 145 mmol/L    Potassium 5.0 3.4 - 5.3 mmol/L    Chloride 93 (L) 98 - 107 mmol/L    Carbon Dioxide (CO2) 22 22 - 29 mmol/L    Anion Gap 16 (H) 7 - 15 mmol/L    Urea Nitrogen 22.0 (H) 6.0 - 20.0 mg/dL    Creatinine 4.70 (H) 0.67 - 1.17 mg/dL    Calcium 7.3 (L) 8.6 - 10.0 mg/dL    Glucose 103 (H) 70 - 99 mg/dL    GFR Estimate 14 (L) >60 mL/min/1.73m2   Magnesium   Result Value Ref Range    Magnesium 1.8 1.7 - 2.3 mg/dL   Phosphorus   Result Value Ref Range    Phosphorus 4.3 2.5 - 4.5 mg/dL   Prepare red blood cells (unit)   Result Value Ref Range    Blood Component Type Red Blood Cells     Product Code F8149R82     Unit Status Transfused     Unit Number [id number]     CROSSMATCH Compatible     CODING SYSTEM [id number]     ISSUE DATE AND TIME [id number]     UNIT ABO/RH O-     UNIT TYPE ISBT 9500    Basic metabolic panel   Result Value Ref Range    Sodium 128 (L) 136 - 145 mmol/L    Potassium 5.2 3.4 - 5.3 mmol/L    Chloride 86 (L) 98 - 107  mmol/L    Carbon Dioxide (CO2) 25 22 - 29 mmol/L    Anion Gap 17 (H) 7 - 15 mmol/L    Urea Nitrogen 26.4 (H) 6.0 - 20.0 mg/dL    Creatinine 5.54 (H) 0.67 - 1.17 mg/dL    Calcium 7.9 (L) 8.6 - 10.0 mg/dL    Glucose 91 70 - 99 mg/dL    GFR Estimate 11 (L) >60 mL/min/1.73m2   CBC with platelets   Result Value Ref Range    WBC Count 8.0 4.0 - 11.0 10e3/uL    RBC Count 2.36 (L) 4.40 - 5.90 10e6/uL    Hemoglobin 7.6 (L) 13.3 - 17.7 g/dL    Hematocrit 23.6 (L) 40.0 - 53.0 %     78 - 100 fL    MCH 32.2 26.5 - 33.0 pg    MCHC 32.2 31.5 - 36.5 g/dL    RDW 16.8 (H) 10.0 - 15.0 %    Platelet Count 175 150 - 450 10e3/uL   Prepare red blood cells (unit)   Result Value Ref Range    Blood Component Type Red Blood Cells     Product Code [id number]     Unit Status Transfused     Unit Number [id number]     CROSSMATCH Compatible     CODING SYSTEM [id number]     ISSUE DATE AND TIME [id number]     UNIT ABO/RH O-     UNIT TYPE ISBT 9500    CBC with platelets   Result Value Ref Range    WBC Count 5.3 4.0 - 11.0 10e3/uL    RBC Count 2.35 (L) 4.40 - 5.90 10e6/uL    Hemoglobin 7.5 (L) 13.3 - 17.7 g/dL    Hematocrit 23.4 (L) 40.0 - 53.0 %     78 - 100 fL    MCH 31.9 26.5 - 33.0 pg    MCHC 32.1 31.5 - 36.5 g/dL    RDW 17.2 (H) 10.0 - 15.0 %    Platelet Count 114 (L) 150 - 450 10e3/uL       All relevant imaging and laboratory values personally reviewed.

## 2022-12-07 NOTE — PROGRESS NOTES
"   12/07/22 1614   Appointment Info   Signing Clinician's Name / Credentials (OT) Yelena Christie MS, OTR/L, CLT   Rehab Comments (OT) C-spine, C-collar at all times   Living Environment   People in Home child(lynette), adult   Current Living Arrangements house   Transportation Anticipated family or friend will provide   Living Environment Comments Pt lives with two adult sons; one sone works from home and assists pt with IADL/ADL as needed   Self-Care   Usual Activity Tolerance moderate   Current Activity Tolerance fair   Regular Exercise Yes   Equipment Currently Used at Home walker, rolling;wheelchair, manual   Fall history within last six months yes   Number of times patient has fallen within last six months 5   Activity/Exercise/Self-Care Comment Pt reports son would assist with ADL prn but pt was mod I for transfers and short distance mobility with FWW. W/c for long distances   Instrumental Activities of Daily Living (IADL)   IADL Comments Son assists   General Information   Onset of Illness/Injury or Date of Surgery 12/06/22   Referring Physician Amber Pascual APRN CNP   Patient/Family Therapy Goal Statement (OT) to go home, reduce pain   Additional Occupational Profile Info/Pertinent History of Current Problem Per chart: \"Shay Jimenez is a 57 year old male with a past medical history of ESRD on dialysis and osteomyelitis s/p C5-T6 fusion admitted on 12/6/2022 for redo C5-T6 fusion.\"   Existing Precautions/Restrictions spinal;other (see comments)  (c-collar at all times)   Left Upper Extremity (Weight-bearing Status) partial weight-bearing (PWB)  (10# lifting restriction)   Right Upper Extremity (Weight-bearing Status) partial weight-bearing (PWB)  (10# lifting restriction)   Left Lower Extremity (Weight-bearing Status) weight-bearing as tolerated (WBAT)   Right Lower Extremity (Weight-bearing Status) weight-bearing as tolerated (WBAT)   General Observations and Info Activity: up with A "   Cognitive Status Examination   Orientation Status orientation to person, place and time   Cognitive Status Comments pt impulsive at times, needs frequent reminders for precautions   Pain Assessment   Patient Currently in Pain Yes, see Vital Sign flowsheet   Range of Motion Comprehensive   Comment, General Range of Motion BUE WFL though limited by pain   Strength Comprehensive (MMT)   Comment, General Manual Muscle Testing (MMT) Assessment generalized deconditioning noted   Bed Mobility   Comment (Bed Mobility) min A with cues   Transfers   Transfer Comments unable to achieve full standing with max Ax1   Activities of Daily Living   BADL Assessment/Intervention upper body dressing;lower body dressing;toileting   Upper Body Dressing Assessment/Training   [place] Level (Upper Body Dressing) maximum assist (25% patient effort)   Lower Body Dressing Assessment/Training   [place] Level (Lower Body Dressing) maximum assist (25% patient effort)   Toileting   [place] Level (Toileting) dependent (less than 25% patient effort)   Clinical Impression   Criteria for Skilled Therapeutic Interventions Met (OT) Yes, treatment indicated   OT Diagnosis decreased I with ADL   OT Problem List-Impairments impacting ADL activity tolerance impaired;mobility;pain;post-surgical precautions;strength   Assessment of Occupational Performance 3-5 Performance Deficits   Identified Performance Deficits dressing, bathing, toileting, transfers   Planned Therapy Interventions (OT) ADL retraining;ROM;strengthening;transfer training   Clinical Decision Making Complexity (OT) moderate complexity   Risk & Benefits of therapy have been explained evaluation/treatment results reviewed;care plan/treatment goals reviewed;risks/benefits reviewed;current/potential barriers reviewed;participants voiced agreement with care plan;participants included;patient   Clinical Impression Comments Pt presents with impaired ADL 2/2 the above deficits. Pt to  benefit from skilled OT to address and maximize safety and I with ADL   OT Total Evaluation Time   OT Eval, Moderate Complexity Minutes (95965) 9   OT Goals   Therapy Frequency (OT) Daily   OT Predicted Duration/Target Date for Goal Attainment 12/14/22   OT Goals Upper Body Dressing;Lower Body Dressing;Toilet Transfer/Toileting   OT: Upper Body Dressing Supervision/stand-by assist;within precautions;including orthotic   OT: Lower Body Dressing Supervision/stand-by assist;within precautions   OT: Toilet Transfer/Toileting Modified independent   OT Discharge Planning   OT Plan review C spine precautions, STS with Ax2 and FWW   OT Discharge Recommendation (DC Rec) Transitional Care Facility   OT Rationale for DC Rec Pt currently well below baseline and requiring significant assist for mobility and ADL. At present, recommend TCU for ongoing therapies to achieve ind and safety with ADL and transfers. Pending LOS and progress in therapy, pt may be able to d/c home with assist. Son provides 24/7 assist at baseline   OT Brief overview of current status min A to EOB, unable to stand with Ax2 this date   Total Session Time   Timed Code Treatment Minutes 27   Total Session Time (sum of timed and untimed services) 36

## 2022-12-07 NOTE — BRIEF OP NOTE
[facility]    Brief Operative Note    Pre-operative diagnosis: S/P spinal fusion [Z98.1]  Post-operative diagnosis Same as pre-operative diagnosis    Procedure: Procedure(s):  O-Arm/Stealth Assisted Revision of Cervical 5 to Thoracic 6 Fusion  Surgeon: Surgeon(s) and Role:     * Fritz Boles MD - Primary     * Romeo Chang MD - Resident - Assisting  Anesthesia: General   Estimated Blood Loss: 2.5 liters    Drains: Hemovac  Specimens:   ID Type Source Tests Collected by Time Destination   A : perispinal muscle Tissue Other ANAEROBIC BACTERIAL CULTURE ROUTINE, AEROBIC BACTERIAL CULTURE ROUTINE Fritz Boles MD 12/6/2022  6:15 PM      Findings:   Prior C5-T6 fusion was explored and pseudoarthrosis noted with screw pull-out at C5 and C6. At time of soft tissue dissection, brisk bleeding was identified and tamponade was achieved with ray-janusz sponge. This prompted a formal diagnostic cerebral angiogram to evaluate the patient for vertebral artery injury. No vertebral artery injury was identified on the right side. Patient returned to operating room for remainder of operation with pedicle screws placed from C5-C7. No mal-placed screw identified on O-arm check-spin. Revision arthrodesis performed from C5-T6 without complication thereafter. .  Complications: None.  Implants:   Implant Name Type Inv. Item Serial No.  Lot No. LRB No. Used Action   GRAFT BONE INFUSE BMP  [id number] - [id number]  GRAFT BONE INFUSE BMP  [id number]  [employer] [id number] N/A 1 Implanted   Variable angle aristeo   [id number]  [id number] N/A 1 Implanted   Variable angle aristeo     [id number] N/A 1 Implanted   GRAFT BONE MAGNIFUSE [id number] - [id number] Bone/Tissue/Biologic GRAFT BONE MAGNIFUSE [id number] [employer]  N/A 1 Implanted   GRAFT BONE MAGNIFUSE [id number] - [id number] Bone/Tissue/Biologic GRAFT BONE MAGNIFUSE [id number] [employer]  INC  N/A 1 Implanted   IMP SCREW INFINITY SPINE [id number] - [id number] Metallic Hardware/[place] IMP SCREW INFINITY SPINE [id number]  [employer]  N/A 1 Implanted   IMP SCREW INFINITY SPINE MULTIAXIAL 16MMX3.5MM [id number] - [id number] Metallic Hardware/[place] IMP SCREW INFINITY SPINE MULTIAXIAL 16MMX3.5MM [id number]  [employer] Taecanet  N/A 1 Implanted   IMP SCREW INFINITY SPINE MULTIAXIAL 18MMX3.5MM [id number] - [id number] Metallic Hardware/[place] IMP SCREW INFINITY SPINE MULTIAXIAL 18MMX3.5MM [id number]  [employer] Taecanet  N/A 3 Implanted   GWEN SPNL 420MM 3.5/5.5MM TPR COCRMO [id number] - [id number] Metallic Hardware/[place] GWEN SPNL 420MM 3.5/5.5MM TPR COCRMO [id number]  [employer] Taecanet  N/A 3 Implanted   IMP SET SCREW MEDTRONIC INFINITY [id number] - [id number] Metallic Hardware/[place] IMP SET SCREW MEDTRONIC INFINITY [id number]  [employer] [facility]  N/A 10 Implanted   IMP SCR SET MEDT SOLERA BREAK OFF 5.5MM TI [id number] - [id number] Metallic Hardware/[place] IMP SCR SET MEDT SOLERA BREAK OFF 5.5MM TI [id number]  [employer]  N/A 6 Implanted

## 2022-12-07 NOTE — ANESTHESIA POSTPROCEDURE EVALUATION
Patient: Shay Jimenez    Procedure: Procedure(s):  O-Arm/Stealth Assisted Revision of Cervical 5 to Thoracic 6 Fusion       Anesthesia Type:  General    Note:  Disposition: Inpatient   Postop Pain Control: Uneventful            Sign Out: Well controlled pain   PONV: No   Neuro/Psych: Uneventful            Sign Out: Acceptable/Baseline neuro status   Airway/Respiratory: Uneventful            Sign Out: Acceptable/Baseline resp. status   CV/Hemodynamics: Uneventful            Sign Out: Acceptable CV status; No obvious hypovolemia; No obvious fluid overload   Other NRE: NONE   DID A NON-ROUTINE EVENT OCCUR? No           Last vitals:  Vitals Value Taken Time   BP 90/60 12/06/22 1945   Temp     Pulse 95 12/06/22 1949   Resp 15 12/06/22 1949   SpO2 98 % 12/06/22 1949   Vitals shown include unvalidated device data.    Electronically Signed By: Britton Jarquin MD  December 6, 2022  7:51 PM

## 2022-12-07 NOTE — ANESTHESIA POSTPROCEDURE EVALUATION
Patient: Shay Jimenez    Procedure: Procedure(s):  O-Arm/Stealth Assisted Revision of Cervical 5 to Thoracic 6 Fusion       Anesthesia Type:  General    Note:  Disposition: Inpatient   Postop Pain Control: Uneventful            Sign Out: Well controlled pain   PONV: No   Neuro/Psych: Uneventful            Sign Out: Acceptable/Baseline neuro status   Airway/Respiratory: Uneventful            Sign Out: Acceptable/Baseline resp. status   CV/Hemodynamics: Uneventful            Sign Out: Acceptable CV status; No obvious hypovolemia; No obvious fluid overload   Other NRE: NONE   DID A NON-ROUTINE EVENT OCCUR? No           Last vitals:  Vitals Value Taken Time   /61 12/06/22 2010   Temp 36.9  C (98.5  F) 12/06/22 1941   Pulse 96 12/06/22 2011   Resp 15 12/06/22 2011   SpO2 100 % 12/06/22 2011   Vitals shown include unvalidated device data.    Electronically Signed By: Britton Jarquin MD  December 6, 2022  8:12 PM

## 2022-12-07 NOTE — PROGRESS NOTES
Admitted/transferred from: PACU  Reason for admission/transfer: C5-T6 spinal fusion  2 RN skin assessment: completed by Dimple ELLIOTT and Haleigh DAVIS  Result of skin assessment and interventions/actions: L knee abrasion, R heel eschar wound, R buttock pressure injury, upper midline back pressure injury, hemovac drain, spine incision  Height, weight, drug calc weight: DONE  Patient belongings (see Flowsheet)  MDRO education added to care plan YES  ?

## 2022-12-07 NOTE — ANESTHESIA CARE TRANSFER NOTE
Patient: Shay Jimenez    Procedure: Procedure(s):  O-Arm/Stealth Assisted Revision of Cervical 5 to Thoracic 6 Fusion       Diagnosis: S/P spinal fusion [Z98.1]  Diagnosis Additional Information: No value filed.    Anesthesia Type:   General     Note:    Oropharynx: oropharynx clear of all foreign objects, nasal airway in place and spontaneously breathing  Level of Consciousness: drowsy  Oxygen Supplementation: face mask  Level of Supplemental Oxygen (L/min / FiO2): 6  Independent Airway: airway patency satisfactory and stable  Dentition: dentition unchanged  Vital Signs Stable: post-procedure vital signs reviewed and stable  Report to RN Given: handoff report given  Patient transferred to: PACU  Comments: Patient following commands but needs a lot of stimulation  Handoff Report: Identifed the Patient, Identified the Reponsible Provider, Reviewed the pertinent medical history, Discussed the surgical course, Reviewed Intra-OP anesthesia mangement and issues during anesthesia, Set expectations for post-procedure period and Allowed opportunity for questions and acknowledgement of understanding      Vitals:  Vitals Value Taken Time   BP 99/46 12/06/22 1950   Temp     Pulse 93 12/06/22 1951   Resp 16 12/06/22 1951   SpO2 97 % 12/06/22 1951   Vitals shown include unvalidated device data.    Electronically Signed By: ENID Stephens CRNA  December 6, 2022  7:52 PM

## 2022-12-07 NOTE — PROGRESS NOTES
HEMODIALYSIS TREATMENT NOTE    Date: 12/7/2022  Time: 1:39 PM    Data:  Pre Wt:  136.1 kg (Estimated)   Desired Wt:  136.1 kg   Post Wt:  136.1 kg (Estimated)  Weight change:  0 kg  Ultrafiltration - Post Run Net Total Removed (mL): 0 mL  Vascular Access Status: Fistula  patent  Dialyzer Rinse: Clear  Total Blood Volume Processed: 77.1 L     Total Dialysis (Treatment) Time: 3   Dialysate Bath: K 2, Ca 3  Heparin: None    Lab:   Yes; Hep B series    Interventions:  ESRD pt. Cannulated with 15 g needle X 2 @  without lido inj. PCN administered midodrine as ordered to help keep BP up. No fluid was pulled per order. Pt ordered and ate lunch. Ending BP was 99/68. Pt rinsed back post tx, needles were pulled, new dressings applied, and sites were held for about 10 mins to help achieve hemostasis. Hand off report was given to YUDI Vásquez.    Assessment:  -Calm & Cooperative  -VSS  -A & O X 4     Plan:    Per renal

## 2022-12-07 NOTE — PROGRESS NOTES
Nephrology Initial Consult  December 7, 2022      Shay Jimenez MRN:[id number] YOB: 1965  Date of Admission:12/6/2022  Primary care provider: Jonathan Travis  Requesting physician: Fritz Boles MD    ASSESSMENT AND RECOMMENDATIONS:   ESRD-Since 4/2019 due to Ca Phos deposition and HTN, runs at [facility] ([phone], fax [phone]) under care of Dr Cline.  Runs MWF via [facility], 4.25h runs.     -Running 3h today for clearance with K of 5.2 but not pulling any UF as he is still on pressor post-op.      -Will decide on runs daily depending on chemistries and hemodynamics.     -Continuing long term HD, no need for new consent.     -Is eventually pursuing renal transplant through [facility] once acute issues requiring neurosurgery are done.      Volume-EDW 130kg, at 136kg today (although this is bed wt and pt was 128kg on standing scale prior to surgery) but still on pressor post surgery and resp status is stable so will not pull UF with run today, intake is currently minimal.     Electrolytes-K 5.2, bicarb 25, Na 128, running HD today for clearance.      BMD-Ca 7.9, Mg and Phos WNL.      Neurosurgery-Had planned revision of previous screws at C5-C6 on 12/6.      Anemia-Hgb 7.6 after 1u PRBC's this am, giving 8k epo with run today.      Nutrition-Deferred.      Time spent: 30 minutes on this date of encounter for chart review, physical exam, medical decision making and co-ordination of care.     Discussed with Dr Marinelli    Recommendations were communicated to primary team via verbal communication.       ENID Shirley CNS  Clinical Nurse Specialist  [phone]    REASON FOR CONSULT: Requested to evaluate 57 yom for management of dialysis post op neurosurgery.      HISTORY OF PRESENT ILLNESS:  Shay Jimenez is a 57 yom with ESRD since 4/2019 due to Ca Phos deposition and HTN, runs at [facility] ([phone], fax [phone]) under care of Dr Cline.  Had planned  neurosurgery revision for C5-C6 on 12/6 as screw had dislodged.  Nephrology consulted for management of dialysis post op.      PAST MEDICAL HISTORY:  ESRD   Past Medical History:   Diagnosis Date     Chronic kidney disease      Neuropathy        Past Surgical History:   Procedure Laterality Date     OPTICAL TRACKING SYSTEM FUSION POSTERIOR SPINE THORACIC THREE+ LEVELS N/A 6/27/2022    Procedure: Cervical 6 to Thoracic 6 Fusion and Thoracic 2-3 Decompression;  Surgeon: Fritz Boles MD;  Location:  OR        MEDICATIONS:  PTA Meds  Prior to Admission medications    Medication Sig Last Dose Taking? Auth Provider Long Term End Date   acetaminophen (TYLENOL) 325 MG tablet Take 1-2 tablets (325-650 mg) by mouth every 4 hours as needed for mild pain or fever 12/6/2022 at 0500 Yes Oksana Spicer MD     ALPRAZolam (XANAX) 1 MG tablet Take 1 mg by mouth 2 times daily as needed for anxiety 12/5/2022 at 2000 Yes Reported, Patient     ammonium lactate (AMLACTIN) 12 % external cream Apply topically daily Apply to bilateral lower legs Past Month Yes Unknown, Entered By History     atorvastatin (LIPITOR) 20 MG tablet Take 20 mg by mouth At Bedtime 12/5/2022 at 2000 Yes Unknown, Entered By History No    calcium carbonate (TUMS) 500 MG chewable tablet Take 2 tablets (1,000 mg) by mouth 3 times daily as needed for heartburn Past Month Yes Oksana Spicer MD     cetirizine (ZYRTEC) 10 MG tablet Take 1 tablet (10 mg) by mouth daily 12/5/2022 at 0800 Yes Oksana Spicer MD     cyanocobalamin (VITAMIN B-12) 500 MCG tablet Take 500 mcg by mouth daily 12/5/2022 at 0800 Yes Unknown, Entered By History     cyclobenzaprine (FLEXERIL) 10 MG tablet  Past Week Yes Reported, Patient     diclofenac (VOLTAREN) 1 % topical gel Apply topically 4 times daily 12/5/2022 at 2000 Yes Reported, Patient     finasteride (PROSCAR) 5 MG tablet Take 1.25 mg by mouth daily Takes 1/4 of 5 mg daily 12/5/2022 at 0800 Yes Unknown, Entered By History      fluticasone (FLONASE) 50 MCG/ACT nasal spray Spray 1 spray into both nostrils 2 times daily 12/5/2022 at 2000 Yes Unknown, Entered By History     gabapentin (NEURONTIN) 300 MG capsule  12/6/2022 at 0500 Yes Reported, Patient Yes    hydrOXYzine (ATARAX) 25 MG tablet Take 1 tablet (25 mg) by mouth 2 times daily as needed for other or itching (pain as adjuant therapy) 12/6/2022 at 0500 Yes Talia Johnson MD     midodrine (PROAMATINE) 10 MG tablet Take 1 tablet (10 mg) by mouth 3 times daily (with meals) 12/5/2022 at 0800 Yes Oksana Spicer MD     multivitamin RENAL (RENAVITE RX/NEPHROVITE) 1 MG tablet Take 1 tablet by mouth daily 12/5/2022 Yes Unknown, Entered By History     omeprazole (PRILOSEC) 20 MG DR capsule Take 20 mg by mouth daily 12/5/2022 at 0800 Yes Unknown, Entered By History     polyethylene glycol (MIRALAX) 17 GM/Dose powder Take 17 g by mouth daily Past Month Yes Dona Cabezas PA-C     potassium chloride ER (KLOR-CON M) 20 MEQ CR tablet Take 20 mEq by mouth 12/5/2022 at 0800 Yes Reported, Patient     sertraline (ZOLOFT) 100 MG tablet Take 100 mg by mouth daily 12/5/2022 at 0800 Yes Reported, Patient Yes    sevelamer carbonate (RENVELA) 800 MG tablet Take 2 tablets (1,600 mg) by mouth 3 times daily (with meals) 12/5/2022 at 1700 Yes Oksana Spicer MD     Specialty Vitamins Products (MAGNESIUM PLUS PROTEIN) 133 MG tablet Take 1 tablet by mouth 12/5/2022 Yes Reported, Patient     vitamin B6 (PYRIDOXINE) 100 MG tablet Take 100 mg by mouth daily 12/5/2022 at 0800 Yes Unknown, Entered By History     baclofen (LIORESAL) 10 MG tablet Take 1 tablet (10 mg) by mouth 2 times daily   Fritz Boles MD     cefuroxime (CEFTIN) 250 MG tablet 500 mg daily   Reported, Patient     ELIQUIS ANTICOAGULANT 2.5 MG tablet TAKE 1 TABLET (2.5 MG) BY MOUTH TWO TIMES A DAY. INDICATIONS: PREVENT STROKE IN AFIB   Reported, Patient     gabapentin (NEURONTIN) 100 MG capsule Take 1 capsule (100 mg) by mouth 3 times  daily   Dona Cabezas PA-C Yes    naloxone (NARCAN) 4 MG/0.1ML nasal spray Spray 1 spray (4 mg) into one nostril alternating nostrils once as needed for opioid reversal every 2-3 minutes until assistance arrives   Fritz Boles MD Yes       Current Meds    sodium chloride 0.9%  250 mL Intravenous Once in dialysis/CRRT     sodium chloride 0.9%  300 mL Hemodialysis Machine Once     acetaminophen  975 mg Oral Q8H CUONG     atorvastatin  20 mg Oral At Bedtime     baclofen  10 mg Oral BID     ceFAZolin  2 g Intravenous Q12H     cetirizine  10 mg Oral Daily     cyanocobalamin  500 mcg Oral Daily     epoetin mar-epbx  8,000 Units Intravenous Once in dialysis/CRRT     finasteride  1.25 mg Oral Daily     fluticasone  1 spray Both Nostrils BID     gabapentin  300 mg Oral TID     magnesium plus protein  133 mg Oral Daily     midodrine  10 mg Oral Q8H CUONG     multivitamin RENAL  1 tablet Oral Daily     - MEDICATION INSTRUCTIONS -   Does not apply Once     pantoprazole  40 mg Oral Daily     polyethylene glycol  17 g Oral Daily     vitamin B6  100 mg Oral Daily     senna-docusate  1 tablet Oral BID     sertraline  100 mg Oral Daily     sevelamer carbonate  1,600 mg Oral TID w/meals     sodium chloride (PF)  3 mL Intracatheter Q8H     Infusion Meds    phenylephrine 0.5 mcg/kg/min (12/07/22 0700)     sodium chloride 75 mL/hr at 12/07/22 0500     - MEDICATION INSTRUCTIONS -         ALLERGIES:    Allergies   Allergen Reactions     Amlodipine      Lisinopril        REVIEW OF SYSTEMS:  A 10 point review of systems was negative except as noted above.    SOCIAL HISTORY:   Social History     Socioeconomic History     Marital status: Single     Spouse name: Not on file     Number of children: Not on file     Years of education: Not on file     Highest education level: Not on file   Occupational History     Not on file   Tobacco Use     Smoking status: Never     Smokeless tobacco: Never   Substance and Sexual Activity     Alcohol  "use: Not Currently     Drug use: Never     Sexual activity: Not on file   Other Topics Concern     Not on file   Social History Narrative     Not on file     Social Determinants of Health     Financial Resource Strain: Not on file   Food Insecurity: Not on file   Transportation Needs: Not on file   Physical Activity: Not on file   Stress: Not on file   Social Connections: Not on file   Intimate Partner Violence: Not on file   Housing Stability: Not on file     Reviewed with patient, noncontributory social hx.     FAMILY MEDICAL HISTORY:   Reviewed with pt, noncontributory.     PHYSICAL EXAM:   Temp  Av.2  F (36.8  C)  Min: 97.5  F (36.4  C)  Max: 99.8  F (37.7  C)  Arterial Line BP  Min: 0/0  Max: 259/244  Arterial Line MAP (mmHg)  Av.4 mmHg  Min: 0 mmHg  Max: 246 mmHg      Pulse  Av.9  Min: 84  Max: 103 Resp  Av  Min: 0  Max: 52  SpO2  Av.8 %  Min: 83 %  Max: 100 %       /57   Pulse 84   Temp 98.2  F (36.8  C) (Axillary)   Resp (!) 6   Ht 1.854 m (6' 1\")   Wt 136.1 kg (300 lb 0.7 oz)   SpO2 (!) 83%   BMI 39.59 kg/m        Admit Weight: 128.4 kg (283 lb 1.1 oz)     GENERAL APPEARANCE: alert and no distress, C-collar on.    EYES: No scleral icterus  NECK:  No JVD  Pulmonary: lungs clear to auscultation with equal breath sounds bilaterally, no clubbing or cyanosis  CV: Regular rhythm, normal rate, no rub   - Edema none  GI: soft, nontender, normal bowel sounds  MS: no evidence of inflammation in joints, no muscle tenderness  : No Oliveira  SKIN: no rash, warm, dry  NEURO: mentation intact and speech normal    LABS:   CMP  Recent Labs   Lab 22  0538 22  2335 22  2246 22  1817 22  1409 22  1305 22  0756   * 131*  --  129* 130*   < > 132*   POTASSIUM 5.2 5.0  --  5.9* 4.9   < > 4.0   CHLORIDE 86* 93*  --   --   --   --  87*   CO2 25 22  --   --   --   --  25   ANIONGAP 17* 16*  --   --   --   --  20*   GLC 91 103* 127* 161* 129*   < > 87 "   BUN 26.4* 22.0*  --   --   --   --  17.4   CR 5.54* 4.70*  --   --   --   --  4.66*   GFRESTIMATED 11* 14*  --   --   --   --  14*   MAC 7.9* 7.3*  --   --   --   --  9.7   MAG  --  1.8  --   --   --   --   --    PHOS  --  4.3  --   --   --   --   --     < > = values in this interval not displayed.     CBC  Recent Labs   Lab 12/07/22  0538 12/06/22  2335 12/06/22  1817 12/06/22  1409 12/06/22  1305 12/06/22  0756   HGB 7.6* 6.6* 9.1* 8.6*   < > 10.2*   WBC 8.0 6.6  --   --   --  4.1   RBC 2.36* 2.04*  --   --   --  3.09*   HCT 23.6* 20.8*  --   --   --  30.3*    102*  --   --   --  98   MCH 32.2 32.4  --   --   --  33.0   MCHC 32.2 31.7  --   --   --  33.7   RDW 16.8* 15.0  --   --   --  14.2    130*  --   --   --  204    < > = values in this interval not displayed.     INR  Recent Labs   Lab 12/06/22  0756   INR 0.92   PTT 26     ABG  Recent Labs   Lab 12/06/22 1817 12/06/22  1409 12/06/22  1305   PH 7.43 7.46* 7.46*   PCO2 40 44 43   PO2 166* 296* 403*   HCO3 27 31* 31*   O2PER 49.0 90.0 93.0      URINE STUDIES  No lab results found.  No lab results found.  PTH  Recent Labs   Lab Test 07/18/22  0700   PTHI 75*     IRON STUDIES  Recent Labs   Lab Test 07/29/22  0600   IRON 44   *   IRONSAT 20   JACKELINE 626*

## 2022-12-07 NOTE — PLAN OF CARE
Major Shift Events:  No neuro deficits noted besides baseline neuropathy in bilateral feet. Pain managed with scheduled tylenol/gabapentin/robaxin and prn oxy x3, and dilaudid x2. Atarax also given x2 for itching. Stood at edge of bed with PT. Normotensive after midodrine, 2 units RBCs, and 1 unit plasma. Jerry shut off at 1100. Sats adequately on RA. Tolerating regular diet. Small watery stool x2. Oliveira removed and is oliguric at baseline. Had HD run today. Incision has dried drainage w/o changes. HV output ~180ml. Skin WNL under C collar. Art line removed.     Plan: Pain management. Increase activity as able. Neuros Q2. MAP > 65. Continue plan of care.     For vital signs and complete assessments, please see documentation flowsheets.

## 2022-12-08 ENCOUNTER — APPOINTMENT (OUTPATIENT)
Dept: CT IMAGING | Facility: CLINIC | Age: 57
DRG: 459 | End: 2022-12-08
Attending: NURSE PRACTITIONER
Payer: MEDICARE

## 2022-12-08 ENCOUNTER — APPOINTMENT (OUTPATIENT)
Dept: GENERAL RADIOLOGY | Facility: CLINIC | Age: 57
DRG: 459 | End: 2022-12-08
Attending: STUDENT IN AN ORGANIZED HEALTH CARE EDUCATION/TRAINING PROGRAM
Payer: MEDICARE

## 2022-12-08 ENCOUNTER — APPOINTMENT (OUTPATIENT)
Dept: PHYSICAL THERAPY | Facility: CLINIC | Age: 57
DRG: 459 | End: 2022-12-08
Attending: NURSE PRACTITIONER
Payer: MEDICARE

## 2022-12-08 ENCOUNTER — APPOINTMENT (OUTPATIENT)
Dept: CT IMAGING | Facility: CLINIC | Age: 57
DRG: 459 | End: 2022-12-08
Payer: MEDICARE

## 2022-12-08 ENCOUNTER — APPOINTMENT (OUTPATIENT)
Dept: OCCUPATIONAL THERAPY | Facility: CLINIC | Age: 57
DRG: 459 | End: 2022-12-08
Attending: STUDENT IN AN ORGANIZED HEALTH CARE EDUCATION/TRAINING PROGRAM
Payer: MEDICARE

## 2022-12-08 LAB
ANION GAP SERPL CALCULATED.3IONS-SCNC: 10 MMOL/L (ref 7–15)
BASOPHILS # BLD AUTO: 0.1 10E3/UL (ref 0–0.2)
BASOPHILS NFR BLD AUTO: 1 %
BLD PROD TYP BPU: NORMAL
BLD PROD TYP BPU: NORMAL
BLOOD COMPONENT TYPE: NORMAL
BLOOD COMPONENT TYPE: NORMAL
BUN SERPL-MCNC: 18.8 MG/DL (ref 6–20)
CALCIUM SERPL-MCNC: 8.3 MG/DL (ref 8.6–10)
CHLORIDE SERPL-SCNC: 96 MMOL/L (ref 98–107)
CODING SYSTEM: NORMAL
CODING SYSTEM: NORMAL
CREAT SERPL-MCNC: 4.29 MG/DL (ref 0.67–1.17)
CROSSMATCH: NORMAL
DEPRECATED HCO3 PLAS-SCNC: 27 MMOL/L (ref 22–29)
EOSINOPHIL # BLD AUTO: 0.3 10E3/UL (ref 0–0.7)
EOSINOPHIL NFR BLD AUTO: 4 %
ERYTHROCYTE [DISTWIDTH] IN BLOOD BY AUTOMATED COUNT: 18.9 % (ref 10–15)
ERYTHROCYTE [DISTWIDTH] IN BLOOD BY AUTOMATED COUNT: 19.1 % (ref 10–15)
GFR SERPL CREATININE-BSD FRML MDRD: 15 ML/MIN/1.73M2
GLUCOSE BLDC GLUCOMTR-MCNC: 108 MG/DL (ref 70–99)
GLUCOSE SERPL-MCNC: 99 MG/DL (ref 70–99)
HCT VFR BLD AUTO: 24.2 % (ref 40–53)
HCT VFR BLD AUTO: 28.3 % (ref 40–53)
HGB BLD-MCNC: 7.9 G/DL (ref 13.3–17.7)
HGB BLD-MCNC: 9 G/DL (ref 13.3–17.7)
IMM GRANULOCYTES # BLD: 0 10E3/UL
IMM GRANULOCYTES NFR BLD: 1 %
ISSUE DATE AND TIME: NORMAL
ISSUE DATE AND TIME: NORMAL
LYMPHOCYTES # BLD AUTO: 0.6 10E3/UL (ref 0.8–5.3)
LYMPHOCYTES NFR BLD AUTO: 8 %
MAGNESIUM SERPL-MCNC: 1.5 MG/DL (ref 1.7–2.3)
MCH RBC QN AUTO: 30.8 PG (ref 26.5–33)
MCH RBC QN AUTO: 31.6 PG (ref 26.5–33)
MCHC RBC AUTO-ENTMCNC: 31.8 G/DL (ref 31.5–36.5)
MCHC RBC AUTO-ENTMCNC: 32.6 G/DL (ref 31.5–36.5)
MCV RBC AUTO: 97 FL (ref 78–100)
MCV RBC AUTO: 97 FL (ref 78–100)
MONOCYTES # BLD AUTO: 0.5 10E3/UL (ref 0–1.3)
MONOCYTES NFR BLD AUTO: 6 %
NEUTROPHILS # BLD AUTO: 6.1 10E3/UL (ref 1.6–8.3)
NEUTROPHILS NFR BLD AUTO: 80 %
NRBC # BLD AUTO: 0 10E3/UL
NRBC BLD AUTO-RTO: 0 /100
PHOSPHATE SERPL-MCNC: 3.5 MG/DL (ref 2.5–4.5)
PLATELET # BLD AUTO: 103 10E3/UL (ref 150–450)
PLATELET # BLD AUTO: 128 10E3/UL (ref 150–450)
POTASSIUM SERPL-SCNC: 4.6 MMOL/L (ref 3.4–5.3)
RBC # BLD AUTO: 2.5 10E6/UL (ref 4.4–5.9)
RBC # BLD AUTO: 2.92 10E6/UL (ref 4.4–5.9)
SODIUM SERPL-SCNC: 133 MMOL/L (ref 136–145)
UNIT ABO/RH: NORMAL
UNIT ABO/RH: NORMAL
UNIT NUMBER: NORMAL
UNIT NUMBER: NORMAL
UNIT STATUS: NORMAL
UNIT STATUS: NORMAL
UNIT TYPE ISBT: 5100
UNIT TYPE ISBT: 9500
WBC # BLD AUTO: 5.7 10E3/UL (ref 4–11)
WBC # BLD AUTO: 7.5 10E3/UL (ref 4–11)

## 2022-12-08 PROCEDURE — 83735 ASSAY OF MAGNESIUM: CPT | Performed by: NURSE PRACTITIONER

## 2022-12-08 PROCEDURE — 97162 PT EVAL MOD COMPLEX 30 MIN: CPT | Mod: GP

## 2022-12-08 PROCEDURE — G1010 CDSM STANSON: HCPCS | Mod: GC | Performed by: RADIOLOGY

## 2022-12-08 PROCEDURE — 999N000128 HC STATISTIC PERIPHERAL IV START W/O US GUIDANCE

## 2022-12-08 PROCEDURE — P9037 PLATE PHERES LEUKOREDU IRRAD: HCPCS | Performed by: STUDENT IN AN ORGANIZED HEALTH CARE EDUCATION/TRAINING PROGRAM

## 2022-12-08 PROCEDURE — 71045 X-RAY EXAM CHEST 1 VIEW: CPT | Mod: 26 | Performed by: RADIOLOGY

## 2022-12-08 PROCEDURE — 200N000002 HC R&B ICU UMMC

## 2022-12-08 PROCEDURE — 99232 SBSQ HOSP IP/OBS MODERATE 35: CPT | Performed by: CLINICAL NURSE SPECIALIST

## 2022-12-08 PROCEDURE — 74177 CT ABD & PELVIS W/CONTRAST: CPT | Mod: MG

## 2022-12-08 PROCEDURE — 250N000013 HC RX MED GY IP 250 OP 250 PS 637: Performed by: NURSE PRACTITIONER

## 2022-12-08 PROCEDURE — 36415 COLL VENOUS BLD VENIPUNCTURE: CPT | Performed by: STUDENT IN AN ORGANIZED HEALTH CARE EDUCATION/TRAINING PROGRAM

## 2022-12-08 PROCEDURE — 250N000011 HC RX IP 250 OP 636: Performed by: STUDENT IN AN ORGANIZED HEALTH CARE EDUCATION/TRAINING PROGRAM

## 2022-12-08 PROCEDURE — G1010 CDSM STANSON: HCPCS

## 2022-12-08 PROCEDURE — 71260 CT THORAX DX C+: CPT | Mod: 26 | Performed by: RADIOLOGY

## 2022-12-08 PROCEDURE — 85027 COMPLETE CBC AUTOMATED: CPT | Performed by: STUDENT IN AN ORGANIZED HEALTH CARE EDUCATION/TRAINING PROGRAM

## 2022-12-08 PROCEDURE — 250N000009 HC RX 250: Performed by: STUDENT IN AN ORGANIZED HEALTH CARE EDUCATION/TRAINING PROGRAM

## 2022-12-08 PROCEDURE — 85027 COMPLETE CBC AUTOMATED: CPT | Performed by: NURSE PRACTITIONER

## 2022-12-08 PROCEDURE — 74177 CT ABD & PELVIS W/CONTRAST: CPT | Mod: 26 | Performed by: RADIOLOGY

## 2022-12-08 PROCEDURE — 80048 BASIC METABOLIC PNL TOTAL CA: CPT | Performed by: NURSE PRACTITIONER

## 2022-12-08 PROCEDURE — 84100 ASSAY OF PHOSPHORUS: CPT | Performed by: NURSE PRACTITIONER

## 2022-12-08 PROCEDURE — 999N000065 XR CHEST PORT 1 VIEW

## 2022-12-08 PROCEDURE — 70450 CT HEAD/BRAIN W/O DYE: CPT | Mod: 26 | Performed by: RADIOLOGY

## 2022-12-08 PROCEDURE — 99233 SBSQ HOSP IP/OBS HIGH 50: CPT | Performed by: STUDENT IN AN ORGANIZED HEALTH CARE EDUCATION/TRAINING PROGRAM

## 2022-12-08 PROCEDURE — 97530 THERAPEUTIC ACTIVITIES: CPT | Mod: GP

## 2022-12-08 PROCEDURE — P9016 RBC LEUKOCYTES REDUCED: HCPCS | Performed by: STUDENT IN AN ORGANIZED HEALTH CARE EDUCATION/TRAINING PROGRAM

## 2022-12-08 PROCEDURE — 36415 COLL VENOUS BLD VENIPUNCTURE: CPT | Performed by: NURSE PRACTITIONER

## 2022-12-08 PROCEDURE — 97530 THERAPEUTIC ACTIVITIES: CPT | Mod: GO

## 2022-12-08 PROCEDURE — 250N000013 HC RX MED GY IP 250 OP 250 PS 637: Performed by: STUDENT IN AN ORGANIZED HEALTH CARE EDUCATION/TRAINING PROGRAM

## 2022-12-08 RX ORDER — IOPAMIDOL 755 MG/ML
135 INJECTION, SOLUTION INTRAVASCULAR ONCE
Status: COMPLETED | OUTPATIENT
Start: 2022-12-08 | End: 2022-12-08

## 2022-12-08 RX ORDER — CEFAZOLIN SODIUM 2 G/100ML
2 INJECTION, SOLUTION INTRAVENOUS EVERY 24 HOURS
Status: DISCONTINUED | OUTPATIENT
Start: 2022-12-08 | End: 2022-12-09

## 2022-12-08 RX ADMIN — SEVELAMER CARBONATE 1600 MG: 800 TABLET, FILM COATED ORAL at 07:36

## 2022-12-08 RX ADMIN — METHOCARBAMOL 500 MG: 500 TABLET ORAL at 17:09

## 2022-12-08 RX ADMIN — CETIRIZINE HYDROCHLORIDE 10 MG: 10 TABLET, FILM COATED ORAL at 07:35

## 2022-12-08 RX ADMIN — FLUTICASONE PROPIONATE 1 SPRAY: 50 SPRAY, METERED NASAL at 07:41

## 2022-12-08 RX ADMIN — SEVELAMER CARBONATE 1600 MG: 800 TABLET, FILM COATED ORAL at 17:09

## 2022-12-08 RX ADMIN — METHOCARBAMOL 500 MG: 500 TABLET ORAL at 12:15

## 2022-12-08 RX ADMIN — ACETAMINOPHEN 975 MG: 325 TABLET, FILM COATED ORAL at 13:44

## 2022-12-08 RX ADMIN — OXYCODONE HYDROCHLORIDE 10 MG: 10 TABLET ORAL at 04:18

## 2022-12-08 RX ADMIN — SODIUM CHLORIDE, PRESERVATIVE FREE 74 ML: 5 INJECTION INTRAVENOUS at 16:47

## 2022-12-08 RX ADMIN — SENNOSIDES AND DOCUSATE SODIUM 1 TABLET: 8.6; 5 TABLET ORAL at 07:36

## 2022-12-08 RX ADMIN — BACLOFEN 10 MG: 10 TABLET ORAL at 21:22

## 2022-12-08 RX ADMIN — FINASTERIDE 1.3 MG: 5 TABLET, FILM COATED ORAL at 07:41

## 2022-12-08 RX ADMIN — MIDODRINE HYDROCHLORIDE 10 MG: 5 TABLET ORAL at 13:44

## 2022-12-08 RX ADMIN — CEFAZOLIN SODIUM 2 G: 2 INJECTION, SOLUTION INTRAVENOUS at 17:17

## 2022-12-08 RX ADMIN — BACLOFEN 10 MG: 10 TABLET ORAL at 07:36

## 2022-12-08 RX ADMIN — CYANOCOBALAMIN TAB 500 MCG 500 MCG: 500 TAB at 07:35

## 2022-12-08 RX ADMIN — SEVELAMER CARBONATE 1600 MG: 800 TABLET, FILM COATED ORAL at 12:14

## 2022-12-08 RX ADMIN — SERTRALINE HYDROCHLORIDE 100 MG: 50 TABLET ORAL at 07:36

## 2022-12-08 RX ADMIN — Medication 100 MG: at 07:35

## 2022-12-08 RX ADMIN — PANTOPRAZOLE SODIUM 40 MG: 40 TABLET, DELAYED RELEASE ORAL at 07:36

## 2022-12-08 RX ADMIN — MIDODRINE HYDROCHLORIDE 10 MG: 5 TABLET ORAL at 06:04

## 2022-12-08 RX ADMIN — FLUTICASONE PROPIONATE 1 SPRAY: 50 SPRAY, METERED NASAL at 21:35

## 2022-12-08 RX ADMIN — Medication 133 MG: at 07:35

## 2022-12-08 RX ADMIN — GABAPENTIN 300 MG: 300 CAPSULE ORAL at 07:35

## 2022-12-08 RX ADMIN — METHOCARBAMOL 500 MG: 500 TABLET ORAL at 21:23

## 2022-12-08 RX ADMIN — ACETAMINOPHEN 975 MG: 325 TABLET, FILM COATED ORAL at 06:04

## 2022-12-08 RX ADMIN — IOPAMIDOL 135 ML: 755 INJECTION, SOLUTION INTRAVENOUS at 16:46

## 2022-12-08 RX ADMIN — GABAPENTIN 300 MG: 300 CAPSULE ORAL at 13:45

## 2022-12-08 RX ADMIN — METHOCARBAMOL 500 MG: 500 TABLET ORAL at 07:36

## 2022-12-08 RX ADMIN — SENNOSIDES AND DOCUSATE SODIUM 1 TABLET: 8.6; 5 TABLET ORAL at 21:23

## 2022-12-08 RX ADMIN — B-COMPLEX W/ C & FOLIC ACID TAB 1 MG 1 TABLET: 1 TAB at 07:35

## 2022-12-08 ASSESSMENT — ACTIVITIES OF DAILY LIVING (ADL)
ADLS_ACUITY_SCORE: 46

## 2022-12-08 NOTE — PROGRESS NOTES
12/08/22 1500   Appointment Info   Signing Clinician's Name / Credentials (PT) Jo-Ann Hernandez DPT   Rehab Comments (PT) per neurosurg  when OOB, Chen dong collar when in bed.       Present no   Living Environment   People in Home child(lynette), adult   Current Living Arrangements house   Transportation Anticipated family or friend will provide   Living Environment Comments Pt lives with two adult sons; one sone works from home and assists pt with IADL/ADL as needed   Self-Care   Usual Activity Tolerance moderate   Current Activity Tolerance fair   Regular Exercise Yes   Equipment Currently Used at Home wheelchair, manual;walker, rolling   Fall history within last six months yes   Number of times patient has fallen within last six months 5   Activity/Exercise/Self-Care Comment Information received from chart as pt uncooperative with eval questioning. Pt reports son would assist with ADL prn but pt was mod I for transfers and short distance mobility with FWW. W/c for long distances   General Information   Onset of Illness/Injury or Date of Surgery 12/06/22   Referring Physician Amber Pascual APRN CNP   Patient/Family Therapy Goals Statement (PT) did not state   Pertinent History of Current Problem (include personal factors and/or comorbidities that impact the POC) Shay Jimenez is a 57 year old male with with PMH ESRD on dialysis and osteomyelitis s/p C5-T6 fusion who is now POD#1 s/p redo C5-T6 fusion   Existing Precautions/Restrictions fall;spinal;brace worn when out of bed   Weight-Bearing Status - LUE partial weight-bearing (% in comments)  (<10#)   Weight-Bearing Status - RUE partial weight-bearing (% in comments)  (<10#)   Heart Disease Risk Factors Age;Gender;Medical history;Lack of physical activity   General Observations  when OOB. RN present throughout eval; paged team regarding mental status changes   Cognition   Affect/Mental Status (Cognition)  agitated;sad/depressed affect;emotionally labile;low arousal/lethargic   Orientation Status (Cognition) oriented to;place;person   Follows Commands (Cognition) follows one-step commands;0-24% accuracy;physical/tactile prompts required;verbal cues/prompting required;repetition of directions required   Behavioral Issues inappropriate language;overwhelmed easily;uncooperative;withdrawn;verbal outbursts   Safety Deficit (Cognition) ability to follow commands;judgment;problem-solving;severe deficit;safety precautions follow-through/compliance   Cognitive Status Comments pt lethargic at start of evela, becoming more unrepsonsive and uncooperative by end of eval. Will stare off into space. Verbal outbursts and emotionally labile at times, tearful.   Pain Assessment   Patient Currently in Pain   (did not state)   Integumentary/Edema   Integumentary/Edema no deficits were identifed   Posture    Posture Forward head position;Protracted shoulders;Kyphosis   Range of Motion (ROM)   ROM Comment appears WFL BUE/LE   Strength (Manual Muscle Testing)   Strength Comments deconditioned, unable to fully assess given lack of participation   Bed Mobility   Comment, (Bed Mobility) maxA of 2 rolling side<>side in bed   Transfers   Comment, (Transfers) NT, unsafe given cognition/mental status. OH lift   Gait/Stairs (Locomotion)   Comment, (Gait/Stairs) NT, unsafe given cognition/mental status. OH lift   Balance   Balance Comments maxA unsupported sitting to don lift sling   Sensory Examination   Sensory Perception Comments unable to fully assess given mentation   Coordination   Coordination no deficits were identified   Muscle Tone   Muscle Tone no deficits were identified   Clinical Impression   Criteria for Skilled Therapeutic Intervention Yes, treatment indicated   PT Diagnosis (PT) impaired functional mobility   Influenced by the following impairments precautions, mental status, deconditioning, decreased activity tolerance, functional  balance   Functional limitations due to impairments decreased (I) w/ bed mobility, transfers, gait   Clinical Presentation (PT Evaluation Complexity) Evolving/Changing   Clinical Presentation Rationale current status, clinical judgement, PMH, home set up   Clinical Decision Making (Complexity) moderate complexity   Planned Therapy Interventions (PT) balance training;bed mobility training;gait training;home exercise program;patient/family education;stair training;strengthening;transfer training;progressive activity/exercise;risk factor education;home program guidelines;orthotic fitting/training   Anticipated Equipment Needs at Discharge (PT)   (TBD)   Risk & Benefits of therapy have been explained evaluation/treatment results reviewed;care plan/treatment goals reviewed;risks/benefits reviewed;current/potential barriers reviewed;participants voiced agreement with care plan;participants included;patient   Clinical Impression Comments Pt presents below functional baseline w/ post-op precautions, decreased activity tolerance, functional strength and balance. Mental status and participation limiting eval today. Will benefit from skilled PT to progress functional IND.   PT Total Evaluation Time   PT Eval, Moderate Complexity Minutes (44597) 10   Plan of Care Review   Plan of Care Reviewed With patient   Physical Therapy Goals   PT Frequency Daily   PT Predicted Duration/Target Date for Goal Attainment 12/15/22   PT Goals Bed Mobility;Transfers;Gait;Wheelchair Mobility   PT: Bed Mobility Supine to/from sit;Rolling;Bridging;Supervision/stand-by assist   PT: Transfers Supervision/stand-by assist;Sit to/from stand;Bed to/from chair;Assistive device;Within precautions   PT: Gait Supervision/stand-by assist;50 feet;Assistive device   PT: Wheelchair Mobility Caregiver SBA;150 feet   PT Discharge Planning   PT Discharge Recommendation (DC Rec) Transitional Care Facility   PT Rationale for DC Rec Pt well below functional baseline,  requiring OH lift today. Will require rehab to progress functional IND.   PT Brief overview of current status OH lift today, decreased participation/ lethargy and mental status changes   Total Session Time   Total Session Time (sum of timed and untimed services) 10

## 2022-12-08 NOTE — PROGRESS NOTES
[facility], [place]   Neurosurgery Progress Note:    Assessment: Shay Jimenez is a 57 year old male with with PMH ESRD on dialysis and osteomyelitis s/p C5-T6 fusion who is now POD#2 s/p redo C5-T6 fusion with concern for hemorrhage; no vertebral artery dissection or extravasation was noted on intraoperative angiogram.  He was admitted to the Neuro ICU postoperatively for MAP goals and frequent neuro checks, remaining stable on pressors.  Neuro exam is unchanged from patient's baseline.    Plan:  - Serial neuro exams  - Pain control  - Hb goal > 7  - Normonatremia  - Monitor HV output  - Goal normotension  - Appreciate Nephrology recommendations re: dialysis (baseline M/W/F)  - Cervical collar at all times  - Advance diet as tolerated  - Bowel regimen  - PRN antiemetics  - IVF until taking adequate PO  - PT/OT  - SCDs for DVT proph  - Okay to transfer to the floor    -----------------------------------  Jonny Stephenson  Neurosurgery Resident PGY2    Interval History/Subjective: Received 1 unit PRBC overnight. Feels better than yesterday in terms of pain and strength.     Objective:   Temp:  [97.4  F (36.3  C)-97.8  F (36.6  C)] 97.4  F (36.3  C)  Pulse:  [] 85  Resp:  [10-21] 17  BP: ()/(29-78) 114/70  SpO2:  [80 %-99 %] 97 %  I/O last 3 completed shifts:  In: 3018.41 [P.O.:840; I.V.:949.41]  Out: 286 [Urine:8; Drains:278]    Gen: Appears comfortable, NAD  Wound: Incision, clean, dry, intact without strikethrough  Neurologic:  - Alert & Oriented to person, place, time, and situation  - Follows commands briskly  - Speech fluent, spontaneous. No aphasia or dysarthria.  - No gaze preference. No apparent hemineglect.  - PERRL, EOMI  - Strong brow raise, eye closure, and smile  - Face symmetric with sensation intact to light touch  - Trapezii and sternocleidomastoid muscles 5/5 bilaterally  - No pronator drift     Del Tr Bi WE WF Gr   R 4* 5 5 5 5 5   L 4* 5 5 5 5 5    HF KE KF  DF PF EHL   R 4+ 5 5 5 5 5   L 4+ 5 5 5 5 5   *pain-limited    Norman's and clonus negative.    Sensation intact and symmetric to light touch throughout    LABS  Recent Labs   Lab Test 12/08/22 0458 12/07/22 2013 12/07/22  1400   WBC 5.7 5.0 5.3   HGB 7.9* 7.7* 7.5*   MCV 97 98 100   * 109* 114*       Recent Labs   Lab Test 12/08/22 0458 12/08/22  0406 12/07/22  0538 12/06/22  2335   *  --  128* 131*   POTASSIUM 4.6  --  5.2 5.0   CHLORIDE 96*  --  86* 93*   CO2 27  --  25 22   BUN 18.8  --  26.4* 22.0*   CR 4.29*  --  5.54* 4.70*   ANIONGAP 10  --  17* 16*   MAC 8.3*  --  7.9* 7.3*   GLC 99 108* 91 103*       IMAGING  Recent Results (from the past 24 hour(s))   XR Abdomen Port 1 View    Narrative    Exam: XR ABDOMEN PORT 1 VIEW, 12/7/2022 3:43 PM    Indication: abdominal distension    Comparison: X-ray abdomen 7/3/2022     Findings: Supine x-ray of the abdomen. Air-filled distended small or  large bowel loops in the central and left abdomen. Opacity in the  right lower quadrant. Radiopacity in the right mid abdomen may  represent enterolith or calcified gallstone.  Lung bases are clear. No definite pneumatosis.      Impression    Impression: Air-filled distended small and large bowel loops in the  central and left abdomen, which may represent early or developing  adynamic ileus.    I have personally reviewed the examination and initial interpretation  and I agree with the findings.    MICHAEL ARREDONDO MD         SYSTEM ID:  [id number]

## 2022-12-08 NOTE — PROGRESS NOTES
S: Pt seen at [facility] hosp room 4210 with nurse for fitting/delivery of ordered . O: I see he has on a Aspen collar on currently and see the EPIC order for the  when OOB. I see his previous  and I matched up the settings with the new one. A: Since he was in bed we did not put on the  since it is for OOB times. The  should fit as good at the one he had previously since the settings were matched. The instruction sheet was left in his personal bag with the OLD , and the NEW  was put aside the bag. P: FU PRN. G: The goal is to restrict motion of head/neck during healing when OOB.  Electronically signed Laureano Gill CPO, LPO.

## 2022-12-08 NOTE — PROGRESS NOTES
SPIRITUAL HEALTH SERVICES  SPIRITUAL ASSESSMENT Progress Note  [facility] ([place] 4A     REFERRAL SOURCE: Admission Request     Pt was up in the chair, but very sleepy and had trouble staying awake during visit.  He welcomed  presence and asked for prayer, which was provided.   let pt know that [facility] would continue to be available to him as needed/requested.  RN also notified.     PLAN: Unit  to follow     Verona Solis    Pager: [phone]

## 2022-12-08 NOTE — PLAN OF CARE
Major Shift Events:  Patient AO x4, difficult to arose overnight. Baseline neuropathy present in BLE. Patient SR-ST, normotensive (on scheduled midodrine), afebrile. 1 unit of RBC given for low Hgb. Patient on 2L NC while sleeping. Patient on a regular diet. Oliguric r/t dialysis MWF.     Plan: Continue Q2 hr neuro checks, increase activity as tolerated. Manage pain. Continue with HD. Monitor Hgb.    For vital signs and complete assessments, please see documentation flowsheets.            Goal Outcome Evaluation:      Plan of Care Reviewed With: patient    Overall Patient Progress: improving

## 2022-12-08 NOTE — PROGRESS NOTES
Neurocritical Care Progress Note    Reason for critical care admission: Post op-C5-T6 fusion  Admitting Team: GABINO  Date of Service:  12/08/2022  Date of Admission:  12/6/2022  Hospital Day: 3    Assessment/Plan  Shay Jimenez is a 57 year old male with a past medical history of ESRD on dialysis and osteomyelitis s/p C5-T6 fusion admitted on 12/6/2022 for redo C5-T6 fusion.    24 hour events: POD#2 doing well. Hgb stale overnight. No overt signs of bleeding. Will obtain CT CAP.     Neuro  #Redo C5-T6 Fusion POD#2  #Concern for Hemorrhage  -No extravasation on angiogram  -Neurochecks every 2 hrs  -MAP goal >65 mmHg  -HOB > 30   -PT/OT/SLP    #Analgesics & sedation  RASS goal:0  -dilaudid 0.2-0.4mg q2  -oxy 5-10mg PRN q3    CV  #Hypotension-resolved  -Cardiac monitoring  -MAP goal >65 mmHg  -Phenylephrine gtt-weaning  -Midodrine 10mg q8 for Dialysis    Resp  #POLO  Oxygen/vent:[place] air  -Continuous pulse ox  -Maintain O2 saturations greater than 92%    GI  #diarrhea   Diet:Regular Diet  Last BM: 12/7  GI prophylaxis:  -Bowel regimen: scheduled senna-docusate and Miralax  -ABD xray-possible developing ileus. Will continue bowel meds despite loose stools.     Renal/  #ESRD  #Chronic Hyponatremia  #Hypochloremia  -Nephrology consulted  -dialysis PRN  -Daily BMP  -IV fluids: none  -Electrolyte replacement protocol    Endo  #POLO  -Hgb A1c 5.6  -Monitor glucose levels    Heme  #Acute blood loss anemia  -EBL in OR 2.5 liters  -3 units PRBC's total  -1 unit FFP  -No dissection or extravasation on angiogram.   -Daily CBC  -Hgb goal >7, plt goal >50k  -Transfuse to meet Hgb and plt goals  -CT CAP to eval for bleeding    ID  #Osteomylytis Hx  -ID consulted per NSGY  -Daily CBC  -Follow temperature curve  -Afebrile  -No leukocytosis  -No ABX at this time.     ICU Checklist  Lines/tubes/drains: Fistula, PIV  FEN: none, prn replacement, regular diet  PPX: DVT - SCDs; GI - protonix.  Code:Full  Dispo: ICU - NCC      TIME SPENT  ON THIS ENCOUNTER   I spent 40 minutes of critical care time on the unit/floor managing the care of Shay Jiemnez. Upon evaluation, this patient had a high probability of imminent or life-threatening deterioration due to acute blood loss anemia requiring close hemodynamic monitoring, which required my direct attention, intervention, and personal management. Greater than 50% of my time was spent at the bedside counseling the patient and/or coordinating care regarding services listed in this note.    The patient was seen and discussed with the [facility] attending, Dr. Arrieta.    Phill Downs, CNP  NCC *[id number]    24 Hour Vital Signs Summary:  Temp: 97.4  F (36.3  C) Temp  Min: 97.4  F (36.3  C)  Max: 98.3  F (36.8  C)  Resp: 17 Resp  Min: 9  Max: 21  SpO2: 95 % SpO2  Min: 80 %  Max: 100 %  Pulse: 90 Pulse  Min: 84  Max: 100    No data recorded  BP: 107/62 Systolic (24hrs), Av , Min:78 , Max:151   Diastolic (24hrs), Av, Min:29, Max:78        Respiratory monitoring:   Resp: 17      I/O last 3 completed shifts:  In: 3018.41 [P.O.:840; I.V.:949.41]  Out: 286 [Urine:8; Drains:278]    Current Medications:    acetaminophen  975 mg Oral Q8H CUONG     atorvastatin  20 mg Oral At Bedtime     baclofen  10 mg Oral BID     cetirizine  10 mg Oral Daily     cyanocobalamin  500 mcg Oral Daily     finasteride  1.3 mg Oral Daily     fluticasone  1 spray Both Nostrils BID     gabapentin  300 mg Oral TID     magnesium plus protein  133 mg Oral Daily     methocarbamol  500 mg Oral 4x Daily     midodrine  10 mg Oral Q8H CUONG     multivitamin RENAL  1 tablet Oral Daily     pantoprazole  40 mg Oral Daily     polyethylene glycol  17 g Oral Daily     vitamin B6  100 mg Oral Daily     senna-docusate  1 tablet Oral BID     sertraline  100 mg Oral Daily     sevelamer carbonate  1,600 mg Oral TID w/meals     sodium chloride (PF)  3 mL Intracatheter Q8H       PRN Medications:  [START ON 2022] acetaminophen, sore throat, bisacodyl, calcium  "carbonate, diazepam, HYDROmorphone **OR** HYDROmorphone, hydrOXYzine, lidocaine 4%, lidocaine (buffered or not buffered), magnesium hydroxide, naloxone **OR** naloxone **OR** naloxone **OR** naloxone, ondansetron **OR** ondansetron, oxyCODONE **OR** oxyCODONE, prochlorperazine **OR** prochlorperazine, sodium chloride (PF)    Infusions:      Allergies   Allergen Reactions     Amlodipine      Lisinopril        Physical Examination:  Vitals: /62   Pulse 90   Temp 97.4  F (36.3  C) (Oral)   Resp 17   Ht 1.854 m (6' 1\")   Wt 136.1 kg (300 lb 0.7 oz)   SpO2 95%   BMI 39.59 kg/m    General: Adult male patient, lying in bed, critically-ill  HEENT: Normocephalic, c-collar, no icterus, oral cavity/oropharynx pink and moist  Cardiac: RRR, s1/s2 auscultated without murmur  Pulm: CTAB, unlabored, expansion symmetric, no retractions or use of accessory muscles  Abdomen: Soft, non-distended, bowel sounds present  Extremities: Warm, generalized edema, distal pulses +2, well perfused  Skin: No rash or lesion  Psych: Calm and cooperative  Neuro:  Mental status: Awake, alert, attentive, oriented to self, time, place, and circumstance. Language is fluent and coherent with intact comprehension of complex commands, naming and repetition.  Cranial nerves: VFF, PERRL, conjugate gaze, EOMI, facial sensation intact, face symmetric, shoulder shrug strong, tongue midline, no dysarthria.   Motor: Normal bulk and tone. No abnormal movements. 5/5 strength in 4/4 extremities.   Sensory: Intact to light touch x 4 extremities  Coordination: FNF and HS without ataxia or dysmetria. Rapid alternating movements intact.   Gait: MARIA ESTHER, deferred.      Labs:  Recent Labs   Lab 12/08/22  0458 12/07/22  0538 12/06/22  2335 12/06/22  1817 12/06/22  1305 12/06/22  0756   * 128* 131* 129*   < > 132*   POTASSIUM 4.6 5.2 5.0 5.9*   < > 4.0   CHLORIDE 96* 86* 93*  --   --  87*   CO2 27 25 22  --   --  25   BUN 18.8 26.4* 22.0*  --   --  17.4   CR " 4.29* 5.54* 4.70*  --   --  4.66*   MAC 8.3* 7.9* 7.3*  --   --  9.7    < > = values in this interval not displayed.       Recent Labs   Lab 12/08/22  0458 12/07/22 2013 12/07/22  1400 12/07/22  0538   WBC 5.7 5.0 5.3 8.0   HGB 7.9* 7.7* 7.5* 7.6*   * 109* 114* 175       Recent Labs   Lab 12/06/22  1817 12/06/22  1409 12/06/22  1305   PH 7.43 7.46* 7.46*   PCO2 40 44 43   PO2 166* 296* 403*   HCO3 27 31* 31*       All cultures:  Recent Labs   Lab 12/06/22 1815   CULTURE No anaerobic organisms isolated after 1 day  No growth, less than 1 day       Imaging:    Results for orders placed or performed during the hospital encounter of 12/06/22 (from the past 24 hour(s))   Hepatitis B Surface Antibody   Result Value Ref Range    Hepatitis B Surface Antibody Instrument Value 4.67 <8.00 m[IU]/mL    Hepatitis B Surface Antibody Nonreactive    Hepatitis B surface antigen   Result Value Ref Range    Hepatitis B Surface Antigen Nonreactive Nonreactive   CBC with platelets   Result Value Ref Range    WBC Count 5.3 4.0 - 11.0 10e3/uL    RBC Count 2.35 (L) 4.40 - 5.90 10e6/uL    Hemoglobin 7.5 (L) 13.3 - 17.7 g/dL    Hematocrit 23.4 (L) 40.0 - 53.0 %     78 - 100 fL    MCH 31.9 26.5 - 33.0 pg    MCHC 32.1 31.5 - 36.5 g/dL    RDW 17.2 (H) 10.0 - 15.0 %    Platelet Count 114 (L) 150 - 450 10e3/uL   Hemoglobin A1c   Result Value Ref Range    Hemoglobin A1C 5.6 <5.7 %   Prepare plasma (unit)   Result Value Ref Range    Blood Component Type Plasma     Product Code V5898B53     Unit Status Transfused     Unit Number [id number]     CODING SYSTEM [id number]     ISSUE DATE AND TIME [id number]     UNIT ABO/RH O+     UNIT TYPE ISBT 5100    Prepare red blood cells (unit)   Result Value Ref Range    Blood Component Type Red Blood Cells     Product Code V8557Z91     Unit Status Transfused     Unit Number [id number]     CROSSMATCH Compatible     CODING SYSTEM [id number]     ISSUE DATE AND TIME 64341342604530     UNIT ABO/RH  O-     UNIT TYPE ISBT 9500    XR Abdomen Port 1 View    Narrative    Exam: XR ABDOMEN PORT 1 VIEW, 12/7/2022 3:43 PM    Indication: abdominal distension    Comparison: X-ray abdomen 7/3/2022     Findings: Supine x-ray of the abdomen. Air-filled distended small or  large bowel loops in the central and left abdomen. Opacity in the  right lower quadrant. Radiopacity in the right mid abdomen may  represent enterolith or calcified gallstone.  Lung bases are clear. No definite pneumatosis.      Impression    Impression: Air-filled distended small and large bowel loops in the  central and left abdomen, which may represent early or developing  adynamic ileus.    I have personally reviewed the examination and initial interpretation  and I agree with the findings.    MICHAEL ARREDONDO MD         SYSTEM ID:  [id number]   CBC with platelets   Result Value Ref Range    WBC Count 5.0 4.0 - 11.0 10e3/uL    RBC Count 2.40 (L) 4.40 - 5.90 10e6/uL    Hemoglobin 7.7 (L) 13.3 - 17.7 g/dL    Hematocrit 23.5 (L) 40.0 - 53.0 %    MCV 98 78 - 100 fL    MCH 32.1 26.5 - 33.0 pg    MCHC 32.8 31.5 - 36.5 g/dL    RDW 17.7 (H) 10.0 - 15.0 %    Platelet Count 109 (L) 150 - 450 10e3/uL   Prepare red blood cells (unit)   Result Value Ref Range    Blood Component Type Red Blood Cells     Product Code O4978O17     Unit Status Transfused     Unit Number [id number]     CROSSMATCH Compatible     CODING SYSTEM [id number]     ISSUE DATE AND TIME [id number]     UNIT ABO/RH O-     UNIT TYPE ISBT 9500    Glucose by meter   Result Value Ref Range    GLUCOSE BY METER POCT 108 (H) 70 - 99 mg/dL   Basic metabolic panel   Result Value Ref Range    Sodium 133 (L) 136 - 145 mmol/L    Potassium 4.6 3.4 - 5.3 mmol/L    Chloride 96 (L) 98 - 107 mmol/L    Carbon Dioxide (CO2) 27 22 - 29 mmol/L    Anion Gap 10 7 - 15 mmol/L    Urea Nitrogen 18.8 6.0 - 20.0 mg/dL    Creatinine 4.29 (H) 0.67 - 1.17 mg/dL    Calcium 8.3 (L) 8.6 - 10.0 mg/dL    Glucose 99 70 - 99 mg/dL     GFR Estimate 15 (L) >60 mL/min/1.73m2   CBC with platelets   Result Value Ref Range    WBC Count 5.7 4.0 - 11.0 10e3/uL    RBC Count 2.50 (L) 4.40 - 5.90 10e6/uL    Hemoglobin 7.9 (L) 13.3 - 17.7 g/dL    Hematocrit 24.2 (L) 40.0 - 53.0 %    MCV 97 78 - 100 fL    MCH 31.6 26.5 - 33.0 pg    MCHC 32.6 31.5 - 36.5 g/dL    RDW 18.9 (H) 10.0 - 15.0 %    Platelet Count 103 (L) 150 - 450 10e3/uL   Magnesium   Result Value Ref Range    Magnesium 1.5 (L) 1.7 - 2.3 mg/dL   Phosphorus   Result Value Ref Range    Phosphorus 3.5 2.5 - 4.5 mg/dL       All relevant imaging and laboratory values personally reviewed.

## 2022-12-08 NOTE — PROGRESS NOTES
[facility] PROGRESS NOTE     Patient:  Shay Jimenez   Date of birth 1965, Medical record number [id number]  Date of Visit:  12/08/2022  Date of Admission: 12/6/2022  Consult Requester:Fritz Boles MD          Assessment and Plan:   ID Problem List  1. S/p redo C5-T6 fusion 12/6/22  2. History of T2-T3 discitis/vertebral osteomyelitis vs DJD s/p cervical to thoracic fusion and T2/3 decompression 6/27/22, intraoperative culture x2 with Cutibacterium acnes in broth only, thought likely contamination but completed 2 weeks Ancef and 4 weeks amoxicillin  3. Recent Morganella morganii bacteremia of unclear etiology s/p 8.5 weeks cefepime (8/14/22 - 10/13/22)     RECOMMENDATION:  1. Follow up intraoperative cultures - NGTD  2. No role for antibiotics at this time     ASSESSMENT:  Shay Jimenez is a 57 year old male with past medical history significant for ESRD on HD, alcohol use disorder, previous c/f thoracic spine osteomyelitis vs DJD s/p cervical spinal fusion in June 2022, recent Morganella bacteremia (08/2022, s/p 8.5 wks cefepime) who is admitted since 12/6/22 for redo cervical spinal fusion which was completed 12/6 with cefazolin perioperatively. ID consulted regarding history of osteomyelitis. He has recent admission in June 2022 for upper back pain, weakness, falls, inability to ambulate with radiographic findings concerning for DJD vs inflammatory changes possibly related to discitis/osteomyelitis at T2-T3. He had cervical spine fusion on 6/27/22, and since clinically stable was done off antibiotics and intraoperative cultures collected. These were positive on day 5 and day 7 in 2 sets for Cutibacterium acnes that was felt likely contaminant due by infectious disease at the time. However, due to hardware placement did receive 2 weeks IV Ancef followed by 4 weeks amoxicillin. One blood culture during that time with Staph epi attributed to contamination. He was also  admitted in August 2022 septic shock requiring pressor support and found to have Morganella morganii bacteremia (blood cultures +2 days) of unclear etiology, no evidence clinically or radiographically for other source of infection, was thought to be possibly related to osteomyelitis and he completed 8.5 weeks of IV cefepime in mid-October. Given report of 4 months of diarrhea 2/2 constipation (likely overflow) per patient, consider GI etiology for previous Morganella bacteremia though CT at that time unremarkable. He continued to have neck pain and imaging findings with no acute change from August 2022, though loosened screws at C5/C6 for which he was admitted for re-do fusion on 12/6. He remains afebrile, no leukocytosis, no report of intraoperative concern for osteomyelitis, and stable findings on CT spinal imaging. Will follow up pending cultures. No evidence of active infection and would not treat with antibiotics at this time.       ID will continue to follow.     Patient was discussed with Dr. Pandya. Recommendations discussed with primary team.    Phylicia Roper PA-C  Infectious Diseases  Pager # [phone]    I spent a total of 35 minutes face-to-face and/or coordinating care of Shay Jimenez. Over 50% of my time on the unit was spent counseling the patient and/or coordinating care.           Interim History and Events:     Shay remains afebrile, vitals stable, off pressors since yesterday afternoon. No leukocytosis. He is tired today, falling asleep on exam. Denies fever, chills, pain, dyspnea. He is on 2L oxygen overnight and this morning. Received 2 units RBC and 1 FFP yesterday. 1 RBC this morning. Hemoglobin 7.9. HD yesterday with UF only due to pressors.     CRP on admission 37.60, improved from previous. Intraoperative cultures pending, no growth to date. Abd Xray with air-filled small and large bowel, c/f adynamic ileus. Had BM smear this morning. Net +4L since admission         HPI: from ID consult  note dated 12/7   Shay Jimenez is a 57 year old male with past medical history significant for HTN, HLD, ESRD on HD, GERD, GINI, obesity, alcohol use disorder, previous c/f thoracic spine osteomyelitis s/p spinal fusion in June 2022, recent Morganella bacteremia (08/2022) who is admitted since 12/6/22 for redo cervical spinal fusion.      He had CTA chest in 02/2022 for dyspnea due to volume overload that noted discitis vs osteomyelitis of thoracic spine. Follow up after this is reported by ID note 6/25/22 - ultimately pt had done ok with rest, later had follow up with orthopedic provider and had MRI notable for fracture of thoracic spine. He was admitted in June 2022 for 6 months of upper back pain, with weakness, falls, and inability to ambulate. He denied fever, chills. CT and thoracic MRI 6/24/22 notable for collapse of T2-T3 vertebral bodies with abnormal enhancement, and thickened abnormal epidural tissues c/f inflammed epidural tissues and paraspinal inflammatory changes c/f DJD but unable to rule out epidural phlegmon abscess c/f discitis and osteomyelitis, and with severe spinal canal stenosis at T2,T3 c/f compressive myelopathy. Antibiotics were deferred since clinically stable and planned for OR with cultures to be collected. On 6/27/22 he had cervical 6 to thoracic 6 fusion and decompression of thoracic 2-3, operative note with degenerative joint disease changes and intraop cultures sent. He was treated with Ancef postoperatively for 14 days. Spine tissue culture was positive in two cultures on day 5 and day 7 in broth only for C. acnes that was thought likely to be contaminant. However, he had new hardware placed during surgery and was found in two cultures, he was started on amoxicillin 500 mg daily (dose adjusted for HD) for 4 weeks after being on Ancef for 2 weeks. Blood culture on 7/5 (collected when afebrile, on Ancef) positive in 1/2 bottle from only 1 set for Staph epi was considered due to  contamination.     He was previously admitted 8/10/22 for septic shock requiring pressors, found to have Morganella morganii of unclear etiology , thought related to progressive T2-3 discitis/osteomyelitis. No intraabdominal source on imaging, TTE negative for endocarditis, no other reported clinical or radiographic signs of infection. He had positive blood culture on 8/10 x2 (reports rigors then, no fever) and again in 1/2 sets on 8/11, negative since 8/13. Resistant to ampicillin, cefazolin, intermediate ceftriaxone, cefepime/Zosyn/carbapenem/FQ sensitive. He was treated with IV cefepime from 8/14/22 - 10/13/22 for almost 9 weeks of IV antibiotics.     CTA chest angio 8/10/22 with progressive bony destruction and c/f discitis/osteomyelitis at T2/3. CT Cervical spine 10/10/22 with no acute finding or change since 8/11/22 - with stable degenerative changes, loosening and retropulsion of screws at C5/C6. MRI lumbar spine same day with stable degenerative changes, no report of discitis/osteomyelitis on imaging at that time. Repeat imaging on 11/3 with stable screw loosening, stable erosive changes at T2-T3. He was found to have hardware failure with screw pullout at C5/C6 and pseudoarthrosis, admitted 12/6/22 for re-do cervical spinal fusion C5-T6 with fortino placement. Had hemorrhage intraoperatively, s/p 2 units RBC, cerebral angiogram negative for vertebral artery injury. Intraoperative cultures were taken and remain pending at this time. Per neurosurgery, no evidence of osteomyelitis reported intraoperatively. Does have significant amount of original hardware retained. He received cefazolin for perioperative antibiotic. He remains in ICU on pressor x1. He remains afebrile, without leukocytosis.      He denies IV drug use or injection medications. He has no open sores, cuts. He reports continued neck pain prior to admission. Denies fever, chills, rigors, unintentional weight loss, nausea, vomiting, abdominal pain,  diarrhea, rash, or urinary symptoms. He denies any previous episodes of bacteremia prior to this year. He has LUE fistula and dialysis in place.            ROS:   -Focused 5 point ROS completed, pertinent positives and negatives listed above.      Physical Examination:  Temp: 97.4  F (36.3  C) Temp src: Oral BP: 114/70 Pulse: 85   Resp: 17 SpO2: 97 % O2 Device: Nasal cannula Oxygen Delivery: 2 LPM    Vitals:    12/06/22 0729 12/07/22 0600   Weight: 128.4 kg (283 lb 1.1 oz) 136.1 kg (300 lb 0.7 oz)       Constitutional: Pleasant adult male seen sitting up in bed, in NAD. Alert and interactive, falls asleep at times. Spine C-collar in place.  HEENT: NCAT, anicteric sclerae, conjunctiva clear. Moist mucous membranes without lesions or thrush.   Respiratory: Non-labored breathing, good air exchange. Lungs are clear to auscultation bilaterally, without wheezing, crackles or rhonchi. No cough noted.   Cardiovascular: Regular rate and rhythm with no murmur, rub or gallop.  GI: Normoactive BS. Abdomen is soft, obese, and non-tender to palpation. No rigidity or guarding.  Skin: Warm and dry. No lesions on exposed surfaces.+peripheral vascular changes  Musculoskeletal: 2+ edema present.   Neurologic: A &O x3, speech normal, answering questions appropriately. Moves all extremities spontaneously. Grossly non-focal.  Neuropsychiatric: Mentation and affect normal/appropriate.  VAD: PIV is c/d/i with no erythema, drainage, or tenderness. LUE fistula.       Medications:    acetaminophen  975 mg Oral Q8H CUONG     atorvastatin  20 mg Oral At Bedtime     baclofen  10 mg Oral BID     cetirizine  10 mg Oral Daily     cyanocobalamin  500 mcg Oral Daily     finasteride  1.3 mg Oral Daily     fluticasone  1 spray Both Nostrils BID     gabapentin  300 mg Oral TID     magnesium plus protein  133 mg Oral Daily     methocarbamol  500 mg Oral 4x Daily     midodrine  10 mg Oral Q8H CUONG     multivitamin RENAL  1 tablet Oral Daily     pantoprazole   40 mg Oral Daily     polyethylene glycol  17 g Oral Daily     vitamin B6  100 mg Oral Daily     senna-docusate  1 tablet Oral BID     sertraline  100 mg Oral Daily     sevelamer carbonate  1,600 mg Oral TID w/meals     sodium chloride (PF)  3 mL Intracatheter Q8H       Infusions/Drips:      Laboratory Data:   No results found for: ACD4    Inflammatory Markers    Recent Labs   Lab Test 12/06/22  0756 10/06/22  1215 10/03/22  1500 09/29/22  1345 09/22/22  1205 09/15/22  1425 09/12/22  1320 09/08/22  1330 09/01/22  1300 08/25/22  1300 08/18/22  1435   SED  --  31*  --  31*  --  25*  --  27*  --  34* 41*   CRP 37.60* 43.70* 42.6* 53.5* 32.90* 29.7* 9.4* 30.3*   < > 39.2* 14.6*    < > = values in this interval not displayed.       Metabolic Studies       Recent Labs   Lab Test 12/08/22  0458 12/08/22  0406 12/07/22  1400 12/07/22  0538 12/06/22  2335 12/06/22  2246 12/06/22  1817 12/06/22  1409 12/06/22  1305 12/06/22  0756 10/11/22  0017 10/10/22  1053 10/06/22  1215 08/25/22  1300 08/18/22  1435 08/18/22  1435 06/27/22  2224   *  --   --  128* 131*  --  129* 130* 130* 132*  --  121*  --   --   --  132*  --    POTASSIUM 4.6  --   --  5.2 5.0  --  5.9* 4.9 4.7 4.0  --  3.3*  --   --    < > 3.0*  --    CHLORIDE 96*  --   --  86* 93*  --   --   --   --  87*  --  79*  --   --   --  94  --    CO2 27  --   --  25 22  --   --   --   --  25  --  18*  --   --   --  28  --    ANIONGAP 10  --   --  17* 16*  --   --   --   --  20*  --  24*  --   --   --  10  --    BUN 18.8  --   --  26.4* 22.0*  --   --   --   --  17.4  --  19.3 11.0   < >  --  20  --    CR 4.29*  --   --  5.54* 4.70*  --   --   --   --  4.66*  --  7.66* 4.94*   < >  --  5.53*  --    GFRESTIMATED 15*  --   --  11* 14*  --   --   --   --  14*  --  8* 13*   < >  --  11*  --    GLC 99 108*  --  91 103* 127* 161* 129* 137* 87   < > 87  --   --    < > 85   < >   A1C  --   --  5.6  --   --   --   --   --   --   --   --   --   --   --   --   --   --    MAC 8.3*  --    --  7.9* 7.3*  --   --   --   --  9.7  --  8.4*  --   --   --  8.7  --    PHOS 3.5  --   --   --  4.3  --   --   --   --   --   --   --   --   --   --   --   --    MAG 1.5*  --   --   --  1.8  --   --   --   --   --   --   --   --   --   --   --   --    LACT  --   --   --   --   --   --  3.4* 2.0 1.9  --   --   --   --   --   --   --    < >    < > = values in this interval not displayed.       Hepatic Studies    Recent Labs   Lab Test 10/06/22  1215 09/29/22  1345 09/22/22  1205 09/15/22  1425 09/08/22  1330 09/01/22  1300 08/18/22  1435 07/21/22  1252 07/16/22  0557 07/11/22  0722 06/29/22  0611 06/24/22  0818   BILITOTAL  --   --   --   --   --   --   --  0.6 0.5  --   --  0.6   ALKPHOS  --   --   --   --   --   --   --  131 134  --   --  152*   ALBUMIN  --   --   --   --   --   --   --  3.0* 2.8* 3.2* 2.9* 3.3*   AST 31 35 41 38 33 22   < > 17 13  --   --  51*   ALT  --   --   --   --   --   --   --  9 <6  --   --  55    < > = values in this interval not displayed.       Pancreatitis testing    No lab results found.    Hematology Studies      Recent Labs   Lab Test 12/08/22  0458 12/07/22  2013 12/07/22  1400 12/07/22  0538 12/06/22  2335 12/06/22  1817 12/06/22  1305 12/06/22  0756   WBC 5.7 5.0 5.3 8.0 6.6  --   --  4.1   HGB 7.9* 7.7* 7.5* 7.6* 6.6* 9.1*   < > 10.2*   HCT 24.2* 23.5* 23.4* 23.6* 20.8*  --   --  30.3*   * 109* 114* 175 130*  --   --  204    < > = values in this interval not displayed.       Arterial Blood Gas Testing    Recent Labs   Lab Test 12/06/22  1817 12/06/22  1409 12/06/22  1305 06/27/22  2224 06/27/22 2006   PH 7.43 7.46* 7.46* 7.43 7.46*   PCO2 40 44 43 41 38   PO2 166* 296* 403* 335* 152*   HCO3 27 31* 31* 27 27   O2PER 49.0 90.0 93.0  --   --         Urine Studies     No lab results found.    Vancomycin Levels     No lab results found.    Invalid input(s): VANCO    Tobramycin levels     No lab results found.    Gentamicin levels    No lab results found.    CSF testing   No  lab results found.      Microbiology:  Culture   Date Value Ref Range Status   12/06/2022 No anaerobic organisms isolated after 1 day  Preliminary   12/06/2022 No growth, less than 1 day  Preliminary   07/06/2022 No Growth  Final   07/06/2022 No Growth  Final   07/05/2022 Positive on the 1st day of incubation (A)  Final   07/05/2022 Staphylococcus epidermidis (AA)  Final     Comment:     1 of 1 bottles   07/04/2022 No Growth  Final   07/03/2022 No Growth  Final   07/02/2022 No Growth  Final   07/01/2022 No Growth  Final   06/30/2022 No Growth  Final   06/29/2022 No Growth  Final   06/28/2022 No Growth  Final   06/27/2022 (A)  Final    Isolated in broth only Cutibacterium (Propionibacterium) acnes     Comment:     On day 7 of incubation    Susceptibility testing of Cutibacterium (Propionibacterium) species is not done from this source. This organism is susceptible to penicillin and cefotaxime and most are susceptible to clindamycin.   06/27/2022 (A)  Final    Isolated in broth only Cutibacterium (Propionibacterium) acnes     Comment:     On day 5 of incubation  Susceptibility testing of Cutibacterium (Propionibacterium) species is not done from this source. This organism is susceptible to penicillin and cefotaxime and most are susceptible to clindamycin.   06/27/2022 No Growth  Final   06/27/2022 No Growth  Final   06/26/2022 No Growth  Final   06/24/2022 No Growth  Final   06/24/2022 No Growth  Final       Last check of C difficile  No results found for: CDBPCT    Imaging:  Abd Xray -  Impression: Air-filled distended small and large bowel loops in the central and left abdomen, which may represent early or developing  adynamic ileus.    CT Thoracic and Cervical Spine 11/3/22  Impression:      1. Postsurgical changes of C5-T6 fortino/screw fixation. When compared to  the 10/10/2012 exams, there is stable transpedicular screw loosening  and retraction at C5 and C6.   2. Stable erosive changes in the T2, T3 sequela of  patient's known  osteomyelitis. Unchanged T11 hemangioma.     MRI Lumbar Spine 10/10/22  IMPRESSION:  1.  Stable degenerative changes of the lumbar spine without a significant interval change.  2.  No high-grade canal stenosis.  3.  Bilateral neural foraminal narrowing is greatest on the left at L5-S1, where there is moderate stenosis.     CT Thoracic and Cervical Spine 10/10/22                                                                 Impression:   1. No acute finding or significant change since 8/11/2022.  2. Unchanged loosening and retropulsion of the transpedicle screws  bilaterally at C5 and C6.   3. Unchanged T2 and T3 vertebral body sclerosis and height loss.  4. Stable degenerative changes of the cervical spine, most  significantly resulting in severe neural foraminal stenosis on the  left C3-4 and moderate neural foraminal stenosis on the right at C3-4  and C4-5.     MRI Thoracic Spine 6/24/22  IMPRESSION: Postcontrast images demonstrate abnormal contrast  enhancement of the T2 and T3 vertebrae, thickened abnormal epidural  tissues from C7-T1 down to T4-T5 possibly simply representing inflamed  epidural tissues but epidural abscess cannot be excluded. Abnormal  contrast enhancement in the bilateral lateral and anterior paraspinous  soft tissues from T1 down to T4 consistent with a paraspinous  phlegmon/abscess again noted. Moderate compression of the thoracic  spinal cord at the upper T2 level again noted.

## 2022-12-08 NOTE — CONSULTS
"Care Management Initial Consult    General Information  Assessment completed with: Shay Barroso  Type of CM/SW Visit: Initial Assessment  Primary Care Provider verified and updated as needed: Yes - Dr. Jonathan Travis   Readmission within the last 30 days: no previous admission in last 30 days    Advance Care Planning: Advance Care Planning Reviewed: explained purpose of health care directive, pt declined to complete a HCD at present time     Communication Assessment  Patient's communication style: spoken language (English)    Hearing Difficulty: no   Wears Glasses: yes    Cognitive  Cognitive/Neuro/Behavioral: WDL except  Level of Consciousness: lethargic   Arousal Level: opens eyes spontaneously, arouses to voice  Orientation: oriented x 4  Mood/Behavior: calm, cooperative  Best Language: 0 - No aphasia  Speech: spontaneous, hoarse, slow    Living Environment:   People in home: Pt and his sons (Chinedu Ray)  Current living Arrangements: house      Able to return to prior arrangements: yes     Family/Social Support:  Care provided by: children  Provides care for: no one - his mom lives in [place].  Marital Status:  - \"My wife left me in June.\"   Description of Support System: Supportive, Involved    Support Assessment: Adequate family and caregiver support    Current Resources:   Patient receiving home care services: No  Community Resources: Dialysis Services -  M/W/F goes to [facility] (P: [phone], F: [phone]).   Equipment currently used at home: wheelchair, manual; walker, rolling  Supplies currently used at home: None    Employment/Financial:  Employment Status: employed full-time - this is what pt reported, not sure if that is accurate given that he is on dialysis 3 days per week for 4.25 hours each session  Employment/ Comments: Investments for [employer]  Financial Concerns: No    Referral to Financial Worker: No     Lifestyle & Psychosocial Needs:  Social Determinants " of Health     Tobacco Use: Low Risk      Smoking Tobacco Use: Never     Smokeless Tobacco Use: Never     Passive Exposure: Not on file   Alcohol Use: Not on file   Financial Resource Strain: Not on file   Food Insecurity: Not on file   Transportation Needs: Not on file   Physical Activity: Not on file   Stress: Not on file   Social Connections: Not on file   Intimate Partner Violence: Not on file   Depression: Not at risk     PHQ-2 Score: 0   Housing Stability: Not on file     Functional Status:  Prior to admission patient needed assistance: Was getting help from his adult children  Dependent ADLs: Bathing, Dressing, Toileting  Dependent IADLs: Transportation, Cooking, Cleaning, Laundry, Shopping, Meal Preparation    Mental Health Status:  Mental Health Status: Current Concern - cried during the interview, reported he missed his wife Stephane who moved to [place] in the summer. They are  but not .      Chemical Dependency Status: n/a        Values/Beliefs:  Spiritual, Cultural Beliefs, Methodist Practices, Values that affect care: no             Additional Information: SHAKIRA met with pt at bedside to complete initial assessment. Pt reported feeling down related to current health conditions and grieving loss of wife who wants a divorce. Pt initially seemed open to talking to SW, but as the interview progressed he started to stare off into space and not respond to questions. SW discontinued the interview and will continue to follow for discharge planning needs.    Patricia Alfaro [facility]   Unit SH[facility]A Phone: [phone]

## 2022-12-08 NOTE — PROVIDER NOTIFICATION
NSG pg'd at approximately 1500 d/t pt becoming increasingly agitated and uncooperative. NSG and NCC came to bedside to assess pt. Orders to get CT once pt is more appropriate.

## 2022-12-08 NOTE — PLAN OF CARE
Major Shift Events: Pt lethargic all shift. Arouses to repeated voice and vigorous stimulation. Pupils equal and reactive but sluggish. Moving all extremities. Neuropathy in BLE at baseline. C/o at surgical incision site. Accordion drain in place with serosanguineous output. CT completed at 1630 d/t increased agitation and to assess for retroperitoneal bleed. Sinus rhythm with soft BPs. NSG aware. Platelets x1, PRBC x1 transfused today with desirable results. On 2L NC. Reg diet with poor appetite- pt didn't eat anything today. No BM. Small UOP today-- pt on HD and doesn't produce much.  to be on when OOB. C-collar while in bed; ok to take off if HOB is at 45. Repositioned q2h.     Plan: Continue q2h neuro checks. HD run tomorrow.     For vital signs and complete assessments, please see documentation flowsheets.

## 2022-12-09 LAB
ANION GAP SERPL CALCULATED.3IONS-SCNC: 15 MMOL/L (ref 7–15)
BUN SERPL-MCNC: 27.4 MG/DL (ref 6–20)
CALCIUM SERPL-MCNC: 8.9 MG/DL (ref 8.6–10)
CHLORIDE SERPL-SCNC: 95 MMOL/L (ref 98–107)
CREAT SERPL-MCNC: 5.68 MG/DL (ref 0.67–1.17)
DEPRECATED HCO3 PLAS-SCNC: 24 MMOL/L (ref 22–29)
ERYTHROCYTE [DISTWIDTH] IN BLOOD BY AUTOMATED COUNT: 19 % (ref 10–15)
GFR SERPL CREATININE-BSD FRML MDRD: 11 ML/MIN/1.73M2
GLUCOSE SERPL-MCNC: 84 MG/DL (ref 70–99)
HCT VFR BLD AUTO: 27.2 % (ref 40–53)
HGB BLD-MCNC: 8.7 G/DL (ref 13.3–17.7)
MAGNESIUM SERPL-MCNC: 1.8 MG/DL (ref 1.7–2.3)
MCH RBC QN AUTO: 31 PG (ref 26.5–33)
MCHC RBC AUTO-ENTMCNC: 32 G/DL (ref 31.5–36.5)
MCV RBC AUTO: 97 FL (ref 78–100)
PHOSPHATE SERPL-MCNC: 3.8 MG/DL (ref 2.5–4.5)
PLATELET # BLD AUTO: 152 10E3/UL (ref 150–450)
POTASSIUM SERPL-SCNC: 4.4 MMOL/L (ref 3.4–5.3)
RBC # BLD AUTO: 2.81 10E6/UL (ref 4.4–5.9)
SODIUM SERPL-SCNC: 134 MMOL/L (ref 136–145)
WBC # BLD AUTO: 6.7 10E3/UL (ref 4–11)

## 2022-12-09 PROCEDURE — 250N000013 HC RX MED GY IP 250 OP 250 PS 637: Performed by: NURSE PRACTITIONER

## 2022-12-09 PROCEDURE — 36415 COLL VENOUS BLD VENIPUNCTURE: CPT | Performed by: NURSE PRACTITIONER

## 2022-12-09 PROCEDURE — 250N000013 HC RX MED GY IP 250 OP 250 PS 637: Performed by: STUDENT IN AN ORGANIZED HEALTH CARE EDUCATION/TRAINING PROGRAM

## 2022-12-09 PROCEDURE — 250N000011 HC RX IP 250 OP 636

## 2022-12-09 PROCEDURE — 85027 COMPLETE CBC AUTOMATED: CPT | Performed by: NURSE PRACTITIONER

## 2022-12-09 PROCEDURE — 250N000011 HC RX IP 250 OP 636: Performed by: STUDENT IN AN ORGANIZED HEALTH CARE EDUCATION/TRAINING PROGRAM

## 2022-12-09 PROCEDURE — 200N000002 HC R&B ICU UMMC

## 2022-12-09 PROCEDURE — 99291 CRITICAL CARE FIRST HOUR: CPT | Performed by: NURSE PRACTITIONER

## 2022-12-09 PROCEDURE — 80048 BASIC METABOLIC PNL TOTAL CA: CPT | Performed by: NURSE PRACTITIONER

## 2022-12-09 PROCEDURE — 84100 ASSAY OF PHOSPHORUS: CPT | Performed by: NURSE PRACTITIONER

## 2022-12-09 PROCEDURE — 99233 SBSQ HOSP IP/OBS HIGH 50: CPT | Performed by: CLINICAL NURSE SPECIALIST

## 2022-12-09 PROCEDURE — 83735 ASSAY OF MAGNESIUM: CPT | Performed by: NURSE PRACTITIONER

## 2022-12-09 RX ORDER — HYDROXYZINE HYDROCHLORIDE 25 MG/1
25 TABLET, FILM COATED ORAL EVERY 6 HOURS PRN
Status: DISCONTINUED | OUTPATIENT
Start: 2022-12-09 | End: 2023-01-07 | Stop reason: HOSPADM

## 2022-12-09 RX ORDER — MAGNESIUM SULFATE HEPTAHYDRATE 40 MG/ML
2 INJECTION, SOLUTION INTRAVENOUS ONCE
Status: COMPLETED | OUTPATIENT
Start: 2022-12-09 | End: 2022-12-09

## 2022-12-09 RX ORDER — CEFAZOLIN SODIUM 2 G/100ML
2 INJECTION, SOLUTION INTRAVENOUS EVERY 24 HOURS
Status: COMPLETED | OUTPATIENT
Start: 2022-12-09 | End: 2022-12-09

## 2022-12-09 RX ADMIN — ACETAMINOPHEN 975 MG: 325 TABLET, FILM COATED ORAL at 06:05

## 2022-12-09 RX ADMIN — Medication 133 MG: at 09:22

## 2022-12-09 RX ADMIN — Medication 2 G: at 19:37

## 2022-12-09 RX ADMIN — POLYETHYLENE GLYCOL 3350 17 G: 17 POWDER, FOR SOLUTION ORAL at 08:56

## 2022-12-09 RX ADMIN — FLUTICASONE PROPIONATE 1 SPRAY: 50 SPRAY, METERED NASAL at 08:57

## 2022-12-09 RX ADMIN — ATORVASTATIN CALCIUM 20 MG: 20 TABLET, FILM COATED ORAL at 21:34

## 2022-12-09 RX ADMIN — MAGNESIUM SULFATE IN WATER 2 G: 40 INJECTION, SOLUTION INTRAVENOUS at 20:34

## 2022-12-09 RX ADMIN — BACLOFEN 10 MG: 10 TABLET ORAL at 19:38

## 2022-12-09 RX ADMIN — SERTRALINE HYDROCHLORIDE 100 MG: 50 TABLET ORAL at 09:23

## 2022-12-09 RX ADMIN — GABAPENTIN 300 MG: 300 CAPSULE ORAL at 19:38

## 2022-12-09 RX ADMIN — CETIRIZINE HYDROCHLORIDE 10 MG: 10 TABLET, FILM COATED ORAL at 09:26

## 2022-12-09 RX ADMIN — FLUTICASONE PROPIONATE 1 SPRAY: 50 SPRAY, METERED NASAL at 19:41

## 2022-12-09 RX ADMIN — FINASTERIDE 1.3 MG: 5 TABLET, FILM COATED ORAL at 09:21

## 2022-12-09 RX ADMIN — MIDODRINE HYDROCHLORIDE 10 MG: 5 TABLET ORAL at 02:03

## 2022-12-09 RX ADMIN — CYANOCOBALAMIN TAB 500 MCG 500 MCG: 500 TAB at 09:24

## 2022-12-09 RX ADMIN — ACETAMINOPHEN 975 MG: 325 TABLET, FILM COATED ORAL at 14:02

## 2022-12-09 RX ADMIN — DIAZEPAM 5 MG: 5 TABLET ORAL at 20:58

## 2022-12-09 RX ADMIN — SENNOSIDES AND DOCUSATE SODIUM 1 TABLET: 8.6; 5 TABLET ORAL at 19:38

## 2022-12-09 RX ADMIN — B-COMPLEX W/ C & FOLIC ACID TAB 1 MG 1 TABLET: 1 TAB at 09:25

## 2022-12-09 RX ADMIN — METHOCARBAMOL 500 MG: 500 TABLET ORAL at 19:38

## 2022-12-09 ASSESSMENT — ACTIVITIES OF DAILY LIVING (ADL)
ADLS_ACUITY_SCORE: 46

## 2022-12-09 NOTE — PROGRESS NOTES
Neuro: patient's behavior extremely lablie this shift,  pleasantly confused to time and cooperative alternating with paranoid, angry and uncooperative. TANG, PERRL. Refuses all meds except vitamins and tylenol. Refuses PT/OT. Refuses turns to assess incision or to prevent pressure injury. Neurosurgery notified.  CV: Refused midodrine, but SBP adequate. Pulses weak but palpable.   Pulm: Sats 93% and better on 2 liters nasal cannula.   GI: Pt refuses to eat, refuses laxatives.No BM this shift.   : No void this shift. Refused scheduled dialysis. Plan for HD run tomorrow.   Skin: Drain removed by neurosurgery. Refuses skin assessment.   Continue plan of care.

## 2022-12-09 NOTE — PROGRESS NOTES
Nephrology Progress Note  12/09/2022       Shay Jimenez is a 57 yom with ESRD since 4/2019 due to Ca Phos deposition and HTN, runs at [facility] ([phone], fax [phone]) under care of Dr Cline.  Had planned neurosurgery revision for C5-C6 on 12/6 as screw had dislodged.  Nephrology consulted for management of dialysis post op.      Interval History :   Mr Jimenez had day off HD yesterday, ordered run for today but pt is delirious and refusing most cares including dialysis and there are no urgent issues in his chemistries today.  Will order HD for tomorrow.      Assessment & Recommendations:   ESRD-Since 4/2019 due to Ca Phos deposition and HTN, runs at [facility] ([phone], fax [phone]) under care of Dr Cline.  Runs MWF via [facility], 4.25h runs.                  -Holding on run today as pt is delirious and refusing.                   -Will decide on runs daily depending on chemistries and hemodynamics.                  -Continuing long term HD, no need for new consent.                  -Is eventually pursuing renal transplant through [facility] once acute issues requiring neurosurgery are done.       Volume-EDW 130kg, bed wt 136kg today, pulm status stable despite being a bit up in wt.  Refusing to run today.       Electrolytes-K 4.4 bicarb 24, Na 134, holding on run today.        BMD-Ca 8.9, Mg and Phos WNL.       Neurosurgery-Had planned revision of previous screws at C5-C6 on 12/6.       Anemia-Hgb 8.6 after 2u PRBC's yesterday, giving 8k epo with runs       Nutrition-Deferred.       Time spent: 30 minutes on this date of encounter for chart review, physical exam, medical decision making and co-ordination of care.      Discussed with Dr Marinelli     Recommendations were communicated to primary team via verbal communication.        ENID Shirley CNS  Clinical Nurse Specialist  [phone]    Review of Systems:   I reviewed the following systems:  Gen: No fevers or chills  CV:  "No CP at rest  Resp: No SOB at rest  GI: No N/V    Physical Exam:   I/O last 3 completed shifts:  In: 774 [P.O.:120; I.V.:135]  Out: 107 [Drains:107]   /87   Pulse 88   Temp 97.5  F (36.4  C) (Oral)   Resp 17   Ht 1.854 m (6' 1\")   Wt 136.4 kg (300 lb 11.3 oz)   SpO2 97%   BMI 39.67 kg/m       GENERAL APPEARANCE: alert and no distress, C-collar on.    EYES: No scleral icterus  NECK:  No JVD  Pulmonary: lungs clear to auscultation with equal breath sounds bilaterally, no clubbing or cyanosis  CV: Regular rhythm, normal rate, no rub   - Edema none  GI: soft, nontender, normal bowel sounds  MS: no evidence of inflammation in joints, no muscle tenderness  : No Oliveira  SKIN: no rash, warm, dry  NEURO: mentation intact and speech normal    Labs:   All labs reviewed by me  Electrolytes/Renal -   Recent Labs   Lab Test 12/09/22  0429 12/08/22 0458 12/08/22  0406 12/07/22  0538 12/06/22  2335   * 133*  --  128* 131*   POTASSIUM 4.4 4.6  --  5.2 5.0   CHLORIDE 95* 96*  --  86* 93*   CO2 24 27  --  25 22   BUN 27.4* 18.8  --  26.4* 22.0*   CR 5.68* 4.29*  --  5.54* 4.70*   GLC 84 99 108* 91 103*   MAC 8.9 8.3*  --  7.9* 7.3*   MAG 1.8 1.5*  --   --  1.8   PHOS 3.8 3.5  --   --  4.3       CBC -   Recent Labs   Lab Test 12/09/22  0429 12/08/22  1401 12/08/22  0458   WBC 6.7 7.5 5.7   HGB 8.7* 9.0* 7.9*    128* 103*       LFTs -   Recent Labs   Lab Test 10/06/22  1215 09/29/22  1345 09/22/22  1205 08/18/22  1435 07/21/22  1252 07/16/22  0557 07/11/22  0722 06/29/22  0611 06/24/22  0818   ALKPHOS  --   --   --   --  131 134  --   --  152*   BILITOTAL  --   --   --   --  0.6 0.5  --   --  0.6   ALT  --   --   --   --  9 <6  --   --  55   AST 31 35 41   < > 17 13  --   --  51*   PROTTOTAL  --   --   --   --  6.7* 6.5*  --   --  7.3   ALBUMIN  --   --   --   --  3.0* 2.8* 3.2*   < > 3.3*    < > = values in this interval not displayed.       Iron Panel -   Recent Labs   Lab Test 07/29/22  0600   IRON 44 "   [facility]   JACKELINE 626*           Current Medications:    acetaminophen  975 mg Oral Q8H CUONG     atorvastatin  20 mg Oral At Bedtime     baclofen  10 mg Oral BID     ceFAZolin  2 g Intravenous Q24H     cetirizine  10 mg Oral Daily     cyanocobalamin  500 mcg Oral Daily     finasteride  1.3 mg Oral Daily     fluticasone  1 spray Both Nostrils BID     gabapentin  300 mg Oral TID     magnesium plus protein  133 mg Oral Daily     methocarbamol  500 mg Oral 4x Daily     midodrine  10 mg Oral Q8H CUONG     multivitamin RENAL  1 tablet Oral Daily     pantoprazole  40 mg Oral Daily     polyethylene glycol  17 g Oral Daily     vitamin B6  100 mg Oral Daily     senna-docusate  1 tablet Oral BID     sertraline  100 mg Oral Daily     sevelamer carbonate  1,600 mg Oral TID w/meals     sodium chloride (PF)  3 mL Intracatheter Q8H

## 2022-12-09 NOTE — PROGRESS NOTES
SPIRITUAL HEALTH SERVICES Progress Note  [facility] 4A    I offered follow up visit with Shay per our visit Wednesday. He did not identify any [facility] needs at this time.    Landon Esquivel  Chaplain Resident  Pager [phone]    * [facility] remains available 24/7 for emergent requests/referrals, either by having the switchboard page the on-call  or by entering an ASAP/STAT consult in Epic (this will also page the on-call ). Routine Epic consults receive an initial response within 24 hours.*

## 2022-12-09 NOTE — PROVIDER NOTIFICATION
Patient very lethargic and intermittently following commands at 2000. Concern for patient's ability to take oral medications. NSG notified. NSG came to bedside and patient responded to resident. Oral medications given, gabapentin held per MD.

## 2022-12-09 NOTE — PROGRESS NOTES
[facility] PROGRESS NOTE     Patient:  Shay Jimenez   Date of birth 1965, Medical record number [id number]  Date of Visit:  12/09/2022  Date of Admission: 12/6/2022  Consult Requester:Fritz Boles MD          Assessment and Plan:   ID Problem List  1. S/p redo C5-T6 fusion 12/6/22 c/b hematoma  2. History of T2-T3 discitis/vertebral osteomyelitis vs DJD s/p cervical to thoracic fusion and T2/3 decompression 6/27/22, intraoperative culture x2 with Cutibacterium acnes in broth only, thought likely contamination but completed 2 weeks Ancef and 4 weeks amoxicillin  3. Recent Morganella morganii bacteremia of unclear etiology s/p 8.5 weeks cefepime (8/14/22 - 10/13/22)     RECOMMENDATION:  1. Follow up intraoperative cultures - NGTD  2. No role for antibiotics at this time from ID standpoint.   1. Per neurosurgery team, has continued on cefazolin d/t drain in place. Will defer this to primary team.     ASSESSMENT:  Shay Jimenez is a 57 year old male with past medical history significant for ESRD on HD, alcohol use disorder, previous c/f thoracic spine osteomyelitis vs DJD s/p cervical spinal fusion in June 2022, recent Morganella bacteremia (08/2022, s/p 8.5 wks cefepime) who is admitted since 12/6/22 for redo cervical spinal fusion which was completed 12/6 with cefazolin perioperatively. ID consulted regarding history of osteomyelitis. He has recent admission in June 2022 for upper back pain, weakness, falls, inability to ambulate with radiographic findings concerning for DJD vs inflammatory changes possibly related to discitis/osteomyelitis at T2-T3. He had cervical spine fusion on 6/27/22, and since clinically stable, this was done off antibiotics and intraoperative cultures collected. These were positive on day 5 and day 7 in 2 sets for Cutibacterium acnes that was felt likely contaminant due by infectious disease at the time. However, due to hardware placement did receive  2 weeks IV Ancef followed by 4 weeks amoxicillin. One blood culture during that time with Staph epi attributed to contamination. He was also admitted in 2022 septic shock requiring pressor support and found to have Morganella morganii bacteremia (blood cultures +2 days) of unclear etiology, no evidence clinically or radiographically for other source of infection, was thought to be possibly related to osteomyelitis and he completed 8.5 weeks of IV cefepime in mid-October. Given report of 4 months of diarrhea 2/2 constipation (likely overflow) per patient, consider GI etiology for previous Morganella bacteremia though CT at that time unremarkable. He continued to have neck pain and imaging findings with no acute change from 2022, though loosened screws at C5/C6 for which he was admitted for re-do fusion on . He remains afebrile, no leukocytosis, no report of intraoperative concern for osteomyelitis, cultures negative to date from . Has continued on cefazolin since surgery per neurosurgery team due to drain in place. Will follow up pending cultures. New agitation and delirium on -, CT with 9.5 x 5 cm hematoma noted at surgical site and lung findings c/f aspiration, edema/mucous plugging. He denies any respiratory concerns, is on 2L NC. No evidence of active infection of spine or lungs. No role for antibiotics at this time from ID standpoint. Will defer cefazolin continuing to neurosurgery team.    Infectious diseases will follow peripherally over the weekend. Please page if positive cultures. Dr. Camarillo (pg [phone] ) will be covering the General ID services over the weekend. Dr. Denney (p) will take over the Yellow ID service on Monday.     Patient was discussed with Dr. Pandya. Recommendations discussed with primary team.    Phylicia Roper PA-C  Infectious Diseases  Pager # [phone]    I spent a total of 35 minutes face-to-face and/or coordinating care of Shay Jimenez. Over 50% of  "my time on the unit was spent counseling the patient and/or coordinating care.           Interim History and Events:     Shay remains afebrile, vitals stable, off pressors. No leukocytosis. He is delirious and agitated today. Screaming for \"doctor, doctor only\" and \"drug-drug interactions\". He is worried about gabapentin but cannot clarify anything further. He was able to answer a few ROS questions - denies fever, chills, pain, dyspnea, or cough. He was started on capnography overnight to monitor due to lethargy. Refused dialysis today. CT head unremarkable. CT C/A/P with 9.5 x 5 cm hematoma at cervical surgical site, multiple GGO in lungs c/f mucous plugging, diffuse mild pulmonary edema. Has not had dialysis since Wednesday and no fluid removed at that time.  He is net +4.8L since admission. Has continued on cefazolin since surgery per neurosurgery team due to drain in place.          HPI: from ID consult note dated 12/7   Shay Jimenez is a 57 year old male with past medical history significant for HTN, HLD, ESRD on HD, GERD, GINI, obesity, alcohol use disorder, previous c/f thoracic spine osteomyelitis s/p spinal fusion in June 2022, recent Morganella bacteremia (08/2022) who is admitted since 12/6/22 for redo cervical spinal fusion.      He had CTA chest in 02/2022 for dyspnea due to volume overload that noted discitis vs osteomyelitis of thoracic spine. Follow up after this is reported by ID note 6/25/22 - ultimately pt had done ok with rest, later had follow up with orthopedic provider and had MRI notable for fracture of thoracic spine. He was admitted in June 2022 for 6 months of upper back pain, with weakness, falls, and inability to ambulate. He denied fever, chills. CT and thoracic MRI 6/24/22 notable for collapse of T2-T3 vertebral bodies with abnormal enhancement, and thickened abnormal epidural tissues c/f inflammed epidural tissues and paraspinal inflammatory changes c/f DJD but unable to rule out " epidural phlegmon abscess c/f discitis and osteomyelitis, and with severe spinal canal stenosis at T2,T3 c/f compressive myelopathy. Antibiotics were deferred since clinically stable and planned for OR with cultures to be collected. On 6/27/22 he had cervical 6 to thoracic 6 fusion and decompression of thoracic 2-3, operative note with degenerative joint disease changes and intraop cultures sent. He was treated with Ancef postoperatively for 14 days. Spine tissue culture was positive in two cultures on day 5 and day 7 in broth only for C. acnes that was thought likely to be contaminant. However, he had new hardware placed during surgery and was found in two cultures, he was started on amoxicillin 500 mg daily (dose adjusted for HD) for 4 weeks after being on Ancef for 2 weeks. Blood culture on 7/5 (collected when afebrile, on Ancef) positive in 1/2 bottle from only 1 set for Staph epi was considered due to contamination.     He was previously admitted 8/10/22 for septic shock requiring pressors, found to have Morganella morganii of unclear etiology , thought related to progressive T2-3 discitis/osteomyelitis. No intraabdominal source on imaging, TTE negative for endocarditis, no other reported clinical or radiographic signs of infection. He had positive blood culture on 8/10 x2 (reports rigors then, no fever) and again in 1/2 sets on 8/11, negative since 8/13. Resistant to ampicillin, cefazolin, intermediate ceftriaxone, cefepime/Zosyn/carbapenem/FQ sensitive. He was treated with IV cefepime from 8/14/22 - 10/13/22 for almost 9 weeks of IV antibiotics.     CTA chest angio 8/10/22 with progressive bony destruction and c/f discitis/osteomyelitis at T2/3. CT Cervical spine 10/10/22 with no acute finding or change since 8/11/22 - with stable degenerative changes, loosening and retropulsion of screws at C5/C6. MRI lumbar spine same day with stable degenerative changes, no report of discitis/osteomyelitis on imaging at  that time. Repeat imaging on 11/3 with stable screw loosening, stable erosive changes at T2-T3. He was found to have hardware failure with screw pullout at C5/C6 and pseudoarthrosis, admitted 12/6/22 for re-do cervical spinal fusion C5-T6 with fortino placement. Had hemorrhage intraoperatively, s/p 2 units RBC, cerebral angiogram negative for vertebral artery injury. Intraoperative cultures were taken and remain pending at this time. Per neurosurgery, no evidence of osteomyelitis reported intraoperatively. Does have significant amount of original hardware retained. He received cefazolin for perioperative antibiotic. He remains in ICU on pressor x1. He remains afebrile, without leukocytosis.      He denies IV drug use or injection medications. He has no open sores, cuts. He reports continued neck pain prior to admission. Denies fever, chills, rigors, unintentional weight loss, nausea, vomiting, abdominal pain, diarrhea, rash, or urinary symptoms. He denies any previous episodes of bacteremia prior to this year. He has LUE fistula and dialysis in place.            ROS:   -Focused 5 point ROS completed, pertinent positives and negatives listed above.      Physical Examination:  Temp: 97.5  F (36.4  C) Temp src: Oral BP: 121/84 Pulse: 96   Resp: 23 SpO2: 95 % O2 Device: Nasal cannula Oxygen Delivery: 2 LPM    Vitals:    12/06/22 0729 12/07/22 0600 12/09/22 0000   Weight: 128.4 kg (283 lb 1.1 oz) 136.1 kg (300 lb 0.7 oz) 136.4 kg (300 lb 11.3 oz)       Constitutional: Agitated adult male seen sitting up in bed, in NAD. Alert and interactive. No C-collar in place.  HEENT: NCAT, anicteric sclerae, conjunctiva clear.   Respiratory: Non-labored breathing, good air exchange. 2L NC. No cough noted.   Cardiovascular: Regular rate and rhythm  GI:  Abdomen is obese.   Skin: No lesions on exposed surfaces.+peripheral vascular changes  Musculoskeletal: 2+ edema present.   Neurologic: Unable to assess orientation today due to agitation.  Moves all extremities spontaneously.   VAD: PIV is c/d/i with no erythema, drainage, or tenderness. LUE fistula.       Medications:    acetaminophen  975 mg Oral Q8H CUONG     atorvastatin  20 mg Oral At Bedtime     baclofen  10 mg Oral BID     ceFAZolin  2 g Intravenous Q24H     cetirizine  10 mg Oral Daily     cyanocobalamin  500 mcg Oral Daily     finasteride  1.3 mg Oral Daily     fluticasone  1 spray Both Nostrils BID     gabapentin  300 mg Oral TID     magnesium plus protein  133 mg Oral Daily     methocarbamol  500 mg Oral 4x Daily     midodrine  10 mg Oral Q8H CUONG     multivitamin RENAL  1 tablet Oral Daily     pantoprazole  40 mg Oral Daily     polyethylene glycol  17 g Oral Daily     vitamin B6  100 mg Oral Daily     senna-docusate  1 tablet Oral BID     sertraline  100 mg Oral Daily     sevelamer carbonate  1,600 mg Oral TID w/meals     sodium chloride (PF)  3 mL Intracatheter Q8H       Infusions/Drips:      Laboratory Data:   No results found for: ACD4    Inflammatory Markers    Recent Labs   Lab Test 12/06/22  0756 10/06/22  1215 10/03/22  1500 09/29/22  1345 09/22/22  1205 09/15/22  1425 09/12/22  1320 09/08/22  1330 09/01/22  1300 08/25/22  1300 08/18/22  1435   SED  --  31*  --  31*  --  25*  --  27*  --  34* 41*   CRP 37.60* 43.70* 42.6* 53.5* 32.90* 29.7* 9.4* 30.3*   < > 39.2* 14.6*    < > = values in this interval not displayed.       Metabolic Studies       Recent Labs   Lab Test 12/09/22  0429 12/08/22  0458 12/08/22  0406 12/07/22  1400 12/07/22  0538 12/06/22  2335 12/06/22  2246 12/06/22  1817 12/06/22  1409 12/06/22  1305 12/06/22  0756 10/11/22  0017 10/10/22  1053   * 133*  --   --  128* 131*  --  129* 130*   < > 132*  --  121*   POTASSIUM 4.4 4.6  --   --  5.2 5.0  --  5.9* 4.9   < > 4.0  --  3.3*   CHLORIDE 95* 96*  --   --  86* 93*  --   --   --   --  87*  --  79*   CO2 24 27  --   --  25 22  --   --   --   --  25  --  18*   ANIONGAP 15 10  --   --  17* 16*  --   --   --   --  20*   --  24*   BUN 27.4* 18.8  --   --  26.4* 22.0*  --   --   --   --  17.4  --  19.3   CR 5.68* 4.29*  --   --  5.54* 4.70*  --   --   --   --  4.66*  --  7.66*   GFRESTIMATED 11* 15*  --   --  11* 14*  --   --   --   --  14*  --  8*   GLC 84 99 108*  --  91 103* 127* 161* 129*   < > 87   < > 87   A1C  --   --   --  5.6  --   --   --   --   --   --   --   --   --    MAC 8.9 8.3*  --   --  7.9* 7.3*  --   --   --   --  9.7  --  8.4*   PHOS 3.8 3.5  --   --   --  4.3  --   --   --   --   --   --   --    MAG 1.8 1.5*  --   --   --  1.8  --   --   --   --   --   --   --    LACT  --   --   --   --   --   --   --  3.4* 2.0   < >  --   --   --     < > = values in this interval not displayed.       Hepatic Studies    Recent Labs   Lab Test 10/06/22  1215 09/29/22  1345 09/22/22  1205 09/15/22  1425 09/08/22  1330 09/01/22  1300 08/18/22  1435 07/21/22  1252 07/16/22  0557 07/11/22  0722 06/29/22  0611 06/24/22  0818   BILITOTAL  --   --   --   --   --   --   --  0.6 0.5  --   --  0.6   ALKPHOS  --   --   --   --   --   --   --  131 134  --   --  152*   ALBUMIN  --   --   --   --   --   --   --  3.0* 2.8* 3.2* 2.9* 3.3*   AST 31 35 41 38 33 22   < > 17 13  --   --  51*   ALT  --   --   --   --   --   --   --  9 <6  --   --  55    < > = values in this interval not displayed.       Pancreatitis testing    No lab results found.    Hematology Studies      Recent Labs   Lab Test 12/09/22  0429 12/08/22  1401 12/08/22  0458 12/07/22  2013 12/07/22  1400 12/07/22  0538   WBC 6.7 7.5 5.7 5.0 5.3 8.0   HGB 8.7* 9.0* 7.9* 7.7* 7.5* 7.6*   HCT 27.2* 28.3* 24.2* 23.5* 23.4* 23.6*    128* 103* 109* 114* 175       Arterial Blood Gas Testing    Recent Labs   Lab Test 12/06/22  1817 12/06/22  1409 12/06/22  1305 06/27/22  2224 06/27/22 2006   PH 7.43 7.46* 7.46* 7.43 7.46*   PCO2 40 44 43 41 38   PO2 166* 296* 403* 335* 152*   HCO3 27 31* 31* 27 27   O2PER 49.0 90.0 93.0  --   --        Microbiology:  Culture   Date Value Ref Range  Status   12/06/2022 No anaerobic organisms isolated after 2 days  Preliminary   12/06/2022 No growth after 2 days  Preliminary   07/06/2022 No Growth  Final   07/06/2022 No Growth  Final   07/05/2022 Positive on the 1st day of incubation (A)  Final   07/05/2022 Staphylococcus epidermidis (AA)  Final     Comment:     1 of 1 bottles   07/04/2022 No Growth  Final   07/03/2022 No Growth  Final   07/02/2022 No Growth  Final   07/01/2022 No Growth  Final   06/30/2022 No Growth  Final   06/29/2022 No Growth  Final   06/28/2022 No Growth  Final   06/27/2022 (A)  Final    Isolated in broth only Cutibacterium (Propionibacterium) acnes     Comment:     On day 7 of incubation    Susceptibility testing of Cutibacterium (Propionibacterium) species is not done from this source. This organism is susceptible to penicillin and cefotaxime and most are susceptible to clindamycin.   06/27/2022 (A)  Final    Isolated in broth only Cutibacterium (Propionibacterium) acnes     Comment:     On day 5 of incubation  Susceptibility testing of Cutibacterium (Propionibacterium) species is not done from this source. This organism is susceptible to penicillin and cefotaxime and most are susceptible to clindamycin.   06/27/2022 No Growth  Final   06/27/2022 No Growth  Final   06/26/2022 No Growth  Final   06/24/2022 No Growth  Final   06/24/2022 No Growth  Final       Imaging:  CT Head 12/8/22                                                                 Impression:  No acute intracranial pathology.     CT C/A/P 12/8/22    IMPRESSION:  1.  There is a partially visualized hematoma in the subcutaneous soft  tissues overlying the posterior cervicothoracic junction subjacent to  the staple line measuring 9.5 x 5 cm in axial dimension. Post surgical  changes at C5-T6 posterior fortino instrumented fusion with multilevel  bilateral pedicle screws. No evidence of hardware failure.  2.  Multifocal groundglass and tree in bud nodular densities in all 5  lobes  as well as peripheral mucous plugging may suggest aspiration or  atypical infection. There is diffuse mild pulmonary edema, small  pleural effusions and compressive atelectasis.  3.  Atrophic native kidneys with acquired cystic disease,  nonobstructing punctate calculus in the inferior pole region left  kidney. Indeterminate exophytic cystic mass lesion of the inferior  pole region right kidney measuring up to 1.8 cm. Recommend nonemergent  follow-up dedicated renal mass protocol MR or CT.    Abd Xray 12/7/22 -  Impression: Air-filled distended small and large bowel loops in the central and left abdomen, which may represent early or developing  adynamic ileus.    CT Thoracic and Cervical Spine 11/3/22  Impression:      1. Postsurgical changes of C5-T6 fortino/screw fixation. When compared to  the 10/10/2012 exams, there is stable transpedicular screw loosening  and retraction at C5 and C6.   2. Stable erosive changes in the T2, T3 sequela of patient's known  osteomyelitis. Unchanged T11 hemangioma.     MRI Lumbar Spine 10/10/22  IMPRESSION:  1.  Stable degenerative changes of the lumbar spine without a significant interval change.  2.  No high-grade canal stenosis.  3.  Bilateral neural foraminal narrowing is greatest on the left at L5-S1, where there is moderate stenosis.     CT Thoracic and Cervical Spine 10/10/22                                                                 Impression:   1. No acute finding or significant change since 8/11/2022.  2. Unchanged loosening and retropulsion of the transpedicle screws  bilaterally at C5 and C6.   3. Unchanged T2 and T3 vertebral body sclerosis and height loss.  4. Stable degenerative changes of the cervical spine, most  significantly resulting in severe neural foraminal stenosis on the  left C3-4 and moderate neural foraminal stenosis on the right at C3-4  and C4-5.     MRI Thoracic Spine 6/24/22  IMPRESSION: Postcontrast images demonstrate abnormal contrast  enhancement  of the T2 and T3 vertebrae, thickened abnormal epidural  tissues from C7-T1 down to T4-T5 possibly simply representing inflamed  epidural tissues but epidural abscess cannot be excluded. Abnormal  contrast enhancement in the bilateral lateral and anterior paraspinous  soft tissues from T1 down to T4 consistent with a paraspinous  phlegmon/abscess again noted. Moderate compression of the thoracic  spinal cord at the upper T2 level again noted.

## 2022-12-09 NOTE — PLAN OF CARE
Major Shift Events: Decreasing alertness overnight, responding to only noxious stimuli at 0000, see provider notification notes for more details. Patient after 0100: AO x4, no neuro deficits apart from baseline neuropathy in bilateral feet. Patient in SR, with softer pressures, NSG aware. Patient on 2L NC overnight, capnography started based off of recommendation from NSG. Regular diet, oliguric d/t dialysis MWF.    Plan: Plan for HD today. Continue to work with PT/OT to increase mobility. Pt appropriate to transfer out of ICU today. Manage pain.    For vital signs and complete assessments, please see documentation flowsheets.          Goal Outcome Evaluation:      Plan of Care Reviewed With: patient    Overall Patient Progress: improving

## 2022-12-09 NOTE — PROGRESS NOTES
[facility], [place]   Neurosurgery Progress Note:    Assessment: Shay Jimenez is a 57 year old male with with PMH ESRD on dialysis and osteomyelitis s/p C5-T6 fusion who is now POD#3 s/p redo C5-T6 fusion with concern for hemorrhage; no vertebral artery dissection or extravasation was noted on intraoperative angiogram.  He was admitted to the Neuro ICU postoperatively for MAP goals and frequent neuro checks, remaining stable on pressors.  Neuro exam is unchanged from patient's baseline.    Plan:  - Serial neuro exams  - Pain control  - Hb goal > 7  - Normonatremia  - Monitor HV output-potential removal of Hemovac drain today  - Goal normotension  - Appreciate Nephrology recommendations re: dialysis (baseline M/W/F)  - Will give 2 g of magnesium IV   - Cervical collar at all times  - Advance diet as tolerated  - Bowel regimen  - PRN antiemetics  - IVF until taking adequate PO  - PT/OT  - SCDs for DVT proph  - Okay to transfer to the floor  -----------------------------------  Jonny Stephenson  Neurosurgery Resident PGY2    Interval History/Subjective: Fluctuating exams overnight with significant improvement in the morning when he was more alert and awake.  Reports improvement in the neck and back pain.    Objective:   Temp:  [97.5  F (36.4  C)-98.7  F (37.1  C)] 97.5  F (36.4  C)  Pulse:  [73-96] 96  Resp:  [11-23] 23  BP: ()/(51-87) 121/84  SpO2:  [88 %-100 %] 95 %  I/O last 3 completed shifts:  In: 774 [P.O.:120; I.V.:135]  Out: 107 [Drains:107]    Gen: Appears comfortable, NAD  Wound: Incision, clean, dry, intact without strikethrough  Neurologic:  - Alert & Oriented to person, place, time, and situation  - Follows commands briskly  - Speech fluent, spontaneous. No aphasia or dysarthria.  - No gaze preference. No apparent hemineglect.  - PERRL, EOMI  - Strong brow raise, eye closure, and smile  - Face symmetric with sensation intact to light touch  - Trapezii and  sternocleidomastoid muscles 5/5 bilaterally  - No pronator drift     Del Tr Bi WE WF Gr   R 4* 5 5 5 5 5   L 4* 5 5 5 5 5    HF KE KF DF PF EHL   R 4+ 5 5 5 5 5   L 4+ 5 5 5 5 5   *pain-limited    Norman's and clonus negative.    Sensation intact and symmetric to light touch throughout    LABS  Recent Labs   Lab Test 12/09/22  0429 12/08/22  1401 12/08/22  0458   WBC 6.7 7.5 5.7   HGB 8.7* 9.0* 7.9*   MCV 97 97 97    128* 103*       Recent Labs   Lab Test 12/09/22  0429 12/08/22  0458 12/08/22  0406 12/07/22  0538   * 133*  --  128*   POTASSIUM 4.4 4.6  --  5.2   CHLORIDE 95* 96*  --  86*   CO2 24 27  --  25   BUN 27.4* 18.8  --  26.4*   CR 5.68* 4.29*  --  5.54*   ANIONGAP 15 10  --  17*   MAC 8.9 8.3*  --  7.9*   GLC 84 99 108* 91       IMAGING  Recent Results (from the past 24 hour(s))   CT Chest/Abdomen/Pelvis w Contrast    Narrative    EXAMINATION: CT CHEST/ABDOMEN/PELVIS W CONTRAST, 12/8/2022 5:20 PM    TECHNIQUE: Helical CT images from the thoracic inlet through the  symphysis pubis were obtained with intravenous contrast. Contrast  dose: iopamidol (ISOVUE-370) solution 135 mL    COMPARISON: 11/3/2022    HISTORY: persistently low hemoglobin despite blood resuscitation, s/p  C5-T6 fusion with c/f hemorrhage    FINDINGS:    Lower neck: Visualized portions of the lower neck and thyroid gland  are unremarkable.    Chest:   Heart/ Mediastinum: Heart is borderline enlarged. No evidence of  central pulmonary embolism. No bulky lymphadenopathy.  Esophagus  appears normal.   Lungs/pleura: Mild tracheomalacia. Scattered peripheral mucous  plugging. Multifocal groundglass and tree in bud nodular densities in  all 5 lobes. Pleural effusions and compressive atelectasis. No  suspicious solid pulmonary nodules.. No pneumothorax.   Chest wall/axilla: No bulky lymphadenopathy..    Abdomen and pelvis:  Hepatobiliary: Normal size. No focal lesions.  No intra or  extrahepatic biliary dilatation.. The gall bladder  is without wall  thickening or pericholecystic fluid. A few small calcified layering  gallstones. Extrahepatic biliary system within normal limits..    Pancreas: Mildly atrophic without suspicious focal lesion or ductal  dilatation.    Spleen: Mild splenomegaly. No focal lesions.    Adrenals: Normal.    Kidneys: Markedly atrophic bilateral native kidneys with acquired  cystic disease. No hydronephrosis, obstructing calculi. Upper  extremity related streak artifact limits evaluation of multiple  exophytic cystic lesions. Nonobstructing punctate inferior pole left  renal calculus. Indeterminant exophytic cystic mass lesion measuring  up to 1.8 cm of the inferior right kidney (series 1 image 7).  Hemorrhagic superior pole left kidney cyst.  Urinary bladder: Decompressed with symmetrical thickening, small  amount of perivesicular inflammatory fat stranding  Reproductive organs: Prostate and seminal vesicles are unremarkable.    Gastrointestinal: The stomach and duodenum are unremarkable. Small and  large bowel are normal in caliber and without abnormal wall  thickening. Colonic diverticulosis without acute diverticulitis.  Mesentery/Peritoneum: No ascites or pneumoperitoneum.    Lymph nodes: No lymphadenopathy.  Vasculature: No aortic aneurysm.     Bones and soft tissues: Postsurgical changes of C5-T6 posterior fortino  instrumented fusion with multilevel bilateral pedicle screws. There is  a soft tissue hematoma subjacent to the staple line which is partially  visualized overlying the cervicothoracic junction measuring up to  approximately 9.5 x 5 cm in axial dimension. Soft tissue anasarca.      Impression    IMPRESSION:  1.  There is a partially visualized hematoma in the subcutaneous soft  tissues overlying the posterior cervicothoracic junction subjacent to  the staple line measuring 9.5 x 5 cm in axial dimension. Post surgical  changes at C5-T6 posterior fortino instrumented fusion with multilevel  bilateral pedicle  screws. No evidence of hardware failure.  2.  Multifocal groundglass and tree in bud nodular densities in all 5  lobes as well as peripheral mucous plugging may suggest aspiration or  atypical infection. There is diffuse mild pulmonary edema, small  pleural effusions and compressive atelectasis.  3.  Atrophic native kidneys with acquired cystic disease,  nonobstructing punctate calculus in the inferior pole region left  kidney. Indeterminate exophytic cystic mass lesion of the inferior  pole region right kidney measuring up to 1.8 cm. Recommend nonemergent  follow-up dedicated renal mass protocol MR or CT.    I have personally reviewed the examination and initial interpretation  and I agree with the findings.    DELIA MENDOZA MD         SYSTEM ID:  [id number]   CT Head w/o Contrast    Narrative    CT HEAD W/O CONTRAST 12/8/2022 5:21 PM    History: AMS post-op     Comparison: CT head 6/26/2022    Technique: Using multidetector thin collimation helical acquisition  technique, axial, coronal and sagittal CT images from the skull base  to the vertex were obtained without intravenous contrast.   (topogram) image(s) also obtained and reviewed.    Findings: There is no intracranial hemorrhage, mass effect, or midline  shift. Gray/white matter differentiation in both cerebral hemispheres  is preserved. Ventricles are proportionate to the cerebral sulci. The  basal cisterns are clear.    The bony calvaria and the bones of the skull base are normal. The  visualized portions of the paranasal sinuses and mastoid air cells are  clear.      Impression    Impression:  No acute intracranial pathology.     I have personally reviewed the examination and initial interpretation  and I agree with the findings.    NADJA CHOWDHURY MD         SYSTEM ID:  [id number]

## 2022-12-09 NOTE — PROGRESS NOTES
Brief Neurosurgery Update    The cervical spinal Hemovac was removed.    First the bandage which was covering the incision and drain were removed. Next the suture securing the drain was cut at the level of the skin. The suture was then completely removed from the skin and disposed of. Next the drain was relieved of it suction by releasing the bulb's valve. Then the drain was removed from the wound with slow steady pressure. There was no resistance removing the drain. The entirety of the drain was removed from the wound. There was scant serosanguinous drainage from the drain site which was controlled with light direct pressure. Finally a bandage was applied over the incision and drain site.     There were no complications.    Herberth Alicia MD PGY1  Plastic and Reconstructive Surgery  Neurosurgery

## 2022-12-09 NOTE — PROGRESS NOTES
Neurocritical Care Progress Note    Reason for critical care admission: Post op-C5-T6 fusion  Admitting Team: [facility]INO  Date of Service:  12/09/2022  Date of Admission:  12/6/2022  Hospital Day: 4    Assessment/Plan  Shay Jimenez is a 57 year old male with a past medical history of ESRD on dialysis and osteomyelitis s/p C5-T6 fusion admitted on 12/6/2022 for redo C5-T6 fusion.    24 hour events: POD#3. Tearful refusing cares 12/8. Was upset about about recent end of marriage. Doing better today, and participating with cares. Plan for tx to floor.      Neuro  #Redo C5-T6 Fusion POD#3  -No extravasation on angiogram  -Neurochecks every 4 hrs  -MAP goal >65 mmHg  -HOB > 30   -PT/OT/SLP  -C-collar while in bed; ok to take off if HOB is at 45    #Analgesics & sedation  RASS goal:0  -dilaudid 0.2-0.4mg q2  -oxy 5-10mg PRN q3    -Refused Psych consult  - to see pt.     CV  #Hypotension-resolved  -Cardiac monitoring  -MAP goal >65 mmHg  -Phenylephrine gtt-weaning  -Midodrine 10mg q8 for Dialysis    Resp  #POLO  Oxygen/vent:[place] air  -Continuous pulse ox  -Maintain O2 saturations greater than 92%    GI  #diarrhea   Diet:Regular Diet  Last BM: 12/9  GI prophylaxis:none  -Bowel regimen: scheduled senna-docusate and Miralax  -ABD xray-possible developing ileus. Will continue bowel meds despite loose stools.     Renal/  #ESRD  #Chronic Hyponatremia  #Hypochloremia  -Nephrology consulted  -dialysis PRN  -Daily BMP  -IV fluids: none  -Electrolyte replacement protocol    Endo  #POLO  -Hgb A1c 5.6  -Monitor glucose levels    Heme  #Acute blood loss anemia  -EBL in OR 2.5 liters  -4 units PRBC's total  -1 unit FFP  -1 unit of PLT's  -No dissection or extravasation on angiogram.   -Daily CBC  -Hgb goal >7, plt goal >50k  -Transfuse to meet Hgb and plt goals  -CT CAP showing subcutaneous hematoma at surgical site.     ID  #Osteomylytis Hx  -ID consulted per NSGY  -Daily CBC  -Follow temperature curve  -Afebrile  -No  leukocytosis  -No ABX at this time per ID  -Ancef per NSGY team for drain ppx    ICU Checklist  Lines/tubes/drains: Fistula, PIV  FEN: none, prn replacement, regular diet  PPX: DVT - SCDs; GI - protonix.  Code:Full  Dispo: ICU - NCC      TIME SPENT ON THIS ENCOUNTER   I spent 38 minutes of critical care time on the unit/floor managing the care of Shay Jimenez. Upon evaluation, this patient had a high probability of imminent or life-threatening deterioration due to acute blood loss anemia requiring close hemodynamic monitoring, which required my direct attention, intervention, and personal management. Greater than 50% of my time was spent at the bedside counseling the patient and/or coordinating care regarding services listed in this note.    The patient was discussed with the [facility] attending, Dr. Arrieta.    Phill Downs, CNP  NCC *[id number]    24 Hour Vital Signs Summary:  Temp: 97.5  F (36.4  C) Temp  Min: 97.5  F (36.4  C)  Max: 98.5  F (36.9  C)  Resp: 15 Resp  Min: 10  Max: 23  SpO2: 92 % SpO2  Min: 88 %  Max: 99 %  Pulse: 89 Pulse  Min: 74  Max: 96    No data recorded  BP: 121/84 Systolic (24hrs), Av , Min:86 , Max:122   Diastolic (24hrs), Av, Min:51, Max:87        Respiratory monitoring:   Resp: 15      I/O last 3 completed shifts:  In: 774 [P.O.:120; I.V.:135]  Out: 107 [Drains:107]    Current Medications:    acetaminophen  975 mg Oral Q8H CUONG     atorvastatin  20 mg Oral At Bedtime     baclofen  10 mg Oral BID     ceFAZolin  2 g Intravenous Q24H     cetirizine  10 mg Oral Daily     cyanocobalamin  500 mcg Oral Daily     finasteride  1.3 mg Oral Daily     fluticasone  1 spray Both Nostrils BID     gabapentin  300 mg Oral TID     magnesium plus protein  133 mg Oral Daily     methocarbamol  500 mg Oral 4x Daily     midodrine  10 mg Oral Q8H CUONG     multivitamin RENAL  1 tablet Oral Daily     pantoprazole  40 mg Oral Daily     polyethylene glycol  17 g Oral Daily     vitamin B6  100 mg Oral Daily      "senna-docusate  1 tablet Oral BID     sertraline  100 mg Oral Daily     sevelamer carbonate  1,600 mg Oral TID w/meals     sodium chloride (PF)  3 mL Intracatheter Q8H       PRN Medications:  sodium chloride 0.9%, acetaminophen, sore throat, bisacodyl, calcium carbonate, diazepam, HYDROmorphone **OR** HYDROmorphone, hydrOXYzine, lidocaine 4%, lidocaine (buffered or not buffered), magnesium hydroxide, naloxone **OR** naloxone **OR** naloxone **OR** naloxone, ondansetron **OR** ondansetron, oxyCODONE **OR** oxyCODONE, prochlorperazine **OR** prochlorperazine, sodium chloride (PF)    Infusions:      Allergies   Allergen Reactions     Amlodipine      Lisinopril        Physical Examination:  Vitals: /84   Pulse 89   Temp 97.5  F (36.4  C) (Oral)   Resp 15   Ht 1.854 m (6' 1\")   Wt 136.4 kg (300 lb 11.3 oz)   SpO2 92%   BMI 39.67 kg/m    General: Adult male patient, lying in bed, critically-ill  HEENT: Normocephalic, c-collar, no icterus, oral cavity/oropharynx pink and moist  Cardiac: RRR, s1/s2 auscultated without murmur  Pulm: CTAB, unlabored, expansion symmetric, no retractions or use of accessory muscles  Abdomen: Soft, non-distended, bowel sounds present  Extremities: Warm, generalized edema, distal pulses +2, well perfused  Skin: No rash or lesion  Psych: Calm and cooperative now, previously tearful.  Neuro:  Mental status: Awake, alert, attentive, oriented to self, time, place, and circumstance. Language is fluent and coherent with intact comprehension of complex commands, naming and repetition.  Cranial nerves: VFF, PERRL, conjugate gaze, EOMI, facial sensation intact, face symmetric, shoulder shrug strong, tongue midline, no dysarthria.   Motor: Normal bulk and tone. No abnormal movements. 5/5 strength in 4/4 extremities.   Sensory: Intact to light touch x 4 extremities  Coordination: FNF and HS without ataxia or dysmetria. Rapid alternating movements intact.   Gait: MARIA ESTHER, " deferred.      Labs:  Recent Labs   Lab 12/09/22  0429 12/08/22  0458 12/07/22  0538 12/06/22  2335   * 133* 128* 131*   POTASSIUM 4.4 4.6 5.2 5.0   CHLORIDE 95* 96* 86* 93*   CO2 24 27 25 22   BUN 27.4* 18.8 26.4* 22.0*   CR 5.68* 4.29* 5.54* 4.70*   MAC 8.9 8.3* 7.9* 7.3*       Recent Labs   Lab 12/09/22  0429 12/08/22  1401 12/08/22  0458 12/07/22 2013   WBC 6.7 7.5 5.7 5.0   HGB 8.7* 9.0* 7.9* 7.7*    128* 103* 109*       Recent Labs   Lab 12/06/22  1817 12/06/22  1409 12/06/22  1305   PH 7.43 7.46* 7.46*   PCO2 40 44 43   PO2 166* 296* 403*   HCO3 27 31* 31*       All cultures:  Recent Labs   Lab 12/06/22  1815   CULTURE No growth after 2 days  No anaerobic organisms isolated after 2 days       Imaging:    Results for orders placed or performed during the hospital encounter of 12/06/22 (from the past 24 hour(s))   CBC with platelets differential    Narrative    The following orders were created for panel order CBC with platelets differential.  Procedure                               Abnormality         Status                     ---------                               -----------         ------                     CBC with platelets and d...[[id number]]  Abnormal            Final result                 Please view results for these tests on the individual orders.   CBC with platelets and differential   Result Value Ref Range    WBC Count 7.5 4.0 - 11.0 10e3/uL    RBC Count 2.92 (L) 4.40 - 5.90 10e6/uL    Hemoglobin 9.0 (L) 13.3 - 17.7 g/dL    Hematocrit 28.3 (L) 40.0 - 53.0 %    MCV 97 78 - 100 fL    MCH 30.8 26.5 - 33.0 pg    MCHC 31.8 31.5 - 36.5 g/dL    RDW 19.1 (H) 10.0 - 15.0 %    Platelet Count 128 (L) 150 - 450 10e3/uL    % Neutrophils 80 %    % Lymphocytes 8 %    % Monocytes 6 %    % Eosinophils 4 %    % Basophils 1 %    % Immature Granulocytes 1 %    NRBCs per 100 WBC 0 <1 /100    Absolute Neutrophils 6.1 1.6 - 8.3 10e3/uL    Absolute Lymphocytes 0.6 (L) 0.8 - 5.3 10e3/uL    Absolute  Monocytes 0.5 0.0 - 1.3 10e3/uL    Absolute Eosinophils 0.3 0.0 - 0.7 10e3/uL    Absolute Basophils 0.1 0.0 - 0.2 10e3/uL    Absolute Immature Granulocytes 0.0 <=0.4 10e3/uL    Absolute NRBCs 0.0 10e3/uL   CT Chest/Abdomen/Pelvis w Contrast    Narrative    EXAMINATION: CT CHEST/ABDOMEN/PELVIS W CONTRAST, 12/8/2022 5:20 PM    TECHNIQUE: Helical CT images from the thoracic inlet through the  symphysis pubis were obtained with intravenous contrast. Contrast  dose: iopamidol (ISOVUE-370) solution 135 mL    COMPARISON: 11/3/2022    HISTORY: persistently low hemoglobin despite blood resuscitation, s/p  C5-T6 fusion with c/f hemorrhage    FINDINGS:    Lower neck: Visualized portions of the lower neck and thyroid gland  are unremarkable.    Chest:   Heart/ Mediastinum: Heart is borderline enlarged. No evidence of  central pulmonary embolism. No bulky lymphadenopathy.  Esophagus  appears normal.   Lungs/pleura: Mild tracheomalacia. Scattered peripheral mucous  plugging. Multifocal groundglass and tree in bud nodular densities in  all 5 lobes. Pleural effusions and compressive atelectasis. No  suspicious solid pulmonary nodules.. No pneumothorax.   Chest wall/axilla: No bulky lymphadenopathy..    Abdomen and pelvis:  Hepatobiliary: Normal size. No focal lesions.  No intra or  extrahepatic biliary dilatation.. The gall bladder is without wall  thickening or pericholecystic fluid. A few small calcified layering  gallstones. Extrahepatic biliary system within normal limits..    Pancreas: Mildly atrophic without suspicious focal lesion or ductal  dilatation.    Spleen: Mild splenomegaly. No focal lesions.    Adrenals: Normal.    Kidneys: Markedly atrophic bilateral native kidneys with acquired  cystic disease. No hydronephrosis, obstructing calculi. Upper  extremity related streak artifact limits evaluation of multiple  exophytic cystic lesions. Nonobstructing punctate inferior pole left  renal calculus. Indeterminant exophytic  cystic mass lesion measuring  up to 1.8 cm of the inferior right kidney (series 1 image 7).  Hemorrhagic superior pole left kidney cyst.  Urinary bladder: Decompressed with symmetrical thickening, small  amount of perivesicular inflammatory fat stranding  Reproductive organs: Prostate and seminal vesicles are unremarkable.    Gastrointestinal: The stomach and duodenum are unremarkable. Small and  large bowel are normal in caliber and without abnormal wall  thickening. Colonic diverticulosis without acute diverticulitis.  Mesentery/Peritoneum: No ascites or pneumoperitoneum.    Lymph nodes: No lymphadenopathy.  Vasculature: No aortic aneurysm.     Bones and soft tissues: Postsurgical changes of C5-T6 posterior fortino  instrumented fusion with multilevel bilateral pedicle screws. There is  a soft tissue hematoma subjacent to the staple line which is partially  visualized overlying the cervicothoracic junction measuring up to  approximately 9.5 x 5 cm in axial dimension. Soft tissue anasarca.      Impression    IMPRESSION:  1.  There is a partially visualized hematoma in the subcutaneous soft  tissues overlying the posterior cervicothoracic junction subjacent to  the staple line measuring 9.5 x 5 cm in axial dimension. Post surgical  changes at C5-T6 posterior fortino instrumented fusion with multilevel  bilateral pedicle screws. No evidence of hardware failure.  2.  Multifocal groundglass and tree in bud nodular densities in all 5  lobes as well as peripheral mucous plugging may suggest aspiration or  atypical infection. There is diffuse mild pulmonary edema, small  pleural effusions and compressive atelectasis.  3.  Atrophic native kidneys with acquired cystic disease,  nonobstructing punctate calculus in the inferior pole region left  kidney. Indeterminate exophytic cystic mass lesion of the inferior  pole region right kidney measuring up to 1.8 cm. Recommend nonemergent  follow-up dedicated renal mass protocol MR or  CT.    I have personally reviewed the examination and initial interpretation  and I agree with the findings.    DELIA MENDOZA MD         SYSTEM ID:  [id number]   CT Head w/o Contrast    Narrative    CT HEAD W/O CONTRAST 12/8/2022 5:21 PM    History: AMS post-op     Comparison: CT head 6/26/2022    Technique: Using multidetector thin collimation helical acquisition  technique, axial, coronal and sagittal CT images from the skull base  to the vertex were obtained without intravenous contrast.   (topogram) image(s) also obtained and reviewed.    Findings: There is no intracranial hemorrhage, mass effect, or midline  shift. Gray/white matter differentiation in both cerebral hemispheres  is preserved. Ventricles are proportionate to the cerebral sulci. The  basal cisterns are clear.    The bony calvaria and the bones of the skull base are normal. The  visualized portions of the paranasal sinuses and mastoid air cells are  clear.      Impression    Impression:  No acute intracranial pathology.     I have personally reviewed the examination and initial interpretation  and I agree with the findings.    NADJA CHOWDHURY MD         SYSTEM ID:  [id number]   Basic metabolic panel   Result Value Ref Range    Sodium 134 (L) 136 - 145 mmol/L    Potassium 4.4 3.4 - 5.3 mmol/L    Chloride 95 (L) 98 - 107 mmol/L    Carbon Dioxide (CO2) 24 22 - 29 mmol/L    Anion Gap 15 7 - 15 mmol/L    Urea Nitrogen 27.4 (H) 6.0 - 20.0 mg/dL    Creatinine 5.68 (H) 0.67 - 1.17 mg/dL    Calcium 8.9 8.6 - 10.0 mg/dL    Glucose 84 70 - 99 mg/dL    GFR Estimate 11 (L) >60 mL/min/1.73m2   CBC with platelets   Result Value Ref Range    WBC Count 6.7 4.0 - 11.0 10e3/uL    RBC Count 2.81 (L) 4.40 - 5.90 10e6/uL    Hemoglobin 8.7 (L) 13.3 - 17.7 g/dL    Hematocrit 27.2 (L) 40.0 - 53.0 %    MCV 97 78 - 100 fL    MCH 31.0 26.5 - 33.0 pg    MCHC 32.0 31.5 - 36.5 g/dL    RDW 19.0 (H) 10.0 - 15.0 %    Platelet Count 152 150 - 450 10e3/uL   Magnesium   Result  Value Ref Range    Magnesium 1.8 1.7 - 2.3 mg/dL   Phosphorus   Result Value Ref Range    Phosphorus 3.8 2.5 - 4.5 mg/dL       All relevant imaging and laboratory values personally reviewed.

## 2022-12-09 NOTE — PROVIDER NOTIFICATION
Patient not following any commands or opening eyes. Only arousing to sternal rub/pain. NSG notified. NSG came to bedside to assess, recommended continuing assessments and starting capnography.

## 2022-12-10 LAB
ERYTHROCYTE [DISTWIDTH] IN BLOOD BY AUTOMATED COUNT: 18.4 % (ref 10–15)
HCT VFR BLD AUTO: 28.5 % (ref 40–53)
HGB BLD-MCNC: 8.9 G/DL (ref 13.3–17.7)
MAGNESIUM SERPL-MCNC: 1.7 MG/DL (ref 1.7–2.3)
MCH RBC QN AUTO: 30.7 PG (ref 26.5–33)
MCHC RBC AUTO-ENTMCNC: 31.2 G/DL (ref 31.5–36.5)
MCV RBC AUTO: 98 FL (ref 78–100)
PHOSPHATE SERPL-MCNC: 2.2 MG/DL (ref 2.5–4.5)
PLATELET # BLD AUTO: 191 10E3/UL (ref 150–450)
RBC # BLD AUTO: 2.9 10E6/UL (ref 4.4–5.9)
WBC # BLD AUTO: 6.3 10E3/UL (ref 4–11)

## 2022-12-10 PROCEDURE — 86140 C-REACTIVE PROTEIN: CPT | Performed by: INTERNAL MEDICINE

## 2022-12-10 PROCEDURE — 36415 COLL VENOUS BLD VENIPUNCTURE: CPT | Performed by: NURSE PRACTITIONER

## 2022-12-10 PROCEDURE — 36415 COLL VENOUS BLD VENIPUNCTURE: CPT | Performed by: STUDENT IN AN ORGANIZED HEALTH CARE EDUCATION/TRAINING PROGRAM

## 2022-12-10 PROCEDURE — 90935 HEMODIALYSIS ONE EVALUATION: CPT | Performed by: INTERNAL MEDICINE

## 2022-12-10 PROCEDURE — 250N000013 HC RX MED GY IP 250 OP 250 PS 637: Performed by: NURSE PRACTITIONER

## 2022-12-10 PROCEDURE — 634N000001 HC RX 634: Performed by: CLINICAL NURSE SPECIALIST

## 2022-12-10 PROCEDURE — 90937 HEMODIALYSIS REPEATED EVAL: CPT

## 2022-12-10 PROCEDURE — 84100 ASSAY OF PHOSPHORUS: CPT | Performed by: STUDENT IN AN ORGANIZED HEALTH CARE EDUCATION/TRAINING PROGRAM

## 2022-12-10 PROCEDURE — 250N000011 HC RX IP 250 OP 636: Performed by: STUDENT IN AN ORGANIZED HEALTH CARE EDUCATION/TRAINING PROGRAM

## 2022-12-10 PROCEDURE — 85027 COMPLETE CBC AUTOMATED: CPT | Performed by: NURSE PRACTITIONER

## 2022-12-10 PROCEDURE — 200N000002 HC R&B ICU UMMC

## 2022-12-10 PROCEDURE — 83735 ASSAY OF MAGNESIUM: CPT | Performed by: STUDENT IN AN ORGANIZED HEALTH CARE EDUCATION/TRAINING PROGRAM

## 2022-12-10 PROCEDURE — 84100 ASSAY OF PHOSPHORUS: CPT | Performed by: NURSE PRACTITIONER

## 2022-12-10 PROCEDURE — 258N000003 HC RX IP 258 OP 636: Performed by: STUDENT IN AN ORGANIZED HEALTH CARE EDUCATION/TRAINING PROGRAM

## 2022-12-10 PROCEDURE — 80048 BASIC METABOLIC PNL TOTAL CA: CPT | Performed by: NURSE PRACTITIONER

## 2022-12-10 PROCEDURE — 83735 ASSAY OF MAGNESIUM: CPT | Performed by: NURSE PRACTITIONER

## 2022-12-10 PROCEDURE — 250N000013 HC RX MED GY IP 250 OP 250 PS 637: Performed by: STUDENT IN AN ORGANIZED HEALTH CARE EDUCATION/TRAINING PROGRAM

## 2022-12-10 PROCEDURE — 258N000003 HC RX IP 258 OP 636: Performed by: CLINICAL NURSE SPECIALIST

## 2022-12-10 PROCEDURE — 99291 CRITICAL CARE FIRST HOUR: CPT | Performed by: NURSE PRACTITIONER

## 2022-12-10 RX ORDER — GABAPENTIN 300 MG/1
300 CAPSULE ORAL EVERY 8 HOURS
Status: DISCONTINUED | OUTPATIENT
Start: 2022-12-16 | End: 2022-12-10

## 2022-12-10 RX ORDER — MULTIPLE VITAMINS W/ MINERALS TAB 9MG-400MCG
1 TAB ORAL DAILY
Status: DISCONTINUED | OUTPATIENT
Start: 2022-12-10 | End: 2022-12-10

## 2022-12-10 RX ORDER — FOLIC ACID 1 MG/1
1 TABLET ORAL DAILY
Status: DISCONTINUED | OUTPATIENT
Start: 2022-12-13 | End: 2023-01-07 | Stop reason: HOSPADM

## 2022-12-10 RX ORDER — FOLIC ACID 5 MG/ML
1 INJECTION, SOLUTION INTRAMUSCULAR; INTRAVENOUS; SUBCUTANEOUS ONCE
Status: COMPLETED | OUTPATIENT
Start: 2022-12-10 | End: 2022-12-10

## 2022-12-10 RX ORDER — CLONIDINE HYDROCHLORIDE 0.1 MG/1
0.1 TABLET ORAL EVERY 8 HOURS
Status: DISCONTINUED | OUTPATIENT
Start: 2022-12-10 | End: 2022-12-12

## 2022-12-10 RX ORDER — GABAPENTIN 300 MG/1
900 CAPSULE ORAL EVERY 8 HOURS
Status: DISCONTINUED | OUTPATIENT
Start: 2022-12-11 | End: 2022-12-10

## 2022-12-10 RX ORDER — GABAPENTIN 100 MG/1
100 CAPSULE ORAL EVERY 8 HOURS
Status: DISCONTINUED | OUTPATIENT
Start: 2022-12-18 | End: 2022-12-10

## 2022-12-10 RX ORDER — OLANZAPINE 10 MG/1
5 INJECTION, POWDER, LYOPHILIZED, FOR SOLUTION INTRAMUSCULAR ONCE
Status: COMPLETED | OUTPATIENT
Start: 2022-12-10 | End: 2022-12-10

## 2022-12-10 RX ORDER — FLUMAZENIL 0.1 MG/ML
0.2 INJECTION, SOLUTION INTRAVENOUS
Status: DISCONTINUED | OUTPATIENT
Start: 2022-12-10 | End: 2022-12-18

## 2022-12-10 RX ORDER — LORAZEPAM 2 MG/ML
1-2 INJECTION INTRAMUSCULAR EVERY 30 MIN PRN
Status: DISCONTINUED | OUTPATIENT
Start: 2022-12-10 | End: 2022-12-13

## 2022-12-10 RX ORDER — HALOPERIDOL 5 MG/ML
2.5-5 INJECTION INTRAMUSCULAR EVERY 4 HOURS PRN
Status: DISCONTINUED | OUTPATIENT
Start: 2022-12-10 | End: 2022-12-13

## 2022-12-10 RX ORDER — GABAPENTIN 600 MG/1
1200 TABLET ORAL ONCE
Status: DISCONTINUED | OUTPATIENT
Start: 2022-12-10 | End: 2022-12-10

## 2022-12-10 RX ORDER — GABAPENTIN 300 MG/1
600 CAPSULE ORAL EVERY 8 HOURS
Status: DISCONTINUED | OUTPATIENT
Start: 2022-12-14 | End: 2022-12-10

## 2022-12-10 RX ORDER — QUETIAPINE FUMARATE 25 MG/1
25 TABLET, FILM COATED ORAL
Status: DISCONTINUED | OUTPATIENT
Start: 2022-12-10 | End: 2022-12-17

## 2022-12-10 RX ORDER — HEPARIN SODIUM 5000 [USP'U]/.5ML
5000 INJECTION, SOLUTION INTRAVENOUS; SUBCUTANEOUS EVERY 8 HOURS
Status: DISCONTINUED | OUTPATIENT
Start: 2022-12-10 | End: 2022-12-20

## 2022-12-10 RX ORDER — FOLIC ACID 5 MG/ML
1 INJECTION, SOLUTION INTRAMUSCULAR; INTRAVENOUS; SUBCUTANEOUS DAILY
Status: COMPLETED | OUTPATIENT
Start: 2022-12-11 | End: 2022-12-12

## 2022-12-10 RX ORDER — LORAZEPAM 1 MG/1
1-2 TABLET ORAL EVERY 30 MIN PRN
Status: DISCONTINUED | OUTPATIENT
Start: 2022-12-10 | End: 2022-12-17

## 2022-12-10 RX ADMIN — LORAZEPAM 2 MG: 2 INJECTION INTRAMUSCULAR at 21:08

## 2022-12-10 RX ADMIN — FOLIC ACID 1 MG: 5 INJECTION, SOLUTION INTRAMUSCULAR; INTRAVENOUS; SUBCUTANEOUS at 21:00

## 2022-12-10 RX ADMIN — SENNOSIDES AND DOCUSATE SODIUM 1 TABLET: 8.6; 5 TABLET ORAL at 08:51

## 2022-12-10 RX ADMIN — CYANOCOBALAMIN TAB 500 MCG 500 MCG: 500 TAB at 08:51

## 2022-12-10 RX ADMIN — SENNOSIDES AND DOCUSATE SODIUM 1 TABLET: 8.6; 5 TABLET ORAL at 21:01

## 2022-12-10 RX ADMIN — CLONIDINE HYDROCHLORIDE 0.1 MG: 0.1 TABLET ORAL at 21:01

## 2022-12-10 RX ADMIN — SEVELAMER CARBONATE 1600 MG: 800 TABLET, FILM COATED ORAL at 08:50

## 2022-12-10 RX ADMIN — EPOETIN ALFA-EPBX 8000 UNITS: 10000 INJECTION, SOLUTION INTRAVENOUS; SUBCUTANEOUS at 15:56

## 2022-12-10 RX ADMIN — FLUTICASONE PROPIONATE 1 SPRAY: 50 SPRAY, METERED NASAL at 21:00

## 2022-12-10 RX ADMIN — BACLOFEN 10 MG: 10 TABLET ORAL at 08:52

## 2022-12-10 RX ADMIN — FINASTERIDE 1.3 MG: 5 TABLET, FILM COATED ORAL at 08:59

## 2022-12-10 RX ADMIN — MIDODRINE HYDROCHLORIDE 10 MG: 5 TABLET ORAL at 09:30

## 2022-12-10 RX ADMIN — GABAPENTIN 300 MG: 300 CAPSULE ORAL at 21:01

## 2022-12-10 RX ADMIN — METHOCARBAMOL 500 MG: 500 TABLET ORAL at 08:51

## 2022-12-10 RX ADMIN — Medication 133 MG: at 08:51

## 2022-12-10 RX ADMIN — HEPARIN SODIUM 5000 UNITS: 5000 INJECTION, SOLUTION INTRAVENOUS; SUBCUTANEOUS at 21:01

## 2022-12-10 RX ADMIN — BACLOFEN 10 MG: 10 TABLET ORAL at 21:01

## 2022-12-10 RX ADMIN — SERTRALINE HYDROCHLORIDE 100 MG: 50 TABLET ORAL at 08:50

## 2022-12-10 RX ADMIN — OLANZAPINE 5 MG: 10 INJECTION, POWDER, LYOPHILIZED, FOR SOLUTION INTRAMUSCULAR at 15:26

## 2022-12-10 RX ADMIN — SODIUM CHLORIDE 300 ML: 9 INJECTION, SOLUTION INTRAVENOUS at 15:56

## 2022-12-10 RX ADMIN — FLUTICASONE PROPIONATE 1 SPRAY: 50 SPRAY, METERED NASAL at 08:59

## 2022-12-10 RX ADMIN — B-COMPLEX W/ C & FOLIC ACID TAB 1 MG 1 TABLET: 1 TAB at 08:50

## 2022-12-10 RX ADMIN — METHOCARBAMOL 500 MG: 500 TABLET ORAL at 21:09

## 2022-12-10 RX ADMIN — Medication 100 MG: at 08:50

## 2022-12-10 RX ADMIN — THIAMINE HYDROCHLORIDE 200 MG: 100 INJECTION, SOLUTION INTRAMUSCULAR; INTRAVENOUS at 21:09

## 2022-12-10 RX ADMIN — CETIRIZINE HYDROCHLORIDE 10 MG: 10 TABLET, FILM COATED ORAL at 08:52

## 2022-12-10 ASSESSMENT — ACTIVITIES OF DAILY LIVING (ADL)
ADLS_ACUITY_SCORE: 46
ADLS_ACUITY_SCORE: 47
ADLS_ACUITY_SCORE: 46
ADLS_ACUITY_SCORE: 47
ADLS_ACUITY_SCORE: 46
ADLS_ACUITY_SCORE: 47
ADLS_ACUITY_SCORE: 47
ADLS_ACUITY_SCORE: 46
ADLS_ACUITY_SCORE: 47
ADLS_ACUITY_SCORE: 46
ADLS_ACUITY_SCORE: 46
ADLS_ACUITY_SCORE: 47

## 2022-12-10 NOTE — PROGRESS NOTES
[facility], [place]   Neurosurgery Progress Note:    Assessment: Shay Jimenez is a 57 year old male with with PMH ESRD on dialysis and osteomyelitis s/p C5-T6 fusion who is now POD#5 s/p redo C5-T6 fusion with concern for hemorrhage; no vertebral artery dissection or extravasation was noted on intraoperative angiogram.  He was admitted to the Neuro ICU postoperatively for MAP goals and frequent neuro checks, remaining stable on pressors.  Neuro exam is unchanged from patient's baseline.    Plan:  - Serial neuro exams  - Pain control  - Hb goal > 7  - Normonatremia  - Goal normotension  - Appreciate Nephrology recommendations re: dialysis (baseline M/W/F)  - Cervical collar at all times  - Advance diet as tolerated  - Bowel regimen  - PRN antiemetics  - IVF until taking adequate PO  - PT/OT  - SCDs for DVT proph  - Okay to transfer to the floor  -----------------------------------  Lior Cornejo M.D.  Neurosurgery Resident, PGY-4    Please contact neurosurgery resident on call with questions.    Dial * * [phone], enter [phone] when prompted.      Interval History/Subjective: Refused medications, cares, and dialysis yesterday. Returned to room and dialysis performed in ICU. Doing fine in evening after dialysis, with somewhat improved confusion.    Objective:   Temp:  [97.6  F (36.4  C)-98.8  F (37.1  C)] 97.9  F (36.6  C)  Pulse:  [80-98] 82  Resp:  [9-25] 20  BP: ()/(53-96) 99/61  SpO2:  [90 %-99 %] 92 %  I/O last 3 completed shifts:  In: 180 [P.O.:120; I.V.:60]  Out: 2300 [Other:2300]    Gen: Appears comfortable, NAD  Wound: Incision, clean, dry, intact without strikethrough  Neurologic:  - Alert & Oriented to person, place, time, and situation  - Follows commands briskly  - Speech fluent, spontaneous. No aphasia or dysarthria.  - No gaze preference. No apparent hemineglect.  - PERRL, EOMI  - Strong brow raise, eye closure, and smile  - Face symmetric with sensation intact to  light touch  - Trapezii and sternocleidomastoid muscles 5/5 bilaterally  - No pronator drift     Del Tr Bi WE WF Gr   R 4* 5 5 5 5 5   L 4* 5 5 5 5 5    HF KE KF DF PF EHL   R 4+ 5 5 5 5 5   L 4+ 5 5 5 5 5   *pain-limited    Norman's and clonus negative.    Sensation intact and symmetric to light touch throughout    LABS  Recent Labs   Lab Test 12/10/22  0559 12/09/22  0429 12/08/22  1401   WBC 6.3 6.7 7.5   HGB 8.9* 8.7* 9.0*   MCV 98 97 97    152 128*       Recent Labs   Lab Test 12/11/22  0029 12/09/22  0429 12/08/22  0458 12/08/22  0406 12/07/22  0538   NA  --  134* 133*  --  128*   POTASSIUM  --  4.4 4.6  --  5.2   CHLORIDE  --  95* 96*  --  86*   CO2  --  24 27  --  25   BUN  --  27.4* 18.8  --  26.4*   CR  --  5.68* 4.29*  --  5.54*   ANIONGAP  --  15 10  --  17*   MAC  --  8.9 8.3*  --  7.9*   GLC 88 84 99   < > 91    < > = values in this interval not displayed.       IMAGING  No results found for this or any previous visit (from the past 24 hour(s)).

## 2022-12-10 NOTE — PLAN OF CARE
Goal Outcome Evaluation:  Major Shift Events:  AOX4, one time episode of incontinence, able to sleep in between Q4 neuro assessments.   Plan: Transfer to  when bed available  For vital signs and complete assessments, please see documentation flowsheets.

## 2022-12-10 NOTE — PROGRESS NOTES
Neurocritical Care Progress Note    Reason for critical care admission: Post op-C5-T6 fusion  Admitting Team: GABINO  Date of Service:  12/10/2022  Date of Admission:  12/6/2022  Hospital Day: 5    Assessment/Plan  Shay Jimenez is a 57 year old male with a past medical history of ESRD on dialysis and osteomyelitis s/p C5-T6 fusion admitted on 12/6/2022 for redo C5-T6 fusion.    24 hour events: POD#4. Delirious/agitated with staff. Initiate delirium precautions. Plan for tx to floor. Refusing Dialysis, not decisional. Likely encephalopathic from refusing dialysis yesterday and being dialyzed since surgery.     Neuro  #Redo C5-T6 Fusion POD#4  -No extravasation on angiogram  -Neurochecks every 4 hrs  -MAP goal >65 mmHg  -HOB > 30   -PT/OT/SLP  -C-collar while in bed; ok to take off if HOB is at 45    #Analgesics & sedation  RASS goal:0  -discontinue dilaudid 0.2-0.4mg q2  -discontinue oxy 5-10mg PRN q3  -Tylenol PRN    -Refused Psych consult  - to see pt.     #Delirium  #Encephalopathy  -delirium precautions  -Seroquel HS PRN  -Zyprexa x1    CV  #Hypotension-resolved  -Cardiac monitoring  -MAP goal >65 mmHg  -Midodrine 10mg q8 for Dialysis    Resp  #POLO  Oxygen/vent:[place] air  -Continuous pulse ox  -Maintain O2 saturations greater than 92%    GI  #diarrhea   Diet:Regular Diet  Last BM: 12/10  GI prophylaxis:none  -Bowel regimen: scheduled senna-docusate and Miralax  -ABD xray-possible developing ileus. Will continue bowel meds despite loose stools.     Renal/  #ESRD  #Chronic Hyponatremia  #Hypochloremia  -Nephrology consulted  -dialysis PRN  -Daily BMP  -IV fluids: none  -Electrolyte replacement protocol    Endo  #POLO  -Hgb A1c 5.6  -Monitor glucose levels    Heme  #Acute blood loss anemia-stable  -EBL in OR 2.5 liters  -4 units PRBC's total  -1 unit FFP  -1 unit of PLT's  -No dissection or extravasation on angiogram.   -Daily CBC  -Hgb goal >7, plt goal >50k  -Transfuse to meet Hgb and plt goals  -CT  CAP showing subcutaneous hematoma at surgical site.     ID  #Maxine Hx  -ID consulted per NSGY  -Daily CBC  -Follow temperature curve  -Afebrile  -No leukocytosis  -No ABX at this time per ID    ICU Checklist  Lines/tubes/drains: Fistula, PIV  FEN: none, prn replacement, regular diet  PPX: DVT - SCDs; GI - protonix.  Code:Full  Dispo: ICU - NCC      TIME SPENT ON THIS ENCOUNTER   I spent 38 minutes of critical care time on the unit/floor managing the care of Shay Jimenez. Upon evaluation, this patient had a high probability of imminent or life-threatening deterioration due to acute blood loss anemia requiring close hemodynamic monitoring, which required my direct attention, intervention, and personal management. Greater than 50% of my time was spent at the bedside counseling the patient and/or coordinating care regarding services listed in this note.    The patient was discussed with the [facility] attending, Dr. Arrieta.    Phill Downs, CNP  NCC *[id number]    24 Hour Vital Signs Summary:  Temp: 97.9  F (36.6  C) Temp  Min: 97.1  F (36.2  C)  Max: 98.8  F (37.1  C)  Resp: 19 Resp  Min: 9  Max: 25  SpO2: 96 % SpO2  Min: 90 %  Max: 98 %  Pulse: 95 Pulse  Min: 80  Max: 96    No data recorded  BP: 131/75 Systolic (24hrs), Av , Min:100 , Max:131   Diastolic (24hrs), Av, Min:57, Max:85      Respiratory monitoring:   Resp: 19      I/O last 3 completed shifts:  In: 260 [P.O.:100; I.V.:160]  Out: -     Current Medications:    sodium chloride 0.9%  250 mL Intravenous Once in dialysis/CRRT     sodium chloride 0.9%  300 mL Hemodialysis Machine Once     atorvastatin  20 mg Oral At Bedtime     baclofen  10 mg Oral BID     cetirizine  10 mg Oral Daily     cyanocobalamin  500 mcg Oral Daily     epoetin mar-epbx  8,000 Units Intravenous Once in dialysis/CRRT     finasteride  1.3 mg Oral Daily     fluticasone  1 spray Both Nostrils BID     gabapentin  300 mg Oral TID     heparin ANTICOAGULANT  5,000 Units Subcutaneous Q8H  "    magnesium plus protein  133 mg Oral Daily     methocarbamol  500 mg Oral 4x Daily     midodrine  10 mg Oral Q8H CUONG     multivitamin RENAL  1 tablet Oral Daily     - MEDICATION INSTRUCTIONS -   Does not apply Once     pantoprazole  40 mg Oral Daily     polyethylene glycol  17 g Oral Daily     vitamin B6  100 mg Oral Daily     senna-docusate  1 tablet Oral BID     sertraline  100 mg Oral Daily     sevelamer carbonate  1,600 mg Oral TID w/meals     sodium chloride (PF)  3 mL Intracatheter Q8H       PRN Medications:  sodium chloride 0.9%, sodium chloride 0.9%, acetaminophen, sore throat, bisacodyl, calcium carbonate, diazepam, HYDROmorphone **OR** HYDROmorphone, hydrOXYzine, lidocaine 4%, lidocaine (buffered or not buffered), lidocaine (buffered or not buffered), lidocaine (buffered or not buffered), magnesium hydroxide, naloxone **OR** naloxone **OR** naloxone **OR** naloxone, ondansetron **OR** ondansetron, oxyCODONE **OR** oxyCODONE, prochlorperazine **OR** prochlorperazine, sodium chloride (PF), - MEDICATION INSTRUCTIONS -    Infusions:    - MEDICATION INSTRUCTIONS -         Allergies   Allergen Reactions     Amlodipine      Lisinopril        Physical Examination:  Vitals: /75 (BP Location: Right arm, Cuff Size: Adult Large)   Pulse 95   Temp 97.9  F (36.6  C) (Axillary)   Resp 19   Ht 1.854 m (6' 1\")   Wt 136.4 kg (300 lb 11.3 oz)   SpO2 96%   BMI 39.67 kg/m    General: Adult male patient, lying in bed, critically-ill  HEENT: Normocephalic, c-collar, no icterus, oral cavity/oropharynx pink and moist  Cardiac: RRR, s1/s2 auscultated without murmur  Pulm: CTAB, unlabored, expansion symmetric, no retractions or use of accessory muscles  Abdomen: Soft, non-distended, bowel sounds present  Extremities: Warm, generalized edema, distal pulses +2, well perfused  Skin: No rash or lesion  Psych: Agitated.  Neuro:  Mental status: Awake, alert, attentive, oriented to self, time, place, and circumstance. " Language is fluent and coherent with intact comprehension of complex commands, naming and repetition.  Cranial nerves: VFF, PERRL, conjugate gaze, EOMI, facial sensation intact, face symmetric, shoulder shrug strong, tongue midline, no dysarthria.   Motor: Normal bulk and tone. No abnormal movements. 5/5 strength in 4/4 extremities.   Sensory: Intact to light touch x 4 extremities  Coordination: FNF and HS without ataxia or dysmetria. Rapid alternating movements intact.   Gait: MARIA ESTHER, deferred.      Labs:  Recent Labs   Lab 12/09/22  0429 12/08/22  0458 12/07/22  0538 12/06/22  2335   * 133* 128* 131*   POTASSIUM 4.4 4.6 5.2 5.0   CHLORIDE 95* 96* 86* 93*   CO2 24 27 25 22   BUN 27.4* 18.8 26.4* 22.0*   CR 5.68* 4.29* 5.54* 4.70*   MAC 8.9 8.3* 7.9* 7.3*       Recent Labs   Lab 12/10/22  0559 12/09/22  0429 12/08/22  1401 12/08/22  0458   WBC 6.3 6.7 7.5 5.7   HGB 8.9* 8.7* 9.0* 7.9*    152 128* 103*       Recent Labs   Lab 12/06/22  1817 12/06/22  1409 12/06/22  1305   PH 7.43 7.46* 7.46*   PCO2 40 44 43   PO2 166* 296* 403*   HCO3 27 31* 31*       All cultures:  Recent Labs   Lab 12/06/22  1815   CULTURE No growth after 3 days  No anaerobic organisms isolated after 3 days       Imaging:    Results for orders placed or performed during the hospital encounter of 12/06/22 (from the past 24 hour(s))   CBC with platelets   Result Value Ref Range    WBC Count 6.3 4.0 - 11.0 10e3/uL    RBC Count 2.90 (L) 4.40 - 5.90 10e6/uL    Hemoglobin 8.9 (L) 13.3 - 17.7 g/dL    Hematocrit 28.5 (L) 40.0 - 53.0 %    MCV 98 78 - 100 fL    MCH 30.7 26.5 - 33.0 pg    MCHC 31.2 (L) 31.5 - 36.5 g/dL    RDW 18.4 (H) 10.0 - 15.0 %    Platelet Count 191 150 - 450 10e3/uL       All relevant imaging and laboratory values personally reviewed.

## 2022-12-10 NOTE — PLAN OF CARE
Major Shift Events: Pt refusing cares & refuses to take certain medications, NSG aware. Verbally abusive to staff. Transferred to dialysis this afternoon, refused dialysis upon arrival. Pt given zyprexa & HD completed in pt room. Pt delirious. Refuses to wear brace & get out of bed. HR 80-90s, BP stable. Pulses weak. Continuous O2 monitoring. 2L NC, desats while asleep. Regular diet, refused meals all day. No BM. Oliguric. Incision under dressing, UTV.    Plan: Transfer to  when bed is available. Continue current plan of care.    For vital signs and complete assessments, please see documentation flowsheets.

## 2022-12-11 ENCOUNTER — APPOINTMENT (OUTPATIENT)
Dept: OCCUPATIONAL THERAPY | Facility: CLINIC | Age: 57
DRG: 459 | End: 2022-12-11
Attending: STUDENT IN AN ORGANIZED HEALTH CARE EDUCATION/TRAINING PROGRAM
Payer: MEDICARE

## 2022-12-11 LAB
ALBUMIN SERPL BCG-MCNC: 3.1 G/DL (ref 3.5–5.2)
ALP SERPL-CCNC: 146 U/L (ref 40–129)
ALT SERPL W P-5'-P-CCNC: <5 U/L (ref 10–50)
AMMONIA PLAS-SCNC: 22 UMOL/L (ref 16–60)
ANION GAP SERPL CALCULATED.3IONS-SCNC: 12 MMOL/L (ref 7–15)
ANION GAP SERPL CALCULATED.3IONS-SCNC: 18 MMOL/L (ref 7–15)
AST SERPL W P-5'-P-CCNC: 18 U/L (ref 10–50)
BACTERIA TISS BX CULT: NO GROWTH
BILIRUB DIRECT SERPL-MCNC: <0.2 MG/DL (ref 0–0.3)
BILIRUB SERPL-MCNC: 0.4 MG/DL
BUN SERPL-MCNC: 20.2 MG/DL (ref 6–20)
BUN SERPL-MCNC: 35.3 MG/DL (ref 6–20)
CALCIUM SERPL-MCNC: 8.7 MG/DL (ref 8.6–10)
CALCIUM SERPL-MCNC: 9 MG/DL (ref 8.6–10)
CHLORIDE SERPL-SCNC: 95 MMOL/L (ref 98–107)
CHLORIDE SERPL-SCNC: 97 MMOL/L (ref 98–107)
CREAT SERPL-MCNC: 4.78 MG/DL (ref 0.67–1.17)
CREAT SERPL-MCNC: 7.39 MG/DL (ref 0.67–1.17)
CRP SERPL-MCNC: 158 MG/L
DEPRECATED HCO3 PLAS-SCNC: 22 MMOL/L (ref 22–29)
DEPRECATED HCO3 PLAS-SCNC: 27 MMOL/L (ref 22–29)
ERYTHROCYTE [DISTWIDTH] IN BLOOD BY AUTOMATED COUNT: 17.6 % (ref 10–15)
GFR SERPL CREATININE-BSD FRML MDRD: 13 ML/MIN/1.73M2
GFR SERPL CREATININE-BSD FRML MDRD: 8 ML/MIN/1.73M2
GLUCOSE BLDC GLUCOMTR-MCNC: 105 MG/DL (ref 70–99)
GLUCOSE BLDC GLUCOMTR-MCNC: 88 MG/DL (ref 70–99)
GLUCOSE BLDC GLUCOMTR-MCNC: 94 MG/DL (ref 70–99)
GLUCOSE SERPL-MCNC: 84 MG/DL (ref 70–99)
GLUCOSE SERPL-MCNC: 89 MG/DL (ref 70–99)
HCT VFR BLD AUTO: 29.7 % (ref 40–53)
HGB BLD-MCNC: 9.3 G/DL (ref 13.3–17.7)
MAGNESIUM SERPL-MCNC: 1.8 MG/DL (ref 1.7–2.3)
MAGNESIUM SERPL-MCNC: 2.3 MG/DL (ref 1.7–2.3)
MCH RBC QN AUTO: 31 PG (ref 26.5–33)
MCHC RBC AUTO-ENTMCNC: 31.3 G/DL (ref 31.5–36.5)
MCV RBC AUTO: 99 FL (ref 78–100)
PHOSPHATE SERPL-MCNC: 2.9 MG/DL (ref 2.5–4.5)
PHOSPHATE SERPL-MCNC: 4.4 MG/DL (ref 2.5–4.5)
PLATELET # BLD AUTO: 210 10E3/UL (ref 150–450)
POTASSIUM SERPL-SCNC: 4.3 MMOL/L (ref 3.4–5.3)
POTASSIUM SERPL-SCNC: 4.6 MMOL/L (ref 3.4–5.3)
PROT SERPL-MCNC: 5.9 G/DL (ref 6.4–8.3)
RBC # BLD AUTO: 3 10E6/UL (ref 4.4–5.9)
SODIUM SERPL-SCNC: 134 MMOL/L (ref 136–145)
SODIUM SERPL-SCNC: 137 MMOL/L (ref 136–145)
T4 FREE SERPL-MCNC: 0.47 NG/DL (ref 0.9–1.7)
TSH SERPL DL<=0.005 MIU/L-ACNC: 7.54 UIU/ML (ref 0.3–4.2)
WBC # BLD AUTO: 6 10E3/UL (ref 4–11)

## 2022-12-11 PROCEDURE — 250N000011 HC RX IP 250 OP 636: Performed by: STUDENT IN AN ORGANIZED HEALTH CARE EDUCATION/TRAINING PROGRAM

## 2022-12-11 PROCEDURE — 84100 ASSAY OF PHOSPHORUS: CPT | Performed by: NURSE PRACTITIONER

## 2022-12-11 PROCEDURE — 84443 ASSAY THYROID STIM HORMONE: CPT | Performed by: PSYCHIATRY & NEUROLOGY

## 2022-12-11 PROCEDURE — 82140 ASSAY OF AMMONIA: CPT | Performed by: PSYCHIATRY & NEUROLOGY

## 2022-12-11 PROCEDURE — 36415 COLL VENOUS BLD VENIPUNCTURE: CPT | Performed by: NURSE PRACTITIONER

## 2022-12-11 PROCEDURE — 36415 COLL VENOUS BLD VENIPUNCTURE: CPT | Performed by: PSYCHIATRY & NEUROLOGY

## 2022-12-11 PROCEDURE — 84439 ASSAY OF FREE THYROXINE: CPT | Performed by: PSYCHIATRY & NEUROLOGY

## 2022-12-11 PROCEDURE — 82310 ASSAY OF CALCIUM: CPT | Performed by: NURSE PRACTITIONER

## 2022-12-11 PROCEDURE — 250N000013 HC RX MED GY IP 250 OP 250 PS 637: Performed by: STUDENT IN AN ORGANIZED HEALTH CARE EDUCATION/TRAINING PROGRAM

## 2022-12-11 PROCEDURE — 250N000013 HC RX MED GY IP 250 OP 250 PS 637: Performed by: NURSE PRACTITIONER

## 2022-12-11 PROCEDURE — 200N000002 HC R&B ICU UMMC

## 2022-12-11 PROCEDURE — 85027 COMPLETE CBC AUTOMATED: CPT | Performed by: NURSE PRACTITIONER

## 2022-12-11 PROCEDURE — 258N000003 HC RX IP 258 OP 636: Performed by: STUDENT IN AN ORGANIZED HEALTH CARE EDUCATION/TRAINING PROGRAM

## 2022-12-11 PROCEDURE — 97530 THERAPEUTIC ACTIVITIES: CPT | Mod: GO | Performed by: OCCUPATIONAL THERAPIST

## 2022-12-11 PROCEDURE — 83735 ASSAY OF MAGNESIUM: CPT | Performed by: NURSE PRACTITIONER

## 2022-12-11 PROCEDURE — 82248 BILIRUBIN DIRECT: CPT | Performed by: PSYCHIATRY & NEUROLOGY

## 2022-12-11 RX ORDER — SERTRALINE HYDROCHLORIDE 20 MG/ML
100 SOLUTION ORAL DAILY
Status: DISCONTINUED | OUTPATIENT
Start: 2022-12-11 | End: 2022-12-12

## 2022-12-11 RX ORDER — CETIRIZINE HYDROCHLORIDE 5 MG/1
10 TABLET ORAL DAILY
Status: DISCONTINUED | OUTPATIENT
Start: 2022-12-11 | End: 2022-12-12

## 2022-12-11 RX ORDER — GABAPENTIN 250 MG/5ML
300 SOLUTION ORAL EVERY 8 HOURS SCHEDULED
Status: DISCONTINUED | OUTPATIENT
Start: 2022-12-11 | End: 2022-12-12

## 2022-12-11 RX ADMIN — MIDODRINE HYDROCHLORIDE 10 MG: 5 TABLET ORAL at 18:01

## 2022-12-11 RX ADMIN — Medication 10 MG: at 15:44

## 2022-12-11 RX ADMIN — LORAZEPAM 2 MG: 2 INJECTION INTRAMUSCULAR at 11:30

## 2022-12-11 RX ADMIN — THIAMINE HYDROCHLORIDE 200 MG: 100 INJECTION, SOLUTION INTRAMUSCULAR; INTRAVENOUS at 20:56

## 2022-12-11 RX ADMIN — LORAZEPAM 2 MG: 2 INJECTION INTRAMUSCULAR at 06:35

## 2022-12-11 RX ADMIN — HEPARIN SODIUM 5000 UNITS: 5000 INJECTION, SOLUTION INTRAVENOUS; SUBCUTANEOUS at 03:02

## 2022-12-11 RX ADMIN — THIAMINE HYDROCHLORIDE 200 MG: 100 INJECTION, SOLUTION INTRAMUSCULAR; INTRAVENOUS at 13:14

## 2022-12-11 RX ADMIN — HEPARIN SODIUM 5000 UNITS: 5000 INJECTION, SOLUTION INTRAVENOUS; SUBCUTANEOUS at 20:42

## 2022-12-11 RX ADMIN — LORAZEPAM 2 MG: 2 INJECTION INTRAMUSCULAR at 18:37

## 2022-12-11 RX ADMIN — FLUTICASONE PROPIONATE 1 SPRAY: 50 SPRAY, METERED NASAL at 08:56

## 2022-12-11 RX ADMIN — GABAPENTIN 300 MG: 250 SOLUTION ORAL at 15:44

## 2022-12-11 RX ADMIN — Medication 40 MG: at 15:44

## 2022-12-11 RX ADMIN — MIDODRINE HYDROCHLORIDE 10 MG: 5 TABLET ORAL at 03:01

## 2022-12-11 RX ADMIN — HEPARIN SODIUM 5000 UNITS: 5000 INJECTION, SOLUTION INTRAVENOUS; SUBCUTANEOUS at 11:29

## 2022-12-11 RX ADMIN — CLONIDINE HYDROCHLORIDE 0.1 MG: 0.1 TABLET ORAL at 11:33

## 2022-12-11 RX ADMIN — ATORVASTATIN CALCIUM 20 MG: 20 TABLET, FILM COATED ORAL at 00:38

## 2022-12-11 RX ADMIN — CLONIDINE HYDROCHLORIDE 0.1 MG: 0.1 TABLET ORAL at 03:02

## 2022-12-11 RX ADMIN — BACLOFEN 10 MG: 20 TABLET ORAL at 18:02

## 2022-12-11 RX ADMIN — FOLIC ACID 1 MG: 5 INJECTION, SOLUTION INTRAMUSCULAR; INTRAVENOUS; SUBCUTANEOUS at 08:52

## 2022-12-11 RX ADMIN — METHOCARBAMOL 500 MG: 500 TABLET ORAL at 11:33

## 2022-12-11 RX ADMIN — SERTRALINE HYDROCHLORIDE 100 MG: 20 SOLUTION ORAL at 15:44

## 2022-12-11 RX ADMIN — SEVELAMER CARBONATE 1600 MG: 800 TABLET, FILM COATED ORAL at 18:01

## 2022-12-11 RX ADMIN — THIAMINE HYDROCHLORIDE 200 MG: 100 INJECTION, SOLUTION INTRAMUSCULAR; INTRAVENOUS at 08:52

## 2022-12-11 RX ADMIN — METHOCARBAMOL 500 MG: 500 TABLET ORAL at 15:44

## 2022-12-11 ASSESSMENT — ACTIVITIES OF DAILY LIVING (ADL)
ADLS_ACUITY_SCORE: 47
ADLS_ACUITY_SCORE: 51
ADLS_ACUITY_SCORE: 47
ADLS_ACUITY_SCORE: 47
ADLS_ACUITY_SCORE: 51
ADLS_ACUITY_SCORE: 47
ADLS_ACUITY_SCORE: 51

## 2022-12-11 NOTE — PROGRESS NOTES
Nephrology Dialysis visit:   This patient was seen and examined while on dialysis. He was given zyprexa after which he has been somnolent. Laboratory results and nurses' notes were reviewed.  No changes to management of volume, anemia, BMD, acidosis, or electrolytes.  Diagnosis - EDOUARD on CKD stage 4 requiring RRT  Mora Marinelli MD          Recorded Pressure: LV, HR=61, Condition=1 (Left Ventricle) LV 87/-5/-1

## 2022-12-11 NOTE — PROGRESS NOTES
Neurocritical Care Progress Note    Reason for critical care admission: Post op-C5-T6 fusion  Admitting Team: GABINO  Date of Service:  12/11/2022  Date of Admission:  12/6/2022  Hospital Day: 6    Assessment/Plan  Shay Jimenez is a 57 year old male with a past medical history of ESRD on dialysis and osteomyelitis s/p C5-T6 fusion admitted on 12/6/2022 for redo C5-T6 fusion.    24 hour events: POD#5. Delirious/agitated with staff. Refused dialysis in the evening. Received Zyprexa and was able to complete dialysis. Mental status did not improve after. Per son pt has been consuming alcohol frequently in recent months.  CIWA with benzos started.     Neuro  #Redo C5-T6 Fusion POD#5  -No extravasation on angiogram  -Neurochecks every 4 hrs  -MAP goal >65 mmHg  -HOB > 30   -PT/OT/SLP  -C-collar while in bed; ok to take off if HOB is at 45    #Analgesics & sedation  RASS goal:0  -Tylenol PRN    #Alcohol Withdrawal   -CIWA  -haldol  -Ativan  -Folic acid  -clonidine  -b-12       #Delirium  #Encephalopathy  -delirium precautions  -Seroquel HS PRN      CV  #Hypotension-resolved  -Cardiac monitoring  -MAP goal >65 mmHg  -Midodrine 10mg q8 for Dialysis    Resp  #POLO  Oxygen/vent:[place] air  -Continuous pulse ox  -Maintain O2 saturations greater than 92%    GI  #diarrhea   Diet:Regular Diet  Last BM: 12/10  GI prophylaxis:none  -Bowel regimen: scheduled senna-docusate and Miralax  -ABD xray-possible developing ileus. Will continue bowel meds despite loose stools.   -Renvela TID with meals    Renal/  #ESRD  #Chronic Hyponatremia  #Hypochloremia  -Nephrology consulted  -dialysis MWF  -Daily BMP  -IV fluids: none  -Electrolyte replacement protocol  -Mag supplementation daily    Endo  #POLO  -Hgb A1c 5.6  -Monitor glucose levels    Heme  #Acute blood loss anemia-stable  -EBL in OR 2.5 liters  -4 units PRBC's total  -1 unit FFP  -1 unit of PLT's  -No dissection or extravasation on angiogram.   -Daily CBC  -Hgb goal >7, plt goal  >50k  -Transfuse to meet Hgb and plt goals  -CT CAP showing subcutaneous hematoma at surgical site.     ID  #Ostealex Hx  -ID consulted per NSGY  -Daily CBC  -Follow temperature curve  -Afebrile  -No leukocytosis  -No ABX at this time per ID    ICU Checklist  Lines/tubes/drains: Fistula, PIV  FEN: none, prn replacement, regular diet  PPX: DVT - SCDs, heparin sq; GI - protonix.  Code:Full  Dispo: ICU - NCC      TIME SPENT ON THIS ENCOUNTER   I spent 40 minutes of critical care time on the unit/floor managing the care of Shay Jimenez. Upon evaluation, this patient had a high probability of imminent or life-threatening deterioration due to acute blood loss anemia requiring close hemodynamic monitoring, which required my direct attention, intervention, and personal management. Greater than 50% of my time was spent at the bedside counseling the patient and/or coordinating care regarding services listed in this note.    The patient was seen and discussed with the [facility] attending, Dr. Wright.    Phill Downs, CNP  NCC *[id number]    24 Hour Vital Signs Summary:  Temp: 99.9  F (37.7  C) Temp  Min: 97.6  F (36.4  C)  Max: 99.9  F (37.7  C)  Resp: 16 Resp  Min: 16  Max: 20  SpO2: 97 % SpO2  Min: 91 %  Max: 100 %  Pulse: 81 Pulse  Min: 81  Max: 101    No data recorded  BP: 120/77 Systolic (24hrs), Av , Min:99 , Max:147   Diastolic (24hrs), Av, Min:53, Max:106      Respiratory monitoring:   Resp: 16      I/O last 3 completed shifts:  In: 370 [P.O.:220; I.V.:150]  Out: 2300 [Other:2300]    Current Medications:    atorvastatin  20 mg Oral At Bedtime     baclofen  10 mg Oral BID     cetirizine  10 mg Oral Daily     cloNIDine  0.1 mg Oral Q8H     cyanocobalamin  500 mcg Oral Daily     finasteride  1.3 mg Oral Daily     fluticasone  1 spray Both Nostrils BID     [START ON 2022] folic acid  1 mg Oral Daily     folic acid  1 mg Intravenous Daily     gabapentin  300 mg Oral Q8H CUONG     heparin ANTICOAGULANT  5,000  "Units Subcutaneous Q8H     magnesium plus protein  133 mg Oral Daily     methocarbamol  500 mg Oral 4x Daily     midodrine  10 mg Oral Q8H CUONG     multivitamin RENAL  1 tablet Oral Daily     pantoprazole  40 mg Oral QAM AC     polyethylene glycol  17 g Oral Daily     vitamin B6  100 mg Oral Daily     senna-docusate  1 tablet Oral BID     sertraline  100 mg Oral Daily     sevelamer carbonate  1,600 mg Oral TID w/meals     sodium chloride (PF)  3 mL Intracatheter Q8H     thiamine  200 mg Intravenous TID     [START ON 12/12/2022] thiamine  100 mg Oral TID     [START ON 12/17/2022] thiamine  100 mg Oral Daily       PRN Medications:  sodium chloride 0.9%, acetaminophen, sore throat, bisacodyl, calcium carbonate, diazepam, flumazenil, haloperidol lactate, hydrOXYzine, LORazepam **OR** LORazepam, magnesium hydroxide, naloxone **OR** naloxone **OR** naloxone **OR** naloxone, ondansetron **OR** ondansetron, prochlorperazine **OR** prochlorperazine, QUEtiapine, sodium chloride (PF)    Infusions:      Allergies   Allergen Reactions     Amlodipine      Lisinopril        Physical Examination:  Vitals: /77   Pulse 81   Temp 99.9  F (37.7  C) (Axillary)   Resp 16   Ht 1.854 m (6' 1\")   Wt 136.4 kg (300 lb 11.3 oz)   SpO2 97%   BMI 39.67 kg/m    General: Adult male patient, lying in bed, critically-ill  HEENT: Normocephalic, c-collar, no icterus, oral cavity/oropharynx pink and moist  Cardiac: RRR, s1/s2 auscultated without murmur  Pulm: CTAB, unlabored, expansion symmetric, no retractions or use of accessory muscles  Abdomen: Soft, non-distended, bowel sounds present  Extremities: Warm, generalized edema, distal pulses +2, well perfused  Skin: No rash or lesion  Psych: drowsy.  Neuro:  Mental status: Awake, alert, attentive, oriented to self, time, place, and circumstance. Language is fluent and coherent with intact comprehension of complex commands, naming and repetition.  Cranial nerves: VFF, PERRL, conjugate gaze, " EOMI, facial sensation intact, face symmetric, shoulder shrug strong, tongue midline, no dysarthria.   Motor: Normal bulk and tone. No abnormal movements. 5/5 strength in 4/4 extremities.   Sensory: Intact to light touch x 4 extremities  Coordination: FNF and HS without ataxia or dysmetria. Rapid alternating movements intact.   Gait: MARIA ESTHER, deferred.      Labs:  Recent Labs   Lab 12/11/22  0528 12/10/22  0559 12/09/22 0429 12/08/22  0458   * 137 134* 133*   POTASSIUM 4.3 4.6 4.4 4.6   CHLORIDE 95* 97* 95* 96*   CO2 27 22 24 27   BUN 20.2* 35.3* 27.4* 18.8   CR 4.78* 7.39* 5.68* 4.29*   MAC 8.7 9.0 8.9 8.3*   BILITOTAL 0.4  --   --   --    ALKPHOS 146*  --   --   --    ALT <5*  --   --   --    AST 18  --   --   --        Recent Labs   Lab 12/11/22  0528 12/10/22  0559 12/09/22 0429 12/08/22  1401   WBC 6.0 6.3 6.7 7.5   HGB 9.3* 8.9* 8.7* 9.0*    191 152 128*       Recent Labs   Lab 12/06/22  1817 12/06/22  1409 12/06/22  1305   PH 7.43 7.46* 7.46*   PCO2 40 44 43   PO2 166* 296* 403*   HCO3 27 31* 31*       All cultures:  Recent Labs   Lab 12/06/22  1815   CULTURE No Growth  No anaerobic organisms isolated after 4 days       Imaging:    Results for orders placed or performed during the hospital encounter of 12/06/22 (from the past 24 hour(s))   Magnesium   Result Value Ref Range    Magnesium 1.7 1.7 - 2.3 mg/dL   Phosphorus   Result Value Ref Range    Phosphorus 2.2 (L) 2.5 - 4.5 mg/dL   Glucose by meter   Result Value Ref Range    GLUCOSE BY METER POCT 88 70 - 99 mg/dL   Basic metabolic panel   Result Value Ref Range    Sodium 134 (L) 136 - 145 mmol/L    Potassium 4.3 3.4 - 5.3 mmol/L    Chloride 95 (L) 98 - 107 mmol/L    Carbon Dioxide (CO2) 27 22 - 29 mmol/L    Anion Gap 12 7 - 15 mmol/L    Urea Nitrogen 20.2 (H) 6.0 - 20.0 mg/dL    Creatinine 4.78 (H) 0.67 - 1.17 mg/dL    Calcium 8.7 8.6 - 10.0 mg/dL    Glucose 89 70 - 99 mg/dL    GFR Estimate 13 (L) >60 mL/min/1.73m2   CBC with platelets   Result  Value Ref Range    WBC Count 6.0 4.0 - 11.0 10e3/uL    RBC Count 3.00 (L) 4.40 - 5.90 10e6/uL    Hemoglobin 9.3 (L) 13.3 - 17.7 g/dL    Hematocrit 29.7 (L) 40.0 - 53.0 %    MCV 99 78 - 100 fL    MCH 31.0 26.5 - 33.0 pg    MCHC 31.3 (L) 31.5 - 36.5 g/dL    RDW 17.6 (H) 10.0 - 15.0 %    Platelet Count 210 150 - 450 10e3/uL   Magnesium   Result Value Ref Range    Magnesium 1.8 1.7 - 2.3 mg/dL   Phosphorus   Result Value Ref Range    Phosphorus 2.9 2.5 - 4.5 mg/dL   Hepatic panel   Result Value Ref Range    Protein Total 5.9 (L) 6.4 - 8.3 g/dL    Albumin 3.1 (L) 3.5 - 5.2 g/dL    Bilirubin Total 0.4 <=1.2 mg/dL    Alkaline Phosphatase 146 (H) 40 - 129 U/L    AST 18 10 - 50 U/L    ALT <5 (L) 10 - 50 U/L    Bilirubin Direct <0.20 0.00 - 0.30 mg/dL   TSH with free T4 reflex   Result Value Ref Range    TSH 7.54 (H) 0.30 - 4.20 uIU/mL   T4 free   Result Value Ref Range    Free T4 0.47 (L) 0.90 - 1.70 ng/dL   Glucose by meter   Result Value Ref Range    GLUCOSE BY METER POCT 105 (H) 70 - 99 mg/dL   Ammonia   Result Value Ref Range    Ammonia 22 16 - 60 umol/L   Glucose by meter   Result Value Ref Range    GLUCOSE BY METER POCT 94 70 - 99 mg/dL       All relevant imaging and laboratory values personally reviewed.

## 2022-12-11 NOTE — PROGRESS NOTES
HEMODIALYSIS TREATMENT NOTE    Date: 12/10/2022  Time: 7:58 PM    Data:  Pre Wt:     Desired Wt:   kg   Post Wt:    Weight change:   kg  Ultrafiltration - Post Run Net Total Removed (mL): 2300 mL  Vascular Access Status: patent  Dialyzer Rinse: Light, Streaked  Total Blood Volume Processed: 50.88 L Liters  Total Dialysis (Treatment) Time: 4HRS Hours    Lab:   No    Interventions:  PT tolerated TX well. 2K with 2.25Ca used PT was aggitateded beginning of TX, JOSE EE ras Continued to move and  bend LUE, unable  To follow commands. PT alert to self only.  LUE restraint applied by primary nurse and assessed q1hr by this nurse    Assessment:  See MAR , Flowsheet and MD orders for further details     Plan:    Per renal

## 2022-12-11 NOTE — PROGRESS NOTES
ICU End of Shift Summary. See flowsheets for vital signs and detailed assessment.    Changes this shift: Patient delirious. Following commands and moving all extremities. Agitated, angry.  Hallucinating. CIWA 25.  Given Ativan according to protocol x1 with good effect. PERRLA.  Denies pain. 3L NC while sleeping. Lungs clear bilaterally. Good sats. No tele as patient has 6A orders. 99.9 tmax. Trace edema. No BM, positive bowel sounds but not eating much. Encouraging PO intake this morning, however was unable to swallow pills appropriately- team aware. May need to readress medication routes if continues. Last glucose 91. Will continue to frequently check. No urine output. Patient oliguric at baseline per report. Scheduled for iHD tomorrow.     Plan: CIWA scores and PRN meds. Go to 6A when bed becomes available.

## 2022-12-11 NOTE — PLAN OF CARE
ICU End of Shift Summary. See flowsheets for vital signs and detailed assessment.    Changes this shift: No acute overnight changes, Dialysis run with 2L's pulled, vitals stable. Patients verbally abusive to staff, refusing cares with episodes of hallucinations CIWA scores followed, Ativan given x2.     Plan: Continue with POC, CIWA assessments, transfer orders in place for 6A,     Goal Outcome Evaluation:      Plan of Care Reviewed With: patient    Overall Patient Progress: no changeOverall Patient Progress: no change

## 2022-12-12 ENCOUNTER — APPOINTMENT (OUTPATIENT)
Dept: GENERAL RADIOLOGY | Facility: CLINIC | Age: 57
DRG: 459 | End: 2022-12-12
Attending: STUDENT IN AN ORGANIZED HEALTH CARE EDUCATION/TRAINING PROGRAM
Payer: MEDICARE

## 2022-12-12 LAB
ANION GAP SERPL CALCULATED.3IONS-SCNC: 16 MMOL/L (ref 7–15)
BUN SERPL-MCNC: 32.8 MG/DL (ref 6–20)
CALCIUM SERPL-MCNC: 9.1 MG/DL (ref 8.6–10)
CHLORIDE SERPL-SCNC: 95 MMOL/L (ref 98–107)
CREAT SERPL-MCNC: 6.29 MG/DL (ref 0.67–1.17)
CRP SERPL-MCNC: 72.4 MG/L
DEPRECATED HCO3 PLAS-SCNC: 24 MMOL/L (ref 22–29)
ERYTHROCYTE [DISTWIDTH] IN BLOOD BY AUTOMATED COUNT: 17.1 % (ref 10–15)
GFR SERPL CREATININE-BSD FRML MDRD: 10 ML/MIN/1.73M2
GLUCOSE SERPL-MCNC: 87 MG/DL (ref 70–99)
HCT VFR BLD AUTO: 29.8 % (ref 40–53)
HGB BLD-MCNC: 9.3 G/DL (ref 13.3–17.7)
MAGNESIUM SERPL-MCNC: 1.9 MG/DL (ref 1.7–2.3)
MCH RBC QN AUTO: 31 PG (ref 26.5–33)
MCHC RBC AUTO-ENTMCNC: 31.2 G/DL (ref 31.5–36.5)
MCV RBC AUTO: 99 FL (ref 78–100)
PHOSPHATE SERPL-MCNC: 3.7 MG/DL (ref 2.5–4.5)
PLATELET # BLD AUTO: 256 10E3/UL (ref 150–450)
POTASSIUM SERPL-SCNC: 4.1 MMOL/L (ref 3.4–5.3)
RBC # BLD AUTO: 3 10E6/UL (ref 4.4–5.9)
SODIUM SERPL-SCNC: 135 MMOL/L (ref 136–145)
WBC # BLD AUTO: 6.6 10E3/UL (ref 4–11)

## 2022-12-12 PROCEDURE — 999N000065 XR CERVICAL SPINE 2/3 VIEWS

## 2022-12-12 PROCEDURE — 99233 SBSQ HOSP IP/OBS HIGH 50: CPT | Performed by: STUDENT IN AN ORGANIZED HEALTH CARE EDUCATION/TRAINING PROGRAM

## 2022-12-12 PROCEDURE — 85014 HEMATOCRIT: CPT | Performed by: NURSE PRACTITIONER

## 2022-12-12 PROCEDURE — 250N000013 HC RX MED GY IP 250 OP 250 PS 637: Performed by: NURSE PRACTITIONER

## 2022-12-12 PROCEDURE — 250N000011 HC RX IP 250 OP 636: Performed by: STUDENT IN AN ORGANIZED HEALTH CARE EDUCATION/TRAINING PROGRAM

## 2022-12-12 PROCEDURE — 999N000128 HC STATISTIC PERIPHERAL IV START W/O US GUIDANCE

## 2022-12-12 PROCEDURE — 200N000002 HC R&B ICU UMMC

## 2022-12-12 PROCEDURE — 250N000013 HC RX MED GY IP 250 OP 250 PS 637: Performed by: STUDENT IN AN ORGANIZED HEALTH CARE EDUCATION/TRAINING PROGRAM

## 2022-12-12 PROCEDURE — 83735 ASSAY OF MAGNESIUM: CPT | Performed by: NURSE PRACTITIONER

## 2022-12-12 PROCEDURE — 72040 X-RAY EXAM NECK SPINE 2-3 VW: CPT | Mod: 26 | Performed by: RADIOLOGY

## 2022-12-12 PROCEDURE — 634N000001 HC RX 634: Performed by: INTERNAL MEDICINE

## 2022-12-12 PROCEDURE — 86140 C-REACTIVE PROTEIN: CPT | Performed by: INTERNAL MEDICINE

## 2022-12-12 PROCEDURE — 258N000003 HC RX IP 258 OP 636: Performed by: STUDENT IN AN ORGANIZED HEALTH CARE EDUCATION/TRAINING PROGRAM

## 2022-12-12 PROCEDURE — 258N000003 HC RX IP 258 OP 636: Performed by: INTERNAL MEDICINE

## 2022-12-12 PROCEDURE — 36415 COLL VENOUS BLD VENIPUNCTURE: CPT | Performed by: NURSE PRACTITIONER

## 2022-12-12 PROCEDURE — 80048 BASIC METABOLIC PNL TOTAL CA: CPT | Performed by: NURSE PRACTITIONER

## 2022-12-12 PROCEDURE — 99233 SBSQ HOSP IP/OBS HIGH 50: CPT | Mod: FS | Performed by: CLINICAL NURSE SPECIALIST

## 2022-12-12 PROCEDURE — 84100 ASSAY OF PHOSPHORUS: CPT | Performed by: NURSE PRACTITIONER

## 2022-12-12 PROCEDURE — 90937 HEMODIALYSIS REPEATED EVAL: CPT

## 2022-12-12 RX ORDER — FINASTERIDE 5 MG/1
1.25 TABLET, FILM COATED ORAL DAILY
Status: DISCONTINUED | OUTPATIENT
Start: 2022-12-13 | End: 2022-12-12

## 2022-12-12 RX ORDER — BACLOFEN 10 MG/1
10 TABLET ORAL 2 TIMES DAILY
Status: DISCONTINUED | OUTPATIENT
Start: 2022-12-12 | End: 2023-01-07 | Stop reason: HOSPADM

## 2022-12-12 RX ORDER — FINASTERIDE 5 MG/1
1.25 TABLET, FILM COATED ORAL DAILY
Status: DISCONTINUED | OUTPATIENT
Start: 2022-12-13 | End: 2023-01-07 | Stop reason: HOSPADM

## 2022-12-12 RX ORDER — SERTRALINE HYDROCHLORIDE 100 MG/1
100 TABLET, FILM COATED ORAL DAILY
Status: DISCONTINUED | OUTPATIENT
Start: 2022-12-13 | End: 2023-01-07 | Stop reason: HOSPADM

## 2022-12-12 RX ORDER — GABAPENTIN 300 MG/1
300 CAPSULE ORAL EVERY 8 HOURS
Status: DISCONTINUED | OUTPATIENT
Start: 2022-12-12 | End: 2023-01-07 | Stop reason: HOSPADM

## 2022-12-12 RX ORDER — OXYCODONE HYDROCHLORIDE 5 MG/1
5 TABLET ORAL EVERY 4 HOURS PRN
Status: DISCONTINUED | OUTPATIENT
Start: 2022-12-12 | End: 2023-01-04

## 2022-12-12 RX ORDER — PANTOPRAZOLE SODIUM 40 MG/1
40 TABLET, DELAYED RELEASE ORAL
Status: DISCONTINUED | OUTPATIENT
Start: 2022-12-13 | End: 2023-01-07 | Stop reason: HOSPADM

## 2022-12-12 RX ORDER — CETIRIZINE HYDROCHLORIDE 10 MG/1
10 TABLET ORAL DAILY
Status: DISCONTINUED | OUTPATIENT
Start: 2022-12-13 | End: 2023-01-07 | Stop reason: HOSPADM

## 2022-12-12 RX ADMIN — MIDODRINE HYDROCHLORIDE 10 MG: 5 TABLET ORAL at 22:07

## 2022-12-12 RX ADMIN — OXYCODONE HYDROCHLORIDE 5 MG: 5 TABLET ORAL at 22:43

## 2022-12-12 RX ADMIN — EPOETIN ALFA-EPBX 8000 UNITS: 10000 INJECTION, SOLUTION INTRAVENOUS; SUBCUTANEOUS at 10:39

## 2022-12-12 RX ADMIN — HEPARIN SODIUM 5000 UNITS: 5000 INJECTION, SOLUTION INTRAVENOUS; SUBCUTANEOUS at 04:21

## 2022-12-12 RX ADMIN — ACETAMINOPHEN 650 MG: 325 TABLET, FILM COATED ORAL at 12:13

## 2022-12-12 RX ADMIN — HYDROXYZINE HYDROCHLORIDE 25 MG: 25 TABLET, FILM COATED ORAL at 22:07

## 2022-12-12 RX ADMIN — Medication 40 MG: at 09:16

## 2022-12-12 RX ADMIN — HEPARIN SODIUM 5000 UNITS: 5000 INJECTION, SOLUTION INTRAVENOUS; SUBCUTANEOUS at 19:41

## 2022-12-12 RX ADMIN — OXYCODONE HYDROCHLORIDE 5 MG: 5 TABLET ORAL at 18:24

## 2022-12-12 RX ADMIN — SODIUM CHLORIDE 250 ML: 9 INJECTION, SOLUTION INTRAVENOUS at 10:41

## 2022-12-12 RX ADMIN — SEVELAMER CARBONATE 1600 MG: 800 TABLET, FILM COATED ORAL at 12:13

## 2022-12-12 RX ADMIN — SERTRALINE HYDROCHLORIDE 100 MG: 20 SOLUTION ORAL at 09:11

## 2022-12-12 RX ADMIN — THIAMINE HYDROCHLORIDE 200 MG: 100 INJECTION, SOLUTION INTRAMUSCULAR; INTRAVENOUS at 13:59

## 2022-12-12 RX ADMIN — ATORVASTATIN CALCIUM 20 MG: 20 TABLET, FILM COATED ORAL at 22:07

## 2022-12-12 RX ADMIN — CYANOCOBALAMIN TAB 500 MCG 500 MCG: 500 TAB at 09:14

## 2022-12-12 RX ADMIN — ACETAMINOPHEN 650 MG: 325 TABLET, FILM COATED ORAL at 16:35

## 2022-12-12 RX ADMIN — GABAPENTIN 300 MG: 250 SOLUTION ORAL at 13:59

## 2022-12-12 RX ADMIN — HYDROXYZINE HYDROCHLORIDE 25 MG: 25 TABLET, FILM COATED ORAL at 16:36

## 2022-12-12 RX ADMIN — METHOCARBAMOL 500 MG: 500 TABLET ORAL at 12:13

## 2022-12-12 RX ADMIN — Medication 100 MG: at 19:34

## 2022-12-12 RX ADMIN — MIDODRINE HYDROCHLORIDE 10 MG: 5 TABLET ORAL at 07:44

## 2022-12-12 RX ADMIN — SEVELAMER CARBONATE 1600 MG: 800 TABLET, FILM COATED ORAL at 07:44

## 2022-12-12 RX ADMIN — GABAPENTIN 300 MG: 300 CAPSULE ORAL at 22:07

## 2022-12-12 RX ADMIN — FOLIC ACID 1 MG: 5 INJECTION, SOLUTION INTRAMUSCULAR; INTRAVENOUS; SUBCUTANEOUS at 09:18

## 2022-12-12 RX ADMIN — THIAMINE HYDROCHLORIDE 200 MG: 100 INJECTION, SOLUTION INTRAMUSCULAR; INTRAVENOUS at 09:18

## 2022-12-12 RX ADMIN — FLUTICASONE PROPIONATE 1 SPRAY: 50 SPRAY, METERED NASAL at 09:19

## 2022-12-12 RX ADMIN — HEPARIN SODIUM 5000 UNITS: 5000 INJECTION, SOLUTION INTRAVENOUS; SUBCUTANEOUS at 12:13

## 2022-12-12 RX ADMIN — MIDODRINE HYDROCHLORIDE 10 MG: 5 TABLET ORAL at 15:41

## 2022-12-12 RX ADMIN — Medication 10 MG: at 09:12

## 2022-12-12 RX ADMIN — FINASTERIDE 1.3 MG: 5 TABLET, FILM COATED ORAL at 09:12

## 2022-12-12 RX ADMIN — LORAZEPAM 1 MG: 2 INJECTION INTRAMUSCULAR at 08:10

## 2022-12-12 RX ADMIN — SENNOSIDES AND DOCUSATE SODIUM 1 TABLET: 8.6; 5 TABLET ORAL at 19:34

## 2022-12-12 RX ADMIN — Medication: at 10:41

## 2022-12-12 RX ADMIN — SODIUM CHLORIDE 300 ML: 9 INJECTION, SOLUTION INTRAVENOUS at 10:40

## 2022-12-12 RX ADMIN — BACLOFEN 10 MG: 10 TABLET ORAL at 19:34

## 2022-12-12 RX ADMIN — B-COMPLEX W/ C & FOLIC ACID TAB 1 MG 1 TABLET: 1 TAB at 09:14

## 2022-12-12 RX ADMIN — FLUTICASONE PROPIONATE 1 SPRAY: 50 SPRAY, METERED NASAL at 19:40

## 2022-12-12 RX ADMIN — Medication 100 MG: at 09:16

## 2022-12-12 RX ADMIN — METHOCARBAMOL 500 MG: 500 TABLET ORAL at 15:41

## 2022-12-12 RX ADMIN — BACLOFEN 10 MG: 20 TABLET ORAL at 09:11

## 2022-12-12 RX ADMIN — LORAZEPAM 2 MG: 1 TABLET ORAL at 11:20

## 2022-12-12 RX ADMIN — SEVELAMER CARBONATE 1600 MG: 800 TABLET, FILM COATED ORAL at 18:24

## 2022-12-12 RX ADMIN — CLONIDINE HYDROCHLORIDE 0.1 MG: 0.1 TABLET ORAL at 09:15

## 2022-12-12 RX ADMIN — Medication 133 MG: at 09:14

## 2022-12-12 RX ADMIN — METHOCARBAMOL 500 MG: 500 TABLET ORAL at 09:15

## 2022-12-12 RX ADMIN — LORAZEPAM 2 MG: 1 TABLET ORAL at 09:09

## 2022-12-12 RX ADMIN — METHOCARBAMOL 500 MG: 500 TABLET ORAL at 19:34

## 2022-12-12 RX ADMIN — LORAZEPAM 2 MG: 1 TABLET ORAL at 13:58

## 2022-12-12 RX ADMIN — GABAPENTIN 300 MG: 250 SOLUTION ORAL at 09:15

## 2022-12-12 ASSESSMENT — ACTIVITIES OF DAILY LIVING (ADL)
ADLS_ACUITY_SCORE: 51
ADLS_ACUITY_SCORE: 47
ADLS_ACUITY_SCORE: 51
ADLS_ACUITY_SCORE: 47
ADLS_ACUITY_SCORE: 47
ADLS_ACUITY_SCORE: 51
ADLS_ACUITY_SCORE: 47
ADLS_ACUITY_SCORE: 51
ADLS_ACUITY_SCORE: 47
ADLS_ACUITY_SCORE: 51

## 2022-12-12 NOTE — PROGRESS NOTES
Nephrology Progress Note  12/12/2022         Shay Jimenez is a 57 yom with ESRD since 4/2019 due to Ca Phos deposition and HTN, runs at [facility] ([phone], fax [phone]) under care of Dr Cline.  Had planned neurosurgery revision for C5-C6 on 12/6 as screw had dislodged.  Nephrology consulted for management of dialysis post op.       Interval History :   Mr Jimenez had day off HD yesterday, seen on run this am.  Delirious and somewhat paranoid on my visit but is cooperative enough to run.  Next run planned for 12/14.       Assessment & Recommendations:   ESRD-Since 4/2019 due to Ca Phos deposition and HTN, runs at [facility] ([phone], fax [phone]) under care of Dr Cline.  Runs MWF via [facility], 4.25h runs.                  -HD today, 2L of UF.                  -Will decide on runs daily depending on chemistries and hemodynamics.                  -Continuing long term HD, no need for new consent.                  -Is eventually pursuing renal transplant through [facility] once acute issues requiring neurosurgery are done.       Volume-EDW 130kg, bed wt ~133kg today, pulm status stable despite being a bit up in wt.  Refusing to run today.       Electrolytes-K 4.1 bicarb 24, Na 135, HD today.        BMD-Ca 9.1, Mg and Phos WNL.       Neurosurgery-Had planned revision of previous screws at C5-C6 on 12/6.       Anemia-Hgb 9.3, giving 8k epo with runs.       Nutrition-Deferred.       Time spent: 30 minutes on this date of encounter for chart review, physical exam, medical decision making and co-ordination of care.      Discussed with Dr Reinoso     Recommendations were communicated to primary team via verbal communication.        ENID Shirley CNS  Clinical Nurse Specialist  [phone]    Review of Systems:   I reviewed the following systems:  Gen: No fevers or chills  CV: No CP at rest  Resp: No SOB at rest  GI: No N/V    Physical Exam:   I/O last 3 completed shifts:  In: 660  "[P.O.:510; I.V.:150]  Out: 0    /79   Pulse 84   Temp 98.8  F (37.1  C) (Axillary)   Resp 18   Ht 1.854 m (6' 1\")   Wt 132.8 kg (292 lb 12.3 oz)   SpO2 98%   BMI 38.63 kg/m       GENERAL APPEARANCE: alert and no distress, moderate confusion  EYES: No scleral icterus  NECK:  No JVD  Pulmonary: lungs clear to auscultation with equal breath sounds bilaterally, no clubbing or cyanosis  CV: Regular rhythm, normal rate, no rub   - Edema none  GI: soft, nontender, normal bowel sounds  MS: no evidence of inflammation in joints, no muscle tenderness  : No Oliveira  SKIN: no rash, warm, dry  NEURO: Delirious but somewhat redirectable.      Labs:   All labs reviewed by me  Electrolytes/Renal - Recent Labs   Lab Test 12/12/22 0955 12/11/22  1143 12/11/22  0920 12/11/22 0528 12/11/22  0029 12/10/22  2152 12/10/22  0559   *  --   --  134*  --   --  137   POTASSIUM 4.1  --   --  4.3  --   --  4.6   CHLORIDE 95*  --   --  95*  --   --  97*   CO2 24  --   --  27  --   --  22   BUN 32.8*  --   --  20.2*  --   --  35.3*   CR 6.29*  --   --  4.78*  --   --  7.39*   GLC 87 94 105* 89   < >  --  84   MAC 9.1  --   --  8.7  --   --  9.0   MAG 1.9  --   --  1.8  --  1.7 2.3   PHOS 3.7  --   --  2.9  --  2.2* 4.4    < > = values in this interval not displayed.       CBC -   Recent Labs   Lab Test 12/12/22  0955 12/11/22  0528 12/10/22  0559   WBC 6.6 6.0 6.3   HGB 9.3* 9.3* 8.9*    210 191       LFTs -   Recent Labs   Lab Test 12/11/22 0528 10/06/22  1215 09/29/22  1345 08/18/22  1435 07/21/22  1252 07/16/22  0557   ALKPHOS 146*  --   --   --  131 134   BILITOTAL 0.4  --   --   --  0.6 0.5   ALT <5*  --   --   --  9 <6   AST 18 31 35   < > 17 13   PROTTOTAL 5.9*  --   --   --  6.7* 6.5*   ALBUMIN 3.1*  --   --   --  3.0* 2.8*    < > = values in this interval not displayed.       Iron Panel -   Recent Labs   Lab Test 07/29/22  0600   IRON 44   IRONSAT 20   JACKELINE 626*           Current Medications:    atorvastatin  " 20 mg Oral At Bedtime     baclofen  10 mg Oral BID     cetirizine  10 mg Oral Daily     cloNIDine  0.1 mg Oral Q8H     cyanocobalamin  500 mcg Oral Daily     finasteride  1.3 mg Oral Daily     fluticasone  1 spray Both Nostrils BID     [START ON 12/13/2022] folic acid  1 mg Oral Daily     gabapentin  300 mg Oral Q8H CUONG     heparin ANTICOAGULANT  5,000 Units Subcutaneous Q8H     magnesium plus protein  133 mg Oral Daily     methocarbamol  500 mg Oral 4x Daily     midodrine  10 mg Oral Q8H CUONG     multivitamin RENAL  1 tablet Oral Daily     pantoprazole  40 mg Oral QAM AC     polyethylene glycol  17 g Oral Daily     vitamin B6  100 mg Oral Daily     senna-docusate  1 tablet Oral BID     sertraline  100 mg Oral Daily     sevelamer carbonate  1,600 mg Oral TID w/meals     sodium chloride (PF)  3 mL Intracatheter Q8H     thiamine  200 mg Intravenous TID     thiamine  100 mg Oral TID     [START ON 12/17/2022] thiamine  100 mg Oral Daily       - MEDICATION INSTRUCTIONS -

## 2022-12-12 NOTE — PROGRESS NOTES
Neurocritical Care Progress Note    Reason for critical care admission: Post op-C5-T6 fusion  Admitting Team: GABINO  Date of Service:  12/12/2022  Date of Admission:  12/6/2022  Hospital Day: 7    Assessment/Plan  Shay Jimenez is a 57 year old male with a past medical history of ESRD on dialysis and osteomyelitis s/p C5-T6 fusion admitted on 12/6/2022 for redo C5-T6 fusion.    24 hour events: Intermittently delirious and refusing care, oriented on my exam.     Neuro  #Redo C5-T6 Fusion POD#6  -No extravasation on angiogram  -Neurochecks every 4 hrs  -MAP goal >65 mmHg  -HOB > 30   -PT/OT/SLP  -C-collar while in bed; ok to take off if HOB is at 45  -Continue Baclofen     #Analgesics & sedation  RASS goal:0  -Tylenol PRN    #Alcohol Withdrawal   -CIWA  -haldol  -Ativan  -Folic acid  -clonidine  -B-12     #Delirium  #Encephalopathy  -delirium precautions  -Seroquel HS PRN    CV  #Hypotension-resolved  -Cardiac monitoring  -MAP goal >65 mmHg  -Midodrine 10mg q8 for Dialysis    Resp  #POLO  Oxygen/vent:Room air  -Continuous pulse ox  -Maintain O2 saturations greater than 92%    GI  #diarrhea   Diet:Regular Diet  Last BM: 12/10  GI prophylaxis:none  -Bowel regimen: scheduled senna-docusate and Miralax though patient refusing   -ABD xray 12/7-possible developing ileus, continue bowel meds despite loose stools.   -Renvela TID with meals    Renal/  #ESRD  #Chronic Hyponatremia  #Hypochloremia  -Nephrology consulted  -dialysis MWF  -Daily BMP  -IV fluids: none  -Electrolyte replacement protocol  -Mag supplementation daily    Endo  #POLO  -Hgb A1c 5.6  -Monitor glucose levels    Heme  #Acute blood loss anemia-stable  -EBL in OR 2.5 liters  -4 units PRBC's total  -1 unit FFP  -1 unit of PLT's  -Daily CBC  -Hgb goal >7, plt goal >50k  -Transfuse to meet Hgb and plt goals    ID  #Osteomylytis Hx  -ID consulted per NSGY  -Daily CBC  -Follow temperature curve  -Afebrile  -No leukocytosis  -No ABX at this time per ID    ICU  Checklist  Lines/tubes/drains: Fistula, PIV  FEN: none, prn replacement, regular diet  PPX: DVT - SCDs, heparin sq; GI - protonix (home PPI)  Code:Full  Dispo: Transfer order per NSGY    TIME SPENT ON THIS ENCOUNTER   I spent 40 minutes of care time on the unit/floor managing the care of Shay Jimenez. Greater than 50% of my time was spent at the bedside counseling the patient and/or coordinating care regarding services listed in this note.    The patient was discussed with the NCC attending, Dr. Karri Wright.    Amber Osborn, APRN, CNP  Neurocritical Care *[id number]    24 Hour Vital Signs Summary:  Temp: 98.7  F (37.1  C) Temp  Min: 98.5  F (36.9  C)  Max: 99.9  F (37.7  C)  Resp: 16 Resp  Min: 14  Max: 16  SpO2: 97 % SpO2  Min: 91 %  Max: 99 %  Pulse: 68 Pulse  Min: 68  Max: 89    No data recorded  BP: 101/63 Systolic (24hrs), Av , Min:101 , Max:137   Diastolic (24hrs), Av, Min:63, Max:92      Respiratory monitoring:   Resp: 16      I/O last 3 completed shifts:  In: 660 [P.O.:510; I.V.:150]  Out: 0     Current Medications:    sodium chloride 0.9%  250 mL Intravenous Once in dialysis/CRRT     sodium chloride 0.9%  300 mL Hemodialysis Machine Once     atorvastatin  20 mg Oral At Bedtime     baclofen  10 mg Oral BID     cetirizine  10 mg Oral Daily     cloNIDine  0.1 mg Oral Q8H     cyanocobalamin  500 mcg Oral Daily     epoetin mar-epbx  8,000 Units Intravenous Once in dialysis/CRRT     finasteride  1.3 mg Oral Daily     fluticasone  1 spray Both Nostrils BID     [START ON 2022] folic acid  1 mg Oral Daily     folic acid  1 mg Intravenous Daily     gabapentin  300 mg Oral Q8H CUONG     heparin ANTICOAGULANT  5,000 Units Subcutaneous Q8H     magnesium plus protein  133 mg Oral Daily     methocarbamol  500 mg Oral 4x Daily     midodrine  10 mg Oral Q8H CUONG     multivitamin RENAL  1 tablet Oral Daily     - MEDICATION INSTRUCTIONS -   Does not apply Once     pantoprazole  40 mg Oral QAM AC      "polyethylene glycol  17 g Oral Daily     vitamin B6  100 mg Oral Daily     senna-docusate  1 tablet Oral BID     sertraline  100 mg Oral Daily     sevelamer carbonate  1,600 mg Oral TID w/meals     sodium chloride (PF)  3 mL Intracatheter Q8H     thiamine  200 mg Intravenous TID     thiamine  100 mg Oral TID     [START ON 12/17/2022] thiamine  100 mg Oral Daily       PRN Medications:  sodium chloride 0.9%, sodium chloride 0.9%, acetaminophen, sore throat, bisacodyl, calcium carbonate, diazepam, flumazenil, haloperidol lactate, hydrOXYzine, lidocaine (buffered or not buffered), lidocaine (buffered or not buffered), LORazepam **OR** LORazepam, magnesium hydroxide, naloxone **OR** naloxone **OR** naloxone **OR** naloxone, ondansetron **OR** ondansetron, prochlorperazine **OR** prochlorperazine, QUEtiapine, sodium chloride (PF), - MEDICATION INSTRUCTIONS -    Infusions:    - MEDICATION INSTRUCTIONS -         Allergies   Allergen Reactions     Amlodipine      Lisinopril        Physical Examination:  Vitals: /63 (BP Location: Right arm)   Pulse 68   Temp 98.7  F (37.1  C) (Axillary)   Resp 16   Ht 1.854 m (6' 1\")   Wt 132.8 kg (292 lb 12.3 oz)   SpO2 97%   BMI 38.63 kg/m    General: Adult male patient, lying in bed.  HEENT: Normocephalic, no icterus, oral cavity/oropharynx pink and moist.  Cardiac: RRR on bedside monitor. He appears adequately perfused.   Pulm: No tachypnea, no increased work of breathing.   Abdomen: Non-distended.  Extremities: Warm, generalized edema, extremities appear adequately perfused.   Skin: No obvious rashes or lesions on exposed skin.   Psych: Calm, cooperative.   Neuro:  Mental status: Awake, alert, attentive, oriented to self, time, place, and circumstance. Language is fluent and coherent with intact comprehension of complex commands.  Cranial nerves: Conjugate gaze, EOMI, face symmetric, tongue midline, no dysarthria.   Motor: Normal bulk and tone. No abnormal movements. 5/5 " strength in 4/4 extremities.   Sensory: Intact to light touch x 4 extremities.  Coordination: Deferred.   Gait: Deferred.      Labs:  Recent Labs   Lab 12/11/22  0528 12/10/22  0559 12/09/22  0429 12/08/22  0458   * 137 134* 133*   POTASSIUM 4.3 4.6 4.4 4.6   CHLORIDE 95* 97* 95* 96*   CO2 27 22 24 27   BUN 20.2* 35.3* 27.4* 18.8   CR 4.78* 7.39* 5.68* 4.29*   MAC 8.7 9.0 8.9 8.3*   BILITOTAL 0.4  --   --   --    ALKPHOS 146*  --   --   --    ALT <5*  --   --   --    AST 18  --   --   --        Recent Labs   Lab 12/11/22  0528 12/10/22  0559 12/09/22  0429 12/08/22  1401   WBC 6.0 6.3 6.7 7.5   HGB 9.3* 8.9* 8.7* 9.0*    191 152 128*       Recent Labs   Lab 12/06/22  1817 12/06/22  1409 12/06/22  1305   PH 7.43 7.46* 7.46*   PCO2 40 44 43   PO2 166* 296* 403*   HCO3 27 31* 31*       All cultures:  Recent Labs   Lab 12/06/22 1815   CULTURE No anaerobic organisms isolated after 5 days  No Growth       Imaging:    Results for orders placed or performed during the hospital encounter of 12/06/22 (from the past 24 hour(s))   Glucose by meter   Result Value Ref Range    GLUCOSE BY METER POCT 105 (H) 70 - 99 mg/dL   Ammonia   Result Value Ref Range    Ammonia 22 16 - 60 umol/L   Glucose by meter   Result Value Ref Range    GLUCOSE BY METER POCT 94 70 - 99 mg/dL       All relevant imaging and laboratory values personally reviewed.

## 2022-12-12 NOTE — PLAN OF CARE
Major Shift Events:  No major shift events to report, intermittently disoriented and uncooperative. Calm majority of the night but became frustrated in the morning.   Plan: Transfer to  when bed available   For vital signs and complete assessments, please see documentation flowsheets.

## 2022-12-12 NOTE — PROGRESS NOTES
[facility] PROGRESS NOTE     Patient:  Shay Jimenez   Date of birth 1965, Medical record number [id number]  Date of Visit:  12/12/2022  Date of Admission: 12/6/2022  Consult Requester:Fritz Boles MD          Assessment and Plan:   ID Problem List  1. S/p redo C5-T6 fusion 12/6/22 c/b hematoma, intraoperative cultures negative  2. History of T2-T3 discitis/vertebral osteomyelitis vs DJD s/p cervical to thoracic fusion and T2/3 decompression 6/27/22, intraoperative culture x2 with Cutibacterium acnes in broth only, thought likely contamination but completed 2 weeks Ancef and 4 weeks amoxicillin  3. Recent Morganella morganii bacteremia of unclear etiology s/p 8.5 weeks cefepime (8/14/22 - 10/13/22)     RECOMMENDATION:  1. Intraoperative cultures with no growth at 5 days  2. No role for antibiotics at this time from ID standpoint.      ASSESSMENT:  Shay Jimenez is a 57 year old male with past medical history significant for ESRD on HD, alcohol use disorder, previous c/f thoracic spine osteomyelitis vs DJD s/p cervical spinal fusion in June 2022, recent Morganella bacteremia (08/2022, s/p 8.5 wks cefepime) who is admitted since 12/6/22 for redo cervical spinal fusion which was completed 12/6 with cefazolin perioperatively. ID consulted regarding history of osteomyelitis. He has recent admission in June 2022 for upper back pain, weakness, falls, inability to ambulate with radiographic findings concerning for DJD vs inflammatory changes possibly related to discitis/osteomyelitis at T2-T3. He had cervical spine fusion on 6/27/22, and since clinically stable, this was done off antibiotics and intraoperative cultures collected. These were positive on day 5 and day 7 in 2 sets for Cutibacterium acnes that was felt likely contaminant due by infectious disease at the time. However, due to hardware placement did receive 2 weeks IV Ancef followed by 4 weeks amoxicillin. One blood  culture during that time with Staph epi attributed to contamination. He was also admitted in August 2022 septic shock requiring pressor support and found to have Morganella morganii bacteremia (blood cultures +2 days) of unclear etiology, no evidence clinically or radiographically for other source of infection, was thought to be possibly related to osteomyelitis and he completed 8.5 weeks of IV cefepime in mid-October. Given report of 4 months of diarrhea 2/2 constipation (likely overflow) and alcohol use diosrder, consider GI etiology for previous Morganella bacteremia though CT at that time unremarkable. He continued to have neck pain and imaging findings with no acute change from August 2022, though loosened screws at C5/C6 for which he was admitted for re-do fusion on 12/6. He remains afebrile, no leukocytosis, no report of intraoperative concern for osteomyelitis, cultures remain negative to date from 12/6. He continued on cefazolin perioperative per neurosurgery team due to drain in place, then discontinued 12/9. New agitation and delirium 12/8 started on CIWA protocol, CT with 9.5 x 5 cm hematoma noted at surgical site and lung findings c/f aspiration, edema/mucous plugging. He denies any respiratory concerns, is stable on 2L NC. No evidence of active infection of spine or lungs. No indication for antibiotics at this time from ID standpoint.      Infectious diseases will sign off at this time.    Patient was discussed with Dr. Denney. Recommendations discussed with primary team.    Phylicia Roper PA-C  Infectious Diseases  Pager # [phone]    I spent a total of 40 minutes face-to-face and/or coordinating care of Shay Jimenez. Over 50% of my time on the unit was spent counseling the patient and/or coordinating care.           Interim History and Events:     Shay remains afebrile, vitals stable, no leukocytosis, and CRP improved. He remained delirious and agitated through the weekend, started on CIWA  protocol with high scores. Intraoperative cultures remain negative to date. Had dialysis on 12/10 with 2.3L removed, dialysis again today. He is net +3.5 L since admission.          HPI: from ID consult note dated 12/7     Shay Jimenez is a 57 year old male with past medical history significant for HTN, HLD, ESRD on HD, GERD, GINI, obesity, alcohol use disorder, previous c/f thoracic spine osteomyelitis s/p spinal fusion in June 2022, recent Morganella bacteremia (08/2022) who is admitted since 12/6/22 for redo cervical spinal fusion.      He had CTA chest in 02/2022 for dyspnea due to volume overload that noted discitis vs osteomyelitis of thoracic spine. Follow up after this is reported by ID note 6/25/22 - ultimately pt had done ok with rest, later had follow up with orthopedic provider and had MRI notable for fracture of thoracic spine. He was admitted in June 2022 for 6 months of upper back pain, with weakness, falls, and inability to ambulate. He denied fever, chills. CT and thoracic MRI 6/24/22 notable for collapse of T2-T3 vertebral bodies with abnormal enhancement, and thickened abnormal epidural tissues c/f inflammed epidural tissues and paraspinal inflammatory changes c/f DJD but unable to rule out epidural phlegmon abscess c/f discitis and osteomyelitis, and with severe spinal canal stenosis at T2,T3 c/f compressive myelopathy. Antibiotics were deferred since clinically stable and planned for OR with cultures to be collected. On 6/27/22 he had cervical 6 to thoracic 6 fusion and decompression of thoracic 2-3, operative note with degenerative joint disease changes and intraop cultures sent. He was treated with Ancef postoperatively for 14 days. Spine tissue culture was positive in two cultures on day 5 and day 7 in broth only for C. acnes that was thought likely to be contaminant. However, he had new hardware placed during surgery and was found in two cultures, he was started on amoxicillin 500 mg  daily (dose adjusted for HD) for 4 weeks after being on Ancef for 2 weeks. Blood culture on 7/5 (collected when afebrile, on Ancef) positive in 1/2 bottle from only 1 set for Staph epi was considered due to contamination.     He was previously admitted 8/10/22 for septic shock requiring pressors, found to have Morganella morganii of unclear etiology , thought related to progressive T2-3 discitis/osteomyelitis. No intraabdominal source on imaging, TTE negative for endocarditis, no other reported clinical or radiographic signs of infection. He had positive blood culture on 8/10 x2 (reports rigors then, no fever) and again in 1/2 sets on 8/11, negative since 8/13. Resistant to ampicillin, cefazolin, intermediate ceftriaxone, cefepime/Zosyn/carbapenem/FQ sensitive. He was treated with IV cefepime from 8/14/22 - 10/13/22 for almost 9 weeks of IV antibiotics.     CTA chest angio 8/10/22 with progressive bony destruction and c/f discitis/osteomyelitis at T2/3. CT Cervical spine 10/10/22 with no acute finding or change since 8/11/22 - with stable degenerative changes, loosening and retropulsion of screws at C5/C6. MRI lumbar spine same day with stable degenerative changes, no report of discitis/osteomyelitis on imaging at that time. Repeat imaging on 11/3 with stable screw loosening, stable erosive changes at T2-T3. He was found to have hardware failure with screw pullout at C5/C6 and pseudoarthrosis, admitted 12/6/22 for re-do cervical spinal fusion C5-T6 with fortino placement. Had hemorrhage intraoperatively, s/p 2 units RBC, cerebral angiogram negative for vertebral artery injury. Intraoperative cultures were taken and remain pending at this time. Per neurosurgery, no evidence of osteomyelitis reported intraoperatively. Does have significant amount of original hardware retained. He received cefazolin for perioperative antibiotic. He remains in ICU on pressor x1. He remains afebrile, without leukocytosis.      He denies IV  drug use or injection medications. He has no open sores, cuts. He reports continued neck pain prior to admission. Denies fever, chills, rigors, unintentional weight loss, nausea, vomiting, abdominal pain, diarrhea, rash, or urinary symptoms. He denies any previous episodes of bacteremia prior to this year. He has LUE fistula and dialysis in place.          ROS:   -Focused 5 point ROS completed, pertinent positives and negatives listed above.      Physical Examination:  Temp: 98.7  F (37.1  C) Temp src: Axillary BP: 101/63 Pulse: 68   Resp: 16 SpO2: 97 % O2 Device: Nasal cannula Oxygen Delivery: 3 LPM    Vitals:    12/06/22 0729 12/07/22 0600 12/09/22 0000 12/12/22 0000   Weight: 128.4 kg (283 lb 1.1 oz) 136.1 kg (300 lb 0.7 oz) 136.4 kg (300 lb 11.3 oz) 132.8 kg (292 lb 12.3 oz)       Constitutional: Adult male seen sitting up in bed, in NAD. Alert and interactive.   HEENT: NCAT, anicteric sclerae, conjunctiva clear.   Respiratory: Non-labored breathing, good air exchange. 2L NC. No cough noted.   Cardiovascular: Regular rate and rhythm  GI:  Abdomen is obese.   Skin: No lesions on exposed surfaces.+peripheral vascular changes  Musculoskeletal: 2+ edema present.   Neurologic: No focal deficit. Moves all extremities spontaneously.   VAD: PIV is c/d/i with no erythema, drainage, or tenderness. LUE fistula with dialysis in progress     Medications:    sodium chloride 0.9%  250 mL Intravenous Once in dialysis/CRRT     sodium chloride 0.9%  300 mL Hemodialysis Machine Once     atorvastatin  20 mg Oral At Bedtime     baclofen  10 mg Oral BID     cetirizine  10 mg Oral Daily     cloNIDine  0.1 mg Oral Q8H     cyanocobalamin  500 mcg Oral Daily     epoetin mar-epbx  8,000 Units Intravenous Once in dialysis/CRRT     finasteride  1.3 mg Oral Daily     fluticasone  1 spray Both Nostrils BID     [START ON 12/13/2022] folic acid  1 mg Oral Daily     folic acid  1 mg Intravenous Daily     gabapentin  300 mg Oral Q8H CUONG      heparin ANTICOAGULANT  5,000 Units Subcutaneous Q8H     magnesium plus protein  133 mg Oral Daily     methocarbamol  500 mg Oral 4x Daily     midodrine  10 mg Oral Q8H CUONG     multivitamin RENAL  1 tablet Oral Daily     - MEDICATION INSTRUCTIONS -   Does not apply Once     pantoprazole  40 mg Oral QAM AC     polyethylene glycol  17 g Oral Daily     vitamin B6  100 mg Oral Daily     senna-docusate  1 tablet Oral BID     sertraline  100 mg Oral Daily     sevelamer carbonate  1,600 mg Oral TID w/meals     sodium chloride (PF)  3 mL Intracatheter Q8H     thiamine  200 mg Intravenous TID     thiamine  100 mg Oral TID     [START ON 12/17/2022] thiamine  100 mg Oral Daily       Infusions/Drips:    - MEDICATION INSTRUCTIONS -         Laboratory Data:   No results found for: ACD4    Inflammatory Markers    Recent Labs   Lab Test 12/10/22  0559 12/06/22  0756 10/06/22  1215 10/03/22  1500 09/29/22  1345 09/22/22  1205 09/15/22  1425 09/12/22  1320 09/08/22  1330 09/01/22  1300 08/25/22  1300 08/18/22  1435   SED  --   --  31*  --  31*  --  25*  --  27*  --  34* 41*   .00* 37.60* 43.70* 42.6* 53.5* 32.90* 29.7* 9.4* 30.3*   < > 39.2* 14.6*    < > = values in this interval not displayed.       Metabolic Studies       Recent Labs   Lab Test 12/11/22  1143 12/11/22  0920 12/11/22  0528 12/11/22  0029 12/10/22  2152 12/10/22  0559 12/09/22  0429 12/08/22  0458 12/08/22  0406 12/07/22  1400 12/07/22  0538 12/06/22  2335 12/06/22  2246 12/06/22  1817 12/06/22  1409   NA  --   --  134*  --   --  137 134* 133*  --   --  128* 131*  --  129* 130*   POTASSIUM  --   --  4.3  --   --  4.6 4.4 4.6  --   --  5.2 5.0  --  5.9* 4.9   CHLORIDE  --   --  95*  --   --  97* 95* 96*  --   --  86* 93*  --   --   --    CO2  --   --  27  --   --  22 24 27  --   --  25 22  --   --   --    ANIONGAP  --   --  12  --   --  18* 15 10  --   --  17* 16*  --   --   --    BUN  --   --  20.2*  --   --  35.3* 27.4* 18.8  --   --  26.4* 22.0*  --   --   --     CR  --   --  4.78*  --   --  7.39* 5.68* 4.29*  --   --  5.54* 4.70*  --   --   --    GFRESTIMATED  --   --  13*  --   --  8* 11* 15*  --   --  11* 14*  --   --   --    GLC 94 105* 89 88  --  84 84 99   < >  --  91 103*   < > 161* 129*   A1C  --   --   --   --   --   --   --   --   --  5.6  --   --   --   --   --    MAC  --   --  8.7  --   --  9.0 8.9 8.3*  --   --  7.9* 7.3*  --   --   --    PHOS  --   --  2.9  --  2.2* 4.4 3.8 3.5  --   --   --  4.3  --   --   --    MAG  --   --  1.8  --  1.7 2.3 1.8 1.5*  --   --   --  1.8  --   --   --    LACT  --   --   --   --   --   --   --   --   --   --   --   --   --  3.4* 2.0    < > = values in this interval not displayed.       Hepatic Studies    Recent Labs   Lab Test 12/11/22  0528 10/06/22  1215 09/29/22  1345 09/22/22  1205 09/15/22  1425 09/08/22  1330 08/18/22  1435 07/21/22  1252 07/16/22  0557 07/11/22  0722 06/29/22  0611 06/24/22  0818   BILITOTAL 0.4  --   --   --   --   --   --  0.6 0.5  --   --  0.6   ALKPHOS 146*  --   --   --   --   --   --  131 134  --   --  152*   ALBUMIN 3.1*  --   --   --   --   --   --  3.0* 2.8* 3.2* 2.9* 3.3*   AST 18 31 35 41 38 33   < > 17 13  --   --  51*   ALT <5*  --   --   --   --   --   --  9 <6  --   --  55    < > = values in this interval not displayed.       Pancreatitis testing    No lab results found.    Hematology Studies      Recent Labs   Lab Test 12/11/22  0528 12/10/22  0559 12/09/22  0429 12/08/22  1401 12/08/22  0458 12/07/22 2013   WBC 6.0 6.3 6.7 7.5 5.7 5.0   HGB 9.3* 8.9* 8.7* 9.0* 7.9* 7.7*   HCT 29.7* 28.5* 27.2* 28.3* 24.2* 23.5*    191 152 128* 103* 109*       Arterial Blood Gas Testing    Recent Labs   Lab Test 12/06/22  1817 12/06/22  1409 12/06/22  1305 06/27/22  2224 06/27/22 2006   PH 7.43 7.46* 7.46* 7.43 7.46*   PCO2 40 44 43 41 38   PO2 166* 296* 403* 335* 152*   HCO3 27 31* 31* 27 27   O2PER 49.0 90.0 93.0  --   --        Microbiology:  Culture   Date Value Ref Range Status    12/06/2022 No anaerobic organisms isolated after 5 days  Preliminary   12/06/2022 No Growth  Final   07/06/2022 No Growth  Final   07/06/2022 No Growth  Final   07/05/2022 Positive on the 1st day of incubation (A)  Final   07/05/2022 Staphylococcus epidermidis (AA)  Final     Comment:     1 of 1 bottles   07/04/2022 No Growth  Final   07/03/2022 No Growth  Final   07/02/2022 No Growth  Final   07/01/2022 No Growth  Final   06/30/2022 No Growth  Final   06/29/2022 No Growth  Final   06/28/2022 No Growth  Final   06/27/2022 (A)  Final    Isolated in broth only Cutibacterium (Propionibacterium) acnes     Comment:     On day 7 of incubation    Susceptibility testing of Cutibacterium (Propionibacterium) species is not done from this source. This organism is susceptible to penicillin and cefotaxime and most are susceptible to clindamycin.   06/27/2022 (A)  Final    Isolated in broth only Cutibacterium (Propionibacterium) acnes     Comment:     On day 5 of incubation  Susceptibility testing of Cutibacterium (Propionibacterium) species is not done from this source. This organism is susceptible to penicillin and cefotaxime and most are susceptible to clindamycin.   06/27/2022 No Growth  Final   06/27/2022 No Growth  Final   06/26/2022 No Growth  Final   06/24/2022 No Growth  Final   06/24/2022 No Growth  Final       Imaging:  CT Head 12/8/22                                                                 Impression:  No acute intracranial pathology.     CT C/A/P 12/8/22    IMPRESSION:  1.  There is a partially visualized hematoma in the subcutaneous soft  tissues overlying the posterior cervicothoracic junction subjacent to  the staple line measuring 9.5 x 5 cm in axial dimension. Post surgical  changes at C5-T6 posterior fortino instrumented fusion with multilevel  bilateral pedicle screws. No evidence of hardware failure.  2.  Multifocal groundglass and tree in bud nodular densities in all 5  lobes as well as peripheral  mucous plugging may suggest aspiration or  atypical infection. There is diffuse mild pulmonary edema, small  pleural effusions and compressive atelectasis.  3.  Atrophic native kidneys with acquired cystic disease,  nonobstructing punctate calculus in the inferior pole region left  kidney. Indeterminate exophytic cystic mass lesion of the inferior  pole region right kidney measuring up to 1.8 cm. Recommend nonemergent  follow-up dedicated renal mass protocol MR or CT.    Abd Xray 12/7/22 -  Impression: Air-filled distended small and large bowel loops in the central and left abdomen, which may represent early or developing  adynamic ileus.    CT Thoracic and Cervical Spine 11/3/22  Impression:      1. Postsurgical changes of C5-T6 fortino/screw fixation. When compared to  the 10/10/2012 exams, there is stable transpedicular screw loosening  and retraction at C5 and C6.   2. Stable erosive changes in the T2, T3 sequela of patient's known  osteomyelitis. Unchanged T11 hemangioma.     MRI Lumbar Spine 10/10/22  IMPRESSION:  1.  Stable degenerative changes of the lumbar spine without a significant interval change.  2.  No high-grade canal stenosis.  3.  Bilateral neural foraminal narrowing is greatest on the left at L5-S1, where there is moderate stenosis.     CT Thoracic and Cervical Spine 10/10/22                                                                 Impression:   1. No acute finding or significant change since 8/11/2022.  2. Unchanged loosening and retropulsion of the transpedicle screws  bilaterally at C5 and C6.   3. Unchanged T2 and T3 vertebral body sclerosis and height loss.  4. Stable degenerative changes of the cervical spine, most  significantly resulting in severe neural foraminal stenosis on the  left C3-4 and moderate neural foraminal stenosis on the right at C3-4  and C4-5.     MRI Thoracic Spine 6/24/22  IMPRESSION: Postcontrast images demonstrate abnormal contrast  enhancement of the T2 and T3  vertebrae, thickened abnormal epidural  tissues from C7-T1 down to T4-T5 possibly simply representing inflamed  epidural tissues but epidural abscess cannot be excluded. Abnormal  contrast enhancement in the bilateral lateral and anterior paraspinous  soft tissues from T1 down to T4 consistent with a paraspinous  phlegmon/abscess again noted. Moderate compression of the thoracic  spinal cord at the upper T2 level again noted.

## 2022-12-12 NOTE — PLAN OF CARE
Cardiac: BP stable. Midodrine scheduled for dialysis. Afebrile. Off monitor HR 70's-90's.  Neuro: Delirious. Alcohol withdrawal. CIWAA protocol. 6mg ativan given total. CIWAA scoring 7-15. Neuro exam inconsistent. Q4hr vitals. Calls out. Redirectable. Irritable. Inappropriate at times. C-collar while OOB. Okay to have off in bed >45 degrees HOB.  Respiratory: 2-3L nc. LS clear. Productive cough.  GI: Regular diet. No bm. Passing gas. Good appetite.  : Anuric. Dialysis HD done. 3.5 liters of fluid removed.  Skin: Surgical incision C-spine.     Sig Events: CIWAA continued. Active withdrawal. Dialysis done.     John Bueno RN SICU Neuro-ICU

## 2022-12-12 NOTE — PROGRESS NOTES
[facility], [place]   Neurosurgery Progress Note:    Assessment: Shay Jimenez is a 57 year old male with with PMH ESRD on dialysis and osteomyelitis s/p C5-T6 fusion who is now POD#5 s/p redo C5-T6 fusion with concern for hemorrhage; no vertebral artery dissection or extravasation was noted on intraoperative angiogram.  He was admitted to the Neuro ICU postoperatively for MAP goals and frequent neuro checks, remaining stable on pressors.  Neuro exam is unchanged from patient's baseline.    Plan:  - Serial neuro exams  - Pain control  - Hb goal > 7  - Normonatremia  - Goal normotension  - Appreciate Nephrology recommendations re: dialysis (baseline M/W/F)  - We will discuss with nephrology regarding dialysis today since the patient received dialysis on Saturday  - Patient is now on CIWA protocol with Haldol and Ativan as needed and thiamine and clonidine scheduled-we will monitor for the amount and frequency of Haldol and Ativan received.  - Cervical collar at all times  - Advance diet as tolerated  - Bowel regimen  - PRN antiemetics  - IVF until taking adequate PO  - PT/OT  - SCDs for DVT proph  - Okay to transfer to the floor  -----------------------------------  Jonny Stephenson  Neurosurgery Resident, PGY-2    Please contact neurosurgery resident on call with questions.    Dial * * [phone], enter [phone] when prompted.      Interval History/Subjective: No acute events overnight. Stable neurological examination with intermittent fluctuations.  Denies headaches or back pain.  Dressing changed this morning.    Objective:   Temp:  [98.5  F (36.9  C)-99.9  F (37.7  C)] 98.8  F (37.1  C)  Pulse:  [68-93] 79  Resp:  [14-18] 18  BP: (101-136)/(63-86) 113/81  SpO2:  [97 %-99 %] 97 %  I/O last 3 completed shifts:  In: 660 [P.O.:510; I.V.:150]  Out: 0     Gen: Appears comfortable, NAD  Wound: Incision, clean, dry, intact without strikethrough  Neurologic:  - Alert & Oriented to person, place, time,  and situation  - Follows commands briskly  - Speech fluent, spontaneous. No aphasia or dysarthria.  - No gaze preference. No apparent hemineglect.  - PERRL, EOMI  - Strong brow raise, eye closure, and smile  - Face symmetric with sensation intact to light touch  - Trapezii and sternocleidomastoid muscles 5/5 bilaterally  - No pronator drift     Del Tr Bi WE WF Gr   R 4* 5 5 5 5 5   L 4* 5 5 5 5 5    HF KE KF DF PF EHL   R 4+ 5 5 5 5 5   L 4+ 5 5 5 5 5   *pain-limited    Norman's and clonus negative.    Sensation intact and symmetric to light touch throughout    LABS  Recent Labs   Lab Test 12/12/22  0955 12/11/22  0528 12/10/22  0559   WBC 6.6 6.0 6.3   HGB 9.3* 9.3* 8.9*   MCV 99 99 98    210 191       Recent Labs   Lab Test 12/11/22  1143 12/11/22  0920 12/11/22  0528 12/11/22  0029 12/10/22  0559 12/09/22  0429   NA  --   --  134*  --  137 134*   POTASSIUM  --   --  4.3  --  4.6 4.4   CHLORIDE  --   --  95*  --  97* 95*   CO2  --   --  27  --  22 24   BUN  --   --  20.2*  --  35.3* 27.4*   CR  --   --  4.78*  --  7.39* 5.68*   ANIONGAP  --   --  12  --  18* 15   MAC  --   --  8.7  --  9.0 8.9   GLC 94 105* 89   < > 84 84    < > = values in this interval not displayed.       IMAGING  No results found for this or any previous visit (from the past 24 hour(s)).

## 2022-12-12 NOTE — PLAN OF CARE
Major Shift Events:  Alert but intermittently agitated and disoriented x3. PRN ativan given per CIWA protocol. Most recent CIWA score 14. VS otherwise stable on 3L NC. Patient up in chair and meal assist provided. No urine or BM.     Plan: Continue with plan of care and transfer to  once bed is available.  For vital signs and complete assessments, please see documentation flowsheets.

## 2022-12-12 NOTE — PROGRESS NOTES
HEMODIALYSIS TREATMENT NOTE    Date: 12/12/2022  Time: 1:39 PM    Data:  Pre Wt: 132.8 kg    Desired Wt: 129.3 kg   Post Wt:129.3 kg    Weight change: 3.5  kg  Ultrafiltration - Post Run Net Total Removed (mL): 3500 mL  Vascular Access Status: Fistula  patent  Dialyzer Rinse: Light, Streaked  Total Blood Volume Processed: 97.5 L   Total Dialysis (Treatment) Time: 4HRS   Dialysate Bath: K 3, Ca 2.25  Heparin: None    Lab:   No    Interventions:  LUE AVF cannulated with 15g needles at the initiation of tx. Pt tolerated tx very well and uneventful. 3.5 L net fluid removed. Pt received Epoetin during the run. 450 BFR maintained throughout. Hemostasis achieved within 10 min. Handoff report given to YUDI Lopez.    Assessment:  Alert and intermittently confused. Hallucinated,delirious, and somewhat paranoid. AVF is positive for thrill and bruit.      Plan:    Next HD per renal

## 2022-12-13 ENCOUNTER — APPOINTMENT (OUTPATIENT)
Dept: OCCUPATIONAL THERAPY | Facility: CLINIC | Age: 57
DRG: 459 | End: 2022-12-13
Attending: STUDENT IN AN ORGANIZED HEALTH CARE EDUCATION/TRAINING PROGRAM
Payer: MEDICARE

## 2022-12-13 LAB
ANION GAP SERPL CALCULATED.3IONS-SCNC: 14 MMOL/L (ref 7–15)
BACTERIA TISS BX CULT: NORMAL
BUN SERPL-MCNC: 21 MG/DL (ref 6–20)
CALCIUM SERPL-MCNC: 9 MG/DL (ref 8.6–10)
CHLORIDE SERPL-SCNC: 98 MMOL/L (ref 98–107)
CREAT SERPL-MCNC: 4.41 MG/DL (ref 0.67–1.17)
DEPRECATED HCO3 PLAS-SCNC: 25 MMOL/L (ref 22–29)
ERYTHROCYTE [DISTWIDTH] IN BLOOD BY AUTOMATED COUNT: 17 % (ref 10–15)
GFR SERPL CREATININE-BSD FRML MDRD: 15 ML/MIN/1.73M2
GLUCOSE SERPL-MCNC: 96 MG/DL (ref 70–99)
HCT VFR BLD AUTO: 30.3 % (ref 40–53)
HGB BLD-MCNC: 9.5 G/DL (ref 13.3–17.7)
MAGNESIUM SERPL-MCNC: 1.7 MG/DL (ref 1.7–2.3)
MCH RBC QN AUTO: 30.8 PG (ref 26.5–33)
MCHC RBC AUTO-ENTMCNC: 31.4 G/DL (ref 31.5–36.5)
MCV RBC AUTO: 98 FL (ref 78–100)
PHOSPHATE SERPL-MCNC: 2.6 MG/DL (ref 2.5–4.5)
PLATELET # BLD AUTO: 272 10E3/UL (ref 150–450)
POTASSIUM SERPL-SCNC: 3.6 MMOL/L (ref 3.4–5.3)
RBC # BLD AUTO: 3.08 10E6/UL (ref 4.4–5.9)
SODIUM SERPL-SCNC: 137 MMOL/L (ref 136–145)
WBC # BLD AUTO: 6.5 10E3/UL (ref 4–11)

## 2022-12-13 PROCEDURE — 250N000013 HC RX MED GY IP 250 OP 250 PS 637: Performed by: STUDENT IN AN ORGANIZED HEALTH CARE EDUCATION/TRAINING PROGRAM

## 2022-12-13 PROCEDURE — 84100 ASSAY OF PHOSPHORUS: CPT | Performed by: NURSE PRACTITIONER

## 2022-12-13 PROCEDURE — 83735 ASSAY OF MAGNESIUM: CPT | Performed by: NURSE PRACTITIONER

## 2022-12-13 PROCEDURE — 36415 COLL VENOUS BLD VENIPUNCTURE: CPT | Performed by: NURSE PRACTITIONER

## 2022-12-13 PROCEDURE — 250N000013 HC RX MED GY IP 250 OP 250 PS 637: Performed by: NURSE PRACTITIONER

## 2022-12-13 PROCEDURE — 97535 SELF CARE MNGMENT TRAINING: CPT | Mod: GO | Performed by: OCCUPATIONAL THERAPIST

## 2022-12-13 PROCEDURE — 250N000013 HC RX MED GY IP 250 OP 250 PS 637

## 2022-12-13 PROCEDURE — 99233 SBSQ HOSP IP/OBS HIGH 50: CPT | Performed by: NURSE PRACTITIONER

## 2022-12-13 PROCEDURE — G0463 HOSPITAL OUTPT CLINIC VISIT: HCPCS

## 2022-12-13 PROCEDURE — 250N000011 HC RX IP 250 OP 636: Performed by: STUDENT IN AN ORGANIZED HEALTH CARE EDUCATION/TRAINING PROGRAM

## 2022-12-13 PROCEDURE — 85014 HEMATOCRIT: CPT | Performed by: NURSE PRACTITIONER

## 2022-12-13 PROCEDURE — 200N000002 HC R&B ICU UMMC

## 2022-12-13 PROCEDURE — 82310 ASSAY OF CALCIUM: CPT | Performed by: NURSE PRACTITIONER

## 2022-12-13 PROCEDURE — 99233 SBSQ HOSP IP/OBS HIGH 50: CPT | Mod: FS | Performed by: CLINICAL NURSE SPECIALIST

## 2022-12-13 RX ORDER — POLYETHYLENE GLYCOL 3350 17 G/17G
17 POWDER, FOR SOLUTION ORAL 3 TIMES DAILY
Status: DISCONTINUED | OUTPATIENT
Start: 2022-12-13 | End: 2023-01-07 | Stop reason: HOSPADM

## 2022-12-13 RX ADMIN — METHOCARBAMOL 500 MG: 500 TABLET ORAL at 08:12

## 2022-12-13 RX ADMIN — HEPARIN SODIUM 5000 UNITS: 5000 INJECTION, SOLUTION INTRAVENOUS; SUBCUTANEOUS at 20:31

## 2022-12-13 RX ADMIN — ACETAMINOPHEN 650 MG: 325 TABLET, FILM COATED ORAL at 17:08

## 2022-12-13 RX ADMIN — OXYCODONE HYDROCHLORIDE 5 MG: 5 TABLET ORAL at 02:40

## 2022-12-13 RX ADMIN — FLUTICASONE PROPIONATE 1 SPRAY: 50 SPRAY, METERED NASAL at 08:13

## 2022-12-13 RX ADMIN — MIDODRINE HYDROCHLORIDE 10 MG: 5 TABLET ORAL at 16:25

## 2022-12-13 RX ADMIN — BACLOFEN 10 MG: 10 TABLET ORAL at 20:30

## 2022-12-13 RX ADMIN — POLYETHYLENE GLYCOL 3350 17 G: 17 POWDER, FOR SOLUTION ORAL at 08:12

## 2022-12-13 RX ADMIN — FINASTERIDE 1.3 MG: 5 TABLET, FILM COATED ORAL at 08:13

## 2022-12-13 RX ADMIN — Medication 100 MG: at 13:56

## 2022-12-13 RX ADMIN — OXYCODONE HYDROCHLORIDE 5 MG: 5 TABLET ORAL at 22:08

## 2022-12-13 RX ADMIN — HEPARIN SODIUM 5000 UNITS: 5000 INJECTION, SOLUTION INTRAVENOUS; SUBCUTANEOUS at 03:41

## 2022-12-13 RX ADMIN — HEPARIN SODIUM 5000 UNITS: 5000 INJECTION, SOLUTION INTRAVENOUS; SUBCUTANEOUS at 12:05

## 2022-12-13 RX ADMIN — MIDODRINE HYDROCHLORIDE 10 MG: 5 TABLET ORAL at 22:07

## 2022-12-13 RX ADMIN — GABAPENTIN 300 MG: 300 CAPSULE ORAL at 13:56

## 2022-12-13 RX ADMIN — ACETAMINOPHEN 650 MG: 325 TABLET, FILM COATED ORAL at 22:08

## 2022-12-13 RX ADMIN — SENNOSIDES AND DOCUSATE SODIUM 1 TABLET: 8.6; 5 TABLET ORAL at 08:11

## 2022-12-13 RX ADMIN — OXYCODONE HYDROCHLORIDE 5 MG: 5 TABLET ORAL at 13:56

## 2022-12-13 RX ADMIN — GABAPENTIN 300 MG: 300 CAPSULE ORAL at 06:26

## 2022-12-13 RX ADMIN — Medication 100 MG: at 08:11

## 2022-12-13 RX ADMIN — POLYETHYLENE GLYCOL 3350 17 G: 17 POWDER, FOR SOLUTION ORAL at 13:56

## 2022-12-13 RX ADMIN — B-COMPLEX W/ C & FOLIC ACID TAB 1 MG 1 TABLET: 1 TAB at 08:11

## 2022-12-13 RX ADMIN — Medication 133 MG: at 08:11

## 2022-12-13 RX ADMIN — METHOCARBAMOL 500 MG: 500 TABLET ORAL at 12:05

## 2022-12-13 RX ADMIN — FLUTICASONE PROPIONATE 1 SPRAY: 50 SPRAY, METERED NASAL at 20:34

## 2022-12-13 RX ADMIN — CYANOCOBALAMIN TAB 500 MCG 500 MCG: 500 TAB at 08:12

## 2022-12-13 RX ADMIN — LORAZEPAM 2 MG: 1 TABLET ORAL at 22:08

## 2022-12-13 RX ADMIN — Medication 100 MG: at 20:30

## 2022-12-13 RX ADMIN — SEVELAMER CARBONATE 1600 MG: 800 TABLET, FILM COATED ORAL at 12:05

## 2022-12-13 RX ADMIN — BACLOFEN 10 MG: 10 TABLET ORAL at 08:11

## 2022-12-13 RX ADMIN — ACETAMINOPHEN 650 MG: 325 TABLET, FILM COATED ORAL at 09:55

## 2022-12-13 RX ADMIN — LORAZEPAM 2 MG: 1 TABLET ORAL at 03:41

## 2022-12-13 RX ADMIN — ATORVASTATIN CALCIUM 20 MG: 20 TABLET, FILM COATED ORAL at 22:08

## 2022-12-13 RX ADMIN — Medication 100 MG: at 08:12

## 2022-12-13 RX ADMIN — SEVELAMER CARBONATE 1600 MG: 800 TABLET, FILM COATED ORAL at 18:21

## 2022-12-13 RX ADMIN — FOLIC ACID 1 MG: 1 TABLET ORAL at 08:12

## 2022-12-13 RX ADMIN — POLYETHYLENE GLYCOL 3350 17 G: 17 POWDER, FOR SOLUTION ORAL at 20:30

## 2022-12-13 RX ADMIN — MIDODRINE HYDROCHLORIDE 10 MG: 5 TABLET ORAL at 06:26

## 2022-12-13 RX ADMIN — SERTRALINE HYDROCHLORIDE 100 MG: 100 TABLET ORAL at 08:11

## 2022-12-13 RX ADMIN — METHOCARBAMOL 500 MG: 500 TABLET ORAL at 20:30

## 2022-12-13 RX ADMIN — CETIRIZINE HYDROCHLORIDE 10 MG: 10 TABLET, FILM COATED ORAL at 08:12

## 2022-12-13 RX ADMIN — SENNOSIDES AND DOCUSATE SODIUM 1 TABLET: 8.6; 5 TABLET ORAL at 20:30

## 2022-12-13 RX ADMIN — OXYCODONE HYDROCHLORIDE 5 MG: 5 TABLET ORAL at 09:55

## 2022-12-13 RX ADMIN — GABAPENTIN 300 MG: 300 CAPSULE ORAL at 22:08

## 2022-12-13 RX ADMIN — OXYCODONE HYDROCHLORIDE 5 MG: 5 TABLET ORAL at 18:21

## 2022-12-13 RX ADMIN — PANTOPRAZOLE SODIUM 40 MG: 40 TABLET, DELAYED RELEASE ORAL at 08:12

## 2022-12-13 RX ADMIN — METHOCARBAMOL 500 MG: 500 TABLET ORAL at 16:25

## 2022-12-13 RX ADMIN — SEVELAMER CARBONATE 1600 MG: 800 TABLET, FILM COATED ORAL at 08:12

## 2022-12-13 ASSESSMENT — ACTIVITIES OF DAILY LIVING (ADL)
ADLS_ACUITY_SCORE: 51

## 2022-12-13 NOTE — PROGRESS NOTES
Neurocritical Care Progress Note    Reason for critical care admission: Post op-C5-T6 fusion  Admitting Team: GABINO  Date of Service:  12/13/2022  Date of Admission:  12/6/2022  Hospital Day: 8    Assessment/Plan  Shay Jimenez is a 57 year old male with a past medical history of ESRD on dialysis and osteomyelitis s/p C5-T6 fusion admitted on 12/6/2022 for redo C5-T6 fusion.    24 hour events: Intermittently delirious and refusing care, oriented on my exam.     Neuro  #Redo C5-T6 Fusion POD#7  -No extravasation on angiogram  -Neurochecks every 4 hrs  -MAP goal >65 mmHg  -HOB > 30   -PT/OT/SLP  -C-collar while in bed; ok to take off if HOB is at 45  -Continue Baclofen   -Continue home gabapentin     #Analgesics & sedation  RASS goal:0  -Tylenol PRN    #Alcohol Withdrawal   -CIWA  -Ativan 6 mg 12/12   -Minimal use in the last 24 hours, IV haldol and lorazepam discontinued      #Delirium, improved   #Encephalopathy  -delirium precautions  -Seroquel HS PRN    CV  #Hypotension-resolved  -Cardiac monitoring  -MAP goal > 65 mmHg  -Midodrine 10 mg q8 for Dialysis    Resp  #POLO  Oxygen/vent:Room air  -Continuous pulse ox  -Maintain O2 saturations greater than 92%    GI  #diarrhea   Diet:Regular Diet  Last BM: 12/10  GI prophylaxis:none  -Bowel regimen: scheduled senna-docusate and Miralax though patient refusing   -ABD xray 12/7-possible developing ileus, continue bowel meds despite loose stools.   -Renvela TID with meals    Renal/  #ESRD  #Chronic Hyponatremia, improved   #Hypochloremia, improved   -Nephrology consulted  -dialysis MWF  -Daily BMP  -IV fluids: none  -Electrolyte replacement protocols not used due to ESRD   -Mag supplementation daily  -Continue home finasteride     Endo  #POLO  -Hgb A1c 5.6%  -Monitor glucose levels    Heme  #Acute blood loss anemia-stable  -EBL in OR 2.5 liters  -4 units PRBC's total  -1 unit FFP  -1 unit of PLT's  -Daily CBC  -Hgb goal >7, plt goal >50k  -Transfuse to meet Hgb and plt  goals    ID  #Osteomylytis Hx  -ID consulted per NSGY  -Daily CBC  -Follow temperature curve  -Afebrile  -No leukocytosis  -No ABX at this time per ID    ICU Checklist  Lines/tubes/drains: Fistula, PIV  FEN: none, prn replacement, regular diet  PPX: DVT - SCDs, heparin sq; GI - protonix (home PPI)  Code:Full  Dispo: Transfer order per NSGY    TIME SPENT ON THIS ENCOUNTER   I spent 35 minutes of care time on the unit/floor managing the care of Shay Jimenez. Greater than 50% of my time was spent at the bedside counseling the patient and/or coordinating care regarding services listed in this note.    The patient was discussed with the NCC attending, Dr. Karri Wright.    Amber Osborn, APRN, CNP  Neurocritical Care *[id number]    24 Hour Vital Signs Summary:  Temp: 99.4  F (37.4  C) Temp  Min: 97.4  F (36.3  C)  Max: 100.3  F (37.9  C)  Resp: 15 Resp  Min: 12  Max: 18  SpO2: 93 % SpO2  Min: 90 %  Max: 98 %  Pulse: 78 Pulse  Min: 71  Max: 96    No data recorded  BP: 124/84 Systolic (24hrs), Av , Min:98 , Max:133   Diastolic (24hrs), Av, Min:60, Max:103      Respiratory monitoring:   Resp: 15      I/O last 3 completed shifts:  In: 1680 [P.O.:1680]  Out: 3500 [Other:3500]    Current Medications:    atorvastatin  20 mg Oral At Bedtime     Baclofen  10 mg Oral BID     cetirizine  10 mg Oral Daily     cyanocobalamin  500 mcg Oral Daily     finasteride  1.3 mg Oral Daily     fluticasone  1 spray Both Nostrils BID     folic acid  1 mg Oral Daily     gabapentin  300 mg Oral Q8H     heparin ANTICOAGULANT  5,000 Units Subcutaneous Q8H     magnesium plus protein  133 mg Oral Daily     methocarbamol  500 mg Oral 4x Daily     midodrine  10 mg Oral Q8H CUONG     multivitamin RENAL  1 tablet Oral Daily     pantoprazole  40 mg Oral QAM AC     polyethylene glycol  17 g Oral Daily     vitamin B6  100 mg Oral Daily     senna-docusate  1 tablet Oral BID     sertraline  100 mg Oral Daily     sevelamer carbonate  1,600 mg Oral  "TID w/meals     sodium chloride (PF)  3 mL Intracatheter Q8H     thiamine  100 mg Oral TID     [START ON 12/17/2022] thiamine  100 mg Oral Daily       PRN Medications:  acetaminophen, sore throat, bisacodyl, calcium carbonate, flumazenil, haloperidol lactate, hydrOXYzine, LORazepam **OR** LORazepam, magnesium hydroxide, naloxone **OR** naloxone **OR** naloxone **OR** naloxone, ondansetron **OR** ondansetron, oxyCODONE, prochlorperazine **OR** prochlorperazine, QUEtiapine, sodium chloride (PF)    Infusions:      Allergies   Allergen Reactions     Amlodipine      Lisinopril        Physical Examination:  Vitals: /84   Pulse 78   Temp 99.4  F (37.4  C) (Axillary)   Resp 15   Ht 1.854 m (6' 1\")   Wt 128.1 kg (282 lb 6.6 oz)   SpO2 93%   BMI 37.26 kg/m    General: Adult male patient, lying in bed.  HEENT: Normocephalic, no icterus, oral cavity/oropharynx pink and moist.  Cardiac: RRR on bedside monitor. He appears adequately perfused.   Pulm: No tachypnea, no increased work of breathing.   Abdomen: Non-distended.  Extremities: Warm, generalized edema, extremities appear adequately perfused.   Skin: No obvious rashes or lesions on exposed skin.   Psych: Calm, cooperative.   Neuro:  Mental status: Awake, alert, attentive, oriented to self, time, place, and circumstance. Language is fluent and coherent with intact comprehension of complex commands.  Cranial nerves: Conjugate gaze, EOMI, face symmetric, tongue midline, no dysarthria.   Motor: Normal bulk and tone. No abnormal movements. 5/5 strength in 4/4 extremities.   Sensory: Deferred.   Coordination: Deferred.   Gait: Deferred.      Labs:  Recent Labs   Lab 12/13/22  0502 12/12/22  0955 12/11/22  0528 12/10/22  0559    135* 134* 137   POTASSIUM 3.6 4.1 4.3 4.6   CHLORIDE 98 95* 95* 97*   CO2 25 24 27 22   BUN 21.0* 32.8* 20.2* 35.3*   CR 4.41* 6.29* 4.78* 7.39*   MAC 9.0 9.1 8.7 9.0   BILITOTAL  --   --  0.4  --    ALKPHOS  --   --  146*  --    ALT  -- "   --  <5*  --    AST  --   --  18  --        Recent Labs   Lab 12/13/22  0502 12/12/22  0955 12/11/22  0528 12/10/22  0559   WBC 6.5 6.6 6.0 6.3   HGB 9.5* 9.3* 9.3* 8.9*    256 210 191       Recent Labs   Lab 12/06/22  1817 12/06/22  1409 12/06/22  1305   PH 7.43 7.46* 7.46*   PCO2 40 44 43   PO2 166* 296* 403*   HCO3 27 31* 31*       All cultures:  Recent Labs   Lab 12/06/22  1815   CULTURE No anaerobic organisms isolated after 6 days  No Growth       Imaging:    Results for orders placed or performed during the hospital encounter of 12/06/22 (from the past 24 hour(s))   Basic metabolic panel   Result Value Ref Range    Sodium 135 (L) 136 - 145 mmol/L    Potassium 4.1 3.4 - 5.3 mmol/L    Chloride 95 (L) 98 - 107 mmol/L    Carbon Dioxide (CO2) 24 22 - 29 mmol/L    Anion Gap 16 (H) 7 - 15 mmol/L    Urea Nitrogen 32.8 (H) 6.0 - 20.0 mg/dL    Creatinine 6.29 (H) 0.67 - 1.17 mg/dL    Calcium 9.1 8.6 - 10.0 mg/dL    Glucose 87 70 - 99 mg/dL    GFR Estimate 10 (L) >60 mL/min/1.73m2   CBC with platelets   Result Value Ref Range    WBC Count 6.6 4.0 - 11.0 10e3/uL    RBC Count 3.00 (L) 4.40 - 5.90 10e6/uL    Hemoglobin 9.3 (L) 13.3 - 17.7 g/dL    Hematocrit 29.8 (L) 40.0 - 53.0 %    MCV 99 78 - 100 fL    MCH 31.0 26.5 - 33.0 pg    MCHC 31.2 (L) 31.5 - 36.5 g/dL    RDW 17.1 (H) 10.0 - 15.0 %    Platelet Count 256 150 - 450 10e3/uL   Magnesium   Result Value Ref Range    Magnesium 1.9 1.7 - 2.3 mg/dL   Phosphorus   Result Value Ref Range    Phosphorus 3.7 2.5 - 4.5 mg/dL   CRP inflammation   Result Value Ref Range    CRP Inflammation 72.40 (H) <5.00 mg/L   XR Cervical Spine 2/3 Views    Narrative    EXAM: XR CERVICAL SPINE 2/3 VIEWS 12/12/2022 8:45 PM    HISTORY: s/p C5-T6 pSF     COMPARISON: CT 11/3/2022    FINDINGS: Portable upright AP and lateral views of the cervical spine  were obtained. Limited lateral view secondary to patient habitus and  limitation of portable imaging. Interval revision of C5-T6  posterior  fusion with fixation hardware in place. Alignment appears stable.  Cutaneous staple line.      Impression    IMPRESSION: Limited evaluation demonstrates revision of C5-T6  posterior fusion with stable alignment.    I have personally reviewed the examination and initial interpretation  and I agree with the findings.    LILLI REA MD         SYSTEM ID:  [id number]   Basic metabolic panel   Result Value Ref Range    Sodium 137 136 - 145 mmol/L    Potassium 3.6 3.4 - 5.3 mmol/L    Chloride 98 98 - 107 mmol/L    Carbon Dioxide (CO2) 25 22 - 29 mmol/L    Anion Gap 14 7 - 15 mmol/L    Urea Nitrogen 21.0 (H) 6.0 - 20.0 mg/dL    Creatinine 4.41 (H) 0.67 - 1.17 mg/dL    Calcium 9.0 8.6 - 10.0 mg/dL    Glucose 96 70 - 99 mg/dL    GFR Estimate 15 (L) >60 mL/min/1.73m2   CBC with platelets   Result Value Ref Range    WBC Count 6.5 4.0 - 11.0 10e3/uL    RBC Count 3.08 (L) 4.40 - 5.90 10e6/uL    Hemoglobin 9.5 (L) 13.3 - 17.7 g/dL    Hematocrit 30.3 (L) 40.0 - 53.0 %    MCV 98 78 - 100 fL    MCH 30.8 26.5 - 33.0 pg    MCHC 31.4 (L) 31.5 - 36.5 g/dL    RDW 17.0 (H) 10.0 - 15.0 %    Platelet Count 272 150 - 450 10e3/uL   Magnesium   Result Value Ref Range    Magnesium 1.7 1.7 - 2.3 mg/dL   Phosphorus   Result Value Ref Range    Phosphorus 2.6 2.5 - 4.5 mg/dL       All relevant imaging and laboratory values personally reviewed.

## 2022-12-13 NOTE — PLAN OF CARE
Cardiac: Sinus Rhythm. Q4hr vitals. Cuff pressures. Bp stable. Afebrile.  Neuro: A&Ox4. Follows commands. CIWAA 2-4. No ativan given for CIWAA. Pupils equal reactive. Appropriate. Remains forgetful at times. Repeats questions. Call light appropriate. Denies hallucinations. Withdrawal symptoms largely decreased.  Respiratory: RA-2L while sleeping. Productive cough. Ls clear.  GI: Bowel program increased. Passing Gas. Regular diet. Good appetite. Large fluid intake.  : Oliguric. No void. Dialysis tomorrow 12/14 planned for MWF schedule.   Skin: Wound Cervical spine. Dressing CDI. WOC following. LDA added for R-heel pressure injury per WOC. Noted on admission.    Sig Events: PT/OT saw patient. Needing adjustment of cervical collar in order to transfer to chair. Transfer orders remain in place.     John Bueno RN SICU Neuro-ICU

## 2022-12-13 NOTE — PROGRESS NOTES
SPIRITUAL HEALTH SERVICES Progress Note  [facility] 4A    Saw pt Shay Jimenez per request for follow up.      Patient/Family Understanding of Illness and Goals of Care - Shay shared that he continues to improve and he hopes to get well enough to go to rehab. Shay spoke about his extensive spine surgery.      Distress and Loss - Shay spoke about complex relationship dynamics that cause him intense emotions. A recent change in his most significant relationship is particularly distressing. Shay was willing to discuss some of this grief.      Strengths, Coping, and Resources - Shay is working on reconnecting with family that live [place] the country. He has two sons that support him and take care of him.      Meaning, Beliefs, and Spirituality - Shay identifies as Orthodoxy.      Plan of Care - I offered attentive listening, and prayer per Shay's request.    Landon Esquivel  Chaplain Resident  Pager [phone]    * [facility] remains available 24/7 for emergent requests/referrals, either by having the switchboard page the on-call  or by entering an ASAP/STAT consult in Epic (this will also page the on-call ). Routine Epic consults receive an initial response within 24 hours.*

## 2022-12-13 NOTE — PROGRESS NOTES
Nephrology Progress Note  12/13/2022         Shay Jimenez is a 57 yom with ESRD since 4/2019 due to Ca Phos deposition and HTN, runs at [facility] ([phone], fax [phone]) under care of Dr Cline.  Had planned neurosurgery revision for C5-C6 on 12/6 as screw had dislodged.  Nephrology consulted for management of dialysis post op.       Interval History :   Mr Jimenez had HD yesterday, pulled ~3L of UF and is now below his outpt EDW.  Less anxious/argumentative today, had no complaints on our visit, plan for run tomorrow and will continue to challenge EDW.       Assessment & Recommendations:   ESRD-Since 4/2019 due to Ca Phos deposition and HTN, runs at [facility] ([phone], fax [phone]) under care of Dr Cline.  Runs MWF via [facility], 4.25h runs.                  -Holding on run today, plan for run tomorrow on MWF schedule.                  -Will decide on runs daily depending on chemistries and hemodynamics.                  -Continuing long term HD, no need for new consent.                  -Is eventually pursuing renal transplant through [facility] once acute issues requiring neurosurgery are done.       Volume-EDW 130kg, below this today at 128kg, will continue to challenge while he is admitted.       Electrolytes-K 3.6 bicarb 25, Na 137, HD today.        BMD-Ca 9.0, Mg and Phos WNL.       Neurosurgery-Had planned revision of previous screws at C5-C6 on 12/6.       Anemia-Hgb 9.5, giving 8k epo with runs.       Nutrition-Deferred.       Time spent: 30 minutes on this date of encounter for chart review, physical exam, medical decision making and co-ordination of care.      Discussed with Dr Reinoso     Recommendations were communicated to primary team via verbal communication.        Markie Shelton, [facility] CNS  Clinical Nurse Specialist  [phone]    Review of Systems:   I reviewed the following systems:  Gen: No fevers or chills  CV: No CP at rest  Resp: No SOB at rest  GI: No  "N/V    Physical Exam:   I/O last 3 completed shifts:  In: 1680 [P.O.:1680]  Out: 3500 [Other:3500]   BP (!) 141/97   Pulse 91   Temp 99  F (37.2  C) (Axillary)   Resp 13   Ht 1.854 m (6' 1\")   Wt 128.1 kg (282 lb 6.6 oz)   SpO2 96%   BMI 37.26 kg/m       GENERAL APPEARANCE: alert and no distress, moderate confusion  EYES: No scleral icterus  NECK:  No JVD  Pulmonary: lungs clear to auscultation with equal breath sounds bilaterally, no clubbing or cyanosis  CV: Regular rhythm, normal rate, no rub   - Edema none  GI: soft, nontender, normal bowel sounds  MS: no evidence of inflammation in joints, no muscle tenderness  : No Oliveira  SKIN: no rash, warm, dry  NEURO: Delirious but somewhat redirectable.      Labs:   All labs reviewed by me  Electrolytes/Renal -   Recent Labs   Lab Test 12/13/22  0502 12/12/22  0955 12/11/22  1143 12/11/22  0920 12/11/22  0528    135*  --   --  134*   POTASSIUM 3.6 4.1  --   --  4.3   CHLORIDE 98 95*  --   --  95*   CO2 25 24  --   --  27   BUN 21.0* 32.8*  --   --  20.2*   CR 4.41* 6.29*  --   --  4.78*   GLC 96 87 94   < > 89   MAC 9.0 9.1  --   --  8.7   MAG 1.7 1.9  --   --  1.8   PHOS 2.6 3.7  --   --  2.9    < > = values in this interval not displayed.       CBC -   Recent Labs   Lab Test 12/13/22  0502 12/12/22  0955 12/11/22  0528   WBC 6.5 6.6 6.0   HGB 9.5* 9.3* 9.3*    256 210       LFTs -   Recent Labs   Lab Test 12/11/22  0528 10/06/22  1215 09/29/22  1345 08/18/22  1435 07/21/22  1252 07/16/22  0557   ALKPHOS 146*  --   --   --  131 134   BILITOTAL 0.4  --   --   --  0.6 0.5   ALT <5*  --   --   --  9 <6   AST 18 31 35   < > 17 13   PROTTOTAL 5.9*  --   --   --  6.7* 6.5*   ALBUMIN 3.1*  --   --   --  3.0* 2.8*    < > = values in this interval not displayed.       Iron Panel -   Recent Labs   Lab Test 07/29/22  0600   IRON 44   IRONSAT 20   JACKELINE 626*           Current Medications:    atorvastatin  20 mg Oral At Bedtime     Baclofen  10 mg Oral BID     " cetirizine  10 mg Oral Daily     cyanocobalamin  500 mcg Oral Daily     finasteride  1.3 mg Oral Daily     fluticasone  1 spray Both Nostrils BID     folic acid  1 mg Oral Daily     gabapentin  300 mg Oral Q8H     heparin ANTICOAGULANT  5,000 Units Subcutaneous Q8H     magnesium plus protein  133 mg Oral Daily     methocarbamol  500 mg Oral 4x Daily     midodrine  10 mg Oral Q8H CUONG     multivitamin RENAL  1 tablet Oral Daily     pantoprazole  40 mg Oral QAM AC     polyethylene glycol  17 g Oral Daily     vitamin B6  100 mg Oral Daily     senna-docusate  1 tablet Oral BID     sertraline  100 mg Oral Daily     sevelamer carbonate  1,600 mg Oral TID w/meals     sodium chloride (PF)  3 mL Intracatheter Q8H     thiamine  100 mg Oral TID     [START ON 12/17/2022] thiamine  100 mg Oral Daily

## 2022-12-13 NOTE — PROGRESS NOTES
[facility], [place]   Neurosurgery Progress Note:    Assessment: Shay Jimenez is a 57 year old male with with PMH ESRD on dialysis and osteomyelitis s/p C5-T6 fusion who is now POD#5 s/p redo C5-T6 fusion with concern for hemorrhage; no vertebral artery dissection or extravasation was noted on intraoperative angiogram.  He was admitted to the Neuro ICU postoperatively for MAP goals and frequent neuro checks, remaining stable on pressors.  Neuro exam is unchanged from patient's baseline.    Plan:  - Serial neuro exams  - Pain control  - Hb goal > 7  - Normonatremia  - Goal normotension  - Appreciate Nephrology recommendations re: dialysis (baseline M/W/F)  - Patient is now on CIWA protocol with Haldol and Ativan as needed and thiamine and clonidine scheduled-we will monitor for the amount and frequency of Haldol and Ativan received.  - Cervical collar at all times  - Advance diet as tolerated  - Bowel regimen  - PRN antiemetics  - IVF until taking adequate PO  - PT/OT  - SCDs for DVT proph  - Okay to transfer to the floor when bed available- orders are in  -----------------------------------  Jonny Stephenson  Neurosurgery Resident, PGY-2    Please contact neurosurgery resident on call with questions.    Dial * * *[phone], enter [phone] when prompted.      Interval History/Subjective: No acute events overnight. Decreasing needs for Ativan overnight.    Objective:   Temp:  [97.4  F (36.3  C)-100.3  F (37.9  C)] 99  F (37.2  C)  Pulse:  [65-96] 91  Resp:  [12-18] 13  BP: ()/() 141/97  SpO2:  [90 %-98 %] 96 %  I/O last 3 completed shifts:  In: 1680 [P.O.:1680]  Out: 3500 [Other:3500]    Gen: Appears comfortable, NAD  Wound: Incision, clean, dry, intact without strikethrough  Neurologic:  - Alert & Oriented to person, place, time, and situation  - Follows commands briskly  - Speech fluent, spontaneous. No aphasia or dysarthria.  - No gaze preference. No apparent hemineglect.  - PERRL,  EOMI  - Strong brow raise, eye closure, and smile  - Face symmetric with sensation intact to light touch  - Trapezii and sternocleidomastoid muscles 5/5 bilaterally  - No pronator drift     Del Tr Bi WE WF Gr   R 4* 5 5 5 5 5   L 4* 5 5 5 5 5    HF KE KF DF PF EHL   R 4+ 5 5 5 5 5   L 4+ 5 5 5 5 5   *pain-limited    Norman's and clonus negative.    Sensation intact and symmetric to light touch throughout    LABS  Recent Labs   Lab Test 12/13/22  0502 12/12/22  0955 12/11/22  0528   WBC 6.5 6.6 6.0   HGB 9.5* 9.3* 9.3*   MCV 98 99 99    256 210       Recent Labs   Lab Test 12/13/22  0502 12/12/22  0955 12/11/22  1143 12/11/22  0920 12/11/22  0528    135*  --   --  134*   POTASSIUM 3.6 4.1  --   --  4.3   CHLORIDE 98 95*  --   --  95*   CO2 25 24  --   --  27   BUN 21.0* 32.8*  --   --  20.2*   CR 4.41* 6.29*  --   --  4.78*   ANIONGAP 14 16*  --   --  12   MAC 9.0 9.1  --   --  8.7   GLC 96 87 94   < > 89    < > = values in this interval not displayed.       IMAGING  Recent Results (from the past 24 hour(s))   XR Cervical Spine 2/3 Views    Narrative    EXAM: XR CERVICAL SPINE 2/3 VIEWS 12/12/2022 8:45 PM    HISTORY: s/p C5-T6 pSF     COMPARISON: CT 11/3/2022    FINDINGS: Portable upright AP and lateral views of the cervical spine  were obtained. Limited lateral view secondary to patient habitus and  limitation of portable imaging. Interval revision of C5-T6 posterior  fusion with fixation hardware in place. Alignment appears stable.  Cutaneous staple line.      Impression    IMPRESSION: Limited evaluation demonstrates revision of C5-T6  posterior fusion with stable alignment.    I have personally reviewed the examination and initial interpretation  and I agree with the findings.    LILLI REA MD         SYSTEM ID:  [id number]

## 2022-12-13 NOTE — CONSULTS
[facility] Nurse Inpatient Assessment     Consulted for: Right heel    Patient History (according to provider note(s):      Shay Jimenez is a 57 year old male with with PMH ESRD on dialysis and osteomyelitis s/p C5-T6 fusion who is now POD#5 s/p redo C5-T6 fusion with concern for hemorrhage; no vertebral artery dissection or extravasation was noted on intraoperative angiogram.  He was admitted to the Neuro ICU postoperatively for MAP goals and frequent neuro checks, remaining stable on pressors.  Neuro exam is unchanged from patient's baseline.      Areas Assessed:      Areas visualized during today's visit: Heels      Pressure Injury Location: Right heel    12/13  Last photo: 12/13  Wound type: Pressure Injury     Pressure Injury Stage: Deep Tissue Pressure Injury (DTPI), present on admission   Wound history/plan of care:  Wound noted by nursing on 12/6 after patient transferred out of PACU. Patient had been proned in OR so unlikely wound is from then and that it was present on admission.     Wound base: 100 % purple and epidermis- boggy appears as though this wound may have initially been a blood filled blister.     Palpation of the wound bed: boggy      Drainage: none     Description of drainage: none     Measurements (length x width x depth, in cm) 3 x 2.5 x 0 cm      Tunneling N/A     Undermining N/A  Periwound skin: Intact      Color: normal and consistent with surrounding tissue      Temperature: normal   Odor: none  Pain: no grimacing or signs of discomfort, none  Pain intervention prior to dressing change: patient tolerated well  Treatment goal: Protection  STATUS: initial assessment  Supplies ordered: supplies stored on unit    My PI Risk Assessment     Sensory Perception: 3 - Slightly Limited     Moisture: 3 - Occasionally moist      Activity: 2 - Chairfast     Mobility: 2 - Very limited     Nutrition: 2 - Probably inadequate      Friction/Shear: 2 -  "Potential problem      TOTAL: 14       Treatment Plan:     Right heel wound: Every third day and as needed  Cleanse wound with NS and pat dry  Paint bibi-wound skin with no sting barrier film.  Cover with 4x4 mepilex flex(# [id number]).   Keep heels elevated off bed.   Wear Prevalon boots while in bed. (# [id number])    Pressure Injury Prevention (PIP) Plan:  If patient is declining pressure injury prevention interventions: Explore reason why and address patient's concerns, Educate on pressure injury risk and prevention intervention(s), If patient is still declining, document \"informed refusal\"  and Ensure Care team is aware ( provider, charge nurse, etc)  Mattress: Follow bed algorithm, reassess daily and order specialty mattress, if indicated.  HOB: Maintain at or below 30 degrees, unless contraindicated  Repositioning in bed: Every 1-2 hours , Left/right positioning; avoid supine and Raise foot of bed prior to raising head of bed, to reduce patient sliding down (shear)  Heels: Keep elevated off mattress, Pillows under calves and Heel lift boots  Protective Dressing: Sacral Mepilex for prevention (#[id number]),  especially for the agitated patient   Chair positioning: Chair cushion (#[id number]) , Repositions self: patient to shift weight every 15 minutes, Assist patient to reposition hourly and Do NOT use a donut for sitting (this increases pressure to smaller area and creates a higher potential for injury)    If patient has a buttock pressure injury, or high risk for PI use chair cushion or SPS.  Moisture Management: Perineal cleansing /protection: Follow Incontinence Protocol, Avoid brief in bed, Clean and dry skin folds with bathing , Consider InterDry (#[id number]) between folds and Moisturize dry skin  Under Devices: Inspect skin under all medical devices during skin inspection , Ensure tubes are stabilized without tension and Ensure patient is not lying on medical devices or equipment when repositioned  Ask provider to " discontinue device when no longer needed.        Orders: Written    RECOMMEND PRIMARY TEAM ORDER: None, at this time  Education provided: plan of care and wound progress  Discussed plan of care with: Patient and Nurse  WOC nurse follow-up plan: weekly  Notify WOC if wound(s) deteriorate.  Nursing to notify the Provider(s) and re-consult the WOC Nurse if new skin concern.    DATA:     Current support surface: Standard  Low air loss (DEN pump, Isolibrium, Pulsate, skin guard, etc)  BMI: Body mass index is 37.26 kg/m .   Active diet order: Orders Placed This Encounter      Advance Diet as Tolerated: Regular Diet Adult     Output: I/O last 3 completed shifts:  In: 1680 [P.O.:1680]  Out: 3500 [Other:3500]     Labs: Recent Labs   Lab 12/13/22  0502 12/12/22  0955 12/11/22  0528 12/07/22 2013 12/07/22  1400   ALBUMIN  --   --  3.1*  --   --    HGB 9.5* 9.3* 9.3*   < > 7.5*   WBC 6.5 6.6 6.0   < > 5.3   A1C  --   --   --   --  5.6   CRP  --  72.40*  --    < >  --     < > = values in this interval not displayed.     Pressure injury risk assessment:   Sensory Perception: 3-->slightly limited  Moisture: 3-->occasionally moist  Activity: 2-->chairfast  Mobility: 2-->very limited  Nutrition: 2-->probably inadequate  Friction and Shear: 2-->potential problem  Konstantin Score: 14     Fide Ruano RN, CWOCN  Dept. Pager: [phone]  Dept. Office Number: [phone]

## 2022-12-13 NOTE — PLAN OF CARE
Major Shift Events:  Neuro exam inconsistent, intermittently agitated. Became much more pleasant after 0100. Alcohol withdrawal, CIWA-Ar protocol in place, scores of 2-4 overnight. A-febrile, VSS. On RA overnight. Patient on full diet. Oliguric d/t dialysis MWF, but one large incontinent episode of urine.    Plan: Potential transfer to . Continue CIWA-Ar protocol, manage pain    For vital signs and complete assessments, please see documentation flowsheets.

## 2022-12-14 ENCOUNTER — APPOINTMENT (OUTPATIENT)
Dept: PHYSICAL THERAPY | Facility: CLINIC | Age: 57
DRG: 459 | End: 2022-12-14
Attending: STUDENT IN AN ORGANIZED HEALTH CARE EDUCATION/TRAINING PROGRAM
Payer: MEDICARE

## 2022-12-14 ENCOUNTER — APPOINTMENT (OUTPATIENT)
Dept: OCCUPATIONAL THERAPY | Facility: CLINIC | Age: 57
DRG: 459 | End: 2022-12-14
Attending: STUDENT IN AN ORGANIZED HEALTH CARE EDUCATION/TRAINING PROGRAM
Payer: MEDICARE

## 2022-12-14 LAB
ANION GAP SERPL CALCULATED.3IONS-SCNC: 15 MMOL/L (ref 7–15)
BUN SERPL-MCNC: 33.2 MG/DL (ref 6–20)
CALCIUM SERPL-MCNC: 8.7 MG/DL (ref 8.6–10)
CHLORIDE SERPL-SCNC: 94 MMOL/L (ref 98–107)
CREAT SERPL-MCNC: 5.64 MG/DL (ref 0.67–1.17)
CRP SERPL-MCNC: 35 MG/L
CRP SERPL-MCNC: 39 MG/L
DEPRECATED HCO3 PLAS-SCNC: 23 MMOL/L (ref 22–29)
ERYTHROCYTE [DISTWIDTH] IN BLOOD BY AUTOMATED COUNT: 16.7 % (ref 10–15)
ERYTHROCYTE [SEDIMENTATION RATE] IN BLOOD BY WESTERGREN METHOD: 60 MM/HR (ref 0–20)
GFR SERPL CREATININE-BSD FRML MDRD: 11 ML/MIN/1.73M2
GLUCOSE SERPL-MCNC: 80 MG/DL (ref 70–99)
HCT VFR BLD AUTO: 28.4 % (ref 40–53)
HGB BLD-MCNC: 8.9 G/DL (ref 13.3–17.7)
IRON BINDING CAPACITY (ROCHE): 180 UG/DL (ref 240–430)
IRON SATN MFR SERPL: 24 % (ref 15–46)
IRON SERPL-MCNC: 43 UG/DL (ref 61–157)
MAGNESIUM SERPL-MCNC: 1.7 MG/DL (ref 1.7–2.3)
MCH RBC QN AUTO: 31.3 PG (ref 26.5–33)
MCHC RBC AUTO-ENTMCNC: 31.3 G/DL (ref 31.5–36.5)
MCV RBC AUTO: 100 FL (ref 78–100)
PHOSPHATE SERPL-MCNC: 3.8 MG/DL (ref 2.5–4.5)
PLATELET # BLD AUTO: 269 10E3/UL (ref 150–450)
POTASSIUM SERPL-SCNC: 3.8 MMOL/L (ref 3.4–5.3)
RBC # BLD AUTO: 2.84 10E6/UL (ref 4.4–5.9)
SODIUM SERPL-SCNC: 132 MMOL/L (ref 136–145)
WBC # BLD AUTO: 6.4 10E3/UL (ref 4–11)

## 2022-12-14 PROCEDURE — 83735 ASSAY OF MAGNESIUM: CPT | Performed by: NURSE PRACTITIONER

## 2022-12-14 PROCEDURE — 90937 HEMODIALYSIS REPEATED EVAL: CPT

## 2022-12-14 PROCEDURE — 99233 SBSQ HOSP IP/OBS HIGH 50: CPT | Mod: FS | Performed by: CLINICAL NURSE SPECIALIST

## 2022-12-14 PROCEDURE — 258N000003 HC RX IP 258 OP 636: Performed by: CLINICAL NURSE SPECIALIST

## 2022-12-14 PROCEDURE — 250N000013 HC RX MED GY IP 250 OP 250 PS 637: Performed by: STUDENT IN AN ORGANIZED HEALTH CARE EDUCATION/TRAINING PROGRAM

## 2022-12-14 PROCEDURE — 86140 C-REACTIVE PROTEIN: CPT | Performed by: INTERNAL MEDICINE

## 2022-12-14 PROCEDURE — 86140 C-REACTIVE PROTEIN: CPT | Performed by: NURSE PRACTITIONER

## 2022-12-14 PROCEDURE — 84100 ASSAY OF PHOSPHORUS: CPT | Performed by: NURSE PRACTITIONER

## 2022-12-14 PROCEDURE — 97112 NEUROMUSCULAR REEDUCATION: CPT | Mod: GP | Performed by: REHABILITATION PRACTITIONER

## 2022-12-14 PROCEDURE — 250N000013 HC RX MED GY IP 250 OP 250 PS 637: Performed by: NURSE PRACTITIONER

## 2022-12-14 PROCEDURE — 85027 COMPLETE CBC AUTOMATED: CPT | Performed by: NURSE PRACTITIONER

## 2022-12-14 PROCEDURE — 85652 RBC SED RATE AUTOMATED: CPT | Performed by: NURSE PRACTITIONER

## 2022-12-14 PROCEDURE — 250N000013 HC RX MED GY IP 250 OP 250 PS 637

## 2022-12-14 PROCEDURE — 97530 THERAPEUTIC ACTIVITIES: CPT | Mod: GP | Performed by: REHABILITATION PRACTITIONER

## 2022-12-14 PROCEDURE — 634N000001 HC RX 634: Performed by: CLINICAL NURSE SPECIALIST

## 2022-12-14 PROCEDURE — 82310 ASSAY OF CALCIUM: CPT | Performed by: NURSE PRACTITIONER

## 2022-12-14 PROCEDURE — 36415 COLL VENOUS BLD VENIPUNCTURE: CPT | Performed by: NURSE PRACTITIONER

## 2022-12-14 PROCEDURE — 200N000002 HC R&B ICU UMMC

## 2022-12-14 PROCEDURE — 97530 THERAPEUTIC ACTIVITIES: CPT | Mod: GO | Performed by: OCCUPATIONAL THERAPIST

## 2022-12-14 PROCEDURE — G0463 HOSPITAL OUTPT CLINIC VISIT: HCPCS

## 2022-12-14 PROCEDURE — 83550 IRON BINDING TEST: CPT | Performed by: CLINICAL NURSE SPECIALIST

## 2022-12-14 PROCEDURE — 250N000011 HC RX IP 250 OP 636: Performed by: STUDENT IN AN ORGANIZED HEALTH CARE EDUCATION/TRAINING PROGRAM

## 2022-12-14 RX ORDER — MIDODRINE HYDROCHLORIDE 5 MG/1
10 TABLET ORAL
Status: DISCONTINUED | OUTPATIENT
Start: 2022-12-14 | End: 2023-01-07 | Stop reason: HOSPADM

## 2022-12-14 RX ADMIN — BACLOFEN 10 MG: 10 TABLET ORAL at 19:57

## 2022-12-14 RX ADMIN — ACETAMINOPHEN 650 MG: 325 TABLET, FILM COATED ORAL at 23:50

## 2022-12-14 RX ADMIN — OXYCODONE HYDROCHLORIDE 5 MG: 5 TABLET ORAL at 16:11

## 2022-12-14 RX ADMIN — Medication 100 MG: at 14:33

## 2022-12-14 RX ADMIN — HEPARIN SODIUM 5000 UNITS: 5000 INJECTION, SOLUTION INTRAVENOUS; SUBCUTANEOUS at 19:57

## 2022-12-14 RX ADMIN — B-COMPLEX W/ C & FOLIC ACID TAB 1 MG 1 TABLET: 1 TAB at 09:00

## 2022-12-14 RX ADMIN — SERTRALINE HYDROCHLORIDE 100 MG: 100 TABLET ORAL at 08:59

## 2022-12-14 RX ADMIN — ATORVASTATIN CALCIUM 20 MG: 20 TABLET, FILM COATED ORAL at 21:43

## 2022-12-14 RX ADMIN — PANTOPRAZOLE SODIUM 40 MG: 40 TABLET, DELAYED RELEASE ORAL at 09:00

## 2022-12-14 RX ADMIN — POLYETHYLENE GLYCOL 3350 17 G: 17 POWDER, FOR SOLUTION ORAL at 09:00

## 2022-12-14 RX ADMIN — HEPARIN SODIUM 5000 UNITS: 5000 INJECTION, SOLUTION INTRAVENOUS; SUBCUTANEOUS at 04:14

## 2022-12-14 RX ADMIN — SODIUM CHLORIDE 250 ML: 9 INJECTION, SOLUTION INTRAVENOUS at 10:50

## 2022-12-14 RX ADMIN — Medication 100 MG: at 08:59

## 2022-12-14 RX ADMIN — ACETAMINOPHEN 650 MG: 325 TABLET, FILM COATED ORAL at 19:57

## 2022-12-14 RX ADMIN — OXYCODONE HYDROCHLORIDE 5 MG: 5 TABLET ORAL at 23:50

## 2022-12-14 RX ADMIN — SODIUM CHLORIDE 300 ML: 9 INJECTION, SOLUTION INTRAVENOUS at 10:50

## 2022-12-14 RX ADMIN — Medication 133 MG: at 08:59

## 2022-12-14 RX ADMIN — FLUTICASONE PROPIONATE 1 SPRAY: 50 SPRAY, METERED NASAL at 09:00

## 2022-12-14 RX ADMIN — GABAPENTIN 300 MG: 300 CAPSULE ORAL at 21:43

## 2022-12-14 RX ADMIN — METHOCARBAMOL 500 MG: 500 TABLET ORAL at 12:08

## 2022-12-14 RX ADMIN — HEPARIN SODIUM 5000 UNITS: 5000 INJECTION, SOLUTION INTRAVENOUS; SUBCUTANEOUS at 12:07

## 2022-12-14 RX ADMIN — EPOETIN ALFA-EPBX 8000 UNITS: 10000 INJECTION, SOLUTION INTRAVENOUS; SUBCUTANEOUS at 12:49

## 2022-12-14 RX ADMIN — FOLIC ACID 1 MG: 1 TABLET ORAL at 09:00

## 2022-12-14 RX ADMIN — ACETAMINOPHEN 650 MG: 325 TABLET, FILM COATED ORAL at 16:11

## 2022-12-14 RX ADMIN — SENNOSIDES AND DOCUSATE SODIUM 1 TABLET: 8.6; 5 TABLET ORAL at 09:00

## 2022-12-14 RX ADMIN — SEVELAMER CARBONATE 1600 MG: 800 TABLET, FILM COATED ORAL at 19:56

## 2022-12-14 RX ADMIN — CETIRIZINE HYDROCHLORIDE 10 MG: 10 TABLET, FILM COATED ORAL at 09:00

## 2022-12-14 RX ADMIN — METHOCARBAMOL 500 MG: 500 TABLET ORAL at 19:57

## 2022-12-14 RX ADMIN — METHOCARBAMOL 500 MG: 500 TABLET ORAL at 16:11

## 2022-12-14 RX ADMIN — POLYETHYLENE GLYCOL 3350 17 G: 17 POWDER, FOR SOLUTION ORAL at 14:33

## 2022-12-14 RX ADMIN — OXYCODONE HYDROCHLORIDE 5 MG: 5 TABLET ORAL at 19:57

## 2022-12-14 RX ADMIN — OXYCODONE HYDROCHLORIDE 5 MG: 5 TABLET ORAL at 12:08

## 2022-12-14 RX ADMIN — POLYETHYLENE GLYCOL 3350 17 G: 17 POWDER, FOR SOLUTION ORAL at 19:56

## 2022-12-14 RX ADMIN — CYANOCOBALAMIN TAB 500 MCG 500 MCG: 500 TAB at 09:00

## 2022-12-14 RX ADMIN — ACETAMINOPHEN 650 MG: 325 TABLET, FILM COATED ORAL at 09:00

## 2022-12-14 RX ADMIN — SEVELAMER CARBONATE 1600 MG: 800 TABLET, FILM COATED ORAL at 14:33

## 2022-12-14 RX ADMIN — MIDODRINE HYDROCHLORIDE 10 MG: 5 TABLET ORAL at 06:09

## 2022-12-14 RX ADMIN — LORAZEPAM 1 MG: 1 TABLET ORAL at 23:50

## 2022-12-14 RX ADMIN — SEVELAMER CARBONATE 1600 MG: 800 TABLET, FILM COATED ORAL at 08:59

## 2022-12-14 RX ADMIN — BACLOFEN 10 MG: 10 TABLET ORAL at 09:00

## 2022-12-14 RX ADMIN — Medication 100 MG: at 19:57

## 2022-12-14 RX ADMIN — FLUTICASONE PROPIONATE 1 SPRAY: 50 SPRAY, METERED NASAL at 19:58

## 2022-12-14 RX ADMIN — GABAPENTIN 300 MG: 300 CAPSULE ORAL at 06:09

## 2022-12-14 RX ADMIN — FINASTERIDE 1.3 MG: 5 TABLET, FILM COATED ORAL at 09:00

## 2022-12-14 RX ADMIN — METHOCARBAMOL 500 MG: 500 TABLET ORAL at 08:59

## 2022-12-14 RX ADMIN — GABAPENTIN 300 MG: 300 CAPSULE ORAL at 14:33

## 2022-12-14 RX ADMIN — SENNOSIDES AND DOCUSATE SODIUM 1 TABLET: 8.6; 5 TABLET ORAL at 19:57

## 2022-12-14 ASSESSMENT — ACTIVITIES OF DAILY LIVING (ADL)
ADLS_ACUITY_SCORE: 51
ADLS_ACUITY_SCORE: 47
ADLS_ACUITY_SCORE: 47
ADLS_ACUITY_SCORE: 51
ADLS_ACUITY_SCORE: 47
ADLS_ACUITY_SCORE: 51
ADLS_ACUITY_SCORE: 51
ADLS_ACUITY_SCORE: 47
ADLS_ACUITY_SCORE: 51
ADLS_ACUITY_SCORE: 47

## 2022-12-14 NOTE — PROGRESS NOTES
HEMODIALYSIS TREATMENT NOTE    Date: 12/14/2022  Time: 1:58 PM    Data:  Pre Wt:   128.1 kg  Desired Wt: 124.6  kg   Post Wt:  124.6 kg  Weight change:  3.5 kg  Ultrafiltration - Post Run Net Total Removed (mL): 3500 mL  Vascular Access Status: Fistula  patent  Dialyzer Rinse: Clear  Total Blood Volume Processed: 77.9 L   Total Dialysis (Treatment) Time: 3.5   Dialysate Bath: K 3, Ca 2.25  Heparin: None    Lab:   No    Interventions:    ESRD pt. Cannulated with 15 g needle X 2 @  without lido inj. Epogen administered as ordered. 3.5L of fluid was pulled per order. Pt ordered and ate lunch. Ending BP was 144/83. Pt rinsed back post tx, needles were pulled, new dressings applied, and sites were held for about 10 mins to help achieve hemostasis. Hand off report was given to YUID Pierson.     Assessment:  -Calm & Cooperative  -VSS  -A & O X 4                Plan:    Per renal

## 2022-12-14 NOTE — PROGRESS NOTES
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  Had planned neurosurgery revision for C5-C6 on 12/6 as screw had dislodged.  Nephrology consulted for management of dialysis post op.       Interval History :   Mr Jimenez had day off HD yesterday, planning HD today on MWF schedule.  Increasing UF as PO intake yesterday was >3L, he gets 3-4L of UF typically as outpt.  Stable from HD standpoint, next planned run 12/16.       Assessment & Recommendations:   ESRD-Since 4/2019 due to Ca Phos deposition and HTN, runs at [facility] ([phone], fax [phone]) under care of Dr Cline.  Runs MWF via [facility], 4.25h runs.                  -HD today, 3-4L of UF as intake yesterday was ~3L.                  -Will decide on runs daily depending on chemistries and hemodynamics.                  -Continuing long term HD, no need for new consent.                  -Is eventually pursuing renal transplant through [facility] once acute issues requiring neurosurgery are done.       Volume-EDW 130kg, bed wt ~133kg today, pulm status stable despite being a bit up in wt.  Refusing to run today.       Electrolytes-K 3.8 bicarb 23, Na 132, HD today.        BMD-Ca 8.7, Mg and Phos WNL.       Neurosurgery-Had planned revision of previous screws at C5-C6 on 12/6.       Anemia-Hgb 8.9, giving 8k epo with runs.  Checking iron studies.       Nutrition-Deferred.       Time spent: 30 minutes on this date of encounter for chart review, physical exam, medical decision making and co-ordination of care.      Discussed with Dr Reinoso     Recommendations were communicated to primary team via verbal communication.        ENID Shirley CNS  Clinical Nurse Specialist  [phone]    Review of Systems:   I reviewed the following systems:  Gen: No fevers or chills  CV: No CP at  "rest  Resp: No SOB at rest  GI: No N/V    Physical Exam:   I/O last 3 completed shifts:  In: 3010 [P.O.:3010]  Out: -    /62   Pulse 69   Temp 97.5  F (36.4  C) (Oral)   Resp 17   Ht 1.854 m (6' 1\")   Wt 128.1 kg (282 lb 6.6 oz)   SpO2 98%   BMI 37.26 kg/m       GENERAL APPEARANCE: alert and no distress, moderate confusion  EYES: No scleral icterus  NECK:  No JVD  Pulmonary: lungs clear to auscultation with equal breath sounds bilaterally, no clubbing or cyanosis  CV: Regular rhythm, normal rate, no rub   - Edema none  GI: soft, nontender, normal bowel sounds  MS: no evidence of inflammation in joints, no muscle tenderness  : No Oliveira  SKIN: no rash, warm, dry  NEURO: Delirious but somewhat redirectable.      Labs:   All labs reviewed by me  Electrolytes/Renal -   Recent Labs   Lab Test 12/14/22  0422 12/13/22  0502 12/12/22  0955   * 137 135*   POTASSIUM 3.8 3.6 4.1   CHLORIDE 94* 98 95*   CO2 23 25 24   BUN 33.2* 21.0* 32.8*   CR 5.64* 4.41* 6.29*   GLC 80 96 87   MAC 8.7 9.0 9.1   MAG 1.7 1.7 1.9   PHOS 3.8 2.6 3.7       CBC -   Recent Labs   Lab Test 12/14/22  0422 12/13/22  0502 12/12/22  0955   WBC 6.4 6.5 6.6   HGB 8.9* 9.5* 9.3*    272 256       LFTs -   Recent Labs   Lab Test 12/11/22  0528 10/06/22  1215 09/29/22  1345 08/18/22  1435 07/21/22  1252 07/16/22  0557   ALKPHOS 146*  --   --   --  131 134   BILITOTAL 0.4  --   --   --  0.6 0.5   ALT <5*  --   --   --  9 <6   AST 18 31 35   < > 17 13   PROTTOTAL 5.9*  --   --   --  6.7* 6.5*   ALBUMIN 3.1*  --   --   --  3.0* 2.8*    < > = values in this interval not displayed.       Iron Panel -   Recent Labs   Lab Test 07/29/22  0600   IRON 44   IRONSAT 20   JACKELINE 626*           Current Medications:    sodium chloride 0.9%  250 mL Intravenous Once in dialysis/CRRT     sodium chloride 0.9%  300 mL Hemodialysis Machine Once     atorvastatin  20 mg Oral At Bedtime     Baclofen  10 mg Oral BID     cetirizine  10 mg Oral Daily     " cyanocobalamin  500 mcg Oral Daily     epoetin mar-epbx  8,000 Units Intravenous Once in dialysis/CRRT     finasteride  1.3 mg Oral Daily     fluticasone  1 spray Both Nostrils BID     folic acid  1 mg Oral Daily     gabapentin  300 mg Oral Q8H     heparin ANTICOAGULANT  5,000 Units Subcutaneous Q8H     magnesium plus protein  133 mg Oral Daily     methocarbamol  500 mg Oral 4x Daily     midodrine  10 mg Oral Q8H CUONG     multivitamin RENAL  1 tablet Oral Daily     - MEDICATION INSTRUCTIONS -   Does not apply Once     pantoprazole  40 mg Oral QAM AC     polyethylene glycol  17 g Oral TID     vitamin B6  100 mg Oral Daily     senna-docusate  1 tablet Oral BID     sertraline  100 mg Oral Daily     sevelamer carbonate  1,600 mg Oral TID w/meals     sodium chloride (PF)  3 mL Intracatheter Q8H     thiamine  100 mg Oral TID     [START ON 12/17/2022] thiamine  100 mg Oral Daily       - MEDICATION INSTRUCTIONS -

## 2022-12-14 NOTE — PLAN OF CARE
Major Shift Events:  Patient neuro exam waxes and wanes with intermittent confusion. Overall pt was pleasant and cooperative. Slept for a good portion of this shift. Baseline neuropathy present in BLE. Low CIWA-Ar scores overnight. A-febrile, SR, normotensive. On 2L NC overnight. Patient on full diet. No BM this shift, oliguric d/t dialysis MWF. Patient's surgical dressing changed this AM by NSG.     Plan: Transfer to  when a bed is available. Dialysis today. Contact NSG to have  brace properly fitted for getting out of bed.    For vital signs and complete assessments, please see documentation flowsheets.

## 2022-12-14 NOTE — PROGRESS NOTES
Neurocritical Care Multi-Disciplinary Note    Reason for critical care admission: Post op-C5-T6 fusion  Admitting Team: GABINO  Date of Service:  12/14/2022  Date of Admission:  12/6/2022  Hospital Day: 9    Patient condition reviewed and discussed while on multidisciplinary rounds today.   Please note these minor interventions that were initiated:  1. None    The Neurocritical Care service will continue to follow peripherally while the patient is within the ICU. We are readily available should issues arise. Please feel free to contact us for critical care issues with which we may be of assistance. For all other concerns, please contact primary service first.     Please contact NCC team phone at *[phone].     ENID Savage, CNP  Neurocritical Care *[phone]

## 2022-12-14 NOTE — PROGRESS NOTES
[facility], [place]   Neurosurgery Progress Note:    Assessment: Shay Jimenez is a 57 year old male with with PMH ESRD on dialysis and osteomyelitis s/p C5-T6 fusion who is now POD#8 s/p redo C5-T6 fusion with concern for hemorrhage; no vertebral artery dissection or extravasation was noted on intraoperative angiogram.  He was admitted to the Neuro ICU postoperatively for MAP goals and frequent neuro checks, remaining stable on pressors.  Neuro exam is unchanged from patient's baseline.    Plan:  - Serial neuro exams  - Pain control  - Hb goal > 7  - Normonatremia  - Goal normotension  - Appreciate Nephrology recommendations re: dialysis (baseline M/W/F)  - Patient is now on CIWA protocol with Haldol and Ativan as needed and thiamine and clonidine scheduled-we will monitor for the amount and frequency of Haldol and Ativan received  - We will send CRP, ESR and if the inflammatory markers are elevated we will get ultrasound at the incision site rule out any subcutaneous collection  - Cervical collar at all times  - Advance diet as tolerated  - Bowel regimen  - PRN antiemetics  - IVF until taking adequate PO  - PT/OT  - SCDs for DVT proph  - Okay to transfer to the floor when bed available- orders are in  -----------------------------------  Jonny Stephenson  Neurosurgery Resident, PGY-2    Please contact neurosurgery resident on call with questions.    Dial * * [phone], enter [phone] when prompted.      Interval History/Subjective: No acute events overnight. Decreasing needs for Ativan overnight.  Staining on the dressing that was removed from the incision-appears greenish, otherwise the incision appears intact with no active drainage.    Objective:   Temp:  [97.5  F (36.4  C)-99.5  F (37.5  C)] 97.5  F (36.4  C)  Pulse:  [62-91] 69  Resp:  [14-18] 17  BP: (102-141)/(62-97) 105/62  Cuff Mean (mmHg):  [82] 82  SpO2:  [90 %-98 %] 98 %  I/O last 3 completed shifts:  In: 3010 [P.O.:3010]  Out: -      Gen: Appears comfortable, NAD  Wound: Incision, clean, dry, intact without strikethrough  Neurologic:  - Alert & Oriented to person, place, time, and situation  - Follows commands briskly  - Speech fluent, spontaneous. No aphasia or dysarthria.  - No gaze preference. No apparent hemineglect.  - PERRL, EOMI  - Strong brow raise, eye closure, and smile  - Face symmetric with sensation intact to light touch  - Trapezii and sternocleidomastoid muscles 5/5 bilaterally  - No pronator drift     Del Tr Bi WE WF Gr   R 4* 5 5 5 5 5   L 4* 5 5 5 5 5    HF KE KF DF PF EHL   R 4+ 5 5 5 5 5   L 4+ 5 5 5 5 5   *pain-limited    Norman's and clonus negative.    Sensation intact and symmetric to light touch throughout    LABS  Recent Labs   Lab Test 12/14/22  0422 12/13/22  0502 12/12/22  0955   WBC 6.4 6.5 6.6   HGB 8.9* 9.5* 9.3*    98 99    272 256       Recent Labs   Lab Test 12/14/22  0422 12/13/22  0502 12/12/22  0955   * 137 135*   POTASSIUM 3.8 3.6 4.1   CHLORIDE 94* 98 95*   CO2 23 25 24   BUN 33.2* 21.0* 32.8*   CR 5.64* 4.41* 6.29*   ANIONGAP 15 14 16*   MAC 8.7 9.0 9.1   GLC 80 96 87       IMAGING  No results found for this or any previous visit (from the past 24 hour(s)).

## 2022-12-14 NOTE — PROGRESS NOTES
[facility] Nurse Inpatient Assessment     Consulted for: Right heel    Patient History (according to provider note(s):      Shay Jimenez is a 57 year old male with with PMH ESRD on dialysis and osteomyelitis s/p C5-T6 fusion who is now POD#5 s/p redo C5-T6 fusion with concern for hemorrhage; no vertebral artery dissection or extravasation was noted on intraoperative angiogram.  He was admitted to the Neuro ICU postoperatively for MAP goals and frequent neuro checks, remaining stable on pressors.  Neuro exam is unchanged from patient's baseline.    Areas Assessed:      Areas visualized during today's visit: back     Wound location: cervical spine     Inferior     Superior     Last photo: 12/14  Wound due to: Surgical Wound  Wound history/plan of care: surgical wound 12/6  Wound base: 95 % approximated , 5 % non-granular tissue     Palpation of the wound bed: normal      Drainage: small     Description of drainage: serosanguinous     Measurements (length x width x depth, in cm): ~16  x 0.1  x  MARIA ESTHER cm      Tunneling: N/A     Undermining: N/A  Periwound skin: Superficial erosion near sutures on inferior left side of wound.       Color: ywllow      Temperature: normal   Odor: none  Pain: no grimacing or signs of discomfort, none  Pain interventions prior to dressing change: N/A  Treatment goal: Drainage control and Protection  STATUS: initial assessment  Supplies ordered: supplies stored on unit     Pressure Injury Location: Right heel- from 12/13 12/13  Last photo: 12/13  Wound type: Pressure Injury     Pressure Injury Stage: Deep Tissue Pressure Injury (DTPI), present on admission   Wound history/plan of care:  Wound noted by nursing on 12/6 after patient transferred out of PACU. Patient had been proned in OR so unlikely wound is from then and that it was present on admission.     Wound base: 100 % purple and epidermis- boggy appears as though this wound may  "have initially been a blood filled blister.     Palpation of the wound bed: boggy      Drainage: none     Description of drainage: none     Measurements (length x width x depth, in cm) 3 x 2.5 x 0 cm      Tunneling N/A     Undermining N/A  Periwound skin: Intact      Color: normal and consistent with surrounding tissue      Temperature: normal   Odor: none  Pain: no grimacing or signs of discomfort, none  Pain intervention prior to dressing change: patient tolerated well  Treatment goal: Protection  STATUS: initial assessment  Supplies ordered: supplies stored on unit    My PI Risk Assessment     Sensory Perception: 3 - Slightly Limited     Moisture: 3 - Occasionally moist      Activity: 2 - Chairfast     Mobility: 2 - Very limited     Nutrition: 2 - Probably inadequate      Friction/Shear: 2 - Potential problem      TOTAL: 14     Treatment Plan:     Cervical spine incision wound(s): Daily and PRN cleanse with wound cleanser and pat dry. Apply Primipore over wound.     Right heel wound: Every third day and as needed  Cleanse wound with NS and pat dry  Paint bibi-wound skin with no sting barrier film.  Cover with 4x4 mepilex flex(# [id number]).   Keep heels elevated off bed.   Wear Prevalon boots while in bed. (# [id number])    Pressure Injury Prevention (PIP) Plan:  If patient is declining pressure injury prevention interventions: Explore reason why and address patient's concerns, Educate on pressure injury risk and prevention intervention(s), If patient is still declining, document \"informed refusal\"  and Ensure Care team is aware ( provider, charge nurse, etc)  Mattress: Follow bed algorithm, reassess daily and order specialty mattress, if indicated.  HOB: Maintain at or below 30 degrees, unless contraindicated  Repositioning in bed: Every 1-2 hours , Left/right positioning; avoid supine and Raise foot of bed prior to raising head of bed, to reduce patient sliding down (shear)  Heels: Keep elevated off mattress, Pillows " under calves and Heel lift boots  Protective Dressing: Sacral Mepilex for prevention (#[id number]),  especially for the agitated patient   Chair positioning: Chair cushion (#[id number]) , Repositions self: patient to shift weight every 15 minutes, Assist patient to reposition hourly and Do NOT use a donut for sitting (this increases pressure to smaller area and creates a higher potential for injury)    If patient has a buttock pressure injury, or high risk for PI use chair cushion or SPS.  Moisture Management: Perineal cleansing /protection: Follow Incontinence Protocol, Avoid brief in bed, Clean and dry skin folds with bathing , Consider InterDry (#[id number]) between folds and Moisturize dry skin  Under Devices: Inspect skin under all medical devices during skin inspection , Ensure tubes are stabilized without tension and Ensure patient is not lying on medical devices or equipment when repositioned  Ask provider to discontinue device when no longer needed.    Orders: Reviewed and Written    RECOMMEND PRIMARY TEAM ORDER: None, at this time  Education provided: plan of care and wound progress  Discussed plan of care with: Patient and Nurse  WOC nurse follow-up plan: weekly  Notify WOC if wound(s) deteriorate.  Nursing to notify the Provider(s) and re-consult the WOC Nurse if new skin concern.    DATA:     Current support surface: Standard  Low air loss (DEN pump, Isolibrium, Pulsate, skin guard, etc)  BMI: Body mass index is 37.26 kg/m .   Active diet order: Orders Placed This Encounter      Advance Diet as Tolerated: Regular Diet Adult     Output: I/O last 3 completed shifts:  In: 3010 [P.O.:3010]  Out: -      Labs:   Recent Labs   Lab 12/14/22  0634 12/14/22  0422 12/12/22  0955 12/11/22  0528 12/07/22  2013 12/07/22  1400   ALBUMIN  --   --   --  3.1*  --   --    HGB  --  8.9*   < > 9.3*   < > 7.5*   WBC  --  6.4   < > 6.0   < > 5.3   A1C  --   --   --   --   --  5.6   CRP 35.00* 39.00*   < >  --    < >  --     < > = values  in this interval not displayed.     Pressure injury risk assessment:   Sensory Perception: 3-->slightly limited  Moisture: 3-->occasionally moist  Activity: 2-->chairfast  Mobility: 2-->very limited  Nutrition: 2-->probably inadequate  Friction and Shear: 2-->potential problem  Konstantin Score: 14     Elizabeth Barlow RN CWOCN  Dept. Pager: [phone]  Dept. Office Number: [phone]

## 2022-12-14 NOTE — PROGRESS NOTES
"CLINICAL NUTRITION SERVICES - ASSESSMENT NOTE     Nutrition Prescription    RECOMMENDATIONS FOR MDs/PROVIDERS TO ORDER:  - Encouragement of adequate PO  - Could consider B complex/renal MVI in the long-term to help limit burden of medications (pt currently on multiple B vits and renal MVI)    Malnutrition Status:    - Unable to determine due to unable to assess all parameter of NFPE    Recommendations already ordered by Registered Dietitian (RD):  - None currently - continue B vitamins and renal vitamin in HD patient     Future/Additional Recommendations:  - PO adequacy      REASON FOR ASSESSMENT  Shay Jimenez is a/an 57 year old male assessed by the dietitian for LOS    Medical History: PMH ESRD on dialysis and osteomyelitis s/p C5-T6 fusion who is now s/p redo C5-T6 fusion with concern for hemorrhage; no vertebral artery dissection or extravasation was noted on intraoperative angiogram.  He was admitted to the Neuro ICU postoperatively for MAP goals and frequent neuro checks, remaining stable on pressors.  Neuro exam is unchanged from patient's baseline.     NUTRITION HISTORY  Assess as able  - pt busy with provider     CURRENT NUTRITION ORDERS  Diet: Regular - lytes WNL  Intake/Tolerance: 100% PO     LABS  Labs reviewed  Na+ 132 (L)  BUN: 33.2 (H)  Creatinine: 5.64 (H)  CRP: 35 (H)  Alk Phos: 146 (H)  Glucose trends: 87; 96; 80    MEDICATIONS  Medications reviewed  Lipitor   B12  Folic acid   B6  B1  Renavite   Miralax  Senokot     ANTHROPOMETRICS  Height: 185.4 cm (6' 1\") 73\"   Most Recent Weight: 128.1 kg (282 lb 6.6 oz)    IBW: 84 kg  BMI: Obesity Grade II BMI 35-39.9  Weight History:   Wt Readings from Last 9 Encounters:   12/13/22 128.1 kg (282 lb 6.6 oz)   11/03/22 131.1 kg (289 lb)   10/10/22 131.5 kg (289 lb 14.5 oz)   09/22/22 132.5 kg (292 lb)   08/18/22 132.5 kg (292 lb)   08/04/22 132.5 kg (292 lb)   07/29/22 132.5 kg (292 lb)   06/29/22 145 kg (319 lb 10.7 oz)   Weight fluctuations with fluid " status changes. Per provider, pt is fluid up.     Dosing Weight: 95 kg (adjusted weight using driest weight of 128 kg and IBW of 84 kg)    ASSESSED NUTRITION NEEDS  Estimated Energy Needs: 3645-0130 kcals/day (25 - 30 kcals/kg)  Justification: Maintenance; on dialysis  Estimated Protein Needs: 124-143 grams protein/day (1.3 - 1.5 grams of pro/kg)  Justification: Increased needs  Estimated Fluid Needs: (1 mL/kcal)   Justification: Or per provider/renal pending fluid status    PHYSICAL FINDINGS  See malnutrition section below.  No abnormal nutrition-related physical findings observed.     MALNUTRITION  % Intake: Decreased intake does not meet criteria  % Weight Loss: Weight loss does not meet criteria  Subcutaneous Fat Loss: None observed  Muscle Loss: Unable to assess  *superficial observation - pt with provider. Requires more detailed assessment*  Fluid Accumulation/Edema: suspected   Malnutrition Diagnosis: Unable to determine due to unable to assess all parameter of NFPE    NUTRITION DIAGNOSIS  Predicted inadequate nutrient intake related to potential for menu fatigue as evidenced by hospital LOS       INTERVENTIONS  Implementation  Nutrition Education: Will be provided if education needs arise   Follow for PO adequacy and need for further nutrition intervention should PO decline      Goals  Patient to consume % of nutritionally adequate meal trays TID, or the equivalent with supplements/snacks.     Monitoring/Evaluation  Progress toward goals will be monitored and evaluated per protocol.    Gina Mcginnis RD, RACHAEL, [facility]  Neuro ICU  Pager: [phone]

## 2022-12-15 ENCOUNTER — APPOINTMENT (OUTPATIENT)
Dept: PHYSICAL THERAPY | Facility: CLINIC | Age: 57
DRG: 459 | End: 2022-12-15
Attending: STUDENT IN AN ORGANIZED HEALTH CARE EDUCATION/TRAINING PROGRAM
Payer: MEDICARE

## 2022-12-15 LAB
CRP SERPL-MCNC: 25.9 MG/L
ERYTHROCYTE [DISTWIDTH] IN BLOOD BY AUTOMATED COUNT: 16.4 % (ref 10–15)
ERYTHROCYTE [SEDIMENTATION RATE] IN BLOOD BY WESTERGREN METHOD: 51 MM/HR (ref 0–20)
GLUCOSE BLDC GLUCOMTR-MCNC: 99 MG/DL (ref 70–99)
HCT VFR BLD AUTO: 30.4 % (ref 40–53)
HGB BLD-MCNC: 9.6 G/DL (ref 13.3–17.7)
MCH RBC QN AUTO: 31.4 PG (ref 26.5–33)
MCHC RBC AUTO-ENTMCNC: 31.6 G/DL (ref 31.5–36.5)
MCV RBC AUTO: 99 FL (ref 78–100)
PLATELET # BLD AUTO: 278 10E3/UL (ref 150–450)
RBC # BLD AUTO: 3.06 10E6/UL (ref 4.4–5.9)
WBC # BLD AUTO: 7.1 10E3/UL (ref 4–11)

## 2022-12-15 PROCEDURE — 250N000013 HC RX MED GY IP 250 OP 250 PS 637

## 2022-12-15 PROCEDURE — 36415 COLL VENOUS BLD VENIPUNCTURE: CPT

## 2022-12-15 PROCEDURE — 97530 THERAPEUTIC ACTIVITIES: CPT | Mod: GP

## 2022-12-15 PROCEDURE — 250N000013 HC RX MED GY IP 250 OP 250 PS 637: Performed by: STUDENT IN AN ORGANIZED HEALTH CARE EDUCATION/TRAINING PROGRAM

## 2022-12-15 PROCEDURE — 250N000013 HC RX MED GY IP 250 OP 250 PS 637: Performed by: NURSE PRACTITIONER

## 2022-12-15 PROCEDURE — 85027 COMPLETE CBC AUTOMATED: CPT

## 2022-12-15 PROCEDURE — 86140 C-REACTIVE PROTEIN: CPT

## 2022-12-15 PROCEDURE — 250N000011 HC RX IP 250 OP 636: Performed by: NURSE PRACTITIONER

## 2022-12-15 PROCEDURE — 97110 THERAPEUTIC EXERCISES: CPT | Mod: GP

## 2022-12-15 PROCEDURE — 250N000011 HC RX IP 250 OP 636: Performed by: STUDENT IN AN ORGANIZED HEALTH CARE EDUCATION/TRAINING PROGRAM

## 2022-12-15 PROCEDURE — 85652 RBC SED RATE AUTOMATED: CPT

## 2022-12-15 PROCEDURE — 200N000002 HC R&B ICU UMMC

## 2022-12-15 RX ADMIN — POLYETHYLENE GLYCOL 3350 17 G: 17 POWDER, FOR SOLUTION ORAL at 14:45

## 2022-12-15 RX ADMIN — FLUTICASONE PROPIONATE 1 SPRAY: 50 SPRAY, METERED NASAL at 08:12

## 2022-12-15 RX ADMIN — SERTRALINE HYDROCHLORIDE 100 MG: 100 TABLET ORAL at 08:09

## 2022-12-15 RX ADMIN — ONDANSETRON 4 MG: 4 TABLET, ORALLY DISINTEGRATING ORAL at 09:58

## 2022-12-15 RX ADMIN — ACETAMINOPHEN 650 MG: 325 TABLET, FILM COATED ORAL at 12:23

## 2022-12-15 RX ADMIN — GABAPENTIN 300 MG: 300 CAPSULE ORAL at 05:51

## 2022-12-15 RX ADMIN — FINASTERIDE 1.3 MG: 5 TABLET, FILM COATED ORAL at 08:21

## 2022-12-15 RX ADMIN — GABAPENTIN 300 MG: 300 CAPSULE ORAL at 14:46

## 2022-12-15 RX ADMIN — OXYCODONE HYDROCHLORIDE 5 MG: 5 TABLET ORAL at 12:23

## 2022-12-15 RX ADMIN — SEVELAMER CARBONATE 1600 MG: 800 TABLET, FILM COATED ORAL at 08:09

## 2022-12-15 RX ADMIN — Medication 100 MG: at 08:10

## 2022-12-15 RX ADMIN — HEPARIN SODIUM 5000 UNITS: 5000 INJECTION, SOLUTION INTRAVENOUS; SUBCUTANEOUS at 12:15

## 2022-12-15 RX ADMIN — GABAPENTIN 300 MG: 300 CAPSULE ORAL at 23:56

## 2022-12-15 RX ADMIN — SENNOSIDES AND DOCUSATE SODIUM 1 TABLET: 8.6; 5 TABLET ORAL at 08:09

## 2022-12-15 RX ADMIN — B-COMPLEX W/ C & FOLIC ACID TAB 1 MG 1 TABLET: 1 TAB at 08:09

## 2022-12-15 RX ADMIN — Medication 100 MG: at 08:21

## 2022-12-15 RX ADMIN — MIDODRINE HYDROCHLORIDE 10 MG: 5 TABLET ORAL at 12:15

## 2022-12-15 RX ADMIN — ACETAMINOPHEN 650 MG: 325 TABLET, FILM COATED ORAL at 08:22

## 2022-12-15 RX ADMIN — PANTOPRAZOLE SODIUM 40 MG: 40 TABLET, DELAYED RELEASE ORAL at 08:10

## 2022-12-15 RX ADMIN — HEPARIN SODIUM 5000 UNITS: 5000 INJECTION, SOLUTION INTRAVENOUS; SUBCUTANEOUS at 04:45

## 2022-12-15 RX ADMIN — BACLOFEN 10 MG: 10 TABLET ORAL at 08:10

## 2022-12-15 RX ADMIN — METHOCARBAMOL 500 MG: 500 TABLET ORAL at 16:48

## 2022-12-15 RX ADMIN — CETIRIZINE HYDROCHLORIDE 10 MG: 10 TABLET, FILM COATED ORAL at 08:10

## 2022-12-15 RX ADMIN — HYDROXYZINE HYDROCHLORIDE 25 MG: 25 TABLET, FILM COATED ORAL at 10:14

## 2022-12-15 RX ADMIN — SENNOSIDES AND DOCUSATE SODIUM 1 TABLET: 8.6; 5 TABLET ORAL at 19:49

## 2022-12-15 RX ADMIN — METHOCARBAMOL 500 MG: 500 TABLET ORAL at 12:15

## 2022-12-15 RX ADMIN — HEPARIN SODIUM 5000 UNITS: 5000 INJECTION, SOLUTION INTRAVENOUS; SUBCUTANEOUS at 19:49

## 2022-12-15 RX ADMIN — FOLIC ACID 1 MG: 1 TABLET ORAL at 08:10

## 2022-12-15 RX ADMIN — CYANOCOBALAMIN TAB 500 MCG 500 MCG: 500 TAB at 08:10

## 2022-12-15 RX ADMIN — ACETAMINOPHEN 650 MG: 325 TABLET, FILM COATED ORAL at 04:45

## 2022-12-15 RX ADMIN — OXYCODONE HYDROCHLORIDE 5 MG: 5 TABLET ORAL at 04:45

## 2022-12-15 RX ADMIN — METHOCARBAMOL 500 MG: 500 TABLET ORAL at 08:10

## 2022-12-15 RX ADMIN — METHOCARBAMOL 500 MG: 500 TABLET ORAL at 19:49

## 2022-12-15 RX ADMIN — ACETAMINOPHEN 650 MG: 325 TABLET, FILM COATED ORAL at 23:57

## 2022-12-15 RX ADMIN — Medication 100 MG: at 14:45

## 2022-12-15 RX ADMIN — ACETAMINOPHEN 650 MG: 325 TABLET, FILM COATED ORAL at 18:53

## 2022-12-15 RX ADMIN — Medication 100 MG: at 19:48

## 2022-12-15 RX ADMIN — BACLOFEN 10 MG: 10 TABLET ORAL at 19:49

## 2022-12-15 RX ADMIN — MIDODRINE HYDROCHLORIDE 10 MG: 5 TABLET ORAL at 08:09

## 2022-12-15 RX ADMIN — LORAZEPAM 2 MG: 1 TABLET ORAL at 23:57

## 2022-12-15 RX ADMIN — OXYCODONE HYDROCHLORIDE 5 MG: 5 TABLET ORAL at 08:22

## 2022-12-15 RX ADMIN — Medication 133 MG: at 08:09

## 2022-12-15 RX ADMIN — SEVELAMER CARBONATE 1600 MG: 800 TABLET, FILM COATED ORAL at 12:16

## 2022-12-15 RX ADMIN — ATORVASTATIN CALCIUM 20 MG: 20 TABLET, FILM COATED ORAL at 23:57

## 2022-12-15 RX ADMIN — FLUTICASONE PROPIONATE 1 SPRAY: 50 SPRAY, METERED NASAL at 19:49

## 2022-12-15 ASSESSMENT — ACTIVITIES OF DAILY LIVING (ADL)
ADLS_ACUITY_SCORE: 47

## 2022-12-15 NOTE — PROGRESS NOTES
Care Management Follow Up    Length of Stay (days): 9    Expected Discharge Date:  TBD     Additional Information:  Received a call from [facility] staff, Deven # [phone].  Deven reported pt was receiving home PT and OT from [facility] prior hospitalization.  RNCC/SW will cont to follow plan of care.    Easton Jessica RN, PHN, BSN  4A and 4E/ ICU  Care Coordinator  Phone: [phone]  Pager: [phone]    To contact the weekend RNCC  [place] (0800 - 1630) Saturday and Sunday   Units: 4A, 4C, 4E, 5A and 5B- Pager 1: [phone]   Units: 6A, 6B, 6C, 6D- Pager 2: [phone]   Units: 7A, 7B, 7C, 7D, and 5C-Pager 3: [phone]

## 2022-12-15 NOTE — PROGRESS NOTES
Neurocritical Care Multi-Disciplinary Note    Reason for critical care admission: Post op-C5-T6 fusion  Admitting Team: GABINO  Date of Service:  12/15/2022  Date of Admission:  12/6/2022  Hospital Day: 10    Patient condition reviewed and discussed while on multidisciplinary rounds today.   Please note these minor interventions that were initiated:  1. None    The Neurocritical Care service will continue to follow peripherally while the patient is within the ICU. We are readily available should issues arise. Please feel free to contact us for critical care issues with which we may be of assistance. For all other concerns, please contact primary service first.     Please contact NCC team phone at *[phone].     ENID Savage, CNP  Neurocritical Care *[phone]

## 2022-12-15 NOTE — PROGRESS NOTES
SPIRITUAL HEALTH SERVICES Progress Note  [facility] 4A    I met with Shay per his interest in follow up. He shared that today is a better day. Shay is looking forward to strengthening his muscles so that he can recover. He also spoke with optimism about his personal relationships that have brought him some peace in recent days.    Landon Esquivel  Chaplain Resident  Pager [phone]    * [facility] remains available 24/7 for emergent requests/referrals, either by having the switchboard page the on-call  or by entering an ASAP/STAT consult in Epic (this will also page the on-call ). Routine Epic consults receive an initial response within 24 hours.*

## 2022-12-15 NOTE — PLAN OF CARE
Major Shift Events:  No major events. iHD today, 3.5L removed. Up to chair x2. Poor rationalization this morning, stating he was going to skip dialysis today to go hunting. Throughout shift, patient did become more rational. Oriented x4 and memory intact throughout shift. VSS. On and off 2L NC throughout shift.   Plan: Tx to 6A when bed is available. Monitor neuro status. Continue current POC.  For vital signs and complete assessments, please see documentation flowsheets.     Goal Outcome Evaluation:    Plan of Care Reviewed With: patient    Overall Patient Progress: no changeOverall Patient Progress: no change    Outcome Evaluation: 6A orders placed. HD run today.

## 2022-12-15 NOTE — PROGRESS NOTES
[facility], [place]   Neurosurgery Progress Note:    Assessment: Shay Jimenez is a 57 year old male with with PMH ESRD on dialysis and osteomyelitis s/p C5-T6 fusion who is now POD#8 s/p redo C5-T6 fusion with concern for hemorrhage; no vertebral artery dissection or extravasation was noted on intraoperative angiogram.  He was admitted to the Neuro ICU postoperatively for MAP goals and frequent neuro checks, remaining stable on pressors.  Neuro exam is unchanged from patient's baseline.    Plan:  - Serial neuro exams  - Pain control  - Hb goal > 7  - Normonatremia  - Goal normotension  - Appreciate Nephrology recommendations re: dialysis (baseline M/W/F)  - Patient is now on CIWA protocol with Haldol and Ativan as needed and thiamine and clonidine scheduled-we will monitor for the amount and frequency of Haldol and Ativan received  - Daily dressings per WO team  - Cervical collar at all times  - Advance diet as tolerated  - Bowel regimen  - PRN antiemetics  - IVF until taking adequate PO  - PT/OT  - SCDs for DVT proph  - Okay to transfer to the floor when bed available- orders are in  -----------------------------------  Jonny Stephenson  Neurosurgery Resident, PGY-2    Please contact neurosurgery resident on call with questions.    Dial * * [phone], enter [phone] when prompted.      Interval History/Subjective: No acute events overnight. Incision looks stable, intact. No active drainage.    Objective:   Temp:  [97.4  F (36.3  C)-98.5  F (36.9  C)] 97.4  F (36.3  C)  Pulse:  [68-86] 77  Resp:  [16-22] 18  BP: (100-147)/(61-97) 119/75  SpO2:  [87 %-98 %] 92 %  I/O last 3 completed shifts:  In: 1260 [P.O.:1260]  Out: 3500 [Other:3500]    Gen: Appears comfortable, NAD  Wound: Incision, clean, dry, intact without strikethrough  Neurologic:  - Alert & Oriented to person, place, time, and situation  - Follows commands briskly  - Speech fluent, spontaneous. No aphasia or dysarthria.  - No gaze  preference. No apparent hemineglect.  - PERRL, EOMI  - Strong brow raise, eye closure, and smile  - Face symmetric with sensation intact to light touch  - Trapezii and sternocleidomastoid muscles 5/5 bilaterally  - No pronator drift     Del Tr Bi WE WF Gr   R 4* 5 5 5 5 5   L 4* 5 5 5 5 5    HF KE KF DF PF EHL   R 4+ 5 5 5 5 5   L 4+ 5 5 5 5 5   *pain-limited    Norman's and clonus negative.    Sensation intact and symmetric to light touch throughout    LABS  Recent Labs   Lab Test 12/15/22  0527 12/14/22  0422 12/13/22  0502   WBC 7.1 6.4 6.5   HGB 9.6* 8.9* 9.5*   MCV 99 100 98    269 272       Recent Labs   Lab Test 12/14/22  0422 12/13/22  0502 12/12/22  0955   * 137 135*   POTASSIUM 3.8 3.6 4.1   CHLORIDE 94* 98 95*   CO2 23 25 24   BUN 33.2* 21.0* 32.8*   CR 5.64* 4.41* 6.29*   ANIONGAP 15 14 16*   MAC 8.7 9.0 9.1   GLC 80 96 87       IMAGING  No results found for this or any previous visit (from the past 24 hour(s)).

## 2022-12-15 NOTE — PROGRESS NOTES
Care Management Follow Up    Length of Stay (days): 9    Expected Discharge Date:  TBD     Concerns to be Addressed: Discharge Planning        Patient plan of care discussed at interdisciplinary rounds: Yes    Anticipated Discharge Disposition: Home     Anticipated Discharge Services: Home Care     Anticipated Discharge DME:  None    Patient/family educated on Medicare website which has current facility and service quality ratings:  N/A    Education Provided on the Discharge Plan:  Yes    Patient/Family in Agreement with the Plan:  Yes    Referrals Placed by CM/SW: None at this time     Private pay costs discussed: Not applicable    Additional Information:    Writer met with patient to discuss recommendations for Transitional Care post discharge from Hospital. Patient adamantly opposed to Transitional Care. Writer discussed benefits with him and asked if he'd be open to it if it was necessary. Patient stated he would not. Patient reports that he has a son who assists with care and is at home most of the day/evening. He reports both sons assist with transportation to Dialysis. He states that he would be open to having home health care and has previously worked with [facility]. SW/RNCC to continue to follow for discharge planning.    MOUNA Wilson, MSW  Adult Acute Care Float   Pager [phone]

## 2022-12-15 NOTE — PLAN OF CARE
Goal Outcome Evaluation:  Major Shift Events:  None.  Neuro: Patient is A&OX4, follows commands, noted LE weakness and baseline numbness.  Pupils are equal and reactive.  When out of bed needs neck brace on.  CV: Not on tele, AVSS.  Resp: Tried to wean oxygen but needs oxygen while awake, on 2L, NC.  LS diminished at the bases.  GI/: Patient on HD MWF, on regular diet with good appetite.  C/O nausea, Zofran given.    Skin:  Patient with heather BLE shins, blanchable coccyx. Posterior neck incision c/d/i.  .    Plan: Awaiting to transfer to , when bed available.  For vital signs and complete assessments, please see documentation flowsheets.

## 2022-12-15 NOTE — PLAN OF CARE
Major Shift Events  Overnight pt remained in stable condition w/ issues of pain control. Pain was managed with Tylenol, Oxycodone, and Gabapentin given PRN as needed (~ q4). Pt continues to complain of pain 6-8/10. MD informed and will address with day team. Otherwise no acute events.    Neuro: A&Ox4, following commands, PERRLA 4-5 mm, sensation intact, pronator -,    Cv: HR (70-80's), BP WDL, pulses +1  Resp: RA vs. 2L NC overnight, SpO2 > 92%, LS: clear/diminished  GI/: Regular diet, Oliguric, BM +, x1 incontinent void, BS+  Skin: Generalized ecchymosis, L-arm fistula, blanchable erythema     Drips: None  Sedation: None     Plan: Transfer to  as able. Follow POC. Monitor closely, notify MD of acute changes.  For vital signs and complete assessments, please see documentation flowsheets.

## 2022-12-16 ENCOUNTER — APPOINTMENT (OUTPATIENT)
Dept: PHYSICAL THERAPY | Facility: CLINIC | Age: 57
DRG: 459 | End: 2022-12-16
Attending: STUDENT IN AN ORGANIZED HEALTH CARE EDUCATION/TRAINING PROGRAM
Payer: MEDICARE

## 2022-12-16 ENCOUNTER — APPOINTMENT (OUTPATIENT)
Dept: OCCUPATIONAL THERAPY | Facility: CLINIC | Age: 57
DRG: 459 | End: 2022-12-16
Attending: STUDENT IN AN ORGANIZED HEALTH CARE EDUCATION/TRAINING PROGRAM
Payer: MEDICARE

## 2022-12-16 ENCOUNTER — APPOINTMENT (OUTPATIENT)
Dept: CT IMAGING | Facility: CLINIC | Age: 57
DRG: 459 | End: 2022-12-16
Attending: STUDENT IN AN ORGANIZED HEALTH CARE EDUCATION/TRAINING PROGRAM
Payer: MEDICARE

## 2022-12-16 LAB
ANION GAP SERPL CALCULATED.3IONS-SCNC: 13 MMOL/L (ref 7–15)
ANION GAP SERPL CALCULATED.3IONS-SCNC: 14 MMOL/L (ref 7–15)
BASE EXCESS BLDV CALC-SCNC: 0.9 MMOL/L (ref -7.7–1.9)
BUN SERPL-MCNC: 19.5 MG/DL (ref 6–20)
BUN SERPL-MCNC: 33.5 MG/DL (ref 6–20)
CALCIUM SERPL-MCNC: 8.5 MG/DL (ref 8.6–10)
CALCIUM SERPL-MCNC: 8.8 MG/DL (ref 8.6–10)
CHLORIDE SERPL-SCNC: 89 MMOL/L (ref 98–107)
CHLORIDE SERPL-SCNC: 91 MMOL/L (ref 98–107)
CREAT SERPL-MCNC: 3.83 MG/DL (ref 0.67–1.17)
CREAT SERPL-MCNC: 5.47 MG/DL (ref 0.67–1.17)
CRP SERPL-MCNC: 21.5 MG/L
DEPRECATED HCO3 PLAS-SCNC: 25 MMOL/L (ref 22–29)
DEPRECATED HCO3 PLAS-SCNC: 25 MMOL/L (ref 22–29)
ERYTHROCYTE [DISTWIDTH] IN BLOOD BY AUTOMATED COUNT: 16 % (ref 10–15)
ERYTHROCYTE [SEDIMENTATION RATE] IN BLOOD BY WESTERGREN METHOD: 45 MM/HR (ref 0–20)
GFR SERPL CREATININE-BSD FRML MDRD: 11 ML/MIN/1.73M2
GFR SERPL CREATININE-BSD FRML MDRD: 17 ML/MIN/1.73M2
GLUCOSE SERPL-MCNC: 79 MG/DL (ref 70–99)
GLUCOSE SERPL-MCNC: 80 MG/DL (ref 70–99)
HCO3 BLDV-SCNC: 29 MMOL/L (ref 21–28)
HCT VFR BLD AUTO: 31.8 % (ref 40–53)
HGB BLD-MCNC: 9.8 G/DL (ref 13.3–17.7)
MCH RBC QN AUTO: 30.9 PG (ref 26.5–33)
MCHC RBC AUTO-ENTMCNC: 30.8 G/DL (ref 31.5–36.5)
MCV RBC AUTO: 100 FL (ref 78–100)
O2/TOTAL GAS SETTING VFR VENT: 3 %
PCO2 BLDV: 66 MM HG (ref 40–50)
PH BLDV: 7.26 [PH] (ref 7.32–7.43)
PLATELET # BLD AUTO: 268 10E3/UL (ref 150–450)
PO2 BLDV: 34 MM HG (ref 25–47)
POTASSIUM SERPL-SCNC: 4.1 MMOL/L (ref 3.4–5.3)
POTASSIUM SERPL-SCNC: 4.8 MMOL/L (ref 3.4–5.3)
RBC # BLD AUTO: 3.17 10E6/UL (ref 4.4–5.9)
SODIUM SERPL-SCNC: 127 MMOL/L (ref 136–145)
SODIUM SERPL-SCNC: 130 MMOL/L (ref 136–145)
WBC # BLD AUTO: 6.9 10E3/UL (ref 4–11)

## 2022-12-16 PROCEDURE — 258N000003 HC RX IP 258 OP 636: Performed by: CLINICAL NURSE SPECIALIST

## 2022-12-16 PROCEDURE — 90937 HEMODIALYSIS REPEATED EVAL: CPT

## 2022-12-16 PROCEDURE — 85027 COMPLETE CBC AUTOMATED: CPT

## 2022-12-16 PROCEDURE — 97530 THERAPEUTIC ACTIVITIES: CPT | Mod: GP

## 2022-12-16 PROCEDURE — 36415 COLL VENOUS BLD VENIPUNCTURE: CPT | Performed by: STUDENT IN AN ORGANIZED HEALTH CARE EDUCATION/TRAINING PROGRAM

## 2022-12-16 PROCEDURE — 250N000013 HC RX MED GY IP 250 OP 250 PS 637: Performed by: STUDENT IN AN ORGANIZED HEALTH CARE EDUCATION/TRAINING PROGRAM

## 2022-12-16 PROCEDURE — 85652 RBC SED RATE AUTOMATED: CPT

## 2022-12-16 PROCEDURE — 634N000001 HC RX 634: Performed by: CLINICAL NURSE SPECIALIST

## 2022-12-16 PROCEDURE — G1010 CDSM STANSON: HCPCS

## 2022-12-16 PROCEDURE — 99232 SBSQ HOSP IP/OBS MODERATE 35: CPT | Mod: FS | Performed by: CLINICAL NURSE SPECIALIST

## 2022-12-16 PROCEDURE — 250N000013 HC RX MED GY IP 250 OP 250 PS 637: Performed by: NURSE PRACTITIONER

## 2022-12-16 PROCEDURE — 97530 THERAPEUTIC ACTIVITIES: CPT | Mod: GO

## 2022-12-16 PROCEDURE — 250N000011 HC RX IP 250 OP 636: Performed by: NURSE PRACTITIONER

## 2022-12-16 PROCEDURE — 70450 CT HEAD/BRAIN W/O DYE: CPT | Mod: 26 | Performed by: RADIOLOGY

## 2022-12-16 PROCEDURE — 86140 C-REACTIVE PROTEIN: CPT

## 2022-12-16 PROCEDURE — 99223 1ST HOSP IP/OBS HIGH 75: CPT | Performed by: ORTHOPAEDIC SURGERY

## 2022-12-16 PROCEDURE — 250N000011 HC RX IP 250 OP 636: Performed by: STUDENT IN AN ORGANIZED HEALTH CARE EDUCATION/TRAINING PROGRAM

## 2022-12-16 PROCEDURE — 80048 BASIC METABOLIC PNL TOTAL CA: CPT | Performed by: STUDENT IN AN ORGANIZED HEALTH CARE EDUCATION/TRAINING PROGRAM

## 2022-12-16 PROCEDURE — 36415 COLL VENOUS BLD VENIPUNCTURE: CPT

## 2022-12-16 PROCEDURE — 82803 BLOOD GASES ANY COMBINATION: CPT | Performed by: STUDENT IN AN ORGANIZED HEALTH CARE EDUCATION/TRAINING PROGRAM

## 2022-12-16 PROCEDURE — 200N000002 HC R&B ICU UMMC

## 2022-12-16 RX ADMIN — METHOCARBAMOL 500 MG: 500 TABLET ORAL at 17:12

## 2022-12-16 RX ADMIN — METHOCARBAMOL 500 MG: 500 TABLET ORAL at 07:37

## 2022-12-16 RX ADMIN — GABAPENTIN 300 MG: 300 CAPSULE ORAL at 22:07

## 2022-12-16 RX ADMIN — MIDODRINE HYDROCHLORIDE 10 MG: 5 TABLET ORAL at 17:12

## 2022-12-16 RX ADMIN — MIDODRINE HYDROCHLORIDE 10 MG: 5 TABLET ORAL at 07:38

## 2022-12-16 RX ADMIN — ATORVASTATIN CALCIUM 20 MG: 20 TABLET, FILM COATED ORAL at 22:07

## 2022-12-16 RX ADMIN — ACETAMINOPHEN 650 MG: 325 TABLET, FILM COATED ORAL at 22:07

## 2022-12-16 RX ADMIN — Medication 133 MG: at 07:37

## 2022-12-16 RX ADMIN — Medication 100 MG: at 07:38

## 2022-12-16 RX ADMIN — HEPARIN SODIUM 5000 UNITS: 5000 INJECTION, SOLUTION INTRAVENOUS; SUBCUTANEOUS at 11:19

## 2022-12-16 RX ADMIN — SERTRALINE HYDROCHLORIDE 100 MG: 100 TABLET ORAL at 07:38

## 2022-12-16 RX ADMIN — OXYCODONE HYDROCHLORIDE 5 MG: 5 TABLET ORAL at 14:10

## 2022-12-16 RX ADMIN — OXYCODONE HYDROCHLORIDE 5 MG: 5 TABLET ORAL at 22:07

## 2022-12-16 RX ADMIN — SEVELAMER CARBONATE 1600 MG: 800 TABLET, FILM COATED ORAL at 07:38

## 2022-12-16 RX ADMIN — SEVELAMER CARBONATE 1600 MG: 800 TABLET, FILM COATED ORAL at 11:19

## 2022-12-16 RX ADMIN — FLUTICASONE PROPIONATE 1 SPRAY: 50 SPRAY, METERED NASAL at 07:40

## 2022-12-16 RX ADMIN — GABAPENTIN 300 MG: 300 CAPSULE ORAL at 14:10

## 2022-12-16 RX ADMIN — BACLOFEN 10 MG: 10 TABLET ORAL at 07:38

## 2022-12-16 RX ADMIN — Medication 100 MG: at 07:53

## 2022-12-16 RX ADMIN — FLUTICASONE PROPIONATE 1 SPRAY: 50 SPRAY, METERED NASAL at 19:34

## 2022-12-16 RX ADMIN — FOLIC ACID 1 MG: 1 TABLET ORAL at 07:38

## 2022-12-16 RX ADMIN — CETIRIZINE HYDROCHLORIDE 10 MG: 10 TABLET, FILM COATED ORAL at 07:38

## 2022-12-16 RX ADMIN — SODIUM CHLORIDE 250 ML: 9 INJECTION, SOLUTION INTRAVENOUS at 10:50

## 2022-12-16 RX ADMIN — SODIUM CHLORIDE 300 ML: 9 INJECTION, SOLUTION INTRAVENOUS at 10:50

## 2022-12-16 RX ADMIN — METHOCARBAMOL 500 MG: 500 TABLET ORAL at 19:33

## 2022-12-16 RX ADMIN — BACLOFEN 10 MG: 10 TABLET ORAL at 19:33

## 2022-12-16 RX ADMIN — FINASTERIDE 1.3 MG: 5 TABLET, FILM COATED ORAL at 07:40

## 2022-12-16 RX ADMIN — ACETAMINOPHEN 650 MG: 325 TABLET, FILM COATED ORAL at 11:19

## 2022-12-16 RX ADMIN — METHOCARBAMOL 500 MG: 500 TABLET ORAL at 11:19

## 2022-12-16 RX ADMIN — ONDANSETRON HYDROCHLORIDE 4 MG: 2 INJECTION, SOLUTION INTRAMUSCULAR; INTRAVENOUS at 12:04

## 2022-12-16 RX ADMIN — PANTOPRAZOLE SODIUM 40 MG: 40 TABLET, DELAYED RELEASE ORAL at 07:15

## 2022-12-16 RX ADMIN — SEVELAMER CARBONATE 1600 MG: 800 TABLET, FILM COATED ORAL at 17:12

## 2022-12-16 RX ADMIN — CYANOCOBALAMIN TAB 500 MCG 500 MCG: 500 TAB at 07:38

## 2022-12-16 RX ADMIN — B-COMPLEX W/ C & FOLIC ACID TAB 1 MG 1 TABLET: 1 TAB at 07:38

## 2022-12-16 RX ADMIN — HEPARIN SODIUM 5000 UNITS: 5000 INJECTION, SOLUTION INTRAVENOUS; SUBCUTANEOUS at 19:33

## 2022-12-16 RX ADMIN — Medication 100 MG: at 14:10

## 2022-12-16 RX ADMIN — Medication 100 MG: at 19:33

## 2022-12-16 RX ADMIN — MIDODRINE HYDROCHLORIDE 10 MG: 5 TABLET ORAL at 11:19

## 2022-12-16 RX ADMIN — EPOETIN ALFA-EPBX 8000 UNITS: 10000 INJECTION, SOLUTION INTRAVENOUS; SUBCUTANEOUS at 11:24

## 2022-12-16 RX ADMIN — HYDROXYZINE HYDROCHLORIDE 25 MG: 25 TABLET, FILM COATED ORAL at 11:19

## 2022-12-16 ASSESSMENT — ACTIVITIES OF DAILY LIVING (ADL)
ADLS_ACUITY_SCORE: 47

## 2022-12-16 NOTE — PROGRESS NOTES
[facility], [place]   Neurosurgery Progress Note:    Assessment: Shay Jimenez is a 57 year old male with with PMH ESRD on dialysis and osteomyelitis s/p C5-T6 fusion who is now POD#8 s/p redo C5-T6 fusion with concern for hemorrhage; no vertebral artery dissection or extravasation was noted on intraoperative angiogram.  He was admitted to the Neuro ICU postoperatively for MAP goals and frequent neuro checks, remaining stable on pressors.  Neuro exam is unchanged from patient's baseline.    Plan:  - Serial neuro exams  - Pain control  - Hb goal > 7  - Normonatremia  - Goal normotension  - Appreciate Nephrology recommendations re: dialysis (baseline M/W/F)  - Patient is now on CIWA protocol with Haldol and Ativan as needed and thiamine and clonidine scheduled-we will monitor for the amount and frequency of Haldol and Ativan received  - Daily dressings per WO team  - Cervical collar at all times  - Advance diet as tolerated  - Bowel regimen  - PRN antiemetics  - IVF until taking adequate PO  - PT/OT  - SCDs for DVT proph  - Okay to transfer to the floor when bed available- orders are in  -----------------------------------  Jonny Stephenson  Neurosurgery Resident, PGY-2    Please contact neurosurgery resident on call with questions.    Dial * * [phone], enter [phone] when prompted.      Interval History/Subjective: This AM was very sleepy/lethargic. Not following commands. Obtained stat head CT- no acute intracranial pathology.     Objective:   Temp:  [97.4  F (36.3  C)-98  F (36.7  C)] 98  F (36.7  C)  Pulse:  [66-88] 71  Resp:  [18-20] 20  BP: ()/(56-83) 115/67  SpO2:  [92 %-100 %] 96 %  I/O last 3 completed shifts:  In: 240 [P.O.:240]  Out: -     Gen: Appears comfortable, NAD  Wound: Incision, clean, dry, intact without strikethrough  Neurologic:  - Alert & Oriented to person, place, time, and situation  - Follows commands briskly  - Speech fluent, spontaneous. No aphasia or  dysarthria.  - No gaze preference. No apparent hemineglect.  - PERRL, EOMI  - Strong brow raise, eye closure, and smile  - Face symmetric with sensation intact to light touch  - Trapezii and sternocleidomastoid muscles 5/5 bilaterally  - No pronator drift     Del Tr Bi WE WF Gr   R 4* 5 5 5 5 5   L 4* 5 5 5 5 5    HF KE KF DF PF EHL   R 4+ 5 5 5 5 5   L 4+ 5 5 5 5 5   *pain-limited    Norman's and clonus negative.    Sensation intact and symmetric to light touch throughout    LABS  Recent Labs   Lab Test 12/16/22  0541 12/15/22  0527 12/14/22  0422   WBC 6.9 7.1 6.4   HGB 9.8* 9.6* 8.9*    99 100    278 269       Recent Labs   Lab Test 12/16/22  0632 12/15/22  1642 12/14/22  0422 12/13/22  0502   *  --  132* 137   POTASSIUM 4.8  --  3.8 3.6   CHLORIDE 89*  --  94* 98   CO2 25  --  23 25   BUN 33.5*  --  33.2* 21.0*   CR 5.47*  --  5.64* 4.41*   ANIONGAP 13  --  15 14   MAC 8.5*  --  8.7 9.0   GLC 80 99 80 96       IMAGING  Recent Results (from the past 24 hour(s))   CT Head w/o Contrast    Narrative    CT HEAD W/O CONTRAST 12/16/2022 4:59 AM    History: change  in mental status     Comparison: CT head 12/8/2022, 6/26/2022    Technique: Using multidetector thin collimation helical acquisition  technique, axial, coronal and sagittal CT images from the skull base  to the vertex were obtained without intravenous contrast.   (topogram) image(s) also obtained and reviewed.    Findings: There is no intracranial hemorrhage, mass effect, or midline  shift. Gray/white matter differentiation in both cerebral hemispheres  is preserved. Ventricles are proportionate to the cerebral sulci. The  basal cisterns are clear.    The bony calvaria and the bones of the skull base are normal. The  visualized portions of the paranasal sinuses and mastoid air cells are  clear.      Impression    Impression:  No acute intracranial pathology.     I have personally reviewed the examination and initial  interpretation  and I agree with the findings.    WILLIAM HERNADEZ MD         SYSTEM ID:  [id number]

## 2022-12-16 NOTE — PROGRESS NOTES
Nephrology Progress Note  12/16/2022         Shay Jimenez is a 57 yom with ESRD since 4/2019 due to Ca Phos deposition and HTN, runs at [facility] ([phone], fax [phone]) under care of Dr Cline.  Had planned neurosurgery revision for C5-C6 on 12/6 as screw had dislodged.  Nephrology consulted for management of dialysis post op.       Interval History :   Mr Jimenez had day off HD yesterday, planning HD today on MWF schedule.  Pulling 2L of UF to keep up with intake since last run.  No issues on runs, next planned run 12/19.       Assessment & Recommendations:   ESRD-Since 4/2019 due to Ca Phos deposition and HTN, runs at [facility] ([phone], fax [phone]) under care of Dr Cline.  Runs MWF via [facility], 4.25h runs.                  -HD today, 2L of UF.                  -Will decide on runs daily depending on chemistries and hemodynamics.                  -Continuing long term HD, no need for new consent.                  -Is eventually pursuing renal transplant through [facility] once acute issues requiring neurosurgery are done.       Volume-EDW 130kg, bed wt ~128kg today, pulm status stable despite being a bit up in wt.         Electrolytes-K 4.8 bicarb 25, Na 127, HD today.        BMD-Ca 8.5, Mg and Phos WNL.       Neurosurgery-Had planned revision of previous screws at C5-C6 on 12/6.       Anemia-Hgb 9.8, giving 8k epo with runs.  Checking iron studies.       Nutrition-Deferred.       Time spent: 30 minutes on this date of encounter for chart review, physical exam, medical decision making and co-ordination of care.      Discussed with Dr Reinoso     Recommendations were communicated to primary team via verbal communication.        Markie Shelton, [facility] CNS  Clinical Nurse Specialist  [phone]      Review of Systems:   I reviewed the following systems:  Gen: No fevers or chills  CV: No CP at rest  Resp: No SOB at rest  GI: No N/V    Physical Exam:   I/O last 3 completed  "shifts:  In: 240 [P.O.:240]  Out: -    /83   Pulse 78   Temp 97.8  F (36.6  C) (Axillary)   Resp 20   Ht 1.854 m (6' 1\")   Wt 128.1 kg (282 lb 6.6 oz)   SpO2 98%   BMI 37.26 kg/m       GENERAL APPEARANCE: alert and no distress, moderate confusion  EYES: No scleral icterus  NECK:  No JVD  Pulmonary: lungs clear to auscultation with equal breath sounds bilaterally, no clubbing or cyanosis  CV: Regular rhythm, normal rate, no rub   - Edema none  GI: soft, nontender, normal bowel sounds  MS: no evidence of inflammation in joints, no muscle tenderness  : No Oliveira  SKIN: no rash, warm, dry  NEURO: Delirious but somewhat redirectable.      Labs:   All labs reviewed by me  Electrolytes/Renal - Recent Labs   Lab Test 12/15/22  1642 12/14/22  0422 12/13/22  0502 12/12/22  0955   NA  --  132* 137 135*   POTASSIUM  --  3.8 3.6 4.1   CHLORIDE  --  94* 98 95*   CO2  --  23 25 24   BUN  --  33.2* 21.0* 32.8*   CR  --  5.64* 4.41* 6.29*   GLC 99 80 96 87   MAC  --  8.7 9.0 9.1   MAG  --  1.7 1.7 1.9   PHOS  --  3.8 2.6 3.7       CBC -   Recent Labs   Lab Test 12/16/22  0541 12/15/22  0527 12/14/22  0422   WBC 6.9 7.1 6.4   HGB 9.8* 9.6* 8.9*    278 269       LFTs -   Recent Labs   Lab Test 12/11/22  0528 10/06/22  1215 09/29/22  1345 08/18/22  1435 07/21/22  1252 07/16/22  0557   ALKPHOS 146*  --   --   --  131 134   BILITOTAL 0.4  --   --   --  0.6 0.5   ALT <5*  --   --   --  9 <6   AST 18 31 35   < > 17 13   PROTTOTAL 5.9*  --   --   --  6.7* 6.5*   ALBUMIN 3.1*  --   --   --  3.0* 2.8*    < > = values in this interval not displayed.       Iron Panel -   Recent Labs   Lab Test 12/14/22  0634 07/29/22  0600   IRON 43* 44   IRONSAT 24 20   JACKELINE  --  626*           Current Medications:    sodium chloride 0.9%  250 mL Intravenous Once in dialysis/CRRT     sodium chloride 0.9%  300 mL Hemodialysis Machine Once     atorvastatin  20 mg Oral At Bedtime     Baclofen  10 mg Oral BID     cetirizine  10 mg Oral Daily "     cyanocobalamin  500 mcg Oral Daily     epoetin mar-epbx  8,000 Units Intravenous Once in dialysis/CRRT     finasteride  1.3 mg Oral Daily     fluticasone  1 spray Both Nostrils BID     folic acid  1 mg Oral Daily     gabapentin  300 mg Oral Q8H     heparin ANTICOAGULANT  5,000 Units Subcutaneous Q8H     magnesium plus protein  133 mg Oral Daily     methocarbamol  500 mg Oral 4x Daily     midodrine  10 mg Oral TID w/meals     multivitamin RENAL  1 tablet Oral Daily     - MEDICATION INSTRUCTIONS -   Does not apply Once     pantoprazole  40 mg Oral QAM AC     polyethylene glycol  17 g Oral TID     vitamin B6  100 mg Oral Daily     senna-docusate  1 tablet Oral BID     sertraline  100 mg Oral Daily     sevelamer carbonate  1,600 mg Oral TID w/meals     sodium chloride (PF)  3 mL Intracatheter Q8H     thiamine  100 mg Oral TID     [START ON 12/17/2022] thiamine  100 mg Oral Daily       - MEDICATION INSTRUCTIONS -

## 2022-12-16 NOTE — CONSULTS
[facility]  Consult Note - Hospitalist Service, GOLD TEAM 12  Date of Admission:  12/6/2022  Consult Requested by: Neurosurgery  Reason for Consult: Medical co-management    Assessment & Plan   Shay Jimenez is a 57 year old male admitted on 12/6/2022 with hx of HTN, HLD, afib, ESRD on HD, peripheral neuropathy, GINI, GERD, and depression admitted for redo C5-T6 fusion now POD #8     Cervical Myelopathy  Chronic Osteomyelitis  C5-T6 spinal fusion complicated by hardware displacement  Admitted [facility] 12/6, underwent redo of C5-T6 fusion.   Pt working with PT/OT, sig assistance needs. Appears to be reluctant for TCU. Concern for some intraoperative hemmorhage but intra-operative angio was re-assuring. Subsequent CT of C-spine and head w/o acute ab, good alignment. Hx of osteo w/o recent abx needs  - Cares per neuro surg  - Heavy care needs that seem unlikely to be met at home  - Cont to work with patient, work woth therapy. Perhaps he can get to a status where home with assist will feel reasonable.     Acute hypoxic resp failure  MIld 02 needs, no increased WOB. Was on room air at the time of my exam. On dialysis. Suspect mild hypervolemia. Fi remains hypoxic into tomorrow, will proceed with additional evauation  - Titrate 02 for goal > 90%  - Cont ovenright 02 as this is used at home for GINI given intolreance of CPAP    Acute on chronic pain  Peripheral neuropathy  Improving, decreaing oxy over the last 24 hours  - Would consider increase in acetaminophen dose to 975  - Cont baclofen and gabapentin  - Cont methocarbamol  - Cont bowl regimen    Hypervolemic hyponatremia  He appears asymptomatic Review shows that he has sodium that vascilate upper 120's-low 130's. Nephrology aware, cont on dialysis  - Consider strict intake monitoring to get sense of patient drinking  - Consider diet change to low salt w/fluid restriction of 2000 mls if he is taking in more fluids than  this on a daily basis.     ESRD on dialysis  Nephrology following  Tolerating usual schedule     Alcohol abuse  ICU/hospital Delirium  Hepatomegaly  No stigmata of cirrhosis. Mild hypoalbuminemia with nml bilirubin and AST/ALT. CT from Aug 2022 did show hepatomegaly. I do not see formal ab ultrasound. Last drink well over one week now. No physiologic evidence of alc withdrawal at this time.   - I would favor discontinuing the CIWA at this time, I think that the int low scores over the last few days are not being driven by withdrawal  - Pt is on gabapentin and baclofen. Both of these have been used to help reduce cravings in pt with alcohol abuse.  - as patient improves. We cna consider increasing one of these if patient agreeable  - Naltrexone may not be safe in the setting of ESRD  - Agree with cont of thiamine and folate  - Would recommend formal OT cognitive testing. His delirium is likely multifactorial at this time from chroinc alc use, current meds, acute illness etc.    Mild/Mod pulm HTN  Moderate MR  GINI  Pt with ECHO in earlier 2022 with MR and elevated right sided pressures. No signs of sig right heart dysfunction. Not on CPAP as he has not toleated. Pt w/hypercarbic respiratory failure on VBG this AM, may be contributing to delirium.  - Cont nocturla 02  - Consider ordering CPAP/Bipap while inpatient to reduce degree of hypercarbia and overnight 02 needs.     Chronic atrial fibrillation  On eliquis PTA. Currentlt on hold. This is likely okay. Once bleeding risk is reduced, this can be restarted, no bridging will be needed       The patient's care was discussed with the Patient.    Herman Iqbal MD  [facility]  Securely message with the Vocera Web Console (learn more here)  Text page via [facility] Paging/Directory   Please see signed in provider for up to date coverage information    Hospitalist Service, GOLD TEAM 12    Clinically Significant Risk Factors  "        # Hyponatremia: Lowest Na = 127 mmol/L in last 2 days, will monitor as appropriate      # Hypoalbuminemia: Lowest albumin = 3.1 g/dL at 12/11/2022  5:28 AM, will monitor as appropriate           # Obesity: Estimated body mass index is 37.26 kg/m  as calculated from the following:    Height as of this encounter: 1.854 m (6' 1\").    Weight as of this encounter: 128.1 kg (282 lb 6.6 oz).          ______________________________________________________________________    Chief Complaint   Post-op    History is obtained from the patient and paper chart    History of Present Illness   Shay Jimenez is a 57 year old male who was admitted for redo of a spinal fusion. He has a history of osteomyelitis in this area over the summer. This was treated with abx fllowed by fusion here. He has had several recurent admission of infecitous and other issues since [facility]. Seen evenrually in the fall by orhot where it was decided that he should undergo a revision due to malpositoining. He has some chronic symptoms of cervical myelopathy. When seen today he was alert and interacitve. On dialysis. No complaints of SOB or coughing. No anusea or vomtiing. Excellent diet. Eating lots of food from outside brought in by family. He has been working with PT. Was immediatleyt veyr clear about refusing TCU. He is moderate to heavy assist of 2 with standing. He does liver with some family memebres    He does report daily alc use. Not interested in treatment specifically. He reports 3-4 double drinks per day. Did have some delirium and possible alc withdrawal several days ago. Has continued on the CIWA protocol with scores raning 4-8 over the last 48 hours with one dose of ativan.     Review of Systems   The 10 point Review of Systems is negative other than noted in the HPI or here.     Past Medical History    I have reviewed this patient's medical history and updated it with pertinent information if needed.   Past Medical History: "   Diagnosis Date     Chronic kidney disease      Neuropathy        Past Surgical History   I have reviewed this patient's surgical history and updated it with pertinent information if needed.  Past Surgical History:   Procedure Laterality Date     OPTICAL TRACKING SYSTEM FUSION POSTERIOR SPINE THORACIC THREE+ LEVELS N/A 6/27/2022    Procedure: Cervical 6 to Thoracic 6 Fusion and Thoracic 2-3 Decompression;  Surgeon: Fritz Boles MD;  Location:  OR     OPTICAL TRACKING SYSTEM FUSION POSTERIOR SPINE THORACIC THREE+ LEVELS N/A 12/6/2022    Procedure: O-Arm/Stealth Assisted Revision of Cervical 5 to Thoracic 6 Fusion;  Surgeon: Fritz Boles MD;  Location:  OR       Social History   I have reviewed this patient's social history and updated it with pertinent information if needed.  Social History     Tobacco Use     Smoking status: Never     Smokeless tobacco: Never   Substance Use Topics     Alcohol use: Not Currently     Drug use: Never       Family History     No significant family history    Medications   Medications Prior to Admission   Medication Sig Dispense Refill Last Dose     acetaminophen (TYLENOL) 325 MG tablet Take 1-2 tablets (325-650 mg) by mouth every 4 hours as needed for mild pain or fever   12/6/2022 at 0500     ALPRAZolam (XANAX) 1 MG tablet Take 0.5 mg by mouth 2 times daily as needed for anxiety   12/5/2022 at 2000     ammonium lactate (AMLACTIN) 12 % external cream Apply topically daily Apply to bilateral lower legs   Past Month     atorvastatin (LIPITOR) 20 MG tablet Take 20 mg by mouth At Bedtime   12/5/2022 at 2000     calcium carbonate (TUMS) 500 MG chewable tablet Take 2 tablets (1,000 mg) by mouth 3 times daily as needed for heartburn   Past Month     cetirizine (ZYRTEC) 10 MG tablet Take 1 tablet (10 mg) by mouth daily   12/5/2022 at 0800     cyanocobalamin (VITAMIN B-12) 500 MCG tablet Take 500 mcg by mouth daily   12/5/2022 at 0800     cyclobenzaprine (FLEXERIL) 10 MG tablet     Past Week     diclofenac (VOLTAREN) 1 % topical gel Apply topically 4 times daily   12/5/2022 at 2000     finasteride (PROSCAR) 5 MG tablet Take 1.25 mg by mouth daily Takes 1/4 of 5 mg daily   12/5/2022 at 0800     fluticasone (FLONASE) 50 MCG/ACT nasal spray Spray 1 spray into both nostrils 2 times daily   12/5/2022 at 2000     gabapentin (NEURONTIN) 300 MG capsule Take 300 mg by mouth 3 times daily   12/6/2022 at 0500     hydrOXYzine (ATARAX) 25 MG tablet Take 1 tablet (25 mg) by mouth 2 times daily as needed for other or itching (pain as adjuant therapy) 60 tablet 0 12/6/2022 at 0500     midodrine (PROAMATINE) 10 MG tablet Take 1 tablet (10 mg) by mouth 3 times daily (with meals)   12/5/2022 at 0800     multivitamin RENAL (RENAVITE RX/NEPHROVITE) 1 MG tablet Take 1 tablet by mouth daily   12/5/2022     omeprazole (PRILOSEC) 20 MG DR capsule Take 20 mg by mouth daily   12/5/2022 at 0800     polyethylene glycol (MIRALAX) 17 GM/Dose powder Take 17 g by mouth daily 510 g 0 Past Month     potassium chloride ER (KLOR-CON M) 20 MEQ CR tablet Take 20 mEq by mouth   12/5/2022 at 0800     sertraline (ZOLOFT) 100 MG tablet Take 100 mg by mouth daily   12/5/2022 at 0800     sevelamer carbonate (RENVELA) 800 MG tablet Take 2 tablets (1,600 mg) by mouth 3 times daily (with meals)   12/5/2022 at 1700     Hive guard unlimited Vitamins Products (MAGNESIUM PLUS PROTEIN) 133 MG tablet Take 1 tablet by mouth   12/5/2022     vitamin B6 (PYRIDOXINE) 100 MG tablet Take 100 mg by mouth daily   12/5/2022 at 0800     baclofen (LIORESAL) 10 MG tablet Take 1 tablet (10 mg) by mouth 2 times daily 60 tablet 1      cefuroxime (CEFTIN) 250 MG tablet 500 mg daily        ELIQUIS ANTICOAGULANT 2.5 MG tablet TAKE 1 TABLET (2.5 MG) BY MOUTH TWO TIMES A DAY. INDICATIONS: PREVENT STROKE IN AFIB        naloxone (NARCAN) 4 MG/0.1ML nasal spray Spray 1 spray (4 mg) into one nostril alternating nostrils once as needed for opioid reversal every 2-3 minutes until  assistance arrives 0.2 mL 0        Allergies   Allergies   Allergen Reactions     Amlodipine      Lisinopril        Physical Exam   Vital Signs: Temp: 98  F (36.7  C) Temp src: Axillary BP: 112/64 Pulse: 75   Resp: 18 SpO2: 90 % O2 Device: Oxymask Oxygen Delivery: 3 LPM  Weight: 282 lbs 6.55 oz    Constitutional: awake, alert, cooperative, no apparent distress, and appears stated age and facemask on chin, no apparent distresss  Eyes: pupils equal, round and reactive to light, extra ocular muscles intact, sclera clear, conjunctiva normal  ENT: normocepalic, without obvious abnormality  Respiratory: No increased work of breathing, good air exchange, clear to auscultation bilaterally, no crackles or wheezing  Cardiovascular: Heart tones distant but, normal S1 and S2, no murmur noted and pulses 2 plus all extermities bilaterally  GI: No scars, normal bowel sounds, soft, non-distended, non-tender, no masses palpated, no hepatosplenomegally and protuberant.  Skin: Le edema with chronic venostatis and some cobblestonging of the sking and no bruising or bleeding  Musculoskeletal: there is no redness, warmth, or swelling of the joints  Neurologic: Awake, alert, oriented to name, place and time.  Cranial nerves II-XII are grossly intact.  Motor is 5 out of 5 bilaterally.  Cerebellar finger to nose, heel to shin intact.  Sensory is intact.  Babinski down going, Romberg negative, and gait is normal.  Mental Status Exam:  Level of Alertness:   awake  Orientation:   person, place  Attention/Concentration:  abnormal - Pt was a bit tengential in thinking patterns  .    Data   Results for orders placed or performed during the hospital encounter of 12/06/22 (from the past 24 hour(s))   Glucose by meter   Result Value Ref Range    GLUCOSE BY METER POCT 99 70 - 99 mg/dL   CT Head w/o Contrast    Narrative    CT HEAD W/O CONTRAST 12/16/2022 4:59 AM    History: change  in mental status     Comparison: CT head 12/8/2022,  6/26/2022    Technique: Using multidetector thin collimation helical acquisition  technique, axial, coronal and sagittal CT images from the skull base  to the vertex were obtained without intravenous contrast.   (topogram) image(s) also obtained and reviewed.    Findings: There is no intracranial hemorrhage, mass effect, or midline  shift. Gray/white matter differentiation in both cerebral hemispheres  is preserved. Ventricles are proportionate to the cerebral sulci. The  basal cisterns are clear.    The bony calvaria and the bones of the skull base are normal. The  visualized portions of the paranasal sinuses and mastoid air cells are  clear.      Impression    Impression:  No acute intracranial pathology.     I have personally reviewed the examination and initial interpretation  and I agree with the findings.    WILLIAM HERNADEZ MD         SYSTEM ID:  [id number]   Erythrocyte sedimentation rate auto   Result Value Ref Range    Erythrocyte Sedimentation Rate 45 (H) 0 - 20 mm/hr   CRP inflammation   Result Value Ref Range    CRP Inflammation 21.50 (H) <5.00 mg/L   CBC with platelets   Result Value Ref Range    WBC Count 6.9 4.0 - 11.0 10e3/uL    RBC Count 3.17 (L) 4.40 - 5.90 10e6/uL    Hemoglobin 9.8 (L) 13.3 - 17.7 g/dL    Hematocrit 31.8 (L) 40.0 - 53.0 %     78 - 100 fL    MCH 30.9 26.5 - 33.0 pg    MCHC 30.8 (L) 31.5 - 36.5 g/dL    RDW 16.0 (H) 10.0 - 15.0 %    Platelet Count 268 150 - 450 10e3/uL   Basic metabolic panel   Result Value Ref Range    Sodium 127 (L) 136 - 145 mmol/L    Potassium 4.8 3.4 - 5.3 mmol/L    Chloride 89 (L) 98 - 107 mmol/L    Carbon Dioxide (CO2) 25 22 - 29 mmol/L    Anion Gap 13 7 - 15 mmol/L    Urea Nitrogen 33.5 (H) 6.0 - 20.0 mg/dL    Creatinine 5.47 (H) 0.67 - 1.17 mg/dL    Calcium 8.5 (L) 8.6 - 10.0 mg/dL    Glucose 80 70 - 99 mg/dL    GFR Estimate 11 (L) >60 mL/min/1.73m2   Blood gas venous   Result Value Ref Range    pH Venous 7.26 (L) 7.32 - 7.43    pCO2 Venous 66 (H)  40 - 50 mm Hg    pO2 Venous 34 25 - 47 mm Hg    Bicarbonate Venous 29 (H) 21 - 28 mmol/L    Base Excess/Deficit (+/-) 0.9 -7.7 - 1.9 mmol/L    FIO2 3

## 2022-12-16 NOTE — PROGRESS NOTES
HEMODIALYSIS TREATMENT NOTE    Date: 12/16/2022  Time: 2:36 PM    Data:  Pre Wt: 128.1kg    Desired Wt: 126.1  kg   Post Wt:  126.1 kg  Weight change:  2 kg  Ultrafiltration - Post Run Net Total Removed (mL): 2000 mL  Vascular Access Status: Fistula  patent  Dialyzer Rinse: Clear  Total Blood Volume Processed: 92.7 L   Total Dialysis (Treatment) Time: 4   Dialysate Bath: K 2, Ca 2.5  Heparin: None    Lab:   No    Interventions:    ESRD pt. Cannulated with 15 g needle X 2 @  without lido Inj; subsequently dropped BFR to 400 d/t frequent Blood flow pump alarm.. Epogen administered as ordered. 2L of fluid net was pulled per order.  Ending BP was 110/73. Pt rinsed back post tx, needles were pulled, new dressings applied, and sites were held for about 10 mins to help achieve hemostasis. Hand off report was given to YUDI Newman.     Assessment:  -Calm & Cooperative  -VSS  -A & O X 4                Plan:    Per renal

## 2022-12-16 NOTE — PLAN OF CARE
Major Shift Events:      Was more lethargic at the beginning of shift; now Aox4. Dialysis today and 2L removed. Therapy with patient to edge of bed twice, but remains weak. Brace needed when OOB. No other acute changes today.    For vital signs and complete assessments, please see documentation flowsheets.     Problem: Hemodialysis  Goal: Safe, Effective Therapy Delivery  Intervention: Optimize Device Care and Function  Recent Flowsheet Documentation  Taken 12/16/2022 1600 by Trent Whittaker, RN  Medication Review/Management:   medications reviewed   high-risk medications identified

## 2022-12-16 NOTE — PLAN OF CARE
Major Shift Events  Overnight pt in worsening condition with changes in mental status. At the beginning of the shift pt appeared more irritable/lethargic than night prior, initial predisposition r/t limited sleep night prior (4 hrs). However @ midnight, pt had worsened requiring significantly more effort to arouse patient and to examine patient. At this time pt was positive on CIWA scale (8) requiring 2 mg ativan for withdrawal. @ 0400 pt deterioration continued, pt now appearing obtunded not responding to vigorous stimulation. KAREEM MANLEY immediatly informed and assessed Pt. Plan was formed to obtain stat head CT, which resulted w/o acute pathology. Awaiting laboratory results for electrolyte analysis at this time.    Continue to monitor closely for acute changes    Neuro: Obtunded, A&Ox1-3, PERRLA 4 mm, sensation consistent (BLL peripheral neuropathy)  Cv: HR (60-80), BP WDL, pulses +1, afebrile  Resp: 3 L oxiplus, SpO2 > 92%, LS: clear/dim  GI/: Regular diet (NPO rosa d/t mentation status), BM x1, void x1, BS +  Skin: L-knee scab, R-heel PI, generalized ecchymosis, heather BLE     Drips: None  Sedation: None     Plan: Follow POC. Monitor closely, notify MD of acute changes.  For vital signs and complete assessments, please see documentation flowsheets.

## 2022-12-16 NOTE — PROGRESS NOTES
Neurocritical Care Multi-Disciplinary Note    Reason for critical care admission: Post op-C5-T6 fusion  Admitting Team: GABINO  Date of Service:  12/16/2022  Date of Admission:  12/6/2022  Hospital Day: 11    Patient condition reviewed and discussed while on multidisciplinary rounds today.   Please note these minor interventions that were initiated:  1. None    The Neurocritical Care service will continue to follow peripherally while the patient is within the ICU. We are readily available should issues arise. Please feel free to contact us for critical care issues with which we may be of assistance. For all other concerns, please contact primary service first.     Please contact NCC team phone at *[phone].     ENID Savage, CNP  Neurocritical Care *[phone]

## 2022-12-16 NOTE — PLAN OF CARE
Goal Outcome Evaluation:      Plan of Care Reviewed With: patient     Overall Patient Progress: improving    Outcome Evaluation: 2 LPM placed on PT. Mpap given once.   Plan: Tx to 6A once bed available. HD run in AM?  For vital signs and complete assessments, please see documentation flowsheets.

## 2022-12-16 NOTE — PROGRESS NOTES
Care Management Follow Up    Length of Stay (days): 10    Expected Discharge Date:       Concerns to be Addressed: all concerns addressed in this encounter, care coordination/care conferences, discharge planning     Patient plan of care discussed at interdisciplinary rounds: Yes    Anticipated Discharge Disposition: Skilled Nursing Facility, Transitional Care     Anticipated Discharge Services: None  Anticipated Discharge DME: None    Patient/family educated on Medicare website which has current facility and service quality ratings: yes  Education Provided on the Discharge Plan:    Patient/Family in Agreement with the Plan: yes    Referrals Placed by CM/SW: Post Acute Facilities  Private pay costs discussed: Not applicable    Additional Information:  Per conversation with primary team, pt is not decisional and thus decisions would fall on pt's sons as NOK. Pt has TCU recommendations per therapies but has been adamantly declining. Pt does not seem to have a good understanding of deficits or needs. Team shared that they are hopeful pt will be medically ready to discharge to TCU by next week. SHAKIRA called pt's son Cory to discuss TCU and explain referral and discharge process. Cory reported that he was currently driving and asked SW to call his brother, Chinedu. SHAKIRA called Chinedu who was available to talk. Chinedu expressed understanding of TCU recommendation and agreed it is the most appropriate disposition for pt at this time. Chinedu confirmed that pt lives with him and Cory but they are unable to provide 24/7 care or assistance. Chinedu was receptive to reviewing TCU options and asked for list to be e-mailed to him. SHAKIRA requested that Cory and Chinedu choose at least 10 preferences. Chinedu agreed and will be visiting pt this week. SHAKIRA will send request for SHAKIRA to check in w/ Chinedu and/or Cory regarding preferences.     SHAKIRA/RNCC will continue to follow for support and discharge planning    TABITHA Aguero, ABELINO  4A/4C   Ph:  [phone]  Pager: [phone]

## 2022-12-17 ENCOUNTER — APPOINTMENT (OUTPATIENT)
Dept: PHYSICAL THERAPY | Facility: CLINIC | Age: 57
DRG: 459 | End: 2022-12-17
Attending: STUDENT IN AN ORGANIZED HEALTH CARE EDUCATION/TRAINING PROGRAM
Payer: MEDICARE

## 2022-12-17 ENCOUNTER — APPOINTMENT (OUTPATIENT)
Dept: OCCUPATIONAL THERAPY | Facility: CLINIC | Age: 57
DRG: 459 | End: 2022-12-17
Attending: ORTHOPAEDIC SURGERY
Payer: MEDICARE

## 2022-12-17 LAB
ERYTHROCYTE [DISTWIDTH] IN BLOOD BY AUTOMATED COUNT: 15.9 % (ref 10–15)
HCT VFR BLD AUTO: 31.2 % (ref 40–53)
HGB BLD-MCNC: 9.7 G/DL (ref 13.3–17.7)
MCH RBC QN AUTO: 31.1 PG (ref 26.5–33)
MCHC RBC AUTO-ENTMCNC: 31.1 G/DL (ref 31.5–36.5)
MCV RBC AUTO: 100 FL (ref 78–100)
PLATELET # BLD AUTO: 279 10E3/UL (ref 150–450)
RBC # BLD AUTO: 3.12 10E6/UL (ref 4.4–5.9)
WBC # BLD AUTO: 7.6 10E3/UL (ref 4–11)

## 2022-12-17 PROCEDURE — 250N000013 HC RX MED GY IP 250 OP 250 PS 637: Performed by: NURSE PRACTITIONER

## 2022-12-17 PROCEDURE — 200N000002 HC R&B ICU UMMC

## 2022-12-17 PROCEDURE — 250N000013 HC RX MED GY IP 250 OP 250 PS 637: Performed by: ORTHOPAEDIC SURGERY

## 2022-12-17 PROCEDURE — 250N000013 HC RX MED GY IP 250 OP 250 PS 637: Performed by: STUDENT IN AN ORGANIZED HEALTH CARE EDUCATION/TRAINING PROGRAM

## 2022-12-17 PROCEDURE — 99232 SBSQ HOSP IP/OBS MODERATE 35: CPT | Performed by: ORTHOPAEDIC SURGERY

## 2022-12-17 PROCEDURE — 85027 COMPLETE CBC AUTOMATED: CPT

## 2022-12-17 PROCEDURE — 36415 COLL VENOUS BLD VENIPUNCTURE: CPT

## 2022-12-17 PROCEDURE — 97530 THERAPEUTIC ACTIVITIES: CPT | Mod: GO | Performed by: OCCUPATIONAL THERAPIST

## 2022-12-17 PROCEDURE — 93010 ELECTROCARDIOGRAM REPORT: CPT | Performed by: INTERNAL MEDICINE

## 2022-12-17 PROCEDURE — 250N000013 HC RX MED GY IP 250 OP 250 PS 637

## 2022-12-17 PROCEDURE — 99232 SBSQ HOSP IP/OBS MODERATE 35: CPT | Performed by: INTERNAL MEDICINE

## 2022-12-17 PROCEDURE — 250N000011 HC RX IP 250 OP 636: Performed by: STUDENT IN AN ORGANIZED HEALTH CARE EDUCATION/TRAINING PROGRAM

## 2022-12-17 PROCEDURE — 93005 ELECTROCARDIOGRAM TRACING: CPT

## 2022-12-17 PROCEDURE — 97530 THERAPEUTIC ACTIVITIES: CPT | Mod: GP

## 2022-12-17 PROCEDURE — 97129 THER IVNTJ 1ST 15 MIN: CPT | Mod: GO | Performed by: OCCUPATIONAL THERAPIST

## 2022-12-17 RX ORDER — QUETIAPINE FUMARATE 50 MG/1
50 TABLET, FILM COATED ORAL AT BEDTIME
Status: DISCONTINUED | OUTPATIENT
Start: 2022-12-17 | End: 2022-12-18

## 2022-12-17 RX ORDER — RAMELTEON 8 MG/1
8 TABLET ORAL AT BEDTIME
Status: DISCONTINUED | OUTPATIENT
Start: 2022-12-17 | End: 2023-01-07 | Stop reason: HOSPADM

## 2022-12-17 RX ORDER — ACETAMINOPHEN 325 MG/1
975 TABLET ORAL 3 TIMES DAILY
Status: DISCONTINUED | OUTPATIENT
Start: 2022-12-17 | End: 2023-01-07 | Stop reason: HOSPADM

## 2022-12-17 RX ADMIN — OXYCODONE HYDROCHLORIDE 5 MG: 5 TABLET ORAL at 13:14

## 2022-12-17 RX ADMIN — MIDODRINE HYDROCHLORIDE 10 MG: 5 TABLET ORAL at 12:10

## 2022-12-17 RX ADMIN — Medication 100 MG: at 07:50

## 2022-12-17 RX ADMIN — Medication 100 MG: at 19:48

## 2022-12-17 RX ADMIN — BACLOFEN 10 MG: 10 TABLET ORAL at 19:48

## 2022-12-17 RX ADMIN — SERTRALINE HYDROCHLORIDE 100 MG: 100 TABLET ORAL at 07:45

## 2022-12-17 RX ADMIN — RAMELTEON 8 MG: 8 TABLET, FILM COATED ORAL at 21:04

## 2022-12-17 RX ADMIN — GABAPENTIN 300 MG: 300 CAPSULE ORAL at 21:05

## 2022-12-17 RX ADMIN — CETIRIZINE HYDROCHLORIDE 10 MG: 10 TABLET, FILM COATED ORAL at 07:46

## 2022-12-17 RX ADMIN — CYANOCOBALAMIN TAB 500 MCG 500 MCG: 500 TAB at 07:47

## 2022-12-17 RX ADMIN — METHOCARBAMOL 500 MG: 500 TABLET ORAL at 12:10

## 2022-12-17 RX ADMIN — B-COMPLEX W/ C & FOLIC ACID TAB 1 MG 1 TABLET: 1 TAB at 07:46

## 2022-12-17 RX ADMIN — GABAPENTIN 300 MG: 300 CAPSULE ORAL at 14:12

## 2022-12-17 RX ADMIN — SEVELAMER CARBONATE 1600 MG: 800 TABLET, FILM COATED ORAL at 07:46

## 2022-12-17 RX ADMIN — ACETAMINOPHEN 975 MG: 325 TABLET, FILM COATED ORAL at 19:48

## 2022-12-17 RX ADMIN — SENNOSIDES AND DOCUSATE SODIUM 1 TABLET: 8.6; 5 TABLET ORAL at 19:48

## 2022-12-17 RX ADMIN — FLUTICASONE PROPIONATE 1 SPRAY: 50 SPRAY, METERED NASAL at 07:47

## 2022-12-17 RX ADMIN — PANTOPRAZOLE SODIUM 40 MG: 40 TABLET, DELAYED RELEASE ORAL at 06:32

## 2022-12-17 RX ADMIN — MIDODRINE HYDROCHLORIDE 10 MG: 5 TABLET ORAL at 07:45

## 2022-12-17 RX ADMIN — METHOCARBAMOL 500 MG: 500 TABLET ORAL at 15:39

## 2022-12-17 RX ADMIN — ATORVASTATIN CALCIUM 20 MG: 20 TABLET, FILM COATED ORAL at 21:05

## 2022-12-17 RX ADMIN — POLYETHYLENE GLYCOL 3350 17 G: 17 POWDER, FOR SOLUTION ORAL at 07:47

## 2022-12-17 RX ADMIN — GABAPENTIN 300 MG: 300 CAPSULE ORAL at 05:42

## 2022-12-17 RX ADMIN — HEPARIN SODIUM 5000 UNITS: 5000 INJECTION, SOLUTION INTRAVENOUS; SUBCUTANEOUS at 12:10

## 2022-12-17 RX ADMIN — FINASTERIDE 1.3 MG: 5 TABLET, FILM COATED ORAL at 11:04

## 2022-12-17 RX ADMIN — OXYCODONE HYDROCHLORIDE 5 MG: 5 TABLET ORAL at 17:37

## 2022-12-17 RX ADMIN — SEVELAMER CARBONATE 1600 MG: 800 TABLET, FILM COATED ORAL at 12:10

## 2022-12-17 RX ADMIN — ACETAMINOPHEN 650 MG: 325 TABLET, FILM COATED ORAL at 05:42

## 2022-12-17 RX ADMIN — Medication 133 MG: at 07:46

## 2022-12-17 RX ADMIN — ACETAMINOPHEN 975 MG: 325 TABLET, FILM COATED ORAL at 14:12

## 2022-12-17 RX ADMIN — FOLIC ACID 1 MG: 1 TABLET ORAL at 07:46

## 2022-12-17 RX ADMIN — METHOCARBAMOL 500 MG: 500 TABLET ORAL at 19:48

## 2022-12-17 RX ADMIN — SEVELAMER CARBONATE 1600 MG: 800 TABLET, FILM COATED ORAL at 17:32

## 2022-12-17 RX ADMIN — HEPARIN SODIUM 5000 UNITS: 5000 INJECTION, SOLUTION INTRAVENOUS; SUBCUTANEOUS at 03:54

## 2022-12-17 RX ADMIN — ACETAMINOPHEN 650 MG: 325 TABLET, FILM COATED ORAL at 10:55

## 2022-12-17 RX ADMIN — Medication 100 MG: at 07:46

## 2022-12-17 RX ADMIN — HEPARIN SODIUM 5000 UNITS: 5000 INJECTION, SOLUTION INTRAVENOUS; SUBCUTANEOUS at 19:48

## 2022-12-17 RX ADMIN — METHOCARBAMOL 500 MG: 500 TABLET ORAL at 07:46

## 2022-12-17 RX ADMIN — FLUTICASONE PROPIONATE 1 SPRAY: 50 SPRAY, METERED NASAL at 19:49

## 2022-12-17 RX ADMIN — Medication 100 MG: at 14:12

## 2022-12-17 RX ADMIN — BACLOFEN 10 MG: 10 TABLET ORAL at 07:46

## 2022-12-17 RX ADMIN — QUETIAPINE FUMARATE 50 MG: 50 TABLET ORAL at 21:05

## 2022-12-17 ASSESSMENT — ACTIVITIES OF DAILY LIVING (ADL)
ADLS_ACUITY_SCORE: 45
ADLS_ACUITY_SCORE: 47
ADLS_ACUITY_SCORE: 45

## 2022-12-17 NOTE — PLAN OF CARE
Major Shift Events: A&Ox4, occasionally forgetful, verbally/sexually inappropriate- poor boundaries, neuropathy in upper & lower extremities @ baseline, PRN Tylenol given x2 & oxy x1 for pain. NSR, afebrile, MAP > 65. Oxymask on @ 3L, LS clear/dim, productive cough. Oliguric, reg diet, 2 soft stools.  brace when OOB.   Plan: Floor orders.    For vital signs and complete assessments, please see documentation flowsheets.    Susy Perdomo RN

## 2022-12-17 NOTE — PROGRESS NOTES
[facility], [place]   Neurosurgery Progress Note:    Assessment: Shay Jimenez is a 57 year old male with with PMH ESRD on dialysis and osteomyelitis s/p C5-T6 fusion who is now POD#9 s/p redo C5-T6 fusion with concern for hemorrhage; no vertebral artery dissection or extravasation was noted on intraoperative angiogram.  He was admitted to the Neuro ICU postoperatively for MAP goals and frequent neuro checks, remaining stable on pressors.  Neuro exam is unchanged from patient's baseline.    Plan:  - Serial neuro exams  - Pain control  - Hb goal > 7  - Normonatremia  - Goal normotension  - Appreciate Nephrology recommendations re: dialysis (baseline M/W/F)  - Patient is now on CIWA protocol with Haldol and Ativan as needed and thiamine and clonidine scheduled-we will monitor for the amount and frequency of Haldol and Ativan received  - Daily dressings per WO team  - Cervical collar at all times  - Advance diet as tolerated  - Bowel regimen  - PRN antiemetics  - IVF until taking adequate PO  - PT/OT  - SCDs for DVT proph  - Okay to transfer to the floor when bed available- orders are in  -----------------------------------  Jonny Stephenson  Neurosurgery Resident, PGY-2    Please contact neurosurgery resident on call with questions.    Dial * * [phone], enter [phone] when prompted.      Interval History/Subjective: This AM was very sleepy/lethargic. Not following commands. Obtained stat head CT- no acute intracranial pathology.     Objective:   Temp:  [97.7  F (36.5  C)-98.2  F (36.8  C)] 98  F (36.7  C)  Pulse:  [] 78  Resp:  [15-22] 15  BP: ()/(51-87) 133/87  SpO2:  [90 %-98 %] 93 %  I/O last 3 completed shifts:  In: 500 [P.O.:500]  Out: 2000 [Other:2000]    Gen: Appears comfortable, NAD  Wound: Incision, clean, dry, intact without strikethrough  Neurologic:  - Alert & Oriented to person, place, time, and situation  - Follows commands briskly  - Speech fluent, spontaneous. No  aphasia or dysarthria.  - No gaze preference. No apparent hemineglect.  - PERRL, EOMI  - Strong brow raise, eye closure, and smile  - Face symmetric with sensation intact to light touch  - Trapezii and sternocleidomastoid muscles 5/5 bilaterally  - No pronator drift     Del Tr Bi WE WF Gr   R 4* 5 5 5 5 5   L 4* 5 5 5 5 5    HF KE KF DF PF EHL   R 4+ 5 5 5 5 5   L 4+ 5 5 5 5 5   *pain-limited    Norman's and clonus negative.    Sensation intact and symmetric to light touch throughout    LABS  Recent Labs   Lab Test 12/17/22  0551 12/16/22  0541 12/15/22  0527   WBC 7.6 6.9 7.1   HGB 9.7* 9.8* 9.6*    100 99    268 278       Recent Labs   Lab Test 12/16/22 2027 12/16/22  0632 12/15/22  1642 12/14/22  0422   * 127*  --  132*   POTASSIUM 4.1 4.8  --  3.8   CHLORIDE 91* 89*  --  94*   CO2 25 25  --  23   BUN 19.5 33.5*  --  33.2*   CR 3.83* 5.47*  --  5.64*   ANIONGAP 14 13  --  15   MAC 8.8 8.5*  --  8.7   GLC 79 80 99 80       IMAGING  No results found for this or any previous visit (from the past 24 hour(s)).

## 2022-12-17 NOTE — PROGRESS NOTES
Neurocritical Care Multi-Disciplinary Note    Reason for critical care admission: Post op-C5-T6 fusion  Admitting Team: GABINO  Date of Service:  12/17/2022  Date of Admission:  12/6/2022  Hospital Day: 12    Patient condition reviewed and discussed while on multidisciplinary rounds today.   Please note these minor interventions that were initiated:  1. None    The Neurocritical Care service will continue to follow peripherally while the patient is within the ICU. We are readily available should issues arise. Please feel free to contact us for critical care issues with which we may be of assistance. For all other concerns, please contact primary service first.     Please contact NCC team phone at *[phone].     ENID Savage, CNP  Neurocritical Care *[phone]

## 2022-12-17 NOTE — PROGRESS NOTES
[facility]    Medicine Progress Note - Hospitalist Service, GOLD TEAM 12    Date of Admission:  12/6/2022    Assessment & Plan   Shay Jimenez is a 57 year old male admitted on 12/6/2022 with hx of HTN, HLD, afib, ESRD on HD, peripheral neuropathy, GINI, GERD, and depression admitted for redo C5-T6 fusion now POD #9    Recommendations:  - Stop CIWA (done)  - Start PM seroquel for sleep and delirium (ordered)  - Trial remelteon for sleep (prev did not tolerate melatonin)  - EKG for qtc monitoring (ordered)  - Cont reg diet (seems very resistant to changing)  - Add 2000 ml daily fluid restriction (quite liberal, will see if tolerates)  - schedule TID acetaminophen (ordered)  - Consider restart apixiban for afib when appropriate from neurosurgical perspective    Cervical Myelopathy  Chronic Osteomyelitis  C5-T6 spinal fusion complicated by hardware displacement  Admitted [facility] 12/6, underwent redo of C5-T6 fusion.   Pt working with PT/OT, sig assistance needs. Appears to be reluctant for TCU. Concern for some intraoperative hemmorhage but intra-operative angio was re-assuring. Subsequent CT of C-spine and head w/o acute ab, good alignment. Hx of osteo w/o recent abx needs  - Cares per neuro surg  - Heavy care needs that seem unlikely to be met at home  - Cont to work with patient, work with therapy. Perhaps he can get to a status where home with assist will feel reasonable.     Acute on chroninc hypoxic-hypercarbic  resp failure, stable  On room air this AM. On dialysis. For volume management. Suspect mild hypervolemia (probbaly chronically so).Does not use CPAP at home (though he should). VBG on 12/16 demonstrated slightly low pH and pc02 of 66.   - Titrate 02 for goal > 90%  - Cont ovenright 02 as this is used at home for GINI given intolreance of CPAP. If concerns for hypercarbia as cause for delirium/encehalopathy, would favor ordering and seeing if RT can assist with  mask that he may tolerate    Acute on chronic pain  Peripheral neuropathy  Improving, decreaing oxy over the last 24 hours  - Would consider increase in acetaminophen dose to 975 (ordered)  - Cont baclofen and gabapentin  - Cont methocarbamol  - Cont bowl regimen    Hypervolemic hyponatremia  He appears asymptomatic Review shows that he has sodium that vascilate upper 120's-low 130's. Nephrology aware, cont on dialysis  - Consider strict intake monitoring to get sense of patient drinking  - Would see if he can toleated a 2000 mls fluid restriction    ESRD on dialysis  Nephrology following  Tolerating usual schedule     Alcohol abuse  ICU/hospital Delirium  Hepatomegaly  No stigmata of cirrhosis. Mild hypoalbuminemia with nml bilirubin and AST/ALT. CT from Aug 2022 did show hepatomegaly. I do not see formal ab ultrasound. Last drink well over one week now. No physiologic evidence of alc withdrawal at this time.   - Discontinue  CIWA  - Pt is on gabapentin and baclofen. Both of these have been used to help reduce cravings in pt with alcohol abuse.  - as patient improves. We cna consider increasing one of these if patient agreeable  - Naltrexone may not be safe in the setting of ESRD  - Agree with cont of thiamine and folate  - Would recommend formal OT cognitive testing, SLUMS. May help with determining dispo needs and help with family decision maker. I agree wit h neurosurg that he is not likely decisional for complex issues   - Improve sleep, start PM seroquel and trial adjunctive remalteon    Mild/Mod pulm HTN  Moderate MR  GINI  Pt with ECHO in earlier 2022 with MR and elevated right sided pressures. No signs of sig right heart dysfunction. Not on CPAP as he has not toleated.  - Cont nocturla 02    Chronic atrial fibrillation  On eliquis PTA. Currentlt on hold. This is likely okay. Once bleeding risk is reduced, this can be restarted, no bridging will be needed         Diet: Advance Diet as Tolerated: Regular Diet  "Adult    DVT Prophylaxis: Heparin SQ  Oliveira Catheter: Not present  Central Lines: None  Cardiac Monitoring: None  Code Status: Full Code      Disposition Plan      Expected Discharge Date: 12/19/2022      Destination: nursing home  Discharge Comments: TCU recommended but patient refusing. Heavy PT needs, decisional capacity is in question, need to involve family for big decisions      The patient's care was discussed with the Bedside Nurse, Patient and Primary team.    Herman Iqbal MD  Hospitalist Service, GOLD TEAM 12  [facility]  Securely message with the Vocera Web Console (learn more here)  Text page via [facility] Paging/Directory   Please see signed in provider for up to date coverage information      Clinically Significant Risk Factors         # Hyponatremia: Lowest Na = 127 mmol/L in last 2 days, will monitor as appropriate      # Hypoalbuminemia: Lowest albumin = 3.1 g/dL at 12/11/2022  5:28 AM, will monitor as appropriate           # Obesity: Estimated body mass index is 37.26 kg/m  as calculated from the following:    Height as of this encounter: 1.854 m (6' 1\").    Weight as of this encounter: 128.1 kg (282 lb 6.6 oz).          ______________________________________________________________________    Interval History   Pt had a veyr sleepless night. Alert and interactive today. No complaints of pain. No  SOB or coughing. No nausea or vomiting. Pt is interested in something to help with sleep. Cont to be focused on discharging home. 4-point ROS otherwise negative.     Data reviewed today: I reviewed all medications, new labs and imaging results over the last 24 hours.   Physical Exam   Vital Signs: Temp: 98  F (36.7  C) Temp src: Oral BP: 133/87 Pulse: 78   Resp: 15 SpO2: 98 % O2 Device: Oxymask Oxygen Delivery: 1 LPM  Weight: 282 lbs 6.55 oz  Physical Exam  Constitutional:       General: He is not in acute distress.     Appearance: He is not " toxic-appearing.      Comments: Sitting in bedside chair, C-collar in place   HENT:      Head: Normocephalic.      Nose: No congestion.      Mouth/Throat:      Mouth: Mucous membranes are moist.   Eyes:      Pupils: Pupils are equal, round, and reactive to light.   Cardiovascular:      Rate and Rhythm: Normal rate and regular rhythm.      Heart sounds: No murmur heard.    No gallop.   Pulmonary:      Effort: Pulmonary effort is normal. No respiratory distress.      Breath sounds: Normal breath sounds.   Abdominal:      General: Abdomen is flat. Bowel sounds are normal. There is no distension.      Palpations: Abdomen is soft.   Musculoskeletal:         General: Swelling present.      Comments: 1+ pitting edema and chronic venous stasis changes in bilater LE   Skin:     General: Skin is warm.      Capillary Refill: Capillary refill takes less than 2 seconds.      Coloration: Skin is not jaundiced.   Neurological:      General: No focal deficit present.      Mental Status: He is alert.         Data   No results found for this or any previous visit (from the past 24 hour(s)).

## 2022-12-17 NOTE — PROGRESS NOTES
"  Nephrology Progress Note  12/17/2022         Assessment & Recommendations:   Shay Jimenez is a 57 year old year old male    # ESKD 2/2 Ca-P deposition MWF  # Volume overload; improved  # EDW  130 kg-> now 128 kg  No dialysis today.  Plan for next dialysis on Monday.  Please check BW, last one was on 12/13/22.   # Anemia 2/2 CKD: Epo 8 K with runs  # S/p C5-6 revision of screw on 12/6/22  # Delirious likely DT: Improved  Recommendations were communicated to primary team via note    Cornelia Reinoso MD   Division of Renal Disease and Hypertension  [employer]  [email or web address]  Vocera Web Console    Interval History :   Nursing and provider notes from last 24 hours reviewed.  In the last 24 hours Shay Jimenez, he has dialysis yesterday and 2 L fluid got removed.  He reports any complaints such as chest pain short of breath orthopnea or PND.  Review of Systems:   I reviewed the following systems:  Negative except as mentioned above.     Physical Exam:   I/O last 3 completed shifts:  In: 500 [P.O.:500]  Out: 2000 [Other:2000]   /87 (BP Location: Right arm, Cuff Size: Adult Regular)   Pulse 78   Temp 98  F (36.7  C) (Oral)   Resp 15   Ht 1.854 m (6' 1\")   Wt 128.1 kg (282 lb 6.6 oz)   SpO2 93%   BMI 37.26 kg/m       GA: Alert, oriented today  Heart: No murmur.  Lung: Clear  Abdomen: Soft, distended  MSK: No edema.   Access: Left AVF  Labs:   All labs reviewed by me  Electrolytes/Renal - Recent Labs   Lab Test 12/16/22 2027 12/16/22  0632 12/15/22  1642 12/14/22  0422 12/13/22  0502 12/12/22  0955   * 127*  --  132* 137 135*   POTASSIUM 4.1 4.8  --  3.8 3.6 4.1   CHLORIDE 91* 89*  --  94* 98 95*   CO2 25 25  --  23 25 24   BUN 19.5 33.5*  --  33.2* 21.0* 32.8*   CR 3.83* 5.47*  --  5.64* 4.41* 6.29*   GLC 79 80 99 80 96 87   MAC 8.8 8.5*  --  8.7 9.0 9.1   MAG  --   --   --  1.7 1.7 1.9   PHOS  --   --   --  3.8 2.6 3.7       CBC -   Recent Labs   Lab Test 12/17/22  0551 12/16/22  0541 12/15/22  0527 "   WBC 7.6 6.9 7.1   HGB 9.7* 9.8* 9.6*    268 278       LFTs -   Recent Labs   Lab Test 12/11/22  0528 10/06/22  1215 09/29/22  1345 08/18/22  1435 07/21/22  1252 07/16/22  0557   ALKPHOS 146*  --   --   --  131 134   BILITOTAL 0.4  --   --   --  0.6 0.5   ALT <5*  --   --   --  9 <6   AST 18 31 35   < > 17 13   PROTTOTAL 5.9*  --   --   --  6.7* 6.5*   ALBUMIN 3.1*  --   --   --  3.0* 2.8*    < > = values in this interval not displayed.       Iron Panel -   Recent Labs   Lab Test 12/14/22  0634 07/29/22  0600   IRON 43* 44   IRONSAT 24 20   JACKELINE  --  626*         Imaging:  All imaging studies reviewed by me.     Current Medications:    acetaminophen  975 mg Oral TID     atorvastatin  20 mg Oral At Bedtime     Baclofen  10 mg Oral BID     cetirizine  10 mg Oral Daily     cyanocobalamin  500 mcg Oral Daily     finasteride  1.3 mg Oral Daily     fluticasone  1 spray Both Nostrils BID     folic acid  1 mg Oral Daily     gabapentin  300 mg Oral Q8H     heparin ANTICOAGULANT  5,000 Units Subcutaneous Q8H     magnesium plus protein  133 mg Oral Daily     methocarbamol  500 mg Oral 4x Daily     midodrine  10 mg Oral TID w/meals     multivitamin RENAL  1 tablet Oral Daily     pantoprazole  40 mg Oral QAM AC     polyethylene glycol  17 g Oral TID     vitamin B6  100 mg Oral Daily     QUEtiapine  50 mg Oral At Bedtime     ramelteon  8 mg Oral At Bedtime     senna-docusate  1 tablet Oral BID     sertraline  100 mg Oral Daily     sevelamer carbonate  1,600 mg Oral TID w/meals     sodium chloride (PF)  3 mL Intracatheter Q8H     thiamine  100 mg Oral TID     thiamine  100 mg Oral Daily       Cornelia Reinoso MD

## 2022-12-17 NOTE — PROGRESS NOTES
Care Management Follow Up    Length of Stay (days): 11    Expected Discharge Date: 12/19/2022     Concerns to be Addressed: all concerns addressed in this encounter, care coordination/care conferences, discharge planning     Patient plan of care discussed at interdisciplinary rounds: Yes    Anticipated Discharge Disposition: Skilled Nursing Facility, Transitional Care     Anticipated Discharge Services: None  Anticipated Discharge DME: None    Patient/family educated on Medicare website which has current facility and service quality ratings: yes  Education Provided on the Discharge Plan:    Patient/Family in Agreement with the Plan: yes    Referrals Placed by CM/SW: Post Acute Facilities  Private pay costs discussed: Not applicable    Additional Information:  SW reviewed pt chart for discharge plan. SHAKIRA called pt son Chinedu to follow up on TCU options that were sent through e-mail previous day. Chinedu shared he had not gone through the e-mail yet, but would review and respond by e-mail to unit SHAKIRA. SW educated Chinedu on referral process and discharge planning. No further needs were identified during this call.    SHAKIRA will continue to follow for discharge planning.    JULISSA David   12/17/2022       Social Work and Care Management Department       SEARCHABLE in [employer] - search SOCIAL WORK       [place] (0800 - 1630) Saturday and Sunday     Units: 4A, 4C, & 4E Pager: [phone]     Units: 5A & 5B Pager: [phone]     Units: 6A & 6B   Pager: [phone]     Units: 6C & 6D Pager: [phone]     Units: 7A &7B  Pager: [phone]     Units: 7C, 7D, & 5C Pager: [phone]     Unit: [facility] ED Pager: [phone]      [facility] [place] (4107-3212) Saturday and Sunday      Units: 5 Ortho, 5 Med/Surg & WB ED  Pager:[phone]     Units: 6 Med/Surg, 8A, & 10A ICU  Pager: [phone]        After hours (1630 - 0000) Saturday & Sunday; (4548-8450) Mon-Fri; (4845-5511) [facility] Recognized Holidays     Units: ALL   Pager: [phone]       JULISSA David

## 2022-12-18 LAB — GLUCOSE BLDC GLUCOMTR-MCNC: 76 MG/DL (ref 70–99)

## 2022-12-18 PROCEDURE — 250N000013 HC RX MED GY IP 250 OP 250 PS 637: Performed by: NURSE PRACTITIONER

## 2022-12-18 PROCEDURE — 250N000011 HC RX IP 250 OP 636: Performed by: STUDENT IN AN ORGANIZED HEALTH CARE EDUCATION/TRAINING PROGRAM

## 2022-12-18 PROCEDURE — 250N000013 HC RX MED GY IP 250 OP 250 PS 637: Performed by: STUDENT IN AN ORGANIZED HEALTH CARE EDUCATION/TRAINING PROGRAM

## 2022-12-18 PROCEDURE — 99232 SBSQ HOSP IP/OBS MODERATE 35: CPT | Performed by: ORTHOPAEDIC SURGERY

## 2022-12-18 PROCEDURE — 250N000013 HC RX MED GY IP 250 OP 250 PS 637

## 2022-12-18 PROCEDURE — 250N000013 HC RX MED GY IP 250 OP 250 PS 637: Performed by: ORTHOPAEDIC SURGERY

## 2022-12-18 PROCEDURE — 99233 SBSQ HOSP IP/OBS HIGH 50: CPT | Performed by: INTERNAL MEDICINE

## 2022-12-18 PROCEDURE — 200N000002 HC R&B ICU UMMC

## 2022-12-18 RX ORDER — QUETIAPINE FUMARATE 25 MG/1
25 TABLET, FILM COATED ORAL AT BEDTIME
Status: DISCONTINUED | OUTPATIENT
Start: 2022-12-18 | End: 2022-12-19

## 2022-12-18 RX ADMIN — OXYCODONE HYDROCHLORIDE 5 MG: 5 TABLET ORAL at 20:16

## 2022-12-18 RX ADMIN — SENNOSIDES AND DOCUSATE SODIUM 1 TABLET: 8.6; 5 TABLET ORAL at 07:53

## 2022-12-18 RX ADMIN — METHOCARBAMOL 500 MG: 500 TABLET ORAL at 11:53

## 2022-12-18 RX ADMIN — MIDODRINE HYDROCHLORIDE 10 MG: 5 TABLET ORAL at 11:54

## 2022-12-18 RX ADMIN — B-COMPLEX W/ C & FOLIC ACID TAB 1 MG 1 TABLET: 1 TAB at 07:52

## 2022-12-18 RX ADMIN — SEVELAMER CARBONATE 1600 MG: 800 TABLET, FILM COATED ORAL at 11:54

## 2022-12-18 RX ADMIN — Medication 100 MG: at 19:43

## 2022-12-18 RX ADMIN — CETIRIZINE HYDROCHLORIDE 10 MG: 10 TABLET, FILM COATED ORAL at 07:53

## 2022-12-18 RX ADMIN — FOLIC ACID 1 MG: 1 TABLET ORAL at 07:53

## 2022-12-18 RX ADMIN — ACETAMINOPHEN 975 MG: 325 TABLET, FILM COATED ORAL at 07:52

## 2022-12-18 RX ADMIN — OXYCODONE HYDROCHLORIDE 5 MG: 5 TABLET ORAL at 11:53

## 2022-12-18 RX ADMIN — METHOCARBAMOL 500 MG: 500 TABLET ORAL at 15:23

## 2022-12-18 RX ADMIN — ACETAMINOPHEN 975 MG: 325 TABLET, FILM COATED ORAL at 19:43

## 2022-12-18 RX ADMIN — Medication 100 MG: at 08:01

## 2022-12-18 RX ADMIN — MIDODRINE HYDROCHLORIDE 10 MG: 5 TABLET ORAL at 07:53

## 2022-12-18 RX ADMIN — GABAPENTIN 300 MG: 300 CAPSULE ORAL at 15:23

## 2022-12-18 RX ADMIN — CYANOCOBALAMIN TAB 500 MCG 500 MCG: 500 TAB at 07:52

## 2022-12-18 RX ADMIN — SERTRALINE HYDROCHLORIDE 100 MG: 100 TABLET ORAL at 07:52

## 2022-12-18 RX ADMIN — HEPARIN SODIUM 5000 UNITS: 5000 INJECTION, SOLUTION INTRAVENOUS; SUBCUTANEOUS at 04:03

## 2022-12-18 RX ADMIN — FLUTICASONE PROPIONATE 1 SPRAY: 50 SPRAY, METERED NASAL at 07:54

## 2022-12-18 RX ADMIN — Medication 133 MG: at 07:52

## 2022-12-18 RX ADMIN — SEVELAMER CARBONATE 1600 MG: 800 TABLET, FILM COATED ORAL at 07:52

## 2022-12-18 RX ADMIN — SEVELAMER CARBONATE 1600 MG: 800 TABLET, FILM COATED ORAL at 17:36

## 2022-12-18 RX ADMIN — PANTOPRAZOLE SODIUM 40 MG: 40 TABLET, DELAYED RELEASE ORAL at 07:53

## 2022-12-18 RX ADMIN — BACLOFEN 10 MG: 10 TABLET ORAL at 19:43

## 2022-12-18 RX ADMIN — QUETIAPINE FUMARATE 25 MG: 25 TABLET ORAL at 22:07

## 2022-12-18 RX ADMIN — ACETAMINOPHEN 975 MG: 325 TABLET, FILM COATED ORAL at 15:23

## 2022-12-18 RX ADMIN — ATORVASTATIN CALCIUM 20 MG: 20 TABLET, FILM COATED ORAL at 22:07

## 2022-12-18 RX ADMIN — FINASTERIDE 1.3 MG: 5 TABLET, FILM COATED ORAL at 08:01

## 2022-12-18 RX ADMIN — HEPARIN SODIUM 5000 UNITS: 5000 INJECTION, SOLUTION INTRAVENOUS; SUBCUTANEOUS at 19:43

## 2022-12-18 RX ADMIN — MIDODRINE HYDROCHLORIDE 10 MG: 5 TABLET ORAL at 17:36

## 2022-12-18 RX ADMIN — RAMELTEON 8 MG: 8 TABLET, FILM COATED ORAL at 22:07

## 2022-12-18 RX ADMIN — HEPARIN SODIUM 5000 UNITS: 5000 INJECTION, SOLUTION INTRAVENOUS; SUBCUTANEOUS at 11:53

## 2022-12-18 RX ADMIN — GABAPENTIN 300 MG: 300 CAPSULE ORAL at 22:07

## 2022-12-18 RX ADMIN — METHOCARBAMOL 500 MG: 500 TABLET ORAL at 19:43

## 2022-12-18 RX ADMIN — BACLOFEN 10 MG: 10 TABLET ORAL at 07:53

## 2022-12-18 RX ADMIN — METHOCARBAMOL 500 MG: 500 TABLET ORAL at 07:53

## 2022-12-18 ASSESSMENT — ACTIVITIES OF DAILY LIVING (ADL)
ADLS_ACUITY_SCORE: 47
ADLS_ACUITY_SCORE: 53
ADLS_ACUITY_SCORE: 47
ADLS_ACUITY_SCORE: 53
ADLS_ACUITY_SCORE: 53
ADLS_ACUITY_SCORE: 47
ADLS_ACUITY_SCORE: 53
ADLS_ACUITY_SCORE: 47

## 2022-12-18 NOTE — PROGRESS NOTES
[facility], [place]   Neurosurgery Progress Note:    Assessment: Shay Jimenez is a 57 year old male with with PMH ESRD on dialysis and osteomyelitis s/p C5-T6 fusion who is now POD#9 s/p redo C5-T6 fusion with concern for hemorrhage; no vertebral artery dissection or extravasation was noted on intraoperative angiogram.  He was admitted to the Neuro ICU postoperatively for MAP goals and frequent neuro checks, remaining stable on pressors.  Neuro exam is unchanged from patient's baseline.    Plan:  - Appreciate Medicine assistance   - Serial neuro exams  - Pain control  - Hb goal > 7  - Normonatremia  - Goal normotension  - Appreciate Nephrology recommendations re: dialysis (baseline M/W/F)  - Daily dressings per WOC team  - Cervical collar when HOB >45,  on when OOB  - Regular diet  - Bowel regimen  - PRN antiemetics  - PT/OT  - SCDs and subcutaneous heparin for DVT proph  - Floor status      -----------------------------------  Snehal Molina MD  Neurosurgery PGY3    Please contact neurosurgery resident on call with questions.    Dial * * [phone], enter [phone] when prompted.        Interval History/Subjective: No acute events overnight. Medicine adjusting medications to improve sleep/wake schedule.     Objective:   Temp:  [96.7  F (35.9  C)-98.5  F (36.9  C)] 97.5  F (36.4  C)  Pulse:  [74-80] 74  Resp:  [14-16] 14  BP: ()/(62-85) 97/62  SpO2:  [85 %-100 %] 100 %  I/O last 3 completed shifts:  In: 1760 [P.O.:1760]  Out: -     Gen: Appears comfortable, NAD  Wound: Incision, clean, dry, intact without strikethrough  Neurologic:  - Alert & Oriented to person, place, time, and situation  - Follows commands briskly  - Speech fluent, spontaneous. No aphasia or dysarthria.  - No gaze preference. No apparent hemineglect.  - PERRL, EOMI  - Strong brow raise, eye closure, and smile  - Face symmetric with sensation intact to light touch  - Trapezii and sternocleidomastoid muscles 5/5  bilaterally  - No pronator drift     Del Tr Bi WE WF Gr   R 4* 5 5 5 5 5   L 4* 5 5 5 5 5    HF KE KF DF PF EHL   R 4+ 5 5 5 5 5   L 4+ 5 5 5 5 5   *pain-limited    Norman's and clonus negative.    Sensation intact and symmetric to light touch throughout    LABS  Recent Labs   Lab Test 12/17/22  0551 12/16/22  0541 12/15/22  0527   WBC 7.6 6.9 7.1   HGB 9.7* 9.8* 9.6*    100 99    268 278       Recent Labs   Lab Test 12/18/22  0810 12/16/22  2027 12/16/22  0632 12/15/22  1642 12/14/22  0422   NA  --  130* 127*  --  132*   POTASSIUM  --  4.1 4.8  --  3.8   CHLORIDE  --  91* 89*  --  94*   CO2  --  25 25  --  23   BUN  --  19.5 33.5*  --  33.2*   CR  --  3.83* 5.47*  --  5.64*   ANIONGAP  --  14 13  --  15   MAC  --  8.8 8.5*  --  8.7   GLC 76 79 80   < > 80    < > = values in this interval not displayed.       IMAGING  No results found for this or any previous visit (from the past 24 hour(s)).

## 2022-12-18 NOTE — PROGRESS NOTES
[facility]    Medicine Progress Note - Hospitalist Service, GOLD TEAM 12    Date of Admission:  12/6/2022    Assessment & Plan   Shay Jimenez is a 57 year old male admitted on 12/6/2022 with hx of HTN, HLD, afib, ESRD on HD, peripheral neuropathy, GINI, GERD, and depression admitted for redo C5-T6 fusion now POD #10    Recommendations:  - Will reduce seroquel to 25, was sleepy in AM but slept well throughout the night    Cervical Myelopathy  Chronic Osteomyelitis  C5-T6 spinal fusion complicated by hardware displacement  Admitted [facility] 12/6, underwent redo of C5-T6 fusion.   Pt working with PT/OT, sig assistance needs. Appears to be reluctant for TCU. Concern for some intraoperative hemmorhage but intra-operative angio was re-assuring. Subsequent CT of C-spine and head w/o acute ab, good alignment. Hx of osteo w/o recent abx needs  - Cares per neuro surg  - Heavy care needs that seem unlikely to be met at home  - Cont to work with patient, work with therapy. Perhaps he can get to a status where home with assist will feel reasonable.     Acute on chroninc hypoxic-hypercarbic  resp failure, stable  On room air this AM. On dialysis. For volume management. Suspect mild hypervolemia (probbaly chronically so).Does not use CPAP at home (though he should). VBG on 12/16 demonstrated slightly low pH and pc02 of 66.   - Titrate 02 for goal > 90%  - Cont ovenright 02 as this is used at home for GINI given intolreance of CPAP. If concerns for hypercarbia as cause for delirium/encehalopathy, would favor ordering and seeing if RT can assist with mask that he may tolerate  -AM confusion would seem more likely related to hypercarbia than med SE. If altered tomorrow AM, consider VBG    Acute on chronic pain  Peripheral neuropathy  Improving, decreaing oxy over the last 24 hours  - Would consider increase in acetaminophen dose to 975 (ordered)  - Cont baclofen and gabapentin  - Cont  methocarbamol  - Cont bowl regimen    Hypervolemic hyponatremia, stable and chronic  He appears asymptomatic Review shows that he has sodium that vascilates upper 120's-low 130's. Nephrology aware, cont on dialysis  - Consider strict intake monitoring to get sense of patient drinking  - Would see if he can toleated a 2000 mls fluid restriction    ESRD on dialysis  Nephrology following  Tolerating usual schedule     Alcohol abuse  ICU/hospital Delirium  Hepatomegaly  No stigmata of cirrhosis. Mild hypoalbuminemia with nml bilirubin and AST/ALT. CT from Aug 2022 did show hepatomegaly. I do not see formal ab ultrasound. Last drink well over one week now. No physiologic evidence of alc withdrawal at this time.   - Discontinued  CIWA 12/19. Would not recommend resumption of home Xanax without strong compelling evidence severe anxiety.   - Pt is on gabapentin and baclofen. Both of these have been used to help reduce cravings in pt with alcohol abuse.  - as patient improves. We cna consider increasing one of these if patient agreeable  - Naltrexone may not be safe in the setting of ESRD  - Agree with cont of thiamine and folate  - Would recommend formal OT cognitive testing, SLUMS. May help with determining dispo needs and help with family decision maker. I agree wit h neurosurg that he is not likely decisional for complex issues   - Cont PM seroquel (dose reduced to 25) and continue adjunctive remalteon    Mild/Mod pulm HTN  Moderate MR  GINI  Pt with ECHO in earlier 2022 with MR and elevated right sided pressures. No signs of sig right heart dysfunction. Not on CPAP as he has not toleated.  - Cont nocturla 02    Chronic atrial fibrillation  On eliquis PTA. Currentlt on hold. This is likely okay. Once bleeding risk is reduced, this can be restarted, no bridging will be needed         Diet: Advance Diet as Tolerated: Regular Diet Adult  Fluid restriction 2000 ML FLUID    DVT Prophylaxis: Heparin SQ  Oliveira Catheter: Not  "present  Central Lines: None  Cardiac Monitoring: None  Code Status: Full Code      Disposition Plan     Expected Discharge Date: 12/19/2022      Destination: nursing home  Discharge Comments: TCU recommended but patient refusing. Heavy PT needs, decisional capacity is in question, need to involve family for big decisions      The patient's care was discussed with the Bedside Nurse, Patient and Primary team.    Herman Iqbal MD  Hospitalist Service, GOLD TEAM [address] [place] [address]  Securely message with the Vocera Web Console (learn more here)  Text page via [facility] Paging/Directory   Please see signed in provider for up to date coverage information      Clinically Significant Risk Factors         # Hyponatremia: Lowest Na = 130 mmol/L in last 2 days, will monitor as appropriate      # Hypoalbuminemia: Lowest albumin = 3.1 g/dL at 12/11/2022  5:28 AM, will monitor as appropriate           # Obesity: Estimated body mass index is 37.26 kg/m  as calculated from the following:    Height as of this encounter: 1.854 m (6' 1\").    Weight as of this encounter: 128.1 kg (282 lb 6.6 oz).          ______________________________________________________________________    Interval History   Pt slept through the nitght. Difficult to arouse. Was having some visual hallucinations upon awakening but at the time of my exam (late morning) He was Alert and interactive. A xO time 4. No complaints of pain. No  SOB or coughing. No nausea or vomiting.e. 4-point ROS otherwise negative.     Data reviewed today: I reviewed all medications, new labs and imaging results over the last 24 hours.   Physical Exam   Vital Signs: Temp: 97.5  F (36.4  C) Temp src: Axillary BP: 97/62 Pulse: 74   Resp: 14 SpO2: 100 % O2 Device: Oxymask Oxygen Delivery: 2 LPM  Weight: 282 lbs 6.55 oz  Physical Exam  Constitutional:       General: He is not in acute distress.     Appearance: He is not toxic-appearing.      " Comments: Sitting in bedside chair, C-collar in place   HENT:      Head: Normocephalic.      Nose: No congestion.      Mouth/Throat:      Mouth: Mucous membranes are moist.   Eyes:      Pupils: Pupils are equal, round, and reactive to light.   Cardiovascular:      Rate and Rhythm: Normal rate and regular rhythm.      Heart sounds: No murmur heard.    No gallop.   Pulmonary:      Effort: Pulmonary effort is normal. No respiratory distress.      Breath sounds: Normal breath sounds.   Abdominal:      General: Abdomen is flat. Bowel sounds are normal. There is no distension.      Palpations: Abdomen is soft.   Musculoskeletal:         General: Swelling present.      Comments: 1+ pitting edema and chronic venous stasis changes in bilater LE   Skin:     General: Skin is warm.      Capillary Refill: Capillary refill takes less than 2 seconds.      Coloration: Skin is not jaundiced.   Neurological:      General: No focal deficit present.      Mental Status: He is alert.         Data   No results found for this or any previous visit (from the past 24 hour(s)).

## 2022-12-18 NOTE — PROGRESS NOTES
D/I: Patient on unit 4A Surgical/Neuro ICU   Neuro- A&O x4. Follows commands. Numb BLE  CV- Normotensive, Afebrile, palpable pulses.  Pulm- RA while awake, 2L simple mask ovrnoc  GI- Reg diet, 2L fluid ret. Loose bm overnight  - Aneuric, HD MWF  Gtts- None  Skin- See flowsheets  Pain- Well controled with tylenol and oxycodone  Lines and drains- PIV, fistula.  See flow sheets for further interventions and assessments.   A: Stable   P: Awaiting open bed on 6A

## 2022-12-18 NOTE — PROGRESS NOTES
Neurocritical Care Multi-Disciplinary Note    Reason for critical care admission: Post op-C5-T6 fusion  Admitting Team: GABINO  Date of Service:  12/18/2022  Date of Admission:  12/6/2022  Hospital Day: 13    Patient condition reviewed and discussed while on multidisciplinary rounds today.   Please note these minor interventions that were initiated:  1. None    The Neurocritical Care service will continue to follow peripherally while the patient is within the ICU. We are readily available should issues arise. Please feel free to contact us for critical care issues with which we may be of assistance. For all other concerns, please contact primary service first.     Please contact NCC team phone at [phone].     ENID Savage, CNP  Neurocritical Care *[phone]

## 2022-12-18 NOTE — PLAN OF CARE
Neuro: Ox4. Difficult to arouse in a.m; having visual hallucinations.  Cleared up in early afternoon but still appears confused at times.  Not always cooperative with neuro exam.  Repetitive comments.  2-3/5 strength to BLE, 5/5 BUE. No sensation to feet, diminished sensation to shins.    Cardiac: HR 70's, no tele. Normotensive.   Respiratory: Sating >92% on RA while awake, 2L while sleeping.   GI/: Anuric. BM X1, loose/soft/brown/incontinent.   Diet/appetite: Tolerating regular diet. Fair appetite.  Activity:  Assist of lift, up to chair for majority of day.  Pain: At acceptable level on current regimen.  Scheduled robaxin, tylenol. Oxy 5 mg given once.  Skin: No new deficits noted.  Dressing on back changed once, mod amount of serous drainage, surrounding incision red with small amount of swelling.  LDA's: RUE PIV. LUE fistula.    Plan: SW to talk with family about TCU referrals.  Plan to dialyze Monday.  Continue with POC. Notify primary team with changes.

## 2022-12-18 NOTE — PROGRESS NOTES
"  Nephrology Progress Note  12/18/2022         Assessment & Recommendations:   Shay Jimenez is a 57 year old year old male    # ESKD 2/2 Ca-P deposition MWF  # Volume overload; improved  # EDW  130 kg-> now 128 kg  No dialysis today.  Plan for next dialysis on tomorrow.  Please check BW today, spoke to RN.   - Renal panel MWF and CBC weekly while here (unless more needed per other service indication)  # Hypotension  ON Midodrine 10 mg TID.   # Anemia 2/2 CKD: Epo 8 K with runs; Hb in 9-10 range.   # MBD: Phos has been normal. Will check Phos tomorrow, last on 12/14. On Renvela 1600 mg TID with meal.   # S/p C5-6 revision of screw on 12/6/22  # Delirious likely DT: Improved  Recommendations were communicated to primary team via note    Cornelia Reinoso MD   Division of Renal Disease and Hypertension  [facility] [place]  myairmail  Vocera Web Console    Interval History :   Nursing and provider notes from last 24 hours reviewed.  In the last 24 hours Shay Jimenez, no acute event overnight. He is no POD#9. SW planned SNF vs TCU.  He feels okay today denies any chest pain or short of breath.  Still needs assistant with ambulation.    Review of Systems:   I reviewed the following systems:  Negative except as mentioned above.     Physical Exam:   I/O last 3 completed shifts:  In: 1760 [P.O.:1760]  Out: -    BP 97/62   Pulse 74   Temp 97.5  F (36.4  C) (Axillary)   Resp 14   Ht 1.854 m (6' 1\")   Wt 128.1 kg (282 lb 6.6 oz)   SpO2 100%   BMI 37.26 kg/m       GA: Alert, somewhat disoriented.  Heart: No murmur.  Lung: Clear  Abdomen: Soft, distended  MSK: No edema.   Access: Left AVF  Neck: Wearing neck brace.   Labs:   All labs reviewed by me  Electrolytes/Renal -   Recent Labs   Lab Test 12/18/22  0810 12/16/22 2027 12/16/22  0632 12/15/22  1642 12/14/22  0422 12/13/22  0502 12/12/22  0955   NA  --  130* 127*  --  132* 137 135*   POTASSIUM  --  4.1 4.8  --  3.8 3.6 4.1   CHLORIDE  --  91* 89*  --  94* 98 95*   CO2  -- "  25 25  --  23 25 24   BUN  --  19.5 33.5*  --  33.2* 21.0* 32.8*   CR  --  3.83* 5.47*  --  5.64* 4.41* 6.29*   GLC 76 79 80   < > 80 96 87   MAC  --  8.8 8.5*  --  8.7 9.0 9.1   MAG  --   --   --   --  1.7 1.7 1.9   PHOS  --   --   --   --  3.8 2.6 3.7    < > = values in this interval not displayed.       CBC -   Recent Labs   Lab Test 12/17/22  0551 12/16/22  0541 12/15/22  0527   WBC 7.6 6.9 7.1   HGB 9.7* 9.8* 9.6*    268 278       LFTs -   Recent Labs   Lab Test 12/11/22  0528 10/06/22  1215 09/29/22  1345 08/18/22  1435 07/21/22  1252 07/16/22  0557   ALKPHOS 146*  --   --   --  131 134   BILITOTAL 0.4  --   --   --  0.6 0.5   ALT <5*  --   --   --  9 <6   AST 18 31 35   < > 17 13   PROTTOTAL 5.9*  --   --   --  6.7* 6.5*   ALBUMIN 3.1*  --   --   --  3.0* 2.8*    < > = values in this interval not displayed.       Iron Panel -   Recent Labs   Lab Test 12/14/22  0634 07/29/22  0600   IRON 43* 44   IRONSAT 24 20   JACKELINE  --  626*         Imaging:  All imaging studies reviewed by me.     Current Medications:    acetaminophen  975 mg Oral TID     atorvastatin  20 mg Oral At Bedtime     Baclofen  10 mg Oral BID     cetirizine  10 mg Oral Daily     cyanocobalamin  500 mcg Oral Daily     finasteride  1.3 mg Oral Daily     fluticasone  1 spray Both Nostrils BID     folic acid  1 mg Oral Daily     gabapentin  300 mg Oral Q8H     heparin ANTICOAGULANT  5,000 Units Subcutaneous Q8H     magnesium plus protein  133 mg Oral Daily     methocarbamol  500 mg Oral 4x Daily     midodrine  10 mg Oral TID w/meals     multivitamin RENAL  1 tablet Oral Daily     pantoprazole  40 mg Oral QAM AC     polyethylene glycol  17 g Oral TID     vitamin B6  100 mg Oral Daily     QUEtiapine  50 mg Oral At Bedtime     ramelteon  8 mg Oral At Bedtime     senna-docusate  1 tablet Oral BID     sertraline  100 mg Oral Daily     sevelamer carbonate  1,600 mg Oral TID w/meals     sodium chloride (PF)  3 mL Intracatheter Q8H     thiamine  100 mg  Oral Daily       Cornelia Reinoso MD

## 2022-12-19 ENCOUNTER — APPOINTMENT (OUTPATIENT)
Dept: PHYSICAL THERAPY | Facility: CLINIC | Age: 57
DRG: 459 | End: 2022-12-19
Attending: STUDENT IN AN ORGANIZED HEALTH CARE EDUCATION/TRAINING PROGRAM
Payer: MEDICARE

## 2022-12-19 ENCOUNTER — APPOINTMENT (OUTPATIENT)
Dept: OCCUPATIONAL THERAPY | Facility: CLINIC | Age: 57
DRG: 459 | End: 2022-12-19
Attending: STUDENT IN AN ORGANIZED HEALTH CARE EDUCATION/TRAINING PROGRAM
Payer: MEDICARE

## 2022-12-19 LAB
ANION GAP SERPL CALCULATED.3IONS-SCNC: 16 MMOL/L (ref 7–15)
ATRIAL RATE - MUSE: 74 BPM
BUN SERPL-MCNC: 43.7 MG/DL (ref 6–20)
CALCIUM SERPL-MCNC: 8.6 MG/DL (ref 8.6–10)
CHLORIDE SERPL-SCNC: 90 MMOL/L (ref 98–107)
CREAT SERPL-MCNC: 6.95 MG/DL (ref 0.67–1.17)
DEPRECATED HCO3 PLAS-SCNC: 22 MMOL/L (ref 22–29)
DIASTOLIC BLOOD PRESSURE - MUSE: NORMAL MMHG
ERYTHROCYTE [DISTWIDTH] IN BLOOD BY AUTOMATED COUNT: 15.8 % (ref 10–15)
GFR SERPL CREATININE-BSD FRML MDRD: 9 ML/MIN/1.73M2
GLUCOSE SERPL-MCNC: 93 MG/DL (ref 70–99)
HCT VFR BLD AUTO: 32.2 % (ref 40–53)
HGB BLD-MCNC: 9.8 G/DL (ref 13.3–17.7)
INTERPRETATION ECG - MUSE: NORMAL
MAGNESIUM SERPL-MCNC: 1.9 MG/DL (ref 1.7–2.3)
MCH RBC QN AUTO: 31.1 PG (ref 26.5–33)
MCHC RBC AUTO-ENTMCNC: 30.4 G/DL (ref 31.5–36.5)
MCV RBC AUTO: 102 FL (ref 78–100)
P AXIS - MUSE: 43 DEGREES
PHOSPHATE SERPL-MCNC: 6.2 MG/DL (ref 2.5–4.5)
PLATELET # BLD AUTO: 277 10E3/UL (ref 150–450)
POTASSIUM SERPL-SCNC: 4.8 MMOL/L (ref 3.4–5.3)
PR INTERVAL - MUSE: 230 MS
QRS DURATION - MUSE: 86 MS
QT - MUSE: 428 MS
QTC - MUSE: 475 MS
R AXIS - MUSE: 21 DEGREES
RBC # BLD AUTO: 3.15 10E6/UL (ref 4.4–5.9)
SODIUM SERPL-SCNC: 128 MMOL/L (ref 136–145)
SYSTOLIC BLOOD PRESSURE - MUSE: NORMAL MMHG
T AXIS - MUSE: 25 DEGREES
VENTRICULAR RATE- MUSE: 74 BPM
WBC # BLD AUTO: 8.9 10E3/UL (ref 4–11)

## 2022-12-19 PROCEDURE — 90937 HEMODIALYSIS REPEATED EVAL: CPT

## 2022-12-19 PROCEDURE — 250N000013 HC RX MED GY IP 250 OP 250 PS 637: Performed by: NURSE PRACTITIONER

## 2022-12-19 PROCEDURE — 36415 COLL VENOUS BLD VENIPUNCTURE: CPT

## 2022-12-19 PROCEDURE — 83735 ASSAY OF MAGNESIUM: CPT

## 2022-12-19 PROCEDURE — 258N000003 HC RX IP 258 OP 636: Performed by: CLINICAL NURSE SPECIALIST

## 2022-12-19 PROCEDURE — 99222 1ST HOSP IP/OBS MODERATE 55: CPT | Performed by: PSYCHIATRY & NEUROLOGY

## 2022-12-19 PROCEDURE — 99232 SBSQ HOSP IP/OBS MODERATE 35: CPT | Performed by: ORTHOPAEDIC SURGERY

## 2022-12-19 PROCEDURE — 250N000011 HC RX IP 250 OP 636: Performed by: STUDENT IN AN ORGANIZED HEALTH CARE EDUCATION/TRAINING PROGRAM

## 2022-12-19 PROCEDURE — 999N000248 HC STATISTIC IV INSERT WITH US BY RN

## 2022-12-19 PROCEDURE — 84100 ASSAY OF PHOSPHORUS: CPT | Performed by: INTERNAL MEDICINE

## 2022-12-19 PROCEDURE — 200N000002 HC R&B ICU UMMC

## 2022-12-19 PROCEDURE — 99232 SBSQ HOSP IP/OBS MODERATE 35: CPT | Mod: FS | Performed by: CLINICAL NURSE SPECIALIST

## 2022-12-19 PROCEDURE — 97535 SELF CARE MNGMENT TRAINING: CPT | Mod: GO | Performed by: OCCUPATIONAL THERAPIST

## 2022-12-19 PROCEDURE — 97530 THERAPEUTIC ACTIVITIES: CPT | Mod: GP

## 2022-12-19 PROCEDURE — 80048 BASIC METABOLIC PNL TOTAL CA: CPT

## 2022-12-19 PROCEDURE — 250N000013 HC RX MED GY IP 250 OP 250 PS 637: Performed by: ORTHOPAEDIC SURGERY

## 2022-12-19 PROCEDURE — 250N000013 HC RX MED GY IP 250 OP 250 PS 637: Performed by: STUDENT IN AN ORGANIZED HEALTH CARE EDUCATION/TRAINING PROGRAM

## 2022-12-19 PROCEDURE — 250N000011 HC RX IP 250 OP 636: Performed by: NURSE PRACTITIONER

## 2022-12-19 PROCEDURE — 85027 COMPLETE CBC AUTOMATED: CPT

## 2022-12-19 PROCEDURE — 250N000011 HC RX IP 250 OP 636: Performed by: CLINICAL NURSE SPECIALIST

## 2022-12-19 PROCEDURE — 634N000001 HC RX 634: Performed by: CLINICAL NURSE SPECIALIST

## 2022-12-19 RX ORDER — HEPARIN SODIUM 1000 [USP'U]/ML
500 INJECTION, SOLUTION INTRAVENOUS; SUBCUTANEOUS CONTINUOUS
Status: DISCONTINUED | OUTPATIENT
Start: 2022-12-19 | End: 2022-12-19

## 2022-12-19 RX ORDER — QUETIAPINE FUMARATE 25 MG/1
50 TABLET, FILM COATED ORAL AT BEDTIME
Status: DISCONTINUED | OUTPATIENT
Start: 2022-12-20 | End: 2023-01-05

## 2022-12-19 RX ADMIN — HEPARIN SODIUM 5000 UNITS: 5000 INJECTION, SOLUTION INTRAVENOUS; SUBCUTANEOUS at 04:10

## 2022-12-19 RX ADMIN — METHOCARBAMOL 500 MG: 500 TABLET ORAL at 15:52

## 2022-12-19 RX ADMIN — Medication 100 MG: at 21:05

## 2022-12-19 RX ADMIN — B-COMPLEX W/ C & FOLIC ACID TAB 1 MG 1 TABLET: 1 TAB at 07:54

## 2022-12-19 RX ADMIN — METHOCARBAMOL 500 MG: 500 TABLET ORAL at 07:52

## 2022-12-19 RX ADMIN — Medication 100 MG: at 07:57

## 2022-12-19 RX ADMIN — HEPARIN SODIUM 500 UNITS: 1000 INJECTION INTRAVENOUS; SUBCUTANEOUS at 11:02

## 2022-12-19 RX ADMIN — BACLOFEN 10 MG: 10 TABLET ORAL at 21:05

## 2022-12-19 RX ADMIN — CETIRIZINE HYDROCHLORIDE 10 MG: 10 TABLET, FILM COATED ORAL at 07:54

## 2022-12-19 RX ADMIN — SEVELAMER CARBONATE 1600 MG: 800 TABLET, FILM COATED ORAL at 07:53

## 2022-12-19 RX ADMIN — GABAPENTIN 300 MG: 300 CAPSULE ORAL at 14:23

## 2022-12-19 RX ADMIN — SEVELAMER CARBONATE 1600 MG: 800 TABLET, FILM COATED ORAL at 11:54

## 2022-12-19 RX ADMIN — FINASTERIDE 1.3 MG: 5 TABLET, FILM COATED ORAL at 07:50

## 2022-12-19 RX ADMIN — HEPARIN SODIUM 500 UNITS/HR: 1000 INJECTION INTRAVENOUS; SUBCUTANEOUS at 11:03

## 2022-12-19 RX ADMIN — ACETAMINOPHEN 975 MG: 325 TABLET, FILM COATED ORAL at 14:23

## 2022-12-19 RX ADMIN — GABAPENTIN 300 MG: 300 CAPSULE ORAL at 22:55

## 2022-12-19 RX ADMIN — PANTOPRAZOLE SODIUM 40 MG: 40 TABLET, DELAYED RELEASE ORAL at 06:50

## 2022-12-19 RX ADMIN — QUETIAPINE FUMARATE 25 MG: 25 TABLET ORAL at 22:55

## 2022-12-19 RX ADMIN — MIDODRINE HYDROCHLORIDE 10 MG: 5 TABLET ORAL at 11:55

## 2022-12-19 RX ADMIN — BACLOFEN 10 MG: 10 TABLET ORAL at 07:53

## 2022-12-19 RX ADMIN — OXYCODONE HYDROCHLORIDE 5 MG: 5 TABLET ORAL at 16:00

## 2022-12-19 RX ADMIN — HYDROXYZINE HYDROCHLORIDE 25 MG: 25 TABLET, FILM COATED ORAL at 22:55

## 2022-12-19 RX ADMIN — EPOETIN ALFA-EPBX 10000 UNITS: 10000 INJECTION, SOLUTION INTRAVENOUS; SUBCUTANEOUS at 11:02

## 2022-12-19 RX ADMIN — MIDODRINE HYDROCHLORIDE 10 MG: 5 TABLET ORAL at 07:54

## 2022-12-19 RX ADMIN — SERTRALINE HYDROCHLORIDE 100 MG: 100 TABLET ORAL at 07:54

## 2022-12-19 RX ADMIN — CYANOCOBALAMIN TAB 500 MCG 500 MCG: 500 TAB at 07:53

## 2022-12-19 RX ADMIN — ATORVASTATIN CALCIUM 20 MG: 20 TABLET, FILM COATED ORAL at 22:55

## 2022-12-19 RX ADMIN — RAMELTEON 8 MG: 8 TABLET, FILM COATED ORAL at 22:55

## 2022-12-19 RX ADMIN — METHOCARBAMOL 500 MG: 500 TABLET ORAL at 11:52

## 2022-12-19 RX ADMIN — SENNOSIDES AND DOCUSATE SODIUM 1 TABLET: 8.6; 5 TABLET ORAL at 07:53

## 2022-12-19 RX ADMIN — SODIUM CHLORIDE 250 ML: 9 INJECTION, SOLUTION INTRAVENOUS at 11:03

## 2022-12-19 RX ADMIN — Medication 133 MG: at 07:53

## 2022-12-19 RX ADMIN — METHOCARBAMOL 500 MG: 500 TABLET ORAL at 21:05

## 2022-12-19 RX ADMIN — SODIUM CHLORIDE 300 ML: 9 INJECTION, SOLUTION INTRAVENOUS at 11:03

## 2022-12-19 RX ADMIN — MIDODRINE HYDROCHLORIDE 10 MG: 5 TABLET ORAL at 18:17

## 2022-12-19 RX ADMIN — OXYCODONE HYDROCHLORIDE 5 MG: 5 TABLET ORAL at 23:15

## 2022-12-19 RX ADMIN — HEPARIN SODIUM 5000 UNITS: 5000 INJECTION, SOLUTION INTRAVENOUS; SUBCUTANEOUS at 11:58

## 2022-12-19 RX ADMIN — FOLIC ACID 1 MG: 1 TABLET ORAL at 07:53

## 2022-12-19 RX ADMIN — ACETAMINOPHEN 975 MG: 325 TABLET, FILM COATED ORAL at 07:54

## 2022-12-19 RX ADMIN — HEPARIN SODIUM 5000 UNITS: 5000 INJECTION, SOLUTION INTRAVENOUS; SUBCUTANEOUS at 21:09

## 2022-12-19 RX ADMIN — ACETAMINOPHEN 975 MG: 325 TABLET, FILM COATED ORAL at 21:05

## 2022-12-19 RX ADMIN — FLUTICASONE PROPIONATE 1 SPRAY: 50 SPRAY, METERED NASAL at 07:59

## 2022-12-19 RX ADMIN — SEVELAMER CARBONATE 1600 MG: 800 TABLET, FILM COATED ORAL at 18:16

## 2022-12-19 RX ADMIN — GABAPENTIN 300 MG: 300 CAPSULE ORAL at 06:50

## 2022-12-19 ASSESSMENT — VISUAL ACUITY
OU: NORMAL ACUITY

## 2022-12-19 ASSESSMENT — ACTIVITIES OF DAILY LIVING (ADL)
ADLS_ACUITY_SCORE: 53
ADLS_ACUITY_SCORE: 49
ADLS_ACUITY_SCORE: 53
ADLS_ACUITY_SCORE: 53
ADLS_ACUITY_SCORE: 49
ADLS_ACUITY_SCORE: 53
ADLS_ACUITY_SCORE: 49
ADLS_ACUITY_SCORE: 53
ADLS_ACUITY_SCORE: 49

## 2022-12-19 NOTE — PROGRESS NOTES
HEMODIALYSIS TREATMENT NOTE    Date: 12/19/2022  Time: 2:27 PM    Data:  Pre Wt: 134.9 kg    Desired Wt: 131.9 kg   Post Wt: 131.9 kg    Weight change: 3 kg  Ultrafiltration - Post Run Net Total Removed (mL): 3000 mL  Vascular Access Status: Fistula  patent  Dialyzer Rinse: Clear  Total Blood Volume Processed: 94.5 L   Total Dialysis (Treatment) Time: 4   Dialysate Bath: K 2, Ca 2.5  Heparin 500 units loading + 500 units/hr    Lab:   No    Interventions:  LUE AVF cannulated with 15g needles at the initiation of tx. Pt tolerated tx very well and uneventful. 3L net fluid removed. Pt received Epoetin during the run. 400-450 BFR maintained throughout. Hemostasis achieved within 10 min. Handoff report given to YUDI Bahena.     Assessment:  Alert and oriented x 4. At times intermittently confused. Vitally stable. Calm and cooperative with cares. AVF is positive for thrill and bruit.     Plan:    Next HD per renal

## 2022-12-19 NOTE — PROGRESS NOTES
[facility], [place]   Neurosurgery Progress Note:    Assessment: Shay Jimenez is a 57 year old male with with PMH ESRD on dialysis and osteomyelitis s/p C5-T6 fusion who is now POD#13 s/p redo C5-T6 fusion with concern for hemorrhage; no vertebral artery dissection or extravasation was noted on intraoperative angiogram.  He was admitted to the Neuro ICU postoperatively for MAP goals and frequent neuro checks, remaining stable on pressors.  Neuro exam is unchanged from patient's baseline.    Plan:  - Will repeat CBC and BMP after dialysis today  - Appreciate Medicine assistance   - Serial neuro exams  - Pain control  - Hb goal > 7  - Normonatremia  - Goal normotension  - Appreciate Nephrology recommendations re: dialysis (baseline M/W/F)  - Daily dressings per WOC team  - Cervical collar when HOB >45,  on when OOB  - Regular diet  - Bowel regimen  - PRN antiemetics  - PT/OT  - SCDs and subcutaneous heparin for DVT proph  - Floor status  - OT assessment today for cognitive function evaluation  - Psychiatry consult for capacity evaluation  -----------------------------------  Jonny Stephenson  Neurosurgery Resident PGY2    Please contact neurosurgery resident on call with questions.    Dial * * [phone], enter [phone] when prompted.    Interval History/Subjective: No acute events overnight. Medicine adjusting medications to improve sleep/wake schedule.     Objective:   Temp:  [97.5  F (36.4  C)-98.2  F (36.8  C)] 97.5  F (36.4  C)  Pulse:  [67-78] 71  Resp:  [14-18] 14  BP: ()/(60-82) 133/76  SpO2:  [91 %-98 %] 91 %  I/O last 3 completed shifts:  In: 1260 [P.O.:1260]  Out: -     Gen: Appears comfortable, NAD  Wound: Incision, clean, dry, intact without strikethrough  Neurologic:  - Alert & Oriented to person, place, time, and situation  - Follows commands briskly  - Speech fluent, spontaneous. No aphasia or dysarthria.  - No gaze preference. No apparent hemineglect.  - PERRL, EOMI  -  Strong brow raise, eye closure, and smile  - Face symmetric with sensation intact to light touch  - Trapezii and sternocleidomastoid muscles 5/5 bilaterally  - No pronator drift     Del Tr Bi WE WF Gr   R 4* 5 5 5 5 5   L 4* 5 5 5 5 5    HF KE KF DF PF EHL   R 4+ 5 5 5 5 5   L 4+ 5 5 5 5 5   *pain-limited    Norman's and clonus negative.    Sensation intact and symmetric to light touch throughout    LABS  Recent Labs   Lab Test 12/17/22  0551 12/16/22  0541 12/15/22  0527   WBC 7.6 6.9 7.1   HGB 9.7* 9.8* 9.6*    100 99    268 278       Recent Labs   Lab Test 12/18/22  0810 12/16/22  2027 12/16/22  0632 12/15/22  1642 12/14/22  0422   NA  --  130* 127*  --  132*   POTASSIUM  --  4.1 4.8  --  3.8   CHLORIDE  --  91* 89*  --  94*   CO2  --  25 25  --  23   BUN  --  19.5 33.5*  --  33.2*   CR  --  3.83* 5.47*  --  5.64*   ANIONGAP  --  14 13  --  15   MAC  --  8.8 8.5*  --  8.7   GLC 76 79 80   < > 80    < > = values in this interval not displayed.       IMAGING  No results found for this or any previous visit (from the past 24 hour(s)).

## 2022-12-19 NOTE — PROGRESS NOTES
Pt. Became agitated when lab attempted to draw blood. Refused bloodwork, refused vital signs, did not want to cooperate in routine neuro assessment.

## 2022-12-19 NOTE — PROGRESS NOTES
[facility]    Medicine Progress Note - Hospitalist Service, GOLD TEAM 12    Date of Admission:  12/6/2022    Assessment & Plan   Shay Jimenez is a 57 year old male admitted on 12/6/2022 with hx of HTN, HLD, afib, ESRD on HD, peripheral neuropathy, GINI, GERD, and depression admitted for redo C5-T6 fusion now POD #11    Recommendations:  - Awaiting SLUMS  - Cont PM meds to help with sleep  - Cont deliirum precautions  - Cont Pt/OT. Pt very high risk for home with assist given needs  - Would re-start eliquis once stable from post-operative perspective    Cervical Myelopathy  Chronic Osteomyelitis  C5-T6 spinal fusion complicated by hardware displacement  Admitted [facility] 12/6, underwent redo of C5-T6 fusion.   Pt working with PT/OT, sig assistance needs. Appears to be reluctant for TCU. Concern for some intraoperative hemmorhage but intra-operative angio was re-assuring. Subsequent CT of C-spine and head w/o acute ab, good alignment. Hx of osteo w/o recent abx needs  - Cares per neuro surg  - Heavy care needs that seem unlikely to be met at home  - Cont to work with patient, work with therapy. Perhaps he can get to a status where home with assist will feel reasonable.     Acute on chroninc hypoxic-hypercarbic  resp failure, stable  On room air this AM. On dialysis. For volume management. Suspect mild hypervolemia (probbaly chronically so).Does not use CPAP at home (though he should). VBG on 12/16 demonstrated slightly low pH and pc02 of 66.   - Titrate 02 for goal > 90%  - Cont ovenright 02 as this is used at home for GINI given intolreance of CPAP. If concerns for hypercarbia as cause for delirium/encehalopathy, would favor ordering and seeing if RT can assist with mask that he may tolerate  -AM confusion would seem more likely related to hypercarbia than med SE. If altered tomorrow AM, consider VBG    Acute on chronic pain  Peripheral neuropathy  Improving, decreaing oxy  over the last 24 hours  - Would consider increase in acetaminophen dose to 975 (ordered)  - Cont baclofen and gabapentin  - Cont methocarbamol  - Cont bowl regimen    Hypervolemic hyponatremia, stable and chronic  He appears asymptomatic Review shows that he has sodium that vascilates upper 120's-low 130's. Nephrology aware, cont on dialysis  - Consider strict intake monitoring to get sense of patient drinking  - Would see if he can toleated a 2000 mls fluid restriction    ESRD on dialysis  Nephrology following  Tolerating usual schedule     Alcohol abuse  ICU/hospital Delirium  Hepatomegaly  No stigmata of cirrhosis. Mild hypoalbuminemia with nml bilirubin and AST/ALT. CT from Aug 2022 did show hepatomegaly. I do not see formal ab ultrasound. Last drink well over one week now. No physiologic evidence of alc withdrawal at this time.   - Discontinued  CIWA 12/19. Would not recommend resumption of home Xanax without strong compelling evidence severe anxiety.   - Pt is on gabapentin and baclofen. Both of these have been used to help reduce cravings in pt with alcohol abuse.  - as patient improves. We cna consider increasing one of these if patient agreeable  - Naltrexone may not be safe in the setting of ESRD  - Agree with cont of thiamine and folate  - Would recommend formal OT cognitive testing, SLUMS. May help with determining dispo needs and help with family decision maker. I agree wit h neurosurg that he is not likely decisional for complex issues   - Cont PM seroquel (dose reduced to 25) and continue adjunctive remalteon    Mild/Mod pulm HTN  Moderate MR  GINI  Pt with ECHO in earlier 2022 with MR and elevated right sided pressures. No signs of sig right heart dysfunction. Not on CPAP as he has not toleated.  - Cont nocturla 02    Chronic atrial fibrillation  On eliquis PTA. Currentlt on hold. This is likely okay. Once bleeding risk is reduced, this can be restarted, no bridging will be needed         Diet:  "Advance Diet as Tolerated: Regular Diet Adult  Fluid restriction 2000 ML FLUID    DVT Prophylaxis: Heparin SQ  Oliveira Catheter: Not present  Central Lines: None  Cardiac Monitoring: None  Code Status: Full Code      Disposition Plan     Expected Discharge Date: 12/19/2022      Destination: nursing home  Discharge Comments: TCU recommended but patient refusing. Heavy PT needs, decisional capacity is in question, need to involve family for big decisions      The patient's care was discussed with the Bedside Nurse, Patient and Primary team.    Herman Iqbal MD  Hospitalist Service, [address]  Securely message with the Vocera Web Console (learn more here)  Text page via [employer] Paging/Directory   Please see signed in provider for up to date coverage information      Clinically Significant Risk Factors              # Hypoalbuminemia: Lowest albumin = 3.1 g/dL at 12/11/2022  5:28 AM, will monitor as appropriate           # Obesity: Estimated body mass index is 39.24 kg/m  as calculated from the following:    Height as of this encounter: 1.854 m (6' 1\").    Weight as of this encounter: 134.9 kg (297 lb 6.4 oz).          ______________________________________________________________________    Interval History   Pt a bit more delirious on dialysis today. Seems to be perseveation on everyone being named melanie. He did recall location but was very tangential with our conversation. No complaints of SOB/COUgh. Pain is generally well controlled. 4-point ROS otherwise negative.     Data reviewed today: I reviewed all medications, new labs and imaging results over the last 24 hours.   Physical Exam   Vital Signs: Temp: 97.5  F (36.4  C) Temp src: Axillary BP: 98/60 Pulse: 67   Resp: 14 SpO2: 97 % O2 Device: Simple face mask Oxygen Delivery: 2 LPM  Weight: 297 lbs 6.41 oz  Physical Exam  Constitutional:       General: He is not in acute distress.     Appearance: He " is not toxic-appearing.      Comments: In bed, fisutla accessed and dialysis ongoing, C-collar in place   HENT:      Nose: No congestion.      Mouth/Throat:      Mouth: Mucous membranes are moist.   Eyes:      Pupils: Pupils are equal, round, and reactive to light.   Cardiovascular:      Rate and Rhythm: Normal rate and regular rhythm.      Heart sounds: No murmur heard.    No gallop.   Pulmonary:      Effort: Pulmonary effort is normal. No respiratory distress.      Breath sounds: Normal breath sounds.   Abdominal:      General: Bowel sounds are normal. There is no distension.      Palpations: Abdomen is soft.   Musculoskeletal:         General: Swelling present.      Comments: 1+ pitting edema and chronic venous stasis changes in bilater LE   Skin:     General: Skin is warm.      Capillary Refill: Capillary refill takes less than 2 seconds.      Coloration: Skin is not jaundiced.   Neurological:      General: No focal deficit present.      Mental Status: He is alert.         Data   No results found for this or any previous visit (from the past 24 hour(s)).

## 2022-12-19 NOTE — PROGRESS NOTES
D/I: Patient on unit 4A Surgical/Neuro ICU   Neuro- A&O x4. Follows commands. Numb BLE. Became very agitated when awoke for AM bloodwork. Refused blood draw as well as all other cares/assessments afterwards.   CV- Normotensive, Afebrile, palpable pulses.  Pulm- RA while awake, 2L simple mask ovrnoc  GI- Reg diet, 2L fluid ret. Loose bm overnight  - Aneuric, HD MWF  Gtts- None  Skin- See flowsheets  Pain- Well controled with tylenol and oxycodone  Lines and drains- PIV, fistula.  See flow sheets for further interventions and assessments.   A: Stable   P: Awaiting open bed on 6A. HD run today.

## 2022-12-19 NOTE — PROGRESS NOTES
Neurocritical Care Multi-Disciplinary Note    Reason for critical care admission: Post op C5-T6 fusion  Admitting Team: neurosurgery  Date of Service:  12/19/2022  Date of Admission:  12/6/2022  Hospital Day: 14    Patient condition reviewed and discussed while on multidisciplinary rounds today.   Please note these minor interventions that were initiated:  1. None    The Neurocritical Care service will continue to follow peripherally while the patient is within the ICU. We are readily available should issues arise. Please feel free to contact us for critical care issues with which we may be of assistance. For all other concerns, please contact primary service first.     Please contact NCC team phone at [phone]    ENID Asif, CNP  Neurocritical Care  *[phone]  Text Page

## 2022-12-19 NOTE — PROGRESS NOTES
Nephrology Progress Note  12/19/2022         Shay Jimenez is a 57 yom with ESRD since 4/2019 due to Ca Phos deposition and HTN, runs at [facility] ([phone], fax [phone]) under care of Dr Cline.  Had planned neurosurgery revision for C5-C6 on 12/6 as screw had dislodged.  Nephrology consulted for management of dialysis post op.       Interval History :   Mr Jimenez had weekend off HD yesterday, seen on HD today on MWF schedule.  Pulling 3L of UF to keep up with intake since last run.  No issues on runs, next planned run 12/21, remains confused but cooperative today.       Assessment & Recommendations:   ESRD-Since 4/2019 due to Ca Phos deposition and HTN, runs at [facility] ([phone], fax [phone]) under care of Dr Cline.  Runs MWF via AVF, 4.25h runs.                  -HD today, 3L of UF.                  -Will decide on runs daily depending on chemistries and hemodynamics.                  -Continuing long term HD, no need for new consent.                  -Is eventually pursuing renal transplant through [place] once acute issues requiring neurosurgery are done.       Volume-EDW 130kg, bed wt ~135kg today, pulling 3L.      Electrolytes-K 4.8 bicarb 22, Na 128, HD today.        BMD-Ca 8.6, Mg and Phos WNL.       Neurosurgery-Had planned revision of previous screws at C5-C6 on 12/6.       Anemia-Hgb 9.8, giving 8k epo with runs.  Checking iron studies.       Nutrition-Deferred.       Time spent: 30 minutes on this date of encounter for chart review, physical exam, medical decision making and co-ordination of care.      Discussed with Dr Worthington     Recommendations were communicated to primary team via verbal communication.        Markie Shelton, ENID CNS  Clinical Nurse Specialist  [phone]      Review of Systems:   I reviewed the following systems:  Gen: No fevers or chills  CV: No CP at rest  Resp: No SOB at rest  GI: No N/V    Physical Exam:   I/O last 3 completed  "shifts:  In: 1260 [P.O.:1260]  Out: -    BP (!) 114/93   Pulse 73   Temp 97.5  F (36.4  C) (Axillary)   Resp 16   Ht 1.854 m (6' 1\")   Wt 134.9 kg (297 lb 6.4 oz)   SpO2 90%   BMI 39.24 kg/m       GENERAL APPEARANCE: alert and no distress, moderate confusion  EYES: No scleral icterus  NECK:  No JVD  Pulmonary: lungs clear to auscultation with equal breath sounds bilaterally, no clubbing or cyanosis  CV: Regular rhythm, normal rate, no rub   - Edema none  GI: soft, nontender, normal bowel sounds  MS: no evidence of inflammation in joints, no muscle tenderness  : No Oliveira  SKIN: no rash, warm, dry  NEURO: Delirious but somewhat redirectable.      Labs:   All labs reviewed by me  Electrolytes/Renal -   Recent Labs   Lab Test 12/19/22  1000 12/18/22  0810 12/16/22  2027 12/16/22  0632 12/15/22  1642 12/14/22  0422 12/13/22  0502   *  --  130* 127*  --  132* 137   POTASSIUM 4.8  --  4.1 4.8  --  3.8 3.6   CHLORIDE 90*  --  91* 89*  --  94* 98   CO2 22  --  25 25  --  23 25   BUN 43.7*  --  19.5 33.5*  --  33.2* 21.0*   CR 6.95*  --  3.83* 5.47*  --  5.64* 4.41*   GLC 93 76 79 80   < > 80 96   MAC 8.6  --  8.8 8.5*  --  8.7 9.0   MAG 1.9  --   --   --   --  1.7 1.7   PHOS 6.2*  --   --   --   --  3.8 2.6    < > = values in this interval not displayed.       CBC -   Recent Labs   Lab Test 12/19/22  1000 12/17/22  0551 12/16/22  0541   WBC 8.9 7.6 6.9   HGB 9.8* 9.7* 9.8*    279 268       LFTs -   Recent Labs   Lab Test 12/11/22  0528 10/06/22  1215 09/29/22  1345 08/18/22  1435 07/21/22  1252 07/16/22  0557   ALKPHOS 146*  --   --   --  131 134   BILITOTAL 0.4  --   --   --  0.6 0.5   ALT <5*  --   --   --  9 <6   AST 18 31 35   < > 17 13   PROTTOTAL 5.9*  --   --   --  6.7* 6.5*   ALBUMIN 3.1*  --   --   --  3.0* 2.8*    < > = values in this interval not displayed.       Iron Panel -   Recent Labs   Lab Test 12/14/22  0634 07/29/22  0600   IRON 43* 44   IRONSAT 24 20   JACKELINE  --  626*           Current " Medications:    acetaminophen  975 mg Oral TID     atorvastatin  20 mg Oral At Bedtime     Baclofen  10 mg Oral BID     cetirizine  10 mg Oral Daily     cyanocobalamin  500 mcg Oral Daily     finasteride  1.3 mg Oral Daily     fluticasone  1 spray Both Nostrils BID     folic acid  1 mg Oral Daily     gabapentin  300 mg Oral Q8H     heparin ANTICOAGULANT  5,000 Units Subcutaneous Q8H     magnesium plus protein  133 mg Oral Daily     methocarbamol  500 mg Oral 4x Daily     midodrine  10 mg Oral TID w/meals     multivitamin RENAL  1 tablet Oral Daily     pantoprazole  40 mg Oral QAM AC     polyethylene glycol  17 g Oral TID     vitamin B6  100 mg Oral Daily     QUEtiapine  25 mg Oral At Bedtime     ramelteon  8 mg Oral At Bedtime     senna-docusate  1 tablet Oral BID     sertraline  100 mg Oral Daily     sevelamer carbonate  1,600 mg Oral TID w/meals     sodium chloride (PF)  3 mL Intracatheter Q8H     thiamine  100 mg Oral Daily       heparin (porcine) 500 Units/hr (12/19/22 1103)     - MEDICATION INSTRUCTIONS -

## 2022-12-19 NOTE — CONSULTS
"Consult Date: 12/19/2022    PSYCHIATRY CONSULTATION    IDENTIFICATION:  Mr. Jimenez is a 57-year-old white male who is hospitalized for a redo of C5 through T6 fusion.  I am asked to evaluate his capacity by Dr. Palomo.    HISTORY:  Prior to interviewing Mr. Jimenez, I had an opportunity to review the electronic medical record. I note that the patient has had ongoing delirium.  Recent nursing notes have documented agitation as recently as this morning. Last night, it was noted that the patient was having visual hallucinations and appeared confused.  The medicine note from 12/18/2022, completed by Dr. Iqbal said \"I agree with Neurosurgery that he is not likely decisional for complex issues.\"    The surgical team is concerned that the patient seems hesitant to go to a TCU after discharge and they are quite convinced that he is unable to care for himself.  Family is also convinced that he is unable to care for himself and they are not willing to attempt to take him home and try to care for him as they simply do not believe that would be possible. In discussing the case with the neurosurgical team, it appears that his delirium tends to improve after he receives dialysis and then gets worse until his next dialysis.  He does seem to have a waxing and waning mental status. At times, he appears to be relatively intact , at other times, he is confused, agitated and refusing cares.    On my interview, the patient was receiving dialysis at the bedside.  His mood was pretty good and he was alert and oriented x 3.  He reports that he is not confused; however, he does have occasionally tangential speech.  He also has a very limited short-term memory and tries to cover up his impairments with prosocial statements such as \"sharp as a tack\" which he tends to perseverate on. Although he knew I was the psychiatrist, at times during our conversation he felt that I was in charge of his dialysis and I pointed out on multiple " "occasions that I was not the dialysis nurse, but he did get confused about my role even right after I explained to him that I did not do his dialysis. He tends to be somewhat evasive in his answers, choosing instead to smile and give prosocial but often unrelated answers.    As far as capacity goes, he does know that he is in the hospital for his surgery.  He is quite confused about his dialysis and has very limited insight into his impairments.  The treatment team tells me he is barely gotten out of bed and he needs quite a bit of assistance.  An OT note from 12/07 suggests that the patient is unable to stand with assistance x 2, that he requires \"significant assist for mobility and ADLs\" and TCU is recommended.  They suggested that sons provide 24/7 assist; however, the patient's sons are reporting they were no longer able to provide that.  At the present time, the patient has delirium with intermittent agitation and intermittent hallucinations.  He has very limited insight into his disabilities.  He has very limited understanding of what physicians are recommending and, at present, does not have capacity to make complex medical decisions and, more importantly, he does not have capacity to care for himself.  He will need a good deal of assistance after discharge and will likely require physical rehabilitation at a transitional care unit.  Next of kin should be used as an alternative decision maker.  I agree with Neurosurgery that a formal cognitive evaluation from occupational therapy might be helpful.    PAST MEDICAL HISTORY:  ACL repair, testicular biopsy, right knee arthroscopy, septoplasty, knee infection on the right, kidney biopsy on 03/15, left knee replacement, spermatocelectomy, right knee replacement, dialysis, C6 to T6 fusion, and T2 to T3 decompression.    FAMILY HISTORY:  The patient's family has had diabetes and chronic kidney disease.  The parents' psychiatric family history is unclear.    SOCIAL " "HISTORY:  The patient has been , remarried, and tells me that his \"girlfriend\" has left him.  He used to work as a .  He has 2 children and he has a history of significant alcohol use.    ALLERGIES:  THE PATIENT HAS ALLERGY TO PRINIVIL.    PHYSICAL REVIEW OF SYSTEMS:  On my interview, the patient was not particularly reliable in his review of systems.  He denies headache, but he says if he turns his head to the right, he has neck and head pain.  He denied difficulty hearing or seeing and he denied chest pain or shortness of breath as well as abdominal pain.  He tells me he has had some incontinence and he certainly has pruritus of his crotch as he has consistently scratching and it is becoming somewhat red.  I did bring this up with the treatment team.  He has inability to dorsiflex, but does not complain of leg pain.    MENTAL STATUS EXAM:  On my interview, the patient was generally pleasant and cooperative.  His mood was good.  His affect full.  His speech was loud, sometimes mildly inappropriate.  His speech was generally coherent, but sometimes tangential.  He is alert and oriented x 3.  Content of thought was without obvious psychosis. Though he has had recent hallucinations, he did not report any suicidal ideation.  Recent memory was clearly impaired.  Remote memory also seems somewhat impaired.  He has difficulty giving much of the history.  His concentration was generally intact as was use of language. Fund of knowledge was difficult to assess.  Insight and judgment were quite guarded.  Muscle strength and tone were generally at baseline.  He is alert and oriented x 3.  Insight and judgment quite guarded.      Recent vital signs include a blood pressure of 124/84, temperature of 97.5, pulse of 78, respiration rate of 16 with 91% oxygen saturation.    Laboratories reveal sodium of 128, urea nitrogen of 43.7 and a creatinine of 6.95.    IMPRESSION:  Delirium with a waxing and waning " mental status exam.  This patient's capacity will likely wax and wane along with his mental status; however, in general, he does not show capacity to make complex medical decisions and he does not have dispositional capacity in that he is clearly unable to take care of himself without significant assistance.  A family member should be used as an alternative decision maker.    RECOMMENDATIONS:  Agree with OT for cognitive. Consider increasing his Seroquel to 50 mg at night and perhaps 25 mg in the morning. Because his QTc is not very concerning at 475, he could receive IV Haldol if necessary for any significant agitation.      Esvin Kaplan MD        D: 2022   T: 2022   MT: JACIEL    Name:     ROSMERYMICHEALSWATI  MRN:      [id number]        Account:      [id number]   :      1965           Consult Date: 2022     Document: [id number]

## 2022-12-20 ENCOUNTER — APPOINTMENT (OUTPATIENT)
Dept: PHYSICAL THERAPY | Facility: CLINIC | Age: 57
DRG: 459 | End: 2022-12-20
Attending: STUDENT IN AN ORGANIZED HEALTH CARE EDUCATION/TRAINING PROGRAM
Payer: MEDICARE

## 2022-12-20 ENCOUNTER — APPOINTMENT (OUTPATIENT)
Dept: OCCUPATIONAL THERAPY | Facility: CLINIC | Age: 57
DRG: 459 | End: 2022-12-20
Attending: STUDENT IN AN ORGANIZED HEALTH CARE EDUCATION/TRAINING PROGRAM
Payer: MEDICARE

## 2022-12-20 LAB
ALLEN'S TEST: NO
ANION GAP SERPL CALCULATED.3IONS-SCNC: 13 MMOL/L (ref 7–15)
BASE EXCESS BLDA CALC-SCNC: 2.5 MMOL/L (ref -9–1.8)
BUN SERPL-MCNC: 24.6 MG/DL (ref 6–20)
CALCIUM SERPL-MCNC: 9 MG/DL (ref 8.6–10)
CHLORIDE SERPL-SCNC: 96 MMOL/L (ref 98–107)
CREAT SERPL-MCNC: 4.86 MG/DL (ref 0.67–1.17)
DEPRECATED HCO3 PLAS-SCNC: 25 MMOL/L (ref 22–29)
ERYTHROCYTE [DISTWIDTH] IN BLOOD BY AUTOMATED COUNT: 15.8 % (ref 10–15)
GFR SERPL CREATININE-BSD FRML MDRD: 13 ML/MIN/1.73M2
GLUCOSE SERPL-MCNC: 96 MG/DL (ref 70–99)
HCO3 BLD-SCNC: 29 MMOL/L (ref 21–28)
HCT VFR BLD AUTO: 31.3 % (ref 40–53)
HGB BLD-MCNC: 9.6 G/DL (ref 13.3–17.7)
MAGNESIUM SERPL-MCNC: 1.9 MG/DL (ref 1.7–2.3)
MCH RBC QN AUTO: 31 PG (ref 26.5–33)
MCHC RBC AUTO-ENTMCNC: 30.7 G/DL (ref 31.5–36.5)
MCV RBC AUTO: 101 FL (ref 78–100)
O2/TOTAL GAS SETTING VFR VENT: 2 %
PCO2 BLD: 52 MM HG (ref 35–45)
PH BLD: 7.35 [PH] (ref 7.35–7.45)
PLATELET # BLD AUTO: 277 10E3/UL (ref 150–450)
PO2 BLD: 55 MM HG (ref 80–105)
POTASSIUM SERPL-SCNC: 4 MMOL/L (ref 3.4–5.3)
RBC # BLD AUTO: 3.1 10E6/UL (ref 4.4–5.9)
SODIUM SERPL-SCNC: 134 MMOL/L (ref 136–145)
WBC # BLD AUTO: 6.9 10E3/UL (ref 4–11)

## 2022-12-20 PROCEDURE — 36600 WITHDRAWAL OF ARTERIAL BLOOD: CPT

## 2022-12-20 PROCEDURE — 250N000013 HC RX MED GY IP 250 OP 250 PS 637: Performed by: NURSE PRACTITIONER

## 2022-12-20 PROCEDURE — 97530 THERAPEUTIC ACTIVITIES: CPT | Mod: GP

## 2022-12-20 PROCEDURE — 82803 BLOOD GASES ANY COMBINATION: CPT | Performed by: STUDENT IN AN ORGANIZED HEALTH CARE EDUCATION/TRAINING PROGRAM

## 2022-12-20 PROCEDURE — 85027 COMPLETE CBC AUTOMATED: CPT

## 2022-12-20 PROCEDURE — 97530 THERAPEUTIC ACTIVITIES: CPT | Mod: GO

## 2022-12-20 PROCEDURE — 250N000011 HC RX IP 250 OP 636

## 2022-12-20 PROCEDURE — 250N000013 HC RX MED GY IP 250 OP 250 PS 637

## 2022-12-20 PROCEDURE — 99232 SBSQ HOSP IP/OBS MODERATE 35: CPT | Performed by: STUDENT IN AN ORGANIZED HEALTH CARE EDUCATION/TRAINING PROGRAM

## 2022-12-20 PROCEDURE — 250N000013 HC RX MED GY IP 250 OP 250 PS 637: Performed by: ORTHOPAEDIC SURGERY

## 2022-12-20 PROCEDURE — 82310 ASSAY OF CALCIUM: CPT

## 2022-12-20 PROCEDURE — G0463 HOSPITAL OUTPT CLINIC VISIT: HCPCS

## 2022-12-20 PROCEDURE — 999N000157 HC STATISTIC RCP TIME EA 10 MIN

## 2022-12-20 PROCEDURE — 250N000011 HC RX IP 250 OP 636: Performed by: NURSE PRACTITIONER

## 2022-12-20 PROCEDURE — 250N000013 HC RX MED GY IP 250 OP 250 PS 637: Performed by: STUDENT IN AN ORGANIZED HEALTH CARE EDUCATION/TRAINING PROGRAM

## 2022-12-20 PROCEDURE — 83735 ASSAY OF MAGNESIUM: CPT

## 2022-12-20 PROCEDURE — 36415 COLL VENOUS BLD VENIPUNCTURE: CPT

## 2022-12-20 PROCEDURE — 200N000002 HC R&B ICU UMMC

## 2022-12-20 RX ORDER — CEFAZOLIN SODIUM 2 G/100ML
2 INJECTION, SOLUTION INTRAVENOUS DAILY
Status: DISPENSED | OUTPATIENT
Start: 2022-12-20 | End: 2022-12-27

## 2022-12-20 RX ORDER — QUETIAPINE FUMARATE 25 MG/1
25 TABLET, FILM COATED ORAL DAILY
Status: DISCONTINUED | OUTPATIENT
Start: 2022-12-20 | End: 2023-01-05

## 2022-12-20 RX ADMIN — Medication 100 MG: at 19:50

## 2022-12-20 RX ADMIN — FLUTICASONE PROPIONATE 1 SPRAY: 50 SPRAY, METERED NASAL at 08:09

## 2022-12-20 RX ADMIN — ACETAMINOPHEN 975 MG: 325 TABLET, FILM COATED ORAL at 15:18

## 2022-12-20 RX ADMIN — HYDROXYZINE HYDROCHLORIDE 25 MG: 25 TABLET, FILM COATED ORAL at 10:08

## 2022-12-20 RX ADMIN — HYDROXYZINE HYDROCHLORIDE 25 MG: 25 TABLET, FILM COATED ORAL at 19:50

## 2022-12-20 RX ADMIN — SEVELAMER CARBONATE 1600 MG: 800 TABLET, FILM COATED ORAL at 11:20

## 2022-12-20 RX ADMIN — FINASTERIDE 1.3 MG: 5 TABLET, FILM COATED ORAL at 08:09

## 2022-12-20 RX ADMIN — METHOCARBAMOL 500 MG: 500 TABLET ORAL at 11:20

## 2022-12-20 RX ADMIN — APIXABAN 2.5 MG: 2.5 TABLET, FILM COATED ORAL at 20:03

## 2022-12-20 RX ADMIN — ACETAMINOPHEN 975 MG: 325 TABLET, FILM COATED ORAL at 19:50

## 2022-12-20 RX ADMIN — METHOCARBAMOL 500 MG: 500 TABLET ORAL at 08:08

## 2022-12-20 RX ADMIN — SERTRALINE HYDROCHLORIDE 100 MG: 100 TABLET ORAL at 08:08

## 2022-12-20 RX ADMIN — MIDODRINE HYDROCHLORIDE 10 MG: 5 TABLET ORAL at 11:21

## 2022-12-20 RX ADMIN — MIDODRINE HYDROCHLORIDE 10 MG: 5 TABLET ORAL at 08:08

## 2022-12-20 RX ADMIN — QUETIAPINE 25 MG: 25 TABLET ORAL at 11:21

## 2022-12-20 RX ADMIN — PANTOPRAZOLE SODIUM 40 MG: 40 TABLET, DELAYED RELEASE ORAL at 08:08

## 2022-12-20 RX ADMIN — ATORVASTATIN CALCIUM 20 MG: 20 TABLET, FILM COATED ORAL at 21:12

## 2022-12-20 RX ADMIN — B-COMPLEX W/ C & FOLIC ACID TAB 1 MG 1 TABLET: 1 TAB at 08:08

## 2022-12-20 RX ADMIN — CETIRIZINE HYDROCHLORIDE 10 MG: 10 TABLET, FILM COATED ORAL at 08:08

## 2022-12-20 RX ADMIN — HEPARIN SODIUM 5000 UNITS: 5000 INJECTION, SOLUTION INTRAVENOUS; SUBCUTANEOUS at 08:09

## 2022-12-20 RX ADMIN — GABAPENTIN 300 MG: 300 CAPSULE ORAL at 06:31

## 2022-12-20 RX ADMIN — FOLIC ACID 1 MG: 1 TABLET ORAL at 08:07

## 2022-12-20 RX ADMIN — OXYCODONE HYDROCHLORIDE 5 MG: 5 TABLET ORAL at 19:50

## 2022-12-20 RX ADMIN — CYANOCOBALAMIN TAB 500 MCG 500 MCG: 500 TAB at 08:07

## 2022-12-20 RX ADMIN — METHOCARBAMOL 500 MG: 500 TABLET ORAL at 15:18

## 2022-12-20 RX ADMIN — BACLOFEN 10 MG: 10 TABLET ORAL at 19:50

## 2022-12-20 RX ADMIN — MIDODRINE HYDROCHLORIDE 10 MG: 5 TABLET ORAL at 17:43

## 2022-12-20 RX ADMIN — Medication 100 MG: at 08:09

## 2022-12-20 RX ADMIN — Medication 133 MG: at 08:08

## 2022-12-20 RX ADMIN — QUETIAPINE 50 MG: 25 TABLET ORAL at 21:12

## 2022-12-20 RX ADMIN — BACLOFEN 10 MG: 10 TABLET ORAL at 08:08

## 2022-12-20 RX ADMIN — METHOCARBAMOL 500 MG: 500 TABLET ORAL at 19:50

## 2022-12-20 RX ADMIN — RAMELTEON 8 MG: 8 TABLET, FILM COATED ORAL at 21:13

## 2022-12-20 RX ADMIN — ACETAMINOPHEN 975 MG: 325 TABLET, FILM COATED ORAL at 08:08

## 2022-12-20 RX ADMIN — APIXABAN 2.5 MG: 2.5 TABLET, FILM COATED ORAL at 11:20

## 2022-12-20 RX ADMIN — GABAPENTIN 300 MG: 300 CAPSULE ORAL at 15:18

## 2022-12-20 RX ADMIN — GABAPENTIN 300 MG: 300 CAPSULE ORAL at 21:12

## 2022-12-20 RX ADMIN — SEVELAMER CARBONATE 1600 MG: 800 TABLET, FILM COATED ORAL at 17:42

## 2022-12-20 RX ADMIN — OXYCODONE HYDROCHLORIDE 5 MG: 5 TABLET ORAL at 10:08

## 2022-12-20 RX ADMIN — SEVELAMER CARBONATE 1600 MG: 800 TABLET, FILM COATED ORAL at 08:08

## 2022-12-20 RX ADMIN — CEFAZOLIN SODIUM 2 G: 2 INJECTION, SOLUTION INTRAVENOUS at 11:21

## 2022-12-20 ASSESSMENT — ACTIVITIES OF DAILY LIVING (ADL)
ADLS_ACUITY_SCORE: 49

## 2022-12-20 ASSESSMENT — VISUAL ACUITY
OU: NORMAL ACUITY
OU: NORMAL ACUITY

## 2022-12-20 NOTE — PLAN OF CARE
Major Shift Events:  Pt continues to be confused and rambling with his speech. He did get up to chair x2, x1 with total lift and second time pivot with PT with strong assist x2.   Plan: Still waiting for floor bed and will transfer when able. Continue to increase activity as tolerated. For vital signs and complete assessments, please see documentation flowsheets.    Goal Outcome Evaluation:    Plan of Care Reviewed With: patient  Overall Patient Progress: no changeOverall Patient Progress: no change  Outcome Evaluation: VSS continues to be confused

## 2022-12-20 NOTE — PROGRESS NOTES
Care Management Follow Up    Length of Stay (days): 14    Expected Discharge Date: TBD, pt is medically stable, needs TCU     Concerns to be Addressed: all concerns addressed in this encounter, care coordination/care conferences, discharge planning     Patient plan of care discussed at interdisciplinary rounds: Yes    Anticipated Discharge Disposition: Skilled Nursing Facility, Transitional Care     Anticipated Discharge Services: SW confirmed with [facility] that pt's dialysis schedule is:     [facility]  ([phone], fax [phone])  [address] [facility] [address]  Runs MWF via [facility], 4.25h runs.  7:35am (arrive by 7:20am), ends at 11:50am    Anticipated Discharge DME: None    Patient/family educated on Medicare website which has current facility and service quality ratings: yes  Education Provided on the Discharge Plan:    Patient/Family in Agreement with the Plan: yes    Referrals Placed by CM/SW: Post Acute Facilities  Private pay costs discussed: Not applicable    Additional Information:  Pt is needign TCU placement. SW confirmed dialysis schedule (above). Pt's NOK (now decision makers as psych confirms pt is not decisional at this time) requested SW send referrals to 10 closest TCUS to their home. SW sent referrals via [employer] to the following TCUs:    [facility], no appropriate bed  [facility]  [facility]- declined      ------------------------------------------------------------------------------------------------------------  [facility]   4A and 4E Coverage  Ph: [phone]

## 2022-12-20 NOTE — PROGRESS NOTES
[facility], [place]   Neurosurgery Progress Note:    Assessment: Shay Jimenez is a 57 year old male with with PMH ESRD on dialysis and osteomyelitis s/p C5-T6 fusion who is now POD#14 s/p redo C5-T6 fusion with concern for hemorrhage; no vertebral artery dissection or extravasation was noted on intraoperative angiogram.  He was admitted to the Neuro ICU postoperatively for MAP goals and frequent neuro checks, remaining stable on pressors.  Neuro exam is unchanged from patient's baseline.    Plan:  - Sutures to be removed today POD-14  - Appreciate Medicine assistance   - Serial neuro exams  - Pain control  - Hb goal > 7  - Normonatremia  - Goal normotension  - Appreciate Nephrology recommendations re: dialysis (baseline M/W/F)  - Daily dressings per WOC team  - Cervical collar when HOB >45,  on when OOB  - Regular diet  - Bowel regimen  - PRN antiemetics  - PT/OT  - SCDs and subcutaneous heparin for DVT proph  - Floor status  - Okay to restart Eliquis  - OT assessment for cognitive function evaluation- to follow  - Psychiatry consult for capacity evaluation- completed  -----------------------------------  Jonny Stephenson  Neurosurgery Resident PGY2    Please contact neurosurgery resident on call with questions.    Dial * * [phone], enter [phone] when prompted.    Interval History/Subjective: No acute events overnight. Seroquel increased to 50 mg in evening.     Objective:   Temp:  [97.2  F (36.2  C)-98.9  F (37.2  C)] 98.9  F (37.2  C)  Pulse:  [66-86] 78  Resp:  [16-18] 16  BP: ()/() 119/106  SpO2:  [89 %-99 %] 98 %  I/O last 3 completed shifts:  In: 560 [P.O.:560]  Out: 3000 [Other:3000]    Gen: Appears comfortable, NAD  Wound: Incision, clean, dry, intact without strikethrough  Neurologic:  - Alert & Oriented to person, place, time, and situation  - Follows commands briskly  - Speech fluent, spontaneous. No aphasia or dysarthria.  - No gaze preference. No apparent  hemineglect.  - PERRL, EOMI  - Strong brow raise, eye closure, and smile  - Face symmetric with sensation intact to light touch  - Trapezii and sternocleidomastoid muscles 5/5 bilaterally  - No pronator drift     Del Tr Bi WE WF Gr   R 4* 5 5 5 5 5   L 4* 5 5 5 5 5    HF KE KF DF PF EHL   R 4+ 5 5 5 5 5   L 4+ 5 5 5 5 5   *pain-limited    Norman's and clonus negative.    Sensation intact and symmetric to light touch throughout    LABS  Recent Labs   Lab Test 12/20/22  0618 12/19/22  1000 12/17/22  0551   WBC 6.9 8.9 7.6   HGB 9.6* 9.8* 9.7*   * 102* 100    277 279       Recent Labs   Lab Test 12/20/22  0618 12/19/22  1000 12/18/22  0810 12/16/22  2027   * 128*  --  130*   POTASSIUM 4.0 4.8  --  4.1   CHLORIDE 96* 90*  --  91*   CO2 25 22  --  25   BUN 24.6* 43.7*  --  19.5   CR 4.86* 6.95*  --  3.83*   ANIONGAP 13 16*  --  14   MAC 9.0 8.6  --  8.8   GLC 96 93 76 79       IMAGING  No results found for this or any previous visit (from the past 24 hour(s)).

## 2022-12-20 NOTE — PLAN OF CARE
Major Shift Events:  Alert, oriented x3-4, but consistently rambling at staff or TV. Delirious, forgetful, occasionally inappropriate comments. Reclined in chair all night, refused to return to bed. Demanded the TV & lights stay on. Attempted therapeutic communication/redirection/care planning multiple times unsuccessfully. Finally slept from ~1621-1901. NSG postponed spinal incision dressing change this AM as pt was sleeping -- will return today to complete it per NSG resident. Tolerated pills with apple sauce.     Plan: Control pain & Delirium precautions. NSG to change spinal incision dressing today. Transfer to floor when able.    For vital signs and complete assessments, please see documentation flowsheets.

## 2022-12-20 NOTE — PROGRESS NOTES
[facility] Nurse Inpatient Assessment     Consulted for: Right heel    Patient History (according to provider note(s):      Shay Jimenez is a 57 year old male with with PMH ESRD on dialysis and osteomyelitis s/p C5-T6 fusion who is now POD#5 s/p redo C5-T6 fusion with concern for hemorrhage; no vertebral artery dissection or extravasation was noted on intraoperative angiogram.  He was admitted to the Neuro ICU postoperatively for MAP goals and frequent neuro checks, remaining stable on pressors.  Neuro exam is unchanged from patient's baseline.    Areas Assessed:      Areas visualized during today's visit: back     Wound location: cervical spine     Inferior     Superior     Last photo: 12/20  Wound due to: Surgical Wound  Wound history/plan of care: surgical wound 12/6  Wound base: 95 % approximated , 5 % non-granular tissue with slight dehiscence      Palpation of the wound bed: normal      Drainage: small     Description of drainage: serosanguinous     Measurements (length x width x depth, in cm): ~16  x 0.1  x  MARIA ESTHER cm      Tunneling: N/A     Undermining: N/A  Periwound skin: Superficial erosion near sutures on inferior left side of wound. , now more red on bibi wound skin- possible cellulitis per neuro NP       Color: red      Temperature: normal   Odor: none  Pain: mild, tender  Pain interventions prior to dressing change: N/A  Treatment goal: Drainage control and Protection  STATUS: stable  Supplies ordered: supplies stored on unit     12/20: sutures and staples removed per Neuro NP (some left behind on high risk for dehiscence area.)     Pressure Injury Location: Right heel    12/13  Last photo: 12/20  Wound type: Pressure Injury     Pressure Injury Stage: Deep Tissue Pressure Injury (DTPI), present on admission   Wound history/plan of care:  Wound noted by nursing on 12/6 after patient transferred out of PACU. Patient had been proned in OR so unlikely  "wound is from then and that it was present on admission.     Wound base: 100 % purple and epidermis-      Palpation of the wound bed: firm      Drainage: none     Description of drainage: none     Measurements (length x width x depth, in cm) 3 x 2.5 x 0 cm      Tunneling N/A     Undermining N/A  Periwound skin: Intact      Color: normal and consistent with surrounding tissue      Temperature: normal   Odor: none  Pain: no grimacing or signs of discomfort, none  Pain intervention prior to dressing change: patient tolerated well  Treatment goal: Protection  STATUS: stable  Supplies ordered: supplies stored on unit    My PI Risk Assessment     Sensory Perception: 3 - Slightly Limited     Moisture: 3 - Occasionally moist      Activity: 2 - Chairfast     Mobility: 2 - Very limited     Nutrition: 2 - Probably inadequate      Friction/Shear: 2 - Potential problem      TOTAL: 14     Treatment Plan:     Cervical spine incision wound(s): Daily and PRN cleanse with wound cleanser and pat dry. Apply Primipore over wound. (only place primipore over staples or weeping areas)    Right heel wound: Every third day and as needed  Cleanse wound with NS and pat dry  Paint bibi-wound skin with no sting barrier film.  Cover with 4x4 mepilex flex(# [id number]).   Keep heels elevated off bed.   Wear Prevalon boots while in bed. (# [id number])    Pressure Injury Prevention (PIP) Plan:  If patient is declining pressure injury prevention interventions: Explore reason why and address patient's concerns, Educate on pressure injury risk and prevention intervention(s), If patient is still declining, document \"informed refusal\"  and Ensure Care team is aware ( provider, charge nurse, etc)  Mattress: Follow bed algorithm, reassess daily and order specialty mattress, if indicated.  HOB: Maintain at or below 30 degrees, unless contraindicated  Repositioning in bed: Every 1-2 hours , Left/right positioning; avoid supine and Raise foot of bed prior to raising " head of bed, to reduce patient sliding down (shear)  Heels: Keep elevated off mattress, Pillows under calves and Heel lift boots  Protective Dressing: Sacral Mepilex for prevention (#608487),  especially for the agitated patient   Chair positioning: Chair cushion (#454016) , Repositions self: patient to shift weight every 15 minutes, Assist patient to reposition hourly and Do NOT use a donut for sitting (this increases pressure to smaller area and creates a higher potential for injury)    If patient has a buttock pressure injury, or high risk for PI use chair cushion or SPS.  Moisture Management: Perineal cleansing /protection: Follow Incontinence Protocol, Avoid brief in bed, Clean and dry skin folds with bathing , Consider InterDry (#584418) between folds and Moisturize dry skin  Under Devices: Inspect skin under all medical devices during skin inspection , Ensure tubes are stabilized without tension and Ensure patient is not lying on medical devices or equipment when repositioned  Ask provider to discontinue device when no longer needed.    Orders: Reviewed and Updated    RECOMMEND PRIMARY TEAM ORDER: None, at this time  Education provided: plan of care and wound progress  Discussed plan of care with: Patient and Nurse  WOC nurse follow-up plan: weekly  Notify WOC if wound(s) deteriorate.  Nursing to notify the Provider(s) and re-consult the WOC Nurse if new skin concern.    DATA:     Current support surface: Standard  Low air loss (DEN pump, Isolibrium, Pulsate, skin guard, etc)  BMI: Body mass index is 39.24 kg/m .   Active diet order: Orders Placed This Encounter      Advance Diet as Tolerated: Regular Diet Adult     Output: I/O last 3 completed shifts:  In: 560 [P.O.:560]  Out: 3000 [Other:3000]     Labs:   Recent Labs   Lab 12/20/22  0618 12/17/22  0551 12/16/22  0541   HGB 9.6*   < > 9.8*   WBC 6.9   < > 6.9   CRP  --   --  21.50*    < > = values in this interval not displayed.     Pressure injury risk  assessment:   Sensory Perception: 3-->slightly limited  Moisture: 3-->occasionally moist  Activity: 2-->chairfast  Mobility: 2-->very limited  Nutrition: 2-->probably inadequate  Friction and Shear: 2-->potential problem  Konstantin Score: 14     Elizabeth Barlow RN CWOCN  Dept. Pager: [phone]  Dept. Office Number: [phone]

## 2022-12-20 NOTE — PROGRESS NOTES
[facility]    Medicine Progress Note - Hospitalist Service, GOLD TEAM 12    Date of Admission:  12/6/2022    Assessment & Plan   Sahy Jimenez is a 57 year old male admitted on 12/6/2022 with hx of HTN, HLD, afib, ESRD on HD, peripheral neuropathy, GINI, GERD, and depression admitted for redo C5-T6 fusion, now been co managed with medicine.     Today's plan   -Patient had no new symptoms, he was comfortable and not in distress   -Awaiting on placement   -Repeating blood gases to assess for hypercapnia     Superficial would infection noted by nrg 12/20  Started cefazolin by nrg 12/20, Plan for 7 day course     Cervical Myelopathy  Chronic Osteomyelitis  C5-T6 spinal fusion complicated by hardware displacement  Admitted [facility] 12/6, underwent redo of C5-T6 fusion  Pt working with PT/OT, sig assistance needs. Appears to be reluctant for TCU. Concern for some intraoperative hemmorhage but intra-operative angio was re-assuring. Subsequent CT of C-spine and head w/o acute ab, good alignment. Hx of osteo w/o recent abx needs  - Cares per neuro surg  - Heavy care needs that seem unlikely to be met at home  - Cont to work with patient, work with therapy. Perhaps he can get to a status where home with assist will feel reasonable.      Acute on chroninc hypoxic-hypercarbic  resp failure, stable  On room air this AM. On dialysis. For volume management. Suspect mild hypervolemia (probbaly chronically so).Does not use CPAP at home (though he should). VBG on 12/16 demonstrated slightly low pH and pc02 of 66.   - Titrate 02 for goal > 90%  - Cont ovenright 02 as this is used at home for GINI given intolreance of CPAP. If concerns for hypercarbia as cause for delirium/encehalopathy, would favor ordering and seeing if RT can assist with mask that he may tolerate    Acute on chronic pain  Peripheral neuropathy  Improving, decreaing oxy over the last 24 hours  - Would consider increase in  acetaminophen dose to 975 (ordered)  - Cont baclofen and gabapentin  - Cont methocarbamol  - Cont bowl regimen     Hypervolemic hyponatremia, stable and chronic  He appears asymptomatic Review shows that he has sodium that vascilates upper 120's-low 130's. Nephrology aware, cont on dialysis  - Consider strict intake monitoring to get sense of patient drinking  - Would see if he can toleated a 2000 mls fluid restriction     ESRD on dialysis  Nephrology following  Tolerating usual schedule      Alcohol abuse  ICU/hospital Delirium  Hepatomegaly  No stigmata of cirrhosis. Mild hypoalbuminemia with nml bilirubin and AST/ALT. CT from Aug 2022 did show hepatomegaly. I do not see formal ab ultrasound. Last drink well over one week now. No physiologic evidence of alc withdrawal at this time.     Discontinued  CIWA 12/19. Would not recommend resumption of home Xanax without strong compelling evidence severe anxiety.   - Pt is on gabapentin and baclofen. Both of these have been used to help reduce cravings in pt with alcohol abuse.  - as patient improves. We can consider increasing one of these if patient agreeable  - Naltrexone may not be safe in the setting of ESRD  - Agree with cont of thiamine and folate  - Cont Seroquel 25 / 50 (am / pm)  and continue adjunctive remalteon     Mild/Mod pulm HTN  Moderate MR  GINI  Pt with ECHO in earlier 2022 with MR and elevated right sided pressures. No signs of sig right heart dysfunction. Not on CPAP as he has not toleated.  - Cont nocturla 02     Chronic atrial fibrillation  On eliquis PTA          Diet: Advance Diet as Tolerated: Regular Diet Adult  Fluid restriction 2000 ML FLUID    DVT Prophylaxis: Apixaban for dvt ppx   Oliveira Catheter: Not present  Central Lines: None  Cardiac Monitoring: None  Code Status: Full Code      Disposition Plan         The patient's care was discussed with the Bedside Nurse and Patient.    Michael Edward MD  Hospitalist Service, GOLD TEAM [address] [place] [address]  "[facility]  Securely message with the [employer] Web Console (learn more here)  Text page via [facility] Paging/Directory   Please see signed in provider for up to date coverage information      Clinically Significant Risk Factors         # Hyponatremia: Lowest Na = 128 mmol/L in last 2 days, will monitor as appropriate      # Hypoalbuminemia: Lowest albumin = 3.1 g/dL at 12/11/2022  5:28 AM, will monitor as appropriate           # Obesity: Estimated body mass index is 39.24 kg/m  as calculated from the following:    Height as of this encounter: 1.854 m (6' 1\").    Weight as of this encounter: 134.9 kg (297 lb 6.4 oz).          ______________________________________________________________________    Interval History   Patient has no new symptoms, He appears disoriented, no reported abdominal pain, diarrhea, constipation or trouble urinating     Data reviewed today: I reviewed all medications, new labs and imaging results over the last 24 hours. I personally reviewed no images or EKG's today.    Physical Exam   Vital Signs: Temp: 98.9  F (37.2  C) Temp src: Oral BP: (!) 145/133 Pulse: 77   Resp: 16 SpO2: 97 % O2 Device: Oxymask Oxygen Delivery: 1.5 LPM  Weight: 297 lbs 6.41 oz  Constitutional: awake, alert, cooperative, no apparent distress, and appears stated age  Eyes: Lids and lashes normal, pupils equal, round and reactive to light, extra ocular muscles intact, sclera clear, conjunctiva normal  ENT: Normocephalic, without obvious abnormality, atraumatic, sinuses nontender on palpation, external ears without lesions, oral pharynx with moist mucous membranes, tonsils without erythema or exudates, gums normal and good dentition.  Hematologic / Lymphatic: no cervical lymphadenopathy  Respiratory: No increased work of breathing, good air exchange, clear to auscultation bilaterally, no crackles or wheezing  Cardiovascular: Normal apical impulse, regular rate and rhythm, normal S1 and S2, no " S3 or S4, and no murmur noted  GI: No scars, normal bowel sounds, soft, non-distended, non-tender, no masses palpated, no hepatosplenomegally  Genitounirinary:   Skin: no bruising or bleeding  Musculoskeletal: There is no redness, warmth, or swelling of the joints.  Full range of motion noted.  Motor strength is 5 out of 5 all extremities bilaterally.  Tone is normal.  Neurologic: Awake, alert, oriented to name, place and time.  Cranial nerves II-XII are grossly intact.  Motor is 5 out of 5 bilaterally.  Cerebellar finger to nose, heel to shin intact.  Sensory is intact.  Babinski down going, Romberg negative, and gait is normal.  Neuropsychiatric: General: normal, calm and normal eye contact    Data   Recent Labs   Lab 12/20/22  0618 12/19/22  1000 12/18/22  0810 12/17/22  0551 12/16/22 2027   WBC 6.9 8.9  --  7.6  --    HGB 9.6* 9.8*  --  9.7*  --    * 102*  --  100  --     277  --  279  --    * 128*  --   --  130*   POTASSIUM 4.0 4.8  --   --  4.1   CHLORIDE 96* 90*  --   --  91*   CO2 25 22  --   --  25   BUN 24.6* 43.7*  --   --  19.5   CR 4.86* 6.95*  --   --  3.83*   ANIONGAP 13 16*  --   --  14   MAC 9.0 8.6  --   --  8.8   GLC 96 93 76  --  79     No results found for this or any previous visit (from the past 24 hour(s)).  Medications       acetaminophen  975 mg Oral TID     apixaban ANTICOAGULANT  2.5 mg Oral BID     atorvastatin  20 mg Oral At Bedtime     Baclofen  10 mg Oral BID     ceFAZolin  2 g Intravenous Daily     cetirizine  10 mg Oral Daily     cyanocobalamin  500 mcg Oral Daily     finasteride  1.3 mg Oral Daily     fluticasone  1 spray Both Nostrils BID     folic acid  1 mg Oral Daily     gabapentin  300 mg Oral Q8H     magnesium plus protein  133 mg Oral Daily     methocarbamol  500 mg Oral 4x Daily     midodrine  10 mg Oral TID w/meals     multivitamin RENAL  1 tablet Oral Daily     pantoprazole  40 mg Oral QAM AC     polyethylene glycol  17 g Oral TID     vitamin B6  100  mg Oral Daily     QUEtiapine  25 mg Oral Daily     QUEtiapine  50 mg Oral At Bedtime     ramelteon  8 mg Oral At Bedtime     senna-docusate  1 tablet Oral BID     sertraline  100 mg Oral Daily     sevelamer carbonate  1,600 mg Oral TID w/meals     sodium chloride (PF)  3 mL Intracatheter Q8H     thiamine  100 mg Oral Daily

## 2022-12-20 NOTE — PLAN OF CARE
Major Shift Events:  Pain well controlled.  Pt up in chair twice, total of 7hrs, well tolerated.  Pt continues to be oriented but with some delirium d/t lack of sleep.  Pt able to take 2hr afternoon nap.  Pt intermittently refusing cares and turns.  Pt educated on risks and consequences of refusing turns and cares.  Limits placed and enforced on sexually inappropriate comments from patients to staff.  Pt 02 demand increased from 1.5L Oxymask to 5L Oxymask.  ABG didn't show any increased CO2.  No BM.  No voiding.  Pt within fluid restriction.  Surgical incision dressing changed by WOC and Neuro Surg.  Some staples and sutures removed as well.    Plan: HD in AM.  Awaiting floor bed.  Work with therapy.  Wean 02.    For vital signs and complete assessments, please see documentation flowsheets.

## 2022-12-20 NOTE — PROGRESS NOTES
Neurocritical Care Multi-Disciplinary Note    Reason for critical care admission: Post op C5-T6 fusion  Admitting Team: neurosurgery  Date of Service:  12/20/2022  Date of Admission:  12/6/2022  Hospital Day: 15    Patient condition reviewed and discussed while on multidisciplinary rounds today.   Please note these minor interventions that were initiated:  1. None    The Neurocritical Care service will continue to follow peripherally while the patient is within the ICU. We are readily available should issues arise. Please feel free to contact us for critical care issues with which we may be of assistance. For all other concerns, please contact primary service first.     Please contact NCC team phone at [phone]    ENID Asif, CNP  Neurocritical Care  *[phone]  Text Page

## 2022-12-21 ENCOUNTER — APPOINTMENT (OUTPATIENT)
Dept: OCCUPATIONAL THERAPY | Facility: CLINIC | Age: 57
DRG: 459 | End: 2022-12-21
Attending: STUDENT IN AN ORGANIZED HEALTH CARE EDUCATION/TRAINING PROGRAM
Payer: MEDICARE

## 2022-12-21 ENCOUNTER — APPOINTMENT (OUTPATIENT)
Dept: PHYSICAL THERAPY | Facility: CLINIC | Age: 57
DRG: 459 | End: 2022-12-21
Attending: STUDENT IN AN ORGANIZED HEALTH CARE EDUCATION/TRAINING PROGRAM
Payer: MEDICARE

## 2022-12-21 ENCOUNTER — APPOINTMENT (OUTPATIENT)
Dept: GENERAL RADIOLOGY | Facility: CLINIC | Age: 57
DRG: 459 | End: 2022-12-21
Attending: STUDENT IN AN ORGANIZED HEALTH CARE EDUCATION/TRAINING PROGRAM
Payer: MEDICARE

## 2022-12-21 LAB
ANION GAP SERPL CALCULATED.3IONS-SCNC: 14 MMOL/L (ref 7–15)
ATRIAL RATE - MUSE: 59 BPM
BUN SERPL-MCNC: 33.7 MG/DL (ref 6–20)
CALCIUM SERPL-MCNC: 9.2 MG/DL (ref 8.6–10)
CHLORIDE SERPL-SCNC: 95 MMOL/L (ref 98–107)
CREAT SERPL-MCNC: 5.89 MG/DL (ref 0.67–1.17)
DEPRECATED HCO3 PLAS-SCNC: 25 MMOL/L (ref 22–29)
DIASTOLIC BLOOD PRESSURE - MUSE: NORMAL MMHG
ERYTHROCYTE [DISTWIDTH] IN BLOOD BY AUTOMATED COUNT: 15.9 % (ref 10–15)
GFR SERPL CREATININE-BSD FRML MDRD: 10 ML/MIN/1.73M2
GLUCOSE SERPL-MCNC: 92 MG/DL (ref 70–99)
HCT VFR BLD AUTO: 32.5 % (ref 40–53)
HGB BLD-MCNC: 9.8 G/DL (ref 13.3–17.7)
INTERPRETATION ECG - MUSE: NORMAL
MAGNESIUM SERPL-MCNC: 2 MG/DL (ref 1.7–2.3)
MCH RBC QN AUTO: 30.7 PG (ref 26.5–33)
MCHC RBC AUTO-ENTMCNC: 30.2 G/DL (ref 31.5–36.5)
MCV RBC AUTO: 102 FL (ref 78–100)
P AXIS - MUSE: 42 DEGREES
PLATELET # BLD AUTO: 282 10E3/UL (ref 150–450)
POTASSIUM SERPL-SCNC: 4.9 MMOL/L (ref 3.4–5.3)
PR INTERVAL - MUSE: 260 MS
QRS DURATION - MUSE: 88 MS
QT - MUSE: 482 MS
QTC - MUSE: 477 MS
R AXIS - MUSE: 28 DEGREES
RBC # BLD AUTO: 3.19 10E6/UL (ref 4.4–5.9)
SODIUM SERPL-SCNC: 134 MMOL/L (ref 136–145)
SYSTOLIC BLOOD PRESSURE - MUSE: NORMAL MMHG
T AXIS - MUSE: 26 DEGREES
TROPONIN T SERPL HS-MCNC: 92 NG/L
VENTRICULAR RATE- MUSE: 59 BPM
WBC # BLD AUTO: 7.2 10E3/UL (ref 4–11)

## 2022-12-21 PROCEDURE — 71045 X-RAY EXAM CHEST 1 VIEW: CPT | Mod: 26 | Performed by: RADIOLOGY

## 2022-12-21 PROCEDURE — 85027 COMPLETE CBC AUTOMATED: CPT | Performed by: NURSE PRACTITIONER

## 2022-12-21 PROCEDURE — 97530 THERAPEUTIC ACTIVITIES: CPT | Mod: GP | Performed by: PHYSICAL THERAPIST

## 2022-12-21 PROCEDURE — 250N000013 HC RX MED GY IP 250 OP 250 PS 637

## 2022-12-21 PROCEDURE — 250N000013 HC RX MED GY IP 250 OP 250 PS 637: Performed by: NURSE PRACTITIONER

## 2022-12-21 PROCEDURE — 99232 SBSQ HOSP IP/OBS MODERATE 35: CPT | Mod: FS | Performed by: CLINICAL NURSE SPECIALIST

## 2022-12-21 PROCEDURE — 634N000001 HC RX 634: Performed by: CLINICAL NURSE SPECIALIST

## 2022-12-21 PROCEDURE — 258N000003 HC RX IP 258 OP 636: Performed by: CLINICAL NURSE SPECIALIST

## 2022-12-21 PROCEDURE — 93010 ELECTROCARDIOGRAM REPORT: CPT | Performed by: INTERNAL MEDICINE

## 2022-12-21 PROCEDURE — 71045 X-RAY EXAM CHEST 1 VIEW: CPT

## 2022-12-21 PROCEDURE — 250N000011 HC RX IP 250 OP 636: Performed by: CLINICAL NURSE SPECIALIST

## 2022-12-21 PROCEDURE — 200N000002 HC R&B ICU UMMC

## 2022-12-21 PROCEDURE — 97129 THER IVNTJ 1ST 15 MIN: CPT | Mod: GO

## 2022-12-21 PROCEDURE — 84484 ASSAY OF TROPONIN QUANT: CPT | Performed by: STUDENT IN AN ORGANIZED HEALTH CARE EDUCATION/TRAINING PROGRAM

## 2022-12-21 PROCEDURE — 93005 ELECTROCARDIOGRAM TRACING: CPT

## 2022-12-21 PROCEDURE — 99232 SBSQ HOSP IP/OBS MODERATE 35: CPT | Performed by: STUDENT IN AN ORGANIZED HEALTH CARE EDUCATION/TRAINING PROGRAM

## 2022-12-21 PROCEDURE — 250N000013 HC RX MED GY IP 250 OP 250 PS 637: Performed by: STUDENT IN AN ORGANIZED HEALTH CARE EDUCATION/TRAINING PROGRAM

## 2022-12-21 PROCEDURE — 90937 HEMODIALYSIS REPEATED EVAL: CPT

## 2022-12-21 PROCEDURE — 36415 COLL VENOUS BLD VENIPUNCTURE: CPT | Performed by: STUDENT IN AN ORGANIZED HEALTH CARE EDUCATION/TRAINING PROGRAM

## 2022-12-21 PROCEDURE — 80048 BASIC METABOLIC PNL TOTAL CA: CPT | Performed by: NURSE PRACTITIONER

## 2022-12-21 PROCEDURE — 97110 THERAPEUTIC EXERCISES: CPT | Mod: GO

## 2022-12-21 PROCEDURE — 250N000011 HC RX IP 250 OP 636

## 2022-12-21 PROCEDURE — 83735 ASSAY OF MAGNESIUM: CPT | Performed by: NURSE PRACTITIONER

## 2022-12-21 RX ORDER — HEPARIN SODIUM 1000 [USP'U]/ML
500 INJECTION, SOLUTION INTRAVENOUS; SUBCUTANEOUS CONTINUOUS
Status: DISCONTINUED | OUTPATIENT
Start: 2022-12-21 | End: 2022-12-21

## 2022-12-21 RX ADMIN — SERTRALINE HYDROCHLORIDE 100 MG: 100 TABLET ORAL at 08:17

## 2022-12-21 RX ADMIN — CETIRIZINE HYDROCHLORIDE 10 MG: 10 TABLET, FILM COATED ORAL at 08:18

## 2022-12-21 RX ADMIN — QUETIAPINE 25 MG: 25 TABLET ORAL at 08:18

## 2022-12-21 RX ADMIN — METHOCARBAMOL 500 MG: 500 TABLET ORAL at 08:19

## 2022-12-21 RX ADMIN — APIXABAN 2.5 MG: 2.5 TABLET, FILM COATED ORAL at 20:37

## 2022-12-21 RX ADMIN — METHOCARBAMOL 500 MG: 500 TABLET ORAL at 20:37

## 2022-12-21 RX ADMIN — GABAPENTIN 300 MG: 300 CAPSULE ORAL at 22:05

## 2022-12-21 RX ADMIN — OXYCODONE HYDROCHLORIDE 5 MG: 5 TABLET ORAL at 20:39

## 2022-12-21 RX ADMIN — FINASTERIDE 1.3 MG: 5 TABLET, FILM COATED ORAL at 08:32

## 2022-12-21 RX ADMIN — CEFAZOLIN SODIUM 2 G: 2 INJECTION, SOLUTION INTRAVENOUS at 18:12

## 2022-12-21 RX ADMIN — MIDODRINE HYDROCHLORIDE 10 MG: 5 TABLET ORAL at 18:13

## 2022-12-21 RX ADMIN — PANTOPRAZOLE SODIUM 40 MG: 40 TABLET, DELAYED RELEASE ORAL at 08:18

## 2022-12-21 RX ADMIN — HYDROXYZINE HYDROCHLORIDE 25 MG: 25 TABLET, FILM COATED ORAL at 13:55

## 2022-12-21 RX ADMIN — MIDODRINE HYDROCHLORIDE 10 MG: 5 TABLET ORAL at 08:18

## 2022-12-21 RX ADMIN — HYDROXYZINE HYDROCHLORIDE 25 MG: 25 TABLET, FILM COATED ORAL at 20:38

## 2022-12-21 RX ADMIN — BACLOFEN 10 MG: 10 TABLET ORAL at 08:18

## 2022-12-21 RX ADMIN — OXYCODONE HYDROCHLORIDE 5 MG: 5 TABLET ORAL at 15:54

## 2022-12-21 RX ADMIN — SEVELAMER CARBONATE 1600 MG: 800 TABLET, FILM COATED ORAL at 08:19

## 2022-12-21 RX ADMIN — FOLIC ACID 1 MG: 1 TABLET ORAL at 08:18

## 2022-12-21 RX ADMIN — ACETAMINOPHEN 975 MG: 325 TABLET, FILM COATED ORAL at 08:19

## 2022-12-21 RX ADMIN — Medication 133 MG: at 08:17

## 2022-12-21 RX ADMIN — SODIUM CHLORIDE 300 ML: 9 INJECTION, SOLUTION INTRAVENOUS at 13:47

## 2022-12-21 RX ADMIN — EPOETIN ALFA-EPBX 10000 UNITS: 10000 INJECTION, SOLUTION INTRAVENOUS; SUBCUTANEOUS at 13:47

## 2022-12-21 RX ADMIN — FLUTICASONE PROPIONATE 1 SPRAY: 50 SPRAY, METERED NASAL at 08:20

## 2022-12-21 RX ADMIN — SENNOSIDES AND DOCUSATE SODIUM 1 TABLET: 8.6; 5 TABLET ORAL at 20:36

## 2022-12-21 RX ADMIN — MIDODRINE HYDROCHLORIDE 10 MG: 5 TABLET ORAL at 12:30

## 2022-12-21 RX ADMIN — CALCIUM CARBONATE (ANTACID) CHEW TAB 500 MG 1000 MG: 500 CHEW TAB at 20:36

## 2022-12-21 RX ADMIN — CYANOCOBALAMIN TAB 500 MCG 500 MCG: 500 TAB at 08:18

## 2022-12-21 RX ADMIN — QUETIAPINE 50 MG: 25 TABLET ORAL at 22:05

## 2022-12-21 RX ADMIN — FLUTICASONE PROPIONATE 1 SPRAY: 50 SPRAY, METERED NASAL at 20:24

## 2022-12-21 RX ADMIN — ACETAMINOPHEN 975 MG: 325 TABLET, FILM COATED ORAL at 20:35

## 2022-12-21 RX ADMIN — APIXABAN 2.5 MG: 2.5 TABLET, FILM COATED ORAL at 08:18

## 2022-12-21 RX ADMIN — Medication 100 MG: at 20:38

## 2022-12-21 RX ADMIN — HEPARIN SODIUM 500 UNITS: 1000 INJECTION INTRAVENOUS; SUBCUTANEOUS at 14:06

## 2022-12-21 RX ADMIN — METHOCARBAMOL 500 MG: 500 TABLET ORAL at 12:29

## 2022-12-21 RX ADMIN — OXYCODONE HYDROCHLORIDE 5 MG: 5 TABLET ORAL at 04:02

## 2022-12-21 RX ADMIN — SEVELAMER CARBONATE 1600 MG: 800 TABLET, FILM COATED ORAL at 18:13

## 2022-12-21 RX ADMIN — BACLOFEN 10 MG: 10 TABLET ORAL at 20:38

## 2022-12-21 RX ADMIN — B-COMPLEX W/ C & FOLIC ACID TAB 1 MG 1 TABLET: 1 TAB at 08:18

## 2022-12-21 RX ADMIN — OXYCODONE HYDROCHLORIDE 5 MG: 5 TABLET ORAL at 09:58

## 2022-12-21 RX ADMIN — SEVELAMER CARBONATE 1600 MG: 800 TABLET, FILM COATED ORAL at 12:29

## 2022-12-21 RX ADMIN — Medication 100 MG: at 08:31

## 2022-12-21 RX ADMIN — GABAPENTIN 300 MG: 300 CAPSULE ORAL at 05:12

## 2022-12-21 RX ADMIN — HYDROXYZINE HYDROCHLORIDE 25 MG: 25 TABLET, FILM COATED ORAL at 04:02

## 2022-12-21 RX ADMIN — RAMELTEON 8 MG: 8 TABLET, FILM COATED ORAL at 22:05

## 2022-12-21 RX ADMIN — SENNOSIDES AND DOCUSATE SODIUM 1 TABLET: 8.6; 5 TABLET ORAL at 08:18

## 2022-12-21 RX ADMIN — HEPARIN SODIUM 500 UNITS/HR: 1000 INJECTION INTRAVENOUS; SUBCUTANEOUS at 14:06

## 2022-12-21 RX ADMIN — ATORVASTATIN CALCIUM 20 MG: 20 TABLET, FILM COATED ORAL at 22:05

## 2022-12-21 ASSESSMENT — ACTIVITIES OF DAILY LIVING (ADL)
ADLS_ACUITY_SCORE: 49
ADLS_ACUITY_SCORE: 53
ADLS_ACUITY_SCORE: 49

## 2022-12-21 NOTE — PLAN OF CARE
Major Shift Events:      No acute changes over night; continues to refuse repositioning and remained in chair until 0400. Remains restless and slept roughly 2 hours. HD scheduled this morning and transfer orders in place for 6th floor.    For vital signs and complete assessments, please see documentation flowsheets.     Problem: Plan of Care - These are the overarching goals to be used throughout the patient stay.    Goal: Plan of Care Review  Description: The Plan of Care Review/Shift note should be completed every shift.  The Outcome Evaluation is a brief statement about your assessment that the patient is improving, declining, or no change.  This information will be displayed automatically on your shift note.  Outcome: Adequate for Care Transition

## 2022-12-21 NOTE — PLAN OF CARE
D/I: Pt alert and oriented. Continues to hallucinate talking to people not in the room. Sats stable on RA. Vital signs stable. Tolerating regular diet. Transferred to dialysis unit for run.   A: Pt having dialysis run. No major shift events.   P: Continue to monitor and transfer when bed becomes available.

## 2022-12-21 NOTE — PROGRESS NOTES
[facility], [place]   Neurosurgery Progress Note:    Assessment: Shay Jimenez is a 57 year old male with with PMH ESRD on dialysis and osteomyelitis s/p C5-T6 fusion who is now POD#15 s/p redo C5-T6 fusion with concern for hemorrhage; no vertebral artery dissection or extravasation was noted on intraoperative angiogram.  He was admitted to the Neuro ICU postoperatively for MAP goals and frequent neuro checks, remaining stable on pressors.  Neuro exam is unchanged from patient's baseline.    Plan:  - Sutures removed partially 12.20.2022  - Appreciate Medicine assistance   - Serial neuro exams  - Pain control  - Hb goal > 7  - Normonatremia  - Goal normotension  - Appreciate Nephrology recommendations re: dialysis (baseline M/W/F)  - Daily dressings  - On ancef for ?incisional superficial infection  - Cervical collar when HOB >45,  on when OOB  - Regular diet  - Bowel regimen  - PRN antiemetics  - PT/OT  - SCDs and subcutaneous heparin for DVT proph  - Floor status  - Eliquis restarted- 12.20.2022  - Psychiatry consult for capacity evaluation- completed  -----------------------------------  Jonny Stephenson  Neurosurgery Resident PGY2    Please contact neurosurgery resident on call with questions.    Dial * * [phone], enter [phone] when prompted.    Interval History/Subjective: No acute events overnight. Seroquel increased to 50 mg in evening, and added 25 mg for AM dose.    Objective:   Temp:  [97.2  F (36.2  C)-98.1  F (36.7  C)] 98.1  F (36.7  C)  Pulse:  [62-84] 78  Resp:  [14-16] 16  BP: (112-144)/(66-99) 112/67  SpO2:  [86 %-98 %] 94 %  I/O last 3 completed shifts:  In: 700 [P.O.:600; I.V.:100]  Out: -     Gen: Appears comfortable, NAD  Wound: Incision, clean, dry, intact without strikethrough  Neurologic:  - Alert & Oriented to person, place, time, and situation  - Follows commands briskly  - Speech fluent, spontaneous. No aphasia or dysarthria.  - No gaze preference. No apparent  hemineglect.  - PERRL, EOMI  - Strong brow raise, eye closure, and smile  - Face symmetric with sensation intact to light touch  - Trapezii and sternocleidomastoid muscles 5/5 bilaterally  - No pronator drift     Del Tr Bi WE WF Gr   R 4* 5 5 5 5 5   L 4* 5 5 5 5 5    HF KE KF DF PF EHL   R 4+ 5 5 5 5 5   L 4+ 5 5 5 5 5   *pain-limited    Norman's and clonus negative.    Sensation intact and symmetric to light touch throughout    LABS  Recent Labs   Lab Test 12/21/22  0647 12/20/22  0618 12/19/22  1000   WBC 7.2 6.9 8.9   HGB 9.8* 9.6* 9.8*   * 101* 102*    277 277       Recent Labs   Lab Test 12/21/22  0647 12/20/22  0618 12/19/22  1000   * 134* 128*   POTASSIUM 4.9 4.0 4.8   CHLORIDE 95* 96* 90*   CO2 25 25 22   BUN 33.7* 24.6* 43.7*   CR 5.89* 4.86* 6.95*   ANIONGAP 14 13 16*   MAC 9.2 9.0 8.6   GLC 92 96 93       IMAGING  Recent Results (from the past 24 hour(s))   XR Chest Port 1 View    Narrative    EXAM: XR CHEST PORT 1 VIEW  12/21/2022 5:40 AM     HISTORY:  Chest pain       COMPARISON:  12/8/2022    FINDINGS:   Unchanged cardiomegaly. No pneumothorax. Decreased bilateral pleural  effusions. Increased diffuse mixed interstitial and airspace  opacities. Partially visualized cervicothoracic fusion hardware.      Impression    IMPRESSION:   1. Unchanged cardiomegaly with increased diffuse mixed interstitial  and airspace opacities, consistent with edema.  2. Decreased bilateral pleural effusions.    I have personally reviewed the examination and initial interpretation  and I agree with the findings.    MARY ALMARAZ MD         SYSTEM ID:  [id number]

## 2022-12-21 NOTE — PROGRESS NOTES
[facility]    Medicine Progress Note - Hospitalist Service, GOLD TEAM 12    Date of Admission:  12/6/2022    Assessment & Plan   Shay Jimenez is a 57 year old male admitted on 12/6/2022 with hx of HTN, HLD, afib, ESRD on HD, peripheral neuropathy, GINI, GERD, and depression admitted for redo C5-T6 fusion, now been co managed with medicine.     Today's plan   -Patient was started on cefazolin for 7 day course for incisional infection     Superficial would infection noted by nrg 12/20  Started cefazolin by nrg 12/20, Plan for 7 day course     Cervical Myelopathy  Chronic Osteomyelitis  C5-T6 spinal fusion complicated by hardware displacement  Admitted [facility] 12/6, underwent redo of C5-T6 fusion  Pt working with PT/OT, sig assistance needs. Appears to be reluctant for TCU. Concern for some intraoperative hemmorhage but intra-operative angio was re-assuring. Subsequent CT of C-spine and head w/o acute ab, good alignment. Hx of osteo w/o recent abx needs  - Cares per neuro surg  - Heavy care needs that seem unlikely to be met at home  - Cont to work with patient, work with therapy. Perhaps he can get to a status where home with assist will feel reasonable.      Acute on chroninc hypoxic-hypercarbic  resp failure, stable  On room air this AM. On dialysis. For volume management. Suspect mild hypervolemia (probbaly chronically so).Does not use CPAP at home (though he should). VBG on 12/16 demonstrated slightly low pH and pc02 of 66.   - Titrate 02 for goal > 90%  - Cont ovenright 02 as this is used at home for GINI given intolreance of CPAP. If concerns for hypercarbia as cause for delirium/encehalopathy, would favor ordering and seeing if RT can assist with mask that he may tolerate    Acute on chronic pain  Peripheral neuropathy  Improving, decreaing oxy over the last 24 hours  - Would consider increase in acetaminophen dose to 975 (ordered)  - Cont baclofen and gabapentin  -  Cont methocarbamol  - Cont bowl regimen     Hypervolemic hyponatremia, stable and chronic  He appears asymptomatic Review shows that he has sodium that vascilates upper 120's-low 130's. Nephrology aware, cont on dialysis  - Consider strict intake monitoring to get sense of patient drinking  - Would see if he can toleated a 2000 mls fluid restriction     ESRD on dialysis  Nephrology following  Tolerating usual schedule      Alcohol abuse  ICU/hospital Delirium  Hepatomegaly  No stigmata of cirrhosis. Mild hypoalbuminemia with nml bilirubin and AST/ALT. CT from Aug 2022 did show hepatomegaly. I do not see formal ab ultrasound. Last drink well over one week now. No physiologic evidence of alc withdrawal at this time.     Discontinued  CIWA 12/19. Would not recommend resumption of home Xanax without strong compelling evidence severe anxiety.   - Pt is on gabapentin and baclofen. Both of these have been used to help reduce cravings in pt with alcohol abuse.  - as patient improves. We can consider increasing one of these if patient agreeable  - Naltrexone may not be safe in the setting of ESRD  - Agree with cont of thiamine and folate  - Cont Seroquel 25 / 50 (am / pm)  and continue adjunctive remalteon     Mild/Mod pulm HTN  Moderate MR  GINI  Pt with ECHO in earlier 2022 with MR and elevated right sided pressures. No signs of sig right heart dysfunction. Not on CPAP as he has not toleated.  - Cont nocturla 02     Chronic atrial fibrillation  On eliquis PTA            Diet: Advance Diet as Tolerated: Regular Diet Adult  Fluid restriction 2000 ML FLUID    DVT Prophylaxis: Apixaban for dvt ppx   Oliveira Catheter: Not present  Central Lines: None  Cardiac Monitoring: None  Code Status: Full Code      Disposition Plan         The patient's care was discussed with the Bedside Nurse and Patient.    Michael Edward MD  Hospitalist Service, GOLD TEAM [address] [place] [address]  Securely message with  "the [employer] Web Console (learn more here)  Text page via [facility] Paging/Directory   Please see signed in provider for up to date coverage information      Clinically Significant Risk Factors         # Hyponatremia: Lowest Na = 134 mmol/L in last 2 days, will monitor as appropriate      # Hypoalbuminemia: Lowest albumin = 3.1 g/dL at 12/11/2022  5:28 AM, will monitor as appropriate           # Obesity: Estimated body mass index is 38.89 kg/m  as calculated from the following:    Height as of this encounter: 1.854 m (6' 1\").    Weight as of this encounter: 133.7 kg (294 lb 12.1 oz).          ______________________________________________________________________    Interval History   Patient has no new symptoms, He still confabulates     Data reviewed today: I reviewed all medications, new labs and imaging results over the last 24 hours. I personally reviewed no images or EKG's today.    Physical Exam   Vital Signs: Temp: 98.1  F (36.7  C) Temp src: Axillary BP: 112/67 Pulse: 78   Resp: 16 SpO2: 94 % O2 Device: None (Room air) Oxygen Delivery: 5 LPM  Weight: 294 lbs 12.08 oz  Constitutional: awake, alert, cooperative, no apparent distress, and appears stated age  Eyes: Lids and lashes normal, pupils equal, round and reactive to light, extra ocular muscles intact, sclera clear, conjunctiva normal  ENT: Normocephalic, without obvious abnormality, atraumatic, sinuses nontender on palpation, external ears without lesions, oral pharynx with moist mucous membranes, tonsils without erythema or exudates, gums normal and good dentition.  Hematologic / Lymphatic: no cervical lymphadenopathy  Respiratory: No increased work of breathing, good air exchange, clear to auscultation bilaterally, no crackles or wheezing  Cardiovascular: Normal apical impulse, regular rate and rhythm, normal S1 and S2, no S3 or S4, and no murmur noted  GI: No scars, normal bowel sounds, soft, non-distended, non-tender, no masses palpated, no " hepatosplenomegally  Genitounirinary:   Skin: no bruising or bleeding  Musculoskeletal: There is no redness, warmth, or swelling of the joints.  Full range of motion noted.  Motor strength is 5 out of 5 all extremities bilaterally.  Tone is normal.  Neurologic: Awake, alert, oriented to name, place and time.  Cranial nerves II-XII are grossly intact.  Motor is 5 out of 5 bilaterally.  Cerebellar finger to nose, heel to shin intact.  Sensory is intact.  Babinski down going, Romberg negative, and gait is normal.  Neuropsychiatric: General: normal, calm and normal eye contact    Data   Recent Labs   Lab 12/21/22  0647 12/20/22  0618 12/19/22  1000   WBC 7.2 6.9 8.9   HGB 9.8* 9.6* 9.8*   * 101* 102*    277 277   * 134* 128*   POTASSIUM 4.9 4.0 4.8   CHLORIDE 95* 96* 90*   CO2 25 25 22   BUN 33.7* 24.6* 43.7*   CR 5.89* 4.86* 6.95*   ANIONGAP 14 13 16*   MAC 9.2 9.0 8.6   GLC 92 96 93     Recent Results (from the past 24 hour(s))   XR Chest Port 1 View    Narrative    EXAM: XR CHEST PORT 1 VIEW  12/21/2022 5:40 AM     HISTORY:  Chest pain       COMPARISON:  12/8/2022    FINDINGS:   Unchanged cardiomegaly. No pneumothorax. Decreased bilateral pleural  effusions. Increased diffuse mixed interstitial and airspace  opacities. Partially visualized cervicothoracic fusion hardware.      Impression    IMPRESSION:   1. Unchanged cardiomegaly with increased diffuse mixed interstitial  and airspace opacities, consistent with edema.  2. Decreased bilateral pleural effusions.    I have personally reviewed the examination and initial interpretation  and I agree with the findings.    MARY ALMARAZ MD         SYSTEM ID:  [id number]     Medications     heparin (porcine)         sodium chloride 0.9%  250 mL Intravenous Once in dialysis/CRRT     sodium chloride 0.9%  300 mL Hemodialysis Machine Once     acetaminophen  975 mg Oral TID     apixaban ANTICOAGULANT  2.5 mg Oral BID     atorvastatin  20 mg Oral At Bedtime      Baclofen  10 mg Oral BID     ceFAZolin  2 g Intravenous Daily     cetirizine  10 mg Oral Daily     cyanocobalamin  500 mcg Oral Daily     epoetin mar-epbx  10,000 Units Intravenous Once in dialysis/CRRT     finasteride  1.3 mg Oral Daily     fluticasone  1 spray Both Nostrils BID     folic acid  1 mg Oral Daily     gabapentin  300 mg Oral Q8H     heparin  500 Units Hemodialysis Machine OR IV Push Once in dialysis/CRRT     magnesium plus protein  133 mg Oral Daily     methocarbamol  500 mg Oral 4x Daily     midodrine  10 mg Oral TID w/meals     multivitamin RENAL  1 tablet Oral Daily     pantoprazole  40 mg Oral QAM AC     polyethylene glycol  17 g Oral TID     vitamin B6  100 mg Oral Daily     QUEtiapine  25 mg Oral Daily     QUEtiapine  50 mg Oral At Bedtime     ramelteon  8 mg Oral At Bedtime     senna-docusate  1 tablet Oral BID     sertraline  100 mg Oral Daily     sevelamer carbonate  1,600 mg Oral TID w/meals     sodium chloride (PF)  3 mL Intracatheter Q8H     sodium chloride (PF)  9 mL Intracatheter During Dialysis/CRRT (from stock)     sodium chloride (PF)  9 mL Intracatheter During Dialysis/CRRT (from stock)     thiamine  100 mg Oral Daily

## 2022-12-21 NOTE — CARE PLAN
SLUMS  Scoring If High School Educated If Less than High School Educated   Normal 27-30 25-30   Mild Neurocognitive Disorder 21-26 20-24   Dementia 1-20 1-19   The SLUMS is a cognitive screening assessment used to identify the presence of cognitive deficits, and/or to identify a change in cognition over time.      Patient Score: 10    Score Interpretation: Pt score indicates cognitive impairment. Difficulty noted with staying on task and deficits into deficits. Will continue to monitor cognition.         12/21/2022 by Yasmin Campbell OT

## 2022-12-21 NOTE — PROGRESS NOTES
Neurocritical Care Multi-Disciplinary Note    Reason for critical care admission: Post op C5-T6 fusion  Admitting Team: neurosurgery  Date of Service:  12/21/2022  Date of Admission:  12/6/2022  Hospital Day: 16    Patient condition reviewed and discussed while on multidisciplinary rounds today.   Please note these minor interventions that were initiated:  1. None    The Neurocritical Care service will continue to follow peripherally while the patient is within the ICU. We are readily available should issues arise. Please feel free to contact us for critical care issues with which we may be of assistance. For all other concerns, please contact primary service first.     Please contact NCC team phone at *[phone]    ENID Asif, CNP  Neurocritical Care  *[phone]  Text Page

## 2022-12-21 NOTE — PROGRESS NOTES
CLINICAL NUTRITION SERVICES - REASSESSMENT NOTE     Nutrition Prescription    RECOMMENDATIONS FOR MDs/PROVIDERS TO ORDER:  - Ongoing encouragement of adequate PO    Malnutrition Status:    - Patient does not meet two of the established criteria necessary for diagnosing malnutrition    Recommendations already ordered by Registered Dietitian (RD):  - None currently     Future/Additional Recommendations:  - PO adequacy      EVALUATION OF THE PROGRESS TOWARD GOALS   Diet: Regular  Intake: 100%       NEW FINDINGS   Nutrition/GI: pt tolerating diet and PO well -- confirmed with RN that pt is eating well.     Skin: WOC following for PI on admit.     Labs/meds: Na+ 134 (L); BUN: 33.7 (H); creatinine: 5.89 (H); phos: 6.2 (12/19/22; H). Meds: B6, B12, Renvela; folic acid; thiamine    Weights: Overall up from admit: 128.4 kg --> 133.7 kg. Fluctuations noted.     MALNUTRITION  % Intake: No decreased intake noted  % Weight Loss: None noted  Subcutaneous Fat Loss: None observed  Muscle Loss: None observed  Fluid Accumulation/Edema: None noted  Malnutrition Diagnosis: Patient does not meet two of the established criteria necessary for diagnosing malnutrition    Previous Goals   Patient to consume % of nutritionally adequate meal trays TID, or the equivalent with supplements/snacks.  Evaluation: Met    Previous Nutrition Diagnosis  Predicted inadequate nutrient intake related to potential for menu fatigue as evidenced by hospital LOS   Evaluation: No change    CURRENT NUTRITION DIAGNOSIS  Predicted inadequate nutrient intake related to potential for menu fatigue as evidenced by hospital LOS     INTERVENTIONS  Implementation  Monitor for adequacy PO; order supplements if PO declines    Goals  Patient to consume % of nutritionally adequate meal trays TID, or the equivalent with supplements/snacks.    Monitoring/Evaluation  Progress toward goals will be monitored and evaluated per protocol.    Gina Mcginnis RD, LD,  [facility]  Neuro ICU  Pager: [phone]

## 2022-12-21 NOTE — PROGRESS NOTES
HEMODIALYSIS TREATMENT NOTE    Date: 12/21/2022  Time: 5:40 PM    Data:  Pre Wt:     Desired Wt:   kg   Post Wt:    Weight change:   kg  Ultrafiltration - Post Run Net Total Removed (mL): 3000 mL  Vascular Access Status: patent  Dialyzer Rinse: Streaked, Light  Total Blood Volume Processed: 89.72 L Liters  Total Dialysis (Treatment) Time: 4 Hours    Lab:   No    Interventions:  PRN Atarax and OXY given    Assessment:  See MAR, flowsheet, and MD orders for further details     Plan:    Per Renal

## 2022-12-21 NOTE — PROGRESS NOTES
Nephrology Progress Note  12/21/2022         Shay Jimenez is a 57 yom with ESRD since 4/2019 due to Ca Phos deposition and HTN, runs at [facility] ([phone], fax [phone]) under care of Dr Cline.  Had planned neurosurgery revision for C5-C6 on 12/6 as screw had dislodged.  Nephrology consulted for management of dialysis post op.       Interval History :   Mr Jimenez had day off HD yesterday, seen on HD today on MWF schedule.  Pulling 3L of UF to keep up with intake since last run.  No issues on runs, next planned run 12/23, remains confused but cooperative today.       Assessment & Recommendations:   ESRD-Since 4/2019 due to Ca Phos deposition and HTN, runs at [facility] ([phone], fax [phone]) under care of Dr Cline.  Runs MWF via [facility], 4.25h runs.                  -HD today, 3L of UF.                  -Will decide on runs daily depending on chemistries and hemodynamics.                  -Continuing long term HD, no need for new consent.                  -Is eventually pursuing renal transplant through [place] once acute issues requiring neurosurgery are done.       Volume-EDW 130kg, bed wt ~135kg today, pulling 3L.      Electrolytes-K 4.0 bicarb 25, Na 134, HD today.        BMD-Ca 9.2, Mg and Phos WNL.       Neurosurgery-Had planned revision of previous screws at C5-C6 on 12/6.       Anemia-Hgb 9.8, giving 8k epo with runs.  Iron sats 24% on 12/14      Nutrition-Deferred.       Time spent: 30 minutes on this date of encounter for chart review, physical exam, medical decision making and co-ordination of care.      Discussed with Dr Worthington     Recommendations were communicated to primary team via verbal communication.        ENID Shirley CNS  Clinical Nurse Specialist  [phone]      Review of Systems:   I reviewed the following systems:  Gen: No fevers or chills  CV: No CP at rest  Resp: No SOB at rest  GI: No N/V    Physical Exam:   I/O last 3 completed  "shifts:  In: 700 [P.O.:600; I.V.:100]  Out: -    /67   Pulse 78   Temp 98.1  F (36.7  C) (Axillary)   Resp 16   Ht 1.854 m (6' 1\")   Wt 133.7 kg (294 lb 12.1 oz)   SpO2 94%   BMI 38.89 kg/m       GENERAL APPEARANCE: alert and no distress, moderate confusion  EYES: No scleral icterus  NECK:  No JVD  Pulmonary: lungs clear to auscultation with equal breath sounds bilaterally, no clubbing or cyanosis  CV: Regular rhythm, normal rate, no rub   - Edema none  GI: soft, nontender, normal bowel sounds  MS: no evidence of inflammation in joints, no muscle tenderness  : No Oliveira  SKIN: no rash, warm, dry  NEURO: Delirious but somewhat redirectable.      Labs:   All labs reviewed by me  Electrolytes/Renal -   Recent Labs   Lab Test 12/21/22  0647 12/20/22  0618 12/19/22  1000 12/15/22  1642 12/14/22  0422 12/13/22  0502   * 134* 128*   < > 132* 137   POTASSIUM 4.9 4.0 4.8   < > 3.8 3.6   CHLORIDE 95* 96* 90*   < > 94* 98   CO2 25 25 22   < > 23 25   BUN 33.7* 24.6* 43.7*   < > 33.2* 21.0*   CR 5.89* 4.86* 6.95*   < > 5.64* 4.41*   GLC 92 96 93   < > 80 96   MAC 9.2 9.0 8.6   < > 8.7 9.0   MAG 2.0 1.9 1.9  --  1.7 1.7   PHOS  --   --  6.2*  --  3.8 2.6    < > = values in this interval not displayed.       CBC -   Recent Labs   Lab Test 12/21/22  0647 12/20/22  0618 12/19/22  1000   WBC 7.2 6.9 8.9   HGB 9.8* 9.6* 9.8*    277 277       LFTs -   Recent Labs   Lab Test 12/11/22  0528 10/06/22  1215 09/29/22  1345 08/18/22  1435 07/21/22  1252 07/16/22  0557   ALKPHOS 146*  --   --   --  131 134   BILITOTAL 0.4  --   --   --  0.6 0.5   ALT <5*  --   --   --  9 <6   AST 18 31 35   < > 17 13   PROTTOTAL 5.9*  --   --   --  6.7* 6.5*   ALBUMIN 3.1*  --   --   --  3.0* 2.8*    < > = values in this interval not displayed.       Iron Panel -   Recent Labs   Lab Test 12/14/22  0634 07/29/22  0600   IRON 43* 44   IRONSAT 24 20   JACKELINE  --  626*           Current Medications:    sodium chloride 0.9%  250 mL " Intravenous Once in dialysis/CRRT     sodium chloride 0.9%  300 mL Hemodialysis Machine Once     acetaminophen  975 mg Oral TID     apixaban ANTICOAGULANT  2.5 mg Oral BID     atorvastatin  20 mg Oral At Bedtime     Baclofen  10 mg Oral BID     ceFAZolin  2 g Intravenous Daily     cetirizine  10 mg Oral Daily     cyanocobalamin  500 mcg Oral Daily     epoetin mar-epbx  10,000 Units Intravenous Once in dialysis/CRRT     finasteride  1.3 mg Oral Daily     fluticasone  1 spray Both Nostrils BID     folic acid  1 mg Oral Daily     gabapentin  300 mg Oral Q8H     heparin  500 Units Hemodialysis Machine OR IV Push Once in dialysis/CRRT     magnesium plus protein  133 mg Oral Daily     methocarbamol  500 mg Oral 4x Daily     midodrine  10 mg Oral TID w/meals     multivitamin RENAL  1 tablet Oral Daily     pantoprazole  40 mg Oral QAM AC     polyethylene glycol  17 g Oral TID     vitamin B6  100 mg Oral Daily     QUEtiapine  25 mg Oral Daily     QUEtiapine  50 mg Oral At Bedtime     ramelteon  8 mg Oral At Bedtime     senna-docusate  1 tablet Oral BID     sertraline  100 mg Oral Daily     sevelamer carbonate  1,600 mg Oral TID w/meals     sodium chloride (PF)  3 mL Intracatheter Q8H     sodium chloride (PF)  9 mL Intracatheter During Dialysis/CRRT (from stock)     sodium chloride (PF)  9 mL Intracatheter During Dialysis/CRRT (from stock)     thiamine  100 mg Oral Daily       heparin (porcine)

## 2022-12-21 NOTE — PROGRESS NOTES
Care Management Follow Up    Length of Stay (days): 15    Expected Discharge Date: 12/19/2022     Concerns to be Addressed: all concerns addressed in this encounter, care coordination/care conferences, discharge planning       Patient plan of care discussed at interdisciplinary rounds: Yes    Anticipated Discharge Disposition: Skilled Nursing Facility, Transitional Care     Anticipated Discharge Services: None    Anticipated Discharge DME: None    Patient/family educated on Medicare website which has current facility and service quality ratings: Yes    Education Provided on the Discharge Plan: Yes     Patient/Family in Agreement with the Plan: Yes    Referrals Placed by CM/SW: Post Acute Facilities    Private pay costs discussed: Not applicable    Additional Information:    Pending Referrals    [facility]  [address]  [facility] [address]  [phone]  12/20: Referral Sent  12/21: Message left with admissions requesting a call back regarding referral status.    [facility]  [address] [facility] [address]  [phone]  12/20: Referral Sent  12/21: Writer left message with admissions requesting a call back regarding referral status.    [facility]  [address] [facility] [address]  [phone]  12/20: Referral Sent  12/21: Writer spoke with admissions. They have not reviewed referral, but will review and call writer back.    [facility] [place] [address] [facility] [address]  [phone]  12/20: Referral Sent  12/21: Message left with admissions requesting a call back regarding referral status.    Declined Referrals    [facility]  [address] [facility] [address]  [phone]  12/20: Referral Sent  12/21: Declined due to not having any available beds.    [facility]  [address] [facility] [address]  [phone]  12/20: Referral Sent  12/21: Declined due to not being in patient's insurance network.    [facility]  [facility]  [address] [facility] [address]  [phone]  12/20: Referral Sent  12/21: Declined due to they only take LTC patients with mental health diagnoses.    [facility]  [address] [facility] [address]  [phone]  12/20: Referral Sent  12/21: Declined in Epic, no reason given.    [place] [address] [facility] [address]  [phone]  12/20: Referral Sent  12/21: Declined due to not taking patient's insurance.    MOUNA Wilson, MSW  Adult Acute Care Float   Pager [phone]

## 2022-12-22 ENCOUNTER — APPOINTMENT (OUTPATIENT)
Dept: PHYSICAL THERAPY | Facility: CLINIC | Age: 57
DRG: 459 | End: 2022-12-22
Attending: STUDENT IN AN ORGANIZED HEALTH CARE EDUCATION/TRAINING PROGRAM
Payer: MEDICARE

## 2022-12-22 LAB
ANION GAP SERPL CALCULATED.3IONS-SCNC: 15 MMOL/L (ref 7–15)
BUN SERPL-MCNC: 17.1 MG/DL (ref 6–20)
CALCIUM SERPL-MCNC: 9.5 MG/DL (ref 8.6–10)
CHLORIDE SERPL-SCNC: 95 MMOL/L (ref 98–107)
CREAT SERPL-MCNC: 4.22 MG/DL (ref 0.67–1.17)
DEPRECATED HCO3 PLAS-SCNC: 25 MMOL/L (ref 22–29)
ERYTHROCYTE [DISTWIDTH] IN BLOOD BY AUTOMATED COUNT: 16.1 % (ref 10–15)
GFR SERPL CREATININE-BSD FRML MDRD: 16 ML/MIN/1.73M2
GLUCOSE SERPL-MCNC: 89 MG/DL (ref 70–99)
HCT VFR BLD AUTO: 30.8 % (ref 40–53)
HGB BLD-MCNC: 9.5 G/DL (ref 13.3–17.7)
MAGNESIUM SERPL-MCNC: 1.6 MG/DL (ref 1.7–2.3)
MCH RBC QN AUTO: 30.6 PG (ref 26.5–33)
MCHC RBC AUTO-ENTMCNC: 30.8 G/DL (ref 31.5–36.5)
MCV RBC AUTO: 99 FL (ref 78–100)
PLATELET # BLD AUTO: 267 10E3/UL (ref 150–450)
POTASSIUM SERPL-SCNC: 4 MMOL/L (ref 3.4–5.3)
RBC # BLD AUTO: 3.1 10E6/UL (ref 4.4–5.9)
SODIUM SERPL-SCNC: 135 MMOL/L (ref 136–145)
WBC # BLD AUTO: 6.6 10E3/UL (ref 4–11)

## 2022-12-22 PROCEDURE — 250N000013 HC RX MED GY IP 250 OP 250 PS 637: Performed by: NURSE PRACTITIONER

## 2022-12-22 PROCEDURE — 83735 ASSAY OF MAGNESIUM: CPT | Performed by: NURSE PRACTITIONER

## 2022-12-22 PROCEDURE — 250N000013 HC RX MED GY IP 250 OP 250 PS 637

## 2022-12-22 PROCEDURE — 250N000011 HC RX IP 250 OP 636: Performed by: NURSE PRACTITIONER

## 2022-12-22 PROCEDURE — 82310 ASSAY OF CALCIUM: CPT | Performed by: NURSE PRACTITIONER

## 2022-12-22 PROCEDURE — 99232 SBSQ HOSP IP/OBS MODERATE 35: CPT | Performed by: STUDENT IN AN ORGANIZED HEALTH CARE EDUCATION/TRAINING PROGRAM

## 2022-12-22 PROCEDURE — 85027 COMPLETE CBC AUTOMATED: CPT | Performed by: NURSE PRACTITIONER

## 2022-12-22 PROCEDURE — 97530 THERAPEUTIC ACTIVITIES: CPT | Mod: GP | Performed by: PHYSICAL THERAPIST

## 2022-12-22 PROCEDURE — 120N000002 HC R&B MED SURG/OB UMMC

## 2022-12-22 PROCEDURE — 36415 COLL VENOUS BLD VENIPUNCTURE: CPT | Performed by: NURSE PRACTITIONER

## 2022-12-22 RX ADMIN — MIDODRINE HYDROCHLORIDE 10 MG: 5 TABLET ORAL at 12:06

## 2022-12-22 RX ADMIN — METHOCARBAMOL 500 MG: 500 TABLET ORAL at 12:06

## 2022-12-22 RX ADMIN — Medication 100 MG: at 21:17

## 2022-12-22 RX ADMIN — ATORVASTATIN CALCIUM 20 MG: 20 TABLET, FILM COATED ORAL at 21:17

## 2022-12-22 RX ADMIN — METHOCARBAMOL 500 MG: 500 TABLET ORAL at 08:01

## 2022-12-22 RX ADMIN — QUETIAPINE 25 MG: 25 TABLET ORAL at 08:01

## 2022-12-22 RX ADMIN — FOLIC ACID 1 MG: 1 TABLET ORAL at 08:01

## 2022-12-22 RX ADMIN — SEVELAMER CARBONATE 1600 MG: 800 TABLET, FILM COATED ORAL at 08:00

## 2022-12-22 RX ADMIN — SEVELAMER CARBONATE 1600 MG: 800 TABLET, FILM COATED ORAL at 12:06

## 2022-12-22 RX ADMIN — BACLOFEN 10 MG: 10 TABLET ORAL at 21:18

## 2022-12-22 RX ADMIN — OXYCODONE HYDROCHLORIDE 5 MG: 5 TABLET ORAL at 18:10

## 2022-12-22 RX ADMIN — METHOCARBAMOL 500 MG: 500 TABLET ORAL at 21:18

## 2022-12-22 RX ADMIN — GABAPENTIN 300 MG: 300 CAPSULE ORAL at 14:04

## 2022-12-22 RX ADMIN — BACLOFEN 10 MG: 10 TABLET ORAL at 08:01

## 2022-12-22 RX ADMIN — Medication 133 MG: at 08:01

## 2022-12-22 RX ADMIN — RAMELTEON 8 MG: 8 TABLET, FILM COATED ORAL at 21:17

## 2022-12-22 RX ADMIN — FINASTERIDE 1.3 MG: 5 TABLET, FILM COATED ORAL at 08:02

## 2022-12-22 RX ADMIN — OXYCODONE HYDROCHLORIDE 5 MG: 5 TABLET ORAL at 01:59

## 2022-12-22 RX ADMIN — SENNOSIDES AND DOCUSATE SODIUM 1 TABLET: 8.6; 5 TABLET ORAL at 21:17

## 2022-12-22 RX ADMIN — CETIRIZINE HYDROCHLORIDE 10 MG: 10 TABLET, FILM COATED ORAL at 08:00

## 2022-12-22 RX ADMIN — B-COMPLEX W/ C & FOLIC ACID TAB 1 MG 1 TABLET: 1 TAB at 08:00

## 2022-12-22 RX ADMIN — SEVELAMER CARBONATE 1600 MG: 800 TABLET, FILM COATED ORAL at 18:03

## 2022-12-22 RX ADMIN — SERTRALINE HYDROCHLORIDE 100 MG: 100 TABLET ORAL at 08:02

## 2022-12-22 RX ADMIN — ACETAMINOPHEN 975 MG: 325 TABLET, FILM COATED ORAL at 21:17

## 2022-12-22 RX ADMIN — METHOCARBAMOL 500 MG: 500 TABLET ORAL at 18:03

## 2022-12-22 RX ADMIN — SENNOSIDES AND DOCUSATE SODIUM 1 TABLET: 8.6; 5 TABLET ORAL at 08:00

## 2022-12-22 RX ADMIN — CEFAZOLIN SODIUM 2 G: 2 INJECTION, SOLUTION INTRAVENOUS at 18:53

## 2022-12-22 RX ADMIN — MIDODRINE HYDROCHLORIDE 10 MG: 5 TABLET ORAL at 08:02

## 2022-12-22 RX ADMIN — GABAPENTIN 300 MG: 300 CAPSULE ORAL at 05:10

## 2022-12-22 RX ADMIN — CYANOCOBALAMIN TAB 500 MCG 500 MCG: 500 TAB at 08:00

## 2022-12-22 RX ADMIN — ACETAMINOPHEN 975 MG: 325 TABLET, FILM COATED ORAL at 08:03

## 2022-12-22 RX ADMIN — QUETIAPINE 50 MG: 25 TABLET ORAL at 21:17

## 2022-12-22 RX ADMIN — APIXABAN 2.5 MG: 2.5 TABLET, FILM COATED ORAL at 08:01

## 2022-12-22 RX ADMIN — ACETAMINOPHEN 975 MG: 325 TABLET, FILM COATED ORAL at 14:04

## 2022-12-22 RX ADMIN — MIDODRINE HYDROCHLORIDE 10 MG: 5 TABLET ORAL at 18:03

## 2022-12-22 RX ADMIN — OXYCODONE HYDROCHLORIDE 5 MG: 5 TABLET ORAL at 14:04

## 2022-12-22 RX ADMIN — OXYCODONE HYDROCHLORIDE 5 MG: 5 TABLET ORAL at 21:21

## 2022-12-22 RX ADMIN — POLYETHYLENE GLYCOL 3350 17 G: 17 POWDER, FOR SOLUTION ORAL at 14:04

## 2022-12-22 RX ADMIN — GABAPENTIN 300 MG: 300 CAPSULE ORAL at 21:18

## 2022-12-22 RX ADMIN — PANTOPRAZOLE SODIUM 40 MG: 40 TABLET, DELAYED RELEASE ORAL at 08:01

## 2022-12-22 RX ADMIN — APIXABAN 2.5 MG: 2.5 TABLET, FILM COATED ORAL at 21:18

## 2022-12-22 RX ADMIN — Medication 100 MG: at 08:34

## 2022-12-22 ASSESSMENT — ACTIVITIES OF DAILY LIVING (ADL)
ADLS_ACUITY_SCORE: 49
ADLS_ACUITY_SCORE: 49
ADLS_ACUITY_SCORE: 53
ADLS_ACUITY_SCORE: 53
ADLS_ACUITY_SCORE: 49
ADLS_ACUITY_SCORE: 53
ADLS_ACUITY_SCORE: 49

## 2022-12-22 NOTE — PROGRESS NOTES
[facility] [place]   Neurosurgery Progress Note:    Assessment: Shay Jimenez is a 57 year old male with with PMH ESRD on dialysis and osteomyelitis s/p C5-T6 fusion who is now POD#15 s/p redo C5-T6 fusion with concern for hemorrhage; no vertebral artery dissection or extravasation was noted on intraoperative angiogram.  He was admitted to the Neuro ICU postoperatively for MAP goals and frequent neuro checks, remaining stable on pressors.  Neuro exam is unchanged from patient's baseline.    Plan:  - Sutures removed partially 12.20.2022  - Appreciate Medicine assistance   - Serial neuro exams  - Pain control  - Hb goal > 7  - Normonatremia  - Goal normotension  - Appreciate Nephrology recommendations re: dialysis (baseline M/W/F)  - Daily dressings  - On ancef for ?incisional superficial infection  - Cervical collar when HOB >45,  on when OOB  - Regular diet  - Bowel regimen  - PRN antiemetics  - PT/OT  - SCDs and subcutaneous heparin for DVT proph  - Floor status  - Eliquis restarted- 12.20.2022  - Psychiatry consult for capacity evaluation- completed  -----------------------------------  Jonny Stephenson  Neurosurgery Resident PGY2    Please contact neurosurgery resident on call with questions.    Dial * * [phone], enter [phone] when prompted.    Interval History/Subjective: No acute events overnight.     Objective:   Temp:  [97.6  F (36.4  C)-98.2  F (36.8  C)] (P) 98.2  F (36.8  C)  Pulse:  [61-91] 91  Resp:  [14-17] 14  BP: ()/() 106/64  SpO2:  [85 %-98 %] 97 %  I/O last 3 completed shifts:  In: 420 [P.O.:320; I.V.:100]  Out: 3000 [Other:3000]    Gen: Appears comfortable, NAD  Wound: Incision, clean, dry, intact without strikethrough  Neurologic:  - Alert & Oriented to person, place, time, and situation  - Follows commands briskly  - Speech fluent, spontaneous. No aphasia or dysarthria.  - No gaze preference. No apparent hemineglect.  - PERRL, EOMI  - Strong brow raise,  eye closure, and smile  - Face symmetric with sensation intact to light touch  - Trapezii and sternocleidomastoid muscles 5/5 bilaterally  - No pronator drift     Del Tr Bi WE WF Gr   R 4* 5 5 5 5 5   L 4* 5 5 5 5 5    HF KE KF DF PF EHL   R 4+ 5 5 5 5 5   L 4+ 5 5 5 5 5   *pain-limited    Norman's and clonus negative.    Sensation intact and symmetric to light touch throughout    LABS  Recent Labs   Lab Test 12/22/22 0521 12/21/22 0647 12/20/22 0618   WBC 6.6 7.2 6.9   HGB 9.5* 9.8* 9.6*   MCV 99 102* 101*    282 277       Recent Labs   Lab Test 12/22/22 0521 12/21/22 0647 12/20/22 0618   * 134* 134*   POTASSIUM 4.0 4.9 4.0   CHLORIDE 95* 95* 96*   CO2 25 25 25   BUN 17.1 33.7* 24.6*   CR 4.22* 5.89* 4.86*   ANIONGAP 15 14 13   MAC 9.5 9.2 9.0   GLC 89 92 96       IMAGING  No results found for this or any previous visit (from the past 24 hour(s)).

## 2022-12-22 NOTE — PROGRESS NOTES
[facility]    Medicine Progress Note - Hospitalist Service, GOLD TEAM 12    Date of Admission:  12/6/2022    Assessment & Plan   Shay Jimenez is a 57 year old male admitted on 12/6/2022 with hx of HTN, HLD, afib, ESRD on HD, peripheral neuropathy, GINI, GERD, and depression admitted for redo C5-T6 fusion, now been co managed with medicine.     Today's plan   -repeat blood gases from 12/20 appears better   -Reviewed am labs     Superficial would infection noted by nrg 12/20  Started cefazolin by nrg 12/20-12/26, Plan for 7 day course     Cervical Myelopathy  Chronic Osteomyelitis  C5-T6 spinal fusion complicated by hardware displacement  Admitted [facility] 12/6, underwent redo of C5-T6 fusion  Pt working with PT/OT, sig assistance needs. Appears to be reluctant for TCU. Concern for some intraoperative hemmorhage but intra-operative angio was re-assuring. Subsequent CT of C-spine and head w/o acute ab, good alignment. Hx of osteo w/o recent abx needs  - Cares per neuro surg  - Heavy care needs that seem unlikely to be met at home  - Cont to work with patient, work with therapy. Perhaps he can get to a status where home with assist will feel reasonable.      Acute on chroninc hypoxic-hypercarbic  resp failure, stable  On room air this AM. On dialysis. For volume management. Suspect mild hypervolemia (probbaly chronically so).Does not use CPAP at home (though he should). VBG on 12/16 demonstrated slightly low pH and pc02 of 66.   - Titrate 02 for goal > 90%  - Cont ovenright 02 as this is used at home for GINI given intolreance of CPAP. If concerns for hypercarbia as cause for delirium/encehalopathy, would favor ordering and seeing if RT can assist with mask that he may tolerate    Acute on chronic pain  Peripheral neuropathy  Improving, decreaing oxy over the last 24 hours  - Would consider increase in acetaminophen dose to 975 (ordered)  - Cont baclofen and gabapentin  -  Cont methocarbamol  - Cont bowl regimen     Hypervolemic hyponatremia, stable and chronic  He appears asymptomatic Review shows that he has sodium that vascilates upper 120's-low 130's. Nephrology aware, cont on dialysis  - Consider strict intake monitoring to get sense of patient drinking  - Would see if he can toleated a 2000 mls fluid restriction     ESRD on dialysis  Nephrology following  Tolerating usual schedule      Alcohol abuse  ICU/hospital Delirium  Hepatomegaly  No stigmata of cirrhosis. Mild hypoalbuminemia with nml bilirubin and AST/ALT. CT from Aug 2022 did show hepatomegaly. I do not see formal ab ultrasound. Last drink well over one week now. No physiologic evidence of alc withdrawal at this time.     Discontinued  CIWA 12/19. Would not recommend resumption of home Xanax without strong compelling evidence severe anxiety.   - Pt is on gabapentin and baclofen. Both of these have been used to help reduce cravings in pt with alcohol abuse.  - as patient improves. We can consider increasing one of these if patient agreeable  - Naltrexone may not be safe in the setting of ESRD  - Agree with cont of thiamine and folate   - Cont Seroquel 25 / 50 (am / pm)  and continue adjunctive remalteon  Mild/Mod pulm HTN  Moderate MR  GINI  Pt with ECHO in earlier 2022 with MR and elevated right sided pressures. No signs of sig right heart dysfunction. Not on CPAP as he has not toleated.  - Cont nocturla 02     Chronic atrial fibrillation  On eliquis PTA.            Diet: Advance Diet as Tolerated: Regular Diet Adult  Fluid restriction 2000 ML FLUID    DVT Prophylaxis: Apixaban for dvt ppx   Oliveira Catheter: Not present  Central Lines: None  Cardiac Monitoring: None  Code Status: Full Code      Disposition Plan         The patient's care was discussed with the Bedside Nurse and Patient.    Michael Edward MD  Hospitalist Service, GOLD TEAM [address] [place] [address]  Securely message with  "the [employer] Web Console (learn more here)  Text page via [facility] Paging/Directory   Please see signed in provider for up to date coverage information      Clinically Significant Risk Factors         # Hyponatremia: Lowest Na = 134 mmol/L in last 2 days, will monitor as appropriate    # Hypomagnesemia: Lowest Mg = 1.6 mg/dL in last 2 days, will replace as needed   # Hypoalbuminemia: Lowest albumin = 3.1 g/dL at 12/11/2022  5:28 AM, will monitor as appropriate           # Obesity: Estimated body mass index is 37.87 kg/m  as calculated from the following:    Height as of this encounter: 1.854 m (6' 1\").    Weight as of this encounter: 130.2 kg (287 lb 0.6 oz).          ______________________________________________________________________    Interval History   Patient has no new symptoms, Mild headache overnight, no new abdominal pain, difficulty urinating, fevers or malaise     Data reviewed today: I reviewed all medications, new labs and imaging results over the last 24 hours. I personally reviewed no images or EKG's today.    Physical Exam   Vital Signs: Temp: 97.6  F (36.4  C) Temp src: Oral BP: 106/64 Pulse: 91   Resp: 14 SpO2: 97 % O2 Device: Nasal cannula Oxygen Delivery: 2 LPM  Weight: 287 lbs .62 oz  Constitutional: awake, alert, cooperative, no apparent distress, and appears stated age  Eyes: Lids and lashes normal, pupils equal, round and reactive to light, extra ocular muscles intact, sclera clear, conjunctiva normal  ENT: Normocephalic, without obvious abnormality, atraumatic, sinuses nontender on palpation, external ears without lesions, oral pharynx with moist mucous membranes, tonsils without erythema or exudates, gums normal and good dentition.  Hematologic / Lymphatic: no cervical lymphadenopathy  Respiratory: No increased work of breathing, good air exchange, clear to auscultation bilaterally, no crackles or wheezing  Cardiovascular: Normal apical impulse, regular rate and rhythm, normal S1 and S2, no " S3 or S4, and no murmur noted  GI: No scars, normal bowel sounds, soft, non-distended, non-tender, no masses palpated, no hepatosplenomegally  Genitounirinary:   Skin: no bruising or bleeding  Musculoskeletal: There is no redness, warmth, or swelling of the joints.  Full range of motion noted.  Motor strength is 5 out of 5 all extremities bilaterally.  Tone is normal.  Neurologic: Awake, alert, oriented to name, place and time.  Cranial nerves II-XII are grossly intact.  Motor is 5 out of 5 bilaterally.  Cerebellar finger to nose, heel to shin intact.  Sensory is intact.  Babinski down going, Romberg negative, and gait is normal.  Neuropsychiatric: General: normal, calm and normal eye contact    Data   Recent Labs   Lab 12/22/22  0521 12/21/22  0647 12/20/22  0618   WBC 6.6 7.2 6.9   HGB 9.5* 9.8* 9.6*   MCV 99 102* 101*    282 277   * 134* 134*   POTASSIUM 4.0 4.9 4.0   CHLORIDE 95* 95* 96*   CO2 25 25 25   BUN 17.1 33.7* 24.6*   CR 4.22* 5.89* 4.86*   ANIONGAP 15 14 13   MAC 9.5 9.2 9.0   GLC 89 92 96     No results found for this or any previous visit (from the past 24 hour(s)).  Medications       acetaminophen  975 mg Oral TID     apixaban ANTICOAGULANT  2.5 mg Oral BID     atorvastatin  20 mg Oral At Bedtime     Baclofen  10 mg Oral BID     ceFAZolin  2 g Intravenous Daily     cetirizine  10 mg Oral Daily     cyanocobalamin  500 mcg Oral Daily     finasteride  1.3 mg Oral Daily     fluticasone  1 spray Both Nostrils BID     folic acid  1 mg Oral Daily     gabapentin  300 mg Oral Q8H     magnesium plus protein  133 mg Oral Daily     methocarbamol  500 mg Oral 4x Daily     midodrine  10 mg Oral TID w/meals     multivitamin RENAL  1 tablet Oral Daily     pantoprazole  40 mg Oral QAM AC     polyethylene glycol  17 g Oral TID     vitamin B6  100 mg Oral Daily     QUEtiapine  25 mg Oral Daily     QUEtiapine  50 mg Oral At Bedtime     ramelteon  8 mg Oral At Bedtime     senna-docusate  1 tablet Oral BID      sertraline  100 mg Oral Daily     sevelamer carbonate  1,600 mg Oral TID w/meals     sodium chloride (PF)  3 mL Intracatheter Q8H     thiamine  100 mg Oral Daily

## 2022-12-22 NOTE — PROGRESS NOTES
Neurocritical Care Multi-Disciplinary Note    Reason for critical care admission: Post op C5-T6 fusion  Admitting Team: neurosurgery  Date of Service:  12/22/2022  Date of Admission:  12/6/2022  Hospital Day: 17    Patient condition reviewed and discussed while on multidisciplinary rounds today.   Please note these minor interventions that were initiated:  1. None    The Neurocritical Care service will continue to follow peripherally while the patient is within the ICU. We are readily available should issues arise. Please feel free to contact us for critical care issues with which we may be of assistance. For all other concerns, please contact primary service first.     Please contact NCC team phone at *[phone]    ENID Asif, CNP  Neurocritical Care  *[phone]  Text Page

## 2022-12-22 NOTE — PLAN OF CARE
Goal Outcome Evaluation:    Major Shift Events:  None.  Neuro: Patient is A&OX4, is talking to the TV and having visual hallucination and unable to sleep for extended time, slept for 2hrs max.   Resp: On RA while awake but at HS he is using NC @2L and Sat O2=95s%.    CV: MAP>65, afebrile and not on tele.  GI/:Regular diet with great appetite,  incontinent of urine and stool.  On HD on MWF.  Skin: Back neck incision open to air and mid back primopore dressing is c/d/i.  Plan: Will continueto monitor and notify MD of status change.    For vital signs and complete assessments, please see documentation flowsheets.

## 2022-12-22 NOTE — PLAN OF CARE
"/78 (BP Location: Right arm)   Pulse 75   Temp 96.8  F (36  C) (Oral)   Resp 20   Ht 1.854 m (6' 1\")   Wt 130.2 kg (287 lb 0.6 oz)   SpO2 95%   BMI 37.87 kg/m     Reason for admission: POD 15 C5-T6 fusion c/b hemorrhage  Neuro: AOX4, forgetful, delirious at times, q4h neuro checks currently intact  Pain: c/o moderate pain in c spine  CMS: baseline neuropathy in BLE, small tremor in hands  Cardiac: WDL  Resp: O2>90 on RA  GI/: oliguric on HD MWF, incontinent of BM  Skin/Wound: c spine incision, blanchable redness on back, L heel wound  Access: PIV SL, L AV fistula  Activity: Heavy assist of 2-3 with gait belt to pivot, brace on when OOB, upright in chair most of shift  Nutrition: Reg 2L FLR  Changes this shift: tx from ICU to 7B at 1230  Plan: Monitor neuro status, encourage activity                        "

## 2022-12-23 ENCOUNTER — APPOINTMENT (OUTPATIENT)
Dept: OCCUPATIONAL THERAPY | Facility: CLINIC | Age: 57
DRG: 459 | End: 2022-12-23
Attending: STUDENT IN AN ORGANIZED HEALTH CARE EDUCATION/TRAINING PROGRAM
Payer: MEDICARE

## 2022-12-23 LAB
ANION GAP SERPL CALCULATED.3IONS-SCNC: 15 MMOL/L (ref 7–15)
BUN SERPL-MCNC: 28.4 MG/DL (ref 6–20)
CALCIUM SERPL-MCNC: 9.6 MG/DL (ref 8.6–10)
CHLORIDE SERPL-SCNC: 94 MMOL/L (ref 98–107)
CREAT SERPL-MCNC: 5.57 MG/DL (ref 0.67–1.17)
DEPRECATED HCO3 PLAS-SCNC: 22 MMOL/L (ref 22–29)
ERYTHROCYTE [DISTWIDTH] IN BLOOD BY AUTOMATED COUNT: 16.5 % (ref 10–15)
GFR SERPL CREATININE-BSD FRML MDRD: 11 ML/MIN/1.73M2
GLUCOSE SERPL-MCNC: 87 MG/DL (ref 70–99)
HCT VFR BLD AUTO: 32 % (ref 40–53)
HGB BLD-MCNC: 9.6 G/DL (ref 13.3–17.7)
MAGNESIUM SERPL-MCNC: 1.8 MG/DL (ref 1.7–2.3)
MCH RBC QN AUTO: 31 PG (ref 26.5–33)
MCHC RBC AUTO-ENTMCNC: 30 G/DL (ref 31.5–36.5)
MCV RBC AUTO: 103 FL (ref 78–100)
PLATELET # BLD AUTO: 293 10E3/UL (ref 150–450)
POTASSIUM SERPL-SCNC: 4.4 MMOL/L (ref 3.4–5.3)
RBC # BLD AUTO: 3.1 10E6/UL (ref 4.4–5.9)
SODIUM SERPL-SCNC: 131 MMOL/L (ref 136–145)
WBC # BLD AUTO: 7.1 10E3/UL (ref 4–11)

## 2022-12-23 PROCEDURE — 250N000013 HC RX MED GY IP 250 OP 250 PS 637: Performed by: NURSE PRACTITIONER

## 2022-12-23 PROCEDURE — 82310 ASSAY OF CALCIUM: CPT | Performed by: NURSE PRACTITIONER

## 2022-12-23 PROCEDURE — 85027 COMPLETE CBC AUTOMATED: CPT | Performed by: NURSE PRACTITIONER

## 2022-12-23 PROCEDURE — 634N000001 HC RX 634: Performed by: CLINICAL NURSE SPECIALIST

## 2022-12-23 PROCEDURE — 90935 HEMODIALYSIS ONE EVALUATION: CPT

## 2022-12-23 PROCEDURE — 258N000003 HC RX IP 258 OP 636: Performed by: CLINICAL NURSE SPECIALIST

## 2022-12-23 PROCEDURE — 99232 SBSQ HOSP IP/OBS MODERATE 35: CPT | Mod: FS | Performed by: CLINICAL NURSE SPECIALIST

## 2022-12-23 PROCEDURE — 250N000011 HC RX IP 250 OP 636: Performed by: NURSE PRACTITIONER

## 2022-12-23 PROCEDURE — 83735 ASSAY OF MAGNESIUM: CPT | Performed by: NURSE PRACTITIONER

## 2022-12-23 PROCEDURE — 36415 COLL VENOUS BLD VENIPUNCTURE: CPT | Performed by: NURSE PRACTITIONER

## 2022-12-23 PROCEDURE — 120N000002 HC R&B MED SURG/OB UMMC

## 2022-12-23 PROCEDURE — 97535 SELF CARE MNGMENT TRAINING: CPT | Mod: GO

## 2022-12-23 PROCEDURE — 97110 THERAPEUTIC EXERCISES: CPT | Mod: GO

## 2022-12-23 PROCEDURE — 99232 SBSQ HOSP IP/OBS MODERATE 35: CPT | Performed by: STUDENT IN AN ORGANIZED HEALTH CARE EDUCATION/TRAINING PROGRAM

## 2022-12-23 RX ORDER — HEPARIN SODIUM 1000 [USP'U]/ML
500 INJECTION, SOLUTION INTRAVENOUS; SUBCUTANEOUS CONTINUOUS
Status: CANCELLED | OUTPATIENT
Start: 2022-12-26

## 2022-12-23 RX ADMIN — PANTOPRAZOLE SODIUM 40 MG: 40 TABLET, DELAYED RELEASE ORAL at 06:33

## 2022-12-23 RX ADMIN — QUETIAPINE 50 MG: 25 TABLET ORAL at 22:30

## 2022-12-23 RX ADMIN — B-COMPLEX W/ C & FOLIC ACID TAB 1 MG 1 TABLET: 1 TAB at 18:11

## 2022-12-23 RX ADMIN — BACLOFEN 10 MG: 10 TABLET ORAL at 21:01

## 2022-12-23 RX ADMIN — METHOCARBAMOL 500 MG: 500 TABLET ORAL at 21:01

## 2022-12-23 RX ADMIN — GABAPENTIN 300 MG: 300 CAPSULE ORAL at 22:30

## 2022-12-23 RX ADMIN — SODIUM CHLORIDE 250 ML: 9 INJECTION, SOLUTION INTRAVENOUS at 12:39

## 2022-12-23 RX ADMIN — SERTRALINE HYDROCHLORIDE 100 MG: 100 TABLET ORAL at 18:11

## 2022-12-23 RX ADMIN — RAMELTEON 8 MG: 8 TABLET, FILM COATED ORAL at 22:30

## 2022-12-23 RX ADMIN — CETIRIZINE HYDROCHLORIDE 10 MG: 10 TABLET, FILM COATED ORAL at 18:12

## 2022-12-23 RX ADMIN — CYANOCOBALAMIN TAB 500 MCG 500 MCG: 500 TAB at 18:11

## 2022-12-23 RX ADMIN — SEVELAMER CARBONATE 1600 MG: 800 TABLET, FILM COATED ORAL at 18:11

## 2022-12-23 RX ADMIN — GABAPENTIN 300 MG: 300 CAPSULE ORAL at 14:14

## 2022-12-23 RX ADMIN — QUETIAPINE 25 MG: 25 TABLET ORAL at 18:11

## 2022-12-23 RX ADMIN — ACETAMINOPHEN 975 MG: 325 TABLET, FILM COATED ORAL at 21:01

## 2022-12-23 RX ADMIN — OXYCODONE HYDROCHLORIDE 5 MG: 5 TABLET ORAL at 13:42

## 2022-12-23 RX ADMIN — Medication 100 MG: at 18:11

## 2022-12-23 RX ADMIN — Medication 133 MG: at 18:11

## 2022-12-23 RX ADMIN — MIDODRINE HYDROCHLORIDE 10 MG: 5 TABLET ORAL at 18:11

## 2022-12-23 RX ADMIN — FINASTERIDE 1.3 MG: 5 TABLET, FILM COATED ORAL at 18:12

## 2022-12-23 RX ADMIN — FOLIC ACID 1 MG: 1 TABLET ORAL at 18:11

## 2022-12-23 RX ADMIN — EPOETIN ALFA-EPBX 10000 UNITS: 10000 INJECTION, SOLUTION INTRAVENOUS; SUBCUTANEOUS at 13:22

## 2022-12-23 RX ADMIN — GABAPENTIN 300 MG: 300 CAPSULE ORAL at 06:33

## 2022-12-23 RX ADMIN — CEFAZOLIN SODIUM 2 G: 2 INJECTION, SOLUTION INTRAVENOUS at 18:10

## 2022-12-23 RX ADMIN — ATORVASTATIN CALCIUM 20 MG: 20 TABLET, FILM COATED ORAL at 22:30

## 2022-12-23 RX ADMIN — Medication: at 12:40

## 2022-12-23 RX ADMIN — Medication 100 MG: at 21:02

## 2022-12-23 RX ADMIN — SODIUM CHLORIDE 300 ML: 9 INJECTION, SOLUTION INTRAVENOUS at 12:39

## 2022-12-23 RX ADMIN — OXYCODONE HYDROCHLORIDE 5 MG: 5 TABLET ORAL at 21:12

## 2022-12-23 ASSESSMENT — ACTIVITIES OF DAILY LIVING (ADL)
ADLS_ACUITY_SCORE: 49
ADLS_ACUITY_SCORE: 50
ADLS_ACUITY_SCORE: 49

## 2022-12-23 NOTE — PLAN OF CARE
"Goal Outcome Evaluation:                      BP 98/55 (BP Location: Right arm)   Pulse 74   Temp 97.6  F (36.4  C) (Oral)   Resp 16   Ht 1.854 m (6' 1\")   Wt 130.2 kg (287 lb 0.6 oz)   SpO2 95%   BMI 37.87 kg/m        Status: Stable, VS WDL, admitted for C5 - T6 fusion c/b hemorrhage.   Pain/Nausea: Denies increased pain, denies nausea.   Mobility: Up with assistance, wheelchair bound.   Diet: Reg, adequate intake.   Labs: Reviewed.   LDAs: PIV, SL.   Skin/incisions:  C spine incision, blanchable redness on back, wound on heel. Overall skin look dusky, dialysis fistular on left arm.   Neuro: Alert and aware, able to communicate needs.   Respiratory: WDL, on RA. Denies SOB.   Cardiac: WDL, denies chest pain.   GI/: Incontinent of BM.   New Changes: None.   Plan:  Continue with POC.   "

## 2022-12-23 NOTE — PROGRESS NOTES
HEMODIALYSIS TREATMENT NOTE    Date: 12/23/2022  Time: 5:10 PM    Data:  Pre Wt: 130.2 kg     Desired Wt: 128.2  kg   Post Wt:  128.2 kg  Weight change: 2  kg  Ultrafiltration - Post Run Net Total Removed (mL): 2000 mL  Vascular Access Status: patent  Dialyzer Rinse: Streaked  Total Blood Volume Processed: 85.29 L Liters  Total Dialysis (Treatment) Time: 4 Hours    Lab:   No    Interventions:  EPO  Oxycodone  Gabapentin    Assessment:  Pt ran for 4 hours on a K3/Ca2.25 with 400 BFR/ 600 DFR. Pt finished meal tray and spoke to family on the phone. Pt talks nearly non-stop, sometimes inappropriate, but easily redirectable. Run otherwise uneventful. Pt rinsed back and hemostasis achieved in about 10 minutes. Report given to PCN.     Plan:    Per renal team

## 2022-12-23 NOTE — PROGRESS NOTES
[facility], [place]   Neurosurgery Progress Note:    Assessment: Shay Jimenez is a 57 year old male with with PMH ESRD on dialysis and osteomyelitis s/p C5-T6 fusion who is now POD#16 s/p redo C5-T6 fusion with concern for hemorrhage; no vertebral artery dissection or extravasation was noted on intraoperative angiogram.  He was admitted to the Neuro ICU postoperatively for MAP goals and frequent neuro checks, remaining stable on pressors.  Neuro exam is unchanged from patient's baseline.    Plan:  - All remaining staples now removed 12.23.2022  - Appreciate Medicine assistance   - Serial neuro exams  - Pain control  - Hb goal > 7  - Normonatremia  - Goal normotension  - Appreciate Nephrology recommendations re: dialysis (baseline M/W/F)  - Daily dressings  - On ancef for ?incisional superficial infection  - Cervical collar when HOB >45,  on when OOB  - Regular diet  - Bowel regimen  - PRN antiemetics  - PT/OT  - SCDs and subcutaneous heparin for DVT proph  - Eliquis restarted- 12.20.2022  - Psychiatry consult for capacity evaluation- complete  - Medically ready for disposition  -----------------------------------  Jonny Stephenson  Neurosurgery Resident PGY2    Please contact neurosurgery resident on call with questions.    Dial * * [phone], enter [phone] when prompted.    Interval History/Subjective: No acute events overnight.     Objective:   Temp:  [96.8  F (36  C)-99.2  F (37.3  C)] 97.6  F (36.4  C)  Pulse:  [60-81] 80  Resp:  [14-20] 16  BP: ()/(55-78) 134/67  SpO2:  [95 %-96 %] 96 %  I/O last 3 completed shifts:  In: 1240 [P.O.:1240]  Out: -     Gen: Appears comfortable, NAD  Wound: Incision, clean, dry, intact without strikethrough  Neurologic:  - Alert & Oriented to person, place, time, and situation  - Follows commands briskly  - Speech fluent, spontaneous. No aphasia or dysarthria.  - No gaze preference. No apparent hemineglect.  - PERRL, EOMI  - Strong brow raise, eye  closure, and smile  - Face symmetric with sensation intact to light touch  - Trapezii and sternocleidomastoid muscles 5/5 bilaterally  - No pronator drift     Del Tr Bi WE WF Gr   R 4* 5 5 5 5 5   L 4* 5 5 5 5 5    HF KE KF DF PF EHL   R 4+ 5 5 5 5 5   L 4+ 5 5 5 5 5   *pain-limited    Norman's and clonus negative.    Sensation intact and symmetric to light touch throughout    LABS  Recent Labs   Lab Test 12/23/22  0633 12/22/22  0521 12/21/22  0647   WBC 7.1 6.6 7.2   HGB 9.6* 9.5* 9.8*   * 99 102*    267 282       Recent Labs   Lab Test 12/23/22  0633 12/22/22  0521 12/21/22  0647   * 135* 134*   POTASSIUM 4.4 4.0 4.9   CHLORIDE 94* 95* 95*   CO2 22 25 25   BUN 28.4* 17.1 33.7*   CR 5.57* 4.22* 5.89*   ANIONGAP 15 15 14   MAC 9.6 9.5 9.2   GLC 87 89 92       IMAGING  No results found for this or any previous visit (from the past 24 hour(s)).

## 2022-12-23 NOTE — PLAN OF CARE
Assumed cares from [id number].    Reason for admission: 12/6 for C5-T6 spinal fusion.    VS: stable on room air  Pain/Nausea: reports discomfort at incision, declined medication. Denied nausea  Neuro: A&Ox4 when asked questions, intermittent forgetfulness, inappropriate speech to staff, redirectable. Moderate weakness when moving legs. Has neuropathy and unable to feel nurse touching toes and bottom of feet. Wearing cervical brace per MD.  Cardiac: WDL  Resp: WDL on room air  GI/: +BS, passing flatus, incontinent of bowel, had large BM this shift, oliguric, on HD.  Skin: pressure injury on R heel, covered with mepilex, redness blanchable on sacrum/coccyx, covered with critic paste and meplilex.   Diet: tolerating regular diet. On fluid restriction of 2000 ml/day    Activity: x2-3 with lift device.  LDA: R PIV with NS TKO, L AV fistula  Labs: reviewed    Plan: continue with plan of care.

## 2022-12-23 NOTE — PLAN OF CARE
"BP 96/55 (BP Location: Right arm)   Pulse 81   Temp 97.6  F (36.4  C) (Oral)   Resp 18   Ht 1.854 m (6' 1\")   Wt 130.2 kg (287 lb 0.6 oz)   SpO2 96%   BMI 37.87 kg/m      Time: 5811-2130  Activity: Assist of 3 with a lift to transfer pt from chair to bed   Pain: Back/neck pain pt declined intervention.   Neuro: A&O x4, forgetful at times, N/T baseline per pt  Cardiac: WDL, denies chest pain   Respiratory: On RA, denies SOB   GI/: Incontinent of B&B, +BS,  Diet: Regular  Lines:  PIV infusing TKO, L fistula   Incisions/Drains/Skin:  L heel wound- mepilex applied, C spine incision-CDI, blanchable redness on back.  Lab:  Reviewed      Plan: Continue with POC.         "

## 2022-12-23 NOTE — PROGRESS NOTES
[facility]    Medicine Progress Note - Hospitalist Service, GOLD TEAM 12    Date of Admission:  12/6/2022    Assessment & Plan   Shay Jimenez is a 57 year old male admitted on 12/6/2022 with hx of HTN, HLD, afib, ESRD on HD, peripheral neuropathy, GINI, GERD, and depression admitted for redo C5-T6 fusion, now been co managed with medicine.     Today's plan   -Patient had no new symptoms, he was comfortable and not in distress   -Awaiting on placement   -Transferred to  from SICU    Superficial would infection noted by nrg 12/20  Started cefazolin by nrg 12/20, Plan for 7 day course     Cervical Myelopathy  Chronic Osteomyelitis  C5-T6 spinal fusion complicated by hardware displacement  Admitted [facility] 12/6, underwent redo of C5-T6 fusion  Pt working with PT/OT, sig assistance needs. Appears to be reluctant for TCU. Concern for some intraoperative hemmorhage but intra-operative angio was re-assuring. Subsequent CT of C-spine and head w/o acute ab, good alignment. Hx of osteo w/o recent abx needs  - Cares per neuro surg  - Heavy care needs that seem unlikely to be met at home  - Cont to work with patient, work with therapy. Perhaps he can get to a status where home with assist will feel reasonable.      Acute on chroninc hypoxic-hypercarbic  resp failure, stable  On room air this AM. On dialysis. For volume management. Suspect mild hypervolemia (probbaly chronically so).Does not use CPAP at home (though he should). VBG on 12/16 demonstrated slightly low pH and pc02 of 66.   - Titrate 02 for goal > 90%  - Cont ovenright 02 as this is used at home for GINI given intolreance of CPAP. If concerns for hypercarbia as cause for delirium/encehalopathy, would favor ordering and seeing if RT can assist with mask that he may tolerate    Acute on chronic pain  Peripheral neuropathy  Improving, decreaing oxy over the last 24 hours  - Would consider increase in acetaminophen dose to  975 (ordered)  - Cont baclofen and gabapentin  - Cont methocarbamol  - Cont bowl regimen     Hypervolemic hyponatremia, stable and chronic  He appears asymptomatic Review shows that he has sodium that vascilates upper 120's-low 130's. Nephrology aware, cont on dialysis  - Consider strict intake monitoring to get sense of patient drinking  - Would see if he can tolerated a 2000 mls fluid restriction     ESRD on dialysis  Nephrology following  Tolerating usual schedule      Alcohol abuse  ICU/hospital Delirium  Hepatomegaly  No stigmata of cirrhosis. Mild hypoalbuminemia with nml bilirubin and AST/ALT. CT from Aug 2022 did show hepatomegaly. I do not see formal ab ultrasound. Last drink well over one week now. No physiologic evidence of alc withdrawal at this time     Discontinued  CIWA 12/19. Would not recommend resumption of home Xanax without strong compelling evidence severe anxiety.   - Pt is on gabapentin and baclofen. Both of these have been used to help reduce cravings in pt with alcohol abuse.  - as patient improves. We can consider increasing one of these if patient agreeable  - Naltrexone may not be safe in the setting of ESRD  - Agree with continuing of thiamine and folate  - Cont Seroquel 25 / 50 (am / pm)  and continue adjunctive remalteon     Mild/Mod pulm HTN  Moderate MR  GINI  Pt with ECHO in earlier 2022 with MR and elevated right sided pressures. No signs of sig right heart dysfunction. Not on CPAP as he has not toleated.  - Cont nocturnal 02      Chronic atrial fibrillation  On eliquis PTA            Diet: Fluid restriction 2000 ML FLUID  Regular Diet Adult    DVT Prophylaxis: Apixaban for dvt ppx   Oliveira Catheter: Not present  Central Lines: None  Cardiac Monitoring: None  Code Status: Full Code      Disposition Plan         The patient's care was discussed with the Bedside Nurse and Patient.    Michael Edward MD  Hospitalist Service, GOLD TEAM [address] [place] [address]  "Center  Securely message with the Vocera Web Console (learn more here)  Text page via [facility] Paging/Directory   Please see signed in provider for up to date coverage information      Clinically Significant Risk Factors         # Hyponatremia: Lowest Na = 131 mmol/L in last 2 days, will monitor as appropriate    # Hypomagnesemia: Lowest Mg = 1.6 mg/dL in last 2 days, will replace as needed   # Hypoalbuminemia: Lowest albumin = 3.1 g/dL at 12/11/2022  5:28 AM, will monitor as appropriate           # Obesity: Estimated body mass index is 37.87 kg/m  as calculated from the following:    Height as of this encounter: 1.854 m (6' 1\").    Weight as of this encounter: 130.2 kg (287 lb 0.6 oz).          ______________________________________________________________________    Interval History   Patient has no new symptoms, No new abdominal pain, difficulty urinating, fevers or malaise.     Data reviewed today: I reviewed all medications, new labs and imaging results over the last 24 hours. I personally reviewed no images or EKG's today.    Physical Exam   Vital Signs: Temp: 97.6  F (36.4  C) Temp src: Oral BP: 134/67 Pulse: 80   Resp: 16 SpO2: 96 % O2 Device: None (Room air) Oxygen Delivery: 2 LPM  Weight: 287 lbs .62 oz  Constitutional: awake, alert, cooperative, no apparent distress, and appears stated age  Eyes: Lids and lashes normal, pupils equal, round and reactive to light, extra ocular muscles intact, sclera clear, conjunctiva normal  ENT: Normocephalic, without obvious abnormality, atraumatic, sinuses nontender on palpation, external ears without lesions, oral pharynx with moist mucous membranes, tonsils without erythema or exudates, gums normal and good dentition.  Hematologic / Lymphatic: no cervical lymphadenopathy  Respiratory: No increased work of breathing, good air exchange, clear to auscultation bilaterally, no crackles or wheezing  Cardiovascular: Normal apical impulse, regular rate and rhythm, normal S1 and " S2, no S3 or S4, and no murmur noted  GI: No scars, normal bowel sounds, soft, non-distended, non-tender, no masses palpated, no hepatosplenomegally  Genitounirinary:   Skin: no bruising or bleeding  Musculoskeletal: There is no redness, warmth, or swelling of the joints.  Full range of motion noted.  Motor strength is 5 out of 5 all extremities bilaterally.  Tone is normal.  Neurologic: Awake, alert, oriented to name, place and time.  Cranial nerves II-XII are grossly intact.  Motor is 5 out of 5 bilaterally.  Cerebellar finger to nose, heel to shin intact.  Sensory is intact.  Babinski down going, Romberg negative, and gait is normal.  Neuropsychiatric: General: normal, calm and normal eye contact    Data   Recent Labs   Lab 12/23/22  0633 12/22/22  0521 12/21/22  0647   WBC 7.1 6.6 7.2   HGB 9.6* 9.5* 9.8*   * 99 102*    267 282   * 135* 134*   POTASSIUM 4.4 4.0 4.9   CHLORIDE 94* 95* 95*   CO2 22 25 25   BUN 28.4* 17.1 33.7*   CR 5.57* 4.22* 5.89*   ANIONGAP 15 15 14   MAC 9.6 9.5 9.2   GLC 87 89 92     No results found for this or any previous visit (from the past 24 hour(s)).  Medications       sodium chloride 0.9%  250 mL Intravenous Once in dialysis/CRRT     sodium chloride 0.9%  300 mL Hemodialysis Machine Once     acetaminophen  975 mg Oral TID     apixaban ANTICOAGULANT  2.5 mg Oral BID     atorvastatin  20 mg Oral At Bedtime     Baclofen  10 mg Oral BID     ceFAZolin  2 g Intravenous Daily     cetirizine  10 mg Oral Daily     cyanocobalamin  500 mcg Oral Daily     epoetin mar-epbx  10,000 Units Intravenous Once in dialysis/CRRT     finasteride  1.3 mg Oral Daily     fluticasone  1 spray Both Nostrils BID     folic acid  1 mg Oral Daily     gabapentin  300 mg Oral Q8H     magnesium plus protein  133 mg Oral Daily     methocarbamol  500 mg Oral 4x Daily     midodrine  10 mg Oral TID w/meals     multivitamin RENAL  1 tablet Oral Daily     - MEDICATION INSTRUCTIONS -   Does not apply  Once     pantoprazole  40 mg Oral QAM AC     polyethylene glycol  17 g Oral TID     vitamin B6  100 mg Oral Daily     QUEtiapine  25 mg Oral Daily     QUEtiapine  50 mg Oral At Bedtime     ramelteon  8 mg Oral At Bedtime     senna-docusate  1 tablet Oral BID     sertraline  100 mg Oral Daily     sevelamer carbonate  1,600 mg Oral TID w/meals     sodium chloride (PF)  3 mL Intracatheter Q8H     sodium chloride (PF)  9 mL Intracatheter During Dialysis/CRRT (from stock)     sodium chloride (PF)  9 mL Intracatheter During Dialysis/CRRT (from stock)     thiamine  100 mg Oral Daily

## 2022-12-23 NOTE — PROGRESS NOTES
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  Had planned neurosurgery revision for C5-C6 on 12/6 as screw had dislodged.  Nephrology consulted for management of dialysis post op.       Interval History :   Mr Jimenez had day off HD yesterday, seen on HD today on MWF schedule.  Pulling 3L of UF as usual with run as BP's are up, is at EDW so challenging today.  Plan to give weekend off of HD with next run on 12/26, stable from nephrology standpoint on 3x/week runs.       Assessment & Recommendations:   ESRD-Since 4/2019 due to Ca Phos deposition and HTN, runs at [facility] ([phone], fax [phone]) under care of Dr Cline.  Runs MWF via AVF, 4.25h runs.                  -HD today, 3L of UF.                  -Will decide on runs daily depending on chemistries and hemodynamics.                  -Continuing long term HD, no need for new consent.                  -Is eventually pursuing renal transplant through [facility] once acute issues requiring neurosurgery are done.       Volume-EDW 130kg, bed wt ~130kg today, pulling 1-2L to challenge.      Electrolytes-K 4.4 bicarb 22, Na 131, HD today.        BMD-Ca 9.6, Mg and Phos WNL.       Neurosurgery-Had planned revision of previous screws at C5-C6 on 12/6.       Anemia-Hgb 9.8, giving 8k epo with runs.  Iron sats 24% on 12/14      Nutrition-Deferred.       Time spent: 30 minutes on this date of encounter for chart review, physical exam, medical decision making and co-ordination of care.      Discussed with Dr Worthington     Recommendations were communicated to primary team via verbal communication.        ENID Shirley CNS  Clinical Nurse Specialist  [phone]      Review of Systems:   I reviewed the following systems:  Gen: No fevers or chills  CV: No CP at rest  Resp: No SOB at  "rest  GI: No N/V    Physical Exam:   I/O last 3 completed shifts:  In: 1240 [P.O.:1240]  Out: -    BP 96/55 (BP Location: Right arm)   Pulse 81   Temp 97.6  F (36.4  C) (Oral)   Resp 18   Ht 1.854 m (6' 1\")   Wt 130.2 kg (287 lb 0.6 oz)   SpO2 96%   BMI 37.87 kg/m       GENERAL APPEARANCE: alert and no distress, moderate confusion  EYES: No scleral icterus  NECK:  No JVD  Pulmonary: lungs clear to auscultation with equal breath sounds bilaterally, no clubbing or cyanosis  CV: Regular rhythm, normal rate, no rub   - Edema none  GI: soft, nontender, normal bowel sounds  MS: no evidence of inflammation in joints, no muscle tenderness  : No Oliveira  SKIN: no rash, warm, dry  NEURO: Delirious but somewhat redirectable.      Labs:   All labs reviewed by me  Electrolytes/Renal -   Recent Labs   Lab Test 12/23/22  0633 12/22/22  0521 12/21/22  0647 12/20/22  0618 12/19/22  1000 12/15/22  1642 12/14/22  0422 12/13/22  0502   * 135* 134*   < > 128*   < > 132* 137   POTASSIUM 4.4 4.0 4.9   < > 4.8   < > 3.8 3.6   CHLORIDE 94* 95* 95*   < > 90*   < > 94* 98   CO2 22 25 25   < > 22   < > 23 25   BUN 28.4* 17.1 33.7*   < > 43.7*   < > 33.2* 21.0*   CR 5.57* 4.22* 5.89*   < > 6.95*   < > 5.64* 4.41*   GLC 87 89 92   < > 93   < > 80 96   MAC 9.6 9.5 9.2   < > 8.6   < > 8.7 9.0   MAG 1.8 1.6* 2.0   < > 1.9  --  1.7 1.7   PHOS  --   --   --   --  6.2*  --  3.8 2.6    < > = values in this interval not displayed.       CBC -   Recent Labs   Lab Test 12/22/22  0521 12/21/22  0647 12/20/22  0618   WBC 6.6 7.2 6.9   HGB 9.5* 9.8* 9.6*    282 277       LFTs -   Recent Labs   Lab Test 12/11/22  0528 10/06/22  1215 09/29/22  1345 08/18/22  1435 07/21/22  1252 07/16/22  0557   ALKPHOS 146*  --   --   --  131 134   BILITOTAL 0.4  --   --   --  0.6 0.5   ALT <5*  --   --   --  9 <6   AST 18 31 35   < > 17 13   PROTTOTAL 5.9*  --   --   --  6.7* 6.5*   ALBUMIN 3.1*  --   --   --  3.0* 2.8*    < > = values in this interval not " displayed.       Iron Panel -   Recent Labs   Lab Test 12/14/22  0634 07/29/22  0600   IRON 43* 44   IRONSAT 24 20   JACKELINE  --  626*           Current Medications:    sodium chloride 0.9%  250 mL Intravenous Once in dialysis/CRRT     sodium chloride 0.9%  300 mL Hemodialysis Machine Once     acetaminophen  975 mg Oral TID     apixaban ANTICOAGULANT  2.5 mg Oral BID     atorvastatin  20 mg Oral At Bedtime     Baclofen  10 mg Oral BID     ceFAZolin  2 g Intravenous Daily     cetirizine  10 mg Oral Daily     cyanocobalamin  500 mcg Oral Daily     epoetin mar-epbx  10,000 Units Intravenous Once in dialysis/CRRT     finasteride  1.3 mg Oral Daily     fluticasone  1 spray Both Nostrils BID     folic acid  1 mg Oral Daily     gabapentin  300 mg Oral Q8H     magnesium plus protein  133 mg Oral Daily     methocarbamol  500 mg Oral 4x Daily     midodrine  10 mg Oral TID w/meals     multivitamin RENAL  1 tablet Oral Daily     - MEDICATION INSTRUCTIONS -   Does not apply Once     pantoprazole  40 mg Oral QAM AC     polyethylene glycol  17 g Oral TID     vitamin B6  100 mg Oral Daily     QUEtiapine  25 mg Oral Daily     QUEtiapine  50 mg Oral At Bedtime     ramelteon  8 mg Oral At Bedtime     senna-docusate  1 tablet Oral BID     sertraline  100 mg Oral Daily     sevelamer carbonate  1,600 mg Oral TID w/meals     sodium chloride (PF)  3 mL Intracatheter Q8H     sodium chloride (PF)  9 mL Intracatheter During Dialysis/CRRT (from stock)     sodium chloride (PF)  9 mL Intracatheter During Dialysis/CRRT (from stock)     thiamine  100 mg Oral Daily

## 2022-12-24 LAB
ANION GAP SERPL CALCULATED.3IONS-SCNC: 14 MMOL/L (ref 7–15)
BUN SERPL-MCNC: 17.6 MG/DL (ref 6–20)
C PNEUM DNA SPEC QL NAA+PROBE: NOT DETECTED
CALCIUM SERPL-MCNC: 9.2 MG/DL (ref 8.6–10)
CHLORIDE SERPL-SCNC: 97 MMOL/L (ref 98–107)
CREAT SERPL-MCNC: 4.19 MG/DL (ref 0.67–1.17)
DEPRECATED HCO3 PLAS-SCNC: 24 MMOL/L (ref 22–29)
ERYTHROCYTE [DISTWIDTH] IN BLOOD BY AUTOMATED COUNT: 16.5 % (ref 10–15)
FLUAV H1 2009 PAND RNA SPEC QL NAA+PROBE: NOT DETECTED
FLUAV H1 RNA SPEC QL NAA+PROBE: NOT DETECTED
FLUAV H3 RNA SPEC QL NAA+PROBE: NOT DETECTED
FLUAV RNA SPEC QL NAA+PROBE: NOT DETECTED
FLUBV RNA SPEC QL NAA+PROBE: NOT DETECTED
GFR SERPL CREATININE-BSD FRML MDRD: 16 ML/MIN/1.73M2
GLUCOSE SERPL-MCNC: 98 MG/DL (ref 70–99)
HADV DNA SPEC QL NAA+PROBE: NOT DETECTED
HCOV PNL SPEC NAA+PROBE: NOT DETECTED
HCT VFR BLD AUTO: 29.3 % (ref 40–53)
HGB BLD-MCNC: 9.1 G/DL (ref 13.3–17.7)
HMPV RNA SPEC QL NAA+PROBE: NOT DETECTED
HPIV1 RNA SPEC QL NAA+PROBE: NOT DETECTED
HPIV2 RNA SPEC QL NAA+PROBE: NOT DETECTED
HPIV3 RNA SPEC QL NAA+PROBE: NOT DETECTED
HPIV4 RNA SPEC QL NAA+PROBE: NOT DETECTED
M PNEUMO DNA SPEC QL NAA+PROBE: NOT DETECTED
MAGNESIUM SERPL-MCNC: 1.6 MG/DL (ref 1.7–2.3)
MCH RBC QN AUTO: 31.3 PG (ref 26.5–33)
MCHC RBC AUTO-ENTMCNC: 31.1 G/DL (ref 31.5–36.5)
MCV RBC AUTO: 101 FL (ref 78–100)
PLATELET # BLD AUTO: 252 10E3/UL (ref 150–450)
POTASSIUM SERPL-SCNC: 3.8 MMOL/L (ref 3.4–5.3)
RBC # BLD AUTO: 2.91 10E6/UL (ref 4.4–5.9)
RSV RNA SPEC QL NAA+PROBE: NOT DETECTED
RSV RNA SPEC QL NAA+PROBE: NOT DETECTED
RV+EV RNA SPEC QL NAA+PROBE: NOT DETECTED
SODIUM SERPL-SCNC: 135 MMOL/L (ref 136–145)
WBC # BLD AUTO: 5.9 10E3/UL (ref 4–11)

## 2022-12-24 PROCEDURE — 250N000013 HC RX MED GY IP 250 OP 250 PS 637: Performed by: NURSE PRACTITIONER

## 2022-12-24 PROCEDURE — 120N000002 HC R&B MED SURG/OB UMMC

## 2022-12-24 PROCEDURE — 99232 SBSQ HOSP IP/OBS MODERATE 35: CPT | Performed by: STUDENT IN AN ORGANIZED HEALTH CARE EDUCATION/TRAINING PROGRAM

## 2022-12-24 PROCEDURE — 83735 ASSAY OF MAGNESIUM: CPT | Performed by: NURSE PRACTITIONER

## 2022-12-24 PROCEDURE — 80048 BASIC METABOLIC PNL TOTAL CA: CPT | Performed by: NURSE PRACTITIONER

## 2022-12-24 PROCEDURE — 87486 CHLMYD PNEUM DNA AMP PROBE: CPT | Performed by: STUDENT IN AN ORGANIZED HEALTH CARE EDUCATION/TRAINING PROGRAM

## 2022-12-24 PROCEDURE — U0005 INFEC AGEN DETEC AMPLI PROBE: HCPCS | Performed by: STUDENT IN AN ORGANIZED HEALTH CARE EDUCATION/TRAINING PROGRAM

## 2022-12-24 PROCEDURE — 250N000011 HC RX IP 250 OP 636: Performed by: STUDENT IN AN ORGANIZED HEALTH CARE EDUCATION/TRAINING PROGRAM

## 2022-12-24 PROCEDURE — 36415 COLL VENOUS BLD VENIPUNCTURE: CPT | Performed by: NURSE PRACTITIONER

## 2022-12-24 PROCEDURE — 85027 COMPLETE CBC AUTOMATED: CPT | Performed by: NURSE PRACTITIONER

## 2022-12-24 PROCEDURE — 87633 RESP VIRUS 12-25 TARGETS: CPT | Performed by: STUDENT IN AN ORGANIZED HEALTH CARE EDUCATION/TRAINING PROGRAM

## 2022-12-24 RX ORDER — OXYCODONE HYDROCHLORIDE 5 MG/1
5 TABLET ORAL ONCE
Status: COMPLETED | OUTPATIENT
Start: 2022-12-24 | End: 2022-12-24

## 2022-12-24 RX ADMIN — METHOCARBAMOL 500 MG: 500 TABLET ORAL at 09:07

## 2022-12-24 RX ADMIN — ATORVASTATIN CALCIUM 20 MG: 20 TABLET, FILM COATED ORAL at 22:19

## 2022-12-24 RX ADMIN — Medication 133 MG: at 17:32

## 2022-12-24 RX ADMIN — Medication 100 MG: at 17:32

## 2022-12-24 RX ADMIN — SERTRALINE HYDROCHLORIDE 100 MG: 100 TABLET ORAL at 17:34

## 2022-12-24 RX ADMIN — OXYCODONE HYDROCHLORIDE 5 MG: 5 TABLET ORAL at 09:45

## 2022-12-24 RX ADMIN — METHOCARBAMOL 500 MG: 500 TABLET ORAL at 20:14

## 2022-12-24 RX ADMIN — CEFAZOLIN SODIUM 2 G: 2 INJECTION, SOLUTION INTRAVENOUS at 17:34

## 2022-12-24 RX ADMIN — METHOCARBAMOL 500 MG: 500 TABLET ORAL at 17:31

## 2022-12-24 RX ADMIN — ACETAMINOPHEN 975 MG: 325 TABLET, FILM COATED ORAL at 09:07

## 2022-12-24 RX ADMIN — QUETIAPINE 50 MG: 25 TABLET ORAL at 22:18

## 2022-12-24 RX ADMIN — GABAPENTIN 300 MG: 300 CAPSULE ORAL at 13:54

## 2022-12-24 RX ADMIN — OXYCODONE HYDROCHLORIDE 5 MG: 5 TABLET ORAL at 22:18

## 2022-12-24 RX ADMIN — GABAPENTIN 300 MG: 300 CAPSULE ORAL at 22:18

## 2022-12-24 RX ADMIN — BACLOFEN 10 MG: 10 TABLET ORAL at 09:14

## 2022-12-24 RX ADMIN — APIXABAN 2.5 MG: 2.5 TABLET, FILM COATED ORAL at 09:07

## 2022-12-24 RX ADMIN — CETIRIZINE HYDROCHLORIDE 10 MG: 10 TABLET, FILM COATED ORAL at 17:32

## 2022-12-24 RX ADMIN — ACETAMINOPHEN 975 MG: 325 TABLET, FILM COATED ORAL at 20:13

## 2022-12-24 RX ADMIN — PANTOPRAZOLE SODIUM 40 MG: 40 TABLET, DELAYED RELEASE ORAL at 09:07

## 2022-12-24 RX ADMIN — OXYCODONE HYDROCHLORIDE 5 MG: 5 TABLET ORAL at 13:54

## 2022-12-24 RX ADMIN — SEVELAMER CARBONATE 1600 MG: 800 TABLET, FILM COATED ORAL at 09:07

## 2022-12-24 RX ADMIN — BACLOFEN 10 MG: 10 TABLET ORAL at 20:13

## 2022-12-24 RX ADMIN — QUETIAPINE 25 MG: 25 TABLET ORAL at 17:34

## 2022-12-24 RX ADMIN — FINASTERIDE 1.3 MG: 5 TABLET, FILM COATED ORAL at 17:34

## 2022-12-24 RX ADMIN — FOLIC ACID 1 MG: 1 TABLET ORAL at 17:34

## 2022-12-24 RX ADMIN — MIDODRINE HYDROCHLORIDE 10 MG: 5 TABLET ORAL at 17:32

## 2022-12-24 RX ADMIN — MIDODRINE HYDROCHLORIDE 10 MG: 5 TABLET ORAL at 12:14

## 2022-12-24 RX ADMIN — OXYCODONE HYDROCHLORIDE 5 MG: 5 TABLET ORAL at 18:16

## 2022-12-24 RX ADMIN — ACETAMINOPHEN 975 MG: 325 TABLET, FILM COATED ORAL at 13:54

## 2022-12-24 RX ADMIN — Medication 100 MG: at 20:14

## 2022-12-24 RX ADMIN — RAMELTEON 8 MG: 8 TABLET, FILM COATED ORAL at 22:19

## 2022-12-24 RX ADMIN — APIXABAN 2.5 MG: 2.5 TABLET, FILM COATED ORAL at 20:13

## 2022-12-24 RX ADMIN — B-COMPLEX W/ C & FOLIC ACID TAB 1 MG 1 TABLET: 1 TAB at 17:31

## 2022-12-24 RX ADMIN — METHOCARBAMOL 500 MG: 500 TABLET ORAL at 12:14

## 2022-12-24 RX ADMIN — CYANOCOBALAMIN TAB 500 MCG 500 MCG: 500 TAB at 17:32

## 2022-12-24 RX ADMIN — MIDODRINE HYDROCHLORIDE 10 MG: 5 TABLET ORAL at 09:07

## 2022-12-24 RX ADMIN — GABAPENTIN 300 MG: 300 CAPSULE ORAL at 09:10

## 2022-12-24 RX ADMIN — OXYCODONE HYDROCHLORIDE 5 MG: 5 TABLET ORAL at 20:32

## 2022-12-24 ASSESSMENT — ACTIVITIES OF DAILY LIVING (ADL)
ADLS_ACUITY_SCORE: 49
ADLS_ACUITY_SCORE: 51
ADLS_ACUITY_SCORE: 49
ADLS_ACUITY_SCORE: 51

## 2022-12-24 NOTE — PROGRESS NOTES
[facility]    Medicine Progress Note - Hospitalist Service, GOLD TEAM 12    Date of Admission:  12/6/2022    Assessment & Plan   Shay Jimenez is a 57 year old male admitted on 12/6/2022 with hx of HTN, HLD, afib, ESRD on HD, peripheral neuropathy, GINI, GERD, and depression admitted for redo C5-T6 fusion, now been co managed with medicine.     Today's plan   -Patient mentioned some cough and phlegm like symptoms  -Reemphasized the need for using incentive spirometer   -Ordered 2 view chest x ray and appears normal   -Ordered viral panel and Covid screen   -Noted low grade temp of 100.3     Superficial would infection noted by nrg 12/20  Started cefazolin by nrg 12/20, Plan for 7 day course     Cervical Myelopathy  Chronic Osteomyelitis  C5-T6 spinal fusion complicated by hardware displacement  Admitted [facility] 12/6, underwent redo of C5-T6 fusion  Pt working with PT/OT, sig assistance needs. Appears to be reluctant for TCU. Concern for some intraoperative hemmorhage but intra-operative angio was re-assuring. Subsequent CT of C-spine and head w/o acute ab, good alignment. Hx of osteo w/o recent abx needs  - Cares per neuro surg  - Heavy care needs that seem unlikely to be met at home  - Cont to work with patient, work with therapy. Perhaps he can get to a status where home with assist will feel reasonable.      Acute on chroninc hypoxic-hypercarbic  resp failure, stable  On room air this AM. On dialysis. For volume management. Suspect mild hypervolemia (probbaly chronically so).Does not use CPAP at home (though he should). VBG on 12/16 demonstrated slightly low pH and pc02 of 66.   - Titrate 02 for goal > 90%  - Cont overnight 02 as this is used at home for GINI given intolreance of CPAP. If concerns for hypercarbia as cause for delirium/encehalopathy, would favor ordering and seeing if RT can assist with mask that he may tolerate    Acute on chronic pain  Peripheral  neuropathy  Improving, decreasing oxycodone over the last 24 hours  - Would consider increase in acetaminophen dose to 975 (ordered)  - Cont baclofen and gabapentin  - Cont methocarbamol  - Cont bowl regimen     Hypervolemic hyponatremia, stable and chronic  He appears asymptomatic Review shows that he has sodium that vascilates upper 120's-low 130's. Nephrology aware, cont on dialysis  - Consider strict intake monitoring to get sense of patient drinking  - Would see if he can tolerated a 2000 mls fluid restriction     ESRD on dialysis  Nephrology following  Tolerating usual schedule      Alcohol abuse  ICU/hospital Delirium  Hepatomegaly  No stigmata of cirrhosis. Mild hypoalbuminemia with nml bilirubin and AST/ALT. CT from Aug 2022 did show hepatomegaly. I do not see formal ab ultrasound. Last drink well over one week now. No physiologic evidence of alcohol withdrawal at this time     Discontinued  CIWA 12/19. Would not recommend resumption of home Xanax without strong compelling evidence severe anxiety.   - Pt is on gabapentin and baclofen. Both of these have been used to help reduce cravings in pt with alcohol abuse.  - as patient improves. We can consider increasing one of these if patient agreeable  - Naltrexone may not be safe in the setting of ESRD  - Agree with continuing of thiamine and folate  - Cont Seroquel 25 / 50 (am / pm)  and continue adjunctive remalteon     Mild/Mod pulm HTN  Moderate MR  GINI  Pt with ECHO in earlier 2022 with MR and elevated right sided pressures. No signs of sig right heart dysfunction. Not on CPAP as he has not toleated.  - Cont nocturnal 02      Chronic atrial fibrillation  On eliquis PTA              Diet: Fluid restriction 2000 ML FLUID  Regular Diet Adult    DVT Prophylaxis: Apixaban for dvt ppx   Oliveira Catheter: Not present  Central Lines: None  Cardiac Monitoring: None  Code Status: Full Code      Disposition Plan         The patient's care was discussed with the Bedside  "Nurse and Patient.    Michael Edward MD  Hospitalist Service, GOLD TEAM 12  [facility]  Securely message with the [employer] Web Console (learn more here)  Text page via [facility] Paging/Directory   Please see signed in provider for up to date coverage information      Clinically Significant Risk Factors         # Hyponatremia: Lowest Na = 131 mmol/L in last 2 days, will monitor as appropriate    # Hypomagnesemia: Lowest Mg = 1.6 mg/dL in last 2 days, will replace as needed   # Hypoalbuminemia: Lowest albumin = 3.1 g/dL at 12/11/2022  5:28 AM, will monitor as appropriate           # Obesity: Estimated body mass index is 37.87 kg/m  as calculated from the following:    Height as of this encounter: 1.854 m (6' 1\").    Weight as of this encounter: 130.2 kg (287 lb 0.6 oz).          ______________________________________________________________________    Interval History   Patient does complain of some cough and phlegm like symptoms, No new abdominal pain, difficulty urinating, fevers or malaise     Data reviewed today: I reviewed all medications, new labs and imaging results over the last 24 hours. I personally reviewed no images or EKG's today.    Physical Exam   Vital Signs: Temp: 99.2  F (37.3  C) Temp src: Oral BP: 93/42 Pulse: 85   Resp: 18 SpO2: 96 % O2 Device: Nasal cannula Oxygen Delivery: 2 LPM  Weight: 287 lbs .62 oz  Constitutional: awake, alert, cooperative, no apparent distress, and appears stated age  Eyes: Lids and lashes normal, pupils equal, round and reactive to light, extra ocular muscles intact, sclera clear, conjunctiva normal  ENT: Normocephalic, without obvious abnormality, atraumatic, sinuses nontender on palpation, external ears without lesions, oral pharynx with moist mucous membranes, tonsils without erythema or exudates, gums normal and good dentition.  Hematologic / Lymphatic: no cervical lymphadenopathy  Respiratory: No increased work of breathing, good " air exchange, clear to auscultation bilaterally, no crackles or wheezing  Cardiovascular: Normal apical impulse, regular rate and rhythm, normal S1 and S2, no S3 or S4, and no murmur noted  GI: No scars, normal bowel sounds, soft, non-distended, non-tender, no masses palpated, no hepatosplenomegally  Genitounirinary:   Skin: no bruising or bleeding  Musculoskeletal: There is no redness, warmth, or swelling of the joints.  Full range of motion noted.  Motor strength is 5 out of 5 all extremities bilaterally.  Tone is normal.  Neurologic: Awake, alert, oriented to name, place and time.  Cranial nerves II-XII are grossly intact.  Motor is 5 out of 5 bilaterally.  Cerebellar finger to nose, heel to shin intact.  Sensory is intact.  Babinski down going, Romberg negative, and gait is normal.  Neuropsychiatric: General: normal, calm and normal eye contact    Data   Recent Labs   Lab 12/24/22  0722 12/23/22  0633 12/22/22  0521   WBC 5.9 7.1 6.6   HGB 9.1* 9.6* 9.5*   * 103* 99    293 267   * 131* 135*   POTASSIUM 3.8 4.4 4.0   CHLORIDE 97* 94* 95*   CO2 24 22 25   BUN 17.6 28.4* 17.1   CR 4.19* 5.57* 4.22*   ANIONGAP 14 15 15   MAC 9.2 9.6 9.5   GLC 98 87 89     No results found for this or any previous visit (from the past 24 hour(s)).  Medications       acetaminophen  975 mg Oral TID     apixaban ANTICOAGULANT  2.5 mg Oral BID     atorvastatin  20 mg Oral At Bedtime     Baclofen  10 mg Oral BID     ceFAZolin  2 g Intravenous Daily     cetirizine  10 mg Oral Daily     cyanocobalamin  500 mcg Oral Daily     finasteride  1.3 mg Oral Daily     fluticasone  1 spray Both Nostrils BID     folic acid  1 mg Oral Daily     gabapentin  300 mg Oral Q8H     magnesium plus protein  133 mg Oral Daily     methocarbamol  500 mg Oral 4x Daily     midodrine  10 mg Oral TID w/meals     multivitamin RENAL  1 tablet Oral Daily     pantoprazole  40 mg Oral QAM AC     polyethylene glycol  17 g Oral TID     vitamin B6  100 mg  Oral Daily     QUEtiapine  25 mg Oral Daily     QUEtiapine  50 mg Oral At Bedtime     ramelteon  8 mg Oral At Bedtime     senna-docusate  1 tablet Oral BID     sertraline  100 mg Oral Daily     sevelamer carbonate  1,600 mg Oral TID w/meals     sodium chloride (PF)  3 mL Intracatheter Q8H     thiamine  100 mg Oral Daily

## 2022-12-24 NOTE — PLAN OF CARE
Temp: 99.6  F (37.6  C) Temp src: Oral BP: 110/52 Pulse: 77   Resp: 18 SpO2: 94 % O2 Device: None (Room air)         Time of care: 9211-7274    NEURO: A&O to self, forgetful and makes inappropriate sexual comments towards staff. Pt talks constantly and thinks staff is talking to him when they are communicating w/ pt's roommate. BLE N/T.   RESPIRATORY: vss, placed on 2LNC at HS per pt baseline, sats 99%.   CARDIAC: vss, denies cardiac chest pain  GI/: oliguria, no bm this shift.   DIET: regular. 2L fluid restriction  PAIN/NAUSEA: denies  INCISION/DRAINS/SKIN: drsg to cervical spine  No, new drainage from outlined area.cervical collar in place, no noted skin breakdown. L AV fistula drsg removed by pt, looks WDL.   IV ACCESS: R PIV TKO. L AV fistula   ACTIVITY: full lift. Repositioning as able. R heel floated.   LAB: reviewed  CHANGES: no acute major changes  PLAN: continue w/ POC

## 2022-12-24 NOTE — PLAN OF CARE
"BP (!) 97/39 (BP Location: Right arm)   Pulse 79   Temp 98.3  F (36.8  C) (Oral)   Resp 18   Ht 1.854 m (6' 1\")   Wt 130.2 kg (287 lb 0.6 oz)   SpO2 97%   BMI 37.87 kg/m      Time: 4936-2684  VSS. A&Ox4, calls appropriately. Patient resting comfortably in bed, 1 incontinent bowel this shift. Oliguric on HD. 2000mL/day restriction. X2-3 assist. R PIV tko+abx. Neuropathy ble. Wear cervical brace per MD. Continue POC.   "

## 2022-12-24 NOTE — PLAN OF CARE
"Goal Outcome Evaluation:    /66 (BP Location: Right arm)   Pulse 86   Temp 100.3  F (37.9  C) (Oral)   Resp 18   Ht 1.854 m (6' 1\")   Wt 130.2 kg (287 lb 0.6 oz)   SpO2 96%   BMI 37.87 kg/m      Status: VSS, admitted for redo spinal fusion  Pain/Nausea: Pain in posterior neck- managed with PRN oxycodone. Denies N/V  Mobility: Assist x2 with lift.   Diet: Regular- tolerating, fair PO intake  Labs: WDL, reviewed  LDAs: R PIV-SL, L AV fistula  Skin/incisions: Intact ex posterior neck incision, changed today- neck brace on. R heel wound  Neuro: A&Ox4, forgetful, sexually inappropriate, illogical thoughts at times. Labile. Baseline neuropathy in BLE  Respiratory: RA- sating >90%. LS clear. IS self administered. Denies SOB. Frequent productive cough  Cardiac: X soft Bps- on schd midodrine.   GI/: Incontinent bowel and bladder- brief on, BM today, +BS and flatus  New Changes: No acute changes this shift  Plan: Continue with POC    "

## 2022-12-24 NOTE — PROGRESS NOTES
[id number]  A&Ox4, calm and cooperative w/ cares, forgetful. Soft bp in the 's systolic on RA. Regular diet w/ 2000 ml fluid restriction . R PIV infusing tko, L AVF. Denies nausea, stated some neck pain gave oxy x1. No bm this shift, oliguric d/t HD. Cervical spine wound w/ primapore w/ some drainage-marked it. BLE neuropathy. Lift team assist. Will notify team w/ any changes .

## 2022-12-24 NOTE — PROGRESS NOTES
[facility], [place]   Neurosurgery Progress Note:    Assessment: Shay Jimenez is a 57 year old male with with PMH ESRD on dialysis and osteomyelitis s/p C5-T6 fusion who is now POD#17 s/p redo C5-T6 fusion with concern for hemorrhage; no vertebral artery dissection or extravasation was noted on intraoperative angiogram.  He was admitted to the Neuro ICU postoperatively for MAP goals and frequent neuro checks, remaining stable on pressors.  Neuro exam is unchanged from patient's baseline.    Plan:  - Appreciate Medicine assistance   - Serial neuro exams  - Pain control  - Hb goal > 7  - Normonatremia  - Goal normotension  - Appreciate Nephrology recommendations re: dialysis (baseline M/W/F)  - Daily dressings  - On ancef for incisional superficial infection- will transition to kefflex on discharge  - Cervical collar when HOB >45,  on when OOB  - Regular diet  - Bowel regimen  - PRN antiemetics  - PT/OT  - SCDs and subcutaneous heparin for DVT proph  - Eliquis restarted- 12.20.2022  - Psychiatry consult for capacity evaluation- complete  - Medically ready for disposition  -----------------------------------  Jonny Stephenson  Neurosurgery Resident PGY2    Please contact neurosurgery resident on call with questions.    Dial * * [phone], enter [phone] when prompted.    Interval History/Subjective: No acute events overnight.     Objective:   Temp:  [98.1  F (36.7  C)-99.6  F (37.6  C)] 99.2  F (37.3  C)  Pulse:  [69-85] 85  Resp:  [18-20] 18  BP: ()/(39-94) 100/51  SpO2:  [80 %-99 %] 96 %  I/O last 3 completed shifts:  In: 1200 [P.O.:1200]  Out: 2000 [Other:2000]    Gen: Appears comfortable, NAD  Wound: Incision, clean, dry, intact without strikethrough  Neurologic:  - Alert & Oriented to person, place, time, and situation  - Follows commands briskly  - Speech fluent, spontaneous. No aphasia or dysarthria.  - No gaze preference. No apparent hemineglect.  - PERRL, EOMI  - Strong brow  raise, eye closure, and smile  - Face symmetric with sensation intact to light touch  - Trapezii and sternocleidomastoid muscles 5/5 bilaterally  - No pronator drift     Del Tr Bi WE WF Gr   R 4* 5 5 5 5 5   L 4* 5 5 5 5 5    HF KE KF DF PF EHL   R 4+ 5 5 5 5 5   L 4+ 5 5 5 5 5   *pain-limited    Norman's and clonus negative.    Sensation intact and symmetric to light touch throughout    LABS  Recent Labs   Lab Test 12/24/22 0722 12/23/22  0633 12/22/22  0521   WBC 5.9 7.1 6.6   HGB 9.1* 9.6* 9.5*   * 103* 99    293 267       Recent Labs   Lab Test 12/24/22 0722 12/23/22  0633 12/22/22  0521   * 131* 135*   POTASSIUM 3.8 4.4 4.0   CHLORIDE 97* 94* 95*   CO2 24 22 25   BUN 17.6 28.4* 17.1   CR 4.19* 5.57* 4.22*   ANIONGAP 14 15 15   MAC 9.2 9.6 9.5   GLC 98 87 89       IMAGING  No results found for this or any previous visit (from the past 24 hour(s)).

## 2022-12-25 LAB
ANION GAP SERPL CALCULATED.3IONS-SCNC: 16 MMOL/L (ref 7–15)
BUN SERPL-MCNC: 27.7 MG/DL (ref 6–20)
CALCIUM SERPL-MCNC: 9.7 MG/DL (ref 8.6–10)
CHLORIDE SERPL-SCNC: 96 MMOL/L (ref 98–107)
CREAT SERPL-MCNC: 5.72 MG/DL (ref 0.67–1.17)
DEPRECATED HCO3 PLAS-SCNC: 23 MMOL/L (ref 22–29)
ERYTHROCYTE [DISTWIDTH] IN BLOOD BY AUTOMATED COUNT: 16.3 % (ref 10–15)
GFR SERPL CREATININE-BSD FRML MDRD: 11 ML/MIN/1.73M2
GLUCOSE SERPL-MCNC: 84 MG/DL (ref 70–99)
HCT VFR BLD AUTO: 33.7 % (ref 40–53)
HGB BLD-MCNC: 10 G/DL (ref 13.3–17.7)
MAGNESIUM SERPL-MCNC: 1.7 MG/DL (ref 1.7–2.3)
MCH RBC QN AUTO: 30.6 PG (ref 26.5–33)
MCHC RBC AUTO-ENTMCNC: 29.7 G/DL (ref 31.5–36.5)
MCV RBC AUTO: 103 FL (ref 78–100)
PLATELET # BLD AUTO: 260 10E3/UL (ref 150–450)
POTASSIUM SERPL-SCNC: 3.7 MMOL/L (ref 3.4–5.3)
RBC # BLD AUTO: 3.27 10E6/UL (ref 4.4–5.9)
SODIUM SERPL-SCNC: 135 MMOL/L (ref 136–145)
WBC # BLD AUTO: 5 10E3/UL (ref 4–11)

## 2022-12-25 PROCEDURE — 99232 SBSQ HOSP IP/OBS MODERATE 35: CPT | Performed by: STUDENT IN AN ORGANIZED HEALTH CARE EDUCATION/TRAINING PROGRAM

## 2022-12-25 PROCEDURE — 999N000127 HC STATISTIC PERIPHERAL IV START W US GUIDANCE

## 2022-12-25 PROCEDURE — 250N000013 HC RX MED GY IP 250 OP 250 PS 637: Performed by: NURSE PRACTITIONER

## 2022-12-25 PROCEDURE — 250N000011 HC RX IP 250 OP 636: Performed by: STUDENT IN AN ORGANIZED HEALTH CARE EDUCATION/TRAINING PROGRAM

## 2022-12-25 PROCEDURE — 80048 BASIC METABOLIC PNL TOTAL CA: CPT | Performed by: NURSE PRACTITIONER

## 2022-12-25 PROCEDURE — 83735 ASSAY OF MAGNESIUM: CPT | Performed by: NURSE PRACTITIONER

## 2022-12-25 PROCEDURE — 36415 COLL VENOUS BLD VENIPUNCTURE: CPT | Performed by: NURSE PRACTITIONER

## 2022-12-25 PROCEDURE — 85027 COMPLETE CBC AUTOMATED: CPT | Performed by: NURSE PRACTITIONER

## 2022-12-25 PROCEDURE — 120N000002 HC R&B MED SURG/OB UMMC

## 2022-12-25 RX ADMIN — PANTOPRAZOLE SODIUM 40 MG: 40 TABLET, DELAYED RELEASE ORAL at 08:56

## 2022-12-25 RX ADMIN — GABAPENTIN 300 MG: 300 CAPSULE ORAL at 14:21

## 2022-12-25 RX ADMIN — GABAPENTIN 300 MG: 300 CAPSULE ORAL at 06:20

## 2022-12-25 RX ADMIN — SENNOSIDES AND DOCUSATE SODIUM 1 TABLET: 8.6; 5 TABLET ORAL at 19:43

## 2022-12-25 RX ADMIN — QUETIAPINE 50 MG: 25 TABLET ORAL at 22:46

## 2022-12-25 RX ADMIN — CEFAZOLIN SODIUM 2 G: 2 INJECTION, SOLUTION INTRAVENOUS at 17:57

## 2022-12-25 RX ADMIN — APIXABAN 2.5 MG: 2.5 TABLET, FILM COATED ORAL at 19:41

## 2022-12-25 RX ADMIN — OXYCODONE HYDROCHLORIDE 5 MG: 5 TABLET ORAL at 14:21

## 2022-12-25 RX ADMIN — FOLIC ACID 1 MG: 1 TABLET ORAL at 18:02

## 2022-12-25 RX ADMIN — OXYCODONE HYDROCHLORIDE 5 MG: 5 TABLET ORAL at 22:46

## 2022-12-25 RX ADMIN — SEVELAMER CARBONATE 1600 MG: 800 TABLET, FILM COATED ORAL at 08:57

## 2022-12-25 RX ADMIN — SERTRALINE HYDROCHLORIDE 100 MG: 100 TABLET ORAL at 18:01

## 2022-12-25 RX ADMIN — SEVELAMER CARBONATE 1600 MG: 800 TABLET, FILM COATED ORAL at 13:26

## 2022-12-25 RX ADMIN — OXYCODONE HYDROCHLORIDE 5 MG: 5 TABLET ORAL at 18:46

## 2022-12-25 RX ADMIN — Medication 100 MG: at 18:01

## 2022-12-25 RX ADMIN — METHOCARBAMOL 500 MG: 500 TABLET ORAL at 08:57

## 2022-12-25 RX ADMIN — ACETAMINOPHEN 650 MG: 325 TABLET, FILM COATED ORAL at 19:44

## 2022-12-25 RX ADMIN — Medication 100 MG: at 19:44

## 2022-12-25 RX ADMIN — MIDODRINE HYDROCHLORIDE 10 MG: 5 TABLET ORAL at 08:55

## 2022-12-25 RX ADMIN — GABAPENTIN 300 MG: 300 CAPSULE ORAL at 22:46

## 2022-12-25 RX ADMIN — METHOCARBAMOL 500 MG: 500 TABLET ORAL at 12:00

## 2022-12-25 RX ADMIN — BACLOFEN 10 MG: 10 TABLET ORAL at 19:43

## 2022-12-25 RX ADMIN — ATORVASTATIN CALCIUM 20 MG: 20 TABLET, FILM COATED ORAL at 22:46

## 2022-12-25 RX ADMIN — OXYCODONE HYDROCHLORIDE 5 MG: 5 TABLET ORAL at 08:57

## 2022-12-25 RX ADMIN — METHOCARBAMOL 500 MG: 500 TABLET ORAL at 19:43

## 2022-12-25 RX ADMIN — RAMELTEON 8 MG: 8 TABLET, FILM COATED ORAL at 22:47

## 2022-12-25 RX ADMIN — FINASTERIDE 1.3 MG: 5 TABLET, FILM COATED ORAL at 18:13

## 2022-12-25 RX ADMIN — Medication 133 MG: at 18:02

## 2022-12-25 RX ADMIN — BACLOFEN 10 MG: 10 TABLET ORAL at 08:56

## 2022-12-25 RX ADMIN — QUETIAPINE 25 MG: 25 TABLET ORAL at 18:02

## 2022-12-25 RX ADMIN — ACETAMINOPHEN 975 MG: 325 TABLET, FILM COATED ORAL at 14:21

## 2022-12-25 RX ADMIN — B-COMPLEX W/ C & FOLIC ACID TAB 1 MG 1 TABLET: 1 TAB at 18:13

## 2022-12-25 RX ADMIN — CETIRIZINE HYDROCHLORIDE 10 MG: 10 TABLET, FILM COATED ORAL at 18:02

## 2022-12-25 RX ADMIN — ACETAMINOPHEN 975 MG: 325 TABLET, FILM COATED ORAL at 08:57

## 2022-12-25 RX ADMIN — METHOCARBAMOL 500 MG: 500 TABLET ORAL at 15:47

## 2022-12-25 RX ADMIN — CYANOCOBALAMIN TAB 500 MCG 500 MCG: 500 TAB at 18:02

## 2022-12-25 ASSESSMENT — ACTIVITIES OF DAILY LIVING (ADL)
ADLS_ACUITY_SCORE: 49

## 2022-12-25 NOTE — PLAN OF CARE
"/53 (BP Location: Right arm)   Pulse 78   Temp 98.7  F (37.1  C) (Oral)   Resp 17   Ht 1.854 m (6' 1\")   Wt 130.2 kg (287 lb 0.6 oz)   SpO2 93%   BMI 37.87 kg/m       Time: 1900-0730  Activity:  Assist of 2 with lift  Pain:  Maintained with PRN and1 time oxycodone   Neuro: A&O x4, C-Collar of pt's HOB on 45  Cardiac: WDL, denies chest pain   Respiratory: On RA, denies SOB   GI/: Oliguria on HD, No BM this shift, +BS   Diet: Rugular  Lines:  PIV SL, Fistula  Incisions/Drains/Skin:  posterior neck incision-CDI ,  R heel wound  Lab:  Reviewed      Plan: Continue with POC          "

## 2022-12-25 NOTE — PROVIDER NOTIFICATION
"Writer paged \"Pt reporting breakthrough pain rates 8/10. Asking if there is other pain medication he can get. Pt has Oxycodone q4hrs  and was given  at 6:15 pm.   Barbi BAGLEY, abhinav \"   "

## 2022-12-25 NOTE — PROVIDER NOTIFICATION
7B Shay Jimenez [id number]   Pt asking to speak with Nephrology. Has questions about dialysis. Either over the phone in person. Thanks!    Myrtle la *[phone]

## 2022-12-25 NOTE — PLAN OF CARE
"Goal Outcome Evaluation:  /64 (BP Location: Right arm)   Pulse 70   Temp 96.9  F (36.1  C) (Oral)   Resp 17   Ht 1.854 m (6' 1\")   Wt 130.2 kg (287 lb 0.6 oz)   SpO2 95%   BMI 37.87 kg/m        NEURO: A&O x4. Calls appropriately.   RESPIRATORY: WDL, denies SOB.   CARDIAC: WDL, denies chest pain.   GI/: Oliguric, pt on HD. Incontinent of bowel, +BS, LBM 12/24/22.  DIET: Regular, tolerating.   PAIN: Pain well controlled with PRN oxycodone and scheduled meds.   SKIN: R heel pressure wound, posterior neck incision, CDI.   INCISION/DRAINS/IV ACCESS: R PIV SL. L AV fistula.   ACTIVITY: Assist x2, lift.   PLAN: Pt would like social work consult. Continue POC.         "

## 2022-12-25 NOTE — PROGRESS NOTES
[facility], [place]   Neurosurgery Progress Note:    Assessment: Shay Jimenez is a 57 year old male with with PMH ESRD on dialysis and osteomyelitis s/p C5-T6 fusion who is now POD#19 s/p redo C5-T6 fusion with concern for hemorrhage; no vertebral artery dissection or extravasation was noted on intraoperative angiogram.  He was admitted to the Neuro ICU postoperatively for MAP goals and frequent neuro checks, remaining stable on pressors.  Neuro exam is unchanged from patient's baseline.    Plan:  - Appreciate Medicine assistance   - Serial neuro exams  - Pain control  - Hb goal > 7  - Normonatremia  - Goal normotension  - Appreciate Nephrology recommendations re: dialysis (baseline M/W/F)  - Daily dressings  - On ancef for incisional superficial infection- will transition to keflex on discharge  - Cervical collar when HOB >45,  on when OOB  - Regular diet  - Bowel regimen  - PRN antiemetics  - PT/OT  - SCDs and subcutaneous heparin for DVT proph  - Eliquis restarted- 12.20.2022  - Psychiatry consult for capacity evaluation- complete  - Medically ready for disposition  -----------------------------------  Delta Man MD, PhD  PGY-2 Neurosurgery  Please page NSGY on call with questions      Please contact neurosurgery resident on call with questions.    Dial * * [phone], enter [phone] when prompted.    Interval History/Subjective: No acute events overnight.     Objective:   Temp:  [98.7  F (37.1  C)-100.3  F (37.9  C)] 98.7  F (37.1  C)  Pulse:  [78-86] 78  Resp:  [17-18] 17  BP: ()/(42-66) 101/53  SpO2:  [93 %-96 %] 93 %  I/O last 3 completed shifts:  In: 1905 [P.O.:1905]  Out: -     Gen: Appears comfortable, NAD  Wound: Incision, clean, dry, intact without strikethrough  Neurologic:  - Alert & Oriented to person, place, time, and situation  - Follows commands briskly  - Speech fluent, spontaneous. No aphasia or dysarthria.  - No gaze preference. No apparent hemineglect.  -  PERRL, EOMI  - Strong brow raise, eye closure, and smile  - Face symmetric with sensation intact to light touch  - Trapezii and sternocleidomastoid muscles 5/5 bilaterally  - No pronator drift     Del Tr Bi WE WF Gr   R 4* 5 5 5 5 5   L 4* 5 5 5 5 5    HF KE KF DF PF EHL   R 4+ 5 5 5 5 5   L 4+ 5 5 5 5 5   *pain-limited    Norman's and clonus negative.    Sensation intact and symmetric to light touch throughout    LABS  Recent Labs   Lab Test 12/24/22  0722 12/23/22  0633 12/22/22  0521   WBC 5.9 7.1 6.6   HGB 9.1* 9.6* 9.5*   * 103* 99    293 267       Recent Labs   Lab Test 12/24/22 0722 12/23/22  0633 12/22/22  0521   * 131* 135*   POTASSIUM 3.8 4.4 4.0   CHLORIDE 97* 94* 95*   CO2 24 22 25   BUN 17.6 28.4* 17.1   CR 4.19* 5.57* 4.22*   ANIONGAP 14 15 15   MAC 9.2 9.6 9.5   GLC 98 87 89       IMAGING  No results found for this or any previous visit (from the past 24 hour(s)).

## 2022-12-25 NOTE — PROGRESS NOTES
[facility]    Medicine Progress Note - Hospitalist Service, GOLD TEAM 12    Date of Admission:  12/6/2022    Assessment & Plan   Shay Jimenez is a 57 year old male admitted on 12/6/2022 with hx of HTN, HLD, afib, ESRD on HD, peripheral neuropathy, GINI, GERD, and depression admitted for redo C5-T6 fusion, now been co managed with medicine.     Today's plan   -Overnight noted some worsening pain, Noted nsg ordered prns of oxycodone   -Reordered 2 view chest x ray as patient refused first time     Superficial would infection noted by nrg 12/20  Started cefazolin by nrg 12/20, Plan for 7 day course     Cervical Myelopathy  Chronic Osteomyelitis  C5-T6 spinal fusion complicated by hardware displacement  Admitted [facility] 12/6, underwent redo of C5-T6 fusion  Pt working with PT/OT, sig assistance needs. Appears to be reluctant for TCU. Concern for some intraoperative hemmorhage but intra-operative angio was re-assuring. Subsequent CT of C-spine and head w/o acute ab, good alignment. Hx of osteo w/o recent abx needs  - Cares per neuro surg  - Heavy care needs that seem unlikely to be met at home  - Cont to work with patient, work with therapy. Perhaps he can get to a status where home with assist will feel reasonable.      Acute on chroninc hypoxic-hypercarbic  resp failure, stable  On room air this AM. On dialysis. For volume management. Suspect mild hypervolemia (probbaly chronically so).Does not use CPAP at home (though he should). VBG on 12/16 demonstrated slightly low pH and pc02 of 66.   - Titrate 02 for goal > 90%  - Cont overnight 02 as this is used at home for GINI given intolreance of CPAP. If concerns for hypercarbia as cause for delirium/encehalopathy, would favor ordering and seeing if RT can assist with mask that he may tolerate    Acute on chronic pain  Peripheral neuropathy  Improving, decreasing oxycodone over the last 24 hours  - Would consider increase in  acetaminophen dose to 975 (ordered)  - Cont baclofen and gabapentin  - Cont methocarbamol  - Cont bowl regimen     Hypervolemic hyponatremia, stable and chronic  He appears asymptomatic Review shows that he has sodium that vascilates upper 120's-low 130's. Nephrology aware, cont on dialysis  - Consider strict intake monitoring to get sense of patient drinking  - Would see if he can tolerated a 2000 mls fluid restriction     ESRD on dialysis  Nephrology following  Tolerating usual schedule      Alcohol abuse  ICU/hospital Delirium  Hepatomegaly  No stigmata of cirrhosis. Mild hypoalbuminemia with nml bilirubin and AST/ALT. CT from Aug 2022 did show hepatomegaly. I do not see formal ab ultrasound. Last drink well over one week now. No physiologic evidence of alcohol withdrawal at this time     Discontinued  CIWA 12/19. Would not recommend resumption of home Xanax without strong compelling evidence severe anxiety.   - Pt is on gabapentin and baclofen. Both of these have been used to help reduce cravings in pt with alcohol abuse.  - as patient improves. We can consider increasing one of these if patient agreeable  - Naltrexone may not be safe in the setting of ESRD  - Agree with continuing of thiamine and folate  - Cont Seroquel 25 / 50 (am / pm)  and continue adjunctive remalteon     Mild/Mod pulm HTN  Moderate MR  GINI  Pt with ECHO in earlier 2022 with MR and elevated right sided pressures. No signs of sig right heart dysfunction. Not on CPAP as he has not tolerated.  - Cont nocturnal 02      Chronic atrial fibrillation  On eliquis PTA                Diet: Fluid restriction 2000 ML FLUID  Regular Diet Adult    DVT Prophylaxis: Apixaban for dvt ppx   Oliveira Catheter: Not present  Central Lines: None  Cardiac Monitoring: None  Code Status: Full Code      Disposition Plan         The patient's care was discussed with the Bedside Nurse and Patient.    Michael Edward MD  Hospitalist Service, GOLD TEAM [address] [place] [address]  "[facility]  Securely message with the [employer] Web Console (learn more here)  Text page via [facility] Paging/Directory   Please see signed in provider for up to date coverage information      Clinically Significant Risk Factors         # Hyponatremia: Lowest Na = 135 mmol/L in last 2 days, will monitor as appropriate    # Hypomagnesemia: Lowest Mg = 1.6 mg/dL in last 2 days, will replace as needed   # Hypoalbuminemia: Lowest albumin = 3.1 g/dL at 12/11/2022  5:28 AM, will monitor as appropriate           # Obesity: Estimated body mass index is 37.87 kg/m  as calculated from the following:    Height as of this encounter: 1.854 m (6' 1\").    Weight as of this encounter: 130.2 kg (287 lb 0.6 oz).          ______________________________________________________________________    Interval History   Patient has no new abdominal pain, difficulty urinating, fevers or malaise     Data reviewed today: I reviewed all medications, new labs and imaging results over the last 24 hours. I personally reviewed no images or EKG's today.    Physical Exam   Vital Signs: Temp: 96.9  F (36.1  C) Temp src: Oral BP: 113/64 Pulse: 70   Resp: 17 SpO2: 95 % O2 Device: None (Room air)    Weight: 287 lbs .62 oz  Constitutional: awake, alert, cooperative, no apparent distress, and appears stated age  Eyes: Lids and lashes normal, pupils equal, round and reactive to light, extra ocular muscles intact, sclera clear, conjunctiva normal  ENT: Normocephalic, without obvious abnormality, atraumatic, sinuses nontender on palpation, external ears without lesions, oral pharynx with moist mucous membranes, tonsils without erythema or exudates, gums normal and good dentition.  Hematologic / Lymphatic: no cervical lymphadenopathy  Respiratory: No increased work of breathing, good air exchange, clear to auscultation bilaterally, no crackles or wheezing  Cardiovascular: Normal apical impulse, regular rate and rhythm, normal S1 and S2, no " S3 or S4, and no murmur noted  GI: No scars, normal bowel sounds, soft, non-distended, non-tender, no masses palpated, no hepatosplenomegally  Genitounirinary:   Skin: no bruising or bleeding  Musculoskeletal: There is no redness, warmth, or swelling of the joints.  Full range of motion noted.  Motor strength is 5 out of 5 all extremities bilaterally.  Tone is normal.  Neurologic: Awake, alert, oriented to name, place and time.  Cranial nerves II-XII are grossly intact.  Motor is 5 out of 5 bilaterally.  Cerebellar finger to nose, heel to shin intact.  Sensory is intact.  Babinski down going, Romberg negative, and gait is normal.  Neuropsychiatric: General: normal, calm and normal eye contact    Data   Recent Labs   Lab 12/25/22  0738 12/24/22  0722 12/23/22  0633   WBC 5.0 5.9 7.1   HGB 10.0* 9.1* 9.6*   * 101* 103*    252 293   * 135* 131*   POTASSIUM 3.7 3.8 4.4   CHLORIDE 96* 97* 94*   CO2 23 24 22   BUN 27.7* 17.6 28.4*   CR 5.72* 4.19* 5.57*   ANIONGAP 16* 14 15   MAC 9.7 9.2 9.6   GLC 84 98 87     No results found for this or any previous visit (from the past 24 hour(s)).  Medications       acetaminophen  975 mg Oral TID     apixaban ANTICOAGULANT  2.5 mg Oral BID     atorvastatin  20 mg Oral At Bedtime     Baclofen  10 mg Oral BID     ceFAZolin  2 g Intravenous Daily     cetirizine  10 mg Oral Daily     cyanocobalamin  500 mcg Oral Daily     finasteride  1.3 mg Oral Daily     fluticasone  1 spray Both Nostrils BID     folic acid  1 mg Oral Daily     gabapentin  300 mg Oral Q8H     magnesium plus protein  133 mg Oral Daily     methocarbamol  500 mg Oral 4x Daily     midodrine  10 mg Oral TID w/meals     multivitamin RENAL  1 tablet Oral Daily     pantoprazole  40 mg Oral QAM AC     polyethylene glycol  17 g Oral TID     vitamin B6  100 mg Oral Daily     QUEtiapine  25 mg Oral Daily     QUEtiapine  50 mg Oral At Bedtime     ramelteon  8 mg Oral At Bedtime     senna-docusate  1 tablet Oral  BID     sertraline  100 mg Oral Daily     sevelamer carbonate  1,600 mg Oral TID w/meals     sodium chloride (PF)  3 mL Intracatheter Q8H     thiamine  100 mg Oral Daily

## 2022-12-26 ENCOUNTER — APPOINTMENT (OUTPATIENT)
Dept: PHYSICAL THERAPY | Facility: CLINIC | Age: 57
DRG: 459 | End: 2022-12-26
Attending: STUDENT IN AN ORGANIZED HEALTH CARE EDUCATION/TRAINING PROGRAM
Payer: MEDICARE

## 2022-12-26 LAB
ANION GAP SERPL CALCULATED.3IONS-SCNC: 17 MMOL/L (ref 7–15)
BUN SERPL-MCNC: 39.6 MG/DL (ref 6–20)
CALCIUM SERPL-MCNC: 9.5 MG/DL (ref 8.6–10)
CHLORIDE SERPL-SCNC: 96 MMOL/L (ref 98–107)
CREAT SERPL-MCNC: 6.95 MG/DL (ref 0.67–1.17)
DEPRECATED HCO3 PLAS-SCNC: 20 MMOL/L (ref 22–29)
ERYTHROCYTE [DISTWIDTH] IN BLOOD BY AUTOMATED COUNT: 16.2 % (ref 10–15)
GFR SERPL CREATININE-BSD FRML MDRD: 9 ML/MIN/1.73M2
GLUCOSE SERPL-MCNC: 97 MG/DL (ref 70–99)
HCT VFR BLD AUTO: 36.6 % (ref 40–53)
HGB BLD-MCNC: 11.2 G/DL (ref 13.3–17.7)
MAGNESIUM SERPL-MCNC: 1.7 MG/DL (ref 1.7–2.3)
MCH RBC QN AUTO: 32 PG (ref 26.5–33)
MCHC RBC AUTO-ENTMCNC: 30.6 G/DL (ref 31.5–36.5)
MCV RBC AUTO: 105 FL (ref 78–100)
PLATELET # BLD AUTO: 246 10E3/UL (ref 150–450)
POTASSIUM SERPL-SCNC: 4.4 MMOL/L (ref 3.4–5.3)
RBC # BLD AUTO: 3.5 10E6/UL (ref 4.4–5.9)
SARS-COV-2 RNA RESP QL NAA+PROBE: NEGATIVE
SODIUM SERPL-SCNC: 133 MMOL/L (ref 136–145)
WBC # BLD AUTO: 6.2 10E3/UL (ref 4–11)

## 2022-12-26 PROCEDURE — 258N000003 HC RX IP 258 OP 636: Performed by: CLINICAL NURSE SPECIALIST

## 2022-12-26 PROCEDURE — 90937 HEMODIALYSIS REPEATED EVAL: CPT

## 2022-12-26 PROCEDURE — 250N000013 HC RX MED GY IP 250 OP 250 PS 637: Performed by: NURSE PRACTITIONER

## 2022-12-26 PROCEDURE — 85027 COMPLETE CBC AUTOMATED: CPT | Performed by: NURSE PRACTITIONER

## 2022-12-26 PROCEDURE — 99232 SBSQ HOSP IP/OBS MODERATE 35: CPT | Performed by: STUDENT IN AN ORGANIZED HEALTH CARE EDUCATION/TRAINING PROGRAM

## 2022-12-26 PROCEDURE — 97530 THERAPEUTIC ACTIVITIES: CPT | Mod: GP

## 2022-12-26 PROCEDURE — 83735 ASSAY OF MAGNESIUM: CPT | Performed by: NURSE PRACTITIONER

## 2022-12-26 PROCEDURE — 250N000011 HC RX IP 250 OP 636: Performed by: STUDENT IN AN ORGANIZED HEALTH CARE EDUCATION/TRAINING PROGRAM

## 2022-12-26 PROCEDURE — 82310 ASSAY OF CALCIUM: CPT | Performed by: NURSE PRACTITIONER

## 2022-12-26 PROCEDURE — 634N000001 HC RX 634: Performed by: CLINICAL NURSE SPECIALIST

## 2022-12-26 PROCEDURE — 36415 COLL VENOUS BLD VENIPUNCTURE: CPT | Performed by: NURSE PRACTITIONER

## 2022-12-26 PROCEDURE — 99233 SBSQ HOSP IP/OBS HIGH 50: CPT | Mod: 24 | Performed by: CLINICAL NURSE SPECIALIST

## 2022-12-26 PROCEDURE — 120N000002 HC R&B MED SURG/OB UMMC

## 2022-12-26 PROCEDURE — 250N000011 HC RX IP 250 OP 636: Performed by: CLINICAL NURSE SPECIALIST

## 2022-12-26 RX ORDER — HEPARIN SODIUM 1000 [USP'U]/ML
500 INJECTION, SOLUTION INTRAVENOUS; SUBCUTANEOUS CONTINUOUS
Status: DISCONTINUED | OUTPATIENT
Start: 2022-12-26 | End: 2022-12-26

## 2022-12-26 RX ORDER — SENNOSIDES 8.6 MG
2 TABLET ORAL DAILY PRN
Status: ON HOLD | COMMUNITY
End: 2022-12-29

## 2022-12-26 RX ORDER — DULOXETIN HYDROCHLORIDE 30 MG/1
30 CAPSULE, DELAYED RELEASE ORAL DAILY
Status: ON HOLD | COMMUNITY
End: 2022-12-29

## 2022-12-26 RX ORDER — FOLIC ACID 0.8 MG
2 TABLET ORAL DAILY
Status: ON HOLD | COMMUNITY
End: 2022-12-29

## 2022-12-26 RX ADMIN — QUETIAPINE 25 MG: 25 TABLET ORAL at 17:09

## 2022-12-26 RX ADMIN — HEPARIN SODIUM 500 UNITS/HR: 1000 INJECTION INTRAVENOUS; SUBCUTANEOUS at 12:27

## 2022-12-26 RX ADMIN — ACETAMINOPHEN 975 MG: 325 TABLET, FILM COATED ORAL at 20:52

## 2022-12-26 RX ADMIN — OXYCODONE HYDROCHLORIDE 5 MG: 5 TABLET ORAL at 15:38

## 2022-12-26 RX ADMIN — SODIUM CHLORIDE 250 ML: 9 INJECTION, SOLUTION INTRAVENOUS at 12:22

## 2022-12-26 RX ADMIN — CEFAZOLIN SODIUM 2 G: 2 INJECTION, SOLUTION INTRAVENOUS at 18:04

## 2022-12-26 RX ADMIN — OXYCODONE HYDROCHLORIDE 5 MG: 5 TABLET ORAL at 08:05

## 2022-12-26 RX ADMIN — ACETAMINOPHEN 975 MG: 325 TABLET, FILM COATED ORAL at 15:40

## 2022-12-26 RX ADMIN — Medication 100 MG: at 20:52

## 2022-12-26 RX ADMIN — OXYCODONE HYDROCHLORIDE 5 MG: 5 TABLET ORAL at 20:51

## 2022-12-26 RX ADMIN — GABAPENTIN 300 MG: 300 CAPSULE ORAL at 17:06

## 2022-12-26 RX ADMIN — PANTOPRAZOLE SODIUM 40 MG: 40 TABLET, DELAYED RELEASE ORAL at 07:56

## 2022-12-26 RX ADMIN — BACLOFEN 10 MG: 10 TABLET ORAL at 07:56

## 2022-12-26 RX ADMIN — METHOCARBAMOL 500 MG: 500 TABLET ORAL at 11:35

## 2022-12-26 RX ADMIN — SERTRALINE HYDROCHLORIDE 100 MG: 100 TABLET ORAL at 17:06

## 2022-12-26 RX ADMIN — MIDODRINE HYDROCHLORIDE 10 MG: 5 TABLET ORAL at 17:06

## 2022-12-26 RX ADMIN — BACLOFEN 10 MG: 10 TABLET ORAL at 20:51

## 2022-12-26 RX ADMIN — SENNOSIDES AND DOCUSATE SODIUM 1 TABLET: 8.6; 5 TABLET ORAL at 07:56

## 2022-12-26 RX ADMIN — B-COMPLEX W/ C & FOLIC ACID TAB 1 MG 1 TABLET: 1 TAB at 17:06

## 2022-12-26 RX ADMIN — GABAPENTIN 300 MG: 300 CAPSULE ORAL at 07:56

## 2022-12-26 RX ADMIN — QUETIAPINE 50 MG: 25 TABLET ORAL at 22:50

## 2022-12-26 RX ADMIN — HYDROXYZINE HYDROCHLORIDE 25 MG: 25 TABLET, FILM COATED ORAL at 22:50

## 2022-12-26 RX ADMIN — OXYCODONE HYDROCHLORIDE 5 MG: 5 TABLET ORAL at 02:55

## 2022-12-26 RX ADMIN — CYANOCOBALAMIN TAB 500 MCG 500 MCG: 500 TAB at 17:06

## 2022-12-26 RX ADMIN — APIXABAN 2.5 MG: 2.5 TABLET, FILM COATED ORAL at 07:56

## 2022-12-26 RX ADMIN — SEVELAMER CARBONATE 1600 MG: 800 TABLET, FILM COATED ORAL at 11:36

## 2022-12-26 RX ADMIN — METHOCARBAMOL 500 MG: 500 TABLET ORAL at 17:06

## 2022-12-26 RX ADMIN — SEVELAMER CARBONATE 1600 MG: 800 TABLET, FILM COATED ORAL at 17:08

## 2022-12-26 RX ADMIN — APIXABAN 2.5 MG: 2.5 TABLET, FILM COATED ORAL at 20:52

## 2022-12-26 RX ADMIN — HEPARIN SODIUM 500 UNITS: 1000 INJECTION INTRAVENOUS; SUBCUTANEOUS at 12:27

## 2022-12-26 RX ADMIN — SODIUM CHLORIDE 300 ML: 9 INJECTION, SOLUTION INTRAVENOUS at 12:22

## 2022-12-26 RX ADMIN — METHOCARBAMOL 500 MG: 500 TABLET ORAL at 07:56

## 2022-12-26 RX ADMIN — MIDODRINE HYDROCHLORIDE 10 MG: 5 TABLET ORAL at 07:56

## 2022-12-26 RX ADMIN — Medication 100 MG: at 17:08

## 2022-12-26 RX ADMIN — SEVELAMER CARBONATE 1600 MG: 800 TABLET, FILM COATED ORAL at 07:56

## 2022-12-26 RX ADMIN — EPOETIN ALFA-EPBX 10000 UNITS: 10000 INJECTION, SOLUTION INTRAVENOUS; SUBCUTANEOUS at 12:26

## 2022-12-26 RX ADMIN — ATORVASTATIN CALCIUM 20 MG: 20 TABLET, FILM COATED ORAL at 22:50

## 2022-12-26 RX ADMIN — FINASTERIDE 1.3 MG: 5 TABLET, FILM COATED ORAL at 17:18

## 2022-12-26 RX ADMIN — ACETAMINOPHEN 975 MG: 325 TABLET, FILM COATED ORAL at 07:56

## 2022-12-26 RX ADMIN — RAMELTEON 8 MG: 8 TABLET, FILM COATED ORAL at 22:51

## 2022-12-26 RX ADMIN — METHOCARBAMOL 500 MG: 500 TABLET ORAL at 20:52

## 2022-12-26 RX ADMIN — Medication 133 MG: at 17:07

## 2022-12-26 RX ADMIN — FOLIC ACID 1 MG: 1 TABLET ORAL at 17:06

## 2022-12-26 RX ADMIN — CETIRIZINE HYDROCHLORIDE 10 MG: 10 TABLET, FILM COATED ORAL at 17:06

## 2022-12-26 RX ADMIN — MIDODRINE HYDROCHLORIDE 10 MG: 5 TABLET ORAL at 11:35

## 2022-12-26 ASSESSMENT — ACTIVITIES OF DAILY LIVING (ADL)
ADLS_ACUITY_SCORE: 49
ADLS_ACUITY_SCORE: 47
ADLS_ACUITY_SCORE: 47
ADLS_ACUITY_SCORE: 49
ADLS_ACUITY_SCORE: 47
ADLS_ACUITY_SCORE: 49
ADLS_ACUITY_SCORE: 47
ADLS_ACUITY_SCORE: 47
ADLS_ACUITY_SCORE: 49
ADLS_ACUITY_SCORE: 49
ADLS_ACUITY_SCORE: 51
ADLS_ACUITY_SCORE: 47

## 2022-12-26 NOTE — PROGRESS NOTES
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  Had planned neurosurgery revision for C5-C6 on 12/6 as screw had dislodged.  Nephrology consulted for management of dialysis post op.       Interval History :   Mr Jimenez had weekend off of HD, seen on HD today on MWF schedule.  At EDW but BP's are reasonable so trying to pull 2L of UF as able.  Next planned run 12/28, stable from dialysis standpoint to discharge.      Assessment & Recommendations:   ESRD-Since 4/2019 due to Ca Phos deposition and HTN, runs at [facility] ([phone], fax [phone]) under care of Dr Cline.  Runs MWF via AVF, 4.25h runs.                  -HD today, 3L of UF.                  -Will decide on runs daily depending on chemistries and hemodynamics.                  -Continuing long term HD, no need for new consent.                  -Is eventually pursuing renal transplant through [facility] once acute issues requiring neurosurgery are done.       Volume-EDW 130kg, bed wt ~130kg today, pulling 1-2L to challenge.      Electrolytes-K 4.4 bicarb 20, Na 133, HD today.        BMD-Ca 9.5, Mg and Phos WNL.       Neurosurgery-Had planned revision of previous screws at C5-C6 on 12/6.       Anemia-Hgb 11.2, giving 8k epo with runs but will hold next run and drop dose.  Iron sats 24% on 12/14      Nutrition-Deferred.       Time spent: 30 minutes on this date of encounter for chart review, physical exam, medical decision making and co-ordination of care.      Discussed with Dr Love     Recommendations were communicated to primary team via verbal communication.        ENID Shirley CNS  Clinical Nurse Specialist  [phone]      Review of Systems:   I reviewed the following systems:  Gen: No fevers or chills  CV: No CP at rest  Resp: No SOB at rest  GI: No  "N/V    Physical Exam:   I/O last 3 completed shifts:  In: 200 [P.O.:200]  Out: -    /78   Pulse 65   Temp 97.7  F (36.5  C) (Oral)   Resp 28   Ht 1.854 m (6' 1\")   Wt 130.2 kg (287 lb 0.6 oz)   SpO2 99%   BMI 37.87 kg/m       GENERAL APPEARANCE: alert and no distress, moderate confusion  EYES: No scleral icterus  NECK:  No JVD  Pulmonary: lungs clear to auscultation with equal breath sounds bilaterally, no clubbing or cyanosis  CV: Regular rhythm, normal rate, no rub   - Edema none  GI: soft, nontender, normal bowel sounds  MS: no evidence of inflammation in joints, no muscle tenderness  : No Oliveira  SKIN: no rash, warm, dry  NEURO: Delirious but somewhat redirectable.      Labs:   All labs reviewed by me  Electrolytes/Renal -   Recent Labs   Lab Test 12/26/22  0947 12/25/22  0738 12/24/22  0722 12/20/22  0618 12/19/22  1000 12/15/22  1642 12/14/22  0422 12/13/22  0502   * 135* 135*   < > 128*   < > 132* 137   POTASSIUM 4.4 3.7 3.8   < > 4.8   < > 3.8 3.6   CHLORIDE 96* 96* 97*   < > 90*   < > 94* 98   CO2 20* 23 24   < > 22   < > 23 25   BUN 39.6* 27.7* 17.6   < > 43.7*   < > 33.2* 21.0*   CR 6.95* 5.72* 4.19*   < > 6.95*   < > 5.64* 4.41*   GLC 97 84 98   < > 93   < > 80 96   MAC 9.5 9.7 9.2   < > 8.6   < > 8.7 9.0   MAG 1.7 1.7 1.6*   < > 1.9  --  1.7 1.7   PHOS  --   --   --   --  6.2*  --  3.8 2.6    < > = values in this interval not displayed.       CBC -   Recent Labs   Lab Test 12/26/22  0947 12/25/22  0738 12/24/22  0722   WBC 6.2 5.0 5.9   HGB 11.2* 10.0* 9.1*    260 252       LFTs -   Recent Labs   Lab Test 12/11/22  0528 10/06/22  1215 09/29/22  1345 08/18/22  1435 07/21/22  1252 07/16/22  0557   ALKPHOS 146*  --   --   --  131 134   BILITOTAL 0.4  --   --   --  0.6 0.5   ALT <5*  --   --   --  9 <6   AST 18 31 35   < > 17 13   PROTTOTAL 5.9*  --   --   --  6.7* 6.5*   ALBUMIN 3.1*  --   --   --  3.0* 2.8*    < > = values in this interval not displayed.       Iron Panel - "   Recent Labs   Lab Test 12/14/22  0634 07/29/22  0600   IRON 43* 44   IRONSAT 24 20   JACKELINE  --  626*           Current Medications:    acetaminophen  975 mg Oral TID     apixaban ANTICOAGULANT  2.5 mg Oral BID     atorvastatin  20 mg Oral At Bedtime     Baclofen  10 mg Oral BID     ceFAZolin  2 g Intravenous Daily     cetirizine  10 mg Oral Daily     cyanocobalamin  500 mcg Oral Daily     finasteride  1.3 mg Oral Daily     fluticasone  1 spray Both Nostrils BID     folic acid  1 mg Oral Daily     gabapentin  300 mg Oral Q8H     magnesium plus protein  133 mg Oral Daily     methocarbamol  500 mg Oral 4x Daily     midodrine  10 mg Oral TID w/meals     multivitamin RENAL  1 tablet Oral Daily     pantoprazole  40 mg Oral QAM AC     polyethylene glycol  17 g Oral TID     vitamin B6  100 mg Oral Daily     QUEtiapine  25 mg Oral Daily     QUEtiapine  50 mg Oral At Bedtime     ramelteon  8 mg Oral At Bedtime     senna-docusate  1 tablet Oral BID     sertraline  100 mg Oral Daily     sevelamer carbonate  1,600 mg Oral TID w/meals     sodium chloride (PF)  3 mL Intracatheter Q8H     thiamine  100 mg Oral Daily       heparin (porcine) 500 Units/hr (12/26/22 1227)     - MEDICATION INSTRUCTIONS -

## 2022-12-26 NOTE — PROVIDER NOTIFICATION
7B [facility] room 33-2 Patient is requesting PO Dilaudid. Naman

## 2022-12-26 NOTE — PROGRESS NOTES
HEMODIALYSIS TREATMENT NOTE    Date: 12/26/2022  Time: 4:25 PM    Data:  Pre Wt:  130.2kg   Desired Wt:127.2   kg   Weight change:3 kg  Ultrafiltration - Post Run Net Total Removed (mL): 3000 mL  Vascular Access Status: +b/t  Dialyzer Rinse: Streaked  Total Blood Volume Processed: 84.39 L   Total Dialysis (Treatment) Time: 4 Hours  Dialysate: K3/Ca2.25  - 450,   Heparin: 2000 units    Lab:   No    Interventions:  Epogen     Assessment:  Pt completed  4 hours of HD via left forearm AVF. Pt tolerated 3kg Net UF. Pt remain alert and oriented x 4, restless and talkative, Vitals stable on HD,SR  sat 97-99% on RA . hemostasis achieved with 10 minutes of decannulation. Post /67. Hand off given to PCN .     Plan:    Per renal team

## 2022-12-26 NOTE — PROGRESS NOTES
[facility], Post   Neurosurgery Progress Note:    Assessment: Shay Jimenez is a 57 year old male with with PMH ESRD on dialysis and osteomyelitis s/p C5-T6 fusion who is now POD#20 s/p redo C5-T6 fusion with concern for hemorrhage; no vertebral artery dissection or extravasation was noted on intraoperative angiogram.  He was admitted to the Neuro ICU postoperatively for MAP goals and frequent neuro checks, remaining stable on pressors.  Neuro exam is unchanged from patient's baseline.    Plan:  - Appreciate Medicine assistance   - Serial neuro exams  - Pain control  - Hb goal > 7  - Normonatremia  - Goal normotension  - Appreciate Nephrology recommendations re: dialysis (baseline M/W/F)  - Daily dressings  - On ancef for incisional superficial infection- will transition to keflex on discharge  - Cervical collar when HOB >45,  on when OOB  - Regular diet  - Bowel regimen  - PRN antiemetics  - PT/OT  - SCDs and subcutaneous heparin for DVT proph  - Eliquis restarted- 12.20.2022  - Psychiatry consult for capacity evaluation- complete  - Medically ready for disposition  -----------------------------------  Jonny Stephenson  Neurosurgery Resident PGY2    Please contact neurosurgery resident on call with questions.    Dial * * [phone], enter [phone] when prompted.    Interval History/Subjective: No acute events overnight.     Objective:   Temp:  [97.1  F (36.2  C)-98.5  F (36.9  C)] 97.3  F (36.3  C)  Pulse:  [63-82] 63  Resp:  [17-20] 20  BP: (111-129)/(57-75) 111/70  SpO2:  [92 %-100 %] 96 %  I/O last 3 completed shifts:  In: 200 [P.O.:200]  Out: -     Gen: Appears comfortable, NAD  Wound: Incision, clean, dry, intact without strikethrough  Neurologic:  - Alert & Oriented to person, place, time, and situation  - Follows commands briskly  - Speech fluent, spontaneous. No aphasia or dysarthria.  - No gaze preference. No apparent hemineglect.  - PERRL, EOMI  - Strong brow raise, eye closure,  and smile  - Face symmetric with sensation intact to light touch  - Trapezii and sternocleidomastoid muscles 5/5 bilaterally  - No pronator drift     Del Tr Bi WE WF Gr   R 4* 5 5 5 5 5   L 4* 5 5 5 5 5    HF KE KF DF PF EHL   R 4+ 5 5 5 5 5   L 4+ 5 5 5 5 5   *pain-limited    Norman's and clonus negative.    Sensation intact and symmetric to light touch throughout    LABS  Recent Labs   Lab Test 12/26/22  0947 12/25/22  0738 12/24/22  0722   WBC 6.2 5.0 5.9   HGB 11.2* 10.0* 9.1*   * 103* 101*    260 252       Recent Labs   Lab Test 12/25/22  0738 12/24/22  0722 12/23/22  0633   * 135* 131*   POTASSIUM 3.7 3.8 4.4   CHLORIDE 96* 97* 94*   CO2 23 24 22   BUN 27.7* 17.6 28.4*   CR 5.72* 4.19* 5.57*   ANIONGAP 16* 14 15   MAC 9.7 9.2 9.6   GLC 84 98 87       IMAGING  No results found for this or any previous visit (from the past 24 hour(s)).

## 2022-12-26 NOTE — PROGRESS NOTES
"Blood pressure 111/70, pulse 63, temperature 97.3  F (36.3  C), temperature source Axillary, resp. rate 20, height 1.854 m (6' 1\"), weight 130.2 kg (287 lb 0.6 oz), SpO2 96 %.    Activity: A2 w/lift, Dialysis by noon today  Neuros: AOX4, makes needs known  Cardiac: WDL, denies chest pain  Respiratory: WDL, RA 96% denies SOB  GI/: Oliguria, dialysis, incontinent of B&B  Diet: Regular, tolerating  Skin/Incisions/Drains: Rt heel pressure wound CDI w/heels elevated, posterior neck incision, T&R q2h  Lines: Lt AV fistula, Rt PIV SL  Pain: 5-8/10, Oxy  Plan: Dialysis at noon  "

## 2022-12-26 NOTE — PROGRESS NOTES
Care Management Follow Up    Length of Stay (days): 20    Expected Discharge Date:       Concerns to be Addressed: all concerns addressed in this encounter, care coordination/care conferences, discharge planning     Patient plan of care discussed at interdisciplinary rounds: Yes    Anticipated Discharge Disposition: Skilled Nursing Facility, Transitional Care     Anticipated Discharge Services: None  Anticipated Discharge DME: None    Patient/family educated on Medicare website which has current facility and service quality ratings: yes  Education Provided on the Discharge Plan:    Patient/Family in Agreement with the Plan: yes    Referrals Placed by CM/SW:     Pending Referrals     [facility]  [address] [address]  P: [phone]   Admissions: [phone]  F: [phone]    [facility]  [address] [place]  [address]  P: [phone]  F: [phone]    [facility]  [address] [facility] [address]  [phone]  12/20: Referral Sent  12/21: Message left with admissions requesting a call back regarding referral status.  12/26: No staff in admissions today. Will try to call back tomorrow.     [facility]  [address] [address]  [phone]  12/20: Referral Sent  12/21: Writer spoke with admissions. They have not reviewed referral, but will review and call writer back.   12/26: LM for admissions.     [facility] [address] [address]  [phone]  12/20: Referral Sent  12/21: Message left with admissions requesting a call back regarding referral status.  12/26: spoke with [facility] liaison Dahiana covering for Richelle. Dahiana said she had no received the patients referral so I re-faxed it to Richelle's fax #[phone]     Declined Referrals     [facility] --- 12/21: Declined due to not having any available beds.     [facility] --- 12/21: Declined due to not being in  "patient's insurance network.     [facility] --- 12/21: Declined due to they only take LTC patients with mental health diagnoses.     [facility] --- 12/21: Declined in Epic, no reason given.     [facility] --- 12/21: Declined due to not taking patient's insurance.    [facility] --- 12/26: declined due to mentation, woundcare    [facility] --- 12/26: declined due to A x 2 with a lift, difficult to transport to dialysis, inappropriate speech with staff, sexually inappropriate language    [facility] --- 12/26: declined due to A x 2 with a lift, difficult to transport to dialysis, inappropriate speech with staff, sexually inappropriate language    [facility] -- 12/26: too complex    [facility] --- 12/26: facility does not accept patients on dialysis    Private pay costs discussed: Not applicable    Additional Information:    Spoke with patient son Chinedu over the phone to discuss TCU referrals. Chinedu told me that I can continue to go down the list of facilities on the medicare.gov website and send referrals in order of distance closest to patients home in [place], [place]. Hcinedu send that when the patient was at [facility] he was \"a tough sell\" and Chinedu said \"as long as we get him into some place it's fine.\"     Also discussed patients insurance concerns. I told Chinedu that his dad has concerns about insurance coverage, and on Friday I encouraged the patient to call the phone number on the back of his insurance card to best get his questions answered. Chinedu told me he is going to talk with the patient about his insurance questions and Chinedu will call the insurance number if necessary.       PRANAY Dos Santos LSW   7B    Phone: [phone]  Pager: [phone]            "

## 2022-12-26 NOTE — PROGRESS NOTES
"Blood pressure 124/59, pulse 74, temperature 97.2  F (36.2  C), temperature source Axillary, resp. rate 20, height 1.854 m (6' 1\"), weight 130.2 kg (287 lb 0.6 oz), SpO2 92 %.    A&O x4. CMS intact. Palpable pulses on BLEs. PRN oxy given x1 for Back/neck pain. Pt w/ x1 Large incontinent BM during shift. AV Fistula on PAMELA + Bruit/Thrill. SOAS. R PIV infusing TKO. Continue POC   "

## 2022-12-26 NOTE — PROGRESS NOTES
NEURO: A&O x4. CMS intact. Pedal pulses present. Calls appropriately.   RESPIRATORY: WDL, denies SOB.   CARDIAC: WDL, denies chest pain.   GI/: Oliguric, pt on HD. Incontinent of bowel, +BS, LBM 12/25/22.  DIET: Regular, tolerating.   PAIN: Pain well controlled with PRN oxycodone and scheduled meds.   SKIN: R heel pressure wound, posterior neck incision, CDI. heels elevated, turned q2-3.  INCISION/DRAINS/IV ACCESS: R PIV SL. L AV fistula.   ACTIVITY: Assist x1-2, lift.   PLAN:  Continue POC. Pt spoke with MD about changing PO oxy to PO dilaudid.  Report and changes or concerns to Thoracic team.

## 2022-12-26 NOTE — PROGRESS NOTES
1500 - 1900    Patient is A&O x4. PRN Oxycodone given x1 for neck pain, pending effectiveness. Patient refused CXR. AV fistula on PRISCILLA, + bruit & thrill, SOSA. PIV is Sl'ed. Scheduled Midodrine per order d/t . Patient remained in bed through the shift.

## 2022-12-27 ENCOUNTER — APPOINTMENT (OUTPATIENT)
Dept: OCCUPATIONAL THERAPY | Facility: CLINIC | Age: 57
DRG: 459 | End: 2022-12-27
Attending: STUDENT IN AN ORGANIZED HEALTH CARE EDUCATION/TRAINING PROGRAM
Payer: MEDICARE

## 2022-12-27 ENCOUNTER — APPOINTMENT (OUTPATIENT)
Dept: PHYSICAL THERAPY | Facility: CLINIC | Age: 57
DRG: 459 | End: 2022-12-27
Attending: STUDENT IN AN ORGANIZED HEALTH CARE EDUCATION/TRAINING PROGRAM
Payer: MEDICARE

## 2022-12-27 LAB
ANION GAP SERPL CALCULATED.3IONS-SCNC: 14 MMOL/L (ref 7–15)
BUN SERPL-MCNC: 26 MG/DL (ref 6–20)
CALCIUM SERPL-MCNC: 8.9 MG/DL (ref 8.6–10)
CHLORIDE SERPL-SCNC: 96 MMOL/L (ref 98–107)
CREAT SERPL-MCNC: 5.09 MG/DL (ref 0.67–1.17)
DEPRECATED HCO3 PLAS-SCNC: 22 MMOL/L (ref 22–29)
ERYTHROCYTE [DISTWIDTH] IN BLOOD BY AUTOMATED COUNT: 16.1 % (ref 10–15)
GFR SERPL CREATININE-BSD FRML MDRD: 12 ML/MIN/1.73M2
GLUCOSE SERPL-MCNC: 86 MG/DL (ref 70–99)
HCT VFR BLD AUTO: 30.8 % (ref 40–53)
HGB BLD-MCNC: 9.5 G/DL (ref 13.3–17.7)
MAGNESIUM SERPL-MCNC: 1.6 MG/DL (ref 1.7–2.3)
MCH RBC QN AUTO: 30.8 PG (ref 26.5–33)
MCHC RBC AUTO-ENTMCNC: 30.8 G/DL (ref 31.5–36.5)
MCV RBC AUTO: 100 FL (ref 78–100)
PLATELET # BLD AUTO: 217 10E3/UL (ref 150–450)
POTASSIUM SERPL-SCNC: 4.4 MMOL/L (ref 3.4–5.3)
RBC # BLD AUTO: 3.08 10E6/UL (ref 4.4–5.9)
SODIUM SERPL-SCNC: 132 MMOL/L (ref 136–145)
WBC # BLD AUTO: 5.7 10E3/UL (ref 4–11)

## 2022-12-27 PROCEDURE — 97535 SELF CARE MNGMENT TRAINING: CPT | Mod: GO

## 2022-12-27 PROCEDURE — 99232 SBSQ HOSP IP/OBS MODERATE 35: CPT | Performed by: INTERNAL MEDICINE

## 2022-12-27 PROCEDURE — 250N000013 HC RX MED GY IP 250 OP 250 PS 637: Performed by: NURSE PRACTITIONER

## 2022-12-27 PROCEDURE — 80048 BASIC METABOLIC PNL TOTAL CA: CPT | Performed by: NURSE PRACTITIONER

## 2022-12-27 PROCEDURE — 85027 COMPLETE CBC AUTOMATED: CPT | Performed by: NURSE PRACTITIONER

## 2022-12-27 PROCEDURE — 97110 THERAPEUTIC EXERCISES: CPT | Mod: GP | Performed by: REHABILITATION PRACTITIONER

## 2022-12-27 PROCEDURE — 36415 COLL VENOUS BLD VENIPUNCTURE: CPT | Performed by: NURSE PRACTITIONER

## 2022-12-27 PROCEDURE — 83735 ASSAY OF MAGNESIUM: CPT | Performed by: NURSE PRACTITIONER

## 2022-12-27 PROCEDURE — 97530 THERAPEUTIC ACTIVITIES: CPT | Mod: GO

## 2022-12-27 PROCEDURE — 120N000002 HC R&B MED SURG/OB UMMC

## 2022-12-27 PROCEDURE — 99024 POSTOP FOLLOW-UP VISIT: CPT | Performed by: NURSE PRACTITIONER

## 2022-12-27 PROCEDURE — 97530 THERAPEUTIC ACTIVITIES: CPT | Mod: GP | Performed by: REHABILITATION PRACTITIONER

## 2022-12-27 RX ADMIN — SEVELAMER CARBONATE 1600 MG: 800 TABLET, FILM COATED ORAL at 08:53

## 2022-12-27 RX ADMIN — GABAPENTIN 300 MG: 300 CAPSULE ORAL at 08:54

## 2022-12-27 RX ADMIN — Medication 100 MG: at 20:51

## 2022-12-27 RX ADMIN — SENNOSIDES AND DOCUSATE SODIUM 1 TABLET: 8.6; 5 TABLET ORAL at 20:51

## 2022-12-27 RX ADMIN — BACLOFEN 10 MG: 10 TABLET ORAL at 08:53

## 2022-12-27 RX ADMIN — GABAPENTIN 300 MG: 300 CAPSULE ORAL at 15:23

## 2022-12-27 RX ADMIN — OXYCODONE HYDROCHLORIDE 5 MG: 5 TABLET ORAL at 11:13

## 2022-12-27 RX ADMIN — HYDROXYZINE HYDROCHLORIDE 25 MG: 25 TABLET, FILM COATED ORAL at 20:51

## 2022-12-27 RX ADMIN — PANTOPRAZOLE SODIUM 40 MG: 40 TABLET, DELAYED RELEASE ORAL at 08:53

## 2022-12-27 RX ADMIN — METHOCARBAMOL 500 MG: 500 TABLET ORAL at 12:19

## 2022-12-27 RX ADMIN — METHOCARBAMOL 500 MG: 500 TABLET ORAL at 08:53

## 2022-12-27 RX ADMIN — FOLIC ACID 1 MG: 1 TABLET ORAL at 17:02

## 2022-12-27 RX ADMIN — ACETAMINOPHEN 975 MG: 325 TABLET, FILM COATED ORAL at 08:54

## 2022-12-27 RX ADMIN — MIDODRINE HYDROCHLORIDE 10 MG: 5 TABLET ORAL at 12:19

## 2022-12-27 RX ADMIN — MIDODRINE HYDROCHLORIDE 10 MG: 5 TABLET ORAL at 08:53

## 2022-12-27 RX ADMIN — SEVELAMER CARBONATE 1600 MG: 800 TABLET, FILM COATED ORAL at 17:15

## 2022-12-27 RX ADMIN — CETIRIZINE HYDROCHLORIDE 10 MG: 10 TABLET, FILM COATED ORAL at 17:02

## 2022-12-27 RX ADMIN — ACETAMINOPHEN 975 MG: 325 TABLET, FILM COATED ORAL at 13:57

## 2022-12-27 RX ADMIN — FINASTERIDE 1.3 MG: 5 TABLET, FILM COATED ORAL at 17:03

## 2022-12-27 RX ADMIN — ACETAMINOPHEN 975 MG: 325 TABLET, FILM COATED ORAL at 20:51

## 2022-12-27 RX ADMIN — Medication 133 MG: at 17:01

## 2022-12-27 RX ADMIN — METHOCARBAMOL 500 MG: 500 TABLET ORAL at 15:23

## 2022-12-27 RX ADMIN — BACLOFEN 10 MG: 10 TABLET ORAL at 20:51

## 2022-12-27 RX ADMIN — OXYCODONE HYDROCHLORIDE 5 MG: 5 TABLET ORAL at 15:23

## 2022-12-27 RX ADMIN — RAMELTEON 8 MG: 8 TABLET, FILM COATED ORAL at 22:53

## 2022-12-27 RX ADMIN — SEVELAMER CARBONATE 1600 MG: 800 TABLET, FILM COATED ORAL at 12:19

## 2022-12-27 RX ADMIN — APIXABAN 2.5 MG: 2.5 TABLET, FILM COATED ORAL at 20:51

## 2022-12-27 RX ADMIN — MIDODRINE HYDROCHLORIDE 10 MG: 5 TABLET ORAL at 17:16

## 2022-12-27 RX ADMIN — OXYCODONE HYDROCHLORIDE 5 MG: 5 TABLET ORAL at 05:59

## 2022-12-27 RX ADMIN — APIXABAN 2.5 MG: 2.5 TABLET, FILM COATED ORAL at 08:54

## 2022-12-27 RX ADMIN — OXYCODONE HYDROCHLORIDE 5 MG: 5 TABLET ORAL at 20:51

## 2022-12-27 RX ADMIN — SENNOSIDES AND DOCUSATE SODIUM 1 TABLET: 8.6; 5 TABLET ORAL at 08:53

## 2022-12-27 RX ADMIN — SERTRALINE HYDROCHLORIDE 100 MG: 100 TABLET ORAL at 17:02

## 2022-12-27 RX ADMIN — QUETIAPINE 25 MG: 25 TABLET ORAL at 17:02

## 2022-12-27 RX ADMIN — CYANOCOBALAMIN TAB 500 MCG 500 MCG: 500 TAB at 17:01

## 2022-12-27 RX ADMIN — ATORVASTATIN CALCIUM 20 MG: 20 TABLET, FILM COATED ORAL at 22:53

## 2022-12-27 RX ADMIN — METHOCARBAMOL 500 MG: 500 TABLET ORAL at 20:51

## 2022-12-27 RX ADMIN — QUETIAPINE 50 MG: 25 TABLET ORAL at 22:53

## 2022-12-27 RX ADMIN — Medication 100 MG: at 17:01

## 2022-12-27 ASSESSMENT — ACTIVITIES OF DAILY LIVING (ADL)
ADLS_ACUITY_SCORE: 53
ADLS_ACUITY_SCORE: 51
ADLS_ACUITY_SCORE: 53
ADLS_ACUITY_SCORE: 51
ADLS_ACUITY_SCORE: 51
ADLS_ACUITY_SCORE: 53
ADLS_ACUITY_SCORE: 53
ADLS_ACUITY_SCORE: 51
ADLS_ACUITY_SCORE: 51

## 2022-12-27 NOTE — PROGRESS NOTES
[facility], [place]   Neurosurgery Progress Note:    Assessment: Shay Jimenez is a 57 year old male with with PMH ESRD on dialysis and osteomyelitis s/p C5-T6 fusion who is now POD#20 s/p redo C5-T6 fusion with concern for hemorrhage; no vertebral artery dissection or extravasation was noted on intraoperative angiogram.  He was admitted to the Neuro ICU postoperatively for MAP goals and frequent neuro checks, remaining stable on pressors.  Neuro exam is unchanged from patient's baseline.    Plan:  - Appreciate Medicine assistance   - Serial neuro exams  - Pain control  - Hb goal > 7  - Normonatremia  - Goal normotension  - Appreciate Nephrology recommendations re: dialysis (baseline M/W/F)  - Daily dressings  -  transition to keflex on discharge given concern of wound integrity   - Cervical collar when HOB >45,  on when OOB  - Regular diet  - Bowel regimen  - PRN antiemetics  - PT/OT  - SCDs and subcutaneous heparin for DVT proph  - Eliquis restarted- 12.20.2022  - Psychiatry consult for capacity evaluation- patient unable to make decisions   - Medically ready for disposition  -----------------------------------  ENID Norwood, CNP  Department of Neurosurgery  Pager: [phone]      Interval History/Subjective: No acute events overnight.  Continues to note shoulder pain with movement.     Objective:   Temp:  [96.8  F (36  C)-98  F (36.7  C)] 97.8  F (36.6  C)  Pulse:  [63-78] 68  Resp:  [9-28] 18  BP: ()/() 123/63  SpO2:  [76 %-100 %] 98 %  I/O last 3 completed shifts:  In: 500 [P.O.:500]  Out: 3000 [Other:3000]    Gen: Appears comfortable, NAD  Wound: Incision, clean, dry, intact without strikethrough  Neurologic:  - Alert & Oriented to person, place, time, and situation  - Follows commands briskly  - Speech fluent, spontaneous. No aphasia or dysarthria.  - No gaze preference. No apparent hemineglect.  - PERRL, EOMI  - Strong brow raise, eye closure, and smile  - Face  symmetric with sensation intact to light touch  - Trapezii and sternocleidomastoid muscles 5/5 bilaterally  - No pronator drift     Del Tr Bi WE WF Gr   R 4* 5 5 5 5 5   L 4* 5 5 5 5 5    HF KE KF DF PF EHL   R 4+ 5 5 5 5 5   L 4+ 5 5 5 5 5   *pain-limited    Norman's and clonus negative.    Sensation intact and symmetric to light touch throughout    LABS  Recent Labs   Lab Test 12/27/22  0646 12/26/22  0947 12/25/22  0738   WBC 5.7 6.2 5.0   HGB 9.5* 11.2* 10.0*    105* 103*    246 260       Recent Labs   Lab Test 12/27/22  0646 12/26/22  0947 12/25/22  0738   * 133* 135*   POTASSIUM 4.4 4.4 3.7   CHLORIDE 96* 96* 96*   CO2 22 20* 23   BUN 26.0* 39.6* 27.7*   CR 5.09* 6.95* 5.72*   ANIONGAP 14 17* 16*   MAC 8.9 9.5 9.7   GLC 86 97 84       IMAGING  No results found for this or any previous visit (from the past 24 hour(s)).

## 2022-12-27 NOTE — PROGRESS NOTES
[facility]    Medicine Progress Note - Hospitalist Service, GOLD TEAM 12    Date of Admission:  12/6/2022    Assessment & Plan   Shay Jimenez is a 57 year old male admitted on 12/6/2022 with hx of HTN, HLD, afib, ESRD on HD, peripheral neuropathy, GINI, GERD, and depression admitted for redo C5-T6 fusion, now been co managed with medicine.     Today's plan   - Cont current therapy     Superficial would infection noted by nrg 12/20  Started cefazolin by nrg 12/20, Plan for 7 day course   - Can switch to Keflex on discharge     Cervical Myelopathy  Chronic Osteomyelitis  C5-T6 spinal fusion complicated by hardware displacement  Admitted [facility] 12/6, underwent redo of C5-T6 fusion  Pt working with PT/OT, sig assistance needs. Appears to be reluctant for TCU. Concern for some intraoperative hemmorhage but intra-operative angio was re-assuring. Subsequent CT of C-spine and head w/o acute ab, good alignment. Hx of osteo w/o recent abx needs  - Cares per neuro surg  - Heavy care needs that seem unlikely to be met at home  - PT/OT rec TCU but patient states he wants to go home. [facility] unable to provide the care he needs. Apparently deemed unable to make his own decisions      Acute on chroninc hypoxic-hypercarbic  resp failure, resolved  - Titrate 02 for goal > 90%  - Cont overnight 02 as this is used at home for GINI given intolreance of CPAP. If concerns for hypercarbia as cause for delirium/encehalopathy, would favor ordering and seeing if RT can assist with mask that he may tolerate    Acute on chronic pain  Peripheral neuropathy  Improving, decreasing oxycodone over the last 24 hours  - Would consider increase in acetaminophen dose to 975 (ordered)  - Cont baclofen and gabapentin  - Cont methocarbamol  - Cont bowl regimen     Hypervolemic hyponatremia, stable and chronic  He appears asymptomatic Review shows that he has sodium that vascilates upper 120's-low 130's.  Nephrology aware, cont on dialysis  - Consider strict intake monitoring to get sense of patient drinking  - Would see if he can tolerated a 2000 mls fluid restriction     ESRD on dialysis  Nephrology following  Tolerating usual schedule M/W/F     Alcohol abuse  ICU/hospital Delirium  Hepatomegaly  No stigmata of cirrhosis. Mild hypoalbuminemia with nml bilirubin and AST/ALT. CT from Aug 2022 did show hepatomegaly  - Pt is on gabapentin and baclofen. Both of these have been used to help reduce cravings in pt with alcohol abuse.  - Naltrexone may not be safe in the setting of ESRD  - Agree with continuing of thiamine and folate  - Cont Seroquel 25 / 50 (am / pm)  and continue adjunctive remalteon     Mild/Mod pulm HTN  Moderate MR  GINI  Pt with ECHO in earlier 2022 with MR and elevated right sided pressures. No signs of sig right heart dysfunction. Not on CPAP as he has not tolerated.  - Cont nocturnal 02      Chronic atrial fibrillation  On eliquis PTA          Diet: Fluid restriction 2000 ML FLUID  Regular Diet Adult    DVT Prophylaxis: Apixaban for dvt ppx   Oliveira Catheter: Not present  Central Lines: None  Cardiac Monitoring: None  Code Status: Full Code      Disposition Plan      Expected Discharge Date: 12/27/2022      Destination: nursing home  Discharge Comments: TCU advised but patient refusing. nsg primary      The patient's care was discussed with the Bedside Nurse and Patient.    Geremias Peraza MD  Hospitalist Service, GOLD TEAM [address] [place] [address]  Securely message with the Vocera Web Console (learn more here)  Text page via [employer] Paging/Directory   Please see signed in provider for up to date coverage information      Clinically Significant Risk Factors         # Hyponatremia: Lowest Na = 132 mmol/L in last 2 days, will monitor as appropriate    # Hypomagnesemia: Lowest Mg = 1.6 mg/dL in last 2 days, will replace as needed   # Hypoalbuminemia: Lowest albumin =  "3.1 g/dL at 12/11/2022  5:28 AM, will monitor as appropriate           # Obesity: Estimated body mass index is 37.87 kg/m  as calculated from the following:    Height as of this encounter: 1.854 m (6' 1\").    Weight as of this encounter: 130.2 kg (287 lb 0.6 oz).          ______________________________________________________________________    Interval History   No acute events overnight, expressed his disagreement regarding TCU recommendations     Data reviewed today: I reviewed all medications, new labs and imaging results over the last 24 hours. I personally reviewed no images or EKG's today.    Physical Exam   Vital Signs: Temp: 97.8  F (36.6  C) Temp src: Axillary BP: 123/63 Pulse: 68   Resp: 18 SpO2: 98 % O2 Device: Nasal cannula    Weight: 287 lbs .62 oz  Constitutional: Awake, no apparent distress  Respiratory: Clear to auscultation bilaterally  Cardiovascular: Regular rate and rhythm  GI: Normal bowel sounds, soft, non-distended, non-tender  Skin/Integumen: No rashes, no cyanosis      Data   Recent Labs   Lab 12/27/22  0646 12/26/22  0947 12/25/22  0738   WBC 5.7 6.2 5.0   HGB 9.5* 11.2* 10.0*    105* 103*    246 260   * 133* 135*   POTASSIUM 4.4 4.4 3.7   CHLORIDE 96* 96* 96*   CO2 22 20* 23   BUN 26.0* 39.6* 27.7*   CR 5.09* 6.95* 5.72*   ANIONGAP 14 17* 16*   MAC 8.9 9.5 9.7   GLC 86 97 84     No results found for this or any previous visit (from the past 24 hour(s)).  Medications       acetaminophen  975 mg Oral TID     apixaban ANTICOAGULANT  2.5 mg Oral BID     atorvastatin  20 mg Oral At Bedtime     Baclofen  10 mg Oral BID     ceFAZolin  2 g Intravenous Daily     cetirizine  10 mg Oral Daily     cyanocobalamin  500 mcg Oral Daily     finasteride  1.3 mg Oral Daily     fluticasone  1 spray Both Nostrils BID     folic acid  1 mg Oral Daily     gabapentin  300 mg Oral Q8H     magnesium plus protein  133 mg Oral Daily     methocarbamol  500 mg Oral 4x Daily     midodrine  10 mg Oral " TID w/meals     multivitamin RENAL  1 tablet Oral Daily     pantoprazole  40 mg Oral QAM AC     polyethylene glycol  17 g Oral TID     vitamin B6  100 mg Oral Daily     QUEtiapine  25 mg Oral Daily     QUEtiapine  50 mg Oral At Bedtime     ramelteon  8 mg Oral At Bedtime     senna-docusate  1 tablet Oral BID     sertraline  100 mg Oral Daily     sevelamer carbonate  1,600 mg Oral TID w/meals     sodium chloride (PF)  3 mL Intracatheter Q8H     thiamine  100 mg Oral Daily

## 2022-12-27 NOTE — PROGRESS NOTES
"Blood pressure 123/63, pulse 68, temperature 97.8  F (36.6  C), temperature source Axillary, resp. rate 18, height 1.854 m (6' 1\"), weight 130.2 kg (287 lb 0.6 oz), SpO2 98 %.    Activity: A2 w/w  Neuros: AOX4, makes needs known  Cardiac: WDL, denies chest pain  Respiratory: WDL RA 98%, denies SOB  GI/: Oliguric on HD MWF, +BS, no BM  Diet: Regular, 2L restriction, tolerating  Skin/Incisions/Drains: Neck CDI, spinal incision CDI changed, Rt heel CDI changed, Coccyx CDI changed  Lines: Rt PIV TKO, Lt AV fistula  Pain: 4-7/10, Oxy, Tylenol  Plan: Continue with POC  "

## 2022-12-27 NOTE — CONSULTS
"Care Management Follow Up    Length of Stay (days): 21    Expected Discharge Date: 12/27/2022     Concerns to be Addressed: insurance issues, discharge planning     Patient plan of care discussed at interdisciplinary rounds: Yes    Anticipated Discharge Disposition: Skilled Nursing Facility, Transitional Care     Anticipated Discharge Services: None  Anticipated Discharge DME: None    Patient/family educated on Medicare website which has current facility and service quality ratings: yes  Education Provided on the Discharge Plan:  yes  Patient/Family in Agreement with the Plan: yes    Referrals Placed by CM/SW: Post Acute Facilities  Private pay costs discussed: Not applicable    Additional Information:    Met with patient at bedside to discuss insurance issues. SW encouraged patient to call his insurance to ask questions about coverage.This writer explained that unfortunately there are so many insurance plans that specific coverage questions would best be answered by an  from his insurance company. Patient mentioned he is already in contact with some from [employer] (he did not know the persons position/role). I told the patient I spoke with his son Jack and Jack told me he is willing to help call insurance. Patient stated \"maybe I'll give him that task.\"     Patient also talked about his acceptance of needing to go to a rehab facility. I told Shay I am still looking for placement for him and will let him know when I find placement.     All concerns addressed in this encounter. Social work will continue to follow for discharge planning and as needed.      PRANAY Dos Santos [facility]    Phone: [phone]  Pager: [phone]            "

## 2022-12-27 NOTE — PROGRESS NOTES
"/52 (BP Location: Right arm)   Pulse 76   Temp 97.1  F (36.2  C) (Oral)   Resp 17   Ht 1.854 m (6' 1\")   Wt 130.2 kg (287 lb 0.6 oz)   SpO2 97%   BMI 37.87 kg/m      Time: 0481-8427.  Reason for Admission: POD#21 redo C5-T6 fusion.   Activity: Ax2 w/ lift, repositioned as requested.   Neuro: A&O x4. Calls appropriately.  Cardiac: WDL. Denies chest pain.   Respiratory: WDL on RA. 2L NC on overnight, desats in sleep. LS clear, equal. Denies SOB.   GI/: Oliguric, on HD (M/W/F). +BS, + flatus. Incontinent of bowel x1, small/soft BM.   Diet: Regular, 2L fluid restriction.   Skin/Incisions/Drains: Posterior neck/spine incision - dermabonded, SOSA & CDI. R heel pressure wound, dressing CDI. Blanchable redness coccyx. Redness in perineum & groin, barrier cream applied. L AV fistula, dressing CDI.   Lines: R PIV tko + abx.   Labs: Reviewed.   Pain/Nausea: C/o incisional pain, managed w/ PRN oxycodone + scheduled meds. Denies nausea.   New changes this shift: X  Plan: Continue POC.       "

## 2022-12-27 NOTE — PHARMACY-ADMISSION MEDICATION HISTORY
Admission Medication History Completed by Pharmacy    See [facility] Admission Navigator for allergy information, preferred outpatient pharmacy, prior to admission medications and immunization status.     Medication History Sources:     Patient, fill history, MD note from 12/5/22    Changes made to PTA medication list (reason):    Added:   o Senna PRN (per patient report)  o Magnesium (per patient report - states that the strength is 500 mg and he was taking 2 caps peer day, but he did not know which type of magnesium it was)  o Duloxetine (per dispense report and per patient)    Deleted: magnesium + protein (patient states that he takes plain magnesium)    Changed:   o Acetaminophen from 325-650 mg Q4hr prn to 1000 mg Q6hr prn and added headache as an indication  o Amlactin from daily to BID (patient states that he didn't always remember to take this regularly)  o Cyclobenzaprine - no instructions - added 10 mg QHS PRN, per pt and med list in MD note 12/5/22  o Diclofenac - changed to PRN per pt report  o Fluticasone from 1 spray BID to 2 sprays BID PRN, per patient  o Hydroxyzine from BID PRN to TID PRN per patient  o Miralax from daily to daily PRN  o KCl to daily    Additional Information:    Patient seemed to know his medications, but was easily sidetracked in conversation and occasionally lost his train of thought or got off-topic.     Patient states that he had never filled apixaban prior to admission, so was not taking this.     Patient stated that he was not taking baclofen, Ceftin or Narcan.    Patient states that he often doesn't need to take sevelamer 3 times a day because he doesn't always eat that often.     Confirmed allergies and added reactions, per patient report - amlodipine: edema, lisinopril: cough.    Home pharmacy is [facility] on [address] in [place].     Prior to Admission medications    Medication Sig Last Dose Taking? Auth Provider Long Term End Date   acetaminophen (TYLENOL) 325 MG tablet Take  1-2 tablets (325-650 mg) by mouth every 4 hours as needed for mild pain or fever  Patient taking differently: Take 1,000 mg by mouth every 6 hours as needed for mild pain, fever or headaches 12/6/2022 at 0500 Yes Oksana Spicer MD     ALPRAZolam (XANAX) 1 MG tablet Take 0.5 mg by mouth 2 times daily as needed for anxiety 12/5/2022 at 2000 Yes Reported, Patient     ammonium lactate (AMLACTIN) 12 % external cream Apply topically 2 times daily as needed Apply to bilateral lower legs Past Month Yes Unknown, Entered By History     atorvastatin (LIPITOR) 20 MG tablet Take 20 mg by mouth At Bedtime 12/5/2022 at 2000 Yes Unknown, Entered By History No    calcium carbonate (TUMS) 500 MG chewable tablet Take 2 tablets (1,000 mg) by mouth 3 times daily as needed for heartburn Past Month Yes Oksana Spicer MD     cetirizine (ZYRTEC) 10 MG tablet Take 1 tablet (10 mg) by mouth daily 12/5/2022 at 0800 Yes Oksana Spicer MD     cyanocobalamin (VITAMIN B-12) 500 MCG tablet Take 500 mcg by mouth daily 12/5/2022 at 0800 Yes Unknown, Entered By History     cyclobenzaprine (FLEXERIL) 10 MG tablet Take 10 mg by mouth nightly as needed Past Week Yes Reported, Patient     diclofenac (VOLTAREN) 1 % topical gel Apply topically 4 times daily as needed 12/5/2022 at 2000 Yes Reported, Patient     DULoxetine (CYMBALTA) 30 MG capsule Take 30 mg by mouth daily Past Month Yes Unknown, Entered By History No    finasteride (PROSCAR) 5 MG tablet Take 1.25 mg by mouth daily Takes 1/4 of 5 mg daily 12/5/2022 at 0800 Yes Unknown, Entered By History     fluticasone (FLONASE) 50 MCG/ACT nasal spray Spray 2 sprays into both nostrils daily as needed 12/5/2022 at 2000 Yes Unknown, Entered By History     gabapentin (NEURONTIN) 300 MG capsule Take 300 mg by mouth 3 times daily 12/6/2022 at 0500 Yes Reported, Patient Yes    hydrOXYzine (ATARAX) 25 MG tablet Take 1 tablet (25 mg) by mouth 2 times daily as needed for other or itching (pain as adjuant  therapy)  Patient taking differently: Take 25 mg by mouth 3 times daily as needed for other or itching (pain as adjuant therapy) 12/6/2022 at 0500 Yes Talia Johnson MD     Magnesium 500 MG CAPS Take 2 capsules by mouth daily  Yes Unknown, Entered By History     midodrine (PROAMATINE) 10 MG tablet Take 1 tablet (10 mg) by mouth 3 times daily (with meals) 12/5/2022 at 0800 Yes Oksana Spicer MD     multivitamin RENAL (RENAVITE RX/NEPHROVITE) 1 MG tablet Take 1 tablet by mouth daily 12/5/2022 Yes Unknown, Entered By History     omeprazole (PRILOSEC) 20 MG DR capsule Take 20 mg by mouth daily 12/5/2022 at 0800 Yes Unknown, Entered By History     polyethylene glycol (MIRALAX) 17 GM/Dose powder Take 17 g by mouth daily  Patient taking differently: Take 17 g by mouth daily as needed for constipation Past Month Yes Dona Cabezas PA-C     potassium chloride ER (KLOR-CON M) 20 MEQ CR tablet Take 20 mEq by mouth daily 12/5/2022 at 0800 Yes Reported, Patient     sennosides (SENOKOT) 8.6 MG tablet Take 2 tablets by mouth daily as needed for constipation  Yes Unknown, Entered By History     sertraline (ZOLOFT) 100 MG tablet Take 100 mg by mouth daily 12/5/2022 at 0800 Yes Reported, Patient Yes    sevelamer carbonate (RENVELA) 800 MG tablet Take 2 tablets (1,600 mg) by mouth 3 times daily (with meals) 12/5/2022 at 1700 Yes Oksana Spicer MD     vitamin B6 (PYRIDOXINE) 100 MG tablet Take 100 mg by mouth daily 12/5/2022 at 0800 Yes Unknown, Entered By History     baclofen (LIORESAL) 10 MG tablet Take 1 tablet (10 mg) by mouth 2 times daily Not taking  Fritz Boles MD     cefuroxime (CEFTIN) 250 MG tablet 500 mg daily Not taking  Reported, Patient     ELIQUIS ANTICOAGULANT 2.5 MG tablet TAKE 1 TABLET (2.5 MG) BY MOUTH TWO TIMES A DAY. INDICATIONS: PREVENT STROKE IN AFIB Not taking  Reported, Patient     naloxone (NARCAN) 4 MG/0.1ML nasal spray Spray 1 spray (4 mg) into one nostril alternating nostrils once as needed  for opioid reversal every 2-3 minutes until assistance arrives Not taking  Fritz Boles MD Yes        Date completed: 12/26/22    Medication history completed by:  Fara Sotomayor, PharmD

## 2022-12-28 LAB
ANION GAP SERPL CALCULATED.3IONS-SCNC: 16 MMOL/L (ref 7–15)
BUN SERPL-MCNC: 37.5 MG/DL (ref 6–20)
CALCIUM SERPL-MCNC: 9.4 MG/DL (ref 8.6–10)
CHLORIDE SERPL-SCNC: 96 MMOL/L (ref 98–107)
CREAT SERPL-MCNC: 6.15 MG/DL (ref 0.67–1.17)
DEPRECATED HCO3 PLAS-SCNC: 22 MMOL/L (ref 22–29)
ERYTHROCYTE [DISTWIDTH] IN BLOOD BY AUTOMATED COUNT: 15.9 % (ref 10–15)
GFR SERPL CREATININE-BSD FRML MDRD: 10 ML/MIN/1.73M2
GLUCOSE SERPL-MCNC: 79 MG/DL (ref 70–99)
HCT VFR BLD AUTO: 34.6 % (ref 40–53)
HGB BLD-MCNC: 10.4 G/DL (ref 13.3–17.7)
MAGNESIUM SERPL-MCNC: 1.8 MG/DL (ref 1.7–2.3)
MCH RBC QN AUTO: 30.1 PG (ref 26.5–33)
MCHC RBC AUTO-ENTMCNC: 30.1 G/DL (ref 31.5–36.5)
MCV RBC AUTO: 100 FL (ref 78–100)
PLATELET # BLD AUTO: 211 10E3/UL (ref 150–450)
POTASSIUM SERPL-SCNC: 4.8 MMOL/L (ref 3.4–5.3)
RBC # BLD AUTO: 3.46 10E6/UL (ref 4.4–5.9)
SODIUM SERPL-SCNC: 134 MMOL/L (ref 136–145)
WBC # BLD AUTO: 5.4 10E3/UL (ref 4–11)

## 2022-12-28 PROCEDURE — 36415 COLL VENOUS BLD VENIPUNCTURE: CPT | Performed by: NURSE PRACTITIONER

## 2022-12-28 PROCEDURE — 250N000013 HC RX MED GY IP 250 OP 250 PS 637: Performed by: NURSE PRACTITIONER

## 2022-12-28 PROCEDURE — 99232 SBSQ HOSP IP/OBS MODERATE 35: CPT | Mod: 24 | Performed by: CLINICAL NURSE SPECIALIST

## 2022-12-28 PROCEDURE — 250N000011 HC RX IP 250 OP 636: Performed by: CLINICAL NURSE SPECIALIST

## 2022-12-28 PROCEDURE — 90937 HEMODIALYSIS REPEATED EVAL: CPT

## 2022-12-28 PROCEDURE — 82310 ASSAY OF CALCIUM: CPT | Performed by: NURSE PRACTITIONER

## 2022-12-28 PROCEDURE — 120N000002 HC R&B MED SURG/OB UMMC

## 2022-12-28 PROCEDURE — 634N000001 HC RX 634: Performed by: CLINICAL NURSE SPECIALIST

## 2022-12-28 PROCEDURE — 83735 ASSAY OF MAGNESIUM: CPT | Performed by: NURSE PRACTITIONER

## 2022-12-28 PROCEDURE — 258N000003 HC RX IP 258 OP 636: Performed by: CLINICAL NURSE SPECIALIST

## 2022-12-28 PROCEDURE — G0463 HOSPITAL OUTPT CLINIC VISIT: HCPCS

## 2022-12-28 PROCEDURE — 85027 COMPLETE CBC AUTOMATED: CPT | Performed by: NURSE PRACTITIONER

## 2022-12-28 PROCEDURE — 99232 SBSQ HOSP IP/OBS MODERATE 35: CPT | Performed by: INTERNAL MEDICINE

## 2022-12-28 RX ORDER — METHOCARBAMOL 500 MG/1
500 TABLET, FILM COATED ORAL EVERY 6 HOURS PRN
Status: DISCONTINUED | OUTPATIENT
Start: 2022-12-28 | End: 2023-01-07 | Stop reason: HOSPADM

## 2022-12-28 RX ORDER — HEPARIN SODIUM 1000 [USP'U]/ML
500 INJECTION, SOLUTION INTRAVENOUS; SUBCUTANEOUS CONTINUOUS
Status: DISCONTINUED | OUTPATIENT
Start: 2022-12-28 | End: 2022-12-28

## 2022-12-28 RX ADMIN — SERTRALINE HYDROCHLORIDE 100 MG: 100 TABLET ORAL at 18:10

## 2022-12-28 RX ADMIN — BACLOFEN 10 MG: 10 TABLET ORAL at 20:24

## 2022-12-28 RX ADMIN — CYANOCOBALAMIN TAB 500 MCG 500 MCG: 500 TAB at 18:09

## 2022-12-28 RX ADMIN — CALCIUM CARBONATE (ANTACID) CHEW TAB 500 MG 1000 MG: 500 CHEW TAB at 20:30

## 2022-12-28 RX ADMIN — Medication 100 MG: at 18:09

## 2022-12-28 RX ADMIN — QUETIAPINE 25 MG: 25 TABLET ORAL at 18:09

## 2022-12-28 RX ADMIN — HEPARIN SODIUM 500 UNITS: 1000 INJECTION INTRAVENOUS; SUBCUTANEOUS at 08:16

## 2022-12-28 RX ADMIN — SEVELAMER CARBONATE 1600 MG: 800 TABLET, FILM COATED ORAL at 12:50

## 2022-12-28 RX ADMIN — OXYCODONE HYDROCHLORIDE 5 MG: 5 TABLET ORAL at 00:56

## 2022-12-28 RX ADMIN — GABAPENTIN 300 MG: 300 CAPSULE ORAL at 00:56

## 2022-12-28 RX ADMIN — SEVELAMER CARBONATE 1600 MG: 800 TABLET, FILM COATED ORAL at 18:09

## 2022-12-28 RX ADMIN — SODIUM CHLORIDE 250 ML: 9 INJECTION, SOLUTION INTRAVENOUS at 08:13

## 2022-12-28 RX ADMIN — ATORVASTATIN CALCIUM 20 MG: 20 TABLET, FILM COATED ORAL at 22:54

## 2022-12-28 RX ADMIN — OXYCODONE HYDROCHLORIDE 5 MG: 5 TABLET ORAL at 18:06

## 2022-12-28 RX ADMIN — APIXABAN 2.5 MG: 2.5 TABLET, FILM COATED ORAL at 20:24

## 2022-12-28 RX ADMIN — BACLOFEN 10 MG: 10 TABLET ORAL at 12:52

## 2022-12-28 RX ADMIN — ACETAMINOPHEN 975 MG: 325 TABLET, FILM COATED ORAL at 12:50

## 2022-12-28 RX ADMIN — SODIUM CHLORIDE 300 ML: 9 INJECTION, SOLUTION INTRAVENOUS at 08:12

## 2022-12-28 RX ADMIN — METHOCARBAMOL 500 MG: 500 TABLET ORAL at 22:54

## 2022-12-28 RX ADMIN — GABAPENTIN 300 MG: 300 CAPSULE ORAL at 12:51

## 2022-12-28 RX ADMIN — APIXABAN 2.5 MG: 2.5 TABLET, FILM COATED ORAL at 12:52

## 2022-12-28 RX ADMIN — ACETAMINOPHEN 975 MG: 325 TABLET, FILM COATED ORAL at 20:23

## 2022-12-28 RX ADMIN — EPOETIN ALFA-EPBX 4000 UNITS: 10000 INJECTION, SOLUTION INTRAVENOUS; SUBCUTANEOUS at 09:18

## 2022-12-28 RX ADMIN — METHOCARBAMOL 500 MG: 500 TABLET ORAL at 18:07

## 2022-12-28 RX ADMIN — CETIRIZINE HYDROCHLORIDE 10 MG: 10 TABLET, FILM COATED ORAL at 18:09

## 2022-12-28 RX ADMIN — GABAPENTIN 300 MG: 300 CAPSULE ORAL at 18:07

## 2022-12-28 RX ADMIN — MIDODRINE HYDROCHLORIDE 10 MG: 5 TABLET ORAL at 18:19

## 2022-12-28 RX ADMIN — FINASTERIDE 1.3 MG: 5 TABLET, FILM COATED ORAL at 18:10

## 2022-12-28 RX ADMIN — HEPARIN SODIUM 500 UNITS/HR: 1000 INJECTION INTRAVENOUS; SUBCUTANEOUS at 08:16

## 2022-12-28 RX ADMIN — RAMELTEON 8 MG: 8 TABLET, FILM COATED ORAL at 22:53

## 2022-12-28 RX ADMIN — FOLIC ACID 1 MG: 1 TABLET ORAL at 18:09

## 2022-12-28 RX ADMIN — Medication 100 MG: at 20:24

## 2022-12-28 RX ADMIN — QUETIAPINE 50 MG: 25 TABLET ORAL at 22:54

## 2022-12-28 RX ADMIN — METHOCARBAMOL 500 MG: 500 TABLET ORAL at 12:51

## 2022-12-28 ASSESSMENT — ACTIVITIES OF DAILY LIVING (ADL)
ADLS_ACUITY_SCORE: 53
ADLS_ACUITY_SCORE: 47
ADLS_ACUITY_SCORE: 53
ADLS_ACUITY_SCORE: 47
ADLS_ACUITY_SCORE: 51
ADLS_ACUITY_SCORE: 47
ADLS_ACUITY_SCORE: 47

## 2022-12-28 NOTE — PROGRESS NOTES
[facility] Nurse Inpatient Assessment     Consulted for: Right heel    Patient History (according to provider note(s):      Shay Jimenez is a 57 year old male with with PMH ESRD on dialysis and osteomyelitis s/p C5-T6 fusion who is now POD#5 s/p redo C5-T6 fusion with concern for hemorrhage; no vertebral artery dissection or extravasation was noted on intraoperative angiogram.  He was admitted to the Neuro ICU postoperatively for MAP goals and frequent neuro checks, remaining stable on pressors.  Neuro exam is unchanged from patient's baseline.    Areas Assessed:      Areas visualized during today's visit: back     Wound location: cervical spine       Superior     12/28  Last photo: 12/20  Wound due to: Surgical Wound  Wound history/plan of care: surgical wound 12/6  Wound base: 100 % serous scabbing     Palpation of the wound bed: normal      Drainage: small     Description of drainage: serosanguinous     Measurements (length x width x depth, in cm): ~6  x 0.1  x 0.1 cm      Tunneling: N/A     Undermining: N/A  Periwound skin: Intact       Color: pink      Temperature: normal   Odor: none  Pain: mild, tender  Pain interventions prior to dressing change: N/A  Treatment goal: Infection control/prevention and Protection  STATUS: improving  Supplies ordered: supplies stored on unit     12/20: sutures and staples removed per Neuro NP (some left behind on high risk for dehiscence area.)     Pressure Injury Location: Right heel    12/20 12/28  Last photo: 12/20  Wound type: Pressure Injury     Pressure Injury Stage: Deep Tissue Pressure Injury (DTPI), present on admission   Wound history/plan of care:  Wound noted by nursing on 12/6 after patient transferred out of PACU. Patient had been proned in OR so unlikely wound is from then and that it was present on admission.  12/28- assessed patient in dialysis, no prevalon boots on at time of assessment.  Wound base:  "100 % purple and epidermis-      Palpation of the wound bed: firm      Drainage: none     Description of drainage: none     Measurements (length x width x depth, in cm) 3 x 3 x 0 cm      Tunneling N/A     Undermining N/A  Periwound skin: Intact      Color: normal and consistent with surrounding tissue      Temperature: normal   Odor: none  Pain: no grimacing or signs of discomfort, none  Pain intervention prior to dressing change: patient tolerated well  Treatment goal: Protection  STATUS: stable  Supplies ordered: supplies stored on unit    My PI Risk Assessment     Sensory Perception: 3 - Slightly Limited     Moisture: 3 - Occasionally moist      Activity: 2 - Chairfast     Mobility: 2 - Very limited     Nutrition: 2 - Probably inadequate      Friction/Shear: 2 - Potential problem      TOTAL: 14     Treatment Plan:     Cervical spine incision wound(s): Daily and PRN cleanse with wound cleanser and pat dry. Apply Primipore over wound. (only place primipore over staples or weeping areas)    Right heel wound: Every third day and as needed  Cleanse wound with NS and pat dry  Paint bibi-wound skin with no sting barrier film.  Cover with 4x4 mepilex flex(# [id number]).   Keep heels elevated off bed.   Wear Prevalon boots while in bed. (# [id number])    Pressure Injury Prevention (PIP) Plan:  If patient is declining pressure injury prevention interventions: Explore reason why and address patient's concerns, Educate on pressure injury risk and prevention intervention(s), If patient is still declining, document \"informed refusal\"  and Ensure Care team is aware ( provider, charge nurse, etc)  Mattress: Follow bed algorithm, reassess daily and order specialty mattress, if indicated.  HOB: Maintain at or below 30 degrees, unless contraindicated  Repositioning in bed: Every 1-2 hours , Left/right positioning; avoid supine and Raise foot of bed prior to raising head of bed, to reduce patient sliding down (shear)  Heels: Keep elevated " off mattress, Pillows under calves and Heel lift boots  Protective Dressing: Sacral Mepilex for prevention (#[id number]),  especially for the agitated patient   Chair positioning: Chair cushion (#[id number]) , Repositions self: patient to shift weight every 15 minutes, Assist patient to reposition hourly and Do NOT use a donut for sitting (this increases pressure to smaller area and creates a higher potential for injury)    If patient has a buttock pressure injury, or high risk for PI use chair cushion or SPS.  Moisture Management: Perineal cleansing /protection: Follow Incontinence Protocol, Avoid brief in bed, Clean and dry skin folds with bathing , Consider InterDry (#[id number]) between folds and Moisturize dry skin  Under Devices: Inspect skin under all medical devices during skin inspection , Ensure tubes are stabilized without tension and Ensure patient is not lying on medical devices or equipment when repositioned  Ask provider to discontinue device when no longer needed.    Orders: Reviewed and Updated    RECOMMEND PRIMARY TEAM ORDER: None, at this time  Education provided: plan of care and wound progress  Discussed plan of care with: Patient  WOC nurse follow-up plan: weekly  Notify WOC if wound(s) deteriorate.  Nursing to notify the Provider(s) and re-consult the WOC Nurse if new skin concern.    DATA:     Current support surface: Standard  Low air loss (DEN pump, Isolibrium, Pulsate, skin guard, etc)  BMI: Body mass index is 37.87 kg/m .   Active diet order: Orders Placed This Encounter      Regular Diet Adult     Output: I/O last 3 completed shifts:  In: 237 [P.O.:237]  Out: -      Labs:   Recent Labs   Lab 12/28/22  0701   HGB 10.4*   WBC 5.4     Pressure injury risk assessment:   Sensory Perception: 3-->slightly limited  Moisture: 3-->occasionally moist  Activity: 2-->chairfast  Mobility: 2-->very limited  Nutrition: 3-->adequate  Friction and Shear: 2-->potential problem  Konstantin Score: 15     Dept. Pager:  [phone]  Dept. Office Number: [phone]

## 2022-12-28 NOTE — PROGRESS NOTES
Care Management Follow Up    Length of Stay (days): 22    Expected Discharge Date: 12/27/2022     Concerns to be Addressed: discharge planning       Patient plan of care discussed at interdisciplinary rounds: Yes    Anticipated Discharge Disposition: Skilled Nursing Facility, Transitional Care    [facility]  [address] [place]  [address]  P: [phone]  F: [phone]     Anticipated Discharge Services: None  Anticipated Discharge DME: None    Patient/family educated on Medicare website which has current facility and service quality ratings: yes  Education Provided on the Discharge Plan:    Patient/Family in Agreement with the Plan: yes    Referrals Placed by CM/SW:     Pending Referrals     [facility]  [address] [place]  [address]  P: [phone]  F: [phone]  12/28: [facility] liaison submitted for insurance auth. She informed me bed movement has been slow recently.    [facility]  [address] [place], [place] [address]PERI Maier  [address]  P: [phone]  F: [phone]    [facility]  [address] [place]  [address]  P: [phone]  F: [phone]    [facility]  [address] [place] [address]  Admissions: [phone]  Fax: [phone]    [facility]  P: [phone]    [facility]  [address] [facility] [address]  P: [phone]   Admissions (Jane): [phone]  F: [phone]    [facility]  [address] [facility] [address]  P: [phone]   Admissions: [phone]  F: [phone]      [facility]  [address] [facility] [address]  [phone]  12/20: Referral Sent  12/21: Message left with admissions requesting a call back regarding referral status.  12/26: No staff in admissions today. Will try to call back tomorrow.     [facility]  [address] [facility] [address]  [phone]  12/20: Referral Sent  12/21: Writer spoke with admissions. They have  not reviewed referral, but will review and call writer back.   12/26: LM for admissions.     [facility] [address] [address]  [phone]  12/20: Referral Sent  12/21: Message left with admissions requesting a call back regarding referral status.  12/26: spoke with Immanuel lialewis Talbot covering for Richelle. Dahiana said she had no received the patients referral so I re-faxed it to Richelle's fax #[phone]     Declined Referrals     [facility] --- 12/21: Declined due to not having any available beds.     [facility] --- 12/21: Declined due to not being in patient's insurance network.     [facility] --- 12/21: Declined due to they only take LTC patients with mental health diagnoses.     [facility] --- 12/21: Declined in Epic, no reason given.     [facility] --- 12/21: Declined due to not taking patient's insurance.     [facility] --- 12/26: declined due to mentation, woundcare     [facility] --- 12/26: declined due to A x 2 with a lift, difficult to transport to dialysis, inappropriate speech with staff, sexually inappropriate language     [facility] --- 12/26: declined due to A x 2 with a lift, difficult to transport to dialysis, inappropriate speech with staff, sexually inappropriate language     [facility] -- 12/26: too complex     [facility] --- 12/26: facility does not accept patients on dialysis    [facility] --- 12/28: patient is too complex for what the facility can handle    [facility] --- 12/28: Bariatric Needs, facility does no have [place] Lift, Behavior issues or concerns      Private pay costs discussed: Not applicable    Additional Information:        PRANAY Dos Santos LSW   [address]    Phone: [phone]  Pager: [phone]

## 2022-12-28 NOTE — PLAN OF CARE
"/58 (BP Location: Right arm)   Pulse 79   Temp 98.6  F (37  C) (Oral)   Resp 16   Ht 1.854 m (6' 1\")   Wt 130.2 kg (287 lb 0.6 oz)   SpO2 100%   BMI 37.87 kg/m     Reason for admission: POD 21 C5-T6 fusion (redo) c/b hemodynamic instability requiring ICU admission  Neuro: AOx4, neuros intact w q4h checks  Pain: denies  CMS: weakness in BLE  Cardiac: hx afib on anticoag  Resp: O2>90 on RA  GI/: LBM 12/27/2022, anuric on dialysis MWF  Skin/Wound: mepilex on buttock, c spine incision with primapore, R heel with mepilex  Access: PIV infusing TKO, L AV fistula   Activity: ax2 with lift, C collar when OOB  Nutrition: Reg  Changes this shift: dialysis complete  Plan:  Awaiting placement                        "

## 2022-12-28 NOTE — PROGRESS NOTES
HEMODIALYSIS TREATMENT NOTE    Date: 12/28/2022  Time: 11:52 AM    Data:  Pre Wt: 130.2 kg     Desired Wt: 127.2  kg   Post Wt: 127.2 kg   Weight change: 3  kg  Ultrafiltration - Post Run Net Total Removed (mL): 3000 mL  Vascular Access Status: Fistula  patent  Dialyzer Rinse: Clear  Total Blood Volume Processed: 94.62 L   Total Dialysis (Treatment) Time: 4   Dialysate Bath: K 2, Ca 2.5  Heparin 500 units loading + 500 units/hr    Lab:   No    Interventions:  LUE AVF cannulated with 15g needles at the initiation of tx. Pt tolerated tx very well and uneventful. 3L net fluid removed. Pt received Epoetin during the run. 450 BFR maintained throughout. Hemostasis achieved within 10 min. Handoff report given to YUDI Christie.     Assessment:  Alert and oriented x 4.  Vitally stable  Calm and cooperative with cares.  AVF is positive for thrill and bruit.     Plan:    Next HD per renal

## 2022-12-28 NOTE — PROGRESS NOTES
"/61 (BP Location: Right arm)   Pulse 66   Temp (!) 95.2  F (35.1  C) (Axillary)   Resp 18   Ht 1.854 m (6' 1\")   Wt 130.2 kg (287 lb 0.6 oz)   SpO2 98%   BMI 37.87 kg/m      Time: 2109-2827.  Reason for Admission: POD#21 redo C5-T6 fusion.   Activity: Ax2 w/ lift, repositioned as requested.   Neuro: A&O x4. Calls appropriately.  Cardiac: WDL. Denies chest pain.   Respiratory: WDL on RA. 2L NC on overnight, desats in sleep. LS clear, equal. Denies SOB.   GI/: Oliguric, on HD (M/W/F). +BS, + flatus. Incontinent of bowel x1, small BM.   Diet: Regular, 2L fluid restriction.   Skin/Incisions/Drains: Spine incision covered w/ primapore, CDI. Posterior neck incision - dermabonded, SOSA. R heel pressure wound & R big toe underside wound, Mepilex in placed CDI. Redness in perineum & groin, barrier cream applied. L AV fistula, dressing CDI.   Lines: R PIV tko + abx.   Labs: Reviewed.   Pain/Nausea: C/o incisional neck/spine pain, managed w/ PRN oxycodone + scheduled meds. Denies nausea.   New changes this shift: X  Plan: Continue POC.       "

## 2022-12-28 NOTE — PROGRESS NOTES
[facility]    Medicine Progress Note - Hospitalist Service, GOLD TEAM 12    Date of Admission:  12/6/2022    Assessment & Plan   Shay Jimenez is a 57 year old male admitted on 12/6/2022 with hx of HTN, HLD, afib, ESRD on HD, peripheral neuropathy, GINI, GERD, and depression admitted for redo C5-T6 fusion, now been co managed with medicine.     Today's plan   - Cont current therapy   - Awaiting placement to TCU    Superficial would infection noted by nrg 12/20  Started cefazolin by nrg 12/20, Plan for 7 day course   - Can switch to Keflex on discharge     Cervical Myelopathy  Chronic Osteomyelitis  C5-T6 spinal fusion complicated by hardware displacement  Admitted [facility] 12/6, underwent redo of C5-T6 fusion  Pt working with PT/OT, sig assistance needs. Appears to be reluctant for TCU. Concern for some intraoperative hemmorhage but intra-operative angio was re-assuring. Subsequent CT of C-spine and head w/o acute ab, good alignment. Hx of osteo w/o recent abx needs  - Cares per neuro surg  - Heavy care needs that seem unlikely to be met at home  - PT/OT rec TCU but patient states he wants to go home. [facility] unable to provide the care he needs. Apparently deemed unable to make his own decisions      Acute on chroninc hypoxic-hypercarbic  resp failure, resolved  - Titrate 02 for goal > 90%  - Cont overnight 02 as this is used at home for GINI given intolreance of CPAP. If concerns for hypercarbia as cause for delirium/encehalopathy, would favor ordering and seeing if RT can assist with mask that he may tolerate    Acute on chronic pain  Peripheral neuropathy  Improving, decreasing oxycodone over the last 24 hours  - Would consider increase in acetaminophen dose to 975 (ordered)  - Cont baclofen and gabapentin  - Cont methocarbamol  - Cont bowl regimen     Hypervolemic hyponatremia, stable and chronic  He appears asymptomatic Review shows that he has sodium that vascilates  upper 120's-low 130's. Nephrology aware, cont on dialysis  - Consider strict intake monitoring to get sense of patient drinking  - Would see if he can tolerated a 2000 mls fluid restriction     ESRD on dialysis  Nephrology following  Tolerating usual schedule M/W/F     Alcohol abuse  ICU/hospital Delirium  Hepatomegaly  No stigmata of cirrhosis. Mild hypoalbuminemia with nml bilirubin and AST/ALT. CT from Aug 2022 did show hepatomegaly  - Pt is on gabapentin and baclofen. Both of these have been used to help reduce cravings in pt with alcohol abuse.  - Naltrexone may not be safe in the setting of ESRD  - Agree with continuing of thiamine and folate  - Cont Seroquel 25 / 50 (am / pm)  and continue adjunctive remalteon     Mild/Mod pulm HTN  Moderate MR  GINI  Pt with ECHO in earlier 2022 with MR and elevated right sided pressures. No signs of sig right heart dysfunction. Not on CPAP as he has not tolerated.  - Cont nocturnal 02      Chronic atrial fibrillation  On eliquis PTA          Diet: Fluid restriction 2000 ML FLUID  Regular Diet Adult    DVT Prophylaxis: Apixaban for dvt ppx   Oliveira Catheter: Not present  Central Lines: None  Cardiac Monitoring: None  Code Status: Full Code      Disposition Plan         The patient's care was discussed with the Bedside Nurse and Patient.    Geremias Peraza MD  Hospitalist Service, GOLD TEAM [address] [place] [address]  Securely message with the Vocera Web Console (learn more here)  Text page via [facility] Paging/Directory   Please see signed in provider for up to date coverage information      Clinically Significant Risk Factors         # Hyponatremia: Lowest Na = 132 mmol/L in last 2 days, will monitor as appropriate    # Hypomagnesemia: Lowest Mg = 1.6 mg/dL in last 2 days, will replace as needed   # Hypoalbuminemia: Lowest albumin = 3.1 g/dL at 12/11/2022  5:28 AM, will monitor as appropriate           # Obesity: Estimated body mass index is  "37.87 kg/m  as calculated from the following:    Height as of this encounter: 1.854 m (6' 1\").    Weight as of this encounter: 130.2 kg (287 lb 0.6 oz).          ______________________________________________________________________    Interval History   No acute events overnight, seen at , no complaints    Data reviewed today: I reviewed all medications, new labs and imaging results over the last 24 hours. I personally reviewed no images or EKG's today.    Physical Exam   Vital Signs: Temp: 98.6  F (37  C) Temp src: Oral BP: 112/58 Pulse: 79   Resp: 16 SpO2: 100 % O2 Device: None (Room air)    Weight: 287 lbs .62 oz  Constitutional: Awake, no apparent distress  Respiratory: Clear to auscultation bilaterally  Cardiovascular: Regular rate and rhythm  GI: Normal bowel sounds, soft, non-distended, non-tender  Skin/Integumen: No rashes, no cyanosis      Data   Recent Labs   Lab 12/28/22  0701 12/27/22  0646 12/26/22  0947   WBC 5.4 5.7 6.2   HGB 10.4* 9.5* 11.2*    100 105*    217 246   * 132* 133*   POTASSIUM 4.8 4.4 4.4   CHLORIDE 96* 96* 96*   CO2 22 22 20*   BUN 37.5* 26.0* 39.6*   CR 6.15* 5.09* 6.95*   ANIONGAP 16* 14 17*   MAC 9.4 8.9 9.5   GLC 79 86 97     No results found for this or any previous visit (from the past 24 hour(s)).  Medications       acetaminophen  975 mg Oral TID     apixaban ANTICOAGULANT  2.5 mg Oral BID     atorvastatin  20 mg Oral At Bedtime     Baclofen  10 mg Oral BID     cetirizine  10 mg Oral Daily     cyanocobalamin  500 mcg Oral Daily     finasteride  1.3 mg Oral Daily     fluticasone  1 spray Both Nostrils BID     folic acid  1 mg Oral Daily     gabapentin  300 mg Oral Q8H     magnesium plus protein  133 mg Oral Daily     methocarbamol  500 mg Oral 4x Daily     midodrine  10 mg Oral TID w/meals     multivitamin RENAL  1 tablet Oral Daily     pantoprazole  40 mg Oral QAM AC     polyethylene glycol  17 g Oral TID     vitamin B6  100 mg Oral Daily     QUEtiapine  25 " mg Oral Daily     QUEtiapine  50 mg Oral At Bedtime     ramelteon  8 mg Oral At Bedtime     senna-docusate  1 tablet Oral BID     sertraline  100 mg Oral Daily     sevelamer carbonate  1,600 mg Oral TID w/meals     sodium chloride (PF)  3 mL Intracatheter Q8H     thiamine  100 mg Oral Daily

## 2022-12-28 NOTE — PLAN OF CARE
"Neuro: A/O/4. Numbness to bilateral LE  Respiratory stable on RA  Cardiac: VSS  GI/: No BM this shift. +Bowel sounds.   Diet: 2L Fluid Restriction. Had 237mls this shift.  Pain: pain managed with PRN oxycodone. Provider told about pt's statements about muscle spasms. Provider added PRN Robaxin. Pt currently sleeping and was not offered robaxin yet.  Incisions/Drains: Unable to view incision on neck and spine. Pt stated he was in too much pain and didn't want to \"bring up another spasm\". Pt fell asleep after pain meds given.   IV Access: Right PIV TKO  Labs: reviewed  Activity: Not oob this shift  Plan: have pain well managed. Wound cares. Encourage ambulation                         "

## 2022-12-28 NOTE — PROGRESS NOTES
[facility] [place]   Neurosurgery Progress Note:    Assessment: Shay Jimenez is a 57 year old male with with PMH ESRD on dialysis and osteomyelitis s/p C5-T6 fusion who is now s/p redo C5-T6 fusion on 12/06/22 with concern for hemorrhage; no vertebral artery dissection or extravasation was noted on intraoperative angiogram.  He was admitted to the Neuro ICU postoperatively for MAP goals and frequent neuro checks, remaining stable on pressors.  Neuro exam is unchanged from patient's baseline.    Plan:  - Appreciate Medicine assistance   - Serial neuro exams  - Pain control  - Hb goal > 7  - Normonatremia  - Goal normotension  - Appreciate Nephrology recommendations re: dialysis (baseline M/W/F)  - Daily dressing changes   - Cervical collar when HOB >45,  on when OOB  - Regular diet  - Bowel regimen  - PRN antiemetics  - PT/OT  - SCDs and subcutaneous heparin for DVT proph  - Eliquis restarted- 12.20.2022  - Psychiatry consult for capacity evaluation- patient unable to make decisions   - Medically ready for disposition  -----------------------------------  ENID Norwood, CNP  Department of Neurosurgery  Pager: [phone]      Interval History/Subjective:  Patient states pain is controlled this morning.  Awaiting placement.   Objective:   Temp:  [95.2  F (35.1  C)-97.8  F (36.6  C)] 97.5  F (36.4  C)  Pulse:  [64-75] 64  Resp:  [17-18] 18  BP: (116-132)/(61-73) 127/73  SpO2:  [93 %-98 %] 97 %  I/O last 3 completed shifts:  In: 237 [P.O.:237]  Out: -     Gen: Appears comfortable, NAD  Wound: Incision, clean, dry, intact without strikethrough  Neurologic:  - Alert & Oriented to person, place, time, and situation  - Follows commands briskly  - Speech fluent, spontaneous. No aphasia or dysarthria.  - No gaze preference. No apparent hemineglect.  - PERRL, EOMI  - Strong brow raise, eye closure, and smile  - Face symmetric with sensation intact to light touch  - Trapezii and  sternocleidomastoid muscles 5/5 bilaterally  - No pronator drift     Del Tr Bi WE WF Gr   R 4* 5 5 5 5 5   L 4* 5 5 5 5 5    HF KE KF DF PF EHL   R 4+ 5 5 5 5 5   L 4+ 5 5 5 5 5   *pain-limited    Norman's and clonus negative.    Sensation intact and symmetric to light touch throughout    LABS  Recent Labs   Lab Test 12/28/22  0701 12/27/22  0646 12/26/22  0947   WBC 5.4 5.7 6.2   HGB 10.4* 9.5* 11.2*    100 105*    217 246       Recent Labs   Lab Test 12/28/22  0701 12/27/22  0646 12/26/22  0947   * 132* 133*   POTASSIUM 4.8 4.4 4.4   CHLORIDE 96* 96* 96*   CO2 22 22 20*   BUN 37.5* 26.0* 39.6*   CR 6.15* 5.09* 6.95*   ANIONGAP 16* 14 17*   MAC 9.4 8.9 9.5   GLC 79 86 97       IMAGING  No results found for this or any previous visit (from the past 24 hour(s)).

## 2022-12-28 NOTE — PROGRESS NOTES
Nephrology Progress Note  12/28/2022         Shay Jimenez is a 57 yom with ESRD since 4/2019 due to Ca Phos deposition and HTN, runs at [facility] ([phone], fax [phone]) under care of Dr Cline.  Had planned neurosurgery revision for C5-C6 on 12/6 as screw had dislodged.  Nephrology consulted for management of dialysis post op.       Interval History :   Mr Jimenez had day off of HD yesterday, seen on run this am.  At EDW but BP's are robust so will try to pull 2-3L per his request.  Stable for discharge from nephrology standpoint, next planned run 12/30.      Assessment & Recommendations:   ESRD-Since 4/2019 due to Ca Phos deposition and HTN, runs at [facility] ([phone], fax [phone]) under care of Dr Cline.  Runs MWF via [facility], 4.25h runs.                  -HD today, 3L of UF.                  -Will decide on runs daily depending on chemistries and hemodynamics.                  -Continuing long term HD, no need for new consent.                  -Is eventually pursuing renal transplant through [facility] once acute issues requiring neurosurgery are done.       Volume-EDW 130kg, bed wt ~130kg today, pulling 2-3L to challenge.      Electrolytes-K 4.8 bicarb 22, Na 134, HD today.        BMD-Ca 9.4, Mg and Phos WNL.       Neurosurgery-Had planned revision of previous screws at C5-C6 on 12/6.       Anemia-Hgb 10.4, reduced epo dose to 4k as maintenance as it has run close to 11.       Nutrition-Taking PO       Time spent: 30 minutes on this date of encounter for chart review, physical exam, medical decision making and co-ordination of care.      Discussed with Dr Love     Recommendations were communicated to primary team via verbal communication.        ENID Shirley CNS  Clinical Nurse Specialist  [phone]      Review of Systems:   I reviewed the following systems:  Gen: No fevers or chills  CV: No CP at rest  Resp: No SOB at rest  GI: No N/V    Physical Exam:   I/O  "last 3 completed shifts:  In: 237 [P.O.:237]  Out: -    /73   Pulse 79   Temp 97.5  F (36.4  C) (Oral)   Resp 12   Ht 1.854 m (6' 1\")   Wt 130.2 kg (287 lb 0.6 oz)   SpO2 94%   BMI 37.87 kg/m       GENERAL APPEARANCE: alert and no distress, moderate confusion  EYES: No scleral icterus  NECK:  No JVD  Pulmonary: lungs clear to auscultation with equal breath sounds bilaterally, no clubbing or cyanosis  CV: Regular rhythm, normal rate, no rub   - Edema none  GI: soft, nontender, normal bowel sounds  MS: no evidence of inflammation in joints, no muscle tenderness  : No Oliveira  SKIN: no rash, warm, dry  NEURO: Delirious but somewhat redirectable.      Labs:   All labs reviewed by me  Electrolytes/Renal -   Recent Labs   Lab Test 12/28/22  0701 12/27/22  0646 12/26/22  0947 12/20/22  0618 12/19/22  1000 12/15/22  1642 12/14/22  0422 12/13/22  0502   * 132* 133*   < > 128*   < > 132* 137   POTASSIUM 4.8 4.4 4.4   < > 4.8   < > 3.8 3.6   CHLORIDE 96* 96* 96*   < > 90*   < > 94* 98   CO2 22 22 20*   < > 22   < > 23 25   BUN 37.5* 26.0* 39.6*   < > 43.7*   < > 33.2* 21.0*   CR 6.15* 5.09* 6.95*   < > 6.95*   < > 5.64* 4.41*   GLC 79 86 97   < > 93   < > 80 96   MAC 9.4 8.9 9.5   < > 8.6   < > 8.7 9.0   MAG 1.8 1.6* 1.7   < > 1.9  --  1.7 1.7   PHOS  --   --   --   --  6.2*  --  3.8 2.6    < > = values in this interval not displayed.       CBC -   Recent Labs   Lab Test 12/28/22  0701 12/27/22  0646 12/26/22  0947   WBC 5.4 5.7 6.2   HGB 10.4* 9.5* 11.2*    217 246       LFTs -   Recent Labs   Lab Test 12/11/22  0528 10/06/22  1215 09/29/22  1345 08/18/22  1435 07/21/22  1252 07/16/22  0557   ALKPHOS 146*  --   --   --  131 134   BILITOTAL 0.4  --   --   --  0.6 0.5   ALT <5*  --   --   --  9 <6   AST 18 31 35   < > 17 13   PROTTOTAL 5.9*  --   --   --  6.7* 6.5*   ALBUMIN 3.1*  --   --   --  3.0* 2.8*    < > = values in this interval not displayed.       Iron Panel -   Recent Labs   Lab Test " 12/14/22  0634 07/29/22  0600   IRON 43* 44   IRONSAT 24 20   JACKELINE  --  626*           Current Medications:    acetaminophen  975 mg Oral TID     apixaban ANTICOAGULANT  2.5 mg Oral BID     atorvastatin  20 mg Oral At Bedtime     Baclofen  10 mg Oral BID     cetirizine  10 mg Oral Daily     cyanocobalamin  500 mcg Oral Daily     finasteride  1.3 mg Oral Daily     fluticasone  1 spray Both Nostrils BID     folic acid  1 mg Oral Daily     gabapentin  300 mg Oral Q8H     magnesium plus protein  133 mg Oral Daily     methocarbamol  500 mg Oral 4x Daily     midodrine  10 mg Oral TID w/meals     multivitamin RENAL  1 tablet Oral Daily     pantoprazole  40 mg Oral QAM AC     polyethylene glycol  17 g Oral TID     vitamin B6  100 mg Oral Daily     QUEtiapine  25 mg Oral Daily     QUEtiapine  50 mg Oral At Bedtime     ramelteon  8 mg Oral At Bedtime     senna-docusate  1 tablet Oral BID     sertraline  100 mg Oral Daily     sevelamer carbonate  1,600 mg Oral TID w/meals     sodium chloride (PF)  3 mL Intracatheter Q8H     thiamine  100 mg Oral Daily       heparin (porcine) 500 Units/hr (12/28/22 0816)     - MEDICATION INSTRUCTIONS -

## 2022-12-29 ENCOUNTER — APPOINTMENT (OUTPATIENT)
Dept: OCCUPATIONAL THERAPY | Facility: CLINIC | Age: 57
DRG: 459 | End: 2022-12-29
Attending: STUDENT IN AN ORGANIZED HEALTH CARE EDUCATION/TRAINING PROGRAM
Payer: MEDICARE

## 2022-12-29 ENCOUNTER — APPOINTMENT (OUTPATIENT)
Dept: PHYSICAL THERAPY | Facility: CLINIC | Age: 57
DRG: 459 | End: 2022-12-29
Attending: STUDENT IN AN ORGANIZED HEALTH CARE EDUCATION/TRAINING PROGRAM
Payer: MEDICARE

## 2022-12-29 LAB
ANION GAP SERPL CALCULATED.3IONS-SCNC: 14 MMOL/L (ref 7–15)
BUN SERPL-MCNC: 24.7 MG/DL (ref 6–20)
CALCIUM SERPL-MCNC: 9.6 MG/DL (ref 8.6–10)
CHLORIDE SERPL-SCNC: 95 MMOL/L (ref 98–107)
CREAT SERPL-MCNC: 4.71 MG/DL (ref 0.67–1.17)
DEPRECATED HCO3 PLAS-SCNC: 24 MMOL/L (ref 22–29)
ERYTHROCYTE [DISTWIDTH] IN BLOOD BY AUTOMATED COUNT: 15.9 % (ref 10–15)
GFR SERPL CREATININE-BSD FRML MDRD: 14 ML/MIN/1.73M2
GLUCOSE SERPL-MCNC: 83 MG/DL (ref 70–99)
HCT VFR BLD AUTO: 32.9 % (ref 40–53)
HGB BLD-MCNC: 10 G/DL (ref 13.3–17.7)
MAGNESIUM SERPL-MCNC: 1.6 MG/DL (ref 1.7–2.3)
MCH RBC QN AUTO: 30.8 PG (ref 26.5–33)
MCHC RBC AUTO-ENTMCNC: 30.4 G/DL (ref 31.5–36.5)
MCV RBC AUTO: 101 FL (ref 78–100)
PLATELET # BLD AUTO: 232 10E3/UL (ref 150–450)
POTASSIUM SERPL-SCNC: 4.2 MMOL/L (ref 3.4–5.3)
RBC # BLD AUTO: 3.25 10E6/UL (ref 4.4–5.9)
SODIUM SERPL-SCNC: 133 MMOL/L (ref 136–145)
WBC # BLD AUTO: 5.4 10E3/UL (ref 4–11)

## 2022-12-29 PROCEDURE — 250N000013 HC RX MED GY IP 250 OP 250 PS 637: Performed by: NURSE PRACTITIONER

## 2022-12-29 PROCEDURE — 99232 SBSQ HOSP IP/OBS MODERATE 35: CPT | Performed by: INTERNAL MEDICINE

## 2022-12-29 PROCEDURE — 36415 COLL VENOUS BLD VENIPUNCTURE: CPT | Performed by: NURSE PRACTITIONER

## 2022-12-29 PROCEDURE — 83735 ASSAY OF MAGNESIUM: CPT | Performed by: NURSE PRACTITIONER

## 2022-12-29 PROCEDURE — 97530 THERAPEUTIC ACTIVITIES: CPT | Mod: GO

## 2022-12-29 PROCEDURE — 120N000002 HC R&B MED SURG/OB UMMC

## 2022-12-29 PROCEDURE — 97110 THERAPEUTIC EXERCISES: CPT | Mod: GO

## 2022-12-29 PROCEDURE — 99024 POSTOP FOLLOW-UP VISIT: CPT

## 2022-12-29 PROCEDURE — 97530 THERAPEUTIC ACTIVITIES: CPT | Mod: GP | Performed by: REHABILITATION PRACTITIONER

## 2022-12-29 PROCEDURE — 80048 BASIC METABOLIC PNL TOTAL CA: CPT | Performed by: NURSE PRACTITIONER

## 2022-12-29 PROCEDURE — 85027 COMPLETE CBC AUTOMATED: CPT | Performed by: NURSE PRACTITIONER

## 2022-12-29 RX ORDER — SERTRALINE HYDROCHLORIDE 100 MG/1
100 TABLET, FILM COATED ORAL DAILY
COMMUNITY
Start: 2022-12-29

## 2022-12-29 RX ORDER — ATORVASTATIN CALCIUM 20 MG/1
20 TABLET, FILM COATED ORAL AT BEDTIME
COMMUNITY
Start: 2022-12-29

## 2022-12-29 RX ORDER — HYDROXYZINE HYDROCHLORIDE 25 MG/1
25 TABLET, FILM COATED ORAL 2 TIMES DAILY PRN
Qty: 60 TABLET | Refills: 0 | COMMUNITY
Start: 2022-12-29

## 2022-12-29 RX ORDER — LANOLIN ALCOHOL/MO/W.PET/CERES
100 CREAM (GRAM) TOPICAL DAILY
Status: ON HOLD | DISCHARGE
Start: 2022-12-29 | End: 2023-02-02

## 2022-12-29 RX ORDER — QUETIAPINE FUMARATE 25 MG/1
25 TABLET, FILM COATED ORAL DAILY
Status: ON HOLD | DISCHARGE
Start: 2022-12-29 | End: 2023-02-02

## 2022-12-29 RX ORDER — METHOCARBAMOL 500 MG/1
500 TABLET, FILM COATED ORAL 4 TIMES DAILY
Status: ON HOLD | DISCHARGE
Start: 2022-12-29 | End: 2023-02-02

## 2022-12-29 RX ORDER — CETIRIZINE HYDROCHLORIDE 10 MG/1
10 TABLET ORAL DAILY
COMMUNITY
Start: 2022-12-29

## 2022-12-29 RX ORDER — CALCIUM CARBONATE 500 MG/1
2 TABLET, CHEWABLE ORAL 3 TIMES DAILY PRN
Status: ON HOLD | COMMUNITY
Start: 2022-12-29 | End: 2023-02-02

## 2022-12-29 RX ORDER — FOLIC ACID 1 MG/1
1 TABLET ORAL DAILY
Status: ON HOLD | DISCHARGE
Start: 2022-12-29 | End: 2023-02-02

## 2022-12-29 RX ORDER — APIXABAN 2.5 MG/1
TABLET, FILM COATED ORAL
COMMUNITY
Start: 2022-12-29

## 2022-12-29 RX ORDER — OXYCODONE HYDROCHLORIDE 5 MG/1
5 TABLET ORAL EVERY 4 HOURS PRN
Refills: 0 | Status: ON HOLD | DISCHARGE
Start: 2022-12-29 | End: 2023-02-02

## 2022-12-29 RX ORDER — QUETIAPINE FUMARATE 50 MG/1
50 TABLET, FILM COATED ORAL AT BEDTIME
Status: ON HOLD | DISCHARGE
Start: 2022-12-29 | End: 2023-02-02

## 2022-12-29 RX ORDER — VIT B COMP NO.3/FOLIC/C/BIOTIN 1 MG-60 MG
1 TABLET ORAL DAILY
Status: ON HOLD | COMMUNITY
Start: 2022-12-29 | End: 2023-02-02

## 2022-12-29 RX ORDER — GABAPENTIN 300 MG/1
300 CAPSULE ORAL 3 TIMES DAILY
Status: ON HOLD | COMMUNITY
Start: 2022-12-29 | End: 2023-02-02

## 2022-12-29 RX ORDER — MULTIVITAMIN WITH IRON
100 TABLET ORAL DAILY
COMMUNITY
Start: 2022-12-29

## 2022-12-29 RX ORDER — ACETAMINOPHEN 325 MG/1
325-650 TABLET ORAL EVERY 4 HOURS PRN
COMMUNITY
Start: 2022-12-29

## 2022-12-29 RX ORDER — SEVELAMER CARBONATE 800 MG/1
1600 TABLET, FILM COATED ORAL
COMMUNITY
Start: 2022-12-29

## 2022-12-29 RX ORDER — RAMELTEON 8 MG/1
8 TABLET ORAL AT BEDTIME
Status: ON HOLD | DISCHARGE
Start: 2022-12-29 | End: 2023-02-02

## 2022-12-29 RX ORDER — UREA 10 %
500 LOTION (ML) TOPICAL DAILY
COMMUNITY
Start: 2022-12-29

## 2022-12-29 RX ORDER — POLYETHYLENE GLYCOL 3350 17 G/17G
17 POWDER, FOR SOLUTION ORAL DAILY
Qty: 510 G | Refills: 0 | Status: ON HOLD | COMMUNITY
Start: 2022-12-29 | End: 2023-02-02

## 2022-12-29 RX ORDER — BACLOFEN 10 MG/1
10 TABLET ORAL 2 TIMES DAILY
Qty: 60 TABLET | Refills: 1 | Status: ON HOLD | COMMUNITY
Start: 2022-12-29 | End: 2023-02-02

## 2022-12-29 RX ORDER — MIDODRINE HYDROCHLORIDE 10 MG/1
10 TABLET ORAL
Status: ON HOLD | COMMUNITY
Start: 2022-12-29 | End: 2023-02-02

## 2022-12-29 RX ORDER — FLUTICASONE PROPIONATE 50 MCG
2 SPRAY, SUSPENSION (ML) NASAL DAILY PRN
COMMUNITY
Start: 2022-12-29

## 2022-12-29 RX ADMIN — OXYCODONE HYDROCHLORIDE 5 MG: 5 TABLET ORAL at 18:04

## 2022-12-29 RX ADMIN — BACLOFEN 10 MG: 10 TABLET ORAL at 19:55

## 2022-12-29 RX ADMIN — METHOCARBAMOL 500 MG: 500 TABLET ORAL at 09:22

## 2022-12-29 RX ADMIN — APIXABAN 2.5 MG: 2.5 TABLET, FILM COATED ORAL at 09:21

## 2022-12-29 RX ADMIN — QUETIAPINE 50 MG: 25 TABLET ORAL at 22:45

## 2022-12-29 RX ADMIN — SEVELAMER CARBONATE 1600 MG: 800 TABLET, FILM COATED ORAL at 09:20

## 2022-12-29 RX ADMIN — OXYCODONE HYDROCHLORIDE 5 MG: 5 TABLET ORAL at 08:25

## 2022-12-29 RX ADMIN — OXYCODONE HYDROCHLORIDE 5 MG: 5 TABLET ORAL at 12:54

## 2022-12-29 RX ADMIN — Medication 100 MG: at 19:55

## 2022-12-29 RX ADMIN — RAMELTEON 8 MG: 8 TABLET, FILM COATED ORAL at 22:47

## 2022-12-29 RX ADMIN — CETIRIZINE HYDROCHLORIDE 10 MG: 10 TABLET, FILM COATED ORAL at 17:56

## 2022-12-29 RX ADMIN — METHOCARBAMOL 500 MG: 500 TABLET ORAL at 19:55

## 2022-12-29 RX ADMIN — METHOCARBAMOL 500 MG: 500 TABLET ORAL at 16:06

## 2022-12-29 RX ADMIN — B-COMPLEX W/ C & FOLIC ACID TAB 1 MG 1 TABLET: 1 TAB at 17:54

## 2022-12-29 RX ADMIN — APIXABAN 2.5 MG: 2.5 TABLET, FILM COATED ORAL at 19:55

## 2022-12-29 RX ADMIN — SERTRALINE HYDROCHLORIDE 100 MG: 100 TABLET ORAL at 17:54

## 2022-12-29 RX ADMIN — QUETIAPINE 25 MG: 25 TABLET ORAL at 17:56

## 2022-12-29 RX ADMIN — MIDODRINE HYDROCHLORIDE 10 MG: 5 TABLET ORAL at 16:07

## 2022-12-29 RX ADMIN — Medication 100 MG: at 17:55

## 2022-12-29 RX ADMIN — BACLOFEN 10 MG: 10 TABLET ORAL at 09:21

## 2022-12-29 RX ADMIN — GABAPENTIN 300 MG: 300 CAPSULE ORAL at 16:06

## 2022-12-29 RX ADMIN — ACETAMINOPHEN 975 MG: 325 TABLET, FILM COATED ORAL at 12:59

## 2022-12-29 RX ADMIN — MIDODRINE HYDROCHLORIDE 10 MG: 5 TABLET ORAL at 09:21

## 2022-12-29 RX ADMIN — ACETAMINOPHEN 975 MG: 325 TABLET, FILM COATED ORAL at 19:53

## 2022-12-29 RX ADMIN — CYANOCOBALAMIN TAB 500 MCG 500 MCG: 500 TAB at 17:56

## 2022-12-29 RX ADMIN — FINASTERIDE 1.3 MG: 5 TABLET, FILM COATED ORAL at 17:54

## 2022-12-29 RX ADMIN — ACETAMINOPHEN 975 MG: 325 TABLET, FILM COATED ORAL at 09:09

## 2022-12-29 RX ADMIN — Medication 133 MG: at 17:54

## 2022-12-29 RX ADMIN — GABAPENTIN 300 MG: 300 CAPSULE ORAL at 01:04

## 2022-12-29 RX ADMIN — PANTOPRAZOLE SODIUM 40 MG: 40 TABLET, DELAYED RELEASE ORAL at 09:20

## 2022-12-29 RX ADMIN — ATORVASTATIN CALCIUM 20 MG: 20 TABLET, FILM COATED ORAL at 22:44

## 2022-12-29 RX ADMIN — METHOCARBAMOL 500 MG: 500 TABLET ORAL at 12:54

## 2022-12-29 RX ADMIN — GABAPENTIN 300 MG: 300 CAPSULE ORAL at 09:21

## 2022-12-29 RX ADMIN — FOLIC ACID 1 MG: 1 TABLET ORAL at 17:54

## 2022-12-29 ASSESSMENT — ACTIVITIES OF DAILY LIVING (ADL)
ADLS_ACUITY_SCORE: 47

## 2022-12-29 NOTE — DISCHARGE SUMMARY
[facility] Discharge Summary and Instructions    Shay Jimenez MRN# [id number]   Age: 57 year old YOB: 1965     Date of Admission:  12/6/2022  Date of Discharge::  01/07/2023  Admitting Physician:  Fritz Boles MD  Discharge Physician:  Fritz Boles MD          Admission Diagnoses:   S/P spinal fusion [Z98.1]  S/P cervical spinal fusion [Z98.1]          Discharge Diagnosis:     S/P spinal fusion [Z98.1]  S/P cervical spinal fusion [Z98.1]     Clinically Significant Risk Factors Present on Admission                            Procedures:   12/06/2022  1. Exploration of prior spinal fusion from Cervical 5 to Thoracic 6(CPT 49762)  2. Removal of spine instrumentation at right cervical 6  3. Re-insertion of instrumentation at same cervical levels at cervical 5 and 6 (CPT 47619)  4. Insertion of new hardware at cervical 7   5. Revision arthrodesis from cervical 5-thoracic 6 (modifier 22 for increased complexity due to patient's body habitus)  6. Open reduction and internal fixation of thoracic 2-3 fracture with correction of kyphotic deformity  7. Posterior lateral fusion using bone morphogenic protein  8. Posterior lateral fusion using allograft   9. Use of intra-operative O-arm and C-arm  10. Temporary midline fascia and skin closure   11. Cerebral angiography   12. Use of Stealth navigation  13. Use of neuromonitoring (SSEPs)           Brief History of Illness:   Shay Jimenez is a 56 yo male who underwent a prior cervical 5 - thoracic 6 fusion for the indication of thoracic pathologic fracture secondary to osteomyelitis. Unfortunately, in follow-up, the patient was found to have hardware failure with screw pullout at C5 and C6 as well as pseudoarthrosis. For this reason, the patient underwent informed consent and provided written and verbal consent with the above stated operation after a discussion of risks, alternatives, and benefits.                 Hospital Course:   Patient  underwent above-mentioned procedure on 12/06/22, intraoperatively there was concern for vertebral artery injury and patient was taken for cerebral angiogram post-operatively which was negative.  Post-operatively the patient became quite agitated and there was concern for alcohol withdrawal, CIWA protocols were initiated.  Infectious disease was also consulted given concern for T2-3 osteomyelitis in the past following previous cervicothoracic fusion on 6/27/22.  Antibiotics were not indicated given no growth from intraoperative cultures.  Patient continued to receive hemodialysis throughout his stay.  Patient was evaluated by PT and OT who recommended TCU placement, however patient was initially resistant to this.  Given patient's intermittent delirium, Psychiatry was consulted and recommended seroquel twice daily.  OT assessed patient for cognition screening and SLUMS score revealed cognitive impairment, therefore given the families wishes for TCU placement this was pursued.  Prior to discharge patient was able to be weaned from narcotics which was felt to be contributing to intermittent altered mental status.  Patient was able to discharge to TCU on 1/7/23, at that time he was medically stable, voiding without a de los santos, eating a regular diet, and pain was well controlled.                     Discharge Medications:     Current Discharge Medication List      CONTINUE these medications which have NOT CHANGED    Details   acetaminophen (TYLENOL) 325 MG tablet Take 1-2 tablets (325-650 mg) by mouth every 4 hours as needed for mild pain or fever    Associated Diagnoses: Discitis, unspecified spinal region      ALPRAZolam (XANAX) 1 MG tablet Take 0.5 mg by mouth 2 times daily as needed for anxiety      ammonium lactate (AMLACTIN) 12 % external cream Apply topically 2 times daily as needed Apply to bilateral lower legs      atorvastatin (LIPITOR) 20 MG tablet Take 20 mg by mouth At Bedtime      calcium carbonate (TUMS) 500  MG chewable tablet Take 2 tablets (1,000 mg) by mouth 3 times daily as needed for heartburn    Associated Diagnoses: Discitis, unspecified spinal region      cetirizine (ZYRTEC) 10 MG tablet Take 1 tablet (10 mg) by mouth daily    Associated Diagnoses: Discitis, unspecified spinal region      cyanocobalamin (VITAMIN B-12) 500 MCG tablet Take 500 mcg by mouth daily      cyclobenzaprine (FLEXERIL) 10 MG tablet Take 10 mg by mouth nightly as needed      diclofenac (VOLTAREN) 1 % topical gel Apply topically 4 times daily as needed      DULoxetine (CYMBALTA) 30 MG capsule Take 30 mg by mouth daily      finasteride (PROSCAR) 5 MG tablet Take 1.25 mg by mouth daily Takes 1/4 of 5 mg daily      fluticasone (FLONASE) 50 MCG/ACT nasal spray Spray 2 sprays into both nostrils daily as needed      gabapentin (NEURONTIN) 300 MG capsule Take 300 mg by mouth 3 times daily      hydrOXYzine (ATARAX) 25 MG tablet Take 1 tablet (25 mg) by mouth 2 times daily as needed for other or itching (pain as adjuant therapy)  Qty: 60 tablet, Refills: 0    Associated Diagnoses: Peripheral polyneuropathy; Discitis, unspecified spinal region; Degenerative arthritis of thoracic spine with cord compression; Abscess in epidural space of spine      Magnesium 500 MG CAPS Take 2 capsules by mouth daily      midodrine (PROAMATINE) 10 MG tablet Take 1 tablet (10 mg) by mouth 3 times daily (with meals)    Associated Diagnoses: Discitis, unspecified spinal region      multivitamin RENAL (RENAVITE RX/NEPHROVITE) 1 MG tablet Take 1 tablet by mouth daily      omeprazole (PRILOSEC) 20 MG DR capsule Take 20 mg by mouth daily      polyethylene glycol (MIRALAX) 17 GM/Dose powder Take 17 g by mouth daily  Qty: 510 g, Refills: 0    Associated Diagnoses: Neurogenic bowel      potassium chloride ER (KLOR-CON M) 20 MEQ CR tablet Take 20 mEq by mouth daily      sennosides (SENOKOT) 8.6 MG tablet Take 2 tablets by mouth daily as needed for constipation      sertraline  (ZOLOFT) 100 MG tablet Take 100 mg by mouth daily      sevelamer carbonate (RENVELA) 800 MG tablet Take 2 tablets (1,600 mg) by mouth 3 times daily (with meals)    Associated Diagnoses: Discitis, unspecified spinal region      vitamin B6 (PYRIDOXINE) 100 MG tablet Take 100 mg by mouth daily      baclofen (LIORESAL) 10 MG tablet Take 1 tablet (10 mg) by mouth 2 times daily  Qty: 60 tablet, Refills: 1    Associated Diagnoses: S/P spinal fusion      cefuroxime (CEFTIN) 250 MG tablet 500 mg daily      ELIQUIS ANTICOAGULANT 2.5 MG tablet TAKE 1 TABLET (2.5 MG) BY MOUTH TWO TIMES A DAY. INDICATIONS: PREVENT STROKE IN AFIB      naloxone (NARCAN) 4 MG/0.1ML nasal spray Spray 1 spray (4 mg) into one nostril alternating nostrils once as needed for opioid reversal every 2-3 minutes until assistance arrives  Qty: 0.2 mL, Refills: 0    Associated Diagnoses: S/P spinal fusion         STOP taking these medications       Specialty Vitamins Products (MAGNESIUM PLUS PROTEIN) 133 MG tablet Comments:   Reason for Stopping:                       Discharge Instructions and Follow-Up:     Discharge diet: Regular   Discharge activity: You may advance activity as tolerated. No strenuous exercise or heay lifting greater than 10 lbs for 4 weeks or until seen and cleared in clinic.    Discharge follow-up:     Follow-up Dr. Fritz Boles MD in 4 weeks with upright x-rays, all additional follow-up visits to be determined by Dr. Frizt Boles MD       Wound care: Ok to shower,however no scrubbing of the wound and no soaking of the wound, meaning no bathtubs or swimming pools. Pat dry only. Leave wound open to air.       Please call if you have:  1. increased pain, redness, drainage, swelling at your incision  2. fevers > 101.5 F degrees  3. with any questions or concerns.  You may reach the Neurosurgery clinic at [phone] during regular work hours. ER at [phone].    and ask for the Neurosurgery Resident on call at  [phone], during off hours or weekends.         Discharge Disposition:     Discharged to short-term care facility        ENID Villasenor, CNP  Department of Neurosurgery  Pager: [phone]

## 2022-12-29 NOTE — PLAN OF CARE
Time: 2300-0730  Temp: 98.6  F (37  C) Temp src: Oral BP: 110/60 Pulse: 68   Resp: 18 SpO2: 92 % O2 Device: None (Room air)       Activity: Lift to chair. C-collar for activity.   Diet: Regular. 2000 fluid restriction.  Pain: C/o stiff neck and back, but no requested PRN's.  Neuro: Alert, oriented x4. Edema +2 generalized. Numbness and tingling at baseline.   Cardiac: WDL.   Respiratory: Lung sounds clear/dim, Denies SOB. Room air.   Skin: Scrotal bleeding, barrier cream applied. Mepilex to bottom, UTV coccyx. Left inner thigh redness and excoriation. Back c-spine incision with primapore CDI. R heel mepilex CDI.   GI/: Anuric. LBM 12/29.   Lines/inf: Right PIV infusing TKO.

## 2022-12-29 NOTE — PROGRESS NOTES
[facility]    Medicine Progress Note - Hospitalist Service, GOLD TEAM 12    Date of Admission:  12/6/2022    Assessment & Plan   Shay Jimenez is a 57 year old male admitted on 12/6/2022 with hx of HTN, HLD, afib, ESRD on HD, peripheral neuropathy, GINI, GERD, and depression admitted for redo C5-T6 fusion, now been co managed with medicine.     Today's plan   - Cont current therapy   - Awaiting placement to TCU    Superficial would infection noted by nrg 12/20  - Completed cefazolin     Cervical Myelopathy  Chronic Osteomyelitis  C5-T6 spinal fusion complicated by hardware displacement  Admitted [facility] 12/6, underwent redo of C5-T6 fusion  Pt working with PT/OT, sig assistance needs. Appears to be reluctant for TCU. Concern for some intraoperative hemmorhage but intra-operative angio was re-assuring. Subsequent CT of C-spine and head w/o acute ab, good alignment. Hx of osteo w/o recent abx needs  - Cares per neuro surg  - Heavy care needs that seem unlikely to be met at home  - PT/OT rec TCU but patient states he wants to go home. [facility] unable to provide the care he needs. Apparently deemed unable to make his own decisions      Acute on chroninc hypoxic-hypercarbic  resp failure, resolved  - Titrate 02 for goal > 90%  - Cont overnight 02 as this is used at home for GINI given intolreance of CPAP. If concerns for hypercarbia as cause for delirium/encehalopathy, would favor ordering and seeing if RT can assist with mask that he may tolerate    Acute on chronic pain  Peripheral neuropathy  Improving, decreasing oxycodone over the last 24 hours  - Would consider increase in acetaminophen dose to 975 (ordered)  - Cont baclofen and gabapentin  - Cont methocarbamol  - Cont bowl regimen     Hypervolemic hyponatremia, stable and chronic  He appears asymptomatic Review shows that he has sodium that vascilates upper 120's-low 130's. Nephrology aware, cont on dialysis  - Consider  strict intake monitoring to get sense of patient drinking  - Would see if he can tolerated a 2000 mls fluid restriction     ESRD on dialysis  Nephrology following  Tolerating usual schedule M/W/F     Alcohol abuse  ICU/hospital Delirium  Hepatomegaly  No stigmata of cirrhosis. Mild hypoalbuminemia with nml bilirubin and AST/ALT. CT from Aug 2022 did show hepatomegaly  - Pt is on gabapentin and baclofen. Both of these have been used to help reduce cravings in pt with alcohol abuse.  - Naltrexone may not be safe in the setting of ESRD  - Agree with continuing of thiamine and folate  - Cont Seroquel 25 / 50 (am / pm)  and continue adjunctive remalteon     Mild/Mod pulm HTN  Moderate MR  GINI  Pt with ECHO in earlier 2022 with MR and elevated right sided pressures. No signs of sig right heart dysfunction. Not on CPAP as he has not tolerated.  - Cont nocturnal 02      Chronic atrial fibrillation  On eliquis PTA          Diet: Fluid restriction 2000 ML FLUID  Regular Diet Adult  Diet    DVT Prophylaxis: Apixaban for dvt ppx   Oliveira Catheter: Not present  Central Lines: None  Cardiac Monitoring: None  Code Status: Full Code      Disposition Plan      Expected Discharge Date: 12/30/2022      Destination: nursing home  Discharge Comments: TCU advised but patient refusing. nsg primary      The patient's care was discussed with the Bedside Nurse and Patient.    Geremias Peraza MD  Hospitalist Service, GOLD TEAM [address] [place] [address]  Securely message with the Vocera Web Console (learn more here)  Text page via [facility] Paging/Directory   Please see signed in provider for up to date coverage information      Clinically Significant Risk Factors         # Hyponatremia: Lowest Na = 133 mmol/L in last 2 days, will monitor as appropriate    # Hypomagnesemia: Lowest Mg = 1.6 mg/dL in last 2 days, will replace as needed   # Hypoalbuminemia: Lowest albumin = 3.1 g/dL at 12/11/2022  5:28 AM, will  "monitor as appropriate           # Obesity: Estimated body mass index is 37.87 kg/m  as calculated from the following:    Height as of this encounter: 1.854 m (6' 1\").    Weight as of this encounter: 130.2 kg (287 lb 0.6 oz).          ______________________________________________________________________    Interval History   No acute events overnight, states he agreed to go to TCU    Data reviewed today: I reviewed all medications, new labs and imaging results over the last 24 hours. I personally reviewed no images or EKG's today.    Physical Exam   Vital Signs: Temp: 97.9  F (36.6  C) Temp src: Oral BP: 112/64 Pulse: 74   Resp: 18 SpO2: 94 % O2 Device: None (Room air)    Weight: 287 lbs .62 oz  Constitutional: Awake, no apparent distress  Respiratory: Clear to auscultation bilaterally  Cardiovascular: Regular rate and rhythm  GI: Normal bowel sounds, soft, non-distended, non-tender  Skin/Integumen: No rashes, no cyanosis      Data   Recent Labs   Lab 12/29/22  0634 12/28/22  0701 12/27/22  0646   WBC 5.4 5.4 5.7   HGB 10.0* 10.4* 9.5*   * 100 100    211 217   * 134* 132*   POTASSIUM 4.2 4.8 4.4   CHLORIDE 95* 96* 96*   CO2 24 22 22   BUN 24.7* 37.5* 26.0*   CR 4.71* 6.15* 5.09*   ANIONGAP 14 16* 14   MAC 9.6 9.4 8.9   GLC 83 79 86     No results found for this or any previous visit (from the past 24 hour(s)).  Medications       acetaminophen  975 mg Oral TID     apixaban ANTICOAGULANT  2.5 mg Oral BID     atorvastatin  20 mg Oral At Bedtime     Baclofen  10 mg Oral BID     cetirizine  10 mg Oral Daily     cyanocobalamin  500 mcg Oral Daily     finasteride  1.3 mg Oral Daily     fluticasone  1 spray Both Nostrils BID     folic acid  1 mg Oral Daily     gabapentin  300 mg Oral Q8H     magnesium plus protein  133 mg Oral Daily     methocarbamol  500 mg Oral 4x Daily     midodrine  10 mg Oral TID w/meals     multivitamin RENAL  1 tablet Oral Daily     pantoprazole  40 mg Oral QAM AC     " polyethylene glycol  17 g Oral TID     vitamin B6  100 mg Oral Daily     QUEtiapine  25 mg Oral Daily     QUEtiapine  50 mg Oral At Bedtime     ramelteon  8 mg Oral At Bedtime     senna-docusate  1 tablet Oral BID     sertraline  100 mg Oral Daily     sevelamer carbonate  1,600 mg Oral TID w/meals     sodium chloride (PF)  3 mL Intracatheter Q8H     thiamine  100 mg Oral Daily

## 2022-12-29 NOTE — PLAN OF CARE
Goal Outcome Evaluation:    Cardiac: denies cardiac chest pain   Resp: RA, sating >90%, pt states that he wants to sleep with 2 L NC, education given to pt that will only give O2 if his sats drop, pt verbalized understanding   Neuro: A&Ox4, neuro intact   GI/: anuric-HD, LBM 12/27  Diet: Regular diet   Skin/Incisions/Drains: mepilex on sacrum, c spine incision covered CDI, R heel mepilex   IV access: R PIV TKO   Labs: Reviewed   Nausea: denies   Activity: Assist x2 w/ lift, C collar for when OOB   Pain: Pain managed w/ scheduled meds     New changes this shift: no new changes, continue POC

## 2022-12-29 NOTE — PROGRESS NOTES
Care Management Follow Up    Length of Stay (days): 23    Expected Discharge Date: 12/30/2022     Concerns to be Addressed: discharge planning     Patient plan of care discussed at interdisciplinary rounds: Yes    Anticipated Discharge Disposition: Skilled Nursing Facility, Transitional Care    [facility]  [address] [place] [address]  [id number]  P: [phone]  F: [phone]     Anticipated Discharge Services: None  Anticipated Discharge DME: None    Patient/family educated on Medicare website which has current facility and service quality ratings: yes  Education Provided on the Discharge Plan: yes   Patient/Family in Agreement with the Plan: yes    Referrals Placed by CM/SW: Post Acute Facilities  Private pay costs discussed: Not applicable    Additional Information:    Patient accepted at [facility] pending insurance auth and a bed to become available. Rides to and from dialysis will need to be arranged. Spoke with patient and bedside to discuss  TCU placement and dialysis transport. Prior to this admissions, patients sons were driving him to dialysis. Patient agrees that at this time he will need wheelchair transport to get to dialysis and is aware that it will be private pay. Patient would like this writer to see if [facility]. His current clinic is [facility] in [place], [place].    [facility] clinic has no chair availability MWF. This writer left a message for the [facility] clinic inquiring about chair availability MWF. Did not receive a call back today. Will try and call again tomorrow.        PRANAY Dos SantosW   7B    Phone: [phone]  Pager: [phone]

## 2022-12-29 NOTE — PROGRESS NOTES
[facility], [place]   Neurosurgery Progress Note:    Assessment: Shay Jimenez is a 57 year old male with with PMH ESRD on dialysis and osteomyelitis s/p C5-T6 fusion who is now s/p redo C5-T6 fusion on 12/06/22 with concern for hemorrhage; no vertebral artery dissection or extravasation was noted on intraoperative angiogram.  He was admitted to the Neuro ICU postoperatively for MAP goals and frequent neuro checks, remaining stable on pressors.  Neuro exam is unchanged from patient's baseline.    Plan:  - Appreciate Medicine assistance   - Serial neuro exams  - Pain control  - Hb goal > 7  - Normonatremia  - Goal normotension  - Appreciate Nephrology recommendations re: dialysis (baseline M/W/F)  - Daily dressing changes   - Cervical collar when HOB >45,  on when OOB  - Regular diet  - Bowel regimen  - PRN antiemetics  - PT/OT  - SCDs and subcutaneous heparin for DVT proph  - Eliquis restarted- 12.20.2022  - Psychiatry consult for capacity evaluation- patient unable to make decisions   - Medically ready for disposition  -----------------------------------  Adelaide Lancaster PA-C  Neurosurgery Department  Pager: [phone]        Interval History/Subjective:  No acute events overnight. Patient's pain is well controlled. Awaiting placement.     Objective:   Temp:  [97.4  F (36.3  C)-98.6  F (37  C)] 97.9  F (36.6  C)  Pulse:  [64-87] 74  Resp:  [7-18] 18  BP: ()/(54-78) 112/64  SpO2:  [75 %-100 %] 94 %  I/O last 3 completed shifts:  In: 662 [P.O.:662]  Out: 3000 [Other:3000]    Gen: Appears comfortable, NAD  Wound: Incision, clean, dry, intact without strikethrough  Neurologic:  - Alert & Oriented to person, place, time, and situation  - Follows commands briskly  - Speech fluent, spontaneous. No aphasia or dysarthria.  - No gaze preference. No apparent hemineglect.  - PERRL, EOMI  - Strong brow raise, eye closure, and smile  - Face symmetric with sensation intact to light  touch  - Trapezii and sternocleidomastoid muscles 5/5 bilaterally  - No pronator drift     Del Tr Bi WE WF Gr   R 4+ 5 5 5 5 5   L 4+ 5 5 5 5 5    HF KE KF DF PF EHL   R 4+ 5 5 5 5 5   L 4+ 5 5 5 5 5       Sensation intact and symmetric to light touch throughout    LABS  Recent Labs   Lab Test 12/29/22  0634 12/28/22  0701 12/27/22  0646   WBC 5.4 5.4 5.7   HGB 10.0* 10.4* 9.5*   * 100 100    211 217       Recent Labs   Lab Test 12/29/22  0634 12/28/22  0701 12/27/22  0646   * 134* 132*   POTASSIUM 4.2 4.8 4.4   CHLORIDE 95* 96* 96*   CO2 24 22 22   BUN 24.7* 37.5* 26.0*   CR 4.71* 6.15* 5.09*   ANIONGAP 14 16* 14   MAC 9.6 9.4 8.9   GLC 83 79 86       IMAGING  No results found for this or any previous visit (from the past 24 hour(s)).

## 2022-12-30 ENCOUNTER — APPOINTMENT (OUTPATIENT)
Dept: PHYSICAL THERAPY | Facility: CLINIC | Age: 57
DRG: 459 | End: 2022-12-30
Attending: STUDENT IN AN ORGANIZED HEALTH CARE EDUCATION/TRAINING PROGRAM
Payer: MEDICARE

## 2022-12-30 LAB
ANION GAP SERPL CALCULATED.3IONS-SCNC: 16 MMOL/L (ref 7–15)
BUN SERPL-MCNC: 38 MG/DL (ref 6–20)
CALCIUM SERPL-MCNC: 9.3 MG/DL (ref 8.6–10)
CHLORIDE SERPL-SCNC: 93 MMOL/L (ref 98–107)
CREAT SERPL-MCNC: 5.94 MG/DL (ref 0.67–1.17)
DEPRECATED HCO3 PLAS-SCNC: 22 MMOL/L (ref 22–29)
ERYTHROCYTE [DISTWIDTH] IN BLOOD BY AUTOMATED COUNT: 15.6 % (ref 10–15)
GFR SERPL CREATININE-BSD FRML MDRD: 10 ML/MIN/1.73M2
GLUCOSE SERPL-MCNC: 80 MG/DL (ref 70–99)
HCT VFR BLD AUTO: 31.2 % (ref 40–53)
HGB BLD-MCNC: 9.7 G/DL (ref 13.3–17.7)
MAGNESIUM SERPL-MCNC: 1.9 MG/DL (ref 1.7–2.3)
MCH RBC QN AUTO: 30.7 PG (ref 26.5–33)
MCHC RBC AUTO-ENTMCNC: 31.1 G/DL (ref 31.5–36.5)
MCV RBC AUTO: 99 FL (ref 78–100)
PLATELET # BLD AUTO: 210 10E3/UL (ref 150–450)
POTASSIUM SERPL-SCNC: 4.8 MMOL/L (ref 3.4–5.3)
RBC # BLD AUTO: 3.16 10E6/UL (ref 4.4–5.9)
SODIUM SERPL-SCNC: 131 MMOL/L (ref 136–145)
WBC # BLD AUTO: 6.3 10E3/UL (ref 4–11)

## 2022-12-30 PROCEDURE — 250N000011 HC RX IP 250 OP 636: Performed by: CLINICAL NURSE SPECIALIST

## 2022-12-30 PROCEDURE — 250N000013 HC RX MED GY IP 250 OP 250 PS 637: Performed by: NURSE PRACTITIONER

## 2022-12-30 PROCEDURE — 85027 COMPLETE CBC AUTOMATED: CPT | Performed by: NURSE PRACTITIONER

## 2022-12-30 PROCEDURE — 250N000013 HC RX MED GY IP 250 OP 250 PS 637: Performed by: STUDENT IN AN ORGANIZED HEALTH CARE EDUCATION/TRAINING PROGRAM

## 2022-12-30 PROCEDURE — 36415 COLL VENOUS BLD VENIPUNCTURE: CPT | Performed by: NURSE PRACTITIONER

## 2022-12-30 PROCEDURE — 99232 SBSQ HOSP IP/OBS MODERATE 35: CPT | Performed by: INTERNAL MEDICINE

## 2022-12-30 PROCEDURE — 97110 THERAPEUTIC EXERCISES: CPT | Mod: GP

## 2022-12-30 PROCEDURE — 90937 HEMODIALYSIS REPEATED EVAL: CPT

## 2022-12-30 PROCEDURE — 80048 BASIC METABOLIC PNL TOTAL CA: CPT | Performed by: NURSE PRACTITIONER

## 2022-12-30 PROCEDURE — 634N000001 HC RX 634: Performed by: CLINICAL NURSE SPECIALIST

## 2022-12-30 PROCEDURE — 99232 SBSQ HOSP IP/OBS MODERATE 35: CPT | Mod: 24 | Performed by: CLINICAL NURSE SPECIALIST

## 2022-12-30 PROCEDURE — 258N000003 HC RX IP 258 OP 636: Performed by: CLINICAL NURSE SPECIALIST

## 2022-12-30 PROCEDURE — 83735 ASSAY OF MAGNESIUM: CPT | Performed by: NURSE PRACTITIONER

## 2022-12-30 PROCEDURE — 999N000248 HC STATISTIC IV INSERT WITH US BY RN

## 2022-12-30 PROCEDURE — 120N000002 HC R&B MED SURG/OB UMMC

## 2022-12-30 RX ORDER — HEPARIN SODIUM 1000 [USP'U]/ML
500 INJECTION, SOLUTION INTRAVENOUS; SUBCUTANEOUS CONTINUOUS
Status: DISCONTINUED | OUTPATIENT
Start: 2022-12-30 | End: 2022-12-30

## 2022-12-30 RX ADMIN — BACLOFEN 10 MG: 10 TABLET ORAL at 12:23

## 2022-12-30 RX ADMIN — BACLOFEN 10 MG: 10 TABLET ORAL at 20:02

## 2022-12-30 RX ADMIN — EPOETIN ALFA-EPBX 4000 UNITS: 10000 INJECTION, SOLUTION INTRAVENOUS; SUBCUTANEOUS at 10:28

## 2022-12-30 RX ADMIN — ACETAMINOPHEN 975 MG: 325 TABLET, FILM COATED ORAL at 12:22

## 2022-12-30 RX ADMIN — MIDODRINE HYDROCHLORIDE 10 MG: 5 TABLET ORAL at 10:12

## 2022-12-30 RX ADMIN — Medication 100 MG: at 16:56

## 2022-12-30 RX ADMIN — RAMELTEON 8 MG: 8 TABLET, FILM COATED ORAL at 22:13

## 2022-12-30 RX ADMIN — CETIRIZINE HYDROCHLORIDE 10 MG: 10 TABLET, FILM COATED ORAL at 16:52

## 2022-12-30 RX ADMIN — FINASTERIDE 1.3 MG: 5 TABLET, FILM COATED ORAL at 19:55

## 2022-12-30 RX ADMIN — METHOCARBAMOL 500 MG: 500 TABLET ORAL at 12:22

## 2022-12-30 RX ADMIN — HEPARIN SODIUM 500 UNITS: 1000 INJECTION INTRAVENOUS; SUBCUTANEOUS at 07:59

## 2022-12-30 RX ADMIN — APIXABAN 2.5 MG: 2.5 TABLET, FILM COATED ORAL at 12:23

## 2022-12-30 RX ADMIN — SODIUM CHLORIDE 300 ML: 9 INJECTION, SOLUTION INTRAVENOUS at 07:58

## 2022-12-30 RX ADMIN — QUETIAPINE 50 MG: 25 TABLET ORAL at 22:07

## 2022-12-30 RX ADMIN — OXYCODONE HYDROCHLORIDE 5 MG: 5 TABLET ORAL at 01:36

## 2022-12-30 RX ADMIN — SERTRALINE HYDROCHLORIDE 100 MG: 100 TABLET ORAL at 16:53

## 2022-12-30 RX ADMIN — GABAPENTIN 300 MG: 300 CAPSULE ORAL at 16:53

## 2022-12-30 RX ADMIN — PANTOPRAZOLE SODIUM 40 MG: 40 TABLET, DELAYED RELEASE ORAL at 12:27

## 2022-12-30 RX ADMIN — HEPARIN SODIUM 500 UNITS/HR: 1000 INJECTION INTRAVENOUS; SUBCUTANEOUS at 07:59

## 2022-12-30 RX ADMIN — ATORVASTATIN CALCIUM 20 MG: 20 TABLET, FILM COATED ORAL at 22:07

## 2022-12-30 RX ADMIN — MIDODRINE HYDROCHLORIDE 10 MG: 5 TABLET ORAL at 17:06

## 2022-12-30 RX ADMIN — SEVELAMER CARBONATE 1600 MG: 800 TABLET, FILM COATED ORAL at 17:04

## 2022-12-30 RX ADMIN — FOLIC ACID 1 MG: 1 TABLET ORAL at 16:51

## 2022-12-30 RX ADMIN — CYANOCOBALAMIN TAB 500 MCG 500 MCG: 500 TAB at 16:53

## 2022-12-30 RX ADMIN — METHOCARBAMOL 500 MG: 500 TABLET ORAL at 16:54

## 2022-12-30 RX ADMIN — OXYCODONE HYDROCHLORIDE 5 MG: 5 TABLET ORAL at 12:28

## 2022-12-30 RX ADMIN — Medication 100 MG: at 20:02

## 2022-12-30 RX ADMIN — GABAPENTIN 300 MG: 300 CAPSULE ORAL at 12:23

## 2022-12-30 RX ADMIN — METHOCARBAMOL 500 MG: 500 TABLET ORAL at 16:53

## 2022-12-30 RX ADMIN — OXYCODONE HYDROCHLORIDE 5 MG: 5 TABLET ORAL at 22:07

## 2022-12-30 RX ADMIN — SODIUM CHLORIDE 250 ML: 9 INJECTION, SOLUTION INTRAVENOUS at 07:59

## 2022-12-30 RX ADMIN — GABAPENTIN 300 MG: 300 CAPSULE ORAL at 23:51

## 2022-12-30 RX ADMIN — Medication 133 MG: at 16:52

## 2022-12-30 RX ADMIN — B-COMPLEX W/ C & FOLIC ACID TAB 1 MG 1 TABLET: 1 TAB at 16:52

## 2022-12-30 RX ADMIN — SEVELAMER CARBONATE 1600 MG: 800 TABLET, FILM COATED ORAL at 10:30

## 2022-12-30 RX ADMIN — METHOCARBAMOL 500 MG: 500 TABLET ORAL at 20:02

## 2022-12-30 RX ADMIN — APIXABAN 2.5 MG: 2.5 TABLET, FILM COATED ORAL at 20:02

## 2022-12-30 RX ADMIN — MIDODRINE HYDROCHLORIDE 10 MG: 5 TABLET ORAL at 12:22

## 2022-12-30 RX ADMIN — ACETAMINOPHEN 975 MG: 325 TABLET, FILM COATED ORAL at 10:11

## 2022-12-30 RX ADMIN — ACETAMINOPHEN 975 MG: 325 TABLET, FILM COATED ORAL at 20:02

## 2022-12-30 RX ADMIN — GABAPENTIN 300 MG: 300 CAPSULE ORAL at 01:16

## 2022-12-30 ASSESSMENT — ACTIVITIES OF DAILY LIVING (ADL)
ADLS_ACUITY_SCORE: 51
ADLS_ACUITY_SCORE: 49
ADLS_ACUITY_SCORE: 51
ADLS_ACUITY_SCORE: 47
ADLS_ACUITY_SCORE: 51
ADLS_ACUITY_SCORE: 51

## 2022-12-30 NOTE — PROGRESS NOTES
[facility], [place]   Neurosurgery Progress Note:    Assessment: Shay Jimenez is a 57 year old male with with PMH ESRD on dialysis and osteomyelitis s/p C5-T6 fusion who is now s/p redo C5-T6 fusion on 12/06/22 with concern for hemorrhage; no vertebral artery dissection or extravasation was noted on intraoperative angiogram.  He was admitted to the Neuro ICU postoperatively for MAP goals and frequent neuro checks, remaining stable on pressors.  Neuro exam is unchanged from patient's baseline.    Plan:  - Appreciate Medicine assistance   - Serial neuro exams  - Pain control  - Hb goal > 7  - Normonatremia  - Goal normotension  - Appreciate Nephrology recommendations re: dialysis (baseline M/W/F)  - Daily dressing changes   - Cervical collar when HOB >45,  on when OOB  - Regular diet  - Bowel regimen  - PRN antiemetics  - PT/OT  - SCDs and subcutaneous heparin for DVT proph  - Eliquis restarted- 12.20.2022  - Psychiatry consult for capacity evaluation- patient unable to make decisions   - Medically ready for disposition  -----------------------------------  Jonny Stephenson  Neurosurgery Resident PGY2    Interval History/Subjective:  No acute events overnight. Patient's pain is well controlled. Awaiting placement.     Objective:   Temp:  [96.7  F (35.9  C)-97.9  F (36.6  C)] 97  F (36.1  C)  Pulse:  [67-82] 71  Resp:  [10-27] 24  BP: ()/(45-84) 89/80  SpO2:  [88 %-100 %] 100 %  I/O last 3 completed shifts:  In: 1048 [P.O.:1048]  Out: -     Gen: Appears comfortable, NAD  Wound: Incision, clean, dry, intact without strikethrough  Neurologic:  - Alert & Oriented to person, place, time, and situation  - Follows commands briskly  - Speech fluent, spontaneous. No aphasia or dysarthria.  - No gaze preference. No apparent hemineglect.  - PERRL, EOMI  - Strong brow raise, eye closure, and smile  - Face symmetric with sensation intact to light touch  - Trapezii and sternocleidomastoid  muscles 5/5 bilaterally  - No pronator drift     Del Tr Bi WE WF Gr   R 4+ 5 5 5 5 5   L 4+ 5 5 5 5 5    HF KE KF DF PF EHL   R 4+ 5 5 5 5 5   L 4+ 5 5 5 5 5       Sensation intact and symmetric to light touch throughout    LABS  Recent Labs   Lab Test 12/30/22  0758 12/29/22  0634 12/28/22  0701   WBC 6.3 5.4 5.4   HGB 9.7* 10.0* 10.4*   MCV 99 101* 100    232 211       Recent Labs   Lab Test 12/30/22 0758 12/29/22  0634 12/28/22  0701   * 133* 134*   POTASSIUM 4.8 4.2 4.8   CHLORIDE 93* 95* 96*   CO2 22 24 22   BUN 38.0* 24.7* 37.5*   CR 5.94* 4.71* 6.15*   ANIONGAP 16* 14 16*   MAC 9.3 9.6 9.4   GLC 80 83 79       IMAGING  No results found for this or any previous visit (from the past 24 hour(s)).

## 2022-12-30 NOTE — PLAN OF CARE
"Vital signs:  Temp: (!) 96.7  F (35.9  C) Temp src: Oral BP: 130/76 Pulse: 82   Resp: 18 SpO2: 95 % O2 Device: Oxymask (mouth-breathing) Oxygen Delivery: 2 LPM Height: 185.4 cm (6' 1\") Weight: 130.2 kg (287 lb 0.6 oz)    1498-9086:    Activity: Repositioned with assist of 2. Uses lift to transfer to chair.   Neuros: A & O x4. Neuro intact. Baseline numbness BLE due to neuropathy.  Cardiac: WDL. Asymptomatic.   Respiratory: LS diminished in bases. O2 sats 88% RA when patient sleeping, applied 2 L/min NC, patient mouth breathing, applied 2 L/min Oxymask, O2 sats above 92%. Pt has hx of GINI. Refuses CPAP. Denies SOB. Unlabored.   GI/: BS+, passing flatus, no BM, incontinent of bowel. Anuric. On hemodialysis.   Diet: Regular diet.   Skin: Posterior neck incision dressing c/d/I.   Lines: Right PIV painful, removed. New PIV placed. Left AV fistula SOSA.   Drains: None.   Labs: Reviewed.   Pain/nausea: PRN oxycodone 5mg x1 for posterior neck pain effective, patient slept. Denies nausea.   New changes this shift: None.   Plan: Continue POC.     "

## 2022-12-30 NOTE — PROGRESS NOTES
HEMODIALYSIS TREATMENT NOTE    Date: 12/30/2022  Time: 11:14 AM    Data:  Pre Wt:  unavailable   Weight change:  3.2 kg  Ultrafiltration - Post Run Net Total Removed (mL): 3200 mL  Vascular Access Status: +b/t  Dialyzer Rinse: Clear  Total Blood Volume Processed: 90.6 L   Total Dialysis (Treatment) Time: 4 Hours  Dialysate: K2/Ca 2.5  Heparin: 500 Unit bolus + 500 unit/hr maintenance     Lab:   No    Interventions:  Epogen   Midodrine    Assessment:  Pt completed 4 hours of HD via left forearm AVF, net UF 3.2kg, Pt vitally stable on HD, SR, sat 92-99 on 1 LMP  O2 via NC for support.  Pt had breakfast on HD, phos binder administered with meal. 10 mg Midodrine given mid-run to optimize BP. Post tx /54: Hand off given to PCN     Plan:    Per renal team

## 2022-12-30 NOTE — PROGRESS NOTES
[facility]    Medicine Progress Note - Hospitalist Service, GOLD TEAM 12    Date of Admission:  12/6/2022    Assessment & Plan   Shay Jimenez is a 57 year old male admitted on 12/6/2022 with hx of HTN, HLD, afib, ESRD on HD, peripheral neuropathy, GINI, GERD, and depression admitted for redo C5-T6 fusion, now been co managed with medicine.     Today's plan   - Awaiting placement to TCU  - HD today     Superficial would infection noted by nrg 12/20  - Completed cefazolin     Cervical Myelopathy  Chronic Osteomyelitis  C5-T6 spinal fusion complicated by hardware displacement  Admitted [facility] 12/6, underwent redo of C5-T6 fusion  Pt working with PT/OT, sig assistance needs. Appears to be reluctant for TCU. Concern for some intraoperative hemmorhage but intra-operative angio was re-assuring. Subsequent CT of C-spine and head w/o acute ab, good alignment. Hx of osteo w/o recent abx needs  - Cares per neuro surg  - Heavy care needs that seem unlikely to be met at home  - PT/OT rec TCU but patient states he wants to go home. [facility] unable to provide the care he needs. Apparently deemed unable to make his own decisions      Acute on chroninc hypoxic-hypercarbic  resp failure, resolved  - Titrate 02 for goal > 90%  - Cont overnight 02 as this is used at home for GINI given intolreance of CPAP. If concerns for hypercarbia as cause for delirium/encehalopathy, would favor ordering and seeing if RT can assist with mask that he may tolerate    Acute on chronic pain  Peripheral neuropathy  Improving, decreasing oxycodone over the last 24 hours  - Would consider increase in acetaminophen dose to 975 (ordered)  - Cont baclofen and gabapentin  - Cont methocarbamol  - Cont bowl regimen     Hypervolemic hyponatremia, stable and chronic  He appears asymptomatic Review shows that he has sodium that vascilates upper 120's-low 130's. Nephrology aware, cont on dialysis  - Consider strict intake  monitoring to get sense of patient drinking  - Would see if he can tolerated a 2000 mls fluid restriction     ESRD on dialysis  Nephrology following  Tolerating usual schedule M/W/F     Alcohol abuse  ICU/hospital Delirium  Hepatomegaly  No stigmata of cirrhosis. Mild hypoalbuminemia with nml bilirubin and AST/ALT. CT from Aug 2022 did show hepatomegaly  - Pt is on gabapentin and baclofen. Both of these have been used to help reduce cravings in pt with alcohol abuse.  - Naltrexone may not be safe in the setting of ESRD  - Agree with continuing of thiamine and folate  - Cont Seroquel 25 / 50 (am / pm)  and continue adjunctive remalteon     Mild/Mod pulm HTN  Moderate MR  GINI  Pt with ECHO in earlier 2022 with MR and elevated right sided pressures. No signs of sig right heart dysfunction. Not on CPAP as he has not tolerated.  - Cont nocturnal 02      Chronic atrial fibrillation  On eliquis PTA          Diet: Fluid restriction 2000 ML FLUID  Regular Diet Adult  Diet    DVT Prophylaxis: Apixaban for dvt ppx   Oliveira Catheter: Not present  Central Lines: None  Cardiac Monitoring: None  Code Status: Full Code      Disposition Plan     Expected Discharge Date: 12/30/2022      Destination: nursing home  Discharge Comments: TCU advised but patient refusing. nsg primary      The patient's care was discussed with the Bedside Nurse and Patient.    Geremias Peraza MD  Hospitalist Service, GOLD TEAM [address] [place] [address]  Securely message with the Vocera Web Console (learn more here)  Text page via [facility] Paging/Directory   Please see signed in provider for up to date coverage information      Clinically Significant Risk Factors         # Hyponatremia: Lowest Na = 131 mmol/L in last 2 days, will monitor as appropriate    # Hypomagnesemia: Lowest Mg = 1.6 mg/dL in last 2 days, will replace as needed   # Hypoalbuminemia: Lowest albumin = 3.1 g/dL at 12/11/2022  5:28 AM, will monitor as  "appropriate           # Obesity: Estimated body mass index is 37.87 kg/m  as calculated from the following:    Height as of this encounter: 1.854 m (6' 1\").    Weight as of this encounter: 130.2 kg (287 lb 0.6 oz).          ______________________________________________________________________    Interval History   No acute events overnight    Data reviewed today: I reviewed all medications, new labs and imaging results over the last 24 hours. I personally reviewed no images or EKG's today.    Physical Exam   Vital Signs: Temp: 97.3  F (36.3  C) Temp src: Oral BP: (!) 85/47 (giving midodrine) Pulse: 74   Resp: 18 SpO2: 95 % O2 Device: None (Room air) Oxygen Delivery: 2 LPM  Weight: 287 lbs .62 oz  Constitutional: Awake, no apparent distress  Respiratory: Clear to auscultation bilaterally  Cardiovascular: Regular rate and rhythm  GI: Normal bowel sounds, soft, non-distended, non-tender  Skin/Integumen: No rashes, no cyanosis      Data   Recent Labs   Lab 12/30/22  0758 12/29/22  0634 12/28/22  0701   WBC 6.3 5.4 5.4   HGB 9.7* 10.0* 10.4*   MCV 99 101* 100    232 211   * 133* 134*   POTASSIUM 4.8 4.2 4.8   CHLORIDE 93* 95* 96*   CO2 22 24 22   BUN 38.0* 24.7* 37.5*   CR 5.94* 4.71* 6.15*   ANIONGAP 16* 14 16*   MAC 9.3 9.6 9.4   GLC 80 83 79     No results found for this or any previous visit (from the past 24 hour(s)).  Medications       acetaminophen  975 mg Oral TID     apixaban ANTICOAGULANT  2.5 mg Oral BID     atorvastatin  20 mg Oral At Bedtime     Baclofen  10 mg Oral BID     cetirizine  10 mg Oral Daily     cyanocobalamin  500 mcg Oral Daily     finasteride  1.3 mg Oral Daily     fluticasone  1 spray Both Nostrils BID     folic acid  1 mg Oral Daily     gabapentin  300 mg Oral Q8H     magnesium plus protein  133 mg Oral Daily     methocarbamol  500 mg Oral 4x Daily     midodrine  10 mg Oral TID w/meals     multivitamin RENAL  1 tablet Oral Daily     pantoprazole  40 mg Oral QAM AC     " polyethylene glycol  17 g Oral TID     vitamin B6  100 mg Oral Daily     QUEtiapine  25 mg Oral Daily     QUEtiapine  50 mg Oral At Bedtime     ramelteon  8 mg Oral At Bedtime     senna-docusate  1 tablet Oral BID     sertraline  100 mg Oral Daily     sevelamer carbonate  1,600 mg Oral TID w/meals     sodium chloride (PF)  3 mL Intracatheter Q8H     thiamine  100 mg Oral Daily

## 2022-12-30 NOTE — PROGRESS NOTES
Care Management Follow Up    Length of Stay (days): 24    Expected Discharge Date: 12/30/2022     Concerns to be Addressed: discharge planning     Patient plan of care discussed at interdisciplinary rounds: Yes    Anticipated Discharge Disposition: Skilled Nursing Facility, Transitional Care    [facility]  [address] [place] [address]  [id number]  P: [phone]  F: [phone]    -  TCU is following but patient is not on the wait list until OP dialysis rides are arranged.      Anticipated Discharge Services: None  Anticipated Discharge DME: None    Patient/family educated on Medicare website which has current facility and service quality ratings: yes  Education Provided on the Discharge Plan:  yes  Patient/Family in Agreement with the Plan: yes    Referrals Placed by CM/SW: Post Acute Facilities  Private pay costs discussed: Not applicable    Additional Information:    SW following for discharge planning.    [facility] and [facility] have no chair availability [facility]. Patient will need to do dialysis at his home clinic in [place] while at [facility]. This writer called the [place] clinic inquiring about patients chair time to set up rides from TCU to dialysis. Left a message and waiting to hear back. Did not receive a call back today. Will attempt to call again on Monday.    [facility] (P: [phone], F: [phone]).     PRANAY Dos Santos   7B    Phone: [phone]  Pager: [phone]

## 2022-12-30 NOTE — PROGRESS NOTES
"CLINICAL NUTRITION SERVICES - REASSESSMENT NOTE     Nutrition Prescription    RECOMMENDATIONS FOR MDs/PROVIDERS TO ORDER:  - please note, no weight since 12/22 when pt was at EDW of 130 kg    Malnutrition Status:    Patient does not meet two of the established criteria necessary for diagnosing malnutrition    Recommendations already ordered by Registered Dietitian (RD):  Continue current nutrition plan of care.     Future/Additional Recommendations:  Follow for continued adequate po intake and wound healing.      EVALUATION OF THE PROGRESS TOWARD GOALS   Diet: Regular (since 12/22) with 2000 ml FR (since 12/17)  Intake: Eating 100% of majority of meals.     Per care rounds, pt mentioned that his fluid restriction was \"optional.\"       NEW FINDINGS   -General: Noted pt requires scheduled chair and ride to dialysis center before [facility]CU will put pt on waiting list. They continue to follow.   -Wt: Last wt 12/22.  Significant fluctuations during stay--likely with fluid shifts, bed scale weights.     -Labs: BUN 38, Cr 5.94 prior to HD today. Last Phos check 6.2 on 12/19.     -Renal: Continues on HD MWF,  kg but not weighed since 12/22.    -Skin: Noted R heel DTI since PTA--Per WOC 12/28 note--stable.  back surgical incision (wound)-- improving per WOC RN note.  R buttocks PI- hospital acquired, R upper back PI--not followed by WOC RN and suspect stage 1 as no open areas noted.     -Meds: B12 500 mcg, folic acid, magnesium plus protein tablet q day, renal multivitamin, miralax 3x/d, 100 mg B6, Thiamine 100 mg senna-docusate 2x/d,      MALNUTRITION  % Intake: No decreased intake noted  % Weight Loss: None noted--confounded by fluid shifts  Subcutaneous Fat Loss: None observed  Muscle Loss: None observed  Fluid Accumulation/Edema: Mild  Malnutrition Diagnosis: Patient does not meet two of the established criteria necessary for diagnosing malnutrition    Previous Goals   Patient to consume % of nutritionally " adequate meal trays TID, or the equivalent with supplements/snacks.  Evaluation: Met    Previous Nutrition Diagnosis  Predicted inadequate nutrient intake related to potential for menu fatigue as evidenced by hospital LOS  Evaluation: No change    CURRENT NUTRITION DIAGNOSIS  Predicted inadequate nutrient intake related to potential for menu fatigue as evidenced by hospital LOS    INTERVENTIONS  Implementation  Monitor for adequacy PO; order supplements if PO declines    Goals  Patient to consume % of nutritionally adequate meal trays TID, or the equivalent with supplements/snacks.    Monitoring/Evaluation  Progress toward goals will be monitored and evaluated per protocol.    Verona Fields RD, LD   7B (M-F) Pager: [phone]  RD Weekend/Holiday Pager: [phone]

## 2022-12-30 NOTE — PROGRESS NOTES
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  Had planned neurosurgery revision for C5-C6 on 12/6 as screw had dislodged.  Nephrology consulted for management of dialysis post op.       Interval History :   Mr Jimenez had day off of HD yesterday, seen on run this am.  At EDW but BP's are robust so will try to pull 2-3L per his request.  Stable for discharge from nephrology standpoint, next planned run on Monday 1/2.      Assessment & Recommendations:   ESRD-Since 4/2019 due to Ca Phos deposition and HTN, runs at [facility] ([phone], fax [phone]) under care of Dr Cline.  Runs MWF via AVF, 4.25h runs.                  -HD today, 3L of UF.                  -Will decide on runs daily depending on chemistries and hemodynamics.                  -Continuing long term HD, no need for new consent.                  -Is eventually pursuing renal transplant through [place] once acute issues requiring neurosurgery are done.       Volume-EDW 130kg, bed wt ~130kg today, pulling 2-3L to challenge.      Electrolytes-K 4.8 bicarb 22, Na 134, HD today.        BMD-Ca 9.4, Mg and Phos WNL.       Neurosurgery-Had planned revision of previous screws at C5-C6 on 12/6.       Anemia-Hgb 10.4, reduced epo dose to 4k as maintenance as it has run close to 11.       Nutrition-Taking PO       Time spent: 30 minutes on this date of encounter for chart review, physical exam, medical decision making and co-ordination of care.      Discussed with Dr Love     Recommendations were communicated to primary team via verbal communication.        ENID Shirley CNS  Clinical Nurse Specialist  [phone]      Review of Systems:   I reviewed the following systems:  Gen: No fevers or chills  CV: No CP at rest  Resp: No SOB at rest  GI: No N/V    Physical Exam:  "  I/O last 3 completed shifts:  In: 1048 [P.O.:1048]  Out: -    /76 (BP Location: Right arm, Patient Position: Semi-Doherty's, Cuff Size: Adult Large)   Pulse 82   Temp (!) 96.7  F (35.9  C) (Oral)   Resp 18   Ht 1.854 m (6' 1\")   Wt 130.2 kg (287 lb 0.6 oz)   SpO2 95%   BMI 37.87 kg/m       GENERAL APPEARANCE: alert and no distress, moderate confusion  EYES: No scleral icterus  NECK:  No JVD  Pulmonary: lungs clear to auscultation with equal breath sounds bilaterally, no clubbing or cyanosis  CV: Regular rhythm, normal rate, no rub   - Edema none  GI: soft, nontender, normal bowel sounds  MS: no evidence of inflammation in joints, no muscle tenderness  : No Oliveira  SKIN: no rash, warm, dry  NEURO: Delirious but somewhat redirectable.      Labs:   All labs reviewed by me  Electrolytes/Renal -   Recent Labs   Lab Test 12/29/22  0634 12/28/22  0701 12/27/22  0646 12/20/22  0618 12/19/22  1000 12/15/22  1642 12/14/22  0422 12/13/22  0502   * 134* 132*   < > 128*   < > 132* 137   POTASSIUM 4.2 4.8 4.4   < > 4.8   < > 3.8 3.6   CHLORIDE 95* 96* 96*   < > 90*   < > 94* 98   CO2 24 22 22   < > 22   < > 23 25   BUN 24.7* 37.5* 26.0*   < > 43.7*   < > 33.2* 21.0*   CR 4.71* 6.15* 5.09*   < > 6.95*   < > 5.64* 4.41*   GLC 83 79 86   < > 93   < > 80 96   MAC 9.6 9.4 8.9   < > 8.6   < > 8.7 9.0   MAG 1.6* 1.8 1.6*   < > 1.9  --  1.7 1.7   PHOS  --   --   --   --  6.2*  --  3.8 2.6    < > = values in this interval not displayed.       CBC -   Recent Labs   Lab Test 12/29/22  0634 12/28/22  0701 12/27/22  0646   WBC 5.4 5.4 5.7   HGB 10.0* 10.4* 9.5*    211 217       LFTs -   Recent Labs   Lab Test 12/11/22  0528 10/06/22  1215 09/29/22  1345 08/18/22  1435 07/21/22  1252 07/16/22  0557   ALKPHOS 146*  --   --   --  131 134   BILITOTAL 0.4  --   --   --  0.6 0.5   ALT <5*  --   --   --  9 <6   AST 18 31 35   < > 17 13   PROTTOTAL 5.9*  --   --   --  6.7* 6.5*   ALBUMIN 3.1*  --   --   --  3.0* 2.8*    < " > = values in this interval not displayed.       Iron Panel -   Recent Labs   Lab Test 12/14/22  0634 07/29/22  0600   IRON 43* 44   IRONSAT 24 20   JACKELINE  --  626*           Current Medications:    sodium chloride 0.9%  250 mL Intravenous Once in dialysis/CRRT     sodium chloride 0.9%  300 mL Hemodialysis Machine Once     acetaminophen  975 mg Oral TID     apixaban ANTICOAGULANT  2.5 mg Oral BID     atorvastatin  20 mg Oral At Bedtime     Baclofen  10 mg Oral BID     cetirizine  10 mg Oral Daily     cyanocobalamin  500 mcg Oral Daily     epoetin mar-epbx  4,000 Units Intravenous Once in dialysis/CRRT     finasteride  1.3 mg Oral Daily     fluticasone  1 spray Both Nostrils BID     folic acid  1 mg Oral Daily     gabapentin  300 mg Oral Q8H     heparin  500 Units Hemodialysis Machine OR IV Push Once in dialysis/CRRT     magnesium plus protein  133 mg Oral Daily     methocarbamol  500 mg Oral 4x Daily     midodrine  10 mg Oral TID w/meals     multivitamin RENAL  1 tablet Oral Daily     pantoprazole  40 mg Oral QAM AC     polyethylene glycol  17 g Oral TID     vitamin B6  100 mg Oral Daily     QUEtiapine  25 mg Oral Daily     QUEtiapine  50 mg Oral At Bedtime     ramelteon  8 mg Oral At Bedtime     senna-docusate  1 tablet Oral BID     sertraline  100 mg Oral Daily     sevelamer carbonate  1,600 mg Oral TID w/meals     sodium chloride (PF)  3 mL Intracatheter Q8H     thiamine  100 mg Oral Daily       heparin (porcine)       - MEDICATION INSTRUCTIONS -

## 2022-12-30 NOTE — PLAN OF CARE
Vitals:    12/29/22 0747 12/29/22 1542 12/29/22 1547 12/29/22 1700   BP: 112/64 (!) 79/45 (!) 85/54 113/61   BP Location: Right arm Right arm Right arm Right arm   Cuff Size:    Adult Large   Pulse: 74 80  67   Resp: 18 16  16   Temp: 97.9  F (36.6  C) 97.9  F (36.6  C)  97.6  F (36.4  C)   TempSrc: Oral Oral  Oral   SpO2: 94% 95%  96%   Weight:       Height:       Cervical myelopathy,  chronic Osteomyelitis,C5-T6 spinal fusion complicated  by hardware displacement,   Alert and oriented, afebrile vitally stable, sating 96% on room air, non labor breathing.  Tolerating intake, on 2000 ml fluid restriction, edema on both lower extremities.  Had BM, anuric, left arm fistula bruit and thrill, right PIV SL.    Oxy for pain management, up out of bed to chair, PT and OT   Spinal incision dressing intact,   Awaiting on TCU placement,  Continue with plan of care.

## 2022-12-30 NOTE — PLAN OF CARE
"/65   Pulse 72   Temp 97.3  F (36.3  C) (Oral)   Resp 18   Ht 1.854 m (6' 1\")   Wt 130.2 kg (287 lb 0.6 oz)   SpO2 95%   BMI 37.87 kg/m     Reason for admission: POD 21 C5-T6 fusion (redo) c/b hemodynamic instability requiring ICU admission  Neuro: AOx4, neuros intact w q4h checks  Pain: denies  CMS: weakness in BLE  Cardiac: hx afib on anticoag, hypotension resolved with midodrine  Resp: O2>90 on RA  GI/: LBM 12/27/2022, anuric on dialysis MWF  Skin/Wound: mepilex on buttock, c spine incision with primapore, R heel with mepilex, R great toe mepilex  Access: PIV SL, L AV fistula   Activity: ax2 with lift, C collar when OOB  Nutrition: Reg  Changes this shift: dialysis complete  Plan:  Awaiting placement               "

## 2022-12-31 LAB
ANION GAP SERPL CALCULATED.3IONS-SCNC: 14 MMOL/L (ref 7–15)
BUN SERPL-MCNC: 26.9 MG/DL (ref 6–20)
CALCIUM SERPL-MCNC: 9.3 MG/DL (ref 8.6–10)
CHLORIDE SERPL-SCNC: 93 MMOL/L (ref 98–107)
CREAT SERPL-MCNC: 4.63 MG/DL (ref 0.67–1.17)
DEPRECATED HCO3 PLAS-SCNC: 21 MMOL/L (ref 22–29)
ERYTHROCYTE [DISTWIDTH] IN BLOOD BY AUTOMATED COUNT: 15.8 % (ref 10–15)
GFR SERPL CREATININE-BSD FRML MDRD: 14 ML/MIN/1.73M2
GLUCOSE SERPL-MCNC: 83 MG/DL (ref 70–99)
HCT VFR BLD AUTO: 35.7 % (ref 40–53)
HGB BLD-MCNC: 10 G/DL (ref 13.3–17.7)
MAGNESIUM SERPL-MCNC: 1.7 MG/DL (ref 1.7–2.3)
MCH RBC QN AUTO: 30.9 PG (ref 26.5–33)
MCHC RBC AUTO-ENTMCNC: 28 G/DL (ref 31.5–36.5)
MCV RBC AUTO: 110 FL (ref 78–100)
PLATELET # BLD AUTO: 204 10E3/UL (ref 150–450)
POTASSIUM SERPL-SCNC: 4.4 MMOL/L (ref 3.4–5.3)
RBC # BLD AUTO: 3.24 10E6/UL (ref 4.4–5.9)
SODIUM SERPL-SCNC: 128 MMOL/L (ref 136–145)
WBC # BLD AUTO: 6 10E3/UL (ref 4–11)

## 2022-12-31 PROCEDURE — 250N000013 HC RX MED GY IP 250 OP 250 PS 637: Performed by: NURSE PRACTITIONER

## 2022-12-31 PROCEDURE — 120N000002 HC R&B MED SURG/OB UMMC

## 2022-12-31 PROCEDURE — 36415 COLL VENOUS BLD VENIPUNCTURE: CPT | Performed by: STUDENT IN AN ORGANIZED HEALTH CARE EDUCATION/TRAINING PROGRAM

## 2022-12-31 PROCEDURE — 83735 ASSAY OF MAGNESIUM: CPT | Performed by: STUDENT IN AN ORGANIZED HEALTH CARE EDUCATION/TRAINING PROGRAM

## 2022-12-31 PROCEDURE — 85027 COMPLETE CBC AUTOMATED: CPT | Performed by: STUDENT IN AN ORGANIZED HEALTH CARE EDUCATION/TRAINING PROGRAM

## 2022-12-31 PROCEDURE — 80048 BASIC METABOLIC PNL TOTAL CA: CPT | Performed by: STUDENT IN AN ORGANIZED HEALTH CARE EDUCATION/TRAINING PROGRAM

## 2022-12-31 PROCEDURE — 250N000011 HC RX IP 250 OP 636: Performed by: NURSE PRACTITIONER

## 2022-12-31 RX ADMIN — MIDODRINE HYDROCHLORIDE 10 MG: 5 TABLET ORAL at 08:42

## 2022-12-31 RX ADMIN — SEVELAMER CARBONATE 1600 MG: 800 TABLET, FILM COATED ORAL at 18:37

## 2022-12-31 RX ADMIN — CYANOCOBALAMIN TAB 500 MCG 500 MCG: 500 TAB at 17:25

## 2022-12-31 RX ADMIN — BACLOFEN 10 MG: 10 TABLET ORAL at 20:03

## 2022-12-31 RX ADMIN — POLYETHYLENE GLYCOL 3350 17 G: 17 POWDER, FOR SOLUTION ORAL at 08:42

## 2022-12-31 RX ADMIN — OXYCODONE HYDROCHLORIDE 5 MG: 5 TABLET ORAL at 22:00

## 2022-12-31 RX ADMIN — B-COMPLEX W/ C & FOLIC ACID TAB 1 MG 1 TABLET: 1 TAB at 17:25

## 2022-12-31 RX ADMIN — PANTOPRAZOLE SODIUM 40 MG: 40 TABLET, DELAYED RELEASE ORAL at 08:43

## 2022-12-31 RX ADMIN — METHOCARBAMOL 500 MG: 500 TABLET ORAL at 20:03

## 2022-12-31 RX ADMIN — BACLOFEN 10 MG: 10 TABLET ORAL at 08:42

## 2022-12-31 RX ADMIN — Medication 100 MG: at 20:03

## 2022-12-31 RX ADMIN — METHOCARBAMOL 500 MG: 500 TABLET ORAL at 15:52

## 2022-12-31 RX ADMIN — ACETAMINOPHEN 975 MG: 325 TABLET, FILM COATED ORAL at 12:02

## 2022-12-31 RX ADMIN — FINASTERIDE 1.3 MG: 5 TABLET, FILM COATED ORAL at 17:24

## 2022-12-31 RX ADMIN — MIDODRINE HYDROCHLORIDE 10 MG: 5 TABLET ORAL at 12:00

## 2022-12-31 RX ADMIN — GABAPENTIN 300 MG: 300 CAPSULE ORAL at 15:52

## 2022-12-31 RX ADMIN — OXYCODONE HYDROCHLORIDE 5 MG: 5 TABLET ORAL at 08:52

## 2022-12-31 RX ADMIN — METHOCARBAMOL 500 MG: 500 TABLET ORAL at 08:42

## 2022-12-31 RX ADMIN — SEVELAMER CARBONATE 1600 MG: 800 TABLET, FILM COATED ORAL at 12:00

## 2022-12-31 RX ADMIN — Medication 133 MG: at 17:25

## 2022-12-31 RX ADMIN — RAMELTEON 8 MG: 8 TABLET, FILM COATED ORAL at 22:03

## 2022-12-31 RX ADMIN — METHOCARBAMOL 500 MG: 500 TABLET ORAL at 12:00

## 2022-12-31 RX ADMIN — OXYCODONE HYDROCHLORIDE 5 MG: 5 TABLET ORAL at 17:25

## 2022-12-31 RX ADMIN — SENNOSIDES AND DOCUSATE SODIUM 1 TABLET: 8.6; 5 TABLET ORAL at 08:42

## 2022-12-31 RX ADMIN — ACETAMINOPHEN 975 MG: 325 TABLET, FILM COATED ORAL at 20:03

## 2022-12-31 RX ADMIN — ATORVASTATIN CALCIUM 20 MG: 20 TABLET, FILM COATED ORAL at 21:59

## 2022-12-31 RX ADMIN — OXYCODONE HYDROCHLORIDE 5 MG: 5 TABLET ORAL at 12:57

## 2022-12-31 RX ADMIN — APIXABAN 2.5 MG: 2.5 TABLET, FILM COATED ORAL at 08:42

## 2022-12-31 RX ADMIN — ACETAMINOPHEN 975 MG: 325 TABLET, FILM COATED ORAL at 08:42

## 2022-12-31 RX ADMIN — QUETIAPINE 50 MG: 25 TABLET ORAL at 21:59

## 2022-12-31 RX ADMIN — GABAPENTIN 300 MG: 300 CAPSULE ORAL at 08:42

## 2022-12-31 RX ADMIN — SERTRALINE HYDROCHLORIDE 100 MG: 100 TABLET ORAL at 17:25

## 2022-12-31 RX ADMIN — APIXABAN 2.5 MG: 2.5 TABLET, FILM COATED ORAL at 20:03

## 2022-12-31 RX ADMIN — ONDANSETRON 4 MG: 4 TABLET, ORALLY DISINTEGRATING ORAL at 06:52

## 2022-12-31 RX ADMIN — SEVELAMER CARBONATE 1600 MG: 800 TABLET, FILM COATED ORAL at 08:42

## 2022-12-31 RX ADMIN — CETIRIZINE HYDROCHLORIDE 10 MG: 10 TABLET, FILM COATED ORAL at 17:25

## 2022-12-31 RX ADMIN — Medication 100 MG: at 17:25

## 2022-12-31 RX ADMIN — GABAPENTIN 300 MG: 300 CAPSULE ORAL at 23:20

## 2022-12-31 RX ADMIN — FOLIC ACID 1 MG: 1 TABLET ORAL at 17:25

## 2022-12-31 ASSESSMENT — ACTIVITIES OF DAILY LIVING (ADL)
ADLS_ACUITY_SCORE: 49
ADLS_ACUITY_SCORE: 49
ADLS_ACUITY_SCORE: 51
ADLS_ACUITY_SCORE: 51
ADLS_ACUITY_SCORE: 49
ADLS_ACUITY_SCORE: 51
ADLS_ACUITY_SCORE: 49
ADLS_ACUITY_SCORE: 51
ADLS_ACUITY_SCORE: 51
ADLS_ACUITY_SCORE: 49
ADLS_ACUITY_SCORE: 51
ADLS_ACUITY_SCORE: 51

## 2022-12-31 NOTE — PROGRESS NOTES
"/51 (BP Location: Right arm)   Pulse 71   Temp 99.4  F (37.4  C) (Oral)   Resp 18   Ht 1.854 m (6' 1\")   Wt 130.2 kg (287 lb 0.6 oz)   SpO2 95%   BMI 37.87 kg/m        Alert and oriented x4, neuro WNL. Reported neck and back pain, given Oxycodone - effective.  Fluid restriction, 2L and regular diet. One BM this shift. Mepilex on coccyx clean, dry, and intact. Neck incision SOSA and prima pore dressing CDI. R heel covered with mepilex dressing. Waiting for placement.   "

## 2022-12-31 NOTE — PLAN OF CARE
Goal Outcome Evaluation:    AOx4 pleasant. q4h neurochecks competed WNL  Denied neck and back pain  Regular diet 2L restriction drank 100mL  Incontinent of b&b, one loose stool  R heel mepiplex CDI  Neck primapore CDI  Waiting for placement  q2h turns  No acute events throughout the shift

## 2022-12-31 NOTE — PROGRESS NOTES
[facility], [place]   Neurosurgery Progress Note:    Assessment: Shay Jimenez is a 57 year old male with with PMH ESRD on dialysis and osteomyelitis s/p C5-T6 fusion who is now s/p redo C5-T6 fusion on 12/06/22 with concern for hemorrhage; no vertebral artery dissection or extravasation was noted on intraoperative angiogram.  He was admitted to the Neuro ICU postoperatively for MAP goals and frequent neuro checks, remaining stable on pressors.  Neuro exam is unchanged from patient's baseline.    Plan:  - Appreciate Medicine assistance   - Serial neuro exams  - Pain control  - Hb goal > 7  - Normonatremia  - Goal normotension  - Appreciate Nephrology recommendations re: dialysis (baseline M/W/F)  - Daily dressing changes   - Cervical collar when HOB >45,  on when OOB  - Regular diet  - Bowel regimen  - PRN antiemetics  - PT/OT  - SCDs and subcutaneous heparin for DVT proph  - Eliquis restarted- 12.20.2022  - Psychiatry consult for capacity evaluation- patient unable to make decisions   - Medically ready for disposition  -----------------------------------  oJnny Stephenson  Neurosurgery Resident PGY2    Interval History/Subjective:  No acute events overnight. Patient's pain is well controlled. Awaiting placement.     Objective:   Temp:  [96.5  F (35.8  C)-99.4  F (37.4  C)] 96.5  F (35.8  C)  Pulse:  [69-77] 75  Resp:  [18-24] 18  BP: ()/(47-80) 101/60  SpO2:  [94 %-100 %] 95 %  I/O last 3 completed shifts:  In: 1828 [P.O.:1828]  Out: 3200 [Other:3200]    Gen: Appears comfortable, NAD  Wound: Incision, clean, dry, intact without strikethrough  Neurologic:  - Alert & Oriented to person, place, time, and situation  - Follows commands briskly  - Speech fluent, spontaneous. No aphasia or dysarthria.  - No gaze preference. No apparent hemineglect.  - PERRL, EOMI  - Strong brow raise, eye closure, and smile  - Face symmetric with sensation intact to light touch  - Trapezii and  sternocleidomastoid muscles 5/5 bilaterally  - No pronator drift     Del Tr Bi WE WF Gr   R 4+ 5 5 5 5 5   L 4+ 5 5 5 5 5    HF KE KF DF PF EHL   R 4+ 5 5 5 5 5   L 4+ 5 5 5 5 5       Sensation intact and symmetric to light touch throughout    LABS  Recent Labs   Lab Test 12/31/22  0822 12/30/22  0758 12/29/22  0634   WBC 6.0 6.3 5.4   HGB 10.0* 9.7* 10.0*   * 99 101*    210 232       Recent Labs   Lab Test 12/31/22 0822 12/30/22  0758 12/29/22  0634   * 131* 133*   POTASSIUM 4.4 4.8 4.2   CHLORIDE 93* 93* 95*   CO2 21* 22 24   BUN 26.9* 38.0* 24.7*   CR 4.63* 5.94* 4.71*   ANIONGAP 14 16* 14   MAC 9.3 9.3 9.6   GLC 83 80 83       IMAGING  No results found for this or any previous visit (from the past 24 hour(s)).

## 2022-12-31 NOTE — PROGRESS NOTES
"Blood pressure 105/54, pulse 69, temperature 98  F (36.7  C), temperature source Oral, resp. rate 20, height 1.854 m (6' 1\"), weight 130.2 kg (287 lb 0.6 oz), SpO2 96 %.    Activity: A2 w/w or lift  Neuros: AOX4, makes needs known  Cardiac: WDL, denies chest pain  Respiratory: WDL 96% denies SOB  GI/: Oliguria on HD, +BS, BM  Diet: Regular, tolerating  Skin/Incisions/Drains: Neck glued, upper back primapore, Coccyx Mepilex, Rt foot foam x2  Lines: Rt PIV SL, Lt AV  Pain: 4-7/10 Oxy  Plan: Continue with POC  "

## 2023-01-01 LAB
ANION GAP SERPL CALCULATED.3IONS-SCNC: 14 MMOL/L (ref 7–15)
BUN SERPL-MCNC: 37.9 MG/DL (ref 6–20)
CALCIUM SERPL-MCNC: 9.1 MG/DL (ref 8.6–10)
CHLORIDE SERPL-SCNC: 92 MMOL/L (ref 98–107)
CREAT SERPL-MCNC: 5.88 MG/DL (ref 0.67–1.17)
DEPRECATED HCO3 PLAS-SCNC: 23 MMOL/L (ref 22–29)
ERYTHROCYTE [DISTWIDTH] IN BLOOD BY AUTOMATED COUNT: 15.5 % (ref 10–15)
GFR SERPL CREATININE-BSD FRML MDRD: 10 ML/MIN/1.73M2
GLUCOSE SERPL-MCNC: 80 MG/DL (ref 70–99)
HCT VFR BLD AUTO: 32.8 % (ref 40–53)
HGB BLD-MCNC: 9.9 G/DL (ref 13.3–17.7)
MAGNESIUM SERPL-MCNC: 1.9 MG/DL (ref 1.7–2.3)
MCH RBC QN AUTO: 30.5 PG (ref 26.5–33)
MCHC RBC AUTO-ENTMCNC: 30.2 G/DL (ref 31.5–36.5)
MCV RBC AUTO: 101 FL (ref 78–100)
PLATELET # BLD AUTO: 211 10E3/UL (ref 150–450)
POTASSIUM SERPL-SCNC: 5 MMOL/L (ref 3.4–5.3)
RBC # BLD AUTO: 3.25 10E6/UL (ref 4.4–5.9)
SODIUM SERPL-SCNC: 129 MMOL/L (ref 136–145)
WBC # BLD AUTO: 5.8 10E3/UL (ref 4–11)

## 2023-01-01 PROCEDURE — 83735 ASSAY OF MAGNESIUM: CPT | Performed by: STUDENT IN AN ORGANIZED HEALTH CARE EDUCATION/TRAINING PROGRAM

## 2023-01-01 PROCEDURE — 80048 BASIC METABOLIC PNL TOTAL CA: CPT | Performed by: STUDENT IN AN ORGANIZED HEALTH CARE EDUCATION/TRAINING PROGRAM

## 2023-01-01 PROCEDURE — 120N000002 HC R&B MED SURG/OB UMMC

## 2023-01-01 PROCEDURE — 99232 SBSQ HOSP IP/OBS MODERATE 35: CPT | Performed by: INTERNAL MEDICINE

## 2023-01-01 PROCEDURE — 250N000013 HC RX MED GY IP 250 OP 250 PS 637: Performed by: NURSE PRACTITIONER

## 2023-01-01 PROCEDURE — 85027 COMPLETE CBC AUTOMATED: CPT | Performed by: STUDENT IN AN ORGANIZED HEALTH CARE EDUCATION/TRAINING PROGRAM

## 2023-01-01 PROCEDURE — 36415 COLL VENOUS BLD VENIPUNCTURE: CPT | Performed by: STUDENT IN AN ORGANIZED HEALTH CARE EDUCATION/TRAINING PROGRAM

## 2023-01-01 RX ADMIN — HYDROXYZINE HYDROCHLORIDE 25 MG: 25 TABLET, FILM COATED ORAL at 21:20

## 2023-01-01 RX ADMIN — APIXABAN 2.5 MG: 2.5 TABLET, FILM COATED ORAL at 20:46

## 2023-01-01 RX ADMIN — MIDODRINE HYDROCHLORIDE 10 MG: 5 TABLET ORAL at 12:23

## 2023-01-01 RX ADMIN — SERTRALINE HYDROCHLORIDE 100 MG: 100 TABLET ORAL at 17:13

## 2023-01-01 RX ADMIN — Medication 100 MG: at 20:48

## 2023-01-01 RX ADMIN — B-COMPLEX W/ C & FOLIC ACID TAB 1 MG 1 TABLET: 1 TAB at 17:13

## 2023-01-01 RX ADMIN — ACETAMINOPHEN 975 MG: 325 TABLET, FILM COATED ORAL at 08:45

## 2023-01-01 RX ADMIN — RAMELTEON 8 MG: 8 TABLET, FILM COATED ORAL at 21:15

## 2023-01-01 RX ADMIN — SEVELAMER CARBONATE 1600 MG: 800 TABLET, FILM COATED ORAL at 17:13

## 2023-01-01 RX ADMIN — FINASTERIDE 1.3 MG: 5 TABLET, FILM COATED ORAL at 17:22

## 2023-01-01 RX ADMIN — BACLOFEN 10 MG: 10 TABLET ORAL at 08:45

## 2023-01-01 RX ADMIN — OXYCODONE HYDROCHLORIDE 5 MG: 5 TABLET ORAL at 12:28

## 2023-01-01 RX ADMIN — ATORVASTATIN CALCIUM 20 MG: 20 TABLET, FILM COATED ORAL at 21:15

## 2023-01-01 RX ADMIN — PANTOPRAZOLE SODIUM 40 MG: 40 TABLET, DELAYED RELEASE ORAL at 08:45

## 2023-01-01 RX ADMIN — SEVELAMER CARBONATE 1600 MG: 800 TABLET, FILM COATED ORAL at 12:24

## 2023-01-01 RX ADMIN — OXYCODONE HYDROCHLORIDE 5 MG: 5 TABLET ORAL at 21:39

## 2023-01-01 RX ADMIN — SEVELAMER CARBONATE 1600 MG: 800 TABLET, FILM COATED ORAL at 08:45

## 2023-01-01 RX ADMIN — Medication 100 MG: at 17:14

## 2023-01-01 RX ADMIN — CETIRIZINE HYDROCHLORIDE 10 MG: 10 TABLET, FILM COATED ORAL at 17:13

## 2023-01-01 RX ADMIN — CYANOCOBALAMIN TAB 500 MCG 500 MCG: 500 TAB at 17:13

## 2023-01-01 RX ADMIN — ACETAMINOPHEN 975 MG: 325 TABLET, FILM COATED ORAL at 20:46

## 2023-01-01 RX ADMIN — OXYCODONE HYDROCHLORIDE 5 MG: 5 TABLET ORAL at 17:37

## 2023-01-01 RX ADMIN — BACLOFEN 10 MG: 10 TABLET ORAL at 20:48

## 2023-01-01 RX ADMIN — SENNOSIDES AND DOCUSATE SODIUM 1 TABLET: 8.6; 5 TABLET ORAL at 08:45

## 2023-01-01 RX ADMIN — MIDODRINE HYDROCHLORIDE 10 MG: 5 TABLET ORAL at 17:13

## 2023-01-01 RX ADMIN — METHOCARBAMOL 500 MG: 500 TABLET ORAL at 17:13

## 2023-01-01 RX ADMIN — FOLIC ACID 1 MG: 1 TABLET ORAL at 17:14

## 2023-01-01 RX ADMIN — METHOCARBAMOL 500 MG: 500 TABLET ORAL at 08:45

## 2023-01-01 RX ADMIN — FLUTICASONE PROPIONATE 1 SPRAY: 50 SPRAY, METERED NASAL at 20:49

## 2023-01-01 RX ADMIN — ACETAMINOPHEN 975 MG: 325 TABLET, FILM COATED ORAL at 12:24

## 2023-01-01 RX ADMIN — Medication 133 MG: at 17:12

## 2023-01-01 RX ADMIN — GABAPENTIN 300 MG: 300 CAPSULE ORAL at 08:45

## 2023-01-01 RX ADMIN — APIXABAN 2.5 MG: 2.5 TABLET, FILM COATED ORAL at 08:45

## 2023-01-01 RX ADMIN — MIDODRINE HYDROCHLORIDE 10 MG: 5 TABLET ORAL at 08:46

## 2023-01-01 RX ADMIN — METHOCARBAMOL 500 MG: 500 TABLET ORAL at 12:24

## 2023-01-01 RX ADMIN — GABAPENTIN 300 MG: 300 CAPSULE ORAL at 17:14

## 2023-01-01 RX ADMIN — METHOCARBAMOL 500 MG: 500 TABLET ORAL at 20:48

## 2023-01-01 ASSESSMENT — ACTIVITIES OF DAILY LIVING (ADL)
ADLS_ACUITY_SCORE: 49
ADLS_ACUITY_SCORE: 51
ADLS_ACUITY_SCORE: 49
ADLS_ACUITY_SCORE: 51
ADLS_ACUITY_SCORE: 49
ADLS_ACUITY_SCORE: 49
ADLS_ACUITY_SCORE: 51
ADLS_ACUITY_SCORE: 51
ADLS_ACUITY_SCORE: 54
ADLS_ACUITY_SCORE: 49
ADLS_ACUITY_SCORE: 51
ADLS_ACUITY_SCORE: 49

## 2023-01-01 NOTE — PLAN OF CARE
Goal Outcome Evaluation:      Plan of Care Reviewed With: patient    Overall Patient Progress: no change    Time: 5691-2888  Reason for admission: s/p redo C5-T6 fusion on 12/06/22  Neuro: A&Ox4, neuros intact w q4h checks  Pain: adequately managed with current regiment  CMS: BLE weakness, numbness  Cardiac: HR 71, denies chest pain   Resp: Sats 97% on RA, denies SOB.   GI/: LBM 12/31/2022, oliguria on dialysis MWF  Skin/Wound: mepilex on buttock, c spine incision with primapore CDI, R heel with mepilex, R great toe mepilex, offloading boots on.   Access: R PIV SL, L AV fistula   Activity: Ax2 with lift, not OOB overnight  Nutrition: Regular diet, on fluid restriction  Changes this shift: None   Plan: Awaiting placement

## 2023-01-01 NOTE — PROGRESS NOTES
[facility]    Medicine Progress Note - Hospitalist Service, GOLD TEAM 12    Date of Admission:  12/6/2022    Assessment & Plan   Shay Jimenez is a 57 year old male admitted on 12/6/2022 with hx of HTN, HLD, afib, ESRD on HD, peripheral neuropathy, GINI, GERD, and depression admitted for redo C5-T6 fusion, now been co managed with medicine.     Today's plan   - Awaiting placement to TCU    Superficial would infection noted by nrg 12/20  - Completed cefazolin     Cervical Myelopathy  Chronic Osteomyelitis  C5-T6 spinal fusion complicated by hardware displacement  Admitted [facility] 12/6, underwent redo of C5-T6 fusion  Pt working with PT/OT, sig assistance needs. Appears to be reluctant for TCU. Concern for some intraoperative hemmorhage but intra-operative angio was re-assuring. Subsequent CT of C-spine and head w/o acute ab, good alignment. Hx of osteo w/o recent abx needs  - Cares per neuro surg  - Heavy care needs that seem unlikely to be met at home  - PT/OT rec TCU but patient states he wants to go home. [facility] unable to provide the care he needs. Apparently deemed unable to make his own decisions      Acute on chroninc hypoxic-hypercarbic  resp failure, resolved  - Titrate 02 for goal > 90%  - Cont overnight 02 as this is used at home for GINI given intolreance of CPAP. If concerns for hypercarbia as cause for delirium/encehalopathy, would favor ordering and seeing if RT can assist with mask that he may tolerate    Acute on chronic pain  Peripheral neuropathy  Improving, decreasing oxycodone over the last 24 hours  - Would consider increase in acetaminophen dose to 975 (ordered)  - Cont baclofen and gabapentin  - Cont methocarbamol  - Cont bowl regimen     Hypervolemic hyponatremia, stable and chronic  He appears asymptomatic Review shows that he has sodium that vascilates upper 120's-low 130's. Nephrology aware, cont on dialysis  - Consider strict intake monitoring to  "get sense of patient drinking  - Would see if he can tolerated a 2000 mls fluid restriction     ESRD on dialysis  Nephrology following  Tolerating usual schedule M/W/F     Alcohol abuse  ICU/hospital Delirium  Hepatomegaly  No stigmata of cirrhosis. Mild hypoalbuminemia with nml bilirubin and AST/ALT. CT from Aug 2022 did show hepatomegaly  - Pt is on gabapentin and baclofen. Both of these have been used to help reduce cravings in pt with alcohol abuse.  - Naltrexone may not be safe in the setting of ESRD  - Agree with continuing of thiamine and folate  - Cont Seroquel 25 / 50 (am / pm)  and continue adjunctive remalteon     Mild/Mod pulm HTN  Moderate MR  GINI  Pt with ECHO in earlier 2022 with MR and elevated right sided pressures. No signs of sig right heart dysfunction. Not on CPAP as he has not tolerated.  - Cont nocturnal 02      Chronic atrial fibrillation  On eliquis PTA          Diet: Fluid restriction 2000 ML FLUID  Regular Diet Adult  Diet    DVT Prophylaxis: Apixaban for dvt ppx   Oliveira Catheter: Not present  Central Lines: None  Cardiac Monitoring: None  Code Status: Full Code      Disposition Plan         The patient's care was discussed with the Bedside Nurse and Patient.    Geremias Peraza MD  Hospitalist Service, GOLD TEAM [address] [place] [address]  Securely message with the Vocera Web Console (learn more here)  Text page via [facility] Paging/Directory   Please see signed in provider for up to date coverage information      Clinically Significant Risk Factors         # Hyponatremia: Lowest Na = 128 mmol/L in last 2 days, will monitor as appropriate      # Hypoalbuminemia: Lowest albumin = 3.1 g/dL at 12/11/2022  5:28 AM, will monitor as appropriate           # Obesity: Estimated body mass index is 37.87 kg/m  as calculated from the following:    Height as of this encounter: 1.854 m (6' 1\").    Weight as of this encounter: 130.2 kg (287 lb 0.6 oz).      " "    ______________________________________________________________________    Interval History   No acute events overnight, denies pain, states he had a \"drink\" last night     Data reviewed today: I reviewed all medications, new labs and imaging results over the last 24 hours. I personally reviewed no images or EKG's today.    Physical Exam   Vital Signs: Temp: 97.4  F (36.3  C) Temp src: Oral BP: 134/77 Pulse: 84   Resp: 18 SpO2: 96 % O2 Device: None (Room air)    Weight: 287 lbs .62 oz  Constitutional: Awake, no apparent distress  Respiratory: Clear to auscultation bilaterally  Cardiovascular: Regular rate and rhythm  GI: Normal bowel sounds, soft, non-distended, non-tender  Skin/Integumen: No rashes, no cyanosis      Data   Recent Labs   Lab 01/01/23  0830 12/31/22  0822 12/30/22  0758   WBC 5.8 6.0 6.3   HGB 9.9* 10.0* 9.7*   * 110* 99    204 210   * 128* 131*   POTASSIUM 5.0 4.4 4.8   CHLORIDE 92* 93* 93*   CO2 23 21* 22   BUN 37.9* 26.9* 38.0*   CR 5.88* 4.63* 5.94*   ANIONGAP 14 14 16*   MAC 9.1 9.3 9.3   GLC 80 83 80     No results found for this or any previous visit (from the past 24 hour(s)).  Medications       acetaminophen  975 mg Oral TID     apixaban ANTICOAGULANT  2.5 mg Oral BID     atorvastatin  20 mg Oral At Bedtime     Baclofen  10 mg Oral BID     cetirizine  10 mg Oral Daily     cyanocobalamin  500 mcg Oral Daily     finasteride  1.3 mg Oral Daily     fluticasone  1 spray Both Nostrils BID     folic acid  1 mg Oral Daily     gabapentin  300 mg Oral Q8H     magnesium plus protein  133 mg Oral Daily     methocarbamol  500 mg Oral 4x Daily     midodrine  10 mg Oral TID w/meals     multivitamin RENAL  1 tablet Oral Daily     pantoprazole  40 mg Oral QAM AC     polyethylene glycol  17 g Oral TID     vitamin B6  100 mg Oral Daily     QUEtiapine  25 mg Oral Daily     QUEtiapine  50 mg Oral At Bedtime     ramelteon  8 mg Oral At Bedtime     senna-docusate  1 tablet Oral BID     " sertraline  100 mg Oral Daily     sevelamer carbonate  1,600 mg Oral TID w/meals     sodium chloride (PF)  3 mL Intracatheter Q8H     thiamine  100 mg Oral Daily

## 2023-01-01 NOTE — PLAN OF CARE
"/69 (BP Location: Right arm)   Pulse 69   Temp 97.1  F (36.2  C) (Axillary)   Resp 16   Ht 1.854 m (6' 1\")   Wt 130.2 kg (287 lb 0.6 oz)   SpO2 96%   BMI 37.87 kg/m      Reason for admission:  C5-T6 fusion (redo) c/b hemodynamic instability requiring ICU admission  Neuro: AOx4, neuros intact w q4h neur checks  Pain: C/O pain managed w/ scheduled and prn meds  Cardiac: WDL, denies cardiac chest pain  Resp: RA sating >92%, denies SOB  GI/: LBM 12/31/2022, anuric on dialysis MWF  Skin/Wound: mepilex on buttock, c spine incision with primapore, R heel with mepilex, R great toe mepilex  Access: PIV SL, L AV fistula   Activity: ax2 with lift, C collar when OOB  Changes this shift: no acute changes this shift  Plan:  Awaiting placement     "

## 2023-01-01 NOTE — PROGRESS NOTES
[facility], [place]   Neurosurgery Progress Note:    Assessment: Shay Jimenez is a 57 year old male with with PMH ESRD on dialysis and osteomyelitis s/p C5-T6 fusion who is now s/p redo C5-T6 fusion on 12/06/22 with concern for hemorrhage; no vertebral artery dissection or extravasation was noted on intraoperative angiogram.  He was admitted to the Neuro ICU postoperatively for MAP goals and frequent neuro checks, remaining stable on pressors.  Neuro exam is unchanged from patient's baseline.    Plan:  - Appreciate Medicine assistance   - Serial neuro exams  - Pain control  - Hb goal > 7  - Normonatremia  - Goal normotension  - Appreciate Nephrology recommendations re: dialysis (baseline M/W/F)  - Daily dressing changes   - Cervical collar when HOB >45,  on when OOB  - Regular diet  - Bowel regimen  - PRN antiemetics  - PT/OT  - SCDs and subcutaneous heparin for DVT proph  - Eliquis restarted- 12.20.2022  - Psychiatry consult for capacity evaluation- patient unable to make decisions   - Medically ready for disposition  -----------------------------------  Lior Cornejo M.D.  Neurosurgery Resident, PGY-4    Please contact neurosurgery resident on call with questions.    Dial * * [phone], enter [phone] when prompted.      Interval History/Subjective:  No acute events. Pending placement.    Objective:   Temp:  [96.5  F (35.8  C)-97.9  F (36.6  C)] 97.9  F (36.6  C)  Pulse:  [69-75] 71  Resp:  [16-18] 18  BP: (101-124)/(60-69) 124/67  SpO2:  [95 %-97 %] 97 %  I/O last 3 completed shifts:  In: 100 [P.O.:100]  Out: -     Gen: Appears comfortable, NAD  Wound: Incision, clean, dry, intact without strikethrough  Neurologic:  - Alert & Oriented to person, place, time, and situation  - Follows commands briskly  - Speech fluent, spontaneous. No aphasia or dysarthria.  - No gaze preference. No apparent hemineglect.  - PERRL, EOMI  - Strong brow raise, eye closure, and smile  - Face symmetric  with sensation intact to light touch  - Trapezii and sternocleidomastoid muscles 5/5 bilaterally  - No pronator drift     Del Tr Bi WE WF Gr   R 4+ 5 5 5 5 5   L 4+ 5 5 5 5 5    HF KE KF DF PF EHL   R 4+ 5 5 5 5 5   L 4+ 5 5 5 5 5       Sensation intact and symmetric to light touch throughout    LABS  Recent Labs   Lab Test 12/31/22  0822 12/30/22  0758 12/29/22  0634   WBC 6.0 6.3 5.4   HGB 10.0* 9.7* 10.0*   * 99 101*    210 232       Recent Labs   Lab Test 12/31/22  0822 12/30/22  0758 12/29/22  0634   * 131* 133*   POTASSIUM 4.4 4.8 4.2   CHLORIDE 93* 93* 95*   CO2 21* 22 24   BUN 26.9* 38.0* 24.7*   CR 4.63* 5.94* 4.71*   ANIONGAP 14 16* 14   MAC 9.3 9.3 9.6   GLC 83 80 83       IMAGING  No results found for this or any previous visit (from the past 24 hour(s)).

## 2023-01-01 NOTE — PROGRESS NOTES
"Blood pressure 134/77, pulse 84, temperature 97.4  F (36.3  C), temperature source Oral, resp. rate 18, height 1.854 m (6' 1\"), weight 130.2 kg (287 lb 0.6 oz), SpO2 96 %.    Activity: A2 w/w and gait belt, not oob  Neuros: AOX4, needy, hollering instead of using call light, pulled covers down when charge helped patient  Cardiac: WDL, denies chest pain  Respiratory: WDL, RA 96% denies SOB  GI/: Oliguria on HD MWF, Incontinent of B&B, BM  Skin/Incisions/Drains: Neck glued, back primapore, coccyx Mepilex, Rt foot w/2 Mepilex, appropriate dressing done.  Lines: Rt PIV SL, Lt AV  Pain: 4-7/10 Oxy, schedule Tylenol  Plan: Waiting for TCU placement  "

## 2023-01-02 ENCOUNTER — APPOINTMENT (OUTPATIENT)
Dept: PHYSICAL THERAPY | Facility: CLINIC | Age: 58
DRG: 459 | End: 2023-01-02
Attending: STUDENT IN AN ORGANIZED HEALTH CARE EDUCATION/TRAINING PROGRAM
Payer: MEDICARE

## 2023-01-02 LAB
ANION GAP SERPL CALCULATED.3IONS-SCNC: 15 MMOL/L (ref 7–15)
BUN SERPL-MCNC: 48.8 MG/DL (ref 6–20)
CALCIUM SERPL-MCNC: 9.3 MG/DL (ref 8.6–10)
CHLORIDE SERPL-SCNC: 95 MMOL/L (ref 98–107)
CREAT SERPL-MCNC: 6.99 MG/DL (ref 0.67–1.17)
DEPRECATED HCO3 PLAS-SCNC: 21 MMOL/L (ref 22–29)
ERYTHROCYTE [DISTWIDTH] IN BLOOD BY AUTOMATED COUNT: 15.4 % (ref 10–15)
GFR SERPL CREATININE-BSD FRML MDRD: 8 ML/MIN/1.73M2
GLUCOSE SERPL-MCNC: 82 MG/DL (ref 70–99)
HCT VFR BLD AUTO: 31.8 % (ref 40–53)
HGB BLD-MCNC: 9.6 G/DL (ref 13.3–17.7)
MAGNESIUM SERPL-MCNC: 1.9 MG/DL (ref 1.7–2.3)
MCH RBC QN AUTO: 30 PG (ref 26.5–33)
MCHC RBC AUTO-ENTMCNC: 30.2 G/DL (ref 31.5–36.5)
MCV RBC AUTO: 99 FL (ref 78–100)
PLATELET # BLD AUTO: 225 10E3/UL (ref 150–450)
POTASSIUM SERPL-SCNC: 5.3 MMOL/L (ref 3.4–5.3)
RBC # BLD AUTO: 3.2 10E6/UL (ref 4.4–5.9)
SODIUM SERPL-SCNC: 131 MMOL/L (ref 136–145)
WBC # BLD AUTO: 7.4 10E3/UL (ref 4–11)

## 2023-01-02 PROCEDURE — 90937 HEMODIALYSIS REPEATED EVAL: CPT

## 2023-01-02 PROCEDURE — 250N000013 HC RX MED GY IP 250 OP 250 PS 637: Performed by: NURSE PRACTITIONER

## 2023-01-02 PROCEDURE — 36415 COLL VENOUS BLD VENIPUNCTURE: CPT | Performed by: STUDENT IN AN ORGANIZED HEALTH CARE EDUCATION/TRAINING PROGRAM

## 2023-01-02 PROCEDURE — 258N000003 HC RX IP 258 OP 636: Performed by: STUDENT IN AN ORGANIZED HEALTH CARE EDUCATION/TRAINING PROGRAM

## 2023-01-02 PROCEDURE — 85027 COMPLETE CBC AUTOMATED: CPT | Performed by: STUDENT IN AN ORGANIZED HEALTH CARE EDUCATION/TRAINING PROGRAM

## 2023-01-02 PROCEDURE — 120N000002 HC R&B MED SURG/OB UMMC

## 2023-01-02 PROCEDURE — 80048 BASIC METABOLIC PNL TOTAL CA: CPT | Performed by: STUDENT IN AN ORGANIZED HEALTH CARE EDUCATION/TRAINING PROGRAM

## 2023-01-02 PROCEDURE — 83735 ASSAY OF MAGNESIUM: CPT | Performed by: STUDENT IN AN ORGANIZED HEALTH CARE EDUCATION/TRAINING PROGRAM

## 2023-01-02 PROCEDURE — 97530 THERAPEUTIC ACTIVITIES: CPT | Mod: GP

## 2023-01-02 PROCEDURE — 634N000001 HC RX 634: Performed by: STUDENT IN AN ORGANIZED HEALTH CARE EDUCATION/TRAINING PROGRAM

## 2023-01-02 PROCEDURE — 250N000013 HC RX MED GY IP 250 OP 250 PS 637: Performed by: STUDENT IN AN ORGANIZED HEALTH CARE EDUCATION/TRAINING PROGRAM

## 2023-01-02 PROCEDURE — 99232 SBSQ HOSP IP/OBS MODERATE 35: CPT | Mod: 24 | Performed by: CLINICAL NURSE SPECIALIST

## 2023-01-02 RX ADMIN — PANTOPRAZOLE SODIUM 40 MG: 40 TABLET, DELAYED RELEASE ORAL at 12:37

## 2023-01-02 RX ADMIN — Medication: at 07:48

## 2023-01-02 RX ADMIN — MIDODRINE HYDROCHLORIDE 10 MG: 5 TABLET ORAL at 07:24

## 2023-01-02 RX ADMIN — SERTRALINE HYDROCHLORIDE 100 MG: 100 TABLET ORAL at 17:30

## 2023-01-02 RX ADMIN — ACETAMINOPHEN 975 MG: 325 TABLET, FILM COATED ORAL at 12:38

## 2023-01-02 RX ADMIN — GABAPENTIN 300 MG: 300 CAPSULE ORAL at 23:03

## 2023-01-02 RX ADMIN — SODIUM CHLORIDE 300 ML: 9 INJECTION, SOLUTION INTRAVENOUS at 07:48

## 2023-01-02 RX ADMIN — EPOETIN ALFA-EPBX 4000 UNITS: 10000 INJECTION, SOLUTION INTRAVENOUS; SUBCUTANEOUS at 09:51

## 2023-01-02 RX ADMIN — MIDODRINE HYDROCHLORIDE 10 MG: 5 TABLET ORAL at 17:29

## 2023-01-02 RX ADMIN — GABAPENTIN 300 MG: 300 CAPSULE ORAL at 12:38

## 2023-01-02 RX ADMIN — GABAPENTIN 300 MG: 300 CAPSULE ORAL at 01:46

## 2023-01-02 RX ADMIN — OXYCODONE HYDROCHLORIDE 5 MG: 5 TABLET ORAL at 01:46

## 2023-01-02 RX ADMIN — ACETAMINOPHEN 975 MG: 325 TABLET, FILM COATED ORAL at 20:26

## 2023-01-02 RX ADMIN — OXYCODONE HYDROCHLORIDE 5 MG: 5 TABLET ORAL at 07:24

## 2023-01-02 RX ADMIN — OXYCODONE HYDROCHLORIDE 5 MG: 5 TABLET ORAL at 17:39

## 2023-01-02 RX ADMIN — QUETIAPINE 50 MG: 25 TABLET ORAL at 22:11

## 2023-01-02 RX ADMIN — APIXABAN 2.5 MG: 2.5 TABLET, FILM COATED ORAL at 12:38

## 2023-01-02 RX ADMIN — CETIRIZINE HYDROCHLORIDE 10 MG: 10 TABLET, FILM COATED ORAL at 17:30

## 2023-01-02 RX ADMIN — OXYCODONE HYDROCHLORIDE 5 MG: 5 TABLET ORAL at 22:10

## 2023-01-02 RX ADMIN — METHOCARBAMOL 500 MG: 500 TABLET ORAL at 20:28

## 2023-01-02 RX ADMIN — METHOCARBAMOL 500 MG: 500 TABLET ORAL at 12:38

## 2023-01-02 RX ADMIN — ATORVASTATIN CALCIUM 20 MG: 20 TABLET, FILM COATED ORAL at 22:10

## 2023-01-02 RX ADMIN — BACLOFEN 10 MG: 10 TABLET ORAL at 12:38

## 2023-01-02 RX ADMIN — FINASTERIDE 1.3 MG: 5 TABLET, FILM COATED ORAL at 17:39

## 2023-01-02 RX ADMIN — CYANOCOBALAMIN TAB 500 MCG 500 MCG: 500 TAB at 17:30

## 2023-01-02 RX ADMIN — FLUTICASONE PROPIONATE 1 SPRAY: 50 SPRAY, METERED NASAL at 12:45

## 2023-01-02 RX ADMIN — FLUTICASONE PROPIONATE 1 SPRAY: 50 SPRAY, METERED NASAL at 20:28

## 2023-01-02 RX ADMIN — FOLIC ACID 1 MG: 1 TABLET ORAL at 17:30

## 2023-01-02 RX ADMIN — APIXABAN 2.5 MG: 2.5 TABLET, FILM COATED ORAL at 22:10

## 2023-01-02 RX ADMIN — SODIUM CHLORIDE 250 ML: 9 INJECTION, SOLUTION INTRAVENOUS at 07:47

## 2023-01-02 RX ADMIN — SEVELAMER CARBONATE 1600 MG: 800 TABLET, FILM COATED ORAL at 12:37

## 2023-01-02 RX ADMIN — Medication 100 MG: at 20:26

## 2023-01-02 RX ADMIN — Medication 100 MG: at 17:39

## 2023-01-02 RX ADMIN — B-COMPLEX W/ C & FOLIC ACID TAB 1 MG 1 TABLET: 1 TAB at 17:30

## 2023-01-02 RX ADMIN — SEVELAMER CARBONATE 1600 MG: 800 TABLET, FILM COATED ORAL at 17:30

## 2023-01-02 RX ADMIN — RAMELTEON 8 MG: 8 TABLET, FILM COATED ORAL at 22:15

## 2023-01-02 RX ADMIN — BACLOFEN 10 MG: 10 TABLET ORAL at 20:27

## 2023-01-02 RX ADMIN — Medication 133 MG: at 17:29

## 2023-01-02 ASSESSMENT — ACTIVITIES OF DAILY LIVING (ADL)
ADLS_ACUITY_SCORE: 54

## 2023-01-02 NOTE — PLAN OF CARE
Activity:dialysis this am, patient up to chair with PT this shift via lift  Neuros:alert and oriented x 4, intermittent confusion  Cardiac:denies chest pain  Respiratory:room air  GI/:audible bowel sounds  Diet:regular  Skin:surgical incision upper spine  Lines/Drains:PIV saline locked, left AV fistula  Plan: PRN oxycodone q4

## 2023-01-02 NOTE — PROGRESS NOTES
Shay Jimenez is a 57 year old male admitted on 12/6/2022 with hx of HTN, HLD, afib, ESRD on HD, peripheral neuropathy, GINI, GERD, and depression admitted for redo C5-T6 fusion, now been co managed with medicine    . Activity: A2 w/w and gait belt, not oob  Neuros: AOX4, enjoys talking and conversing  Cardiac: WDL, denies chest pain  Respiratory: WDL, RA 97% denies SOB  GI/: Oliguria on HD MWF, Incontinent of B&B, BM , but not on this shift.  Skin/Incisions/Drains: Neck glued, back primapore, coccyx Mepilex, Rt foot w/2 Mepilex, appropriate dressing done.  Lines: Rt PIV SL, Lt AV  Pain: 6-7/10 Oxy, schedule Tylenol  Plan: Waiting for TCU placement

## 2023-01-02 NOTE — PROGRESS NOTES
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  Had planned neurosurgery revision for C5-C6 on 12/6 as screw had dislodged.  Nephrology consulted for management of dialysis post op.       Interval History :   Mr Jimenez had weekend off of HD, seen on run this am.  At EDW but BP's are robust so will try to pull 2-3L, may need to adjust EDW after prolonged hospitalization.  Stable for discharge from nephrology standpoint, next planned run 1/4.    Assessment & Recommendations:   ESRD-Since 4/2019 due to Ca Phos deposition and HTN, runs at [facility] ([phone], fax [phone]) under care of Dr Cline.  Runs MWF via AVF, 4.25h runs.                  -HD today on MWF schedule, 3L of UF.                 -Continuing long term HD, no need for new consent.                  -Is eventually pursuing renal transplant through [facility] once acute issues w/ neurosurgery are done.       Volume-EDW 130kg, bed wt ~130kg today, pulling 2-3L to challenge.      Electrolytes-K 5.3 bicarb 21, Na 131, HD today.        BMD-Ca 9.4, Mg and Phos WNL last check.       Neurosurgery-Had planned revision of previous screws at C5-C6 on 12/6.       Anemia-Hgb 9.6, reduced epo dose to 4k as maintenance as it has run close to 11.       Nutrition-Taking PO       Time spent: 30 minutes on this date of encounter for chart review, physical exam, medical decision making and co-ordination of care.      Discussed with Dr Lafleur     Recommendations were communicated to primary team via verbal communication.        ENID Shirley CNS  Clinical Nurse Specialist  [phone]      Review of Systems:   I reviewed the following systems:  Gen: No fevers or chills  CV: No CP at rest  Resp: No SOB at rest  GI: No N/V    Physical Exam:   I/O last 3 completed shifts:  In: 300  "[P.O.:300]  Out: 0    /64   Pulse 63   Temp 98  F (36.7  C) (Axillary)   Resp 13   Ht 1.854 m (6' 1\")   Wt 130.2 kg (287 lb 0.6 oz)   SpO2 94%   BMI 37.87 kg/m       GENERAL APPEARANCE: alert and no distress, moderate confusion  EYES: No scleral icterus  NECK:  No JVD  Pulmonary: lungs clear to auscultation with equal breath sounds bilaterally, no clubbing or cyanosis  CV: Regular rhythm, normal rate, no rub   - Edema none  GI: soft, nontender, normal bowel sounds  MS: no evidence of inflammation in joints, no muscle tenderness  : No Oliveira  SKIN: no rash, warm, dry  NEURO: Delirious but somewhat redirectable.      Labs:   All labs reviewed by me  Electrolytes/Renal -   Recent Labs   Lab Test 01/02/23  0657 01/01/23  0830 12/31/22  0822 12/20/22  0618 12/19/22  1000 12/15/22  1642 12/14/22  0422 12/13/22  0502   * 129* 128*   < > 128*   < > 132* 137   POTASSIUM 5.3 5.0 4.4   < > 4.8   < > 3.8 3.6   CHLORIDE 95* 92* 93*   < > 90*   < > 94* 98   CO2 21* 23 21*   < > 22   < > 23 25   BUN 48.8* 37.9* 26.9*   < > 43.7*   < > 33.2* 21.0*   CR 6.99* 5.88* 4.63*   < > 6.95*   < > 5.64* 4.41*   GLC 82 80 83   < > 93   < > 80 96   MAC 9.3 9.1 9.3   < > 8.6   < > 8.7 9.0   MAG 1.9 1.9 1.7   < > 1.9  --  1.7 1.7   PHOS  --   --   --   --  6.2*  --  3.8 2.6    < > = values in this interval not displayed.       CBC -   Recent Labs   Lab Test 01/02/23  0657 01/01/23  0830 12/31/22  0822   WBC 7.4 5.8 6.0   HGB 9.6* 9.9* 10.0*    211 204       LFTs -   Recent Labs   Lab Test 12/11/22  0528 10/06/22  1215 09/29/22  1345 08/18/22  1435 07/21/22  1252 07/16/22  0557   ALKPHOS 146*  --   --   --  131 134   BILITOTAL 0.4  --   --   --  0.6 0.5   ALT <5*  --   --   --  9 <6   AST 18 31 35   < > 17 13   PROTTOTAL 5.9*  --   --   --  6.7* 6.5*   ALBUMIN 3.1*  --   --   --  3.0* 2.8*    < > = values in this interval not displayed.       Iron Panel -   Recent Labs   Lab Test 12/14/22  0634 07/29/22  0600   IRON 43* " 44   IRONSAT 24 20   JACKELINE  --  626*           Current Medications:    acetaminophen  975 mg Oral TID     apixaban ANTICOAGULANT  2.5 mg Oral BID     atorvastatin  20 mg Oral At Bedtime     Baclofen  10 mg Oral BID     cetirizine  10 mg Oral Daily     cyanocobalamin  500 mcg Oral Daily     epoetin mar-epbx  4,000 Units Intravenous Once in dialysis/CRRT     finasteride  1.3 mg Oral Daily     fluticasone  1 spray Both Nostrils BID     folic acid  1 mg Oral Daily     gabapentin  300 mg Oral Q8H     magnesium plus protein  133 mg Oral Daily     methocarbamol  500 mg Oral 4x Daily     midodrine  10 mg Oral TID w/meals     multivitamin RENAL  1 tablet Oral Daily     pantoprazole  40 mg Oral QAM AC     polyethylene glycol  17 g Oral TID     vitamin B6  100 mg Oral Daily     QUEtiapine  25 mg Oral Daily     QUEtiapine  50 mg Oral At Bedtime     ramelteon  8 mg Oral At Bedtime     senna-docusate  1 tablet Oral BID     sertraline  100 mg Oral Daily     sevelamer carbonate  1,600 mg Oral TID w/meals     sodium chloride (PF)  3 mL Intracatheter Q8H     thiamine  100 mg Oral Daily       - MEDICATION INSTRUCTIONS -

## 2023-01-02 NOTE — PROGRESS NOTES
Care Management Follow Up    Length of Stay (days): 27    Expected Discharge Date: 12/30/2022     Concerns to be Addressed: discharge planning       Patient plan of care discussed at interdisciplinary rounds: Yes    Anticipated Discharge Disposition: Skilled Nursing Facility, Transitional Care    [address]  [address]  P: [phone]  F: [phone]     Anticipated Discharge Services: None  Anticipated Discharge DME: None    Patient/family educated on Medicare website which has current facility and service quality ratings: yes  Education Provided on the Discharge Plan:    Patient/Family in Agreement with the Plan: yes    Referrals Placed by CM/SW: Post Acute Facilities  Private pay costs discussed: transportation costs    Additional Information:    Dialysis rides arranged through [facility] ([phone]) MWF  patient runs from 7:15am-11:50am. Total cost per round trip will $120 per trip. SW will need to call [employer] transportation back and give them [employer] credit card information. Met with patient at bedside to discuss discharge planning and get patients credit card information. Patient said to call his son Cory. This writer called Cory and LVM and waiting to hear back.     Did not receive call back from patients son Cory. Will attempt to call Cory again tomorrow.      PRANAY Dos SantosW   7B    Phone: [phone]  Pager: [phone]

## 2023-01-02 NOTE — PROGRESS NOTES
HEMODIALYSIS TREATMENT NOTE    Date: 1/2/2023  Time: 12:00 PM    Data:  Pre Wt:     Desired Wt:   kg   Post Wt:    Weight change:   kg  Ultrafiltration - Post Run Net Total Removed (mL): 2300 mL  Vascular Access Status: patent  Dialyzer Rinse: Streaked, Moderate  Total Blood Volume Processed: 101.15 L Liters  Total Dialysis (Treatment) Time: 4 Hours    Lab:   Yes    Interventions:  PT slept during TX, 2L NC O2 applied to keep Sat above 90%. Post TX PT is on room air Sats 98%    Assessment:  Morteza JIMÉNEZ MD orders and flowsheet for  Further details     Plan:    Per renal

## 2023-01-02 NOTE — PROGRESS NOTES
[id number]     Pt disoriented to time/situation intermittently. Repetitive speech, sometimes speaking to oneself in room. CMS intact w/ ongoing bilateral n/t reported per pt. 3-4/5 pain on BLE. 5/5 strength on BUE. C/o neck pain. Received scheduled gabapentin per MAR. Micro-Repositioning self in bed w/ no assistance. Not OOB during shift. Incontinent of B&B. No BM overnight. L AV Fistula WNL. Plan for Dialysis this AM

## 2023-01-02 NOTE — PROGRESS NOTES
[facility], [place]   Neurosurgery Progress Note:    Assessment: Shay Jimenez is a 57 year old male with with PMH ESRD on dialysis and osteomyelitis s/p C5-T6 fusion who is now s/p redo C5-T6 fusion on 12/06/22 with concern for hemorrhage; no vertebral artery dissection or extravasation was noted on intraoperative angiogram.  He was admitted to the Neuro ICU postoperatively for MAP goals and frequent neuro checks, remaining stable on pressors.  Neuro exam is unchanged from patient's baseline.    Plan:  - Appreciate Medicine assistance   - Serial neuro exams  - Pain control  - Hb goal > 7  - Normonatremia  - Goal normotension  - Appreciate Nephrology recommendations re: dialysis (baseline M/W/F)  - Daily dressing changes   - Cervical collar when HOB >45,  on when OOB  - Regular diet  - Bowel regimen  - PRN antiemetics  - PT/OT  - SCDs and subcutaneous heparin for DVT proph  - Eliquis restarted- 12.20.2022  - Psychiatry consult for capacity evaluation- patient unable to make decisions   - Medically ready for disposition  -----------------------------------  Jonny Stephenson  Neurosurgery Resident, PGY-2    Please contact neurosurgery resident on call with questions.    Dial * * [phone], enter [phone] when prompted.      Interval History/Subjective:  No acute events. Pending placement.    Objective:   Temp:  [97.9  F (36.6  C)-99.8  F (37.7  C)] 98  F (36.7  C)  Pulse:  [63-84] 63  Resp:  [8-24] 13  BP: ()/(64-91) 99/64  SpO2:  [93 %-97 %] 94 %  I/O last 3 completed shifts:  In: 300 [P.O.:300]  Out: 0     Gen: Appears comfortable, NAD  Wound: Incision, clean, dry, intact without strikethrough  Neurologic:  - Alert & Oriented to person, place, time, and situation  - Follows commands briskly  - Speech fluent, spontaneous. No aphasia or dysarthria.  - No gaze preference. No apparent hemineglect.  - PERRL, EOMI  - Strong brow raise, eye closure, and smile  - Face symmetric with sensation  intact to light touch  - Trapezii and sternocleidomastoid muscles 5/5 bilaterally  - No pronator drift     Del Tr Bi WE WF Gr   R 4+ 5 5 5 5 5   L 4+ 5 5 5 5 5    HF KE KF DF PF EHL   R 4+ 5 5 5 5 5   L 4+ 5 5 5 5 5       Sensation intact and symmetric to light touch throughout    LABS  Recent Labs   Lab Test 01/02/23  0657 01/01/23  0830 12/31/22  0822   WBC 7.4 5.8 6.0   HGB 9.6* 9.9* 10.0*   MCV 99 101* 110*    211 204       Recent Labs   Lab Test 01/02/23  0657 01/01/23  0830 12/31/22  0822   * 129* 128*   POTASSIUM 5.3 5.0 4.4   CHLORIDE 95* 92* 93*   CO2 21* 23 21*   BUN 48.8* 37.9* 26.9*   CR 6.99* 5.88* 4.63*   ANIONGAP 15 14 14   MAC 9.3 9.1 9.3   GLC 82 80 83       IMAGING  No results found for this or any previous visit (from the past 24 hour(s)).

## 2023-01-02 NOTE — PROGRESS NOTES
General medicine is signing off, no active issues, pt waiting for TCU, HD needs per nephrology. Please reconsult if Medicine involvement is needed.     Geremias Peraza MD

## 2023-01-03 ENCOUNTER — APPOINTMENT (OUTPATIENT)
Dept: PHYSICAL THERAPY | Facility: CLINIC | Age: 58
DRG: 459 | End: 2023-01-03
Attending: STUDENT IN AN ORGANIZED HEALTH CARE EDUCATION/TRAINING PROGRAM
Payer: MEDICARE

## 2023-01-03 LAB
ANION GAP SERPL CALCULATED.3IONS-SCNC: 13 MMOL/L (ref 7–15)
BUN SERPL-MCNC: 31.2 MG/DL (ref 6–20)
CALCIUM SERPL-MCNC: 9.4 MG/DL (ref 8.6–10)
CHLORIDE SERPL-SCNC: 96 MMOL/L (ref 98–107)
CREAT SERPL-MCNC: 5.03 MG/DL (ref 0.67–1.17)
DEPRECATED HCO3 PLAS-SCNC: 22 MMOL/L (ref 22–29)
ERYTHROCYTE [DISTWIDTH] IN BLOOD BY AUTOMATED COUNT: 15.6 % (ref 10–15)
GFR SERPL CREATININE-BSD FRML MDRD: 13 ML/MIN/1.73M2
GLUCOSE SERPL-MCNC: 76 MG/DL (ref 70–99)
HCT VFR BLD AUTO: 31.3 % (ref 40–53)
HGB BLD-MCNC: 9.4 G/DL (ref 13.3–17.7)
MAGNESIUM SERPL-MCNC: 1.8 MG/DL (ref 1.7–2.3)
MCH RBC QN AUTO: 30 PG (ref 26.5–33)
MCHC RBC AUTO-ENTMCNC: 30 G/DL (ref 31.5–36.5)
MCV RBC AUTO: 100 FL (ref 78–100)
PLATELET # BLD AUTO: 227 10E3/UL (ref 150–450)
POTASSIUM SERPL-SCNC: 4.8 MMOL/L (ref 3.4–5.3)
RBC # BLD AUTO: 3.13 10E6/UL (ref 4.4–5.9)
SODIUM SERPL-SCNC: 131 MMOL/L (ref 136–145)
WBC # BLD AUTO: 6.5 10E3/UL (ref 4–11)

## 2023-01-03 PROCEDURE — 250N000013 HC RX MED GY IP 250 OP 250 PS 637: Performed by: NURSE PRACTITIONER

## 2023-01-03 PROCEDURE — 83735 ASSAY OF MAGNESIUM: CPT | Performed by: STUDENT IN AN ORGANIZED HEALTH CARE EDUCATION/TRAINING PROGRAM

## 2023-01-03 PROCEDURE — 250N000013 HC RX MED GY IP 250 OP 250 PS 637: Performed by: STUDENT IN AN ORGANIZED HEALTH CARE EDUCATION/TRAINING PROGRAM

## 2023-01-03 PROCEDURE — 80048 BASIC METABOLIC PNL TOTAL CA: CPT | Performed by: STUDENT IN AN ORGANIZED HEALTH CARE EDUCATION/TRAINING PROGRAM

## 2023-01-03 PROCEDURE — 85027 COMPLETE CBC AUTOMATED: CPT | Performed by: STUDENT IN AN ORGANIZED HEALTH CARE EDUCATION/TRAINING PROGRAM

## 2023-01-03 PROCEDURE — 120N000002 HC R&B MED SURG/OB UMMC

## 2023-01-03 PROCEDURE — 97530 THERAPEUTIC ACTIVITIES: CPT | Mod: GP

## 2023-01-03 PROCEDURE — 36415 COLL VENOUS BLD VENIPUNCTURE: CPT | Performed by: STUDENT IN AN ORGANIZED HEALTH CARE EDUCATION/TRAINING PROGRAM

## 2023-01-03 RX ADMIN — GABAPENTIN 300 MG: 300 CAPSULE ORAL at 08:49

## 2023-01-03 RX ADMIN — ACETAMINOPHEN 975 MG: 325 TABLET, FILM COATED ORAL at 11:45

## 2023-01-03 RX ADMIN — FLUTICASONE PROPIONATE 1 SPRAY: 50 SPRAY, METERED NASAL at 08:49

## 2023-01-03 RX ADMIN — METHOCARBAMOL 500 MG: 500 TABLET ORAL at 11:45

## 2023-01-03 RX ADMIN — METHOCARBAMOL 500 MG: 500 TABLET ORAL at 19:49

## 2023-01-03 RX ADMIN — Medication 133 MG: at 16:17

## 2023-01-03 RX ADMIN — PANTOPRAZOLE SODIUM 40 MG: 40 TABLET, DELAYED RELEASE ORAL at 08:50

## 2023-01-03 RX ADMIN — APIXABAN 2.5 MG: 2.5 TABLET, FILM COATED ORAL at 19:49

## 2023-01-03 RX ADMIN — BACLOFEN 10 MG: 10 TABLET ORAL at 08:50

## 2023-01-03 RX ADMIN — ATORVASTATIN CALCIUM 20 MG: 20 TABLET, FILM COATED ORAL at 22:43

## 2023-01-03 RX ADMIN — CETIRIZINE HYDROCHLORIDE 10 MG: 10 TABLET, FILM COATED ORAL at 16:18

## 2023-01-03 RX ADMIN — SEVELAMER CARBONATE 1600 MG: 800 TABLET, FILM COATED ORAL at 11:45

## 2023-01-03 RX ADMIN — FLUTICASONE PROPIONATE 1 SPRAY: 50 SPRAY, METERED NASAL at 19:50

## 2023-01-03 RX ADMIN — Medication 100 MG: at 16:18

## 2023-01-03 RX ADMIN — OXYCODONE HYDROCHLORIDE 5 MG: 5 TABLET ORAL at 22:44

## 2023-01-03 RX ADMIN — QUETIAPINE 25 MG: 25 TABLET ORAL at 22:44

## 2023-01-03 RX ADMIN — Medication 100 MG: at 19:51

## 2023-01-03 RX ADMIN — FOLIC ACID 1 MG: 1 TABLET ORAL at 16:16

## 2023-01-03 RX ADMIN — METHOCARBAMOL 500 MG: 500 TABLET ORAL at 08:49

## 2023-01-03 RX ADMIN — GABAPENTIN 300 MG: 300 CAPSULE ORAL at 16:16

## 2023-01-03 RX ADMIN — OXYCODONE HYDROCHLORIDE 5 MG: 5 TABLET ORAL at 04:25

## 2023-01-03 RX ADMIN — SEVELAMER CARBONATE 1600 MG: 800 TABLET, FILM COATED ORAL at 08:49

## 2023-01-03 RX ADMIN — MIDODRINE HYDROCHLORIDE 10 MG: 5 TABLET ORAL at 08:50

## 2023-01-03 RX ADMIN — B-COMPLEX W/ C & FOLIC ACID TAB 1 MG 1 TABLET: 1 TAB at 16:17

## 2023-01-03 RX ADMIN — RAMELTEON 8 MG: 8 TABLET, FILM COATED ORAL at 22:42

## 2023-01-03 RX ADMIN — OXYCODONE HYDROCHLORIDE 5 MG: 5 TABLET ORAL at 11:44

## 2023-01-03 RX ADMIN — METHOCARBAMOL 500 MG: 500 TABLET ORAL at 01:08

## 2023-01-03 RX ADMIN — FINASTERIDE 1.3 MG: 5 TABLET, FILM COATED ORAL at 17:59

## 2023-01-03 RX ADMIN — CYANOCOBALAMIN TAB 500 MCG 500 MCG: 500 TAB at 16:18

## 2023-01-03 RX ADMIN — BACLOFEN 10 MG: 10 TABLET ORAL at 19:49

## 2023-01-03 RX ADMIN — SERTRALINE HYDROCHLORIDE 100 MG: 100 TABLET ORAL at 16:16

## 2023-01-03 RX ADMIN — METHOCARBAMOL 500 MG: 500 TABLET ORAL at 16:16

## 2023-01-03 RX ADMIN — MIDODRINE HYDROCHLORIDE 10 MG: 5 TABLET ORAL at 17:59

## 2023-01-03 RX ADMIN — QUETIAPINE 50 MG: 25 TABLET ORAL at 22:42

## 2023-01-03 RX ADMIN — ACETAMINOPHEN 975 MG: 325 TABLET, FILM COATED ORAL at 19:51

## 2023-01-03 RX ADMIN — APIXABAN 2.5 MG: 2.5 TABLET, FILM COATED ORAL at 08:50

## 2023-01-03 RX ADMIN — ACETAMINOPHEN 975 MG: 325 TABLET, FILM COATED ORAL at 08:49

## 2023-01-03 RX ADMIN — MIDODRINE HYDROCHLORIDE 10 MG: 5 TABLET ORAL at 11:45

## 2023-01-03 ASSESSMENT — ACTIVITIES OF DAILY LIVING (ADL)
ADLS_ACUITY_SCORE: 56
ADLS_ACUITY_SCORE: 52
ADLS_ACUITY_SCORE: 52
ADLS_ACUITY_SCORE: 56
ADLS_ACUITY_SCORE: 52
ADLS_ACUITY_SCORE: 56
ADLS_ACUITY_SCORE: 52
ADLS_ACUITY_SCORE: 52
ADLS_ACUITY_SCORE: 54
ADLS_ACUITY_SCORE: 52
ADLS_ACUITY_SCORE: 52
ADLS_ACUITY_SCORE: 56

## 2023-01-03 NOTE — PLAN OF CARE
"/66 (BP Location: Right arm)   Pulse 77   Temp (!) 96.6  F (35.9  C) (Oral)   Resp 20   Ht 1.854 m (6' 1\")   Wt 130.2 kg (287 lb 0.6 oz)   SpO2 97%   BMI 37.87 kg/m    Reason for admission: 12/6 C5-T6 fusion (redo) c/b hemodynamic instability requiring ICU admission  Neuro: AOx4, neuros intact w q4h checks, intermittently delirious and anxious  Pain: denies  CMS: weakness in BLE  Cardiac: hx afib on anticoag  Resp: O2>90 on RA, wears 2LPM when sleeping  GI/: LBM 1/3/2023, anuric on dialysis MWF  Skin/Wound: mepilex on buttock changed, c spine incision SOSA, R heel with mepilex changed, R great toe mepilex changed  Access: PIV SL, L AV fistula   Activity: ax2 with lift, C collar when OOB  Nutrition: Reg, 2L FLR  Plan:  will discharge to [facility],waiting for dialysis ride set-up                      "

## 2023-01-03 NOTE — PROGRESS NOTES
[facility], [place]   Neurosurgery Progress Note:    Assessment: Shay Jimenez is a 57 year old male with with PMH ESRD on dialysis and osteomyelitis s/p C5-T6 fusion who is now s/p redo C5-T6 fusion on 12/06/22 with concern for hemorrhage; no vertebral artery dissection or extravasation was noted on intraoperative angiogram.  He was admitted to the Neuro ICU postoperatively for MAP goals and frequent neuro checks, remaining stable on pressors.  Neuro exam is unchanged from patient's baseline.    Plan:  - Appreciate Medicine assistance   - Serial neuro exams  - Pain control  - Hb goal > 7  - Normonatremia  - Goal normotension  - Appreciate Nephrology recommendations re: dialysis (baseline M/W/F)  - Daily dressing changes   - Cervical collar when HOB >45,  on when OOB  - Regular diet  - Bowel regimen  - PRN antiemetics  - PT/OT  - SCDs and subcutaneous heparin for DVT proph  - Eliquis restarted- 12.20.2022  - Psychiatry consult for capacity evaluation- patient unable to make decisions   - Medically ready for disposition  -----------------------------------  Adelaide Lancaster PA-C  Neurosurgery Department  Pager: [phone]      Interval History/Subjective:  No acute events overnight. Reports minimal neck pain. Currently awaiting placement.    Objective:   Temp:  [97.4  F (36.3  C)-98.7  F (37.1  C)] 98.7  F (37.1  C)  Pulse:  [59-87] 61  Resp:  [12-18] 18  BP: ()/() 109/64  SpO2:  [94 %-100 %] 97 %  I/O last 3 completed shifts:  In: 837 [P.O.:837]  Out: 2300 [Other:2300]    Gen: Appears comfortable, NAD  Wound: Incision, clean, dry, intact without strikethrough  Neurologic:  - Alert & Oriented to person, place, time, and situation  - Follows commands briskly  - Speech fluent, spontaneous. No aphasia or dysarthria.  - No gaze preference. No apparent hemineglect.  - PERRL, EOMI  - Strong brow raise, eye closure, and smile  - Face symmetric with sensation intact to light  touch  - Trapezii and sternocleidomastoid muscles 5/5 bilaterally  - No pronator drift     Del Tr Bi WE WF Gr   R 4+ 5 5 5 5 5   L 4+ 5 5 5 5 5    HF KE KF DF PF EHL   R 4+ 5 5 5 5 5   L 4+ 5 5 5 5 5       Sensation intact and symmetric to light touch throughout    LABS  Recent Labs   Lab Test 01/03/23  0755 01/02/23  0657 01/01/23  0830   WBC 6.5 7.4 5.8   HGB 9.4* 9.6* 9.9*    99 101*    225 211       Recent Labs   Lab Test 01/03/23  0755 01/02/23  0657 01/01/23  0830   * 131* 129*   POTASSIUM 4.8 5.3 5.0   CHLORIDE 96* 95* 92*   CO2 22 21* 23   BUN 31.2* 48.8* 37.9*   CR 5.03* 6.99* 5.88*   ANIONGAP 13 15 14   MAC 9.4 9.3 9.1   GLC 76 82 80       IMAGING  No results found for this or any previous visit (from the past 24 hour(s)).

## 2023-01-03 NOTE — PROVIDER NOTIFICATION
"Vital signs:  Temp: 98  F (36.7  C) Temp src: Oral BP: 131/69 Pulse: 78   Resp: 18 SpO2: 98 % O2 Device: Nasal cannula Oxygen Delivery: 2 LPM (wears to sleep) Height: 185.4 cm (6' 1\") Weight: 130.2 kg (287 lb 0.6 oz)    Patient thinking / car is outside patient's room. A & O x4 otherwise. Noted left pupil 4mm and right pupil 3mm, both reactive to light. C/o 6/10 HA and neck pain. Otherwise neuro intact. Administered PRN Robaxin 500mg. Notified Dr. Man, who saw patient, verbal order to postpone giving oxycodone 5mg until next neuro assessment at 0400.   "

## 2023-01-03 NOTE — PROGRESS NOTES
Care Management Follow Up    Length of Stay (days): 28    Expected Discharge Date: 01/06/2023     Concerns to be Addressed: discharge planning     Patient plan of care discussed at interdisciplinary rounds: Yes    Anticipated Discharge Disposition: Skilled Nursing Facility, Transitional Care    [address]  [address]  P: [phone]  F: [phone]     Anticipated Discharge Services: None  Anticipated Discharge DME: None    Patient/family educated on Medicare website which has current facility and service quality ratings: yes  Education Provided on the Discharge Plan:    Patient/Family in Agreement with the Plan: yes    Referrals Placed by CM/SW: Post Acute Facilities  Private pay costs discussed: Not applicable    Additional Information:    Called [employer] ([phone]) to give the patients card information to secure dialysis rides starting on Jan 9th.      PRANAY Dos SantosW   7B    Phone: [phone]  Pager: [phone]

## 2023-01-03 NOTE — PLAN OF CARE
"Vital signs:  Temp: 97.9  F (36.6  C) Temp src: Oral BP: 116/61 Pulse: 70   Resp: 18 SpO2: 96 % O2 Device: Nasal cannula Oxygen Delivery: 2 LPM Height: 185.4 cm (6' 1\") Weight: 130.2 kg (287 lb 0.6 oz)    6559-0061:    Activity: Repositioned with assist of 2. C-collar when out of bed.   Neuros: A & O x4. Noted left pupil unequal 4mm and right pupil 3mm at 0115, c/o HA, see previous note. Provider notified and saw patient, recheck pupils at 0400 and update provider, hold oxycodone. Currently, pupils equal, no c/o HA, provider was notified, verbal ok to administer 5mg oxycodone.   Cardiac: WDL. Asymptomatic. BP stable.  Respiratory: LS diminished in bases. O2 sats high 90s on RA when awake. Patient wears 2 L/min NC at night. Denies SOB. Unlabored.   GI/: BS+, passing flatus, no BM. Anuric, on HD. Diet: Regular diet. On 2000 mL fluid restriction, patient aware.   Skin: Back primapore dressing c/d/I. Blanchable redness on coccyx/sacrum, Mepilex c/d/I.   Lines: PIV saline locked. AV fistula left arm thrill and bruit.   Incisions/Drains: None.   Labs: Reviewed.   Pain/nausea: PRN oxycodone 5mg at 2210 and at 0425, PRN Robaxin x1 and scheduled Robaxin for neck and back pain helpful. Patient refused Atarax. Denies nausea.   New changes this shift: Pupils not equal at 0115, provider notified.   Plan: Continue POC.       "

## 2023-01-04 LAB
ANION GAP SERPL CALCULATED.3IONS-SCNC: 15 MMOL/L (ref 7–15)
BUN SERPL-MCNC: 38.1 MG/DL (ref 6–20)
CALCIUM SERPL-MCNC: 9.3 MG/DL (ref 8.6–10)
CHLORIDE SERPL-SCNC: 95 MMOL/L (ref 98–107)
CREAT SERPL-MCNC: 5.82 MG/DL (ref 0.67–1.17)
DEPRECATED HCO3 PLAS-SCNC: 22 MMOL/L (ref 22–29)
ERYTHROCYTE [DISTWIDTH] IN BLOOD BY AUTOMATED COUNT: 15.3 % (ref 10–15)
GFR SERPL CREATININE-BSD FRML MDRD: 11 ML/MIN/1.73M2
GLUCOSE SERPL-MCNC: 91 MG/DL (ref 70–99)
HCT VFR BLD AUTO: 32.2 % (ref 40–53)
HGB BLD-MCNC: 9.7 G/DL (ref 13.3–17.7)
MAGNESIUM SERPL-MCNC: 1.9 MG/DL (ref 1.7–2.3)
MCH RBC QN AUTO: 29.9 PG (ref 26.5–33)
MCHC RBC AUTO-ENTMCNC: 30.1 G/DL (ref 31.5–36.5)
MCV RBC AUTO: 99 FL (ref 78–100)
PLATELET # BLD AUTO: 230 10E3/UL (ref 150–450)
POTASSIUM SERPL-SCNC: 4.7 MMOL/L (ref 3.4–5.3)
RBC # BLD AUTO: 3.24 10E6/UL (ref 4.4–5.9)
SODIUM SERPL-SCNC: 132 MMOL/L (ref 136–145)
WBC # BLD AUTO: 7.2 10E3/UL (ref 4–11)

## 2023-01-04 PROCEDURE — 120N000002 HC R&B MED SURG/OB UMMC

## 2023-01-04 PROCEDURE — 250N000013 HC RX MED GY IP 250 OP 250 PS 637: Performed by: NURSE PRACTITIONER

## 2023-01-04 PROCEDURE — 634N000001 HC RX 634: Performed by: CLINICAL NURSE SPECIALIST

## 2023-01-04 PROCEDURE — 83735 ASSAY OF MAGNESIUM: CPT | Performed by: STUDENT IN AN ORGANIZED HEALTH CARE EDUCATION/TRAINING PROGRAM

## 2023-01-04 PROCEDURE — 250N000011 HC RX IP 250 OP 636: Performed by: CLINICAL NURSE SPECIALIST

## 2023-01-04 PROCEDURE — 90937 HEMODIALYSIS REPEATED EVAL: CPT

## 2023-01-04 PROCEDURE — 250N000013 HC RX MED GY IP 250 OP 250 PS 637

## 2023-01-04 PROCEDURE — 85027 COMPLETE CBC AUTOMATED: CPT | Performed by: STUDENT IN AN ORGANIZED HEALTH CARE EDUCATION/TRAINING PROGRAM

## 2023-01-04 PROCEDURE — 99232 SBSQ HOSP IP/OBS MODERATE 35: CPT | Mod: 24 | Performed by: CLINICAL NURSE SPECIALIST

## 2023-01-04 PROCEDURE — 80048 BASIC METABOLIC PNL TOTAL CA: CPT | Performed by: STUDENT IN AN ORGANIZED HEALTH CARE EDUCATION/TRAINING PROGRAM

## 2023-01-04 PROCEDURE — 258N000003 HC RX IP 258 OP 636: Performed by: CLINICAL NURSE SPECIALIST

## 2023-01-04 PROCEDURE — 36415 COLL VENOUS BLD VENIPUNCTURE: CPT | Performed by: STUDENT IN AN ORGANIZED HEALTH CARE EDUCATION/TRAINING PROGRAM

## 2023-01-04 RX ORDER — HEPARIN SODIUM 1000 [USP'U]/ML
500 INJECTION, SOLUTION INTRAVENOUS; SUBCUTANEOUS CONTINUOUS
Status: DISCONTINUED | OUTPATIENT
Start: 2023-01-04 | End: 2023-01-04

## 2023-01-04 RX ADMIN — SODIUM CHLORIDE 250 ML: 9 INJECTION, SOLUTION INTRAVENOUS at 07:56

## 2023-01-04 RX ADMIN — GABAPENTIN 300 MG: 300 CAPSULE ORAL at 23:50

## 2023-01-04 RX ADMIN — GABAPENTIN 300 MG: 300 CAPSULE ORAL at 12:29

## 2023-01-04 RX ADMIN — OXYCODONE HYDROCHLORIDE 2.5 MG: 5 TABLET ORAL at 16:30

## 2023-01-04 RX ADMIN — QUETIAPINE 25 MG: 25 TABLET ORAL at 23:50

## 2023-01-04 RX ADMIN — GABAPENTIN 300 MG: 300 CAPSULE ORAL at 00:13

## 2023-01-04 RX ADMIN — APIXABAN 2.5 MG: 2.5 TABLET, FILM COATED ORAL at 20:03

## 2023-01-04 RX ADMIN — FOLIC ACID 1 MG: 1 TABLET ORAL at 16:31

## 2023-01-04 RX ADMIN — FINASTERIDE 1.3 MG: 5 TABLET, FILM COATED ORAL at 16:39

## 2023-01-04 RX ADMIN — MIDODRINE HYDROCHLORIDE 10 MG: 5 TABLET ORAL at 07:33

## 2023-01-04 RX ADMIN — APIXABAN 2.5 MG: 2.5 TABLET, FILM COATED ORAL at 12:29

## 2023-01-04 RX ADMIN — BACLOFEN 10 MG: 10 TABLET ORAL at 12:30

## 2023-01-04 RX ADMIN — MIDODRINE HYDROCHLORIDE 10 MG: 5 TABLET ORAL at 10:48

## 2023-01-04 RX ADMIN — B-COMPLEX W/ C & FOLIC ACID TAB 1 MG 1 TABLET: 1 TAB at 16:39

## 2023-01-04 RX ADMIN — ACETAMINOPHEN 975 MG: 325 TABLET, FILM COATED ORAL at 12:29

## 2023-01-04 RX ADMIN — SODIUM CHLORIDE 300 ML: 9 INJECTION, SOLUTION INTRAVENOUS at 07:56

## 2023-01-04 RX ADMIN — METHOCARBAMOL 500 MG: 500 TABLET ORAL at 12:29

## 2023-01-04 RX ADMIN — RAMELTEON 8 MG: 8 TABLET, FILM COATED ORAL at 22:09

## 2023-01-04 RX ADMIN — SEVELAMER CARBONATE 1600 MG: 800 TABLET, FILM COATED ORAL at 12:29

## 2023-01-04 RX ADMIN — HEPARIN SODIUM 500 UNITS: 1000 INJECTION INTRAVENOUS; SUBCUTANEOUS at 08:06

## 2023-01-04 RX ADMIN — OXYCODONE HYDROCHLORIDE 2.5 MG: 5 TABLET ORAL at 22:07

## 2023-01-04 RX ADMIN — Medication 100 MG: at 20:03

## 2023-01-04 RX ADMIN — BACLOFEN 10 MG: 10 TABLET ORAL at 20:03

## 2023-01-04 RX ADMIN — PANTOPRAZOLE SODIUM 40 MG: 40 TABLET, DELAYED RELEASE ORAL at 12:29

## 2023-01-04 RX ADMIN — QUETIAPINE 50 MG: 25 TABLET ORAL at 22:07

## 2023-01-04 RX ADMIN — SERTRALINE HYDROCHLORIDE 100 MG: 100 TABLET ORAL at 16:32

## 2023-01-04 RX ADMIN — OXYCODONE HYDROCHLORIDE 2.5 MG: 5 TABLET ORAL at 12:28

## 2023-01-04 RX ADMIN — MIDODRINE HYDROCHLORIDE 10 MG: 5 TABLET ORAL at 12:28

## 2023-01-04 RX ADMIN — CETIRIZINE HYDROCHLORIDE 10 MG: 10 TABLET, FILM COATED ORAL at 16:31

## 2023-01-04 RX ADMIN — MIDODRINE HYDROCHLORIDE 10 MG: 5 TABLET ORAL at 16:35

## 2023-01-04 RX ADMIN — SEVELAMER CARBONATE 1600 MG: 800 TABLET, FILM COATED ORAL at 16:30

## 2023-01-04 RX ADMIN — ACETAMINOPHEN 975 MG: 325 TABLET, FILM COATED ORAL at 20:03

## 2023-01-04 RX ADMIN — Medication 133 MG: at 16:31

## 2023-01-04 RX ADMIN — Medication 100 MG: at 16:32

## 2023-01-04 RX ADMIN — CYANOCOBALAMIN TAB 500 MCG 500 MCG: 500 TAB at 16:31

## 2023-01-04 RX ADMIN — HEPARIN SODIUM 500 UNITS/HR: 1000 INJECTION INTRAVENOUS; SUBCUTANEOUS at 08:06

## 2023-01-04 RX ADMIN — ATORVASTATIN CALCIUM 20 MG: 20 TABLET, FILM COATED ORAL at 22:07

## 2023-01-04 RX ADMIN — METHOCARBAMOL 500 MG: 500 TABLET ORAL at 16:34

## 2023-01-04 RX ADMIN — FLUTICASONE PROPIONATE 1 SPRAY: 50 SPRAY, METERED NASAL at 20:03

## 2023-01-04 RX ADMIN — EPOETIN ALFA-EPBX 10000 UNITS: 10000 INJECTION, SOLUTION INTRAVENOUS; SUBCUTANEOUS at 10:44

## 2023-01-04 RX ADMIN — METHOCARBAMOL 500 MG: 500 TABLET ORAL at 20:03

## 2023-01-04 RX ADMIN — GABAPENTIN 300 MG: 300 CAPSULE ORAL at 16:31

## 2023-01-04 ASSESSMENT — ACTIVITIES OF DAILY LIVING (ADL)
ADLS_ACUITY_SCORE: 56
ADLS_ACUITY_SCORE: 56
ADLS_ACUITY_SCORE: 54
ADLS_ACUITY_SCORE: 52
ADLS_ACUITY_SCORE: 54
ADLS_ACUITY_SCORE: 56
ADLS_ACUITY_SCORE: 54
ADLS_ACUITY_SCORE: 56

## 2023-01-04 NOTE — PROGRESS NOTES
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  Had planned neurosurgery revision for C5-C6 on 12/6 as screw had dislodged.  Nephrology consulted for management of dialysis post op.       Interval History :   Mr Jimenez had weekend off of HD, seen on run this am.  At EDW but BP's are robust so will try to pull 2-3L as usual.  Will continue to run MWF, stable from nephrology standpoint, waiting for spot at rehab.      Assessment & Recommendations:   ESRD-Since 4/2019 due to Ca Phos deposition and HTN, runs at [facility] ([phone], fax [phone]) under care of Dr Cline.  Runs MWF via AVF, 4.25h runs.                  -HD today on MWF schedule, 3L of UF.                 -Continuing long term HD, no need for new consent.                  -Is eventually pursuing renal transplant through [facility] once acute issues w/ neurosurgery are done.       Volume-EDW 130kg, bed wt ~130kg today, pulling 2-3L to challenge.      Electrolytes-K 4.7 bicarb 22, Na 132, HD today.        BMD-Ca 9.3, Mg and Phos WNL last check.       Neurosurgery-Had planned revision of previous screws at C5-C6 on 12/6.       Anemia-Hgb 9.7, reduced epo dose to 4k as maintenance as it has run close to 11.       Nutrition-Taking PO       Time spent: 30 minutes on this date of encounter for chart review, physical exam, medical decision making and co-ordination of care.      Discussed with Dr Lafleur     Recommendations were communicated to primary team via verbal communication.        ENID Shirley CNS  Clinical Nurse Specialist  [phone]      Review of Systems:   I reviewed the following systems:  Gen: No fevers or chills  CV: No CP at rest  Resp: No SOB at rest  GI: No N/V    Physical Exam:   I/O last 3 completed shifts:  In: 780 [P.O.:780]  Out: -    /75    "Pulse 71   Temp 97.6  F (36.4  C) (Oral)   Resp 21   Ht 1.854 m (6' 1\")   Wt 130.2 kg (287 lb 0.6 oz)   SpO2 98%   BMI 37.87 kg/m       GENERAL APPEARANCE: alert and no distress, moderate confusion  EYES: No scleral icterus  NECK:  No JVD  Pulmonary: lungs clear to auscultation with equal breath sounds bilaterally, no clubbing or cyanosis  CV: Regular rhythm, normal rate, no rub   - Edema none  GI: soft, nontender, normal bowel sounds  MS: no evidence of inflammation in joints, no muscle tenderness  : No Oliveira  SKIN: no rash, warm, dry  NEURO: Delirious but somewhat redirectable.      Labs:   All labs reviewed by me  Electrolytes/Renal -   Recent Labs   Lab Test 01/04/23  0804 01/03/23  0755 01/02/23  0657 12/20/22  0618 12/19/22  1000 12/15/22  1642 12/14/22  0422 12/13/22  0502   * 131* 131*   < > 128*   < > 132* 137   POTASSIUM 4.7 4.8 5.3   < > 4.8   < > 3.8 3.6   CHLORIDE 95* 96* 95*   < > 90*   < > 94* 98   CO2 22 22 21*   < > 22   < > 23 25   BUN 38.1* 31.2* 48.8*   < > 43.7*   < > 33.2* 21.0*   CR 5.82* 5.03* 6.99*   < > 6.95*   < > 5.64* 4.41*   GLC 91 76 82   < > 93   < > 80 96   MAC 9.3 9.4 9.3   < > 8.6   < > 8.7 9.0   MAG 1.9 1.8 1.9   < > 1.9  --  1.7 1.7   PHOS  --   --   --   --  6.2*  --  3.8 2.6    < > = values in this interval not displayed.       CBC -   Recent Labs   Lab Test 01/04/23  0804 01/03/23  0755 01/02/23  0657   WBC 7.2 6.5 7.4   HGB 9.7* 9.4* 9.6*    227 225       LFTs -   Recent Labs   Lab Test 12/11/22  0528 10/06/22  1215 09/29/22  1345 08/18/22  1435 07/21/22  1252 07/16/22  0557   ALKPHOS 146*  --   --   --  131 134   BILITOTAL 0.4  --   --   --  0.6 0.5   ALT <5*  --   --   --  9 <6   AST 18 31 35   < > 17 13   PROTTOTAL 5.9*  --   --   --  6.7* 6.5*   ALBUMIN 3.1*  --   --   --  3.0* 2.8*    < > = values in this interval not displayed.       Iron Panel -   Recent Labs   Lab Test 12/14/22  0634 07/29/22  0600   IRON 43* 44   IRONSAT 24 20   JACKELINE  --  626* "           Current Medications:    acetaminophen  975 mg Oral TID     apixaban ANTICOAGULANT  2.5 mg Oral BID     atorvastatin  20 mg Oral At Bedtime     Baclofen  10 mg Oral BID     cetirizine  10 mg Oral Daily     cyanocobalamin  500 mcg Oral Daily     epoetin mar-epbx  10,000 Units Intravenous Once in dialysis/CRRT     finasteride  1.3 mg Oral Daily     fluticasone  1 spray Both Nostrils BID     folic acid  1 mg Oral Daily     gabapentin  300 mg Oral Q8H     magnesium plus protein  133 mg Oral Daily     methocarbamol  500 mg Oral 4x Daily     midodrine  10 mg Oral TID w/meals     multivitamin RENAL  1 tablet Oral Daily     pantoprazole  40 mg Oral QAM AC     polyethylene glycol  17 g Oral TID     vitamin B6  100 mg Oral Daily     QUEtiapine  25 mg Oral Daily     QUEtiapine  50 mg Oral At Bedtime     ramelteon  8 mg Oral At Bedtime     senna-docusate  1 tablet Oral BID     sertraline  100 mg Oral Daily     sevelamer carbonate  1,600 mg Oral TID w/meals     sodium chloride (PF)  3 mL Intracatheter Q8H     thiamine  100 mg Oral Daily       heparin (porcine) 500 Units/hr (01/04/23 0806)     - MEDICATION INSTRUCTIONS -

## 2023-01-04 NOTE — CARE PLAN
Time: 1418-2723  Activity: assist x2, lift. Sat up in the chair, cervical collar when out of bed.     Pain: Back and neck pain managed with oxycodone and tylenol.   Neuro: A/O x4, denies N/T.  Cardiac: Denies Cardiac chest pain.   Respiratory: Denies SOB. On RA.  GI/: x1 soft BM this shift. +BS. Passing gas.  Diet: regular diet, 2L fluid restriction.   Lines: L AV Fistula, no PIV access.  Incisions/Drains/Skin: redness upper back at the incision site, small skin breakage. dressing applied. Coccyx/sacrum redness, blanchable. Mepilex applied.  Lab: Reviewed   New changes this shift: No significant changes this shift, Continue POC

## 2023-01-04 NOTE — PLAN OF CARE
"Vital signs:  Temp: 97.8  F (36.6  C) Temp src: Oral BP: 122/68 Pulse: 66   Resp: 19 SpO2: 95 %  Oxygen Delivery: 2 LPM Height: 185.4 cm (6' 1\") Weight: 130.2 kg (287 lb 0.6 oz)    8781-6114:    Activity: Repositioned with assist of 2. Uses lift to chair. C-collar when out of bed.   Neuros: Patient refused to respond to questions, wanting to sleep. Noted left pupil unequal 4mm and right pupil 3mm at midnight, otherwise neuro intact. Notified provider, no new orders at this time. Currently PERRLA and 3mm.  Cardiac: WDL. Asymptomatic. BP stable.  Respiratory: LS diminished in bases. O2 sats high 90s on RA when awake. Patient wears 2 L/min NC at night. O2 sats 90% when patient sleeping due to mouth-breathing, applied 2 L/min Oxymask, O2 sats above 92%. Denies SOB. Unlabored.   GI/: BS+, passing flatus, no BM, incontinent. Anuric, on HD.   Diet: Regular diet. On 2000 mL fluid restriction, patient aware.   Skin: Back primapore dressing c/d/I. Blanchable redness on coccyx/sacrum, Mepilex c/d/I.   Lines: No IV access. AV fistula left arm thrill and bruit, SOSA.  Incisions/Drains: None.   Labs: Reviewed.   Pain/nausea: Denies pain. Denies nausea.   New changes this shift: None.   Plan: Continue POC.   "

## 2023-01-04 NOTE — PROGRESS NOTES
[facility], [place]   Neurosurgery Progress Note:    Assessment: Shay Jimenez is a 57 year old male with with PMH ESRD on dialysis and osteomyelitis s/p C5-T6 fusion who is now s/p redo C5-T6 fusion on 12/06/22 with concern for hemorrhage; no vertebral artery dissection or extravasation was noted on intraoperative angiogram.  He was initially  admitted to the Neuro ICU postoperatively for MAP goals and frequent neuro checks, remaining stable on pressors.  Neuro exam is unchanged from patient's baseline.    Plan:  - Appreciate Medicine assistance   - Serial neuro exams  - Pain control  - Hb goal > 7  - Normonatremia  - Goal normotension  - Appreciate Nephrology recommendations re: dialysis (baseline M/W/F)  - Daily dressing changes   - Cervical collar when HOB >45,  on when OOB  - Regular diet  - Bowel regimen  - PRN antiemetics  - PT/OT  - SCDs and subcutaneous heparin for DVT proph  - Eliquis restarted- 12.20.2022  - Psychiatry consult for capacity evaluation- patient unable to make decisions   - Medically ready for disposition  -----------------------------------  Delta Man MD, PhD  PGY-2 Neurosurgery  Please page NSGY on call with questions      Interval History/Subjective:  No acute events overnight.     Objective:   Temp:  [95.6  F (35.3  C)-98.7  F (37.1  C)] 95.6  F (35.3  C)  Pulse:  [61-78] 76  Resp:  [18-20] 18  BP: ()/(50-68) 96/54  SpO2:  [90 %-97 %] 95 %  I/O last 3 completed shifts:  In: 780 [P.O.:780]  Out: -     Gen: Appears comfortable, NAD  Wound: Incision, clean, dry, intact without strikethrough  Neurologic:  - Alert & Oriented to person, place, time, and situation  - Follows commands briskly  - Speech fluent, spontaneous. No aphasia or dysarthria.  - No gaze preference. No apparent hemineglect.  - PERRL, EOMI  - Strong brow raise, eye closure, and smile  - Face symmetric with sensation intact to light touch  - Trapezii and sternocleidomastoid  muscles 5/5 bilaterally  - No pronator drift     Del Tr Bi WE WF Gr   R 4+ 5 5 5 5 5   L 4+ 5 5 5 5 5    HF KE KF DF PF EHL   R 4+ 5 5 5 5 5   L 4+ 5 5 5 5 5       Sensation intact and symmetric to light touch throughout    LABS  Recent Labs   Lab Test 01/03/23 0755 01/02/23  0657 01/01/23  0830   WBC 6.5 7.4 5.8   HGB 9.4* 9.6* 9.9*    99 101*    225 211       Recent Labs   Lab Test 01/03/23 0755 01/02/23  0657 01/01/23  0830   * 131* 129*   POTASSIUM 4.8 5.3 5.0   CHLORIDE 96* 95* 92*   CO2 22 21* 23   BUN 31.2* 48.8* 37.9*   CR 5.03* 6.99* 5.88*   ANIONGAP 13 15 14   MAC 9.4 9.3 9.1   GLC 76 82 80       IMAGING  No results found for this or any previous visit (from the past 24 hour(s)).

## 2023-01-04 NOTE — PLAN OF CARE
Activity:dialysis this am, patient up to chair with PT this shift via lift  Neuros:alert and oriented x 4, intermittent confusion  Cardiac:denies chest pain  Respiratory:room air  GI/:LBM 1/4  Diet:regular  Skin:surgical incision upper spine  Lines/Drains: left AV fistula  Plan: PRN oxycodone q4

## 2023-01-04 NOTE — PROGRESS NOTES
HEMODIALYSIS TREATMENT NOTE    Date: 1/4/2023  Time: 10:33 AM    Data:  Pre Wt:Unavailable     Weight change: 2 13,020 mg (actual weight)` kg  Ultrafiltration - Post Run Net Total Removed (mL): 2000 mL  Vascular Access Status: patent  Dialyzer Rinse: Streaked, Moderate  Total Blood Volume Processed:  93.7 Liters  Total Dialysis (Treatment) Time: 4 Hours  Dialysate: K2/Ca2.5  Heparin: 500 unit bolus + 500 units/hr maintenace  Lab:    Yes    Interventions:  Epogen  Heparin 2000 units    Assessment:  Pt completed 3.5 hours of HD via left arm AVF. Tolerated 2kg net UF. A&Ox4, vitally stable on HD, SR sat 93-97% on RA. Hemostasis achieved with 10 minutes of decannulation. Post BP 90/50 Handoff provided to PCN     Plan:    Per renal tea,

## 2023-01-05 ENCOUNTER — APPOINTMENT (OUTPATIENT)
Dept: OCCUPATIONAL THERAPY | Facility: CLINIC | Age: 58
DRG: 459 | End: 2023-01-05
Attending: STUDENT IN AN ORGANIZED HEALTH CARE EDUCATION/TRAINING PROGRAM
Payer: MEDICARE

## 2023-01-05 ENCOUNTER — APPOINTMENT (OUTPATIENT)
Dept: PHYSICAL THERAPY | Facility: CLINIC | Age: 58
DRG: 459 | End: 2023-01-05
Attending: STUDENT IN AN ORGANIZED HEALTH CARE EDUCATION/TRAINING PROGRAM
Payer: MEDICARE

## 2023-01-05 LAB
ANION GAP SERPL CALCULATED.3IONS-SCNC: 14 MMOL/L (ref 7–15)
BUN SERPL-MCNC: 25.8 MG/DL (ref 6–20)
CALCIUM SERPL-MCNC: 9.3 MG/DL (ref 8.6–10)
CHLORIDE SERPL-SCNC: 93 MMOL/L (ref 98–107)
CREAT SERPL-MCNC: 4.76 MG/DL (ref 0.67–1.17)
DEPRECATED HCO3 PLAS-SCNC: 23 MMOL/L (ref 22–29)
ERYTHROCYTE [DISTWIDTH] IN BLOOD BY AUTOMATED COUNT: 15.1 % (ref 10–15)
GFR SERPL CREATININE-BSD FRML MDRD: 13 ML/MIN/1.73M2
GLUCOSE SERPL-MCNC: 84 MG/DL (ref 70–99)
HCT VFR BLD AUTO: 31.5 % (ref 40–53)
HGB BLD-MCNC: 9.6 G/DL (ref 13.3–17.7)
MAGNESIUM SERPL-MCNC: 1.7 MG/DL (ref 1.7–2.3)
MCH RBC QN AUTO: 29.7 PG (ref 26.5–33)
MCHC RBC AUTO-ENTMCNC: 30.5 G/DL (ref 31.5–36.5)
MCV RBC AUTO: 98 FL (ref 78–100)
PLATELET # BLD AUTO: 225 10E3/UL (ref 150–450)
POTASSIUM SERPL-SCNC: 4.3 MMOL/L (ref 3.4–5.3)
RBC # BLD AUTO: 3.23 10E6/UL (ref 4.4–5.9)
SODIUM SERPL-SCNC: 130 MMOL/L (ref 136–145)
WBC # BLD AUTO: 6.4 10E3/UL (ref 4–11)

## 2023-01-05 PROCEDURE — 97110 THERAPEUTIC EXERCISES: CPT | Mod: GP

## 2023-01-05 PROCEDURE — 97530 THERAPEUTIC ACTIVITIES: CPT | Mod: GO

## 2023-01-05 PROCEDURE — 250N000013 HC RX MED GY IP 250 OP 250 PS 637: Performed by: NURSE PRACTITIONER

## 2023-01-05 PROCEDURE — 97110 THERAPEUTIC EXERCISES: CPT | Mod: GO

## 2023-01-05 PROCEDURE — 97530 THERAPEUTIC ACTIVITIES: CPT | Mod: GP

## 2023-01-05 PROCEDURE — 83735 ASSAY OF MAGNESIUM: CPT | Performed by: STUDENT IN AN ORGANIZED HEALTH CARE EDUCATION/TRAINING PROGRAM

## 2023-01-05 PROCEDURE — 250N000013 HC RX MED GY IP 250 OP 250 PS 637

## 2023-01-05 PROCEDURE — 97535 SELF CARE MNGMENT TRAINING: CPT | Mod: GO

## 2023-01-05 PROCEDURE — G0463 HOSPITAL OUTPT CLINIC VISIT: HCPCS

## 2023-01-05 PROCEDURE — 80048 BASIC METABOLIC PNL TOTAL CA: CPT | Performed by: STUDENT IN AN ORGANIZED HEALTH CARE EDUCATION/TRAINING PROGRAM

## 2023-01-05 PROCEDURE — 36415 COLL VENOUS BLD VENIPUNCTURE: CPT | Performed by: STUDENT IN AN ORGANIZED HEALTH CARE EDUCATION/TRAINING PROGRAM

## 2023-01-05 PROCEDURE — 120N000002 HC R&B MED SURG/OB UMMC

## 2023-01-05 PROCEDURE — 85027 COMPLETE CBC AUTOMATED: CPT | Performed by: STUDENT IN AN ORGANIZED HEALTH CARE EDUCATION/TRAINING PROGRAM

## 2023-01-05 RX ADMIN — FLUTICASONE PROPIONATE 1 SPRAY: 50 SPRAY, METERED NASAL at 08:43

## 2023-01-05 RX ADMIN — FLUTICASONE PROPIONATE 1 SPRAY: 50 SPRAY, METERED NASAL at 21:12

## 2023-01-05 RX ADMIN — MIDODRINE HYDROCHLORIDE 10 MG: 5 TABLET ORAL at 08:42

## 2023-01-05 RX ADMIN — METHOCARBAMOL 500 MG: 500 TABLET ORAL at 08:42

## 2023-01-05 RX ADMIN — METHOCARBAMOL 500 MG: 500 TABLET ORAL at 21:08

## 2023-01-05 RX ADMIN — CYANOCOBALAMIN TAB 500 MCG 500 MCG: 500 TAB at 16:34

## 2023-01-05 RX ADMIN — MIDODRINE HYDROCHLORIDE 10 MG: 5 TABLET ORAL at 12:08

## 2023-01-05 RX ADMIN — METHOCARBAMOL 500 MG: 500 TABLET ORAL at 12:08

## 2023-01-05 RX ADMIN — FINASTERIDE 1.3 MG: 5 TABLET, FILM COATED ORAL at 18:25

## 2023-01-05 RX ADMIN — BACLOFEN 10 MG: 10 TABLET ORAL at 21:08

## 2023-01-05 RX ADMIN — RAMELTEON 8 MG: 8 TABLET, FILM COATED ORAL at 21:08

## 2023-01-05 RX ADMIN — Medication 100 MG: at 21:08

## 2023-01-05 RX ADMIN — ACETAMINOPHEN 975 MG: 325 TABLET, FILM COATED ORAL at 12:09

## 2023-01-05 RX ADMIN — SEVELAMER CARBONATE 1600 MG: 800 TABLET, FILM COATED ORAL at 18:25

## 2023-01-05 RX ADMIN — ACETAMINOPHEN 975 MG: 325 TABLET, FILM COATED ORAL at 21:08

## 2023-01-05 RX ADMIN — SEVELAMER CARBONATE 1600 MG: 800 TABLET, FILM COATED ORAL at 08:43

## 2023-01-05 RX ADMIN — ACETAMINOPHEN 975 MG: 325 TABLET, FILM COATED ORAL at 08:43

## 2023-01-05 RX ADMIN — GABAPENTIN 300 MG: 300 CAPSULE ORAL at 16:35

## 2023-01-05 RX ADMIN — SERTRALINE HYDROCHLORIDE 100 MG: 100 TABLET ORAL at 16:35

## 2023-01-05 RX ADMIN — PANTOPRAZOLE SODIUM 40 MG: 40 TABLET, DELAYED RELEASE ORAL at 08:42

## 2023-01-05 RX ADMIN — BACLOFEN 10 MG: 10 TABLET ORAL at 08:43

## 2023-01-05 RX ADMIN — GABAPENTIN 300 MG: 300 CAPSULE ORAL at 23:45

## 2023-01-05 RX ADMIN — FOLIC ACID 1 MG: 1 TABLET ORAL at 16:34

## 2023-01-05 RX ADMIN — APIXABAN 2.5 MG: 2.5 TABLET, FILM COATED ORAL at 08:46

## 2023-01-05 RX ADMIN — B-COMPLEX W/ C & FOLIC ACID TAB 1 MG 1 TABLET: 1 TAB at 18:27

## 2023-01-05 RX ADMIN — OXYCODONE HYDROCHLORIDE 2.5 MG: 5 TABLET ORAL at 08:41

## 2023-01-05 RX ADMIN — CETIRIZINE HYDROCHLORIDE 10 MG: 10 TABLET, FILM COATED ORAL at 16:35

## 2023-01-05 RX ADMIN — Medication 133 MG: at 16:34

## 2023-01-05 RX ADMIN — ATORVASTATIN CALCIUM 20 MG: 20 TABLET, FILM COATED ORAL at 21:08

## 2023-01-05 RX ADMIN — APIXABAN 2.5 MG: 2.5 TABLET, FILM COATED ORAL at 21:08

## 2023-01-05 RX ADMIN — Medication 100 MG: at 16:35

## 2023-01-05 RX ADMIN — MIDODRINE HYDROCHLORIDE 10 MG: 5 TABLET ORAL at 18:25

## 2023-01-05 RX ADMIN — GABAPENTIN 300 MG: 300 CAPSULE ORAL at 08:43

## 2023-01-05 RX ADMIN — METHOCARBAMOL 500 MG: 500 TABLET ORAL at 16:35

## 2023-01-05 ASSESSMENT — ACTIVITIES OF DAILY LIVING (ADL)
ADLS_ACUITY_SCORE: 56
ADLS_ACUITY_SCORE: 58
ADLS_ACUITY_SCORE: 56
ADLS_ACUITY_SCORE: 60
ADLS_ACUITY_SCORE: 58

## 2023-01-05 NOTE — PROGRESS NOTES
[facility] Nurse Inpatient Assessment     Consulted for: Right heel    Patient History (according to provider note(s):      Shay Jimenez is a 57 year old male with with PMH ESRD on dialysis and osteomyelitis s/p C5-T6 fusion who is now POD#5 s/p redo C5-T6 fusion with concern for hemorrhage; no vertebral artery dissection or extravasation was noted on intraoperative angiogram.  He was admitted to the Neuro ICU postoperatively for MAP goals and frequent neuro checks, remaining stable on pressors.  Neuro exam is unchanged from patient's baseline.    Areas Assessed:      Areas visualized during today's visit: back     Wound location: cervical spine       Superior     12/28  Last photo: 1/5  Wound due to: Surgical Wound  Wound history/plan of care: surgical wound 12/6  Wound base: 100 % serous scabbing     Palpation of the wound bed: normal      Drainage: small     Description of drainage: serosanguinous     Measurements (length x width x depth, in cm): 2  x 0.1  x 0.1 cm      Tunneling: N/A     Undermining: N/A  Periwound skin: Intact       Color: pink      Temperature: normal   Odor: none  Pain: mild, tender  Pain interventions prior to dressing change: N/A  Treatment goal: Infection control/prevention and Protection  STATUS: improving  Supplies ordered: supplies stored on unit      Pressure Injury Location: Right heel    12/20 12/28    Last photo: 1/5  Wound type: Pressure Injury     Pressure Injury Stage: either Stage 2 or 3 deferring definitive staging until wound base has evolved, present on admission   Wound history/plan of care:  Wound noted by nursing on 12/6 after patient transferred out of PACU. Patient had been proned in OR so unlikely wound is from then and that it was present on admission.  12/28- assessed patient in dialysis, no prevalon boots on at time of assessment.  Wound base: 100 % non-granular tissue     Palpation of the wound bed: normal  "     Drainage: none     Description of drainage: none     Measurements (length x width x depth, in cm) 1.5 x 1.5 x 0.2 cm      Tunneling N/A     Undermining N/A  Periwound skin: Intact      Color: normal and consistent with surrounding tissue      Temperature: normal   Odor: none  Pain: no grimacing or signs of discomfort, none  Pain intervention prior to dressing change: patient tolerated well  Treatment goal: Protection  STATUS: evolving callus falling off, trimmed back   Supplies ordered: supplies stored on unit    My PI Risk Assessment     Sensory Perception: 3 - Slightly Limited     Moisture: 3 - Occasionally moist      Activity: 2 - Chairfast     Mobility: 2 - Very limited     Nutrition: 2 - Probably inadequate      Friction/Shear: 2 - Potential problem      TOTAL: 14     Treatment Plan:     Cervical spine incision wound(s): Daily and PRN cleanse with wound cleanser and pat dry. Leave open to air.     Right heel wound: Every third day and as needed  Cleanse wound with NS and pat dry  Paint bibi-wound skin with no sting barrier film.  Cover with 4x4 mepilex flex(# [id number]).   Keep heels elevated off bed.   Wear Prevalon boots while in bed. (# [id number])    Pressure Injury Prevention (PIP) Plan:  If patient is declining pressure injury prevention interventions: Explore reason why and address patient's concerns, Educate on pressure injury risk and prevention intervention(s), If patient is still declining, document \"informed refusal\"  and Ensure Care team is aware ( provider, charge nurse, etc)  Mattress: Follow bed algorithm, reassess daily and order specialty mattress, if indicated.  HOB: Maintain at or below 30 degrees, unless contraindicated  Repositioning in bed: Every 1-2 hours , Left/right positioning; avoid supine and Raise foot of bed prior to raising head of bed, to reduce patient sliding down (shear)  Heels: Keep elevated off mattress, Pillows under calves and Heel lift boots  Protective Dressing: Sacral " Mepilex for prevention (#810380),  especially for the agitated patient   Chair positioning: Chair cushion (#[id number]) , Repositions self: patient to shift weight every 15 minutes, Assist patient to reposition hourly and Do NOT use a donut for sitting (this increases pressure to smaller area and creates a higher potential for injury)    If patient has a buttock pressure injury, or high risk for PI use chair cushion or SPS.  Moisture Management: Perineal cleansing /protection: Follow Incontinence Protocol, Avoid brief in bed, Clean and dry skin folds with bathing , Consider InterDry (#[id number]) between folds and Moisturize dry skin  Under Devices: Inspect skin under all medical devices during skin inspection , Ensure tubes are stabilized without tension and Ensure patient is not lying on medical devices or equipment when repositioned  Ask provider to discontinue device when no longer needed.    Orders: Reviewed and Updated    RECOMMEND PRIMARY TEAM ORDER: None, at this time  Education provided: plan of care and wound progress  Discussed plan of care with: Patient  WOC nurse follow-up plan: weekly  Notify WOC if wound(s) deteriorate.  Nursing to notify the Provider(s) and re-consult the WOC Nurse if new skin concern.    DATA:     Current support surface: Standard  Low air loss (DEN pump, Isolibrium, Pulsate, skin guard, etc)  BMI: Body mass index is 37.87 kg/m .   Active diet order: Orders Placed This Encounter      Regular Diet Adult      Diet     Output: I/O last 3 completed shifts:  In: 830 [P.O.:830]  Out: 2000 [Other:2000]     Labs:   Recent Labs   Lab 01/05/23  0653   HGB 9.6*   WBC 6.4     Pressure injury risk assessment:   Sensory Perception: 3-->slightly limited  Moisture: 3-->occasionally moist  Activity: 2-->chairfast  Mobility: 3-->slightly limited  Nutrition: 3-->adequate  Friction and Shear: 2-->potential problem  Konstantin Score: 16     Elizabeth Barlow RN CWOCN   Dept. Pager: [phone]  Dept. Office Number:  [phone]

## 2023-01-05 NOTE — PROGRESS NOTES
[facility], [place]   Neurosurgery Progress Note:    Assessment: Shay Jimenez is a 57 year old male with with PMH ESRD on dialysis and osteomyelitis s/p C5-T6 fusion who is now s/p redo C5-T6 fusion on 12/06/22 with concern for hemorrhage; no vertebral artery dissection or extravasation was noted on intraoperative angiogram.  He was initially  admitted to the Neuro ICU postoperatively for MAP goals and frequent neuro checks, remaining stable on pressors.  Neuro exam is unchanged from patient's baseline.    Plan:  - Appreciate Medicine assistance   - Serial neuro exams  - Pain control  - Hb goal > 7  - Normonatremia  - Goal normotension  - Appreciate Nephrology recommendations re: dialysis (baseline M/W/F)  - Daily dressing changes   - Cervical collar when HOB >45,  on when OOB  - Regular diet  - Bowel regimen  - PRN antiemetics  - PT/OT  - SCDs and subcutaneous heparin for DVT proph  - Eliquis restarted- 12.20.2022  - Psychiatry consult for capacity evaluation- patient unable to make decisions   - Medically ready for disposition  -----------------------------------  Adelaide Lancaster PA-C  Neurosurgery Department  Pager: [phone]        Interval History/Subjective:  No acute events overnight. Patient reports pain well controlled. Awaiting placement.    Objective:   Temp:  [97.5  F (36.4  C)-98.5  F (36.9  C)] 98.2  F (36.8  C)  Pulse:  [67-82] 71  Resp:  [10-26] 18  BP: ()/(44-86) 117/70  SpO2:  [83 %-100 %] 95 %  I/O last 3 completed shifts:  In: 830 [P.O.:830]  Out: 2000 [Other:2000]    Gen: Appears comfortable, NAD  Wound: Incision, clean, dry, intact without strikethrough  Neurologic:  - Alert & Oriented to person, place, time, and situation  - Follows commands briskly  - Speech fluent, spontaneous. No aphasia or dysarthria.  - No gaze preference. No apparent hemineglect.  - PERRL, EOMI  - Strong brow raise, eye closure, and smile  - Face symmetric with sensation  intact to light touch  - Trapezii and sternocleidomastoid muscles 5/5 bilaterally  - No pronator drift     Del Tr Bi WE WF Gr   R 4+ 5 5 5 5 5   L 4+ 5 5 5 5 5    HF KE KF DF PF EHL   R 4+ 5 5 5 5 5   L 4+ 5 5 5 5 5       Sensation intact and symmetric to light touch throughout    LABS  Recent Labs   Lab Test 01/05/23  0653 01/04/23  0804 01/03/23  0755   WBC 6.4 7.2 6.5   HGB 9.6* 9.7* 9.4*   MCV 98 99 100    230 227       Recent Labs   Lab Test 01/05/23  0653 01/04/23  0804 01/03/23  0755   * 132* 131*   POTASSIUM 4.3 4.7 4.8   CHLORIDE 93* 95* 96*   CO2 23 22 22   BUN 25.8* 38.1* 31.2*   CR 4.76* 5.82* 5.03*   ANIONGAP 14 15 13   MAC 9.3 9.3 9.4   GLC 84 91 76       IMAGING  No results found for this or any previous visit (from the past 24 hour(s)).

## 2023-01-05 NOTE — PLAN OF CARE
"Vital signs:  Temp: 98.1  F (36.7  C) Temp src: Oral BP: 103/55 Pulse: 70   Resp: 18 SpO2: 95 %  Oxygen Delivery: 2 LPM Height: 185.4 cm (6' 1\") Weight: 130.2 kg (287 lb 0.6 oz)    9890-3178:    Activity: Repositioned with assist of 2. Uses lift to chair. C-collar when out of bed.   Neuros: Patient refused to respond to questions, wanting to sleep. Flirtatious, argumentative. Otherwise neuro intact. Forgetful. Bed alarm on.  Cardiac: WDL. Asymptomatic. BP 100s/50s-60s.  Respiratory: LS diminished in bases. O2 sats high 90s on RA when awake. Patient wears 2 L/min NC at night. Patient occasionally removes NC at night, reapplied. Denies SOB. Unlabored.   GI/: BS+, passing flatus, incontinent of small brown BM x2. Anuric, on HD.   Diet: Regular diet. On 2000 mL fluid restriction, patient aware.   Skin: Back primapore applied, no drainage. Blanchable redness on coccyx/sacrum, Mepilex applied.  Lines: No IV access. AV fistula left arm thrill and bruit, SOSA, slightly bruised.  Incisions/Drains: None.   Labs: Reviewed.   Pain/nausea: PRN oxycodone 2.5mg x1, scheduled Tylenol, scheduled baclofen, and scheduled Robaxin effective, patient slept in between cares. Denies nausea.   New changes this shift: None.   Plan: Continue POC.        "

## 2023-01-06 LAB
ANION GAP SERPL CALCULATED.3IONS-SCNC: 16 MMOL/L (ref 7–15)
BUN SERPL-MCNC: 34 MG/DL (ref 6–20)
CALCIUM SERPL-MCNC: 9 MG/DL (ref 8.6–10)
CHLORIDE SERPL-SCNC: 93 MMOL/L (ref 98–107)
CREAT SERPL-MCNC: 6.13 MG/DL (ref 0.67–1.17)
DEPRECATED HCO3 PLAS-SCNC: 21 MMOL/L (ref 22–29)
ERYTHROCYTE [DISTWIDTH] IN BLOOD BY AUTOMATED COUNT: 15.1 % (ref 10–15)
GFR SERPL CREATININE-BSD FRML MDRD: 10 ML/MIN/1.73M2
GLUCOSE SERPL-MCNC: 83 MG/DL (ref 70–99)
HCT VFR BLD AUTO: 29.9 % (ref 40–53)
HGB BLD-MCNC: 9.4 G/DL (ref 13.3–17.7)
MAGNESIUM SERPL-MCNC: 1.9 MG/DL (ref 1.7–2.3)
MCH RBC QN AUTO: 30.5 PG (ref 26.5–33)
MCHC RBC AUTO-ENTMCNC: 31.4 G/DL (ref 31.5–36.5)
MCV RBC AUTO: 97 FL (ref 78–100)
PLATELET # BLD AUTO: 217 10E3/UL (ref 150–450)
POTASSIUM SERPL-SCNC: 4.3 MMOL/L (ref 3.4–5.3)
RBC # BLD AUTO: 3.08 10E6/UL (ref 4.4–5.9)
SODIUM SERPL-SCNC: 130 MMOL/L (ref 136–145)
WBC # BLD AUTO: 7 10E3/UL (ref 4–11)

## 2023-01-06 PROCEDURE — 250N000013 HC RX MED GY IP 250 OP 250 PS 637: Performed by: NURSE PRACTITIONER

## 2023-01-06 PROCEDURE — 634N000001 HC RX 634: Performed by: CLINICAL NURSE SPECIALIST

## 2023-01-06 PROCEDURE — 99232 SBSQ HOSP IP/OBS MODERATE 35: CPT | Mod: 24 | Performed by: CLINICAL NURSE SPECIALIST

## 2023-01-06 PROCEDURE — 36415 COLL VENOUS BLD VENIPUNCTURE: CPT | Performed by: STUDENT IN AN ORGANIZED HEALTH CARE EDUCATION/TRAINING PROGRAM

## 2023-01-06 PROCEDURE — 120N000002 HC R&B MED SURG/OB UMMC

## 2023-01-06 PROCEDURE — 83735 ASSAY OF MAGNESIUM: CPT | Performed by: STUDENT IN AN ORGANIZED HEALTH CARE EDUCATION/TRAINING PROGRAM

## 2023-01-06 PROCEDURE — 90937 HEMODIALYSIS REPEATED EVAL: CPT

## 2023-01-06 PROCEDURE — 258N000003 HC RX IP 258 OP 636: Performed by: CLINICAL NURSE SPECIALIST

## 2023-01-06 PROCEDURE — 85027 COMPLETE CBC AUTOMATED: CPT | Performed by: STUDENT IN AN ORGANIZED HEALTH CARE EDUCATION/TRAINING PROGRAM

## 2023-01-06 PROCEDURE — 80048 BASIC METABOLIC PNL TOTAL CA: CPT | Performed by: STUDENT IN AN ORGANIZED HEALTH CARE EDUCATION/TRAINING PROGRAM

## 2023-01-06 PROCEDURE — 250N000011 HC RX IP 250 OP 636: Performed by: CLINICAL NURSE SPECIALIST

## 2023-01-06 RX ORDER — HEPARIN SODIUM 1000 [USP'U]/ML
500 INJECTION, SOLUTION INTRAVENOUS; SUBCUTANEOUS CONTINUOUS
Status: DISCONTINUED | OUTPATIENT
Start: 2023-01-06 | End: 2023-01-06

## 2023-01-06 RX ADMIN — FLUTICASONE PROPIONATE 1 SPRAY: 50 SPRAY, METERED NASAL at 20:26

## 2023-01-06 RX ADMIN — MIDODRINE HYDROCHLORIDE 10 MG: 5 TABLET ORAL at 13:07

## 2023-01-06 RX ADMIN — PANTOPRAZOLE SODIUM 40 MG: 40 TABLET, DELAYED RELEASE ORAL at 13:07

## 2023-01-06 RX ADMIN — ACETAMINOPHEN 975 MG: 325 TABLET, FILM COATED ORAL at 13:07

## 2023-01-06 RX ADMIN — FOLIC ACID 1 MG: 1 TABLET ORAL at 17:26

## 2023-01-06 RX ADMIN — SODIUM CHLORIDE 300 ML: 9 INJECTION, SOLUTION INTRAVENOUS at 07:57

## 2023-01-06 RX ADMIN — GABAPENTIN 300 MG: 300 CAPSULE ORAL at 17:26

## 2023-01-06 RX ADMIN — SERTRALINE HYDROCHLORIDE 100 MG: 100 TABLET ORAL at 17:25

## 2023-01-06 RX ADMIN — ACETAMINOPHEN 975 MG: 325 TABLET, FILM COATED ORAL at 20:22

## 2023-01-06 RX ADMIN — FINASTERIDE 1.3 MG: 5 TABLET, FILM COATED ORAL at 17:29

## 2023-01-06 RX ADMIN — HEPARIN SODIUM 500 UNITS/HR: 1000 INJECTION INTRAVENOUS; SUBCUTANEOUS at 07:56

## 2023-01-06 RX ADMIN — ATORVASTATIN CALCIUM 20 MG: 20 TABLET, FILM COATED ORAL at 22:17

## 2023-01-06 RX ADMIN — BACLOFEN 10 MG: 10 TABLET ORAL at 20:23

## 2023-01-06 RX ADMIN — METHOCARBAMOL 500 MG: 500 TABLET ORAL at 17:26

## 2023-01-06 RX ADMIN — B-COMPLEX W/ C & FOLIC ACID TAB 1 MG 1 TABLET: 1 TAB at 17:26

## 2023-01-06 RX ADMIN — CYANOCOBALAMIN TAB 500 MCG 500 MCG: 500 TAB at 17:26

## 2023-01-06 RX ADMIN — Medication 100 MG: at 17:27

## 2023-01-06 RX ADMIN — HEPARIN SODIUM 500 UNITS: 1000 INJECTION INTRAVENOUS; SUBCUTANEOUS at 07:56

## 2023-01-06 RX ADMIN — APIXABAN 2.5 MG: 2.5 TABLET, FILM COATED ORAL at 13:07

## 2023-01-06 RX ADMIN — MIDODRINE HYDROCHLORIDE 10 MG: 5 TABLET ORAL at 17:26

## 2023-01-06 RX ADMIN — EPOETIN ALFA-EPBX 4000 UNITS: 10000 INJECTION, SOLUTION INTRAVENOUS; SUBCUTANEOUS at 10:33

## 2023-01-06 RX ADMIN — Medication 133 MG: at 17:25

## 2023-01-06 RX ADMIN — SEVELAMER CARBONATE 1600 MG: 800 TABLET, FILM COATED ORAL at 17:26

## 2023-01-06 RX ADMIN — Medication 100 MG: at 20:23

## 2023-01-06 RX ADMIN — ACETAMINOPHEN 975 MG: 325 TABLET, FILM COATED ORAL at 10:29

## 2023-01-06 RX ADMIN — SEVELAMER CARBONATE 1600 MG: 800 TABLET, FILM COATED ORAL at 13:09

## 2023-01-06 RX ADMIN — METHOCARBAMOL 500 MG: 500 TABLET ORAL at 20:24

## 2023-01-06 RX ADMIN — METHOCARBAMOL 500 MG: 500 TABLET ORAL at 13:06

## 2023-01-06 RX ADMIN — GABAPENTIN 300 MG: 300 CAPSULE ORAL at 23:50

## 2023-01-06 RX ADMIN — MIDODRINE HYDROCHLORIDE 10 MG: 5 TABLET ORAL at 08:37

## 2023-01-06 RX ADMIN — APIXABAN 2.5 MG: 2.5 TABLET, FILM COATED ORAL at 20:23

## 2023-01-06 RX ADMIN — CETIRIZINE HYDROCHLORIDE 10 MG: 10 TABLET, FILM COATED ORAL at 17:26

## 2023-01-06 RX ADMIN — SODIUM CHLORIDE 250 ML: 9 INJECTION, SOLUTION INTRAVENOUS at 07:57

## 2023-01-06 ASSESSMENT — ACTIVITIES OF DAILY LIVING (ADL)
ADLS_ACUITY_SCORE: 56
ADLS_ACUITY_SCORE: 60
ADLS_ACUITY_SCORE: 54
ADLS_ACUITY_SCORE: 56
ADLS_ACUITY_SCORE: 54
ADLS_ACUITY_SCORE: 56
ADLS_ACUITY_SCORE: 56
ADLS_ACUITY_SCORE: 54
ADLS_ACUITY_SCORE: 54
ADLS_ACUITY_SCORE: 56
ADLS_ACUITY_SCORE: 56
ADLS_ACUITY_SCORE: 54

## 2023-01-06 NOTE — PLAN OF CARE
Activity:up to chair this shift, worked with therapy x 2 today  Neuros:alert and oriented x 4  Cardiac:denies chest pain  Respiratory:room air  GI/:incontinent of stool, anuric  Diet:regular, fluid restriction  Skin:woc nurse changed dressings to right foot today, wound on back open to air  Lines/Drains:AV fistula  Plan:continue to monitor    Patient refused Seroquel and requested medication to be discontinued, neurosurgery team notified of patient's request.

## 2023-01-06 NOTE — PROGRESS NOTES
HEMODIALYSIS TREATMENT NOTE    Date: 1/6/2023  Time: 4:19 PM    Data:  Weight change:   4 kg  Ultrafiltration - Post Run Net Total Removed (mL): 4000 mL  Vascular Access Status: patent  Dialyzer Rinse: Streaked  Total Blood Volume Processed: 102.22 L Liters  Total Dialysis (Treatment) Time: 4 Hours    Lab:   No    Interventions:  Tylenol  Heparin gtt  Heparin bolus  Epoetin  Reposition x2  AVF cannulated with 15 gauge needles    Assessment:  Pt ran for 4 hrs on  K3/ Ca 2.25 bath with a /  and 4 L pulled with no complications. Heparin gtt and bolus during HD per MAR. Pt requested 4 L be pulled instead of 3 L, Markie NP aware. Pt complaint of headache, tylenol admin per MAR with relief. Epoetin admin per MAR. Pt had 2 bowel movements during tx, Pt repositioned x2 during tx. Pt rinsed back and hemostasis achieved in about 5 mins. Pt alert and oriented x4. Report given to PCN     Plan:    Per renal team

## 2023-01-06 NOTE — PROGRESS NOTES
[facility], [place]   Neurosurgery Progress Note:    Assessment: Shay Jimenez is a 57 year old male with with PMH ESRD on dialysis and osteomyelitis s/p C5-T6 fusion who is now s/p redo C5-T6 fusion on 12/06/22 with concern for hemorrhage; no vertebral artery dissection or extravasation was noted on intraoperative angiogram.  He was initially  admitted to the Neuro ICU postoperatively for MAP goals and frequent neuro checks, remaining stable on pressors.  Neuro exam is unchanged from patient's baseline.    Plan:  - Appreciate Medicine assistance   - Serial neuro exams  - Pain control  - Hb goal > 7  - Normonatremia  - Goal normotension  - Appreciate Nephrology recommendations re: dialysis (baseline M/W/F)  - Daily dressing changes   - Cervical collar when HOB >45,  on when OOB  - Regular diet  - Bowel regimen  - PRN antiemetics  - PT/OT  - SCDs and subcutaneous heparin for DVT proph  - Eliquis restarted- 12.20.2022  - Psychiatry consult for capacity evaluation- patient unable to make decisions   - Medically ready for disposition  -----------------------------------  Delta Man MD, PhD  PGY-2 Neurosurgery  Please page NSGY on call with questions      Interval History/Subjective:  No acute events overnight. Patient reports pain well controlled. Oxycodone discontinued. Awaiting placement.    Objective:   Temp:  [97.2  F (36.2  C)-98.3  F (36.8  C)] 97.2  F (36.2  C)  Pulse:  [67-75] 67  Resp:  [16-18] 18  BP: (100-123)/(61-70) 123/62  SpO2:  [92 %-96 %] 92 %  I/O last 3 completed shifts:  In: 460 [P.O.:460]  Out: -     Gen: Appears comfortable, NAD  Wound: Incision, clean, dry, intact without strikethrough  Neurologic:  - Alert & Oriented to person, place, time, and situation  - Follows commands briskly  - Speech fluent, spontaneous. No aphasia or dysarthria.  - No gaze preference. No apparent hemineglect.  - PERRL, EOMI  - Strong brow raise, eye closure, and smile  - Face  symmetric with sensation intact to light touch  - Trapezii and sternocleidomastoid muscles 5/5 bilaterally  - No pronator drift     Del Tr Bi WE WF Gr   R 4+ 5 5 5 5 5   L 4+ 5 5 5 5 5    HF KE KF DF PF EHL   R 4+ 5 5 5 5 5   L 4+ 5 5 5 5 5       Sensation intact and symmetric to light touch throughout    LABS  Recent Labs   Lab Test 01/06/23  0731 01/05/23  0653 01/04/23  0804   WBC 7.0 6.4 7.2   HGB 9.4* 9.6* 9.7*   MCV 97 98 99    225 230       Recent Labs   Lab Test 01/05/23  0653 01/04/23  0804 01/03/23  0755   * 132* 131*   POTASSIUM 4.3 4.7 4.8   CHLORIDE 93* 95* 96*   CO2 23 22 22   BUN 25.8* 38.1* 31.2*   CR 4.76* 5.82* 5.03*   ANIONGAP 14 15 13   MAC 9.3 9.3 9.4   GLC 84 91 76       IMAGING  No results found for this or any previous visit (from the past 24 hour(s)).

## 2023-01-06 NOTE — PROGRESS NOTES
"Blood pressure 116/72, pulse 68, temperature 97.8  F (36.6  C), temperature source Oral, resp. rate 16, height 1.854 m (6' 1\"), weight 130.2 kg (287 lb 0.6 oz), SpO2 93 %.    Activity: A2 w/w or lift  Neuros: AOX4,  Makes needs known  Cardiac: WDL, denies chest pain  Respiratory: WDL RA 93%, denies SOB  GI/: Oliguria on HD, incontinent of B&B, BM today  Diet: Regular and 2L fluid restriction,  tolerating  Skin/Incisions/Drains: Neck incision glued, upper back incision Primapore, Purple  Scabs right foot w/Mepilex  and boot.  Lines: No IV access  Pain: Denies pain meds  Plan: HD-MWF, Continue with  POC  "

## 2023-01-06 NOTE — PROGRESS NOTES
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  Had planned neurosurgery revision for C5-C6 on 12/6 as screw had dislodged.  Nephrology consulted for management of dialysis post op.       Interval History :   Mr Jimenez had weekend off of HD, seen on run this am.  At EDW but BP's are robust so will try to pull 2-3L as usual.  Will continue to run MWF, stable from nephrology standpoint, waiting for spot at rehab.  Next run 1/9.    Assessment & Recommendations:   ESRD-Since 4/2019 due to Ca Phos deposition and HTN, runs at [facility] ([phone], fax [phone]) under care of Dr Cline.  Runs MWF via [facility], 4.25h runs.                  -HD today on MWF schedule, 3L of UF.                 -Continuing long term HD, no need for new consent.                  -Is eventually pursuing renal transplant through [place] once acute issues w/ neurosurgery are done.       Volume-EDW 130kg, bed wt ~130kg today, pulling 2-3L to challenge as BP's are stable.      Electrolytes-K 4.3 bicarb 21, Na 130, HD today.        BMD-Ca 9.0, Mg and Phos WNL last check.       Neurosurgery-Had planned revision of previous screws at C5-C6 on 12/6.       Anemia-Hgb 9.4, reduced epo dose to 4k as maintenance as it has run close to 11.       Nutrition-Taking PO       Time spent: 30 minutes on this date of encounter for chart review, physical exam, medical decision making and co-ordination of care.      Discussed with Dr Lafleur     Recommendations were communicated to primary team via verbal communication.        ENID Shirley CNS  Clinical Nurse Specialist  [phone]      Review of Systems:   I reviewed the following systems:  Gen: No fevers or chills  CV: No CP at rest  Resp: No SOB at rest  GI: No N/V    Physical Exam:   I/O last 3 completed shifts:  In: 0   Out:  "4000 [Other:4000]   BP 99/57   Pulse 71   Temp 97.1  F (36.2  C) (Oral)   Resp 14   Ht 1.854 m (6' 1\")   Wt 130.2 kg (287 lb 0.6 oz)   SpO2 95%   BMI 37.87 kg/m       GENERAL APPEARANCE: alert and no distress, moderate confusion  EYES: No scleral icterus  NECK:  No JVD  Pulmonary: lungs clear to auscultation with equal breath sounds bilaterally, no clubbing or cyanosis  CV: Regular rhythm, normal rate, no rub   - Edema none  GI: soft, nontender, normal bowel sounds  MS: no evidence of inflammation in joints, no muscle tenderness  : No Oliveira  SKIN: no rash, warm, dry  NEURO: Delirious but somewhat redirectable.      Labs:   All labs reviewed by me  Electrolytes/Renal -   Recent Labs   Lab Test 01/06/23  0731 01/05/23  0653 01/04/23  0804 12/20/22  0618 12/19/22  1000 12/15/22  1642 12/14/22  0422 12/13/22  0502   * 130* 132*   < > 128*   < > 132* 137   POTASSIUM 4.3 4.3 4.7   < > 4.8   < > 3.8 3.6   CHLORIDE 93* 93* 95*   < > 90*   < > 94* 98   CO2 21* 23 22   < > 22   < > 23 25   BUN 34.0* 25.8* 38.1*   < > 43.7*   < > 33.2* 21.0*   CR 6.13* 4.76* 5.82*   < > 6.95*   < > 5.64* 4.41*   GLC 83 84 91   < > 93   < > 80 96   MAC 9.0 9.3 9.3   < > 8.6   < > 8.7 9.0   MAG 1.9 1.7 1.9   < > 1.9  --  1.7 1.7   PHOS  --   --   --   --  6.2*  --  3.8 2.6    < > = values in this interval not displayed.       CBC -   Recent Labs   Lab Test 01/06/23  0731 01/05/23  0653 01/04/23  0804   WBC 7.0 6.4 7.2   HGB 9.4* 9.6* 9.7*    225 230       LFTs -   Recent Labs   Lab Test 12/11/22  0528 10/06/22  1215 09/29/22  1345 08/18/22  1435 07/21/22  1252 07/16/22  0557   ALKPHOS 146*  --   --   --  131 134   BILITOTAL 0.4  --   --   --  0.6 0.5   ALT <5*  --   --   --  9 <6   AST 18 31 35   < > 17 13   PROTTOTAL 5.9*  --   --   --  6.7* 6.5*   ALBUMIN 3.1*  --   --   --  3.0* 2.8*    < > = values in this interval not displayed.       Iron Panel -   Recent Labs   Lab Test 12/14/22  0634 07/29/22  0600   IRON 43* 44 "   IRONSAT 24 20   JACKELINE  --  626*           Current Medications:    acetaminophen  975 mg Oral TID     apixaban ANTICOAGULANT  2.5 mg Oral BID     atorvastatin  20 mg Oral At Bedtime     Baclofen  10 mg Oral BID     cetirizine  10 mg Oral Daily     cyanocobalamin  500 mcg Oral Daily     finasteride  1.3 mg Oral Daily     fluticasone  1 spray Both Nostrils BID     folic acid  1 mg Oral Daily     gabapentin  300 mg Oral Q8H     magnesium plus protein  133 mg Oral Daily     methocarbamol  500 mg Oral 4x Daily     midodrine  10 mg Oral TID w/meals     multivitamin RENAL  1 tablet Oral Daily     pantoprazole  40 mg Oral QAM AC     polyethylene glycol  17 g Oral TID     vitamin B6  100 mg Oral Daily     ramelteon  8 mg Oral At Bedtime     senna-docusate  1 tablet Oral BID     sertraline  100 mg Oral Daily     sevelamer carbonate  1,600 mg Oral TID w/meals     sodium chloride (PF)  3 mL Intracatheter Q8H     sodium chloride (PF)  9 mL Intracatheter During Dialysis/CRRT (from stock)     sodium chloride (PF)  9 mL Intracatheter During Dialysis/CRRT (from stock)     thiamine  100 mg Oral Daily       heparin (porcine) 500 Units/hr (01/06/23 8306)

## 2023-01-06 NOTE — PLAN OF CARE
"[id number]    /62 (BP Location: Right arm)   Pulse 67   Temp 97.2  F (36.2  C) (Oral)   Resp 18   Ht 1.854 m (6' 1\")   Wt 130.2 kg (287 lb 0.6 oz)   SpO2 92%   BMI 37.87 kg/m        Activity: rested in bed   Neuros: alert and orientated x4, calm and cooperative. Baseline numbness and tingling to bilateral LE's   Cardiac: WNL   Respiratory: O2 sats low 90's on RA, unlabored  (wears 2L NC for comfort while sleeping)   GI/: abdomen soft/non-tender.  BM overnight.  Voided x 1   Diet: regular diet, 2 L FR   Skin: right heel wound foam in place, coccyx foam in place   Lines: LUE AV fistula   Incisions/Drains: n/a    Labs: reviewed   Pain/nausea: denied need for pain meds, no nausea    New changes this shift: none    Plan: HD today, continue POC       "

## 2023-01-06 NOTE — PROGRESS NOTES
"Blood pressure 123/62, pulse 67, temperature 97.2  F (36.2  C), temperature source Oral, resp. rate 18, height 1.854 m (6' 1\"), weight 130.2 kg (287 lb 0.6 oz), SpO2 92 %.    Goal Outcome Evaluation:     Plan of Care Reviewed With: patient   Overall Patient Progress: No change     [id number]   VSS on RA. A&O x4, intermittent confusion. CMS intact. Baseline BLE n/t, unchanged from previous. 5/5 strength BUEs, 3/5 strength BLEs. L AV fistula WNL. Anuric. Incontinent of bowel. No BM during shift. Assist x2 to reposition. Neck incision SOSA, healed/pink CDI. C/o 4/10 neck pain, managed w/ scheduled tylenol/robaxin. Awaiting TCU placement     "

## 2023-01-06 NOTE — PROVIDER NOTIFICATION
[facility] room  33-2 Patient states he gave letter about  Medical Leave Extension to staff from neurosurgery and needs to be completed by 1/10/23. Could  someone complete and return to patient. Naman

## 2023-01-07 ENCOUNTER — HOSPITAL ENCOUNTER (INPATIENT)
Facility: SKILLED NURSING FACILITY | Age: 58
LOS: 2 days | Discharge: ANOTHER HOSPITAL | DRG: 949 | End: 2023-01-09
Attending: INTERNAL MEDICINE | Admitting: INTERNAL MEDICINE
Payer: MEDICARE

## 2023-01-07 VITALS
HEART RATE: 75 BPM | BODY MASS INDEX: 38.04 KG/M2 | HEIGHT: 73 IN | WEIGHT: 287.04 LBS | OXYGEN SATURATION: 95 % | SYSTOLIC BLOOD PRESSURE: 110 MMHG | RESPIRATION RATE: 16 BRPM | TEMPERATURE: 96 F | DIASTOLIC BLOOD PRESSURE: 60 MMHG

## 2023-01-07 PROBLEM — R53.1 WEAKNESS: Status: ACTIVE | Noted: 2023-01-07

## 2023-01-07 LAB
ANION GAP SERPL CALCULATED.3IONS-SCNC: 17 MMOL/L (ref 7–15)
BUN SERPL-MCNC: 21 MG/DL (ref 6–20)
CALCIUM SERPL-MCNC: 9 MG/DL (ref 8.6–10)
CHLORIDE SERPL-SCNC: 91 MMOL/L (ref 98–107)
CREAT SERPL-MCNC: 4.58 MG/DL (ref 0.67–1.17)
DEPRECATED HCO3 PLAS-SCNC: 21 MMOL/L (ref 22–29)
ERYTHROCYTE [DISTWIDTH] IN BLOOD BY AUTOMATED COUNT: 15 % (ref 10–15)
GFR SERPL CREATININE-BSD FRML MDRD: 14 ML/MIN/1.73M2
GLUCOSE SERPL-MCNC: 75 MG/DL (ref 70–99)
HCT VFR BLD AUTO: 30.6 % (ref 40–53)
HGB BLD-MCNC: 9.5 G/DL (ref 13.3–17.7)
MAGNESIUM SERPL-MCNC: 1.7 MG/DL (ref 1.7–2.3)
MCH RBC QN AUTO: 30.5 PG (ref 26.5–33)
MCHC RBC AUTO-ENTMCNC: 31 G/DL (ref 31.5–36.5)
MCV RBC AUTO: 98 FL (ref 78–100)
PLATELET # BLD AUTO: 231 10E3/UL (ref 150–450)
POTASSIUM SERPL-SCNC: 4 MMOL/L (ref 3.4–5.3)
RBC # BLD AUTO: 3.11 10E6/UL (ref 4.4–5.9)
SARS-COV-2 RNA RESP QL NAA+PROBE: NEGATIVE
SODIUM SERPL-SCNC: 129 MMOL/L (ref 136–145)
WBC # BLD AUTO: 7 10E3/UL (ref 4–11)

## 2023-01-07 PROCEDURE — 36415 COLL VENOUS BLD VENIPUNCTURE: CPT | Performed by: STUDENT IN AN ORGANIZED HEALTH CARE EDUCATION/TRAINING PROGRAM

## 2023-01-07 PROCEDURE — 83735 ASSAY OF MAGNESIUM: CPT | Performed by: STUDENT IN AN ORGANIZED HEALTH CARE EDUCATION/TRAINING PROGRAM

## 2023-01-07 PROCEDURE — 250N000013 HC RX MED GY IP 250 OP 250 PS 637: Performed by: NURSE PRACTITIONER

## 2023-01-07 PROCEDURE — U0003 INFECTIOUS AGENT DETECTION BY NUCLEIC ACID (DNA OR RNA); SEVERE ACUTE RESPIRATORY SYNDROME CORONAVIRUS 2 (SARS-COV-2) (CORONAVIRUS DISEASE [COVID-19]), AMPLIFIED PROBE TECHNIQUE, MAKING USE OF HIGH THROUGHPUT TECHNOLOGIES AS DESCRIBED BY CMS-2020-01-R: HCPCS | Performed by: STUDENT IN AN ORGANIZED HEALTH CARE EDUCATION/TRAINING PROGRAM

## 2023-01-07 PROCEDURE — 250N000013 HC RX MED GY IP 250 OP 250 PS 637: Performed by: INTERNAL MEDICINE

## 2023-01-07 PROCEDURE — 120N000009 HC R&B SNF

## 2023-01-07 PROCEDURE — 80048 BASIC METABOLIC PNL TOTAL CA: CPT | Performed by: STUDENT IN AN ORGANIZED HEALTH CARE EDUCATION/TRAINING PROGRAM

## 2023-01-07 PROCEDURE — 85027 COMPLETE CBC AUTOMATED: CPT | Performed by: STUDENT IN AN ORGANIZED HEALTH CARE EDUCATION/TRAINING PROGRAM

## 2023-01-07 RX ORDER — FLUTICASONE PROPIONATE 50 MCG
1 SPRAY, SUSPENSION (ML) NASAL DAILY
Status: DISCONTINUED | OUTPATIENT
Start: 2023-01-08 | End: 2023-01-08

## 2023-01-07 RX ORDER — NALOXONE HYDROCHLORIDE 0.4 MG/ML
0.2 INJECTION, SOLUTION INTRAMUSCULAR; INTRAVENOUS; SUBCUTANEOUS
Status: DISCONTINUED | OUTPATIENT
Start: 2023-01-07 | End: 2023-01-09 | Stop reason: HOSPADM

## 2023-01-07 RX ORDER — ATORVASTATIN CALCIUM 10 MG/1
20 TABLET, FILM COATED ORAL EVERY EVENING
Status: DISCONTINUED | OUTPATIENT
Start: 2023-01-07 | End: 2023-01-09 | Stop reason: HOSPADM

## 2023-01-07 RX ORDER — VIT B COMP NO.3/FOLIC/C/BIOTIN 1 MG-60 MG
1 TABLET ORAL DAILY
Status: DISCONTINUED | OUTPATIENT
Start: 2023-01-07 | End: 2023-01-09 | Stop reason: HOSPADM

## 2023-01-07 RX ORDER — PANTOPRAZOLE SODIUM 40 MG/1
40 TABLET, DELAYED RELEASE ORAL
Status: DISCONTINUED | OUTPATIENT
Start: 2023-01-08 | End: 2023-01-09 | Stop reason: HOSPADM

## 2023-01-07 RX ORDER — BACLOFEN 10 MG/1
10 TABLET ORAL 2 TIMES DAILY
Status: DISCONTINUED | OUTPATIENT
Start: 2023-01-07 | End: 2023-01-09 | Stop reason: HOSPADM

## 2023-01-07 RX ORDER — SERTRALINE HYDROCHLORIDE 25 MG/1
100 TABLET, FILM COATED ORAL DAILY
Status: DISCONTINUED | OUTPATIENT
Start: 2023-01-07 | End: 2023-01-09 | Stop reason: HOSPADM

## 2023-01-07 RX ORDER — NALOXONE HYDROCHLORIDE 0.4 MG/ML
0.4 INJECTION, SOLUTION INTRAMUSCULAR; INTRAVENOUS; SUBCUTANEOUS
Status: DISCONTINUED | OUTPATIENT
Start: 2023-01-07 | End: 2023-01-09 | Stop reason: HOSPADM

## 2023-01-07 RX ORDER — ONDANSETRON 4 MG/1
4 TABLET, ORALLY DISINTEGRATING ORAL EVERY 6 HOURS PRN
Status: DISCONTINUED | OUTPATIENT
Start: 2023-01-07 | End: 2023-01-09 | Stop reason: HOSPADM

## 2023-01-07 RX ORDER — METHOCARBAMOL 500 MG/1
500 TABLET, FILM COATED ORAL 4 TIMES DAILY
Status: DISCONTINUED | OUTPATIENT
Start: 2023-01-07 | End: 2023-01-09 | Stop reason: HOSPADM

## 2023-01-07 RX ORDER — OXYCODONE HYDROCHLORIDE 5 MG/1
5 TABLET ORAL EVERY 8 HOURS PRN
Status: DISCONTINUED | OUTPATIENT
Start: 2023-01-07 | End: 2023-01-09 | Stop reason: HOSPADM

## 2023-01-07 RX ORDER — FINASTERIDE 5 MG/1
1.3 TABLET, FILM COATED ORAL DAILY
Status: DISCONTINUED | OUTPATIENT
Start: 2023-01-07 | End: 2023-01-09 | Stop reason: HOSPADM

## 2023-01-07 RX ORDER — CETIRIZINE HYDROCHLORIDE 10 MG/1
10 TABLET ORAL DAILY
Status: DISCONTINUED | OUTPATIENT
Start: 2023-01-07 | End: 2023-01-09 | Stop reason: HOSPADM

## 2023-01-07 RX ORDER — ACETAMINOPHEN 325 MG/1
650 TABLET ORAL EVERY 4 HOURS PRN
Status: DISCONTINUED | OUTPATIENT
Start: 2023-01-07 | End: 2023-01-09 | Stop reason: HOSPADM

## 2023-01-07 RX ORDER — SEVELAMER CARBONATE 800 MG/1
1600 TABLET, FILM COATED ORAL
Status: DISCONTINUED | OUTPATIENT
Start: 2023-01-07 | End: 2023-01-09 | Stop reason: HOSPADM

## 2023-01-07 RX ORDER — MIDODRINE HYDROCHLORIDE 5 MG/1
10 TABLET ORAL
Status: DISCONTINUED | OUTPATIENT
Start: 2023-01-07 | End: 2023-01-09 | Stop reason: HOSPADM

## 2023-01-07 RX ORDER — GABAPENTIN 300 MG/1
300 CAPSULE ORAL 3 TIMES DAILY
Status: DISCONTINUED | OUTPATIENT
Start: 2023-01-07 | End: 2023-01-09 | Stop reason: HOSPADM

## 2023-01-07 RX ORDER — ACETAMINOPHEN 650 MG/1
650 SUPPOSITORY RECTAL EVERY 4 HOURS PRN
Status: DISCONTINUED | OUTPATIENT
Start: 2023-01-07 | End: 2023-01-09 | Stop reason: HOSPADM

## 2023-01-07 RX ORDER — FOLIC ACID 1 MG/1
1 TABLET ORAL DAILY
Status: DISCONTINUED | OUTPATIENT
Start: 2023-01-07 | End: 2023-01-09 | Stop reason: HOSPADM

## 2023-01-07 RX ADMIN — SERTRALINE HYDROCHLORIDE 100 MG: 25 TABLET ORAL at 21:03

## 2023-01-07 RX ADMIN — ACETAMINOPHEN 650 MG: 325 TABLET, FILM COATED ORAL at 15:15

## 2023-01-07 RX ADMIN — GABAPENTIN 300 MG: 300 CAPSULE ORAL at 21:05

## 2023-01-07 RX ADMIN — METHOCARBAMOL 500 MG: 500 TABLET ORAL at 17:09

## 2023-01-07 RX ADMIN — GABAPENTIN 300 MG: 300 CAPSULE ORAL at 15:55

## 2023-01-07 RX ADMIN — Medication 10 MG: at 21:03

## 2023-01-07 RX ADMIN — MIDODRINE HYDROCHLORIDE 10 MG: 5 TABLET ORAL at 17:09

## 2023-01-07 RX ADMIN — APIXABAN 2.5 MG: 2.5 TABLET, FILM COATED ORAL at 21:04

## 2023-01-07 RX ADMIN — PANTOPRAZOLE SODIUM 40 MG: 40 TABLET, DELAYED RELEASE ORAL at 08:39

## 2023-01-07 RX ADMIN — SEVELAMER CARBONATE 1600 MG: 800 TABLET, FILM COATED ORAL at 17:09

## 2023-01-07 RX ADMIN — Medication 100 MG: at 21:04

## 2023-01-07 RX ADMIN — MIDODRINE HYDROCHLORIDE 10 MG: 5 TABLET ORAL at 12:17

## 2023-01-07 RX ADMIN — METHOCARBAMOL 500 MG: 500 TABLET ORAL at 08:39

## 2023-01-07 RX ADMIN — FOLIC ACID 1 MG: 1 TABLET ORAL at 21:04

## 2023-01-07 RX ADMIN — METHOCARBAMOL 500 MG: 500 TABLET ORAL at 12:18

## 2023-01-07 RX ADMIN — GABAPENTIN 300 MG: 300 CAPSULE ORAL at 08:39

## 2023-01-07 RX ADMIN — BACLOFEN 10 MG: 10 TABLET ORAL at 08:39

## 2023-01-07 RX ADMIN — FLUTICASONE PROPIONATE 1 SPRAY: 50 SPRAY, METERED NASAL at 08:37

## 2023-01-07 RX ADMIN — SEVELAMER CARBONATE 1600 MG: 800 TABLET, FILM COATED ORAL at 08:37

## 2023-01-07 RX ADMIN — THIAMINE HCL TAB 100 MG 100 MG: 100 TAB at 21:04

## 2023-01-07 RX ADMIN — CETIRIZINE HYDROCHLORIDE 10 MG: 10 TABLET, FILM COATED ORAL at 21:04

## 2023-01-07 RX ADMIN — OXYCODONE HYDROCHLORIDE 5 MG: 5 TABLET ORAL at 21:04

## 2023-01-07 RX ADMIN — ACETAMINOPHEN 975 MG: 325 TABLET, FILM COATED ORAL at 08:38

## 2023-01-07 RX ADMIN — ACETAMINOPHEN 975 MG: 325 TABLET, FILM COATED ORAL at 12:17

## 2023-01-07 RX ADMIN — METHOCARBAMOL 500 MG: 500 TABLET ORAL at 21:05

## 2023-01-07 RX ADMIN — Medication 1 TABLET: at 21:05

## 2023-01-07 RX ADMIN — ATORVASTATIN CALCIUM 20 MG: 10 TABLET, FILM COATED ORAL at 21:04

## 2023-01-07 RX ADMIN — APIXABAN 2.5 MG: 2.5 TABLET, FILM COATED ORAL at 08:39

## 2023-01-07 ASSESSMENT — ACTIVITIES OF DAILY LIVING (ADL)
ADLS_ACUITY_SCORE: 53
FALL_HISTORY_WITHIN_LAST_SIX_MONTHS: YES
TOILETING_ISSUES: YES
ADLS_ACUITY_SCORE: 60
ADLS_ACUITY_SCORE: 60
DRESS: 1-->ASSISTANCE (EQUIPMENT/PERSON) NEEDED
ADLS_ACUITY_SCORE: 39
DIFFICULTY_EATING/SWALLOWING: NO
ADLS_ACUITY_SCORE: 60
DRESSING/BATHING_DIFFICULTY: YES
BATHING: 2-->COMPLETELY DEPENDENT (NOT DEVELOPMENTALLY APPROPRIATE)
DRESS: 1-->ASSISTANCE (EQUIPMENT/PERSON) NEEDED (NOT DEVELOPMENTALLY APPROPRIATE)
TOILETING_MANAGEMENT: NEEDS ASSISTANCE
DOING_ERRANDS_INDEPENDENTLY_DIFFICULTY: NO
ADLS_ACUITY_SCORE: 60
ADLS_ACUITY_SCORE: 60
ADLS_ACUITY_SCORE: 57
ADLS_ACUITY_SCORE: 55
ADLS_ACUITY_SCORE: 55
CONCENTRATING,_REMEMBERING_OR_MAKING_DECISIONS_DIFFICULTY: YES
TRANSFERRING: 2-->COMPLETELY DEPENDENT (NOT DEVELOPMENTALLY APPROPRIATE)
TOILETING: 1-->ASSISTANCE (EQUIPMENT/PERSON) NEEDED (NOT DEVELOPMENTALLY APPROPRIATE)
CHANGE_IN_FUNCTIONAL_STATUS_SINCE_ONSET_OF_CURRENT_ILLNESS/INJURY: YES
WALKING_OR_CLIMBING_STAIRS: AMBULATION DIFFICULTY, REQUIRES EQUIPMENT;AMBULATION DIFFICULTY, ASSISTANCE 1 PERSON
TRANSFERRING: 2-->COMPLETELY DEPENDENT
ADLS_ACUITY_SCORE: 60
TOILETING_ASSISTANCE: TOILETING DIFFICULTY, ASSISTANCE 1 PERSON
ADLS_ACUITY_SCORE: 60
WALKING_OR_CLIMBING_STAIRS_DIFFICULTY: YES
TOILETING: 1-->ASSISTANCE (EQUIPMENT/PERSON) NEEDED
EQUIPMENT_CURRENTLY_USED_AT_HOME: WHEELCHAIR, MANUAL
WEAR_GLASSES_OR_BLIND: YES

## 2023-01-07 NOTE — PLAN OF CARE
Time: 1900-0730  Temp: (!) 96.4  F (35.8  C) Temp src: Oral BP: 126/69 Pulse: 65   Resp: 16 SpO2: 97 % O2 Device: None (Room air)       Activity: Assist with lift to chair.   Diet: Regular.   Pain: Minimal pain in neck.   Neuro: Alert, oriented x4. Forgetful, short term memory loss. Denies new numbness or tingling.   Cardiac: WDL.   Respiratory: Lung sounds clear/diminished.   Skin: Blanchable redness sacrum and butt, barrier cream applied and foam dressing in place. Patient wearing prafo boots.   GI/: Large incontinent urine episode. Soft incontinent BM.   PIV: No access.

## 2023-01-07 NOTE — PROGRESS NOTES
Care Management Discharge Note    Discharge Date: 01/07/2023       Discharge Disposition: Skilled Nursing Facility, Transitional Care    Discharge Services: resumption of outpatient dialysis-I called [facility] ([phone], [place]) and notified them of patient's discharge, current functional status (using rachid for transfers) and plan to resume outpatient dialysis on Monday, 1/9.  I will fax patient's run records and discharge summary to Linda.    Discharge DME: None    Discharge Transportation: family or friend will provide, agency    Private pay costs discussed: transportation costs to dialysis-arranged through [employer]    PAS Confirmation Code: [id number]  Patient/family educated on Medicare website which has current facility and service quality ratings: yes    Education Provided on the Discharge Plan:  yes  Persons Notified of Discharge Plans: Shay and his son   Patient/Family in Agreement with the Plan: yes    Handoff Referral Completed: Yes    Additional Information:  IMM-complete with patient and son         Narayansherie ELLIOTTTABITHA Diaz, [facility]   1/7/2023  -pager [phone]    Social Work and Care Management Department       SEARCHABLE in [employer] - search SOCIAL WORK       [place] (0800 - 1630) Saturday and Sunday     Units: 4A, 4C, & 4E Pager: [phone]     Units: 5A & 5B Pager: [phone]     Units: 6A & 6B   Pager: [phone]     Units: 6C & 6D Pager: [phone]     Units: 7A &7B  Pager: [phone]     Units: 7C, 7D, & 5C Pager: [phone]     Unit: [place] ED Pager: [phone]      [facility] (9311-9904) Saturday and Sunday      Units: 5 Ortho, 5 Med/Surg & WB ED  Pager:[phone]     Units: 6 Med/Surg, 8A, & 10A ICU  Pager: [phone]        After hours (1630 - 0000) Saturday & Sunday; (7316-8807) Mon-Fri; (8856-5061) [facility] Recognized Holidays     Units: ALL  Pager: [phone]

## 2023-01-07 NOTE — PLAN OF CARE
Goal Outcome Evaluation:    Plan of Care Reviewed With: patient    Overall Patient Progress: improving       Activity: Assist with lift to chair.   Diet: Regular, appetite good.  Pain: Minimal pain in neck, controlled with tylenol.  Neuro: Alert, oriented x4. Denies new numbness or tingling. Has baseline numbness in lower extremities.  Cardiac: WDL.   Respiratory: Lung sounds clear/diminished.   Skin: Blanchable redness sacrum. Foam dressing in place. Patient wearing prafo boots.   GI/: Pt on hemodialysis. Had 2 soft incontinent stools this shift.    Transferred by WC to [place] [facility]. Report given to TCU RN prior to discharge.

## 2023-01-07 NOTE — PROGRESS NOTES
Care Management Discharge Note    Discharge Date: 01/07/2023       Discharge Disposition: Skilled Nursing Facility, Transitional Care  [facility]  [address] [place] [address]  [address]  P: [phone]  F: [phone]    Discharge Services: resumption of outpatient dialysis-I called [facility] ([phone], [place]) and notified them of patient's discharge, current functional status (using archid for transfers) and plan to resume outpatient dialysis on Monday, 1/9.  I will fax patient's run records and discharge summary to Linda.    Discharge DME: None    Discharge Transportation:  [email or web address] wheelchair transportation ([place], [phone]) for 1:15pm today.    Private pay costs discussed: Transportation to dialysis is private pay.   [facility] ([phone]) MWF  patient runs from 7:15am-11:50am.  Total cost per round trip will $120 per trip.  This was arranged by STEFAN Dos SantosW, LSW.  I reviewed this information with patient and his son Cory.  I also emailed Cory information about [employer].    PAS Confirmation Code: [id number]  Patient/family educated on Medicare website which has current facility and service quality ratings: yes    Education Provided on the Discharge Plan:  yes  Persons Notified of Discharge Plans: Shay and his son Cory  Patient/Family in Agreement with the Plan: yes    Handoff Referral Completed: Yes    Additional Information:  IMM-dicussed with patient and his son Cory.  Faxed completed form to [phone].        Merritt Rodriguez, MSW, LICSW   1/7/2023  -pager [phone]    Social Work and Care Management Department       SEARCHABLE in [employer] - search SOCIAL WORK       [place] (4186 - 1424) Saturday and Sunday     Units: 4A, 4C, & 4E Pager: [phone]     Units: 5A & 5B Pager: [phone]     Units: 6A & 6B   Pager: [phone]     Units: 6C & 6D Pager: [phone]     Units: 7A &7B  Pager: [phone]     Units: 7C, 7D, & 5C Pager: [phone]      Unit: [place] ED Pager: [phone]      [facility] [place] (7647-3405) Saturday and Sunday      Units: 5 Ortho, 5 Med/Surg & WB ED  Pager:[phone]     Units: 6 Med/Surg, 8A, & 10A ICU  Pager: [phone]        After hours (1630 - 0000) Saturday & Sunday; (4326-8111) Mon-Fri; (5315-7283) [facility] Recognized Holidays     Units: ALL  Pager: [phone]

## 2023-01-07 NOTE — PHARMACY-ADMISSION MEDICATION HISTORY
Please see Admission Medication History note completed by pharmacist on 12/26/2022 under previous encounter at [facility] [address] for information regarding prior to admission medications.    Tristan Farrell, PharmD  PGY1 Pharmacist Resident

## 2023-01-07 NOTE — PLAN OF CARE
Patient is a 57 year old male  admitted to room 407 via wheelchair, HE transport.  Patient is alert and oriented X 4. See Epic for VS and assessment.  Patient is able to transfer A-2  using likolift; can stand and vivot with PT. Hx of ESRD, is HD dependent, M-W-F. Has HD fistula on BRITTNEY, ++ bruit. C5 to T6 spinal fusion revision, surgical incision to upper back & neck area. Pt must wear neck brace when up or out of bed. Pt made it clear that he does not want to be on Renal diet, placed on RD (MD informed). Has open area to Right heel,  covered with Mepilex.  Patient was settled into their room, shown call light, tv, mealtimes etc. Oriented to unit. Will continue monitoring pain level and VS. Notifying MD with any concerns.  Follow MD orders for cares and medications.    Level of Schooling:college  Ethnicity: and Other  Marital Status:  Dentures: No  Hearing Aid: No  Smoker:  No  Glasses: Yes  Occupation:   Falls 0-1 mo: 0 2-6 mo: 0  Stairs prior function: Dependent  Prior device use: Manual Wheelchair and Walker   Advanced Care Directive Referral to Social Work?Yes

## 2023-01-07 NOTE — PLAN OF CARE
Goal Outcome Evaluation: Ongoing; Progressing      Plan of Care Reviewed With: patient    Overall Patient Progress: no change    Outcome Evaluation: Pt oriented, medium BM this afternoon, Pt denies pain.    RN assumed cares at 1500, Pt alert and oriented, VS stable this afternoon.  Pt denies pain this shift.  No PIV, MD aware.  Pt able to turn well in bed.  Plan for TCU once medically ready and accepted.  No acute incidents this shift.  Continue to monitor and notify MD of any changes.

## 2023-01-07 NOTE — PROGRESS NOTES
[facility], [place]   Neurosurgery Progress Note:    Assessment: Shay Jimenez is a 57 year old male with with PMH ESRD on dialysis and osteomyelitis s/p C5-T6 fusion who is now s/p redo C5-T6 fusion on 12/06/22 with concern for hemorrhage; no vertebral artery dissection or extravasation was noted on intraoperative angiogram.  He was initially  admitted to the Neuro ICU postoperatively for MAP goals and frequent neuro checks, remaining stable on pressors.  Neuro exam is unchanged from patient's baseline.    Plan:  - Appreciate Medicine assistance   - Serial neuro exams  - Pain control  - Hb goal > 7  - Normonatremia  - Goal normotension  - Appreciate Nephrology recommendations re: dialysis (baseline M/W/F)  - Daily dressing changes   - Cervical collar when HOB >45,  on when OOB  - Regular diet  - Bowel regimen  - PRN antiemetics  - PT/OT  - SCDs and subcutaneous heparin for DVT proph  - Eliquis restarted- 12.20.2022  - Psychiatry consult for capacity evaluation- patient unable to make decisions   - Medically ready for disposition  -----------------------------------  Delta Man MD, PhD  PGY-2 Neurosurgery  Please page NSGY on call with questions      Interval History/Subjective:  No acute events overnight.     Objective:   Temp:  [96.2  F (35.7  C)-97.1  F (36.2  C)] 96.2  F (35.7  C)  Pulse:  [62-81] 65  Resp:  [10-22] 16  BP: ()/(51-83) 142/76  SpO2:  [91 %-97 %] 96 %  I/O last 3 completed shifts:  In: 0   Out: 4000 [Other:4000]    Gen: Appears comfortable, NAD  Wound: Incision, clean, dry, intact without strikethrough  Neurologic:  - Alert & Oriented to person, place, time, and situation  - Follows commands briskly  - Speech fluent, spontaneous. No aphasia or dysarthria.  - No gaze preference. No apparent hemineglect.  - PERRL, EOMI  - Strong brow raise, eye closure, and smile  - Face symmetric with sensation intact to light touch  - Trapezii and sternocleidomastoid  muscles 5/5 bilaterally  - No pronator drift     Del Tr Bi WE WF Gr   R 4+ 5 5 5 5 5   L 4+ 5 5 5 5 5    HF KE KF DF PF EHL   R 4+ 5 5 5 5 5   L 4+ 5 5 5 5 5       Sensation intact and symmetric to light touch throughout    LABS  Recent Labs   Lab Test 01/06/23  0731 01/05/23  0653 01/04/23  0804   WBC 7.0 6.4 7.2   HGB 9.4* 9.6* 9.7*   MCV 97 98 99    225 230       Recent Labs   Lab Test 01/06/23  0731 01/05/23  0653 01/04/23  0804   * 130* 132*   POTASSIUM 4.3 4.3 4.7   CHLORIDE 93* 93* 95*   CO2 21* 23 22   BUN 34.0* 25.8* 38.1*   CR 6.13* 4.76* 5.82*   ANIONGAP 16* 14 15   MAC 9.0 9.3 9.3   GLC 83 84 91       IMAGING  No results found for this or any previous visit (from the past 24 hour(s)).

## 2023-01-07 NOTE — DISCHARGE SUMMARY
[facility] Discharge Summary and Instructions    Shay Jimenez MRN# [id number]   Age: 57 year old YOB: 1965     Date of Admission:  12/6/2022  Date of Discharge::  1/7/2023  Admitting Physician:  Fritz Boles MD  Discharge Physician:  Dr Piter Ricardo MD          Admission Diagnoses:   S/P spinal fusion [Z98.1]  S/P cervical spinal fusion [Z98.1]          Discharge Diagnosis:     S/P spinal fusion [Z98.1]  S/P cervical spinal fusion [Z98.1]          Procedures:   Procedure/Surgery Information   Procedure: Procedure(s):  O-Arm/Stealth Assisted Revision of Cervical 5 to Thoracic 6 Fusion   Surgeon(s): Surgeon(s) and Role:     * Fritz Boles MD - Primary     * Romeo Chang MD - Resident - Assisting   Incision time: 8 Hr 56 Min 48 Sec   Specimens: ID Type Source Tests Collected by Time Destination   A : perispinal muscle Tissue Spine ANAEROBIC BACTERIAL CULTURE ROUTINE, AEROBIC BACTERIAL CULTURE ROUTINE Fritz Boles MD 12/6/2022  6:15 PM                       Brief History of Illness:   previous C5-T6 fusion for thoracic pathologic fracture 2/2 osteomyelitis c/b hardware failure with screw pullout at C5 and C6, and pseudoarthrosis.            Hospital Course:   See below for itemized timeline:    12/6:  OR as above.  Concern for right vertebral artery bleeding intraop- taken to angiogram which was negative.   12/7:  2U RBC for hgb < 8.  Received HD.   12/10:  started on CIWA for hallucinations, concern for ETOH w/d  12/12: mentation improving, receiving HD.  ID signed off.   12/13: WOC consult.  12/15: Daily wound dressing changes ordered  12/16: hyponatremia.  On-going confusion.  CIWA HD today  12/19:  more alert, awaiting transfer to floor.  Psych evaluating for capacity   12/20:  proximal 2/3 of sutures/staples removed.  Distal 1/3 kept in place.  Eliquis restarted.  Ancef started for wound ppx.   12/23: Dialysis. Awaiting TCU.  12/24-12/26: [facility]  12/27: Switched to  keflex  12/28:  awaiting placement   12/29:  [facility], awaiting placement   12/31: Pending placement.  1/1-1/2: [facility]  1/3: left pupil > right 1 mm, held oxy x1, resolved. [facility]  1/4: HD. Oxy decreased to 2.5mg and scheduled to fall off 1/5 pm.  1/5: patient denying neck pain, oxy off, seroquel off  1/6: [place] paperwork completed. Plan again for TCU  1/7: Discharged to TCU, voiding without a de los santos, eating a regular diet, pain was well controlled and therefore he was discharged.       Recommendations for rehab:  - Appreciate Medicine assistance   - Serial neuro exams  - Pain control  - Hb goal > 7  - Normonatremia  - Goal normotension  - Appreciate Nephrology recommendations re: dialysis (baseline M/W/F)  - Daily dressing changes   - Cervical collar when HOB >45,  on when OOB  - Regular diet  - Bowel regimen  - PRN antiemetics  - PT/OT  - SCDs and subcutaneous heparin for DVT proph  - Eliquis restarted for atrial fibrillation- 12.20.2022  - Medically ready for disposition  Clinically Significant Risk Factors         # Hyponatremia: Lowest Na = 129 mmol/L in last 2 days, will monitor as appropriate      # Hypoalbuminemia: Lowest albumin = 3.1 g/dL at 12/11/2022  5:28 AM, will monitor as appropriate                  Gen: Appears comfortable, NAD  Wound: Incision, clean, dry, intact without strikethrough  Neurologic:  - Alert & Oriented to person, place, time, and situation  - Follows commands briskly  - Speech fluent, spontaneous. No aphasia or dysarthria.  - No gaze preference. No apparent hemineglect.  - PERRL, EOMI  - Strong brow raise, eye closure, and smile  - Face symmetric with sensation intact to light touch  - Trapezii and sternocleidomastoid muscles 5/5 bilaterally  - No pronator drift       Del Tr Bi WE WF Gr   R 4+ 5 5 5 5 5   L 4+ 5 5 5 5 5     HF KE KF DF PF EHL   R 4+ 5 5 5 5 5   L 4+ 5 5 5 5 5         Sensation intact and symmetric to light touch throughout            Discharge Medications:     Current  Discharge Medication List      START taking these medications    Details   folic acid (FOLVITE) 1 MG tablet Take 1 tablet (1 mg) by mouth daily    Associated Diagnoses: S/P cervical spinal fusion      methocarbamol (ROBAXIN) 500 MG tablet Take 1 tablet (500 mg) by mouth 4 times daily    Comments: Muscle spasms  Associated Diagnoses: S/P cervical spinal fusion      oxyCODONE (ROXICODONE) 5 MG tablet Take 1 tablet (5 mg) by mouth every 4 hours as needed for moderate pain (4-6)  Refills: 0    Comments: pain  Associated Diagnoses: S/P cervical spinal fusion      !! QUEtiapine (SEROQUEL) 25 MG tablet Take 1 tablet (25 mg) by mouth daily    Comments: Agitation  Associated Diagnoses: S/P cervical spinal fusion      !! QUEtiapine (SEROQUEL) 50 MG tablet Take 1 tablet (50 mg) by mouth At Bedtime    Comments: Agitation  Associated Diagnoses: S/P cervical spinal fusion      ramelteon (ROZEREM) 8 MG tablet Take 1 tablet (8 mg) by mouth At Bedtime    Comments: Sleep  Associated Diagnoses: S/P cervical spinal fusion      thiamine (B-1) 100 MG tablet Take 1 tablet (100 mg) by mouth daily    Comments: Thiamine deficiency  Associated Diagnoses: S/P cervical spinal fusion       !! - Potential duplicate medications found. Please discuss with provider.      CONTINUE these medications which have CHANGED    Details   acetaminophen (TYLENOL) 325 MG tablet Take 1-2 tablets (325-650 mg) by mouth every 4 hours as needed for mild pain or fever    Comments: Pain  Associated Diagnoses: Discitis, unspecified spinal region      atorvastatin (LIPITOR) 20 MG tablet Take 1 tablet (20 mg) by mouth At Bedtime    Comments: hyperlipidemia      baclofen (LIORESAL) 10 MG tablet Take 1 tablet (10 mg) by mouth 2 times daily  Qty: 60 tablet, Refills: 1    Comments: Muscle spasms  Associated Diagnoses: S/P spinal fusion      calcium carbonate (TUMS) 500 MG chewable tablet Take 2 tablets (1,000 mg) by mouth 3 times daily as needed for heartburn    Comments:  Heart burn  Associated Diagnoses: Discitis, unspecified spinal region      cetirizine (ZYRTEC) 10 MG tablet Take 1 tablet (10 mg) by mouth daily    Comments: Allergies  Associated Diagnoses: Discitis, unspecified spinal region      cyanocobalamin (VITAMIN B-12) 500 MCG tablet Take 1 tablet (500 mcg) by mouth daily    Comments: Peripheral neuropathy      ELIQUIS ANTICOAGULANT 2.5 MG tablet TAKE 1 TABLET (2.5 MG) BY MOUTH TWO TIMES A DAY. INDICATIONS: PREVENT STROKE IN AFIB    Comments: DVT-PE      fluticasone (FLONASE) 50 MCG/ACT nasal spray Spray 2 sprays into both nostrils daily as needed    Comments: allergies      gabapentin (NEURONTIN) 300 MG capsule Take 1 capsule (300 mg) by mouth 3 times daily    Comments: Peripheral neuropathy      hydrOXYzine (ATARAX) 25 MG tablet Take 1 tablet (25 mg) by mouth 2 times daily as needed for other or itching (pain as adjuant therapy)  Qty: 60 tablet, Refills: 0    Comments: Itching  Associated Diagnoses: Discitis, unspecified spinal region; Degenerative arthritis of thoracic spine with cord compression; Peripheral polyneuropathy; Abscess in epidural space of spine      midodrine (PROAMATINE) 10 MG tablet Take 1 tablet (10 mg) by mouth 3 times daily (with meals)    Comments: Blood pressure  Associated Diagnoses: Discitis, unspecified spinal region      multivitamin RENAL (RENAVITE RX/NEPHROVITE) 1 MG tablet Take 1 tablet by mouth daily    Comments: CKD      omeprazole (PRILOSEC) 20 MG DR capsule Take 1 capsule (20 mg) by mouth daily    Comments: Indigestion      polyethylene glycol (MIRALAX) 17 GM/Dose powder Take 17 g by mouth daily  Qty: 510 g, Refills: 0    Comments: Constipation  Associated Diagnoses: Neurogenic bowel      sertraline (ZOLOFT) 100 MG tablet Take 1 tablet (100 mg) by mouth daily    Comments: Depression      sevelamer carbonate (RENVELA) 800 MG tablet Take 2 tablets (1,600 mg) by mouth 3 times daily (with meals)    Comments: CKD  Associated Diagnoses:  Discitis, unspecified spinal region      Specialty Vitamins Products (MAGNESIUM PLUS PROTEIN) 133 MG tablet Take 1 tablet (133 mg) by mouth daily    Comments: Hypomagesemia  Associated Diagnoses: S/P cervical spinal fusion      vitamin B6 (PYRIDOXINE) 100 MG tablet Take 1 tablet (100 mg) by mouth daily    Comments: B6 deficiency         CONTINUE these medications which have NOT CHANGED    Details   finasteride (PROSCAR) 5 MG tablet Take 1.25 mg by mouth daily Takes 1/4 of 5 mg daily         STOP taking these medications       ALPRAZolam (XANAX) 1 MG tablet Comments:   Reason for Stopping:         ammonium lactate (AMLACTIN) 12 % external cream Comments:   Reason for Stopping:         cefuroxime (CEFTIN) 250 MG tablet Comments:   Reason for Stopping:         cyclobenzaprine (FLEXERIL) 10 MG tablet Comments:   Reason for Stopping:         diclofenac (VOLTAREN) 1 % topical gel Comments:   Reason for Stopping:         DULoxetine (CYMBALTA) 30 MG capsule Comments:   Reason for Stopping:         Magnesium 500 MG CAPS Comments:   Reason for Stopping:         naloxone (NARCAN) 4 MG/0.1ML nasal spray Comments:   Reason for Stopping:         potassium chloride ER (KLOR-CON M) 20 MEQ CR tablet Comments:   Reason for Stopping:         sennosides (SENOKOT) 8.6 MG tablet Comments:   Reason for Stopping:                       Discharge Instructions and Follow-Up:     Discharge diet: Regular   Discharge activity: You may advance activity as tolerated. No strenuous exercise or heay lifting greater than 10 lbs for 4 weeks or until seen and cleared in clinic.   Discharge follow-up: Follow-up in clinic in 2 weeks for general postoperative cares and wound check. Schedulers emailed.   Wound care: Ok to shower,however no scrubbing of the wound and no soaking of the wound, meaning no bathtubs or swimming pools. Pat dry only. Leave wound open to air.     Please call if you have:  1. increased pain, redness, drainage, swelling at your  incision  2. fevers > 101.5 F degrees  3. with any questions or concerns.  You may reach the Neurosurgery clinic at [phone] during regular work hours. ER at [phone].    and ask for the Neurosurgery Resident on call at [phone], during off hours or weekends.         Discharge Disposition:     Shaneka Larson MD  Neurosurgery Resident      Discharged to TCU

## 2023-01-08 ENCOUNTER — APPOINTMENT (OUTPATIENT)
Dept: OCCUPATIONAL THERAPY | Facility: SKILLED NURSING FACILITY | Age: 58
DRG: 949 | End: 2023-01-08
Attending: INTERNAL MEDICINE
Payer: MEDICARE

## 2023-01-08 ENCOUNTER — APPOINTMENT (OUTPATIENT)
Dept: PHYSICAL THERAPY | Facility: SKILLED NURSING FACILITY | Age: 58
DRG: 949 | End: 2023-01-08
Attending: INTERNAL MEDICINE
Payer: MEDICARE

## 2023-01-08 PROCEDURE — 99305 1ST NF CARE MODERATE MDM 35: CPT | Mod: AI | Performed by: INTERNAL MEDICINE

## 2023-01-08 PROCEDURE — 97162 PT EVAL MOD COMPLEX 30 MIN: CPT | Mod: GP

## 2023-01-08 PROCEDURE — 250N000013 HC RX MED GY IP 250 OP 250 PS 637: Performed by: INTERNAL MEDICINE

## 2023-01-08 PROCEDURE — 120N000009 HC R&B SNF

## 2023-01-08 PROCEDURE — 97165 OT EVAL LOW COMPLEX 30 MIN: CPT | Mod: GO

## 2023-01-08 PROCEDURE — 97535 SELF CARE MNGMENT TRAINING: CPT | Mod: GO

## 2023-01-08 PROCEDURE — 97530 THERAPEUTIC ACTIVITIES: CPT | Mod: GP

## 2023-01-08 PROCEDURE — 250N000009 HC RX 250: Performed by: INTERNAL MEDICINE

## 2023-01-08 RX ORDER — FLUTICASONE PROPIONATE 50 MCG
1 SPRAY, SUSPENSION (ML) NASAL 2 TIMES DAILY
Status: DISCONTINUED | OUTPATIENT
Start: 2023-01-08 | End: 2023-01-09 | Stop reason: HOSPADM

## 2023-01-08 RX ORDER — CALCIUM CARBONATE 500 MG/1
1000 TABLET, CHEWABLE ORAL 3 TIMES DAILY PRN
Status: DISCONTINUED | OUTPATIENT
Start: 2023-01-08 | End: 2023-01-09 | Stop reason: HOSPADM

## 2023-01-08 RX ADMIN — Medication 1 TABLET: at 20:16

## 2023-01-08 RX ADMIN — THIAMINE HCL TAB 100 MG 100 MG: 100 TAB at 20:16

## 2023-01-08 RX ADMIN — METHOCARBAMOL 500 MG: 500 TABLET ORAL at 18:03

## 2023-01-08 RX ADMIN — MIDODRINE HYDROCHLORIDE 10 MG: 5 TABLET ORAL at 18:03

## 2023-01-08 RX ADMIN — METHOCARBAMOL 500 MG: 500 TABLET ORAL at 20:16

## 2023-01-08 RX ADMIN — GABAPENTIN 300 MG: 300 CAPSULE ORAL at 15:15

## 2023-01-08 RX ADMIN — ACETAMINOPHEN 650 MG: 325 TABLET, FILM COATED ORAL at 05:17

## 2023-01-08 RX ADMIN — Medication 10 MG: at 09:19

## 2023-01-08 RX ADMIN — GABAPENTIN 300 MG: 300 CAPSULE ORAL at 20:16

## 2023-01-08 RX ADMIN — ACETAMINOPHEN 650 MG: 325 TABLET, FILM COATED ORAL at 09:20

## 2023-01-08 RX ADMIN — SEVELAMER CARBONATE 1600 MG: 800 TABLET, FILM COATED ORAL at 12:08

## 2023-01-08 RX ADMIN — METHOCARBAMOL 500 MG: 500 TABLET ORAL at 09:20

## 2023-01-08 RX ADMIN — CETIRIZINE HYDROCHLORIDE 10 MG: 10 TABLET, FILM COATED ORAL at 20:16

## 2023-01-08 RX ADMIN — APIXABAN 2.5 MG: 2.5 TABLET, FILM COATED ORAL at 20:17

## 2023-01-08 RX ADMIN — SEVELAMER CARBONATE 1600 MG: 800 TABLET, FILM COATED ORAL at 09:20

## 2023-01-08 RX ADMIN — FOLIC ACID 1 MG: 1 TABLET ORAL at 20:17

## 2023-01-08 RX ADMIN — OXYCODONE HYDROCHLORIDE 5 MG: 5 TABLET ORAL at 18:03

## 2023-01-08 RX ADMIN — Medication 1 SPRAY: at 20:15

## 2023-01-08 RX ADMIN — Medication 5 UNITS: at 09:32

## 2023-01-08 RX ADMIN — SEVELAMER CARBONATE 1600 MG: 800 TABLET, FILM COATED ORAL at 18:03

## 2023-01-08 RX ADMIN — OXYCODONE HYDROCHLORIDE 5 MG: 5 TABLET ORAL at 05:17

## 2023-01-08 RX ADMIN — SERTRALINE HYDROCHLORIDE 100 MG: 25 TABLET ORAL at 20:17

## 2023-01-08 RX ADMIN — APIXABAN 2.5 MG: 2.5 TABLET, FILM COATED ORAL at 09:19

## 2023-01-08 RX ADMIN — PANTOPRAZOLE SODIUM 40 MG: 40 TABLET, DELAYED RELEASE ORAL at 05:17

## 2023-01-08 RX ADMIN — GABAPENTIN 300 MG: 300 CAPSULE ORAL at 09:20

## 2023-01-08 RX ADMIN — Medication 100 MG: at 20:17

## 2023-01-08 RX ADMIN — Medication 10 MG: at 20:17

## 2023-01-08 RX ADMIN — CALCIUM CARBONATE (ANTACID) CHEW TAB 500 MG 1000 MG: 500 CHEW TAB at 01:42

## 2023-01-08 RX ADMIN — ATORVASTATIN CALCIUM 20 MG: 10 TABLET, FILM COATED ORAL at 20:16

## 2023-01-08 RX ADMIN — METHOCARBAMOL 500 MG: 500 TABLET ORAL at 12:08

## 2023-01-08 RX ADMIN — Medication 1 SPRAY: at 09:20

## 2023-01-08 RX ADMIN — ACETAMINOPHEN 650 MG: 325 TABLET, FILM COATED ORAL at 12:08

## 2023-01-08 ASSESSMENT — ACTIVITIES OF DAILY LIVING (ADL)
ADLS_ACUITY_SCORE: 63
ADLS_ACUITY_SCORE: 57
ADLS_ACUITY_SCORE: 63

## 2023-01-08 NOTE — H&P
[facility]    History and Physical - Hospitalist Service       Date of Admission:  1/7/2023    Assessment & Plan      Shay Jimenez is a 57 year old male with history of hypertension  , ESRD on HD , GERD, GINI intolerant of CPAP, chronic atrial fibrillation  on chronic AC ,   alcohol use  Disorder,  Had  previous C5-T6 spinal fusion due to pathological fracture form osteomyelitis. He was noted to have discitis/osteomyelitis on CT of chest back in February that was done for respiratory issues  When was hospitalized at  [facility]  And was to follow up with [facility] spine ( I cannot see those records and patient  Has no recollection if anything was done ) . Later in June MRI showed T2, T3 vertebral collapse with myelopathy ,some abnormal epidural tissue and inflammation , at that time antibiotics deferred  As was stable  And was planned for intraop culture . culture turned + on day 5 and day 7 for C acnes that was felt to be contamination however due to presence of hardware it was decided to treat with ancef x 2 weeks followed by  amoxicillin x 4 weeks.  He then was treated for Morganella morganii septic shock of unclear etiology , no itraabdominal source found on CT, TTE did not show vegetations was treated with almost 9 weeks of iv cefepime 8/14-10/13 . On repeat imaging  Was found to have hardware failure  and was admitted in December for surgery and underwent C5-T6 redo cervical spine fusion  12/6/22. He did have hemorrhage intraop ,  Cerebral angio  Did not show vertebral artery injury, did receive blood, was in ICU on pressors to keep blood pressure  Up for perfusion  . Also received periop cefazolin till drain pulled . Intraop culture remained negative and there was no indication for ongoing antibiotics per ID . Hospitalization was complicated   with agitation and delirium  And was started on CIWA,  Acute on chronic respiratory failure  Was on oxygen.  He is doing well and was  trasfered to [place] [facility] 1/7/23.        Hardware failure, pseudoarthritis status post   Spinal fusion 12/6/22  Previous history of C5-T6 fusion for pathological fracture due to osteomyelitis   -intraop culture remained neg and per ID there was no indication for ongoing antibiotics   - needs cervical collar  When HOB > 45 degrees and  when OOB     Hypertension   - not on medications, he is actually on midodrine tid but hold if blood pressure  > 120     Chronic Atrial fibrillation   - patient  Had been seen by card sin past states had ZIO  patch in past but apparently  Results were lost , so would follow up  [facility] as out patient  And have repeat ZIO patch    - on AC with eliquis and is back on it , not on rate controlling medications     Mild to moderate  Pulmonary hypertension   Moderate MR  - could all be related to untreated GINI but has not tolerated CPAP   - follow up  as out patient      Chronic resp failure  GINI   Obesity   - intolerant to CPAP  - uses O2 at night 1-3 liters, continue     Acute on chronic pain  Peripheral neuropathy  - continue baclofen, gabapentin  Also on methocarbamol     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    - continue HD  M/W/F   -  kg   - refused renal diet     hyponatremia  - correct with HD     Chronic anemia  - defer back to nephrology reg epo      Chronic anticoagulation  - on chronic eliquis and Is back on it , monitor fr bleeding      HDL  - continue statins     Anxiety, depression OCD   - patient  Was taking xanax PTA, he took as PRN  But took about 4-5 / 76 days , sometimes up 2 x a day  - had not received benzodiazepines in some time so did not restart as now should not show signs of withdrawal   - he still feels anxious and was asking for xanax but did not prescribed, asked psychiatry to see   - he does not want to see addiction medicine     Alcohol use disorder  - drink 5-6 cocktail a day ( vodka )   - continue vitamins   - does not want to see chem  dep   - continue baclofen and gabapentin as can also help with craving        right heal pressure sore  -WOC to see        Diet: Regular Diet Adult    DVT Prophylaxis: eliquis   Oliveira Catheter: Not present  Lines: None     Cardiac Monitoring: None  Code Status: Full Code      Clinically Significant Risk Factors Present on Admission         # Hyponatremia: Lowest Na = 129 mmol/L in last 2 days, will monitor as appropriate       # Drug Induced Coagulation Defect: home medication list includes an anticoagulant medication                 Disposition Plan    TBD         Светлана Rios MD  Hospitalist Service  [facility]  Securely message with [employer] (more info)  Text page via [employer] Paging/Directory     ______________________________________________________________________    Chief Complaint   Neck pain     History is obtained from the patient and reviewing records in care everywhere ( neurosurgery, nephrology also notes form abbie Rodrigues )     History of Present Illness    Shay Jimenez is a 57 year old male with history of hypertension  , ESRD on HD , GERD, GINI intolerant of CPAP, chronic atrial fibrillation  on chronic AC ,   alcohol use  Disorder,  Had  previous C5-T6 spinal fusion due to pathological fracture form osteomyelitis. He was noted to have discitis/osteomyelitis on CT of chest back in February that was done for respiratory issues  When was hospitalized at  [facility]  And was to follow up with [facility] spine ( I cannot see those records and patient  Has no recollection if anything was done ) . Later in June MRI showed T2, T3 vertebral collapse with myelopathy ,some abnormal epidural tissue and inflammation , at that time antibiotics deferred  As was stable  And was planned for intraop culture . culture turned + on day 5 and day 7 for C acnes that was felt to be contamination however due to presence of hardware it was decided to treat with ancef x 2 weeks followed by   amoxicillin x 4 weeks.  He then was treated for Morganella morganii septic shock of unclear etiology , no itraabdominal source found on CT, TTE did not show vegetations was treated with almost 9 weeks of iv cefepime 8/14-10/13 . On repeat imaging  Was found to have hardware failure  and was admitted in December for surgery and underwent C5-T6 redo cervical spine fusion  12/6/22. He did have hemorrhage intraop ,  Cerebral angio  Did not show vertebral artery injury, did receive blood, was in ICU on pressors to keep blood pressure  Up for perfusion  . Also received periop cefazolin till drain pulled . Intraop culture remained negative and there was no indication for ongoing antibiotics per ID . Hospitalization was complicated   with agitation and delirium  And was started on CIWA,  Acute on chronic respiratory failure  Was on oxygen.  He is doing well and was trasfered to [facility] 1/7/23.      He is doing ok, has not been sleeping well , uses O2 1-3 liters at night as intolerant to CPAP , no chest pain  No shortness of breath  No nausea vomiting or diarrhea       Past Medical History    Past Medical History:   Diagnosis Date     Chronic kidney disease      Neuropathy        Past Surgical History   Past Surgical History:   Procedure Laterality Date     IR CAROTID CEREBRAL ANGIOGRAM BILATERAL  12/6/2022     OPTICAL TRACKING SYSTEM FUSION POSTERIOR SPINE THORACIC THREE+ LEVELS N/A 6/27/2022    Procedure: Cervical 6 to Thoracic 6 Fusion and Thoracic 2-3 Decompression;  Surgeon: rFitz Boles MD;  Location:  OR     OPTICAL TRACKING SYSTEM FUSION POSTERIOR SPINE THORACIC THREE+ LEVELS N/A 12/6/2022    Procedure: O-Arm/Stealth Assisted Revision of Cervical 5 to Thoracic 6 Fusion;  Surgeon: Fritz Boles MD;  Location: [facility] OR       Prior to Admission Medications   Prior to Admission Medications   Prescriptions Last Dose Informant Patient Reported? Taking?   ELIQUIS ANTICOAGULANT 2.5 MG tablet 1/7/2023 at AM  Yes  Yes   Sig: TAKE 1 TABLET (2.5 MG) BY MOUTH TWO TIMES A DAY. INDICATIONS: PREVENT STROKE IN AFIB   QUEtiapine (SEROQUEL) 25 MG tablet 1/4/2023  No No   Sig: Take 1 tablet (25 mg) by mouth daily   QUEtiapine (SEROQUEL) 50 MG tablet 1/4/2023  No No   Sig: Take 1 tablet (50 mg) by mouth At Bedtime   Specialty Vitamins Products (MAGNESIUM PLUS PROTEIN) 133 MG tablet 1/6/2023 at PM  No Yes   Sig: Take 1 tablet (133 mg) by mouth daily   acetaminophen (TYLENOL) 325 MG tablet 1/7/2023 at AM  Yes Yes   Sig: Take 1-2 tablets (325-650 mg) by mouth every 4 hours as needed for mild pain or fever   atorvastatin (LIPITOR) 20 MG tablet 1/6/2023 at PM  Yes Yes   Sig: Take 1 tablet (20 mg) by mouth At Bedtime   baclofen (LIORESAL) 10 MG tablet 1/7/2023 at AM  Yes Yes   Sig: Take 1 tablet (10 mg) by mouth 2 times daily   calcium carbonate (TUMS) 500 MG chewable tablet More than a month at PRN  Yes Yes   Sig: Take 2 tablets (1,000 mg) by mouth 3 times daily as needed for heartburn   cetirizine (ZYRTEC) 10 MG tablet 1/6/2023 at PM  Yes Yes   Sig: Take 1 tablet (10 mg) by mouth daily   cyanocobalamin (VITAMIN B-12) 500 MCG tablet 1/6/2023 at AM  Yes Yes   Sig: Take 1 tablet (500 mcg) by mouth daily   finasteride (PROSCAR) 5 MG tablet 1/6/2023 at PM  Yes Yes   Sig: Take 1.25 mg by mouth daily Takes 1/4 of 5 mg daily   fluticasone (FLONASE) 50 MCG/ACT nasal spray 1/7/2023 at AM  Yes Yes   Sig: Spray 2 sprays into both nostrils daily as needed   folic acid (FOLVITE) 1 MG tablet 1/6/2023 at PM  No Yes   Sig: Take 1 tablet (1 mg) by mouth daily   gabapentin (NEURONTIN) 300 MG capsule 1/7/2023 at AM  Yes Yes   Sig: Take 1 capsule (300 mg) by mouth 3 times daily   hydrOXYzine (ATARAX) 25 MG tablet Past Month at PRN  Yes Yes   Sig: Take 1 tablet (25 mg) by mouth 2 times daily as needed for other or itching (pain as adjuant therapy)   methocarbamol (ROBAXIN) 500 MG tablet 1/7/2023 at noon  No Yes   Sig: Take 1 tablet (500 mg) by mouth 4 times  daily   midodrine (PROAMATINE) 10 MG tablet 1/7/2023 at AM  Yes Yes   Sig: Take 1 tablet (10 mg) by mouth 3 times daily (with meals)   multivitamin RENAL (RENAVITE RX/NEPHROVITE) 1 MG tablet 1/6/2023 at PM  Yes Yes   Sig: Take 1 tablet by mouth daily   omeprazole (PRILOSEC) 20 MG DR capsule Past Month at switched to protonix on admission  Yes Yes   Sig: Take 1 capsule (20 mg) by mouth daily   oxyCODONE (ROXICODONE) 5 MG tablet 1/2/2023 at PRN  No No   Sig: Take 1 tablet (5 mg) by mouth every 4 hours as needed for moderate pain (4-6)   polyethylene glycol (MIRALAX) 17 GM/Dose powder 12/31/2022 at AM  Yes Yes   Sig: Take 17 g by mouth daily   ramelteon (ROZEREM) 8 MG tablet 1/5/2023  No No   Sig: Take 1 tablet (8 mg) by mouth At Bedtime   sertraline (ZOLOFT) 100 MG tablet 1/6/2023 at PM  Yes Yes   Sig: Take 1 tablet (100 mg) by mouth daily   sevelamer carbonate (RENVELA) 800 MG tablet 1/7/2023 at AM  Yes Yes   Sig: Take 2 tablets (1,600 mg) by mouth 3 times daily (with meals)   thiamine (B-1) 100 MG tablet 1/6/2023 at PM  No Yes   Sig: Take 1 tablet (100 mg) by mouth daily   vitamin B6 (PYRIDOXINE) 100 MG tablet 1/6/2023 at PM  Yes Yes   Sig: Take 1 tablet (100 mg) by mouth daily      Facility-Administered Medications: None           Physical Exam   Vital Signs: Temp: (!) 96.3  F (35.7  C) Temp src: Oral BP: (!) 140/70 Pulse: 85   Resp: 18 SpO2: (!) 87 % O2 Device: None (Room air)    Weight: 0 lbs 0 oz     General appearence: awake alert  In no apparent distress    HEENT: EOMI, PEARLA, sclera nonicteric,  moist,  mucus membranes,   NECK : supple  RESPIRATORY: lungs clear to auscultation bilateral,  no wheezing or crackles   CARDIOVASCULAR:S1 S2 regular rate and rhythm, no rubs gallops or murmurs appreciated  GASTROINTESTINAL:soft, non-distended , non-tender , + bowel sounds,  SKIN: warm and dry, no mottling noted   NEUROLOGIC; awake alert and oriented, no focal deficits found, 5/5  in both arms also strong dorsi  and plantarflexion bilateral    EXTREMITIES: no clubbing, cyanosis or edema , moves all extremity, good pedal pulses   MUSCULOSKELETAL: without deformity         Data       Imaging results reviewed over the past 24 hrs:   No results found for this or any previous visit (from the past 24 hour(s)).  Recent Labs   Lab 01/07/23  0736 01/06/23  0731 01/05/23  0653   WBC 7.0 7.0 6.4   HGB 9.5* 9.4* 9.6*   MCV 98 97 98    217 225   * 130* 130*   POTASSIUM 4.0 4.3 4.3   CHLORIDE 91* 93* 93*   CO2 21* 21* 23   BUN 21.0* 34.0* 25.8*   CR 4.58* 6.13* 4.76*   ANIONGAP 17* 16* 14   MAC 9.0 9.0 9.3   GLC 75 83 84

## 2023-01-08 NOTE — PHARMACY-MEDICATION REGIMEN REVIEW
Pharmacy Medication Regimen Review  Shay Jimenez is a 57 year old male who is currently in the Transitional Care Unit.    Assessment: Upon review of the medications and patient chart the following irregularities were found: Significant Drug Interactions:     baclofen and methocarbamol are both CNS depressants, monitor for confusion, drowsiness, and weakness  Other Recommendations:     Patient was taking scheduled Tylenol 975 mg TID during previous admission, consider scheduling if patient has trouble with pain control.     Consider ordering a PRN bowel regimen.     Patient was taking cyanocobalamin 500 mcg daily during previous admission, consider re-ordering for this admission.     Plan:   1. Consider scheduling Tylenol if patient has trouble with pain control (was taking TID scheduled during previous admission).  2. Consider ordering a PRN bowel regimen.  3. Consider re-ordering cyanocobalamin 500 mcg daily, patient was taking on previous admission.  4. Continue current medication regimen.     Attending provider will be sent this note for review.  If there are any emergent issues noted above, pharmacist will contact provider directly by phone.      Pharmacy will periodically review the resident's medication regimen for any PRN medications not administered in > 72 hours and discontinue them. The pharmacist will discuss gradual dose reductions of psychopharmacologic medications with interdisciplinary team on a regular basis.    Please contact pharmacy if the above does not answer specific medication questions/concerns.  Irina Sepulveda [facility] on 1/8/2023 at 12:14 PM    Background:  A pharmacist has reviewed all medications and pertinent medical history today.  Medications were reviewed for appropriate use and any irregularities found are listed with recommendations.      Current Facility-Administered Medications:      [START ON 1/10/2023] - Skin Test Reading -, , Does not apply, Q21 Days, Светлана Rios MD      acetaminophen (TYLENOL) Suppository 650 mg, 650 mg, Rectal, Q4H PRN, Светлана Rios MD     acetaminophen (TYLENOL) tablet 650 mg, 650 mg, Oral, Q4H PRN, Светлана Rios MD, 650 mg at 01/08/23 0920     apixaban ANTICOAGULANT (ELIQUIS) tablet 2.5 mg, 2.5 mg, Oral, BID, Светлана Rios MD, 2.5 mg at 01/08/23 0919     atorvastatin (LIPITOR) tablet 20 mg, 20 mg, Oral, QPM, Светлана Rios MD, 20 mg at 01/07/23 2104     baclofen (LIORESAL) half-tab 10 mg, 10 mg, Oral, BID, Светлана Rios MD, 10 mg at 01/08/23 0919     calcium carbonate (TUMS) chewable tablet 1,000 mg, 1,000 mg, Oral, TID PRN, Ashley Le MD, 1,000 mg at 01/08/23 0142     cetirizine (zyrTEC) tablet 10 mg, 10 mg, Oral, Daily, Светлана Rios MD, 10 mg at 01/07/23 2104     finasteride (PROSCAR) suspension 1.3 mg, 1.3 mg, Oral, Daily, Светлана Rios MD     fluticasone (FLONASE) 50 MCG/ACT spray 1 spray, 1 spray, Both Nostrils, BID, Светлана Rios MD, 1 spray at 01/08/23 0920     folic acid (FOLVITE) tablet 1 mg, 1 mg, Oral, Daily, Светлана Rios MD, 1 mg at 01/07/23 2104     gabapentin (NEURONTIN) capsule 300 mg, 300 mg, Oral, TID, Светлана Rios MD, 300 mg at 01/08/23 0920     melatonin tablet 5 mg, 5 mg, Oral, At Bedtime PRN, Светлана Rios MD     methocarbamol (ROBAXIN) tablet 500 mg, 500 mg, Oral, 4x Daily, Светлана Rios MD, 500 mg at 01/08/23 0920     midodrine (PROAMATINE) tablet 10 mg, 10 mg, Oral, TID w/meals, Светлана Rios MD, 10 mg at 01/07/23 1709     multivitamin RENAL (RENAVITE RX/NEPHROVITE) tablet 1 tablet, 1 tablet, Oral, Daily, Светлана Rios MD, 1 tablet at 01/07/23 2105     naloxone (NARCAN) injection 0.2 mg, 0.2 mg, Intravenous, Q2 Min PRN **OR** naloxone (NARCAN) injection 0.4 mg, 0.4 mg, Intravenous, Q2 Min PRN **OR** naloxone (NARCAN) injection 0.2 mg, 0.2 mg, Intramuscular, Q2 Min PRN **OR** naloxone (NARCAN) injection 0.4 mg, 0.4 mg, Intramuscular, Q2 Min PRN, Светлана Rios MD     Nurse  may request from Pharmacy a change of form of medication (e.g. Liquid to tablet)., , Does not apply, Continuous PRN, Светлана Rios MD     ondansetron (ZOFRAN ODT) ODT tab 4 mg, 4 mg, Oral, Q6H PRN, Светлана Rios MD     oxyCODONE (ROXICODONE) tablet 5 mg, 5 mg, Oral, Q8H PRN, Светлана Rios MD, 5 mg at 01/08/23 0517     pantoprazole (PROTONIX) EC tablet 40 mg, 40 mg, Oral, QAM AC, Светлана Rios MD, 40 mg at 01/08/23 0517     Patient is already receiving anticoagulation with heparin, enoxaparin (LOVENOX), warfarin (COUMADIN)  or other anticoagulant medication, , Does not apply, Continuous PRN, Светлана Rios MD     pyridOXINE (VITAMIN B6) tablet 100 mg, 100 mg, Oral, Daily, Светлана Rios MD, 100 mg at 01/07/23 2104     sertraline (ZOLOFT) tablet 100 mg, 100 mg, Oral, Daily, Светлана Rios MD, 100 mg at 01/07/23 2103     sevelamer carbonate (RENVELA) tablet 1,600 mg, 1,600 mg, Oral, TID w/meals, Светлана Rios MD, 1,600 mg at 01/08/23 0920     thiamine (B-1) tablet 100 mg, 100 mg, Oral, Daily, Светлана Rios MD, 100 mg at 01/07/23 2104     tuberculin injection 5 Units, 5 Units, Intradermal, Q21 Days, Светлана Rios MD, 5 Units at 01/08/23 0932  No current outpatient prescriptions on file.

## 2023-01-08 NOTE — PROGRESS NOTES
Social Work: Initial Assessment with Discharge Plan    Patient Name: Shay Jimenez  : 1965  Age: 57 year old  MRN: [id number]  Completed assessment with: Chart review and pt interview.   Admitted to TCU: 23    Presenting Information   Date of SW assessment: 2023  Health Care Directive: This patient's capacity will likely wax and wane along with his mental status; however, in general, he does not show capacity to make complex medical decisions and he does not have dispositional capacity in that he is clearly unable to take care of himself without significant assistance.  A family member should be used as an alternative decision maker.  Primary Health Care Agent: Self  Secondary Health Care Agent: Chinedu/Cory chacko.   Living Situation: Pt lives with 2 adult sons.   Previous Functional Status: Was getting help from his adult children  Dependent ADLs: Bathing, Dressing, Toileting  Dependent IADLs: Transportation, Cooking, Cleaning, Laundry, Shopping, Meal Preparation     DME available: wheelchair, manual; walker, rolling  Patient and family understanding of hospitalization: Appropriate and pleasant.   Cultural/Language/Spiritual Considerations: Pt is a 57 y.o.  male , English speaking, Spiritism  Abuse concerns: None reported.   -------------------------------------------------------------------------------------------------------------  TRANSPORTATION:    Has lack of transportation kept you from medical appointments, meetings, work, or from getting things needed for daily living?  A. Yes, it has kept me from medical appointments or from getting my medications  B. Yes, it has kept me from non-medical meetings, appointments, work or from getting things that I need  C. No  X. Patient Unable to respond  Y. Patient declines to respond  -------------------------------------------------------------------------------------------------------------  Health Literacy:   How Often do you  "need to have someone help you when you read instructions, pamphlets, or other written material from your doctor or pharmacy?  0.       Never  1.       Rarely  2.       Sometimes  3.       Often  4.       Always  5.       Patient declines to respond  6.       Patient unable to respond  ------------------------------------------------------------------------------------------------------------   BIMS:  See flow sheet   Tell the pt : \"I am going to say three words for you to remember.  Please repeat the words after I have said all three.  The words are:Sock, Blue and Bed. Now tell me the three words.\"     Number of words repeated after the first attempt.  0: None   1: One  2: Two  3: Three  Ask the pt: \"Please tell me what year it is right now?\"  0: Missed by 5 >5 years or no answer   1: Missed by 2-5 years  2: Missed by a 1 year  3: Correct      Ask the pt: \"What month are we in right now?\"  0: Missed by > 1 month or no answer.  1: Missed by 6 days to 1 month  2: Accurate within 5 days.     Ask pt: \"what day of the week is today?\"  0: Incorrect day of the week.  1: Correct.     Ask pt:\" Let's go back to an earlier question.  What were those three words that I asked you to repeat?\" If unable to remember a word, give a cue (something to wear, a color, a piece of furniture) for that word.   Able to recall Sock.  0: No, could not recall.  1: Yes, after cueing (something to wear)  2: Yes, no cueing required.   Able to recall Blue.  0: No, could not recall.  1:Yes, after cueing (a color)  2:Yes, no cueing required.   Able to recall Bed.  0: No,  1: Yes, after cueing (a piece of furniture)  2: Yes, no cueing required.   -----------------------------------------------------------------------------------------------------------  CAM:  1.) Acute Onset/Fluctuating Course:  Is there evidence of an acute change in mental status from the patient's baseline?  0. No  1. Yes  2.) Inattention:  Does the patient have difficulty focusing " attention, for example, being easily distractable, or having difficulty keeping track of what was being said?  0. No - Behavior not present  1. Yes - Behavior present  3.) Disorganized Thinking:  Is the patient's speech disorganized or incoherent, such as rambling or irrelevant conversation, unclear or illogical flow of ideas, or unpredictable switching from subject to subject?  0. No - Behavior not present  1. Yes - Behavior present  4.) Altered level of consciousness:  Did the patient have altered level of consciousness as indicated by any of the following criteria?  0. No - Alert  1. Yes - Vigilant (hyperalert), lethargic (drowsy) , stupor (difficult to arouse) or comatose (unable to be aroused)  -------------------------------------------------------------------------------------------------------------  PHQ-9:   Over the last 2 weeks, have you been bothered by any of the following problems?     If symptom is present, then ask the patient:  About how often have you been bothered by this?   Symptom Presence                                              Symptom Frequency  0. No                                                                     0. Never or 1 day  1. Yes                                                                   1. 2-6 days (several days)  9. No Response                                                    2. 7-11 days (half or more of the days)                                                                                3. 12-14 days (nearly every day)       Present Frequency   A.       Little interest of pleasure doing things  0     B.       Feeling down, depressed, or hopeless  0     C.       Trouble falling or staying asleep, or sleeping too much  0     D.       Feeling tired or having little energy  0     E.       Poor appetite or overeating  0     F.        Feeling bad about yourself - or that you are a failure, or have let yourself or your family down  0     G.      Trouble  concentrating on things, such as reading the newspaper or watching television  0     H.      Moving or speaking so slowly that other people could have noticed. Or the opposite - being so fidgety or restless that you have been moving around a lot more than usual  0     I.         Thoughts that you would be better off dead, or of hurting yourself in some way  0       -------------------------------------------------------------------------------------------------------------  SOCIAL ISOLATION  How often do you feel lonely or isolated form those around you?  0.       Never  1.       Rarely  2.       Sometimes  3.       Often  4.       Always  5.       Patient declines to respond  6.       Patient unable to respond  -------------------------------------------------------------------------------------------------------------    BIMS: Pt scored 13 on BIMS indicating cognition intact.  PHQ-9: Pt scored 0 on PHQ-9 indicating no depressive symptoms present.   PAS: confirmation number- [id number]  Has there been a level II screen?  No  Were there any recommendations in the screen? No  If yes, will the recommendations we incorporated into the Plan of Care?  N/A  Physical Health  Reason for admission:  Mr. Jimenez is a 57-year-old white male who is hospitalized for a redo of C5 through T6 fusion.    Provider Information   Primary Care Physician:Jonathan Travis, [facility] [address] [place] [address]*  [phone]  : None reported.     Mental Health:   Diagnosis: depression   Current Support/Services: Medications   Previous Services: therapist.   Services Needed/Recommended: SHAKIRA offered [facility] services while at [facility].      Substance Use:  Diagnosis: history of significant alcohol use, no smoking, smoked pot in high school and college but nothing since.   Current Support/Services: None reported.   Previous Services: None reported.   Services Needed/Recommended: N/A    Support  "System  Marital Status: 2nd wife-  - \"My wife left me in June.\"   Family support: his sons (Chinedu Ray) his mom lives in [place]. Has a brother in [place], [place].  Other support available: Friends and neighbors.  Gaps in support system: None reported.     Community Resources  Current in home services: Received a call from [facility] staff, [place]en # [phone].  Deven reported pt was receiving home PT and OT from [facility] prior hospitalization.   Previous services:  Dialysis Services -  M/W/F goes to [facility] (P: [phone], F: [phone]). Also previously worked with [employer].     Financial/Employment/Education  Employment Status: Currently, working.         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   Income Source: wages and disability.   Education: Went to [employer].   Financial Concerns:  Needs help getting FMLA forms and have them filled out.   Insurance: [employer]/ Medicare    Discharge Plan   Patient and family discharge goal: Pt wants to go home.  Will see if both son's can continue to take care of pt.   Provided Education on discharge plan: YES  Patient agreeable to discharge plan:  YES  A list of Medicare Certified Facilities was provided to the patient and/or family to encourage patient choice. Based on location and rating, patient would like referrals made to: N/A right now.   General information regarding anticipated insurance coverage and possible out of pocket cost was discussed. Patient and patient's family are aware patient may incur the cost of transportation to the facility, pending insurance payment: YES  Barriers to discharge: If adult sons can take care of pt.     Discharge Recommendations   Disposition: Home TBD  Transportation Needs: Possible medical transport.   Name of Transportation Company and Phone: TBD     Additional comments   Delirium with a waxing and waning mental " status exam.  This patient's capacity will likely wax and wane along with his mental status; however, in general, he does not show capacity to make complex medical decisions and he does not have dispositional capacity in that he is clearly unable to take care of himself without significant assistance.  A family member should be used as an alternative decision maker.    [facility], Dr. Cline  ([phone], fax [phone])  [address] [place] [id number]  Runs MWF via [facility], 4.25h runs.  7:35am (arrive by 7:20am), ends at 11:50am   Transportation to dialysis is private pay.   [facility] ([phone]) MWF  patient runs from 7:15am-11:50am.    Pt could not understand who/how FMLA worked. SW will come back on the 9th and see if we can call HR together to get forms sent here.     JULISSA Forrest   [facility], Transitional Care Unit   Social Work   [facility] [place] [address]  (PH) [phone]

## 2023-01-08 NOTE — PROGRESS NOTES
"   01/08/23 1300   Appointment Info   Signing Clinician's Name / Credentials (PT) Katherin Garg, AG   Rehab Comments (PT) PT: Eval complete, Txt initiated   Quick Adds   Quick Adds Certification      Language English   Living Environment   People in Home child(lynette), adult   Current Living Arrangements house   Living Environment Comments PT: Pt lives with his two sons in [place], [place], Two platform stairs on the front of the house with no railing. Ramp on the garage access. Pt explains that he has been in and out of hospital and rehabs since initial cervical surgery in June 2022. Prior to this pt was moving around [place] with walker in home. Pt reports nearly 5 years on dialysis.   Self-Care   Usual Activity Tolerance moderate   Current Activity Tolerance fair   Equipment Currently Used at Home wheelchair, manual   Fall history within last six months yes   Activity/Exercise/Self-Care Comment Per OT notes: \"Per acute care therapy, pt reports son would assist with ADL prn but pt was mod I for transfers and short distance mobility with FWW. W/c for long distances\"   Post-Acute Assessment Only   Post-Acute Functional Assessment See below   General Information   Onset of Illness/Injury or Date of Surgery 12/06/22   Referring Physician Dr. Светлана Rios MD   Patient/Family Therapy Goals Statement (PT) To go home   Pertinent History of Current Problem (include personal factors and/or comorbidities that impact the POC) Taken per chart review: \"previous C5-T6 fusion for thoracic pathologic fracture 2/2 osteomyelitis c/b hardware failure with screw pullout at C5 and C6, and pseudoarthrosis. \"   Existing Precautions/Restrictions fall;spinal;lifting  (Cervical collar on when >45 degrees HOB, no lifting greater than 10 lbs for 4 weeks,  on when OOB)   General Observations Pt lying semi supine with son present, able to communicate verbally without concern   Cognition   Cognitive Status Comments PT: Defer to OT for " cog assessment   Pain Assessment   Patient Currently in Pain No   Integumentary/Edema   Integumentary/Edema Comments PT: Cervical collar and  in place when >45 degrees, managed by nursing   Range of Motion (ROM)   ROM Comment PT: PROM WFL , limited by fluid overload in LE   Strength (Manual Muscle Testing)   Strength Comments PT: B LE is grossly 3-/5   Balance   Balance Comments PT: Pt requires UE support and Irina Steady wtih CGA for standing balance, ~ 45 seconds   Sensory Examination   Sensory Perception Comments PT: Intact B LE fine touch however pt reports bilateral neuropathy in LEs impairing function and proprioception   Coordination   Coordination Comments PT: Impaired, impaired B Heel Mauricio test   Muscle Tone   Muscle Tone no deficits were identified   Clinical Impression   Criteria for Skilled Therapeutic Intervention Yes, treatment indicated   PT Diagnosis (PT) PT: Decreased endurance, strength, and tolerance for jessica ctivity from reported baseline values   Influenced by the following impairments medical status, post surgical status   Functional limitations due to impairments Ambulation, transfers, out of bed mobility   Clinical Presentation (PT Evaluation Complexity) Evolving/Changing   Clinical Presentation Rationale PMH, post surgical status, Recent and frequent hospitalizations   Clinical Decision Making (Complexity) moderate complexity   Planned Therapy Interventions (PT) balance training;bed mobility training;cryotherapy;E-stim;gait training;groups;home exercise program;joint mobilization;lumbar stabilization;manual therapy techniques;motor coordination training;neuromuscular re-education;orthotic fitting/training;patient/family education;postural re-education;prosthetic fitting/training;ROM (range of motion);stair training;strengthening;stretching;swiss ball techniques;TENS;thermotherapy;transfer training;wheelchair management/propulsion training;progressive activity/exercise;risk factor  education;home program guidelines   Risk & Benefits of therapy have been explained evaluation/treatment results reviewed;care plan/treatment goals reviewed;risks/benefits reviewed;current/potential barriers reviewed;participants voiced agreement with care plan;participants included;patient   Clinical Impression Comments PT: Pt is a 58 y/o male who presents for PT evaluation following hospitalization for cervical fusion and subsequent hardware failure and revision. PT evaluation revealed increased pain with impaired enduranc, strength, and tolerance with activity as well as need for assist with all mobility. Pt will benefit from skilled therapy in order to address these concerns, aide in the safe restoration of function, and reduce risk of hospital readmission.   PT Total Evaluation Time   PT Eval, Moderate Complexity Minutes (98485) 30   Therapy Certification   Start of care date 01/08/23   Certification date from 01/08/23   Certification date to 02/06/23   Medical Diagnosis Cervical Spinal Fusion   Physical Therapy Goals   PT Frequency 6x/week   PT Predicted Duration/Target Date for Goal Attainment 01/26/23   PT Goals Bed Mobility;Transfers;Gait;Wheelchair Mobility;PT Goal 1;Aerobic Activity;PT Goal 2;Stairs   Therapeutic Activity   Therapeutic Activities: dynamic activities to improve functional performance Minutes (83000) 25   Treatment Detail/Skilled Intervention PT: Pt and son oriented to therapy schedule and expectations, answered all related questions to therapuetic progression. Pt rolled bilaterally in bed to don , pt requires min/mod A for rolling, appears to have difficulty following cuing at times, attempts to sit upright when author simply cued him to roll. Max A for donning of brace. Supine > sit with mod A for trunk and LE coordination and support. Pt found to be unaware of BM incontinence. Once sittting EOB, pt performed 3 stands from elevated bed with CGA. Able to stand for ~ 1-2 min without  "orthostatic sxs or complaints. Pt then transferred from sitting > supine with max A x 1, min A x 1 for LE lift and trunk support so nursing could assist with changing of brief. Author found pt high back wheelchair for cervical support that pt agrees to attempt tomorrow.   PT Discharge Planning   PT Plan PT: Pt has issues with BM incontinence, plan for Rafa Steady to high back wheelchair (in hallway), bring to gym, try sit <> stands in // bars with rehab tech   PT Discharge Recommendation (DC Rec) home with assist   PT Rationale for DC Rec PT: Pt lives with sons in [place], [place]. No Stairs to enter. Has manual and power wc   PT Brief overview of current status PT: Mod A for bed mobility, Rafa steady CGA for stands. No other movement initiated on eval   Total Session Time   Timed Code Treatment Minutes 25   Total Session Time (sum of timed and untimed services) 55   Post Acute Settings Only   What unit is patient on? TCU   Bed Mobility: Turning side to side/Roll Left and Right   Patient Performance Partial/moderate assist \"includes weight bearing assist of trunk or limbs\"   Staff Performance One person assist (one person physical assist)   Describe Performance PT: Mod A for bilateral rolling using UE   Bed Mobility: Sit to lying   Patient Performance Total dependence (helper does ALL of effort)   Staff Performance Two +person assist (two plus persons physical assist)   Describe Performance PT: See ther act, Pinky x 1, max x 1   Bed Mobility: Lying to sitting on the side of bed   Patient Performance Substantial/maximal assist   Staff Performance One person assist (one person physical assist)   Describe Performance Mod A x 1, See Ther Act   Transfers: Sit to Stand   Patient Performance Partial/moderate assist \"includes weight bearing assist of trunk or limbs\"   Staff Performance One person assist (one person physical assist)   Equipment Used Other (comments)  (Rafa Steady)   Describe Performance PT: CGA/min A with rafa " steady   Transfers: Chair/Bed transfers   Reason Not Done Safety concerns   Ambulation   Walks in room: Reason Not Done Safety concerns   Walks in browne:  Reason Not Done Safety concerns   Walk 10 Feet   Reason Not Done Safety concerns   Walk 10 Feet on uneven surfaces   Reason Not Done Safety concerns   Walk 50 Feet with Two Turns   Reason Not Done Safety concerns   Walk 150 Feet   Reason Not Done Safety concerns   Locomotion   Move on unit: Reason Not Done Safety concerns   Move off unit: Reason Not Done Safety concerns   Wheel 50 Feet   Reason Not Done Safety concerns   Wheel 150 Feet   Reason Not Done Safety concerns   1 Step (curb)   Reason Not Done Safety concerns   4 Steps   Reason Not Done Safety concerns   12 Steps   Reason Not Done Safety concerns   Car Transfer   Reason Not Done Safety concerns   Picking up Object   Reason Not Done Safety concerns

## 2023-01-08 NOTE — PLAN OF CARE
Goal Outcome Evaluation:         Pt alert and oriented, able to make needs known, forgetful, on regular diet due to refusal for renal diet, take pills whole with thin water, O2 via nasal cannula @2-3LPM, denied chest pain, SOB, N/V. Incontinent of bowel and bladder, pt said that he is not producing any urine, but have occassional wetness on his brief. Pleasant and cooperative, given PRN oxycodone @ HS. Placed call light within reached, Will continue with POC.               Patient's most recent vital signs are:     Vital signs:  BP: 122/63  Temp: 96.3  HR: 69  RR: 18  SpO2: 94 %     Patient does not have new respiratory symptoms.  Patient does not have new sore throat.  Patient does not have a fever greater than 99.5.

## 2023-01-08 NOTE — PHARMACY-TCU REVIEW OF H&P
I have reviewed this patient's TCU admission History & Physical for medication related changes/recommendations identified by the admitting provider.  I am confirming that there are no recommendations requiring changes to medication orders are indicated at this time based on the provider recommendations in the H&P.    Confirmed with Dr. Rios that patient would not like to continue Seroquel (last dose given 1/5 during previous admission).     Irina Sepulveda [facility] on 1/8/2023 at 11:54 AM

## 2023-01-08 NOTE — PLAN OF CARE
Patient is alert and oriented  x 4, baseline forgetfulness. Speech clear and spontaneous. Able to communicate needs. VSS on RA, uses O2 overnight. Denies any cough, chest pain, SOB, dificulty breathing, lightheadedness or dizziness. No nausea or vomiting. Regular diet per pt request, thin liquid. Assist of 2 with Likolift. HD dependent. Oligaric, incontinent bowel & bladder. R heel Mepilex  Intact. Pain comfortably manageable, PRN tylenol given x 2. Calls appropriately for help. Call light in reach.         Patient's most recent vital signs are:     Vital signs:  BP: 136/87  Temp: 96.3  HR: 70  RR: 18  SpO2: 94 %     Patient does not have new respiratory symptoms.  Patient does not have new sore throat.  Patient does not have a fever greater than 99.5.

## 2023-01-08 NOTE — PLAN OF CARE
"Goal Outcome Evaluation:  Pt.A&Ox4 - forgetful w/STM loss. On HD M-W-F / oliguric and incontinent B&B. Had lrg.and small loose BM during shift. Assist of 1 w/bed mobility. Using O2 @ 2L via NC \"at night only\". Denies SOB/CP. Cervical collar when HOB >45 and  when OOB. Pt.requested TUMS - paged H.O.for order and was administered @ 0145. Sleeping off and on.    0540 - Pt.called c/o feeling something in his throat causing coughing episode trying to clear. Coughing up clear sputum, stated it's probably post-nasal drip. Pt.used Flonase that he brought from [facility] - writer sent to Pharmacy to re-label. Also sticky noted MD of pt.request for Flonase BID as was previously ordered (currently daily). Pt.requested and rec'd Tylenol 650mg and Oxycodone 5mg po @ 0515 for surgical pain. Refused Prevalon boots tonight, stating too hot. Heels elevated on pillows - has PI JALEEL heel.        Patient's most recent vital signs are:     Vital signs:  BP: 122/63  Temp: 96.3  HR: 69  RR: 18  SpO2: 94 %     Patient does not have new respiratory symptoms.  Patient does not have new sore throat.  Patient does not have a fever greater than 99.5.                               "

## 2023-01-09 ENCOUNTER — APPOINTMENT (OUTPATIENT)
Dept: CT IMAGING | Facility: CLINIC | Age: 58
End: 2023-01-09
Attending: NURSE PRACTITIONER
Payer: MEDICARE

## 2023-01-09 ENCOUNTER — HOSPITAL ENCOUNTER (INPATIENT)
Facility: SKILLED NURSING FACILITY | Age: 58
LOS: 2 days | Discharge: STILL ADMITTED OR ADMINISTRATIVE | DRG: 949 | End: 2023-01-11
Attending: INTERNAL MEDICINE | Admitting: INTERNAL MEDICINE
Payer: MEDICARE

## 2023-01-09 ENCOUNTER — HOSPITAL ENCOUNTER (EMERGENCY)
Facility: CLINIC | Age: 58
Discharge: HOME | End: 2023-01-09
Attending: EMERGENCY MEDICINE | Admitting: NURSE PRACTITIONER
Payer: MEDICARE

## 2023-01-09 VITALS
OXYGEN SATURATION: 95 % | RESPIRATION RATE: 18 BRPM | DIASTOLIC BLOOD PRESSURE: 83 MMHG | HEART RATE: 87 BPM | SYSTOLIC BLOOD PRESSURE: 122 MMHG | TEMPERATURE: 96.6 F

## 2023-01-09 VITALS
SYSTOLIC BLOOD PRESSURE: 113 MMHG | RESPIRATION RATE: 18 BRPM | HEART RATE: 77 BPM | DIASTOLIC BLOOD PRESSURE: 63 MMHG | OXYGEN SATURATION: 97 % | TEMPERATURE: 98.1 F

## 2023-01-09 DIAGNOSIS — N18.6 ESRD (END STAGE RENAL DISEASE) (H): ICD-10-CM

## 2023-01-09 DIAGNOSIS — R41.0 CONFUSION: ICD-10-CM

## 2023-01-09 PROBLEM — E83.30 DISORDER OF PHOSPHORUS METABOLISM, UNSPECIFIED: Status: ACTIVE | Noted: 2021-09-16

## 2023-01-09 PROBLEM — R50.9 FEVER, UNSPECIFIED: Status: ACTIVE | Noted: 2019-08-11

## 2023-01-09 PROBLEM — A41.9 SEVERE SEPSIS WITH SEPTIC SHOCK (H): Status: ACTIVE | Noted: 2022-08-10

## 2023-01-09 PROBLEM — D50.9 IRON DEFICIENCY ANEMIA, UNSPECIFIED: Status: ACTIVE | Noted: 2019-10-10

## 2023-01-09 PROBLEM — M62.81 GENERALIZED MUSCLE WEAKNESS: Status: ACTIVE | Noted: 2022-10-17

## 2023-01-09 PROBLEM — E66.01 MORBID OBESITY (H): Status: ACTIVE | Noted: 2022-08-13

## 2023-01-09 PROBLEM — J30.2 OTHER SEASONAL ALLERGIC RHINITIS: Status: ACTIVE | Noted: 2019-08-23

## 2023-01-09 PROBLEM — T80.52XA: Status: ACTIVE | Noted: 2022-02-18

## 2023-01-09 PROBLEM — M10.9 GOUT: Status: ACTIVE | Noted: 2019-04-24

## 2023-01-09 PROBLEM — F32.A DEPRESSION: Status: ACTIVE | Noted: 2019-04-24

## 2023-01-09 PROBLEM — R06.02 SHORTNESS OF BREATH: Status: ACTIVE | Noted: 2019-08-11

## 2023-01-09 PROBLEM — G62.9 POLYNEUROPATHY: Status: ACTIVE | Noted: 2022-08-13

## 2023-01-09 PROBLEM — E83.52 HYPERCALCEMIA: Status: ACTIVE | Noted: 2022-01-13

## 2023-01-09 PROBLEM — R19.00: Status: ACTIVE | Noted: 2022-09-01

## 2023-01-09 PROBLEM — R51.9 HEADACHE, UNSPECIFIED: Status: ACTIVE | Noted: 2019-08-11

## 2023-01-09 PROBLEM — U07.1 COVID-19: Status: ACTIVE | Noted: 2023-01-09

## 2023-01-09 PROBLEM — M46.40 DISCITIS: Status: ACTIVE | Noted: 2022-06-24

## 2023-01-09 PROBLEM — N40.0 BPH (BENIGN PROSTATIC HYPERPLASIA): Status: ACTIVE | Noted: 2019-04-24

## 2023-01-09 PROBLEM — N25.81 SECONDARY HYPERPARATHYROIDISM OF RENAL ORIGIN (H): Status: ACTIVE | Noted: 2019-10-24

## 2023-01-09 PROBLEM — I95.9 HYPOTENSION, UNSPECIFIED: Status: ACTIVE | Noted: 2022-01-28

## 2023-01-09 PROBLEM — E87.6 HYPOKALEMIA: Status: ACTIVE | Noted: 2022-10-05

## 2023-01-09 PROBLEM — R52 PAIN, UNSPECIFIED: Status: ACTIVE | Noted: 2019-08-11

## 2023-01-09 PROBLEM — K21.9 GASTROESOPHAGEAL REFLUX DISEASE WITHOUT ESOPHAGITIS: Status: ACTIVE | Noted: 2019-04-24

## 2023-01-09 PROBLEM — I48.91 ATRIAL FIBRILLATION WITH RVR (H): Status: ACTIVE | Noted: 2022-08-14

## 2023-01-09 PROBLEM — M47.14: Status: ACTIVE | Noted: 2022-06-24

## 2023-01-09 PROBLEM — D68.8 OTHER SPECIFIED COAGULATION DEFECTS (H): Status: ACTIVE | Noted: 2022-08-03

## 2023-01-09 PROBLEM — N19 UREMIA: Status: ACTIVE | Noted: 2019-04-24

## 2023-01-09 PROBLEM — E46 PROTEIN-CALORIE MALNUTRITION (H): Status: ACTIVE | Noted: 2022-11-09

## 2023-01-09 PROBLEM — F41.9 ANXIETY: Status: ACTIVE | Noted: 2022-08-13

## 2023-01-09 PROBLEM — F10.10 ALCOHOL ABUSE, UNCOMPLICATED: Status: ACTIVE | Noted: 2022-10-12

## 2023-01-09 PROBLEM — D64.9 ANEMIA: Status: ACTIVE | Noted: 2019-11-27

## 2023-01-09 PROBLEM — R65.21 SEVERE SEPSIS WITH SEPTIC SHOCK (H): Status: ACTIVE | Noted: 2022-08-10

## 2023-01-09 PROBLEM — E87.70 FLUID OVERLOAD, UNSPECIFIED: Status: ACTIVE | Noted: 2020-06-03

## 2023-01-09 PROBLEM — R19.7 DIARRHEA, UNSPECIFIED: Status: ACTIVE | Noted: 2019-08-11

## 2023-01-09 PROBLEM — L29.9 PRURITUS, UNSPECIFIED: Status: ACTIVE | Noted: 2019-08-11

## 2023-01-09 PROBLEM — E87.1 HYPONATREMIA: Status: ACTIVE | Noted: 2022-10-17

## 2023-01-09 PROBLEM — F32.2 MAJOR DEPRESSIVE DISORDER, SINGLE EPISODE, SEVERE WITHOUT PSYCHOTIC FEATURES (H): Status: ACTIVE | Noted: 2022-08-30

## 2023-01-09 PROBLEM — J15.9 PNEUMONIA, BACTERIAL: Status: ACTIVE | Noted: 2020-09-16

## 2023-01-09 PROBLEM — M46.24: Status: ACTIVE | Noted: 2022-08-19

## 2023-01-09 PROBLEM — Z99.2 DEPENDENCE ON RENAL DIALYSIS (H): Status: ACTIVE | Noted: 2022-08-30

## 2023-01-09 LAB
ALBUMIN SERPL BCG-MCNC: 3.8 G/DL (ref 3.5–5.2)
ALP SERPL-CCNC: 125 U/L (ref 40–129)
ALT SERPL W P-5'-P-CCNC: <5 U/L (ref 10–50)
ANION GAP SERPL CALCULATED.3IONS-SCNC: 16 MMOL/L (ref 7–15)
APAP SERPL-MCNC: <5 UG/ML (ref 10–30)
AST SERPL W P-5'-P-CCNC: 19 U/L (ref 10–50)
ATRIAL RATE - MUSE: 72 BPM
ATRIAL RATE - MUSE: 75 BPM
BASOPHILS # BLD AUTO: 0 10E3/UL (ref 0–0.2)
BASOPHILS NFR BLD AUTO: 1 %
BILIRUB SERPL-MCNC: 0.6 MG/DL
BUN SERPL-MCNC: 18 MG/DL (ref 6–20)
CALCIUM SERPL-MCNC: 9.2 MG/DL (ref 8.6–10)
CHLORIDE SERPL-SCNC: 91 MMOL/L (ref 98–107)
CREAT SERPL-MCNC: 4.45 MG/DL (ref 0.67–1.17)
DEPRECATED HCO3 PLAS-SCNC: 25 MMOL/L (ref 22–29)
DIASTOLIC BLOOD PRESSURE - MUSE: NORMAL MMHG
DIASTOLIC BLOOD PRESSURE - MUSE: NORMAL MMHG
EOSINOPHIL # BLD AUTO: 0.4 10E3/UL (ref 0–0.7)
EOSINOPHIL NFR BLD AUTO: 6 %
ERYTHROCYTE [DISTWIDTH] IN BLOOD BY AUTOMATED COUNT: 14.9 % (ref 10–15)
ETHANOL SERPL-MCNC: <0.01 G/DL
GFR SERPL CREATININE-BSD FRML MDRD: 15 ML/MIN/1.73M2
GLUCOSE SERPL-MCNC: 83 MG/DL (ref 70–99)
HCT VFR BLD AUTO: 33.1 % (ref 40–53)
HGB BLD-MCNC: 10.2 G/DL (ref 13.3–17.7)
HOLD SPECIMEN: NORMAL
HOLD SPECIMEN: NORMAL
IMM GRANULOCYTES # BLD: 0 10E3/UL
IMM GRANULOCYTES NFR BLD: 0 %
INTERPRETATION ECG - MUSE: NORMAL
INTERPRETATION ECG - MUSE: NORMAL
LACTATE SERPL-SCNC: 0.9 MMOL/L (ref 0.7–2)
LYMPHOCYTES # BLD AUTO: 0.8 10E3/UL (ref 0.8–5.3)
LYMPHOCYTES NFR BLD AUTO: 14 %
MCH RBC QN AUTO: 30 PG (ref 26.5–33)
MCHC RBC AUTO-ENTMCNC: 30.8 G/DL (ref 31.5–36.5)
MCV RBC AUTO: 97 FL (ref 78–100)
MONOCYTES # BLD AUTO: 0.4 10E3/UL (ref 0–1.3)
MONOCYTES NFR BLD AUTO: 8 %
NEUTROPHILS # BLD AUTO: 4.1 10E3/UL (ref 1.6–8.3)
NEUTROPHILS NFR BLD AUTO: 71 %
NRBC # BLD AUTO: 0 10E3/UL
NRBC BLD AUTO-RTO: 0 /100
P AXIS - MUSE: 10 DEGREES
P AXIS - MUSE: 24 DEGREES
PLATELET # BLD AUTO: 262 10E3/UL (ref 150–450)
POTASSIUM SERPL-SCNC: 3.9 MMOL/L (ref 3.4–5.3)
PR INTERVAL - MUSE: 212 MS
PR INTERVAL - MUSE: 214 MS
PROT SERPL-MCNC: 6.7 G/DL (ref 6.4–8.3)
QRS DURATION - MUSE: 80 MS
QRS DURATION - MUSE: 84 MS
QT - MUSE: 484 MS
QT - MUSE: 486 MS
QTC - MUSE: 529 MS
QTC - MUSE: 542 MS
R AXIS - MUSE: 10 DEGREES
R AXIS - MUSE: 13 DEGREES
RBC # BLD AUTO: 3.4 10E6/UL (ref 4.4–5.9)
SALICYLATES SERPL-MCNC: <0.5 MG/DL
SODIUM SERPL-SCNC: 132 MMOL/L (ref 136–145)
SYSTOLIC BLOOD PRESSURE - MUSE: NORMAL MMHG
SYSTOLIC BLOOD PRESSURE - MUSE: NORMAL MMHG
T AXIS - MUSE: 13 DEGREES
T AXIS - MUSE: 15 DEGREES
TROPONIN T SERPL HS-MCNC: 120 NG/L
TROPONIN T SERPL HS-MCNC: 94 NG/L
VENTRICULAR RATE- MUSE: 72 BPM
VENTRICULAR RATE- MUSE: 75 BPM
WBC # BLD AUTO: 5.7 10E3/UL (ref 4–11)

## 2023-01-09 PROCEDURE — 80053 COMPREHEN METABOLIC PANEL: CPT | Performed by: EMERGENCY MEDICINE

## 2023-01-09 PROCEDURE — G1010 CDSM STANSON: HCPCS

## 2023-01-09 PROCEDURE — 70498 CT ANGIOGRAPHY NECK: CPT | Mod: 26 | Performed by: RADIOLOGY

## 2023-01-09 PROCEDURE — 82077 ASSAY SPEC XCP UR&BREATH IA: CPT | Performed by: NURSE PRACTITIONER

## 2023-01-09 PROCEDURE — 80179 DRUG ASSAY SALICYLATE: CPT | Performed by: NURSE PRACTITIONER

## 2023-01-09 PROCEDURE — 80143 DRUG ASSAY ACETAMINOPHEN: CPT | Performed by: EMERGENCY MEDICINE

## 2023-01-09 PROCEDURE — 93005 ELECTROCARDIOGRAM TRACING: CPT | Performed by: NURSE PRACTITIONER

## 2023-01-09 PROCEDURE — 93010 ELECTROCARDIOGRAM REPORT: CPT | Mod: 76 | Performed by: NURSE PRACTITIONER

## 2023-01-09 PROCEDURE — 85025 COMPLETE CBC W/AUTO DIFF WBC: CPT | Performed by: EMERGENCY MEDICINE

## 2023-01-09 PROCEDURE — 83605 ASSAY OF LACTIC ACID: CPT | Performed by: EMERGENCY MEDICINE

## 2023-01-09 PROCEDURE — 120N000009 HC R&B SNF

## 2023-01-09 PROCEDURE — 99285 EMERGENCY DEPT VISIT HI MDM: CPT | Mod: 25 | Performed by: NURSE PRACTITIONER

## 2023-01-09 PROCEDURE — 70498 CT ANGIOGRAPHY NECK: CPT | Mod: MG

## 2023-01-09 PROCEDURE — 70496 CT ANGIOGRAPHY HEAD: CPT | Mod: MG

## 2023-01-09 PROCEDURE — 36415 COLL VENOUS BLD VENIPUNCTURE: CPT | Performed by: EMERGENCY MEDICINE

## 2023-01-09 PROCEDURE — 93010 ELECTROCARDIOGRAM REPORT: CPT | Performed by: NURSE PRACTITIONER

## 2023-01-09 PROCEDURE — 36415 COLL VENOUS BLD VENIPUNCTURE: CPT | Performed by: NURSE PRACTITIONER

## 2023-01-09 PROCEDURE — 70496 CT ANGIOGRAPHY HEAD: CPT | Mod: 26 | Performed by: RADIOLOGY

## 2023-01-09 PROCEDURE — 250N000011 HC RX IP 250 OP 636: Performed by: EMERGENCY MEDICINE

## 2023-01-09 PROCEDURE — 93005 ELECTROCARDIOGRAM TRACING: CPT | Mod: 76 | Performed by: NURSE PRACTITIONER

## 2023-01-09 PROCEDURE — 84484 ASSAY OF TROPONIN QUANT: CPT | Performed by: NURSE PRACTITIONER

## 2023-01-09 PROCEDURE — 5A1D70Z PERFORMANCE OF URINARY FILTRATION, INTERMITTENT, LESS THAN 6 HOURS PER DAY: ICD-10-PCS | Performed by: INTERNAL MEDICINE

## 2023-01-09 PROCEDURE — G1010 CDSM STANSON: HCPCS | Mod: GC | Performed by: RADIOLOGY

## 2023-01-09 PROCEDURE — 84484 ASSAY OF TROPONIN QUANT: CPT | Mod: 91 | Performed by: NURSE PRACTITIONER

## 2023-01-09 PROCEDURE — 250N000013 HC RX MED GY IP 250 OP 250 PS 637: Performed by: INTERNAL MEDICINE

## 2023-01-09 RX ORDER — CALCIUM CARBONATE 500 MG/1
1000 TABLET, CHEWABLE ORAL 3 TIMES DAILY PRN
Status: DISCONTINUED | OUTPATIENT
Start: 2023-01-09 | End: 2023-01-11 | Stop reason: HOSPADM

## 2023-01-09 RX ORDER — SEVELAMER CARBONATE 800 MG/1
1600 TABLET, FILM COATED ORAL
Status: CANCELLED | OUTPATIENT
Start: 2023-01-09

## 2023-01-09 RX ORDER — NALOXONE HYDROCHLORIDE 0.4 MG/ML
0.4 INJECTION, SOLUTION INTRAMUSCULAR; INTRAVENOUS; SUBCUTANEOUS
Status: DISCONTINUED | OUTPATIENT
Start: 2023-01-09 | End: 2023-01-11 | Stop reason: HOSPADM

## 2023-01-09 RX ORDER — ACETAMINOPHEN 325 MG/1
650 TABLET ORAL EVERY 4 HOURS PRN
Status: DISCONTINUED | OUTPATIENT
Start: 2023-01-09 | End: 2023-01-11 | Stop reason: HOSPADM

## 2023-01-09 RX ORDER — OXYCODONE HYDROCHLORIDE 5 MG/1
5 TABLET ORAL EVERY 8 HOURS PRN
Status: CANCELLED | OUTPATIENT
Start: 2023-01-09

## 2023-01-09 RX ORDER — NALOXONE HYDROCHLORIDE 0.4 MG/ML
0.4 INJECTION, SOLUTION INTRAMUSCULAR; INTRAVENOUS; SUBCUTANEOUS
Status: CANCELLED | OUTPATIENT
Start: 2023-01-09

## 2023-01-09 RX ORDER — NALOXONE HYDROCHLORIDE 0.4 MG/ML
0.2 INJECTION, SOLUTION INTRAMUSCULAR; INTRAVENOUS; SUBCUTANEOUS
Status: DISCONTINUED | OUTPATIENT
Start: 2023-01-09 | End: 2023-01-11 | Stop reason: HOSPADM

## 2023-01-09 RX ORDER — ACETAMINOPHEN 325 MG/1
650 TABLET ORAL EVERY 4 HOURS PRN
Status: CANCELLED | OUTPATIENT
Start: 2023-01-09

## 2023-01-09 RX ORDER — ATORVASTATIN CALCIUM 10 MG/1
20 TABLET, FILM COATED ORAL EVERY EVENING
Status: DISCONTINUED | OUTPATIENT
Start: 2023-01-09 | End: 2023-01-11 | Stop reason: HOSPADM

## 2023-01-09 RX ORDER — ACETAMINOPHEN 650 MG/1
650 SUPPOSITORY RECTAL EVERY 4 HOURS PRN
Status: DISCONTINUED | OUTPATIENT
Start: 2023-01-09 | End: 2023-01-11 | Stop reason: HOSPADM

## 2023-01-09 RX ORDER — FOLIC ACID 1 MG/1
1 TABLET ORAL DAILY
Status: DISCONTINUED | OUTPATIENT
Start: 2023-01-09 | End: 2023-01-11 | Stop reason: HOSPADM

## 2023-01-09 RX ORDER — CETIRIZINE HYDROCHLORIDE 10 MG/1
10 TABLET ORAL DAILY
Status: DISCONTINUED | OUTPATIENT
Start: 2023-01-09 | End: 2023-01-11 | Stop reason: HOSPADM

## 2023-01-09 RX ORDER — MIDODRINE HYDROCHLORIDE 5 MG/1
10 TABLET ORAL
Status: CANCELLED | OUTPATIENT
Start: 2023-01-09

## 2023-01-09 RX ORDER — ONDANSETRON 4 MG/1
4 TABLET, ORALLY DISINTEGRATING ORAL EVERY 6 HOURS PRN
Status: DISCONTINUED | OUTPATIENT
Start: 2023-01-09 | End: 2023-01-11 | Stop reason: HOSPADM

## 2023-01-09 RX ORDER — FLUTICASONE PROPIONATE 50 MCG
1 SPRAY, SUSPENSION (ML) NASAL 2 TIMES DAILY
Status: DISCONTINUED | OUTPATIENT
Start: 2023-01-10 | End: 2023-01-11 | Stop reason: HOSPADM

## 2023-01-09 RX ORDER — FINASTERIDE 5 MG/1
1.3 TABLET, FILM COATED ORAL DAILY
Status: DISCONTINUED | OUTPATIENT
Start: 2023-01-10 | End: 2023-01-11 | Stop reason: HOSPADM

## 2023-01-09 RX ORDER — OXYCODONE HYDROCHLORIDE 5 MG/1
5 TABLET ORAL EVERY 8 HOURS PRN
Status: DISCONTINUED | OUTPATIENT
Start: 2023-01-09 | End: 2023-01-11 | Stop reason: HOSPADM

## 2023-01-09 RX ORDER — METHOCARBAMOL 500 MG/1
500 TABLET, FILM COATED ORAL 4 TIMES DAILY
Status: CANCELLED | OUTPATIENT
Start: 2023-01-09

## 2023-01-09 RX ORDER — CETIRIZINE HYDROCHLORIDE 10 MG/1
10 TABLET ORAL DAILY
Status: CANCELLED | OUTPATIENT
Start: 2023-01-09

## 2023-01-09 RX ORDER — FLUTICASONE PROPIONATE 50 MCG
1 SPRAY, SUSPENSION (ML) NASAL 2 TIMES DAILY
Status: CANCELLED | OUTPATIENT
Start: 2023-01-09

## 2023-01-09 RX ORDER — FINASTERIDE 5 MG/1
1.3 TABLET, FILM COATED ORAL DAILY
Status: CANCELLED | OUTPATIENT
Start: 2023-01-09

## 2023-01-09 RX ORDER — SEVELAMER CARBONATE 800 MG/1
1600 TABLET, FILM COATED ORAL
Status: DISCONTINUED | OUTPATIENT
Start: 2023-01-10 | End: 2023-01-11 | Stop reason: HOSPADM

## 2023-01-09 RX ORDER — IOPAMIDOL 755 MG/ML
75 INJECTION, SOLUTION INTRAVASCULAR ONCE
Status: COMPLETED | OUTPATIENT
Start: 2023-01-09 | End: 2023-01-09

## 2023-01-09 RX ORDER — CALCIUM CARBONATE 500 MG/1
1000 TABLET, CHEWABLE ORAL 3 TIMES DAILY PRN
Status: CANCELLED | OUTPATIENT
Start: 2023-01-09

## 2023-01-09 RX ORDER — BACLOFEN 10 MG/1
10 TABLET ORAL 2 TIMES DAILY
Status: DISCONTINUED | OUTPATIENT
Start: 2023-01-09 | End: 2023-01-11 | Stop reason: HOSPADM

## 2023-01-09 RX ORDER — VIT B COMP NO.3/FOLIC/C/BIOTIN 1 MG-60 MG
1 TABLET ORAL DAILY
Status: CANCELLED | OUTPATIENT
Start: 2023-01-09

## 2023-01-09 RX ORDER — FOLIC ACID 1 MG/1
1 TABLET ORAL DAILY
Status: CANCELLED | OUTPATIENT
Start: 2023-01-09

## 2023-01-09 RX ORDER — GABAPENTIN 300 MG/1
300 CAPSULE ORAL 3 TIMES DAILY
Status: DISCONTINUED | OUTPATIENT
Start: 2023-01-10 | End: 2023-01-11 | Stop reason: HOSPADM

## 2023-01-09 RX ORDER — SERTRALINE HYDROCHLORIDE 25 MG/1
100 TABLET, FILM COATED ORAL DAILY
Status: DISCONTINUED | OUTPATIENT
Start: 2023-01-09 | End: 2023-01-11 | Stop reason: HOSPADM

## 2023-01-09 RX ORDER — PANTOPRAZOLE SODIUM 40 MG/1
40 TABLET, DELAYED RELEASE ORAL
Status: CANCELLED | OUTPATIENT
Start: 2023-01-10

## 2023-01-09 RX ORDER — SERTRALINE HYDROCHLORIDE 25 MG/1
100 TABLET, FILM COATED ORAL DAILY
Status: CANCELLED | OUTPATIENT
Start: 2023-01-09

## 2023-01-09 RX ORDER — ACETAMINOPHEN 650 MG/1
650 SUPPOSITORY RECTAL EVERY 4 HOURS PRN
Status: CANCELLED | OUTPATIENT
Start: 2023-01-09

## 2023-01-09 RX ORDER — VIT B COMP NO.3/FOLIC/C/BIOTIN 1 MG-60 MG
1 TABLET ORAL DAILY
Status: DISCONTINUED | OUTPATIENT
Start: 2023-01-09 | End: 2023-01-11 | Stop reason: HOSPADM

## 2023-01-09 RX ORDER — ATORVASTATIN CALCIUM 10 MG/1
20 TABLET, FILM COATED ORAL EVERY EVENING
Status: CANCELLED | OUTPATIENT
Start: 2023-01-09

## 2023-01-09 RX ORDER — BACLOFEN 10 MG/1
10 TABLET ORAL 2 TIMES DAILY
Status: CANCELLED | OUTPATIENT
Start: 2023-01-09

## 2023-01-09 RX ORDER — ONDANSETRON 4 MG/1
4 TABLET, ORALLY DISINTEGRATING ORAL EVERY 6 HOURS PRN
Status: CANCELLED | OUTPATIENT
Start: 2023-01-09

## 2023-01-09 RX ORDER — NALOXONE HYDROCHLORIDE 0.4 MG/ML
0.2 INJECTION, SOLUTION INTRAMUSCULAR; INTRAVENOUS; SUBCUTANEOUS
Status: CANCELLED | OUTPATIENT
Start: 2023-01-09

## 2023-01-09 RX ORDER — MIDODRINE HYDROCHLORIDE 5 MG/1
10 TABLET ORAL
Status: DISCONTINUED | OUTPATIENT
Start: 2023-01-10 | End: 2023-01-11 | Stop reason: HOSPADM

## 2023-01-09 RX ORDER — PANTOPRAZOLE SODIUM 40 MG/1
40 TABLET, DELAYED RELEASE ORAL
Status: DISCONTINUED | OUTPATIENT
Start: 2023-01-10 | End: 2023-01-11 | Stop reason: HOSPADM

## 2023-01-09 RX ORDER — METHOCARBAMOL 500 MG/1
500 TABLET, FILM COATED ORAL 4 TIMES DAILY
Status: DISCONTINUED | OUTPATIENT
Start: 2023-01-09 | End: 2023-01-11 | Stop reason: HOSPADM

## 2023-01-09 RX ORDER — GABAPENTIN 300 MG/1
300 CAPSULE ORAL 3 TIMES DAILY
Status: CANCELLED | OUTPATIENT
Start: 2023-01-09

## 2023-01-09 RX ADMIN — OXYCODONE HYDROCHLORIDE 5 MG: 5 TABLET ORAL at 01:04

## 2023-01-09 RX ADMIN — Medication 10 MG: at 05:58

## 2023-01-09 RX ADMIN — MIDODRINE HYDROCHLORIDE 10 MG: 5 TABLET ORAL at 05:57

## 2023-01-09 RX ADMIN — ACETAMINOPHEN 650 MG: 325 TABLET, FILM COATED ORAL at 01:04

## 2023-01-09 RX ADMIN — ACETAMINOPHEN 650 MG: 325 TABLET, FILM COATED ORAL at 05:57

## 2023-01-09 RX ADMIN — GABAPENTIN 300 MG: 300 CAPSULE ORAL at 05:57

## 2023-01-09 RX ADMIN — IOPAMIDOL 75 ML: 755 INJECTION, SOLUTION INTRAVENOUS at 14:54

## 2023-01-09 RX ADMIN — PANTOPRAZOLE SODIUM 40 MG: 40 TABLET, DELAYED RELEASE ORAL at 06:00

## 2023-01-09 RX ADMIN — Medication 5 MG: at 00:47

## 2023-01-09 ASSESSMENT — ACTIVITIES OF DAILY LIVING (ADL)
ADLS_ACUITY_SCORE: 39
ADLS_ACUITY_SCORE: 59
ADLS_ACUITY_SCORE: 63
ADLS_ACUITY_SCORE: 39
ADLS_ACUITY_SCORE: 59
ADLS_ACUITY_SCORE: 39
ADLS_ACUITY_SCORE: 59
ADLS_ACUITY_SCORE: 39
ADLS_ACUITY_SCORE: 59
ADLS_ACUITY_SCORE: 59

## 2023-01-09 NOTE — PLAN OF CARE
A/Ox 4 but can be forgetful. Denies SOB, N/V, and CP. LBM 1/8/23 per pt report. Ax2 with liko lift; did not get oob. Mepilex on right foot changed and intact. Back dressing CDI. No reports of pain on this shift. Regular diet, thin liquids, and take pills whole. Call light within reach. Continue with POC.

## 2023-01-09 NOTE — PROGRESS NOTES
SW received and email from [facility] saying they are pt's [facility].  Just let them know when pt is going to discharge.     MARNIE Guido  [facility]  [facility]  and [address], [address]  [place] [address]  Cell: [phone]  Intake fax [phone]    JULISSA Forrest   [facility], Transitional Care Unit   Social Work   [address] [place] [address], [address]th [place] [address]  () [phone]

## 2023-01-09 NOTE — PLAN OF CARE
"Goal Outcome Evaluation:  Beginning of shift, writer informed by other staff RN that pt.was calling out and when she entered room, pt.was sitting up EOB and asked RN to put on gait belt, but pt.not sure why he wanted it placed. Writer noted pt.disoriented to time, place and situation. Reorientation provided and pt.receptive, as time went on pt.A&Ox4 and stated \"I had one of my crazy moments\". Of note, pt.has not really slept well since admission 01/07. Medicated pt.w/Melatonin, Tylenol and Oxycodone. Pt.had short bout of coughing episode d/t post-nasal drip, coughing up clear sputum. Applied O2 @ 2L via NC/humidified. Pt.incontinent B&B, oliguric and had small, soft>formed BM during shift. Has HD MWF w/pickup @ 0630. Finally fell asleep ~ 0130 and currently has remained asleep, undisturbed as of this time. Did turn on bed alarm d/t above episode.    0615 - Pt.eventually slept very well and staff had some difficulty waking him up this AM. Liko lifted into wheelchair - lift sling left in place per order and applied . Pt.declined anything to eat, stating he wasn't hungry, did have some apple juice. Most AM meds given - others on return. Confirmed pickup w/transport company @ 0630 and staff will bring pt.down. Pt.took with him, his cell phone and .        Patient's most recent vital signs are:     Vital signs:  BP: 145/78  Temp: 96.6  HR: 68  RR: 18  SpO2: 94 %     Patient does not have new respiratory symptoms.  Patient does not have new sore throat.  Patient does not have a fever greater than 99.5.                               "

## 2023-01-09 NOTE — PROGRESS NOTES
01/08/23 0716   Appointment Info   Signing Clinician's Name / Credentials (OT) Matilda Moreno OTRL   Living Environment   People in Home child(lynette), adult  (2 adult sons, one works from home and helps pt with ADL/IADL prn.)   Current Living Arrangements house   Transportation Anticipated family or friend will provide;agency   Living Environment Comments PT: Pt lives with his two sons in [place], [place], Two platform stairs on the front of the house with no railing. Ramp on the garage access. Pt explains that he has been in and out of hospital and rehabs since initial cervical surgery in June 2022. Prior to this pt was moving around IND with walker in home. Pt reports nearly 5 years on dialysis.  He has a tub/shower combo with hand held shower and bench.  Pt has a standard height toilet and reports they may have a RTS to use a home.   Self-Care   Usual Activity Tolerance moderate   Current Activity Tolerance fair   Equipment Currently Used at Home wheelchair, manual   Fall history within last six months yes   Activity/Exercise/Self-Care Comment Per acute care therapy, pt reports son would assist with ADL prn but pt was mod I for transfers and short distance mobility with FWW. W/c for long distances   Instrumental Activities of Daily Living (IADL)   IADL Comments Son assists prn   General Information   Onset of Illness/Injury or Date of Surgery 12/06/22   Referring Physician Светлана Rios MD   Patient/Family Therapy Goal Statement (OT) To Walk   Additional Occupational Profile Info/Pertinent History of Current Problem Shay Jimenez is a 57 year old male with with PMH ESRD on dialysis and osteomyelitis s/p C5-T6 fusion and recent s/p redo C5-T6 fusion   Existing Precautions/Restrictions spinal  ( when OOB, okay to shower - pat dry)   Left Upper Extremity (Weight-bearing Status) partial weight-bearing (PWB)  (10# lifting restriction x4 weeks, until cleared in the clinic)   Right Upper Extremity  (Weight-bearing Status) partial weight-bearing (PWB)  (10# lifting restriction x4 weeks, until cleared in the clinic)   Cognitive Status Examination   Orientation Status orientation to person, place and time   Cognitive Status Comments SLUMS in acute care on 12/21 was BNLs scoring 10/30.  OT will monitor, assess and tx as appropriate.   Visual Perception   Visual Impairment/Limitations corrective lenses for reading   Sensory   Sensory Comments H/o poor sensation in B feet and ankles per pt.   Pain Assessment   Patient Currently in Pain No   Strength Comprehensive (MMT)   General Manual Muscle Testing (MMT) Assessment upper extremity strength deficits identified   Comment, General Manual Muscle Testing (MMT) Assessment pt generally weak, shoulders and kartik hands (h/o fall and surgery to L wrist over the past year).  Pt requested 2# dumbbell, basic ex reviewed and pt demo/verb understanding.   Upper Extremity (Manual Muscle Testing)   Comment, MMT: Upper Extremity Generally weak.  Pt identifies core weakness as well.   Coordination   Coordination Comments WFLs per observation.   Bed Mobility   Comment (Bed Mobility) SBA with HOB raised.  Pt feels his supine to sit is within his 10# precautions.  To get back into bed, pt needs assist with BLE's due to weakness.   Transfers   Transfer Comments NT, pt has been using Irina Stedy.   Activities of Daily Living   BADL Assessment/Intervention upper body dressing;lower body dressing;grooming   Upper Body Dressing Assessment/Training   Comment, (Upper Body Dressing) Set-up   Lower Body Dressing Assessment/Training   Comment, (Lower Body Dressing) Max A to thread clothing, pt does not want to work on LB dressing/use of AE and reports his sons will assist him.   Pt does want to be IND managing clothing for toileting.  He reports that he is currently having diarrhea without warning.   Grooming Assessment/Training   Comment, (Grooming) Set-up   Clinical Impression   Criteria for  Skilled Therapeutic Interventions Met (OT) Yes, treatment indicated   OT Diagnosis Decrease ADL, Decrease transfers, Decrease IADL   OT Problem List-Impairments impacting ADL problems related to;activity tolerance impaired;balance;flexibility;mobility;strength;sensation   Assessment of Occupational Performance 3-5 Performance Deficits   Identified Performance Deficits gr/hyg, dressing, transfers, IADL   Planned Therapy Interventions (OT) ADL retraining;IADL retraining;balance training;bed mobility training;strengthening;transfer training;progressive activity/exercise   Clinical Decision Making Complexity (OT) low complexity   Anticipated Equipment Needs Upon Discharge (OT)   (Monitor for AE needs in the bathroom.  Pt not interested in LB dressing AE.)   Risk & Benefits of therapy have been explained evaluation/treatment results reviewed;care plan/treatment goals reviewed;risks/benefits reviewed;current/potential barriers reviewed;participants voiced agreement with care plan;participants included;patient   Clinical Impression Comments Pt admitted s/p C5-T6 fusion and recent s/p redo C5-T6 fusion.  He reports his LEs are weak and has h/o feeling loss in ankles and feet. Pt went through rehab a couple times in recent past and reports he does not want to work on LB dressing with AE as his sons will assist him.  He agreed to all other aspects of the OT POC.   OT Total Evaluation Time   OT Eval, Low Complexity Minutes (37801) 15   Therapy Certification   Start of Care Date 01/08/23   Certification date from 01/08/23   Certification date to 02/06/23   OT Goals   Therapy Frequency (OT) 6 times/wk   OT Predicted Duration/Target Date for Goal Attainment 01/26/23   OT Goals Hygiene/Grooming;Upper Body Dressing;Lower Body Dressing;Upper Body Bathing;Lower Body Bathing;Bed Mobility;Transfers;Toilet Transfer/Toileting;Meal Preparation;Cognition   OT: Hygiene/Grooming modified independent;while standing;within precautions   OT:  Upper Body Dressing Modified independent;including set-up/clothing retrieval;within precautions   OT: Lower Body Dressing Modified independent;including set-up/clothing retrieval;within precautions  (Pt wants to con't assist of ricco for shoes and socks but wants to manage clothing for toileting mod I.)   OT: Upper Body Bathing Modified independent;within precautions   OT: Lower Body Bathing Supervision/stand-by assist;with precautions   OT: Bed Mobility Modified independent;within precautions   OT: Transfer Supervision/stand-by assist;within precautions  (shower tranfer)   OT: Toilet Transfer/Toileting Modified independent;using adaptive equipment;within precautions   OT: Meal Preparation Supervision/stand-by assist;with simple meal preparation;within precautions;ambulatory level   OT: Cognitive Patient/caregiver will verbalize understanding of cognitive assessment results/recommendations as needed for safe discharge planning   Self-Care/Home Management   Self-Care/Home Mgmt/ADL, Compensatory, Meal Prep Minutes (46362) 25   Treatment Detail/Skilled Intervention OT:  assists pt to don his .  Pt able to roll SBA.  Supine to sit SBA with HOB raised.  Pt able to help scoot to HOB in sitting, cues to con't following his precautions.  Min A sit to supine and min A supine scoot with bed control assist. Pt requests to not work on LB dressing, ricco will do his shoes and socks but he would like ot manage lower body clothing during toileting.   OT Discharge Planning   OT Plan OT: spinal prec,  OOB, incontinence, h/o decrease sensation B ankles and feet, h/o cog deficit after surgery, okay to shower - pat dry.  Tx: sponge bath, pt not feeling ready for a shower, gg bathing score, dressing (pt will have assist of ricco for socks and shoes but wants to manage clothing during toileting). Transfers with Irina huffman, progress to standing at the sink.   OT Discharge Recommendation (DC Rec) home with assist;home with home  care occupational therapy   OT Brief overview of current status See clinical impression above for eval status.   Total Session Time   Timed Code Treatment Minutes 25   Total Session Time (sum of timed and untimed services) 40   Post Acute Settings Only   What unit is patient on? TCU   Upper Body Dressing   Describe Performance OT: Set-up   Lower Body Dressing (Pants/Undergarments)   Describe Performance OT: Dependent. Pt would like to be able to manage clothing during toileting.   Lower Body Dressing (Putting On/Taking-Off Footwear)   Describe Performance OT: dependent, pt has assist of sons   Toileting Hygiene   Describe Performance OT: Dependent per pt   Transfers: Toilet transfers   Describe Performance OT: NT, pt has not been on the toilet.  He has incontinence.  Th instructs pt how he can use Irina Stedy to get to the toilet.   Hygiene/Grooming   Describe Performance OT: set-up   Oral Hygiene   Describe Performance OT: set-up

## 2023-01-09 NOTE — ED TRIAGE NOTES
"Pt arrives via EMS f/ dialysis due to AMS. Staff at dialysis noticed he is not answering questions appropriately and seems \"off\". VSS. Upon arrival pt is alert and oriented to self but unable to tell me where he is or the date, is also argumentative and states we are telling him the wrong dates and that no one will tell him where he is. BG 74 per ems.       "

## 2023-01-09 NOTE — SIGNIFICANT EVENT
Received a phone call from [facility] in [place], Pt got delirious during HD treatment,  Pt is transferring to [facility] acute level care/ER for further evaluation. Attending Physician notified. Pt chart will be pended just incase pt returns to rehab.

## 2023-01-09 NOTE — PROGRESS NOTES
Was told by RN that patient  Went to HD and Is being sent [facility] form HD due to delirium during HG   Reviewing records he was disorented over night as well but then was ok but seemed to be OK in AM when picked up at 6:30 for HD     Will place discharge  Readmit orders in case he comes back    Светлана Rios MD

## 2023-01-09 NOTE — DISCHARGE INSTRUCTIONS
[facility]   MARNIE Guido  [facility]  [facility]  and [facility]  [address] [place] [address], [address]  [place] [address]  Intake fax [phone]

## 2023-01-10 ENCOUNTER — APPOINTMENT (OUTPATIENT)
Dept: PHYSICAL THERAPY | Facility: SKILLED NURSING FACILITY | Age: 58
DRG: 949 | End: 2023-01-10
Attending: INTERNAL MEDICINE
Payer: MEDICARE

## 2023-01-10 ENCOUNTER — APPOINTMENT (OUTPATIENT)
Dept: OCCUPATIONAL THERAPY | Facility: SKILLED NURSING FACILITY | Age: 58
DRG: 949 | End: 2023-01-10
Attending: INTERNAL MEDICINE
Payer: MEDICARE

## 2023-01-10 ENCOUNTER — APPOINTMENT (OUTPATIENT)
Dept: LAB | Facility: CLINIC | Age: 58
End: 2023-01-10
Payer: MEDICARE

## 2023-01-10 PROCEDURE — 97530 THERAPEUTIC ACTIVITIES: CPT | Mod: GP | Performed by: REHABILITATION PRACTITIONER

## 2023-01-10 PROCEDURE — G0463 HOSPITAL OUTPT CLINIC VISIT: HCPCS

## 2023-01-10 PROCEDURE — 99309 SBSQ NF CARE MODERATE MDM 30: CPT | Performed by: INTERNAL MEDICINE

## 2023-01-10 PROCEDURE — 250N000013 HC RX MED GY IP 250 OP 250 PS 637: Performed by: INTERNAL MEDICINE

## 2023-01-10 PROCEDURE — 99304 1ST NF CARE SF/LOW MDM 25: CPT | Performed by: PSYCHIATRY & NEUROLOGY

## 2023-01-10 PROCEDURE — 97535 SELF CARE MNGMENT TRAINING: CPT | Mod: GO

## 2023-01-10 PROCEDURE — 120N000009 HC R&B SNF

## 2023-01-10 RX ADMIN — APIXABAN 2.5 MG: 2.5 TABLET, FILM COATED ORAL at 10:15

## 2023-01-10 RX ADMIN — Medication 1 SPRAY: at 21:34

## 2023-01-10 RX ADMIN — METHOCARBAMOL 500 MG: 500 TABLET ORAL at 18:17

## 2023-01-10 RX ADMIN — SEVELAMER CARBONATE 1600 MG: 800 TABLET, FILM COATED ORAL at 18:16

## 2023-01-10 RX ADMIN — APIXABAN 2.5 MG: 2.5 TABLET, FILM COATED ORAL at 00:08

## 2023-01-10 RX ADMIN — GABAPENTIN 300 MG: 300 CAPSULE ORAL at 21:32

## 2023-01-10 RX ADMIN — ATORVASTATIN CALCIUM 20 MG: 10 TABLET, FILM COATED ORAL at 21:32

## 2023-01-10 RX ADMIN — CETIRIZINE HYDROCHLORIDE 10 MG: 10 TABLET, FILM COATED ORAL at 00:10

## 2023-01-10 RX ADMIN — Medication 10 MG: at 21:33

## 2023-01-10 RX ADMIN — ACETAMINOPHEN 650 MG: 325 TABLET, FILM COATED ORAL at 15:12

## 2023-01-10 RX ADMIN — GABAPENTIN 300 MG: 300 CAPSULE ORAL at 10:15

## 2023-01-10 RX ADMIN — FINASTERIDE 1.3 MG: 5 TABLET, FILM COATED ORAL at 21:34

## 2023-01-10 RX ADMIN — ACETAMINOPHEN 650 MG: 325 TABLET, FILM COATED ORAL at 08:14

## 2023-01-10 RX ADMIN — Medication 1 TABLET: at 21:34

## 2023-01-10 RX ADMIN — GABAPENTIN 300 MG: 300 CAPSULE ORAL at 15:12

## 2023-01-10 RX ADMIN — PANTOPRAZOLE SODIUM 40 MG: 40 TABLET, DELAYED RELEASE ORAL at 07:01

## 2023-01-10 RX ADMIN — Medication 100 MG: at 21:33

## 2023-01-10 RX ADMIN — ACETAMINOPHEN 650 MG: 325 TABLET, FILM COATED ORAL at 00:19

## 2023-01-10 RX ADMIN — THIAMINE HCL TAB 100 MG 100 MG: 100 TAB at 00:10

## 2023-01-10 RX ADMIN — Medication 1 TABLET: at 00:11

## 2023-01-10 RX ADMIN — APIXABAN 2.5 MG: 2.5 TABLET, FILM COATED ORAL at 21:32

## 2023-01-10 RX ADMIN — ATORVASTATIN CALCIUM 20 MG: 10 TABLET, FILM COATED ORAL at 00:10

## 2023-01-10 RX ADMIN — Medication 1 SPRAY: at 00:15

## 2023-01-10 RX ADMIN — CETIRIZINE HYDROCHLORIDE 10 MG: 10 TABLET, FILM COATED ORAL at 21:33

## 2023-01-10 RX ADMIN — OXYCODONE HYDROCHLORIDE 5 MG: 5 TABLET ORAL at 08:14

## 2023-01-10 RX ADMIN — METHOCARBAMOL 500 MG: 500 TABLET ORAL at 15:12

## 2023-01-10 RX ADMIN — OXYCODONE HYDROCHLORIDE 5 MG: 5 TABLET ORAL at 17:01

## 2023-01-10 RX ADMIN — ACETAMINOPHEN 650 MG: 325 TABLET, FILM COATED ORAL at 16:57

## 2023-01-10 RX ADMIN — FINASTERIDE 1.3 MG: 5 TABLET, FILM COATED ORAL at 01:28

## 2023-01-10 RX ADMIN — OXYCODONE HYDROCHLORIDE 5 MG: 5 TABLET ORAL at 00:19

## 2023-01-10 RX ADMIN — THIAMINE HCL TAB 100 MG 100 MG: 100 TAB at 21:31

## 2023-01-10 RX ADMIN — Medication 10 MG: at 10:14

## 2023-01-10 RX ADMIN — Medication 100 MG: at 00:11

## 2023-01-10 RX ADMIN — SERTRALINE HYDROCHLORIDE 100 MG: 25 TABLET ORAL at 00:11

## 2023-01-10 RX ADMIN — FOLIC ACID 1 MG: 1 TABLET ORAL at 21:33

## 2023-01-10 RX ADMIN — METHOCARBAMOL 500 MG: 500 TABLET ORAL at 00:09

## 2023-01-10 RX ADMIN — METHOCARBAMOL 500 MG: 500 TABLET ORAL at 10:15

## 2023-01-10 RX ADMIN — FOLIC ACID 1 MG: 1 TABLET ORAL at 00:11

## 2023-01-10 RX ADMIN — SERTRALINE HYDROCHLORIDE 100 MG: 25 TABLET ORAL at 21:34

## 2023-01-10 RX ADMIN — Medication 10 MG: at 00:08

## 2023-01-10 RX ADMIN — METHOCARBAMOL 500 MG: 500 TABLET ORAL at 21:32

## 2023-01-10 ASSESSMENT — ACTIVITIES OF DAILY LIVING (ADL)
ADLS_ACUITY_SCORE: 43
ADLS_ACUITY_SCORE: 47
ADLS_ACUITY_SCORE: 41
ADLS_ACUITY_SCORE: 47
ADLS_ACUITY_SCORE: 43
ADLS_ACUITY_SCORE: 47
ADLS_ACUITY_SCORE: 43
ADLS_ACUITY_SCORE: 39
ADLS_ACUITY_SCORE: 43
ADLS_ACUITY_SCORE: 47

## 2023-01-10 NOTE — PLAN OF CARE
"There were no vitals taken for this visit.  Iso:  No active isolations  Diet: No diet orders on file  Mental Status:        O2:  RA   Mg+: 1.7 (01/07 0736)  K:  3.9 (01/09 1255)  PLT: 262 (01/09 1255)  HGB: 10.2 (01/09 1255)  BS: 83 (01/09 1255)  No components found for: TROPL   Infusions:     9:40 PM- 11 PM  Goal Outcome Evaluation:   Pt came back from the ED at around 9 :40 PM. Report was given by the ED nurse. Pt stable per report. PT was asking each and every staff  where they are from and why they have an accent. Pt also had an encounter with another staff (Nataliya) on where she was from. \"  You were not born in [place].\" He said after  OLYA told him that he was born in [place]. He later said he wanted a nurse from 'here'. Pt requested his medications at 10 PM but pharmacy was still reviewing them.( Last checked at 11:PM) NOC nurse will be updated. Continue with POC.    Patient does not have new respiratory symptoms.  Patient does not have new sore throat.  Patient does not have a fever greater than 99.5.                               "

## 2023-01-10 NOTE — PLAN OF CARE
"Goal Outcome Evaluation:       Pt A&O, confused at times. Displays argumentative, behavior towards nurse and NA with frequent and repeated questions to staff.  Wears cervical collar. Uses liko lift to transfer. Regular diet, takes pills whole PO with thin liquids. Seen by psychiatrist to evaluate mental status late afternoon (see note by Esvin Kaplan). Son came to visit and brought supper- good appetite ate 100% of meals. LBM 1/9- incontinent of B&B. Up in chair most of day, pt stated he was \"Working and talking to clients\" even though it was 10pm. midodrine held, d/t parameters. Requests to wear NC with 2L oxygen at night- will pass on to next shift.         Patient's most recent vital signs are:     Vital signs:  BP: 145/67  Temp: 97.3  HR: 70  RR: 18  SpO2: 97 %     Patient does not have new respiratory symptoms.  Patient does not have new sore throat.  Patient does not have a fever greater than 99.5.             "

## 2023-01-10 NOTE — PHARMACY-ADMISSION MEDICATION HISTORY
Patient went to ED and came back to TCU. See prior med history notes on 12/26/22 and 1/7/23.    Denia Caal, PharmD, BCPS

## 2023-01-10 NOTE — DISCHARGE INSTRUCTIONS
We were not able to determine the exact cause of your confusion today, however it could be due to medications or due to lack of sleep.  The CT of your head did not show any head bleed, stroke, or other concerning findings.  Your EKG and test for your heart did not show a cause for your symptoms today.  All of your labs looked good and close to your normal lab values.  You are safe to return to rehab to continue rehabilitation for walking so that you are safe to return home.  I recommend continuing to work with your providers at rehab to help you be able to sleep at night, and have pharmacy take a look at your medications for any possible interactions that could be contributing to your nighttime sleeplessness and/or your confusion.     Keep your regularly scheduled appointments including your regularly scheduled dialysis.  You may of course return to the emergency department at any time if you develop new or worsening symptoms that are concerning to you.

## 2023-01-10 NOTE — PROGRESS NOTES
SHAKIRA cut and paste Initial Assessment as pt went to Dialysis and was sent to ER due to delirium.  However, in a lot of hospital notes, this happens all the time. Pt was only in ER for a short time and returned to TCU.  Pt was not gone for more than 24hrs. All information is current and accurate.     Social Work: Initial Assessment with Discharge Plan     Patient Name: Shay Jimenez  : 1965  Age: 57 year old  MRN: [id number]  Completed assessment with: Chart review and pt interview.   Admitted to TCU: 23     Presenting Information   Date of SW assessment: 2023  Health Care Directive: This patient's capacity will likely wax and wane along with his mental status; however, in general, he does not show capacity to make complex medical decisions and he does not have dispositional capacity in that he is clearly unable to take care of himself without significant assistance.  A family member should be used as an alternative decision maker.  Primary Health Care Agent: Self  Secondary Health Care Agent: Chinedu/Cory chacko.   Living Situation: Pt lives with 2 adult sons.   Previous Functional Status: Was getting help from his adult children  Dependent ADLs: Bathing, Dressing, Toileting  Dependent IADLs: Transportation, Cooking, Cleaning, Laundry, Shopping, Meal Preparation     DME available: wheelchair, manual; walker, rolling  Patient and family understanding of hospitalization: Appropriate and pleasant.   Cultural/Language/Spiritual Considerations: Pt is a 57 y.o.  male , English speaking, Sikh  Abuse concerns: None reported.   -------------------------------------------------------------------------------------------------------------  TRANSPORTATION:    Has lack of transportation kept you from medical appointments, meetings, work, or from getting things needed for daily living?  A. Yes, it has kept me from medical appointments or from getting my medications  B. Yes, it has kept me  "from non-medical meetings, appointments, work or from getting things that I need  C. No  X. Patient Unable to respond  Y. Patient declines to respond  -------------------------------------------------------------------------------------------------------------  Health Literacy:   How Often do you need to have someone help you when you read instructions, pamphlets, or other written material from your doctor or pharmacy?  0.       Never  1.       Rarely  2.       Sometimes  3.       Often  4.       Always  5.       Patient declines to respond  6.       Patient unable to respond  ------------------------------------------------------------------------------------------------------------   BIMS:  See flow sheet   Tell the pt : \"I am going to say three words for you to remember.  Please repeat the words after I have said all three.  The words are:Sock, Blue and Bed. Now tell me the three words.\"      Number of words repeated after the first attempt.  0: None   1: One  2: Two  3: Three  Ask the pt: \"Please tell me what year it is right now?\"  0: Missed by 5 >5 years or no answer   1: Missed by 2-5 years  2: Missed by a 1 year  3: Correct       Ask the pt: \"What month are we in right now?\"  0: Missed by > 1 month or no answer.  1: Missed by 6 days to 1 month  2: Accurate within 5 days.      Ask pt: \"what day of the week is today?\"  0: Incorrect day of the week.  1: Correct.      Ask pt:\" Let's go back to an earlier question.  What were those three words that I asked you to repeat?\" If unable to remember a word, give a cue (something to wear, a color, a piece of furniture) for that word.   Able to recall Sock.  0: No, could not recall.  1: Yes, after cueing (something to wear)  2: Yes, no cueing required.   Able to recall Blue.  0: No, could not recall.  1:Yes, after cueing (a color)  2:Yes, no cueing required.   Able to recall Bed.  0: No,  1: Yes, after cueing (a piece of furniture)  2: Yes, no cueing required. "   -----------------------------------------------------------------------------------------------------------  CAM:  1.) Acute Onset/Fluctuating Course:  Is there evidence of an acute change in mental status from the patient's baseline?  0. No  1. Yes  2.) Inattention:  Does the patient have difficulty focusing attention, for example, being easily distractable, or having difficulty keeping track of what was being said?  0. No - Behavior not present  1. Yes - Behavior present  3.) Disorganized Thinking:  Is the patient's speech disorganized or incoherent, such as rambling or irrelevant conversation, unclear or illogical flow of ideas, or unpredictable switching from subject to subject?  0. No - Behavior not present  1. Yes - Behavior present  4.) Altered level of consciousness:  Did the patient have altered level of consciousness as indicated by any of the following criteria?  0. No - Alert  1. Yes - Vigilant (hyperalert), lethargic (drowsy) , stupor (difficult to arouse) or comatose (unable to be aroused)  -------------------------------------------------------------------------------------------------------------  PHQ-9:   Over the last 2 weeks, have you been bothered by any of the following problems?      If symptom is present, then ask the patient:  About how often have you been bothered by this?   Symptom Presence                                              Symptom Frequency  0. No                                                                     0. Never or 1 day  1. Yes                                                                   1. 2-6 days (several days)  9. No Response                                                    2. 7-11 days (half or more of the days)                                                                                3. 12-14 days (nearly every day)       Present Frequency   A.       Little interest of pleasure doing things  0     B.       Feeling down, depressed, or hopeless  0      C.       Trouble falling or staying asleep, or sleeping too much  0     D.       Feeling tired or having little energy  0     E.       Poor appetite or overeating  0     F.        Feeling bad about yourself - or that you are a failure, or have let yourself or your family down  0     G.      Trouble concentrating on things, such as reading the newspaper or watching television  0     H.      Moving or speaking so slowly that other people could have noticed. Or the opposite - being so fidgety or restless that you have been moving around a lot more than usual  0     I.         Thoughts that you would be better off dead, or of hurting yourself in some way  0        -------------------------------------------------------------------------------------------------------------  SOCIAL ISOLATION  How often do you feel lonely or isolated form those around you?  0.       Never  1.       Rarely  2.       Sometimes  3.       Often  4.       Always  5.       Patient declines to respond  6.       Patient unable to respond  -------------------------------------------------------------------------------------------------------------     BIMS: Pt scored 13 on BIMS indicating cognition intact.  PHQ-9: Pt scored 0 on PHQ-9 indicating no depressive symptoms present.   PAS: confirmation number- [id number]  Has there been a level II screen?  No  Were there any recommendations in the screen? No  If yes, will the recommendations we incorporated into the Plan of Care?  N/A  Physical Health  Reason for admission:  Mr. Jimenez is a 57-year-old white male who is hospitalized for a redo of C5 through T6 fusion.     Provider Information   Primary Care Physician:Jonathan Travis, [facility] [address] [facility] [address], [place] [id number]*  [phone]  : None reported.      Mental Health:   Diagnosis: depression   Current Support/Services: Medications   Previous Services: therapist.   Services Needed/Recommended: SHAKIRA offered Scott and  "Health Psychology services while at [facility].        Substance Use:  Diagnosis: history of significant alcohol use, no smoking, smoked pot in high school and college but nothing since.   Current Support/Services: None reported.   Previous Services: None reported.   Services Needed/Recommended: N/A     Support System  Marital Status: 2nd wife-  - \"My wife left me in June.\"   Family support: his sons (Chinedu Ray) his mom lives in [place]. Has a brother in [place], [place].  Other support available: Friends and neighbors.  Gaps in support system: None reported.      Community Resources  Current in home services: Received a call from [facility] staff, Deven # [phone].  Deven reported pt was receiving home PT and OT from [facility] prior hospitalization.   MARNIE Guido    [facility]  and [address], [address]  [place] [id number]  Cell: [phone]  Intake fax [phone]  Previous services:  Dialysis Services -  M/W/F goes to [facility] (P: [phone], F: [phone]). Also previously worked with [facility].      Financial/Employment/Education  Employment Status: Currently, working.         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  Did work for [employer] for 12 yrs.   Income Source: wages and disability.   Education: Went to [employer].   Financial Concerns:  Needs help getting FMLA forms and have them filled out.   Insurance: [employer]/ Medicare     Discharge Plan   Patient and family discharge goal: Pt wants to go home.  Will see if both son's can continue to take care of pt.   Provided Education on discharge plan: YES  Patient agreeable to discharge plan:  YES  A list of Medicare Certified Facilities was provided to the patient and/or family to encourage patient choice. Based on location and rating, patient would like referrals made to: N/A right now.   General " information regarding anticipated insurance coverage and possible out of pocket cost was discussed. Patient and patient's family are aware patient may incur the cost of transportation to the facility, pending insurance payment: YES  Barriers to discharge: If adult sons can take care of pt.      Discharge Recommendations   Disposition: Home TBD  Transportation Needs: Possible medical transport.   Name of Transportation Company and Phone: TBD      Additional comments   Delirium with a waxing and waning mental status exam.  This patient's capacity will likely wax and wane along with his mental status; however, in general, he does not show capacity to make complex medical decisions and he does not have dispositional capacity in that he is clearly unable to take care of himself without significant assistance.  A family member should be used as an alternative decision maker.     [facility], Dr. Cline  ([phone], fax [phone])  [address] [place] [address]  Runs MWF via [facility], 4.25h runs.  7:35am (arrive by 7:20am), ends at 11:50am   Transportation to dialysis is private pay.   [facility] ([phone]) MWF  patient runs from 7:15am-11:50am.     Pt could not understand who/how FMLA worked. SW will come back on the 9th and see if we can call HR together to get forms sent here.      JULISSA Forrest   [facility], Transitional Care Unit   Social Work   Formerly [facility] [address] [place] [address]  (ph) [phone]

## 2023-01-10 NOTE — PROGRESS NOTES
[facility] Nurse Inpatient Assessment     Consulted for: Right heel    Patient History (according to provider note(s):      Shay Jimenez is a 57 year old male with with PMH ESRD on dialysis and osteomyelitis s/p C5-T6 fusion who is now POD#5 s/p redo C5-T6 fusion with concern for hemorrhage; no vertebral artery dissection or extravasation was noted on intraoperative angiogram.  He was admitted to the Neuro ICU postoperatively for MAP goals and frequent neuro checks, remaining stable on pressors.  Neuro exam is unchanged from patient's baseline.    Patient transferred to TCU 1/9/2023 for continued rehab.     Areas Assessed:      Areas visualized during today's visit: Feet     Pressure Injury Location: Right heel      Last photo: 1/5- no change  Wound type: Pressure Injury     Pressure Injury Stage: 2, present on admission   Wound history/plan of care:  Wound noted by nursing on Leonard on 12/6 after patient transferred out of PACU. Patient had been proned in OR so unlikely wound is from then and that it was present on admission.  Wound base: 100 % granulation tissue     Palpation of the wound bed: normal      Drainage: none     Description of drainage: none     Measurements (length x width x depth, in cm) 1 x 1 x 0.2 cm      Tunneling N/A     Undermining N/A  Periwound skin: Intact      Color: normal and consistent with surrounding tissue      Temperature: normal   Odor: none  Pain: no grimacing or signs of discomfort, none  Pain intervention prior to dressing change: patient tolerated well  Treatment goal: Protection  STATUS: healing   Supplies ordered: supplies stored on unit     Treatment Plan:     Right heel wound: Every third day and as needed  Cleanse wound with NS and pat dry  Paint bibi-wound skin with no sting barrier film.  Cover with 4x4 mepilex flex(# [id number]).   Keep heels elevated off bed.   Wear Prevalon boots while in bed. (# [id number])    Pressure  "Injury Prevention (PIP) Plan:  If patient is declining pressure injury prevention interventions: Explore reason why and address patient's concerns, Educate on pressure injury risk and prevention intervention(s), If patient is still declining, document \"informed refusal\"  and Ensure Care team is aware ( provider, charge nurse, etc)  Mattress: Follow bed algorithm, reassess daily and order specialty mattress, if indicated.  HOB: Maintain at or below 30 degrees, unless contraindicated  Repositioning in bed: Every 1-2 hours , Left/right positioning; avoid supine and Raise foot of bed prior to raising head of bed, to reduce patient sliding down (shear)  Heels: Keep elevated off mattress, Pillows under calves and Heel lift boots  Protective Dressing: Sacral Mepilex for prevention (#[id number]),  especially for the agitated patient   Chair positioning: Chair cushion (#[id number]) , Repositions self: patient to shift weight every 15 minutes, Assist patient to reposition hourly and Do NOT use a donut for sitting (this increases pressure to smaller area and creates a higher potential for injury)    If patient has a buttock pressure injury, or high risk for PI use chair cushion or SPS.  Moisture Management: Perineal cleansing /protection: Follow Incontinence Protocol, Avoid brief in bed, Clean and dry skin folds with bathing , Consider InterDry (#[id number]) between folds and Moisturize dry skin  Under Devices: Inspect skin under all medical devices during skin inspection , Ensure tubes are stabilized without tension and Ensure patient is not lying on medical devices or equipment when repositioned  Ask provider to discontinue device when no longer needed.    Orders: Reviewed and Updated    RECOMMEND PRIMARY TEAM ORDER: None, at this time  Education provided: plan of care and wound progress  Discussed plan of care with: Patient  WOC nurse follow-up plan: weekly  Notify WOC if wound(s) deteriorate.  Nursing to notify the Provider(s) and " re-consult the WOC Nurse if new skin concern.    DATA:     Current support surface: Standard  Low air loss (DEN pump, Isolibrium, Pulsate, skin guard, etc)  BMI: There is no height or weight on file to calculate BMI.   Active diet order: Orders Placed This Encounter      Regular Diet Adult     Output: No intake/output data recorded.     Labs:   Recent Labs   Lab 01/09/23  1255   ALBUMIN 3.8   HGB 10.2*   WBC 5.7     Pressure injury risk assessment:   Sensory Perception: 3-->slightly limited  Moisture: 3-->occasionally moist  Activity: 3-->walks occasionally  Mobility: 3-->slightly limited  Nutrition: 3-->adequate  Friction and Shear: 2-->potential problem  Konstantin Score: 17     Kanwal Farrell RN CWOCN   Dept. Pager: [phone]  Dept. Office Number: [phone]

## 2023-01-10 NOTE — PROGRESS NOTES
"SW made home care referral to [facility] for RN/PT/OT services via Epic.     ADDENDUM 1620: SW received update from [facility] Connection Coordinator, Kamala noting that they would not be able to accept home care referral d/t pt being on their \"do not readmit list\" as pt was \"non compliant with POC and safety issues at home for both the clinician and pt\".     Arti Lay, SW  [facility]   [facility]       "

## 2023-01-10 NOTE — PLAN OF CARE
Physical Therapy Discharge Summary    Reason for therapy discharge:    Discharged to transitional care facility.    Progress towards therapy goal(s). See goals on Care Plan in [facility] electronic health record for goal details.  Goals met    Therapy recommendation(s):    Continued therapy is recommended.  Rationale/Recommendations:  continued TCU rehab to promote activity tolerance and strengthening .

## 2023-01-10 NOTE — PLAN OF CARE
Goal Outcome Evaluation:    VS: /67 (BP Location: Right arm)   Pulse 63   Temp (!) 96.5  F (35.8  C) (Oral)   Resp 18   SpO2 99%    O2: RA   Output: Incontinent;    Last BM: 1/9 incontinent   Activity: Participated in therapy; in bed, using phone, tv  Up in chair   Skin: X; spinal incision, buttocks, R heel (no wound care orders - Sticky Note sent to provider). Left arm fistula SOSA   Pain: Tylenol x2  Oxy x1   CMS Neuro: Intact, N/T reported  Cervical collar on; pt complies w/wearing schedule  A&O, confused, inappropriate, repeated, and frequent questions   Dressing: Coccyx Mepilex  R heel Mepilex   Diet: Reg, no bfast or lunch   LDA: Cervical collar   Equipment: Liko, cervical collar, Pravalon boots   Plan: Con't POC.    Additional Info:       Patient's most recent vital signs are:     Vital signs:  BP: 134/67  Temp: 96.5  HR: 63  RR: 18  SpO2: 99 %     Patient does not have new respiratory symptoms.  Patient does not have new sore throat.  Patient does not have a fever greater than 99.5

## 2023-01-10 NOTE — PROGRESS NOTES
[facility] Transitional Care    Medicine Progress Note - Hospitalist Service    Date of Admission:  1/9/2023    Assessment & Plan   Shay Jimenez is a 56 yo obese gentleman w/ h/o HTN, ESRD on HD, GERD, GINI intolerant of CPAP, chronic atrial fibrillation on chronic AC, alcohol ause, had previous C5-T6 spinal fusion due to pathological fracture form osteomyelitis.  He was noted to have discitis/osteomyelitis on CT of chest back in 2/2022 that was obtained for respiratory issues.  He was hospitalized at [facility] and was to follow up with [facility] spine ( no records and pt has no recollection if anything was done ).  In 6/2022 MRI showed T2, T3 vertebral collapse with myelopathy, some abnormal epidural tissue and inflammation, at that time antibiotics was deferred as pt was stable and was planned for obtaining intraop cx.  Intra-op cx was + on day 5 and day 7 for C. Acnes that was felt to be contamination, however due to presence of hardware it was decided to treat with ancef x 2 weeks followed by  amoxicillin x 4 weeks.  He then was treated for Morganella morganii septic shock, no intra-abd source found on CT.  TTE did not show vegetations was treated with almost 9 weeks of iv cefepime 8/14-10/13. On repeat imaging was found to have hardware failure  and was admitted on 12/6/22 to [facility] [place] Neurosurgeon service for surgery.  He underwent C5-T6 redo cervical spine fusion 12/6/22.  He did have hemorrhage intraop, cerebral angio did not show vertebral artery injury, did receive blood, was in ICU on pressors to keep blood pressure up for perfusion  . Also received periop cefazolin till drain pulled. Intraop culture remained negative and there was no indication for ongoing antibiotics per ID.  Hospital course was complicated by agitation and delirium.  He treated w/ benzo per CIWA,  He was also noted to in aute on chronic respiratory failure and did require supplement Oxygen.  Transferred to  [facility] 1/7/23 for conditioning.     Patient went to dialysis center in [place] on 1/9/23 for his scheduled HD.  He was noted to be confused, disoriented, argumentative and irritable at the dialysis center. Transferred to [facility] ED. he underwent extensive work-up consisting of CT head without contrast, CTA head and neck, CBC, BMP, troponin and EKG; all of the aforementioned test were negative.  Patient's mental status cleared while in the ED.  [facility]  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 12/6/22  Previous h/o C5-T6 fusion for pathological fracture due to osteomyelitis   ---   Intraop culture remained neg and per ID there was no indication for ongoing antibiotics   ---   Needs cervical collar when HOB > 45 degrees and when OOB      Hypertension   ---   Not on meds  ---   Currently on midodrine tid but hold if blood pressure  > 120      Chronic Atrial fibrillation   ---   He was seen by card in the past   ---   Had ZIO patch in past but apparently results were lost  ---   Rate well controlled w/o meds  ---   On eliquis for stroke prophylaxis     Mild to moderate pulm HTN  Moderate MR  ---   Could all be related to untreated GINI but has not tolerated CPAP   ---   Follow up as out patient       Chronic resp failure  GINI   Obesity   ---   Intolerant to CPAP  ---   Uses O2 at night 1-3 liters, continue     Acute on chronic pain  Peripheral neuropathy  ---   Continue baclofen, gabapentin and methocarbamol      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    ----   Continue HD  M/W/F   ---    kg   ---   Refused renal diet      Chronic hyponatremia  ---   Na level is 132  ---   Managed by nephrology during HD      ACD  ---   Due to ESKD  ---   Managed during HD w/ epo    HDL  ---   Continue atorvastatin      Anxiety d/o  Depression   OCD    ---   Continue Zoloft     Alcohol use disorder  ---   Drink 5-6 cocktail a day (vodka )   ---   Continue vitamins   ---   Does not want to see chem dep   ---   Continue baclofen and gabapentin as can also help with craving      Right heal pressure sore  ---   WOCN to see             Diet: Regular Diet Adult    DVT Prophylaxis: eliquis   Oliveira Catheter: Not present  Lines: None     Cardiac Monitoring: None  Code Status: Full Code    Disposition Plan   TBD             Andi Giang MD  Hospitalist Service  [facility]  Securely message with [employer] (more info)  Text page via [facility] Paging/Directory   ___________________________________________________________________    Interval History   No complaints.  AAO X 3  Uneventful night    Physical Exam   Vital Signs: Temp: (!) 96.5  F (35.8  C) Temp src: Oral BP: 134/67 Pulse: 63   Resp: 18 SpO2: 99 % O2 Device: None (Room air)    Weight: 0 lbs 0 oz  General: Obese, aao x 3, NAD.  HEENT:  NC/AT,  c-collar present  CVS:  NL s 1 and s2, 2/6 systolic murmur, no r/g.  Lungs:  CTA B/L.   Abd:  Soft, + bs, NT, no rebound or gaurding, no fluid shift.  Ext:  No c/c.  Lymph:  No edema.  Neuro:  Nonfocal.  Musculoskeletal: No calf tenderness to palpation.    Skin:  No rash.  Psychiatry:  Mood and affect appropriate.      Data     I have personally reviewed the following data over the past 24 hrs:    5.7  \   10.2 (L)   / 262     132 (L) 91 (L) 18.0 /  83   3.9 25 4.45 (H) \       ALT: <5 (L) AST: 19 AP: 125 TBILI: 0.6   ALB: 3.8 TOT PROTEIN: 6.7 LIPASE: N/A       Trop: 94 (H) BNP: N/A       Procal: N/A CRP: N/A Lactic Acid: 0.9

## 2023-01-10 NOTE — ED PROVIDER NOTES
ED Provider Note  [facility]      History     Chief Complaint   Patient presents with     Altered Mental Status     HPI  Shay Jimenez is a 57 year old male with a PMH significant for hypertension, ESRD on HD, GERD, GINI intolerant of CPAP, chronic atrial fibrillation on Eliquis, alcohol use disorder, peripheral neuropathy, had previous C5-T6 spinal fusion due to pathological fracture form osteomyelitis c/b infection, sepsis and hardware failure with complicated lengthy hospital course who presented from dialysis for altered mental status.  Patient is currently residing in a TCU since 1/7/2023, staff felt he was oriented when he left for dialysis this morning, however in dialysis it was felt he was argumentative and irritable, not oriented to date and not answering questions appropriately, different from his baseline when they had seen him before.  Of note his most recent hospitalization was complicated by agitation and delirium possibly due to alcohol withdrawal amongst other factors.  Upon arrival to the emergency department patient is insistent that people are lying to him about the date, insists that it is 12/31/2022, keeps insisting that there is a championship [employer] football game and that is how he knows what day it is.  Denies any vision changes, headache, chest pain, shortness of breath, increased weakness, increased numbness or tingling in his extremities, increased swelling in his extremities.    Past Medical History  Past Medical History:   Diagnosis Date     Chronic kidney disease      Neuropathy      Past Surgical History:   Procedure Laterality Date     IR CAROTID CEREBRAL ANGIOGRAM BILATERAL  12/6/2022     OPTICAL TRACKING SYSTEM FUSION POSTERIOR SPINE THORACIC THREE+ LEVELS N/A 6/27/2022    Procedure: Cervical 6 to Thoracic 6 Fusion and Thoracic 2-3 Decompression;  Surgeon: Fritz Boles MD;  Location:  OR     OPTICAL TRACKING SYSTEM FUSION POSTERIOR SPINE  THORACIC THREE+ LEVELS N/A 12/6/2022    Procedure: O-Arm/Stealth Assisted Revision of Cervical 5 to Thoracic 6 Fusion;  Surgeon: Fritz Boles MD;  Location: [facility] OR     No current outpatient medications on file.    Allergies   Allergen Reactions     Amlodipine      edema     Lisinopril Cough     Family History  No family history on file.  Social History   Social History     Tobacco Use     Smoking status: Never     Smokeless tobacco: Never   Substance Use Topics     Alcohol use: Not Currently     Drug use: Never         A medically appropriate review of systems was performed with pertinent positives and negatives noted in the HPI, and all other systems negative.    Physical Exam   BP: 134/80  Pulse: 77  Temp: 98.1  F (36.7  C)  Resp: 18  SpO2: 97 %  Physical Exam  Vitals and nursing note reviewed.   Constitutional:       General: He is not in acute distress.     Appearance: He is not toxic-appearing.   HENT:      Head: Normocephalic and atraumatic.   Eyes:      General: No visual field deficit.     Extraocular Movements: Extraocular movements intact.      Pupils: Pupils are equal, round, and reactive to light.   Cardiovascular:      Rate and Rhythm: Normal rate and regular rhythm.      Heart sounds: Normal heart sounds.   Pulmonary:      Effort: Pulmonary effort is normal.      Breath sounds: Normal breath sounds.   Abdominal:      General: There is no distension.      Palpations: Abdomen is soft.      Tenderness: There is no abdominal tenderness.   Musculoskeletal:         General: No swelling or tenderness. Normal range of motion.      Cervical back: Normal range of motion and neck supple. No rigidity.   Skin:     General: Skin is warm and dry.      Capillary Refill: Capillary refill takes less than 2 seconds.   Neurological:      Mental Status: He is alert.      Cranial Nerves: No cranial nerve deficit, dysarthria or facial asymmetry.      Sensory: No sensory deficit.      Motor: No weakness.       Coordination: Coordination normal.      Comments: Disoriented to date   Psychiatric:         Speech: Speech normal.         Behavior: Behavior is agitated.      Comments: Irritable that people are lying to him about the date         ED Course, Procedures, & Data     ED Course as of 01/09/23 1905   Mon Jan 09, 2023   1511 Troponin T, High Sensitivity(!!): 120     Procedures       ED Course Selections:        EKG Interpretation:      Interpreted by ENID Hoang CNP  Time reviewed: 1327  Symptoms at time of EKG:  confusion   Rhythm: sinus rhythm and 1st degree AV block  Rate: normal  Axis: normal  Ectopy: none  Conduction: 1st degree AV block  ST Segments/ T Waves: prolonged QT/QTc 486/542  Q Waves: none  Comparison to prior: Unchanged    Clinical Impression: no acute changes         EKG Interpretation:      EKG Number: 2.  Done at 2.5 hours.  Interpreted by ENID Hoang CNP  Symptoms at time of EKG: unchanged, elevated troponin t at 120   Rhythm: Normal sinus  and 1 degree AV block  Rate: Normal  Axis: Normal  Ectopy: None  Conduction: Normal and 1st degree AV block  ST Segments/ T Waves: No acute ischemic changes and QTc prolongation 484/529  Q Waves: None  Comparison to prior: Unchanged    Clinical Impression: no acute changes                       Results for orders placed or performed during the hospital encounter of 01/09/23   CT Head w/o Contrast     Status: None    Narrative    CT angiogram of the head with contrast  Head CT without contrast    History:  altered mental status.  Comparison:  12/16/2022      Technique: Initial noncontrast images from the skull base to the  vertex were obtained, and reviewed in bone, brain, and subdural  windows.    HEAD and NECK CTA: During rapid bolus intravenous injection of  nonionic contrast material, axial images were obtained using thin  collimation multidetector helical technique from the base of the upper  aortic arch through the [place]. This  CT angiogram data was  reconstructed at thin intervals with mild overlap. Images were sent to  the [employer] workstation, and 3D reconstructions were obtained. The  axial source images, multiplanar reformations, 3D reconstructions in  both maximum intensity projection display and volume rendered models  were reviewed, with reconstructions performed by the technologist and  the radiologist    Findings:    On the noncontrast images of the brain, there is no definite  intracranial hemorrhage, mass affect, or midline shift. The ventricles  are not enlarged. The gray to white matter differentiation of the  cerebral hemispheres is preserved. There is moderate cerebral atrophy.    Head CTA demonstrates fusiform dilatation of the V4 segment of the  right vertebral artery just proximal to the origin of the right  posterior inferior cerebellar artery. This measures 5 mm in width and  11 mm in length. The anterior communicating artery is patent.  Regarding the posterior communicating arteries, both are patent.    Neck CTA demonstrates no stenosis of the major cervical arteries. The  origins of the great vessels from the aortic arch are patent. The  normal distal right internal carotid artery measures 5 mm. The normal  distal left internal carotid artery measures 5 mm.     No mass within the visualized portions of the cervical soft tissues or  lung apices.       Impression    Impression:    1. No evidence of intracranial hemorrhage on the noncontrast head CT.  2. Head CTA demonstrates no definite occlusion. 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   2. Neck CTA demonstrates no stenosis of the major cervical arteries.      I have personally reviewed the examination and initial interpretation  and I agree with the findings.    HARRISON DUMONT MD         SYSTEM ID:  [id number]   CTA Head Neck with  Contrast     Status: None    Narrative    CT angiogram of the head with contrast  Head CT without contrast    History:  altered mental status.  Comparison:  12/16/2022      Technique: Initial noncontrast images from the skull base to the  vertex were obtained, and reviewed in bone, brain, and subdural  windows.    HEAD and NECK CTA: During rapid bolus intravenous injection of  nonionic contrast material, axial images were obtained using thin  collimation multidetector helical technique from the base of the upper  aortic arch through the [place]. This CT angiogram data was  reconstructed at thin intervals with mild overlap. Images were sent to  the [employer] workstation, and 3D reconstructions were obtained. The  axial source images, multiplanar reformations, 3D reconstructions in  both maximum intensity projection display and volume rendered models  were reviewed, with reconstructions performed by the technologist and  the radiologist    Findings:    On the noncontrast images of the brain, there is no definite  intracranial hemorrhage, mass affect, or midline shift. The ventricles  are not enlarged. The gray to white matter differentiation of the  cerebral hemispheres is preserved. There is moderate cerebral atrophy.    Head CTA demonstrates fusiform dilatation of the V4 segment of the  right vertebral artery just proximal to the origin of the right  posterior inferior cerebellar artery. This measures 5 mm in width and  11 mm in length. The anterior communicating artery is patent.  Regarding the posterior communicating arteries, both are patent.    Neck CTA demonstrates no stenosis of the major cervical arteries. The  origins of the great vessels from the aortic arch are patent. The  normal distal right internal carotid artery measures 5 mm. The normal  distal left internal carotid artery measures 5 mm.     No mass within the visualized portions of the cervical soft tissues or  lung apices.       Impression     Impression:    1. No evidence of intracranial hemorrhage on the noncontrast head CT.  2. Head CTA demonstrates no definite occlusion. 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   2. Neck CTA demonstrates no stenosis of the major cervical arteries.      I have personally reviewed the examination and initial interpretation  and I agree with the findings.    HARRISON DUMONT MD         SYSTEM ID:  [id number]   Comprehensive metabolic panel     Status: Abnormal   Result Value Ref Range    Sodium 132 (L) 136 - 145 mmol/L    Potassium 3.9 3.4 - 5.3 mmol/L    Chloride 91 (L) 98 - 107 mmol/L    Carbon Dioxide (CO2) 25 22 - 29 mmol/L    Anion Gap 16 (H) 7 - 15 mmol/L    Urea Nitrogen 18.0 6.0 - 20.0 mg/dL    Creatinine 4.45 (H) 0.67 - 1.17 mg/dL    Calcium 9.2 8.6 - 10.0 mg/dL    Glucose 83 70 - 99 mg/dL    Alkaline Phosphatase 125 40 - 129 U/L    AST 19 10 - 50 U/L    ALT <5 (L) 10 - 50 U/L    Protein Total 6.7 6.4 - 8.3 g/dL    Albumin 3.8 3.5 - 5.2 g/dL    Bilirubin Total 0.6 <=1.2 mg/dL    GFR Estimate 15 (L) >60 mL/min/1.73m2   Lactic acid whole blood     Status: Normal   Result Value Ref Range    Lactic Acid 0.9 0.7 - 2.0 mmol/L   [facility] Draw     Status: None    Narrative    The following orders were created for panel order [facility].  Procedure                               Abnormality         Status                     ---------                               -----------         ------                     Extra Blue Top Tube[[id number]]                              Final result               Extra Red Top Tube[[id number]]                               Final result                 Please view results for these tests on the individual orders.   CBC with platelets and differential     Status: Abnormal   Result Value Ref Range    WBC Count 5.7 4.0 - 11.0 10e3/uL    RBC Count 3.40 (L) 4.40 -  5.90 10e6/uL    Hemoglobin 10.2 (L) 13.3 - 17.7 g/dL    Hematocrit 33.1 (L) 40.0 - 53.0 %    MCV 97 78 - 100 fL    MCH 30.0 26.5 - 33.0 pg    MCHC 30.8 (L) 31.5 - 36.5 g/dL    RDW 14.9 10.0 - 15.0 %    Platelet Count 262 150 - 450 10e3/uL    % Neutrophils 71 %    % Lymphocytes 14 %    % Monocytes 8 %    % Eosinophils 6 %    % Basophils 1 %    % Immature Granulocytes 0 %    NRBCs per 100 WBC 0 <1 /100    Absolute Neutrophils 4.1 1.6 - 8.3 10e3/uL    Absolute Lymphocytes 0.8 0.8 - 5.3 10e3/uL    Absolute Monocytes 0.4 0.0 - 1.3 10e3/uL    Absolute Eosinophils 0.4 0.0 - 0.7 10e3/uL    Absolute Basophils 0.0 0.0 - 0.2 10e3/uL    Absolute Immature Granulocytes 0.0 <=0.4 10e3/uL    Absolute NRBCs 0.0 10e3/uL   Extra Blue Top Tube     Status: None   Result Value Ref Range    Hold Specimen JIC    Extra Red Top Tube     Status: None   Result Value Ref Range    Hold Specimen JIC    Ethyl Alcohol Level     Status: Normal   Result Value Ref Range    Alcohol ethyl <0.01 <=0.01 g/dL   Salicylate level     Status: Normal   Result Value Ref Range    Salicylate <0.5   mg/dL   Troponin T, High Sensitivity     Status: Abnormal   Result Value Ref Range    Troponin T, High Sensitivity 120 (HH) <=22 ng/L   Acetaminophen level     Status: Abnormal   Result Value Ref Range    Acetaminophen <5.0 (L) 10.0 - 30.0 ug/mL   Troponin T, High Sensitivity     Status: Abnormal   Result Value Ref Range    Troponin T, High Sensitivity 94 (H) <=22 ng/L   EKG 12-lead, tracing only     Status: None   Result Value Ref Range    Systolic Blood Pressure  mmHg    Diastolic Blood Pressure  mmHg    Ventricular Rate 75 BPM    Atrial Rate 75 BPM    VA Interval 212 ms    QRS Duration 84 ms     ms    QTc 542 ms    P Axis 10 degrees    R AXIS 13 degrees    T Axis 15 degrees    Interpretation ECG       Sinus rhythm with 1st degree A-V block  Prolonged QT  Abnormal ECG  Unconfirmed report - interpretation of this ECG is computer generated - see medical record  for final interpretation  Confirmed by - EMERGENCY ROOM, PHYSICIAN ([id number]),  DANNY SMITH ([id number]) on 1/9/2023 4:11:34 PM     EKG 12-lead, tracing only     Status: None   Result Value Ref Range    Systolic Blood Pressure  mmHg    Diastolic Blood Pressure  mmHg    Ventricular Rate 72 BPM    Atrial Rate 72 BPM    MO Interval 214 ms    QRS Duration 80 ms     ms    QTc 529 ms    P Axis 24 degrees    R AXIS 10 degrees    T Axis 13 degrees    Interpretation ECG       Sinus rhythm with 1st degree A-V block  Prolonged QT  Abnormal ECG  Unconfirmed report - interpretation of this ECG is computer generated - see medical record for final interpretation  Confirmed by - EMERGENCY ROOM, PHYSICIAN ([id number]),  DANNY SMITH ([id number]) on 1/9/2023 4:11:54 PM     CBC with platelets differential     Status: Abnormal    Narrative    The following orders were created for panel order CBC with platelets differential.  Procedure                               Abnormality         Status                     ---------                               -----------         ------                     CBC with platelets and d...[[id number]]  Abnormal            Final result                 Please view results for these tests on the individual orders.     Medications   sodium chloride (PF) 0.9% PF flush 90 mL (90 mLs Intravenous Given 1/9/23 1454)   iopamidol (ISOVUE-370) solution 75 mL (75 mLs Intravenous Given 1/9/23 1454)     Labs Ordered and Resulted from Time of ED Arrival to Time of ED Departure   COMPREHENSIVE METABOLIC PANEL - Abnormal       Result Value    Sodium 132 (*)     Potassium 3.9      Chloride 91 (*)     Carbon Dioxide (CO2) 25      Anion Gap 16 (*)     Urea Nitrogen 18.0      Creatinine 4.45 (*)     Calcium 9.2      Glucose 83      Alkaline Phosphatase 125      AST 19      ALT <5 (*)     Protein Total 6.7      Albumin 3.8      Bilirubin Total 0.6      GFR Estimate 15 (*)    CBC WITH PLATELETS AND DIFFERENTIAL - Abnormal     WBC Count 5.7      RBC Count 3.40 (*)     Hemoglobin 10.2 (*)     Hematocrit 33.1 (*)     MCV 97      MCH 30.0      MCHC 30.8 (*)     RDW 14.9      Platelet Count 262      % Neutrophils 71      % Lymphocytes 14      % Monocytes 8      % Eosinophils 6      % Basophils 1      % Immature Granulocytes 0      NRBCs per 100 WBC 0      Absolute Neutrophils 4.1      Absolute Lymphocytes 0.8      Absolute Monocytes 0.4      Absolute Eosinophils 0.4      Absolute Basophils 0.0      Absolute Immature Granulocytes 0.0      Absolute NRBCs 0.0     TROPONIN T, HIGH SENSITIVITY - Abnormal    Troponin T, High Sensitivity 120 (*)    ACETAMINOPHEN LEVEL - Abnormal    Acetaminophen <5.0 (*)    TROPONIN T, HIGH SENSITIVITY - Abnormal    Troponin T, High Sensitivity 94 (*)    LACTIC ACID WHOLE BLOOD - Normal    Lactic Acid 0.9     ETHYL ALCOHOL LEVEL - Normal    Alcohol ethyl <0.01     SALICYLATE LEVEL - Normal    Salicylate <0.5       CTA Head Neck with Contrast   Final Result   Impression:     1. No evidence of intracranial hemorrhage on the noncontrast head CT.   2. Head CTA demonstrates no definite occlusion. There is a fusiform   aneurysm of the V4 segment of the right vertebral artery just proximal   to the origin of the right posterior inferior cerebellar artery as   detailed above.    2. Neck CTA demonstrates no stenosis of the major cervical arteries.         I have personally reviewed the examination and initial interpretation   and I agree with the findings.      HARRISON DUMONT MD            SYSTEM ID:  [id number]      CT Head w/o Contrast   Final Result   Impression:     1. No evidence of intracranial hemorrhage on the noncontrast head CT.   2. Head CTA demonstrates no definite occlusion. There is a fusiform   aneurysm of the V4 segment of the right vertebral artery just proximal   to the origin of the right posterior inferior cerebellar artery as   detailed above.    2. Neck CTA demonstrates no stenosis of the major  cervical arteries.         I have personally reviewed the examination and initial interpretation   and I agree with the findings.      HARRISON DUMONT MD            SYSTEM ID:  [id number]             Medical Decision Making  The patient presented with a problem that is a chronic illness mild to moderate exacerbation, progression, or side effect of treatment.    The patient's evaluation involved:  an assessment requiring an independent historian (patients ex wife upon patient request)  review of 3+ prior external note(s) (see separate area of note for details)  ordering and review of 3+ test(s) (see separate area of note for details)  review of 3+ test result(s) ordered prior to this encounter (see separate area of note for details)    The patient's management involved a decision regarding hospitalization.      Assessment & Plan    Shay Jimenez is a 57 year old male with a PMH significant for hypertension, ESRD on HD, GERD, GINI intolerant of CPAP, chronic atrial fibrillation on Eliquis, alcohol use disorder, peripheral neuropathy, had previous C5-T6 spinal fusion due to pathological fracture form osteomyelitis c/b infection, sepsis and hardware failure with complicated lengthy hospital course who presented from dialysis for altered mental status. Upon arrival patient is nontoxic-appearing, afebrile, in moderate distress secondary to agitation about the date, believing people are lying to him and trying to confuse him.  Patient here with confusion.  Differential diagnosis includes but is not limited to CVA/TIA, head bleed, delirium, infection/sepsis, drug interaction, overdose, electrolyte abnormality among others.  Upon arrival an IV was established, comprehensive labs were obtained, EKG was performed.  EKG was unchanged from prior showing no acute changes.  Initial troponin Twas markedly at 120, patient appears to have chronically elevated troponins, repeat negative for acute MI downtrending at 94. Other labs  unremarkable for individual who is dialysis dependent, no leukocytosis WBC 5.7, normal lactate, hemoglobin stable at 10.2.  Hyponatremia 132, hypochloremia 91, creatinine elevated at 4.45 with GFR 15.  This is not surprising based on patient being dialysis dependent and having most of dialysis run today.  Negative ethanol, acetaminophen and salicylate levels.  No focal neurological deficits aside from confusion about the date, neuropathy in feet which is unchanged from his baseline, unchanged weakness from deconditioning which is why he is at the TCU.  Extremities equal bilaterally for strength and sensation.  Will send for CT CTA.  This was negative for any acute intracranial pathology that explain patient's sudden onset confusion today was noted during dialysis but not noted prior to him leaving rehab facility for dialysis.  Patient did seem to clear while he was in the emergency department, not recognizing and able to verbalize that he was confused earlier.  Now oriented to the date, oriented x4.  Patient states he has not slept this week or slept very little every night, very possibly this is the cause or contributing to his confusion.  Of note he did develop delirium during his last hospitalization, it is possible that he is still dealing with this.  Recommend he return to TCU for ongoing rehab and consider strategies that may help patient sleep during the night so he may be awake during the day in hopes of not developing further delirium.     I have reviewed the nursing notes. I have reviewed the findings, diagnosis, plan and need for follow up with the patient.  Patient is in agreement with this plan and was discharged back to his rehab facility.    Discharge Medication List as of 1/9/2023  8:32 PM          Final diagnoses:   Confusion   ESRD (end stage renal disease) (H)     ENID Hoang CNP  [facility] EMERGENCY DEPARTMENT  1/9/2023     Anika Estrada APRN CNP  01/09/23 1606

## 2023-01-10 NOTE — CONSULTS
"Consult Date: 01/10/2023    PSYCHIATRIC CONSULTATION    IDENTIFICATION:  Mr. Jimenez is a 57-year-old male who is status post spinal surgery.  He is also dialysis dependent and recently suffering from delirium.  He has also made some suicidal statements and I am asked to evaluate his psychiatric status by Dr. Rios.    On interviewing Mr. Jimenez, he reports that he has had intermittent confusion.  He tells me he was talking to someone who was not there for 8 hours and even today, he is still occasionally seeing bugs fly by.  He thinks this may be secondary to oxycodone and he reports that that is being \"tested.\"  They are going to give him some oxy tomorrow and see how he does. As far as depression goes, he reports that he does not remember being depressed in the last 3 days.  He says that his wife left him prior to his surgery and that that was depressing, but he is not having suicidal ideation at present, I think his bigger problem has been his intermittent delirium.  Treating his delirium is somewhat challenging as his QTc is elevated at 529.  Because of this, the likely safest drug would be Abilify.    PAST MEDICAL HISTORY:  Includes chronic kidney disease, currently on dialysis, neuropathy, sleep apnea, history of delirium, and history of diskitis and osteomyelitis.  He has a history of hardware failure, hypertension, chronic atrial fibrillation, pulmonary hypertension, acute on chronic pain and chronic anticoagulation.  He also has a history of anxiety and depression.  He is treated with Zoloft 100 mg he reports for both depression and OCD.    ALLERGIES:  THE PATIENT IS ALLERGIC TO AMLODIPINE AND LISINOPRIL.    FAMILY HISTORY:  The patient reports that his mother and son have had major depressive disorder.    SOCIAL HISTORY:  According to our records, the patient drinks 5 to 6 vodka cocktails a day and carries a diagnosis of alcohol use disorder.  He is  and remarried, perhaps .  He has " 2 children.  He worked as a  for [employer].    REVIEW OF SYSTEMS:  He  denied headache or problems with vision or hearing.  He denied chest pain but has had some shortness of breath.  He denied abdominal pain but has had some constipation.  He denied genitourinary symptoms.  He has not been up walking yet but has been able to sit up in a chair.    MENTAL STATUS EXAM:  On my interview, the patient was pleasant and cooperative.  He was mildly intrusive.  He wrote my name down on a newspaper.  He reports his mood is currently pretty good and his affect is full.  He admits that he was depressed prior to this hospitalization but denies current depression and he denies suicidal ideation.  Content of thought has included visual hallucinations and, in fact, continues to have occasional fleeting visions of bugs flying by.  Recent and remote memory, concentration, fund of knowledge, use of language all are quite variable.  On my interview, they were intact, but they have recently been quite impaired.  On my interview, he was alert and oriented x 3.  Insight and judgment were generally intact.  Muscle strength is decreased in the uppers.  Insight and judgment have been variable.    ASSESSMENT:  Intermittent delirium.    RECOMMENDATIONS:  Abilify 2 mg p.o. b.i.d. p.r.n. for agitation.    Esvin Kaplan MD        D: 01/10/2023   T: 01/10/2023   MT: [id number]    Name:     SWATI AGUIRRE  MRN:      [id number]        Account:      [id number]   :      1965           Consult Date: 01/10/2023     Document: [id number]

## 2023-01-10 NOTE — CARE PLAN
Problem: Chronic Kidney Disease  Goal: Optimal Functional Ability  Description: Promote participation in regular daily and physical activity to minimize decline associated with disease process and inactivity; maintain an accessible environment to facilitate safe activity.    Evaluate functional mobility ability and safety; retrain in bed mobility, gait or transfers using assistive device, based on individualized need.    Encourage self-care performance to promote maximum independence in activities of daily living; provide cueing, encouragement, retraining, adaptive equipment and additional time if needed.    Evaluate and address body systems and performance deficits, such as strength, range of motion, balance and activity tolerance impairment.    Assess cognition and address impairments with interventions, such as orientation cues in the environment and memory aids or compensatory techniques    Outcome: Progressing     Problem: Spinal Surgery  Goal: Absence of Infection Signs and Symptoms  Description: Optimize activity and mobility to maximize infection resistance (e.g., reposition, sit in chair, ambulate).    Maintain normothermia and glycemic control to maximize infection resistance.    Maintain dressing and closed drainage system integrity to reduce the risk for infection; inspect incision as visible.    Implement transmission-based precautions and isolation, as indicated, to prevent spread of infection.    Discontinue prophylactic antimicrobial agent within 24 hours after procedure, as directed.    Identify early signs of sepsis, such as increased heart rate and decreased blood pressure, as well as changes in mental state, respiratory pattern or peripheral perfusion.    Prepare for rapid sepsis management, including lactate level, intravenous access, fluid administration and oxygen therapy.    Provide fever-reduction and comfort measures.    Outcome: Progressing     Problem: Pain Chronic (Persistent)  Goal:  Optimal Pain Control and Function  Description: Evaluate pain level, effect of treatment and patient response at regular intervals.    Minimize pain stimuli; coordinate care and adjust environment (e.g., light, noise, unnecessary movement); promote sleep/rest.    Match pharmacologic analgesia to severity and type of pain mechanism (e.g., neuropathic, muscle, inflammatory); consider multimodal approach (e.g., nonopioid, opioid, adjuvant).    Provide medication at regular intervals; titrate to patient response.    Manage breakthrough pain with additional doses; consider rotation or switching medication    Outcome: Progressing     Problem: Anxiety  Goal: Anxiety Reduction or Resolution  Description: Maintain a calm and reassuring environment; minimize noise; provide familiar items; cluster care; offer choices.    Encourage support system presence and participation.    Support expression and identification of feelings and worries; compassionately acknowledge and validate concerns.    Utilize existing coping strategies and assist in developing new strategies (e.g., music, deep breathing, relaxation techniques, massage, meditation or pet therapy).    Identify thoughts and feelings that led to current anxiety onset to enhance understanding of triggers.    Reframe anxiety-provoking situations; provide a new perspective; engage in problem-solving.    Outcome: Progressing     Problem: BADL (Basic Activities of Daily Living) Impairment  Goal: Optimal Safe BADL Performance  Description: Assess BADL (basic activity of daily living) abilities; encourage participation at maximally safe independent level.    Provide assistance and supervision needed to maintain safety; involve caregiver in BADL (basic activity of daily living) training.    Ensure effective use of equipment or devices, such as a long-handled reacher, shower seat or orthosis.    Ensure proper body mechanics and positioning for optimal task performance.    Provide  set-up of items if patient is unable to retrieve; store personal care items in accessible location.    Schedule BADL (basic activity of daily living) activities when pain and fatigue are at a minimum; pace activity to conserve energy.        Outcome: Progressing     Problem: Wound Healing Progression  Goal: Optimal Wound Healing  Description: Use a standard wound assessment tool to monitor progression of wound healing.    Perform a nutrition screening or assessment and physical exam; assess adequacy of oral intake and risk of vitamin and mineral deficiency; if suspect inadequacy or deficiency provide supplementation.    Optimize fluids, nutritional intake, sleep/rest and glycemic control to enhance healing.    Consider oxygen therapy in the presence of hypoxia to enhance tissue oxygenation.    Position to avoid tension or pressure on wound surface.  Manage edema (e.g., elevate extremities) and maintain blood pressure to optimize tissue perfusion.    Provide nonpharmacologic and pharmacologic comfort measures to manage pain and minimize vasoconstriction; premedicate for painful procedures.    Provide wound care, such as cleansing, debridement, topical therapy, appropriate dressing selection to promote wound healing and prevent infection.    Maintain sterile and occlusive dressing, if prescribed; minimize dressing changes to decrease infection risk and trauma to the wound bed.    Maintain moist wound bed and consistent wound temperature for optimal healing environment.      Outcome: Progressing     Problem: Skin Injury Risk Increased  Goal: Skin Health and Integrity  Description: Perform a full pressure injury risk assessment, as indicated by screening, upon admission to care unit.    Reassess skin (full inspection and injury risk, including skin temperature, consistency and color) frequently (e.g., scheduled interval, with change in condition) to provide optimal early detection and prevention.    Maintain adequate  tissue perfusion (e.g., encourage fluid balance; avoid crossing legs, constrictive clothing or devices) to promote tissue oxygenation.    Maintain head of bed at lowest degree of elevation tolerated, considering medical condition and other restrictions. Use positioning supports to prevent sliding and friction. Consider low friction textiles.      Outcome: Progressing     Problem: Urinary Incontinence  Goal: Effective Urinary Elimination  Description: Identify cause and contributing factors, such as disease process, medication or treatment side effects, infection or dietary bladder irritants; provide treatment.    Assess for general symptoms, such as fatigue, depression, weakness, confusion, sensory alterations that could impact toileting.    Provide safe and ready access to toileting devices and aids (e.g., commode, urinal).    Promote behavioral methods, such as prompted toileting, elimination schedule, relaxation techniques or voiding posture to facilitate flow.    Consider pelvic floor muscle strengthening, such as pelvic floor muscle training, vaginal cone, bladder support or neuromodulation.    Ensure bladder emptying (e.g., intermittent catheterization, portable bladder ultrasound after voiding).    Outcome: Progressing   Goal Outcome Evaluation: ongoing, new admission 1/9/23

## 2023-01-10 NOTE — PLAN OF CARE
"Patient is alert, disoriented with time and forgetful. Denies any SOB and CP. Able to make needs known. Uses the call light just to have a conversation. Ask inappropriate questions. Ask the NA (Louie) true or false question, states \" when you first saw me did you did you think my penis is big or small?\" Patient ask things like writers nationality, drug  and talks about some drugs like cocaine and TV shows like Narcos. Patient offers the writer to stay on his room to talk but refuse respectfully. Inappropriate behavior endorsed to the next shift. PRN Oxycodone x1 and Tylenol x1 given as per patient request. Due medications given. Continue with plan of care.      Patient's most recent vital signs are:     Vital signs:  BP: 141/122  Temp: 96.7  HR: 83  RR: 18  SpO2: 92 %     Patient does not have new respiratory symptoms.  Patient does not have new sore throat.  Patient does not have a fever greater than 99.5.           "

## 2023-01-10 NOTE — PROGRESS NOTES
01/10/23 1000   Name of Certified Therapeutic Rec Specialist   Name of Certified Therapeutic Rec Specialist RASHMI Cook   Appointment Type   Type of Therapeutic Rec Session Therapeutic Rec Assessment   General Information   Patient Profile Review See Profile for full history and prior level of function   Daily Contact with Relatives or Friends Phone call;Visit   Pets Dog   Community Involvement   Community Involvement Currently employed   Spiritual Practice [facility] ? No   Outings Dinner   Hobbies/Interests   Cards Other (see comments)  (kristin wallace)   Music   Music Preferences Rock;Other (comments)  (90's Alternative)   Listens to Recorded Music Yes   Brought Own Equipment Yes   Media   Newspapers Daily   Computer Will use tablet/phone   TV / Movies TV   Reading Magazines;Books   Sports / Physical Activities   Outdoor Activities Fishing;Hunting   Sports/Exercise Golf   Sports Fan Baseball;Basketball;Football;Golf;Hockey   Impression   Open to Socializing with Others Independent   Barriers to Leisure Mobility   Patient, family and / or staff in agreement with Plan of Care Yes   Treatment Plan   Interested in Unit [place]? No   Type of Intervention Independent with activity   Equipment and Supplies While on Unit Newspaper   Assessment Assessment completed, pt was oriented to role of rec therapy and provided with leisure resource list. Pt is verbose and at times difficult to redirect. Provided pt daily newspaper. will provide check in for materials as needed

## 2023-01-10 NOTE — PROGRESS NOTES
SPIRITUAL HEALTH SERVICES  SPIRITUAL ASSESSMENT Progress Note  [facility] ([facility]) TCU R 407 01/10/23      REFERRAL SOURCE:     Pt declined visit with Unit . He stated he had been seen by other Chaplains at [facility]. He mentioned he was good and did not have any interest in a visit today.    PLAN: No follow up necessary.    Erlin Le MA, MPA  Associate   Pager: [phone]

## 2023-01-11 ENCOUNTER — HOSPITAL ENCOUNTER (INPATIENT)
Facility: SKILLED NURSING FACILITY | Age: 58
LOS: 23 days | Discharge: HOME | DRG: 949 | End: 2023-02-03
Attending: INTERNAL MEDICINE
Payer: MEDICARE

## 2023-01-11 ENCOUNTER — APPOINTMENT (OUTPATIENT)
Dept: OCCUPATIONAL THERAPY | Facility: SKILLED NURSING FACILITY | Age: 58
DRG: 949 | End: 2023-01-11
Attending: INTERNAL MEDICINE
Payer: MEDICARE

## 2023-01-11 ENCOUNTER — HOSPITAL ENCOUNTER (EMERGENCY)
Facility: CLINIC | Age: 58
Discharge: NURSING, REHAB OR LONG-TERM CARE | End: 2023-01-11
Attending: EMERGENCY MEDICINE | Admitting: EMERGENCY MEDICINE
Payer: MEDICARE

## 2023-01-11 VITALS
RESPIRATION RATE: 16 BRPM | OXYGEN SATURATION: 91 % | SYSTOLIC BLOOD PRESSURE: 141 MMHG | HEART RATE: 67 BPM | TEMPERATURE: 97.3 F | DIASTOLIC BLOOD PRESSURE: 81 MMHG

## 2023-01-11 VITALS — RESPIRATION RATE: 27 BRPM | HEART RATE: 96 BPM | OXYGEN SATURATION: 99 %

## 2023-01-11 DIAGNOSIS — Z98.1 HX OF FUSION OF CERVICAL SPINE: ICD-10-CM

## 2023-01-11 DIAGNOSIS — Z86.59 MENTAL STATUS CHANGE RESOLVED: ICD-10-CM

## 2023-01-11 DIAGNOSIS — M62.81 GENERALIZED MUSCLE WEAKNESS: ICD-10-CM

## 2023-01-11 DIAGNOSIS — Z98.1 S/P CERVICAL SPINAL FUSION: ICD-10-CM

## 2023-01-11 DIAGNOSIS — F10.90 HABITUAL ALCOHOL USE: ICD-10-CM

## 2023-01-11 DIAGNOSIS — E83.30 DISORDER OF PHOSPHORUS METABOLISM, UNSPECIFIED: Primary | ICD-10-CM

## 2023-01-11 DIAGNOSIS — N18.6 END STAGE RENAL DISEASE (H): ICD-10-CM

## 2023-01-11 DIAGNOSIS — T84.7XXS HARDWARE COMPLICATING WOUND INFECTION, SEQUELA: ICD-10-CM

## 2023-01-11 DIAGNOSIS — R91.8 OTHER NONSPECIFIC ABNORMAL FINDING OF LUNG FIELD: ICD-10-CM

## 2023-01-11 PROBLEM — T84.7XXA HARDWARE COMPLICATING WOUND INFECTION (H): Status: ACTIVE | Noted: 2023-01-11

## 2023-01-11 LAB
ALBUMIN SERPL-MCNC: 3 G/DL (ref 3.4–5)
ALP SERPL-CCNC: 113 U/L (ref 40–150)
ALT SERPL W P-5'-P-CCNC: 6 U/L (ref 0–70)
ANION GAP SERPL CALCULATED.3IONS-SCNC: 14 MMOL/L (ref 3–14)
ANION GAP SERPL CALCULATED.3IONS-SCNC: 15 MMOL/L (ref 3–14)
AST SERPL W P-5'-P-CCNC: 18 U/L (ref 0–45)
BASOPHILS # BLD AUTO: 0 10E3/UL (ref 0–0.2)
BASOPHILS NFR BLD AUTO: 1 %
BILIRUB SERPL-MCNC: 1.2 MG/DL (ref 0.2–1.3)
BUN SERPL-MCNC: 32 MG/DL (ref 7–30)
BUN SERPL-MCNC: 34 MG/DL (ref 7–30)
CALCIUM SERPL-MCNC: 9.4 MG/DL (ref 8.5–10.1)
CALCIUM SERPL-MCNC: 9.6 MG/DL (ref 8.5–10.1)
CHLORIDE BLD-SCNC: 94 MMOL/L (ref 94–109)
CHLORIDE BLD-SCNC: 95 MMOL/L (ref 94–109)
CO2 SERPL-SCNC: 23 MMOL/L (ref 20–32)
CO2 SERPL-SCNC: 26 MMOL/L (ref 20–32)
CREAT SERPL-MCNC: 7.36 MG/DL (ref 0.66–1.25)
CREAT SERPL-MCNC: 7.48 MG/DL (ref 0.66–1.25)
EOSINOPHIL # BLD AUTO: 0.4 10E3/UL (ref 0–0.7)
EOSINOPHIL NFR BLD AUTO: 5 %
ERYTHROCYTE [DISTWIDTH] IN BLOOD BY AUTOMATED COUNT: 14.7 % (ref 10–15)
ERYTHROCYTE [DISTWIDTH] IN BLOOD BY AUTOMATED COUNT: 14.9 % (ref 10–15)
GFR SERPL CREATININE-BSD FRML MDRD: 8 ML/MIN/1.73M2
GFR SERPL CREATININE-BSD FRML MDRD: 8 ML/MIN/1.73M2
GLUCOSE BLD-MCNC: 79 MG/DL (ref 70–99)
GLUCOSE BLD-MCNC: 86 MG/DL (ref 70–99)
HCT VFR BLD AUTO: 30.2 % (ref 40–53)
HCT VFR BLD AUTO: 32.4 % (ref 40–53)
HGB BLD-MCNC: 10 G/DL (ref 13.3–17.7)
HGB BLD-MCNC: 9.6 G/DL (ref 13.3–17.7)
IMM GRANULOCYTES # BLD: 0 10E3/UL
IMM GRANULOCYTES NFR BLD: 0 %
LYMPHOCYTES # BLD AUTO: 0.9 10E3/UL (ref 0.8–5.3)
LYMPHOCYTES NFR BLD AUTO: 13 %
MAGNESIUM SERPL-MCNC: 2.3 MG/DL (ref 1.6–2.3)
MCH RBC QN AUTO: 30.1 PG (ref 26.5–33)
MCH RBC QN AUTO: 30.2 PG (ref 26.5–33)
MCHC RBC AUTO-ENTMCNC: 30.9 G/DL (ref 31.5–36.5)
MCHC RBC AUTO-ENTMCNC: 31.8 G/DL (ref 31.5–36.5)
MCV RBC AUTO: 95 FL (ref 78–100)
MCV RBC AUTO: 98 FL (ref 78–100)
MONOCYTES # BLD AUTO: 0.5 10E3/UL (ref 0–1.3)
MONOCYTES NFR BLD AUTO: 8 %
NEUTROPHILS # BLD AUTO: 4.9 10E3/UL (ref 1.6–8.3)
NEUTROPHILS NFR BLD AUTO: 73 %
NRBC # BLD AUTO: 0 10E3/UL
NRBC BLD AUTO-RTO: 0 /100
PLATELET # BLD AUTO: 237 10E3/UL (ref 150–450)
PLATELET # BLD AUTO: 237 10E3/UL (ref 150–450)
POTASSIUM BLD-SCNC: 4 MMOL/L (ref 3.4–5.3)
POTASSIUM BLD-SCNC: 4 MMOL/L (ref 3.4–5.3)
PROT SERPL-MCNC: 6.6 G/DL (ref 6.8–8.8)
RBC # BLD AUTO: 3.18 10E6/UL (ref 4.4–5.9)
RBC # BLD AUTO: 3.32 10E6/UL (ref 4.4–5.9)
SODIUM SERPL-SCNC: 132 MMOL/L (ref 133–144)
SODIUM SERPL-SCNC: 135 MMOL/L (ref 133–144)
WBC # BLD AUTO: 5.7 10E3/UL (ref 4–11)
WBC # BLD AUTO: 6.6 10E3/UL (ref 4–11)

## 2023-01-11 PROCEDURE — 999N000127 HC STATISTIC PERIPHERAL IV START W US GUIDANCE

## 2023-01-11 PROCEDURE — 80053 COMPREHEN METABOLIC PANEL: CPT | Performed by: INTERNAL MEDICINE

## 2023-01-11 PROCEDURE — 83735 ASSAY OF MAGNESIUM: CPT | Performed by: INTERNAL MEDICINE

## 2023-01-11 PROCEDURE — 36415 COLL VENOUS BLD VENIPUNCTURE: CPT | Performed by: EMERGENCY MEDICINE

## 2023-01-11 PROCEDURE — 99283 EMERGENCY DEPT VISIT LOW MDM: CPT | Performed by: EMERGENCY MEDICINE

## 2023-01-11 PROCEDURE — 250N000013 HC RX MED GY IP 250 OP 250 PS 637: Performed by: INTERNAL MEDICINE

## 2023-01-11 PROCEDURE — 97535 SELF CARE MNGMENT TRAINING: CPT | Mod: GO

## 2023-01-11 PROCEDURE — 250N000013 HC RX MED GY IP 250 OP 250 PS 637: Performed by: PHYSICIAN ASSISTANT

## 2023-01-11 PROCEDURE — 99285 EMERGENCY DEPT VISIT HI MDM: CPT | Performed by: EMERGENCY MEDICINE

## 2023-01-11 PROCEDURE — 36415 COLL VENOUS BLD VENIPUNCTURE: CPT | Performed by: INTERNAL MEDICINE

## 2023-01-11 PROCEDURE — 82040 ASSAY OF SERUM ALBUMIN: CPT | Performed by: EMERGENCY MEDICINE

## 2023-01-11 PROCEDURE — 120N000009 HC R&B SNF

## 2023-01-11 PROCEDURE — 85025 COMPLETE CBC W/AUTO DIFF WBC: CPT | Performed by: INTERNAL MEDICINE

## 2023-01-11 PROCEDURE — 85027 COMPLETE CBC AUTOMATED: CPT | Performed by: EMERGENCY MEDICINE

## 2023-01-11 PROCEDURE — 99309 SBSQ NF CARE MODERATE MDM 30: CPT | Performed by: INTERNAL MEDICINE

## 2023-01-11 RX ORDER — NALOXONE HYDROCHLORIDE 0.4 MG/ML
0.4 INJECTION, SOLUTION INTRAMUSCULAR; INTRAVENOUS; SUBCUTANEOUS
Status: DISCONTINUED | OUTPATIENT
Start: 2023-01-11 | End: 2023-02-03 | Stop reason: HOSPADM

## 2023-01-11 RX ORDER — GABAPENTIN 300 MG/1
300 CAPSULE ORAL 3 TIMES DAILY
Status: DISCONTINUED | OUTPATIENT
Start: 2023-01-11 | End: 2023-02-03 | Stop reason: HOSPADM

## 2023-01-11 RX ORDER — VIT B COMP NO.3/FOLIC/C/BIOTIN 1 MG-60 MG
1 TABLET ORAL DAILY
Status: CANCELLED | OUTPATIENT
Start: 2023-01-11

## 2023-01-11 RX ORDER — FOLIC ACID 1 MG/1
1 TABLET ORAL DAILY
Status: DISCONTINUED | OUTPATIENT
Start: 2023-01-11 | End: 2023-02-03 | Stop reason: HOSPADM

## 2023-01-11 RX ORDER — FINASTERIDE 5 MG/1
1.3 TABLET, FILM COATED ORAL DAILY
Status: CANCELLED | OUTPATIENT
Start: 2023-01-11

## 2023-01-11 RX ORDER — PANTOPRAZOLE SODIUM 40 MG/1
40 TABLET, DELAYED RELEASE ORAL
Status: DISCONTINUED | OUTPATIENT
Start: 2023-01-12 | End: 2023-02-03 | Stop reason: HOSPADM

## 2023-01-11 RX ORDER — FLUTICASONE PROPIONATE 50 MCG
1 SPRAY, SUSPENSION (ML) NASAL 2 TIMES DAILY
Status: DISCONTINUED | OUTPATIENT
Start: 2023-01-11 | End: 2023-02-03 | Stop reason: HOSPADM

## 2023-01-11 RX ORDER — SERTRALINE HYDROCHLORIDE 100 MG/1
100 TABLET, FILM COATED ORAL DAILY
Status: DISCONTINUED | OUTPATIENT
Start: 2023-01-11 | End: 2023-02-03 | Stop reason: HOSPADM

## 2023-01-11 RX ORDER — OXYCODONE HYDROCHLORIDE 5 MG/1
5 TABLET ORAL EVERY 8 HOURS PRN
Status: CANCELLED | OUTPATIENT
Start: 2023-01-11

## 2023-01-11 RX ORDER — FINASTERIDE 5 MG/1
1.3 TABLET, FILM COATED ORAL DAILY
Status: DISCONTINUED | OUTPATIENT
Start: 2023-01-11 | End: 2023-02-03 | Stop reason: HOSPADM

## 2023-01-11 RX ORDER — OXYCODONE HYDROCHLORIDE 5 MG/1
5 TABLET ORAL EVERY 8 HOURS PRN
Status: DISCONTINUED | OUTPATIENT
Start: 2023-01-11 | End: 2023-01-22

## 2023-01-11 RX ORDER — MIDODRINE HYDROCHLORIDE 5 MG/1
10 TABLET ORAL
Status: CANCELLED | OUTPATIENT
Start: 2023-01-11

## 2023-01-11 RX ORDER — ACETAMINOPHEN 650 MG/1
650 SUPPOSITORY RECTAL EVERY 4 HOURS PRN
Status: DISCONTINUED | OUTPATIENT
Start: 2023-01-11 | End: 2023-02-03 | Stop reason: HOSPADM

## 2023-01-11 RX ORDER — ACETAMINOPHEN 650 MG/1
650 SUPPOSITORY RECTAL EVERY 4 HOURS PRN
Status: CANCELLED | OUTPATIENT
Start: 2023-01-11

## 2023-01-11 RX ORDER — BACLOFEN 10 MG/1
10 TABLET ORAL 2 TIMES DAILY
Status: CANCELLED | OUTPATIENT
Start: 2023-01-11

## 2023-01-11 RX ORDER — GABAPENTIN 300 MG/1
300 CAPSULE ORAL 3 TIMES DAILY
Status: CANCELLED | OUTPATIENT
Start: 2023-01-11

## 2023-01-11 RX ORDER — CETIRIZINE HYDROCHLORIDE 10 MG/1
10 TABLET ORAL DAILY
Status: CANCELLED | OUTPATIENT
Start: 2023-01-11

## 2023-01-11 RX ORDER — ONDANSETRON 4 MG/1
4 TABLET, ORALLY DISINTEGRATING ORAL EVERY 6 HOURS PRN
Status: CANCELLED | OUTPATIENT
Start: 2023-01-11

## 2023-01-11 RX ORDER — ACETAMINOPHEN 325 MG/1
650 TABLET ORAL EVERY 4 HOURS PRN
Status: DISCONTINUED | OUTPATIENT
Start: 2023-01-11 | End: 2023-02-03 | Stop reason: HOSPADM

## 2023-01-11 RX ORDER — ONDANSETRON 4 MG/1
4 TABLET, ORALLY DISINTEGRATING ORAL EVERY 6 HOURS PRN
Status: DISCONTINUED | OUTPATIENT
Start: 2023-01-11 | End: 2023-02-03 | Stop reason: HOSPADM

## 2023-01-11 RX ORDER — NALOXONE HYDROCHLORIDE 0.4 MG/ML
0.2 INJECTION, SOLUTION INTRAMUSCULAR; INTRAVENOUS; SUBCUTANEOUS
Status: DISCONTINUED | OUTPATIENT
Start: 2023-01-11 | End: 2023-02-03 | Stop reason: HOSPADM

## 2023-01-11 RX ORDER — FOLIC ACID 1 MG/1
1 TABLET ORAL DAILY
Status: CANCELLED | OUTPATIENT
Start: 2023-01-11

## 2023-01-11 RX ORDER — NALOXONE HYDROCHLORIDE 0.4 MG/ML
0.2 INJECTION, SOLUTION INTRAMUSCULAR; INTRAVENOUS; SUBCUTANEOUS
Status: CANCELLED | OUTPATIENT
Start: 2023-01-11

## 2023-01-11 RX ORDER — METHOCARBAMOL 500 MG/1
500 TABLET, FILM COATED ORAL 4 TIMES DAILY
Status: DISCONTINUED | OUTPATIENT
Start: 2023-01-11 | End: 2023-01-24

## 2023-01-11 RX ORDER — ACETAMINOPHEN 325 MG/1
650 TABLET ORAL EVERY 4 HOURS PRN
Status: CANCELLED | OUTPATIENT
Start: 2023-01-11

## 2023-01-11 RX ORDER — SEVELAMER CARBONATE 800 MG/1
1600 TABLET, FILM COATED ORAL
Status: CANCELLED | OUTPATIENT
Start: 2023-01-11

## 2023-01-11 RX ORDER — BACLOFEN 10 MG/1
10 TABLET ORAL 2 TIMES DAILY
Status: DISCONTINUED | OUTPATIENT
Start: 2023-01-11 | End: 2023-01-27

## 2023-01-11 RX ORDER — CALCIUM CARBONATE 500 MG/1
1000 TABLET, CHEWABLE ORAL 3 TIMES DAILY PRN
Status: DISCONTINUED | OUTPATIENT
Start: 2023-01-11 | End: 2023-02-03 | Stop reason: HOSPADM

## 2023-01-11 RX ORDER — METHOCARBAMOL 500 MG/1
500 TABLET, FILM COATED ORAL 4 TIMES DAILY
Status: CANCELLED | OUTPATIENT
Start: 2023-01-11

## 2023-01-11 RX ORDER — NALOXONE HYDROCHLORIDE 0.4 MG/ML
0.4 INJECTION, SOLUTION INTRAMUSCULAR; INTRAVENOUS; SUBCUTANEOUS
Status: CANCELLED | OUTPATIENT
Start: 2023-01-11

## 2023-01-11 RX ORDER — PANTOPRAZOLE SODIUM 40 MG/1
40 TABLET, DELAYED RELEASE ORAL
Status: CANCELLED | OUTPATIENT
Start: 2023-01-12

## 2023-01-11 RX ORDER — MIDODRINE HYDROCHLORIDE 5 MG/1
10 TABLET ORAL
Status: DISCONTINUED | OUTPATIENT
Start: 2023-01-12 | End: 2023-02-03 | Stop reason: HOSPADM

## 2023-01-11 RX ORDER — CETIRIZINE HYDROCHLORIDE 10 MG/1
10 TABLET ORAL DAILY
Status: DISCONTINUED | OUTPATIENT
Start: 2023-01-11 | End: 2023-02-03 | Stop reason: HOSPADM

## 2023-01-11 RX ORDER — SERTRALINE HYDROCHLORIDE 25 MG/1
100 TABLET, FILM COATED ORAL DAILY
Status: CANCELLED | OUTPATIENT
Start: 2023-01-11

## 2023-01-11 RX ORDER — ATORVASTATIN CALCIUM 20 MG/1
20 TABLET, FILM COATED ORAL EVERY EVENING
Status: DISCONTINUED | OUTPATIENT
Start: 2023-01-11 | End: 2023-02-03 | Stop reason: HOSPADM

## 2023-01-11 RX ORDER — SEVELAMER CARBONATE 800 MG/1
1600 TABLET, FILM COATED ORAL
Status: DISCONTINUED | OUTPATIENT
Start: 2023-01-12 | End: 2023-01-17

## 2023-01-11 RX ORDER — ATORVASTATIN CALCIUM 20 MG/1
20 TABLET, FILM COATED ORAL EVERY EVENING
Status: CANCELLED | OUTPATIENT
Start: 2023-01-11

## 2023-01-11 RX ORDER — CALCIUM CARBONATE 500 MG/1
1000 TABLET, CHEWABLE ORAL 3 TIMES DAILY PRN
Status: CANCELLED | OUTPATIENT
Start: 2023-01-11

## 2023-01-11 RX ORDER — FLUTICASONE PROPIONATE 50 MCG
1 SPRAY, SUSPENSION (ML) NASAL 2 TIMES DAILY
Status: CANCELLED | OUTPATIENT
Start: 2023-01-11

## 2023-01-11 RX ORDER — VIT B COMP NO.3/FOLIC/C/BIOTIN 1 MG-60 MG
1 TABLET ORAL DAILY
Status: DISCONTINUED | OUTPATIENT
Start: 2023-01-11 | End: 2023-02-03 | Stop reason: HOSPADM

## 2023-01-11 RX ADMIN — Medication 1 TABLET: at 23:08

## 2023-01-11 RX ADMIN — PANTOPRAZOLE SODIUM 40 MG: 40 TABLET, DELAYED RELEASE ORAL at 06:02

## 2023-01-11 RX ADMIN — Medication 10 MG: at 08:09

## 2023-01-11 RX ADMIN — Medication 1 SPRAY: at 08:09

## 2023-01-11 RX ADMIN — FOLIC ACID 1 MG: 1 TABLET ORAL at 23:08

## 2023-01-11 RX ADMIN — ACETAMINOPHEN 650 MG: 325 TABLET, FILM COATED ORAL at 02:07

## 2023-01-11 RX ADMIN — GABAPENTIN 300 MG: 300 CAPSULE ORAL at 08:09

## 2023-01-11 RX ADMIN — APIXABAN 2.5 MG: 2.5 TABLET, FILM COATED ORAL at 23:08

## 2023-01-11 RX ADMIN — CETIRIZINE HYDROCHLORIDE 10 MG: 10 TABLET, FILM COATED ORAL at 23:08

## 2023-01-11 RX ADMIN — Medication 100 MG: at 23:08

## 2023-01-11 RX ADMIN — APIXABAN 2.5 MG: 2.5 TABLET, FILM COATED ORAL at 08:09

## 2023-01-11 RX ADMIN — OXYCODONE HYDROCHLORIDE 5 MG: 5 TABLET ORAL at 02:07

## 2023-01-11 RX ADMIN — ATORVASTATIN CALCIUM 20 MG: 20 TABLET, FILM COATED ORAL at 23:08

## 2023-01-11 RX ADMIN — THIAMINE HCL TAB 100 MG 100 MG: 100 TAB at 23:08

## 2023-01-11 RX ADMIN — METHOCARBAMOL 500 MG: 500 TABLET ORAL at 08:09

## 2023-01-11 RX ADMIN — FINASTERIDE 1.3 MG: 5 TABLET, FILM COATED ORAL at 23:16

## 2023-01-11 RX ADMIN — NALOXONE HYDROCHLORIDE 4 MG: 4 SPRAY NASAL at 15:02

## 2023-01-11 RX ADMIN — GABAPENTIN 300 MG: 300 CAPSULE ORAL at 23:08

## 2023-01-11 RX ADMIN — SEVELAMER CARBONATE 1600 MG: 800 TABLET, FILM COATED ORAL at 08:09

## 2023-01-11 RX ADMIN — METHOCARBAMOL 500 MG: 500 TABLET ORAL at 23:08

## 2023-01-11 ASSESSMENT — ACTIVITIES OF DAILY LIVING (ADL)
ADLS_ACUITY_SCORE: 52
ADLS_ACUITY_SCORE: 52
ADLS_ACUITY_SCORE: 53
ADLS_ACUITY_SCORE: 39
ADLS_ACUITY_SCORE: 53
ADLS_ACUITY_SCORE: 47
ADLS_ACUITY_SCORE: 53
ADLS_ACUITY_SCORE: 53
ADLS_ACUITY_SCORE: 41
ADLS_ACUITY_SCORE: 53
ADLS_ACUITY_SCORE: 39
ADLS_ACUITY_SCORE: 37

## 2023-01-11 NOTE — PLAN OF CARE
"Patient is alert oriented but forgetful and ask repeated questions. Denies any CP. Requested to have O2 at bedtime. On 2 LPM O2 via nasal cannula. Complains of having a googly eyes and mild SOB. Vital signs taken with no any abnormal findings. O2 saturation is at 98%. Advised to have some rest/sleep and patient agreed to turn off some lights on his room. States that \" I will try to rest my eyes and go to sleep\". Reports of feeling relieve after. PRN oxycodone x1 and tylenol x1 given for pain management. Continue with plan of care.       Patient's most recent vital signs are:     Vital signs:  BP: 106/60  Temp: 97.3  HR: 83  RR: 17  SpO2: 98 %     Patient does not have new respiratory symptoms.  Patient does not have new sore throat.  Patient does not have a fever greater than 99.5.           "

## 2023-01-11 NOTE — PROGRESS NOTES
"This rehab services evaluation was originally performed by Katherin Garg on 1/8/2023. This writer is pulling this note forward from a pended chart for staff reference and continuation of care plan. The patients status has not changed and does not need a re-evaluation.      01/08/23 1300   Appointment Info   Signing Clinician's Name / Credentials (PT) Katherin Garg, DPT   Rehab Comments (PT) PT: Eval complete, Txt initiated   Quick Adds   Quick Adds Certification      Language English   Living Environment   People in Home child(lynette), adult   Current Living Arrangements house   Living Environment Comments PT: Pt lives with his two sons in [place], [place], Two platform stairs on the front of the house with no railing. Ramp on the garage access. Pt explains that he has been in and out of hospital and rehabs since initial cervical surgery in June 2022. Prior to this pt was moving around [place] with walker in home. Pt reports nearly 5 years on dialysis.   Self-Care   Usual Activity Tolerance moderate   Current Activity Tolerance fair   Equipment Currently Used at Home wheelchair, manual   Fall history within last six months yes   Activity/Exercise/Self-Care Comment Per OT notes: \"Per acute care therapy, pt reports son would assist with ADL prn but pt was mod I for transfers and short distance mobility with FWW. W/c for long distances\"   Post-Acute Assessment Only   Post-Acute Functional Assessment See below   General Information   Onset of Illness/Injury or Date of Surgery 12/06/22   Referring Physician Dr. Светлана Rios MD   Patient/Family Therapy Goals Statement (PT) To go home   Pertinent History of Current Problem (include personal factors and/or comorbidities that impact the POC) Taken per chart review: \"previous C5-T6 fusion for thoracic pathologic fracture 2/2 osteomyelitis c/b hardware failure with screw pullout at C5 and C6, and pseudoarthrosis. \"   Existing Precautions/Restrictions " fall;spinal;lifting  (Cervical collar on when >45 degrees HOB, no lifting greater than 10 lbs for 4 weeks,  on when OOB)   General Observations Pt lying semi supine with son present, able to communicate verbally without concern   Cognition   Cognitive Status Comments PT: Defer to OT for cog assessment   Pain Assessment   Patient Currently in Pain No   Integumentary/Edema   Integumentary/Edema Comments PT: Cervical collar and  in place when >45 degrees, managed by nursing   Range of Motion (ROM)   ROM Comment PT: PROM WFL , limited by fluid overload in LE   Strength (Manual Muscle Testing)   Strength Comments PT: B LE is grossly 3-/5   Balance   Balance Comments PT: Pt requires UE support and Irina Steady wtih CGA for standing balance, ~ 45 seconds   Sensory Examination   Sensory Perception Comments PT: Intact B LE fine touch however pt reports bilateral neuropathy in LEs impairing function and proprioception   Coordination   Coordination Comments PT: Impaired, impaired B Heel Mauricio test   Muscle Tone   Muscle Tone no deficits were identified   Clinical Impression   Criteria for Skilled Therapeutic Intervention Yes, treatment indicated   PT Diagnosis (PT) PT: Decreased endurance, strength, and tolerance for jessica ctivity from reported baseline values   Influenced by the following impairments medical status, post surgical status   Functional limitations due to impairments Ambulation, transfers, out of bed mobility   Clinical Presentation (PT Evaluation Complexity) Evolving/Changing   Clinical Presentation Rationale PMH, post surgical status, Recent and frequent hospitalizations   Clinical Decision Making (Complexity) moderate complexity   Planned Therapy Interventions (PT) balance training;bed mobility training;cryotherapy;E-stim;gait training;groups;home exercise program;joint mobilization;lumbar stabilization;manual therapy techniques;motor coordination training;neuromuscular re-education;orthotic  fitting/training;patient/family education;postural re-education;prosthetic fitting/training;ROM (range of motion);stair training;strengthening;stretching;swiss ball techniques;TENS;thermotherapy;transfer training;wheelchair management/propulsion training;progressive activity/exercise;risk factor education;home program guidelines   Risk & Benefits of therapy have been explained evaluation/treatment results reviewed;care plan/treatment goals reviewed;risks/benefits reviewed;current/potential barriers reviewed;participants voiced agreement with care plan;participants included;patient   Clinical Impression Comments PT: Pt is a 58 y/o male who presents for PT evaluation following hospitalization for cervical fusion and subsequent hardware failure and revision. PT evaluation revealed increased pain with impaired enduranc, strength, and tolerance with activity as well as need for assist with all mobility. Pt will benefit from skilled therapy in order to address these concerns, aide in the safe restoration of function, and reduce risk of hospital readmission.   PT Total Evaluation Time   PT Eval, Moderate Complexity Minutes (00883) 30   Therapy Certification   Start of care date 01/08/23   Certification date from 01/08/23   Certification date to 02/06/23   Medical Diagnosis Cervical Spinal Fusion   Physical Therapy Goals   PT Frequency 6x/week   PT Predicted Duration/Target Date for Goal Attainment 01/26/23   PT Goals Bed Mobility;Transfers;Gait;Wheelchair Mobility;PT Goal 1;Aerobic Activity;PT Goal 2;Stairs   Therapeutic Activity   Therapeutic Activities: dynamic activities to improve functional performance Minutes (65463) 25   Treatment Detail/Skilled Intervention PT: Pt and son oriented to therapy schedule and expectations, answered all related questions to therapuetic progression. Pt rolled bilaterally in bed to don , pt requires min/mod A for rolling, appears to have difficulty following cuing at times, attempts to  "sit upright when author simply cued him to roll. Max A for donning of brace. Supine > sit with mod A for trunk and LE coordination and support. Pt found to be unaware of BM incontinence. Once sittting EOB, pt performed 3 stands from elevated bed with CGA. Able to stand for ~ 1-2 min without orthostatic sxs or complaints. Pt then transferred from sitting > supine with max A x 1, min A x 1 for LE lift and trunk support so nursing could assist with changing of brief. Author found pt high back wheelchair for cervical support that pt agrees to attempt tomorrow.   PT Discharge Planning   PT Plan PT: Pt has issues with BM incontinence, plan for Irina Steady to high back wheelchair (in hallway), bring to gym, try sit <> stands in // bars with rehab tech   PT Discharge Recommendation (DC Rec) home with assist   PT Rationale for DC Rec PT: Pt lives with ricco in [place], [place]. No Stairs to enter. Has manual and power wc   PT Brief overview of current status PT: Mod A for bed mobility, Irina steady CGA for stands. No other movement initiated on eval   Total Session Time   Timed Code Treatment Minutes 25   Total Session Time (sum of timed and untimed services) 55   Post Acute Settings Only   What unit is patient on? TCU   Bed Mobility: Turning side to side/Roll Left and Right   Patient Performance Partial/moderate assist \"includes weight bearing assist of trunk or limbs\"   Staff Performance One person assist (one person physical assist)   Describe Performance PT: Mod A for bilateral rolling using UE   Bed Mobility: Sit to lying   Patient Performance Total dependence (helper does ALL of effort)   Staff Performance Two +person assist (two plus persons physical assist)   Describe Performance PT: See ther act, Pinky x 1, max x 1   Bed Mobility: Lying to sitting on the side of bed   Patient Performance Substantial/maximal assist   Staff Performance One person assist (one person physical assist)   Describe Performance Mod A x 1, See Ther " "Act   Transfers: Sit to Stand   Patient Performance Partial/moderate assist \"includes weight bearing assist of trunk or limbs\"   Staff Performance One person assist (one person physical assist)   Equipment Used Other (comments)  (Rafa Rosenthal)   Describe Performance PT: CGA/min A with rafa rosenthal   Transfers: Chair/Bed transfers   Reason Not Done Safety concerns   Ambulation   Walks in room: Reason Not Done Safety concerns   Walks in [place]:  Reason Not Done Safety concerns   Walk 10 Feet   Reason Not Done Safety concerns   Walk 10 Feet on uneven surfaces   Reason Not Done Safety concerns   Walk 50 Feet with Two Turns   Reason Not Done Safety concerns   Walk 150 Feet   Reason Not Done Safety concerns   Locomotion   Move on unit: Reason Not Done Safety concerns   Move off unit: Reason Not Done Safety concerns   Wheel 50 Feet   Reason Not Done Safety concerns   Wheel 150 Feet   Reason Not Done Safety concerns   1 Step (curb)   Reason Not Done Safety concerns   4 Steps   Reason Not Done Safety concerns   12 Steps   Reason Not Done Safety concerns   Car Transfer   Reason Not Done Safety concerns   Picking up Object   Reason Not Done Safety concerns         "

## 2023-01-11 NOTE — PROGRESS NOTES
Pt lethargic, responds to some verbal cue/command. MD paged, will place order to send to ED. Lab on unit to draw stat CBC, BMP, Mag.

## 2023-01-11 NOTE — ED NOTES
"Pt arrives from TCU, AMS, pt grabbing at gown and neck brace trying to pull them off, pt unable to answer questions, mumbles and keeps repeating \"get this off me\"  Pt grabbing at the air above him, falls asleep and apneic snoring.  "

## 2023-01-11 NOTE — PROGRESS NOTES
Called ED and spoke with Genaro. Updated on condition. Updated on missing dialysis on Monday to do dialysis sending pt to ED. Pt dialysis center cancelled Wednesday dialysis due to belief that pt inpatient and would receive dialysis in hospital. Rescheduled for Thursday am unless he receives in hospital.

## 2023-01-11 NOTE — PLAN OF CARE
Goal Outcome Evaluation:       I called patient's son, Cory, to inform him that his father was transferred to the E.D. and updated him on his condition. He was very appreciative that I called him. He said he would not be coming to hospital this evening and I told him he could call later to see if his dad would be admitted to the acute care setting or coming back to TCU.

## 2023-01-11 NOTE — ED NOTES
Pt woke and knew date and what he was in the hospital for, pt did not know what hospital he was in.  Pt stated his medical history and asked how long he would be here.  Pt stated his legs were still numb from the surgery and that he doesn't produce urine because of his kidney disease.  Md notified of pts change in mental status

## 2023-01-11 NOTE — PROGRESS NOTES
PT alert and oriented at [facility]. denied SOB, CP, N/V.  strength equal bilaterally. Pt endorsed numbness per baseline to BLE. R Heel dressing changed. Writer went to pass lunch medications but pt sleeping. Writer returned one hour later at 1420 and pt still asleep writer attempted to wake pt. Pt unable to respond to commands after finger pinching and light sternal rub. Vitals WNL. pupil response sluggish. MD updated and assessed pt. Ordered one dose intranasal narcan due to no IV access. 4 Mg narcan administered at 1505. Pt on way to STAT CT.

## 2023-01-11 NOTE — PROGRESS NOTES
SHAKIRA called son/Cory to find out what pt looked like before hospitalization and what pt needs to look like to return home.  Pt was walking with walker. Pt was mentally clear and with no delusions. Son doesn't know what is going on now.  It happened after the second surgery.  Pt is fine one minute and not fine the next minute.  In order for pt to return home-  1- must be able to use walker and transfer on own.   2- must be clear thinking.  Safe to be on own.     Son also said pt now has insomnia.  Pt texted son all night last night rambling on about nothing. About every hour.     There are no stairs that pt has to use.   SHAKIRA asked if FMLA papers were sent in?  Son said as far as he knows papers were given to doctor in the hospital.  They were to be filled out and faxed back to [place]. SHAKIRA will see if papers were sent back.  Maxine Madrigal at [phone] ext [phone]     SHAKIRA left a vm with Maxine  Stating if the FMLA papers are not back, please fax a blank copy here and SHAKIRA will get TCU doctors to sign them.  SHAKIRA didn't want that to fall through the cracks.     Catrina Arciniega, JULISSA   [facility], Transitional Care Unit   Social Work   [facility]th [place] [address]  (ph) [phone]

## 2023-01-11 NOTE — ED NOTES
Bed: ED03  Expected date: 1/11/23  Expected time: 4:32 PM  Means of arrival:   Comments:  HOLD for TCU

## 2023-01-11 NOTE — TREATMENT PLAN
Assigned RN received a call from [facility], Dr. Cline-   inquiring about treatment and discharge plans for pt. Pt apparently called the center this am and told them he is coming today for his treatment. This RN called them back and clarified pt's status with them. Pt goes to Dialysis to the said facility  M/W/F. On Monday(1/9)  he did not finish his run as Dialysis noted pt was confused, disoriented, argumentative and irritable therefore transferred pt to [facility] ED before he got admitted in this unit with the plan to continue with his regular Dialysis treatment per provider note. Dialysis [facility] is under the impression that pt is admitted inpatiently therefore, no need to perform the Dialysis treatment in their facility. This RN explained TCU 's status as outpatient unit within the hospital therefore pt should continue Dialysis treatrment with them. Unfortunately, they do not have the pt on their schedule d/t reason mentioned above and they do not have any opening today. They can only accommodate pt tomorrow if he could make it there by 5:40 am. Assigned RN, AM charge RN and PCS informed this am. [facility] to set-up ride for pt (uses [employer]-private pay) for tomorrow's Dialysis. Will update provider. Will ff-up.

## 2023-01-12 ENCOUNTER — APPOINTMENT (OUTPATIENT)
Dept: OCCUPATIONAL THERAPY | Facility: SKILLED NURSING FACILITY | Age: 58
DRG: 949 | End: 2023-01-12
Attending: INTERNAL MEDICINE
Payer: MEDICARE

## 2023-01-12 PROBLEM — Z98.1 HX OF FUSION OF CERVICAL SPINE: Status: ACTIVE | Noted: 2023-01-12

## 2023-01-12 PROCEDURE — 250N000013 HC RX MED GY IP 250 OP 250 PS 637: Performed by: INTERNAL MEDICINE

## 2023-01-12 PROCEDURE — 99310 SBSQ NF CARE HIGH MDM 45: CPT | Performed by: INTERNAL MEDICINE

## 2023-01-12 PROCEDURE — 250N000013 HC RX MED GY IP 250 OP 250 PS 637

## 2023-01-12 PROCEDURE — 120N000009 HC R&B SNF

## 2023-01-12 PROCEDURE — 250N000013 HC RX MED GY IP 250 OP 250 PS 637: Performed by: PHYSICIAN ASSISTANT

## 2023-01-12 RX ORDER — ARIPIPRAZOLE 2 MG/1
2 TABLET ORAL 2 TIMES DAILY PRN
Status: DISPENSED | OUTPATIENT
Start: 2023-01-12 | End: 2023-01-26

## 2023-01-12 RX ORDER — OLANZAPINE 2.5 MG/1
5 TABLET, FILM COATED ORAL AT BEDTIME
Status: DISCONTINUED | OUTPATIENT
Start: 2023-01-12 | End: 2023-02-03 | Stop reason: HOSPADM

## 2023-01-12 RX ADMIN — METHOCARBAMOL 500 MG: 500 TABLET ORAL at 17:54

## 2023-01-12 RX ADMIN — OXYCODONE HYDROCHLORIDE 5 MG: 5 TABLET ORAL at 12:18

## 2023-01-12 RX ADMIN — APIXABAN 2.5 MG: 2.5 TABLET, FILM COATED ORAL at 22:24

## 2023-01-12 RX ADMIN — GABAPENTIN 300 MG: 300 CAPSULE ORAL at 12:09

## 2023-01-12 RX ADMIN — BACLOFEN 10 MG: 10 TABLET ORAL at 00:13

## 2023-01-12 RX ADMIN — APIXABAN 2.5 MG: 2.5 TABLET, FILM COATED ORAL at 12:08

## 2023-01-12 RX ADMIN — SEVELAMER CARBONATE 1600 MG: 800 TABLET, FILM COATED ORAL at 12:09

## 2023-01-12 RX ADMIN — Medication 10 MG: at 22:23

## 2023-01-12 RX ADMIN — ATORVASTATIN CALCIUM 20 MG: 20 TABLET, FILM COATED ORAL at 22:23

## 2023-01-12 RX ADMIN — FINASTERIDE 1.3 MG: 5 TABLET, FILM COATED ORAL at 22:29

## 2023-01-12 RX ADMIN — BACLOFEN 10 MG: 10 TABLET ORAL at 22:23

## 2023-01-12 RX ADMIN — Medication 100 MG: at 22:23

## 2023-01-12 RX ADMIN — Medication 1 TABLET: at 22:23

## 2023-01-12 RX ADMIN — METHOCARBAMOL 500 MG: 500 TABLET ORAL at 22:23

## 2023-01-12 RX ADMIN — THIAMINE HCL TAB 100 MG 100 MG: 100 TAB at 22:24

## 2023-01-12 RX ADMIN — SEVELAMER CARBONATE 1600 MG: 800 TABLET, FILM COATED ORAL at 17:54

## 2023-01-12 RX ADMIN — OLANZAPINE 5 MG: 2.5 TABLET, FILM COATED ORAL at 22:23

## 2023-01-12 RX ADMIN — CETIRIZINE HYDROCHLORIDE 10 MG: 10 TABLET, FILM COATED ORAL at 22:23

## 2023-01-12 RX ADMIN — GABAPENTIN 300 MG: 300 CAPSULE ORAL at 22:23

## 2023-01-12 RX ADMIN — MIDODRINE HYDROCHLORIDE 10 MG: 5 TABLET ORAL at 12:09

## 2023-01-12 RX ADMIN — FOLIC ACID 1 MG: 1 TABLET ORAL at 22:24

## 2023-01-12 RX ADMIN — BACLOFEN 10 MG: 10 TABLET ORAL at 12:10

## 2023-01-12 RX ADMIN — SERTRALINE HYDROCHLORIDE 100 MG: 100 TABLET, FILM COATED ORAL at 00:13

## 2023-01-12 RX ADMIN — SERTRALINE HYDROCHLORIDE 100 MG: 100 TABLET, FILM COATED ORAL at 22:23

## 2023-01-12 RX ADMIN — METHOCARBAMOL 500 MG: 500 TABLET ORAL at 12:10

## 2023-01-12 ASSESSMENT — ACTIVITIES OF DAILY LIVING (ADL)
ADLS_ACUITY_SCORE: 56
ADLS_ACUITY_SCORE: 50
ADLS_ACUITY_SCORE: 56
ADLS_ACUITY_SCORE: 56
ADLS_ACUITY_SCORE: 50
ADLS_ACUITY_SCORE: 56
ADLS_ACUITY_SCORE: 56
ADLS_ACUITY_SCORE: 45

## 2023-01-12 NOTE — DISCHARGE SUMMARY
[facility]  Hospitalist Discharge Summary      Date of Admission:  1/7/2023  Date of Discharge:  1/9/2023   Discharging Provider: Светлана Rios MD  Discharge Service: Hospitalist Service    Discharge Diagnoses   Hardware failure, pseudoarthritis status post   Spinal fusion 12/6/22  Previous history of C5-T6 fusion for pathological fracture due to osteomyelitis   Hypertension   Chronic Atrial fibrillation   Mild to moderate  Pulmonary hypertension   Moderate MR  Chronic resp failure  GINI   Obesity   Acute on chronic pain  Peripheral neuropathy  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    hyponatremia  Chronic anemia  Chronic anticoagulation  HDL  Anxiety, depression OCD   Alcohol use disorder  right heal pressure sore      Discharge Disposition      Was taken to [facility] form HD       Hospital Course             Shay Jimenez is a 57 year old male with history of hypertension  , ESRD on HD , GERD, GINI intolerant of CPAP, chronic atrial fibrillation  on chronic AC ,   alcohol use  Disorder,  Had  previous C5-T6 spinal fusion due to pathological fracture form osteomyelitis. He was noted to have discitis/osteomyelitis on CT of chest back in February that was done for respiratory issues  When was hospitalized at  [facility]  And was to follow up with [facility] spine ( I cannot see those records and patient  Has no recollection if anything was done ) . Later in June MRI showed T2, T3 vertebral collapse with myelopathy ,some abnormal epidural tissue and inflammation , at that time antibiotics deferred  As was stable  And was planned for intraop culture . culture turned + on day 5 and day 7 for C acnes that was felt to be contamination however due to presence of hardware it was decided to treat with ancef x 2 weeks followed by  amoxicillin x 4 weeks.  He then was treated for Morganella morganii septic shock of unclear etiology , no itraabdominal source found on CT, TTE did not  show vegetations was treated with almost 9 weeks of iv cefepime 8/14-10/13 . On repeat imaging  Was found to have hardware failure  and was admitted in December for surgery and underwent C5-T6 redo cervical spine fusion  12/6/22. He did have hemorrhage intraop ,  Cerebral angio  Did not show vertebral artery injury, did receive blood, was in ICU on pressors to keep blood pressure  Up for perfusion  . Also received periop cefazolin till drain pulled . Intraop culture remained negative and there was no indication for ongoing antibiotics per ID . Hospitalization was complicated   with agitation and delirium  And was started on CIWA,  Acute on chronic respiratory failure  Was on oxygen.  [facility] is doing well [facility]       He was doing ok at time of admission. He was picked up to go to HD 6:30 AM 1/9  So had not seen that day and during HD developed confusion thus was taken to [facility]          Hardware failure, pseudoarthritis status post   Spinal fusion 12/6/22  Previous history of C5-T6 fusion for pathological fracture due to osteomyelitis   -intraop culture remained neg and per ID there was no indication for ongoing antibiotics   - needs cervical collar  When HOB > 45 degrees and  when OOB      Hypertension   - not on medications, he is actually on midodrine tid but hold if blood pressure  > 120      Chronic Atrial fibrillation   - patient  Had been seen by card sin past states had ZIO  patch in past but apparently  Results were lost , so would follow up  Evan stark as out patient  And have repeat ZIO patch    - on AC with eliquis and is back on it , not on rate controlling medications      Mild to moderate  Pulmonary hypertension   Moderate MR  - could all be related to untreated GINI but has not tolerated CPAP   - follow up  as out patient       Chronic resp failure  GINI   Obesity   - intolerant to CPAP  - uses O2 at night 1-3 liters, continue      Acute on chronic  pain  Peripheral neuropathy  - continue baclofen, gabapentin  Also on methocarbamol      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    - continue HD  M/W/F   -  kg   - refused renal diet      hyponatremia  - correct with HD      Chronic anemia  - defer back to nephrology reg epo       Chronic anticoagulation  - on chronic eliquis and Is back on it , monitor fr bleeding       HDL  - continue statins      Anxiety, depression OCD   - patient  Was taking xanax PTA, he took as PRN  But took about 4-5 / 76 days , sometimes up 2 x a day  - had not received benzodiazepines in some time so did not restart as now should not show signs of withdrawal   - he still feels anxious and was asking for xanax but did not prescribed, asked psychiatry to see   - he does not want to see addiction medicine      Alcohol use disorder  - drink 5-6 cocktail a day ( vodka )   - continue vitamins   - does not want to see chem dep   - continue baclofen and gabapentin as can also help with craving            right heal pressure sore  -WOC to see      Was told by RN that patient  Went to HD and Is being sent [facility] ER form HD due to delirium during HG   Reviewing records he was disorented over night as well but then was ok but seemed to be OK in AM when picked up at 6:30 for HD            Consultations This Hospital Stay   PHYSICAL THERAPY ADULT IP CONSULT  OCCUPATIONAL THERAPY ADULT IP CONSULT  WOUND OSTOMY CONTINENCE NURSE  IP CONSULT  PSYCHIATRY IP CONSULT  PSYCHIATRY IP CONSULT  WOUND OSTOMY CONTINENCE NURSE  IP CONSULT    Code Status   Full Code       Светлана Rios MD  [facility] TRANSITIONAL CARE UNIT [address] [address]  Phone: [phone]  ______________________________________________________________________           Primary Care Physician   SAMANTHA GONZALEZ    Discharge Orders   No discharge procedures on file.    Significant Results and Procedures   Most Recent 3  CBC's:Recent Labs   Lab Test 01/11/23  1722 01/11/23  1538 01/09/23  1255   WBC 6.6 5.7 5.7   HGB 9.6* 10.0* 10.2*   MCV 95 98 97    237 262     Most Recent 3 BMP's:Recent Labs   Lab Test 01/11/23  1722 01/11/23  1538 01/09/23  1255    132* 132*   POTASSIUM 4.0 4.0 3.9   CHLORIDE 95 94 91*   CO2 26 23 25   BUN 34* 32* 18.0   CR 7.48* 7.36* 4.45*   ANIONGAP 14 15* 16*   MAC 9.4 9.6 9.2   GLC 86 79 83     Most Recent 2 LFT's:Recent Labs   Lab Test 01/11/23  1722 01/09/23  1255   AST 18 19   ALT 6 <5*   ALKPHOS 113 125   BILITOTAL 1.2 0.6   ,   Results for orders placed or performed during the hospital encounter of 12/06/22   XR Surgery MOLLY Fluoro Less Than 5 Min w Stills    Narrative    This exam was marked as non-reportable because it will not be read by a   radiologist or a [facility] non-radiologist provider.         IR Carotid Cerebral Angiogram Bilateral    Narrative    SWATI ELLIOTT [facility]  [id number]  1965    History: 57-year-old man presenting for emergent cerebral angiogram  after a suspicion for intraoperative injury of the right vertebral  artery during the cervical spinal fusion surgery.    Indication for the procedure: As above     : Dr Ricardo  Brownell:  JS Escamilla; Celina Worrell   Anesthesia:  Performed by Anesthesia  Medications:  Per anesthesia  Puncture site:  Right Femoral Artery  Fluoroscopy time (minutes): 16.0  Radiation dose (mGy): 1356  Contrast amount (mL):?20  Estimated blood loss (mL): 05  Other medications: None    Procedure:  Emergent cerebral angiography and interpretation of the images.  Anatomical landmark guided right common femoral arteriotomy  Diagnostic angiography of the right common femoral artery.  Selective catheterization and diagnostic cerebral angiography of the  left vertebral artery, right internal carotid artery and right  vertebral artery.   Percutaneous closure of right femoral arteriotomy using 6F Angioseal  device.    Consent: The risks,  benefits of a conventional cerebral angiography  were discussed with the patient neurosurgical team. Given the  suspicion for active extravasation from vertebral artery injury, the  decision was made to proceed with emergent cerebral angiography.      Technique: The patient was brought to the angiography suite and placed  in supine position. The medications were administered by the radiology  nursing staff. The nursing staff independently monitored the patient's  vital signs during the procedure.    The patient 's right groin was prepped and draped in standard fashion.  The right common femoral artery was palpated. Lidocaine was injected  locally and a small skin incision was made over the femoral artery  using a scalpel. The subcutaneous tissue was dissected using a Naz  clamp. A 21 gauge needle was placed into the right femoral artery  which was exchanged for a 4 F dilator. The dilator was exchanged for a  6French sheath over a J-wire. The sheath was connected to a continuous  flush of heparinized saline.  A 5F angled glide diagnostic catheter  was now advanced through the sheath under fluoroscopic guidance over a  0.035 inch Terumo Glidewire.  Double flush technique was used  throughout the procedure.  Diagnostic angiography over the neck and  cranium was now performed by catheterizing the left vertebral artery,  right internal carotid artery and the right vertebral artery.  At the  end of the procedure, the femoral sheath was removed and hemostasis  achieved with a 6 Chadian Angio-Seal closure device.  The patient  tolerated the procedure well and was transferred back to the operating  room in a stable condition    Findings:  Left vertebral artery injection: Cranial view  With the diagnostic catheter advanced in the proximal left vertebral  artery, biplane angiography was performed over the cranium in AP and  lateral projections.  The left vertebral artery is normal in its  distal V3, V4 segments.  The left  posterior inferior cerebellar artery  originates from the mid V4 segment of the left vertebral artery.  The  basilar artery is patent and bifurcates into bilateral posterior  cerebral and superior cerebellar arteries.  There is contrast washout  of the basilar artery as well as the right posterior cerebral and  superior cerebellar arteries likely secondary to competitive flow from  the contralateral vertebral circulation.  The capillary and venous  phases are normal.    Right internal carotid artery injection: Cranial view  With the diagnostic catheter advanced into the proximal cervical  segment of the right internal carotid artery, biplane angiography was  performed over the cranium in AP and oblique projections.  The right  internal carotid artery is normal in its distal cervical, petrous,  laceral, cavernous, clinoid, ophthalmic and communicating segments.   The right internal carotid artery bifurcates into the right anterior  cerebral artery and the right middle cerebral artery.  The proximal  divisions and distal branches of the right anterior and the right  middle cerebral arteries are normal.  There is a fetal configuration  to the right posterior cerebral artery with flash filling of the  basilar and the left vertebral arteries.  There is robust  opacification of the left anterior and the left middle cerebral artery  across the anterior communicating artery.  The capillary and venous  phases are normal.    Right vertebral artery injection: Cervical and cranial view  With the diagnostic catheter advanced in the proximal V1 segment of  the right vertebral artery, biplane angiography was performed over the  cranium in AP and oblique projections.  The right vertebral artery is  normal in its V1, V2, V3, and proximal V4 segments.  There is no  evidence of vertebral artery dissection, pseudoaneurysm or contrast  extravasation.    Right common femoral artery. Pelvic view  Through the 6French sheath angiography was  performed over the right  groin. Pelvic view of the right common femoral artery in the OJEDA  projection demonstrates a normal right common femoral artery,  superficial femoral artery, and deep femoral artery. The sheath is  located above the bifurcation. There is no significant stenosis,  dissection or pseudoaneurysm.    Dr King was present for the entire procedure.      Impression    Impression:  Normal cervical?cerebral angiography without any evidence of  iatrogenic vessel injury on selective angiography of the left  vertebral artery, right internal carotid artery and the right  vertebral artery.    JS Escamilla  Endovascular Surgical Neuroradiology Fellow  Pager: [phone]    I have reviewed all the images and agree with the interpretation.  I  attest that I was present for the entire procedure and agree with the  operative report.    I have personally reviewed the examination and initial interpretation  and I agree with the findings.    THO KING MD         SYSTEM ID:  [id number]   XR Abdomen Port 1 View    Narrative    Exam: XR ABDOMEN PORT 1 VIEW, 12/7/2022 3:43 PM    Indication: abdominal distension    Comparison: X-ray abdomen 7/3/2022     Findings: Supine x-ray of the abdomen. Air-filled distended small or  large bowel loops in the central and left abdomen. Opacity in the  right lower quadrant. Radiopacity in the right mid abdomen may  represent enterolith or calcified gallstone.  Lung bases are clear. No definite pneumatosis.      Impression    Impression: Air-filled distended small and large bowel loops in the  central and left abdomen, which may represent early or developing  adynamic ileus.    I have personally reviewed the examination and initial interpretation  and I agree with the findings.    MICHAEL ARREDONDO MD         SYSTEM ID:  [id number]   XR Chest Port 1 View    Narrative    EXAM: XR CHEST PORT 1 VIEW 12/8/2022 10:54 AM    HISTORY: 57-year-old male status post posterior fusion.      COMPARISON: Outside hospital chest radiograph 8/10/2022 and chest CT  with contrast 8/10/2022 (no images available, only interpretation).    TECHNIQUE: Portable upright AP view of the chest.    FINDINGS:   The upper abdomen, soft tissues, and bones appear unremarkable. Spinal  fusion hardware noted. Percutaneous staples noted. Bilateral  costophrenic angle blunting/haziness. Patchy basilar predominant air  space opacities. No pneumothorax. Midline trachea. Cardiomegaly and  prominent pulmonary vasculature.      Impression    IMPRESSION: Cardiomegaly with congested pulmonary vasculature, left  greater than right small pleural effusions, and bibasilar opacities  likely representing edema and/or atelectasis.    I have personally reviewed the examination and initial interpretation  and I agree with the findings.    ANGÉLICA YEE DO         SYSTEM ID:  [id number]   CT Chest/Abdomen/Pelvis w Contrast    Narrative    EXAMINATION: CT CHEST/ABDOMEN/PELVIS W CONTRAST, 12/8/2022 5:20 PM    TECHNIQUE: Helical CT images from the thoracic inlet through the  symphysis pubis were obtained with intravenous contrast. Contrast  dose: iopamidol (ISOVUE-370) solution 135 mL    COMPARISON: 11/3/2022    HISTORY: persistently low hemoglobin despite blood resuscitation, s/p  C5-T6 fusion with c/f hemorrhage    FINDINGS:    Lower neck: Visualized portions of the lower neck and thyroid gland  are unremarkable.    Chest:   Heart/ Mediastinum: Heart is borderline enlarged. No evidence of  central pulmonary embolism. No bulky lymphadenopathy.  Esophagus  appears normal.   Lungs/pleura: Mild tracheomalacia. Scattered peripheral mucous  plugging. Multifocal groundglass and tree in bud nodular densities in  all 5 lobes. Pleural effusions and compressive atelectasis. No  suspicious solid pulmonary nodules.. No pneumothorax.   Chest wall/axilla: No bulky lymphadenopathy..    Abdomen and pelvis:  Hepatobiliary: Normal size. No focal lesions.  No  intra or  extrahepatic biliary dilatation.. The gall bladder is without wall  thickening or pericholecystic fluid. A few small calcified layering  gallstones. Extrahepatic biliary system within normal limits..    Pancreas: Mildly atrophic without suspicious focal lesion or ductal  dilatation.    Spleen: Mild splenomegaly. No focal lesions.    Adrenals: Normal.    Kidneys: Markedly atrophic bilateral native kidneys with acquired  cystic disease. No hydronephrosis, obstructing calculi. Upper  extremity related streak artifact limits evaluation of multiple  exophytic cystic lesions. Nonobstructing punctate inferior pole left  renal calculus. Indeterminant exophytic cystic mass lesion measuring  up to 1.8 cm of the inferior right kidney (series 1 image 7).  Hemorrhagic superior pole left kidney cyst.  Urinary bladder: Decompressed with symmetrical thickening, small  amount of perivesicular inflammatory fat stranding  Reproductive organs: Prostate and seminal vesicles are unremarkable.    Gastrointestinal: The stomach and duodenum are unremarkable. Small and  large bowel are normal in caliber and without abnormal wall  thickening. Colonic diverticulosis without acute diverticulitis.  Mesentery/Peritoneum: No ascites or pneumoperitoneum.    Lymph nodes: No lymphadenopathy.  Vasculature: No aortic aneurysm.     Bones and soft tissues: Postsurgical changes of C5-T6 posterior fortino  instrumented fusion with multilevel bilateral pedicle screws. There is  a soft tissue hematoma subjacent to the staple line which is partially  visualized overlying the cervicothoracic junction measuring up to  approximately 9.5 x 5 cm in axial dimension. Soft tissue anasarca.      Impression    IMPRESSION:  1.  There is a partially visualized hematoma in the subcutaneous soft  tissues overlying the posterior cervicothoracic junction subjacent to  the staple line measuring 9.5 x 5 cm in axial dimension. Post surgical  changes at C5-T6 posterior fortino  instrumented fusion with multilevel  bilateral pedicle screws. No evidence of hardware failure.  2.  Multifocal groundglass and tree in bud nodular densities in all 5  lobes as well as peripheral mucous plugging may suggest aspiration or  atypical infection. There is diffuse mild pulmonary edema, small  pleural effusions and compressive atelectasis.  3.  Atrophic native kidneys with acquired cystic disease,  nonobstructing punctate calculus in the inferior pole region left  kidney. Indeterminate exophytic cystic mass lesion of the inferior  pole region right kidney measuring up to 1.8 cm. Recommend nonemergent  follow-up dedicated renal mass protocol MR or CT.    I have personally reviewed the examination and initial interpretation  and I agree with the findings.    DELIA MENDOZA MD         SYSTEM ID:  [id number]   CT Head w/o Contrast    Narrative    CT HEAD W/O CONTRAST 12/8/2022 5:21 PM    History: AMS post-op     Comparison: CT head 6/26/2022    Technique: Using multidetector thin collimation helical acquisition  technique, axial, coronal and sagittal CT images from the skull base  to the vertex were obtained without intravenous contrast.   (topogram) image(s) also obtained and reviewed.    Findings: There is no intracranial hemorrhage, mass effect, or midline  shift. Gray/white matter differentiation in both cerebral hemispheres  is preserved. Ventricles are proportionate to the cerebral sulci. The  basal cisterns are clear.    The bony calvaria and the bones of the skull base are normal. The  visualized portions of the paranasal sinuses and mastoid air cells are  clear.      Impression    Impression:  No acute intracranial pathology.     I have personally reviewed the examination and initial interpretation  and I agree with the findings.    NADJA CHOWDHURY MD         SYSTEM ID:  [id number]   XR Cervical Spine 2/3 Views    Narrative    EXAM: XR CERVICAL SPINE 2/3 VIEWS 12/12/2022 8:45 PM    HISTORY: s/p  C5-T6 pSF     COMPARISON: CT 11/3/2022    FINDINGS: Portable upright AP and lateral views of the cervical spine  were obtained. Limited lateral view secondary to patient habitus and  limitation of portable imaging. Interval revision of C5-T6 posterior  fusion with fixation hardware in place. Alignment appears stable.  Cutaneous staple line.      Impression    IMPRESSION: Limited evaluation demonstrates revision of C5-T6  posterior fusion with stable alignment.    I have personally reviewed the examination and initial interpretation  and I agree with the findings.    LILLI REA MD         SYSTEM ID:  [id number]   CT Head w/o Contrast    Narrative    CT HEAD W/O CONTRAST 12/16/2022 4:59 AM    History: change  in mental status     Comparison: CT head 12/8/2022, 6/26/2022    Technique: Using multidetector thin collimation helical acquisition  technique, axial, coronal and sagittal CT images from the skull base  to the vertex were obtained without intravenous contrast.   (topogram) image(s) also obtained and reviewed.    Findings: There is no intracranial hemorrhage, mass effect, or midline  shift. Gray/white matter differentiation in both cerebral hemispheres  is preserved. Ventricles are proportionate to the cerebral sulci. The  basal cisterns are clear.    The bony calvaria and the bones of the skull base are normal. The  visualized portions of the paranasal sinuses and mastoid air cells are  clear.      Impression    Impression:  No acute intracranial pathology.     I have personally reviewed the examination and initial interpretation  and I agree with the findings.    WILLIAM HERNADEZ MD         SYSTEM ID:  [id number]   XR Chest Port 1 View    Narrative    EXAM: XR CHEST PORT 1 VIEW  12/21/2022 5:40 AM     HISTORY:  Chest pain       COMPARISON:  12/8/2022    FINDINGS:   Unchanged cardiomegaly. No pneumothorax. Decreased bilateral pleural  effusions. Increased diffuse mixed interstitial and airspace  opacities.  Partially visualized cervicothoracic fusion hardware.      Impression    IMPRESSION:   1. Unchanged cardiomegaly with increased diffuse mixed interstitial  and airspace opacities, consistent with edema.  2. Decreased bilateral pleural effusions.    I have personally reviewed the examination and initial interpretation  and I agree with the findings.    MARY ALMARAZ MD         SYSTEM ID:  [id number]       Discharge Medications   Discharge Medication List as of 1/9/2023 12:09 PM      CONTINUE these medications which have NOT CHANGED    Details   acetaminophen (TYLENOL) 325 MG tablet Take 1-2 tablets (325-650 mg) by mouth every 4 hours as needed for mild pain or fever, HistoricalPain      atorvastatin (LIPITOR) 20 MG tablet Take 1 tablet (20 mg) by mouth At Bedtime, Historicalhyperlipidemia      baclofen (LIORESAL) 10 MG tablet Take 1 tablet (10 mg) by mouth 2 times daily, Disp-60 tablet, R-1, HistoricalMuscle spasms      calcium carbonate (TUMS) 500 MG chewable tablet Take 2 tablets (1,000 mg) by mouth 3 times daily as needed for heartburn, HistoricalHeart burn      cetirizine (ZYRTEC) 10 MG tablet Take 1 tablet (10 mg) by mouth daily, HistoricalAllergies      cyanocobalamin (VITAMIN B-12) 500 MCG tablet Take 1 tablet (500 mcg) by mouth daily, HistoricalPeripheral neuropathy      ELIQUIS ANTICOAGULANT 2.5 MG tablet TAKE 1 TABLET (2.5 MG) BY MOUTH TWO TIMES A DAY. INDICATIONS: PREVENT STROKE IN AFIB, JULIÁN, HistoricalDVT-PE      finasteride (PROSCAR) 5 MG tablet Take 1.25 mg by mouth daily Takes 1/4 of 5 mg daily, Historical      fluticasone (FLONASE) 50 MCG/ACT nasal spray Spray 2 sprays into both nostrils daily as needed, Historicalallergies      folic acid (FOLVITE) 1 MG tablet Take 1 tablet (1 mg) by mouth daily, Transitional      gabapentin (NEURONTIN) 300 MG capsule Take 1 capsule (300 mg) by mouth 3 times daily, HistoricalPeripheral neuropathy      hydrOXYzine (ATARAX) 25 MG tablet Take 1 tablet (25 mg) by mouth 2  times daily as needed for other or itching (pain as adjuant therapy), Disp-60 tablet, R-0, HistoricalItching      methocarbamol (ROBAXIN) 500 MG tablet Take 1 tablet (500 mg) by mouth 4 times daily, TransitionalMuscle spasms      midodrine (PROAMATINE) 10 MG tablet Take 1 tablet (10 mg) by mouth 3 times daily (with meals), HistoricalBlood pressure      multivitamin RENAL (RENAVITE RX/NEPHROVITE) 1 MG tablet Take 1 tablet by mouth daily, HistoricalCKD      omeprazole (PRILOSEC) 20 MG DR capsule Take 1 capsule (20 mg) by mouth daily, HistoricalIndigestion      polyethylene glycol (MIRALAX) 17 GM/Dose powder Take 17 g by mouth daily, Disp-510 g, R-0, HistoricalConstipation      sertraline (ZOLOFT) 100 MG tablet Take 1 tablet (100 mg) by mouth daily, HistoricalDepression      sevelamer carbonate (RENVELA) 800 MG tablet Take 2 tablets (1,600 mg) by mouth 3 times daily (with meals), HistoricalCKD      Specialty Vitamins Products (MAGNESIUM PLUS PROTEIN) 133 MG tablet Take 1 tablet (133 mg) by mouth daily, TransitionalHypomagesemia      thiamine (B-1) 100 MG tablet Take 1 tablet (100 mg) by mouth daily, TransitionalThiamine deficiency      vitamin B6 (PYRIDOXINE) 100 MG tablet Take 1 tablet (100 mg) by mouth daily, HistoricalB6 deficiency      oxyCODONE (ROXICODONE) 5 MG tablet Take 1 tablet (5 mg) by mouth every 4 hours as needed for moderate pain (4-6), R-0, Transitionalpain      !! QUEtiapine (SEROQUEL) 25 MG tablet Take 1 tablet (25 mg) by mouth daily, TransitionalAgitation      !! QUEtiapine (SEROQUEL) 50 MG tablet Take 1 tablet (50 mg) by mouth At Bedtime, TransitionalAgitation      ramelteon (ROZEREM) 8 MG tablet Take 1 tablet (8 mg) by mouth At Bedtime, TransitionalSleep       !! - Potential duplicate medications found. Please discuss with provider.        Allergies   Allergies   Allergen Reactions     Amlodipine      edema     Lisinopril Cough

## 2023-01-12 NOTE — PLAN OF CARE
Pt is A&OX4 but forgetful about recent events. Calm & cooperative with care. Denied CP, SOB, & n/v. A of 2 with liko lift for transfer. Pt wears cervical brace when out of the bed. Incontinent for both B&B; had episode of urinary incontinence X1. Takes med whole with thin liquid. Pt was sleepy during the transfer to the / before going for HD. Pt was on 2L O2 via NC @ night due to GINI. VSS taken before leaving for HD. Pt left late for dialysis @ 0545 due to late arrival of transport. Pt slept few hrs of the night. Able to make needs known & call light was within reach. Will continue with plan of care.    Patient's most recent vital signs are:     Vital signs:  BP: 119/71  Temp: 96.4  HR: 77  RR: 18  SpO2: 95 %     Patient does not have new respiratory symptoms.  Patient does not have new sore throat.  Patient does not have a fever greater than 99.5.

## 2023-01-12 NOTE — CONSULTS
"      Psychiatry Consultation; Follow up              Reason for Consult, requesting source:    Recurrent encephalopathy. Seen by Dr. Kaplan of psychiatry on 1/10.      Requesting source: Andi Giang    Labs and imaging reviewed, patient seen and evaluated by ENID Medrano CNP             Interim history:    Mr. Jimenez is a 57-year-old male who is status post spinal surgery.  He is also dialysis dependent and recently suffering from delirium. Psychiatry evaluated patient on 1/10 and recommended Abilify 2mg PO BID PRN for agitation, but it was never ordered. He continues with intermittent agitation, aggressiveness and was actually transported to [facility] ED from Dialysis due to agitation. Nursing reports that he stays up during the middle of the night, calls family for hours, and has been disrespectful and rude towards staff.    On interview with me, he reports seeing bugs and hearing people talking about him in the hallway, wanted me to make sure door was completely closed before we began talking so \"they\" didn't hear. He says he is not sleeping well but says he has been taking Seroquel every night and it keeps him awake, although no Seroquel has been ordered. Reported being upset at prolonged hospitalization, then became agitated at provider for giving him \"pity.\" He denied depression, then said he was depressed about his divorce, then berated this provider for \"not caring.\"         Current Medications:       [START ON 1/31/2023] - Skin Test Reading (tuberculin) -   Does not apply Q21 Days     apixaban ANTICOAGULANT  2.5 mg Oral BID     atorvastatin  20 mg Oral QPM     baclofen  10 mg Oral BID     cetirizine  10 mg Oral Daily     finasteride  1.3 mg Oral Daily     fluticasone  1 spray Both Nostrils BID     folic acid  1 mg Oral Daily     gabapentin  300 mg Oral TID     melatonin  10 mg Oral At Bedtime     methocarbamol  500 mg Oral 4x Daily     midodrine  10 mg Oral TID w/meals     multivitamin RENAL  " "1 tablet Oral Daily     pantoprazole  40 mg Oral QAM AC     pyridOXINE  100 mg Oral Daily     sertraline  100 mg Oral Daily     sevelamer carbonate  1,600 mg Oral TID w/meals     thiamine  100 mg Oral Daily     [START ON 1/29/2023] tuberculin  5 Units Intradermal Q21 Days              Family and Social History:   Worked as  at [employer]. Has sons. .            MSE:   Appearance: awake, alert and slightly unkempt  Attitude:  guarded  Eye Contact:  fair  Mood:  \"OK\"  Affect:  : guarded  Speech:  clear, coherent  Psychomotor Behavior:  no evidence of tardive dyskinesia, dystonia, or tics  Muscle strength and tone: deconditioned   Thought Process:  illogical  Associations:  no loose associations  Thought Content:  visual hallucinations present  Insight:  poor  Judgement:  poor  Oriented to:  person, place  Attention Span and Concentration:  limited  Recent and Remote Memory:  Recent memory poor, long-term memory was conversationally intact     Vital signs:  Temp: (!) 96.4  F (35.8  C) Temp src: Oral BP: 119/71 Pulse: 77   Resp: 18 SpO2: 95 % O2 Device: Nasal cannula Oxygen Delivery: 2 LPM      Estimated body mass index is 37.87 kg/m  as calculated from the following:    Height as of 12/6/22: 1.854 m (6' 1\").    Weight as of 12/22/22: 130.2 kg (287 lb 0.6 oz).    Qtc: 529 on 1/9/23         DSM-5 Diagnosis:   Delirium           Assessment:   Shay is a 57 year old male with symptoms suggestive of encephalopathy in the setting of fluctuating levels of consciousness, inattention, confusion, agitation and distraction exacerbated due to his medical condition and hospital environment. Sleep regulation will be very important. Abilify was recommended as a PRN by Dr. Kaplan due to negligible effect on QTc. Zyprexa is more favorable than Seroquel for pts with QTc prolongation and is also sedating, will try this to help with paranoia, difficulty sleeping.           Summary of Recommendations: "     --Ordered Abilify 2mg PO BID PRN for agitation. Strongly recommend nursing staff premedicate patient with this prior to transporting to HD.     --Zyprexa 5mg PO at bedtime.     --Delirium precautions:    Up during the day with lights on    Lights off at night, avoid interruptions during the night as much as possible    Family visits    Encourage wearing glasses    Reorientation    Avoid opioids, benzodiazepines, anticholinergics as much as possible.     Continue to ensure proper nutrition, fluid and electrolyte balance. Monitor for infections, hypoxia, metabolic derangements, or other causes of delirium.         Talisha Perdomo, PMSHERIFP-BC  Consult/Liaison Psychiatry   [facility]

## 2023-01-12 NOTE — PROGRESS NOTES
" This rehab services evaluation was originally performed by Katherin Garg on 1/8/23. This writer is pulling this note forward from a pended chart for staff reference and continuation of care plan. The patients status has not changed and does not need a re-evaluation.        01/08/23 1300   Appointment Info   Signing Clinician's Name / Credentials (PT) Katherin Garg, DPT   Rehab Comments (PT) PT: Eval complete, Txt initiated   Quick Adds   Quick Adds Certification      Language English   Living Environment   People in Home child(lynette), adult   Current Living Arrangements house   Living Environment Comments PT: Pt lives with his two sons in [place], [place], Two platform stairs on the front of the house with no railing. Ramp on the garage access. Pt explains that he has been in and out of hospital and rehabs since initial cervical surgery in June 2022. Prior to this pt was moving around [place] with walker in home. Pt reports nearly 5 years on dialysis.   Self-Care   Usual Activity Tolerance moderate   Current Activity Tolerance fair   Equipment Currently Used at Home wheelchair, manual   Fall history within last six months yes   Activity/Exercise/Self-Care Comment Per OT notes: \"Per acute care therapy, pt reports son would assist with ADL prn but pt was mod I for transfers and short distance mobility with FWW. W/c for long distances\"   Post-Acute Assessment Only   Post-Acute Functional Assessment See below   General Information   Onset of Illness/Injury or Date of Surgery 12/06/22   Referring Physician Dr. Светлана Rios MD   Patient/Family Therapy Goals Statement (PT) To go home   Pertinent History of Current Problem (include personal factors and/or comorbidities that impact the POC) Taken per chart review: \"previous C5-T6 fusion for thoracic pathologic fracture 2/2 osteomyelitis c/b hardware failure with screw pullout at C5 and C6, and pseudoarthrosis. \"   Existing Precautions/Restrictions " fall;spinal;lifting  (Cervical collar on when >45 degrees HOB, no lifting greater than 10 lbs for 4 weeks,  on when OOB)   General Observations Pt lying semi supine with son present, able to communicate verbally without concern   Cognition   Cognitive Status Comments PT: Defer to OT for cog assessment   Pain Assessment   Patient Currently in Pain No   Integumentary/Edema   Integumentary/Edema Comments PT: Cervical collar and  in place when >45 degrees, managed by nursing   Range of Motion (ROM)   ROM Comment PT: PROM WFL , limited by fluid overload in LE   Strength (Manual Muscle Testing)   Strength Comments PT: B LE is grossly 3-/5   Balance   Balance Comments PT: Pt requires UE support and Irina Steady wtih CGA for standing balance, ~ 45 seconds   Sensory Examination   Sensory Perception Comments PT: Intact B LE fine touch however pt reports bilateral neuropathy in LEs impairing function and proprioception   Coordination   Coordination Comments PT: Impaired, impaired B Heel Mauricio test   Muscle Tone   Muscle Tone no deficits were identified   Clinical Impression   Criteria for Skilled Therapeutic Intervention Yes, treatment indicated   PT Diagnosis (PT) PT: Decreased endurance, strength, and tolerance for jessica ctivity from reported baseline values   Influenced by the following impairments medical status, post surgical status   Functional limitations due to impairments Ambulation, transfers, out of bed mobility   Clinical Presentation (PT Evaluation Complexity) Evolving/Changing   Clinical Presentation Rationale PMH, post surgical status, Recent and frequent hospitalizations   Clinical Decision Making (Complexity) moderate complexity   Planned Therapy Interventions (PT) balance training;bed mobility training;cryotherapy;E-stim;gait training;groups;home exercise program;joint mobilization;lumbar stabilization;manual therapy techniques;motor coordination training;neuromuscular re-education;orthotic  fitting/training;patient/family education;postural re-education;prosthetic fitting/training;ROM (range of motion);stair training;strengthening;stretching;swiss ball techniques;TENS;thermotherapy;transfer training;wheelchair management/propulsion training;progressive activity/exercise;risk factor education;home program guidelines   Risk & Benefits of therapy have been explained evaluation/treatment results reviewed;care plan/treatment goals reviewed;risks/benefits reviewed;current/potential barriers reviewed;participants voiced agreement with care plan;participants included;patient   Clinical Impression Comments PT: Pt is a 56 y/o male who presents for PT evaluation following hospitalization for cervical fusion and subsequent hardware failure and revision. PT evaluation revealed increased pain with impaired enduranc, strength, and tolerance with activity as well as need for assist with all mobility. Pt will benefit from skilled therapy in order to address these concerns, aide in the safe restoration of function, and reduce risk of hospital readmission.   PT Total Evaluation Time   PT Eval, Moderate Complexity Minutes (33758) 30   Therapy Certification   Start of care date 01/08/23   Certification date from 01/08/23   Certification date to 02/06/23   Medical Diagnosis Cervical Spinal Fusion   Physical Therapy Goals   PT Frequency 6x/week   PT Predicted Duration/Target Date for Goal Attainment 01/26/23   PT Goals Bed Mobility;Transfers;Gait;Wheelchair Mobility;PT Goal 1;Aerobic Activity;PT Goal 2;Stairs   Therapeutic Activity   Therapeutic Activities: dynamic activities to improve functional performance Minutes (15061) 25   Treatment Detail/Skilled Intervention PT: Pt and son oriented to therapy schedule and expectations, answered all related questions to therapuetic progression. Pt rolled bilaterally in bed to don , pt requires min/mod A for rolling, appears to have difficulty following cuing at times, attempts to  "sit upright when author simply cued him to roll. Max A for donning of brace. Supine > sit with mod A for trunk and LE coordination and support. Pt found to be unaware of BM incontinence. Once sittting EOB, pt performed 3 stands from elevated bed with CGA. Able to stand for ~ 1-2 min without orthostatic sxs or complaints. Pt then transferred from sitting > supine with max A x 1, min A x 1 for LE lift and trunk support so nursing could assist with changing of brief. Author found pt high back wheelchair for cervical support that pt agrees to attempt tomorrow.   PT Discharge Planning   PT Plan PT: Pt has issues with BM incontinence, plan for Irina Steady to high back wheelchair (in hallway), bring to gym, try sit <> stands in // bars with rehab tech   PT Discharge Recommendation (DC Rec) home with assist   PT Rationale for DC Rec PT: Pt lives with ricco in [place], [place]. No Stairs to enter. Has manual and power wc   PT Brief overview of current status PT: Mod A for bed mobility, Irina steady CGA for stands. No other movement initiated on eval   Total Session Time   Timed Code Treatment Minutes 25   Total Session Time (sum of timed and untimed services) 55   Post Acute Settings Only   What unit is patient on? TCU   Bed Mobility: Turning side to side/Roll Left and Right   Patient Performance Partial/moderate assist \"includes weight bearing assist of trunk or limbs\"   Staff Performance One person assist (one person physical assist)   Describe Performance PT: Mod A for bilateral rolling using UE   Bed Mobility: Sit to lying   Patient Performance Total dependence (helper does ALL of effort)   Staff Performance Two +person assist (two plus persons physical assist)   Describe Performance PT: See ther act, Pinky x 1, max x 1   Bed Mobility: Lying to sitting on the side of bed   Patient Performance Substantial/maximal assist   Staff Performance One person assist (one person physical assist)   Describe Performance Mod A x 1, See Ther " "Act   Transfers: Sit to Stand   Patient Performance Partial/moderate assist \"includes weight bearing assist of trunk or limbs\"   Staff Performance One person assist (one person physical assist)   Equipment Used Other (comments)  (Rafa Rosenthal)   Describe Performance PT: CGA/min A with rafa rosenthal   Transfers: Chair/Bed transfers   Reason Not Done Safety concerns   Ambulation   Walks in room: Reason Not Done Safety concerns   Walks in [place]:  Reason Not Done Safety concerns   Walk 10 Feet   Reason Not Done Safety concerns   Walk 10 Feet on uneven surfaces   Reason Not Done Safety concerns   Walk 50 Feet with Two Turns   Reason Not Done Safety concerns   Walk 150 Feet   Reason Not Done Safety concerns   Locomotion   Move on unit: Reason Not Done Safety concerns   Move off unit: Reason Not Done Safety concerns   Wheel 50 Feet   Reason Not Done Safety concerns   Wheel 150 Feet   Reason Not Done Safety concerns   1 Step (curb)   Reason Not Done Safety concerns   4 Steps   Reason Not Done Safety concerns   12 Steps   Reason Not Done Safety concerns   Car Transfer   Reason Not Done Safety concerns   Picking up Object   Reason Not Done Safety concerns        "

## 2023-01-12 NOTE — PLAN OF CARE
This rehab services evaluation was originally performed by BELINDA Perry on 1/8/23. This writer is pulling this note forward from a pended chart for staff reference and continuation of care plan. The patients status has not changed and does not need a re-evaluation.      Appointment Info   Signing Clinician's Name / Credentials (OT) BELINDA Perry   Living Environment   People in Home child(lynette), adult  (2 adult sons, one works from home and helps pt with ADL/IADL prn.)   Current Living Arrangements house   Transportation Anticipated family or friend will provide;agency   Living Environment Comments PT: Pt lives with his two sons in [place], [place], Two platform stairs on the front of the house with no railing. Ramp on the garage access. Pt explains that he has been in and out of hospital and rehabs since initial cervical surgery in June 2022. Prior to this pt was moving around IND with walker in home. Pt reports nearly 5 years on dialysis.  He has a tub/shower combo with hand held shower and bench.  Pt has a standard height toilet and reports they may have a RTS to use a home.   Self-Care   Usual Activity Tolerance moderate   Current Activity Tolerance fair   Equipment Currently Used at Home wheelchair, manual   Fall history within last six months yes   Activity/Exercise/Self-Care Comment Per acute care therapy, pt reports son would assist with ADL prn but pt was mod I for transfers and short distance mobility with FWW. W/c for long distances   Instrumental Activities of Daily Living (IADL)   IADL Comments Son assists prn   General Information   Onset of Illness/Injury or Date of Surgery 12/06/22   Referring Physician Светлана Rios MD   Patient/Family Therapy Goal Statement (OT) To Walk   Additional Occupational Profile Info/Pertinent History of Current Problem Shay Jimenez is a 57 year old male with with PMH ESRD on dialysis and osteomyelitis s/p C5-T6 fusion and recent s/p redo C5-T6 fusion    Existing Precautions/Restrictions spinal  ( when OOB, okay to shower - pat dry)   Left Upper Extremity (Weight-bearing Status) partial weight-bearing (PWB)  (10# lifting restriction x4 weeks, until cleared in the clinic)   Right Upper Extremity (Weight-bearing Status) partial weight-bearing (PWB)  (10# lifting restriction x4 weeks, until cleared in the clinic)   Cognitive Status Examination   Orientation Status orientation to person, place and time   Cognitive Status Comments SLUMS in acute care on 12/21 was BNLs scoring 10/30.  OT will monitor, assess and tx as appropriate.   Visual Perception   Visual Impairment/Limitations corrective lenses for reading   Sensory   Sensory Comments H/o poor sensation in B feet and ankles per pt.   Pain Assessment   Patient Currently in Pain No   Strength Comprehensive (MMT)   General Manual Muscle Testing (MMT) Assessment upper extremity strength deficits identified   Comment, General Manual Muscle Testing (MMT) Assessment pt generally weak, shoulders and kartik hands (h/o fall and surgery to L wrist over the past year).  Pt requested 2# dumbbell, basic ex reviewed and pt demo/verb understanding.   Upper Extremity (Manual Muscle Testing)   Comment, MMT: Upper Extremity Generally weak.  Pt identifies core weakness as well.   Coordination   Coordination Comments WFLs per observation.   Bed Mobility   Comment (Bed Mobility) SBA with HOB raised.  Pt feels his supine to sit is within his 10# precautions.  To get back into bed, pt needs assist with BLE's due to weakness.   Transfers   Transfer Comments NT, pt has been using Irina Stedy.   Activities of Daily Living   BADL Assessment/Intervention upper body dressing;lower body dressing;grooming   Upper Body Dressing Assessment/Training   Comment, (Upper Body Dressing) Set-up   Lower Body Dressing Assessment/Training   Comment, (Lower Body Dressing) Max A to thread clothing, pt does not want to work on LB dressing/use of AE and reports  his sons will assist him.   Pt does want to be IND managing clothing for toileting.  He reports that he is currently having diarrhea without warning.   Grooming Assessment/Training   Comment, (Grooming) Set-up   Clinical Impression   Criteria for Skilled Therapeutic Interventions Met (OT) Yes, treatment indicated   OT Diagnosis Decrease ADL, Decrease transfers, Decrease IADL   OT Problem List-Impairments impacting ADL problems related to;activity tolerance impaired;balance;flexibility;mobility;strength;sensation   Assessment of Occupational Performance 3-5 Performance Deficits   Identified Performance Deficits gr/hyg, dressing, transfers, IADL   Planned Therapy Interventions (OT) ADL retraining;IADL retraining;balance training;bed mobility training;strengthening;transfer training;progressive activity/exercise   Clinical Decision Making Complexity (OT) low complexity   Anticipated Equipment Needs Upon Discharge (OT)    (Monitor for AE needs in the bathroom.  Pt not interested in LB dressing AE.)   Risk & Benefits of therapy have been explained evaluation/treatment results reviewed;care plan/treatment goals reviewed;risks/benefits reviewed;current/potential barriers reviewed;participants voiced agreement with care plan;participants included;patient   Clinical Impression Comments Pt admitted s/p C5-T6 fusion and recent s/p redo C5-T6 fusion.  He reports his LEs are weak and has h/o feeling loss in ankles and feet. Pt went through rehab a couple times in recent past and reports he does not want to work on LB dressing with AE as his sons will assist him.  He agreed to all other aspects of the OT POC.   OT Total Evaluation Time   OT Héctor Low Complexity Minutes (32045) 15   Therapy Certification   Start of Care Date 01/08/23   Certification date from 01/08/23   Certification date to 02/06/23   OT Goals   Therapy Frequency (OT) 6 times/wk   OT Predicted Duration/Target Date for Goal Attainment 01/26/23   OT Goals  Hygiene/Grooming;Upper Body Dressing;Lower Body Dressing;Upper Body Bathing;Lower Body Bathing;Bed Mobility;Transfers;Toilet Transfer/Toileting;Meal Preparation;Cognition   OT: Hygiene/Grooming modified independent;while standing;within precautions   OT: Upper Body Dressing Modified independent;including set-up/clothing retrieval;within precautions   OT: Lower Body Dressing Modified independent;including set-up/clothing retrieval;within precautions  (Pt wants to con't assist of sons for shoes and socks but wants to manage clothing for toileting mod I.)   OT: Upper Body Bathing Modified independent;within precautions   OT: Lower Body Bathing Supervision/stand-by assist;with precautions   OT: Bed Mobility Modified independent;within precautions   OT: Transfer Supervision/stand-by assist;within precautions  (shower tranfer)   OT: Toilet Transfer/Toileting Modified independent;using adaptive equipment;within precautions   OT: Meal Preparation Supervision/stand-by assist;with simple meal preparation;within precautions;ambulatory level   OT: Cognitive Patient/caregiver will verbalize understanding of cognitive assessment results/recommendations as needed for safe discharge planning   Self-Care/Home Management   Self-Care/Home Mgmt/ADL, Compensatory, Meal Prep Minutes (48638) 46   Treatment Detail/Skilled Intervention OT: Th assists pt to don his .  Pt able to roll SBA.  Supine to sit SBA with HOB raised.  Pt able to help scoot to HOB in sitting, cues to con't following his precautions.  Min A sit to supine and min A supine scoot with bed control assist. Pt requests to not work on LB dressing, sons will do his shoes and socks but he would like ot manage lower body clothing during toileting.   OT Discharge Planning   OT Plan OT: spinal prec,  OOB, incontinence, h/o decrease sensation B ankles and feet, h/o cog deficit after surgery, okay to shower - pat dry.  Tx: sponge bath, pt not feeling ready for a shower, gg  bathing score, dressing (pt will have assist of ricco for socks and shoes but wants to manage clothing during toileting). Transfers with Irina Trentonoc prn, progress to standing at the sink.   OT Discharge Recommendation (DC Rec) home with assist;home with home care occupational therapy   OT Brief overview of current status See clinical impression above for eval status.   Total Session Time   Timed Code Treatment Minutes 25   Total Session Time (sum of timed and untimed services) 40   Post Acute Settings Only   What unit is patient on? TCU   Upper Body Dressing   Describe Performance OT: Set-up   Lower Body Dressing (Pants/Undergarments)   Describe Performance OT: Dependent. Pt would like to be able to manage clothing during toileting.   Lower Body Dressing (Putting On/Taking-Off Footwear)   Describe Performance OT: dependent, pt has assist of ricco   Toileting Hygiene   Describe Performance OT: Dependent per pt   Transfers: Toilet transfers   Describe Performance OT: NT, pt has not been on the toilet.  He has incontinence.  Th instructs pt how he can use Irina Madera to get to the toilet.   Hygiene/Grooming   Describe Performance OT: set-up   Oral Hygiene   Describe Performance OT: set-up

## 2023-01-12 NOTE — PROGRESS NOTES
[facility]    Medicine Progress Note - Hospitalist Service    Date of Admission:  1/9/2023    Assessment & Plan   Shay Jimeenz is a 58 yo obese gentleman w/ h/o HTN, ESRD on HD, GERD, GINI intolerant of CPAP, chronic atrial fibrillation on chronic AC, alcohol ause, had previous C5-T6 spinal fusion due to pathological fracture form osteomyelitis.  He was noted to have discitis/osteomyelitis on CT of chest back in 2/2022 that was obtained for respiratory issues.  He was hospitalized at [facility] and was to follow up with [facility] spine ( no records and pt has no recollection if anything was done ).  In 6/2022 MRI showed T2, T3 vertebral collapse with myelopathy, some abnormal epidural tissue and inflammation, at that time antibiotics was deferred as pt was stable and was planned for obtaining intraop cx.  Intra-op cx was + on day 5 and day 7 for C. Acnes that was felt to be contamination, however due to presence of hardware it was decided to treat with ancef x 2 weeks followed by  amoxicillin x 4 weeks.  He then was treated for Morganella morganii septic shock, no intra-abd source found on CT.  TTE did not show vegetations was treated with almost 9 weeks of iv cefepime 8/14-10/13. On repeat imaging was found to have hardware failure  and was admitted on 12/6/22 to [facility] Neurosurgeon service for surgery.  He underwent C5-T6 redo cervical spine fusion 12/6/22.  He did have hemorrhage intraop, cerebral angio did not show vertebral artery injury, did receive blood, was in ICU on pressors to keep blood pressure up for perfusion  . Also received periop cefazolin till drain pulled. Intraop culture remained negative and there was no indication for ongoing antibiotics per ID.  Hospital course was complicated by agitation and delirium.  He treated w/ benzo per CIWA,  He was also noted to in aute on chronic respiratory failure and did require supplement Oxygen.  Transferred to  [facility] 1/7/23 for conditioning.     Patient went to dialysis center in [place] on 1/9/23 for his scheduled HD.  He was noted to be confused, disoriented, argumentative and irritable at the dialysis center. Transferred to [facility] ED.  He underwent extensive work-up consisting of CT head without contrast, CTA head and neck, CBC, BMP, troponin and EKG; all of the aforementioned test were negative.  Patient's mental status cleared while in the ED.  Patient was subsequently sent back to [facility] for continuation of his care.  He only had an hour of HD run on 1/9/23    MAJOR EVENTS TODAY  ---   Pt went to HD center in [place] this am  ---   [facility]  ---   He apparently woke up at the dialysis center, became agitated and aggressively    ---   He completed HD run  ---   [facility]  ---   He is now awake, calm, and cooperative     Hardware failure, pseudoarthritis   S/p C5-T6 redo cervical spine fusion 12/6/22  Previous h/o C5-T6 fusion for pathological fracture due to osteomyelitis   ---   Intraop culture remained neg and per ID there was no indication for ongoing antibiotics   ---   Needs cervical collar when HOB > 45 degrees and when OOB     Acute metabolic encephalopathy, recurrent   ---   Etiology remained elusive  ---   He had recurrent confusion, agitation and delirium during his hospitalization at the [facility], 12/6 - 1/9/23  ---   Pt was seen at [facility] ED on 1/9/23 after HD when he became very agitated, confused, disoriented, argumentative and irritable at the dialysis center.  CT head w/o contrast and CTA head and neck were negative.  [facility]  ---   1/11/23:  He was deep sleep while sitting in a chair.  We were unable to wake him.  He was placed in bed w/ a rachid lift.  He was being placed on a gurney to undergo CT when he spontaneously woke up and became agitated, confused, disoriented, argumentative and irritable again.  He was  sent to the [facility] ED.  His vital were stable.  CBC and lytes were unremarkable.  CNS imaging studies were not obtained because pt awake, calm, cooperative and had non-focal neuro exam.  He was sent back to  TCU  ---   This morning, pt was sleepy when he was taken to HD center.  He woke up became agitated, confused and disoriented once more but this time his symptoms was brief  ---   He is now back at [facility] and his MS is AAO X 3, cooperative  ---   Will consult w/ psychiatry service  ---   Start Melatonin for insomnia.  Will consider adding Seroquel     Hypertension   ---   Not on meds  ---   Currently on midodrine tid but hold if blood pressure  > 120      Chronic Atrial fibrillation   ---   He was seen by card in the past   ---   Had ZIO patch in past but apparently results were lost  ---   Rate well controlled w/o meds  ---   On eliquis for stroke prophylaxis     Mild to moderate pulm HTN  Moderate MR  ---   Could all be related to untreated GINI but has not tolerated CPAP   ---   Follow up as out patient       Chronic resp failure  GINI   Obesity   ---   Intolerant to CPAP  ---   Uses O2 at night 1-3 liters, continue     Acute on chronic pain  Peripheral neuropathy  ---   Continue baclofen, gabapentin and methocarbamol      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    ---   Continue HD  M/W/F   ---    kg   ---   Refused renal diet      Chronic hyponatremia  ---   Na level was 132 on 1/9  ---   Managed by nephrology during HD      ACD  ---   Due to ESKD  ---   Managed during HD w/ epo    HDL  ---   Continue atorvastatin      Anxiety d/o  Depression   OCD   ---   Continue Zoloft     Alcohol use disorder  ---   Drink 5-6 cocktail a day (vodka )   ---   Continue vitamins   ---   Does not want to see chem dep   ---   Continue baclofen and gabapentin as can also help with craving      Right heal pressure sore  ---   WOCN to see             Diet: Regular Diet Adult    DVT Prophylaxis: eliquis   [place]  Catheter: Not present  Lines: None     Cardiac Monitoring: None  Code Status: Full Code    Disposition Plan   TBD             Andi Giang MD  Hospitalist Service  [facility]  Securely message with [employer] (more info)  Text page via [employer] Paging/Directory   ___________________________________________________________________    Interval History   Pt underwent HD today  He is AAO x 3 when seen  He is cooperative  He has no complaints  He did not sleep well last 2 nights    Physical Exam   Vital Signs: Temp: (!) 96.4  F (35.8  C) Temp src: Oral BP: 119/71 Pulse: 77   Resp: 18 SpO2: 95 % O2 Device: Nasal cannula Oxygen Delivery: 2 LPM  Weight: 0 lbs 0 oz  General: Obese, aao x 3, NAD.  HEENT:  NC/AT,  c-collar present  CVS:  NL s 1 and s2, 2/6 systolic murmur, no r/g.  Lungs:  CTA B/L.   Abd:  Soft, + bs, NT, no rebound or gaurding, no fluid shift.  Ext:  No c/c.  Lymph:  No edema.  Neuro:  Nonfocal.  Musculoskeletal: No calf tenderness to palpation.    Skin:  No rash.  Psychiatry:  Mood and affect appropriate.      Data     I have personally reviewed the following data over the past 24 hrs:    6.6  \   9.6 (L)   / 237     135 95 34 (H) /  86   4.0 26 7.48 (H) \       ALT: 6 AST: 18 AP: 113 TBILI: 1.2   ALB: 3.0 (L) TOT PROTEIN: 6.6 (L) LIPASE: N/A

## 2023-01-12 NOTE — PROGRESS NOTES
"SW went into pt's room twice as pt was calling out.  Pt was seeing people in his room.  There was nobody there.  Pt said \"I am not going to ER.  I was there for 9 hrs and they didn't do anything for me.\"  Pt was alert and a bit agitated by being verbally hostile and short.    SW received a fax from [employer]. Sw had called the other day asking if it was completed (FMLA) if not send blank and SW will make sure it's complete. SW will talk to the doctor tomorrow to fill it out.     JULISSA Forrest   [facility], Transitional Care Unit   Social Work   [facility] [place] [address], [address] [place] [address]  () [phone]      "

## 2023-01-12 NOTE — PROGRESS NOTES
Disoriented to situation and location. Hemodialysis completed this morning. Incontinent of bowel and bladder. Cervical brace worn. Liko lift for transfer. Two staff members should partner up when answering call lights. Patient rude, demanding and confused. Writer spoke with patient's mother, who lives in [place], for 20 minutes today. Family concerned about patient's mental capabilities. Continue to document behaviors.     Patient's most recent vital signs are:     Vital signs:  BP: 119/71  Temp: 96.4  HR: 77  RR: 18  SpO2: 95 %     Patient does not have new respiratory symptoms.  Patient does not have new sore throat.  Patient does not have a fever greater than 99.5.

## 2023-01-12 NOTE — PROGRESS NOTES
[facility]    Medicine Progress Note - Hospitalist Service    Date of Admission:  1/9/2023    Assessment & Plan   Shay Jimenez is a 58 yo obese gentleman w/ h/o HTN, ESRD on HD, GERD, GINI intolerant of CPAP, chronic atrial fibrillation on chronic AC, alcohol ause, had previous C5-T6 spinal fusion due to pathological fracture form osteomyelitis.  He was noted to have discitis/osteomyelitis on CT of chest back in 2/2022 that was obtained for respiratory issues.  He was hospitalized at [facility] and was to follow up with [facility] spine ( no records and pt has no recollection if anything was done ).  In 6/2022 MRI showed T2, T3 vertebral collapse with myelopathy, some abnormal epidural tissue and inflammation, at that time antibiotics was deferred as pt was stable and was planned for obtaining intraop cx.  Intra-op cx was + on day 5 and day 7 for C. Acnes that was felt to be contamination, however due to presence of hardware it was decided to treat with ancef x 2 weeks followed by  amoxicillin x 4 weeks.  He then was treated for Morganella morganii septic shock, no intra-abd source found on CT.  TTE did not show vegetations was treated with almost 9 weeks of iv cefepime 8/14-10/13. On repeat imaging was found to have hardware failure  and was admitted on 12/6/22 to [facility] service for surgery.  He underwent C5-T6 redo cervical spine fusion 12/6/22.  He did have hemorrhage intraop, cerebral angio did not show vertebral artery injury, did receive blood, was in ICU on pressors to keep blood pressure up for perfusion  . Also received periop cefazolin till drain pulled. Intraop culture remained negative and there was no indication for ongoing antibiotics per ID.  Hospital course was complicated by agitation and delirium.  He treated w/ benzo per CIWA,  He was also noted to in aute on chronic respiratory failure and did require supplement Oxygen.  Transferred to  [facility] 1/7/23 for conditioning.     Patient went to dialysis center in [place] on 1/9/23 for his scheduled HD.  He was noted to be confused, disoriented, argumentative and irritable at the dialysis center. Transferred to [facility] ED. he underwent extensive work-up consisting of CT head without contrast, CTA head and neck, CBC, BMP, troponin and EKG; all of the aforementioned test were negative.  Patient's mental status cleared while in the ED.  Patient was subsequently sent back to [facility] for continuation of his care.  He only had an hour of HD run on 1/9/23    MAJOR EVENT TODAY  ---   HD cancelled today by dialysis Center-[facility],  ---   Pt did not sleep last night  ---   He was apparently texting his son q1 hr last night  ---   He was last seen awake until ~ noon today.  ---   He was found to be deep asleep, somnolent, lethargic at around 2:30 pm  ---   Unable to wake him w/ deep sternal and pain stimuli  ---   Vital were stable  ---   Pt was sitting in a chair, put back in bed w/ rachid lift, remained sleep  ---   I ordered stat CT head w/o contrast  ---   TCU were attempting to place pt on a gurney when pt became awake, agitated, aggressive, rude and not cooperative.  ---   He was sent to [facility] ED for evaluation  ---   He may need further eval by psychiatry consult service     Hardware failure, pseudoarthritis s/p C5-T6 redo cervical spine fusion 12/6/22  Previous h/o C5-T6 fusion for pathological fracture due to osteomyelitis   ---   Intraop culture remained neg and per ID there was no indication for ongoing antibiotics   ---   Needs cervical collar when HOB > 45 degrees and when OOB      Hypertension   ---   Not on meds  ---   Currently on midodrine tid but hold if blood pressure  > 120      Chronic Atrial fibrillation   ---   He was seen by card in the past   ---   Had ZIO patch in past but apparently results were lost  ---   Rate well controlled w/o meds  ---    On eliquis for stroke prophylaxis     Mild to moderate pulm HTN  Moderate MR  ---   Could all be related to untreated GINI but has not tolerated CPAP   ---   Follow up as out patient       Chronic resp failure  GINI   Obesity   ---   Intolerant to CPAP  ---   Uses O2 at night 1-3 liters, continue     Acute on chronic pain  Peripheral neuropathy  ---   Continue baclofen, gabapentin and methocarbamol      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    ---   Continue HD  M/W/F   ---    kg   ---   Refused renal diet      Chronic hyponatremia  ---   Na level was 132 on 1/9  ---   Managed by nephrology during HD      ACD  ---   Due to ESKD  ---   Managed during HD w/ epo    HDL  ---   Continue atorvastatin      Anxiety d/o  Depression   OCD   ---   Continue Zoloft     Alcohol use disorder  ---   Drink 5-6 cocktail a day (vodka )   ---   Continue vitamins   ---   Does not want to see chem dep   ---   Continue baclofen and gabapentin as can also help with craving      Right heal pressure sore  ---   WOCN to see             Diet: Regular Diet Adult    DVT Prophylaxis: eliquis   Oliveira Catheter: Not present  Lines: None     Cardiac Monitoring: None  Code Status: Full Code    Disposition Plan   TBD             Andi Giang MD  Hospitalist Service  [facility]  Securely message with [email or web address] (more info)  Text page via [employer] Paging/Directory   ___________________________________________________________________    Interval History   Pt is somnolent, appears to be in a deep sleep state  Did not sleep last night  Has been intermittently agitated,rude, aggressive and not copperative    Physical Exam   Vital Signs: Temp: (!) 96.4  F (35.8  C) Temp src: Oral BP: 119/71 Pulse: 77   Resp: 18 SpO2: 95 % O2 Device: Nasal cannula Oxygen Delivery: 2 LPM  Weight: 0 lbs 0 oz  General: Obese, aao x 3, NAD.  HEENT:  NC/AT,  c-collar present  CVS:  NL s 1 and s2, 2/6 systolic murmur, no r/g.  Lungs:  CTA  B/L.   Abd:  Soft, + bs, NT, no rebound or gaurding, no fluid shift.  Ext:  No c/c.  Lymph:  No edema.  Neuro:  Nonfocal.  Musculoskeletal: No calf tenderness to palpation.    Skin:  No rash.  Psychiatry:  Mood and affect appropriate.      Data     I have personally reviewed the following data over the past 24 hrs:    6.6  \   9.6 (L)   / 237     135 95 34 (H) /  86   4.0 26 7.48 (H) \       ALT: 6 AST: 18 AP: 113 TBILI: 1.2   ALB: 3.0 (L) TOT PROTEIN: 6.6 (L) LIPASE: N/A

## 2023-01-12 NOTE — PROGRESS NOTES
MDS Pain Assessment    The following is the pain interview as conducted by the TCU RN caring for the patient on 1 / 13 / 2022. This assessment is required by the [facility] for all patients in [place] SNF (Skilled Nursing Facilities).       1. Have you had pain or hurting at any time in the last 5 days? Yes    2. How much of the time have you experienced pain or hurting over the last 5 days? daily    3. Over the past 5 days, has pain made it hard for you to sleep at night? yes    4. Over the past 5 days, have you limited your day-to-day activities because of pain? yes    5. Pain intensity (Numeric scale used first-if patient unable to answer, verbal scale to be used.)    Numeric Scale: Please rate your worst pain over the last 5 days on a zero to ten scale, with zero being no pain and ten being the worst pain you can imagine.     8    Verbal Scale: USED ONLY if unable to give numeric, otherwise N/A  Please rate the intensity of your worst pain over the last 5 days.

## 2023-01-12 NOTE — PROGRESS NOTES
SHAKIRA cut and paste again as pt was again sent to ER.  They did not keep pt long and sent pt back to [facility]. Pt was not gone over 24 hrs and nothing has changed in assessment.     SHAKIRA cut and paste Initial Assessment as pt went to Dialysis and was sent to ER due to delirium.  However, in a lot of hospital notes, this happens all the time. Pt was only in ER for a short time and returned to TCU.  Pt was not gone for more than 24hrs. All information is current and accurate.      Social Work: Initial Assessment with Discharge Plan     Patient Name: Shay Jimenez  : 1965  Age: 57 year old  MRN: [id number]  Completed assessment with: Chart review and pt interview.   Admitted to TCU: 23     Presenting Information   Date of SW assessment: 2023  Health Care Directive: This patient's capacity will likely wax and wane along with his mental status; however, in general, he does not show capacity to make complex medical decisions and he does not have dispositional capacity in that he is clearly unable to take care of himself without significant assistance.  A family member should be used as an alternative decision maker.  Primary Health Care Agent: Self  Secondary Health Care Agent: Chinedu/Cory chacko.   Living Situation: Pt lives with 2 adult sons.   Previous Functional Status: Was getting help from his adult children  Dependent ADLs: Bathing, Dressing, Toileting  Dependent IADLs: Transportation, Cooking, Cleaning, Laundry, Shopping, Meal Preparation     DME available: wheelchair, manual; walker, rolling  Patient and family understanding of hospitalization: Appropriate and pleasant.   Cultural/Language/Spiritual Considerations: Pt is a 57 y.o.  male , English speaking, Jain  Abuse concerns: None reported.   -------------------------------------------------------------------------------------------------------------  TRANSPORTATION:    Has lack of transportation kept you from medical  "appointments, meetings, work, or from getting things needed for daily living?  A. Yes, it has kept me from medical appointments or from getting my medications  B. Yes, it has kept me from non-medical meetings, appointments, work or from getting things that I need  C. No  X. Patient Unable to respond  Y. Patient declines to respond  -------------------------------------------------------------------------------------------------------------  Health Literacy:   How Often do you need to have someone help you when you read instructions, pamphlets, or other written material from your doctor or pharmacy?  0.       Never  1.       Rarely  2.       Sometimes  3.       Often  4.       Always  5.       Patient declines to respond  6.       Patient unable to respond  ------------------------------------------------------------------------------------------------------------   BIMS:  See flow sheet   Tell the pt : \"I am going to say three words for you to remember.  Please repeat the words after I have said all three.  The words are:Sock, Blue and Bed. Now tell me the three words.\"      Number of words repeated after the first attempt.  0: None   1: One  2: Two  3: Three  Ask the pt: \"Please tell me what year it is right now?\"  0: Missed by 5 >5 years or no answer   1: Missed by 2-5 years  2: Missed by a 1 year  3: Correct       Ask the pt: \"What month are we in right now?\"  0: Missed by > 1 month or no answer.  1: Missed by 6 days to 1 month  2: Accurate within 5 days.      Ask pt: \"what day of the week is today?\"  0: Incorrect day of the week.  1: Correct.      Ask pt:\" Let's go back to an earlier question.  What were those three words that I asked you to repeat?\" If unable to remember a word, give a cue (something to wear, a color, a piece of furniture) for that word.   Able to recall Sock.  0: No, could not recall.  1: Yes, after cueing (something to wear)  2: Yes, no cueing required.   Able to recall Blue.  0: No, could " not recall.  1:Yes, after cueing (a color)  2:Yes, no cueing required.   Able to recall Bed.  0: No,  1: Yes, after cueing (a piece of furniture)  2: Yes, no cueing required.   -----------------------------------------------------------------------------------------------------------  CAM:  1.) Acute Onset/Fluctuating Course:  Is there evidence of an acute change in mental status from the patient's baseline?  0. No  1. Yes  2.) Inattention:  Does the patient have difficulty focusing attention, for example, being easily distractable, or having difficulty keeping track of what was being said?  0. No - Behavior not present  1. Yes - Behavior present  3.) Disorganized Thinking:  Is the patient's speech disorganized or incoherent, such as rambling or irrelevant conversation, unclear or illogical flow of ideas, or unpredictable switching from subject to subject?  0. No - Behavior not present  1. Yes - Behavior present  4.) Altered level of consciousness:  Did the patient have altered level of consciousness as indicated by any of the following criteria?  0. No - Alert  1. Yes - Vigilant (hyperalert), lethargic (drowsy) , stupor (difficult to arouse) or comatose (unable to be aroused)  -------------------------------------------------------------------------------------------------------------  PHQ-9:   Over the last 2 weeks, have you been bothered by any of the following problems?      If symptom is present, then ask the patient:  About how often have you been bothered by this?   Symptom Presence                                              Symptom Frequency  0. No                                                                     0. Never or 1 day  1. Yes                                                                   1. 2-6 days (several days)  9. No Response                                                    2. 7-11 days (half or more of the  days)                                                                                3. 12-14 days (nearly every day)       Present Frequency   A.       Little interest of pleasure doing things  0     B.       Feeling down, depressed, or hopeless  0     C.       Trouble falling or staying asleep, or sleeping too much  0     D.       Feeling tired or having little energy  0     E.       Poor appetite or overeating  0     F.        Feeling bad about yourself - or that you are a failure, or have let yourself or your family down  0     G.      Trouble concentrating on things, such as reading the newspaper or watching television  0     H.      Moving or speaking so slowly that other people could have noticed. Or the opposite - being so fidgety or restless that you have been moving around a lot more than usual  0     I.         Thoughts that you would be better off dead, or of hurting yourself in some way  0        -------------------------------------------------------------------------------------------------------------  SOCIAL ISOLATION  How often do you feel lonely or isolated form those around you?  0.       Never  1.       Rarely  2.       Sometimes  3.       Often  4.       Always  5.       Patient declines to respond  6.       Patient unable to respond  -------------------------------------------------------------------------------------------------------------     BIMS: Pt scored 13 on BIMS indicating cognition intact.  PHQ-9: Pt scored 0 on PHQ-9 indicating no depressive symptoms present.   PAS: confirmation number- [id number]  Has there been a level II screen?  No  Were there any recommendations in the screen? No  If yes, will the recommendations we incorporated into the Plan of Care?  N/A  Physical Health  Reason for admission:  Mr. Jimenez is a 57-year-old white male who is hospitalized for a redo of C5 through T6 fusion.     Provider Information   Primary Care Physician:Jonathan Travis, [facility] [address]  "[address], [facility] [address]*  [phone]  : None reported.      Mental Health:   Diagnosis: depression   Current Support/Services: Medications   Previous Services: therapist.   Services Needed/Recommended: SW offered [facility] services while at [facility].        Substance Use:  Diagnosis: history of significant alcohol use, no smoking, smoked pot in high school and college but nothing since.   Current Support/Services: None reported.   Previous Services: None reported.   Services Needed/Recommended: N/A     Support System  Marital Status: 2nd wife-  - \"My wife left me in June.\"   Family support: his sons (Cory, Chinedu) his mom lives in [place]. Has a brother in [place], [place].  Other support available: Friends and neighbors.  Gaps in support system: None reported.      Community Resources  Current in home services: Received a call from [facility] staff, [place] # [phone].  Deven reported pt was receiving home PT and OT from [facility] prior hospitalization.   MARNIE Guido    Home [facility], [facility].  and [address], [address]  [place] [address]  Cell: [phone]  Intake fax [phone]  Previous services:  Dialysis Services -  M/W/F goes to [facility] (P: [phone], F: [phone]). Also previously worked with [facility].      Financial/Employment/Education  Employment Status: Currently, working.         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   Income Source: wages and disability.   Education: Went to [employer].   Financial Concerns:  Needs help getting FMLA forms and have them filled out.   Insurance: UNITED [employer]/ Medicare     Discharge Plan   Patient and family discharge goal: Pt wants to go home.  Will see if both son's can continue to take care of pt.   Provided Education on discharge " plan: YES  Patient agreeable to discharge plan:  YES  A list of Medicare Certified Facilities was provided to the patient and/or family to encourage patient choice. Based on location and rating, patient would like referrals made to: N/A right now.   General information regarding anticipated insurance coverage and possible out of pocket cost was discussed. Patient and patient's family are aware patient may incur the cost of transportation to the facility, pending insurance payment: YES  Barriers to discharge: If adult sons can take care of pt.      Discharge Recommendations   Disposition: Home TBD  Transportation Needs: Possible medical transport.   Name of Transportation Company and Phone: TBD      Additional comments   Delirium with a waxing and waning mental status exam.  This patient's capacity will likely wax and wane along with his mental status; however, in general, he does not show capacity to make complex medical decisions and he does not have dispositional capacity in that he is clearly unable to take care of himself without significant assistance.  A family member should be used as an alternative decision maker.     [facility], Dr. Cline  ([phone], fax [phone])  [address] [place] [id number]  Runs MWF via [facility], 4.25h runs.  7:35am (arrive by 7:20am), ends at 11:50am   Transportation to dialysis is private pay.   [facility] ([phone]) MWF  patient runs from 7:15am-11:50am.     Pt could not understand who/how FMLA worked. SW will come back on the 9th and see if we can call HR together to get forms sent here.      JULISSA Forrest   [facility], Transitional Care Unit   Social Work   [address] [place] [address], [address]th [place] [address]  (PH) [phone]

## 2023-01-12 NOTE — ED PROVIDER NOTES
ED Provider Note  [facility]      History     Chief Complaint   Patient presents with     Altered Mental Status     Pt transferred from TCU due to altered mental status     HPI  Shay Jimenez is a 57 year old male who presents due to concerns for altered mental status.  Per report at TCU patient was lethargic was having difficulty responding.  Patient was initially somewhat confused here.  However patient is cleared and now he is alert and oriented x3.  He states he feels well, is asking when he can go back to his TCU.  He states he was supposed to have dialysis today, his last run was on Monday.  He denies any new symptoms.  States he feels well, does not endorse feeling of confusion.  No other symptoms noted.    Past Medical History  Past Medical History:   Diagnosis Date     Chronic kidney disease      Neuropathy      Past Surgical History:   Procedure Laterality Date     IR CAROTID CEREBRAL ANGIOGRAM BILATERAL  12/6/2022     OPTICAL TRACKING SYSTEM FUSION POSTERIOR SPINE THORACIC THREE+ LEVELS N/A 6/27/2022    Procedure: Cervical 6 to Thoracic 6 Fusion and Thoracic 2-3 Decompression;  Surgeon: Fritz Boles MD;  Location:  OR     OPTICAL TRACKING SYSTEM FUSION POSTERIOR SPINE THORACIC THREE+ LEVELS N/A 12/6/2022    Procedure: O-Arm/Stealth Assisted Revision of Cervical 5 to Thoracic 6 Fusion;  Surgeon: Fritz Boles MD;  Location: [facility]     acetaminophen (TYLENOL) 325 MG tablet  atorvastatin (LIPITOR) 20 MG tablet  baclofen (LIORESAL) 10 MG tablet  calcium carbonate (TUMS) 500 MG chewable tablet  cetirizine (ZYRTEC) 10 MG tablet  cyanocobalamin (VITAMIN B-12) 500 MCG tablet  ELIQUIS ANTICOAGULANT 2.5 MG tablet  finasteride (PROSCAR) 5 MG tablet  fluticasone (FLONASE) 50 MCG/ACT nasal spray  folic acid (FOLVITE) 1 MG tablet  gabapentin (NEURONTIN) 300 MG capsule  hydrOXYzine (ATARAX) 25 MG tablet  methocarbamol (ROBAXIN) 500 MG tablet  midodrine (PROAMATINE) 10 MG  tablet  multivitamin RENAL (RENAVITE RX/NEPHROVITE) 1 MG tablet  omeprazole (PRILOSEC) 20 MG DR capsule  oxyCODONE (ROXICODONE) 5 MG tablet  polyethylene glycol (MIRALAX) 17 GM/Dose powder  QUEtiapine (SEROQUEL) 25 MG tablet  QUEtiapine (SEROQUEL) 50 MG tablet  ramelteon (ROZEREM) 8 MG tablet  sertraline (ZOLOFT) 100 MG tablet  sevelamer carbonate (RENVELA) 800 MG tablet  Specialty Vitamins Products (MAGNESIUM PLUS PROTEIN) 133 MG tablet  thiamine (B-1) 100 MG tablet  vitamin B6 (PYRIDOXINE) 100 MG tablet      Allergies   Allergen Reactions     Amlodipine      edema     Lisinopril Cough     Family History  No family history on file.  Social History   Social History     Tobacco Use     Smoking status: Never     Smokeless tobacco: Never   Substance Use Topics     Alcohol use: Not Currently     Drug use: Never      Past medical history, past surgical history, medications, allergies, family history, and social history were reviewed with the patient. No additional pertinent items.      A medically appropriate review of systems was performed with pertinent positives and negatives noted in the HPI, and all other systems negative.    Physical Exam      Physical Exam  Vitals and nursing note reviewed.   Constitutional:       General: He is not in acute distress.     Appearance: He is well-developed. He is not diaphoretic.      Comments: Neck brace in place.   HENT:      Head: Normocephalic and atraumatic.      Mouth/Throat:      Pharynx: No oropharyngeal exudate.   Eyes:      General: No scleral icterus.        Right eye: No discharge.         Left eye: No discharge.      Pupils: Pupils are equal, round, and reactive to light.   Cardiovascular:      Rate and Rhythm: Normal rate and regular rhythm.      Heart sounds: Normal heart sounds. No murmur heard.    No friction rub. No gallop.   Pulmonary:      Effort: Pulmonary effort is normal. No respiratory distress.      Breath sounds: Normal breath sounds. No wheezing.   Chest:       Chest wall: No tenderness.   Abdominal:      General: Bowel sounds are normal. There is no distension.      Palpations: Abdomen is soft.      Tenderness: There is no abdominal tenderness.   Musculoskeletal:         General: No tenderness or deformity. Normal range of motion.      Cervical back: Normal range of motion and neck supple.   Skin:     General: Skin is warm and dry.      Coloration: Skin is not pale.      Findings: No erythema or rash.   Neurological:      Mental Status: He is alert and oriented to person, place, and time. Mental status is at baseline.      Cranial Nerves: No cranial nerve deficit.   Psychiatric:         Mood and Affect: Mood normal.         Behavior: Behavior normal.           ED Course, Procedures, & Data      Procedures                      Results for orders placed or performed during the hospital encounter of 01/11/23   Comprehensive metabolic panel     Status: None (Preliminary result)   Result Value Ref Range    Sodium 135 133 - 144 mmol/L    Potassium 4.0 3.4 - 5.3 mmol/L    Chloride 95 94 - 109 mmol/L    Carbon Dioxide (CO2)      Anion Gap      Urea Nitrogen      Creatinine      Calcium      Glucose      Alkaline Phosphatase      AST      ALT      Protein Total      Albumin      Bilirubin Total      GFR Estimate     CBC with platelets and differential     Status: Abnormal   Result Value Ref Range    WBC Count 6.6 4.0 - 11.0 10e3/uL    RBC Count 3.18 (L) 4.40 - 5.90 10e6/uL    Hemoglobin 9.6 (L) 13.3 - 17.7 g/dL    Hematocrit 30.2 (L) 40.0 - 53.0 %    MCV 95 78 - 100 fL    MCH 30.2 26.5 - 33.0 pg    MCHC 31.8 31.5 - 36.5 g/dL    RDW 14.7 10.0 - 15.0 %    Platelet Count 237 150 - 450 10e3/uL    % Neutrophils 73 %    % Lymphocytes 13 %    % Monocytes 8 %    % Eosinophils 5 %    % Basophils 1 %    % Immature Granulocytes 0 %    NRBCs per 100 WBC 0 <1 /100    Absolute Neutrophils 4.9 1.6 - 8.3 10e3/uL    Absolute Lymphocytes 0.9 0.8 - 5.3 10e3/uL    Absolute Monocytes 0.5 0.0 -  1.3 10e3/uL    Absolute Eosinophils 0.4 0.0 - 0.7 10e3/uL    Absolute Basophils 0.0 0.0 - 0.2 10e3/uL    Absolute Immature Granulocytes 0.0 <=0.4 10e3/uL    Absolute NRBCs 0.0 10e3/uL   CBC with platelets differential     Status: Abnormal    Narrative    The following orders were created for panel order CBC with platelets differential.  Procedure                               Abnormality         Status                     ---------                               -----------         ------                     CBC with platelets and d...[[id number]]  Abnormal            Final result                 Please view results for these tests on the individual orders.   Results for orders placed or performed during the hospital encounter of 01/09/23   CBC with platelets     Status: Abnormal   Result Value Ref Range    WBC Count 5.7 4.0 - 11.0 10e3/uL    RBC Count 3.32 (L) 4.40 - 5.90 10e6/uL    Hemoglobin 10.0 (L) 13.3 - 17.7 g/dL    Hematocrit 32.4 (L) 40.0 - 53.0 %    MCV 98 78 - 100 fL    MCH 30.1 26.5 - 33.0 pg    MCHC 30.9 (L) 31.5 - 36.5 g/dL    RDW 14.9 10.0 - 15.0 %    Platelet Count 237 150 - 450 10e3/uL   Basic metabolic panel     Status: Abnormal (Preliminary result)   Result Value Ref Range    Sodium 132 (L) 133 - 144 mmol/L    Potassium 4.0 3.4 - 5.3 mmol/L    Chloride 94 94 - 109 mmol/L    Carbon Dioxide (CO2)      Anion Gap      Urea Nitrogen      Creatinine      Calcium      Glucose      GFR Estimate       Medications - No data to display  Labs Ordered and Resulted from Time of ED Arrival to Time of ED Departure   CBC WITH PLATELETS AND DIFFERENTIAL - Abnormal       Result Value    WBC Count 6.6      RBC Count 3.18 (*)     Hemoglobin 9.6 (*)     Hematocrit 30.2 (*)     MCV 95      MCH 30.2      MCHC 31.8      RDW 14.7      Platelet Count 237      % Neutrophils 73      % Lymphocytes 13      % Monocytes 8      % Eosinophils 5      % Basophils 1      % Immature Granulocytes 0      NRBCs per 100 WBC 0      Absolute  Neutrophils 4.9      Absolute Lymphocytes 0.9      Absolute Monocytes 0.5      Absolute Eosinophils 0.4      Absolute Basophils 0.0      Absolute Immature Granulocytes 0.0      Absolute NRBCs 0.0     COMPREHENSIVE METABOLIC PANEL    Sodium 135      Potassium 4.0      Chloride 95      Carbon Dioxide (CO2)        Anion Gap        Urea Nitrogen        Creatinine        Calcium        Glucose        Alkaline Phosphatase        AST        ALT        Protein Total        Albumin        Bilirubin Total        GFR Estimate       ROUTINE UA WITH MICROSCOPIC REFLEX TO CULTURE     No orders to display          Medical Decision Making  The patient presented with a problem that is a chronic illness severe exacerbation, progression, or side effect of treatment.    The patient's evaluation involved:  an assessment requiring an independent historian (report)  review of 2 prior external note(s) (see separate area of note for details)  ordering and review of 3+ test(s) (see separate area of note for details)  strong consideration of a test (Head CT) that was ultimately deferred    The patient's management involved a decision regarding hospitalization.      Assessment & Plan    Is a 57-year-old male who presents with concern for altered mental status.  Patient is in a TCU post spinal surgery.  Patient was noted to be lethargic and having difficulty answering questions.  He was noted to be somewhat confused upon arrival.  He is now alert and oriented x3.  Is endorsing no current confusion.  He states he feels at baseline.  He is scheduled to dialyze today but missed this.  Patient was recently seen for similar presentation that resolved on its own.  CBC shows no acute abnormalities.  CMP shows no acute abnormalities.  Patient is not acidotic there is no elevated potassium.  Patient does not need to dialyze today can likely do this tomorrow.  We will discharge back to TCU.    I have reviewed the nursing notes. I have reviewed the  findings, diagnosis, plan and need for follow up with the patient.    New Prescriptions    No medications on file       Final diagnoses:   None       Piter Lopez DO  [facility] EMERGENCY DEPARTMENT  1/11/2023     Piter Lopez DO  01/11/23 [id number]

## 2023-01-12 NOTE — PROGRESS NOTES
"CLINICAL NUTRITION SERVICES - ASSESSMENT NOTE     Nutrition Prescription    RECOMMENDATIONS FOR MDs/PROVIDERS TO ORDER:  None at this time    Malnutrition Status:    Patient does not meet two of the established criteria necessary for diagnosing malnutrition    Recommendations already ordered by Registered Dietitian (RD):  Ordered updated weight  Continue B vitamins and renal MVI    Future/Additional Recommendations:  Monitor labs, intakes, and weight trends     REASON FOR ASSESSMENT  Shay Jimenez is a/an 57 year old male assessed by the dietitian for LOS. Presented from dialysis center d/t delirium.     NUTRITION/MEDICAL HISTORY  - PMH of HTN, ESRD, GERD, GINI, chronic atrial fibrillation, alcohol abuse, and had a C5-T6 redo cervical spine fusion 12/6/22.    - Spoke to patient at bedside. Says he is eating the same way as at home. About 2 meals per day at breakfast/dinner and 8 diet winston's per day. Says he has not noticed a decrease in appetite and that he is eating fine. Was very against starting a renal diet.    - Per MD note 1/11, refused a renal diet    CURRENT NUTRITION ORDERS  Diet: Regular  Per previous RD note, pt was on a 2000 mL Fluid Restriction (12/17-12/30).    Intake/Tolerance:  -Per nursing note on 1/10, no breakfast or lunch, however son came to visit and brought supper in which he ate 100% of meal.    Labs reviewed: BUN 34 (H), Cr 7.48 (H), GFR 8 (L)    Medications reviewed: Calcium carbonate, Zofran, Lipitor, Folvite, Renvela, Renal MVI, Protonix, Vitamin B6, Thiamine    ANTHROPOMETRICS  Ht Readings from Last 1 Encounters:   12/06/22 1.854 m (6' 1\")     Most Recent Weight: 130.2 kg   IBW: 78.2 kg (166% IBW)  BMI: Obesity Grade II BMI 35-39.9  Weight History: Patient's weight has remained stable over the past month, overall lost 2.3 lbs (1.7%) over the last 6 months.   Wt Readings from Last 20 Encounters:   12/22/22 130.2 kg (287 lb 0.6 oz)   11/03/22 131.1 kg (289 lb)   10/10/22 131.5 kg (289 " lb 14.5 oz)   09/22/22 132.5 kg (292 lb)   08/18/22 132.5 kg (292 lb)   08/04/22 132.5 kg (292 lb)   07/29/22 132.5 kg (292 lb)   06/29/22 145 kg (319 lb 10.7 oz)   Wt fluctuations d/t fluid status changes (pt on dialysis)    Dosing Weight: 91.2 kg - adjusted based off last weight on 12/22 of 130.2 kg and IBW of 78.2 kg    ASSESSED NUTRITION NEEDS  Estimated Energy Needs: 4643-5288 kcals/day (25 - 30 kcals/kg)  Justification: Maintenance  Estimated Protein Needs:  grams protein/day (1.0-1.2 grams of pro/kg)  Justification: Dialysis  Estimated Fluid Needs: 1983-2165 mL/day (1 mL/kcal)   Justification: Or per provider pending fluid status    PHYSICAL FINDINGS  See malnutrition section below.  Per flowsheet 1/12, fecal incontinence  WOC seen on 1/10 - right heel pressure injury, healing    MALNUTRITION  % Intake: No decreased intake noted, however limited data on intakes  % Weight Loss: None noted  Subcutaneous Fat Loss: None observed  Muscle Loss: None observed, however unable to assess some areas d/t pt condition  Fluid Accumulation/Edema: None noted  Malnutrition Diagnosis: Patient does not meet two of the established criteria necessary for diagnosing malnutrition    NUTRITION DIAGNOSIS  Predicted inadequate oral intake related to potential for menu fatigue as evidenced by hospital LOS.       INTERVENTIONS  Implementation  Nutrition Education: encouraged adequate PO intake and introduced role of nutrition services.    Goals  Patient to consume % of nutritionally adequate meal trays TID, or the equivalent with supplements/snacks.     Monitoring/Evaluation  Progress toward goals will be monitored and evaluated per protocol.    Ellen Hussein  Dietetic Intern

## 2023-01-13 ENCOUNTER — APPOINTMENT (OUTPATIENT)
Dept: PHYSICAL THERAPY | Facility: SKILLED NURSING FACILITY | Age: 58
DRG: 949 | End: 2023-01-13
Attending: INTERNAL MEDICINE
Payer: MEDICARE

## 2023-01-13 PROCEDURE — 120N000009 HC R&B SNF

## 2023-01-13 PROCEDURE — 250N000013 HC RX MED GY IP 250 OP 250 PS 637

## 2023-01-13 PROCEDURE — 250N000013 HC RX MED GY IP 250 OP 250 PS 637: Performed by: PHYSICIAN ASSISTANT

## 2023-01-13 PROCEDURE — 97530 THERAPEUTIC ACTIVITIES: CPT | Mod: GP | Performed by: REHABILITATION PRACTITIONER

## 2023-01-13 PROCEDURE — 250N000013 HC RX MED GY IP 250 OP 250 PS 637: Performed by: INTERNAL MEDICINE

## 2023-01-13 RX ADMIN — GABAPENTIN 300 MG: 300 CAPSULE ORAL at 09:07

## 2023-01-13 RX ADMIN — METHOCARBAMOL 500 MG: 500 TABLET ORAL at 16:50

## 2023-01-13 RX ADMIN — GABAPENTIN 300 MG: 300 CAPSULE ORAL at 14:26

## 2023-01-13 RX ADMIN — SERTRALINE HYDROCHLORIDE 100 MG: 100 TABLET, FILM COATED ORAL at 22:27

## 2023-01-13 RX ADMIN — BACLOFEN 10 MG: 10 TABLET ORAL at 22:28

## 2023-01-13 RX ADMIN — FOLIC ACID 1 MG: 1 TABLET ORAL at 22:28

## 2023-01-13 RX ADMIN — GABAPENTIN 300 MG: 300 CAPSULE ORAL at 22:27

## 2023-01-13 RX ADMIN — FLUTICASONE PROPIONATE 1 SPRAY: 50 SPRAY, METERED NASAL at 23:44

## 2023-01-13 RX ADMIN — APIXABAN 2.5 MG: 2.5 TABLET, FILM COATED ORAL at 09:06

## 2023-01-13 RX ADMIN — METHOCARBAMOL 500 MG: 500 TABLET ORAL at 14:26

## 2023-01-13 RX ADMIN — BACLOFEN 10 MG: 10 TABLET ORAL at 09:06

## 2023-01-13 RX ADMIN — METHOCARBAMOL 500 MG: 500 TABLET ORAL at 09:06

## 2023-01-13 RX ADMIN — Medication 100 MG: at 22:28

## 2023-01-13 RX ADMIN — Medication 1 TABLET: at 22:28

## 2023-01-13 RX ADMIN — ATORVASTATIN CALCIUM 20 MG: 20 TABLET, FILM COATED ORAL at 22:27

## 2023-01-13 RX ADMIN — ACETAMINOPHEN 325MG 650 MG: 325 TABLET ORAL at 23:49

## 2023-01-13 RX ADMIN — APIXABAN 2.5 MG: 2.5 TABLET, FILM COATED ORAL at 22:28

## 2023-01-13 RX ADMIN — METHOCARBAMOL 500 MG: 500 TABLET ORAL at 22:27

## 2023-01-13 RX ADMIN — FINASTERIDE 1.3 MG: 5 TABLET, FILM COATED ORAL at 22:36

## 2023-01-13 RX ADMIN — OXYCODONE HYDROCHLORIDE 5 MG: 5 TABLET ORAL at 23:49

## 2023-01-13 RX ADMIN — OLANZAPINE 5 MG: 2.5 TABLET, FILM COATED ORAL at 22:27

## 2023-01-13 RX ADMIN — THIAMINE HCL TAB 100 MG 100 MG: 100 TAB at 22:28

## 2023-01-13 RX ADMIN — CETIRIZINE HYDROCHLORIDE 10 MG: 10 TABLET, FILM COATED ORAL at 22:27

## 2023-01-13 RX ADMIN — PANTOPRAZOLE SODIUM 40 MG: 40 TABLET, DELAYED RELEASE ORAL at 05:29

## 2023-01-13 RX ADMIN — SEVELAMER CARBONATE 1600 MG: 800 TABLET, FILM COATED ORAL at 09:06

## 2023-01-13 RX ADMIN — Medication 10 MG: at 22:27

## 2023-01-13 ASSESSMENT — ACTIVITIES OF DAILY LIVING (ADL)
ADLS_ACUITY_SCORE: 56
ADLS_ACUITY_SCORE: 52
ADLS_ACUITY_SCORE: 52
ADLS_ACUITY_SCORE: 56
ADLS_ACUITY_SCORE: 52
ADLS_ACUITY_SCORE: 56
ADLS_ACUITY_SCORE: 52
ADLS_ACUITY_SCORE: 52

## 2023-01-13 NOTE — PLAN OF CARE
Goal Outcome Evaluation:    Pt is A&O X4 But forgetful about recent events.Pt is calm and cooperative with care. Denied CP,SOB & N/V.  A with liko for transfer. Pt wears cervical brace when out of the bed.  Incontinent of both bowel and bladder. Pt takes medications whole with thin liquids. Pt slept few hrs of the night. Able to make needs known and call light was within reach. Will continue with plan of care.    Patient's most recent vital signs are:     Vital signs:  BP: 121/63  Temp: 97  HR: 82  RR: 18  SpO2: 91 %     Patient does not have new respiratory symptoms.  Patient does not have new sore throat.  Patient does not have a fever greater than 99.5.

## 2023-01-13 NOTE — PLAN OF CARE
Goal Outcome Evaluation:    Pt went to HD at 0912 and returned at 1420  Pt was lethargic/very sleepy upon return. Pt opened eyes to gentle stimulation and answered questions but unable to stay awake. VSS at 1450.  Wears cervical collar.   Liko lift for transfers.   Pt was calm and cooperative with cares this shift.  Incontinent of urine x1.  Bed alarm on. Call light is within reach. Will continue with POC.       Patient's most recent vital signs are:   Blood pressure 120/59, pulse 73, temperature 97.2  F (36.2  C), temperature source Oral, resp. rate 16, SpO2 92 %.       Vital signs:  BP: 120/59  Temp: 97.2  HR: 73  RR: 16  SpO2: 92 %      Patient does not have new respiratory symptoms.  Patient does not have new sore throat.  Patient does not have a fever greater than 99.5.

## 2023-01-14 ENCOUNTER — APPOINTMENT (OUTPATIENT)
Dept: PHYSICAL THERAPY | Facility: SKILLED NURSING FACILITY | Age: 58
DRG: 949 | End: 2023-01-14
Attending: INTERNAL MEDICINE
Payer: MEDICARE

## 2023-01-14 PROCEDURE — 022N000001 HC SNF RUG CODE OPNP

## 2023-01-14 PROCEDURE — 120N000009 HC R&B SNF

## 2023-01-14 PROCEDURE — 250N000013 HC RX MED GY IP 250 OP 250 PS 637

## 2023-01-14 PROCEDURE — 250N000013 HC RX MED GY IP 250 OP 250 PS 637: Performed by: PHYSICIAN ASSISTANT

## 2023-01-14 PROCEDURE — 250N000013 HC RX MED GY IP 250 OP 250 PS 637: Performed by: INTERNAL MEDICINE

## 2023-01-14 PROCEDURE — 97530 THERAPEUTIC ACTIVITIES: CPT | Mod: GP | Performed by: PHYSICAL THERAPIST

## 2023-01-14 RX ADMIN — THIAMINE HCL TAB 100 MG 100 MG: 100 TAB at 20:33

## 2023-01-14 RX ADMIN — FLUTICASONE PROPIONATE 1 SPRAY: 50 SPRAY, METERED NASAL at 20:34

## 2023-01-14 RX ADMIN — DICLOFENAC SODIUM 4 G: 10 GEL TOPICAL at 12:41

## 2023-01-14 RX ADMIN — ATORVASTATIN CALCIUM 20 MG: 20 TABLET, FILM COATED ORAL at 20:33

## 2023-01-14 RX ADMIN — DICLOFENAC SODIUM 4 G: 10 GEL TOPICAL at 20:33

## 2023-01-14 RX ADMIN — APIXABAN 2.5 MG: 2.5 TABLET, FILM COATED ORAL at 09:30

## 2023-01-14 RX ADMIN — ACETAMINOPHEN 325MG 650 MG: 325 TABLET ORAL at 16:10

## 2023-01-14 RX ADMIN — BACLOFEN 10 MG: 10 TABLET ORAL at 20:32

## 2023-01-14 RX ADMIN — METHOCARBAMOL 500 MG: 500 TABLET ORAL at 09:30

## 2023-01-14 RX ADMIN — SEVELAMER CARBONATE 1600 MG: 800 TABLET, FILM COATED ORAL at 18:14

## 2023-01-14 RX ADMIN — METHOCARBAMOL 500 MG: 500 TABLET ORAL at 16:11

## 2023-01-14 RX ADMIN — DICLOFENAC SODIUM 4 G: 10 GEL TOPICAL at 16:11

## 2023-01-14 RX ADMIN — BACLOFEN 10 MG: 10 TABLET ORAL at 09:30

## 2023-01-14 RX ADMIN — OXYCODONE HYDROCHLORIDE 5 MG: 5 TABLET ORAL at 10:13

## 2023-01-14 RX ADMIN — FLUTICASONE PROPIONATE 1 SPRAY: 50 SPRAY, METERED NASAL at 09:29

## 2023-01-14 RX ADMIN — SEVELAMER CARBONATE 1600 MG: 800 TABLET, FILM COATED ORAL at 12:41

## 2023-01-14 RX ADMIN — OLANZAPINE 5 MG: 2.5 TABLET, FILM COATED ORAL at 20:32

## 2023-01-14 RX ADMIN — FOLIC ACID 1 MG: 1 TABLET ORAL at 20:33

## 2023-01-14 RX ADMIN — CETIRIZINE HYDROCHLORIDE 10 MG: 10 TABLET, FILM COATED ORAL at 20:34

## 2023-01-14 RX ADMIN — MIDODRINE HYDROCHLORIDE 10 MG: 5 TABLET ORAL at 09:30

## 2023-01-14 RX ADMIN — METHOCARBAMOL 500 MG: 500 TABLET ORAL at 12:42

## 2023-01-14 RX ADMIN — FINASTERIDE 1.3 MG: 5 TABLET, FILM COATED ORAL at 20:33

## 2023-01-14 RX ADMIN — GABAPENTIN 300 MG: 300 CAPSULE ORAL at 09:30

## 2023-01-14 RX ADMIN — Medication 100 MG: at 20:33

## 2023-01-14 RX ADMIN — SERTRALINE HYDROCHLORIDE 100 MG: 100 TABLET, FILM COATED ORAL at 20:33

## 2023-01-14 RX ADMIN — GABAPENTIN 300 MG: 300 CAPSULE ORAL at 12:42

## 2023-01-14 RX ADMIN — METHOCARBAMOL 500 MG: 500 TABLET ORAL at 20:33

## 2023-01-14 RX ADMIN — PANTOPRAZOLE SODIUM 40 MG: 40 TABLET, DELAYED RELEASE ORAL at 07:08

## 2023-01-14 RX ADMIN — OXYCODONE HYDROCHLORIDE 5 MG: 5 TABLET ORAL at 18:14

## 2023-01-14 RX ADMIN — APIXABAN 2.5 MG: 2.5 TABLET, FILM COATED ORAL at 20:33

## 2023-01-14 RX ADMIN — GABAPENTIN 300 MG: 300 CAPSULE ORAL at 20:32

## 2023-01-14 RX ADMIN — Medication 1 TABLET: at 20:32

## 2023-01-14 ASSESSMENT — ACTIVITIES OF DAILY LIVING (ADL)
ADLS_ACUITY_SCORE: 52
ADLS_ACUITY_SCORE: 52
ADLS_ACUITY_SCORE: 56
ADLS_ACUITY_SCORE: 52
ADLS_ACUITY_SCORE: 56
ADLS_ACUITY_SCORE: 52
ADLS_ACUITY_SCORE: 56
ADLS_ACUITY_SCORE: 52
ADLS_ACUITY_SCORE: 56
ADLS_ACUITY_SCORE: 56
ADLS_ACUITY_SCORE: 52
ADLS_ACUITY_SCORE: 56

## 2023-01-14 NOTE — PLAN OF CARE
0700H-2300H   Goal Outcome Evaluation:  A/O x 4 ( can be intermittently confused)  afebrile, was on 02 at 2LPM after working with PT this AM, and has been off and on with )2 throughout the day and night.Rt. Arm PIV intact, saline locked, left arm AV fistula SOSA ( site intact), Rt. Foot with mepilex to base near big toe ( protection), right heel wound cares done (due: every third day), refused prevalon boots when in bed ( gets too warm on feet per patient). Oxycodone given x 2 for back pain.With new order for Voltaren gel scheduled that patient applies to both hands. Had BM x 2 during this shift, said dribbling with urine, ( not sure if patient is really passing urine, with pending urine collection sample for drug screen placed on 01/11). Comfortable at this time. To continue POC.     Patient's most recent vital signs are:     Vital signs:  BP: 115/65  Temp: 96.6  HR: 80  RR: 16  SpO2: 97 % / 02 at 2LPM by NC     Patient does not have new respiratory symptoms.  Patient does not have new sore throat.  Patient does not have a fever greater than 99.5.

## 2023-01-14 NOTE — CARE PLAN
Pt is A&O X4 But forgetful about recent events.Pt is calm and cooperative with care. Denied CP,SOB & N/V.  A with liko for transfer. Pt wears cervical brace when out of the bed.  Incontinent of both bowel and bladder. Pt takes medications whole with thin liquids. Pt slept few hrs of the night. Able to make needs known and call light was within reach. Will continue with plan of care.    Patient's most recent vital signs are:     Vital signs:  BP: 120/70  Temp: 97.2  HR: 72  RR: 16  SpO2: 92 %     Patient does not have new respiratory symptoms.  Patient does not have new sore throat.  Patient does not have a fever greater than 99.5.

## 2023-01-14 NOTE — PROGRESS NOTES
"Patient alert and pleasant during session. Eager to participate in PT. supine to sit with SBA, HOB raised. Pt performs bed <> chair transfer using Irina Steady, CGA x1. In gym pt performs sit to stand mod A x1 for blocking kartik knees, pt heavy reliance on UE on // bars. Pt maintains standing ~90 sec with cues for tucking buttocks in and chest out for fish upright position. Pt sits and states he feels \"pressure\" which sometimes means he needs to use bed pan. Opt to go back to room in case pt needs bed pan. once in room use Irina steady for sit to stand x 3, one for 45 sec, one for 90 sec and one time just to stand and sit back down into bed. Uses UE to boost body in sitting up side of bed. Sit to supine, mod A for lifting LE, pt able to use overhead bedrail to pull up in bed.  "

## 2023-01-15 ENCOUNTER — APPOINTMENT (OUTPATIENT)
Dept: OCCUPATIONAL THERAPY | Facility: SKILLED NURSING FACILITY | Age: 58
DRG: 949 | End: 2023-01-15
Attending: INTERNAL MEDICINE
Payer: MEDICARE

## 2023-01-15 PROCEDURE — 97530 THERAPEUTIC ACTIVITIES: CPT | Mod: GO

## 2023-01-15 PROCEDURE — 250N000013 HC RX MED GY IP 250 OP 250 PS 637

## 2023-01-15 PROCEDURE — 250N000013 HC RX MED GY IP 250 OP 250 PS 637: Performed by: PHYSICIAN ASSISTANT

## 2023-01-15 PROCEDURE — 120N000009 HC R&B SNF

## 2023-01-15 RX ADMIN — OXYCODONE HYDROCHLORIDE 5 MG: 5 TABLET ORAL at 12:34

## 2023-01-15 RX ADMIN — DICLOFENAC SODIUM 4 G: 10 GEL TOPICAL at 16:39

## 2023-01-15 RX ADMIN — THIAMINE HCL TAB 100 MG 100 MG: 100 TAB at 20:49

## 2023-01-15 RX ADMIN — METHOCARBAMOL 500 MG: 500 TABLET ORAL at 20:49

## 2023-01-15 RX ADMIN — Medication 100 MG: at 20:49

## 2023-01-15 RX ADMIN — OXYCODONE HYDROCHLORIDE 5 MG: 5 TABLET ORAL at 20:48

## 2023-01-15 RX ADMIN — ATORVASTATIN CALCIUM 20 MG: 20 TABLET, FILM COATED ORAL at 20:49

## 2023-01-15 RX ADMIN — GABAPENTIN 300 MG: 300 CAPSULE ORAL at 09:38

## 2023-01-15 RX ADMIN — DICLOFENAC SODIUM 4 G: 10 GEL TOPICAL at 12:35

## 2023-01-15 RX ADMIN — METHOCARBAMOL 500 MG: 500 TABLET ORAL at 16:38

## 2023-01-15 RX ADMIN — FOLIC ACID 1 MG: 1 TABLET ORAL at 20:48

## 2023-01-15 RX ADMIN — FLUTICASONE PROPIONATE 1 SPRAY: 50 SPRAY, METERED NASAL at 20:48

## 2023-01-15 RX ADMIN — GABAPENTIN 300 MG: 300 CAPSULE ORAL at 13:28

## 2023-01-15 RX ADMIN — METHOCARBAMOL 500 MG: 500 TABLET ORAL at 12:35

## 2023-01-15 RX ADMIN — OLANZAPINE 5 MG: 2.5 TABLET, FILM COATED ORAL at 20:48

## 2023-01-15 RX ADMIN — FINASTERIDE 1.3 MG: 5 TABLET, FILM COATED ORAL at 20:48

## 2023-01-15 RX ADMIN — APIXABAN 2.5 MG: 2.5 TABLET, FILM COATED ORAL at 20:49

## 2023-01-15 RX ADMIN — DICLOFENAC SODIUM 4 G: 10 GEL TOPICAL at 09:38

## 2023-01-15 RX ADMIN — SEVELAMER CARBONATE 1600 MG: 800 TABLET, FILM COATED ORAL at 12:34

## 2023-01-15 RX ADMIN — MIDODRINE HYDROCHLORIDE 10 MG: 5 TABLET ORAL at 12:34

## 2023-01-15 RX ADMIN — ACETAMINOPHEN 325MG 650 MG: 325 TABLET ORAL at 16:38

## 2023-01-15 RX ADMIN — SEVELAMER CARBONATE 1600 MG: 800 TABLET, FILM COATED ORAL at 09:38

## 2023-01-15 RX ADMIN — DICLOFENAC SODIUM 4 G: 10 GEL TOPICAL at 20:48

## 2023-01-15 RX ADMIN — BACLOFEN 10 MG: 10 TABLET ORAL at 09:38

## 2023-01-15 RX ADMIN — SERTRALINE HYDROCHLORIDE 100 MG: 100 TABLET, FILM COATED ORAL at 20:48

## 2023-01-15 RX ADMIN — FLUTICASONE PROPIONATE 1 SPRAY: 50 SPRAY, METERED NASAL at 09:39

## 2023-01-15 RX ADMIN — MIDODRINE HYDROCHLORIDE 10 MG: 5 TABLET ORAL at 09:38

## 2023-01-15 RX ADMIN — PANTOPRAZOLE SODIUM 40 MG: 40 TABLET, DELAYED RELEASE ORAL at 07:14

## 2023-01-15 RX ADMIN — ACETAMINOPHEN 325MG 650 MG: 325 TABLET ORAL at 22:36

## 2023-01-15 RX ADMIN — GABAPENTIN 300 MG: 300 CAPSULE ORAL at 20:48

## 2023-01-15 RX ADMIN — SEVELAMER CARBONATE 1600 MG: 800 TABLET, FILM COATED ORAL at 18:44

## 2023-01-15 RX ADMIN — CETIRIZINE HYDROCHLORIDE 10 MG: 10 TABLET, FILM COATED ORAL at 20:49

## 2023-01-15 RX ADMIN — BACLOFEN 10 MG: 10 TABLET ORAL at 20:48

## 2023-01-15 RX ADMIN — METHOCARBAMOL 500 MG: 500 TABLET ORAL at 09:38

## 2023-01-15 RX ADMIN — Medication 1 TABLET: at 20:49

## 2023-01-15 RX ADMIN — APIXABAN 2.5 MG: 2.5 TABLET, FILM COATED ORAL at 09:38

## 2023-01-15 ASSESSMENT — ACTIVITIES OF DAILY LIVING (ADL)
ADLS_ACUITY_SCORE: 60
ADLS_ACUITY_SCORE: 60
ADLS_ACUITY_SCORE: 57
ADLS_ACUITY_SCORE: 60
ADLS_ACUITY_SCORE: 60
ADLS_ACUITY_SCORE: 56
ADLS_ACUITY_SCORE: 60
ADLS_ACUITY_SCORE: 56
ADLS_ACUITY_SCORE: 61
ADLS_ACUITY_SCORE: 61

## 2023-01-15 NOTE — PLAN OF CARE
Goal Outcome Evaluation:       Pt had no acute issue this shift. Alert and oriented x 4. Able to make needs known to staffs. Pt I a hemodialysis Pt. Arteriovenous fistula to left arm assessed and appears WNL. No bowel movement this shift. Requires assistance of 1 with cares. Pt on 2 L of oxygen via nasal canula. HOB maintained below 45 degree. Pt slept through out the night. All safety measures in place. Will continue with POC      Patient's most recent vital signs are:     Vital signs:  BP: 115/65  Temp: 96.6  HR: 80  RR: 16  SpO2: 97 %     Patient does not have new respiratory symptoms.  Patient does not have new sore throat.  Patient does not have a fever greater than 99.5.

## 2023-01-15 NOTE — PROGRESS NOTES
OT weekly note   01/15/23 7469   Appointment Info   Signing Clinician's Name / Credentials (OT) MANUELA Rodriguez/L, CBIS   Rehab Comments (OT) oT: focused on functional mob, STS w/ sarasteady   OT Goals   Therapy Frequency (OT) 6 times/wk   OT Predicted Duration/Target Date for Goal Attainment 01/26/23   OT: Hygiene/Grooming supervision/stand-by assist   OT: Upper Body Dressing Supervision/stand-by assist   OT: Lower Body Dressing Minimal assist;using adaptive equipment;within precautions   OT: Upper Body Bathing Supervision/stand-by assist   OT: Lower Body Bathing Minimal assist;with precautions   OT: Bed Mobility Supervision/stand-by assist;Modified independent;within precautions   OT: Transfer Supervision/stand-by assist;with assistive device;within precautions   OT: Toilet Transfer/Toileting Supervision/stand-by assist;using adaptive equipment;within precautions   OT: Meal Preparation Minimal assist;with simple meal preparation;from wheelchair   OT: Cognitive Patient/caregiver will verbalize understanding of cognitive assessment results/recommendations as needed for safe discharge planning   Therapeutic Activities   Therapeutic Activity Minutes (57149) 30   Treatment Detail/Skilled Intervention oT: focused on bed mob, STS transfers, pt demo sba w/ supine to sit w/ HOB elevated 20degr and use of bed rail, th provided max A to azeb  neck brace prior to mobility, pt sat at EOB w/ sba, conversational but 3 statement indicating impaired memory, pt assisted w/ directing care , STS w/ sarasteady min A  at EOB first stand and jacqui x 3.5 min w/ cga-min A w/ one unstable event that required th to provide min A to regain balance due to kartik LE weakness, seated to rest, 2nd STS w/ sarasteady cga , jacqui standing w/ cga x3min then needed to sit due to kartik LE weakness, /78 after first stand, SATS 98% on room air w/ activity   OT Discharge Planning   OT Plan OT: spinal prec,  OOB, incontinence, h/o decrease  sensation B ankles and feet, h/o cog deficit after surgery, okay to shower - pat dry.  Tx: focus on managing LB clothing for toileting), transfers with rafa lafleur, standing at the sink with rafa ciarra.   OT Discharge Recommendation (DC Rec) home with assist;home with home care occupational therapy  (due to cognitive deficits and significant LE weakness, antic need for 24/7 supervion/assist)   OT Brief overview of current status OT:goals reviewed and revised,  pt variable w/behavior and level of confusion, pt has spinal precautions and requires assist to azeb  brace when OOB, pt demo impaired memory consistently but variable w/ halucinations and confusion. today pt cooperative and enbraced working on mobility and activty jacqui, performance limited by kartik LE weakness/balance issues. cont to focus on imprving activty jacqui , toileting and mobility   Total Session Time   Timed Code Treatment Minutes 30   Total Session Time (sum of timed and untimed services) 30   Post Acute Settings Only   What unit is patient on? TCU

## 2023-01-15 NOTE — PLAN OF CARE
Goal Outcome Evaluation:    Pt is A&OX4, calm & cooperative with care. Denied CP, SOB, & n/v. VSS. A of 1 with Irina Steady for transfer. Pt spent most of the shift on a chair. Pt wears cervical brace when out of the bed. Incontinent for both B&B. Takes med whole with thin liquid. Managed pain with PRN with oxycodone X1. Pt wears 2L O2 via NC @ night due to GINI. Pt ate both meals with good appetite. Able to make needs known & call light was within reach. Will continue with plan of care.    Patient's most recent vital signs are:     Vital signs:  BP: 108/74  Temp: 95.7  HR: 83  RR: 18  SpO2: 94 %     Patient does not have new respiratory symptoms.  Patient does not have new sore throat.  Patient does not have a fever greater than 99.5.

## 2023-01-16 ENCOUNTER — APPOINTMENT (OUTPATIENT)
Dept: LAB | Facility: CLINIC | Age: 58
End: 2023-01-16
Payer: MEDICARE

## 2023-01-16 LAB
ANION GAP SERPL CALCULATED.3IONS-SCNC: 7 MMOL/L (ref 3–14)
BUN SERPL-MCNC: 17 MG/DL (ref 7–30)
CALCIUM SERPL-MCNC: 9.4 MG/DL (ref 8.5–10.1)
CHLORIDE BLD-SCNC: 99 MMOL/L (ref 94–109)
CO2 SERPL-SCNC: 34 MMOL/L (ref 20–32)
CREAT SERPL-MCNC: 3.96 MG/DL (ref 0.66–1.25)
ERYTHROCYTE [DISTWIDTH] IN BLOOD BY AUTOMATED COUNT: 14.8 % (ref 10–15)
GFR SERPL CREATININE-BSD FRML MDRD: 17 ML/MIN/1.73M2
GLUCOSE BLD-MCNC: 140 MG/DL (ref 70–99)
HCT VFR BLD AUTO: 32.7 % (ref 40–53)
HGB BLD-MCNC: 10.1 G/DL (ref 13.3–17.7)
MCH RBC QN AUTO: 30.3 PG (ref 26.5–33)
MCHC RBC AUTO-ENTMCNC: 30.9 G/DL (ref 31.5–36.5)
MCV RBC AUTO: 98 FL (ref 78–100)
PHOSPHATE SERPL-MCNC: 0.9 MG/DL (ref 2.5–4.5)
PLATELET # BLD AUTO: 321 10E3/UL (ref 150–450)
POTASSIUM BLD-SCNC: 3.5 MMOL/L (ref 3.4–5.3)
RBC # BLD AUTO: 3.33 10E6/UL (ref 4.4–5.9)
SODIUM SERPL-SCNC: 140 MMOL/L (ref 133–144)
WBC # BLD AUTO: 5.7 10E3/UL (ref 4–11)

## 2023-01-16 PROCEDURE — 84100 ASSAY OF PHOSPHORUS: CPT | Performed by: INTERNAL MEDICINE

## 2023-01-16 PROCEDURE — 250N000013 HC RX MED GY IP 250 OP 250 PS 637: Performed by: PHYSICIAN ASSISTANT

## 2023-01-16 PROCEDURE — 250N000009 HC RX 250: Performed by: INTERNAL MEDICINE

## 2023-01-16 PROCEDURE — 250N000013 HC RX MED GY IP 250 OP 250 PS 637

## 2023-01-16 PROCEDURE — 120N000009 HC R&B SNF

## 2023-01-16 PROCEDURE — 250N000013 HC RX MED GY IP 250 OP 250 PS 637: Performed by: INTERNAL MEDICINE

## 2023-01-16 PROCEDURE — 85027 COMPLETE CBC AUTOMATED: CPT | Performed by: INTERNAL MEDICINE

## 2023-01-16 PROCEDURE — 80048 BASIC METABOLIC PNL TOTAL CA: CPT | Performed by: INTERNAL MEDICINE

## 2023-01-16 PROCEDURE — 36415 COLL VENOUS BLD VENIPUNCTURE: CPT | Performed by: INTERNAL MEDICINE

## 2023-01-16 PROCEDURE — G0463 HOSPITAL OUTPT CLINIC VISIT: HCPCS

## 2023-01-16 PROCEDURE — 258N000003 HC RX IP 258 OP 636: Performed by: INTERNAL MEDICINE

## 2023-01-16 RX ADMIN — ATORVASTATIN CALCIUM 20 MG: 20 TABLET, FILM COATED ORAL at 21:41

## 2023-01-16 RX ADMIN — SEVELAMER CARBONATE 1600 MG: 800 TABLET, FILM COATED ORAL at 18:00

## 2023-01-16 RX ADMIN — DICLOFENAC SODIUM 4 G: 10 GEL TOPICAL at 13:29

## 2023-01-16 RX ADMIN — FOLIC ACID 1 MG: 1 TABLET ORAL at 21:41

## 2023-01-16 RX ADMIN — GABAPENTIN 300 MG: 300 CAPSULE ORAL at 13:29

## 2023-01-16 RX ADMIN — CETIRIZINE HYDROCHLORIDE 10 MG: 10 TABLET, FILM COATED ORAL at 21:40

## 2023-01-16 RX ADMIN — METHOCARBAMOL 500 MG: 500 TABLET ORAL at 18:00

## 2023-01-16 RX ADMIN — MIDODRINE HYDROCHLORIDE 10 MG: 5 TABLET ORAL at 18:00

## 2023-01-16 RX ADMIN — APIXABAN 2.5 MG: 2.5 TABLET, FILM COATED ORAL at 21:41

## 2023-01-16 RX ADMIN — FLUTICASONE PROPIONATE 1 SPRAY: 50 SPRAY, METERED NASAL at 13:34

## 2023-01-16 RX ADMIN — METHOCARBAMOL 500 MG: 500 TABLET ORAL at 13:29

## 2023-01-16 RX ADMIN — THIAMINE HCL TAB 100 MG 100 MG: 100 TAB at 21:41

## 2023-01-16 RX ADMIN — Medication 1 TABLET: at 21:41

## 2023-01-16 RX ADMIN — SERTRALINE HYDROCHLORIDE 100 MG: 100 TABLET, FILM COATED ORAL at 21:41

## 2023-01-16 RX ADMIN — BACLOFEN 10 MG: 10 TABLET ORAL at 21:41

## 2023-01-16 RX ADMIN — OLANZAPINE 5 MG: 2.5 TABLET, FILM COATED ORAL at 21:41

## 2023-01-16 RX ADMIN — MIDODRINE HYDROCHLORIDE 10 MG: 5 TABLET ORAL at 13:29

## 2023-01-16 RX ADMIN — OXYCODONE HYDROCHLORIDE 5 MG: 5 TABLET ORAL at 13:29

## 2023-01-16 RX ADMIN — Medication 10 MG: at 21:40

## 2023-01-16 RX ADMIN — SODIUM PHOSPHATE, MONOBASIC, MONOHYDRATE AND SODIUM PHOSPHATE, DIBASIC, ANHYDROUS 15 MMOL: 276; 142 INJECTION, SOLUTION INTRAVENOUS at 21:28

## 2023-01-16 RX ADMIN — BACLOFEN 10 MG: 10 TABLET ORAL at 11:07

## 2023-01-16 RX ADMIN — Medication 100 MG: at 21:41

## 2023-01-16 RX ADMIN — OXYCODONE HYDROCHLORIDE 5 MG: 5 TABLET ORAL at 21:41

## 2023-01-16 RX ADMIN — GABAPENTIN 300 MG: 300 CAPSULE ORAL at 21:41

## 2023-01-16 RX ADMIN — SEVELAMER CARBONATE 1600 MG: 800 TABLET, FILM COATED ORAL at 13:31

## 2023-01-16 RX ADMIN — METHOCARBAMOL 500 MG: 500 TABLET ORAL at 21:41

## 2023-01-16 ASSESSMENT — ACTIVITIES OF DAILY LIVING (ADL)
ADLS_ACUITY_SCORE: 57
ADLS_ACUITY_SCORE: 61
ADLS_ACUITY_SCORE: 57
ADLS_ACUITY_SCORE: 61
ADLS_ACUITY_SCORE: 57
ADLS_ACUITY_SCORE: 61
ADLS_ACUITY_SCORE: 61

## 2023-01-16 NOTE — PROGRESS NOTES
01/16/23 1500   Appointment Info   Signing Clinician's Name / Credentials (PT) Katherin Garg DPT   Appointment Canceled Reason (PT) At procedure/test;Other (see Cancel Comments row)   Appointment Cancel Comments (PT) PT: Pt at dialysis this AM, unable to see

## 2023-01-16 NOTE — PLAN OF CARE
Problem: Plan of Care - These are the overarching goals to be used throughout the patient stay.    Goal: Plan of Care Review  Description: The Plan of Care Review/Shift note should be completed every shift.  The Outcome Evaluation is a brief statement about your assessment that the patient is improving, declining, or no change.  This information will be displayed automatically on your shift note.  Outcome: Progressing  Flowsheets (Taken 1/16/2023 1704)  Plan of Care Reviewed With: patient  Overall Patient Progress: improving     Problem: Plan of Care - These are the overarching goals to be used throughout the patient stay.    Goal: Optimal Comfort and Wellbeing  Outcome: Progressing     Problem: Plan of Care - These are the overarching goals to be used throughout the patient stay.    Goal: Readiness for Transition of Care  Outcome: Progressing  Flowsheets (Taken 1/16/2023 1704)  Transportation Anticipated: family or friend will provide  Concerns to be Addressed:    medication    discharge planning  Intervention: Mutually Develop Transition Plan  Recent Flowsheet Documentation  Taken 1/16/2023 1704 by Robby Shipley RN  Transportation Anticipated: family or friend will provide  Concerns to be Addressed:    medication    discharge planning     Problem: Plan of Care - These are the overarching goals to be used throughout the patient stay.    Goal: Readiness for Transition of Care  Intervention: Mutually Develop Transition Plan  Recent Flowsheet Documentation  Taken 1/16/2023 1704 by Robby Shipley RN  Transportation Anticipated: family or friend will provide  Concerns to be Addressed:    medication    discharge planning     Problem: TCU Patient Goals  Goal: TCU Plan of Care  Outcome: Progressing  Flowsheets (Taken 1/16/2023 1704)  Teaching Goals: wound care   Goal Outcome Evaluation:      Plan of Care Reviewed With: patient    Overall Patient Progress: improvingOverall Patient Progress: improving      BP (!)  129/97 (BP Location: Right arm)   Pulse 100   Temp 99.8  F (37.7  C) (Oral)   Resp 18   Wt 124.4 kg (274 lb 4 oz)   SpO2 94%   BMI 36.18 kg/m    Iso:  No active isolations  Diet: Regular Diet Adult  Mental Status:     Main Acuity Score: 170  O2:  2 LPM  Mg+:    K:  3.5 (01/16 1630)  PLT: 321 (01/16 1630)  HGB: 10.1 (01/16 1630)  BS: 140 (01/16 1630)  No components found for: TROPL   Infusions: sodium phosphate 15 mmol in D5W 250 mL intermittent infusion   infusing for 4 hours. NOC RN updated.      Alert and oriented x 4 but can be forgetful . Denies having SOB,chills,headache and fever. Continue with POC      Patient's most recent vital signs are:     Vital signs:  BP: 129/97  Temp: 99.8  HR: 100  RR: 18  SpO2: 94 %     Patient does not have new respiratory symptoms.  Patient does not have new sore throat.  Patient does not have a fever greater than 99.5.

## 2023-01-16 NOTE — PLAN OF CARE
Alert, oriented , can be intermittently forgetful.Has been on and off from 02 , currently at 3LPM by NC, comfortable . Pain managed with Oxycodone PRN and Tylenol PRN for back pain . With intact dressing to mid upper back and right heel . Rt. Lower forearm PIV intact, saline locked. Had BM ( small during this shift). For HD tomorrow, patient aware of  schedule. Call light within reach. Comfortable at this time. To continue POC.     Appointment for tomorrow: HD at 0730H                                              0615H > should be at the door for                                              Patient's most recent vital signs are:     Vital signs:  BP: 110/65  Temp: 96.3  HR: 84  RR: 18  SpO2: 96 %     Patient does not have new respiratory symptoms.  Patient does not have new sore throat.  Patient does not  have a fever greater than 99.5.

## 2023-01-16 NOTE — PROGRESS NOTES
[facility] Nurse Inpatient Assessment     Consulted for: Right heel    Patient History (according to provider note(s):      Shay Jimenez is a 57 year old male with with PMH ESRD on dialysis and osteomyelitis s/p C5-T6 fusion who is now POD#5 s/p redo C5-T6 fusion with concern for hemorrhage; no vertebral artery dissection or extravasation was noted on intraoperative angiogram.  He was admitted to the Neuro ICU postoperatively for MAP goals and frequent neuro checks, remaining stable on pressors.  Neuro exam is unchanged from patient's baseline.    Patient transferred to TCU 1/9/2023 for continued rehab.     Areas Assessed:      Areas visualized during today's visit: Feet     Pressure Injury Location: Right heel      1/5 1/16  Last photo: 1/5  Wound type: Pressure Injury     Pressure Injury Stage: 2, present on admission   Wound history/plan of care:  Wound noted by nursing on [place] on 12/6 after patient transferred out of PACU. Patient had been proned in OR so unlikely wound is from then and that it was present on admission.  Wound base: 100 % granulation tissue     Palpation of the wound bed: normal      Drainage: none     Description of drainage: none     Measurements (length x width x depth, in cm) 1 x 1 x 0.2 cm      Tunneling N/A     Undermining N/A  Periwound skin: Intact      Color: normal and consistent with surrounding tissue      Temperature: normal   Odor: none  Pain: no grimacing or signs of discomfort, none  Pain intervention prior to dressing change: patient tolerated well  Treatment goal: Protection  STATUS: healing   Supplies ordered: supplies stored on unit     Treatment Plan:     Right heel wound: Every third day and as needed  Cleanse wound with NS and pat dry  Paint bibi-wound skin with no sting barrier film.  Cover with 4x4 mepilex flex(# [id number]).   Keep heels elevated off bed.   Wear Prevalon boots while in bed. (#  "[id number])    Pressure Injury Prevention (PIP) Plan:  If patient is declining pressure injury prevention interventions: Explore reason why and address patient's concerns, Educate on pressure injury risk and prevention intervention(s), If patient is still declining, document \"informed refusal\"  and Ensure Care team is aware ( provider, charge nurse, etc)  Mattress: Follow bed algorithm, reassess daily and order specialty mattress, if indicated.  HOB: Maintain at or below 30 degrees, unless contraindicated  Repositioning in bed: Every 1-2 hours , Left/right positioning; avoid supine and Raise foot of bed prior to raising head of bed, to reduce patient sliding down (shear)  Heels: Keep elevated off mattress, Pillows under calves and Heel lift boots  Protective Dressing: Sacral Mepilex for prevention (#[id number]),  especially for the agitated patient   Chair positioning: Chair cushion (#[id number]) , Repositions self: patient to shift weight every 15 minutes, Assist patient to reposition hourly and Do NOT use a donut for sitting (this increases pressure to smaller area and creates a higher potential for injury)    If patient has a buttock pressure injury, or high risk for PI use chair cushion or SPS.  Moisture Management: Perineal cleansing /protection: Follow Incontinence Protocol, Avoid brief in bed, Clean and dry skin folds with bathing , Consider InterDry (#[id number]) between folds and Moisturize dry skin  Under Devices: Inspect skin under all medical devices during skin inspection , Ensure tubes are stabilized without tension and Ensure patient is not lying on medical devices or equipment when repositioned  Ask provider to discontinue device when no longer needed.    Orders: Reviewed and Updated    RECOMMEND PRIMARY TEAM ORDER: None, at this time  Education provided: plan of care and wound progress  Discussed plan of care with: Patient  WOC nurse follow-up plan: weekly  Notify WOC if wound(s) deteriorate.  Nursing to notify the " Provider(s) and re-consult the [facility] Nurse if new skin concern.    DATA:     Current support surface: Standard  Low air loss (DEN pump, Isolibrium, Pulsate, skin guard, etc)  BMI: There is no height or weight on file to calculate BMI.   Active diet order: Orders Placed This Encounter      Regular Diet Adult     Output: No intake/output data recorded.     Labs:   Recent Labs   Lab 01/11/23  1722   ALBUMIN 3.0*   HGB 9.6*   WBC 6.6     Pressure injury risk assessment:   Sensory Perception: 3-->slightly limited  Moisture: 3-->occasionally moist  Activity: 3-->walks occasionally  Mobility: 3-->slightly limited  Nutrition: 3-->adequate  Friction and Shear: 2-->potential problem  Konstantin Score: 17       Dept. Pager: [phone]  Dept. Office Number: [phone]

## 2023-01-16 NOTE — PROGRESS NOTES
01/16/23 1403   Appointment Info   Signing Clinician's Name / Credentials (OT) Lala Ozuna OTR   Rehab Comments (OT) -45 pt refused therapy, pt appeared very confused but overall pleasant. Very difficult to redirect.

## 2023-01-16 NOTE — PROGRESS NOTES
Status at Admission - 01/08/2023 Current Status - 01/16/2023   Bed Mobility   Mod A for bilateral rolling using UE, Min A x 1, max A x 1 sit to supine, supine to sit Mod A x 1   Supine to sit SBA,   Transfer   CGA with Irina steady for sit to stand, unable   Sit to  // bars with mod A x 1, blocking at knees, heavy reliance on UE. Pivot transfers with Irina steady A x 1   Gait   Unable    Unable   Stairs   Unable    Unable   Wheelchair Propulsion   Unable   Not tested         Assessment of Progress Since Last Update: Since initial evaluation pt has demonstrated good progression with functional mobility as documented above.    Barriers to Progress: Patient has had several episodes of cognitive status changes and has left for ED on two separate occasions. This has impacted his ability to participate with therapy   Proposed Solutions to Improve Barriers: Medical team is addressing cognitive concerns.   Justification for Continued Therapy Services: Patient has made good progress despite barriers and limited participation due to cognitive challenges. Patient will benefit from therapy to address functional mobility and to address ambulation in order to reduce care giver burden, reduce risk of rehospitilization and to reduce fall risk.   Additional Considerations for Safe and Efficient Discharge: Patient may need equipment, will continue to assess.

## 2023-01-16 NOTE — PLAN OF CARE
Goal Outcome Evaluation:       Pt had no acute issue this shift. Alert and oriented x 4. Able to make needs known to staffs. Pt is a hemodialysis Pt for M.W.F. Sent out this shift. Noted to be sleeping during rounds. Unable to take Protonix due to sleepiness. On 2 L oxygen through out the night. No acute issue noted this shift. Will continue with POC.       Patient's most recent vital signs are:     Vital signs:  BP: 110/65  Temp: 96.3  HR: 84  RR: 18  SpO2: 96 %     Patient does not have new respiratory symptoms.  Patient does not have new sore throat.  Patient does not have a fever greater than 99.5.

## 2023-01-16 NOTE — PLAN OF CARE
Pt is alert and oriented , can be forgetful. Pt left facilityfor hemodialysis appointment this morning at 7:30 am and returned to facility at 1300 by transportation via w/c. Pain managed with PRN Oxycodone for back pain. Pt denied chest pain, SOB, and N/v. Cervical brace on when pt out of bed. Pt reported that his son will bring lunch. Able to make needs known. Call light with in reach. Will continue with plan of care.        Patient's most recent vital signs are:     Vital signs:  BP: 91/51  Temp: 99.1  HR: 69  RR: 18  SpO2: 96 %     Patient does not have new respiratory symptoms.  Patient does not have new sore throat.  Patient does not have a fever greater than 99.5.

## 2023-01-17 ENCOUNTER — APPOINTMENT (OUTPATIENT)
Dept: PHYSICAL THERAPY | Facility: SKILLED NURSING FACILITY | Age: 58
DRG: 949 | End: 2023-01-17
Attending: INTERNAL MEDICINE
Payer: MEDICARE

## 2023-01-17 ENCOUNTER — APPOINTMENT (OUTPATIENT)
Dept: OCCUPATIONAL THERAPY | Facility: SKILLED NURSING FACILITY | Age: 58
DRG: 949 | End: 2023-01-17
Attending: INTERNAL MEDICINE
Payer: MEDICARE

## 2023-01-17 LAB — PHOSPHATE SERPL-MCNC: 2.1 MG/DL (ref 2.5–4.5)

## 2023-01-17 PROCEDURE — 250N000013 HC RX MED GY IP 250 OP 250 PS 637

## 2023-01-17 PROCEDURE — 250N000013 HC RX MED GY IP 250 OP 250 PS 637: Performed by: STUDENT IN AN ORGANIZED HEALTH CARE EDUCATION/TRAINING PROGRAM

## 2023-01-17 PROCEDURE — 99309 SBSQ NF CARE MODERATE MDM 30: CPT | Performed by: INTERNAL MEDICINE

## 2023-01-17 PROCEDURE — 84100 ASSAY OF PHOSPHORUS: CPT | Performed by: INTERNAL MEDICINE

## 2023-01-17 PROCEDURE — 97530 THERAPEUTIC ACTIVITIES: CPT | Mod: GP

## 2023-01-17 PROCEDURE — 120N000009 HC R&B SNF

## 2023-01-17 PROCEDURE — 93005 ELECTROCARDIOGRAM TRACING: CPT

## 2023-01-17 PROCEDURE — 36416 COLLJ CAPILLARY BLOOD SPEC: CPT | Performed by: INTERNAL MEDICINE

## 2023-01-17 PROCEDURE — 250N000013 HC RX MED GY IP 250 OP 250 PS 637: Performed by: PHYSICIAN ASSISTANT

## 2023-01-17 PROCEDURE — 97535 SELF CARE MNGMENT TRAINING: CPT | Mod: GO

## 2023-01-17 PROCEDURE — 250N000009 HC RX 250: Performed by: INTERNAL MEDICINE

## 2023-01-17 PROCEDURE — 258N000003 HC RX IP 258 OP 636: Performed by: INTERNAL MEDICINE

## 2023-01-17 PROCEDURE — 250N000013 HC RX MED GY IP 250 OP 250 PS 637: Performed by: INTERNAL MEDICINE

## 2023-01-17 PROCEDURE — 93010 ELECTROCARDIOGRAM REPORT: CPT | Performed by: INTERNAL MEDICINE

## 2023-01-17 RX ORDER — OXYCODONE HYDROCHLORIDE 5 MG/1
5 TABLET ORAL
Status: COMPLETED | OUTPATIENT
Start: 2023-01-17 | End: 2023-01-17

## 2023-01-17 RX ORDER — SEVELAMER CARBONATE 800 MG/1
800 TABLET, FILM COATED ORAL
Status: DISCONTINUED | OUTPATIENT
Start: 2023-01-17 | End: 2023-02-03 | Stop reason: HOSPADM

## 2023-01-17 RX ADMIN — Medication 10 MG: at 21:21

## 2023-01-17 RX ADMIN — DICLOFENAC SODIUM 4 G: 10 GEL TOPICAL at 08:27

## 2023-01-17 RX ADMIN — MIDODRINE HYDROCHLORIDE 10 MG: 5 TABLET ORAL at 12:19

## 2023-01-17 RX ADMIN — MIDODRINE HYDROCHLORIDE 10 MG: 5 TABLET ORAL at 16:24

## 2023-01-17 RX ADMIN — METHOCARBAMOL 500 MG: 500 TABLET ORAL at 12:19

## 2023-01-17 RX ADMIN — Medication 1 TABLET: at 21:21

## 2023-01-17 RX ADMIN — Medication 100 MG: at 21:21

## 2023-01-17 RX ADMIN — ACETAMINOPHEN 325MG 650 MG: 325 TABLET ORAL at 01:03

## 2023-01-17 RX ADMIN — ATORVASTATIN CALCIUM 20 MG: 20 TABLET, FILM COATED ORAL at 21:21

## 2023-01-17 RX ADMIN — OXYCODONE HYDROCHLORIDE 5 MG: 5 TABLET ORAL at 13:23

## 2023-01-17 RX ADMIN — BACLOFEN 10 MG: 10 TABLET ORAL at 08:25

## 2023-01-17 RX ADMIN — OXYCODONE HYDROCHLORIDE 5 MG: 5 TABLET ORAL at 01:04

## 2023-01-17 RX ADMIN — MIDODRINE HYDROCHLORIDE 10 MG: 5 TABLET ORAL at 08:25

## 2023-01-17 RX ADMIN — APIXABAN 2.5 MG: 2.5 TABLET, FILM COATED ORAL at 21:22

## 2023-01-17 RX ADMIN — ACETAMINOPHEN 325MG 650 MG: 325 TABLET ORAL at 05:22

## 2023-01-17 RX ADMIN — APIXABAN 2.5 MG: 2.5 TABLET, FILM COATED ORAL at 08:25

## 2023-01-17 RX ADMIN — OXYCODONE HYDROCHLORIDE 5 MG: 5 TABLET ORAL at 05:22

## 2023-01-17 RX ADMIN — FLUTICASONE PROPIONATE 1 SPRAY: 50 SPRAY, METERED NASAL at 21:26

## 2023-01-17 RX ADMIN — GABAPENTIN 300 MG: 300 CAPSULE ORAL at 13:23

## 2023-01-17 RX ADMIN — SERTRALINE HYDROCHLORIDE 100 MG: 100 TABLET, FILM COATED ORAL at 21:21

## 2023-01-17 RX ADMIN — SEVELAMER CARBONATE 800 MG: 800 TABLET, FILM COATED ORAL at 19:18

## 2023-01-17 RX ADMIN — METHOCARBAMOL 500 MG: 500 TABLET ORAL at 08:26

## 2023-01-17 RX ADMIN — THIAMINE HCL TAB 100 MG 100 MG: 100 TAB at 21:22

## 2023-01-17 RX ADMIN — OLANZAPINE 5 MG: 2.5 TABLET, FILM COATED ORAL at 21:21

## 2023-01-17 RX ADMIN — SODIUM PHOSPHATE, MONOBASIC, MONOHYDRATE AND SODIUM PHOSPHATE, DIBASIC, ANHYDROUS 9 MMOL: 276; 142 INJECTION, SOLUTION INTRAVENOUS at 08:17

## 2023-01-17 RX ADMIN — GABAPENTIN 300 MG: 300 CAPSULE ORAL at 08:25

## 2023-01-17 RX ADMIN — FOLIC ACID 1 MG: 1 TABLET ORAL at 21:21

## 2023-01-17 RX ADMIN — FLUTICASONE PROPIONATE 1 SPRAY: 50 SPRAY, METERED NASAL at 08:27

## 2023-01-17 RX ADMIN — DICLOFENAC SODIUM 4 G: 10 GEL TOPICAL at 16:27

## 2023-01-17 RX ADMIN — GABAPENTIN 300 MG: 300 CAPSULE ORAL at 21:21

## 2023-01-17 RX ADMIN — ACETAMINOPHEN 325MG 650 MG: 325 TABLET ORAL at 13:23

## 2023-01-17 RX ADMIN — SEVELAMER CARBONATE 800 MG: 800 TABLET, FILM COATED ORAL at 12:19

## 2023-01-17 RX ADMIN — DICLOFENAC SODIUM 4 G: 10 GEL TOPICAL at 21:26

## 2023-01-17 RX ADMIN — METHOCARBAMOL 500 MG: 500 TABLET ORAL at 21:22

## 2023-01-17 RX ADMIN — OXYCODONE HYDROCHLORIDE 5 MG: 5 TABLET ORAL at 21:20

## 2023-01-17 RX ADMIN — METHOCARBAMOL 500 MG: 500 TABLET ORAL at 16:24

## 2023-01-17 RX ADMIN — CETIRIZINE HYDROCHLORIDE 10 MG: 10 TABLET, FILM COATED ORAL at 21:22

## 2023-01-17 RX ADMIN — FINASTERIDE 1.3 MG: 5 TABLET, FILM COATED ORAL at 21:25

## 2023-01-17 RX ADMIN — BACLOFEN 10 MG: 10 TABLET ORAL at 21:22

## 2023-01-17 RX ADMIN — PANTOPRAZOLE SODIUM 40 MG: 40 TABLET, DELAYED RELEASE ORAL at 05:22

## 2023-01-17 RX ADMIN — DICLOFENAC SODIUM 4 G: 10 GEL TOPICAL at 12:19

## 2023-01-17 ASSESSMENT — ACTIVITIES OF DAILY LIVING (ADL)
ADLS_ACUITY_SCORE: 57
ADLS_ACUITY_SCORE: 61
ADLS_ACUITY_SCORE: 57
ADLS_ACUITY_SCORE: 57
ADLS_ACUITY_SCORE: 61

## 2023-01-17 NOTE — PLAN OF CARE
"Goal Outcome Evaluation:  Pt.A&Ox4. Requested Oxycodone for \"breakthrough pain\". Writer informed pt.that he received Oxycodone @ 2140 and due next in 8hrs but could get Tylenol. Pt.stated Tylenol does not help and pt.started to become agitated. Writer paged H.O.and rec'd one time order of Oxycodone 5mg po which was administered with Tylenol @ 0100. Pt.now on standard Phos.replacement and was completed ~0200 - recheck will be done this AM. Has PIV R forearm. Incontinent small amounts urine / oliguric and has HD MWF. Incontinent small, formed BM.    0530 - Pt.with minimal sleep, if any, and frequent call light use throughout shift. Tylenol and Oxycodone @ 0520 per request. Writer inquired about sleep and pt.replied that he slept @ dialysis \"@ least 4hrs\"      Patient's most recent vital signs are:     Vital signs:  BP: 129/97  Temp: 99.8  HR: 100  RR: 18  SpO2: 94 %     Patient does not have new respiratory symptoms.  Patient does not have new sore throat.  Patient does not have a fever greater than 99.5.                             "

## 2023-01-17 NOTE — PROGRESS NOTES
[facility]    Medicine Progress Note - Hospitalist Service    Date of Admission:  1/9/2023    Assessment & Plan   Shay Jimenez is a 56 yo obese gentleman w/ h/o HTN, ESRD on HD, GERD, GINI intolerant of CPAP, chronic atrial fibrillation on chronic Eliquis, alcohol ause, had previous C5-T6 spinal fusion due to pathological fracture from osteomyelitis.    He was noted to have discitis/osteomyelitis on CT of chest back in 2/2022 that was obtained for respiratory issues.      He was hospitalized at [facility] and was to follow up with [facility] spine (no records and pt has no recollection if anything was done). In 6/2022 MRI showed T2, T3 vertebral collapse with myelopathy, some abnormal epidural tissue and inflammation, at that time antibiotics was deferred as pt was stable and was planned for obtaining intraop cx.  Intra-op cx was + on day 5 and day 7 for C. Acnes that was felt to be contamination, however due to presence of hardware it was decided to treat with ancef x 2 weeks followed by  amoxicillin x 4 weeks.     He then was treated for Morganella morganii septic shock, no intra-abd source found on CT.  TTE did not show vegetations was treated with almost 9 weeks of iv cefepime 8/14-10/13.     On repeat imaging was found to have hardware failure and was admitted on 12/6/22 to [facility] [place] Neurosurgeon service for surgery.  He underwent C5-T6 redo cervical spine fusion 12/6/22.  He did have hemorrhage intraop, cerebral angio did not show vertebral artery injury, did receive blood, was in ICU on pressors to keep blood pressure up for perfusion.     Also received periop cefazolin till drain pulled. Intraop culture remained negative and there was no indication for ongoing antibiotics per ID.  Hospital course was complicated by agitation and delirium.  He treated w/ benzo per CIWA,  He was also noted to in aute on chronic respiratory failure and did require supplement Oxygen.   Transferred to [facility] 1/7/23 for conditioning.     Patient's TCU course has been complicated by waxing and waning mentation, behavioral issues, mostly notable on 1/9/23 while in dialysis at [facility], evaluated subsequently at [facility] but work-up including CT head without contrast, CTA head and neck, CBC, BMP, troponin and EKG; all of the aforementioned test were negative and his mentation improved. He was seen by Psychiatry on 1/12/23 for this delirium and was started on Zyprexa 5 mg at bedtime as well as Aripiprazole 2 mg BID prn.        #Hardware failure, pseudoarthritis   #S/p C5-T6 redo cervical spine fusion 12/6/22  #Previous h/o C5-T6 fusion for pathological fracture due to osteomyelitis   -Intraop culture remained neg and per ID there was no indication for ongoing antibiotics     Needs cervical collar when HOB > 45 degrees and when OOB     #Acute metabolic encephalopathy, recurrent, resolved at this time  -Etiology remained elusive  -He had recurrent confusion, agitation and delirium during his hospitalization at the [facility], 12/6 - 1/9/23  -Pt was seen at [facility] ED on 1/9/23 after HD when he became very agitated, confused, disoriented, argumentative and irritable at the dialysis center.  CT head w/o contrast and CTA head and neck were negative.  -  - seen by Psychiatry    Olanzapine 5 mg at bedtime    Aripirpazole 2 mg bid prn      #Hypertension   -Not on meds    Continue midodrine 10 mg tid but hold if blood pressure  > 120      #Chronic Atrial fibrillation   -He was seen by cardiology in the past   -He had ZIO patch in past but apparently results were lost  -Currently well rate controlled w/o meds    On eliquis 2.5 mg for stroke prophylaxis     #Mild to moderate pulm HTN  #Moderate MR  -Could all be related to untreated GINI but has not tolerated CPAP     Will need to follow up as out patient       #Chronic resp failure  #GINI   #Obesity   -Intolerant to CPAP  -Uses O2 at night 1-3 liters,  continue     #Acute on chronic pain  #Peripheral neuropathy    Continue baclofen, gabapentin and methocarbamol      #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   #Hypophosphatemia     Continue HD  M/W/F      kg     Decreased sevelemer to 800 mg tid    Check phos level again tomorrow  -Refused renal diet      #Chronic hyponatremia - resolved    Managed by nephrology during HD      #ACD  -Due to ESKD  -Managed during HD w/ epo    #HDL    Continue atorvastatin      #Anxiety d/o  #Depression   #OCD     Continue Setraline 100 mg every day      #Alcohol use disorder  -Drink 5-6 cocktail a day (vodka )     Continue vitamins   -Does not want to see chem dep     Continue baclofen and gabapentin as can also help with craving      #Right heal pressure sore    WOCN to see      Diet: Regular Diet Adult  DVT Prophylaxis: eliquis   Oliveira Catheter: Not present  Lines: None     Cardiac Monitoring: None  Code Status: Full Code    Disposition Plan   01/27/23    Psych to finalize meds prior to discharge, ?nocturnal O2 test for whether home O2 needed.           ABRIL MONIQUE MD  Hospitalist Service  [facility]  Securely message with [employer] (more info)  Text page via [employer] Paging/Directory   ___________________________________________________________________    Interval History   Patient went for HD yesterday  Hemodynamically stable, afebrile, used supplemtal O2 at night       Physical Exam   Vital Signs: Temp: 98.5  F (36.9  C) Temp src: Oral BP: 93/56 Pulse: 84   Resp: 18 SpO2: 94 % O2 Device: None (Room air)    Weight: 274 lbs 4.04 oz    General Appearance: Lying comfortably in bed, on supplemental O2 for comfirt, in no acute distress or discomfort  HEENT: PERRL: EOMI; moist mucous membrane w/o lesions  Neck: No JVD  Pulmonary: Clear to auscultation bilaterally, no wheezes or crackles  CVS: Regular rhythm, systolic murmur, no rubs or gallops  GI: BS (+), soft nontender, no rebound or guarding    Extremities: Bilateral LE edema   Skin: No rashes or lesions  Neurologic: A&O x3  Psych: Mood is appropriate        Data     I have personally reviewed the following data over the past 24 hrs:    5.7  \   10.1 (L)   / 321     140 99 17 /  140 (H)   3.5 34 (H) 3.96 (H) \

## 2023-01-17 NOTE — PROGRESS NOTES
SW met with pt at bedside to f/u with pt on status of FMLA paperwork. SW explained that SW Catrina had called pt insurance who reported that they never received pt's FMLA paperwork while in the hospital. SW received signature from pt to complete FMLA paperwork again. SW explained that SW would bring docs to pt doctor to complete and then SW will fax completed documentation to employer. Pt also reported that pt is newly on Medicare d/t being on dialysis. Pt asked SW how much Medicare would pay for dialysis. SW uncertain and will look into and f/u with pt.     Arti Lay, SHAKIRA  [facility]   [facility]

## 2023-01-17 NOTE — PLAN OF CARE
07:30 : Pt Phosphorus 2.1 abnormal lab results from today , notified provider. Replaced RN managed  phosphorus IV infusion for 4 hrs. Pt tolerated well during phosphorus infusion. Writer monitored closely during infusion. Phosphors recheck labs due tomorrow ( 1/18/23) morning. Pt had EKG at 11:00 am results are back.  Pt is alert and oriented , can be forgetful. Pt is co-operative with cares.Pain managed with PRN Oxycodone and Tylenol for back pain. Pt denied chest pain, SOB, and N/V. Cervical brace on when pt out of bed.Pt is up in chair sitting during  the shift.Pt participated in Pt and OT.  Able to make needs known. Call light with in reach. Will continue with plan of care.          Patient's most recent vital signs are:     Vital signs:  BP: 93/56  Temp: 98.5  HR: 84  RR: 18  SpO2: 94 %     Patient does not have new respiratory symptoms.  Patient does not have new sore throat.  Patient does not have a fever greater than 99.5.

## 2023-01-18 ENCOUNTER — APPOINTMENT (OUTPATIENT)
Dept: PHYSICAL THERAPY | Facility: SKILLED NURSING FACILITY | Age: 58
DRG: 949 | End: 2023-01-18
Attending: INTERNAL MEDICINE
Payer: MEDICARE

## 2023-01-18 LAB
ATRIAL RATE - MUSE: 82 BPM
DIASTOLIC BLOOD PRESSURE - MUSE: NORMAL MMHG
INTERPRETATION ECG - MUSE: NORMAL
P AXIS - MUSE: NORMAL DEGREES
PHOSPHATE SERPL-MCNC: 2.6 MG/DL (ref 2.5–4.5)
PR INTERVAL - MUSE: 246 MS
QRS DURATION - MUSE: 52 MS
QT - MUSE: 394 MS
QTC - MUSE: 460 MS
R AXIS - MUSE: 3 DEGREES
SYSTOLIC BLOOD PRESSURE - MUSE: NORMAL MMHG
T AXIS - MUSE: 69 DEGREES
VENTRICULAR RATE- MUSE: 82 BPM

## 2023-01-18 PROCEDURE — 250N000013 HC RX MED GY IP 250 OP 250 PS 637: Performed by: PHYSICIAN ASSISTANT

## 2023-01-18 PROCEDURE — 120N000009 HC R&B SNF

## 2023-01-18 PROCEDURE — 250N000013 HC RX MED GY IP 250 OP 250 PS 637: Performed by: INTERNAL MEDICINE

## 2023-01-18 PROCEDURE — 84100 ASSAY OF PHOSPHORUS: CPT | Performed by: INTERNAL MEDICINE

## 2023-01-18 PROCEDURE — 250N000013 HC RX MED GY IP 250 OP 250 PS 637

## 2023-01-18 PROCEDURE — 36415 COLL VENOUS BLD VENIPUNCTURE: CPT | Performed by: INTERNAL MEDICINE

## 2023-01-18 PROCEDURE — 97530 THERAPEUTIC ACTIVITIES: CPT | Mod: GP | Performed by: STUDENT IN AN ORGANIZED HEALTH CARE EDUCATION/TRAINING PROGRAM

## 2023-01-18 RX ADMIN — DICLOFENAC SODIUM 4 G: 10 GEL TOPICAL at 17:10

## 2023-01-18 RX ADMIN — FINASTERIDE 1.3 MG: 5 TABLET, FILM COATED ORAL at 21:27

## 2023-01-18 RX ADMIN — MIDODRINE HYDROCHLORIDE 10 MG: 5 TABLET ORAL at 17:07

## 2023-01-18 RX ADMIN — DICLOFENAC SODIUM 4 G: 10 GEL TOPICAL at 13:28

## 2023-01-18 RX ADMIN — SERTRALINE HYDROCHLORIDE 100 MG: 100 TABLET, FILM COATED ORAL at 21:23

## 2023-01-18 RX ADMIN — FLUTICASONE PROPIONATE 1 SPRAY: 50 SPRAY, METERED NASAL at 21:27

## 2023-01-18 RX ADMIN — SEVELAMER CARBONATE 800 MG: 800 TABLET, FILM COATED ORAL at 19:08

## 2023-01-18 RX ADMIN — FOLIC ACID 1 MG: 1 TABLET ORAL at 21:23

## 2023-01-18 RX ADMIN — MIDODRINE HYDROCHLORIDE 10 MG: 5 TABLET ORAL at 13:27

## 2023-01-18 RX ADMIN — GABAPENTIN 300 MG: 300 CAPSULE ORAL at 21:24

## 2023-01-18 RX ADMIN — ATORVASTATIN CALCIUM 20 MG: 20 TABLET, FILM COATED ORAL at 21:23

## 2023-01-18 RX ADMIN — METHOCARBAMOL 500 MG: 500 TABLET ORAL at 13:28

## 2023-01-18 RX ADMIN — OLANZAPINE 5 MG: 2.5 TABLET, FILM COATED ORAL at 21:24

## 2023-01-18 RX ADMIN — Medication 100 MG: at 21:23

## 2023-01-18 RX ADMIN — DICLOFENAC SODIUM 4 G: 10 GEL TOPICAL at 21:28

## 2023-01-18 RX ADMIN — BACLOFEN 10 MG: 10 TABLET ORAL at 21:23

## 2023-01-18 RX ADMIN — FLUTICASONE PROPIONATE 1 SPRAY: 50 SPRAY, METERED NASAL at 06:07

## 2023-01-18 RX ADMIN — METHOCARBAMOL 500 MG: 500 TABLET ORAL at 21:23

## 2023-01-18 RX ADMIN — GABAPENTIN 300 MG: 300 CAPSULE ORAL at 13:27

## 2023-01-18 RX ADMIN — Medication 10 MG: at 21:23

## 2023-01-18 RX ADMIN — CETIRIZINE HYDROCHLORIDE 10 MG: 10 TABLET, FILM COATED ORAL at 21:23

## 2023-01-18 RX ADMIN — METHOCARBAMOL 500 MG: 500 TABLET ORAL at 17:08

## 2023-01-18 RX ADMIN — Medication 1 TABLET: at 21:23

## 2023-01-18 RX ADMIN — OXYCODONE HYDROCHLORIDE 5 MG: 5 TABLET ORAL at 21:22

## 2023-01-18 RX ADMIN — APIXABAN 2.5 MG: 2.5 TABLET, FILM COATED ORAL at 21:23

## 2023-01-18 RX ADMIN — PANTOPRAZOLE SODIUM 40 MG: 40 TABLET, DELAYED RELEASE ORAL at 05:51

## 2023-01-18 RX ADMIN — OXYCODONE HYDROCHLORIDE 5 MG: 5 TABLET ORAL at 13:26

## 2023-01-18 RX ADMIN — ARIPIPRAZOLE 2 MG: 2 TABLET ORAL at 21:22

## 2023-01-18 RX ADMIN — SEVELAMER CARBONATE 800 MG: 800 TABLET, FILM COATED ORAL at 13:27

## 2023-01-18 RX ADMIN — THIAMINE HCL TAB 100 MG 100 MG: 100 TAB at 21:23

## 2023-01-18 ASSESSMENT — ACTIVITIES OF DAILY LIVING (ADL)
ADLS_ACUITY_SCORE: 57

## 2023-01-18 NOTE — PROGRESS NOTES
SHAKIRA received completed FMLA paperwork from provider, Malcom Campbell MD.     SHAKIRA faxed pt's completed FMLA paperwork to [employer] (F: [phone]).     SHAKIRA met with pt at bedside to follow up with pt about FMLA paperwork faxed. SHAKIRA also provided pt with [employer] Billing phone number ([phone]) per pt request. SHAKIRA also provided pt with information about what Medicare should pay for regarding pt's dialysis. SHAKIRA advised pt to follow up with insurance, especially [facility] insurance, to get more policy specific information and if they would assist with copays. Pt had no other questions at this time.     ABELINO Sarah  [facility]   [facility]

## 2023-01-18 NOTE — PLAN OF CARE
Goal Outcome Evaluation:       Pt is alert and oriented x 4. Able to make needs known to staffs. Pt can be very forgetful. On the call light on and off this shift. Pt requires assistance of 2 with transfer. Continent of bowel using a bed pan. On hemodialysis M,W,F. Due to be picked up at 0615 AM. Pt aware of dialysis appointment. Pt is on oxygen via nasal canula at 3 L. Pt saturation above 95 through out the shift. Pt right lower forearm PIV intact with dressing clean and dry. Saline lock. Pt pain managed this shift with PRN oxycodone. Verbalized effectiveness. Awake through out the shift. No acute issue noted. Will continue with POC.       Patient's most recent vital signs are:     Vital signs:  BP: 68/48  Temp: 96.5  HR: 70  RR: 17  SpO2: 96 %     Patient does not have new respiratory symptoms.  Patient does not have new sore throat.  Patient does not have a fever greater than 99.5.

## 2023-01-18 NOTE — PLAN OF CARE
Problem: TCU Patient Goals  Goal: TCU Plan of Care  Outcome: Progressing  Flowsheets (Taken 1/18/2023 8283)  Bowel: incontinent  Bladder: (Patient is on HD) --  Meal time plan: Patient to be in chair for meals  Teaching Goals: wound care  Skin Integrity:    repositioning    specialty mattress   Goal Outcome Evaluation:  Patient came back from Dialysis.Called and stated he wants to go for his Dialysis, reoriented and told him that he just came back from dialysis and he settled. Taken all his medications. Continue with current plan of care.    Blood pressure (!) 68/48, pulse 70, temperature (!) 96.5  F (35.8  C), temperature source Oral, resp. rate 17, weight 124.4 kg (274 lb 4 oz), SpO2 96 %.

## 2023-01-19 ENCOUNTER — APPOINTMENT (OUTPATIENT)
Dept: OCCUPATIONAL THERAPY | Facility: SKILLED NURSING FACILITY | Age: 58
DRG: 949 | End: 2023-01-19
Attending: INTERNAL MEDICINE
Payer: MEDICARE

## 2023-01-19 LAB — PHOSPHATE SERPL-MCNC: 1.8 MG/DL (ref 2.5–4.5)

## 2023-01-19 PROCEDURE — 84100 ASSAY OF PHOSPHORUS: CPT | Performed by: INTERNAL MEDICINE

## 2023-01-19 PROCEDURE — 36416 COLLJ CAPILLARY BLOOD SPEC: CPT | Performed by: INTERNAL MEDICINE

## 2023-01-19 PROCEDURE — 250N000013 HC RX MED GY IP 250 OP 250 PS 637: Performed by: PHYSICIAN ASSISTANT

## 2023-01-19 PROCEDURE — 258N000003 HC RX IP 258 OP 636: Performed by: INTERNAL MEDICINE

## 2023-01-19 PROCEDURE — 250N000013 HC RX MED GY IP 250 OP 250 PS 637

## 2023-01-19 PROCEDURE — 120N000009 HC R&B SNF

## 2023-01-19 PROCEDURE — 250N000013 HC RX MED GY IP 250 OP 250 PS 637: Performed by: INTERNAL MEDICINE

## 2023-01-19 PROCEDURE — 36415 COLL VENOUS BLD VENIPUNCTURE: CPT | Performed by: INTERNAL MEDICINE

## 2023-01-19 PROCEDURE — 250N000009 HC RX 250: Performed by: INTERNAL MEDICINE

## 2023-01-19 RX ADMIN — Medication 1 TABLET: at 20:27

## 2023-01-19 RX ADMIN — GABAPENTIN 300 MG: 300 CAPSULE ORAL at 14:30

## 2023-01-19 RX ADMIN — OLANZAPINE 5 MG: 2.5 TABLET, FILM COATED ORAL at 20:28

## 2023-01-19 RX ADMIN — MIDODRINE HYDROCHLORIDE 10 MG: 5 TABLET ORAL at 11:14

## 2023-01-19 RX ADMIN — MIDODRINE HYDROCHLORIDE 10 MG: 5 TABLET ORAL at 18:20

## 2023-01-19 RX ADMIN — GABAPENTIN 300 MG: 300 CAPSULE ORAL at 11:12

## 2023-01-19 RX ADMIN — METHOCARBAMOL 500 MG: 500 TABLET ORAL at 14:30

## 2023-01-19 RX ADMIN — GABAPENTIN 300 MG: 300 CAPSULE ORAL at 20:27

## 2023-01-19 RX ADMIN — SERTRALINE HYDROCHLORIDE 100 MG: 100 TABLET, FILM COATED ORAL at 20:27

## 2023-01-19 RX ADMIN — METHOCARBAMOL 500 MG: 500 TABLET ORAL at 11:11

## 2023-01-19 RX ADMIN — APIXABAN 2.5 MG: 2.5 TABLET, FILM COATED ORAL at 20:27

## 2023-01-19 RX ADMIN — Medication 100 MG: at 20:28

## 2023-01-19 RX ADMIN — CETIRIZINE HYDROCHLORIDE 10 MG: 10 TABLET, FILM COATED ORAL at 20:27

## 2023-01-19 RX ADMIN — SODIUM PHOSPHATE, MONOBASIC, MONOHYDRATE AND SODIUM PHOSPHATE, DIBASIC, ANHYDROUS 15 MMOL: 276; 142 INJECTION, SOLUTION INTRAVENOUS at 14:50

## 2023-01-19 RX ADMIN — OXYCODONE HYDROCHLORIDE 5 MG: 5 TABLET ORAL at 22:15

## 2023-01-19 RX ADMIN — FLUTICASONE PROPIONATE 1 SPRAY: 50 SPRAY, METERED NASAL at 11:13

## 2023-01-19 RX ADMIN — ATORVASTATIN CALCIUM 20 MG: 20 TABLET, FILM COATED ORAL at 20:27

## 2023-01-19 RX ADMIN — APIXABAN 2.5 MG: 2.5 TABLET, FILM COATED ORAL at 11:11

## 2023-01-19 RX ADMIN — METHOCARBAMOL 500 MG: 500 TABLET ORAL at 20:27

## 2023-01-19 RX ADMIN — OXYCODONE HYDROCHLORIDE 5 MG: 5 TABLET ORAL at 05:12

## 2023-01-19 RX ADMIN — BACLOFEN 10 MG: 10 TABLET ORAL at 11:11

## 2023-01-19 RX ADMIN — DICLOFENAC SODIUM 4 G: 10 GEL TOPICAL at 16:53

## 2023-01-19 RX ADMIN — METHOCARBAMOL 500 MG: 500 TABLET ORAL at 16:51

## 2023-01-19 RX ADMIN — DICLOFENAC SODIUM 4 G: 10 GEL TOPICAL at 20:30

## 2023-01-19 RX ADMIN — Medication 10 MG: at 22:15

## 2023-01-19 RX ADMIN — PANTOPRAZOLE SODIUM 40 MG: 40 TABLET, DELAYED RELEASE ORAL at 04:39

## 2023-01-19 RX ADMIN — OXYCODONE HYDROCHLORIDE 5 MG: 5 TABLET ORAL at 14:30

## 2023-01-19 RX ADMIN — DICLOFENAC SODIUM 4 G: 10 GEL TOPICAL at 11:17

## 2023-01-19 RX ADMIN — ACETAMINOPHEN 325MG 650 MG: 325 TABLET ORAL at 16:51

## 2023-01-19 RX ADMIN — FOLIC ACID 1 MG: 1 TABLET ORAL at 20:27

## 2023-01-19 RX ADMIN — FINASTERIDE 1.3 MG: 5 TABLET, FILM COATED ORAL at 20:30

## 2023-01-19 RX ADMIN — THIAMINE HCL TAB 100 MG 100 MG: 100 TAB at 20:27

## 2023-01-19 RX ADMIN — ACETAMINOPHEN 325MG 650 MG: 325 TABLET ORAL at 04:39

## 2023-01-19 RX ADMIN — FLUTICASONE PROPIONATE 1 SPRAY: 50 SPRAY, METERED NASAL at 20:31

## 2023-01-19 RX ADMIN — BACLOFEN 10 MG: 10 TABLET ORAL at 20:27

## 2023-01-19 RX ADMIN — ACETAMINOPHEN 325MG 650 MG: 325 TABLET ORAL at 11:34

## 2023-01-19 ASSESSMENT — ACTIVITIES OF DAILY LIVING (ADL)
ADLS_ACUITY_SCORE: 57
ADLS_ACUITY_SCORE: 54
ADLS_ACUITY_SCORE: 57
ADLS_ACUITY_SCORE: 54
ADLS_ACUITY_SCORE: 57

## 2023-01-19 NOTE — PLAN OF CARE
Goal Outcome Evaluation:       Overall Pt had no acute issue this shift. Alert and oriented x 4. Able to make needs known to staffs. Pt is continent of bowel. Oliguric and on hemodialysis MWF. Pt takes medication whole with thin liquid. Requires assistance of 2 with transfers using a Liko lift. Pt is on oxygen with saturation above 95 through out the shift. Pt denied having chest pain, SOB, N/V, fever and chills. Complained of pain which was managed with PRN oxycodone and Tylenol. Pt verbalized effectiveness. All safety measures in place this shift. Will continue with POC.      Patient's most recent vital signs are:     Vital signs:  BP: 83/44  Temp: 98.6  HR: 91  RR: 17  SpO2: 92 %     Patient does not have new respiratory symptoms.  Patient does not have new sore throat.  Patient does not have a fever greater than 99.5.

## 2023-01-19 NOTE — PROGRESS NOTES
01/19/23 0916   Appointment Info   Signing Clinician's Name / Credentials (PT) Charisma Osborn DPT   Appointment Canceled Reason (PT) Unarousable   Appointment Cancel Comments (PT) PT: attempted to rouse pt with voice and touch, pt snoring loudly and does not respond

## 2023-01-19 NOTE — PROGRESS NOTES
SW called son/Cory.  We set up care conference on the 24th at 10 am.  Cory said he saw pt the other day. There is no way they can care for pt at this time with no improvement.     SHAKIRA met with pt.  Pt was short with SW.  SW talked about the above info.  Pt said he would rather go homeless than go into an assisted.  SW said that was going to happen.  Pt said he has a week to walk. That is his plan.  Pt won't plan for the alternative.     JULISSA Forrest   [facility], Transitional Care Unit   Social Work   [facility] [place] [place] [address], [address]th [place] [address]  (PH) [phone]

## 2023-01-19 NOTE — PLAN OF CARE
Goal Outcome Evaluation:    VS: BP (!) 89/59   Pulse 68   Temp (!) 96.5  F (35.8  C) (Axillary)   Resp 16   Wt 124.4 kg (274 lb 4 oz)   SpO2 96%   BMI 36.18 kg/m       O2: RA upon waking  2 LPM humidified NC while sleeping   Output: Oliguric; pt on HD   Last BM: 1/18   Activity: Up in w/c; slept through therapy  A-2 Irina Steady from bed to w/c  Making phone calls, watching tv   Skin: X; Left arm HD fistula  Back  Buttocks  Heel  R PIV   Pain: Oxy Q8H  Tylenol Q4H   CMS Neuro: BLE edema 3+   A&O once awake   Dressing: Cervical collar when OOB   Diet: Reg   LDA: Right PIV CDI; Ph+ infusing w/ no s/s of complications  Cervical collar   Equipment: IV pole, pump   Plan: Con't POC.    Additional Info: Ph+ 1.8; replacement hung  Lab redraw scheduled for 2130.   Pt's surgical appt rescheduled for next week.       Patient's most recent vital signs are:     Vital signs:  BP: 89/59  Temp: 96.5  HR: 68  RR: 16  SpO2: 96 %     Patient does not have new respiratory symptoms.  Patient does not have new sore throat.  Patient does not have a fever greater than 99.5.

## 2023-01-20 ENCOUNTER — APPOINTMENT (OUTPATIENT)
Dept: OCCUPATIONAL THERAPY | Facility: SKILLED NURSING FACILITY | Age: 58
DRG: 949 | End: 2023-01-20
Attending: INTERNAL MEDICINE
Payer: MEDICARE

## 2023-01-20 ENCOUNTER — APPOINTMENT (OUTPATIENT)
Dept: LAB | Facility: CLINIC | Age: 58
End: 2023-01-20
Payer: MEDICARE

## 2023-01-20 ENCOUNTER — APPOINTMENT (OUTPATIENT)
Dept: PHYSICAL THERAPY | Facility: SKILLED NURSING FACILITY | Age: 58
DRG: 949 | End: 2023-01-20
Attending: INTERNAL MEDICINE
Payer: MEDICARE

## 2023-01-20 LAB
HOLD SPECIMEN: NORMAL
PHOSPHATE SERPL-MCNC: 2.3 MG/DL (ref 2.5–4.5)
PHOSPHATE SERPL-MCNC: 3.7 MG/DL (ref 2.5–4.5)

## 2023-01-20 PROCEDURE — 120N000009 HC R&B SNF

## 2023-01-20 PROCEDURE — 97530 THERAPEUTIC ACTIVITIES: CPT | Mod: GP | Performed by: STUDENT IN AN ORGANIZED HEALTH CARE EDUCATION/TRAINING PROGRAM

## 2023-01-20 PROCEDURE — 250N000013 HC RX MED GY IP 250 OP 250 PS 637

## 2023-01-20 PROCEDURE — 36416 COLLJ CAPILLARY BLOOD SPEC: CPT | Performed by: INTERNAL MEDICINE

## 2023-01-20 PROCEDURE — G0463 HOSPITAL OUTPT CLINIC VISIT: HCPCS

## 2023-01-20 PROCEDURE — 84100 ASSAY OF PHOSPHORUS: CPT | Performed by: INTERNAL MEDICINE

## 2023-01-20 PROCEDURE — 97535 SELF CARE MNGMENT TRAINING: CPT | Mod: GO

## 2023-01-20 PROCEDURE — 250N000013 HC RX MED GY IP 250 OP 250 PS 637: Performed by: INTERNAL MEDICINE

## 2023-01-20 PROCEDURE — 250N000013 HC RX MED GY IP 250 OP 250 PS 637: Performed by: PHYSICIAN ASSISTANT

## 2023-01-20 PROCEDURE — 258N000003 HC RX IP 258 OP 636: Performed by: INTERNAL MEDICINE

## 2023-01-20 PROCEDURE — 250N000009 HC RX 250: Performed by: INTERNAL MEDICINE

## 2023-01-20 PROCEDURE — 97110 THERAPEUTIC EXERCISES: CPT | Mod: GP | Performed by: STUDENT IN AN ORGANIZED HEALTH CARE EDUCATION/TRAINING PROGRAM

## 2023-01-20 RX ADMIN — PANTOPRAZOLE SODIUM 40 MG: 40 TABLET, DELAYED RELEASE ORAL at 06:20

## 2023-01-20 RX ADMIN — SODIUM PHOSPHATE, MONOBASIC, MONOHYDRATE AND SODIUM PHOSPHATE, DIBASIC, ANHYDROUS 9 MMOL: 276; 142 INJECTION, SOLUTION INTRAVENOUS at 02:14

## 2023-01-20 RX ADMIN — DICLOFENAC SODIUM 4 G: 10 GEL TOPICAL at 14:35

## 2023-01-20 RX ADMIN — FLUTICASONE PROPIONATE 1 SPRAY: 50 SPRAY, METERED NASAL at 11:27

## 2023-01-20 RX ADMIN — Medication 100 MG: at 21:54

## 2023-01-20 RX ADMIN — OXYCODONE HYDROCHLORIDE 5 MG: 5 TABLET ORAL at 21:54

## 2023-01-20 RX ADMIN — GABAPENTIN 300 MG: 300 CAPSULE ORAL at 21:53

## 2023-01-20 RX ADMIN — ACETAMINOPHEN 325MG 650 MG: 325 TABLET ORAL at 18:55

## 2023-01-20 RX ADMIN — FLUTICASONE PROPIONATE 1 SPRAY: 50 SPRAY, METERED NASAL at 21:55

## 2023-01-20 RX ADMIN — FINASTERIDE 1.3 MG: 5 TABLET, FILM COATED ORAL at 21:52

## 2023-01-20 RX ADMIN — GABAPENTIN 300 MG: 300 CAPSULE ORAL at 14:35

## 2023-01-20 RX ADMIN — ATORVASTATIN CALCIUM 20 MG: 20 TABLET, FILM COATED ORAL at 21:54

## 2023-01-20 RX ADMIN — OLANZAPINE 5 MG: 2.5 TABLET, FILM COATED ORAL at 21:54

## 2023-01-20 RX ADMIN — METHOCARBAMOL 500 MG: 500 TABLET ORAL at 14:35

## 2023-01-20 RX ADMIN — METHOCARBAMOL 500 MG: 500 TABLET ORAL at 11:20

## 2023-01-20 RX ADMIN — MIDODRINE HYDROCHLORIDE 10 MG: 5 TABLET ORAL at 18:02

## 2023-01-20 RX ADMIN — BACLOFEN 10 MG: 10 TABLET ORAL at 21:54

## 2023-01-20 RX ADMIN — OXYCODONE HYDROCHLORIDE 5 MG: 5 TABLET ORAL at 14:35

## 2023-01-20 RX ADMIN — SERTRALINE HYDROCHLORIDE 100 MG: 100 TABLET, FILM COATED ORAL at 21:53

## 2023-01-20 RX ADMIN — APIXABAN 2.5 MG: 2.5 TABLET, FILM COATED ORAL at 21:53

## 2023-01-20 RX ADMIN — BACLOFEN 10 MG: 10 TABLET ORAL at 11:29

## 2023-01-20 RX ADMIN — THIAMINE HCL TAB 100 MG 100 MG: 100 TAB at 21:55

## 2023-01-20 RX ADMIN — APIXABAN 2.5 MG: 2.5 TABLET, FILM COATED ORAL at 11:29

## 2023-01-20 RX ADMIN — DICLOFENAC SODIUM 4 G: 10 GEL TOPICAL at 11:27

## 2023-01-20 RX ADMIN — METHOCARBAMOL 500 MG: 500 TABLET ORAL at 18:02

## 2023-01-20 RX ADMIN — OXYCODONE HYDROCHLORIDE 5 MG: 5 TABLET ORAL at 06:20

## 2023-01-20 RX ADMIN — ACETAMINOPHEN 325MG 650 MG: 325 TABLET ORAL at 11:27

## 2023-01-20 RX ADMIN — Medication 10 MG: at 21:53

## 2023-01-20 RX ADMIN — MIDODRINE HYDROCHLORIDE 10 MG: 5 TABLET ORAL at 11:35

## 2023-01-20 RX ADMIN — FOLIC ACID 1 MG: 1 TABLET ORAL at 21:54

## 2023-01-20 RX ADMIN — CETIRIZINE HYDROCHLORIDE 10 MG: 10 TABLET, FILM COATED ORAL at 21:54

## 2023-01-20 RX ADMIN — DICLOFENAC SODIUM 4 G: 10 GEL TOPICAL at 21:52

## 2023-01-20 RX ADMIN — METHOCARBAMOL 500 MG: 500 TABLET ORAL at 21:55

## 2023-01-20 RX ADMIN — Medication 1 TABLET: at 21:53

## 2023-01-20 RX ADMIN — GABAPENTIN 300 MG: 300 CAPSULE ORAL at 11:20

## 2023-01-20 RX ADMIN — DICLOFENAC SODIUM 4 G: 10 GEL TOPICAL at 18:02

## 2023-01-20 ASSESSMENT — ACTIVITIES OF DAILY LIVING (ADL)
ADLS_ACUITY_SCORE: 57
ADLS_ACUITY_SCORE: 54
ADLS_ACUITY_SCORE: 57
ADLS_ACUITY_SCORE: 54
ADLS_ACUITY_SCORE: 57
ADLS_ACUITY_SCORE: 54
ADLS_ACUITY_SCORE: 57
ADLS_ACUITY_SCORE: 57
ADLS_ACUITY_SCORE: 54
ADLS_ACUITY_SCORE: 54

## 2023-01-20 NOTE — PROGRESS NOTES
"SW met with pt. Pt said\" he was having a hard time thinking straight.  Pt has a lot going on in his head. \"  Pt stated he wanted SW help to get Medicare to be first instead of [facility].  They are denying every bill.  SW said she will check on this.  SW did. Pt has to call both companies and let them know about each other.  We can wait until Wednesday to verify that Medicare would be first?  Staff is off until then.  SHAKIRA updated pt.     JULISSA Forrest   [facility], Transitional Care Unit   Social Work   [facility] [place] [address], [address]th [place] [address]  (PH) [phone]      "

## 2023-01-20 NOTE — PROGRESS NOTES
[facility] Nurse Inpatient Assessment     Consulted for: Right heel    Patient History (according to provider note(s):      Shay Jimenez is a 57 year old male with with PMH ESRD on dialysis and osteomyelitis s/p C5-T6 fusion who is now POD#5 s/p redo C5-T6 fusion with concern for hemorrhage; no vertebral artery dissection or extravasation was noted on intraoperative angiogram.  He was admitted to the Neuro ICU postoperatively for MAP goals and frequent neuro checks, remaining stable on pressors.  Neuro exam is unchanged from patient's baseline.    Patient transferred to TCU 1/9/2023 for continued rehab.     Areas Assessed:      Areas visualized during today's visit: Feet     Pressure Injury Location: Right heel      1/5 1/16 1/20  Last photo: 1/20  Wound type: Pressure Injury     Pressure Injury Stage: 2, present on admission   Wound history/plan of care:  Wound noted by nursing on [place] on 12/6 after patient transferred out of PACU. Patient had been proned in OR so unlikely wound is from then and that it was present on admission.  Wound base: 100 % granulation tissue     Palpation of the wound bed: normal      Drainage: none     Description of drainage: none     Measurements (length x width x depth, in cm) 1 x 1 x 0.2 cm      Tunneling N/A     Undermining N/A  Periwound skin: Intact      Color: normal and consistent with surrounding tissue      Temperature: normal   Odor: none  Pain: no grimacing or signs of discomfort, none  Pain intervention prior to dressing change: patient tolerated well  Treatment goal: Protection  STATUS: healing   Supplies ordered: supplies stored on unit     Treatment Plan:     Right heel wound: Every third day and as needed  Cleanse wound with NS and pat dry  Paint bibi-wound skin with no sting barrier film.  Apply dab of Triad paste (#943079) to wound bed only  Cover with 4x4 mepilex flex(# 332639).   Keep heels elevated  "off bed.   Wear Prevalon boots while in bed. (# [id number])    Pressure Injury Prevention (PIP) Plan:  If patient is declining pressure injury prevention interventions: Explore reason why and address patient's concerns, Educate on pressure injury risk and prevention intervention(s), If patient is still declining, document \"informed refusal\"  and Ensure Care team is aware ( provider, charge nurse, etc)  Mattress: Follow bed algorithm, reassess daily and order specialty mattress, if indicated.  HOB: Maintain at or below 30 degrees, unless contraindicated  Repositioning in bed: Every 1-2 hours , Left/right positioning; avoid supine and Raise foot of bed prior to raising head of bed, to reduce patient sliding down (shear)  Heels: Keep elevated off mattress, Pillows under calves and Heel lift boots  Protective Dressing: Sacral Mepilex for prevention (#221978),  especially for the agitated patient   Chair positioning: Chair cushion (#736665) , Repositions self: patient to shift weight every 15 minutes, Assist patient to reposition hourly and Do NOT use a donut for sitting (this increases pressure to smaller area and creates a higher potential for injury)    If patient has a buttock pressure injury, or high risk for PI use chair cushion or SPS.  Moisture Management: Perineal cleansing /protection: Follow Incontinence Protocol, Avoid brief in bed, Clean and dry skin folds with bathing , Consider InterDry (#893433) between folds and Moisturize dry skin  Under Devices: Inspect skin under all medical devices during skin inspection , Ensure tubes are stabilized without tension and Ensure patient is not lying on medical devices or equipment when repositioned  Ask provider to discontinue device when no longer needed.    Orders: Reviewed and Updated    RECOMMEND PRIMARY TEAM ORDER: None, at this time  Education provided: plan of care and wound progress  Discussed plan of care with: Patient  WOC nurse follow-up plan: weekly  Notify WOC " if wound(s) deteriorate.  Nursing to notify the Provider(s) and re-consult the WOC Nurse if new skin concern.    DATA:     Current support surface: Standard  Low air loss (DEN pump, Isolibrium, Pulsate, skin guard, etc)  BMI: Body mass index is 36.18 kg/m .   Active diet order: Orders Placed This Encounter      Regular Diet Adult     Output: I/O last 3 completed shifts:  In: 700 [P.O.:700]  Out: -      Labs:   Recent Labs   Lab 01/16/23  1630   HGB 10.1*   WBC 5.7     Pressure injury risk assessment:   Sensory Perception: 3-->slightly limited  Moisture: 3-->occasionally moist  Activity: 1-->bedfast  Mobility: 2-->very limited  Nutrition: 3-->adequate  Friction and Shear: 2-->potential problem  Konstantin Score: 14       Dept. Pager: [phone]  Dept. Office Number: [phone]

## 2023-01-20 NOTE — PLAN OF CARE
Goal Outcome Evaluation:    Pt is alert and oriented x 4. He is able to make his needs known. He denied SOB and chest pain. He verbalized neck pain. Received Oxycodone for pain. Pt verbalized feeling better. Cleaned, dried and applied new dressing on the incision on his spinal cord. Continue to monitor and evaluation patient for pain.    Patient's most recent vital signs are:     Vital signs:  BP: 84/47  Temp: 97.4  HR: 86  RR: 16  SpO2: 97 %     Patient does not have new respiratory symptoms.  Patient does not have new sore throat.  Patient does not have a fever greater than 99.5.

## 2023-01-20 NOTE — PROGRESS NOTES
CLINICAL NUTRITION SERVICES - REASSESSMENT NOTE     Nutrition Prescription    RECOMMENDATIONS FOR MDs/PROVIDERS TO ORDER:  None at this time.     Malnutrition Status:    Patient does not meet two of the established criteria necessary for diagnosing malnutrition but is at risk for malnutrition.     Recommendations already ordered by Registered Dietitian (RD):  -Special K bar once daily @      Future/Additional Recommendations:  Monitor oral intakes on dialysis days, weights.      EVALUATION OF THE PROGRESS TOWARD GOALS   Diet: Regular (per documentation, pt refused renal diet upon admission)   Intake: 100% per I/Os   7-day HT average: 1584 kcal (69% low end needs, 17 kcal/kg), 74 g pro (81% low end needs, 0.8 g/kg)      NEW FINDINGS   Per chart review, patient with a good appetite, tolerating 100% of meals. Per HT, he orders adequately on non-dialysis days, and on dialysis days generally only eats 1 meal daily meeting <50% of his nutrition needs.     Visited with patient who was up in the chair today. He endorses a good appetite, feels that it is about the same as it usually is. He is finding foods that he likes and did not have nutrition questions or concerns. He endorses seeing a RD at his dialysis unit, but he does not eat a meal there and does not eat breakfast on those days as they pick him up at 6:15AM. We discussed this writers concerns re the inconsistencies of his PO intake and that he is not getting adequate nutrition on dialysis days. Writer suggested a protein bar at HS, that way patient can save them and eat them on the morning of dialysis days. Patient amenable.     Weights:  01/16/23 1755 124.4 kg (274 lb 4 oz)     12/22/22 130.2 kg (287 lb 0.6 oz)   11/03/22 131.1 kg (289 lb)   10/10/22 131.5 kg (289 lb 14.5 oz)   09/22/22 132.5 kg (292 lb)   08/18/22 132.5 kg (292 lb)   08/04/22 132.5 kg (292 lb)   07/29/22 132.5 kg (292 lb)   06/29/22 145 kg (319 lb 10.7 oz     01/18 dialysis note:  Pre Wt: 127.2  kg  Post wt: 122 kg- dry weight   Patient with variable weights given fluid shifts and dialysis, pt with 5.4% wt loss in 3 months.     MALNUTRITION  % Intake: < 75% for > 7 days (moderate)  % Weight Loss: Weight loss does not meet criteria  Subcutaneous Fat Loss: None observed  Muscle Loss: None observed  Fluid Accumulation/Edema: None noted  Malnutrition Diagnosis: Patient does not meet two of the established criteria necessary for diagnosing malnutrition but is at risk for malnutrition.     Previous Goals   Patient to consume % of nutritionally adequate meal trays TID, or the equivalent with supplements/snacks.  Evaluation: Not met    Previous Nutrition Diagnosis  Predicted inadequate oral intake related to potential for menu fatigue as evidenced by hospital LOS.  Evaluation: No longer applicable, nutrition diagnosis changed below    CURRENT NUTRITION DIAGNOSIS  Inadequate oral intake related to only eating one meal on dialysis days as evidenced by patient on average meeting 69% kcal needs.     INTERVENTIONS  Implementation  Medical food supplement therapy: Special K bar once daily @      Goals  Patient to consume % of nutritionally adequate meal trays TID, or the equivalent with supplements/snacks.    Monitoring/Evaluation  Progress toward goals will be monitored and evaluated per protocol.    Renu Carlton, MPH, RD, LD  TCU RD pager: [phone]  Weekend/holiday pager: [phone]

## 2023-01-20 NOTE — PLAN OF CARE
Goal Outcome Evaluation:      Plan of Care Reviewed With: patient    Overall Patient Progress: improving    Outcome Evaluation: Patient with a good appetite and tolerating oral intakes. Poor intakes on dialysis days as he misses lunch and dinner, will trial AM protein bar as a way of increasing intake on dialysis days.    Renu Carlton, MPH, RD, LD  TCU RD pager: [phone]  Weekend/holiday pager: [phone]

## 2023-01-20 NOTE — PLAN OF CARE
Goal Outcome Evaluation:       A&O, x4. Makes needs known to staff. Returned from dialysis at 1115. Denies CP, SOB, N/V. Neck and shoulder pain managed with PRN tylenol and oxycodone given per pt request. Neck brace on, pt worked with PT and OT this afternoon. Seen by [facility] nurse for right heel wound. Regular diet, thin liquids. Takes medications PO. Transfers with liko lift. Incontinent of B&B. 3 + edema on BLE- sticky note left to provider for lymph consult.     -No acute issues this evening or change from baseline. Pt cooperative with cares. Son came to visit and brought pt supper. Tubi  applied to BLE and legs elevated on pillows d/t 3+ edema. 2L NC oxygen applied. Continue POC.         Patient's most recent vital signs are:     Vital signs:  BP: 90/55  Temp: 96.3  HR: 74  RR: 16  SpO2: 99 %     Patient does not have new respiratory symptoms.  Patient does not have new sore throat.  Patient does not have a fever greater than 99.5.

## 2023-01-21 ENCOUNTER — APPOINTMENT (OUTPATIENT)
Dept: OCCUPATIONAL THERAPY | Facility: SKILLED NURSING FACILITY | Age: 58
DRG: 949 | End: 2023-01-21
Attending: INTERNAL MEDICINE
Payer: MEDICARE

## 2023-01-21 ENCOUNTER — APPOINTMENT (OUTPATIENT)
Dept: SPEECH THERAPY | Facility: SKILLED NURSING FACILITY | Age: 58
DRG: 949 | End: 2023-01-21
Attending: INTERNAL MEDICINE
Payer: MEDICARE

## 2023-01-21 LAB — PHOSPHATE SERPL-MCNC: 1.6 MG/DL (ref 2.5–4.5)

## 2023-01-21 PROCEDURE — 36415 COLL VENOUS BLD VENIPUNCTURE: CPT | Performed by: INTERNAL MEDICINE

## 2023-01-21 PROCEDURE — 120N000009 HC R&B SNF

## 2023-01-21 PROCEDURE — 99309 SBSQ NF CARE MODERATE MDM 30: CPT | Performed by: INTERNAL MEDICINE

## 2023-01-21 PROCEDURE — 250N000013 HC RX MED GY IP 250 OP 250 PS 637: Performed by: INTERNAL MEDICINE

## 2023-01-21 PROCEDURE — 84100 ASSAY OF PHOSPHORUS: CPT | Performed by: INTERNAL MEDICINE

## 2023-01-21 PROCEDURE — 92523 SPEECH SOUND LANG COMPREHEN: CPT | Mod: GN

## 2023-01-21 PROCEDURE — 250N000013 HC RX MED GY IP 250 OP 250 PS 637

## 2023-01-21 PROCEDURE — 250N000013 HC RX MED GY IP 250 OP 250 PS 637: Performed by: PHYSICIAN ASSISTANT

## 2023-01-21 PROCEDURE — 97535 SELF CARE MNGMENT TRAINING: CPT | Mod: GO

## 2023-01-21 RX ADMIN — OXYCODONE HYDROCHLORIDE 5 MG: 5 TABLET ORAL at 20:06

## 2023-01-21 RX ADMIN — MIDODRINE HYDROCHLORIDE 10 MG: 5 TABLET ORAL at 11:14

## 2023-01-21 RX ADMIN — FLUTICASONE PROPIONATE 1 SPRAY: 50 SPRAY, METERED NASAL at 11:13

## 2023-01-21 RX ADMIN — OXYCODONE HYDROCHLORIDE 5 MG: 5 TABLET ORAL at 11:14

## 2023-01-21 RX ADMIN — METHOCARBAMOL 500 MG: 500 TABLET ORAL at 20:05

## 2023-01-21 RX ADMIN — ACETAMINOPHEN 325MG 650 MG: 325 TABLET ORAL at 11:13

## 2023-01-21 RX ADMIN — PANTOPRAZOLE SODIUM 40 MG: 40 TABLET, DELAYED RELEASE ORAL at 11:14

## 2023-01-21 RX ADMIN — DICLOFENAC SODIUM 4 G: 10 GEL TOPICAL at 11:15

## 2023-01-21 RX ADMIN — ACETAMINOPHEN 325MG 650 MG: 325 TABLET ORAL at 02:48

## 2023-01-21 RX ADMIN — DICLOFENAC SODIUM 4 G: 10 GEL TOPICAL at 17:32

## 2023-01-21 RX ADMIN — METHOCARBAMOL 500 MG: 500 TABLET ORAL at 17:32

## 2023-01-21 RX ADMIN — GABAPENTIN 300 MG: 300 CAPSULE ORAL at 11:14

## 2023-01-21 RX ADMIN — Medication 1 TABLET: at 20:04

## 2023-01-21 RX ADMIN — THIAMINE HCL TAB 100 MG 100 MG: 100 TAB at 20:04

## 2023-01-21 RX ADMIN — GABAPENTIN 300 MG: 300 CAPSULE ORAL at 15:04

## 2023-01-21 RX ADMIN — APIXABAN 2.5 MG: 2.5 TABLET, FILM COATED ORAL at 11:14

## 2023-01-21 RX ADMIN — APIXABAN 2.5 MG: 2.5 TABLET, FILM COATED ORAL at 20:05

## 2023-01-21 RX ADMIN — OLANZAPINE 5 MG: 2.5 TABLET, FILM COATED ORAL at 20:05

## 2023-01-21 RX ADMIN — FLUTICASONE PROPIONATE 1 SPRAY: 50 SPRAY, METERED NASAL at 20:08

## 2023-01-21 RX ADMIN — CETIRIZINE HYDROCHLORIDE 10 MG: 10 TABLET, FILM COATED ORAL at 20:05

## 2023-01-21 RX ADMIN — METHOCARBAMOL 500 MG: 500 TABLET ORAL at 11:14

## 2023-01-21 RX ADMIN — ATORVASTATIN CALCIUM 20 MG: 20 TABLET, FILM COATED ORAL at 20:04

## 2023-01-21 RX ADMIN — METHOCARBAMOL 500 MG: 500 TABLET ORAL at 15:04

## 2023-01-21 RX ADMIN — FINASTERIDE 1.3 MG: 5 TABLET, FILM COATED ORAL at 20:08

## 2023-01-21 RX ADMIN — MIDODRINE HYDROCHLORIDE 10 MG: 5 TABLET ORAL at 17:32

## 2023-01-21 RX ADMIN — BACLOFEN 10 MG: 10 TABLET ORAL at 20:05

## 2023-01-21 RX ADMIN — FOLIC ACID 1 MG: 1 TABLET ORAL at 20:05

## 2023-01-21 RX ADMIN — ACETAMINOPHEN 325MG 650 MG: 325 TABLET ORAL at 17:32

## 2023-01-21 RX ADMIN — BACLOFEN 10 MG: 10 TABLET ORAL at 11:14

## 2023-01-21 RX ADMIN — GABAPENTIN 300 MG: 300 CAPSULE ORAL at 20:05

## 2023-01-21 RX ADMIN — DICLOFENAC SODIUM 4 G: 10 GEL TOPICAL at 15:04

## 2023-01-21 RX ADMIN — DICLOFENAC SODIUM 4 G: 10 GEL TOPICAL at 20:08

## 2023-01-21 RX ADMIN — SERTRALINE HYDROCHLORIDE 100 MG: 100 TABLET, FILM COATED ORAL at 20:04

## 2023-01-21 RX ADMIN — Medication 100 MG: at 20:05

## 2023-01-21 ASSESSMENT — ACTIVITIES OF DAILY LIVING (ADL)
ADLS_ACUITY_SCORE: 54

## 2023-01-21 NOTE — PLAN OF CARE
Patient is alert and oriented x4. Denies any SOB and CP. Able to make needs known.  On O2 at 2 LPM via nasal cannula at bedtime. Incontinent of bladder this shift. PRN tylenol 650 mg given x1 for pain management. Patient Dialysis session was cut short yesterday per report and needs to return to the dialysis center today at 07:00. Called and confirmed the appointment to the dialysis center. Contacted the transportation service and informed me that no  is available. At 06:00,  from the transportation service called that he is already waiting outside. Patient leave for his scheduled appointment today at 06:10. No acute issue during shift. Continue with the plan of care.    Patient's most recent vital signs are:     Vital signs:  BP: 90/55  Temp: 96.3  HR: 74  RR: 16  SpO2: 99 %     Patient does not have new respiratory symptoms.  Patient does not have new sore throat.  Patient does not have a fever greater than 99.5.

## 2023-01-21 NOTE — PROGRESS NOTES
[facility] Transitional Care    Medicine Progress Note - Hospitalist Service    Date of Admission:  1/9/2023    Assessment & Plan   Shay Jimenez is a 56 yo obese gentleman w/ h/o HTN, ESRD on HD, GERD, GINI intolerant of CPAP, chronic atrial fibrillation on chronic Eliquis, alcohol ause, had previous C5-T6 spinal fusion due to pathological fracture from osteomyelitis.    He was noted to have discitis/osteomyelitis on CT of chest back in 2/2022 that was obtained for respiratory issues.      He was hospitalized at [facility] and was to follow up with [facility] spine (no records and pt has no recollection if anything was done). In 6/2022 MRI showed T2, T3 vertebral collapse with myelopathy, some abnormal epidural tissue and inflammation, at that time antibiotics was deferred as pt was stable and was planned for obtaining intraop cx.  Intra-op cx was + on day 5 and day 7 for C. Acnes that was felt to be contamination, however due to presence of hardware it was decided to treat with ancef x 2 weeks followed by  amoxicillin x 4 weeks.     He then was treated for Morganella morganii septic shock, no intra-abd source found on CT.  TTE did not show vegetations was treated with almost 9 weeks of iv cefepime 8/14-10/13.     On repeat imaging was found to have hardware failure and was admitted on 12/6/22 to [facility] [place] Neurosurgeon service for surgery.  He underwent C5-T6 redo cervical spine fusion 12/6/22.  He did have hemorrhage intraop, cerebral angio did not show vertebral artery injury, did receive blood, was in ICU on pressors to keep blood pressure up for perfusion.     Also received periop cefazolin till drain pulled. Intraop culture remained negative and there was no indication for ongoing antibiotics per ID.  Hospital course was complicated by agitation and delirium.  He treated w/ benzo per CIWA,  He was also noted to in aute on chronic respiratory failure and did require supplement Oxygen.   Transferred to [facility] 1/7/23 for conditioning.     Patient's TCU course has been complicated by waxing and waning mentation, behavioral issues, mostly notable on 1/9/23 while in dialysis at [place], evaluated subsequently at [facility] but work-up including CT head without contrast, CTA head and neck, CBC, BMP, troponin and EKG; all of the aforementioned test were negative and his mentation improved. He was seen by Psychiatry on 1/12/23 for this delirium and was started on Zyprexa 5 mg at bedtime as well as Aripiprazole 2 mg BID prn.        #Hardware failure, pseudoarthritis   #S/p C5-T6 redo cervical spine fusion 12/6/22  #Previous h/o C5-T6 fusion for pathological fracture due to osteomyelitis   -Intraop culture remained neg and per ID there was no indication for ongoing antibiotics     Needs cervical collar when HOB > 45 degrees and when OOB     #Acute metabolic encephalopathy, recurrent, resolved at this time  -Etiology remained elusive  -He had recurrent confusion, agitation and delirium during his hospitalization at the [facility], 12/6 - 1/9/23  -Pt was seen at [facility] ED on 1/9/23 after HD when he became very agitated, confused, disoriented, argumentative and irritable at the dialysis center.  CT head w/o contrast and CTA head and neck were negative.  -  - seen by Psychiatry    Olanzapine 5 mg at bedtime    Aripirpazole 2 mg bid prn    Limit pschotropic medications as much as possible; of note, PTA Xanax restarted on 1/22/23 prn every day       #Hypotension   -Not on meds    Continue midodrine 10 mg tid but hold if blood pressure  > 120      #Chronic Atrial fibrillation   -He was seen by cardiology in the past   -He had ZIO patch in past but apparently results were lost  -Currently well rate controlled w/o meds    On eliquis 2.5 mg for stroke prophylaxis     #Mild to moderate pulm HTN  #Moderate MR  -Could all be related to untreated GINI but has not tolerated CPAP     Will need to follow up as out  patient       #Chronic resp failure  #GINI   #Obesity   -Intolerant to CPAP  -Uses O2 at night 1-3 liters, continue     #Acute on chronic pain  #Peripheral neuropathy    Continue baclofen, gabapentin and methocarbamol      #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   #Hypophosphatemia     Continue HD  M/W/F      kg     Holding sevelemer to 800 mg tid    Monitoring phos level  -Refused renal diet      #Chronic hyponatremia - resolved    Managed by nephrology during HD      #ACD  -Due to ESKD  -Managed during HD w/ epo    #HDL    Continue atorvastatin      #Anxiety d/o  #Depression   #OCD     Continue Setraline 100 mg every day      #Alcohol use disorder  -Drink 5-6 cocktail a day (vodka )     Continue vitamins   -Does not want to see chem dep     Continue baclofen and gabapentin as can also help with craving      #Right heal pressure sore    WOCN to see      Diet: Regular Diet Adult  Snacks/Supplements Adult: Other; Special K bar @ HS; Between Meals  DVT Prophylaxis: eliquis   Oliveira Catheter: Not present  Lines: None     Cardiac Monitoring: None  Code Status: Full Code    Disposition Plan   01/27/23    Psych to finalize meds prior to discharge, ?nocturnal O2 test for whether home O2 needed.       Indication for psychotropic meds:   -Hydroxyzine for itchiness   -PTA xanax every day prn for anxiety   -Aripiprazole prn for agitation & agression      ABRIL MONIQUE MD  Hospitalist Service  [facility] Transitional Care  Securely message with [employer] (more info)  Text page via [employer] Paging/Directory   ___________________________________________________________________    Interval History   Patient went for HD yesterday  Hemodynamically stable, afebrile.  Asking for PTA xanax to be restarted as needed       Physical Exam   Vital Signs: Temp: (!) 96.3  F (35.7  C) Temp src: Oral BP: 90/55 Pulse: 74   Resp: 16 SpO2: 99 % O2 Device: None (Room air)    Weight: 274 lbs 4.04 oz    General Appearance: Lying  comfortably in bed, on supplemental O2 for comfirt, in no acute distress or discomfort  HEENT: PERRL: EOMI; moist mucous membrane w/o lesions  Neck: No JVD  Pulmonary: Clear to auscultation bilaterally, no wheezes or crackles  CVS: Regular rhythm, systolic murmur, no rubs or gallops  GI: BS (+), soft nontender, no rebound or guarding   Extremities: Bilateral LE edema   Skin: No rashes or lesions  Neurologic: A&O x3  Psych: Mood is appropriate        Data

## 2023-01-21 NOTE — PROGRESS NOTES
SUMMARY OF TEST:    The CLQT assesses visual attention and perception, working memory and language output skills, as well as auditory memory and comprehension.  Non-linguistic tasks can help assess planning, and self-monitoring, visual discrimination and analysis, as well as creativity and mental flexibility.   Together, these subtests assess the cognitive domains of attention, memory, executive function, language, and visuospatial skills using a severity rating of either WNL (within normal limits), Mild, Moderate or Severe.        Cognitive Domain                   Severity Rating/Score  Attention                                   184- within normal limits (borderline mild)  Memory                                    150-mild  Executive Functions                 26-within normal limits (borderline mild)  Language                                 32- within normal limits (borderline mild)  Visuospatial Skills                    78- mild  Composite Severity Rating        3.6-within normal limits (borderline mild)  Clock Drawing Severity Rating    7-severe    INTERPRETATION OF TEST RESULTS: Patient at times was quick to give up when he was starting to have difficulties thus leading to some lower scores on individual subtests.  Patient does recognize some changes and difficulties he is having with his short-term memory.  Patient otherwise feels as though his cognition is at baseline.  Patient was agreeable to work with speech therapy to address some memory based difficulties if it will aid him in his goal to discharge home quickly.    TIME FOR INTERPRETATION AND PREPARATION OF REPORT: 15 minutes  TOTAL TIME: 60 minutes    Reference:  Elvia Lundberg, Alexa, CCC-SLP, (2001) [employer]

## 2023-01-21 NOTE — PLAN OF CARE
Goal Outcome Evaluation:    VS: /53   Pulse 85   Temp 97.5  F (36.4  C) (Oral)   Resp 16   Wt 124.4 kg (274 lb 4 oz)   SpO2 99%   BMI 36.18 kg/m     O2: RA day shift   Output: Oliguric, on HD   Last BM: 1/20   Activity: Up in w/c; participates in therapy  A-2 Irina Rosenthal  Had dialysis appt; returned to unit about 1100.  Son brought in [employer] for lunch and visited   Skin: X; spinal dsg CDI  Pt has itchy area to right back/flank that pt scratches w/ a back scratcher; noted red area so covered w/Mepilex for protection.  R heel PI  LUE HD fistula   Pain: Oxy and tylenol x1   CMS Neuro: Intact; BLE 3+ edema  A&O, confusion noted   Dressing: spinal dsg CDI  Right back/flank covered w/Mepilex for protection  R heel PI Q3D; changed 1/20 by WOC   Diet: Reg  [employer] for lunch   LDA: Cervical collar, O2 at HS   Equipment: Cervical collar, w/c, Irina Ariadna, O2 at HS   Plan: Con't POC.    Additional Info: Ph+ 3.7, no replacement needed.       Patient's most recent vital signs are:     Vital signs:  BP: 112/53  Temp: 97.5  HR: 85  RR: 16  SpO2: 99 %     Patient does not have new respiratory symptoms.  Patient does not have new sore throat.  Patient does not have a fever greater than 99.5.

## 2023-01-21 NOTE — PROGRESS NOTES
01/21/23 1616   Appointment Info   Signing Clinician's Name / Credentials (SLP) Laureano Gloria MS, CCC-SLP   Quick Adds Certification   General Information   Onset of Illness/Injury or Date of Surgery 12/06/22   Referring Physician Malcom Campbell MD   Pertinent History of Current Problem Shay Jimenez is a 56 yo obese gentleman w/ h/o HTN, ESRD on HD, GERD, GINI intolerant of CPAP, chronic atrial fibrillation on chronic AC, alcohol ause, had previous C5-T6 spinal fusion due to pathological fracture form osteomyelitis.  He was noted to have discitis/osteomyelitis on CT of chest back in 2/2022 that was obtained for respiratory issues.  He was hospitalized at [facility] and was to follow up with [facility] spine ( no records and pt has no recollection if anything was done ).  In 6/2022 MRI showed T2, T3 vertebral collapse with myelopathy, some abnormal epidural tissue and inflammation, at that time antibiotics was deferred as pt was stable and was planned for obtaining intraop cx.  Intra-op cx was + on day 5 and day 7 for C. Acnes that was felt to be contamination, however due to presence of hardware it was decided to treat with ancef x 2 weeks followed by  amoxicillin x 4 weeks.  He then was treated for Morganella morganii septic shock, no intra-abd source found on CT.  TTE did not show vegetations was treated with almost 9 weeks of iv cefepime 8/14-10/13. On repeat imaging was found to have hardware failure  and was admitted on 12/6/22 to [facility] for surgery.  He underwent C5-T6 redo cervical spine fusion 12/6/22.  He did have hemorrhage intraop, cerebral angio did not show vertebral artery injury, did receive blood, was in ICU on pressors to keep blood pressure up for perfusion  . Also received periop cefazolin till drain pulled. Intraop culture remained negative and there was no indication for ongoing antibiotics per ID.  Hospital course was complicated by agitation and  delirium.  He treated w/ benzo per CIWA,  He was also noted to in aute on chronic respiratory failure and did require supplement Oxygen.   General Observations Admitted to TCU in setting of spinal fusion but in the course of physical and occupational therapies, staff members have noted some deficits in his cognition and felt patient would benefit from SLP services to maximize cognitive function to aid in success with physical related goals.   Type of Evaluation   Type of Evaluation Speech, Language, Cognition   Motor Speech   Comment, Motor Speech Assessment Within functional limits and fully intelligible.   Auditory Comprehension   Comment, Assessment (Auditory Comprehension) Able to follow directions accurately.   Verbal Expression   Comment, Assesment (Verbal Expression) No word finding difficulties evident in casual conversation or during more structured tasks   Pragmatic Language   Comment, Assessment (Pragmatic Language) Within normal limits   Reading Comprehension   Comment, Assessment (Reading Comprehension) Within functional limits within context of formal cognitive assessment.   Written Language   Comment, Assessment (Written Language) Some deficits due to difficulties with manual dexterity   Cognition   Cognitive Function memory deficit   Additional cognitive-linguistic evaluation indicated  Completed;Please see separate report for results   Cognitive Status Exam Comments Patient scoring a 3.6 on CL QT which is within normal limits but borderline with mild impairment.  Patient on multiple subtests of attention, executive function and language head scores that were within normal limits but borderline with mild.   Orientation Status (Cognition) disoriented to;time   Follows Commands (Cognition) WFL   Memory Deficit (Cognition) minimal deficit   Clinical Impression   Criteria for Skilled Therapeutic Interventions Met (SLP Eval) Yes, treatment indicated   SLP Diagnosis Mild cognitive linguistic impairment    Problem List (SLP) Limited mobility   Functional Limitations Related to Problem List (SLP) Decreased level of independence with higher level IADL tasks including ability to complete new learning with other therapies   Risks & Benefits of therapy have been explained evaluation/treatment results reviewed;care plan/treatment goals reviewed;participants voiced agreement with care plan;participants included;patient   Clinical Impression Comments Patient agreed to complete formal cognitive assessment.  Patient overall within normal limits but borderline mild impairment.  Patient does recognize some difficulties that he has in his short-term memory which was the most pronounced on formal assessment though did note some challenges with executive functioning tasks.  Patient at times quick to give up when beginning to encounter challenges or difficulties completing the task.   SLP Total Evaluation Time   Eval: Sound production with lang comprehension and expression Minutes (39918) 45   Therapy Certification   Start of Care Date 01/21/23   Certification date from 01/21/23   Certification date to 02/20/23   SLP Goals   Therapy Frequency (SLP Eval) 3 times/wk   SLP Predicted Duration/Target Date for Goal Attainment 02/04/23   SLP Goals SLP Goal 1;SLP Goal 2   SLP: Goal 1 Patient will utilize compensatory memory strategies to recall novel information with presence of distractions and delays with 90% accuracy   SLP: Goal 2 Patient will complete complex level reasoning/problem solving tasks with 80% accuracy.   SLP Discharge Planning   SLP Plan Review scores from CL QT.  Review compensatory memory strategies and introduce idea of memory notebook   Total Session Time   Total Session Time (sum of timed and untimed services) 45

## 2023-01-22 ENCOUNTER — APPOINTMENT (OUTPATIENT)
Dept: SPEECH THERAPY | Facility: SKILLED NURSING FACILITY | Age: 58
DRG: 949 | End: 2023-01-22
Attending: INTERNAL MEDICINE
Payer: MEDICARE

## 2023-01-22 ENCOUNTER — APPOINTMENT (OUTPATIENT)
Dept: PHYSICAL THERAPY | Facility: SKILLED NURSING FACILITY | Age: 58
DRG: 949 | End: 2023-01-22
Attending: INTERNAL MEDICINE
Payer: MEDICARE

## 2023-01-22 LAB — PHOSPHATE SERPL-MCNC: 3 MG/DL (ref 2.5–4.5)

## 2023-01-22 PROCEDURE — 84100 ASSAY OF PHOSPHORUS: CPT | Performed by: INTERNAL MEDICINE

## 2023-01-22 PROCEDURE — 250N000009 HC RX 250: Performed by: INTERNAL MEDICINE

## 2023-01-22 PROCEDURE — 97116 GAIT TRAINING THERAPY: CPT | Mod: GP

## 2023-01-22 PROCEDURE — 97530 THERAPEUTIC ACTIVITIES: CPT | Mod: GP

## 2023-01-22 PROCEDURE — 258N000003 HC RX IP 258 OP 636: Performed by: INTERNAL MEDICINE

## 2023-01-22 PROCEDURE — 97130 THER IVNTJ EA ADDL 15 MIN: CPT | Mod: GN

## 2023-01-22 PROCEDURE — 250N000013 HC RX MED GY IP 250 OP 250 PS 637

## 2023-01-22 PROCEDURE — 120N000009 HC R&B SNF

## 2023-01-22 PROCEDURE — 97129 THER IVNTJ 1ST 15 MIN: CPT | Mod: GN

## 2023-01-22 PROCEDURE — 250N000013 HC RX MED GY IP 250 OP 250 PS 637: Performed by: INTERNAL MEDICINE

## 2023-01-22 PROCEDURE — 36415 COLL VENOUS BLD VENIPUNCTURE: CPT | Performed by: INTERNAL MEDICINE

## 2023-01-22 PROCEDURE — 250N000013 HC RX MED GY IP 250 OP 250 PS 637: Performed by: PHYSICIAN ASSISTANT

## 2023-01-22 RX ORDER — HYDROXYZINE HYDROCHLORIDE 25 MG/1
50 TABLET, FILM COATED ORAL EVERY 6 HOURS PRN
Status: DISCONTINUED | OUTPATIENT
Start: 2023-01-22 | End: 2023-02-03 | Stop reason: HOSPADM

## 2023-01-22 RX ORDER — ALPRAZOLAM 0.25 MG
0.5 TABLET ORAL DAILY PRN
Status: DISCONTINUED | OUTPATIENT
Start: 2023-01-22 | End: 2023-02-03 | Stop reason: HOSPADM

## 2023-01-22 RX ORDER — AMOXICILLIN 250 MG
2 CAPSULE ORAL 2 TIMES DAILY
Status: DISCONTINUED | OUTPATIENT
Start: 2023-01-22 | End: 2023-02-03 | Stop reason: HOSPADM

## 2023-01-22 RX ORDER — HYDROXYZINE HYDROCHLORIDE 25 MG/1
25 TABLET, FILM COATED ORAL EVERY 6 HOURS PRN
Status: DISCONTINUED | OUTPATIENT
Start: 2023-01-22 | End: 2023-02-03 | Stop reason: HOSPADM

## 2023-01-22 RX ADMIN — GABAPENTIN 300 MG: 300 CAPSULE ORAL at 07:34

## 2023-01-22 RX ADMIN — DICLOFENAC SODIUM 4 G: 10 GEL TOPICAL at 07:44

## 2023-01-22 RX ADMIN — FOLIC ACID 1 MG: 1 TABLET ORAL at 21:10

## 2023-01-22 RX ADMIN — METHOCARBAMOL 500 MG: 500 TABLET ORAL at 17:09

## 2023-01-22 RX ADMIN — OLANZAPINE 5 MG: 2.5 TABLET, FILM COATED ORAL at 21:10

## 2023-01-22 RX ADMIN — SENNOSIDES AND DOCUSATE SODIUM 2 TABLET: 8.6; 5 TABLET ORAL at 21:11

## 2023-01-22 RX ADMIN — METHOCARBAMOL 500 MG: 500 TABLET ORAL at 07:34

## 2023-01-22 RX ADMIN — SERTRALINE HYDROCHLORIDE 100 MG: 100 TABLET, FILM COATED ORAL at 21:10

## 2023-01-22 RX ADMIN — ACETAMINOPHEN 325MG 650 MG: 325 TABLET ORAL at 21:11

## 2023-01-22 RX ADMIN — APIXABAN 2.5 MG: 2.5 TABLET, FILM COATED ORAL at 21:11

## 2023-01-22 RX ADMIN — DICLOFENAC SODIUM 4 G: 10 GEL TOPICAL at 13:13

## 2023-01-22 RX ADMIN — DICLOFENAC SODIUM 4 G: 10 GEL TOPICAL at 17:09

## 2023-01-22 RX ADMIN — SENNOSIDES AND DOCUSATE SODIUM 2 TABLET: 8.6; 5 TABLET ORAL at 11:38

## 2023-01-22 RX ADMIN — GABAPENTIN 300 MG: 300 CAPSULE ORAL at 21:10

## 2023-01-22 RX ADMIN — APIXABAN 2.5 MG: 2.5 TABLET, FILM COATED ORAL at 07:34

## 2023-01-22 RX ADMIN — CETIRIZINE HYDROCHLORIDE 10 MG: 10 TABLET, FILM COATED ORAL at 21:12

## 2023-01-22 RX ADMIN — Medication 100 MG: at 21:11

## 2023-01-22 RX ADMIN — MIDODRINE HYDROCHLORIDE 10 MG: 5 TABLET ORAL at 11:39

## 2023-01-22 RX ADMIN — ATORVASTATIN CALCIUM 20 MG: 20 TABLET, FILM COATED ORAL at 21:11

## 2023-01-22 RX ADMIN — BACLOFEN 10 MG: 10 TABLET ORAL at 21:11

## 2023-01-22 RX ADMIN — MIDODRINE HYDROCHLORIDE 10 MG: 5 TABLET ORAL at 17:11

## 2023-01-22 RX ADMIN — ACETAMINOPHEN 325MG 650 MG: 325 TABLET ORAL at 12:40

## 2023-01-22 RX ADMIN — FLUTICASONE PROPIONATE 1 SPRAY: 50 SPRAY, METERED NASAL at 21:14

## 2023-01-22 RX ADMIN — METHOCARBAMOL 500 MG: 500 TABLET ORAL at 21:10

## 2023-01-22 RX ADMIN — GABAPENTIN 300 MG: 300 CAPSULE ORAL at 13:13

## 2023-01-22 RX ADMIN — ACETAMINOPHEN 325MG 650 MG: 325 TABLET ORAL at 01:54

## 2023-01-22 RX ADMIN — FLUTICASONE PROPIONATE 1 SPRAY: 50 SPRAY, METERED NASAL at 07:42

## 2023-01-22 RX ADMIN — METHOCARBAMOL 500 MG: 500 TABLET ORAL at 12:40

## 2023-01-22 RX ADMIN — FINASTERIDE 1.3 MG: 5 TABLET, FILM COATED ORAL at 21:16

## 2023-01-22 RX ADMIN — PANTOPRAZOLE SODIUM 40 MG: 40 TABLET, DELAYED RELEASE ORAL at 06:00

## 2023-01-22 RX ADMIN — OXYCODONE HYDROCHLORIDE 5 MG: 5 TABLET ORAL at 04:33

## 2023-01-22 RX ADMIN — MIDODRINE HYDROCHLORIDE 10 MG: 5 TABLET ORAL at 07:34

## 2023-01-22 RX ADMIN — DICLOFENAC SODIUM 4 G: 10 GEL TOPICAL at 21:12

## 2023-01-22 RX ADMIN — BACLOFEN 10 MG: 10 TABLET ORAL at 07:38

## 2023-01-22 RX ADMIN — THIAMINE HCL TAB 100 MG 100 MG: 100 TAB at 21:10

## 2023-01-22 RX ADMIN — SODIUM PHOSPHATE, MONOBASIC, MONOHYDRATE AND SODIUM PHOSPHATE, DIBASIC, ANHYDROUS 15 MMOL: 276; 142 INJECTION, SOLUTION INTRAVENOUS at 01:46

## 2023-01-22 RX ADMIN — Medication 1 TABLET: at 21:11

## 2023-01-22 RX ADMIN — ALPRAZOLAM 0.5 MG: 0.25 TABLET ORAL at 21:10

## 2023-01-22 ASSESSMENT — ACTIVITIES OF DAILY LIVING (ADL)
ADLS_ACUITY_SCORE: 54

## 2023-01-22 NOTE — PLAN OF CARE
"Goal Outcome Evaluation:       Pt alert and oriented x4. Intermittently forgetful. Denies CP, SOB, N/V. Pain managed with PRN oxycodone and tylenol- last given @ 2006. States feels like he has a bowel obstruction (has regular BMs) Sticky note left to provider. Regular diet, thin liquids. Appetite good, ate 100% of meals. Takes pills whole with thin liquids. Voltaren gel applied to hands. Tubi  on BLE for 3 + edema. Pt spent evening up in w/c working. Refused melatonin states,  \"Does not work for him.\" 2.5 L oxygen via NC used at night. Call light within reach, continue POC.         Patient's most recent vital signs are:     Vital signs:  BP: 96/45  Temp: 96.7  HR: 83  RR: 18  SpO2: 100 %     Patient does not have new respiratory symptoms.  Patient does not have new sore throat.  Patient does not have a fever greater than 99.5.                          "

## 2023-01-22 NOTE — PLAN OF CARE
Goal Outcome Evaluation:    VS: /70 (BP Location: Right arm, Cuff Size: Adult Large)   Pulse 75   Temp (!) 96.7  F (35.9  C) (Oral)   Resp 18   Wt 124.4 kg (274 lb 4 oz)   SpO2 100%   BMI 36.18 kg/m      O2: RA   Output: Oliguric, on HD   Last BM: 1/22 x-large formed in BSC  Pt started senna   Activity: Up in w/c; participates in therapy  A-2 Irina Steady   Skin: X; spinal dsg CDI  R heel PI  LUE HD fistula  R PIV   Pain: Tylenol x1   CMS Neuro: Intact; BLE 3+ edema  A&O, confusion noted   Dressing: spinal dsg CDI  Right back/flank covered w/Mepilex for protection  R heel PI Q3D; changed 1/20 by WOC  R PIV CDI   Diet: Reg, poor appetite   LDA: Cervical collar, O2 at HS   Equipment: Cervical collar, w/c, Irina Steady, O2 at HS   Plan: Con't POC.    Additional Info: Ph+ 3.0, no replacement needed.  Pt occasionally refuses to wear  brace when OOB; repositioning pt helps it be more comfortable.  Oxy discontinued; pt reported not wanting it removed from MAR; provider updated.      Patient's most recent vital signs are:     Vital signs:  BP: 108/70  Temp: 96.7  HR: 75  RR: 18  SpO2: 100 %     Patient does not have new respiratory symptoms.  Patient does not have new sore throat.  Patient does not have a fever greater than 99.5

## 2023-01-22 NOTE — PLAN OF CARE
Patient is alert and oriented x4. Denies any SOB and CP. Able to make needs known. Request to have O2 at bedtime. On O2, 2 LPM via nasal cannula. Right arm PIV intact. Received pt. with phosphorus level of 1.6, replacement hung and lab redraw scheduled this morning. PRN oxycodone given x1 for pain management. Complains of constipation at 07:00 and requested to talk to the provider. Sticky note created and concern endorsed to the next shift. Patient requested to be transferred to the chair with rafa ramos. Seems comfortable now. No acute issue during shift. Continue with the plan of care.      Patient's most recent vital signs are:     Vital signs:  BP: 96/45  Temp: 96.7  HR: 83  RR: 18  SpO2: 100 %     Patient does not have new respiratory symptoms.  Patient does not have new sore throat.  Patient does not have a fever greater than 99.5.

## 2023-01-23 ENCOUNTER — APPOINTMENT (OUTPATIENT)
Dept: OCCUPATIONAL THERAPY | Facility: SKILLED NURSING FACILITY | Age: 58
DRG: 949 | End: 2023-01-23
Attending: INTERNAL MEDICINE
Payer: MEDICARE

## 2023-01-23 ENCOUNTER — APPOINTMENT (OUTPATIENT)
Dept: PHYSICAL THERAPY | Facility: SKILLED NURSING FACILITY | Age: 58
DRG: 949 | End: 2023-01-23
Attending: INTERNAL MEDICINE
Payer: MEDICARE

## 2023-01-23 LAB
ANION GAP SERPL CALCULATED.3IONS-SCNC: 4 MMOL/L (ref 3–14)
BUN SERPL-MCNC: 18 MG/DL (ref 7–30)
CALCIUM SERPL-MCNC: 8.8 MG/DL (ref 8.5–10.1)
CHLORIDE BLD-SCNC: 96 MMOL/L (ref 94–109)
CO2 SERPL-SCNC: 38 MMOL/L (ref 20–32)
CREAT SERPL-MCNC: 3.19 MG/DL (ref 0.66–1.25)
ERYTHROCYTE [DISTWIDTH] IN BLOOD BY AUTOMATED COUNT: 15.2 % (ref 10–15)
GFR SERPL CREATININE-BSD FRML MDRD: 22 ML/MIN/1.73M2
GLUCOSE BLD-MCNC: 111 MG/DL (ref 70–99)
HCT VFR BLD AUTO: 30.4 % (ref 40–53)
HGB BLD-MCNC: 9.5 G/DL (ref 13.3–17.7)
MCH RBC QN AUTO: 30.5 PG (ref 26.5–33)
MCHC RBC AUTO-ENTMCNC: 31.3 G/DL (ref 31.5–36.5)
MCV RBC AUTO: 98 FL (ref 78–100)
PLATELET # BLD AUTO: 207 10E3/UL (ref 150–450)
POTASSIUM BLD-SCNC: 4.3 MMOL/L (ref 3.4–5.3)
RBC # BLD AUTO: 3.11 10E6/UL (ref 4.4–5.9)
SODIUM SERPL-SCNC: 138 MMOL/L (ref 133–144)
WBC # BLD AUTO: 2.2 10E3/UL (ref 4–11)

## 2023-01-23 PROCEDURE — 250N000013 HC RX MED GY IP 250 OP 250 PS 637: Performed by: INTERNAL MEDICINE

## 2023-01-23 PROCEDURE — 97110 THERAPEUTIC EXERCISES: CPT | Mod: GP

## 2023-01-23 PROCEDURE — 120N000009 HC R&B SNF

## 2023-01-23 PROCEDURE — 80048 BASIC METABOLIC PNL TOTAL CA: CPT | Performed by: INTERNAL MEDICINE

## 2023-01-23 PROCEDURE — 250N000013 HC RX MED GY IP 250 OP 250 PS 637

## 2023-01-23 PROCEDURE — 250N000013 HC RX MED GY IP 250 OP 250 PS 637: Performed by: PHYSICIAN ASSISTANT

## 2023-01-23 PROCEDURE — 97116 GAIT TRAINING THERAPY: CPT | Mod: GP

## 2023-01-23 PROCEDURE — 97110 THERAPEUTIC EXERCISES: CPT | Mod: GO

## 2023-01-23 PROCEDURE — 85027 COMPLETE CBC AUTOMATED: CPT | Performed by: INTERNAL MEDICINE

## 2023-01-23 PROCEDURE — 36415 COLL VENOUS BLD VENIPUNCTURE: CPT | Performed by: INTERNAL MEDICINE

## 2023-01-23 RX ORDER — OXYCODONE HYDROCHLORIDE 5 MG/1
5 TABLET ORAL DAILY PRN
Status: DISCONTINUED | OUTPATIENT
Start: 2023-01-23 | End: 2023-01-25

## 2023-01-23 RX ADMIN — METHOCARBAMOL 500 MG: 500 TABLET ORAL at 20:49

## 2023-01-23 RX ADMIN — PANTOPRAZOLE SODIUM 40 MG: 40 TABLET, DELAYED RELEASE ORAL at 05:55

## 2023-01-23 RX ADMIN — ATORVASTATIN CALCIUM 20 MG: 20 TABLET, FILM COATED ORAL at 20:49

## 2023-01-23 RX ADMIN — BACLOFEN 10 MG: 10 TABLET ORAL at 20:50

## 2023-01-23 RX ADMIN — METHOCARBAMOL 500 MG: 500 TABLET ORAL at 17:40

## 2023-01-23 RX ADMIN — CETIRIZINE HYDROCHLORIDE 10 MG: 10 TABLET, FILM COATED ORAL at 20:49

## 2023-01-23 RX ADMIN — FLUTICASONE PROPIONATE 1 SPRAY: 50 SPRAY, METERED NASAL at 20:52

## 2023-01-23 RX ADMIN — BACLOFEN 10 MG: 10 TABLET ORAL at 12:36

## 2023-01-23 RX ADMIN — Medication 1 TABLET: at 20:49

## 2023-01-23 RX ADMIN — APIXABAN 2.5 MG: 2.5 TABLET, FILM COATED ORAL at 20:51

## 2023-01-23 RX ADMIN — DICLOFENAC SODIUM 4 G: 10 GEL TOPICAL at 12:35

## 2023-01-23 RX ADMIN — Medication 1 TABLET: at 20:50

## 2023-01-23 RX ADMIN — SERTRALINE HYDROCHLORIDE 100 MG: 100 TABLET, FILM COATED ORAL at 20:48

## 2023-01-23 RX ADMIN — DICLOFENAC SODIUM 4 G: 10 GEL TOPICAL at 20:52

## 2023-01-23 RX ADMIN — OLANZAPINE 5 MG: 2.5 TABLET, FILM COATED ORAL at 20:49

## 2023-01-23 RX ADMIN — SENNOSIDES AND DOCUSATE SODIUM 2 TABLET: 8.6; 5 TABLET ORAL at 20:50

## 2023-01-23 RX ADMIN — ALPRAZOLAM 0.5 MG: 0.25 TABLET ORAL at 20:48

## 2023-01-23 RX ADMIN — MIDODRINE HYDROCHLORIDE 10 MG: 5 TABLET ORAL at 17:40

## 2023-01-23 RX ADMIN — MIDODRINE HYDROCHLORIDE 10 MG: 5 TABLET ORAL at 12:36

## 2023-01-23 RX ADMIN — METHOCARBAMOL 500 MG: 500 TABLET ORAL at 12:36

## 2023-01-23 RX ADMIN — FINASTERIDE 1.3 MG: 5 TABLET, FILM COATED ORAL at 20:52

## 2023-01-23 RX ADMIN — APIXABAN 2.5 MG: 2.5 TABLET, FILM COATED ORAL at 12:36

## 2023-01-23 RX ADMIN — GABAPENTIN 300 MG: 300 CAPSULE ORAL at 20:51

## 2023-01-23 RX ADMIN — FLUTICASONE PROPIONATE 1 SPRAY: 50 SPRAY, METERED NASAL at 12:35

## 2023-01-23 RX ADMIN — DICLOFENAC SODIUM 4 G: 10 GEL TOPICAL at 17:39

## 2023-01-23 RX ADMIN — FOLIC ACID 1 MG: 1 TABLET ORAL at 20:49

## 2023-01-23 RX ADMIN — GABAPENTIN 300 MG: 300 CAPSULE ORAL at 12:36

## 2023-01-23 RX ADMIN — Medication 10 MG: at 20:49

## 2023-01-23 RX ADMIN — THIAMINE HCL TAB 100 MG 100 MG: 100 TAB at 20:50

## 2023-01-23 RX ADMIN — ACETAMINOPHEN 325MG 650 MG: 325 TABLET ORAL at 12:46

## 2023-01-23 RX ADMIN — SENNOSIDES AND DOCUSATE SODIUM 2 TABLET: 8.6; 5 TABLET ORAL at 12:36

## 2023-01-23 RX ADMIN — OXYCODONE HYDROCHLORIDE 5 MG: 5 TABLET ORAL at 12:46

## 2023-01-23 RX ADMIN — Medication 100 MG: at 20:49

## 2023-01-23 RX ADMIN — GABAPENTIN 300 MG: 300 CAPSULE ORAL at 15:18

## 2023-01-23 ASSESSMENT — ACTIVITIES OF DAILY LIVING (ADL)
ADLS_ACUITY_SCORE: 54

## 2023-01-23 NOTE — PLAN OF CARE
Goal Outcome Evaluation:       Overall no acute issue noted this shift. Alert and oriented x 4. Able to make needs known to staffs. slept throughout the shift. Denied having chest pain, SOB, fever and chills N/V. On 2 L of oxygen this shift. Saturation above 95 through out the shift. Sent out for hemodialysis this morning at 0640.       Patient's most recent vital signs are:     Vital signs:  BP: 108/57  Temp: 97.8  HR: 74  RR: 20  SpO2: 98 %     Patient does not have new respiratory symptoms.  Patient does not have new sore throat.  Patient does not have a fever greater than 99.5.

## 2023-01-23 NOTE — PLAN OF CARE
Goal Outcome Evaluation:       PT alert and oriented X4. Forgetful at times. Denied SOB, CP, N/V, stated bowls are soft but took all scheduled bowel medications. Pt returned from dialysis at 1150. Ordered lunch at that time and still up in chair. C collar in place while up in chair. No phosphorus replaced today. Unable to visualize heel dressing. Prn tylenol and oxycodone X1 on shift.         Patient's most recent vital signs are:     Vital signs:  BP: 108/57  Temp: 97.8  HR: 74  RR: 20  SpO2: 98 %     Patient does not have new respiratory symptoms.  Patient does not have new sore throat.  Patient does not have a fever greater than 99.5.

## 2023-01-23 NOTE — PLAN OF CARE
Goal Outcome Evaluation:        Pt alert and oriented x4. Denies CP, SOB, N/V.  Regular diet, thin liquids. Son came to visit and brought pizza for supper. Appetite good, ate 100% of meals. Takes pills whole with thin liquids. Voltaren gel applied to hands. 2.5 L oxygen via NC used at night.  R heal wound dressing changed. Xanax given before bedtime per pt request. R PIV DCI and patent. Call light within reach, continue POC.         Patient's most recent vital signs are:     Vital signs:  BP: 81/46  Temp: 98.3  HR: 86  RR: 18  SpO2: 95 %     Patient does not have new respiratory symptoms.  Patient does not have new sore throat.  Patient does not have a fever greater than 99.5.

## 2023-01-23 NOTE — PLAN OF CARE
"Goal Outcome Evaluation:        Pt alert and oriented x4. Intermittently forgetful. Denies CP, SOB, N/V.  Regular diet, thin liquids. Appetite good, ate 100% of meals. Takes pills whole with thin liquids. Voltaren gel applied to hands. Tubi  on BLE for 3 + edema. 2.5 L oxygen via NC used at night. At 2100 pt woke up from nap, disoriented to place and time, and asking, \"Where is the dog?\" RN oriented patient. Pt then asked RN if she was sleeping here, said, \" You can climb into bed with me.\" RN cleaned up small incontinent BM, pt asked, \"Can you clean up my ballsack ?\" RN stated they were not dirty, pt said \"No, I just want you to because it would feel nice.\" RN set boundaries with Pt, letting him know its inappropriate behavior.  Given xanax at bedtime per pt request. Call light within reach, continue POC.         Patient's most recent vital signs are:     Vital signs:  BP: 108/57  Temp: 97.8  HR: 74  RR: 20  SpO2: 98 %     Patient does not have new respiratory symptoms.  Patient does not have new sore throat.  Patient does not have a fever greater than 99.5.                             "

## 2023-01-23 NOTE — PROGRESS NOTES
SW received a message asking to talk to pt about transportation.    SW met with pt.  Pt said he got three invoices today about transport.  Pt showed SW.    SW called [employer].  [phone]  She said she talked with pt.  It's all okay.     JULISSA Forrest   [facility], Transitional Care Unit   Social Work   [facility] [place] [address], [address] [place] [address]  (ph) [phone]

## 2023-01-24 ENCOUNTER — APPOINTMENT (OUTPATIENT)
Dept: PHYSICAL THERAPY | Facility: SKILLED NURSING FACILITY | Age: 58
DRG: 949 | End: 2023-01-24
Attending: INTERNAL MEDICINE
Payer: MEDICARE

## 2023-01-24 ENCOUNTER — APPOINTMENT (OUTPATIENT)
Dept: SPEECH THERAPY | Facility: SKILLED NURSING FACILITY | Age: 58
DRG: 949 | End: 2023-01-24
Attending: INTERNAL MEDICINE
Payer: MEDICARE

## 2023-01-24 ENCOUNTER — APPOINTMENT (OUTPATIENT)
Dept: OCCUPATIONAL THERAPY | Facility: SKILLED NURSING FACILITY | Age: 58
DRG: 949 | End: 2023-01-24
Attending: INTERNAL MEDICINE
Payer: MEDICARE

## 2023-01-24 LAB — PHOSPHATE SERPL-MCNC: 2.4 MG/DL (ref 2.5–4.5)

## 2023-01-24 PROCEDURE — 97530 THERAPEUTIC ACTIVITIES: CPT | Mod: GP | Performed by: REHABILITATION PRACTITIONER

## 2023-01-24 PROCEDURE — 97535 SELF CARE MNGMENT TRAINING: CPT | Mod: GO

## 2023-01-24 PROCEDURE — 99366 TEAM CONF W/PAT BY HC PROF: CPT

## 2023-01-24 PROCEDURE — 120N000009 HC R&B SNF

## 2023-01-24 PROCEDURE — 250N000013 HC RX MED GY IP 250 OP 250 PS 637: Performed by: INTERNAL MEDICINE

## 2023-01-24 PROCEDURE — 36415 COLL VENOUS BLD VENIPUNCTURE: CPT | Performed by: INTERNAL MEDICINE

## 2023-01-24 PROCEDURE — 250N000013 HC RX MED GY IP 250 OP 250 PS 637: Performed by: PHYSICIAN ASSISTANT

## 2023-01-24 PROCEDURE — 250N000013 HC RX MED GY IP 250 OP 250 PS 637

## 2023-01-24 PROCEDURE — 84100 ASSAY OF PHOSPHORUS: CPT | Performed by: INTERNAL MEDICINE

## 2023-01-24 PROCEDURE — 97130 THER IVNTJ EA ADDL 15 MIN: CPT | Mod: GN

## 2023-01-24 PROCEDURE — 97129 THER IVNTJ 1ST 15 MIN: CPT | Mod: GN

## 2023-01-24 RX ORDER — METHOCARBAMOL 500 MG/1
500 TABLET, FILM COATED ORAL 4 TIMES DAILY PRN
Status: DISCONTINUED | OUTPATIENT
Start: 2023-01-24 | End: 2023-02-03 | Stop reason: HOSPADM

## 2023-01-24 RX ORDER — POLYETHYLENE GLYCOL 3350 17 G/17G
17 POWDER, FOR SOLUTION ORAL DAILY
Status: DISCONTINUED | OUTPATIENT
Start: 2023-01-24 | End: 2023-02-03 | Stop reason: HOSPADM

## 2023-01-24 RX ORDER — LIDOCAINE 4 G/G
1-2 PATCH TOPICAL
Status: DISCONTINUED | OUTPATIENT
Start: 2023-01-24 | End: 2023-02-03 | Stop reason: HOSPADM

## 2023-01-24 RX ADMIN — LIDOCAINE PATCH 4% 2 PATCH: 40 PATCH TOPICAL at 14:45

## 2023-01-24 RX ADMIN — HYDROXYZINE HYDROCHLORIDE 25 MG: 25 TABLET, FILM COATED ORAL at 18:24

## 2023-01-24 RX ADMIN — GABAPENTIN 300 MG: 300 CAPSULE ORAL at 13:02

## 2023-01-24 RX ADMIN — APIXABAN 2.5 MG: 2.5 TABLET, FILM COATED ORAL at 08:47

## 2023-01-24 RX ADMIN — ARIPIPRAZOLE 2 MG: 2 TABLET ORAL at 18:23

## 2023-01-24 RX ADMIN — SENNOSIDES AND DOCUSATE SODIUM 2 TABLET: 8.6; 5 TABLET ORAL at 21:03

## 2023-01-24 RX ADMIN — ATORVASTATIN CALCIUM 20 MG: 20 TABLET, FILM COATED ORAL at 21:02

## 2023-01-24 RX ADMIN — Medication 1 PACKET: at 21:04

## 2023-01-24 RX ADMIN — BACLOFEN 10 MG: 10 TABLET ORAL at 08:47

## 2023-01-24 RX ADMIN — MIDODRINE HYDROCHLORIDE 10 MG: 5 TABLET ORAL at 08:46

## 2023-01-24 RX ADMIN — Medication 100 MG: at 21:05

## 2023-01-24 RX ADMIN — APIXABAN 2.5 MG: 2.5 TABLET, FILM COATED ORAL at 21:06

## 2023-01-24 RX ADMIN — FLUTICASONE PROPIONATE 1 SPRAY: 50 SPRAY, METERED NASAL at 21:08

## 2023-01-24 RX ADMIN — METHOCARBAMOL 500 MG: 500 TABLET ORAL at 08:47

## 2023-01-24 RX ADMIN — ACETAMINOPHEN 325MG 650 MG: 325 TABLET ORAL at 18:24

## 2023-01-24 RX ADMIN — ALPRAZOLAM 0.5 MG: 0.25 TABLET ORAL at 18:24

## 2023-01-24 RX ADMIN — DICLOFENAC SODIUM 4 G: 10 GEL TOPICAL at 13:13

## 2023-01-24 RX ADMIN — ACETAMINOPHEN 325MG 650 MG: 325 TABLET ORAL at 06:51

## 2023-01-24 RX ADMIN — GABAPENTIN 300 MG: 300 CAPSULE ORAL at 06:52

## 2023-01-24 RX ADMIN — DICLOFENAC SODIUM 4 G: 10 GEL TOPICAL at 08:47

## 2023-01-24 RX ADMIN — POLYETHYLENE GLYCOL 3350 17 G: 17 POWDER, FOR SOLUTION ORAL at 13:02

## 2023-01-24 RX ADMIN — MIDODRINE HYDROCHLORIDE 10 MG: 5 TABLET ORAL at 13:02

## 2023-01-24 RX ADMIN — FINASTERIDE 1.3 MG: 5 TABLET, FILM COATED ORAL at 21:08

## 2023-01-24 RX ADMIN — Medication 1 TABLET: at 21:05

## 2023-01-24 RX ADMIN — ACETAMINOPHEN 325MG 650 MG: 325 TABLET ORAL at 00:24

## 2023-01-24 RX ADMIN — HYDROXYZINE HYDROCHLORIDE 25 MG: 25 TABLET, FILM COATED ORAL at 00:24

## 2023-01-24 RX ADMIN — SENNOSIDES AND DOCUSATE SODIUM 2 TABLET: 8.6; 5 TABLET ORAL at 08:47

## 2023-01-24 RX ADMIN — OXYCODONE HYDROCHLORIDE 5 MG: 5 TABLET ORAL at 18:23

## 2023-01-24 RX ADMIN — DOCUSATE SODIUM 226 ML: 50 LIQUID ORAL at 21:09

## 2023-01-24 RX ADMIN — OLANZAPINE 5 MG: 2.5 TABLET, FILM COATED ORAL at 21:05

## 2023-01-24 RX ADMIN — GABAPENTIN 300 MG: 300 CAPSULE ORAL at 21:05

## 2023-01-24 RX ADMIN — FLUTICASONE PROPIONATE 1 SPRAY: 50 SPRAY, METERED NASAL at 08:47

## 2023-01-24 RX ADMIN — PANTOPRAZOLE SODIUM 40 MG: 40 TABLET, DELAYED RELEASE ORAL at 05:50

## 2023-01-24 RX ADMIN — CETIRIZINE HYDROCHLORIDE 10 MG: 10 TABLET, FILM COATED ORAL at 21:06

## 2023-01-24 RX ADMIN — SERTRALINE HYDROCHLORIDE 100 MG: 100 TABLET, FILM COATED ORAL at 21:05

## 2023-01-24 RX ADMIN — OLANZAPINE 5 MG: 2.5 TABLET, FILM COATED ORAL at 21:02

## 2023-01-24 RX ADMIN — FOLIC ACID 1 MG: 1 TABLET ORAL at 21:07

## 2023-01-24 RX ADMIN — HYDROXYZINE HYDROCHLORIDE 50 MG: 25 TABLET, FILM COATED ORAL at 06:52

## 2023-01-24 ASSESSMENT — ACTIVITIES OF DAILY LIVING (ADL)
ADLS_ACUITY_SCORE: 54

## 2023-01-24 NOTE — PROGRESS NOTES
Charts reviewed and briefly met with patent   sign out received from Dr. Campbell  Patient had several questions for me today regarding bowel regimen and pain control  For bowels:  We will start miralax daily in addition to Senakot. In addition, once enema to be administered today  For pain control, we will stay on once daily PRN Oxycodone  Also started on Lidocaine patch to the neck and rt shoulder  Stop baclofen and change Robaxin to PRN ( patient with intermittent delirium and hallucinations)  /73 (BP Location: Right arm)   Pulse 97   Temp (!) 96.1  F (35.6  C) (Oral)   Resp 18   Wt 124.4 kg (274 lb 4 oz)   SpO2 100%   BMI 36.18 kg/m    [facility]  Discharge date is 2/3  Labs from 1/23 reviewed. Labs Q Monday. Renvela being held due to mildly high phos       Dr FILIPPO Groves MD, FACP  Hospitalist ( Internal medicine)  Pager: [phone]

## 2023-01-24 NOTE — PROGRESS NOTES
Status at Admission - 1/8/2023 Current Status - 1/24/2023   Bed Mobility   Mod A for B rolling,     Supine > Sit Mod A    Sit > Supine Max A x 1, Min A x 1   SBA  for supine > sit     Min A for sit > supine   Transfer   CGA/Min A with Irina Steady   Mod A with FWW   Gait   Unable   30 ft with FWW, CGA x 2  and third person for close wc follow   Stairs   Unable   Unable/Unsafe   Wheelchair Propulsion   Unable   IND with in room distances, Max A for longer distances due to UE pain         Assessment of Progress Since Last Update: Pt has made good functional progress as detailed objectively above. Specifically, pt has improved his tolerance and time of out of bed activity, and ability to engage in ambulation and perform standing activities with decreasing assist.    Barriers to Progress: None at this time.    Proposed Solutions to Improve Barriers: N/A   Justification for Continued Therapy Services: Pt requires further skilled therapy in order to improve functional outcomes and independence with mobility. Specifically, pt's weakness and poor motor control in LE requires further neuro re-education to ensure safety with standing, transferring and ambulation. Pt's current caregiver burden with out of chair mobility is quite high and more therapy is required to lessen this and improve pt's ability to be safe and functional at discharge.    Additional Considerations for Safe and Efficient Discharge: Family training to be completed on Saturday 1/28 with family.

## 2023-01-24 NOTE — PROGRESS NOTES
Care Conference:  In attendance was pt, PT, OT, SW and nursing.  Doctor was not available. Son/Cory was on the phone.  Pt is starting to walk about 30 ft. PT will work with pt on car transfers as pt has an appt tomorrow for Xray.  OT is still trying to work with pt on commode or toilet.  Pt is having a hard time getting on/off commode. They will keep working on this.  Both sons are busy and are unable to help with this piece.  They would like pt to be ind with toileting.  Son('s) will come this weekend for family training. We set a new discharge date to make sure all is ready at home for pt 2/3.  Pt should be able to walk more and get better at transfers.  Cognition is still an issue.  Pt said he is going to start working on the 1st.  OT suggested pt not go back to work yet.  Pt yelled to stay out of it.  Pt will be returning to work.  SW reminded OT, work will figure that piece out.  Pt can work from room for a day or two in bed. Nursing will have  set up for discharge.     JULISSA Forrest   [facility], Transitional Care Unit   Social Work   [address]th [place] [address]  (PH) [phone]

## 2023-01-24 NOTE — PLAN OF CARE
Goal Outcome Evaluation:       Pt alert and oriented X4. Able to make needs known. Forgetful at times. Demanding on staff with frequent request. Denied SOB, CP, N/V. Stated he feels pressure in able. Pt having regular BM. PRN enema ordered. New suspected DTI assessed on bottom of R foot. Skin prep to area. Blue boots applied and patient care order entered. Foot board on bed extended. Lidocaine to upper and lower back. Surgical incision to upper back WNL. Dressing to right heel intact. NM and provider updated on skin issue.         Patient's most recent vital signs are:     Vital signs:  BP: 113/73  Temp: 96.1  HR: 97  RR: 18  SpO2: 100 %     Patient does not have new respiratory symptoms.  Patient does not have new sore throat.  Patient does not have a fever greater than 99.5.

## 2023-01-24 NOTE — PLAN OF CARE
Brief Orthopedic Surgery/Podiatry Note    Orthopedics/Podiatry was consulted for nail trimming.     Our podiatrist rounds on Thursday each week. The next rounding date is 1/26/23. The patient will be seen at that time if they remain inpatient. Should the patient discharge prior to that time, please place a outpatient podiatry referral for nail cares.     Of note, this patient was not evaluated by this provider. Please page Orthopedic Surgery if further assistance is needed.     VENESSA ANGELES PA-C  1/24/2023 4:32 PM  Orthopaedic Surgery     Thank you for allowing me to participate in this patient's care. Please page me directly any questions/concerns.   Securely message with the Vocera Web Console (learn more here)  Text page via [employer]    If there is no response, if it is a weekend, or if it is during evening hours, please page the orthopaedic surgery resident on call via [employer] Paging/Directory

## 2023-01-24 NOTE — PROGRESS NOTES
01/24/23 1002   Appointment Info   Signing Clinician's Name / Credentials (OT) Salma Dao OTR/L,JAQUAN   Rehab Comments (OT) oT: care conf and weekly update note   OT Goals   Therapy Frequency (OT) 6 times/wk   OT Predicted Duration/Target Date for Goal Attainment 02/03/23  (per discussion in care conf w/ pt/son Cory, PT , SW also present :pt progressing but no ready to d/c to home at this time, need to ext therapy goal date to 2/2/23 for safer d/c plan)   OT: Hygiene/Grooming Goal Met   OT: Upper Body Dressing Goal Met   OT: Lower Body Dressing Minimal assist;using adaptive equipment;within precautions   OT: Upper Body Bathing Supervision/stand-by assist   OT: Lower Body Bathing   (d/c goal,recommend assist at this time for safety)   OT: Bed Mobility Supervision/stand-by assist;Modified independent;within precautions  (without use of hosp bed functions)   OT: Transfer with assistive device  (CGA to Supervision)   OT: Toilet Transfer/Toileting Supervision/stand-by assist;using adaptive equipment;within precautions   OT: Meal Preparation   (discont goal)   OT: Cognitive Goal Met   Self-Care/Home Management   Treatment Detail/Skilled Intervention OT: per chart review and previous OT sessions pt set up w/ ub drg/grooming but depend w/ toileting, uses rafa steady for transfers to commode for toileting,STS in sarasteady w/ sba but fatigues after approx 3 STS transfers   OT Cognitive Perf Testing (req report)   Treatment Detail/Skilled Intervention OT: pt continues to demo impaired memory and impaired judgement. pt stated he plans to go back to work early february. th recommended to pt that he does not appear ready to return to work due to cognitive deficits and physical fatigue appear to be barriers to return to work at this time.   OT Discharge Planning   OT Plan OT: spinal prec,  OOB, incontinence, h/o decrease sensation B ankles and feet per pt, h/o cog deficit after surgery, okay to shower - pat dry.  Tx:  focus on managing LB clothing for toileting, transfers with rafa stedy on/off commode overlay, standing at the sink with rafa stedy. Wean off rafa stedy as appropriate.   OT Discharge Recommendation (DC Rec) home with assist;home with home care occupational therapy   OT Brief overview of current status OT: progressing w/ standing and bed mobility but toileting , LB drg,and SPT continue to be barriers to d/c to home,  need to ext therapy goal date to 2/2/23 for safer d/c plan   Post Acute Settings Only   What unit is patient on? TCU

## 2023-01-24 NOTE — PLAN OF CARE
Problem: Pain Acute  Goal: Optimal Pain Control and Function  Outcome: Progressing     Problem: Fall Injury Risk  Goal: Absence of Fall and Fall-Related Injury  Outcome: Progressing     Problem: Chronic Kidney Disease  Goal: Optimal Functional Ability  Description: Promote participation in regular daily and physical activity to minimize decline associated with disease process and inactivity; maintain an accessible environment to facilitate safe activity.    Evaluate functional mobility ability and safety; retrain in bed mobility, gait or transfers using assistive device, based on individualized need.    Encourage self-care performance to promote maximum independence in activities of daily living; provide cueing, encouragement, retraining, adaptive equipment and additional time if needed.    Evaluate and address body systems and performance deficits, such as strength, range of motion, balance and activity tolerance impairment.    Assess cognition and address impairments with interventions, such as orientation cues in the environment and memory aids or compensatory techniques    Outcome: Progressing     Problem: Pain Chronic (Persistent)  Goal: Optimal Pain Control and Function  Description: Evaluate pain level, effect of treatment and patient response at regular intervals.    Minimize pain stimuli; coordinate care and adjust environment (e.g., light, noise, unnecessary movement); promote sleep/rest.    Match pharmacologic analgesia to severity and type of pain mechanism (e.g., neuropathic, muscle, inflammatory); consider multimodal approach (e.g., nonopioid, opioid, adjuvant).    Provide medication at regular intervals; titrate to patient response.    Manage breakthrough pain with additional doses; consider rotation or switching medication    Outcome: Progressing     Problem: Anxiety  Goal: Anxiety Reduction or Resolution  Description: Maintain a calm and reassuring environment; minimize noise; provide familiar  items; cluster care; offer choices.    Encourage support system presence and participation.    Support expression and identification of feelings and worries; compassionately acknowledge and validate concerns.    Utilize existing coping strategies and assist in developing new strategies (e.g., music, deep breathing, relaxation techniques, massage, meditation or pet therapy).    Identify thoughts and feelings that led to current anxiety onset to enhance understanding of triggers.    Reframe anxiety-provoking situations; provide a new perspective; engage in problem-solving.    Outcome: Progressing     Problem: BADL (Basic Activities of Daily Living) Impairment  Goal: Optimal Safe BADL Performance  Description: Assess BADL (basic activity of daily living) abilities; encourage participation at maximally safe independent level.    Provide assistance and supervision needed to maintain safety; involve caregiver in BADL (basic activity of daily living) training.    Ensure effective use of equipment or devices, such as a long-handled reacher, shower seat or orthosis.    Ensure proper body mechanics and positioning for optimal task performance.    Provide set-up of items if patient is unable to retrieve; store personal care items in accessible location.    Schedule BADL (basic activity of daily living) activities when pain and fatigue are at a minimum; pace activity to conserve energy.        Outcome: Progressing     Problem: Wound Healing Progression  Goal: Optimal Wound Healing  Description: Use a standard wound assessment tool to monitor progression of wound healing.    Perform a nutrition screening or assessment and physical exam; assess adequacy of oral intake and risk of vitamin and mineral deficiency; if suspect inadequacy or deficiency provide supplementation.    Optimize fluids, nutritional intake, sleep/rest and glycemic control to enhance healing.    Consider oxygen therapy in the presence of hypoxia to enhance  tissue oxygenation.    Position to avoid tension or pressure on wound surface.  Manage edema (e.g., elevate extremities) and maintain blood pressure to optimize tissue perfusion.    Provide nonpharmacologic and pharmacologic comfort measures to manage pain and minimize vasoconstriction; premedicate for painful procedures.    Provide wound care, such as cleansing, debridement, topical therapy, appropriate dressing selection to promote wound healing and prevent infection.    Maintain sterile and occlusive dressing, if prescribed; minimize dressing changes to decrease infection risk and trauma to the wound bed.    Maintain moist wound bed and consistent wound temperature for optimal healing environment.      Outcome: Progressing     Problem: Skin Injury Risk Increased  Goal: Skin Health and Integrity  Description: Perform a full pressure injury risk assessment, as indicated by screening, upon admission to care unit.    Reassess skin (full inspection and injury risk, including skin temperature, consistency and color) frequently (e.g., scheduled interval, with change in condition) to provide optimal early detection and prevention.    Maintain adequate tissue perfusion (e.g., encourage fluid balance; avoid crossing legs, constrictive clothing or devices) to promote tissue oxygenation.    Maintain head of bed at lowest degree of elevation tolerated, considering medical condition and other restrictions. Use positioning supports to prevent sliding and friction. Consider low friction textiles.      Outcome: Progressing     Problem: Urinary Incontinence  Goal: Effective Urinary Elimination  Description: Identify cause and contributing factors, such as disease process, medication or treatment side effects, infection or dietary bladder irritants; provide treatment.    Assess for general symptoms, such as fatigue, depression, weakness, confusion, sensory alterations that could impact toileting.    Provide safe and ready access to  toileting devices and aids (e.g., commode, urinal).    Promote behavioral methods, such as prompted toileting, elimination schedule, relaxation techniques or voiding posture to facilitate flow.    Consider pelvic floor muscle strengthening, such as pelvic floor muscle training, vaginal cone, bladder support or neuromodulation.    Ensure bladder emptying (e.g., intermittent catheterization, portable bladder ultrasound after voiding).    Outcome: Progressing     Problem: Cognitive Impairment  Goal: Optimal Cognitive Function  Description: Assess cognitive function using a standardized tool to establish baseline and identify areas of deficit.    Establish rapport and trust; utilize unhurried and calm approach.    Utilize clear communication; address patient by name, speak slowly with simple directions and gestures; provide time for patient response, listen carefully and maintain eye contact.    Provide environmental adaptations to support cognitive function and safety; consider sensory exposure, hearing ability and distractions.      Outcome: Progressing     Problem: Depression  Goal: Improved Mood  Description: Provide opportunity for patient and family/caregiver to express feelings, stressors and self-perception (e.g., verbalization, journaling).    Convey caring approach, empathy and potential for change; hope is key for recovery.    Utilize existing coping strategies and assist in developing new strategies, such as relaxation, music and problem-solving; assess for ineffective strategies (e.g., substance use, isolation).    Recognize past and present achievements, successes and personal strengths.    Empower participation in planning care and activities, as well as development of attainable goals, to increase self-esteem and feelings of control.    Promote self-care; support balanced sleep, physical activity and nutrition.    Outcome: Progressing     Problem: Psychotic Signs/Symptoms  Goal: Improved Behavioral  Control (Psychotic Signs/Symptoms)  Description: Assess subjective and objective presentation of mood and emotion state.    Encourage and promote emotional awareness, acceptance and expression of feelings.    Provide psychoeducation and supportive therapy interventions to improve mood and emotions.    Provide opportunities for expression, such as verbalization, reflection, journaling, art and role-playing.    Outcome: Progressing   Goal Outcome Evaluation: ongoing

## 2023-01-24 NOTE — PLAN OF CARE
Problem: Chronic Kidney Disease  Goal: Optimal Functional Ability  Description: Promote participation in regular daily and physical activity to minimize decline associated with disease process and inactivity; maintain an accessible environment to facilitate safe activity.    Evaluate functional mobility ability and safety; retrain in bed mobility, gait or transfers using assistive device, based on individualized need.    Encourage self-care performance to promote maximum independence in activities of daily living; provide cueing, encouragement, retraining, adaptive equipment and additional time if needed.    Evaluate and address body systems and performance deficits, such as strength, range of motion, balance and activity tolerance impairment.    Assess cognition and address impairments with interventions, such as orientation cues in the environment and memory aids or compensatory techniques    Outcome: Progressing     Problem: Anxiety  Goal: Anxiety Reduction or Resolution  Description: Maintain a calm and reassuring environment; minimize noise; provide familiar items; cluster care; offer choices.    Encourage support system presence and participation.    Support expression and identification of feelings and worries; compassionately acknowledge and validate concerns.    Utilize existing coping strategies and assist in developing new strategies (e.g., music, deep breathing, relaxation techniques, massage, meditation or pet therapy).    Identify thoughts and feelings that led to current anxiety onset to enhance understanding of triggers.    Reframe anxiety-provoking situations; provide a new perspective; engage in problem-solving.    Outcome: Progressing     Problem: BADL (Basic Activities of Daily Living) Impairment  Goal: Optimal Safe BADL Performance  Description: Assess BADL (basic activity of daily living) abilities; encourage participation at maximally safe independent level.    Provide assistance and  supervision needed to maintain safety; involve caregiver in BADL (basic activity of daily living) training.    Ensure effective use of equipment or devices, such as a long-handled reacher, shower seat or orthosis.    Ensure proper body mechanics and positioning for optimal task performance.    Provide set-up of items if patient is unable to retrieve; store personal care items in accessible location.    Schedule BADL (basic activity of daily living) activities when pain and fatigue are at a minimum; pace activity to conserve energy.        Outcome: Progressing     Problem: Wound Healing Progression  Goal: Optimal Wound Healing  Description: Use a standard wound assessment tool to monitor progression of wound healing.    Perform a nutrition screening or assessment and physical exam; assess adequacy of oral intake and risk of vitamin and mineral deficiency; if suspect inadequacy or deficiency provide supplementation.    Optimize fluids, nutritional intake, sleep/rest and glycemic control to enhance healing.    Consider oxygen therapy in the presence of hypoxia to enhance tissue oxygenation.    Position to avoid tension or pressure on wound surface.  Manage edema (e.g., elevate extremities) and maintain blood pressure to optimize tissue perfusion.    Provide nonpharmacologic and pharmacologic comfort measures to manage pain and minimize vasoconstriction; premedicate for painful procedures.    Provide wound care, such as cleansing, debridement, topical therapy, appropriate dressing selection to promote wound healing and prevent infection.    Maintain sterile and occlusive dressing, if prescribed; minimize dressing changes to decrease infection risk and trauma to the wound bed.    Maintain moist wound bed and consistent wound temperature for optimal healing environment.      Outcome: Progressing     Problem: Skin Injury Risk Increased  Goal: Skin Health and Integrity  Description: Perform a full pressure injury risk  "assessment, as indicated by screening, upon admission to care unit.    Reassess skin (full inspection and injury risk, including skin temperature, consistency and color) frequently (e.g., scheduled interval, with change in condition) to provide optimal early detection and prevention.    Maintain adequate tissue perfusion (e.g., encourage fluid balance; avoid crossing legs, constrictive clothing or devices) to promote tissue oxygenation.    Maintain head of bed at lowest degree of elevation tolerated, considering medical condition and other restrictions. Use positioning supports to prevent sliding and friction. Consider low friction textiles.      Outcome: Progressing     Problem: Plan of Care - These are the overarching goals to be used throughout the patient stay.    Goal: Plan of Care Review  Description: The Plan of Care Review/Shift note should be completed every shift.  The Outcome Evaluation is a brief statement about your assessment that the patient is improving, declining, or no change.  This information will be displayed automatically on your shift note.  Outcome: Progressing  Flowsheets (Taken 1/24/2023 1704)  Plan of Care Reviewed With: patient     Problem: Plan of Care - These are the overarching goals to be used throughout the patient stay.    Goal: Patient-Specific Goal (Individualized)  Description: You can add care plan individualizations to a care plan. Examples of Individualization might be:  \"Parent requests to be called daily at 9am for status\", \"I have a hard time hearing out of my right ear\", or \"Do not touch me to wake me up as it startles me\".  Outcome: Progressing     Problem: Goal Outcome Summary  Goal: Goal Outcome Summary  Outcome: Progressing     Problem: Fall Injury Risk  Goal: Absence of Fall and Fall-Related Injury  Outcome: Progressing   Goal Outcome Evaluation:    Patient's most recent vital signs are:     /52 (BP Location: Right arm)   Pulse 79   Temp 98.1  F (36.7  C) " (Oral)   Resp 18   Wt 124.4 kg (274 lb 4 oz)   SpO2 98%   BMI 36.18 kg/m    Iso:  No active isolations  Diet: Regular Diet Adult  Snacks/Supplements Adult: Other; Special K bar @ HS; Between Meals  Mental Status:     Main Acuity Score: 163.5  O2:  2 LPM  Mg+:    K:  4.3 (01/23 1624)  PLT: 207 (01/23 1624)  HGB: 9.5 (01/23 1624)  BS: 111 (01/23 1624)  No components found for: TROPL   Infusions: No infusions    Patient does not have new respiratory symptoms.  Patient does not have new sore throat.  Patient does not have a fever greater than 99.5.    Pt is alert and oriented x 4. Denies having chest pain and SOB. Pt was agitated and screaming during the whole shift. He put on his call light more than 20 times. Writer and NARS would go to his room to respond to the call lights but the pt would not remember why he had his light on. PRN XANAX,ABILIFY and ATARAX were given to calm him down. Enema given for constipation . Continue with POC.

## 2023-01-24 NOTE — PLAN OF CARE
Goal Outcome Evaluation:       Overall pt had no acute issue noted this shift. Alert and oriented x 4. Able to make needs known to staffs. Awake all night on the call light. Denied having discomfort this shift. On supplemental oxygen at 2 L with saturation above 95 degree. No acute complain at this time. Will continue with POC.       Patient's most recent vital signs are:     Vital signs:  BP: 81/46  Temp: 98.3  HR: 86  RR: 18  SpO2: 95 %     Patient does not have new respiratory symptoms.  Patient does not have new sore throat.  Patient does not have a fever greater than 99.5.

## 2023-01-24 NOTE — PLAN OF CARE
Goal Outcome Evaluation:                    Orientation: Aox4  Bowel: Continent, c/o of constipation, prn enema ordered  Bladder: Continent  Ambulation/Transfers: Irina Madera  Diet/ Liquids: Regular Diet  Skin: Impaired, R heel wound and possible DTI on R 1st metatarsal, encourage blue foam boots at all times    Other: Unable to obtain current weight for phosphorus replacement, bed is calibrated to get weight when returned to bed

## 2023-01-25 ENCOUNTER — APPOINTMENT (OUTPATIENT)
Dept: PHYSICAL THERAPY | Facility: SKILLED NURSING FACILITY | Age: 58
DRG: 949 | End: 2023-01-25
Attending: INTERNAL MEDICINE
Payer: MEDICARE

## 2023-01-25 ENCOUNTER — APPOINTMENT (OUTPATIENT)
Dept: OCCUPATIONAL THERAPY | Facility: SKILLED NURSING FACILITY | Age: 58
DRG: 949 | End: 2023-01-25
Attending: INTERNAL MEDICINE
Payer: MEDICARE

## 2023-01-25 LAB — PHOSPHATE SERPL-MCNC: 2.1 MG/DL (ref 2.5–4.5)

## 2023-01-25 PROCEDURE — 97116 GAIT TRAINING THERAPY: CPT | Mod: GP

## 2023-01-25 PROCEDURE — 250N000013 HC RX MED GY IP 250 OP 250 PS 637: Performed by: INTERNAL MEDICINE

## 2023-01-25 PROCEDURE — 97530 THERAPEUTIC ACTIVITIES: CPT | Mod: GP

## 2023-01-25 PROCEDURE — 99309 SBSQ NF CARE MODERATE MDM 30: CPT | Performed by: INTERNAL MEDICINE

## 2023-01-25 PROCEDURE — 97535 SELF CARE MNGMENT TRAINING: CPT | Mod: GO

## 2023-01-25 PROCEDURE — 36415 COLL VENOUS BLD VENIPUNCTURE: CPT | Performed by: INTERNAL MEDICINE

## 2023-01-25 PROCEDURE — 250N000013 HC RX MED GY IP 250 OP 250 PS 637: Performed by: PHYSICIAN ASSISTANT

## 2023-01-25 PROCEDURE — 120N000009 HC R&B SNF

## 2023-01-25 PROCEDURE — 84100 ASSAY OF PHOSPHORUS: CPT | Performed by: INTERNAL MEDICINE

## 2023-01-25 PROCEDURE — 97110 THERAPEUTIC EXERCISES: CPT | Mod: GO

## 2023-01-25 PROCEDURE — 250N000013 HC RX MED GY IP 250 OP 250 PS 637

## 2023-01-25 RX ORDER — OXYCODONE HYDROCHLORIDE 5 MG/1
5 TABLET ORAL 2 TIMES DAILY PRN
Status: DISCONTINUED | OUTPATIENT
Start: 2023-01-25 | End: 2023-02-03 | Stop reason: HOSPADM

## 2023-01-25 RX ADMIN — ALPRAZOLAM 0.5 MG: 0.25 TABLET ORAL at 23:57

## 2023-01-25 RX ADMIN — DICLOFENAC SODIUM 4 G: 10 GEL TOPICAL at 13:15

## 2023-01-25 RX ADMIN — Medication 10 MG: at 23:58

## 2023-01-25 RX ADMIN — OXYCODONE HYDROCHLORIDE 5 MG: 5 TABLET ORAL at 16:15

## 2023-01-25 RX ADMIN — CETIRIZINE HYDROCHLORIDE 10 MG: 10 TABLET, FILM COATED ORAL at 20:50

## 2023-01-25 RX ADMIN — OLANZAPINE 5 MG: 2.5 TABLET, FILM COATED ORAL at 20:51

## 2023-01-25 RX ADMIN — GABAPENTIN 300 MG: 300 CAPSULE ORAL at 20:51

## 2023-01-25 RX ADMIN — THIAMINE HCL TAB 100 MG 100 MG: 100 TAB at 20:51

## 2023-01-25 RX ADMIN — LIDOCAINE PATCH 4% 2 PATCH: 40 PATCH TOPICAL at 13:11

## 2023-01-25 RX ADMIN — Medication 1 PACKET: at 00:37

## 2023-01-25 RX ADMIN — ACETAMINOPHEN 325MG 650 MG: 325 TABLET ORAL at 05:21

## 2023-01-25 RX ADMIN — GABAPENTIN 300 MG: 300 CAPSULE ORAL at 13:06

## 2023-01-25 RX ADMIN — FLUTICASONE PROPIONATE 1 SPRAY: 50 SPRAY, METERED NASAL at 20:52

## 2023-01-25 RX ADMIN — MIDODRINE HYDROCHLORIDE 10 MG: 5 TABLET ORAL at 13:10

## 2023-01-25 RX ADMIN — APIXABAN 2.5 MG: 2.5 TABLET, FILM COATED ORAL at 13:06

## 2023-01-25 RX ADMIN — POTASSIUM & SODIUM PHOSPHATES POWDER PACK 280-160-250 MG 1 PACKET: 280-160-250 PACK at 18:46

## 2023-01-25 RX ADMIN — POTASSIUM & SODIUM PHOSPHATES POWDER PACK 280-160-250 MG 1 PACKET: 280-160-250 PACK at 22:46

## 2023-01-25 RX ADMIN — Medication 1 PACKET: at 05:15

## 2023-01-25 RX ADMIN — Medication 10 MG: at 00:37

## 2023-01-25 RX ADMIN — FOLIC ACID 1 MG: 1 TABLET ORAL at 20:51

## 2023-01-25 RX ADMIN — SERTRALINE HYDROCHLORIDE 100 MG: 100 TABLET, FILM COATED ORAL at 20:50

## 2023-01-25 RX ADMIN — ATORVASTATIN CALCIUM 20 MG: 20 TABLET, FILM COATED ORAL at 20:51

## 2023-01-25 RX ADMIN — Medication 100 MG: at 20:50

## 2023-01-25 RX ADMIN — APIXABAN 2.5 MG: 2.5 TABLET, FILM COATED ORAL at 20:51

## 2023-01-25 RX ADMIN — FINASTERIDE 1.3 MG: 5 TABLET, FILM COATED ORAL at 20:52

## 2023-01-25 RX ADMIN — METHOCARBAMOL 500 MG: 500 TABLET ORAL at 13:06

## 2023-01-25 RX ADMIN — MIDODRINE HYDROCHLORIDE 10 MG: 5 TABLET ORAL at 18:45

## 2023-01-25 RX ADMIN — HYDROXYZINE HYDROCHLORIDE 25 MG: 25 TABLET, FILM COATED ORAL at 23:57

## 2023-01-25 RX ADMIN — DICLOFENAC SODIUM 4 G: 10 GEL TOPICAL at 20:53

## 2023-01-25 RX ADMIN — Medication 1 TABLET: at 20:51

## 2023-01-25 RX ADMIN — POTASSIUM & SODIUM PHOSPHATES POWDER PACK 280-160-250 MG 1 PACKET: 280-160-250 PACK at 16:13

## 2023-01-25 RX ADMIN — PANTOPRAZOLE SODIUM 40 MG: 40 TABLET, DELAYED RELEASE ORAL at 05:15

## 2023-01-25 RX ADMIN — ACETAMINOPHEN 325MG 650 MG: 325 TABLET ORAL at 13:08

## 2023-01-25 RX ADMIN — OXYCODONE HYDROCHLORIDE 5 MG: 5 TABLET ORAL at 05:21

## 2023-01-25 RX ADMIN — POLYETHYLENE GLYCOL 3350 17 G: 17 POWDER, FOR SOLUTION ORAL at 13:08

## 2023-01-25 RX ADMIN — SENNOSIDES AND DOCUSATE SODIUM 2 TABLET: 8.6; 5 TABLET ORAL at 13:08

## 2023-01-25 RX ADMIN — FLUTICASONE PROPIONATE 1 SPRAY: 50 SPRAY, METERED NASAL at 13:18

## 2023-01-25 RX ADMIN — DICLOFENAC SODIUM 4 G: 10 GEL TOPICAL at 16:17

## 2023-01-25 ASSESSMENT — ACTIVITIES OF DAILY LIVING (ADL)
ADLS_ACUITY_SCORE: 52
ADLS_ACUITY_SCORE: 52
ADLS_ACUITY_SCORE: 54
ADLS_ACUITY_SCORE: 52

## 2023-01-25 NOTE — PLAN OF CARE
Goal Outcome Evaluation:    VS: BP 94/58   Pulse 83   Temp 97.9  F (36.6  C) (Oral)   Resp 18   Wt 129.2 kg (284 lb 13.4 oz)   SpO2 98%   BMI 37.58 kg/m       O2: RA   Output: Urinal or BSC   Last BM: 1/25 BSC   Activity: At HD in AM; returned to unit about 1230. Participated in therapy   Skin: X; R heel wound  LUE fistula  Neck collar   Pain: Yes; pt requested oxy 2x day; provider updated.  Pt requested oxy then wanted to wait closer to bedtime; med returned.    CMS Neuro: BLE edema 3+, tubi  socks on  A&O, some forgetfulness, repeats requests   Dressing: Back   R PIV   Diet: Reg   LDA: R PIV   Equipment: Irina Steady, cervical collar, w/c, urinal, BSC   Plan: Con't POC.    Additional Info: Ph+ 2.1 (result came in late); oral replacement ordered - will update rg RN to give. Lab recheck for 1/26 at 0600.       Patient's most recent vital signs are:     Vital signs:  BP: 94/58  Temp: 97.9  HR: 83  RR: 18  SpO2: 98 %     Patient does not have new respiratory symptoms.  Patient does not have new sore throat.  Patient does not have a fever greater than 99.5.

## 2023-01-25 NOTE — PLAN OF CARE
Goal Outcome Evaluation:    Patient is AOx4, able to make his needs known. Was awake at the beginning of the shift and kept using call light multiple times. Needs attended to and prn melatonin given with positive effectiveness. Patient was on 2L supplemental O2 for comfort, denied CP, SOB or pain. Phosphorous supplement given per orders, recheck this AM. Patient has dialysis today,  0615. Continue with POC  0520: Patient verbalized pain, rated 8/10 and requested prn tylenol and oxy which was given.      Patient's most recent vital signs are:     Vital signs:  BP: 127/52  Temp: 98.1  HR: 79  RR: 18  SpO2: 98 %     Patient does not have new respiratory symptoms.  Patient does not have new sore throat.  Patient does not have a fever greater than 99.5.

## 2023-01-25 NOTE — PROGRESS NOTES
[facility]    Medicine Progress Note - Hospitalist Service    Date of Admission:  1/9/2023    Assessment & Plan   Shay Jimenez is a 58 yo obese gentleman w/ h/o HTN, ESRD on HD, GERD, GINI intolerant of CPAP, chronic atrial fibrillation on chronic Eliquis, alcohol ause, had previous C5-T6 spinal fusion due to pathological fracture from osteomyelitis.    He was noted to have discitis/osteomyelitis on CT of chest back in 2/2022 that was obtained for respiratory issues.      He was hospitalized at [facility] and was to follow up with [facility] spine (no records and pt has no recollection if anything was done). In 6/2022 MRI showed T2, T3 vertebral collapse with myelopathy, some abnormal epidural tissue and inflammation, at that time antibiotics was deferred as pt was stable and was planned for obtaining intraop cx.  Intra-op cx was + on day 5 and day 7 for C. Acnes that was felt to be contamination, however due to presence of hardware it was decided to treat with ancef x 2 weeks followed by  amoxicillin x 4 weeks.     He then was treated for Morganella morganii septic shock, no intra-abd source found on CT.  TTE did not show vegetations was treated with almost 9 weeks of iv cefepime 8/14-10/13.     On repeat imaging was found to have hardware failure and was admitted on 12/6/22 to [facility] Neurosurgeon service for surgery.  He underwent C5-T6 redo cervical spine fusion 12/6/22.  He did have hemorrhage intraop, cerebral angio did not show vertebral artery injury, did receive blood, was in ICU on pressors to keep blood pressure up for perfusion.     Also received periop cefazolin till drain pulled. Intraop culture remained negative and there was no indication for ongoing antibiotics per ID.  Hospital course was complicated by agitation and delirium.  He treated w/ benzo per CIWA,  He was also noted to in aute on chronic respiratory failure and did require supplement Oxygen.   Transferred to [facility] 1/7/23 for conditioning.     Patient's TCU course has been complicated by waxing and waning mentation, behavioral issues, mostly notable on 1/9/23 while in dialysis at [facility], evaluated subsequently at [facility] but work-up including CT head without contrast, CTA head and neck, CBC, BMP, troponin and EKG; all of the aforementioned test were negative and his mentation improved. He was seen by Psychiatry on 1/12/23 for this delirium and was started on Zyprexa 5 mg at bedtime as well as Aripiprazole 2 mg BID prn.        #Hardware failure, pseudoarthritis   #S/p C5-T6 redo cervical spine fusion 12/6/22  #Previous h/o C5-T6 fusion for pathological fracture due to osteomyelitis   -Intraop culture remained neg and per ID there was no indication for ongoing antibiotics     Needs cervical collar when HOB > 45 degrees and when OOB     #Acute metabolic encephalopathy, recurrent, resolved at this time  -Etiology remained elusive  -He had recurrent confusion, agitation and delirium during his hospitalization at the [facility], 12/6 - 1/9/23  -Pt was seen at [facility] ED on 1/9/23 after HD when he became very agitated, confused, disoriented, argumentative and irritable at the dialysis center.  CT head w/o contrast and CTA head and neck were negative.  -  - seen by Psychiatry    Olanzapine 5 mg at bedtime    Aripirpazole 2 mg bid prn    Limit pschotropic medications as much as possible; of note, PTA Xanax restarted on 1/22/23 prn every day     Stop scheduled baclofen and change scheduled Robaxin to PRN on 1/24        #Hypotension   -Not on meds    Continue midodrine 10 mg tid but hold if blood pressure  > 120      #Chronic Atrial fibrillation   -He was seen by cardiology in the past   -He had ZIO patch in past but apparently results were lost  -Currently well rate controlled w/o meds    On eliquis 2.5 mg for stroke prophylaxis     #Mild to moderate pulm HTN  #Moderate MR  -Could all be related to  untreated GINI but has not tolerated CPAP     Will need to follow up as out patient       #Chronic resp failure  #GINI   #Obesity   -Intolerant to CPAP  -Uses O2 at night 1-3 liters, continue     #Acute on chronic pain  #Peripheral neuropathy    Continue gabapentin and PRN robaxin    Lidocaine patches initiated    Oxycodone increased to 5 mg BID PRN, from daily PRN on 1/25     #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   #Hypophosphatemia     Continue HD  M/W/F      kg     Holding sevelemer to 800 mg tid    Monitoring phos level  -Refused renal diet      #Chronic hyponatremia - resolved    Managed by nephrology during HD      #ACD  -Due to ESKD  -Managed during HD w/ epo    #HDL    Continue atorvastatin      #Anxiety d/o  #Depression   #OCD     Continue Setraline 100 mg every day      #Alcohol use disorder  -Drink 5-6 cocktail a day (vodka )     Continue vitamins   -Does not want to see chem dep     Continue baclofen and gabapentin as can also help with craving      #Right heal pressure sore    WOCN to see      Diet: Regular Diet Adult  Snacks/Supplements Adult: Other; Special K bar @ HS; Between Meals  DVT Prophylaxis: eliquis   Oliveira Catheter: Not present  Lines: None     Cardiac Monitoring: None  Code Status: Full Code    Disposition Plan   01/27/23    Psych to finalize meds prior to discharge, ?nocturnal O2 test for whether home O2 needed.       Indication for psychotropic meds:   -Hydroxyzine for itchiness   -PTA xanax every day prn for anxiety   -Aripiprazole prn for agitation & agression      Nickcha Armaan Moreno MD  Hospitalist Service  [facility]  Securely message with Holly (more info)  Text page via [facility] Paging/Directory   ___________________________________________________________________    Interval History   Patient just got back from dialysis today.  Complains of increased pain and wants Oxycodone increased to twice daily PRN  No fever or chills  Eating  and drinking well         Physical Exam   Vital Signs: Temp: 97.9  F (36.6  C) Temp src: Oral BP: 94/58 Pulse: 83   Resp: 18 SpO2: 98 % O2 Device: None (Room air)    Weight: 284 lbs 13.35 oz    General Appearance: Lying comfortably in bed, on supplemental O2 for comfirt, in no acute distress or discomfort  HEENT: PERRL: EOMI; moist mucous membrane w/o lesions  Neck: No JVD  Pulmonary: Clear to auscultation bilaterally, no wheezes or crackles  CVS: Regular rhythm, systolic murmur, no rubs or gallops  GI: BS (+), soft nontender, no rebound or guarding   Extremities: Bilateral LE edema   Skin: No rashes or lesions  Neurologic: A&O x3  Psych: Mood is appropriate        Data

## 2023-01-26 ENCOUNTER — APPOINTMENT (OUTPATIENT)
Dept: OCCUPATIONAL THERAPY | Facility: SKILLED NURSING FACILITY | Age: 58
DRG: 949 | End: 2023-01-26
Attending: INTERNAL MEDICINE
Payer: MEDICARE

## 2023-01-26 ENCOUNTER — APPOINTMENT (OUTPATIENT)
Dept: LAB | Facility: CLINIC | Age: 58
End: 2023-01-26
Payer: MEDICARE

## 2023-01-26 ENCOUNTER — APPOINTMENT (OUTPATIENT)
Dept: PHYSICAL THERAPY | Facility: SKILLED NURSING FACILITY | Age: 58
DRG: 949 | End: 2023-01-26
Attending: INTERNAL MEDICINE
Payer: MEDICARE

## 2023-01-26 LAB — PHOSPHATE SERPL-MCNC: 3.1 MG/DL (ref 2.5–4.5)

## 2023-01-26 PROCEDURE — G0463 HOSPITAL OUTPT CLINIC VISIT: HCPCS

## 2023-01-26 PROCEDURE — 250N000013 HC RX MED GY IP 250 OP 250 PS 637

## 2023-01-26 PROCEDURE — 250N000013 HC RX MED GY IP 250 OP 250 PS 637: Performed by: PHYSICIAN ASSISTANT

## 2023-01-26 PROCEDURE — 250N000013 HC RX MED GY IP 250 OP 250 PS 637: Performed by: INTERNAL MEDICINE

## 2023-01-26 PROCEDURE — 97530 THERAPEUTIC ACTIVITIES: CPT | Mod: GP

## 2023-01-26 PROCEDURE — 84100 ASSAY OF PHOSPHORUS: CPT | Performed by: INTERNAL MEDICINE

## 2023-01-26 PROCEDURE — 11721 DEBRIDE NAIL 6 OR MORE: CPT | Performed by: PODIATRIST

## 2023-01-26 PROCEDURE — 120N000009 HC R&B SNF

## 2023-01-26 PROCEDURE — 0HCRXZZ EXTIRPATION OF MATTER FROM TOE NAIL, EXTERNAL APPROACH: ICD-10-PCS | Performed by: PODIATRIST

## 2023-01-26 PROCEDURE — 36415 COLL VENOUS BLD VENIPUNCTURE: CPT | Performed by: INTERNAL MEDICINE

## 2023-01-26 PROCEDURE — 97535 SELF CARE MNGMENT TRAINING: CPT | Mod: GO

## 2023-01-26 PROCEDURE — 250N000013 HC RX MED GY IP 250 OP 250 PS 637: Performed by: PODIATRIST

## 2023-01-26 PROCEDURE — 99307 SBSQ NF CARE SF MDM 10: CPT | Mod: 25 | Performed by: PODIATRIST

## 2023-01-26 RX ORDER — ECONAZOLE NITRATE 10 MG/G
CREAM TOPICAL DAILY
Status: DISCONTINUED | OUTPATIENT
Start: 2023-01-26 | End: 2023-01-26

## 2023-01-26 RX ORDER — CLOTRIMAZOLE 1 %
CREAM (GRAM) TOPICAL 2 TIMES DAILY
Status: DISCONTINUED | OUTPATIENT
Start: 2023-01-26 | End: 2023-02-03 | Stop reason: HOSPADM

## 2023-01-26 RX ADMIN — THIAMINE HCL TAB 100 MG 100 MG: 100 TAB at 20:40

## 2023-01-26 RX ADMIN — APIXABAN 2.5 MG: 2.5 TABLET, FILM COATED ORAL at 09:11

## 2023-01-26 RX ADMIN — ALPRAZOLAM 0.5 MG: 0.25 TABLET ORAL at 20:40

## 2023-01-26 RX ADMIN — FINASTERIDE 1.3 MG: 5 TABLET, FILM COATED ORAL at 20:41

## 2023-01-26 RX ADMIN — DICLOFENAC SODIUM 4 G: 10 GEL TOPICAL at 13:07

## 2023-01-26 RX ADMIN — METHOCARBAMOL 500 MG: 500 TABLET ORAL at 13:03

## 2023-01-26 RX ADMIN — METHOCARBAMOL 500 MG: 500 TABLET ORAL at 06:53

## 2023-01-26 RX ADMIN — CLOTRIMAZOLE: 1 CREAM TOPICAL at 20:41

## 2023-01-26 RX ADMIN — GABAPENTIN 300 MG: 300 CAPSULE ORAL at 09:12

## 2023-01-26 RX ADMIN — APIXABAN 2.5 MG: 2.5 TABLET, FILM COATED ORAL at 20:40

## 2023-01-26 RX ADMIN — Medication 10 MG: at 21:02

## 2023-01-26 RX ADMIN — DICLOFENAC SODIUM 4 G: 10 GEL TOPICAL at 20:40

## 2023-01-26 RX ADMIN — GABAPENTIN 300 MG: 300 CAPSULE ORAL at 13:03

## 2023-01-26 RX ADMIN — PANTOPRAZOLE SODIUM 40 MG: 40 TABLET, DELAYED RELEASE ORAL at 06:20

## 2023-01-26 RX ADMIN — FLUTICASONE PROPIONATE 1 SPRAY: 50 SPRAY, METERED NASAL at 20:42

## 2023-01-26 RX ADMIN — DICLOFENAC SODIUM 4 G: 10 GEL TOPICAL at 18:14

## 2023-01-26 RX ADMIN — SENNOSIDES AND DOCUSATE SODIUM 2 TABLET: 8.6; 5 TABLET ORAL at 20:40

## 2023-01-26 RX ADMIN — Medication 1 TABLET: at 20:40

## 2023-01-26 RX ADMIN — HYDROXYZINE HYDROCHLORIDE 25 MG: 25 TABLET, FILM COATED ORAL at 20:52

## 2023-01-26 RX ADMIN — MIDODRINE HYDROCHLORIDE 10 MG: 5 TABLET ORAL at 13:03

## 2023-01-26 RX ADMIN — Medication 100 MG: at 20:39

## 2023-01-26 RX ADMIN — OLANZAPINE 5 MG: 2.5 TABLET, FILM COATED ORAL at 20:39

## 2023-01-26 RX ADMIN — FLUTICASONE PROPIONATE 1 SPRAY: 50 SPRAY, METERED NASAL at 09:13

## 2023-01-26 RX ADMIN — OXYCODONE HYDROCHLORIDE 5 MG: 5 TABLET ORAL at 18:22

## 2023-01-26 RX ADMIN — SERTRALINE HYDROCHLORIDE 100 MG: 100 TABLET, FILM COATED ORAL at 20:40

## 2023-01-26 RX ADMIN — CETIRIZINE HYDROCHLORIDE 10 MG: 10 TABLET, FILM COATED ORAL at 20:40

## 2023-01-26 RX ADMIN — OXYCODONE HYDROCHLORIDE 5 MG: 5 TABLET ORAL at 06:52

## 2023-01-26 RX ADMIN — FOLIC ACID 1 MG: 1 TABLET ORAL at 20:40

## 2023-01-26 RX ADMIN — ACETAMINOPHEN 325MG 650 MG: 325 TABLET ORAL at 13:03

## 2023-01-26 RX ADMIN — DICLOFENAC SODIUM 4 G: 10 GEL TOPICAL at 09:13

## 2023-01-26 RX ADMIN — ACETAMINOPHEN 325MG 650 MG: 325 TABLET ORAL at 06:53

## 2023-01-26 RX ADMIN — MIDODRINE HYDROCHLORIDE 10 MG: 5 TABLET ORAL at 18:14

## 2023-01-26 RX ADMIN — ATORVASTATIN CALCIUM 20 MG: 20 TABLET, FILM COATED ORAL at 20:39

## 2023-01-26 RX ADMIN — GABAPENTIN 300 MG: 300 CAPSULE ORAL at 20:40

## 2023-01-26 ASSESSMENT — ACTIVITIES OF DAILY LIVING (ADL)
ADLS_ACUITY_SCORE: 54

## 2023-01-26 NOTE — PROGRESS NOTES
Charts reviewed. No acute complaints  Reviewed WOCN consult today  Pain well controlled  /70 (BP Location: Right arm)   Pulse 86   Temp 97.3  F (36.3  C) (Oral)   Resp 18   Wt 129.2 kg (284 lb 13.4 oz)   SpO2 96%   BMI 37.58 kg/m    Continue care as planned 'Phos level today at 3.1  Will continue to hold Renvela for 1-2 days before restarting, or could consider a lower dose or reduce the frequency to BID      Dr FILIPPO Groves MD, [facility]  Hospitalist ( Internal medicine)  Pager: [phone]

## 2023-01-26 NOTE — PROGRESS NOTES
1/25  SW met with pt to discuss insurance. SW gave pt phone number to call to get Medicare to be primary.  Pt will call them.     1/26  SW met with pt to discuss [employer] is saying they are done paying.  But now they should be secondary.  Pt is saying Medicare is first. Pt wants to keep getting stronger so pt isn't so much of a burden on pt's two sons.  They are in school and working. Pt wants to stay another week and son can only come and get pt on the 3rd. Our system says pt did get Medicare to be primary.  Pt said pt started dialysis in April on 2018.     JULISSA Forrest   [facility], Trans[facility]   Social Work   [facility] [place] [address], [address] [place] [address]  () [phone]

## 2023-01-26 NOTE — CONSULTS
Consult Date: 2023    SUBJECTIVE FINDINGS:  A 57-year-old consulted to Podiatry by Malcom Campbell MD, for toenail cares.  The patient is seen at bedside, resting, relates he has neuropathy in his feet.  He cannot trim his toenails and they get thick and he relates he is doing wound cares with the wound nurse and that seems to be doing better for sores on his feet.    OBJECTIVE FINDINGS:  DP and PT are 2/4 bilaterally.  He has incurvated, thickened, brittle nails with subungual debris, dystrophy, discoloration and thickening 1 through 5 bilaterally to differing degrees.  He has dry, scaly skin bilaterally.  His first MPJ ulcer appears to be healed.  I did review previous photos from the wound nurse.  He has a posterior right heel ulcer that also appears to be healing.  There is no erythema, no active drainage, no odor, no calor bilaterally.    ASSESSMENT AND PLAN:  Onychomycosis bilaterally.  Tinea pedis bilaterally with dry skin.  Diagnosis and treatment options discussed with the patient.  All the toenails 1 through 5 bilaterally were debrided upon consent, 6+ toenails were debrided.  Prescription for econazole cream given and use discussed with him.  Wound cares as per wound care nurse.  I reviewed previous notes as noted in the EMR.  Follow up with Podiatry as an outpatient.  I will see him back in 1 month.  Pharmacy noted Econazole cream not available, switched to Clotrimazole cream 1%.    Ashkan Grimaldo DPM        D: 2023   T: 2023   MT: ABY    Name:     ROSMERYMICHEALSWATI  MRN:      [id number]        Account:      [id number]   :      1965           Consult Date: 2023     Document: [id number]    cc:  Malcom Campbell MD

## 2023-01-26 NOTE — PROGRESS NOTES
[facility] Nurse Inpatient Assessment     Consulted for: Right heel    Patient History (according to provider note(s):      Shay Jimenez is a 57 year old male with with PMH ESRD on dialysis and osteomyelitis s/p C5-T6 fusion who is now POD#5 s/p redo C5-T6 fusion with concern for hemorrhage; no vertebral artery dissection or extravasation was noted on intraoperative angiogram.  He was admitted to the Neuro ICU postoperatively for MAP goals and frequent neuro checks, remaining stable on pressors.  Neuro exam is unchanged from patient's baseline.    Patient transferred to TCU 1/9/2023 for continued rehab.     Areas Assessed:      Areas visualized during today's visit: Feet     Pressure Injury Location: Right heel      1/5 1/20 1/26  Last photo: 1/26  Wound type: Pressure Injury     Pressure Injury Stage: 2, present on admission   Wound history/plan of care:  Wound noted by nursing on [place] on 12/6 after patient transferred out of PACU. Patient had been proned in OR so unlikely wound is from then and that it was present on admission.  Wound base: 100 % granulation tissue     Palpation of the wound bed: normal      Drainage: none     Description of drainage: none     Measurements (length x width x depth, in cm) 1 x 0.8 x 0.2 cm      Tunneling N/A     Undermining N/A  Periwound skin: Intact      Color: normal and consistent with surrounding tissue      Temperature: normal   Odor: none  Pain: no grimacing or signs of discomfort, none  Pain intervention prior to dressing change: patient tolerated well  Treatment goal: Protection  STATUS: healing   Supplies ordered: supplies stored on unit     Pressure Injury Location: Right plantar 1st mtp    1/26 dried drainage    1/26 peeled callous  Last photo: 1/26  Wound type: Pressure Injury     Pressure Injury Stage: Deep Tissue Pressure Injury (DTPI), present on admission      This is a Medical Device Related  "Pressure Injury (MDRPI) due to wheelchair foot rest  Wound history/plan of care:   Pt is very clear he has had discoloration for \"weeks\". Upon initial assessment finds dark maroon dried drainage on callous that appears older in age, not new injury. Callous is loose and peeling. Gently peeled back callous to find intact healed skin. Not a pressure injury at this time. No topical treatment needed at this time. Continue general pressure injury prevention as previously written.      Treatment Plan:     Right heel wound: Every third day and as needed  Cleanse wound with NS and pat dry  Paint bibi-wound skin with no sting barrier film.  Apply dab of Triad paste (#739101) to wound bed only  Cover with 4x4 mepilex flex(# 167404).   Keep heels elevated off bed.   Wear Prevalon boots while in bed. (# [id number])    Pressure Injury Prevention (PIP) Plan:  If patient is declining pressure injury prevention interventions: Explore reason why and address patient's concerns, Educate on pressure injury risk and prevention intervention(s), If patient is still declining, document \"informed refusal\"  and Ensure Care team is aware ( provider, charge nurse, etc)  Mattress: Follow bed algorithm, reassess daily and order specialty mattress, if indicated.  HOB: Maintain at or below 30 degrees, unless contraindicated  Repositioning in bed: Every 1-2 hours , Left/right positioning; avoid supine and Raise foot of bed prior to raising head of bed, to reduce patient sliding down (shear)  Heels: Keep elevated off mattress, Pillows under calves and Heel lift boots  Protective Dressing: Sacral Mepilex for prevention (#879766),  especially for the agitated patient   Chair positioning: Chair cushion (#151999) , Repositions self: patient to shift weight every 15 minutes, Assist patient to reposition hourly and Do NOT use a donut for sitting (this increases pressure to smaller area and creates a higher potential for injury)    If patient has a buttock " pressure injury, or high risk for PI use chair cushion or SPS.  Moisture Management: Perineal cleansing /protection: Follow Incontinence Protocol, Avoid brief in bed, Clean and dry skin folds with bathing , Consider InterDry (#757153) between folds and Moisturize dry skin  Under Devices: Inspect skin under all medical devices during skin inspection , Ensure tubes are stabilized without tension and Ensure patient is not lying on medical devices or equipment when repositioned  Ask provider to discontinue device when no longer needed.    Orders: Reviewed and Updated    RECOMMEND PRIMARY TEAM ORDER: None, at this time  Education provided: plan of care and wound progress  Discussed plan of care with: Patient  WOC nurse follow-up plan: weekly  Notify WOC if wound(s) deteriorate.  Nursing to notify the Provider(s) and re-consult the WOC Nurse if new skin concern.    DATA:     Current support surface: Standard  Low air loss (DEN pump, Isolibrium, Pulsate, skin guard, etc)  BMI: Body mass index is 37.58 kg/m .   Active diet order: Orders Placed This Encounter      Regular Diet Adult     Output: I/O last 3 completed shifts:  In: 960 [P.O.:960]  Out: -      Labs:   Recent Labs   Lab 01/23/23  1624   HGB 9.5*   WBC 2.2*     Pressure injury risk assessment:   Sensory Perception: 3-->slightly limited  Moisture: 3-->occasionally moist  Activity: 3-->walks occasionally  Mobility: 2-->very limited  Nutrition: 3-->adequate  Friction and Shear: 2-->potential problem  Konstantin Score: 16     Dept. Pager: [phone]  Dept. Office Number: [phone]

## 2023-01-26 NOTE — CONSULTS
Past Medical History:   Diagnosis Date     Chronic kidney disease      Neuropathy      Patient Active Problem List   Diagnosis     Spondylosis of thoracic spine with myelopathy     Discitis     End stage renal disease (H)     Abscess in epidural space of spine     Chronic renal insufficiency, stage II (mild)     S/P cervical spinal fusion     Weakness     Alcohol abuse, uncomplicated     Other seasonal allergic rhinitis     Anaphylactic reaction due to vaccination, initial encounter     Anemia     Anxiety     Aortic root dilatation (H)     Arthritis of knee, right     Atrial fibrillation with RVR (H)     BPH (benign prostatic hyperplasia)     Chest pain     Other specified coagulation defects (H)     COVID-19     Dependence on renal dialysis (H)     Depression     Diarrhea, unspecified     Disorder of phosphorus metabolism, unspecified     Enlargement of abdominal organ     Hypotension, unspecified     Fever, unspecified     Fluid overload, unspecified     Gastroesophageal reflux disease without esophagitis     Gout     Generalized muscle weakness     Habitual alcohol use     Headache, unspecified     History of total knee replacement     Hypercalcemia     Hyperlipidemia     Hypokalemia     Hyponatremia     Iron deficiency anemia, unspecified     Major depressive disorder, single episode, severe without psychotic features (H)     Morbid obesity (H)     Multiple pulmonary nodules     Obstructive sleep apnea syndrome     Osteomyelitis of vertebra, thoracic region (H)     Other nonspecific abnormal finding of lung field     Pain, unspecified     Polyneuropathy     Pneumonia, bacterial     Protein-calorie malnutrition (H)     Pruritus, unspecified     Pure hypercholesterolemia     Reflux esophagitis     Secondary hyperparathyroidism of renal origin (H)     Severe sepsis with septic shock (H)     Shortness of breath     Sleep-related hypoventilation     Uremia     Hardware complicating wound infection (H)     Hx of  fusion of cervical spine     Past Surgical History:   Procedure Laterality Date     IR CAROTID CEREBRAL ANGIOGRAM BILATERAL  12/6/2022     OPTICAL TRACKING SYSTEM FUSION POSTERIOR SPINE THORACIC THREE+ LEVELS N/A 6/27/2022    Procedure: Cervical 6 to Thoracic 6 Fusion and Thoracic 2-3 Decompression;  Surgeon: Fritz Boles MD;  Location:  OR     OPTICAL TRACKING SYSTEM FUSION POSTERIOR SPINE THORACIC THREE+ LEVELS N/A 12/6/2022    Procedure: O-Arm/Stealth Assisted Revision of Cervical 5 to Thoracic 6 Fusion;  Surgeon: Fritz Boles MD;  Location:  OR     Social History     Socioeconomic History     Marital status: Single     Spouse name: Not on file     Number of children: Not on file     Years of education: Not on file     Highest education level: Not on file   Occupational History     Not on file   Tobacco Use     Smoking status: Never     Smokeless tobacco: Never   Substance and Sexual Activity     Alcohol use: Not Currently     Drug use: Never     Sexual activity: Not on file   Other Topics Concern     Not on file   Social History Narrative     Not on file     Social Determinants of Health     Financial Resource Strain: Not on file   Food Insecurity: Not on file   Transportation Needs: Not on file   Physical Activity: Not on file   Stress: Not on file   Social Connections: Not on file   Intimate Partner Violence: Not on file   Housing Stability: Not on file     No family history on file.        Lab Results   Component Value Date    WBC 2.2 01/23/2023     Lab Results   Component Value Date    RBC 3.11 01/23/2023     Lab Results   Component Value Date    HGB 9.5 01/23/2023     Lab Results   Component Value Date    HCT 30.4 01/23/2023     No components found for: MCT  Lab Results   Component Value Date    MCV 98 01/23/2023     Lab Results   Component Value Date    MCH 30.5 01/23/2023     Lab Results   Component Value Date    MCHC 31.3 01/23/2023     Lab Results   Component Value Date    RDW 15.2  01/23/2023     Lab Results   Component Value Date     01/23/2023     Last Comprehensive Metabolic Panel:  Sodium   Date Value Ref Range Status   01/23/2023 138 133 - 144 mmol/L Final     Potassium   Date Value Ref Range Status   01/23/2023 4.3 3.4 - 5.3 mmol/L Final     Chloride   Date Value Ref Range Status   01/23/2023 96 94 - 109 mmol/L Final     Carbon Dioxide (CO2)   Date Value Ref Range Status   01/23/2023 38 (H) 20 - 32 mmol/L Final     Anion Gap   Date Value Ref Range Status   01/23/2023 4 3 - 14 mmol/L Final     Glucose   Date Value Ref Range Status   01/23/2023 111 (H) 70 - 99 mg/dL Final     Urea Nitrogen   Date Value Ref Range Status   01/23/2023 18 7 - 30 mg/dL Final     Creatinine   Date Value Ref Range Status   01/23/2023 3.19 (H) 0.66 - 1.25 mg/dL Final     GFR Estimate   Date Value Ref Range Status   01/23/2023 22 (L) >60 mL/min/1.73m2 Final     Comment:     eGFR calculated using 2021 CKD-EPI equation.     Calcium   Date Value Ref Range Status   01/23/2023 8.8 8.5 - 10.1 mg/dL Final           Note dictated.

## 2023-01-26 NOTE — PLAN OF CARE
Goal Outcome Evaluation:    Patient is alert and oriented x 4. He is able to make his needs known. He denied SOB and chest pain. Pt verbalized that he twisted his right rotating cuff and ribs during therapy while he was pulling himself from the toilet. Received PRN Oxycodone and Voltaren cream for pain of 7. He verbalized feeling better. Phosphorus level was 2.1 this afternoon. Pt received scheduled Phosphorus as ordered. Next lab draw in the morning.    Patient's most recent vital signs are:     Vital signs:  BP: 108/58  Temp: 96.8  HR: 89  RR: 18  SpO2: 98 %     Patient does not have new respiratory symptoms.  Patient does not have new sore throat.  Patient does not have a fever greater than 99.5.

## 2023-01-26 NOTE — PLAN OF CARE
Goal Outcome Evaluation:    Patient is AOx4, able to make his needs known. Was awake at the beginning of the shift and kept using call light multiple times. Needs attended to and prn melatonin, Xanax and atarax given with positive effects. Patient was on 2L supplemental O2 for comfort, denied CP, SOB or pain.Patient noted to be sleeping during safety checks. Continue with POC  Patient verbalized right rotator calf pain, prn oxy 5mg, tylenol 650mg and robaxin at 7am per request.      Patient's most recent vital signs are:     Vital signs:  BP: 108/58  Temp: 96.8  HR: 89  RR: 18  SpO2: 98 %     Patient does not have new respiratory symptoms.  Patient does not have new sore throat.  Patient does not have a fever greater than 99.5.

## 2023-01-26 NOTE — PLAN OF CARE
Weekly summary report    Treatment frequency: 3x/week  Recertification date: 2/20/2023  Target discharge from therapy: 2/2  Current level of function: Patient open to utilizing external memory aids but reinforcement needed for consistent use and success. Overall mild cognitive impairment but does limit success with PT/OT and ability to carryover new learning.

## 2023-01-26 NOTE — PLAN OF CARE
Goal Outcome Evaluation:    VS: /70 (BP Location: Right arm)   Pulse 86   Temp 97.3  F (36.3  C) (Oral)   Resp 18   Wt 129.2 kg (284 lb 13.4 oz)   SpO2 96%   BMI 37.58 kg/m      O2: RA   Output: Urinal or BSC   Last BM: 1/25 BSC   Activity: Up in w/c; Participated in therapy   Skin: X; R heel wound  LUE fistula  Neck collar   Pain: Yes   CMS Neuro: BLE edema 3+, tubi  socks on  A&O, some forgetfulness, repeats requests   Dressing: Back   R PIV   Diet: Reg   LDA: R PIV   Equipment: Irina Steady, cervical collar, w/c, urinal, BSC   Plan: Con't POC.    Additional Info: Ph+ 3.1, no replacement needed.       Patient's most recent vital signs are:     Vital signs:  BP: 129/70  Temp: 97.3  HR: 86  RR: 18  SpO2: 96 %     Patient does not have new respiratory symptoms.  Patient does not have new sore throat.  Patient does not have a fever greater than 99.5.

## 2023-01-27 ENCOUNTER — TELEPHONE (OUTPATIENT)
Dept: NEUROSURGERY | Facility: CLINIC | Age: 58
End: 2023-01-27
Payer: COMMERCIAL

## 2023-01-27 ENCOUNTER — APPOINTMENT (OUTPATIENT)
Dept: OCCUPATIONAL THERAPY | Facility: SKILLED NURSING FACILITY | Age: 58
DRG: 949 | End: 2023-01-27
Attending: INTERNAL MEDICINE
Payer: MEDICARE

## 2023-01-27 PROCEDURE — 97535 SELF CARE MNGMENT TRAINING: CPT | Mod: GO

## 2023-01-27 PROCEDURE — 250N000013 HC RX MED GY IP 250 OP 250 PS 637: Performed by: PHYSICIAN ASSISTANT

## 2023-01-27 PROCEDURE — 250N000013 HC RX MED GY IP 250 OP 250 PS 637: Performed by: INTERNAL MEDICINE

## 2023-01-27 PROCEDURE — 250N000013 HC RX MED GY IP 250 OP 250 PS 637

## 2023-01-27 PROCEDURE — 120N000009 HC R&B SNF

## 2023-01-27 PROCEDURE — 97110 THERAPEUTIC EXERCISES: CPT | Mod: GO

## 2023-01-27 RX ADMIN — OXYCODONE HYDROCHLORIDE 5 MG: 5 TABLET ORAL at 05:50

## 2023-01-27 RX ADMIN — APIXABAN 2.5 MG: 2.5 TABLET, FILM COATED ORAL at 13:10

## 2023-01-27 RX ADMIN — ALPRAZOLAM 0.5 MG: 0.25 TABLET ORAL at 21:33

## 2023-01-27 RX ADMIN — OXYCODONE HYDROCHLORIDE 5 MG: 5 TABLET ORAL at 18:11

## 2023-01-27 RX ADMIN — OLANZAPINE 5 MG: 2.5 TABLET, FILM COATED ORAL at 21:21

## 2023-01-27 RX ADMIN — FINASTERIDE 1.3 MG: 5 TABLET, FILM COATED ORAL at 21:21

## 2023-01-27 RX ADMIN — CETIRIZINE HYDROCHLORIDE 10 MG: 10 TABLET, FILM COATED ORAL at 21:21

## 2023-01-27 RX ADMIN — DICLOFENAC SODIUM 4 G: 10 GEL TOPICAL at 13:08

## 2023-01-27 RX ADMIN — GABAPENTIN 300 MG: 300 CAPSULE ORAL at 21:21

## 2023-01-27 RX ADMIN — HYDROXYZINE HYDROCHLORIDE 25 MG: 25 TABLET, FILM COATED ORAL at 06:06

## 2023-01-27 RX ADMIN — THIAMINE HCL TAB 100 MG 100 MG: 100 TAB at 21:21

## 2023-01-27 RX ADMIN — METHOCARBAMOL 500 MG: 500 TABLET ORAL at 21:37

## 2023-01-27 RX ADMIN — PANTOPRAZOLE SODIUM 40 MG: 40 TABLET, DELAYED RELEASE ORAL at 05:49

## 2023-01-27 RX ADMIN — SEVELAMER CARBONATE 800 MG: 800 TABLET, FILM COATED ORAL at 13:08

## 2023-01-27 RX ADMIN — FLUTICASONE PROPIONATE 1 SPRAY: 50 SPRAY, METERED NASAL at 13:08

## 2023-01-27 RX ADMIN — MIDODRINE HYDROCHLORIDE 10 MG: 5 TABLET ORAL at 13:10

## 2023-01-27 RX ADMIN — APIXABAN 2.5 MG: 2.5 TABLET, FILM COATED ORAL at 21:21

## 2023-01-27 RX ADMIN — CLOTRIMAZOLE: 1 CREAM TOPICAL at 13:08

## 2023-01-27 RX ADMIN — HYDROXYZINE HYDROCHLORIDE 50 MG: 25 TABLET, FILM COATED ORAL at 21:33

## 2023-01-27 RX ADMIN — DICLOFENAC SODIUM 4 G: 10 GEL TOPICAL at 18:11

## 2023-01-27 RX ADMIN — GABAPENTIN 300 MG: 300 CAPSULE ORAL at 06:06

## 2023-01-27 RX ADMIN — GABAPENTIN 300 MG: 300 CAPSULE ORAL at 13:10

## 2023-01-27 RX ADMIN — Medication 1 TABLET: at 21:21

## 2023-01-27 RX ADMIN — DICLOFENAC SODIUM 4 G: 10 GEL TOPICAL at 21:21

## 2023-01-27 RX ADMIN — Medication 10 MG: at 21:21

## 2023-01-27 RX ADMIN — FOLIC ACID 1 MG: 1 TABLET ORAL at 21:21

## 2023-01-27 RX ADMIN — FLUTICASONE PROPIONATE 1 SPRAY: 50 SPRAY, METERED NASAL at 21:21

## 2023-01-27 RX ADMIN — ATORVASTATIN CALCIUM 20 MG: 20 TABLET, FILM COATED ORAL at 21:21

## 2023-01-27 RX ADMIN — ACETAMINOPHEN 325MG 650 MG: 325 TABLET ORAL at 05:50

## 2023-01-27 RX ADMIN — SERTRALINE HYDROCHLORIDE 100 MG: 100 TABLET, FILM COATED ORAL at 21:21

## 2023-01-27 RX ADMIN — ACETAMINOPHEN 325MG 650 MG: 325 TABLET ORAL at 21:37

## 2023-01-27 RX ADMIN — SENNOSIDES AND DOCUSATE SODIUM 2 TABLET: 8.6; 5 TABLET ORAL at 13:09

## 2023-01-27 RX ADMIN — Medication 100 MG: at 21:21

## 2023-01-27 ASSESSMENT — ACTIVITIES OF DAILY LIVING (ADL)
ADLS_ACUITY_SCORE: 54

## 2023-01-27 NOTE — PLAN OF CARE
Goal Outcome Evaluation:       Pt A&O x4. Denies SOB, CP, fever or chills. Returned from dialysis at 1300. Medications given PO with thin liquids. On regular diet, ate 100% of lunch. Transfers with sara ramos. R heel dressing CDI. Wears cervical brace when out of bed. 2 L oxygen used at HS d/t GINI. Renvela resumed, dialysis clinic to check phosphate levels and replace as needed (see note from Dr. Groves). Tubi  on BLE. Continue POC.         Patient's most recent vital signs are:     Vital signs:  BP: 127/68  Temp: 97.8  HR: 73  RR: 18  SpO2: 94 %     Patient does not have new respiratory symptoms.  Patient does not have new sore throat.  Patient does not have a fever greater than 99.5.

## 2023-01-27 NOTE — TELEPHONE ENCOUNTER
Patient is very concerned and wants a c/b today regarding his short term disability through [employer] - patient states that insurance has yet to receive the medical records that have been requested.

## 2023-01-27 NOTE — PLAN OF CARE
Goal Outcome Evaluation:    Pt is A&OX4, calm & cooperative with care. Denied CP, SOB, & n/v. A of 1 with Irina Steady for transfer. Pt wears cervical brace when out of bed. Continent/incontinent for both B&B. Uses BSC/urinal when continent. Takes med whole with thin liquid. Managed pain with PRN oxycodone X1. Pt is on 2L O2 via NC @ night due to GINI. Pt is scheduled for HD this morning (01/27/23); pickup @ 0615 but pt was still on the unit till 0715. Pt slept well throughout the night. Able to make needs known & call light was within reach. Will continue with plan of care.    Patient's most recent vital signs are:     Vital signs:  BP: 118/69  Temp: 97  HR: 83  RR: 18  SpO2: 96 %     Patient does not have new respiratory symptoms.  Patient does not have new sore throat.  Patient does not have a fever greater than 99.5.

## 2023-01-27 NOTE — PLAN OF CARE
Goal Outcome Evaluation:    Pt is A&OX4, calm & cooperative with care. Denied CP, SOB, & n/v. VSS. A of 1 with Irina Steady for transfer. Pt spent most of the shift on a chair. Pt wears cervical brace when out of the bed. Continent/incontinent for both B&B. Uses BSC/urinal when continent. Takes med whole with thin liquid. Managed pain with PRN oxycodone X1. PRN xanax & atarax given per pt's request. Pt is on 2L O2 via NC @ night due to GINI. Pt ate dinner with good appetite. Able to make needs known & call light was within reach. Will continue with plan of care.    Patient's most recent vital signs are:     Vital signs:  BP: 118/69  Temp: 97  HR: 83  RR: 18  SpO2: 96 %     Patient does not have new respiratory symptoms.  Patient does not have new sore throat.  Patient does not have a fever greater than 99.5.

## 2023-01-27 NOTE — PROGRESS NOTES
CLINICAL NUTRITION SERVICES - REASSESSMENT NOTE     Nutrition Prescription    RECOMMENDATIONS FOR MDs/PROVIDERS TO ORDER:  None    Malnutrition Status:    Patient does not meet two of the established criteria necessary for diagnosing malnutrition but is at risk for malnutrition    Recommendations already ordered by Registered Dietitian (RD):  Continue current supplement    Future/Additional Recommendations:  Monitor labs, intakes, and weight trends.     EVALUATION OF THE PROGRESS TOWARD GOALS   Diet: Regular  Snacks/supplements: Special K bar at HS    Intake/Tolerance: 100% of documented meals.    Pt ordering (on average) 1770 kcal and 83 g protein per day per [employer] and with documented intakes is likely meeting ~75% minimum energy and > 75% protein needs.     NEW FINDINGS/REVIEW OF SYSTEMS    Nutrition/GI: Pt with good appetite/intakes, no questions or concerns at this time. Had two special K bar on table.     Weights: Patient with variable weights given fluid shifts and dialysis.     Pre-dialysis wt 1/27: 125 kg  Post-dialysis wt 1/27: 120 kg  Wt Readings from Last Encounters:   01/24/23 129.2 kg (284 lb 13.4 oz)   0116/23 124.4 kg (274 lb 4 oz)   12/22/22 130.2 kg (287 lb 0.6 oz)   11/03/22 131.1 kg (289 lb)   10/10/22 131.5 kg (289 lb 14.5 oz)   09/22/22 132.5 kg (292 lb)   08/18/22 132.5 kg (292 lb)   08/04/22 132.5 kg (292 lb)   07/29/22 132.5 kg (292 lb)   06/29/22 145 kg (319 lb 10.7 oz)     Labs reviewed   01/21/23 17:07 01/22/23 07:45 01/23/23 16:24 01/24/23 05:53 01/25/23 13:56 01/26/23 07:17   Sodium   138      Potassium   4.3      Urea Nitrogen   18      Creatinine   3.19 (H)      Phosphorus 1.6 (L) 3.0  2.4 (L) 2.1 (L) 3.1      Medications reviewed: vitamin B12, folic acid, midodrine, renal multivitamin, zyprexa, mirlaax, neutra-phos, vitamin B6, senna, thiamine, oxycodone  IV Fluids?: No    MALNUTRITION  % Intake: No decreased intake noted  % Weight Loss: Difficult to assess  Subcutaneous Fat  Loss: None observed  Muscle Loss: None observed  Fluid Accumulation/Edema: Moderate  Malnutrition Diagnosis: Patient does not meet two of the established criteria necessary for diagnosing malnutrition    Previous Goals   Patient to consume % of nutritionally adequate meal trays TID, or the equivalent with supplements/snacks.  Evaluation: Met    Previous Nutrition Diagnosis  Inadequate oral intake related to only eating one meal on dialysis days as evidenced by patient on average meeting 69% kcal needs.   Evaluation: Improving    CURRENT NUTRITION DIAGNOSIS  Predicted inadequate nutrient intake (protein-energy) related to eating mostly 100% of meals documented the past week with good appetite but potential for PO intake decline.     INTERVENTIONS  Implementation  Encouraged oral intakes    Goals  Patient to consume % of nutritionally adequate meal trays TID, or the equivalent with supplements/snacks.    Monitoring/Evaluation  Progress toward goals will be monitored and evaluated per protocol.    Haleigh Dyer MS, RDN, LDN  RD pager: [phone]   Weekend/Holiday Pager: [phone]

## 2023-01-27 NOTE — PROGRESS NOTES
Charts reviewed. No acute complaints  Was seen by Podiatry for bilateral foot onychomycosis and had toe nails clipped. Initiated on Clotrimazole cream  Also discussed with patient's primary nephrologist. He advised not checking on Phos levels as they would check at the dialysis center and replace if needed.  Resume Renvela    /68 (BP Location: Right arm, Cuff Size: Adult Large)   Pulse 73   Temp 97.8  F (36.6  C) (Oral)   Resp 18   Wt 129.2 kg (284 lb 13.4 oz)   SpO2 94%   BMI 37.58 kg/m        Dr FILIPPO Groves MD, [facility]  Hospitalist ( Internal medicine)  Pager: [phone]

## 2023-01-28 ENCOUNTER — APPOINTMENT (OUTPATIENT)
Dept: SPEECH THERAPY | Facility: SKILLED NURSING FACILITY | Age: 58
DRG: 949 | End: 2023-01-28
Attending: INTERNAL MEDICINE
Payer: MEDICARE

## 2023-01-28 PROCEDURE — 250N000013 HC RX MED GY IP 250 OP 250 PS 637: Performed by: INTERNAL MEDICINE

## 2023-01-28 PROCEDURE — 120N000009 HC R&B SNF

## 2023-01-28 PROCEDURE — 97129 THER IVNTJ 1ST 15 MIN: CPT | Mod: GN | Performed by: SPEECH-LANGUAGE PATHOLOGIST

## 2023-01-28 PROCEDURE — 250N000013 HC RX MED GY IP 250 OP 250 PS 637: Performed by: PHYSICIAN ASSISTANT

## 2023-01-28 PROCEDURE — 250N000013 HC RX MED GY IP 250 OP 250 PS 637

## 2023-01-28 PROCEDURE — 97130 THER IVNTJ EA ADDL 15 MIN: CPT | Mod: GN | Performed by: SPEECH-LANGUAGE PATHOLOGIST

## 2023-01-28 RX ADMIN — Medication 10 MG: at 21:13

## 2023-01-28 RX ADMIN — DICLOFENAC SODIUM 4 G: 10 GEL TOPICAL at 21:10

## 2023-01-28 RX ADMIN — FOLIC ACID 1 MG: 1 TABLET ORAL at 21:10

## 2023-01-28 RX ADMIN — OXYCODONE HYDROCHLORIDE 5 MG: 5 TABLET ORAL at 15:14

## 2023-01-28 RX ADMIN — GABAPENTIN 300 MG: 300 CAPSULE ORAL at 08:56

## 2023-01-28 RX ADMIN — HYDROXYZINE HYDROCHLORIDE 50 MG: 25 TABLET, FILM COATED ORAL at 21:09

## 2023-01-28 RX ADMIN — CETIRIZINE HYDROCHLORIDE 10 MG: 10 TABLET, FILM COATED ORAL at 21:09

## 2023-01-28 RX ADMIN — SEVELAMER CARBONATE 800 MG: 800 TABLET, FILM COATED ORAL at 08:56

## 2023-01-28 RX ADMIN — DICLOFENAC SODIUM 4 G: 10 GEL TOPICAL at 08:56

## 2023-01-28 RX ADMIN — ALPRAZOLAM 0.5 MG: 0.25 TABLET ORAL at 21:08

## 2023-01-28 RX ADMIN — GABAPENTIN 300 MG: 300 CAPSULE ORAL at 13:20

## 2023-01-28 RX ADMIN — ATORVASTATIN CALCIUM 20 MG: 20 TABLET, FILM COATED ORAL at 21:09

## 2023-01-28 RX ADMIN — ACETAMINOPHEN 325MG 650 MG: 325 TABLET ORAL at 06:23

## 2023-01-28 RX ADMIN — DICLOFENAC SODIUM 4 G: 10 GEL TOPICAL at 13:20

## 2023-01-28 RX ADMIN — ACETAMINOPHEN 325MG 650 MG: 325 TABLET ORAL at 13:20

## 2023-01-28 RX ADMIN — CALCIUM CARBONATE (ANTACID) CHEW TAB 500 MG 1000 MG: 500 CHEW TAB at 19:01

## 2023-01-28 RX ADMIN — HYDROXYZINE HYDROCHLORIDE 50 MG: 25 TABLET, FILM COATED ORAL at 15:13

## 2023-01-28 RX ADMIN — FINASTERIDE 1.3 MG: 5 TABLET, FILM COATED ORAL at 21:13

## 2023-01-28 RX ADMIN — DICLOFENAC SODIUM 4 G: 10 GEL TOPICAL at 18:19

## 2023-01-28 RX ADMIN — THIAMINE HCL TAB 100 MG 100 MG: 100 TAB at 21:10

## 2023-01-28 RX ADMIN — Medication 100 MG: at 21:11

## 2023-01-28 RX ADMIN — HYDROXYZINE HYDROCHLORIDE 50 MG: 25 TABLET, FILM COATED ORAL at 06:24

## 2023-01-28 RX ADMIN — OXYCODONE HYDROCHLORIDE 5 MG: 5 TABLET ORAL at 08:56

## 2023-01-28 RX ADMIN — GABAPENTIN 300 MG: 300 CAPSULE ORAL at 21:10

## 2023-01-28 RX ADMIN — CLOTRIMAZOLE: 1 CREAM TOPICAL at 08:56

## 2023-01-28 RX ADMIN — METHOCARBAMOL 500 MG: 500 TABLET ORAL at 15:14

## 2023-01-28 RX ADMIN — OLANZAPINE 5 MG: 2.5 TABLET, FILM COATED ORAL at 21:09

## 2023-01-28 RX ADMIN — APIXABAN 2.5 MG: 2.5 TABLET, FILM COATED ORAL at 21:08

## 2023-01-28 RX ADMIN — SERTRALINE HYDROCHLORIDE 100 MG: 100 TABLET, FILM COATED ORAL at 21:10

## 2023-01-28 RX ADMIN — Medication 1 TABLET: at 21:09

## 2023-01-28 RX ADMIN — FLUTICASONE PROPIONATE 1 SPRAY: 50 SPRAY, METERED NASAL at 08:56

## 2023-01-28 RX ADMIN — FLUTICASONE PROPIONATE 1 SPRAY: 50 SPRAY, METERED NASAL at 18:22

## 2023-01-28 RX ADMIN — APIXABAN 2.5 MG: 2.5 TABLET, FILM COATED ORAL at 08:56

## 2023-01-28 RX ADMIN — SENNOSIDES AND DOCUSATE SODIUM 2 TABLET: 8.6; 5 TABLET ORAL at 08:57

## 2023-01-28 RX ADMIN — PANTOPRAZOLE SODIUM 40 MG: 40 TABLET, DELAYED RELEASE ORAL at 06:23

## 2023-01-28 RX ADMIN — ACETAMINOPHEN 325MG 650 MG: 325 TABLET ORAL at 19:57

## 2023-01-28 ASSESSMENT — ACTIVITIES OF DAILY LIVING (ADL)
ADLS_ACUITY_SCORE: 54

## 2023-01-28 NOTE — PLAN OF CARE
Goal Outcome Evaluation:    No recent changes during this shift, alert/ oriented but can be forgetful. Had dialysis earlier today. Left arm fistula SOSA, intact, Rt/ lower forearm PIV intact (flushed, saline locked). Rt. heel wound foam dressing intact, BLE tubigrips on .Wears Cervical collar and  brace when up,Irina steady for transfers.Had large BM during this shift, refused all scheduled BM meds due to this. PRN meds given per request: Oxycodone x 1, Tylenol X 1, Robaxin X 1, Xanax  X 1, Atarax x 1. Comfortable at this time. To continue POC.     Patient's most recent vital signs are:     Vital signs:  BP: 117/66  Temp: 97.4  HR: 79  RR: 18  SpO2: 95 %     Patient does not have new respiratory symptoms.  Patient does not have new sore throat.  Patient does not have a fever greater than 99.5.

## 2023-01-28 NOTE — PLAN OF CARE
Goal Outcome Evaluation:    Pt is A&OX4, calm & cooperative with care. Denied CP, SOB, & n/v. A of 1 with Irina Steady for transfer; was not OOB this shift. Pt wears cervical brace when out of bed. Continent/incontinent for both B&B. Uses BSC/urinal when continent. Takes med whole with thin liquid. Pt is on 2L O2 via NC @ night due to GINI. Pt slept well for most of the night. Able to make needs known & call light was within reach. Will continue with plan of care.    Patient's most recent vital signs are:     Vital signs:  BP: 117/66  Temp: 97.4  HR: 79  RR: 18  SpO2: 95 %     Patient does not have new respiratory symptoms.  Patient does not have new sore throat.  Patient does not have a fever greater than 99.5.

## 2023-01-28 NOTE — PLAN OF CARE
0700H-2300H   Goal Outcome Evaluation:  No recent changes during this shift. A/O , can be intermittently confused , can be intermittently SOB also, gets comfortable with 02 by NC at 2LPM which patient takes on and off. Rt. Heel wound cares done, Left arm fistula CDI , Right arm PIV intact, flushed / Wears Cervical collar and  brace when up. PRN meds being given as requested: Tylenol, Robaxin, Oxycodone , Atarax as requested for pain. Refusing Midodrine (said he's blood pressure is fine, he should be good without it) and Renvela intermittently ( said he doesn't really need it here as we don't monitor his phos level here but on dialysis).     01/28: Patient complaining of stuffy/dry nose, he's on Flucatisone scheduled, sticky note left to provider if can order PRN nasal spray.     On 02 at 2 LPM at bedtime, Atarax and Xanax PRN given per request.Comfortable at this time. To continue POC.     Patient's most recent vital signs are:     Vital signs:  BP: 119/63  Temp: 96.9  HR: 82  RR: 18  SpO2: 100 %     Patient does not have new respiratory symptoms.  Patient does not have new sore throat.  Patient does not have a fever greater than 99.5.

## 2023-01-29 ENCOUNTER — APPOINTMENT (OUTPATIENT)
Dept: OCCUPATIONAL THERAPY | Facility: SKILLED NURSING FACILITY | Age: 58
DRG: 949 | End: 2023-01-29
Attending: INTERNAL MEDICINE
Payer: MEDICARE

## 2023-01-29 ENCOUNTER — APPOINTMENT (OUTPATIENT)
Dept: PHYSICAL THERAPY | Facility: SKILLED NURSING FACILITY | Age: 58
DRG: 949 | End: 2023-01-29
Attending: INTERNAL MEDICINE
Payer: MEDICARE

## 2023-01-29 PROCEDURE — 250N000011 HC RX IP 250 OP 636: Performed by: PHYSICIAN ASSISTANT

## 2023-01-29 PROCEDURE — 250N000013 HC RX MED GY IP 250 OP 250 PS 637: Performed by: INTERNAL MEDICINE

## 2023-01-29 PROCEDURE — 99307 SBSQ NF CARE SF MDM 10: CPT | Performed by: INTERNAL MEDICINE

## 2023-01-29 PROCEDURE — 97530 THERAPEUTIC ACTIVITIES: CPT | Mod: GP

## 2023-01-29 PROCEDURE — 97530 THERAPEUTIC ACTIVITIES: CPT | Mod: GO

## 2023-01-29 PROCEDURE — 120N000009 HC R&B SNF

## 2023-01-29 PROCEDURE — 250N000013 HC RX MED GY IP 250 OP 250 PS 637

## 2023-01-29 PROCEDURE — 250N000013 HC RX MED GY IP 250 OP 250 PS 637: Performed by: PHYSICIAN ASSISTANT

## 2023-01-29 RX ADMIN — SEVELAMER CARBONATE 800 MG: 800 TABLET, FILM COATED ORAL at 17:26

## 2023-01-29 RX ADMIN — FLUTICASONE PROPIONATE 1 SPRAY: 50 SPRAY, METERED NASAL at 18:47

## 2023-01-29 RX ADMIN — ACETAMINOPHEN 325MG 650 MG: 325 TABLET ORAL at 22:05

## 2023-01-29 RX ADMIN — Medication 100 MG: at 22:06

## 2023-01-29 RX ADMIN — FOLIC ACID 1 MG: 1 TABLET ORAL at 22:06

## 2023-01-29 RX ADMIN — SERTRALINE HYDROCHLORIDE 100 MG: 100 TABLET, FILM COATED ORAL at 22:06

## 2023-01-29 RX ADMIN — GABAPENTIN 300 MG: 300 CAPSULE ORAL at 13:29

## 2023-01-29 RX ADMIN — FLUTICASONE PROPIONATE 1 SPRAY: 50 SPRAY, METERED NASAL at 08:48

## 2023-01-29 RX ADMIN — APIXABAN 2.5 MG: 2.5 TABLET, FILM COATED ORAL at 22:06

## 2023-01-29 RX ADMIN — ONDANSETRON 4 MG: 4 TABLET, ORALLY DISINTEGRATING ORAL at 06:37

## 2023-01-29 RX ADMIN — DICLOFENAC SODIUM 4 G: 10 GEL TOPICAL at 08:52

## 2023-01-29 RX ADMIN — FINASTERIDE 1.3 MG: 5 TABLET, FILM COATED ORAL at 22:05

## 2023-01-29 RX ADMIN — Medication 10 MG: at 22:06

## 2023-01-29 RX ADMIN — HYDROXYZINE HYDROCHLORIDE 50 MG: 25 TABLET, FILM COATED ORAL at 22:06

## 2023-01-29 RX ADMIN — THIAMINE HCL TAB 100 MG 100 MG: 100 TAB at 22:06

## 2023-01-29 RX ADMIN — CETIRIZINE HYDROCHLORIDE 10 MG: 10 TABLET, FILM COATED ORAL at 22:06

## 2023-01-29 RX ADMIN — DICLOFENAC SODIUM 4 G: 10 GEL TOPICAL at 13:00

## 2023-01-29 RX ADMIN — ACETAMINOPHEN 325MG 650 MG: 325 TABLET ORAL at 06:36

## 2023-01-29 RX ADMIN — APIXABAN 2.5 MG: 2.5 TABLET, FILM COATED ORAL at 08:48

## 2023-01-29 RX ADMIN — GABAPENTIN 300 MG: 300 CAPSULE ORAL at 08:48

## 2023-01-29 RX ADMIN — Medication 1 TABLET: at 22:06

## 2023-01-29 RX ADMIN — DICLOFENAC SODIUM 4 G: 10 GEL TOPICAL at 22:06

## 2023-01-29 RX ADMIN — GABAPENTIN 300 MG: 300 CAPSULE ORAL at 22:06

## 2023-01-29 RX ADMIN — CLOTRIMAZOLE: 1 CREAM TOPICAL at 08:52

## 2023-01-29 RX ADMIN — ALPRAZOLAM 0.5 MG: 0.25 TABLET ORAL at 11:19

## 2023-01-29 RX ADMIN — OXYCODONE HYDROCHLORIDE 5 MG: 5 TABLET ORAL at 18:48

## 2023-01-29 RX ADMIN — METHOCARBAMOL 500 MG: 500 TABLET ORAL at 22:06

## 2023-01-29 RX ADMIN — OLANZAPINE 5 MG: 2.5 TABLET, FILM COATED ORAL at 22:06

## 2023-01-29 RX ADMIN — DICLOFENAC SODIUM 4 G: 10 GEL TOPICAL at 17:26

## 2023-01-29 RX ADMIN — ACETAMINOPHEN 325MG 650 MG: 325 TABLET ORAL at 14:24

## 2023-01-29 RX ADMIN — PANTOPRAZOLE SODIUM 40 MG: 40 TABLET, DELAYED RELEASE ORAL at 06:37

## 2023-01-29 RX ADMIN — HYDROXYZINE HYDROCHLORIDE 50 MG: 25 TABLET, FILM COATED ORAL at 06:37

## 2023-01-29 RX ADMIN — ATORVASTATIN CALCIUM 20 MG: 20 TABLET, FILM COATED ORAL at 22:06

## 2023-01-29 RX ADMIN — SEVELAMER CARBONATE 800 MG: 800 TABLET, FILM COATED ORAL at 08:46

## 2023-01-29 ASSESSMENT — ACTIVITIES OF DAILY LIVING (ADL)
ADLS_ACUITY_SCORE: 54

## 2023-01-29 NOTE — PLAN OF CARE
Pt is alert and oriented x4. Pt denied chest pain, SOB, and N/V. Pt requested PRN alprazolam 0.5 mg given for anxiety. Pt participated in PT and walked with therapy.Pt refused to take noon midodrine. Pt able to make needs known. Pt refused right heel dressing change x2. Pt able to make needs known. Call light with in reach. Continue with plan of care.    Patient's most recent vital signs are:     Vital signs:  BP: 132/73  Temp: 96.2  HR: 89  RR: 18  SpO2: 98 %     Patient does not have new respiratory symptoms.  Patient does not have new sore throat.  Patient does not have a fever greater than 99.5.

## 2023-01-29 NOTE — PLAN OF CARE
Goal Outcome Evaluation:    Pt is A&OX4, calm & cooperative with care. Denied CP, SOB, & n/v. A of 1 with Irina Steady for transfer; was not OOB this shift. Pt wears cervical brace when out of bed. Continent/incontinent for both B&B. Uses BSC/urinal when continent. Takes med whole with thin liquid. PRN tylenol & atarax given per pt's request. Pt c/o nausea & PRN Zofran given per request. Pt is on 2L O2 via NC @ night due to GINI. Phosphorus level is @ 3.1 per 01/26/23 lab report. Pt slept well for most of the night. Able to make needs known & call light was within reach. Will continue with plan of care.    Patient's most recent vital signs are:     Vital signs:  BP: 131/71  Temp: 97.1  HR: 81  RR: 18  SpO2: 98 %     Patient does not have new respiratory symptoms.  Patient does not have new sore throat.  Patient does not have a fever greater than 99.5.

## 2023-01-29 NOTE — PROGRESS NOTES
"   01/29/23 0945   Appointment Info   Signing Clinician's Name / Credentials (PT) Rhea Sharpe PTA   PT Assistant Visit Number [id number]   Rehab Comments (PT) PT -5 min for donning of cervical brace for session   Therapeutic Activity   Therapeutic Activities: dynamic activities to improve functional performance Minutes (86994) 40   Symptoms Noted During/After Treatment Fatigue   Treatment Detail/Skilled Intervention PT: Pt supine in bed w/o cervical brace upon arrival for session. mod A to don cervical brace for session. see gg for functional activities.....   PT Discharge Planning   PT Plan PT: work on STS at gym mat (elevated surface of 24\") with focus on part to whole training to reinforce \"nose over toes\" or as he likes to say \"bobbing for apples\". Progress gait distance - need second person for close w/c follow. Have been progressing straight down hallway - progress to performing turns as Pt safe and able   PT Discharge Recommendation (DC Rec) Long term care facility   PT Rationale for DC Rec Pt displays good participation and works hard during sessions. Good progression of gait distances and transfers, but continues to require assist d/t BLE weakness and quickly fatiguing. Pt continues to need considerable assist and cares.   PT Brief overview of current status PT: bed mobility - variable from min A - SBA, use of BR, min A - CGA x2 for STS/SPT from raised surface but can require mod A if fatigued, FWW. amb ~70ft x 2, FWW, CGA with close w/c follow by second person d/t risk of buckling d/t BLE weakness.   Total Session Time   Timed Code Treatment Minutes 40   Total Session Time (sum of timed and untimed services) 40   Post Acute Settings Only   What unit is patient on? TCU   Bed Mobility: Turning side to side/Roll Left and Right   Patient Performance Independent   Staff Performance No setup or physical help (No setup or physical help from staff)   Describe Performance PT: IND, BR   Bed Mobility: Lying to sitting on " "the side of bed   Patient Performance Supervision or verbal cues   Staff Performance Set up help only   Describe Performance PT: sup < sit at EOB, superivison and set up assist, BR and HOB raised to ~15 degrees   Transfers: Sit to Stand   Patient Performance Partial/moderate assist \"includes weight bearing assist of trunk or limbs\"   Staff Performance One person assist (one person physical assist)   Equipment Used Rolling walker   Describe Performance PT: STs from raised surfaces (24\"), BUE push off, min A - CGA x 2 for safety d/t risk of BLE buckling. mass practice of part to whole training in gym at raised blue mat for improved anterior weight shifting - Pt states \"it is like bobbing for apples\". With repetition, Pt able to progress to full stand with improved anterior weight shift. When fatigued, Pt can require mod A x 2 to perform STS. Pt's cognition continues to slowly improve and is better able to participate in therapy sessions. Pt making slow and steady progress to decrease fall risk and caregiver burden.   Transfers: Chair/Bed transfers   Patient Performance Steadying Assist   Staff Performance One person assist (one person physical assist)   Equipment Used Rolling walker   Describe Performance PT: SPT from various raised surfaces (apprx 24\" - use double cushion in w/c), FWW, BUE on FWW, min A x 1 with SBA x 1 d/t risk of BLE buckling. Pt required decreased sequencing cues today but continues to rapidly sit once completely turned. Again provided education on importance of controlling descent for protection of spine and joints as well as safety.   Ambulation   Walks in room: Reason Not Done Safety concerns   Walks in [place]: Staff Performance Two +person assist ( two plus persons physical assist)   Mobility device used rolling walker   Describe Performance PT: amb 70ft x 2, FWW, min A - CGA with close w/c follow by second person d/t BLE weakness and risk of buckling. Pt able to progress distance today. Pt " required occasional cues for path deviation and additional time when initially initiating advancement of LEs. Pt able to perform minor navigation around objects but unable to perform turns or perform any major navigation changes at this time.   Walk 10 Feet   Describe Performance PT: see amb   Walk 10 Feet on uneven surfaces   Reason Not Done Safety concerns   Walk 50 Feet with Two Turns   Reason Not Done Safety concerns   Walk 150 Feet   Reason Not Done Safety concerns

## 2023-01-29 NOTE — PLAN OF CARE
Goal Outcome Evaluation:  No recent changes during this shift.Afebrile, on 02 on and off at 2LPM ,pain managed with PRN meds: Oxycodone, Atarax, Tylenol being given as requested.  Rt. Heel foam dressing intact, wears BLE tubigrips ( 2+ edema to feet). Rt.lower forearm PIV intact, saline flushed. Comfortable at this time. To continue POC.     Appointment for tomorrow: HD , 0615H wheelchair .     Patient's most recent vital signs are:     Vital signs:  BP: 142/81  Temp: 96.8  HR: 90  RR: 19  SpO2: 97 % /RA    Patient does not have new respiratory symptoms.  Patient does not have new sore throat.  Patient does not have a fever greater than 99.5.

## 2023-01-29 NOTE — PROGRESS NOTES
[facility]    Medicine Progress Note - Hospitalist Service    Date of Admission:  1/9/2023    Assessment & Plan   Shay Jimenez is a 58 yo obese gentleman w/ h/o HTN, ESRD on HD, GERD, GINI intolerant of CPAP, chronic atrial fibrillation on chronic Eliquis, alcohol ause, had previous C5-T6 spinal fusion due to pathological fracture from osteomyelitis.    He was noted to have discitis/osteomyelitis on CT of chest back in 2/2022 that was obtained for respiratory issues.      He was hospitalized at [facility] and was to follow up with [facility] spine (no records and pt has no recollection if anything was done). In 6/2022 MRI showed T2, T3 vertebral collapse with myelopathy, some abnormal epidural tissue and inflammation, at that time antibiotics was deferred as pt was stable and was planned for obtaining intraop cx.  Intra-op cx was + on day 5 and day 7 for C. Acnes that was felt to be contamination, however due to presence of hardware it was decided to treat with ancef x 2 weeks followed by  amoxicillin x 4 weeks.     He then was treated for Morganella morganii septic shock, no intra-abd source found on CT.  TTE did not show vegetations was treated with almost 9 weeks of iv cefepime 8/14-10/13.     On repeat imaging was found to have hardware failure and was admitted on 12/6/22 to [facility] Neurosurgeon service for surgery.  He underwent C5-T6 redo cervical spine fusion 12/6/22.  He did have hemorrhage intraop, cerebral angio did not show vertebral artery injury, did receive blood, was in ICU on pressors to keep blood pressure up for perfusion.     Also received periop cefazolin till drain pulled. Intraop culture remained negative and there was no indication for ongoing antibiotics per ID.  Hospital course was complicated by agitation and delirium.  He treated w/ benzo per CIWA,  He was also noted to in aute on chronic respiratory failure and did require supplement Oxygen.   Transferred to [facility] 1/7/23 for conditioning.     Patient's TCU course had been complicated by waxing and waning mentation, behavioral issues, mostly notable on 1/9/23 while in dialysis at [place], evaluated subsequently at [facility] but work-up including CT head without contrast, CTA head and neck, CBC, BMP, troponin and EKG; all of the aforementioned test were negative and his mentation improved. He was seen by Psychiatry on 1/12/23 for this delirium and was started on Zyprexa 5 mg at bedtime as well as Aripiprazole 2 mg BID prn, with improvemnet         #Hardware failure, pseudoarthritis   #S/p C5-T6 redo cervical spine fusion 12/6/22  #Previous h/o C5-T6 fusion for pathological fracture due to osteomyelitis   -Intraop culture remained neg and per ID there was no indication for ongoing antibiotics     Needs cervical collar when HOB > 45 degrees and when OOB     #Acute metabolic encephalopathy, recurrent, resolved at this time  -Etiology remained elusive  -He had recurrent confusion, agitation and delirium during his hospitalization at the [facility], 12/6 - 1/9/23  -Pt was seen at [facility] ED on 1/9/23 after HD when he became very agitated, confused, disoriented, argumentative and irritable at the dialysis center.  CT head w/o contrast and CTA head and neck were negative.  -  - seen by Psychiatry    Olanzapine 5 mg at bedtime    Aripirpazole 2 mg bid prn ( has not needed this and therefore discontinued)    Limit pschotropic medications as much as possible; of note, PTA Xanax restarted on 1/22/23 prn every day     Stop scheduled baclofen and change scheduled Robaxin to PRN on 1/24    With the above intervention, patient is feeling much better with almost resolution of encephalopathy      #Hypotension   -Not on meds    Continue midodrine 10 mg tid but hold if blood pressure  > 120      #Chronic Atrial fibrillation   -He was seen by cardiology in the past   -He had ZIO patch in past but apparently results  were lost  -Currently well rate controlled w/o meds    On eliquis 2.5 mg for stroke prophylaxis     #Mild to moderate pulm HTN  #Moderate MR  -Could all be related to untreated GINI but has not tolerated CPAP     Will need to follow up as out patient       #Chronic resp failure  #GINI   #Obesity   -Intolerant to CPAP  -Uses O2 at night 1-3 liters, continue     #Acute on chronic pain  #Peripheral neuropathy    Continue gabapentin and PRN robaxin    Lidocaine patches initiated    Oxycodone increased to 5 mg BID PRN, from daily PRN on 1/25     #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   #Hypophosphatemia     Continue HD  M/W/F      kg     Resume sevelemer to 800 mg tid    Per discussion with his regular dialysis nephrologist, they advised us to stop checking phos levels here in the TCU, as this will be done at the dialysis center and dealt with as necessary   -Refused renal diet      #Chronic hyponatremia - resolved    Managed by nephrology during HD      #ACD  -Due to ESKD  -Managed during HD w/ epo    #HDL    Continue atorvastatin      #Anxiety d/o  #Depression   #OCD     Continue Setraline 100 mg every day      #Alcohol use disorder  -Drink 5-6 cocktail a day (vodka )     Continue vitamins   -Does not want to see chem dep     Continue baclofen and gabapentin as can also help with craving      #Right heal pressure sore    WOCN to see      Diet: Regular Diet Adult  Snacks/Supplements Adult: Other; Special K bar @ HS; Between Meals  DVT Prophylaxis: eliquis   Oliveira Catheter: Not present  Lines: None     Cardiac Monitoring: None  Code Status: Full Code    Disposition Plan   01/27/23    Psych to finalize meds prior to discharge, ?nocturnal O2 test for whether home O2 needed.       Indication for psychotropic meds:   -Hydroxyzine for itchiness   -PTA xanax every day prn for anxiety         Nickcha Armaan Moreno MD  Hospitalist Service  [facility]  Securely message with Holly  (more info)  Text page via [facility] Paging/Directory   ___________________________________________________________________    Interval History   No new complaints overnight   Patient says that he has been doing well. No fever or chills  Eating and drinking well   Pleasant mood       Physical Exam   Vital Signs: Temp: (!) 96.2  F (35.7  C) Temp src: Oral BP: 132/73 Pulse: 89   Resp: 18 SpO2: 98 % O2 Device: Nasal cannula Oxygen Delivery: 2 LPM  Weight: 284 lbs 13.35 oz    General Appearance: Lying comfortably in bed, on supplemental O2 for comfirt, in no acute distress or discomfort  HEENT: PERRL: EOMI; moist mucous membrane w/o lesions  Neck: No JVD  Pulmonary: Clear to auscultation bilaterally, no wheezes or crackles  CVS: Regular rhythm, systolic murmur, no rubs or gallops  GI: BS (+), soft nontender, no rebound or guarding   Extremities: Bilateral LE edema, with wraps on  Skin: No rashes or lesions  Neurologic: A&O x3  Psych: Mood is appropriate        Data

## 2023-01-30 ENCOUNTER — APPOINTMENT (OUTPATIENT)
Dept: OCCUPATIONAL THERAPY | Facility: SKILLED NURSING FACILITY | Age: 58
DRG: 949 | End: 2023-01-30
Attending: INTERNAL MEDICINE
Payer: MEDICARE

## 2023-01-30 ENCOUNTER — TRANSFERRED RECORDS (OUTPATIENT)
Dept: HEALTH INFORMATION MANAGEMENT | Facility: CLINIC | Age: 58
End: 2023-01-30

## 2023-01-30 ENCOUNTER — APPOINTMENT (OUTPATIENT)
Dept: PHYSICAL THERAPY | Facility: SKILLED NURSING FACILITY | Age: 58
DRG: 949 | End: 2023-01-30
Attending: INTERNAL MEDICINE
Payer: MEDICARE

## 2023-01-30 PROCEDURE — 97110 THERAPEUTIC EXERCISES: CPT | Mod: GP

## 2023-01-30 PROCEDURE — 250N000013 HC RX MED GY IP 250 OP 250 PS 637: Performed by: INTERNAL MEDICINE

## 2023-01-30 PROCEDURE — 250N000013 HC RX MED GY IP 250 OP 250 PS 637

## 2023-01-30 PROCEDURE — 97530 THERAPEUTIC ACTIVITIES: CPT | Mod: GO

## 2023-01-30 PROCEDURE — 120N000009 HC R&B SNF

## 2023-01-30 PROCEDURE — 250N000013 HC RX MED GY IP 250 OP 250 PS 637: Performed by: PHYSICIAN ASSISTANT

## 2023-01-30 RX ADMIN — OXYCODONE HYDROCHLORIDE 5 MG: 5 TABLET ORAL at 17:15

## 2023-01-30 RX ADMIN — Medication 1 TABLET: at 21:18

## 2023-01-30 RX ADMIN — THIAMINE HCL TAB 100 MG 100 MG: 100 TAB at 21:17

## 2023-01-30 RX ADMIN — METHOCARBAMOL 500 MG: 500 TABLET ORAL at 17:15

## 2023-01-30 RX ADMIN — HYDROXYZINE HYDROCHLORIDE 50 MG: 25 TABLET, FILM COATED ORAL at 21:18

## 2023-01-30 RX ADMIN — OLANZAPINE 5 MG: 2.5 TABLET, FILM COATED ORAL at 21:17

## 2023-01-30 RX ADMIN — Medication 100 MG: at 21:18

## 2023-01-30 RX ADMIN — DICLOFENAC SODIUM 4 G: 10 GEL TOPICAL at 21:16

## 2023-01-30 RX ADMIN — PANTOPRAZOLE SODIUM 40 MG: 40 TABLET, DELAYED RELEASE ORAL at 05:45

## 2023-01-30 RX ADMIN — GABAPENTIN 300 MG: 300 CAPSULE ORAL at 13:15

## 2023-01-30 RX ADMIN — ACETAMINOPHEN 325MG 650 MG: 325 TABLET ORAL at 17:15

## 2023-01-30 RX ADMIN — OXYCODONE HYDROCHLORIDE 5 MG: 5 TABLET ORAL at 05:45

## 2023-01-30 RX ADMIN — FLUTICASONE PROPIONATE 1 SPRAY: 50 SPRAY, METERED NASAL at 21:19

## 2023-01-30 RX ADMIN — SERTRALINE HYDROCHLORIDE 100 MG: 100 TABLET, FILM COATED ORAL at 21:18

## 2023-01-30 RX ADMIN — FINASTERIDE 1.3 MG: 5 TABLET, FILM COATED ORAL at 21:16

## 2023-01-30 RX ADMIN — DICLOFENAC SODIUM 4 G: 10 GEL TOPICAL at 17:15

## 2023-01-30 RX ADMIN — ACETAMINOPHEN 325MG 650 MG: 325 TABLET ORAL at 03:23

## 2023-01-30 RX ADMIN — GABAPENTIN 300 MG: 300 CAPSULE ORAL at 21:17

## 2023-01-30 RX ADMIN — ATORVASTATIN CALCIUM 20 MG: 20 TABLET, FILM COATED ORAL at 21:17

## 2023-01-30 RX ADMIN — DICLOFENAC SODIUM 4 G: 10 GEL TOPICAL at 13:15

## 2023-01-30 RX ADMIN — APIXABAN 2.5 MG: 2.5 TABLET, FILM COATED ORAL at 21:18

## 2023-01-30 RX ADMIN — Medication 10 MG: at 21:18

## 2023-01-30 RX ADMIN — CETIRIZINE HYDROCHLORIDE 10 MG: 10 TABLET, FILM COATED ORAL at 21:18

## 2023-01-30 RX ADMIN — ALPRAZOLAM 0.5 MG: 0.25 TABLET ORAL at 21:17

## 2023-01-30 RX ADMIN — FOLIC ACID 1 MG: 1 TABLET ORAL at 21:18

## 2023-01-30 ASSESSMENT — ACTIVITIES OF DAILY LIVING (ADL)
ADLS_ACUITY_SCORE: 54

## 2023-01-30 NOTE — PLAN OF CARE
0700H-2300H     Goal Outcome Evaluation:  Patient returned from HD at around 1200H.Refused V/S check,refused skin assessment,refused most of his meds, took gabapentin, and Voltaren gel only after returned. With PT/OT/SLP all scheduled this afternoon as patient was not available in the morning. No labs done today. Routine labs ( CBC w/ PLT,BMP) scheduled every Thursday as labs being monitored also at HD ([facility]) . With pre-existing IV that is not being used, offered to removed for patient ,said he'll wait tomorrow instead.   Last BM recorded 01/27, per patient he's been regular , refusing BM meds.       PRN meds given before bedtime: Atarax, Xanax per request. Comfortable at this time. To continue POC.           Patient's most recent vital signs are:     Vital signs:  BP: 123/68  Temp: 96.7  HR: 83  RR: 18  SpO2: 98 % /RA    Patient does not have new respiratory symptoms.  Patient does not have new sore throat.  Patient does not have a fever greater than 99.5.

## 2023-01-30 NOTE — CARE PLAN
Pt is alert and oriented, able to make needs known, has been up most of the night. R lower forearm PIV intact. Can be incontinent of B&B. Left for dialysis at about 06:20 this am. Wears a cervical brace when out of bed. Pt is on 2L oxygen         Patient's most recent vital signs are:     Vital signs:  BP: 140/88  Temp: 95.5  HR: 85  RR: 19  SpO2: 99 %     Patient does not have new respiratory symptoms.  Patient does not have new sore throat.  Patient does not have a fever greater than 99.5.

## 2023-01-30 NOTE — DISCHARGE INSTRUCTIONS
[facility]:   : [phone]   Fax: [phone]    Nurse, physical therapy, and occupational therapy.    -You will get a call after [facility] to schedule the first home care visit. The home health nurse should come out within the first 24-48 hours. If you don't get a call from them within the first 48 hours, please call to follow up (number above).     Right heel wound: Every third day and as needed   Cleanse wound with NS and pat dry   Paint bibi-wound skin with no sting barrier film.   Apply dab of Triad paste (#[id number]) to wound bed only   Cover with 4x4 mepilex flex(# [id number]).   Keep heels elevated off bed.   Wear Prevalon boots while in bed. (# [id number]          PRIMARY CARE APPOINTMENT  Date: 2/9/2023  Time: 11:00pm   Provider: Thaddeus Castillo  Address: [facility] [address] [address] [address] [facility] [address]  Phone: [phone]      RETURN SURGICAL SPINE   Date: 3/16/2023  Time: 3:00pm   Provider: Elvi BLAIR   Address: [address] [facility] [address]  Phone: [phone]

## 2023-01-30 NOTE — PROGRESS NOTES
Charts reviewed. No acute complaints overnight  No confusion or disorientation  Left for dialysis early this morning. Labs to be done when in dialysis per dialysis nephrologist, Dr. Cline  Will change weekly labs for Thursday   BP (!) 140/88 (BP Location: Right arm)   Pulse 85   Temp (!) 95.5  F (35.3  C) (Oral)   Resp 19   Wt 129.2 kg (284 lb 13.4 oz)   SpO2 99%   BMI 37.58 kg/m    Continue care as planned     Dr FILIPPO Groves MD, [facility]  Hospitalist ( Internal medicine)  Pager: [phone]

## 2023-01-31 ENCOUNTER — APPOINTMENT (OUTPATIENT)
Dept: SPEECH THERAPY | Facility: SKILLED NURSING FACILITY | Age: 58
DRG: 949 | End: 2023-01-31
Attending: INTERNAL MEDICINE
Payer: MEDICARE

## 2023-01-31 ENCOUNTER — APPOINTMENT (OUTPATIENT)
Dept: PHYSICAL THERAPY | Facility: SKILLED NURSING FACILITY | Age: 58
DRG: 949 | End: 2023-01-31
Attending: INTERNAL MEDICINE
Payer: MEDICARE

## 2023-01-31 ENCOUNTER — APPOINTMENT (OUTPATIENT)
Dept: OCCUPATIONAL THERAPY | Facility: SKILLED NURSING FACILITY | Age: 58
DRG: 949 | End: 2023-01-31
Attending: INTERNAL MEDICINE
Payer: MEDICARE

## 2023-01-31 PROCEDURE — 250N000013 HC RX MED GY IP 250 OP 250 PS 637: Performed by: INTERNAL MEDICINE

## 2023-01-31 PROCEDURE — 97110 THERAPEUTIC EXERCISES: CPT | Mod: GO

## 2023-01-31 PROCEDURE — 250N000013 HC RX MED GY IP 250 OP 250 PS 637: Performed by: PHYSICIAN ASSISTANT

## 2023-01-31 PROCEDURE — 250N000013 HC RX MED GY IP 250 OP 250 PS 637

## 2023-01-31 PROCEDURE — 120N000009 HC R&B SNF

## 2023-01-31 PROCEDURE — 97129 THER IVNTJ 1ST 15 MIN: CPT | Mod: GN

## 2023-01-31 PROCEDURE — 97530 THERAPEUTIC ACTIVITIES: CPT | Mod: GP

## 2023-01-31 RX ADMIN — Medication 10 MG: at 21:13

## 2023-01-31 RX ADMIN — ACETAMINOPHEN 325MG 650 MG: 325 TABLET ORAL at 04:05

## 2023-01-31 RX ADMIN — GABAPENTIN 300 MG: 300 CAPSULE ORAL at 13:58

## 2023-01-31 RX ADMIN — ACETAMINOPHEN 325MG 650 MG: 325 TABLET ORAL at 16:23

## 2023-01-31 RX ADMIN — ALPRAZOLAM 0.5 MG: 0.25 TABLET ORAL at 21:12

## 2023-01-31 RX ADMIN — FOLIC ACID 1 MG: 1 TABLET ORAL at 21:12

## 2023-01-31 RX ADMIN — SEVELAMER CARBONATE 800 MG: 800 TABLET, FILM COATED ORAL at 19:39

## 2023-01-31 RX ADMIN — APIXABAN 2.5 MG: 2.5 TABLET, FILM COATED ORAL at 21:13

## 2023-01-31 RX ADMIN — CLOTRIMAZOLE: 1 CREAM TOPICAL at 09:09

## 2023-01-31 RX ADMIN — PANTOPRAZOLE SODIUM 40 MG: 40 TABLET, DELAYED RELEASE ORAL at 04:05

## 2023-01-31 RX ADMIN — FLUTICASONE PROPIONATE 1 SPRAY: 50 SPRAY, METERED NASAL at 09:04

## 2023-01-31 RX ADMIN — GABAPENTIN 300 MG: 300 CAPSULE ORAL at 21:13

## 2023-01-31 RX ADMIN — MIDODRINE HYDROCHLORIDE 10 MG: 5 TABLET ORAL at 09:08

## 2023-01-31 RX ADMIN — FLUTICASONE PROPIONATE 1 SPRAY: 50 SPRAY, METERED NASAL at 21:17

## 2023-01-31 RX ADMIN — OXYCODONE HYDROCHLORIDE 5 MG: 5 TABLET ORAL at 06:27

## 2023-01-31 RX ADMIN — CETIRIZINE HYDROCHLORIDE 10 MG: 10 TABLET, FILM COATED ORAL at 21:13

## 2023-01-31 RX ADMIN — DICLOFENAC SODIUM 4 G: 10 GEL TOPICAL at 09:05

## 2023-01-31 RX ADMIN — SENNOSIDES AND DOCUSATE SODIUM 2 TABLET: 8.6; 5 TABLET ORAL at 21:13

## 2023-01-31 RX ADMIN — OXYCODONE HYDROCHLORIDE 5 MG: 5 TABLET ORAL at 16:24

## 2023-01-31 RX ADMIN — APIXABAN 2.5 MG: 2.5 TABLET, FILM COATED ORAL at 09:05

## 2023-01-31 RX ADMIN — THIAMINE HCL TAB 100 MG 100 MG: 100 TAB at 21:12

## 2023-01-31 RX ADMIN — OLANZAPINE 5 MG: 2.5 TABLET, FILM COATED ORAL at 21:13

## 2023-01-31 RX ADMIN — SERTRALINE HYDROCHLORIDE 100 MG: 100 TABLET, FILM COATED ORAL at 21:12

## 2023-01-31 RX ADMIN — HYDROXYZINE HYDROCHLORIDE 50 MG: 25 TABLET, FILM COATED ORAL at 22:44

## 2023-01-31 RX ADMIN — HYDROXYZINE HYDROCHLORIDE 50 MG: 25 TABLET, FILM COATED ORAL at 16:24

## 2023-01-31 RX ADMIN — DICLOFENAC SODIUM 4 G: 10 GEL TOPICAL at 16:24

## 2023-01-31 RX ADMIN — POLYETHYLENE GLYCOL 3350 17 G: 17 POWDER, FOR SOLUTION ORAL at 21:13

## 2023-01-31 RX ADMIN — GABAPENTIN 300 MG: 300 CAPSULE ORAL at 09:12

## 2023-01-31 RX ADMIN — FLUTICASONE PROPIONATE 1 SPRAY: 50 SPRAY, METERED NASAL at 02:16

## 2023-01-31 RX ADMIN — CLOTRIMAZOLE: 1 CREAM TOPICAL at 21:15

## 2023-01-31 RX ADMIN — FINASTERIDE 1.3 MG: 5 TABLET, FILM COATED ORAL at 21:17

## 2023-01-31 RX ADMIN — SENNOSIDES AND DOCUSATE SODIUM 2 TABLET: 8.6; 5 TABLET ORAL at 09:04

## 2023-01-31 RX ADMIN — SEVELAMER CARBONATE 800 MG: 800 TABLET, FILM COATED ORAL at 09:04

## 2023-01-31 RX ADMIN — Medication 100 MG: at 21:13

## 2023-01-31 RX ADMIN — DICLOFENAC SODIUM 4 G: 10 GEL TOPICAL at 21:17

## 2023-01-31 RX ADMIN — ATORVASTATIN CALCIUM 20 MG: 20 TABLET, FILM COATED ORAL at 21:13

## 2023-01-31 RX ADMIN — Medication 1 TABLET: at 21:12

## 2023-01-31 ASSESSMENT — ACTIVITIES OF DAILY LIVING (ADL)
ADLS_ACUITY_SCORE: 54

## 2023-01-31 NOTE — PLAN OF CARE
"Pt is alert and oriented x4. Pt denied chest pain, SOB, and N/V.Pt participated in PT and OT walked with therapy.Pt refused to take noon sevelamer carbonate. Pt refused to change right heel dressing, offered x4, pt stated \" I will call you after my OT.\" Pt refused to eat lunch.Pt able to make needs known. Pt is up in chair during this shift.Call light with in reach. Continue with plan of care.       Patient's most recent vital signs are:     Vital signs:  BP: 108/58  Temp: 98.3  HR: 90  RR: 16  SpO2: 97 %     Patient does not have new respiratory symptoms.  Patient does not have new sore throat.  Patient does not have a fever greater than 99.5.             "

## 2023-01-31 NOTE — PROGRESS NOTES
01/31/23 1401   Appointment Info   Signing Clinician's Name / Credentials (OT) Salma Dao, OTR/L CBIS   OT Goals   Therapy Frequency (OT) 6 times/wk   OT Predicted Duration/Target Date for Goal Attainment 02/03/23   Self-Care/Home Management   Treatment Detail/Skilled Intervention OT: pt declined to perform ADLs this session or transfers/toileting ,stated he was too tired to do them safety at this time due to fatigue from PT session involving multple STS and ambulation activities.   Therapeutic Procedures/Exercise   Therapeutic Procedure: strength, endurance, ROM, flexibillity minutes (06533) 30   Treatment Detail/Skilled Intervention OT: kartik ue A/AAROM exer, shoulder exer w/out add resist to focus on smooth movements w/ proper joint alignment and proper line of motion, 2# resist for elb and distal exer ,c/o R sh pain, completed 15-20 reps per each  w/ L and Rue in sitting.   OT Discharge Planning   OT Plan OT: spinal prec,  OOB, incontinence, h/o decrease sensation B ankles and feet, h/o cog deficit after surgery, okay to shower - pat dry.  Tx: sponge bath, pt not feeling ready for a shower. dressing(pt wants assist of son for socks and shoes vs AE training, but he wants to be able to manage LB clothing for toileting), transfers with raaf lafleur, standing at the sink with rafa lafleur.   OT Discharge Recommendation (DC Rec) home with assist;home with home care occupational therapy   OT Brief overview of current status oT: pt improving w/ mobility, contin to need assist w/ toileting and LB drg   Total Session Time   Timed Code Treatment Minutes 30   Total Session Time (sum of timed and untimed services) 30   Post Acute Settings Only   What unit is patient on? TCU

## 2023-01-31 NOTE — PLAN OF CARE
Care Coordination:     Discharge planned for 2/3/23.     Home Care agency at discharge:   [facility]:   Ph: [phone]   Fax: [phone]    Patient will receive nurse, physical therapy, and occupational therapy.    Writer met with patient to communicate home care at discharge. Patient will continue with previous home care agency- [facility]. Writer did make request to add on RN services, in addition to PT/OT services patient was previously receiving. patient was agreeable to this. Writer did ask patient if he would like me to update his sons- patient declined needing to update sons. Writer did discuss continuation of home care and addition of RN with sons during care conference last week.     Anika Lozdaa   Patient Care Management Coordinator  Acute Rehabilitation Unit/ Transitional Care Unit.   Ph: [phone]

## 2023-01-31 NOTE — PLAN OF CARE
Goal Outcome Evaluation:  Pt.A&Ox4. Sleeps off and on throughout shift. O2 on @ 2L via NC overnight. Requested and rec'd Tylenol 650mg po @ 0400 for c/o HA and Flonase for post-nasal drip. Denies CP/SOB. Has HD MWF. Voided 600mls using urinal indep.    0630 - Requested and rec'd Oxycodone 5mg po for c/o neck/back pain.      Patient's most recent vital signs are:     Vital signs:  BP: 123/68  Temp: 96.7  HR: 83  RR: 18  SpO2: 98 %     Patient does not have new respiratory symptoms.  Patient does not have new sore throat.  Patient does not have a fever greater than 99.5.

## 2023-02-01 ENCOUNTER — APPOINTMENT (OUTPATIENT)
Dept: OCCUPATIONAL THERAPY | Facility: SKILLED NURSING FACILITY | Age: 58
DRG: 949 | End: 2023-02-01
Attending: INTERNAL MEDICINE
Payer: MEDICARE

## 2023-02-01 ENCOUNTER — APPOINTMENT (OUTPATIENT)
Dept: PHYSICAL THERAPY | Facility: SKILLED NURSING FACILITY | Age: 58
DRG: 949 | End: 2023-02-01
Attending: INTERNAL MEDICINE
Payer: MEDICARE

## 2023-02-01 PROCEDURE — 250N000013 HC RX MED GY IP 250 OP 250 PS 637: Performed by: PHYSICIAN ASSISTANT

## 2023-02-01 PROCEDURE — 97110 THERAPEUTIC EXERCISES: CPT | Mod: GO

## 2023-02-01 PROCEDURE — 97530 THERAPEUTIC ACTIVITIES: CPT | Mod: GP

## 2023-02-01 PROCEDURE — 120N000009 HC R&B SNF

## 2023-02-01 PROCEDURE — 250N000013 HC RX MED GY IP 250 OP 250 PS 637

## 2023-02-01 PROCEDURE — 250N000013 HC RX MED GY IP 250 OP 250 PS 637: Performed by: INTERNAL MEDICINE

## 2023-02-01 PROCEDURE — 97535 SELF CARE MNGMENT TRAINING: CPT | Mod: GO

## 2023-02-01 RX ADMIN — ALPRAZOLAM 0.5 MG: 0.25 TABLET ORAL at 21:52

## 2023-02-01 RX ADMIN — ACETAMINOPHEN 325MG 650 MG: 325 TABLET ORAL at 18:58

## 2023-02-01 RX ADMIN — APIXABAN 2.5 MG: 2.5 TABLET, FILM COATED ORAL at 13:26

## 2023-02-01 RX ADMIN — DICLOFENAC SODIUM 4 G: 10 GEL TOPICAL at 17:33

## 2023-02-01 RX ADMIN — HYDROXYZINE HYDROCHLORIDE 25 MG: 25 TABLET, FILM COATED ORAL at 21:52

## 2023-02-01 RX ADMIN — SENNOSIDES AND DOCUSATE SODIUM 2 TABLET: 8.6; 5 TABLET ORAL at 13:26

## 2023-02-01 RX ADMIN — DICLOFENAC SODIUM 4 G: 10 GEL TOPICAL at 13:27

## 2023-02-01 RX ADMIN — FOLIC ACID 1 MG: 1 TABLET ORAL at 20:01

## 2023-02-01 RX ADMIN — POLYETHYLENE GLYCOL 3350 17 G: 17 POWDER, FOR SOLUTION ORAL at 20:00

## 2023-02-01 RX ADMIN — OXYCODONE HYDROCHLORIDE 5 MG: 5 TABLET ORAL at 05:59

## 2023-02-01 RX ADMIN — CLOTRIMAZOLE: 1 CREAM TOPICAL at 21:53

## 2023-02-01 RX ADMIN — Medication 10 MG: at 21:51

## 2023-02-01 RX ADMIN — GABAPENTIN 300 MG: 300 CAPSULE ORAL at 20:00

## 2023-02-01 RX ADMIN — PANTOPRAZOLE SODIUM 40 MG: 40 TABLET, DELAYED RELEASE ORAL at 05:59

## 2023-02-01 RX ADMIN — FINASTERIDE 1.3 MG: 5 TABLET, FILM COATED ORAL at 21:52

## 2023-02-01 RX ADMIN — Medication 1 TABLET: at 20:00

## 2023-02-01 RX ADMIN — THIAMINE HCL TAB 100 MG 100 MG: 100 TAB at 20:00

## 2023-02-01 RX ADMIN — CETIRIZINE HYDROCHLORIDE 10 MG: 10 TABLET, FILM COATED ORAL at 20:01

## 2023-02-01 RX ADMIN — OXYCODONE HYDROCHLORIDE 5 MG: 5 TABLET ORAL at 18:58

## 2023-02-01 RX ADMIN — GABAPENTIN 300 MG: 300 CAPSULE ORAL at 13:26

## 2023-02-01 RX ADMIN — APIXABAN 2.5 MG: 2.5 TABLET, FILM COATED ORAL at 21:51

## 2023-02-01 RX ADMIN — FLUTICASONE PROPIONATE 1 SPRAY: 50 SPRAY, METERED NASAL at 21:53

## 2023-02-01 RX ADMIN — Medication 100 MG: at 20:01

## 2023-02-01 RX ADMIN — OLANZAPINE 5 MG: 2.5 TABLET, FILM COATED ORAL at 20:00

## 2023-02-01 RX ADMIN — SENNOSIDES AND DOCUSATE SODIUM 2 TABLET: 8.6; 5 TABLET ORAL at 21:51

## 2023-02-01 RX ADMIN — ATORVASTATIN CALCIUM 20 MG: 20 TABLET, FILM COATED ORAL at 21:51

## 2023-02-01 RX ADMIN — SERTRALINE HYDROCHLORIDE 100 MG: 100 TABLET, FILM COATED ORAL at 20:00

## 2023-02-01 RX ADMIN — SEVELAMER CARBONATE 800 MG: 800 TABLET, FILM COATED ORAL at 17:33

## 2023-02-01 RX ADMIN — ACETAMINOPHEN 325MG 650 MG: 325 TABLET ORAL at 01:27

## 2023-02-01 RX ADMIN — SEVELAMER CARBONATE 800 MG: 800 TABLET, FILM COATED ORAL at 13:26

## 2023-02-01 RX ADMIN — DICLOFENAC SODIUM 4 G: 10 GEL TOPICAL at 21:57

## 2023-02-01 ASSESSMENT — ACTIVITIES OF DAILY LIVING (ADL)
ADLS_ACUITY_SCORE: 54

## 2023-02-01 NOTE — PLAN OF CARE
Goal Outcome Evaluation:       Pt is alert and oriented x 4. Able to make needs known to staffs. Pt denied having chest pain, SOB, N/V fever and chills. Oliguric this shift. Occasional incontinent of bowel. Pt uses Irina steady with transfers. Cervical brace on when head is above 45 degree. Pt requested and received PRN oxycodone and Tylenol for pain management. Pt is due to be sent to dialysis this morning. Pending ride at this time. No acute issue noted at the time of this report.       Patient's most recent vital signs are:     Vital signs:  BP: 109/76  Temp: 97.4  HR: 74  RR: 18  SpO2: 98 %     Patient does not have new respiratory symptoms.  Patient does not have new sore throat.  Patient does not have a fever greater than 99.5.

## 2023-02-01 NOTE — PROGRESS NOTES
Pt alert and oriented X3. Able to make needs known. rula at dialysis until 1245. Denied SOB, CP, N/V.         Patient's most recent vital signs are:     Vital signs:  BP: 124/64  Temp: 97.2  HR: 80  RR: 18  SpO2: 95 %     Patient does not have new respiratory symptoms.  Patient does not have new sore throat.  Patient does not have a fever greater than 99.5.

## 2023-02-02 ENCOUNTER — APPOINTMENT (OUTPATIENT)
Dept: PHYSICAL THERAPY | Facility: SKILLED NURSING FACILITY | Age: 58
DRG: 949 | End: 2023-02-02
Attending: INTERNAL MEDICINE
Payer: MEDICARE

## 2023-02-02 ENCOUNTER — OFFICE VISIT (OUTPATIENT)
Dept: NEUROSURGERY | Facility: CLINIC | Age: 58
End: 2023-02-02
Attending: STUDENT IN AN ORGANIZED HEALTH CARE EDUCATION/TRAINING PROGRAM
Payer: MEDICARE

## 2023-02-02 ENCOUNTER — APPOINTMENT (OUTPATIENT)
Dept: SPEECH THERAPY | Facility: SKILLED NURSING FACILITY | Age: 58
DRG: 949 | End: 2023-02-02
Attending: INTERNAL MEDICINE
Payer: MEDICARE

## 2023-02-02 ENCOUNTER — DOCUMENTATION ONLY (OUTPATIENT)
Dept: NEUROSURGERY | Facility: CLINIC | Age: 58
End: 2023-02-02

## 2023-02-02 ENCOUNTER — APPOINTMENT (OUTPATIENT)
Dept: OCCUPATIONAL THERAPY | Facility: SKILLED NURSING FACILITY | Age: 58
DRG: 949 | End: 2023-02-02
Attending: INTERNAL MEDICINE
Payer: MEDICARE

## 2023-02-02 ENCOUNTER — ANCILLARY PROCEDURE (OUTPATIENT)
Dept: GENERAL RADIOLOGY | Facility: CLINIC | Age: 58
End: 2023-02-02
Attending: STUDENT IN AN ORGANIZED HEALTH CARE EDUCATION/TRAINING PROGRAM
Payer: MEDICARE

## 2023-02-02 VITALS
DIASTOLIC BLOOD PRESSURE: 61 MMHG | OXYGEN SATURATION: 96 % | HEART RATE: 84 BPM | SYSTOLIC BLOOD PRESSURE: 117 MMHG | RESPIRATION RATE: 18 BRPM | WEIGHT: 284.83 LBS | TEMPERATURE: 97 F | BODY MASS INDEX: 37.58 KG/M2

## 2023-02-02 VITALS
HEART RATE: 81 BPM | SYSTOLIC BLOOD PRESSURE: 118 MMHG | DIASTOLIC BLOOD PRESSURE: 71 MMHG | WEIGHT: 284 LBS | BODY MASS INDEX: 37.47 KG/M2

## 2023-02-02 DIAGNOSIS — M47.14 SPONDYLOSIS OF THORACIC SPINE WITH MYELOPATHY: Primary | ICD-10-CM

## 2023-02-02 DIAGNOSIS — Z98.1 S/P SPINAL FUSION: ICD-10-CM

## 2023-02-02 LAB
ANION GAP SERPL CALCULATED.3IONS-SCNC: 5 MMOL/L (ref 3–14)
BUN SERPL-MCNC: 25 MG/DL (ref 7–30)
CALCIUM SERPL-MCNC: 9 MG/DL (ref 8.5–10.1)
CHLORIDE BLD-SCNC: 93 MMOL/L (ref 94–109)
CO2 SERPL-SCNC: 33 MMOL/L (ref 20–32)
CREAT SERPL-MCNC: 4.09 MG/DL (ref 0.66–1.25)
ERYTHROCYTE [DISTWIDTH] IN BLOOD BY AUTOMATED COUNT: 14.7 % (ref 10–15)
GFR SERPL CREATININE-BSD FRML MDRD: 16 ML/MIN/1.73M2
GLUCOSE BLD-MCNC: 99 MG/DL (ref 70–99)
HCT VFR BLD AUTO: 27.1 % (ref 40–53)
HGB BLD-MCNC: 8.5 G/DL (ref 13.3–17.7)
MCH RBC QN AUTO: 29.7 PG (ref 26.5–33)
MCHC RBC AUTO-ENTMCNC: 31.4 G/DL (ref 31.5–36.5)
MCV RBC AUTO: 95 FL (ref 78–100)
PHOSPHATE SERPL-MCNC: 2.6 MG/DL (ref 2.5–4.5)
PLATELET # BLD AUTO: 225 10E3/UL (ref 150–450)
POTASSIUM BLD-SCNC: 4.3 MMOL/L (ref 3.4–5.3)
RBC # BLD AUTO: 2.86 10E6/UL (ref 4.4–5.9)
SODIUM SERPL-SCNC: 131 MMOL/L (ref 133–144)
WBC # BLD AUTO: 5.8 10E3/UL (ref 4–11)

## 2023-02-02 PROCEDURE — 97535 SELF CARE MNGMENT TRAINING: CPT | Mod: GO

## 2023-02-02 PROCEDURE — 72082 X-RAY EXAM ENTIRE SPI 2/3 VW: CPT | Performed by: RADIOLOGY

## 2023-02-02 PROCEDURE — 84100 ASSAY OF PHOSPHORUS: CPT | Performed by: INTERNAL MEDICINE

## 2023-02-02 PROCEDURE — 250N000013 HC RX MED GY IP 250 OP 250 PS 637: Performed by: PHYSICIAN ASSISTANT

## 2023-02-02 PROCEDURE — 99024 POSTOP FOLLOW-UP VISIT: CPT | Performed by: STUDENT IN AN ORGANIZED HEALTH CARE EDUCATION/TRAINING PROGRAM

## 2023-02-02 PROCEDURE — 250N000013 HC RX MED GY IP 250 OP 250 PS 637

## 2023-02-02 PROCEDURE — 80048 BASIC METABOLIC PNL TOTAL CA: CPT | Performed by: INTERNAL MEDICINE

## 2023-02-02 PROCEDURE — 250N000013 HC RX MED GY IP 250 OP 250 PS 637: Performed by: INTERNAL MEDICINE

## 2023-02-02 PROCEDURE — 85027 COMPLETE CBC AUTOMATED: CPT | Performed by: INTERNAL MEDICINE

## 2023-02-02 PROCEDURE — 97530 THERAPEUTIC ACTIVITIES: CPT | Mod: GP

## 2023-02-02 PROCEDURE — 36415 COLL VENOUS BLD VENIPUNCTURE: CPT | Performed by: INTERNAL MEDICINE

## 2023-02-02 PROCEDURE — 120N000009 HC R&B SNF

## 2023-02-02 PROCEDURE — 97129 THER IVNTJ 1ST 15 MIN: CPT | Mod: GN

## 2023-02-02 RX ORDER — LANOLIN ALCOHOL/MO/W.PET/CERES
100 CREAM (GRAM) TOPICAL DAILY
Qty: 30 TABLET | Refills: 0 | Status: SHIPPED | OUTPATIENT
Start: 2023-02-02

## 2023-02-02 RX ORDER — OLANZAPINE 5 MG/1
5 TABLET ORAL AT BEDTIME
Qty: 14 TABLET | Refills: 0 | Status: SHIPPED | OUTPATIENT
Start: 2023-02-02

## 2023-02-02 RX ORDER — PHENOL 1.4 %
10 AEROSOL, SPRAY (ML) MUCOUS MEMBRANE AT BEDTIME
Qty: 30 TABLET | Refills: 0 | Status: SHIPPED | OUTPATIENT
Start: 2023-02-02

## 2023-02-02 RX ORDER — POLYETHYLENE GLYCOL 3350 17 G/17G
17 POWDER, FOR SOLUTION ORAL DAILY
Qty: 510 G | Refills: 0 | Status: SHIPPED | OUTPATIENT
Start: 2023-02-02

## 2023-02-02 RX ORDER — OXYCODONE HYDROCHLORIDE 5 MG/1
5 TABLET ORAL 2 TIMES DAILY PRN
Qty: 10 TABLET | Refills: 0 | Status: SHIPPED | OUTPATIENT
Start: 2023-02-02

## 2023-02-02 RX ORDER — METHOCARBAMOL 500 MG/1
500 TABLET, FILM COATED ORAL 4 TIMES DAILY PRN
Qty: 30 TABLET | Refills: 0 | Status: SHIPPED | OUTPATIENT
Start: 2023-02-02

## 2023-02-02 RX ORDER — FOLIC ACID 1 MG/1
1 TABLET ORAL DAILY
Qty: 30 TABLET | Refills: 0 | Status: SHIPPED | OUTPATIENT
Start: 2023-02-02

## 2023-02-02 RX ORDER — VIT B COMP NO.3/FOLIC/C/BIOTIN 1 MG-60 MG
1 TABLET ORAL DAILY
Qty: 30 TABLET | Refills: 0 | Status: SHIPPED | OUTPATIENT
Start: 2023-02-02

## 2023-02-02 RX ORDER — MIDODRINE HYDROCHLORIDE 10 MG/1
10 TABLET ORAL
Qty: 90 TABLET | Refills: 0 | Status: SHIPPED | OUTPATIENT
Start: 2023-02-02

## 2023-02-02 RX ORDER — GABAPENTIN 300 MG/1
300 CAPSULE ORAL 3 TIMES DAILY
Qty: 90 CAPSULE | Refills: 0 | Status: SHIPPED | OUTPATIENT
Start: 2023-02-02

## 2023-02-02 RX ORDER — AMOXICILLIN 250 MG
2 CAPSULE ORAL 2 TIMES DAILY
Qty: 30 TABLET | Refills: 0 | Status: SHIPPED | OUTPATIENT
Start: 2023-02-02

## 2023-02-02 RX ORDER — CLOTRIMAZOLE 1 %
CREAM (GRAM) TOPICAL 2 TIMES DAILY
Qty: 14 G | Refills: 0 | Status: SHIPPED | OUTPATIENT
Start: 2023-02-02 | End: 2023-02-10

## 2023-02-02 RX ADMIN — OLANZAPINE 5 MG: 2.5 TABLET, FILM COATED ORAL at 20:18

## 2023-02-02 RX ADMIN — OXYCODONE HYDROCHLORIDE 5 MG: 5 TABLET ORAL at 17:40

## 2023-02-02 RX ADMIN — ACETAMINOPHEN 325MG 650 MG: 325 TABLET ORAL at 05:48

## 2023-02-02 RX ADMIN — FINASTERIDE 1.3 MG: 5 TABLET, FILM COATED ORAL at 20:26

## 2023-02-02 RX ADMIN — SENNOSIDES AND DOCUSATE SODIUM 2 TABLET: 8.6; 5 TABLET ORAL at 08:36

## 2023-02-02 RX ADMIN — SEVELAMER CARBONATE 800 MG: 800 TABLET, FILM COATED ORAL at 08:36

## 2023-02-02 RX ADMIN — APIXABAN 2.5 MG: 2.5 TABLET, FILM COATED ORAL at 20:18

## 2023-02-02 RX ADMIN — FLUTICASONE PROPIONATE 1 SPRAY: 50 SPRAY, METERED NASAL at 20:19

## 2023-02-02 RX ADMIN — PANTOPRAZOLE SODIUM 40 MG: 40 TABLET, DELAYED RELEASE ORAL at 05:48

## 2023-02-02 RX ADMIN — APIXABAN 2.5 MG: 2.5 TABLET, FILM COATED ORAL at 08:36

## 2023-02-02 RX ADMIN — FLUTICASONE PROPIONATE 1 SPRAY: 50 SPRAY, METERED NASAL at 08:38

## 2023-02-02 RX ADMIN — GABAPENTIN 300 MG: 300 CAPSULE ORAL at 13:21

## 2023-02-02 RX ADMIN — HYDROXYZINE HYDROCHLORIDE 50 MG: 25 TABLET, FILM COATED ORAL at 20:18

## 2023-02-02 RX ADMIN — METHOCARBAMOL 500 MG: 500 TABLET ORAL at 13:23

## 2023-02-02 RX ADMIN — FOLIC ACID 1 MG: 1 TABLET ORAL at 20:17

## 2023-02-02 RX ADMIN — THIAMINE HCL TAB 100 MG 100 MG: 100 TAB at 20:18

## 2023-02-02 RX ADMIN — ATORVASTATIN CALCIUM 20 MG: 20 TABLET, FILM COATED ORAL at 20:17

## 2023-02-02 RX ADMIN — GABAPENTIN 300 MG: 300 CAPSULE ORAL at 20:17

## 2023-02-02 RX ADMIN — HYDROXYZINE HYDROCHLORIDE 50 MG: 25 TABLET, FILM COATED ORAL at 08:36

## 2023-02-02 RX ADMIN — DICLOFENAC SODIUM 4 G: 10 GEL TOPICAL at 20:22

## 2023-02-02 RX ADMIN — OXYCODONE HYDROCHLORIDE 5 MG: 5 TABLET ORAL at 08:36

## 2023-02-02 RX ADMIN — Medication 1 TABLET: at 20:17

## 2023-02-02 RX ADMIN — DICLOFENAC SODIUM 4 G: 10 GEL TOPICAL at 17:32

## 2023-02-02 RX ADMIN — CETIRIZINE HYDROCHLORIDE 10 MG: 10 TABLET, FILM COATED ORAL at 20:18

## 2023-02-02 RX ADMIN — Medication 100 MG: at 20:17

## 2023-02-02 RX ADMIN — SERTRALINE HYDROCHLORIDE 100 MG: 100 TABLET, FILM COATED ORAL at 20:17

## 2023-02-02 RX ADMIN — GABAPENTIN 300 MG: 300 CAPSULE ORAL at 08:36

## 2023-02-02 RX ADMIN — ALPRAZOLAM 0.5 MG: 0.25 TABLET ORAL at 20:17

## 2023-02-02 ASSESSMENT — ACTIVITIES OF DAILY LIVING (ADL)
ADLS_ACUITY_SCORE: 54

## 2023-02-02 NOTE — PLAN OF CARE
"Pt is alert and oriented x 4. Pt denied chest pain, SOB, and N/V. Pain managed with PRN oxycodone  x1.R upper arm PIV removed . Pt tolarated well.Chanaged dressing on right heel no noted drainage on old dressing. Changed mepilex on L knee. PRN.BLE tubi  on , 2+ edema to feet BLE. Pt refused blue form boots.PRN atarax and  alprazolam  given before bedtime per patient request. Refused to take melatonin. Pt states \" I can fall a sleep with ut taking it\". Cervical collar on when OOB.Pt has early morning dialysis appointment tomorrow. Pt plans to discharge home after coming back from dialysis appointment.Pt able to make needs known. Call light with in reach. Continue with plan of care.    Patient's most recent vital signs are:     Vital signs:  BP: 132/73  Temp: 97.3  HR: 80  RR: 18  SpO2: 96 %     Patient does not have new respiratory symptoms.  Patient does not have new sore throat.  Patient does not have a fever greater than 99.5.             "

## 2023-02-02 NOTE — PLAN OF CARE
Goal Outcome Evaluation:         Pt is alert and oriented x4, able to make his needs known, Pt slept on or off throughout the shift. O2  Therapy at 2 L/min via nasal cannula. PRN atarax given before bedtime per patient request. Pt denied CP/SOB. Pt has HD MWF.  Pt slept well throughout the shift.         Patient's most recent vital signs are:     Vital signs:  BP: 133/67  Temp: 97.4  HR: 86  RR: 18  SpO2: 97 %     Patient does not have new respiratory symptoms.  Patient does not have new sore throat.  Patient does not have a fever greater than 99.5.

## 2023-02-02 NOTE — PROGRESS NOTES
Speech Language Therapy Discharge Summary    Reason for therapy discharge:    Discharged to home.    Progress towards therapy goal(s). See goals on Care Plan in [facility] electronic health record for goal details.  Goals partially met.  Barriers to achieving goals:   limited tolerance for therapy and discharge from facility.    Therapy recommendation(s):    No further therapy is recommended. Pt with fluctuating participation during stay at TCU. Pt currently lives with adult son's who are able to provide increased assist with IADL management. Pt benefited from intervention during stay and demonstrated mild progress with memory.

## 2023-02-02 NOTE — PROGRESS NOTES
MDS Pain Assessment    The following is the pain interview as conducted by the TCU RN caring for the patient on 2/2 / 2023. This assessment is required by the [facility] for all patients in [place] SNF (Skilled Nursing Facilities).       1. Have you had pain or hurting at any time in the last 5 days? Yes    2. How much of the time have you experienced pain or hurting over the last 5 days? occasionally    3. Over the past 5 days, has pain made it hard for you to sleep at night? Yes-sometimes    4. Over the past 5 days, have you limited your day-to-day activities because of pain? No    5. Pain intensity (Numeric scale used first-if patient unable to answer, verbal scale to be used.)    Numeric Scale: Please rate your worst pain over the last 5 days on a zero to ten scale, with zero being no pain and ten being the worst pain you can imagine.     7    Verbal Scale: USED ONLY if unable to give numeric, otherwise N/A  Please rate the intensity of your worst pain over the last 5 days.     not applicable

## 2023-02-02 NOTE — PROGRESS NOTES
Met with patient in preparation for discharge.   Went through his medications  Orders placed    Andrew Jenkins MD  [facility] Transitional Care  Contact information available via [facility] Paging/Directory

## 2023-02-02 NOTE — DISCHARGE SUMMARY
Occupational Therapy Discharge Summary    Reason for therapy discharge:    Pt discharging to home with support of family and home services tomorrow.     Progress towards therapy goal(s). See goals on Care Plan in [facility] electronic health record for goal details.  Goals partially met.  Pt is making progress with his mobility and ADL.  He demo amb to and from the commode over the toilet using 2WW, from w/c in his room, yesterday.  He is yet unable to do his own toilet hygiene or manage his own clothing during toileting.  Pt participates with ADLs after set-up with increase assist for LB.  Pt has improved his bed mobility.   Th provided instruction re: AE, pt considering a commode and toilet tongs (OT will touch base with pt tomorrow to see if he would like to purchase these from [facility]).  Pt is also aware of a sock aid and LHSH, though he declines these presently and states his son's will assist him.  Pt has a reacher and an adapted toilet and shower at home.    Therapy recommendation(s):    Continued therapy is recommended.  Rationale/Recommendations: Pt is progressing though progress is slow.  He has advanced to SBA bed mobility and CGA amb to and from the commode over the toilet using 2WW while in OT. His STS transfers are variable with surface heights - min to mod A.  He con't to have goals of mod I for managing toilet hygiene, and managing his clothing during toileting.  He can also become IND with LB dressing if he considers a sock aid and LHSH.  Pt has not started IADL training as he is still progressing with his mobility.  Pt has poor sensation in his feet and ankles (not new), he goes to dialysis 3x/wk, he has RUE sh/humerus pain and spinal precautions with his  when OOB.  Th anticipates pt will con't to gain strength as he heals, and with training/modifications he will be mod I for ADL/IADL and mobility when given additional OT training.

## 2023-02-02 NOTE — PROGRESS NOTES
Discharge Plan     Discharge Date: 2/3/23  Discharge Disposition: Home       Discharge Services: [facility]:   Ph: [phone]   Fax: [phone]     Discharge Supplies: N/A     Discharge Transportation: Son will     IRF completed. No additional questions discussed.     JULISSA Forrest   [facility], Transitional Care Unit   Social Work   [facility] [address] [place] [address]  (PH) [phone]

## 2023-02-02 NOTE — PROGRESS NOTES
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  Intraoperatively the procedure was complicated by significant bleeding requiring an angiogram to verify there is no vertebral artery injury.  Postoperatively his stay was complicated by encephalopathy.  He is still residing at a transitional care unit but is planning on discharging to home tomorrow.  He does feel he is ambulating as well as he did before surgery and does not feel the hardware nearly as much as he did before the revision surgery once again.  He denies any worsening of his neurologic symptoms and overall feels he is improving after surgery.  No current facility-administered medications for this visit.     No current outpatient medications on file.     Facility-Administered Medications Ordered in Other Visits   Medication     acetaminophen (TYLENOL) Suppository 650 mg     acetaminophen (TYLENOL) tablet 650 mg     ALPRAZolam (XANAX) tablet 0.5 mg     apixaban ANTICOAGULANT (ELIQUIS) tablet 2.5 mg     atorvastatin (LIPITOR) tablet 20 mg     calcium carbonate (TUMS) chewable tablet 1,000 mg     cetirizine (zyrTEC) tablet 10 mg     clotrimazole (LOTRIMIN) 1 % cream     diclofenac (VOLTAREN) 1 % topical gel 4 g     finasteride (PROSCAR) suspension 1.3 mg     fluticasone (FLONASE) 50 MCG/ACT spray 1 spray     folic acid (FOLVITE) tablet 1 mg     gabapentin (NEURONTIN) capsule 300 mg     hydrOXYzine (ATARAX) tablet 25 mg    Or     hydrOXYzine (ATARAX) tablet 50 mg     Lidocaine (LIDOCARE) 4 % Patch 1-2 patch     lidocaine patch in PLACE     melatonin tablet 10 mg     methocarbamol (ROBAXIN) tablet 500 mg     midodrine (PROAMATINE) tablet 10 mg     multivitamin RENAL (RENAVITE RX/NEPHROVITE) tablet 1 tablet     naloxone (NARCAN) injection 0.2 mg    Or     naloxone (NARCAN) injection 0.4 mg    Or     naloxone (NARCAN) injection 0.2 mg    Or     naloxone (NARCAN) injection 0.4 mg     Nurse may  "request from Pharmacy a change of form of medication (e.g. Liquid to tablet).     OLANZapine (zyPREXA) tablet 5 mg     ondansetron (ZOFRAN ODT) ODT tab 4 mg     oxyCODONE (ROXICODONE) tablet 5 mg     pantoprazole (PROTONIX) EC tablet 40 mg     Patient is already receiving anticoagulation with heparin, enoxaparin (LOVENOX), warfarin (COUMADIN)  or other anticoagulant medication     polyethylene glycol (MIRALAX) Packet 17 g     pyridOXINE (VITAMIN B6) tablet 100 mg     senna-docusate (SENOKOT-S/PERICOLACE) 8.6-50 MG per tablet 2 tablet     sertraline (ZOLOFT) tablet 100 mg     sevelamer carbonate (RENVELA) tablet 800 mg     thiamine (B-1) tablet 100 mg      Physical Exam:  Vital signs:      BP: 118/71 Pulse: 81             Weight: 128.8 kg (284 lb)  Estimated body mass index is 37.47 kg/m  as calculated from the following:    Height as of 12/6/22: 1.854 m (6' 1\").    Weight as of this encounter: 128.8 kg (284 lb).   Is full-strength in his bilateral upper and lower extremities.  Sensation is intact light touch throughout.  Incision is well-healed.  Results Reviewed:  I personally viewed the x-rays from today which are of suboptimal quality however the pedicle screws and cervical spine as well as the thoracic spine do appear to be in correct positions and there is no increase in kyphosis after surgery.  Assessment:  57-year-old male status post revision of cervical thoracic fusion for hardware displacement.  Plan:  Okay to lift up to 25 pounds as tolerated.  Keep the collar on when out of bed.  Okay to return to work with light duty restrictions.  Continue with physical therapy as instructed by rehab.  We will have him come back and see us again in 6 weeks with x-rays once again.    Fritz Boles MD    "

## 2023-02-02 NOTE — PROGRESS NOTES
[employer] restrictions form completed during office visit today with Dr. Boles. Faxed to Maxine at [employer] at [phone]. Provider note also included. Confirmed fax was sent successfully.    Patient informed. Copy sent to scanning.         .Celina Mireles, RNCC  Neurology/Neurosurgery

## 2023-02-02 NOTE — PLAN OF CARE
Goal Outcome Evaluation:    VS: /73 (BP Location: Right arm)   Pulse 80   Temp 97.3  F (36.3  C) (Oral)   Resp 18   Wt 129.2 kg (284 lb 13.4 oz)   SpO2 96%   BMI 37.58 kg/m     O2: RA during day   Output: On HD; oliguric   Last BM: 1/31   Activity: Up in w/c;  Left unit for appt about 0900   Skin: X; spine, R heel, L knee   Pain: Oxy x1   CMS Neuro: BLE numbness present  A&O, makes needs known   Dressing: Spine CDI  R heel CDI  L knee CDI   Diet: Reg   LDA: Cervical collar on when OOB or >45   Equipment: BLE boots, w/c, Irina Steady, WW   Plan: Con't POC   Additional Info: Pt plans to discharge 2/3 after dialysis.      Patient's most recent vital signs are:     Vital signs:  BP: 132/73  Temp: 97.3  HR: 80  RR: 18  SpO2: 96 %     Patient does not have new respiratory symptoms.  Patient does not have new sore throat.  Patient does not have a fever greater than 99.5.

## 2023-02-02 NOTE — LETTER
2/2/2023         RE: Shay Jimenez  [address] [facility] [address]        Dear Colleague,    Thank you for referring your patient, Shay Jimenez, to the [facility] [place]. Please see a copy of my visit note below.    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  Intraoperatively the procedure was complicated by significant bleeding requiring an angiogram to verify there is no vertebral artery injury.  Postoperatively his stay was complicated by encephalopathy.  He is still residing at a transitional care unit but is planning on discharging to home tomorrow.  He does feel he is ambulating as well as he did before surgery and does not feel the hardware nearly as much as he did before the revision surgery once again.  He denies any worsening of his neurologic symptoms and overall feels he is improving after surgery.  No current facility-administered medications for this visit.     No current outpatient medications on file.     Facility-Administered Medications Ordered in Other Visits   Medication     acetaminophen (TYLENOL) Suppository 650 mg     acetaminophen (TYLENOL) tablet 650 mg     ALPRAZolam (XANAX) tablet 0.5 mg     apixaban ANTICOAGULANT (ELIQUIS) tablet 2.5 mg     atorvastatin (LIPITOR) tablet 20 mg     calcium carbonate (TUMS) chewable tablet 1,000 mg     cetirizine (zyrTEC) tablet 10 mg     clotrimazole (LOTRIMIN) 1 % cream     diclofenac (VOLTAREN) 1 % topical gel 4 g     finasteride (PROSCAR) suspension 1.3 mg     fluticasone (FLONASE) 50 MCG/ACT spray 1 spray     folic acid (FOLVITE) tablet 1 mg     gabapentin (NEURONTIN) capsule 300 mg     hydrOXYzine (ATARAX) tablet 25 mg    Or     hydrOXYzine (ATARAX) tablet 50 mg     Lidocaine (LIDOCARE) 4 % Patch 1-2 patch     lidocaine patch in PLACE     melatonin tablet 10 mg     methocarbamol (ROBAXIN) tablet 500 mg     midodrine (PROAMATINE)  "tablet 10 mg     multivitamin RENAL (RENAVITE RX/NEPHROVITE) tablet 1 tablet     naloxone (NARCAN) injection 0.2 mg    Or     naloxone (NARCAN) injection 0.4 mg    Or     naloxone (NARCAN) injection 0.2 mg    Or     naloxone (NARCAN) injection 0.4 mg     Nurse may request from Pharmacy a change of form of medication (e.g. Liquid to tablet).     OLANZapine (zyPREXA) tablet 5 mg     ondansetron (ZOFRAN ODT) ODT tab 4 mg     oxyCODONE (ROXICODONE) tablet 5 mg     pantoprazole (PROTONIX) EC tablet 40 mg     Patient is already receiving anticoagulation with heparin, enoxaparin (LOVENOX), warfarin (COUMADIN)  or other anticoagulant medication     polyethylene glycol (MIRALAX) Packet 17 g     pyridOXINE (VITAMIN B6) tablet 100 mg     senna-docusate (SENOKOT-S/PERICOLACE) 8.6-50 MG per tablet 2 tablet     sertraline (ZOLOFT) tablet 100 mg     sevelamer carbonate (RENVELA) tablet 800 mg     thiamine (B-1) tablet 100 mg      Physical Exam:  Vital signs:      BP: 118/71 Pulse: 81             Weight: 128.8 kg (284 lb)  Estimated body mass index is 37.47 kg/m  as calculated from the following:    Height as of 12/6/22: 1.854 m (6' 1\").    Weight as of this encounter: 128.8 kg (284 lb).   Is full-strength in his bilateral upper and lower extremities.  Sensation is intact light touch throughout.  Incision is well-healed.  Results Reviewed:  I personally viewed the x-rays from today which are of suboptimal quality however the pedicle screws and cervical spine as well as the thoracic spine do appear to be in correct positions and there is no increase in kyphosis after surgery.  Assessment:  57-year-old male status post revision of cervical thoracic fusion for hardware displacement.  Plan:  Okay to lift up to 25 pounds as tolerated.  Keep the collar on when out of bed.  Okay to return to work with light duty restrictions.  Continue with physical therapy as instructed by rehab.  We will have him come back and see us again in 6 weeks " with x-rays once again.    Fritz Boles MD        Again, thank you for allowing me to participate in the care of your patient.        Sincerely,        Fritz Boles MD

## 2023-02-03 PROCEDURE — 99316 NF DSCHRG MGMT 30 MIN+: CPT | Performed by: INTERNAL MEDICINE

## 2023-02-03 PROCEDURE — 250N000013 HC RX MED GY IP 250 OP 250 PS 637: Performed by: PHYSICIAN ASSISTANT

## 2023-02-03 PROCEDURE — 250N000013 HC RX MED GY IP 250 OP 250 PS 637: Performed by: INTERNAL MEDICINE

## 2023-02-03 RX ADMIN — PANTOPRAZOLE SODIUM 40 MG: 40 TABLET, DELAYED RELEASE ORAL at 05:56

## 2023-02-03 RX ADMIN — ACETAMINOPHEN 325MG 650 MG: 325 TABLET ORAL at 01:36

## 2023-02-03 RX ADMIN — OXYCODONE HYDROCHLORIDE 5 MG: 5 TABLET ORAL at 06:26

## 2023-02-03 ASSESSMENT — ACTIVITIES OF DAILY LIVING (ADL)
ADLS_ACUITY_SCORE: 54

## 2023-02-03 NOTE — PLAN OF CARE
Goal Outcome Evaluation:    VS:    O2: RA during day   Output: On HD; oliguric   Last BM: 1/31   Activity: Up in w/c;  Left unit for HD appt about 0630   Skin: X; spine, healed, 2 scabs noted  R heel  L knee   Pain: Oxy x1   CMS Neuro: BLE numbness present at baseline  A&O, makes needs known   Dressing: Spine SOSA  R heel CDI, changed 2/2  L knee CDI, changed 2/2   Diet: Reg   LDA: Cervical collar on when OOB or >45   Equipment: BLE boots, w/c, Irina Steady, WW   Plan: Con't POC.   Additional Info: Pt plans to discharge 2/3 after dialysis  Discharge paperwork reviewed, questions encouraged, asked, and answered. Pt's personal belongings sent home w/ pt.

## 2023-02-03 NOTE — PLAN OF CARE
Physical Therapy Discharge Summary    Reason for therapy discharge:    Discharged to home with home therapy.    Progress towards therapy goal(s). See goals on Care Plan in [facility] electronic health record for goal details.  Goals partially met.  Barriers to achieving goals:   Patient was limited at beginning of stay with issues related to cognition. Mobility goals were impacted due to cervical restrictions.    Therapy recommendation(s):    Continued therapy is recommended.  Rationale/Recommendations:  Patient will benefit from physical therapy to address functional mobility and strength, especially as he weans off neck brace. Pt will benefit from therapy to address ambulation, transfers and dynamic balance to reduce care giver burden and fall risk.

## 2023-02-03 NOTE — CARE PLAN
Pt is alert and oriented x4, able to make his needs known, Pt slept on or off throughout the shift. O2  Therapy at 2 L/min via nasal cannula. PRN atarax given before bedtime per patient request. Pt denied CP/SOB. Pt has HD MWF. Pt slept well throughout the shift.     Patient's most recent vital signs are:     Vital signs:  BP: 117/61  Temp: 97  HR: 84  RR: 18  SpO2: 96 %     Patient does not have new respiratory symptoms.  Patient does not have new sore throat.  Patient does not have a fever greater than 99.5.

## 2023-02-04 NOTE — DISCHARGE SUMMARY
Discharge Summary    Shay Jimenez MRN# [id number]   YOB: 1965 Age: 57 year old     Date of Admission:  1/11/2023  Date of Discharge:  2/3/2023 12:55 PM  Admitting Physician:  No admitting provider for patient encounter.  Discharge Physician:  Andrew Jenkins MD   Discharging Service:  Internal Medicine     Primary Provider: Jonathan Travis          Discharge Diagnosis:   Hardware failure, pseudoarthritis   S/p C5-T6 redo cervical spine fusion 12/6/22  Previous h/o C5-T6 fusion for pathological fracture due to osteomyelitis   Acute metabolic encephalopathy, recurrent, resolved at this time  Hypotension   Chronic Atrial fibrillation   Mild to moderate pulm HTN  Moderate MR  Chronic resp failure  GINI   Obesity   Acute on chronic pain  Peripheral neuropathy  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   Hypophosphatemia   Chronic hyponatremia - resolved  ACD  HDL   Anxiety d/o  Depression   OCD    Alcohol use disorder  Right heal pressure sore                Brief History of Illness:     Shay Jimenez is a 57 year old male who was admitted for rehabilitation    For more details, please refer to the admission H&P on 1/11/2023             Hospital Course:    56 yo obese gentleman w/ h/o HTN, ESRD on HD, GERD, GINI intolerant of CPAP, chronic atrial fibrillation on chronic Eliquis, alcohol ause, had previous C5-T6 spinal fusion due to pathological fracture from osteomyelitis.    He was noted to have discitis/osteomyelitis on CT of chest back in 2/2022 that was obtained for respiratory issues.       He was hospitalized at [facility] and was to follow up with [facility] spine (no records and pt has no recollection if anything was done). In 6/2022 MRI showed T2, T3 vertebral collapse with myelopathy, some abnormal epidural tissue and inflammation, at that time antibiotics was deferred as pt was stable and was planned for obtaining intraop cx.  Intra-op cx was + on day 5 and day 7 for C. Acnes that  "was felt to be contamination, however due to presence of hardware it was decided to treat with ancef x 2 weeks followed by  amoxicillin x 4 weeks.      He then was treated for Morganella morganii septic shock, no intra-abd source found on CT.  TTE did not show vegetations was treated with almost 9 weeks of iv cefepime 8/14-10/13.      On repeat imaging was found to have hardware failure and was admitted on 12/6/22 to [facility] Neurosurgeon service for surgery.  He underwent C5-T6 redo cervical spine fusion 12/6/22.  He did have hemorrhage intraop, cerebral angio did not show vertebral artery injury, did receive blood, was in ICU on pressors to keep blood pressure up for perfusion.      Also received periop cefazolin till drain pulled. Intraop culture remained negative and there was no indication for ongoing antibiotics per ID.  Hospital course was complicated by agitation and delirium.  He treated w/ benzo per JUAN LUISWA,  He was also noted to in aute on chronic respiratory failure and did require supplement Oxygen.  Transferred to [facility] 1/7/23 for conditioning.      Patient's TCU course had been complicated by waxing and waning mentation, behavioral issues, mostly notable on 1/9/23 while in dialysis at [place], evaluated subsequently at [facility] but work-up including CT head without contrast, CTA head and neck, CBC, BMP, troponin and EKG; all of the aforementioned test were negative and his mentation improved. He was seen by Psychiatry on 1/12/23 for this delirium and was started on Zyprexa 5 mg at bedtime as well as Aripiprazole 2 mg BID prn, with improvemnet           # Hardware failure, pseudoarthritis   # S/p C5-T6 redo cervical spine fusion 12/6/22  # Previous h/o C5-T6 fusion for pathological fracture due to osteomyelitis   -Intraop culture remained neg and per ID there was no indication for ongoing antibiotics   Seen by Dr. Boles on 02/02 with following recommendations  - \"Okay to lift up " "to 25 pounds as tolerated.  Keep the collar on when out of bed.  Okay to return to work with light duty restrictions.  Continue with physical therapy as instructed by rehab.  We will have him come back and see us again in 6 weeks with x-rays once again.\"    Patient has completed the therapy in TCU. He will bed discharged with homecare services for PT/OT and RN.         # Acute metabolic encephalopathy, recurrent, resolved at this time  -Etiology remained elusive  -He had recurrent confusion, agitation and delirium during his hospitalization at the [place], 12/6 - 1/9/23  -Pt was seen at [facility] ED on 1/9/23 after HD when he became very agitated, confused, disoriented, argumentative and irritable at the dialysis center.  CT head w/o contrast and CTA head and neck were negative.  -  - seen by Psychiatry    Olanzapine 5 mg at bedtime    Aripirpazole 2 mg bid prn ( has not needed this and therefore discontinued)    Limit pschotropic medications as much as possible; of note, PTA Xanax restarted on 1/22/23 prn every day     Stop scheduled baclofen and change scheduled Robaxin to PRN on 1/24    With the above intervention, patient is feeling much better with almost resolution of encephalopathy    I have prescribed Olanzapine for 14 days, advised to follow up with primary care.     On the day of discharge, patient is alert, awake, and oriented. No confusion noted        # Hypotension   -Not on meds    Continue midodrine 10 mg tid but hold if blood pressure  > 120      # Chronic Atrial fibrillation   -He was seen by cardiology in the past   -He had ZIO patch in past but apparently results were lost  -Currently well rate controlled w/o meds    On eliquis 2.5 mg for stroke prophylaxis     # Mild to moderate pulm HTN  # Moderate MR  -Could all be related to untreated GINI but has not tolerated CPAP     Will need to follow up as out patient       # Chronic resp failure  # GINI   # Obesity   -Intolerant to CPAP  - Intermittently uses " oxygen at night.   - Reports he has oxygen at home  - On day of discharge, reports no chest pain, shortness of breath.   - Advised sleep study as outpatient, and follow up with primary care.       # Acute on chronic pain  # Peripheral neuropathy    Continue gabapentin and PRN robaxin    Lidocaine patches initiated    Oxycodone increased to 5 mg BID PRN, from daily PRN on 1/25     # 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   # Hypophosphatemia     Continue HD  M/W/F      kg     Resume sevelemer to 800 mg tid    Per discussion with his regular dialysis nephrologist, they advised us to stop checking phos levels here in the TCU, as this will be done at the dialysis center and dealt with as necessary   -Refused renal diet      # Chronic hyponatremia - resolved    Managed by nephrology during HD      # ACD  -Due to ESKD  -Managed during HD w/ epo     # HDL    Continue atorvastatin      # Anxiety d/o  # Depression   # OCD     Continue Setraline 100 mg every day      # Alcohol use disorder  -Drink 5-6 cocktail a day (vodka )     Continue vitamins   -Does not want to see chem dep     Continue baclofen and gabapentin as can also help with craving      # Right heal pressure sore    WOCN consulted. Appreciate recommendations.     # Pneumonia vaccine: Up to date. Prevnar 13 on 04/30/2018, and Pneumovax 23 on 06/26/2018     Diet: Regular Diet Adult  Snacks/Supplements Adult: Other; Special K bar @ HS; Between Meals  DVT Prophylaxis: eliquis   Oliveira Catheter: Not present  Lines: None     Cardiac Monitoring: None  Code Status: Full Code        Indication for psychotropic meds:   -Hydroxyzine for itchiness   -PTA xanax every day prn for anxiety         Discharge Medications:     Discharge Medication List as of 2/3/2023 10:54 AM      START taking these medications    Details   clotrimazole (LOTRIMIN) 1 % external cream Apply topically 2 times daily for 8 days Indication: fungal foot infection Apply to feet and  toenails.Disp-14 g, [id number]      melatonin 10 MG TABS tablet Take 1 tablet (10 mg) by mouth At Bedtime, Disp-30 tablet, R-0, E-Prescribe      OLANZapine (ZYPREXA) 5 MG tablet Take 1 tablet (5 mg) by mouth At Bedtime, Disp-14 tablet, R-0, E-Prescribe      senna-docusate (SENOKOT-S/PERICOLACE) 8.6-50 MG tablet Take 2 tablets by mouth 2 times daily, Disp-30 tablet, R-0, E-Prescribe         CONTINUE these medications which have CHANGED    Details   folic acid (FOLVITE) 1 MG tablet Take 1 tablet (1 mg) by mouth daily, Disp-30 tablet, R-0, E-Prescribe      gabapentin (NEURONTIN) 300 MG capsule Take 1 capsule (300 mg) by mouth 3 times daily, Disp-90 capsule, R-0, E-Prescribe      methocarbamol (ROBAXIN) 500 MG tablet Take 1 tablet (500 mg) by mouth 4 times daily as needed for muscle spasms, Disp-30 tablet, R-0, E-Prescribe      midodrine (PROAMATINE) 10 MG tablet Take 1 tablet (10 mg) by mouth 3 times daily (with meals), Disp-90 tablet, R-0, E-Prescribe      multivitamin RENAL (RENAVITE RX/NEPHROVITE) 1 MG tablet Take 1 tablet by mouth daily, Disp-30 tablet, R-0, E-Prescribe      oxyCODONE (ROXICODONE) 5 MG tablet Take 1 tablet (5 mg) by mouth 2 times daily as needed for severe pain (7-10), Disp-10 tablet, R-0, E-Prescribe      polyethylene glycol (MIRALAX) 17 GM/Dose powder Take 17 g by mouth daily, Disp-510 g, R-0, E-Prescribe      thiamine (B-1) 100 MG tablet Take 1 tablet (100 mg) by mouth daily, Disp-30 tablet, R-0, E-Prescribe         CONTINUE these medications which have NOT CHANGED    Details   acetaminophen (TYLENOL) 325 MG tablet Take 1-2 tablets (325-650 mg) by mouth every 4 hours as needed for mild pain or fever, HistoricalPain      atorvastatin (LIPITOR) 20 MG tablet Take 1 tablet (20 mg) by mouth At Bedtime, Historicalhyperlipidemia      cetirizine (ZYRTEC) 10 MG tablet Take 1 tablet (10 mg) by mouth daily, HistoricalAllergies      cyanocobalamin (VITAMIN B-12) 500 MCG tablet Take 1 tablet (500 mcg) by  mouth daily, HistoricalPeripheral neuropathy      ELIQUIS ANTICOAGULANT 2.5 MG tablet TAKE 1 TABLET (2.5 MG) BY MOUTH TWO TIMES A DAY. INDICATIONS: PREVENT STROKE IN AFIB, JULIÁN, HistoricalDVT-PE      finasteride (PROSCAR) 5 MG tablet Take 1.25 mg by mouth daily Takes 1/4 of 5 mg daily, Historical      fluticasone (FLONASE) 50 MCG/ACT nasal spray Spray 2 sprays into both nostrils daily as needed, Historicalallergies      hydrOXYzine (ATARAX) 25 MG tablet Take 1 tablet (25 mg) by mouth 2 times daily as needed for other or itching (pain as adjuant therapy), Disp-60 tablet, R-0, HistoricalItching      omeprazole (PRILOSEC) 20 MG DR capsule Take 1 capsule (20 mg) by mouth daily, HistoricalIndigestion      sertraline (ZOLOFT) 100 MG tablet Take 1 tablet (100 mg) by mouth daily, HistoricalDepression      sevelamer carbonate (RENVELA) 800 MG tablet Take 2 tablets (1,600 mg) by mouth 3 times daily (with meals), HistoricalCKD      Specialty Vitamins Products (MAGNESIUM PLUS PROTEIN) 133 MG tablet Take 1 tablet (133 mg) by mouth daily, TransitionalHypomagesemia      vitamin B6 (PYRIDOXINE) 100 MG tablet Take 1 tablet (100 mg) by mouth daily, HistoricalB6 deficiency         STOP taking these medications       baclofen (LIORESAL) 10 MG tablet Comments:   Reason for Stopping:         calcium carbonate (TUMS) 500 MG chewable tablet Comments:   Reason for Stopping:         QUEtiapine (SEROQUEL) 25 MG tablet Comments:   Reason for Stopping:         QUEtiapine (SEROQUEL) 50 MG tablet Comments:   Reason for Stopping:         ramelteon (ROZEREM) 8 MG tablet Comments:   Reason for Stopping:                   Procedures/Consultations:   No procedures performed during this admission  Consultation during this admission received from PT/OT           Final Day of Progress before Discharge:   Reports doing well  No nausea, vomiting, chest pain, shortness of breath  No fever, chills.     Physical Exam:  Blood pressure 117/61, pulse 84,  temperature 97  F (36.1  C), temperature source Oral, resp. rate 18, weight 129.2 kg (284 lb 13.4 oz), SpO2 96 %.      General: Laying in bed in no acute distress  HEENT:No icterus, no pallor. Cervical collar in place.   CVS/Chest: S1, S2 normal.   Respiratory/Chest: B/L CTA  GI/Abdomen/: Soft, NT, BS+  Extremities:  Others:    Data:  All laboratory data reviewed             Condition on Discharge:   Discharge condition: Stable   Code status on discharge: Full Code                Discharge Disposition:   Discharged to home               Pending Results:   Unresulted Labs Ordered in the Past 30 Days of this Admission     Date and Time Order Name Status Description    12/6/2022 12:14 PM Prepare red blood cells (unit) Preliminary     12/6/2022 12:14 PM Prepare red blood cells (unit) Preliminary     12/6/2022 12:14 PM Prepare red blood cells (unit) Preliminary                   Discharge Instructions and Follow-Up:     Discharge Procedure Orders   Home Care Referral   Referral Priority: Routine: Next available opening Referral Type: Home Health Therapies & Aides   Number of Visits Requested: 1     Reason for your hospital stay   Order Comments: Admitted to TCU for rehabilitation     Activity   Order Comments: Your activity upon discharge: Okay to lift up to 25 pounds as tolerated.  Keep the collar on when out of bed.  Okay to return to work with light duty restrictions.     Order Specific Question Answer Comments   Is discharge order? Yes      Monitor and record   Order Comments: Watch for nausea, vomiting, chest pain, shortness of breath, fever, chills, neck pain ,lightheadedness, dizziness,   If these symptoms occurs, please call you primary care or come to ED     [facility]ult [facility] Follow-up and recommended labs and tests   Order Comments: Follow up with primary care provider, SAMANTHA GONZALEZ, within 7 days for hospital follow- up.  The following labs/tests are recommended: CBC, BMP  Follow up with Neurosurgery in 6  weeks .      Appointments on [place] and/or [facility] (with [facility] or [facility] provider or service). Call [phone] if you haven't heard regarding these appointments within 7 days of discharge.     Diet   Order Comments: Follow this diet upon discharge: Orders Placed This Encounter      Snacks/Supplements Adult: Other; Special K bar @ HS; Between Meals      Regular Diet Adult     Order Specific Question Answer Comments   Is discharge order? Yes           Attestation:  Andrew Jenkins MD.    Time spent on patient: 60 minutes total including face to face and coordinating care time reviewing current illness, any medication changes, and the care plan for today.

## 2023-03-09 ENCOUNTER — TELEPHONE (OUTPATIENT)
Dept: NEUROSURGERY | Facility: CLINIC | Age: 58
End: 2023-03-09
Payer: COMMERCIAL

## 2023-03-09 DIAGNOSIS — Z98.1 S/P SPINAL FUSION: Primary | ICD-10-CM

## 2023-03-09 NOTE — TELEPHONE ENCOUNTER
Patient questioning about having imaging done prior to appointment on 3-16-23.  Imaging order in chart was from December, writer not certain this referral was current to March appointment.

## 2023-03-09 NOTE — CONFIDENTIAL NOTE
Xray ordered and encounter routed to Elizabeth to get scheduled. I did leave him a detailed message on his VM letting him know that he will need to arrive at 2:30pm on 3/16 for the xrays.     Casandra Lyon RN Care Coordinator   Neurology/Neurosurgery/PM&R/ Pain Management

## 2023-03-15 ENCOUNTER — PRE VISIT (OUTPATIENT)
Dept: NEUROSURGERY | Facility: CLINIC | Age: 58
End: 2023-03-15
Payer: COMMERCIAL

## 2023-03-15 NOTE — TELEPHONE ENCOUNTER
NEUROSURGERY- NEW PREVISIT PLANNING       Record Status/Location     Referring Provider N/A 12 week post-op   Diagnosis  S/p spinal fusion; spondylosis of thoracic spine with myelopathy    MRI (HEAD, NECK, SPINE)  NO   CT  NO   X-ray Epic Yes - XR Scoliosis - Scheduled 3/16/23   INJECTION  NO   PHYSICAL THERAPY  NO   SURGERY Epic Yes - C5-T6 posterior lateral fusion revision for displaced C5-6 displaced hardware  DOS: 12-6-22

## 2023-03-23 ENCOUNTER — ANCILLARY PROCEDURE (OUTPATIENT)
Dept: GENERAL RADIOLOGY | Facility: CLINIC | Age: 58
End: 2023-03-23
Attending: STUDENT IN AN ORGANIZED HEALTH CARE EDUCATION/TRAINING PROGRAM
Payer: MEDICARE

## 2023-03-23 ENCOUNTER — OFFICE VISIT (OUTPATIENT)
Dept: NEUROSURGERY | Facility: CLINIC | Age: 58
End: 2023-03-23
Payer: MEDICARE

## 2023-03-23 VITALS
SYSTOLIC BLOOD PRESSURE: 120 MMHG | WEIGHT: 284 LBS | HEART RATE: 85 BPM | DIASTOLIC BLOOD PRESSURE: 71 MMHG | HEIGHT: 73 IN | BODY MASS INDEX: 37.64 KG/M2

## 2023-03-23 DIAGNOSIS — Z98.1 S/P SPINAL FUSION: ICD-10-CM

## 2023-03-23 DIAGNOSIS — Z98.1 S/P SPINAL FUSION: Primary | ICD-10-CM

## 2023-03-23 PROCEDURE — 72082 X-RAY EXAM ENTIRE SPI 2/3 VW: CPT | Performed by: RADIOLOGY

## 2023-03-23 PROCEDURE — 99214 OFFICE O/P EST MOD 30 MIN: CPT | Performed by: STUDENT IN AN ORGANIZED HEALTH CARE EDUCATION/TRAINING PROGRAM

## 2023-03-23 RX ORDER — HYDROXYZINE HYDROCHLORIDE 10 MG/1
10 TABLET, FILM COATED ORAL 3 TIMES DAILY PRN
Qty: 90 TABLET | Refills: 1 | Status: SHIPPED | OUTPATIENT
Start: 2023-03-23 | End: 2023-03-30

## 2023-03-23 RX ORDER — BACLOFEN 10 MG/1
10 TABLET ORAL 2 TIMES DAILY
Qty: 60 TABLET | Refills: 3 | Status: SHIPPED | OUTPATIENT
Start: 2023-03-23 | End: 2023-03-30

## 2023-03-23 NOTE — LETTER
3/23/2023         RE: Shay Jimenez  [address] [address]        Dear Colleague,    Thank you for referring your patient, Shay Jimenez, to the [facility] [place]. Please see a copy of my visit note below.    HPI:  58-year-old male status post C5-T6 posterior cervical fusion requiring revision with cervical pedicle screws due to lateral mass screw pullout.  Postoperatively his course was complicated by delirium.  He is now back home able to ambulate with a walker.  He rarely uses a wheelchair and only for long distances.  He denies any significant pain in the neck.  He does not feel the hardware rubbing around like it did before the last surgery.  Overall he thinks his neurologic status is improving but not quite back to normal at this point.  Current Outpatient Medications   Medication     acetaminophen (TYLENOL) 325 MG tablet     atorvastatin (LIPITOR) 20 MG tablet     baclofen (LIORESAL) 10 MG tablet     cetirizine (ZYRTEC) 10 MG tablet     cyanocobalamin (VITAMIN B-12) 500 MCG tablet     ELIQUIS ANTICOAGULANT 2.5 MG tablet     finasteride (PROSCAR) 5 MG tablet     fluticasone (FLONASE) 50 MCG/ACT nasal spray     folic acid (FOLVITE) 1 MG tablet     gabapentin (NEURONTIN) 300 MG capsule     hydrOXYzine (ATARAX) 10 MG tablet     hydrOXYzine (ATARAX) 25 MG tablet     melatonin 10 MG TABS tablet     methocarbamol (ROBAXIN) 500 MG tablet     midodrine (PROAMATINE) 10 MG tablet     multivitamin RENAL (RENAVITE RX/NEPHROVITE) 1 MG tablet     OLANZapine (ZYPREXA) 5 MG tablet     omeprazole (PRILOSEC) 20 MG DR capsule     oxyCODONE (ROXICODONE) 5 MG tablet     polyethylene glycol (MIRALAX) 17 GM/Dose powder     senna-docusate (SENOKOT-S/PERICOLACE) 8.6-50 MG tablet     sertraline (ZOLOFT) 100 MG tablet     sevelamer carbonate (RENVELA) 800 MG tablet     Specialty Vitamins Products (MAGNESIUM PLUS PROTEIN) 133 MG tablet     thiamine (B-1) 100 MG tablet      "vitamin B6 (PYRIDOXINE) 100 MG tablet     No current facility-administered medications for this visit.      Physical Exam:  Vital signs:      BP: 120/71 Pulse: 85           Height: 185.4 cm (6' 1\") Weight: 128.8 kg (284 lb)  Estimated body mass index is 37.47 kg/m  as calculated from the following:    Height as of this encounter: 1.854 m (6' 1\").    Weight as of this encounter: 128.8 kg (284 lb).   He has full strength in his bilateral upper and lower extremities.  Sensations intact light touch throughout.  Incision is well-healed.  Results Reviewed:  I personally reviewed x-rays from today showing stable hardware without any evidence of fractures or malalignment.  The sagittal balance is stable at 3-1/2 cm measured from C5.  This is significantly improved from preop which was approximately 11 cm.  Assessment:  58-year-old male status post C5-T6 revision of posterior lateral fusion and decompression.  Plan:  Okay to start to wean the collar.  I will have him wear the collar when he is up and walking but if he sitting in a chair or laying down in bed he does not need to wear the collar any longer.  We will have him come back and see us again in 3 months and get a CT scan of the cervical and thoracic spine as well as scoliosis films at that time.  I will have him continue to work with physical therapy.  I have supplied prescriptions for baclofen and hydroxyzine as requested.  He believes the baclofen does help with any muscle spasms that he gets.    Fritz Boles MD      Again, thank you for allowing me to participate in the care of your patient.        Sincerely,        Fritz Boles MD    "

## 2023-03-23 NOTE — NURSING NOTE
"Shay Jimenez's goals for this visit include:   Chief Complaint   Patient presents with     RECHECK     12 week post op       He requests these members of his care team be copied on today's visit information: yes    PCP: Jonathan Travis    Referring Provider:  No referring provider defined for this encounter.    /71 (BP Location: Right arm, Patient Position: Sitting, Cuff Size: Adult Large)   Pulse 85   Ht 1.854 m (6' 1\")   Wt 128.8 kg (284 lb)   BMI 37.47 kg/m      Do you need any medication refills at today's visit? Yes  'MIESHA Butcher, LESLI ([facility])      "

## 2023-03-23 NOTE — PROGRESS NOTES
"HPI:  58-year-old male status post C5-T6 posterior cervical fusion requiring revision with cervical pedicle screws due to lateral mass screw pullout.  Postoperatively his course was complicated by delirium.  He is now back home able to ambulate with a walker.  He rarely uses a wheelchair and only for long distances.  He denies any significant pain in the neck.  He does not feel the hardware rubbing around like it did before the last surgery.  Overall he thinks his neurologic status is improving but not quite back to normal at this point.  Current Outpatient Medications   Medication     acetaminophen (TYLENOL) 325 MG tablet     atorvastatin (LIPITOR) 20 MG tablet     baclofen (LIORESAL) 10 MG tablet     cetirizine (ZYRTEC) 10 MG tablet     cyanocobalamin (VITAMIN B-12) 500 MCG tablet     ELIQUIS ANTICOAGULANT 2.5 MG tablet     finasteride (PROSCAR) 5 MG tablet     fluticasone (FLONASE) 50 MCG/ACT nasal spray     folic acid (FOLVITE) 1 MG tablet     gabapentin (NEURONTIN) 300 MG capsule     hydrOXYzine (ATARAX) 10 MG tablet     hydrOXYzine (ATARAX) 25 MG tablet     melatonin 10 MG TABS tablet     methocarbamol (ROBAXIN) 500 MG tablet     midodrine (PROAMATINE) 10 MG tablet     multivitamin RENAL (RENAVITE RX/NEPHROVITE) 1 MG tablet     OLANZapine (ZYPREXA) 5 MG tablet     omeprazole (PRILOSEC) 20 MG DR capsule     oxyCODONE (ROXICODONE) 5 MG tablet     polyethylene glycol (MIRALAX) 17 GM/Dose powder     senna-docusate (SENOKOT-S/PERICOLACE) 8.6-50 MG tablet     sertraline (ZOLOFT) 100 MG tablet     sevelamer carbonate (RENVELA) 800 MG tablet     Specialty Vitamins Products (MAGNESIUM PLUS PROTEIN) 133 MG tablet     thiamine (B-1) 100 MG tablet     vitamin B6 (PYRIDOXINE) 100 MG tablet     No current facility-administered medications for this visit.      Physical Exam:  Vital signs:      BP: 120/71 Pulse: 85           Height: 185.4 cm (6' 1\") Weight: 128.8 kg (284 lb)  Estimated body mass index is 37.47 kg/m  as " "calculated from the following:    Height as of this encounter: 1.854 m (6' 1\").    Weight as of this encounter: 128.8 kg (284 lb).   He has full strength in his bilateral upper and lower extremities.  Sensations intact light touch throughout.  Incision is well-healed.  Results Reviewed:  I personally reviewed x-rays from today showing stable hardware without any evidence of fractures or malalignment.  The sagittal balance is stable at 3-1/2 cm measured from C5.  This is significantly improved from preop which was approximately 11 cm.  Assessment:  58-year-old male status post C5-T6 revision of posterior lateral fusion and decompression.  Plan:  Okay to start to wean the collar.  I will have him wear the collar when he is up and walking but if he sitting in a chair or laying down in bed he does not need to wear the collar any longer.  We will have him come back and see us again in 3 months and get a CT scan of the cervical and thoracic spine as well as scoliosis films at that time.  I will have him continue to work with physical therapy.  I have supplied prescriptions for baclofen and hydroxyzine as requested.  He believes the baclofen does help with any muscle spasms that he gets.    Fritz Boles MD  "

## 2023-03-30 ENCOUNTER — TELEPHONE (OUTPATIENT)
Dept: NEUROSURGERY | Facility: CLINIC | Age: 58
End: 2023-03-30
Payer: COMMERCIAL

## 2023-03-30 DIAGNOSIS — Z98.1 S/P SPINAL FUSION: ICD-10-CM

## 2023-03-30 RX ORDER — BACLOFEN 10 MG/1
10 TABLET ORAL 2 TIMES DAILY
Qty: 60 TABLET | Refills: 3 | Status: SHIPPED | OUTPATIENT
Start: 2023-03-30 | End: 2023-07-31

## 2023-03-30 RX ORDER — HYDROXYZINE HYDROCHLORIDE 10 MG/1
10 TABLET, FILM COATED ORAL 3 TIMES DAILY PRN
Qty: 90 TABLET | Refills: 1 | Status: SHIPPED | OUTPATIENT
Start: 2023-03-30 | End: 2023-07-31

## 2023-03-30 NOTE — TELEPHONE ENCOUNTER
Patient's medications from  were sent to the [facility] clinic he would like his prescription for baclofen (LIORESAL) and hydrOXYzine (ATARAX) be sent to [facility] on [address] [address] in [place], [place].

## 2023-03-30 NOTE — TELEPHONE ENCOUNTER
Called pharmacy, canceled old med rx x2, sent updated ones to pts requested pharmacy. Pt updated.

## 2023-04-03 PROBLEM — G47.36 SLEEP RELATED HYPOVENTILATION IN CONDITIONS CLASSIFIED ELSEWHERE: Status: ACTIVE | Noted: 2022-08-30

## 2023-05-09 ENCOUNTER — TELEPHONE (OUTPATIENT)
Dept: NEUROSURGERY | Facility: CLINIC | Age: 58
End: 2023-05-09
Payer: COMMERCIAL

## 2023-05-09 NOTE — TELEPHONE ENCOUNTER
Patient is at [facility] right now and is requesting to get his CT scans done today there. He already has orders put in, but can they be scheduled at [facility] instead?

## 2023-05-09 NOTE — TELEPHONE ENCOUNTER
Saw DR Boles on 3/23/23. He recommended to follow up in 3 months with CT scans and scoli films    Follow up appt 6/29/23  Can do xrays on or after 3 month fidencio approximately 6/15 or after.   Advised that imaging scheduling will be in contract soon to schedule those tests.

## 2023-06-20 ENCOUNTER — ANCILLARY PROCEDURE (OUTPATIENT)
Dept: GENERAL RADIOLOGY | Facility: CLINIC | Age: 58
End: 2023-06-20
Attending: STUDENT IN AN ORGANIZED HEALTH CARE EDUCATION/TRAINING PROGRAM
Payer: MEDICARE

## 2023-06-20 ENCOUNTER — ANCILLARY PROCEDURE (OUTPATIENT)
Dept: CT IMAGING | Facility: CLINIC | Age: 58
End: 2023-06-20
Attending: STUDENT IN AN ORGANIZED HEALTH CARE EDUCATION/TRAINING PROGRAM
Payer: MEDICARE

## 2023-06-20 DIAGNOSIS — Z98.1 S/P SPINAL FUSION: ICD-10-CM

## 2023-06-20 PROCEDURE — G1010 CDSM STANSON: HCPCS | Mod: GC | Performed by: RADIOLOGY

## 2023-06-20 PROCEDURE — 72082 X-RAY EXAM ENTIRE SPI 2/3 VW: CPT | Performed by: RADIOLOGY

## 2023-06-20 PROCEDURE — 72128 CT CHEST SPINE W/O DYE: CPT | Mod: MG | Performed by: RADIOLOGY

## 2023-06-20 PROCEDURE — 72125 CT NECK SPINE W/O DYE: CPT | Mod: MG | Performed by: RADIOLOGY

## 2023-06-29 ENCOUNTER — OFFICE VISIT (OUTPATIENT)
Dept: NEUROSURGERY | Facility: CLINIC | Age: 58
End: 2023-06-29
Payer: MEDICARE

## 2023-06-29 VITALS
BODY MASS INDEX: 36.36 KG/M2 | HEART RATE: 83 BPM | DIASTOLIC BLOOD PRESSURE: 78 MMHG | WEIGHT: 275.57 LBS | SYSTOLIC BLOOD PRESSURE: 117 MMHG

## 2023-06-29 DIAGNOSIS — M47.14 SPONDYLOSIS OF THORACIC SPINE WITH MYELOPATHY: Primary | ICD-10-CM

## 2023-06-29 PROCEDURE — 99213 OFFICE O/P EST LOW 20 MIN: CPT | Performed by: STUDENT IN AN ORGANIZED HEALTH CARE EDUCATION/TRAINING PROGRAM

## 2023-06-29 RX ORDER — BACLOFEN 20 MG/1
20 TABLET ORAL 3 TIMES DAILY
Qty: 90 TABLET | Refills: 2 | Status: SHIPPED | OUTPATIENT
Start: 2023-06-29 | End: 2023-07-31

## 2023-06-29 RX ORDER — ALPRAZOLAM 0.5 MG
TABLET ORAL
COMMUNITY
Start: 2023-05-31

## 2023-06-29 NOTE — LETTER
6/29/2023         RE: Shay Jimenez  [address] [address]        Dear Colleague,    Thank you for referring your patient, Shay Jimenez, to the [facility] [place]. Please see a copy of my visit note below.    HPI:  58-year-old male status post C5-T6 posterior fusion with subsequent revision of the cervical spine for a T2 and 3 osteomyelitis with spinal cord injury approximate 1 year ago.  Postoperatively after the revision surgery has been doing very well.  He denies any significant neck or back pain.  He is not back to normal with regards to his functional status yet but has improved significantly since the original surgery.  He is living at home after being at rehab following the revision surgery once again.  He does report some muscle spasms for which the baclofen does seem to help.  He recently increase this medication on his own taking 20 mg 3 times daily.  This has seemed to help.  Current Outpatient Medications   Medication     acetaminophen (TYLENOL) 325 MG tablet     ALPRAZolam (XANAX) 0.5 MG tablet     atorvastatin (LIPITOR) 20 MG tablet     baclofen (LIORESAL) 10 MG tablet     cetirizine (ZYRTEC) 10 MG tablet     cyanocobalamin (VITAMIN B-12) 500 MCG tablet     ELIQUIS ANTICOAGULANT 2.5 MG tablet     finasteride (PROSCAR) 5 MG tablet     fluticasone (FLONASE) 50 MCG/ACT nasal spray     folic acid (FOLVITE) 1 MG tablet     gabapentin (NEURONTIN) 300 MG capsule     hydrOXYzine (ATARAX) 25 MG tablet     midodrine (PROAMATINE) 10 MG tablet     multivitamin RENAL (RENAVITE RX/NEPHROVITE) 1 MG tablet     omeprazole (PRILOSEC) 20 MG DR capsule     polyethylene glycol (MIRALAX) 17 GM/Dose powder     sertraline (ZOLOFT) 100 MG tablet     sevelamer carbonate (RENVELA) 800 MG tablet     Specialty Vitamins Products (MAGNESIUM PLUS PROTEIN) 133 MG tablet     thiamine (B-1) 100 MG tablet     vitamin B6 (PYRIDOXINE) 100 MG tablet     hydrOXYzine (ATARAX) 10  "MG tablet     melatonin 10 MG TABS tablet     methocarbamol (ROBAXIN) 500 MG tablet     OLANZapine (ZYPREXA) 5 MG tablet     oxyCODONE (ROXICODONE) 5 MG tablet     senna-docusate (SENOKOT-S/PERICOLACE) 8.6-50 MG tablet     No current facility-administered medications for this visit.      Physical Exam:  Vital signs:      BP: 117/78 Pulse: 83             Weight: 125 kg (275 lb 9.2 oz)  Estimated body mass index is 36.36 kg/m  as calculated from the following:    Height as of 3/23/23: 1.854 m (6' 1\").    Weight as of this encounter: 125 kg (275 lb 9.2 oz).   Is full strength in his bilateral upper and lower extremities.  Sensation is intact light touch throughout.  Incision is well-healed.  Results Reviewed:  I personally viewed the images of a CT scan of the cervical and thoracic spine.  Hardware peers to be in good locations without any obvious abnormalities right now.  There is now fusion of the vertebral bodies at T2 and 3 both posteriorly and anteriorly.  Assessment:  58-year-old male status post C5-T6 posterior fusion decompression for osteomyelitis and kyphosis with thoracic myelopathy  Plan:  Okay to advance activity as tolerated without any need for the brace going forward.  None of the fusion has occurred at T2 and T3 I have less concern for progressive kyphosis and recurrent injury at this level.  Continue work with physical therapy.  We will have him follow-up in 6 months for a 1 year follow-up as well.    I spent 25 minutes reviewing the patient's chart, interviewing examining the patient as well as discussing diagnosis and treatment options.    Fritz Boles MD    Again, thank you for allowing me to participate in the care of your patient.        Sincerely,        Fritz Boles MD    "

## 2023-06-29 NOTE — PROGRESS NOTES
HPI:  58-year-old male status post C5-T6 posterior fusion with subsequent revision of the cervical spine for a T2 and 3 osteomyelitis with spinal cord injury approximate 1 year ago.  Postoperatively after the revision surgery has been doing very well.  He denies any significant neck or back pain.  He is not back to normal with regards to his functional status yet but has improved significantly since the original surgery.  He is living at home after being at rehab following the revision surgery once again.  He does report some muscle spasms for which the baclofen does seem to help.  He recently increase this medication on his own taking 20 mg 3 times daily.  This has seemed to help.  Current Outpatient Medications   Medication     acetaminophen (TYLENOL) 325 MG tablet     ALPRAZolam (XANAX) 0.5 MG tablet     atorvastatin (LIPITOR) 20 MG tablet     baclofen (LIORESAL) 10 MG tablet     cetirizine (ZYRTEC) 10 MG tablet     cyanocobalamin (VITAMIN B-12) 500 MCG tablet     ELIQUIS ANTICOAGULANT 2.5 MG tablet     finasteride (PROSCAR) 5 MG tablet     fluticasone (FLONASE) 50 MCG/ACT nasal spray     folic acid (FOLVITE) 1 MG tablet     gabapentin (NEURONTIN) 300 MG capsule     hydrOXYzine (ATARAX) 25 MG tablet     midodrine (PROAMATINE) 10 MG tablet     multivitamin RENAL (RENAVITE RX/NEPHROVITE) 1 MG tablet     omeprazole (PRILOSEC) 20 MG DR capsule     polyethylene glycol (MIRALAX) 17 GM/Dose powder     sertraline (ZOLOFT) 100 MG tablet     sevelamer carbonate (RENVELA) 800 MG tablet     Specialty Vitamins Products (MAGNESIUM PLUS PROTEIN) 133 MG tablet     thiamine (B-1) 100 MG tablet     vitamin B6 (PYRIDOXINE) 100 MG tablet     hydrOXYzine (ATARAX) 10 MG tablet     melatonin 10 MG TABS tablet     methocarbamol (ROBAXIN) 500 MG tablet     OLANZapine (ZYPREXA) 5 MG tablet     oxyCODONE (ROXICODONE) 5 MG tablet     senna-docusate (SENOKOT-S/PERICOLACE) 8.6-50 MG tablet     No current facility-administered medications for  "this visit.      Physical Exam:  Vital signs:      BP: 117/78 Pulse: 83             Weight: 125 kg (275 lb 9.2 oz)  Estimated body mass index is 36.36 kg/m  as calculated from the following:    Height as of 3/23/23: 1.854 m (6' 1\").    Weight as of this encounter: 125 kg (275 lb 9.2 oz).   Is full strength in his bilateral upper and lower extremities.  Sensation is intact light touch throughout.  Incision is well-healed.  Results Reviewed:  I personally viewed the images of a CT scan of the cervical and thoracic spine.  Hardware peers to be in good locations without any obvious abnormalities right now.  There is now fusion of the vertebral bodies at T2 and 3 both posteriorly and anteriorly.  Assessment:  58-year-old male status post C5-T6 posterior fusion decompression for osteomyelitis and kyphosis with thoracic myelopathy  Plan:  Okay to advance activity as tolerated without any need for the brace going forward.  None of the fusion has occurred at T2 and T3 I have less concern for progressive kyphosis and recurrent injury at this level.  Continue work with physical therapy.  We will have him follow-up in 6 months for a 1 year follow-up as well.    I spent 25 minutes reviewing the patient's chart, interviewing examining the patient as well as discussing diagnosis and treatment options.    Fritz Boles MD  "

## 2023-07-17 ENCOUNTER — TRANSFERRED RECORDS (OUTPATIENT)
Dept: HEALTH INFORMATION MANAGEMENT | Facility: CLINIC | Age: 58
End: 2023-07-17

## 2023-07-28 DIAGNOSIS — Z98.1 S/P SPINAL FUSION: ICD-10-CM

## 2023-07-29 DIAGNOSIS — M47.14 SPONDYLOSIS OF THORACIC SPINE WITH MYELOPATHY: ICD-10-CM

## 2023-07-31 RX ORDER — HYDROXYZINE HYDROCHLORIDE 10 MG/1
TABLET, FILM COATED ORAL
Qty: 90 TABLET | Refills: 1 | Status: SHIPPED | OUTPATIENT
Start: 2023-07-31 | End: 2023-08-29

## 2023-07-31 RX ORDER — BACLOFEN 10 MG/1
TABLET ORAL
Qty: 180 TABLET | Refills: 1 | Status: SHIPPED | OUTPATIENT
Start: 2023-07-31 | End: 2023-10-06

## 2023-07-31 RX ORDER — BACLOFEN 20 MG/1
TABLET ORAL
Qty: 90 TABLET | Refills: 2 | Status: SHIPPED | OUTPATIENT
Start: 2023-07-31

## 2023-08-25 DIAGNOSIS — Z98.1 S/P SPINAL FUSION: ICD-10-CM

## 2023-08-27 ENCOUNTER — HEALTH MAINTENANCE LETTER (OUTPATIENT)
Age: 58
End: 2023-08-27

## 2023-08-28 NOTE — TELEPHONE ENCOUNTER
Requested Pharmacy: [facility]    Pt's last office visit: 6/29/23  Next scheduled office visit: 12/7/23      Per the RN/LPN medication refill protocol, writer is unable to refill this request.     Casandra Lyon, RN Care Coordinator   Neurology/Neurosurgery/PM&R/ Pain Management

## 2023-08-29 RX ORDER — HYDROXYZINE HYDROCHLORIDE 10 MG/1
TABLET, FILM COATED ORAL
Qty: 90 TABLET | Refills: 1 | Status: SHIPPED | OUTPATIENT
Start: 2023-08-29 | End: 2023-09-12

## 2023-09-06 ENCOUNTER — TELEPHONE (OUTPATIENT)
Dept: TRANSPLANT | Facility: CLINIC | Age: 58
End: 2023-09-06
Payer: COMMERCIAL

## 2023-09-06 NOTE — TELEPHONE ENCOUNTER
Patient Call: Voicemail  Date/Time: 9/6/2023  9170  Reason for call: Pt returning call his email address is [email or web address]

## 2023-09-12 DIAGNOSIS — Z98.1 S/P SPINAL FUSION: ICD-10-CM

## 2023-09-12 RX ORDER — HYDROXYZINE HYDROCHLORIDE 10 MG/1
TABLET, FILM COATED ORAL
Qty: 270 TABLET | Refills: 1 | Status: SHIPPED | OUTPATIENT
Start: 2023-09-12

## 2023-10-06 DIAGNOSIS — Z98.1 S/P SPINAL FUSION: ICD-10-CM

## 2023-10-06 RX ORDER — BACLOFEN 10 MG/1
10 TABLET ORAL 2 TIMES DAILY
Qty: 180 TABLET | Refills: 0 | Status: SHIPPED | OUTPATIENT
Start: 2023-10-06

## 2023-10-06 NOTE — TELEPHONE ENCOUNTER
Patient calling for a refill of baclofen.     DOS: 6/27/22 and 12/6/22  Procedure: Revision of Cervical 5 to Thoracic 6 Fusion  (12/06/22), Cervical 6 to Thoracic 6 Fusion and Thoracic 2-3 Decompression (6/27/22)  Surgeon: Dr. Boles   Weeks Post Op: see above    Current symptom(s): no pain, muscles spasms in bilat feet, neuropathy in feet   Current pain management: 10 mg BID, in home PT 3 times per week as of now . In need of refill now.     Last fill: 7/31/23 by Dr. Boles   Next visit: 12/7/23 with Dr. Boles     Of note: patient recently admitted 9/13/23-9/18/23 to [facility] for femur fracture d/t mechanical fall.  AVS for discharge Baclofen 10 mg BID as needed for muscle spasms      Medication pended for your approval, if appropriate. Pharmacy verified.     Patient states his insurance covers 3 mo supply     Any patient questions or concerns: no    Informed patient request will be forwarded to care team.

## 2023-10-16 ENCOUNTER — TELEPHONE (OUTPATIENT)
Dept: TRANSPLANT | Facility: CLINIC | Age: 58
End: 2023-10-16
Payer: MEDICARE

## 2023-10-16 NOTE — TELEPHONE ENCOUNTER
VM message left reminding patient of upcoming PKE appointments at [facility] on 10/19/23 starting at 0730. Instructed patient may eat breakfast and take regularly scheduled medications.  Contact information given for any further questions/concerns/need to reschedule.

## 2023-10-17 ENCOUNTER — TELEPHONE (OUTPATIENT)
Dept: TRANSPLANT | Facility: CLINIC | Age: 58
End: 2023-10-17
Payer: MEDICARE

## 2023-10-17 NOTE — TELEPHONE ENCOUNTER
Shay called this AM 10/17/2023.  Stated his insurance has lapsed,  he received a letter yesterday stating Medicare was  not covering his clinic visits. Shay wanted to cancel his 10/19/2023 kidney eval., and get his insurance  in line.  Stated he will be calling back when he gets all insurance updated.     Per patient, I cancelled his 10/19/2023 Kidney eval., appts.,

## 2023-10-23 NOTE — TELEPHONE ENCOUNTER
Patient called to touch base with the RNCC stated his Insurance is back on and would like to re schedule his Evaluation. Caller stated his phone has been acting up and would like a return call on his son Cory's phone [phone].

## 2023-11-05 ENCOUNTER — HEALTH MAINTENANCE LETTER (OUTPATIENT)
Age: 58
End: 2023-11-05

## 2023-12-08 NOTE — PROGRESS NOTES
Call to patient and gave the information provided by the PCP.  Patient understands and has no further questions at this time.       Nephrology Progress Note  12/08/2022       Shay Jimenez is a 57 yom with ESRD since 4/2019 due to Ca Phos deposition and HTN, runs at [facility] ([phone], fax [phone]) under care of Dr Cline.  Had planned neurosurgery revision for C5-C6 on 12/6 as screw had dislodged.  Nephrology consulted for management of dialysis post op.      Interval History :   Mr Jimenez had HD yesterday with no UF as he was on significant dose of pressor to support BP's.  No issue in chemistries and is receiving PRBC's this am for ongoing post op anemia, holding on run today with plans for run tomorrow.      Assessment & Recommendations:   ESRD-Since 4/2019 due to Ca Phos deposition and HTN, runs at [facility] ([phone], fax [phone]) under care of Dr Cline.  Runs MWF via [facility], 4.25h runs.                  -Holding on run today as chemistries are stable, getting PRBC's for ongoing anemia post op.                  -Will decide on runs daily depending on chemistries and hemodynamics.                  -Continuing long term HD, no need for new consent.                  -Is eventually pursuing renal transplant through [place] once acute issues requiring neurosurgery are done.       Volume-EDW 130kg, bedw wt yesterday was up at 136 but I suspect this is due to difference in scale as he was 128kg prior to surgery.  Stable off of pressors since yesterday afternoon but still needing frequent PRBC's post op.       Electrolytes-K 4.6, bicarb 27, Na 133, holding on run today.        BMD-Ca 7.9, Mg and Phos WNL.       Neurosurgery-Had planned revision of previous screws at C5-C6 on 12/6.       Anemia-Hgb 7.6 after 1u PRBC's this am, giving 8k epo with run today.       Nutrition-Deferred.       Time spent: 30 minutes on this date of encounter for chart review, physical exam, medical decision making and co-ordination of care.      Discussed with Dr Marinelli     Recommendations were communicated to primary team via  "verbal communication.        ENID Shirley CNS  Clinical Nurse Specialist  [phone]    Review of Systems:   I reviewed the following systems:  Gen: No fevers or chills  CV: No CP at rest  Resp: No SOB at rest  GI: No N/V    Physical Exam:   I/O last 3 completed shifts:  In: 3018.41 [P.O.:840; I.V.:949.41]  Out: 286 [Urine:8; Drains:278]   /62   Pulse 90   Temp 97.6  F (36.4  C) (Oral)   Resp 17   Ht 1.854 m (6' 1\")   Wt 136.1 kg (300 lb 0.7 oz)   SpO2 95%   BMI 39.59 kg/m       GENERAL APPEARANCE: alert and no distress, C-collar on.    EYES: No scleral icterus  NECK:  No JVD  Pulmonary: lungs clear to auscultation with equal breath sounds bilaterally, no clubbing or cyanosis  CV: Regular rhythm, normal rate, no rub   - Edema none  GI: soft, nontender, normal bowel sounds  MS: no evidence of inflammation in joints, no muscle tenderness  : No Oliveira  SKIN: no rash, warm, dry  NEURO: mentation intact and speech normal    Labs:   All labs reviewed by me  Electrolytes/Renal - Recent Labs   Lab Test 12/08/22  0458 12/08/22  0406 12/07/22  0538 12/06/22  2335 08/18/22  1435 07/29/22  0600 06/28/22  0809 06/28/22  0420   *  --  128* 131*   < > 129*   < > 135   POTASSIUM 4.6  --  5.2 5.0   < > 3.6   < > 4.7   CHLORIDE 96*  --  86* 93*   < > 93*   < > 98   CO2 27  --  25 22   < > 26   < > 26   BUN 18.8  --  26.4* 22.0*   < > 28   < > 49*   CR 4.29*  --  5.54* 4.70*   < > 7.54*   < > 6.86*   GLC 99 108* 91 103*   < > 89   < > 153*   MAC 8.3*  --  7.9* 7.3*   < > 9.2   < > 8.7   MAG 1.5*  --   --  1.8  --   --   --  2.1   PHOS 3.5  --   --  4.3  --  4.4   < > 5.8*    < > = values in this interval not displayed.       CBC -   Recent Labs   Lab Test 12/08/22  0458 12/07/22  2013 12/07/22  1400   WBC 5.7 5.0 5.3   HGB 7.9* 7.7* 7.5*   * 109* 114*       LFTs -   Recent Labs   Lab Test 10/06/22  1215 09/29/22  1345 09/22/22  1205 08/18/22  1435 07/21/22  1252 07/16/22  0557 07/11/22  0722 " 06/29/22  0611 06/24/22  0818   ALKPHOS  --   --   --   --  131 134  --   --  152*   BILITOTAL  --   --   --   --  0.6 0.5  --   --  0.6   ALT  --   --   --   --  9 <6  --   --  55   AST 31 35 41   < > 17 13  --   --  51*   PROTTOTAL  --   --   --   --  6.7* 6.5*  --   --  7.3   ALBUMIN  --   --   --   --  3.0* 2.8* 3.2*   < > 3.3*    < > = values in this interval not displayed.       Iron Panel -   Recent Labs   Lab Test 07/29/22  0600   IRON 44   IRONSAT 20   JACKELINE 626*           Current Medications:    acetaminophen  975 mg Oral Q8H CUONG     atorvastatin  20 mg Oral At Bedtime     baclofen  10 mg Oral BID     cetirizine  10 mg Oral Daily     cyanocobalamin  500 mcg Oral Daily     finasteride  1.3 mg Oral Daily     fluticasone  1 spray Both Nostrils BID     gabapentin  300 mg Oral TID     magnesium plus protein  133 mg Oral Daily     methocarbamol  500 mg Oral 4x Daily     midodrine  10 mg Oral Q8H CUONG     multivitamin RENAL  1 tablet Oral Daily     pantoprazole  40 mg Oral Daily     polyethylene glycol  17 g Oral Daily     vitamin B6  100 mg Oral Daily     senna-docusate  1 tablet Oral BID     sertraline  100 mg Oral Daily     sevelamer carbonate  1,600 mg Oral TID w/meals     sodium chloride (PF)  3 mL Intracatheter Q8H

## 2024-01-11 ENCOUNTER — OFFICE VISIT (OUTPATIENT)
Dept: NEUROSURGERY | Facility: CLINIC | Age: 59
End: 2024-01-11
Payer: COMMERCIAL

## 2024-01-11 ENCOUNTER — ANCILLARY PROCEDURE (OUTPATIENT)
Dept: CT IMAGING | Facility: CLINIC | Age: 59
End: 2024-01-11
Attending: STUDENT IN AN ORGANIZED HEALTH CARE EDUCATION/TRAINING PROGRAM
Payer: COMMERCIAL

## 2024-01-11 ENCOUNTER — ANCILLARY PROCEDURE (OUTPATIENT)
Dept: GENERAL RADIOLOGY | Facility: CLINIC | Age: 59
End: 2024-01-11
Attending: STUDENT IN AN ORGANIZED HEALTH CARE EDUCATION/TRAINING PROGRAM
Payer: COMMERCIAL

## 2024-01-11 VITALS
HEIGHT: 73 IN | SYSTOLIC BLOOD PRESSURE: 95 MMHG | BODY MASS INDEX: 37.11 KG/M2 | WEIGHT: 279.98 LBS | HEART RATE: 101 BPM | DIASTOLIC BLOOD PRESSURE: 57 MMHG

## 2024-01-11 DIAGNOSIS — Z98.1 S/P SPINAL FUSION: ICD-10-CM

## 2024-01-11 DIAGNOSIS — Z98.1 S/P SPINAL FUSION: Primary | ICD-10-CM

## 2024-01-11 PROCEDURE — 72082 X-RAY EXAM ENTIRE SPI 2/3 VW: CPT | Performed by: RADIOLOGY

## 2024-01-11 PROCEDURE — 72125 CT NECK SPINE W/O DYE: CPT | Performed by: RADIOLOGY

## 2024-01-11 PROCEDURE — 99213 OFFICE O/P EST LOW 20 MIN: CPT | Performed by: STUDENT IN AN ORGANIZED HEALTH CARE EDUCATION/TRAINING PROGRAM

## 2024-01-11 PROCEDURE — 72128 CT CHEST SPINE W/O DYE: CPT | Performed by: RADIOLOGY

## 2024-01-11 ASSESSMENT — PAIN SCALES - GENERAL: PAINLEVEL: NO PAIN (0)

## 2024-01-11 NOTE — PROGRESS NOTES
"HPI:  58 year old male s/p C5-T7 Fusion and decompression for T2-3 osteomyelitis and cord compression with myelopathy.  He returns today noting some upper back pain but notes this feels more like he can feel the hardware in his back.  He does not note significant pain.  He still has baseline myelopathy and difficulty with ambulation due to the myelopathy.  This has not really changed significantly.  He returns today with a new CT scan of the cervical and thoracic spine.  Current Outpatient Medications   Medication    acetaminophen (TYLENOL) 325 MG tablet    ALPRAZolam (XANAX) 0.5 MG tablet    atorvastatin (LIPITOR) 20 MG tablet    baclofen (LIORESAL) 10 MG tablet    baclofen (LIORESAL) 20 MG tablet    cetirizine (ZYRTEC) 10 MG tablet    cyanocobalamin (VITAMIN B-12) 500 MCG tablet    ELIQUIS ANTICOAGULANT 2.5 MG tablet    finasteride (PROSCAR) 5 MG tablet    fluticasone (FLONASE) 50 MCG/ACT nasal spray    folic acid (FOLVITE) 1 MG tablet    gabapentin (NEURONTIN) 300 MG capsule    hydrOXYzine (ATARAX) 10 MG tablet    hydrOXYzine (ATARAX) 25 MG tablet    midodrine (PROAMATINE) 10 MG tablet    multivitamin RENAL (RENAVITE RX/NEPHROVITE) 1 MG tablet    omeprazole (PRILOSEC) 20 MG DR capsule    polyethylene glycol (MIRALAX) 17 GM/Dose powder    sertraline (ZOLOFT) 100 MG tablet    sevelamer carbonate (RENVELA) 800 MG tablet    Specialty Vitamins Products (MAGNESIUM PLUS PROTEIN) 133 MG tablet    thiamine (B-1) 100 MG tablet    vitamin B6 (PYRIDOXINE) 100 MG tablet    melatonin 10 MG TABS tablet    methocarbamol (ROBAXIN) 500 MG tablet    OLANZapine (ZYPREXA) 5 MG tablet    oxyCODONE (ROXICODONE) 5 MG tablet    senna-docusate (SENOKOT-S/PERICOLACE) 8.6-50 MG tablet     No current facility-administered medications for this visit.      Physical Exam:  Vital signs:      BP: 95/57 Pulse: 101           Height: 185.4 cm (6' 1\") Weight: 127 kg (279 lb 15.8 oz) (Pt reported)  Estimated body mass index is 36.94 kg/m  as " "calculated from the following:    Height as of this encounter: 1.854 m (6' 1\").    Weight as of this encounter: 127 kg (279 lb 15.8 oz).  He has full strength in his bilateral upper and lower extremities.  Sensation is intact light touch throughout.  Incision is well-healed.  Results Reviewed:  I personally viewed the images of a CT scan of the cervical and thoracic spine.  This shows a well fused T2 and T3 vertebral body area where the prior osteomyelitis was present.  There is also no significant changes in the hardware except for the ends at C5 and the bottom at T6.  These do show some haloing around the screws at these ends more significantly in the cervical spine than the thoracic spine.  Overall this is not displaced the hardware is any amount significantly.  Assessment:  58-year-old male with T2-3 osteomyelitis now healed and fused.  There does appear to be some haloing around the top and bottom screws however no significant displacement.  Plan:  Okay to continue advance activity as tolerated and continue with rehab for his myelopathy.  At this point he could still continue to get better but he is over a year out and may have reached the majority of his improvement.  For the haloing around the screws I would not recommend any different treatment right now.  Should this continue to get worse over time we could look to evaluate him for an extension or even hardware removal given the original reason for placing the hardware has now healed.  Based on imaging results right now and symptoms I would not recommend any changes however.  He can follow-up with me as needed going forward.    Fritz Boles MD  "

## 2024-01-11 NOTE — LETTER
1/11/2024         RE: Shay Jimenez  [address] [address]        Dear Colleague,    Thank you for referring your patient, Shay Jimenez, to the [facility]OS[facility] [place]. Please see a copy of my visit note below.    HPI:  58 year old male s/p C5-T7 Fusion and decompression for T2-3 osteomyelitis and cord compression with myelopathy.  He returns today noting some upper back pain but notes this feels more like he can feel the hardware in his back.  He does not note significant pain.  He still has baseline myelopathy and difficulty with ambulation due to the myelopathy.  This has not really changed significantly.  He returns today with a new CT scan of the cervical and thoracic spine.  Current Outpatient Medications   Medication     acetaminophen (TYLENOL) 325 MG tablet     ALPRAZolam (XANAX) 0.5 MG tablet     atorvastatin (LIPITOR) 20 MG tablet     baclofen (LIORESAL) 10 MG tablet     baclofen (LIORESAL) 20 MG tablet     cetirizine (ZYRTEC) 10 MG tablet     cyanocobalamin (VITAMIN B-12) 500 MCG tablet     ELIQUIS ANTICOAGULANT 2.5 MG tablet     finasteride (PROSCAR) 5 MG tablet     fluticasone (FLONASE) 50 MCG/ACT nasal spray     folic acid (FOLVITE) 1 MG tablet     gabapentin (NEURONTIN) 300 MG capsule     hydrOXYzine (ATARAX) 10 MG tablet     hydrOXYzine (ATARAX) 25 MG tablet     midodrine (PROAMATINE) 10 MG tablet     multivitamin RENAL (RENAVITE RX/NEPHROVITE) 1 MG tablet     omeprazole (PRILOSEC) 20 MG DR capsule     polyethylene glycol (MIRALAX) 17 GM/Dose powder     sertraline (ZOLOFT) 100 MG tablet     sevelamer carbonate (RENVELA) 800 MG tablet     Specialty Vitamins Products (MAGNESIUM PLUS PROTEIN) 133 MG tablet     thiamine (B-1) 100 MG tablet     vitamin B6 (PYRIDOXINE) 100 MG tablet     melatonin 10 MG TABS tablet     methocarbamol (ROBAXIN) 500 MG tablet     OLANZapine (ZYPREXA) 5 MG tablet     oxyCODONE (ROXICODONE) 5 MG tablet     senna-docusate  "(SENOKOT-S/PERICOLACE) 8.6-50 MG tablet     No current facility-administered medications for this visit.      Physical Exam:  Vital signs:      BP: 95/57 Pulse: 101           Height: 185.4 cm (6' 1\") Weight: 127 kg (279 lb 15.8 oz) (Pt reported)  Estimated body mass index is 36.94 kg/m  as calculated from the following:    Height as of this encounter: 1.854 m (6' 1\").    Weight as of this encounter: 127 kg (279 lb 15.8 oz).  He has full strength in his bilateral upper and lower extremities.  Sensation is intact light touch throughout.  Incision is well-healed.  Results Reviewed:  I personally viewed the images of a CT scan of the cervical and thoracic spine.  This shows a well fused T2 and T3 vertebral body area where the prior osteomyelitis was present.  There is also no significant changes in the hardware except for the ends at C5 and the bottom at T6.  These do show some haloing around the screws at these ends more significantly in the cervical spine than the thoracic spine.  Overall this is not displaced the hardware is any amount significantly.  Assessment:  58-year-old male with T2-3 osteomyelitis now healed and fused.  There does appear to be some haloing around the top and bottom screws however no significant displacement.  Plan:  Okay to continue advance activity as tolerated and continue with rehab for his myelopathy.  At this point he could still continue to get better but he is over a year out and may have reached the majority of his improvement.  For the haloing around the screws I would not recommend any different treatment right now.  Should this continue to get worse over time we could look to evaluate him for an extension or even hardware removal given the original reason for placing the hardware has now healed.  Based on imaging results right now and symptoms I would not recommend any changes however.  He can follow-up with me as needed going forward.    Fritz Boles MD      Again, thank you for " allowing me to participate in the care of your patient.        Sincerely,        Fritz Boles MD

## 2024-01-15 ENCOUNTER — TELEPHONE (OUTPATIENT)
Dept: TRANSPLANT | Facility: CLINIC | Age: 59
End: 2024-01-15
Payer: MEDICARE

## 2024-01-15 ENCOUNTER — TELEPHONE (OUTPATIENT)
Dept: TRANSPLANT | Facility: CLINIC | Age: 59
End: 2024-01-15

## 2024-01-15 NOTE — TELEPHONE ENCOUNTER
VM message left reminding patient of upcoming PKE appointments at [facility] on 1/18/24 starting at 0730. Instructed patient may eat breakfast and take regularly scheduled medications.  Contact information given for any further questions/concerns/need to reschedule.

## 2024-01-15 NOTE — TELEPHONE ENCOUNTER
Patient called to touch base with the RNCC regarding his Evaluation appointment for this week. Patient will have to re schedule due to his son who is his transportation has tested positive for Covid.

## 2024-01-16 ENCOUNTER — TELEPHONE (OUTPATIENT)
Dept: TRANSPLANT | Facility: CLINIC | Age: 59
End: 2024-01-16

## 2024-01-16 NOTE — TELEPHONE ENCOUNTER
Please call Shay,  He wanted to discuss his case.  His son came down with covid and will need to re-schedule his evaluation that is scheduled for  01/18/2024.  His son is his .

## 2024-01-16 NOTE — TELEPHONE ENCOUNTER
Patient Call: General  Route to LPN    Reason for call: patient had question regarding medical records and anyother documents that need to be fax to coordinator. More details with call back.      Call back needed? Yes    Return Call Needed  Same as documented in contacts section  When to return call?: Same day: Route High Priority

## 2024-04-04 ENCOUNTER — TELEPHONE (OUTPATIENT)
Dept: TRANSPLANT | Facility: CLINIC | Age: 59
End: 2024-04-04
Payer: MEDICARE

## 2024-04-04 NOTE — TELEPHONE ENCOUNTER
Patient Call: General  Route to LPN    Reason for call:  patient have evaluation seesion on April 11th with coordinator. Patient is currently in hospital and will be going to rehab after that. They are looking to reschedule appointment.       Call back needed? Yes    Return Call Needed  Same as documented in contacts section  When to return call?: Same day: Route High Priority

## 2024-04-17 NOTE — PROGRESS NOTES
"HPI:  57-year-old male status post C5-T6 posterior fusion with T2-3 decompression for thoracic myelopathy presents after a recent admission to a local hospital for bacteremia and possible sepsis.  He is now on antibiotics.  He denies any changes in his neurologic status since we last saw him.  A CT scan was done showing possible displacement of the upper lateral mass screws in the cervical spine.  He denies any significant pain and no wound healing issues.  He is continuing to improve with his thoracic myelopathy symptoms as he was last clinic visit.  Current Outpatient Medications   Medication     acetaminophen (TYLENOL) 325 MG tablet     ALPRAZolam (XANAX) 1 MG tablet     ammonium lactate (AMLACTIN) 12 % external cream     atorvastatin (LIPITOR) 20 MG tablet     baclofen (LIORESAL) 10 MG tablet     calcium carbonate (TUMS) 500 MG chewable tablet     cefuroxime (CEFTIN) 250 MG tablet     cetirizine (ZYRTEC) 10 MG tablet     cyanocobalamin (VITAMIN B-12) 500 MCG tablet     finasteride (PROSCAR) 5 MG tablet     fluticasone (FLONASE) 50 MCG/ACT nasal spray     gabapentin (NEURONTIN) 100 MG capsule     hydrOXYzine (ATARAX) 25 MG tablet     midodrine (PROAMATINE) 10 MG tablet     multivitamin RENAL (RENAVITE RX/NEPHROVITE) 1 MG tablet     naloxone (NARCAN) 4 MG/0.1ML nasal spray     omeprazole (PRILOSEC) 20 MG DR capsule     polyethylene glycol (MIRALAX) 17 GM/Dose powder     sertraline (ZOLOFT) 100 MG tablet     sevelamer carbonate (RENVELA) 800 MG tablet     vitamin B6 (PYRIDOXINE) 100 MG tablet     No current facility-administered medications for this visit.      Physical Exam:  Vital signs:                    Height: 185.4 cm (6' 1\") Weight: 132.5 kg (292 lb)  Estimated body mass index is 38.52 kg/m  as calculated from the following:    Height as of this encounter: 1.854 m (6' 1\").    Weight as of this encounter: 132.5 kg (292 lb).   Is full-strength in his bilateral lower extremities.  Sensation is intact to light " Ambulating to bathroom. Voiding large amounts clear urine.   touch.  His incision is well-healed without any erythema swelling or drainage.  Results Reviewed:  I personally viewed the images of the CT scan of the cervical and thoracic spine showing lateral mass screws at C5 and C6 which have been displaced posteriorly and no longer significantly in the bone.  The thoracic screws at T1 and T2 are firmly in place and there is significant improvement in the kyphosis as compared to preop.  I also reviewed an MRI of the thoracic spine which shows significant decompression of the thoracic spinal cord without any further compression.  No evidence of any abscess or fluid collection.  Assessment:  57-year-old male with displaced lateral mass screws at C5 and 6.  No change in symptoms.  Plan:  We discussed possible revision in the future.  Given he has an infection being treated right now I would recommend continuing to treat that as his T1 and T2 screws are in a good spot and he is continuing to improve with his symptoms.  We will discuss further revision once he has completed treatment for his infection.  I will see him back as previously scheduled in about 4 weeks.  We discussed warning symptoms including increased pain, numbness and weakness.    Fritz Boles MD

## 2024-06-02 NOTE — LETTER
"    8/4/2022         RE: Shay Jimenez  [address] [place], [place] [address] [id number]        Dear Colleague,    Thank you for referring your patient, Shay Jimenez, to the [facility] [place]. Please see a copy of my visit note below.    HPI:  57 year old male s/p C5-T6 Fusion and decompression for T2-3 osteomyelitis.  Now able to walk with a walker.  Living at home with assistance.  No significant pain and feeling better.  Sensation still not quite normal.  No bladder control issues.    Current Outpatient Medications   Medication     acetaminophen (TYLENOL) 325 MG tablet     ALPRAZolam (XANAX) 1 MG tablet     ammonium lactate (AMLACTIN) 12 % external cream     amoxicillin (AMOXIL) 500 MG capsule     atorvastatin (LIPITOR) 20 MG tablet     calcium carbonate (TUMS) 500 MG chewable tablet     cetirizine (ZYRTEC) 10 MG tablet     cyanocobalamin (VITAMIN B-12) 500 MCG tablet     DULoxetine (CYMBALTA) 30 MG capsule     finasteride (PROSCAR) 5 MG tablet     fluticasone (FLONASE) 50 MCG/ACT nasal spray     gabapentin (NEURONTIN) 100 MG capsule     hydrOXYzine (ATARAX) 25 MG tablet     lactobacillus rhamnosus, GG, (CULTURELL) capsule     midodrine (PROAMATINE) 10 MG tablet     multivitamin RENAL (RENAVITE RX/NEPHROVITE) 1 MG tablet     omeprazole (PRILOSEC) 20 MG DR capsule     polyethylene glycol (MIRALAX) 17 GM/Dose powder     sertraline (ZOLOFT) 100 MG tablet     sevelamer carbonate (RENVELA) 800 MG tablet     vitamin B6 (PYRIDOXINE) 100 MG tablet     No current facility-administered medications for this visit.      Physical Exam:  Vital signs:      BP: 97/63 Pulse: 86           Height: 185.4 cm (6' 1\") Weight: 132.5 kg (292 lb)  Estimated body mass index is 38.52 kg/m  as calculated from the following:    Height as of this encounter: 1.854 m (6' 1\").    Weight as of this encounter: 132.5 kg (292 lb).   Full strength in bilateral lower extremities.  Sensation diminished to normal in BLE.  " Refill Routing Note   Medication(s) are not appropriate for processing by [facility] for the following reason(s):        Required vitals abnormal    [facility] action(s):  Defer               Appointments  past 12m or future 3m with PCP    Date Provider   Last Visit   4/22/2024 Eric Hernandes Jr., MD   Next Visit   10/29/2024 Eric Hernandes Jr., MD   ED visits in past 90 days: 0        Note composed:2:17 AM 06/02/2024           Incision well healed.    Results Reviewed:  I personally reviewed x rays today showing stable hardware without fracture and no increased kyphosis.  Assessment:  57 year old male s/p C5-T6 Fusion and decompression T2-3 for spinal cord comrepession from osteomyelitis.  Plan:  Ok to lift up to 25 pounds.  Continue with therapy for myelopathy.  Follow up 6 more weeks with me.      Fritz Boles MD      Again, thank you for allowing me to participate in the care of your patient.        Sincerely,        Fritz Boles MD

## 2024-06-20 ENCOUNTER — TELEPHONE (OUTPATIENT)
Dept: TRANSPLANT | Facility: CLINIC | Age: 59
End: 2024-06-20
Payer: MEDICARE

## 2024-06-20 NOTE — TELEPHONE ENCOUNTER
Called pt and left VM reminder of upcoming PKE visit on 6/27/24, provided clinic address, check in time, and encouraged brining a support person. Provided transplant office number for any questions.

## 2024-06-24 ENCOUNTER — TELEPHONE (OUTPATIENT)
Dept: TRANSPLANT | Facility: CLINIC | Age: 59
End: 2024-06-24
Payer: MEDICARE
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
## 2024-06-24 NOTE — TELEPHONE ENCOUNTER
Returned call to Shay but was only able to leave a voice message. Understand he wants to cancel his PKE for 6/27/2024. Asked him to return my call so we can reschedule.

## 2024-06-25 ENCOUNTER — TELEPHONE (OUTPATIENT)
Dept: TRANSPLANT | Facility: CLINIC | Age: 59
End: 2024-06-25
Payer: MEDICARE

## 2024-06-25 DIAGNOSIS — N18.6 END STAGE RENAL DISEASE (H): ICD-10-CM

## 2024-06-25 DIAGNOSIS — Z76.82 ORGAN TRANSPLANT CANDIDATE: ICD-10-CM

## 2024-06-25 DIAGNOSIS — I10 ESSENTIAL HYPERTENSION: ICD-10-CM

## 2024-06-25 DIAGNOSIS — Z01.818 PRE-TRANSPLANT EVALUATION FOR KIDNEY TRANSPLANT: Primary | ICD-10-CM

## 2024-06-25 DIAGNOSIS — E78.5 HYPERLIPIDEMIA: ICD-10-CM

## 2024-06-25 NOTE — TELEPHONE ENCOUNTER
Shay called needing to reschedule his PKE from 6/27 to 9/18. His care partner is not able to attend the 6/27 date. He will also be scheduled for the virtual MTP on 7/11 at 0930. He will arrive for evaluation on 9/18 at 0730. Instructed to eat breakfast and take his morning medications. Reviewed the goals of an evaluation and approval process. Reviewed the list of providers he will see and their roles. Reviewed the schedule for the day. Orders routed to scheduling.

## 2024-07-11 ENCOUNTER — VIRTUAL VISIT (OUTPATIENT)
Dept: TRANSPLANT | Facility: CLINIC | Age: 59
End: 2024-07-11
Attending: SURGERY
Payer: MEDICARE

## 2024-07-11 DIAGNOSIS — Z76.82 AWAITING ORGAN TRANSPLANT: Primary | ICD-10-CM

## 2024-07-11 NOTE — GROUP NOTE
Date: 7/11/2024    Scheduled Start Time:  9:30 AM   Scheduled End Time: 10:45 AM    Department: [facility]    Facilitator(s): Toya Harvey RN    Documentation:  Solid Organ Transplant Education      Patient attended the virtual pre-transplant patient education class today. The [facility] website pre-transplant modules were viewed:     Content reviewed:  Living Donation and how to access that program  Paired exchange  Kidney Donor Profile Index (KDPI)  Waiting list issues (right to decline without penalty, high PHS risk donors, what to expect when called with an offer)  Hospital experience, length of stay, need to stay locally post-discharge (2-4 weeks)    Surgical complications with video                      Post-surgery lifting and driving restrictions  Post-transplant routines, frequency of lab work and clinic visits  Role of Transplant Coordinator    Participants were informed of the benefits of transplant as well as potential risks such as infection, cancer, and death. The need for total adherence with immunosuppression medications and following transplant regimens was stressed.  The overall evaluation/approval/listing process was reviewed.  Pt was engaged in class and asked appropriate questions. Encouraged to contact assigned RNCC with any further questions.            Patient:   Shay Jimenez    Transplant Episode(s):  Kidney Transplant Referral - 8/21/2023

## 2024-09-11 ENCOUNTER — TELEPHONE (OUTPATIENT)
Dept: TRANSPLANT | Facility: CLINIC | Age: 59
End: 2024-09-11

## 2024-09-11 NOTE — TELEPHONE ENCOUNTER
[facility] Call Center    Phone Message    May a detailed message be left on voicemail: yes     Reason for Call: Appointment Intake    Patient called and stated he is unable to come to his evaluation on 09/18/24 because his support person is not available.  Please call patient to confirm a new PKE date and let scheduling team know how we can proceed with existing appointments.  Thank you!    Action Taken: Message routed to:  [facility] TRU PANIAGUA    Travel Screening: Not Applicable     Date of Service:

## 2024-09-16 NOTE — TELEPHONE ENCOUNTER
[facility] Call Center    Phone Message    May a detailed message be left on voicemail: yes     Reason for Call: Other: Patient called in as he has not received a call back regarding rescheduling his Eval on 9/18. Please reach out to the patient to discuss rescheduling. Appointment are currently still scheduled, so please let us know if we can cancel those. Patient did want it noted that he needs to reschedule due to his ride/caretaker is no longer available that day.     Action Taken: Message routed to:  [facility] Susy SCHULTZ    Travel Screening: Not Applicable

## 2024-12-03 ENCOUNTER — TELEPHONE (OUTPATIENT)
Dept: TRANSPLANT | Facility: CLINIC | Age: 59
End: 2024-12-03
Payer: MEDICARE

## 2024-12-03 NOTE — TELEPHONE ENCOUNTER
Reached out to the patient, no answer, left a VM:  Patient called on: 12/03/2024  Current Appt Date/Time: 12/18/2024  Reason for Call: 2-week Reminder Call

## 2024-12-22 ENCOUNTER — HEALTH MAINTENANCE LETTER (OUTPATIENT)
Age: 59
End: 2024-12-22

## 2025-01-14 ENCOUNTER — TELEPHONE (OUTPATIENT)
Dept: NEUROSURGERY | Facility: CLINIC | Age: 60
End: 2025-01-14
Payer: MEDICARE

## 2025-01-14 NOTE — TELEPHONE ENCOUNTER
I called patient and LM for patient to return call. Pt should get disability paperwork completed with PCP not Dr. Boles. Pt has not seen Dr. Boles in over 1 yr.

## 2025-01-14 NOTE — TELEPHONE ENCOUNTER
[facility] Call Center    Phone Message    May a detailed message be left on voicemail: yes     Reason for Call: Form or Letter   Type or form/letter needing completion: Explanation of diagnosis and why he is disabled, states he is needing this in order to apply for assisted living through Medicaid.   Provider: Dr. Boles  Date form needed: ASAP per patient  Once completed: email to [email or web address]    Please call patient to confirm once this has been sent.    Action Taken: Message routed to:  Other: [facility]    Travel Screening: Not Applicable     Date of Service:

## 2025-01-16 NOTE — TELEPHONE ENCOUNTER
The patient returned a call to Dr. Boles's Care Team.  The patient stated he was able to retrieve the message that was left for him.  This writer communicated the message in the chart.  The patient is requesting a call back to discuss.  Thank you.

## 2025-01-16 NOTE — TELEPHONE ENCOUNTER
I returned call to patient and he is requesting a copy of Dr. Boles last visit note to be mailed to his home address(verified).

## 2025-02-06 NOTE — PLAN OF CARE
[id number]: Patient is Alert and oriented x 4. VSS on room air. Complain of pain in neck, prn tylenol and flexiril given x1. Up with assist of 2, S.S due to dizziness. Bowel meds given, pt passing gas but no BM yet. Tolerating regular diet. Pt currently off unit at inpatient dialysis.  Discharge pending placement.     20

## 2025-02-20 NOTE — PLAN OF CARE
Goal Outcome Evaluation:       Pt had no acute issue this shift. Alert and oriented x 4. Able to make needs known to staffs. Pt continue to be oliguric this shift. Dialysis scheduled this shift. Left at 0640 this morning. Was on the light many times this shift. Incontinent of bowel this shift. No acute issue noted this shift. Will continue with POC.       Patient's most recent vital signs are:     Vital signs:  BP: 84/47  Temp: 97.4  HR: 86  RR: 16  SpO2: 97 %     Patient does not have new respiratory symptoms.  Patient does not have new sore throat.  Patient does not have a fever greater than 99.5.                         good balance

## 2025-04-10 ENCOUNTER — TELEPHONE (OUTPATIENT)
Dept: TRANSPLANT | Facility: CLINIC | Age: 60
End: 2025-04-10
Payer: COMMERCIAL

## 2025-04-10 NOTE — TELEPHONE ENCOUNTER
Reached out to the patient, no answer, left a VM:  Patient called on: 04/10/2025  Current Appt Date/Time: 04/24/2025  Reason for Call: 2-week Reminder Call

## 2025-05-01 ENCOUNTER — TELEPHONE (OUTPATIENT)
Dept: TRANSPLANT | Facility: CLINIC | Age: 60
End: 2025-05-01
Payer: MEDICARE

## 2025-05-01 NOTE — TELEPHONE ENCOUNTER
Called patient today and reached his VM. LM I am calling to follow up on his pre K eval appts he missed last week.  Asked he returns my call and that I did send him a My Chart message as well     Called dialysis SW and reached VM.  LM explaining situation, asked for their opinion and to call me back.
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
## (undated) DEVICE — SUCTION TIP YANKAUER STR K87

## (undated) DEVICE — DRAPE SHEET REV FOLD 3/4 9349

## (undated) DEVICE — SU VICRYL 2-0 CT-2 CR 8X18" J726D

## (undated) DEVICE — GLOVE PROTEXIS W/NEU-THERA 7.0  2D73TE70

## (undated) DEVICE — SU ETHILON 3-0 FS-1 18" 669H

## (undated) DEVICE — SYR 10ML LL W/O NDL 302995

## (undated) DEVICE — KIT DRAIN CLOSED WOUND SUCTION MED 400ML RESVR

## (undated) DEVICE — TUBING SUCTION MEDI-VAC SOFT 3/16"X20' N520A

## (undated) DEVICE — LINEN TOWEL PACK X5 5464

## (undated) DEVICE — DRSG ADAPTIC 3X8" 6113

## (undated) DEVICE — BONE CLEANING TIP INTERPULSE  0210-010-000

## (undated) DEVICE — SU PROLENE 2-0 SHDA 36" 8523H

## (undated) DEVICE — TOOL DISSECT MIDAS MR8 14CM MATCH HEAD 3MM MR8-14MH30

## (undated) DEVICE — TOOL DISSECT MIDAS MR8 14CM BALL 5MM DIA MR8-14BA50

## (undated) DEVICE — STPL SKIN PROXIMATE 35 WIDE PMW35

## (undated) DEVICE — DRAPE MAYO STAND 23X54 8337

## (undated) DEVICE — PAD FOAM JACKSON TABLE CHEST

## (undated) DEVICE — MANIFOLD NEPTUNE 4 PORT 700-20

## (undated) DEVICE — SPONGE COTTONOID 1/2X1/2" 20-04S

## (undated) DEVICE — MARKER SPHERES PASSIVE MEDT PACK 5 8801075

## (undated) DEVICE — SU VICRYL 3-0 SH 8X18" UND J864D

## (undated) DEVICE — PACK GOWN 3/PK DISP XL SBA32GPFCB

## (undated) DEVICE — ESU ELEC BLADE 2.75" COATED/INSULATED E1455

## (undated) DEVICE — CUP AND LID 2PK 2OZ STERILE  SSK9006A

## (undated) DEVICE — SYR BULB IRRIG DOVER 60 ML LATEX FREE 67000

## (undated) DEVICE — STRAP UNIVERSAL POSITIONING 2-PIECE 4X47X76" 91-287

## (undated) DEVICE — DRAPE ISOLATION BAG 1003

## (undated) DEVICE — SPONGE COTTONOID 1X3" 20-10S

## (undated) DEVICE — GLOVE PROTEXIS BLUE W/NEU-THERA 7.5  2D73EB75

## (undated) DEVICE — DRAPE STERI TOWEL SM 1000

## (undated) DEVICE — SU ETHILON 3-0 PS-1 18" 1663H

## (undated) DEVICE — BLADE CLIPPER SGL USE 9680

## (undated) DEVICE — CLIP HORIZON MED BLUE 002200

## (undated) DEVICE — LINEN TOWEL PACK X30 5481

## (undated) DEVICE — COVER BIG CASE BACK TABLE 6'X2' 420-HD-S

## (undated) DEVICE — PREP SKIN SCRUB TRAY 4461A

## (undated) DEVICE — GOWN XXL 9575

## (undated) DEVICE — ESU GROUND PAD ADULT W/CORD E7507

## (undated) DEVICE — RX BACITRACIN OINTMENT 0.9G 1/32OZ CUR001109

## (undated) DEVICE — CLIP HORIZON SM RED WIDE SLOT 001201

## (undated) DEVICE — Device

## (undated) DEVICE — TOOL DISSECT MIDAS MR8 14CM MATCH HD DMD 3MM MR8-14MH30D

## (undated) DEVICE — SU VICRYL 2-0 CT-1 18' J739D

## (undated) DEVICE — DRAPE STERI TOWEL LG 1010

## (undated) DEVICE — KIT PATIENT CARE JACKSON SPINE PACK 5808PV

## (undated) DEVICE — PREP POVIDONE-IODINE 7.5% SCRUB 4OZ BOTTLE MDS093945

## (undated) DEVICE — SU VICRYL 0 CT-1 27" UND J260H

## (undated) DEVICE — SU VICRYL 0 CT-1 CR 8X18" J740D

## (undated) DEVICE — PREP CHLORAPREP CLEAR 3ML 930400

## (undated) DEVICE — PEN MARKING SKIN W/LABELS 31145884

## (undated) DEVICE — ESU CORD BIPOLAR GREEN 10-4000

## (undated) DEVICE — DRSG PRIMAPORE 03 1/8X6" 66000318

## (undated) DEVICE — SUCTION IRR SYSTEM W/O TIP INTERPULSE HANDPIECE 0210-100-000

## (undated) DEVICE — STPL SKIN 35W 6.9MM  PXW35

## (undated) DEVICE — GLOVE PROTEXIS MICRO 7.0  2D73PM70

## (undated) DEVICE — ADH SKIN CLOSURE PREMIERPRO EXOFIN 1.0ML 3470

## (undated) DEVICE — DRAIN HEMOVAC RESERVOIR KIT 10FR 1/8" MED 00-2550-002-10

## (undated) DEVICE — STPL SKIN 35W ROTATING HEAD PRW35

## (undated) DEVICE — DRAPE IOBAN INCISE 23X17" 6650EZ

## (undated) DEVICE — CATH TRAY FOLEY SURESTEP 16FR W/TMP PRB STLK LATEX A319416AM

## (undated) DEVICE — TUBING SMOKE EVAC 3/8"X10' 0702-045-023

## (undated) DEVICE — SPONGE SURGIFOAM 100 1974

## (undated) DEVICE — ESU PENCIL W/COATED BLADE E2450H

## (undated) DEVICE — DRAPE O ARM TUBE 9732722

## (undated) DEVICE — SPONGE COTTONOID 1/2X3" 20-07S

## (undated) DEVICE — SU VICRYL 0 CT-2 27" J334H

## (undated) DEVICE — WIPES FOLEY CARE SURESTEP PROVON DFC100

## (undated) DEVICE — DRAPE POUCH INSTRUMENT 1018

## (undated) DEVICE — PREP DURAPREP 26ML APL 8630

## (undated) DEVICE — DECANTER VIAL 2006S

## (undated) DEVICE — SPONGE LAP 18X18" X8435

## (undated) DEVICE — GOWN LG DISP 9515

## (undated) DEVICE — SPONGE COTTONOID 1/2X3" 80-1407

## (undated) DEVICE — ESU PENCIL SMOKE EVAC W/ROCKER SWITCH 0703-047-000

## (undated) DEVICE — LINEN TOWEL PACK X6 WHITE 5487

## (undated) DEVICE — SUCTION MANIFOLD NEPTUNE 2 SYS 4 PORT 0702-020-000

## (undated) DEVICE — PREP POVIDONE IODINE SOLUTION 10% 4OZ BOTTLE 29906-004

## (undated) DEVICE — PACK LARGE SPINE SNE15LSFSE

## (undated) DEVICE — SPONGE COTTONOID 3/4X3/4" 80-1401

## (undated) DEVICE — IONM UP TO 7 HOURS

## (undated) DEVICE — PACK NEURO MINOR UMMC SNE32MNMU4

## (undated) DEVICE — SU VICRYL 2-0 CT-2 27" J333H

## (undated) DEVICE — DRAPE U SPLIT 74X120" 29440

## (undated) DEVICE — NDL BLUNT 18GA 1.5" W/O FILTER 305180

## (undated) DEVICE — SYR 30ML LL W/O NDL 302832

## (undated) DEVICE — SUCTION FRAZIER 12FR W/HANDLE K73

## (undated) DEVICE — TAPE MICROFOAM 4" 1528-4

## (undated) DEVICE — ESU HOLSTER PLASTIC DISP E2400

## (undated) DEVICE — IMM COLLAR CERVICAL VISTA ADJ ADULT 79-83380

## (undated) DEVICE — BONE WAX 2.5GM W31G

## (undated) DEVICE — SPONGE COTTONOID 1/2X1/2" 80-1400

## (undated) DEVICE — DRSG GAUZE 4X4" TRAY

## (undated) DEVICE — RX SURGIFLO HEMOSTATIC MATRIX 8ML 2991

## (undated) DEVICE — ESU BIPOLAR SEALER AQUAMANTYS 6MM 23-112-1

## (undated) DEVICE — RX SURGIFLO HEMOSTATIC MATRIX W/THROMBIN 8ML 2994

## (undated) DEVICE — PACK SET-UP STD 9102

## (undated) DEVICE — POSITIONER PT PRONESAFE HEAD REST W/DERMAPROX INSERT 40599

## (undated) DEVICE — ESU CLEANER TIP 31142717

## (undated) RX ORDER — HYDROMORPHONE HYDROCHLORIDE 1 MG/ML
INJECTION, SOLUTION INTRAMUSCULAR; INTRAVENOUS; SUBCUTANEOUS
Status: DISPENSED
Start: 2022-12-06

## (undated) RX ORDER — CEFAZOLIN SODIUM 1 G/3ML
INJECTION, POWDER, FOR SOLUTION INTRAMUSCULAR; INTRAVENOUS
Status: DISPENSED
Start: 2022-06-27

## (undated) RX ORDER — FENTANYL CITRATE 50 UG/ML
INJECTION, SOLUTION INTRAMUSCULAR; INTRAVENOUS
Status: DISPENSED
Start: 2022-06-27

## (undated) RX ORDER — CEFAZOLIN SODIUM 1 G/3ML
INJECTION, POWDER, FOR SOLUTION INTRAMUSCULAR; INTRAVENOUS
Status: DISPENSED
Start: 2022-12-06

## (undated) RX ORDER — REMIFENTANIL HYDROCHLORIDE 1 MG/ML
INJECTION, POWDER, LYOPHILIZED, FOR SOLUTION INTRAVENOUS
Status: DISPENSED
Start: 2022-06-27

## (undated) RX ORDER — FENTANYL CITRATE 50 UG/ML
INJECTION, SOLUTION INTRAMUSCULAR; INTRAVENOUS
Status: DISPENSED
Start: 2022-12-06

## (undated) RX ORDER — LIDOCAINE HYDROCHLORIDE 20 MG/ML
INJECTION, SOLUTION EPIDURAL; INFILTRATION; INTRACAUDAL; PERINEURAL
Status: DISPENSED
Start: 2022-06-27

## (undated) RX ORDER — GINSENG 100 MG
CAPSULE ORAL
Status: DISPENSED
Start: 2022-06-27

## (undated) RX ORDER — HYDROMORPHONE HCL IN WATER/PF 6 MG/30 ML
PATIENT CONTROLLED ANALGESIA SYRINGE INTRAVENOUS
Status: DISPENSED
Start: 2022-12-06

## (undated) RX ORDER — VANCOMYCIN HYDROCHLORIDE 1 G/20ML
INJECTION, POWDER, LYOPHILIZED, FOR SOLUTION INTRAVENOUS
Status: DISPENSED
Start: 2022-06-27

## (undated) RX ORDER — FENTANYL CITRATE-0.9 % NACL/PF 10 MCG/ML
PLASTIC BAG, INJECTION (ML) INTRAVENOUS
Status: DISPENSED
Start: 2022-12-06

## (undated) RX ORDER — CEFAZOLIN SODIUM/WATER 2 G/20 ML
SYRINGE (ML) INTRAVENOUS
Status: DISPENSED
Start: 2022-06-27

## (undated) RX ORDER — ONDANSETRON 2 MG/ML
INJECTION INTRAMUSCULAR; INTRAVENOUS
Status: DISPENSED
Start: 2022-12-06

## (undated) RX ORDER — PROPOFOL 10 MG/ML
INJECTION, EMULSION INTRAVENOUS
Status: DISPENSED
Start: 2022-06-27

## (undated) RX ORDER — PROPOFOL 10 MG/ML
INJECTION, EMULSION INTRAVENOUS
Status: DISPENSED
Start: 2022-12-06

## (undated) RX ORDER — ALBUMIN, HUMAN INJ 5% 5 %
SOLUTION INTRAVENOUS
Status: DISPENSED
Start: 2022-06-27

## (undated) RX ORDER — SODIUM CHLORIDE 9 MG/ML
INJECTION, SOLUTION INTRAVENOUS
Status: DISPENSED
Start: 2022-12-06

## (undated) RX ORDER — CEFAZOLIN SODIUM/WATER 2 G/20 ML
SYRINGE (ML) INTRAVENOUS
Status: DISPENSED
Start: 2022-12-06

## (undated) RX ORDER — ESMOLOL HYDROCHLORIDE 10 MG/ML
INJECTION INTRAVENOUS
Status: DISPENSED
Start: 2022-12-06

## (undated) RX ORDER — DEXAMETHASONE SODIUM PHOSPHATE 4 MG/ML
INJECTION, SOLUTION INTRA-ARTICULAR; INTRALESIONAL; INTRAMUSCULAR; INTRAVENOUS; SOFT TISSUE
Status: DISPENSED
Start: 2022-06-27

## (undated) RX ORDER — BUPIVACAINE HYDROCHLORIDE AND EPINEPHRINE 5; 5 MG/ML; UG/ML
INJECTION, SOLUTION EPIDURAL; INTRACAUDAL; PERINEURAL
Status: DISPENSED
Start: 2022-06-27

## (undated) RX ORDER — CALCIUM CHLORIDE 100 MG/ML
INJECTION INTRAVENOUS; INTRAVENTRICULAR
Status: DISPENSED
Start: 2022-12-06

## (undated) RX ORDER — ONDANSETRON 2 MG/ML
INJECTION INTRAMUSCULAR; INTRAVENOUS
Status: DISPENSED
Start: 2022-06-27